# Patient Record
Sex: MALE | Race: ASIAN | NOT HISPANIC OR LATINO | Employment: OTHER | URBAN - METROPOLITAN AREA
[De-identification: names, ages, dates, MRNs, and addresses within clinical notes are randomized per-mention and may not be internally consistent; named-entity substitution may affect disease eponyms.]

---

## 2017-01-03 ENCOUNTER — OFFICE VISIT - HEALTHEAST (OUTPATIENT)
Dept: SPEECH THERAPY | Facility: REHABILITATION | Age: 52
End: 2017-01-03

## 2017-01-03 ENCOUNTER — OFFICE VISIT - HEALTHEAST (OUTPATIENT)
Dept: PHYSICAL THERAPY | Facility: REHABILITATION | Age: 52
End: 2017-01-03

## 2017-01-03 DIAGNOSIS — R29.898 LOWER EXTREMITY WEAKNESS: ICD-10-CM

## 2017-01-03 DIAGNOSIS — R29.3 ABNORMAL POSTURE: ICD-10-CM

## 2017-01-03 DIAGNOSIS — I63.9 CVA (CEREBRAL VASCULAR ACCIDENT) (H): ICD-10-CM

## 2017-01-03 DIAGNOSIS — R13.12 OROPHARYNGEAL DYSPHAGIA: ICD-10-CM

## 2017-01-03 DIAGNOSIS — I63.9 STROKE (H): ICD-10-CM

## 2017-01-03 DIAGNOSIS — Z74.09 IMPAIRED FUNCTIONAL MOBILITY, BALANCE, GAIT, AND ENDURANCE: ICD-10-CM

## 2017-01-03 DIAGNOSIS — R29.898 WEAKNESS OF BOTH LOWER EXTREMITIES: ICD-10-CM

## 2017-01-03 DIAGNOSIS — I69.320 APHASIA, POST-STROKE: ICD-10-CM

## 2017-01-03 DIAGNOSIS — I69.322 DYSARTHRIA, POST-STROKE: ICD-10-CM

## 2017-01-05 ENCOUNTER — OFFICE VISIT - HEALTHEAST (OUTPATIENT)
Dept: PHYSICAL THERAPY | Facility: REHABILITATION | Age: 52
End: 2017-01-05

## 2017-01-05 ENCOUNTER — OFFICE VISIT - HEALTHEAST (OUTPATIENT)
Dept: SPEECH THERAPY | Facility: REHABILITATION | Age: 52
End: 2017-01-05

## 2017-01-05 DIAGNOSIS — I63.9 STROKE (H): ICD-10-CM

## 2017-01-05 DIAGNOSIS — R29.3 ABNORMAL POSTURE: ICD-10-CM

## 2017-01-05 DIAGNOSIS — R13.12 OROPHARYNGEAL DYSPHAGIA: ICD-10-CM

## 2017-01-05 DIAGNOSIS — I69.320 APHASIA, POST-STROKE: ICD-10-CM

## 2017-01-05 DIAGNOSIS — I63.9 CVA (CEREBRAL VASCULAR ACCIDENT) (H): ICD-10-CM

## 2017-01-05 DIAGNOSIS — I69.322 DYSARTHRIA, POST-STROKE: ICD-10-CM

## 2017-01-05 DIAGNOSIS — Z74.09 IMPAIRED FUNCTIONAL MOBILITY, BALANCE, GAIT, AND ENDURANCE: ICD-10-CM

## 2017-01-05 DIAGNOSIS — R29.898 LOWER EXTREMITY WEAKNESS: ICD-10-CM

## 2017-01-05 DIAGNOSIS — R29.898 WEAKNESS OF BOTH LOWER EXTREMITIES: ICD-10-CM

## 2017-01-06 ENCOUNTER — OFFICE VISIT - HEALTHEAST (OUTPATIENT)
Dept: OCCUPATIONAL THERAPY | Facility: REHABILITATION | Age: 52
End: 2017-01-06

## 2017-01-06 DIAGNOSIS — G81.91 RIGHT HEMIPARESIS (H): ICD-10-CM

## 2017-01-06 DIAGNOSIS — Z78.9 DECREASED ACTIVITIES OF DAILY LIVING (ADL): ICD-10-CM

## 2017-01-12 ENCOUNTER — OFFICE VISIT - HEALTHEAST (OUTPATIENT)
Dept: PHYSICAL THERAPY | Facility: REHABILITATION | Age: 52
End: 2017-01-12

## 2017-01-12 DIAGNOSIS — I63.9 STROKE (H): ICD-10-CM

## 2017-01-12 DIAGNOSIS — Z74.09 IMPAIRED FUNCTIONAL MOBILITY, BALANCE, GAIT, AND ENDURANCE: ICD-10-CM

## 2017-01-12 DIAGNOSIS — R29.3 ABNORMAL POSTURE: ICD-10-CM

## 2017-01-12 DIAGNOSIS — R29.898 LOWER EXTREMITY WEAKNESS: ICD-10-CM

## 2017-01-12 DIAGNOSIS — I63.9 CVA (CEREBRAL VASCULAR ACCIDENT) (H): ICD-10-CM

## 2017-01-12 DIAGNOSIS — R29.898 WEAKNESS OF BOTH LOWER EXTREMITIES: ICD-10-CM

## 2017-01-17 ENCOUNTER — OFFICE VISIT - HEALTHEAST (OUTPATIENT)
Dept: PHYSICAL THERAPY | Facility: REHABILITATION | Age: 52
End: 2017-01-17

## 2017-01-17 DIAGNOSIS — I63.9 STROKE (H): ICD-10-CM

## 2017-01-17 DIAGNOSIS — R29.3 ABNORMAL POSTURE: ICD-10-CM

## 2017-01-17 DIAGNOSIS — Z74.09 IMPAIRED FUNCTIONAL MOBILITY, BALANCE, GAIT, AND ENDURANCE: ICD-10-CM

## 2017-01-17 DIAGNOSIS — R29.898 WEAKNESS OF BOTH LOWER EXTREMITIES: ICD-10-CM

## 2017-01-19 ENCOUNTER — OFFICE VISIT - HEALTHEAST (OUTPATIENT)
Dept: PHYSICAL THERAPY | Facility: REHABILITATION | Age: 52
End: 2017-01-19

## 2017-01-19 DIAGNOSIS — Z74.09 IMPAIRED FUNCTIONAL MOBILITY, BALANCE, GAIT, AND ENDURANCE: ICD-10-CM

## 2017-01-19 DIAGNOSIS — R29.898 WEAKNESS OF BOTH LOWER EXTREMITIES: ICD-10-CM

## 2017-01-19 DIAGNOSIS — I63.9 STROKE (H): ICD-10-CM

## 2017-01-19 DIAGNOSIS — R29.3 ABNORMAL POSTURE: ICD-10-CM

## 2017-01-24 ENCOUNTER — OFFICE VISIT - HEALTHEAST (OUTPATIENT)
Dept: PHYSICAL THERAPY | Facility: REHABILITATION | Age: 52
End: 2017-01-24

## 2017-01-24 DIAGNOSIS — R29.3 ABNORMAL POSTURE: ICD-10-CM

## 2017-01-24 DIAGNOSIS — R29.898 WEAKNESS OF BOTH LOWER EXTREMITIES: ICD-10-CM

## 2017-01-24 DIAGNOSIS — Z74.09 IMPAIRED FUNCTIONAL MOBILITY, BALANCE, GAIT, AND ENDURANCE: ICD-10-CM

## 2017-01-24 DIAGNOSIS — I63.9 STROKE (H): ICD-10-CM

## 2017-01-25 ENCOUNTER — OFFICE VISIT - HEALTHEAST (OUTPATIENT)
Dept: FAMILY MEDICINE | Facility: CLINIC | Age: 52
End: 2017-01-25

## 2017-01-25 DIAGNOSIS — I63.9 STROKE (H): ICD-10-CM

## 2017-01-25 DIAGNOSIS — K13.0 LIP ULCER: ICD-10-CM

## 2017-01-25 DIAGNOSIS — G47.00 INSOMNIA: ICD-10-CM

## 2017-01-25 DIAGNOSIS — R32 INCONTINENCE: ICD-10-CM

## 2017-01-26 ENCOUNTER — COMMUNICATION - HEALTHEAST (OUTPATIENT)
Dept: FAMILY MEDICINE | Facility: CLINIC | Age: 52
End: 2017-01-26

## 2017-01-26 ENCOUNTER — OFFICE VISIT - HEALTHEAST (OUTPATIENT)
Dept: PHYSICAL THERAPY | Facility: REHABILITATION | Age: 52
End: 2017-01-26

## 2017-01-26 DIAGNOSIS — R29.3 ABNORMAL POSTURE: ICD-10-CM

## 2017-01-26 DIAGNOSIS — R29.898 WEAKNESS OF BOTH LOWER EXTREMITIES: ICD-10-CM

## 2017-01-26 DIAGNOSIS — Z74.09 IMPAIRED FUNCTIONAL MOBILITY, BALANCE, GAIT, AND ENDURANCE: ICD-10-CM

## 2017-01-26 DIAGNOSIS — I63.9 STROKE (H): ICD-10-CM

## 2017-01-27 ENCOUNTER — RECORDS - HEALTHEAST (OUTPATIENT)
Dept: ADMINISTRATIVE | Facility: OTHER | Age: 52
End: 2017-01-27

## 2017-01-27 ENCOUNTER — HOSPITAL ENCOUNTER (OUTPATIENT)
Dept: INTERVENTIONAL RADIOLOGY/VASCULAR | Facility: CLINIC | Age: 52
Discharge: HOME | End: 2017-01-27
Attending: RADIOLOGY

## 2017-01-27 ENCOUNTER — HOSPITAL ENCOUNTER (OUTPATIENT)
Dept: INTERVENTIONAL RADIOLOGY/VASCULAR | Facility: HOSPITAL | Age: 52
Discharge: HOME | End: 2017-01-27
Attending: RADIOLOGY

## 2017-01-27 DIAGNOSIS — R63.30 FEEDING DIFFICULTIES: ICD-10-CM

## 2017-01-30 ENCOUNTER — COMMUNICATION - HEALTHEAST (OUTPATIENT)
Dept: INTERVENTIONAL RADIOLOGY/VASCULAR | Facility: HOSPITAL | Age: 52
End: 2017-01-30

## 2017-01-31 ENCOUNTER — OFFICE VISIT - HEALTHEAST (OUTPATIENT)
Dept: PHYSICAL THERAPY | Facility: REHABILITATION | Age: 52
End: 2017-01-31

## 2017-01-31 DIAGNOSIS — I63.9 STROKE (H): ICD-10-CM

## 2017-01-31 DIAGNOSIS — R29.898 WEAKNESS OF BOTH LOWER EXTREMITIES: ICD-10-CM

## 2017-01-31 DIAGNOSIS — R29.3 ABNORMAL POSTURE: ICD-10-CM

## 2017-01-31 DIAGNOSIS — Z74.09 IMPAIRED FUNCTIONAL MOBILITY, BALANCE, GAIT, AND ENDURANCE: ICD-10-CM

## 2017-02-01 ENCOUNTER — HOSPITAL ENCOUNTER (OUTPATIENT)
Dept: NEUROLOGY | Facility: CLINIC | Age: 52
Setting detail: THERAPIES SERIES
Discharge: STILL ADMITTED OR ADMINISTRATIVE | End: 2017-02-01
Attending: PSYCHIATRY & NEUROLOGY

## 2017-02-01 ENCOUNTER — AMBULATORY - HEALTHEAST (OUTPATIENT)
Dept: NEUROLOGY | Facility: CLINIC | Age: 52
End: 2017-02-01

## 2017-02-01 DIAGNOSIS — G81.10 SPASTIC HEMIPLEGIA (H): ICD-10-CM

## 2017-02-01 DIAGNOSIS — I67.9 CEREBROVASCULAR DISEASE: ICD-10-CM

## 2017-02-01 DIAGNOSIS — I69.30 SEQUELAE, POST-STROKE: ICD-10-CM

## 2017-02-01 DIAGNOSIS — F06.4 ANXIETY DISORDER DUE TO MEDICAL CONDITION: ICD-10-CM

## 2017-02-01 DIAGNOSIS — M62.838 MUSCLE SPASTICITY: ICD-10-CM

## 2017-02-01 DIAGNOSIS — G47.00 INSOMNIA, UNSPECIFIED: ICD-10-CM

## 2017-02-01 DIAGNOSIS — G40.209 PARTIAL SYMPTOMATIC EPILEPSY WITH COMPLEX PARTIAL SEIZURES, NOT INTRACTABLE, WITHOUT STATUS EPILEPTICUS (H): ICD-10-CM

## 2017-02-01 DIAGNOSIS — F32.A ANXIETY AND DEPRESSION: ICD-10-CM

## 2017-02-01 DIAGNOSIS — F41.9 ANXIETY AND DEPRESSION: ICD-10-CM

## 2017-02-01 DIAGNOSIS — K11.7 SIALORRHEA: ICD-10-CM

## 2017-02-01 DIAGNOSIS — K11.7 DROOLING: ICD-10-CM

## 2017-02-07 ENCOUNTER — OFFICE VISIT - HEALTHEAST (OUTPATIENT)
Dept: PHYSICAL THERAPY | Facility: REHABILITATION | Age: 52
End: 2017-02-07

## 2017-02-07 DIAGNOSIS — Z74.09 IMPAIRED FUNCTIONAL MOBILITY, BALANCE, GAIT, AND ENDURANCE: ICD-10-CM

## 2017-02-07 DIAGNOSIS — R29.3 ABNORMAL POSTURE: ICD-10-CM

## 2017-02-07 DIAGNOSIS — I63.9 STROKE (H): ICD-10-CM

## 2017-02-07 DIAGNOSIS — R29.898 WEAKNESS OF BOTH LOWER EXTREMITIES: ICD-10-CM

## 2017-02-09 ENCOUNTER — OFFICE VISIT - HEALTHEAST (OUTPATIENT)
Dept: PHYSICAL THERAPY | Facility: REHABILITATION | Age: 52
End: 2017-02-09

## 2017-02-09 DIAGNOSIS — R29.3 ABNORMAL POSTURE: ICD-10-CM

## 2017-02-09 DIAGNOSIS — I63.9 STROKE (H): ICD-10-CM

## 2017-02-09 DIAGNOSIS — I63.9 CVA (CEREBRAL VASCULAR ACCIDENT) (H): ICD-10-CM

## 2017-02-09 DIAGNOSIS — R29.898 LOWER EXTREMITY WEAKNESS: ICD-10-CM

## 2017-02-09 DIAGNOSIS — Z74.09 IMPAIRED FUNCTIONAL MOBILITY, BALANCE, GAIT, AND ENDURANCE: ICD-10-CM

## 2017-02-09 DIAGNOSIS — R29.898 WEAKNESS OF BOTH LOWER EXTREMITIES: ICD-10-CM

## 2017-02-14 ENCOUNTER — OFFICE VISIT - HEALTHEAST (OUTPATIENT)
Dept: PHYSICAL THERAPY | Facility: REHABILITATION | Age: 52
End: 2017-02-14

## 2017-02-14 DIAGNOSIS — I63.9 CVA (CEREBRAL VASCULAR ACCIDENT) (H): ICD-10-CM

## 2017-02-14 DIAGNOSIS — Z74.09 IMPAIRED FUNCTIONAL MOBILITY, BALANCE, GAIT, AND ENDURANCE: ICD-10-CM

## 2017-02-14 DIAGNOSIS — I63.9 STROKE (H): ICD-10-CM

## 2017-02-14 DIAGNOSIS — R29.3 ABNORMAL POSTURE: ICD-10-CM

## 2017-02-14 DIAGNOSIS — R29.898 WEAKNESS OF BOTH LOWER EXTREMITIES: ICD-10-CM

## 2017-02-14 DIAGNOSIS — R29.898 LOWER EXTREMITY WEAKNESS: ICD-10-CM

## 2017-02-16 ENCOUNTER — OFFICE VISIT - HEALTHEAST (OUTPATIENT)
Dept: PHYSICAL THERAPY | Facility: REHABILITATION | Age: 52
End: 2017-02-16

## 2017-02-16 DIAGNOSIS — I63.9 STROKE (H): ICD-10-CM

## 2017-02-16 DIAGNOSIS — R29.3 ABNORMAL POSTURE: ICD-10-CM

## 2017-02-16 DIAGNOSIS — R29.898 WEAKNESS OF BOTH LOWER EXTREMITIES: ICD-10-CM

## 2017-02-16 DIAGNOSIS — Z74.09 IMPAIRED FUNCTIONAL MOBILITY, BALANCE, GAIT, AND ENDURANCE: ICD-10-CM

## 2017-02-22 ENCOUNTER — OFFICE VISIT - HEALTHEAST (OUTPATIENT)
Dept: FAMILY MEDICINE | Facility: CLINIC | Age: 52
End: 2017-02-22

## 2017-02-22 DIAGNOSIS — G40.109 FOCAL MOTOR SEIZURE (H): ICD-10-CM

## 2017-02-22 DIAGNOSIS — R10.9 ABDOMINAL PAIN: ICD-10-CM

## 2017-02-22 DIAGNOSIS — I69.959 HEMIPLEGIA OF NONDOMINANT SIDE, LATE EFFECT OF CEREBROVASCULAR DISEASE (H): ICD-10-CM

## 2017-03-09 ENCOUNTER — COMMUNICATION - HEALTHEAST (OUTPATIENT)
Dept: SCHEDULING | Facility: CLINIC | Age: 52
End: 2017-03-09

## 2017-03-20 ENCOUNTER — COMMUNICATION - HEALTHEAST (OUTPATIENT)
Dept: FAMILY MEDICINE | Facility: CLINIC | Age: 52
End: 2017-03-20

## 2017-03-20 DIAGNOSIS — R32 INCONTINENCE: ICD-10-CM

## 2017-03-22 ENCOUNTER — COMMUNICATION - HEALTHEAST (OUTPATIENT)
Dept: FAMILY MEDICINE | Facility: CLINIC | Age: 52
End: 2017-03-22

## 2017-03-28 ENCOUNTER — COMMUNICATION - HEALTHEAST (OUTPATIENT)
Dept: FAMILY MEDICINE | Facility: CLINIC | Age: 52
End: 2017-03-28

## 2017-03-28 DIAGNOSIS — K21.9 GERD WITHOUT ESOPHAGITIS: ICD-10-CM

## 2017-04-17 ENCOUNTER — RECORDS - HEALTHEAST (OUTPATIENT)
Dept: ADMINISTRATIVE | Facility: OTHER | Age: 52
End: 2017-04-17

## 2017-04-19 ENCOUNTER — HOSPITAL ENCOUNTER (OUTPATIENT)
Dept: INTERVENTIONAL RADIOLOGY/VASCULAR | Facility: HOSPITAL | Age: 52
Discharge: HOME | End: 2017-04-19
Attending: RADIOLOGY

## 2017-04-19 DIAGNOSIS — R63.30 FEEDING DIFFICULTIES: ICD-10-CM

## 2017-04-26 ENCOUNTER — OFFICE VISIT - HEALTHEAST (OUTPATIENT)
Dept: FAMILY MEDICINE | Facility: CLINIC | Age: 52
End: 2017-04-26

## 2017-04-26 DIAGNOSIS — I61.9 HEMORRHAGIC STROKE (H): ICD-10-CM

## 2017-04-26 DIAGNOSIS — R47.01 APHASIA: ICD-10-CM

## 2017-05-06 ENCOUNTER — COMMUNICATION - HEALTHEAST (OUTPATIENT)
Dept: FAMILY MEDICINE | Facility: CLINIC | Age: 52
End: 2017-05-06

## 2017-05-06 DIAGNOSIS — I61.9 HEMORRHAGIC STROKE (H): ICD-10-CM

## 2017-05-10 ENCOUNTER — COMMUNICATION - HEALTHEAST (OUTPATIENT)
Dept: FAMILY MEDICINE | Facility: CLINIC | Age: 52
End: 2017-05-10

## 2017-05-15 ENCOUNTER — OFFICE VISIT - HEALTHEAST (OUTPATIENT)
Dept: FAMILY MEDICINE | Facility: CLINIC | Age: 52
End: 2017-05-15

## 2017-05-15 DIAGNOSIS — R25.1 TREMOR: ICD-10-CM

## 2017-05-15 DIAGNOSIS — K21.9 GERD (GASTROESOPHAGEAL REFLUX DISEASE): ICD-10-CM

## 2017-05-15 DIAGNOSIS — K12.1 STOMATITIS: ICD-10-CM

## 2017-05-17 ENCOUNTER — COMMUNICATION - HEALTHEAST (OUTPATIENT)
Dept: FAMILY MEDICINE | Facility: CLINIC | Age: 52
End: 2017-05-17

## 2017-05-31 ENCOUNTER — COMMUNICATION - HEALTHEAST (OUTPATIENT)
Dept: NURSING | Facility: CLINIC | Age: 52
End: 2017-05-31

## 2017-06-08 ENCOUNTER — AMBULATORY - HEALTHEAST (OUTPATIENT)
Dept: CARE COORDINATION | Facility: CLINIC | Age: 52
End: 2017-06-08

## 2017-06-22 ENCOUNTER — COMMUNICATION - HEALTHEAST (OUTPATIENT)
Dept: FAMILY MEDICINE | Facility: CLINIC | Age: 52
End: 2017-06-22

## 2017-06-28 ENCOUNTER — COMMUNICATION - HEALTHEAST (OUTPATIENT)
Dept: NURSING | Facility: CLINIC | Age: 52
End: 2017-06-28

## 2017-06-30 ENCOUNTER — COMMUNICATION - HEALTHEAST (OUTPATIENT)
Dept: FAMILY MEDICINE | Facility: CLINIC | Age: 52
End: 2017-06-30

## 2017-06-30 DIAGNOSIS — K21.9 GERD WITHOUT ESOPHAGITIS: ICD-10-CM

## 2017-07-06 ENCOUNTER — AMBULATORY - HEALTHEAST (OUTPATIENT)
Dept: CARE COORDINATION | Facility: CLINIC | Age: 52
End: 2017-07-06

## 2017-07-25 ENCOUNTER — COMMUNICATION - HEALTHEAST (OUTPATIENT)
Dept: FAMILY MEDICINE | Facility: CLINIC | Age: 52
End: 2017-07-25

## 2017-07-25 DIAGNOSIS — R25.1 TREMOR: ICD-10-CM

## 2017-08-03 ENCOUNTER — COMMUNICATION - HEALTHEAST (OUTPATIENT)
Dept: NURSING | Facility: CLINIC | Age: 52
End: 2017-08-03

## 2017-08-06 ENCOUNTER — ANESTHESIA - HEALTHEAST (OUTPATIENT)
Dept: INTENSIVE CARE | Facility: CLINIC | Age: 52
End: 2017-08-06

## 2017-08-11 ENCOUNTER — HOME CARE/HOSPICE - HEALTHEAST (OUTPATIENT)
Dept: HOME HEALTH SERVICES | Facility: HOME HEALTH | Age: 52
End: 2017-08-11

## 2017-08-18 ENCOUNTER — COMMUNICATION - HEALTHEAST (OUTPATIENT)
Dept: HOME HEALTH SERVICES | Facility: HOME HEALTH | Age: 52
End: 2017-08-18

## 2017-08-21 ENCOUNTER — COMMUNICATION - HEALTHEAST (OUTPATIENT)
Dept: FAMILY MEDICINE | Facility: CLINIC | Age: 52
End: 2017-08-21

## 2017-08-22 ENCOUNTER — COMMUNICATION - HEALTHEAST (OUTPATIENT)
Dept: NURSING | Facility: CLINIC | Age: 52
End: 2017-08-22

## 2017-08-22 ENCOUNTER — AMBULATORY - HEALTHEAST (OUTPATIENT)
Dept: CARE COORDINATION | Facility: CLINIC | Age: 52
End: 2017-08-22

## 2017-08-23 ENCOUNTER — COMMUNICATION - HEALTHEAST (OUTPATIENT)
Dept: NURSING | Facility: CLINIC | Age: 52
End: 2017-08-23

## 2017-08-23 ENCOUNTER — COMMUNICATION - HEALTHEAST (OUTPATIENT)
Dept: CARE COORDINATION | Facility: CLINIC | Age: 52
End: 2017-08-23

## 2017-08-28 ENCOUNTER — COMMUNICATION - HEALTHEAST (OUTPATIENT)
Dept: NURSING | Facility: CLINIC | Age: 52
End: 2017-08-28

## 2017-08-28 DIAGNOSIS — G93.40 ACUTE ENCEPHALOPATHY: ICD-10-CM

## 2017-08-30 ENCOUNTER — COMMUNICATION - HEALTHEAST (OUTPATIENT)
Dept: NURSING | Facility: CLINIC | Age: 52
End: 2017-08-30

## 2017-08-30 DIAGNOSIS — G93.40 ACUTE ENCEPHALOPATHY: ICD-10-CM

## 2017-09-01 ENCOUNTER — AMBULATORY - HEALTHEAST (OUTPATIENT)
Dept: CARE COORDINATION | Facility: CLINIC | Age: 52
End: 2017-09-01

## 2017-09-07 ENCOUNTER — COMMUNICATION - HEALTHEAST (OUTPATIENT)
Dept: FAMILY MEDICINE | Facility: CLINIC | Age: 52
End: 2017-09-07

## 2017-09-07 DIAGNOSIS — R25.1 TREMOR: ICD-10-CM

## 2017-09-08 ENCOUNTER — COMMUNICATION - HEALTHEAST (OUTPATIENT)
Dept: SCHEDULING | Facility: CLINIC | Age: 52
End: 2017-09-08

## 2017-09-13 ENCOUNTER — AMBULATORY - HEALTHEAST (OUTPATIENT)
Dept: SCHEDULING | Facility: CLINIC | Age: 52
End: 2017-09-13

## 2017-09-13 ENCOUNTER — COMMUNICATION - HEALTHEAST (OUTPATIENT)
Dept: FAMILY MEDICINE | Facility: CLINIC | Age: 52
End: 2017-09-13

## 2017-09-13 DIAGNOSIS — I61.9 HEMORRHAGIC STROKE (H): ICD-10-CM

## 2017-09-15 ENCOUNTER — COMMUNICATION - HEALTHEAST (OUTPATIENT)
Dept: FAMILY MEDICINE | Facility: CLINIC | Age: 52
End: 2017-09-15

## 2017-09-17 ENCOUNTER — COMMUNICATION - HEALTHEAST (OUTPATIENT)
Dept: FAMILY MEDICINE | Facility: CLINIC | Age: 52
End: 2017-09-17

## 2017-09-17 DIAGNOSIS — R79.89 ELEVATED TROPONIN: ICD-10-CM

## 2017-09-20 ENCOUNTER — COMMUNICATION - HEALTHEAST (OUTPATIENT)
Dept: FAMILY MEDICINE | Facility: CLINIC | Age: 52
End: 2017-09-20

## 2017-09-22 ENCOUNTER — COMMUNICATION - HEALTHEAST (OUTPATIENT)
Dept: FAMILY MEDICINE | Facility: CLINIC | Age: 52
End: 2017-09-22

## 2017-09-22 ENCOUNTER — OFFICE VISIT - HEALTHEAST (OUTPATIENT)
Dept: FAMILY MEDICINE | Facility: CLINIC | Age: 52
End: 2017-09-22

## 2017-09-22 DIAGNOSIS — Z12.11 SCREEN FOR COLON CANCER: ICD-10-CM

## 2017-09-22 DIAGNOSIS — A41.9 SEVERE SEPSIS (H): ICD-10-CM

## 2017-09-22 DIAGNOSIS — R65.20 SEVERE SEPSIS (H): ICD-10-CM

## 2017-09-22 DIAGNOSIS — J18.9 PNEUMONIA: ICD-10-CM

## 2017-09-25 ENCOUNTER — COMMUNICATION - HEALTHEAST (OUTPATIENT)
Dept: NURSING | Facility: CLINIC | Age: 52
End: 2017-09-25

## 2017-09-25 ENCOUNTER — COMMUNICATION - HEALTHEAST (OUTPATIENT)
Dept: FAMILY MEDICINE | Facility: CLINIC | Age: 52
End: 2017-09-25

## 2017-09-29 ENCOUNTER — OFFICE VISIT - HEALTHEAST (OUTPATIENT)
Dept: FAMILY MEDICINE | Facility: CLINIC | Age: 52
End: 2017-09-29

## 2017-09-29 ENCOUNTER — COMMUNICATION - HEALTHEAST (OUTPATIENT)
Dept: FAMILY MEDICINE | Facility: CLINIC | Age: 52
End: 2017-09-29

## 2017-09-29 DIAGNOSIS — R05.9 COUGH: ICD-10-CM

## 2017-09-29 DIAGNOSIS — E87.5 HYPERKALEMIA: ICD-10-CM

## 2017-10-09 ENCOUNTER — COMMUNICATION - HEALTHEAST (OUTPATIENT)
Dept: FAMILY MEDICINE | Facility: CLINIC | Age: 52
End: 2017-10-09

## 2017-10-09 DIAGNOSIS — K21.9 GERD (GASTROESOPHAGEAL REFLUX DISEASE): ICD-10-CM

## 2017-10-09 DIAGNOSIS — I61.9 HEMORRHAGIC STROKE (H): ICD-10-CM

## 2017-10-09 DIAGNOSIS — R79.89 ELEVATED TROPONIN: ICD-10-CM

## 2017-10-09 DIAGNOSIS — R25.1 TREMOR: ICD-10-CM

## 2017-10-13 ENCOUNTER — COMMUNICATION - HEALTHEAST (OUTPATIENT)
Dept: SCHEDULING | Facility: CLINIC | Age: 52
End: 2017-10-13

## 2017-10-13 ENCOUNTER — OFFICE VISIT - HEALTHEAST (OUTPATIENT)
Dept: FAMILY MEDICINE | Facility: CLINIC | Age: 52
End: 2017-10-13

## 2017-10-13 DIAGNOSIS — R05.9 COUGH: ICD-10-CM

## 2017-10-13 DIAGNOSIS — J18.9 PNEUMONIA: ICD-10-CM

## 2017-10-13 DIAGNOSIS — K13.79 MOUTH PAIN: ICD-10-CM

## 2017-10-16 ENCOUNTER — COMMUNICATION - HEALTHEAST (OUTPATIENT)
Dept: NURSING | Facility: CLINIC | Age: 52
End: 2017-10-16

## 2017-10-20 ENCOUNTER — HOSPITAL ENCOUNTER (OUTPATIENT)
Dept: INTERVENTIONAL RADIOLOGY/VASCULAR | Facility: HOSPITAL | Age: 52
Discharge: HOME | End: 2017-10-20
Attending: RADIOLOGY

## 2017-10-20 DIAGNOSIS — R63.30 FEEDING DIFFICULTIES: ICD-10-CM

## 2017-10-25 ENCOUNTER — OFFICE VISIT - HEALTHEAST (OUTPATIENT)
Dept: FAMILY MEDICINE | Facility: CLINIC | Age: 52
End: 2017-10-25

## 2017-10-25 DIAGNOSIS — I61.9 HEMORRHAGIC STROKE (H): ICD-10-CM

## 2017-10-25 DIAGNOSIS — M26.629 TMJ PAIN DYSFUNCTION SYNDROME: ICD-10-CM

## 2017-10-25 DIAGNOSIS — Z23 NEED FOR VACCINATION: ICD-10-CM

## 2017-10-25 DIAGNOSIS — R47.01 APHASIA: ICD-10-CM

## 2017-10-30 ENCOUNTER — RECORDS - HEALTHEAST (OUTPATIENT)
Dept: ADMINISTRATIVE | Facility: OTHER | Age: 52
End: 2017-10-30

## 2017-11-17 ENCOUNTER — COMMUNICATION - HEALTHEAST (OUTPATIENT)
Dept: FAMILY MEDICINE | Facility: CLINIC | Age: 52
End: 2017-11-17

## 2017-11-17 DIAGNOSIS — I61.9 HEMORRHAGIC STROKE (H): ICD-10-CM

## 2017-11-21 ENCOUNTER — COMMUNICATION - HEALTHEAST (OUTPATIENT)
Dept: NURSING | Facility: CLINIC | Age: 52
End: 2017-11-21

## 2017-11-24 ENCOUNTER — COMMUNICATION - HEALTHEAST (OUTPATIENT)
Dept: FAMILY MEDICINE | Facility: CLINIC | Age: 52
End: 2017-11-24

## 2017-11-24 DIAGNOSIS — R05.9 COUGH: ICD-10-CM

## 2017-11-26 ENCOUNTER — OFFICE VISIT - HEALTHEAST (OUTPATIENT)
Dept: FAMILY MEDICINE | Facility: CLINIC | Age: 52
End: 2017-11-26

## 2017-11-26 DIAGNOSIS — J96.11 CHRONIC RESPIRATORY FAILURE WITH HYPOXIA (H): ICD-10-CM

## 2017-11-26 DIAGNOSIS — I69.30 HISTORY OF STROKE WITH RESIDUAL DEFICIT: ICD-10-CM

## 2017-11-26 DIAGNOSIS — J69.0 ASPIRATION PNEUMONIA (H): ICD-10-CM

## 2017-11-30 ENCOUNTER — COMMUNICATION - HEALTHEAST (OUTPATIENT)
Dept: NURSING | Facility: CLINIC | Age: 52
End: 2017-11-30

## 2017-12-07 ENCOUNTER — COMMUNICATION - HEALTHEAST (OUTPATIENT)
Dept: FAMILY MEDICINE | Facility: CLINIC | Age: 52
End: 2017-12-07

## 2017-12-08 ENCOUNTER — RECORDS - HEALTHEAST (OUTPATIENT)
Dept: ADMINISTRATIVE | Facility: OTHER | Age: 52
End: 2017-12-08

## 2017-12-11 ENCOUNTER — AMBULATORY - HEALTHEAST (OUTPATIENT)
Dept: CARE COORDINATION | Facility: CLINIC | Age: 52
End: 2017-12-11

## 2017-12-17 ENCOUNTER — COMMUNICATION - HEALTHEAST (OUTPATIENT)
Dept: FAMILY MEDICINE | Facility: CLINIC | Age: 52
End: 2017-12-17

## 2017-12-17 DIAGNOSIS — I61.9 HEMORRHAGIC STROKE (H): ICD-10-CM

## 2017-12-21 ENCOUNTER — COMMUNICATION - HEALTHEAST (OUTPATIENT)
Dept: NURSING | Facility: CLINIC | Age: 52
End: 2017-12-21

## 2017-12-22 ENCOUNTER — AMBULATORY - HEALTHEAST (OUTPATIENT)
Dept: CARE COORDINATION | Facility: CLINIC | Age: 52
End: 2017-12-22

## 2017-12-28 ENCOUNTER — RECORDS - HEALTHEAST (OUTPATIENT)
Dept: ADMINISTRATIVE | Facility: OTHER | Age: 52
End: 2017-12-28

## 2018-01-02 ENCOUNTER — HOSPITAL ENCOUNTER (OUTPATIENT)
Dept: INTERVENTIONAL RADIOLOGY/VASCULAR | Facility: HOSPITAL | Age: 53
Discharge: HOME | End: 2018-01-02
Attending: RADIOLOGY

## 2018-01-02 DIAGNOSIS — R63.30 FEEDING DIFFICULTIES: ICD-10-CM

## 2018-01-03 ENCOUNTER — COMMUNICATION - HEALTHEAST (OUTPATIENT)
Dept: FAMILY MEDICINE | Facility: CLINIC | Age: 53
End: 2018-01-03

## 2018-01-03 DIAGNOSIS — R25.1 TREMOR: ICD-10-CM

## 2018-01-03 DIAGNOSIS — I61.9 HEMORRHAGIC STROKE (H): ICD-10-CM

## 2018-01-03 DIAGNOSIS — K21.9 GERD (GASTROESOPHAGEAL REFLUX DISEASE): ICD-10-CM

## 2018-01-26 ENCOUNTER — COMMUNICATION - HEALTHEAST (OUTPATIENT)
Dept: FAMILY MEDICINE | Facility: CLINIC | Age: 53
End: 2018-01-26

## 2018-01-29 ENCOUNTER — COMMUNICATION - HEALTHEAST (OUTPATIENT)
Dept: FAMILY MEDICINE | Facility: CLINIC | Age: 53
End: 2018-01-29

## 2018-02-13 ENCOUNTER — COMMUNICATION - HEALTHEAST (OUTPATIENT)
Dept: FAMILY MEDICINE | Facility: CLINIC | Age: 53
End: 2018-02-13

## 2018-02-13 DIAGNOSIS — R79.89 ELEVATED TROPONIN: ICD-10-CM

## 2018-02-16 ENCOUNTER — RECORDS - HEALTHEAST (OUTPATIENT)
Dept: ADMINISTRATIVE | Facility: OTHER | Age: 53
End: 2018-02-16

## 2018-02-16 ENCOUNTER — HOSPITAL ENCOUNTER (OUTPATIENT)
Dept: RADIOLOGY | Facility: HOSPITAL | Age: 53
Discharge: HOME | End: 2018-02-16
Attending: FAMILY MEDICINE

## 2018-02-16 ENCOUNTER — HOSPITAL ENCOUNTER (OUTPATIENT)
Dept: INTERVENTIONAL RADIOLOGY/VASCULAR | Facility: HOSPITAL | Age: 53
Discharge: HOME | End: 2018-02-16
Attending: RADIOLOGY | Admitting: RADIOLOGY

## 2018-02-16 ENCOUNTER — OFFICE VISIT - HEALTHEAST (OUTPATIENT)
Dept: FAMILY MEDICINE | Facility: CLINIC | Age: 53
End: 2018-02-16

## 2018-02-16 DIAGNOSIS — J69.0 ASPIRATION PNEUMONIA (H): ICD-10-CM

## 2018-02-16 DIAGNOSIS — I63.9 CVA (CEREBRAL VASCULAR ACCIDENT) (H): ICD-10-CM

## 2018-02-16 DIAGNOSIS — R63.30 FEEDING DIFFICULTIES: ICD-10-CM

## 2018-02-21 ENCOUNTER — COMMUNICATION - HEALTHEAST (OUTPATIENT)
Dept: FAMILY MEDICINE | Facility: CLINIC | Age: 53
End: 2018-02-21

## 2018-02-28 ENCOUNTER — OFFICE VISIT (OUTPATIENT)
Dept: SURGERY | Facility: CLINIC | Age: 53
End: 2018-02-28

## 2018-02-28 ENCOUNTER — ANESTHESIA EVENT (OUTPATIENT)
Dept: SURGERY | Facility: CLINIC | Age: 53
End: 2018-02-28
Payer: COMMERCIAL

## 2018-02-28 ENCOUNTER — APPOINTMENT (OUTPATIENT)
Dept: SURGERY | Facility: CLINIC | Age: 53
End: 2018-02-28

## 2018-02-28 ENCOUNTER — ALLIED HEALTH/NURSE VISIT (OUTPATIENT)
Dept: SURGERY | Facility: CLINIC | Age: 53
End: 2018-02-28

## 2018-02-28 ENCOUNTER — RECORDS - HEALTHEAST (OUTPATIENT)
Dept: ADMINISTRATIVE | Facility: OTHER | Age: 53
End: 2018-02-28

## 2018-02-28 VITALS
WEIGHT: 80 LBS | OXYGEN SATURATION: 95 % | RESPIRATION RATE: 16 BRPM | BODY MASS INDEX: 14.72 KG/M2 | TEMPERATURE: 98.4 F | HEART RATE: 85 BPM | HEIGHT: 62 IN | DIASTOLIC BLOOD PRESSURE: 83 MMHG | SYSTOLIC BLOOD PRESSURE: 122 MMHG

## 2018-02-28 DIAGNOSIS — Z01.818 PRE-OP EVALUATION: Primary | ICD-10-CM

## 2018-02-28 RX ORDER — METOPROLOL TARTRATE 25 MG/1
25 TABLET, FILM COATED ORAL 2 TIMES DAILY
COMMUNITY
Start: 2018-02-13 | End: 2021-11-03

## 2018-02-28 RX ORDER — ESOMEPRAZOLE MAGNESIUM 40 MG/1
40 CAPSULE, DELAYED RELEASE ORAL EVERY MORNING
COMMUNITY
Start: 2018-01-04 | End: 2021-10-28

## 2018-02-28 RX ORDER — CODEINE PHOSPHATE/GUAIFENESIN 10-100MG/5
5 LIQUID (ML) ORAL 3 TIMES DAILY PRN
COMMUNITY
Start: 2017-11-24 | End: 2021-10-28

## 2018-02-28 RX ORDER — QUETIAPINE FUMARATE 25 MG/1
25 TABLET, FILM COATED ORAL 2 TIMES DAILY PRN
COMMUNITY
End: 2021-09-09

## 2018-02-28 RX ORDER — BISACODYL 10 MG
10 SUPPOSITORY, RECTAL RECTAL PRN
COMMUNITY
Start: 2017-12-18 | End: 2018-03-18

## 2018-02-28 RX ORDER — LEVETIRACETAM 100 MG/ML
7.5 SOLUTION ORAL 2 TIMES DAILY
COMMUNITY
Start: 2017-10-10 | End: 2021-08-20

## 2018-02-28 RX ORDER — BENZONATATE 100 MG/1
100 CAPSULE ORAL EVERY 6 HOURS PRN
COMMUNITY
Start: 2017-09-29 | End: 2021-10-28

## 2018-02-28 RX ORDER — CHLORHEXIDINE GLUCONATE ORAL RINSE 1.2 MG/ML
15 SOLUTION DENTAL DAILY
COMMUNITY
End: 2023-10-25

## 2018-02-28 RX ORDER — BACLOFEN 10 MG/1
10 TABLET ORAL AT BEDTIME
COMMUNITY
Start: 2018-01-04 | End: 2021-10-28

## 2018-02-28 RX ORDER — ASPIRIN 81 MG/1
81 TABLET, CHEWABLE ORAL EVERY MORNING
COMMUNITY
Start: 2017-08-17 | End: 2022-10-28

## 2018-02-28 ASSESSMENT — LIFESTYLE VARIABLES: TOBACCO_USE: 1

## 2018-02-28 ASSESSMENT — ENCOUNTER SYMPTOMS: SEIZURES: 1

## 2018-02-28 NOTE — ANESTHESIA PREPROCEDURE EVALUATION
Anesthesia Evaluation     . Pt has had prior anesthetic. Type: General    No history of anesthetic complications          ROS/MED HX    ENT/Pulmonary: Comment: 2/16/18:  Placed on antibiotics for suspected aspiration  pneumonia.  CXR:  Stable slight nodular opacity left upper lung is not significantly changed. Lungs otherwise clear. No acute new findings          (+)other ENT- Trismus, bruxism and perdontal disease, tobacco use (smoked 1 PPD for 20 years.  ), Past use 1 packs/day  , . .    Neurologic: Comment: Seizure disorder 2/2 CVA.  No recent seizures.     H/O CVA with left sided weakneaa & dysphagia >>> feeding tube placement 6/2016    S/P aneurysm clipping, 2007            (+)seizures CVA date: 6/2016 with deficits- bilateral weakness with L weakness> right.  Dysphagia.  ,     Cardiovascular: Comment: Severe sepsis 8/17/2017 with NSTEMI,records indicate likely LV strain from underlying sepsis    (+) hypertension--CAD (NSTEMI 6/2017), --. Taking blood thinners Pt has received instructions: . . . :. . Previous cardiac testing date:results:date: results:ECG reviewed date: results: date: results:          METS/Exercise Tolerance: Comment:   Total cares    Hematologic:  - neg hematologic  ROS       Musculoskeletal: Comment: Upper and lower extremity contractures.         GI/Hepatic: Comment: G-J tube for all nutrition.  Tube feedings 24 hours per day.     (+) GERD Asymptomatic on medication, hepatitis (carrier) type C,       Renal/Genitourinary: Comment: Incontinent of bladder, but not bowel         Endo:  - neg endo ROS       Psychiatric:     (+) psychiatric history depression      Infectious Disease: Comment: + ESBL  (+) MRSA,       Malignancy:      - no malignancy   Other: Comment: Total cares   (+) No chance of pregnancy H/O Chronic Pain,H/O chronic opiod use , other significant disability Wheelchair bound and Other (comment)                   Physical Exam      Airway   TM distance: >3 FB  Neck ROM:  full  Comment: Difficult airway evaluation given Trismus.     Dental   (+) missing    Cardiovascular   Rhythm and rate: regular and normal      Pulmonary   PE comment: Shallow breath sounds with diminished breath sounds in bases bilateral. Coarse cough.    Other findings: XR CHEST 2 VIEWS    2/16/2018 2:49 PM        INDICATION: Pneumonitis due to inhalation of food and vomit    COMPARISON: 11/26/2017 an 10/13/2017        FINDINGS: Mild thoracic curve convex to the right. Stable slight nodular opacity left upper lung is not significantly changed. Lungs otherwise clear. No acute new findings        ECG 9/22/2017 ([facility])  Normal sinus rhythm 86  Normal ECG  When compared with ECG of 05-AUG-2017 02:57,  T wave amplitude has increased in Anterolateral leads    Echocardiogram 8/7/2017    Left Ventricle: Normal size.The calculated left ventricular ejection   fraction is 47%. Mildly depressed LV systolic function primarily related   to abnormal septal wall motion. The left ventricular wall thickness is   normal. Abnormal septal motion. Excessive respiratory changes likely   secondary to mechanical ventilation. E/e' < 8, suggesting normal LV   filling pressure.    Right Ventricle: Right ventricle not well visualized. TAPSE is normal,   which is consistent with normal right ventricular systolic function.    IVC: Severely elevated central venous pressure with IVC diameter greater   than 2.1 cm and less than 50% decrease during inspiration. Estimated   central venous pressure equal to 13 mmHg    No hemodynamically significant valvular heart abnormalities           PAC Discussion and Assessment    ASA Classification: 3  Case is suitable for: [facility]  Anesthetic techniques and relevant risks discussed: GA  Invasive monitoring and risk discussed:   Types:   Possibility and Risk of blood transfusion discussed:   NPO instructions given:   Additional anesthetic preparation and risks discussed:   Needs early admission to pre-op area:    Other:     PAC Resident/NP Anesthesia Assessment:  Liu Malave is a 52 year old male scheduled for Kenalog Injection In Mouth, Dental Exam, Restorations, Radiographs, Extractions, Periodontal Therapy, Biopsies on 3/7/2018 with Monica Conroy and Akilah at [facility] under general anesthesia.  Mr. Ponce has dental pain and cannot be managed in clinic setting given limited mouth opening with trismus and bruxism.  Additionally, he has a h/o CVA with subsequent dysphagia requiring a G-J tube for all nutrition and medications.  His last dental EUA procedure was in 2016 requiring overnight hospitalization under observation status d/t concerns regarding dysphagia and airway management following the procedure.  PAC referral for risk assessment and optimization of anesthesia with comorbid conditions of:  as above; HTN; HLD; FRANKY; h/o aspiration pneumonia;  GERD; seizure disorder; depression; positive MRSA; positive ESBL and hepatitis C carrier.    Mr. Malave presents to PAC with his daughter, Idalmis, and [facility].  Daughter reports that Mr. Malave has frequent bouts of aspiration pneumonia given his dysphagia 2/2 CVA.  He was started prophylactically on antibiotics for suspected aspiration pneumonia 2/16/18. Mr. Malave resides at home, wheel chair bound and receives total cares by family.  He makes garbled sounds.  He has a G-J tube and receives 24 hour feedings. Daughter answers questions.  Complex hospitalization last summer (2016) for septicemia at [facility] . Please see EPIC for further details    He has the following specific operative considerations:     1.  Cardiology - NSTEMI 6/2017 thought to be LV strain from underlying sepsis according to records.  Medical management.  Denies cardiac symptoms.  METS<1. RCRI : Coronary Artery Disease (MI, positive stress test, angina, Qs on EKG).  0.9 % risk of major adverse cardiac event. Low risk surgery.  No further  cardiac evaluation needed per 2014 ACC/AHA guidelines for non-cardiac surgery.        - HTN, take BB and CCB DOS.  Hold ACE DOS       - HLD, take statin DOS  2.  Pulmonary - 20 pack years smoking hx.  Quit smoking       - High risk for aspiration>>>ASPIRATION PRECAUTIONS.  CXR showed pneumonitis due to aspiration 2/16/18>>>Currently being treated phylactically with amoxicillin>>>No evidence of consolidation.       - FRANKY # of risks 3/8 = intermediate  3.  Hematology - VTE rsk:  0.5%       - H/o blood transfusion   4.  GI - Risk of PONV score = 2.  If > 2, anti-emetic intervention recommended.       - GERD, take PPI       - G-J tube- all nutrition and medication through tube.  NPO per protocol for above procedure.   5.  Musculoskeletal - recommend careful positioning given left sided weakness greater than right with evidence of contractures of extremities. WC bound.  Total cares. Fall precautions.   6.  Neuro - CVA 6/2016 now with left sided weakness, dysphagia and total cares.  + aphasia.  Able to follow some commands.        - Seizure disorder 2/2 CVA.  No seizures since 6/2016.  Take levetiractam DOS            - aneurysm clipping, 2006       - depression, take venlafaxine DOS   7.  HEENT - Periodontal disease>>>above procedure planned       - Bruxism       - Trismus  8.  ID - + MRSA, +ESBL and Hepatis C Carrier      - Anesthesia considerations:  12/19/16; 1111; Mask Ventilation: Easy; Ease of Intubation: Easy; Airway Size: 7;  Cuffed;  JESSI, Nasal;  Blade Type: C-Mac;  Blade Size: 3;  Place by: NADINE López CRNA;  Insertion Attempts: 1;  Secured at (cm)to lip: 26 cm;  Breath Sounds: Equal, clear and bilateral;  End Tidal CO2: Present;  Dentition: Intact;  Grade View of Cords: 1;  Airway Adjuncts:  C-Mac- Airway:  Limited mouth opening given trismus.  Unable to evaluate mallmapati      Arrival time, NPO, shower and medication instructions provided by nursing staff today.  Preparing For Your Surgery handout given.   Patient was discussed with Dr Morales. I spent 20 minutes face to face with patient assessing, educating, counseling and/or coordinating care and examining the patient.  Of that 20 minutes, I spent greater than 50% of my time counseling and/or coordinating care.  All of the above labs and procedures I personally reviewed.    Reviewed and Signed by PAC Mid-Level Provider/Resident  Mid-Level Provider/Resident: Yessi MILLER CNP  Date: 2/28/2018  Time: 9:07    Attending Anesthesiologist Anesthesia Assessment:  52 year old for dental exam, restorations, extractions; HTN, HLD, history of chronic aspiration despite only being fed via g tube. NSTEMI 6/2017, managed medically. CXR showed pneumonitis due to aspiration 2/16/2018; started on prophylactic antibodies. He has had many CXR due to his chronic aspiration but never focal pneumonia. CVA 2016, non verbal, WCB.    Patient/case discussed with [place]. No need to see patient. Patient is appropriate for the planned procedure without further work-up or medical management.      Reviewed and Signed by PAC Anesthesiologist  Anesthesiologist: kasandra  Date: 2/28/2018  Time:   Pass/Fail: Pass  Disposition:     PAC Pharmacist Assessment:        Pharmacist:   Date:   Time:      Anesthesia Plan      History & Physical Review  History and physical reviewed and following examination; no interval change.    ASA Status:  3 .    NPO Status:  > 8 hours    Plan for General and ETT (Nasal Sharlene) with Intravenous and Propofol induction. Maintenance will be Balanced.    PONV prophylaxis:  Ondansetron (or other 5HT-3)  Additional equipment: Videolaryngoscope Reviewed GA risks including possible postop admission. All daughter's questions answered. Daughter agrees with plan and wishes to proceed.      Postoperative Care  Postoperative pain management:  IV analgesics and Oral pain medications.      Consents  Anesthetic plan, risks, benefits and alternatives discussed with:  Patient and Daughter/Son..                           .

## 2018-02-28 NOTE — ADDENDUM NOTE
Addendum  created 02/28/18 1236 by Yessi Landers, APRN CNP    Follow-up modified, LOS modified, Sign clinical note, Visit diagnoses modified

## 2018-02-28 NOTE — MR AVS SNAPSHOT
After Visit Summary   2018    Liu Malave    MRN: [id number]           Patient Information     Date Of Birth          1965        Visit Information        Provider Department      2018 9:15 AM Criselda, Son; Rn, [facility] Preoperative Assessment Center        Care Instructions    Preparing for Your Surgery      Name:  Liu Malave   MRN:  [id number]   :  1965   Today's Date:  2018     Arriving for surgery:  Surgery date:  3/7/2018  Arrival time:  6:00 am  Please come to:     [facility] [address]  [address] [place]  [address]    - parking is available in front of [facility] from 5:15AM to 8:00PM. If you prefer, park your car in the Green Lot.    -Proceed to the 3rd floor, check in at the Adult Surgery Waiting Lounge. [phone]    If an escort is needed stop at the Information Desk in the lobby. Inform the information person that you are here for surgery. An escort to the Adult Surgery Waiting Lounge will be provided.        What can I eat or drink?  -  You may have solid food or milk products until 8 hours prior to your surgery.  -  You may have water, apple juice or 7up/Sprite until 2 hours prior to your surgery.    Which medicines can I take?  -  Do NOT take these medications in the morning, the day of surgery:  Lisinopril      Stop aspirin 5 days before procedure    -  Please take these medications the day of surgery:   Zanaflex, effexor, doxazosin, tylenol, tramadol as needed, seroquel, benzonatate, Keppra, tylenol #3 as needed, Nexium, metoprolol      How do I prepare myself?  -  Take two showers: one the night before surgery; and one the morning of surgery.         Use Scrubcare or Hibiclens to wash from neck down.  You may use your own shampoo and conditioner. No other hair products.   -  Do NOT use lotion, powder, deodorant, or antiperspirant the day of your surgery.  -  Do NOT wear any makeup, fingernail  polish or jewelry.  -Do not bring your own medications to the hospital, except for inhalers and eye drops.  -  Bring your ID and insurance card.    Questions or Concerns:  If you have questions or concerns, please call the  Preoperative Assessment Center, Monday-Friday 7AM-7PM:  [phone]    AFTER YOUR SURGERY  Breathing exercises   Breathing exercises help you recover faster. Take deep breaths and let the air out slowly. This will:     Help you wake up after surgery.    Help prevent complications like pneumonia.  Preventing complications will help you go home sooner.   We may give you a breathing device (incentive spirometer) to encourage you to breathe deeply.   Nausea and vomiting   You may feel sick to your stomach after surgery; if so, let your nurse know.    Pain control:  After surgery, you may have pain. Our goal is to help you manage your pain. Pain medicine will help you feel comfortable enough to do activities that will help you heal.  These activities may include breathing exercises, walking and physical therapy.   To help your health care team treat your pain we will ask: 1) If you have pain  2) where it is located 3) describe your pain in your words  Methods of pain control include medications given by mouth, vein or by nerve block for some surgeries.  We may give you a pain control pump that will:  1) Deliver the medicine through a tube placed in your vein  2) Control the amount of medicine you receive  3) Allow you to push a button to deliver a dose of pain medicine  Sequential Compression Device (SCD) or Pneumo Boots:  You may need to wear SCD S on your legs or feet. These are wraps connected to a machine that pumps in air and releases it. The repeated pumping helps prevent blood clots from forming.                 Follow-ups after your visit        Your next 10 appointments already scheduled     Feb 28, 2018 11:15 AM CST   LAB with  LAB    Health Lab ([facility])     [address] [id number] [phone]           Please do not eat 10-12 hours before your appointment if you are coming in fasting for labs on lipids, cholesterol, or glucose (sugar). This does not apply to pregnant women. Water, hot tea and black coffee (with nothing added) are okay. Do not drink other fluids, diet soda or chew gum.            Mar 07, 2018   Procedure with Liu Conroy DDS   [facility], Check, Same Day Surgery (--)    [address]  [facility] [id number] [phone]              Who to contact     Please call your clinic at [phone] to:    Ask questions about your health    Make or cancel appointments    Discuss your medicines    Learn about your test results    Speak to your doctor            Additional Information About Your Visit        [employer] Information     [employer] is an electronic gateway that provides easy, online access to your medical records. With [employer], you can request a clinic appointment, read your test results, renew a prescription or communicate with your care team.     To sign up for [employer] visit the website at [email or web address]   You will be asked to enter the access code listed below, as well as some personal information. Please follow the directions to create your username and password.     Your access code is: [email or web address]  Expires: 2018  6:30 AM     Your access code will  in 90 days. If you need help or a new code, please contact your [facility] or call [phone] for assistance.        Care EveryWhere ID     This is your Care EveryWhere ID. This could be used by other organizations to access your Check medical records  [id number]         Blood Pressure from Last 3 Encounters:   18 122/83   16 114/71   16 (!) 146/91    Weight from Last 3 Encounters:   18 36.3 kg (80 lb)   16 48.7 kg (107 lb 5.8 oz)   16 54.4 kg (120 lb)               Today, you had the following     No orders found for display       Primary Care Provider Office Phone # Fax #    Silver Longoria [phone]       [facility] [address] [address]        Equal Access to Services     TERESATEE MARILYN : Hadii aad ku hadlainao Soomaali, waaxda luqadaha, qaybta kaalmada adeegyada, waxay idiin hayaan adetay le cheri . So [facility] [phone].    ATENCIÓN: Si habla español, tiene a serna disposición servicios gratuitos de asistencia lingüística. [facility] [phone].    We comply with applicable federal civil rights laws and [place] laws. We do not discriminate on the basis of race, color, national origin, age, disability, sex, sexual orientation, or gender identity.            Thank you!     Thank you for choosing [facility] PREOPERATIVE ASSESSMENT CENTER  for your care. Our goal is always to provide you with excellent care. Hearing back from our patients is one way we can continue to improve our services. Please take a few minutes to complete the written survey that you may receive in the mail after your visit with us. Thank you!             Your Updated Medication List - Protect others around you: Learn how to safely use, store and throw away your medicines at www.disposemymeds.org.          This list is accurate as of 2/28/18  9:53 AM.  Always use your most recent med list.                   Brand Name Dispense Instructions for use Diagnosis    acetaminophen 500 MG tablet    TYLENOL     1,000 mg by Gastric Tube route every 6 hours as needed for mild pain        acetaminophen-codeine 300-30 MG per tablet    TYLENOL #3     Take 1 tablet by mouth every 4 hours as needed        amoxicillin 400 MG/5ML suspension    AMOXIL     6.3 mLs (500 mg) by Oral or G tube route 3 times daily        aspirin 81 MG chewable tablet      Take 81 mg by mouth every morning        baclofen 10 MG tablet    LIORESAL     Take 10 mg by mouth At Bedtime        benzonatate  100 MG capsule    TESSALON     Take 100 mg by mouth every 6 hours as needed        * bisacodyl 5 MG EC tablet    DULCOLAX     Take 10 mg by mouth daily as needed for constipation        * bisacodyl 10 MG Suppository    DULCOLAX     Place 10 mg rectally as needed        chlorhexidine 0.12 % solution    PERIDEX     Swish and spit 15 mLs in mouth 2 times daily        doxazosin 2 MG tablet    CARDURA     2 mg by Gastric Tube route 2 times daily        esomeprazole 40 MG CR capsule    nexIUM     Take 40 mg by mouth every morning        guaiFENesin-codeine 100-10 MG/5ML Syrp syrup    guaiFENesin AC     Take 5 mLs by mouth 3 times daily as needed        levETIRAcetam 100 MG/ML solution    KEPPRA     Take 7.5 mLs by mouth 2 times daily        lisinopril 10 MG tablet    PRINIVIL/ZESTRIL     10 mg by Gastric Tube route every morning        metoprolol tartrate 25 MG tablet    LOPRESSOR     Take 25 mg by mouth 2 times daily        QUEtiapine 25 MG tablet    SEROquel     Take 25 mg by mouth 2 times daily as needed        simvastatin 20 MG tablet    ZOCOR     20 mg by Gastric Tube route At Bedtime        tiZANidine 4 MG tablet    ZANAFLEX     4 mg by Gastric Tube route 2 times daily        TRAMADOL HCL PO      50 mg by Gastric Tube route as needed        traZODone 100 MG tablet    DESYREL     100 mg by Gastric Tube route At Bedtime        venlafaxine 75 MG Tb24 24 hr tablet    EFFEXOR-ER     Take 75 mg by mouth 2 times daily        * Notice:  This list has 2 medication(s) that are the same as other medications prescribed for you. Read the directions carefully, and ask your doctor or other care provider to review them with you.

## 2018-02-28 NOTE — PATIENT INSTRUCTIONS
Preparing for Your Surgery      Name:  Liu Malave   MRN:  [id number]   :  1965   Today's Date:  2018     Arriving for surgery:  Surgery date:  3/7/2018  Arrival time:  6:00 am  Please come to:     [facility] [address]  [address] [place]  [address]    - parking is available in front of [facility] from 5:15AM to 8:00PM. If you prefer, park your car in the Green Lot.    -Proceed to the 3rd floor, check in at the Adult Surgery Waiting Lounge. [phone]    If an escort is needed stop at the Information Desk in the lobby. Inform the information person that you are here for surgery. An escort to the Adult Surgery Waiting Lounge will be provided.        What can I eat or drink?  -  You may have solid food or milk products until 8 hours prior to your surgery.  -  You may have water, apple juice or 7up/Sprite until 2 hours prior to your surgery.    Which medicines can I take?  -  Do NOT take these medications in the morning, the day of surgery:  Lisinopril      Stop aspirin 5 days before procedure    -  Please take these medications the day of surgery:   Zanaflex, effexor, doxazosin, tylenol, tramadol as needed, seroquel, benzonatate, Keppra, tylenol #3 as needed, Nexium, metoprolol      How do I prepare myself?  -  Take two showers: one the night before surgery; and one the morning of surgery.         Use Scrubcare or Hibiclens to wash from neck down.  You may use your own shampoo and conditioner. No other hair products.   -  Do NOT use lotion, powder, deodorant, or antiperspirant the day of your surgery.  -  Do NOT wear any makeup, fingernail polish or jewelry.  -Do not bring your own medications to the hospital, except for inhalers and eye drops.  -  Bring your ID and insurance card.    Questions or Concerns:  If you have questions or concerns, please call the  Preoperative Assessment Center, Monday-Friday 7AM-7PM:  [phone]    AFTER YOUR  SURGERY  Breathing exercises   Breathing exercises help you recover faster. Take deep breaths and let the air out slowly. This will:     Help you wake up after surgery.    Help prevent complications like pneumonia.  Preventing complications will help you go home sooner.   We may give you a breathing device (incentive spirometer) to encourage you to breathe deeply.   Nausea and vomiting   You may feel sick to your stomach after surgery; if so, let your nurse know.    Pain control:  After surgery, you may have pain. Our goal is to help you manage your pain. Pain medicine will help you feel comfortable enough to do activities that will help you heal.  These activities may include breathing exercises, walking and physical therapy.   To help your health care team treat your pain we will ask: 1) If you have pain  2) where it is located 3) describe your pain in your words  Methods of pain control include medications given by mouth, vein or by nerve block for some surgeries.  We may give you a pain control pump that will:  1) Deliver the medicine through a tube placed in your vein  2) Control the amount of medicine you receive  3) Allow you to push a button to deliver a dose of pain medicine  Sequential Compression Device (SCD) or Pneumo Boots:  You may need to wear SCD S on your legs or feet. These are wraps connected to a machine that pumps in air and releases it. The repeated pumping helps prevent blood clots from forming.

## 2018-02-28 NOTE — H&P
Pre-Operative H & P     CC:  Preoperative exam to assess for increased cardiopulmonary risk while undergoing surgery and anesthesia.    Date of Encounter: 2/28/2018  Primary Care Physician:  Silver Longoria  Liu Malave is a 52 year old male who presents for pre-operative H & P in preparation for Kenalog Injection In Mouth, Dental Exam, Restorations, Radiographs, Extractions, Periodontal Therapy, Biopsies on 3/7/2018 with Monica Conroy and Akilah at [facility] under general anesthesia.  Mr. Ponce has dental pain and cannot be managed in clinic setting given limited mouth opening with trismus and bruxism.  Additionally, he has a h/o CVA with subsequent dysphagia requiring a G-J tube for all nutrition and medications.  His last dental EUA procedure was in 2016 requiring overnight hospitalization under observation status d/t concerns regarding dysphagia and airway management following the procedure.  PAC referral for risk assessment and optimization of anesthesia with comorbid conditions of:  as above; HTN; HLD; FRANKY; h/o aspiration pneumonia;  GERD; seizure disorder; depression; positive MRSA; positive ESBL and hepatitis C carrier.    Mr. Malave presents to PAC with his daughter, Idalmis, and Khmer interpreteaur.  Daughter reports that Mr. Malave has frequent bouts of aspiration pneumonia given his dysphagia 2/2 CVA.  He was started prophylactically on antibiotics for suspected aspiration pneumonia 2/16/18. Mr. Malave resides at home, wheel chair bound and receives total cares by family.  He makes garbled sounds.  He has a G-J tube and receives 24 hour feedings. Daughter answers questions.  Complex hospitalization last summer (2016) for septicemia at [facility] . Please see EPIC for further details     History is obtained from the patient and electronic health record.     Past Medical History  Past Medical History:   Diagnosis Date     Anxiety      Bruxism       Chronic static encephalopathy      Chronic static encephalopathy      CVA (cerebral vascular accident) (H) 7/2016    Hemorrhagic,      Depression      Dyslipidemia      Elevated troponin      GERD (gastroesophageal reflux disease)      Hepatitis C carrier (H)      History of ESBL E. coli infection      History of stroke with residual deficit      HTN (hypertension)      Lung nodule 12/5/16    9 x 9 mm left upper lobe on CXR     MRSA (methicillin resistant staph aureus) culture positive      Periodontal disease      Persistent fever      Pneumonia of both lower lobes due to infectious organism      Respiratory failure with hypoxia (H)      Seizure (H)      Severe sepsis (H)      Trismus        Past Surgical History  Past Surgical History:   Procedure Laterality Date     aneurysm clipping  2006     EXAM UNDER ANESTHESIA, RESTORATIONS, EXTRACTION(S) DENTAL COMPLEX, COMBINED N/A 12/19/2016    Procedure: COMBINED EXAM UNDER ANESTHESIA, RESTORATIONS, EXTRACTION(S) DENTAL COMPLEX;  Surgeon: Verónica Martinez DDS;  Location: [facility] OR     J-G tube       TRACHEOSTOMY      Removed       Hx of Blood transfusions/reactions: denies     Hx of abnormal bleeding or anti-platelet use: ASA - messaged Dr. Conroy re: need to hold for procedure.  Waiting to hear back     Menstrual history: No LMP for male patient.: na     Steroid use in the last year: denies    Personal or FH with difficulty with Anesthesia:  denies    Prior to Admission Medications  Current Outpatient Prescriptions   Medication Sig Dispense Refill     metoprolol tartrate (LOPRESSOR) 25 MG tablet Take 25 mg by mouth 2 times daily       esomeprazole (NEXIUM) 40 MG CR capsule Take 40 mg by mouth every morning       baclofen (LIORESAL) 10 MG tablet Take 10 mg by mouth At Bedtime       bisacodyl (DULCOLAX) 10 MG Suppository Place 10 mg rectally as needed       guaiFENesin-codeine (GUAIFENESIN AC) 100-10 MG/5ML SYRP syrup Take 5 mLs by mouth 3 times daily as needed        acetaminophen-codeine (TYLENOL #3) 300-30 MG per tablet Take 1 tablet by mouth every 4 hours as needed       levETIRAcetam (KEPPRA) 100 MG/ML solution Take 7.5 mLs by mouth 2 times daily       benzonatate (TESSALON) 100 MG capsule Take 100 mg by mouth every 6 hours as needed       aspirin 81 MG chewable tablet Take 81 mg by mouth every morning       amoxicillin (AMOXIL) 400 MG/5ML suspension 6.3 mLs (500 mg) by Oral or G tube route 3 times daily  0     TRAMADOL HCL PO 50 mg by Gastric Tube route as needed        acetaminophen (TYLENOL) 500 MG tablet 1,000 mg by Gastric Tube route every 6 hours as needed for mild pain        doxazosin (CARDURA) 2 MG tablet 2 mg by Gastric Tube route 2 times daily        lisinopril (PRINIVIL/ZESTRIL) 10 MG tablet 10 mg by Gastric Tube route every morning        traZODone (DESYREL) 100 MG tablet 100 mg by Gastric Tube route At Bedtime        venlafaxine (EFFEXOR-ER) 75 MG TB24 24 hr tablet Take 75 mg by mouth 2 times daily       simvastatin (ZOCOR) 20 MG tablet 20 mg by Gastric Tube route At Bedtime        tiZANidine (ZANAFLEX) 4 MG tablet 4 mg by Gastric Tube route 2 times daily        QUEtiapine (SEROQUEL) 25 MG tablet Take 25 mg by mouth 2 times daily as needed       chlorhexidine (PERIDEX) 0.12 % solution Swish and spit 15 mLs in mouth 2 times daily       bisacodyl (DULCOLAX) 5 MG EC tablet Take 10 mg by mouth daily as needed for constipation       [DISCONTINUED] LEVETIRACETAM PO 7.5 mLs by Gastric Tube route 2 times daily Dose currently unknown         Allergies  No Known Allergies    Social History  Social History     Social History     Marital status:      Spouse name: N/A     Number of children: 2     Years of education: N/A     Occupational History     disability      Social History Main Topics     Smoking status: Former Smoker     Packs/day: 1.00     Years: 20.00     Types: Cigarettes     Smokeless tobacco: Never Used      Comment: quit 2006     Alcohol use No     Drug  use: No     Sexual activity: Not on file     Other Topics Concern     Not on file     Social History Narrative    Lives with wife, 2 daughters and son in law.    W/c bound.           Family History  History reviewed. No pertinent family history.    ROS/MED HX    ENT/Pulmonary: Comment: 2/16/18:  Placed on antibiotics for suspected aspiration  pneumonia.  CXR:  Stable slight nodular opacity left upper lung is not significantly changed. Lungs otherwise clear. No acute new findings          (+)other ENT- Trismus, bruxism and perdontal disease, tobacco use (smoked 1 PPD for 20 years.  ), Past use 1 packs/day  , . .    Neurologic: Comment: Seizure disorder 2/2 CVA.  No recent seizures.     H/O CVA with left sided weakneaa & dysphagia >>> feeding tube placement 6/2016    S/P aneurysm clipping, 2007            (+)seizures CVA date: 6/2016 with deficits- bilateral weakness with L weakness> right.  Dysphagia.  ,     Cardiovascular: Comment: Severe sepsis 8/17/2017 with NSTEMI,records indicate likely LV strain from underlying sepsis    (+) hypertension--CAD (NSTEMI 6/2017), --. Taking blood thinners Pt has received instructions: . . . :. . Previous cardiac testing date:results:date: results:ECG reviewed date: results: date: results:          METS/Exercise Tolerance: Comment:   Total cares    Hematologic:  - neg hematologic  ROS       Musculoskeletal: Comment: Upper and lower extremity contractures.         GI/Hepatic: Comment: G-J tube for all nutrition.  Tube feedings 24 hours per day.     (+) GERD Asymptomatic on medication, hepatitis (carrier) type C,       Renal/Genitourinary: Comment: Incontinent of bladder, but not bowel         Endo:  - neg endo ROS       Psychiatric:     (+) psychiatric history depression      Infectious Disease: Comment: + ESBL  (+) MRSA,       Malignancy:      - no malignancy   Other: Comment: Total cares   (+) No chance of pregnancy H/O Chronic Pain,H/O chronic opiod use , other significant  "disability Wheelchair bound and Other (comment)                  PAC Discussion and Assessment    ASA Classification: 3  Case is suitable for: [facility]  Anesthetic techniques and relevant risks discussed: GA  Invasive monitoring and risk discussed:   Types:   Possibility and Risk of blood transfusion discussed:   NPO instructions given:   Additional anesthetic preparation and risks discussed:   Needs early admission to pre-op area:   Other:     PAC Resident/NP Anesthesia Assessment:  [facility] Frieda is a 52 year old male scheduled for   Temp: 98.4  F (36.9  C) Temp src: Oral BP: 122/83 Pulse: 85   Resp: 16 SpO2: 95 %         80 lbs 0 oz  5' 2\"   Body mass index is 14.63 kg/(m^2).       Physical Exam  Constitutional: Awake, alert, cooperative, garbled speech, WC bound.  Eyes: Pupils equal, round and reactive to light, extra ocular muscles intact, sclera clear, conjunctiva normal.  HENT: Normocephalic, oral pharynx with moist mucus membranes, difficult oral exam given trismus.  Dentition that can can be assessed appears to be in poor repair.  No goiter appreciated.   Respiratory: Shallow breath sounds with diminished breath sounds in bases bilateral. Coarse cough.  Cardiovascular: Regular rate and rhythm, normal S1 and S2, and no murmur noted.  Carotids +2, no bruits. No edema. Palpable pulses to radial  DP and PT arteries.   GI: Normal bowel sounds, soft, non-distended, non-tender, no masses palpated, no hepatosplenomegaly.    Lymph/Hematologic: No cervical lymphadenopathy and no supraclavicular lymphadenopathy.  Genitourinary:  na  Skin: Warm and dry.    Musculoskeletal: limited ROM of neck.  Gross motor strength less than normal.  Contractures of extremities.  Neurologic: Awake, alert, oriented to name, place and time. Non-ambulatory  Neuropsychiatric: Calm, cooperative. Normal affect.     Procedures   XR CHEST 2 VIEWS    2/16/2018 2:49 PM        INDICATION: Pneumonitis due to inhalation of food and vomit  "   COMPARISON: 11/26/2017 an 10/13/2017        FINDINGS: Mild thoracic curve convex to the right. Stable slight nodular opacity left upper lung is not significantly changed. Lungs otherwise clear. No acute new findings        ECG 9/22/2017 ([facility])  Normal sinus rhythm 86  Normal ECG  When compared with ECG of 05-AUG-2017 02:57,  T wave amplitude has increased in Anterolateral leads    Echocardiogram 8/7/2017    Left Ventricle: Normal size.The calculated left ventricular ejection   fraction is 47%. Mildly depressed LV systolic function primarily related   to abnormal septal wall motion. The left ventricular wall thickness is   normal. Abnormal septal motion. Excessive respiratory changes likely   secondary to mechanical ventilation. E/e' < 8, suggesting normal LV   filling pressure.    Right Ventricle: Right ventricle not well visualized. TAPSE is normal,   which is consistent with normal right ventricular systolic function.    IVC: Severely elevated central venous pressure with IVC diameter greater   than 2.1 cm and less than 50% decrease during inspiration. Estimated   central venous pressure equal to 13 mmHg    No hemodynamically significant valvular heart abnormalities    Outside records reviewed from: Care Everywhere    ASSESSMENT and PLAN  Liu Malave is a 52 year old male scheduled to undergo Kenalog Injection In Mouth, Dental Exam, Restorations, Radiographs, Extractions, Periodontal Therapy, Biopsies on 3/7/2018 with Monica Conroy and Akilah at [facility] under general anesthesia.     He has the following specific operative considerations:     1.  Cardiology - NSTEMI 6/2017 thought to be LV strain from underlying sepsis according to records.  Medical management.  Denies cardiac symptoms.  METS<1. RCRI : Coronary Artery Disease (MI, positive stress test, angina, Qs on EKG).  0.9 % risk of major adverse cardiac event. Low risk surgery.  No further cardiac evaluation needed  per 2014 ACC/AHA guidelines for non-cardiac surgery.        - HTN, take BB and CCB DOS.  Hold ACE DOS       - HLD, take statin DOS  2.  Pulmonary - 20 pack years smoking hx.  Quit smoking       - High risk for aspiration>>>ASPIRATION PRECAUTIONS.  CXR showed pneumonitis due to aspiration 2/16/18>>>Currently being treated phylactically with amoxicillin>>>No evidence of consolidation.       - FRANKY # of risks 3/8 = intermediate  3.  Hematology - VTE rsk:  0.5%       - H/o blood transfusion   4.  GI - Risk of PONV score = 2.  If > 2, anti-emetic intervention recommended.       - GERD, take PPI       - G-J tube- all nutrition and medication through tube.  NPO per protocol for above procedure.   5.  Musculoskeletal - recommend careful positioning given left sided weakness greater than right with evidence of contractures of extremities. WC bound.  Total cares. Fall precautions.   6.  Neuro - CVA 6/2016 now with left sided weakness, dysphagia and total cares.  + aphasia.  Able to follow some commands.        - Seizure disorder 2/2 CVA.  No seizures since 6/2016.  Take levetiractam DOS            - aneurysm clipping, 2006       - depression, take venlafaxine DOS   7.  HEENT - Periodontal disease>>>above procedure planned       - Bruxism       - Trismus  8.  ID - + MRSA, +ESBL and Hepatis C Carrier      - Anesthesia considerations:  12/19/16; 1111; Mask Ventilation: Easy; Ease of Intubation: Easy; Airway Size: 7;  Cuffed;  JESSI, Nasal;  Blade Type: C-Mac;  Blade Size: 3;  Place by: NADINE López CRNA;  Insertion Attempts: 1;  Secured at (cm)to lip: 26 cm;  Breath Sounds: Equal, clear and bilateral;  End Tidal CO2: Present;  Dentition: Intact;  Grade View of Cords: 1;  Airway Adjuncts:  C-Mac  - Airway:  Limited mouth opening given trismus.  Unable to evaluate mallmapati      Arrival time, NPO, shower and medication instructions provided by nursing staff today.  Preparing For Your Surgery handout given.  Patient was discussed with  Dr Morales. I spent 20 minutes face to face with patient assessing, educating, counseling and/or coordinating care and examining the patient.  Of that 20 minutes, I spent greater than 50% of my time counseling and/or coordinating care.  All of the above labs and procedures I personally reviewed.    PAUL Quinn CNP  Preoperative Assessment Center  [facility]  Clinic and Surgery Center  Phone: [phone]  Fax: [phone]

## 2018-02-28 NOTE — MR AVS SNAPSHOT
"              After Visit Summary   2018    Liu Malave    MRN: [id number]           Patient Information     Date Of Birth          1965        Visit Information        Provider Department      2018 8:15 AM Aburto, Son; 5, [facility] Preoperative Assessment Center         Follow-ups after your visit        Your next 10 appointments already scheduled     Mar 07, 2018   Procedure with Liu Conroy DDS   [facility], [place], Same Day Surgery (--)    [address]  [facility] [address] [phone]              Who to contact     Please call your clinic at [phone] to:    Ask questions about your health    Make or cancel appointments    Discuss your medicines    Learn about your test results    Speak to your doctor            Additional Information About Your Visit        MyChart Information     [employer]s is an electronic gateway that provides easy, online access to your medical records. With EZ-Apps, you can request a clinic appointment, read your test results, renew a prescription or communicate with your care team.     To sign up for [employer] visit the website at [email or web address]   You will be asked to enter the access code listed below, as well as some personal information. Please follow the directions to create your username and password.     Your access code is: [email or web address]  Expires: 2018  6:30 AM     Your access code will  in 90 days. If you need help or a new code, please contact your [facility] or call [phone] for assistance.        Care EveryWhere ID     This is your Care EveryWhere ID. This could be used by other organizations to access your [place] medical records  [id number]        Your Vitals Were     Pulse Temperature Respirations Height Pulse Oximetry BMI (Body Mass Index)    85 98.4  F (36.9  C) (Oral) 16 5' 2\" (1.575 m) 95% 14.63 kg/m2       Blood Pressure from Last 3 Encounters:   18 122/83   16 114/71 "   12/05/16 (!) 146/91    Weight from Last 3 Encounters:   02/28/18 80 lb (36.3 kg)   12/19/16 107 lb 5.8 oz (48.7 kg)   12/05/16 120 lb (54.4 kg)              Today, you had the following     No orders found for display       Primary Care Provider Office Phone # Fax #    Silver Longoria [phone]       [facility] [address]  [facility] [address]        Equal Access to Services     [facility] CenterABHINAV : Hadii aad ku hadasho Soomaali, waaxda luqadaha, qaybta kaalmada adeegyada, waxay idiin hayaan adeeg mimi alonzo . So [facility] [phone].    ATENCIÓN: Si habla español, tiene a serna disposición servicios gratuitos de asistencia lingüística. [facility] [phone].    We comply with applicable federal civil rights laws and [place] laws. We do not discriminate on the basis of race, color, national origin, age, disability, sex, sexual orientation, or gender identity.            Thank you!     Thank you for choosing [facility] PREOPERATIVE ASSESSMENT CENTER  for your care. Our goal is always to provide you with excellent care. Hearing back from our patients is one way we can continue to improve our services. Please take a few minutes to complete the written survey that you may receive in the mail after your visit with us. Thank you!             Your Updated Medication List - Protect others around you: Learn how to safely use, store and throw away your medicines at www.disposemymeds.org.          This list is accurate as of 2/28/18 11:41 AM.  Always use your most recent med list.                   Brand Name Dispense Instructions for use Diagnosis    acetaminophen 500 MG tablet    TYLENOL     1,000 mg by Gastric Tube route every 6 hours as needed for mild pain        acetaminophen-codeine 300-30 MG per tablet    TYLENOL #3     Take 1 tablet by mouth every 4 hours as needed        amoxicillin 400 MG/5ML suspension    AMOXIL     6.3 mLs (500 mg) by Oral or G tube route 3 times daily        aspirin  81 MG chewable tablet      Take 81 mg by mouth every morning        baclofen 10 MG tablet    LIORESAL     Take 10 mg by mouth At Bedtime        benzonatate 100 MG capsule    TESSALON     Take 100 mg by mouth every 6 hours as needed        * bisacodyl 5 MG EC tablet    DULCOLAX     Take 10 mg by mouth daily as needed for constipation        * bisacodyl 10 MG Suppository    DULCOLAX     Place 10 mg rectally as needed        chlorhexidine 0.12 % solution    PERIDEX     Swish and spit 15 mLs in mouth 2 times daily        doxazosin 2 MG tablet    CARDURA     2 mg by Gastric Tube route 2 times daily        esomeprazole 40 MG CR capsule    nexIUM     Take 40 mg by mouth every morning        guaiFENesin-codeine 100-10 MG/5ML Syrp syrup    guaiFENesin AC     Take 5 mLs by mouth 3 times daily as needed        levETIRAcetam 100 MG/ML solution    KEPPRA     Take 7.5 mLs by mouth 2 times daily        lisinopril 10 MG tablet    PRINIVIL/ZESTRIL     10 mg by Gastric Tube route every morning        metoprolol tartrate 25 MG tablet    LOPRESSOR     Take 25 mg by mouth 2 times daily        QUEtiapine 25 MG tablet    SEROquel     Take 25 mg by mouth 2 times daily as needed        simvastatin 20 MG tablet    ZOCOR     20 mg by Gastric Tube route At Bedtime        tiZANidine 4 MG tablet    ZANAFLEX     4 mg by Gastric Tube route 2 times daily        TRAMADOL HCL PO      50 mg by Gastric Tube route as needed        traZODone 100 MG tablet    DESYREL     100 mg by Gastric Tube route At Bedtime        venlafaxine 75 MG Tb24 24 hr tablet    EFFEXOR-ER     Take 75 mg by mouth 2 times daily        * Notice:  This list has 2 medication(s) that are the same as other medications prescribed for you. Read the directions carefully, and ask your doctor or other care provider to review them with you.

## 2018-03-06 ENCOUNTER — TELEPHONE (OUTPATIENT)
Dept: SURGERY | Facility: CLINIC | Age: 53
End: 2018-03-06

## 2018-03-06 NOTE — TELEPHONE ENCOUNTER
Received message back from Dr. Conroy that patient can continue his ASA for upcoming dental procedure.  Contacted patient and spoke with his daughter who had accompanied him last week to his pre-op as patient does not speak English.  Instructed to have him continue his Aspirin, but hold lisinopril DOS.  Daughter verbalized an understanding and thanked me for the call back.  No further questions.

## 2018-03-07 ENCOUNTER — ANESTHESIA (OUTPATIENT)
Dept: SURGERY | Facility: CLINIC | Age: 53
End: 2018-03-07
Payer: COMMERCIAL

## 2018-03-07 ENCOUNTER — HOSPITAL ENCOUNTER (OUTPATIENT)
Facility: CLINIC | Age: 53
Discharge: HOME | End: 2018-03-07
Attending: DENTIST | Admitting: DENTIST
Payer: COMMERCIAL

## 2018-03-07 VITALS
WEIGHT: 80 LBS | RESPIRATION RATE: 12 BRPM | OXYGEN SATURATION: 93 % | TEMPERATURE: 99.2 F | DIASTOLIC BLOOD PRESSURE: 51 MMHG | HEIGHT: 62 IN | BODY MASS INDEX: 14.72 KG/M2 | SYSTOLIC BLOOD PRESSURE: 117 MMHG | HEART RATE: 78 BPM

## 2018-03-07 LAB — GLUCOSE BLDC GLUCOMTR-MCNC: 114 MG/DL (ref 70–99)

## 2018-03-07 PROCEDURE — 25000125 ZZHC RX 250: Performed by: DENTIST

## 2018-03-07 PROCEDURE — 82962 GLUCOSE BLOOD TEST: CPT

## 2018-03-07 PROCEDURE — 36000051 ZZH SURGERY LEVEL 2 1ST 30 MIN - UMMC: Performed by: DENTIST

## 2018-03-07 PROCEDURE — 25000566 ZZH SEVOFLURANE, EA 15 MIN: Performed by: DENTIST

## 2018-03-07 PROCEDURE — 25000128 H RX IP 250 OP 636: Performed by: NURSE ANESTHETIST, CERTIFIED REGISTERED

## 2018-03-07 PROCEDURE — 25000132 ZZH RX MED GY IP 250 OP 250 PS 637: Performed by: ANESTHESIOLOGY

## 2018-03-07 PROCEDURE — 37000008 ZZH ANESTHESIA TECHNICAL FEE, 1ST 30 MIN: Performed by: DENTIST

## 2018-03-07 PROCEDURE — 71000027 ZZH RECOVERY PHASE 2 EACH 15 MINS: Performed by: DENTIST

## 2018-03-07 PROCEDURE — 36000053 ZZH SURGERY LEVEL 2 EA 15 ADDTL MIN - UMMC: Performed by: DENTIST

## 2018-03-07 PROCEDURE — 25000128 H RX IP 250 OP 636: Performed by: DENTIST

## 2018-03-07 PROCEDURE — 27210794 ZZH OR GENERAL SUPPLY STERILE: Performed by: DENTIST

## 2018-03-07 PROCEDURE — 71000015 ZZH RECOVERY PHASE 1 LEVEL 2 EA ADDTL HR: Performed by: DENTIST

## 2018-03-07 PROCEDURE — 71000014 ZZH RECOVERY PHASE 1 LEVEL 2 FIRST HR: Performed by: DENTIST

## 2018-03-07 PROCEDURE — 25000125 ZZHC RX 250: Performed by: NURSE ANESTHETIST, CERTIFIED REGISTERED

## 2018-03-07 PROCEDURE — 37000009 ZZH ANESTHESIA TECHNICAL FEE, EACH ADDTL 15 MIN: Performed by: DENTIST

## 2018-03-07 PROCEDURE — 40000170 ZZH STATISTIC PRE-PROCEDURE ASSESSMENT II: Performed by: DENTIST

## 2018-03-07 PROCEDURE — C9399 UNCLASSIFIED DRUGS OR BIOLOG: HCPCS | Performed by: NURSE ANESTHETIST, CERTIFIED REGISTERED

## 2018-03-07 RX ORDER — LIDOCAINE 40 MG/G
CREAM TOPICAL
Status: DISCONTINUED | OUTPATIENT
Start: 2018-03-07 | End: 2018-03-07 | Stop reason: HOSPADM

## 2018-03-07 RX ORDER — ACETAMINOPHEN 10 MG/ML
15 INJECTION, SOLUTION INTRAVENOUS ONCE
Status: COMPLETED | OUTPATIENT
Start: 2018-03-07 | End: 2018-03-07

## 2018-03-07 RX ORDER — FENTANYL CITRATE 50 UG/ML
25-50 INJECTION, SOLUTION INTRAMUSCULAR; INTRAVENOUS
Status: DISCONTINUED | OUTPATIENT
Start: 2018-03-07 | End: 2018-03-07 | Stop reason: HOSPADM

## 2018-03-07 RX ORDER — MIDAZOLAM HYDROCHLORIDE 2 MG/ML
15 SYRUP ORAL ONCE
Status: DISCONTINUED | OUTPATIENT
Start: 2018-03-07 | End: 2018-03-07 | Stop reason: HOSPADM

## 2018-03-07 RX ORDER — OXYMETAZOLINE HYDROCHLORIDE 0.05 G/100ML
SPRAY NASAL PRN
Status: DISCONTINUED | OUTPATIENT
Start: 2018-03-07 | End: 2018-03-07

## 2018-03-07 RX ORDER — SODIUM CHLORIDE, SODIUM LACTATE, POTASSIUM CHLORIDE, CALCIUM CHLORIDE 600; 310; 30; 20 MG/100ML; MG/100ML; MG/100ML; MG/100ML
INJECTION, SOLUTION INTRAVENOUS CONTINUOUS
Status: DISCONTINUED | OUTPATIENT
Start: 2018-03-07 | End: 2018-03-07 | Stop reason: HOSPADM

## 2018-03-07 RX ORDER — CLINDAMYCIN PHOSPHATE 600 MG/50ML
600 INJECTION, SOLUTION INTRAVENOUS
Status: COMPLETED | OUTPATIENT
Start: 2018-03-07 | End: 2018-03-07

## 2018-03-07 RX ORDER — MEPERIDINE HYDROCHLORIDE 25 MG/ML
12.5 INJECTION INTRAMUSCULAR; INTRAVENOUS; SUBCUTANEOUS
Status: DISCONTINUED | OUTPATIENT
Start: 2018-03-07 | End: 2018-03-07 | Stop reason: HOSPADM

## 2018-03-07 RX ORDER — ONDANSETRON 2 MG/ML
INJECTION INTRAMUSCULAR; INTRAVENOUS PRN
Status: DISCONTINUED | OUTPATIENT
Start: 2018-03-07 | End: 2018-03-07

## 2018-03-07 RX ORDER — TRIAMCINOLONE ACETONIDE 40 MG/ML
INJECTION, SUSPENSION INTRA-ARTICULAR; INTRAMUSCULAR PRN
Status: DISCONTINUED | OUTPATIENT
Start: 2018-03-07 | End: 2018-03-07 | Stop reason: HOSPADM

## 2018-03-07 RX ORDER — NALOXONE HYDROCHLORIDE 0.4 MG/ML
.1-.4 INJECTION, SOLUTION INTRAMUSCULAR; INTRAVENOUS; SUBCUTANEOUS
Status: DISCONTINUED | OUTPATIENT
Start: 2018-03-07 | End: 2018-03-07 | Stop reason: HOSPADM

## 2018-03-07 RX ORDER — EPHEDRINE SULFATE 50 MG/ML
INJECTION, SOLUTION INTRAMUSCULAR; INTRAVENOUS; SUBCUTANEOUS PRN
Status: DISCONTINUED | OUTPATIENT
Start: 2018-03-07 | End: 2018-03-07

## 2018-03-07 RX ORDER — KETOROLAC TROMETHAMINE 30 MG/ML
INJECTION, SOLUTION INTRAMUSCULAR; INTRAVENOUS PRN
Status: DISCONTINUED | OUTPATIENT
Start: 2018-03-07 | End: 2018-03-07

## 2018-03-07 RX ORDER — LIDOCAINE HYDROCHLORIDE 20 MG/ML
INJECTION, SOLUTION INFILTRATION; PERINEURAL PRN
Status: DISCONTINUED | OUTPATIENT
Start: 2018-03-07 | End: 2018-03-07

## 2018-03-07 RX ORDER — SODIUM CHLORIDE, SODIUM LACTATE, POTASSIUM CHLORIDE, CALCIUM CHLORIDE 600; 310; 30; 20 MG/100ML; MG/100ML; MG/100ML; MG/100ML
INJECTION, SOLUTION INTRAVENOUS CONTINUOUS PRN
Status: DISCONTINUED | OUTPATIENT
Start: 2018-03-07 | End: 2018-03-07

## 2018-03-07 RX ORDER — ONDANSETRON 2 MG/ML
4 INJECTION INTRAMUSCULAR; INTRAVENOUS EVERY 30 MIN PRN
Status: DISCONTINUED | OUTPATIENT
Start: 2018-03-07 | End: 2018-03-07 | Stop reason: HOSPADM

## 2018-03-07 RX ORDER — PROPOFOL 10 MG/ML
INJECTION, EMULSION INTRAVENOUS PRN
Status: DISCONTINUED | OUTPATIENT
Start: 2018-03-07 | End: 2018-03-07

## 2018-03-07 RX ORDER — FENTANYL CITRATE 50 UG/ML
INJECTION, SOLUTION INTRAMUSCULAR; INTRAVENOUS PRN
Status: DISCONTINUED | OUTPATIENT
Start: 2018-03-07 | End: 2018-03-07

## 2018-03-07 RX ORDER — DEXAMETHASONE SODIUM PHOSPHATE 4 MG/ML
INJECTION, SOLUTION INTRA-ARTICULAR; INTRALESIONAL; INTRAMUSCULAR; INTRAVENOUS; SOFT TISSUE PRN
Status: DISCONTINUED | OUTPATIENT
Start: 2018-03-07 | End: 2018-03-07

## 2018-03-07 RX ORDER — ONDANSETRON 4 MG/1
4 TABLET, ORALLY DISINTEGRATING ORAL EVERY 30 MIN PRN
Status: DISCONTINUED | OUTPATIENT
Start: 2018-03-07 | End: 2018-03-07 | Stop reason: HOSPADM

## 2018-03-07 RX ADMIN — CLINDAMYCIN PHOSPHATE 600 MG: 12 INJECTION, SOLUTION INTRAVENOUS at 07:57

## 2018-03-07 RX ADMIN — PHENYLEPHRINE HYDROCHLORIDE 100 MCG: 10 INJECTION, SOLUTION INTRAMUSCULAR; INTRAVENOUS; SUBCUTANEOUS at 08:47

## 2018-03-07 RX ADMIN — Medication 10 MG: at 08:04

## 2018-03-07 RX ADMIN — DEXAMETHASONE SODIUM PHOSPHATE 4 MG: 4 INJECTION, SOLUTION INTRAMUSCULAR; INTRAVENOUS at 08:10

## 2018-03-07 RX ADMIN — OXYMETAZOLINE HYDROCHLORIDE 1 SPRAY: 5 SPRAY NASAL at 07:42

## 2018-03-07 RX ADMIN — PHENYLEPHRINE HYDROCHLORIDE 200 MCG: 10 INJECTION, SOLUTION INTRAMUSCULAR; INTRAVENOUS; SUBCUTANEOUS at 08:28

## 2018-03-07 RX ADMIN — PHENYLEPHRINE HYDROCHLORIDE 100 MCG: 10 INJECTION, SOLUTION INTRAMUSCULAR; INTRAVENOUS; SUBCUTANEOUS at 08:02

## 2018-03-07 RX ADMIN — PROPOFOL 200 MG: 10 INJECTION, EMULSION INTRAVENOUS at 07:39

## 2018-03-07 RX ADMIN — ACETAMINOPHEN 500 MG: 10 INJECTION, SOLUTION INTRAVENOUS at 08:07

## 2018-03-07 RX ADMIN — LIDOCAINE HYDROCHLORIDE 70 MG: 20 INJECTION, SOLUTION INFILTRATION; PERINEURAL at 07:39

## 2018-03-07 RX ADMIN — Medication 10 MG: at 08:14

## 2018-03-07 RX ADMIN — KETOROLAC TROMETHAMINE 15 MG: 30 INJECTION, SOLUTION INTRAMUSCULAR at 09:05

## 2018-03-07 RX ADMIN — SUGAMMADEX 100 MG: 100 INJECTION, SOLUTION INTRAVENOUS at 09:16

## 2018-03-07 RX ADMIN — Medication 10 MG: at 08:25

## 2018-03-07 RX ADMIN — Medication 10 MG: at 08:09

## 2018-03-07 RX ADMIN — ONDANSETRON 4 MG: 2 INJECTION INTRAMUSCULAR; INTRAVENOUS at 09:05

## 2018-03-07 RX ADMIN — PHENYLEPHRINE HYDROCHLORIDE 100 MCG: 10 INJECTION, SOLUTION INTRAMUSCULAR; INTRAVENOUS; SUBCUTANEOUS at 08:09

## 2018-03-07 RX ADMIN — FENTANYL CITRATE 100 MCG: 50 INJECTION, SOLUTION INTRAMUSCULAR; INTRAVENOUS at 07:39

## 2018-03-07 RX ADMIN — SODIUM CHLORIDE, POTASSIUM CHLORIDE, SODIUM LACTATE AND CALCIUM CHLORIDE: 600; 310; 30; 20 INJECTION, SOLUTION INTRAVENOUS at 07:25

## 2018-03-07 RX ADMIN — ROCURONIUM BROMIDE 50 MG: 10 INJECTION INTRAVENOUS at 07:39

## 2018-03-07 NOTE — OR NURSING
W/c bound w/ more left sided weakness; non-verbal but makes some garbled noises occasionally; Daughter says clear cognitively and signs own consents; History of seizures, but daughter denies need for seizure pads as they didn't display with seizing motion.

## 2018-03-07 NOTE — ANESTHESIA CARE TRANSFER NOTE
Patient: Liukurtis Malave    Procedure(s):  Kenalog Injection In Bilateral [place], Combined Dental Exam,Radiographs,Three Dental Restorations, Periodontal Therapy and Fluoride Treatment - Wound Class: I-Clean  Kenalog Injection in Bilateral [place], Combined Dental Exam,Radiographs, Three Dental Restorations, Periodontal Therapy and Fluoride Treatment - Wound Class: II-Clean Contaminated    Diagnosis: Dental Caries, Periodonal Disease   Diagnosis Additional Information: No value filed.    Anesthesia Type:   General, ETT     Note:  Airway :Face Mask  Patient transferred to:PACU  Comments: Arrived in PACU, report to RN, vitals stable, temp 37.5, PIV patent, patient comfortable.Handoff Report: Identifed the Patient, Identified the Reponsible Provider, Reviewed the pertinent medical history, Discussed the surgical course, Reviewed Intra-OP anesthesia mangement and issues during anesthesia, Set expectations for post-procedure period and Allowed opportunity for questions and acknowledgement of understanding      Vitals: (Last set prior to Anesthesia Care Transfer)    CRNA VITALS  3/7/2018 0858 - 3/7/2018 0938      3/7/2018             Pulse: 105    Ht Rate: 109    SpO2: 98 %    Resp Rate (observed): (!)  3                Electronically Signed By: PAUL Gill CRNA  March 7, 2018  9:38 AM

## 2018-03-07 NOTE — IP AVS SNAPSHOT
[address] [address]    Phone:  [phone]                                       After Visit Summary   3/7/2018    Liu Malave    MRN: [id number]           After Visit Summary Signature Page     I have received my discharge instructions, and my questions have been answered. I have discussed any challenges I see with this plan with the nurse or doctor.    ..........................................................................................................................................  Patient/Patient Representative Signature      ..........................................................................................................................................  Patient Representative Print Name and Relationship to Patient    ..................................................               ................................................  Date                                            Time    ..........................................................................................................................................  Reviewed by Signature/Title    ...................................................              ..............................................  Date                                                            Time

## 2018-03-07 NOTE — ANESTHESIA POSTPROCEDURE EVALUATION
Patient: Liu Malave    Procedure(s):  Kenalog Injection In Bilateral Masseter, Combined Dental Exam,Radiographs,Three Dental Restorations, Periodontal Therapy and Fluoride Treatment - Wound Class: I-Clean  Kenalog Injection in Bilateral [place]eter, Combined Dental Exam,Radiographs, Three Dental Restorations, Periodontal Therapy and Fluoride Treatment - Wound Class: II-Clean Contaminated    Diagnosis:Dental Caries, Periodonal Disease   Diagnosis Additional Information: No value filed.    Anesthesia Type:  General, ETT    Note:  Anesthesia Post Evaluation    Patient location during evaluation: Bedside and Phase 2  Patient participation: Unable to participate in evaluation secondary to underlying medical condition  Level of consciousness: awake and alert  Pain management: adequate  Airway patency: patent  Cardiovascular status: acceptable  Respiratory status: acceptable  Hydration status: balanced  PONV: none     Anesthetic complications: None          Last vitals:  Vitals:    03/07/18 0945 03/07/18 1000 03/07/18 1015   BP: 109/46 104/51 111/55   Pulse:      Resp: 18 17 15   Temp:  37.4  C (99.3  F)    SpO2: 99% 94% 92%         Electronically Signed By: Cherelle Ellison MD  March 7, 2018  10:47 AM

## 2018-03-07 NOTE — IP AVS SNAPSHOT
MRN:[id number]                      After Visit Summary   3/7/2018    Liu Malave    MRN: [id number]           Thank you!     Thank you for choosing [facility] for your care. Our goal is always to provide you with excellent care. Hearing back from our patients is one way we can continue to improve our services. Please take a few minutes to complete the written survey that you may receive in the mail after you visit with us. Thank you!        Patient Information     Date Of Birth          1965        About your hospital stay     You were admitted on:  March 7, 2018 You last received care in the:  [facility] PACU    You were discharged on:  March 7, 2018       Who to Call     For medical emergencies, please call 911.  For non-urgent questions about your medical care, please call your primary care provider or clinic, [phone]  For questions related to your surgery, please call your surgery clinic        Attending Provider     Provider iLu Sanchez DDS Oral Surgery       Primary Care Provider Office Phone # Fax #    Silver Longoria [phone]      Further instructions from your care team       Same-Day Surgery   Adult Discharge Orders & Instructions     For 24 hours after surgery:  1. Get plenty of rest.  A responsible adult must stay with you for at least 24 hours after you leave the hospital.   2. Pain medication can slow your reflexes. Do not drive or use heavy equipment.  If you have weakness or tingling, don't drive or use heavy equipment until this feeling goes away.  3. Mixing alcohol and pain medication can cause dizziness and slow your breathing. It can even be fatal. Do not drink alcohol while taking pain medication.  4. Avoid strenuous or risky activities.  Ask for help when climbing stairs.   5. You may feel lightheaded.  If so, sit for a few minutes before standing.  Have someone help you get up.   6. If you have nausea (feel sick to your stomach), drink  only clear liquids such as apple juice, ginger ale, broth or 7-Up.  Rest may also help.  Be sure to drink enough fluids.  Move to a regular diet as you feel able. Take pain medications with a small amount of solid food, such as toast or crackers, to avoid nausea.   7. A slight fever is normal. Call the doctor if your fever is over 100 F (37.7 C) (taken under the tongue) or lasts longer than 24 hours.  8. You may have a dry mouth, muscle aches, trouble sleeping or a sore throat.  These symptoms should go away after 24 hours.  9. Do not make important or legal decisions.   Pain Management:      1. Take pain medication (if prescribed) for pain as directed by your physician.        2. WARNING: If the pain medication you have been prescribed contains Tylenol  (acetaminophen), DO NOT take additional doses of Tylenol (acetaminophen).     Call your doctor for any of the followin.  Signs of infection (fever, growing tenderness at the surgery site, severe pain, a large amount of drainage or bleeding, foul-smelling drainage, redness, swelling).    2.  It has been over 8 to 10 hours since surgery and you are still not able to urinate (pee).    3.  Headache for over 24 hours.    4.  Numbness, tingling or weakness the day after surgery (if you had spinal anesthesia).  To contact a doctor, call _____________________________________ or:      [phone] and ask for the Resident On Call for:          __________________________________________ (answered 24 hours a day)      Emergency Department:  [facility]: [phone]  [facility] [facility]: [phone]               Rev. 10/2014   FOLLOW UP DENTAL CARE AFTER DENTAL SURGERY UNDER GENERAL ANESTHESIA       After dental surgery care or emergencies that develop during hours when our clinic is closed (5 PM - 8AM Monday through Friday, all hours on weekends) should be directed to the  Dental Resident on Call.  Please call [phone] and  specifically ask the  to page the  Dental Resident On-Call.      INSTRUCTIONS AFTER DENTAL SURGERY UNDER GENERAL ANESTHESIA  l    Daily Activities:  Your child should be limited to quiet, restful activities today.  Your child may return to school or  tomorrow as per his or her normal routine.  Physical activity can begin tomorrow.  Your child can bathe normally after surgery.      Bleeding:  Bleeding after dental procedures are a common finding.  Areas of bleeding within your child s mouth were controlled before the child was awakened from general anesthesia.  It is not unusual for periodic oozing (pink or blood tinged saliva) to occur after dental surgery.  Hold gauze or a clean towel with firm pressure against the surgical site until the oozing has stopped.      Swelling:  Slight swelling in the lips and cheeks are common after surgery and for the following 2 days.  If swelling occurs, ice packs may be used for the first 24 hours (10 minutes on, 10 minutes off) to decrease the swelling.  If swelling persists after 24 hours, warm, moist compresses (10 minutes on, 10 minutes off) may help.  If swelling develops or remains after 48 hours, please contact our office.      Diet:  After all bleeding has stopped, the patient may drink cool, non-carbonated beverages but should not use a straw.  Encourage your child to drink fluids to prevent dehydration.  Cool soft foods (eg. Jell-O, pudding, yogurt) are ideal the first day.  By the second day, consistency of foods can progress as tolerated.  Avoid hard, crunchy foods such as nuts, sunflower, seeds, pretzels, and popcorn that may get stuck in the surgical areas.      Oral Hygiene:  Keeping the mouth clean is essential for healing.  Today, teeth may be brushed and flossed gently, but avoid brushing over surgical sites.  Soreness and swelling may not permit your child to brush effectively.  Please make every effort to clean the teeth within the limits of your  child s comfort.      Pain:  Discomfort after surgery is common for the first 48 hours.  Acetaminophen (Tylenol) or ibuprofen (Motrin) can be used for pain control unless a doctor has advised against their use for your child.  If this is the case, please speak directly with the dentist to explore other medications for pain control.  Do not give your child Aspirin.  Tylenol or Motrin should be given according to the instructions on the bottle taking into account your child s age and weight.  If pain is not relieved by these medications, please contact the dentist to determine the best course of action.      INSTRUCTIONS AFTER DENTAL SURGERY UNDER GENERAL ANESTHESIA    Fever:  A slight fever (up to 100.5 F) is not uncommon for the first 48 hours after surgery.  If a higher fever develops or if the fever persists for more than 48 hours, please contact our office at [phone].     Surgical Site Care:  Today, do not disturb the surgical site if teeth have been removed.  Do not allow the child to use a straw or sippy for the first 2 days after surgery.  Do not stretch the lips or cheeks to look at the area.  Do not allow the child to rinse the mouth, use mouthwash, or probe the area with fingers or other objects.  Beginning tomorrow, the child may rinse with warm water every 2 to 4 hours and especially after meals.  If you prefer, your child may rinse with warm salt water [1 teaspoon of salt with one cup (8 ounces) of water].       Numbness:  Dental procedures in the operating room do not routinely require the use of medications that numb the teeth, gums, or other parts of the mouth.  If numbing medications have been used during your child s surgery, he or she can cause damage to his or her lips, cheeks, and tongue by biting or scatching the area.  Please monitor your child closely to prevent them from biting or scatching areas of the mouth that are numb after surgery.  The numbing medications can last for 2 to 4 hours  "after the dental procedure is complete.      Silver Caps:  If the dentist has placed stainless steel crowns ( silver caps ) on your child s teeth, your child should avoid eating hard, sticky foods to prevent dislodging the silver caps.  Silver caps should be kept clean to avoid irritation to the surrounding gum tissues.  Should a silver cap become loose or dislodged in the future, please have your child seen at our clinic.            Pending Results     No orders found from 3/5/2018 to 3/8/2018.            Admission Information     Date & Time Provider Department Dept. Phone    3/7/2018 [facility] [phone]      Your Vitals Were     Blood Pressure Pulse Temperature Respirations Height Weight    101/41 78 99.5  F (37.5  C) 20 1.575 m (5' 2.01\") 36.3 kg (80 lb)    Pulse Oximetry BMI (Body Mass Index)                99% 14.63 kg/m2          MyChart Information     [employer] lets you send messages to your doctor, view your test results, renew your prescriptions, schedule appointments and more. To sign up, go to [email or web address] . Click on \"Log in\" on the left side of the screen, which will take you to the Welcome page. Then click on \"Sign up Now\" on the right side of the page.     You will be asked to enter the access code listed below, as well as some personal information. Please follow the directions to create your username and password.     Your access code is: [email or web address]  Expires: 2018  6:30 AM     Your access code will  in 90 days. If you need help or a new code, please call your [facility] clinic or [phone].        Care EveryWhere ID     This is your Care EveryWhere ID. This could be used by other organizations to access your [facility] medical records  [id number]        Equal Access to Services     [facility] MARILYN NEWSOME: Jennifer Luo, waquiana ivey, qaybta kachris adams, brady newsome. So [facility] [phone].    ATENCIÓN: Si " janneth domínguez, tiene a serna disposición servicios gratuitos de asistencia lingüística. Iza gregory [phone].    We comply with applicable federal civil rights laws and [place] laws. We do not discriminate on the basis of race, color, national origin, age, disability, sex, sexual orientation, or gender identity.               Review of your medicines      UNREVIEWED medicines. Ask your doctor about these medicines        Dose / Directions    acetaminophen 500 MG tablet   Commonly known as:  TYLENOL        Dose:  1000 mg   1,000 mg by Gastric Tube route every 6 hours as needed for mild pain   Refills:  0       acetaminophen-codeine 300-30 MG per tablet   Commonly known as:  TYLENOL #3        Dose:  1 tablet   Take 1 tablet by mouth every 4 hours as needed   Refills:  0       amoxicillin 400 MG/5ML suspension   Commonly known as:  AMOXIL   Indication:  Possible post-op infection        Dose:  500 mg   6.3 mLs (500 mg) by Oral or G tube route 3 times daily   Refills:  0       aspirin 81 MG chewable tablet        Dose:  81 mg   Take 81 mg by mouth every morning   Refills:  0       baclofen 10 MG tablet   Commonly known as:  LIORESAL        Dose:  10 mg   Take 10 mg by mouth At Bedtime   Refills:  0       benzonatate 100 MG capsule   Commonly known as:  TESSALON        Dose:  100 mg   Take 100 mg by mouth every 6 hours as needed   Refills:  0       * bisacodyl 5 MG EC tablet   Commonly known as:  DULCOLAX        Dose:  10 mg   Take 10 mg by mouth daily as needed for constipation   Refills:  0       * bisacodyl 10 MG Suppository   Commonly known as:  DULCOLAX        Dose:  10 mg   Place 10 mg rectally as needed   Refills:  0       chlorhexidine 0.12 % solution   Commonly known as:  PERIDEX        Dose:  15 mL   Swish and spit 15 mLs in mouth 2 times daily   Refills:  0       doxazosin 2 MG tablet   Commonly known as:  CARDURA        Dose:  2 mg   2 mg by Gastric Tube route 2 times daily   Refills:  0       esomeprazole 40  MG CR capsule   Commonly known as:  nexIUM        Dose:  40 mg   Take 40 mg by mouth every morning   Refills:  0       guaiFENesin-codeine 100-10 MG/5ML Syrp syrup   Commonly known as:  guaiFENesin AC        Dose:  5 mL   Take 5 mLs by mouth 3 times daily as needed   Refills:  0       levETIRAcetam 100 MG/ML solution   Commonly known as:  KEPPRA        Dose:  7.5 mL   Take 7.5 mLs by mouth 2 times daily   Refills:  0       lisinopril 10 MG tablet   Commonly known as:  PRINIVIL/ZESTRIL        Dose:  10 mg   10 mg by Gastric Tube route every morning   Refills:  0       metoprolol tartrate 25 MG tablet   Commonly known as:  LOPRESSOR        Dose:  25 mg   Take 25 mg by mouth 2 times daily   Refills:  0       QUEtiapine 25 MG tablet   Commonly known as:  SEROquel        Dose:  25 mg   Take 25 mg by mouth 2 times daily as needed   Refills:  0       simvastatin 20 MG tablet   Commonly known as:  ZOCOR        Dose:  20 mg   20 mg by Gastric Tube route At Bedtime   Refills:  0       tiZANidine 4 MG tablet   Commonly known as:  ZANAFLEX        Dose:  4 mg   4 mg by Gastric Tube route 2 times daily   Refills:  0       TRAMADOL HCL PO        Dose:  50 mg   50 mg by Gastric Tube route as needed   Refills:  0       traZODone 100 MG tablet   Commonly known as:  DESYREL        Dose:  100 mg   100 mg by Gastric Tube route At Bedtime   Refills:  0       venlafaxine 75 MG Tb24 24 hr tablet   Commonly known as:  EFFEXOR-ER        Dose:  75 mg   Take 75 mg by mouth 2 times daily   Refills:  0       * Notice:  This list has 2 medication(s) that are the same as other medications prescribed for you. Read the directions carefully, and ask your doctor or other care provider to review them with you.             Protect others around you: Learn how to safely use, store and throw away your medicines at www.disposemymeds.org.             Medication List: This is a list of all your medications and when to take them. Check marks below indicate  your daily home schedule. Keep this list as a reference.      Medications           Morning Afternoon Evening Bedtime As Needed    acetaminophen 500 MG tablet   Commonly known as:  TYLENOL   1,000 mg by Gastric Tube route every 6 hours as needed for mild pain                                acetaminophen-codeine 300-30 MG per tablet   Commonly known as:  TYLENOL #3   Take 1 tablet by mouth every 4 hours as needed                                amoxicillin 400 MG/5ML suspension   Commonly known as:  AMOXIL   6.3 mLs (500 mg) by Oral or G tube route 3 times daily                                aspirin 81 MG chewable tablet   Take 81 mg by mouth every morning                                baclofen 10 MG tablet   Commonly known as:  LIORESAL   Take 10 mg by mouth At Bedtime                                benzonatate 100 MG capsule   Commonly known as:  TESSALON   Take 100 mg by mouth every 6 hours as needed                                * bisacodyl 5 MG EC tablet   Commonly known as:  DULCOLAX   Take 10 mg by mouth daily as needed for constipation                                * bisacodyl 10 MG Suppository   Commonly known as:  DULCOLAX   Place 10 mg rectally as needed                                chlorhexidine 0.12 % solution   Commonly known as:  PERIDEX   Swish and spit 15 mLs in mouth 2 times daily                                doxazosin 2 MG tablet   Commonly known as:  CARDURA   2 mg by Gastric Tube route 2 times daily                                esomeprazole 40 MG CR capsule   Commonly known as:  nexIUM   Take 40 mg by mouth every morning                                guaiFENesin-codeine 100-10 MG/5ML Syrp syrup   Commonly known as:  guaiFENesin AC   Take 5 mLs by mouth 3 times daily as needed                                levETIRAcetam 100 MG/ML solution   Commonly known as:  KEPPRA   Take 7.5 mLs by mouth 2 times daily                                lisinopril 10 MG tablet   Commonly known as:   PRINIVIL/ZESTRIL   10 mg by Gastric Tube route every morning                                metoprolol tartrate 25 MG tablet   Commonly known as:  LOPRESSOR   Take 25 mg by mouth 2 times daily                                QUEtiapine 25 MG tablet   Commonly known as:  SEROquel   Take 25 mg by mouth 2 times daily as needed                                simvastatin 20 MG tablet   Commonly known as:  ZOCOR   20 mg by Gastric Tube route At Bedtime                                tiZANidine 4 MG tablet   Commonly known as:  ZANAFLEX   4 mg by Gastric Tube route 2 times daily                                TRAMADOL HCL PO   50 mg by Gastric Tube route as needed                                traZODone 100 MG tablet   Commonly known as:  DESYREL   100 mg by Gastric Tube route At Bedtime                                venlafaxine 75 MG Tb24 24 hr tablet   Commonly known as:  EFFEXOR-ER   Take 75 mg by mouth 2 times daily                                * Notice:  This list has 2 medication(s) that are the same as other medications prescribed for you. Read the directions carefully, and ask your doctor or other care provider to review them with you.

## 2018-03-07 NOTE — BRIEF OP NOTE
[facility] [place]    Brief Operative Note    Pre-operative diagnosis: Dental Caries, Periodonal Disease   Post-operative diagnosis * No post-op diagnosis entered *  Procedure: Procedure(s):  Kenalog Injection In Bilateral [place], Combined Dental Exam,Radiographs,Three Dental Restorations, Periodontal Therapy and Fluoride Treatment - Wound Class: I-Clean  Kenalog Injection in Bilateral [place], Combined Dental Exam,Radiographs, Three Dental Restorations, Periodontal Therapy and Fluoride Treatment - Wound Class: II-Clean Contaminated  Surgeon: Surgeon(s) and Role:  Panel 1:     * Liu Conroy DDS - Primary    Panel 2:     * Darryl Isbell DDS - Primary     * Dipti Burrows MD - Resident - Assisting  Anesthesia: General   Estimated blood loss: Minimal  Drains: None  Specimens: * No specimens in log *  Findings:   None.  Complications: None.  Implants: None      Dipti Burrows DDS  [phone]

## 2018-03-07 NOTE — OR NURSING
Liu's jaw less swollen, softer after ice applied to face. His daughter was happy with how he looked.

## 2018-03-07 NOTE — OR NURSING
Patient's daughter, Idalmis, is concerned that Lui's jaw appears swollen and is firm to touch. Patient received kenalog injections in his jaw for dental surgery. Dr. Isbell assured Idalmis the swelling was from injection and will decrease in time. Ice applied to jaw with jaw bra.

## 2018-03-07 NOTE — DISCHARGE INSTRUCTIONS
Same-Day Surgery   Adult Discharge Orders & Instructions     For 24 hours after surgery:  1. Get plenty of rest.  A responsible adult must stay with you for at least 24 hours after you leave the hospital.   2. Pain medication can slow your reflexes. Do not drive or use heavy equipment.  If you have weakness or tingling, don't drive or use heavy equipment until this feeling goes away.  3. Mixing alcohol and pain medication can cause dizziness and slow your breathing. It can even be fatal. Do not drink alcohol while taking pain medication.  4. Avoid strenuous or risky activities.  Ask for help when climbing stairs.   5. You may feel lightheaded.  If so, sit for a few minutes before standing.  Have someone help you get up.   6. If you have nausea (feel sick to your stomach), drink only clear liquids such as apple juice, ginger ale, broth or 7-Up.  Rest may also help.  Be sure to drink enough fluids.  Move to a regular diet as you feel able. Take pain medications with a small amount of solid food, such as toast or crackers, to avoid nausea.   7. A slight fever is normal. Call the doctor if your fever is over 100 F (37.7 C) (taken under the tongue) or lasts longer than 24 hours.  8. You may have a dry mouth, muscle aches, trouble sleeping or a sore throat.  These symptoms should go away after 24 hours.  9. Do not make important or legal decisions.   Pain Management:      1. Take pain medication (if prescribed) for pain as directed by your physician.        2. WARNING: If the pain medication you have been prescribed contains Tylenol  (acetaminophen), DO NOT take additional doses of Tylenol (acetaminophen).     Call your doctor for any of the followin.  Signs of infection (fever, growing tenderness at the surgery site, severe pain, a large amount of drainage or bleeding, foul-smelling drainage, redness, swelling).    2.  It has been over 8 to 10 hours since surgery and you are still not able to urinate (pee).    3.   Headache for over 24 hours.    4.  Numbness, tingling or weakness the day after surgery (if you had spinal anesthesia).  To contact a doctor, call _____________________________________ or:      [phone] and ask for the Resident On Call for:          __________________________________________ (answered 24 hours a day)      Emergency Department:  [facility]: [phone]  [facility]: [phone]               Rev. 10/2014   FOLLOW UP DENTAL CARE AFTER DENTAL SURGERY UNDER GENERAL ANESTHESIA       After dental surgery care or emergencies that develop during hours when our clinic is closed (5 PM - 8AM Monday through Friday, all hours on weekends) should be directed to the  Dental Resident on Call.  Please call [phone] and specifically ask the  to page the  Dental Resident On-Call.      INSTRUCTIONS AFTER DENTAL SURGERY UNDER GENERAL ANESTHESIA  l    Daily Activities:  Your child should be limited to quiet, restful activities today.  Your child may return to school or  tomorrow as per his or her normal routine.  Physical activity can begin tomorrow.  Your child can bathe normally after surgery.      Bleeding:  Bleeding after dental procedures are a common finding.  Areas of bleeding within your child s mouth were controlled before the child was awakened from general anesthesia.  It is not unusual for periodic oozing (pink or blood tinged saliva) to occur after dental surgery.  Hold gauze or a clean towel with firm pressure against the surgical site until the oozing has stopped.      Swelling:  Slight swelling in the lips and cheeks are common after surgery and for the following 2 days.  If swelling occurs, ice packs may be used for the first 24 hours (10 minutes on, 10 minutes off) to decrease the swelling.  If swelling persists after 24 hours, warm, moist compresses (10 minutes on, 10 minutes off) may help.  If swelling develops or remains after 48  hours, please contact our office.      Diet:  After all bleeding has stopped, the patient may drink cool, non-carbonated beverages but should not use a straw.  Encourage your child to drink fluids to prevent dehydration.  Cool soft foods (eg. Jell-O, pudding, yogurt) are ideal the first day.  By the second day, consistency of foods can progress as tolerated.  Avoid hard, crunchy foods such as nuts, sunflower, seeds, pretzels, and popcorn that may get stuck in the surgical areas.      Oral Hygiene:  Keeping the mouth clean is essential for healing.  Today, teeth may be brushed and flossed gently, but avoid brushing over surgical sites.  Soreness and swelling may not permit your child to brush effectively.  Please make every effort to clean the teeth within the limits of your child s comfort.      Pain:  Discomfort after surgery is common for the first 48 hours.  Acetaminophen (Tylenol) or ibuprofen (Motrin) can be used for pain control unless a doctor has advised against their use for your child.  If this is the case, please speak directly with the dentist to explore other medications for pain control.  Do not give your child Aspirin.  Tylenol or Motrin should be given according to the instructions on the bottle taking into account your child s age and weight.  If pain is not relieved by these medications, please contact the dentist to determine the best course of action.      INSTRUCTIONS AFTER DENTAL SURGERY UNDER GENERAL ANESTHESIA    Fever:  A slight fever (up to 100.5 F) is not uncommon for the first 48 hours after surgery.  If a higher fever develops or if the fever persists for more than 48 hours, please contact our office at [phone].     Surgical Site Care:  Today, do not disturb the surgical site if teeth have been removed.  Do not allow the child to use a straw or sippy for the first 2 days after surgery.  Do not stretch the lips or cheeks to look at the area.  Do not allow the child to rinse the  mouth, use mouthwash, or probe the area with fingers or other objects.  Beginning tomorrow, the child may rinse with warm water every 2 to 4 hours and especially after meals.  If you prefer, your child may rinse with warm salt water [1 teaspoon of salt with one cup (8 ounces) of water].       Numbness:  Dental procedures in the operating room do not routinely require the use of medications that numb the teeth, gums, or other parts of the mouth.  If numbing medications have been used during your child s surgery, he or she can cause damage to his or her lips, cheeks, and tongue by biting or scatching the area.  Please monitor your child closely to prevent them from biting or scatching areas of the mouth that are numb after surgery.  The numbing medications can last for 2 to 4 hours after the dental procedure is complete.      Silver Caps:  If the dentist has placed stainless steel crowns ( silver caps ) on your child s teeth, your child should avoid eating hard, sticky foods to prevent dislodging the silver caps.  Silver caps should be kept clean to avoid irritation to the surrounding gum tissues.  Should a silver cap become loose or dislodged in the future, please have your child seen at our clinic.

## 2018-03-08 NOTE — OP NOTE
Procedure Date: 03/07/2018      DATE OF SERVICE:  3/7/2018.      It was deemed necessary for this patient to be seen in the hospital operating room due to trismus and aspiration pneumonia risk and the inability to be treated in a traditional dental clinic setting.      Under general anesthesia, the following operations were performed in the mouth:   1. Bilateral dental examination.   2. Dental radiographs.   3. Prophylaxis.     4. Periodontal therapy.   5. Dental restorations x 3   6. Fluoride varnish application.      ATTENDING PHYSICIAN: Darryl Isbell DDS.      FIRST ASSISTANT DENTAL RESIDENT:  Dipti Burrows DDS.      ANESTHESIOLOGIST:  MD Terra.      SCRUB NURSE Sussy.      CIRCULATING NURSE:  Mariluz.      CRNA:  Renuka.      PREOPERATIVE DIAGNOSIS:  Suspected periodontal disease and dental caries.      POSTOPERATIVE DIAGNOSES:  Chronic generalized gingivitis, enamel hypoplasia and dental caries.      DESCRIPTION OF PROCEDURE:  The patient was brought into the Operating Room and draped in the usual customary [facility] fashion.  Following the timeout procedures, general anesthesia was administered through the patient's right naris.  A bilateral dental exam was performed and a full mouth series of 6 periapical radiographs were obtained and interpreted.  A moist throat pack was placed at 8:36 a.m.  Clinical examination revealed multiple decayed teeth, generalized heavy plaque and light calculus, generalized bleeding on probing and periodontal pockets ranging from 2-4 mm.  Mobility of class II was noted on the upper left first premolar and the lower left second premolar.  Radiographic examination revealed normal bone trabeculation, generalized horizontal bone loss and several coronal radiolucencies consistent with dental caries on the upper right lateral incisor, upper left canine and lower left first premolar.  No local anesthesia was used.  The following procedures were performed:   Bilateral steroid injections into the masseters was performed with Dr. Conroy of oral surgery, periodontal therapy was performed on all teeth using ultrasonic debridement, supragingival and subgingival scaling and root planing with rubber cup polishing and flossing.  Dental restorations of Fuji 2 glass ionomer material were placed on the upper right lateral incisor mesial facial, upper left canine mesial lingual distal and lower left first premolar mesial occlusal.  Fluoride varnish was applied to all teeth.  The throat pack was removed with suction at 9:11 a.m.  The oropharynx was inspected and found to be clear.  Estimated blood loss was 2 mL.  The attending doctor, Dr. Wolff, was present for the entire procedure.  The patient was extubated in the Operating Room and taken to the post-anesthesia care unit in good condition.         STEFFEN WOLFF DDS       As dictated by CECILY GARCIA MD            D: 2018   T: 2018   MT: GIA      Name:     NAVYA ESPINAL   MRN:      [id number]        Account:        [id number]   :      1965           Procedure Date: 2018      Document: [id number]

## 2018-03-13 NOTE — OP NOTE
Procedure Date: 03/07/2018       DATE OF SERVICE:  3/7/2018.       It was deemed necessary for this patient to be seen in the hospital operating room due to trismus and aspiration pneumonia risk and the inability to be treated in a traditional dental clinic setting.       Under general anesthesia, the following operations were performed in the mouth:   1. Bilateral dental examination.   2. Dental radiographs.   3. Prophylaxis.     4. Periodontal therapy.   5. Dental restorations x 3   6. Fluoride varnish application.       ATTENDING PHYSICIAN:  Dr. Liu Conroy      FIRST ASSISTANT DENTAL RESIDENT:  Dipti Tellez DDS.       ANESTHESIOLOGIST:  MD Terra.       SCRUB NURSE Sussy.       CIRCULATING NURSE:  Francisco Javier.       CRNA:  Renuka.       PREOPERATIVE DIAGNOSIS: Mandibular hypomobility.       POSTOPERATIVE DIAGNOSES:  same as pre-op       DESCRIPTION OF PROCEDURE:  The patient was brought into the Operating Room and draped in the usual customary [facility] fashion.  Following the timeout procedures, general anesthesia was administered through the patient's right naris. 1 ml ( 40 mg ) of Kenalog injected into the left masseterand then 40 mg into the right masseter for a total of 80 mg of Kenalog. The mandible was opened to 49 mm and stretched to ensure no interferences. The dental service then completed there procedures. See their op note for details of dental procedures performed. The throat pack was removed with suction at 9:11 a.m.  The oropharynx was inspected and found to be clear.  Estimated blood loss was 2 mL.  The attending doctor, Dr. Isbell, was present for the entire procedure.  The patient was extubated in the Operating Room and taken to the post-anesthesia care unit in good condition.     I was present, scrubbed and directed the critical portions of the procedure. Please see the operative report for full details.     Liu Conroy DDS, MD  Staff, Oral and Maxillofacial  Surgery

## 2018-03-14 ENCOUNTER — OFFICE VISIT - HEALTHEAST (OUTPATIENT)
Dept: FAMILY MEDICINE | Facility: CLINIC | Age: 53
End: 2018-03-14

## 2018-03-14 DIAGNOSIS — R05.9 COUGH: ICD-10-CM

## 2018-03-14 DIAGNOSIS — Z87.01 HISTORY OF ASPIRATION PNEUMONIA: ICD-10-CM

## 2018-03-14 DIAGNOSIS — M79.89 SWELLING OF THIGH: ICD-10-CM

## 2018-03-14 DIAGNOSIS — Z86.73 HISTORY OF STROKE: ICD-10-CM

## 2018-03-14 DIAGNOSIS — J96.01 ACUTE RESPIRATORY FAILURE WITH HYPOXIA (H): ICD-10-CM

## 2018-03-16 ENCOUNTER — HOME CARE/HOSPICE - HEALTHEAST (OUTPATIENT)
Dept: HOSPICE | Facility: HOSPICE | Age: 53
End: 2018-03-16

## 2018-03-21 ENCOUNTER — COMMUNICATION - HEALTHEAST (OUTPATIENT)
Dept: NURSING | Facility: CLINIC | Age: 53
End: 2018-03-21

## 2018-03-21 ENCOUNTER — COMMUNICATION - HEALTHEAST (OUTPATIENT)
Dept: FAMILY MEDICINE | Facility: CLINIC | Age: 53
End: 2018-03-21

## 2018-03-21 DIAGNOSIS — R25.1 TREMOR: ICD-10-CM

## 2018-03-22 ENCOUNTER — HOME CARE/HOSPICE - HEALTHEAST (OUTPATIENT)
Dept: HOSPICE | Facility: HOSPICE | Age: 53
End: 2018-03-22

## 2018-03-22 ENCOUNTER — COMMUNICATION - HEALTHEAST (OUTPATIENT)
Dept: FAMILY MEDICINE | Facility: CLINIC | Age: 53
End: 2018-03-22

## 2018-03-24 ENCOUNTER — RECORDS - HEALTHEAST (OUTPATIENT)
Dept: ADMINISTRATIVE | Facility: OTHER | Age: 53
End: 2018-03-24

## 2018-03-28 ENCOUNTER — COMMUNICATION - HEALTHEAST (OUTPATIENT)
Dept: FAMILY MEDICINE | Facility: CLINIC | Age: 53
End: 2018-03-28

## 2018-05-16 ENCOUNTER — HOSPITAL ENCOUNTER (OUTPATIENT)
Dept: INTERVENTIONAL RADIOLOGY/VASCULAR | Facility: HOSPITAL | Age: 53
Discharge: HOME | End: 2018-05-16
Attending: NURSE PRACTITIONER | Admitting: RADIOLOGY

## 2018-05-16 DIAGNOSIS — E46 MALNUTRITION, UNSPECIFIED TYPE (H): ICD-10-CM

## 2018-06-26 ENCOUNTER — COMMUNICATION - HEALTHEAST (OUTPATIENT)
Dept: FAMILY MEDICINE | Facility: CLINIC | Age: 53
End: 2018-06-26

## 2018-06-26 ENCOUNTER — AMBULATORY - HEALTHEAST (OUTPATIENT)
Dept: FAMILY MEDICINE | Facility: CLINIC | Age: 53
End: 2018-06-26

## 2018-07-25 ENCOUNTER — COMMUNICATION - HEALTHEAST (OUTPATIENT)
Dept: FAMILY MEDICINE | Facility: CLINIC | Age: 53
End: 2018-07-25

## 2018-08-15 ENCOUNTER — HOME CARE/HOSPICE - HEALTHEAST (OUTPATIENT)
Dept: HOME HEALTH SERVICES | Facility: HOME HEALTH | Age: 53
End: 2018-08-15

## 2018-08-15 ENCOUNTER — OFFICE VISIT - HEALTHEAST (OUTPATIENT)
Dept: FAMILY MEDICINE | Facility: CLINIC | Age: 53
End: 2018-08-15

## 2018-08-15 DIAGNOSIS — I10 ESSENTIAL HYPERTENSION: ICD-10-CM

## 2018-08-15 DIAGNOSIS — R33.9 URINARY RETENTION: ICD-10-CM

## 2018-08-15 DIAGNOSIS — E46 HYPOALBUMINEMIA DUE TO PROTEIN-CALORIE MALNUTRITION (H): ICD-10-CM

## 2018-08-15 DIAGNOSIS — I69.30 LATE EFFECT OF STROKE: ICD-10-CM

## 2018-08-15 DIAGNOSIS — D64.9 ANEMIA: ICD-10-CM

## 2018-08-15 DIAGNOSIS — R25.2 SPASTICITY: ICD-10-CM

## 2018-08-15 DIAGNOSIS — I69.959 HEMIPLEGIA OF NONDOMINANT SIDE, LATE EFFECT OF CEREBROVASCULAR DISEASE (H): ICD-10-CM

## 2018-08-15 DIAGNOSIS — E78.5 DYSLIPIDEMIA: ICD-10-CM

## 2018-08-15 DIAGNOSIS — B18.2 HEPATITIS C VIRUS CARRIER STATE (H): ICD-10-CM

## 2018-08-15 DIAGNOSIS — E88.09 HYPOALBUMINEMIA DUE TO PROTEIN-CALORIE MALNUTRITION (H): ICD-10-CM

## 2018-08-15 DIAGNOSIS — R13.12 OROPHARYNGEAL DYSPHAGIA: ICD-10-CM

## 2018-08-15 DIAGNOSIS — K59.01 SLOW TRANSIT CONSTIPATION: ICD-10-CM

## 2018-08-15 DIAGNOSIS — K13.79 MOUTH SORES: ICD-10-CM

## 2018-08-15 DIAGNOSIS — G40.909 SEIZURE DISORDER (H): ICD-10-CM

## 2018-08-15 DIAGNOSIS — R45.1 RESTLESSNESS AND AGITATION: ICD-10-CM

## 2018-08-15 DIAGNOSIS — Z93.1 GASTROSTOMY TUBE DEPENDENT (H): ICD-10-CM

## 2018-08-15 LAB
ALBUMIN SERPL-MCNC: 3.2 G/DL (ref 3.5–5)
ALP SERPL-CCNC: 84 U/L (ref 45–120)
ALT SERPL W P-5'-P-CCNC: 44 U/L (ref 0–45)
ANION GAP SERPL CALCULATED.3IONS-SCNC: 11 MMOL/L (ref 5–18)
AST SERPL W P-5'-P-CCNC: 38 U/L (ref 0–40)
BILIRUB SERPL-MCNC: 0.2 MG/DL (ref 0–1)
BUN SERPL-MCNC: 23 MG/DL (ref 8–22)
CALCIUM SERPL-MCNC: 8.9 MG/DL (ref 8.5–10.5)
CHLORIDE BLD-SCNC: 105 MMOL/L (ref 98–107)
CHOLEST SERPL-MCNC: 183 MG/DL
CO2 SERPL-SCNC: 22 MMOL/L (ref 22–31)
CREAT SERPL-MCNC: 0.97 MG/DL (ref 0.7–1.3)
ERYTHROCYTE [DISTWIDTH] IN BLOOD BY AUTOMATED COUNT: 13.6 % (ref 11–14.5)
FASTING STATUS PATIENT QL REPORTED: YES
FERRITIN SERPL-MCNC: 64 NG/ML (ref 27–300)
GFR SERPL CREATININE-BSD FRML MDRD: >60 ML/MIN/1.73M2
GLUCOSE BLD-MCNC: 87 MG/DL (ref 70–125)
HCT VFR BLD AUTO: 33.3 % (ref 40–54)
HDLC SERPL-MCNC: 41 MG/DL
HGB BLD-MCNC: 11.5 G/DL (ref 14–18)
IRON SATN MFR SERPL: 22 % (ref 20–50)
IRON SERPL-MCNC: 84 UG/DL (ref 42–175)
LDLC SERPL CALC-MCNC: 120 MG/DL
MCH RBC QN AUTO: 30.7 PG (ref 27–34)
MCHC RBC AUTO-ENTMCNC: 34.6 G/DL (ref 32–36)
MCV RBC AUTO: 89 FL (ref 80–100)
PLATELET # BLD AUTO: 231 THOU/UL (ref 140–440)
PMV BLD AUTO: 6.8 FL (ref 7–10)
POTASSIUM BLD-SCNC: 5.1 MMOL/L (ref 3.5–5)
PROT SERPL-MCNC: 8.2 G/DL (ref 6–8)
RBC # BLD AUTO: 3.75 MILL/UL (ref 4.4–6.2)
SODIUM SERPL-SCNC: 138 MMOL/L (ref 136–145)
TIBC SERPL-MCNC: 384 UG/DL (ref 313–563)
TRANSFERRIN SERPL-MCNC: 307 MG/DL (ref 212–360)
TRIGL SERPL-MCNC: 110 MG/DL
VIT B12 SERPL-MCNC: 1399 PG/ML (ref 213–816)
WBC: 7.3 THOU/UL (ref 4–11)

## 2018-08-16 ENCOUNTER — HOME CARE/HOSPICE - HEALTHEAST (OUTPATIENT)
Dept: HOSPICE | Facility: HOSPICE | Age: 53
End: 2018-08-16

## 2018-08-16 ENCOUNTER — COMMUNICATION - HEALTHEAST (OUTPATIENT)
Dept: FAMILY MEDICINE | Facility: CLINIC | Age: 53
End: 2018-08-16

## 2018-08-17 ENCOUNTER — COMMUNICATION - HEALTHEAST (OUTPATIENT)
Dept: FAMILY MEDICINE | Facility: CLINIC | Age: 53
End: 2018-08-17

## 2018-08-17 ENCOUNTER — HOME CARE/HOSPICE - HEALTHEAST (OUTPATIENT)
Dept: HOME HEALTH SERVICES | Facility: HOME HEALTH | Age: 53
End: 2018-08-17

## 2018-08-19 ENCOUNTER — HOME CARE/HOSPICE - HEALTHEAST (OUTPATIENT)
Dept: HOME HEALTH SERVICES | Facility: HOME HEALTH | Age: 53
End: 2018-08-19

## 2018-08-20 ENCOUNTER — HOME CARE/HOSPICE - HEALTHEAST (OUTPATIENT)
Dept: HOME HEALTH SERVICES | Facility: HOME HEALTH | Age: 53
End: 2018-08-20

## 2018-08-20 ENCOUNTER — HOME CARE/HOSPICE - HEALTHEAST (OUTPATIENT)
Dept: HOSPICE | Facility: HOSPICE | Age: 53
End: 2018-08-20

## 2018-08-20 ENCOUNTER — COMMUNICATION - HEALTHEAST (OUTPATIENT)
Dept: FAMILY MEDICINE | Facility: CLINIC | Age: 53
End: 2018-08-20

## 2018-08-22 ENCOUNTER — COMMUNICATION - HEALTHEAST (OUTPATIENT)
Dept: FAMILY MEDICINE | Facility: CLINIC | Age: 53
End: 2018-08-22

## 2018-08-23 ENCOUNTER — HOME CARE/HOSPICE - HEALTHEAST (OUTPATIENT)
Dept: HOME HEALTH SERVICES | Facility: HOME HEALTH | Age: 53
End: 2018-08-23

## 2018-08-25 ENCOUNTER — HOME CARE/HOSPICE - HEALTHEAST (OUTPATIENT)
Dept: HOME HEALTH SERVICES | Facility: HOME HEALTH | Age: 53
End: 2018-08-25

## 2018-08-28 ENCOUNTER — HOME CARE/HOSPICE - HEALTHEAST (OUTPATIENT)
Dept: HOME HEALTH SERVICES | Facility: HOME HEALTH | Age: 53
End: 2018-08-28

## 2018-08-28 ENCOUNTER — COMMUNICATION - HEALTHEAST (OUTPATIENT)
Dept: FAMILY MEDICINE | Facility: CLINIC | Age: 53
End: 2018-08-28

## 2018-08-31 ENCOUNTER — HOME CARE/HOSPICE - HEALTHEAST (OUTPATIENT)
Dept: HOME HEALTH SERVICES | Facility: HOME HEALTH | Age: 53
End: 2018-08-31

## 2018-09-04 ENCOUNTER — COMMUNICATION - HEALTHEAST (OUTPATIENT)
Dept: FAMILY MEDICINE | Facility: CLINIC | Age: 53
End: 2018-09-04

## 2018-09-04 DIAGNOSIS — R25.1 TREMOR: ICD-10-CM

## 2018-09-05 ENCOUNTER — HOME CARE/HOSPICE - HEALTHEAST (OUTPATIENT)
Dept: HOME HEALTH SERVICES | Facility: HOME HEALTH | Age: 53
End: 2018-09-05

## 2018-09-07 ENCOUNTER — HOME CARE/HOSPICE - HEALTHEAST (OUTPATIENT)
Dept: HOME HEALTH SERVICES | Facility: HOME HEALTH | Age: 53
End: 2018-09-07

## 2018-09-07 ENCOUNTER — COMMUNICATION - HEALTHEAST (OUTPATIENT)
Dept: FAMILY MEDICINE | Facility: CLINIC | Age: 53
End: 2018-09-07

## 2018-09-12 ENCOUNTER — HOSPITAL ENCOUNTER (OUTPATIENT)
Dept: INTERVENTIONAL RADIOLOGY/VASCULAR | Facility: HOSPITAL | Age: 53
Discharge: HOME | End: 2018-09-12
Attending: RADIOLOGY | Admitting: RADIOLOGY

## 2018-09-12 DIAGNOSIS — E46 MALNUTRITION (H): ICD-10-CM

## 2018-09-15 ENCOUNTER — HOME CARE/HOSPICE - HEALTHEAST (OUTPATIENT)
Dept: HOME HEALTH SERVICES | Facility: HOME HEALTH | Age: 53
End: 2018-09-15

## 2018-09-20 ENCOUNTER — HOME CARE/HOSPICE - HEALTHEAST (OUTPATIENT)
Dept: HOME HEALTH SERVICES | Facility: HOME HEALTH | Age: 53
End: 2018-09-20

## 2018-09-21 ENCOUNTER — COMMUNICATION - HEALTHEAST (OUTPATIENT)
Dept: OCCUPATIONAL THERAPY | Facility: CLINIC | Age: 53
End: 2018-09-21

## 2018-09-21 ENCOUNTER — COMMUNICATION - HEALTHEAST (OUTPATIENT)
Dept: FAMILY MEDICINE | Facility: CLINIC | Age: 53
End: 2018-09-21

## 2018-09-21 ENCOUNTER — OFFICE VISIT - HEALTHEAST (OUTPATIENT)
Dept: FAMILY MEDICINE | Facility: CLINIC | Age: 53
End: 2018-09-21

## 2018-09-21 DIAGNOSIS — K21.9 GERD WITHOUT ESOPHAGITIS: ICD-10-CM

## 2018-09-21 DIAGNOSIS — I69.30 HISTORY OF STROKE WITH RESIDUAL DEFICIT: ICD-10-CM

## 2018-09-21 DIAGNOSIS — F41.9 ANXIETY: ICD-10-CM

## 2018-09-21 DIAGNOSIS — M62.471 CONTRACTURE OF MUSCLE ANKLE AND FOOT, RIGHT: ICD-10-CM

## 2018-09-21 DIAGNOSIS — K13.79 MOUTH SORES: ICD-10-CM

## 2018-09-21 DIAGNOSIS — R05.3 CHRONIC COUGH: ICD-10-CM

## 2018-09-21 DIAGNOSIS — R33.9 URINARY RETENTION: ICD-10-CM

## 2018-09-21 DIAGNOSIS — R13.10 DYSPHAGIA: ICD-10-CM

## 2018-09-21 DIAGNOSIS — R06.02 SHORTNESS OF BREATH: ICD-10-CM

## 2018-09-21 DIAGNOSIS — Z93.4 GASTROJEJUNOSTOMY TUBE STATUS (H): ICD-10-CM

## 2018-09-21 DIAGNOSIS — R06.00 DYSPNEA: ICD-10-CM

## 2018-09-21 DIAGNOSIS — R05.9 COUGH: ICD-10-CM

## 2018-09-21 DIAGNOSIS — R09.02 HYPOXIA: ICD-10-CM

## 2018-10-04 ENCOUNTER — COMMUNICATION - HEALTHEAST (OUTPATIENT)
Dept: FAMILY MEDICINE | Facility: CLINIC | Age: 53
End: 2018-10-04

## 2018-10-04 DIAGNOSIS — R06.00 DYSPNEA: ICD-10-CM

## 2018-10-04 DIAGNOSIS — R09.02 HYPOXIA: ICD-10-CM

## 2018-10-04 DIAGNOSIS — R05.9 COUGH: ICD-10-CM

## 2018-10-08 ENCOUNTER — COMMUNICATION - HEALTHEAST (OUTPATIENT)
Dept: FAMILY MEDICINE | Facility: CLINIC | Age: 53
End: 2018-10-08

## 2018-10-08 ENCOUNTER — RECORDS - HEALTHEAST (OUTPATIENT)
Dept: ADMINISTRATIVE | Facility: OTHER | Age: 53
End: 2018-10-08

## 2018-10-08 DIAGNOSIS — R05.3 CHRONIC COUGH: ICD-10-CM

## 2018-10-08 DIAGNOSIS — R06.02 SOB (SHORTNESS OF BREATH): ICD-10-CM

## 2018-10-17 ENCOUNTER — HOME CARE/HOSPICE - HEALTHEAST (OUTPATIENT)
Dept: HOME HEALTH SERVICES | Facility: HOME HEALTH | Age: 53
End: 2018-10-17

## 2018-10-17 ENCOUNTER — COMMUNICATION - HEALTHEAST (OUTPATIENT)
Dept: FAMILY MEDICINE | Facility: CLINIC | Age: 53
End: 2018-10-17

## 2018-10-17 DIAGNOSIS — K59.01 SLOW TRANSIT CONSTIPATION: ICD-10-CM

## 2018-11-14 ENCOUNTER — HOME CARE/HOSPICE - HEALTHEAST (OUTPATIENT)
Dept: HOSPICE | Facility: HOSPICE | Age: 53
End: 2018-11-14

## 2018-11-14 ENCOUNTER — COMMUNICATION - HEALTHEAST (OUTPATIENT)
Dept: FAMILY MEDICINE | Facility: CLINIC | Age: 53
End: 2018-11-14

## 2018-11-14 ENCOUNTER — HOME CARE/HOSPICE - HEALTHEAST (OUTPATIENT)
Dept: HOME HEALTH SERVICES | Facility: HOME HEALTH | Age: 53
End: 2018-11-14

## 2018-11-14 ENCOUNTER — COMMUNICATION - HEALTHEAST (OUTPATIENT)
Dept: HOME HEALTH SERVICES | Facility: HOME HEALTH | Age: 53
End: 2018-11-14

## 2018-11-14 DIAGNOSIS — K59.01 SLOW TRANSIT CONSTIPATION: ICD-10-CM

## 2018-11-15 ENCOUNTER — COMMUNICATION - HEALTHEAST (OUTPATIENT)
Dept: FAMILY MEDICINE | Facility: CLINIC | Age: 53
End: 2018-11-15

## 2018-11-15 DIAGNOSIS — I61.9 HEMORRHAGIC STROKE (H): ICD-10-CM

## 2018-12-13 ENCOUNTER — HOME CARE/HOSPICE - HEALTHEAST (OUTPATIENT)
Dept: HOME HEALTH SERVICES | Facility: HOME HEALTH | Age: 53
End: 2018-12-13

## 2018-12-13 ENCOUNTER — HOME CARE/HOSPICE - HEALTHEAST (OUTPATIENT)
Dept: HOSPICE | Facility: HOSPICE | Age: 53
End: 2018-12-13

## 2018-12-13 ENCOUNTER — COMMUNICATION - HEALTHEAST (OUTPATIENT)
Dept: HOME HEALTH SERVICES | Facility: HOME HEALTH | Age: 53
End: 2018-12-13

## 2018-12-14 ENCOUNTER — AMBULATORY - HEALTHEAST (OUTPATIENT)
Dept: FAMILY MEDICINE | Facility: CLINIC | Age: 53
End: 2018-12-14

## 2018-12-16 ENCOUNTER — HOME CARE/HOSPICE - HEALTHEAST (OUTPATIENT)
Dept: HOME HEALTH SERVICES | Facility: HOME HEALTH | Age: 53
End: 2018-12-16

## 2018-12-24 ENCOUNTER — HOSPITAL ENCOUNTER (OUTPATIENT)
Dept: INTERVENTIONAL RADIOLOGY/VASCULAR | Facility: HOSPITAL | Age: 53
Discharge: HOME | End: 2018-12-24
Admitting: RADIOLOGY

## 2018-12-24 DIAGNOSIS — R13.10 DYSPHAGIA, UNSPECIFIED TYPE: ICD-10-CM

## 2019-01-10 ENCOUNTER — HOME CARE/HOSPICE - HEALTHEAST (OUTPATIENT)
Dept: HOME HEALTH SERVICES | Facility: HOME HEALTH | Age: 54
End: 2019-01-10

## 2019-02-04 ENCOUNTER — COMMUNICATION - HEALTHEAST (OUTPATIENT)
Dept: FAMILY MEDICINE | Facility: CLINIC | Age: 54
End: 2019-02-04

## 2019-02-05 ENCOUNTER — HOME CARE/HOSPICE - HEALTHEAST (OUTPATIENT)
Dept: HOME HEALTH SERVICES | Facility: HOME HEALTH | Age: 54
End: 2019-02-05

## 2019-02-14 ENCOUNTER — HOME CARE/HOSPICE - HEALTHEAST (OUTPATIENT)
Dept: HOME HEALTH SERVICES | Facility: HOME HEALTH | Age: 54
End: 2019-02-14

## 2019-02-14 ENCOUNTER — HOME CARE/HOSPICE - HEALTHEAST (OUTPATIENT)
Dept: HOSPICE | Facility: HOSPICE | Age: 54
End: 2019-02-14

## 2019-02-14 ENCOUNTER — COMMUNICATION - HEALTHEAST (OUTPATIENT)
Dept: HOME HEALTH SERVICES | Facility: HOME HEALTH | Age: 54
End: 2019-02-14

## 2019-03-05 ENCOUNTER — HOME CARE/HOSPICE - HEALTHEAST (OUTPATIENT)
Dept: HOME HEALTH SERVICES | Facility: HOME HEALTH | Age: 54
End: 2019-03-05

## 2019-03-07 ENCOUNTER — RECORDS - HEALTHEAST (OUTPATIENT)
Dept: ADMINISTRATIVE | Facility: OTHER | Age: 54
End: 2019-03-07

## 2019-03-18 ENCOUNTER — HOSPITAL ENCOUNTER (OUTPATIENT)
Dept: INTERVENTIONAL RADIOLOGY/VASCULAR | Facility: HOSPITAL | Age: 54
Discharge: HOME | End: 2019-03-18
Admitting: RADIOLOGY

## 2019-03-18 DIAGNOSIS — E46 MALNUTRITION, UNSPECIFIED TYPE (H): ICD-10-CM

## 2019-03-22 ENCOUNTER — COMMUNICATION - HEALTHEAST (OUTPATIENT)
Dept: SCHEDULING | Facility: CLINIC | Age: 54
End: 2019-03-22

## 2019-03-26 ENCOUNTER — OFFICE VISIT - HEALTHEAST (OUTPATIENT)
Dept: FAMILY MEDICINE | Facility: CLINIC | Age: 54
End: 2019-03-26

## 2019-03-26 DIAGNOSIS — R05.9 COUGH: ICD-10-CM

## 2019-03-26 DIAGNOSIS — J18.9 PNEUMONIA: ICD-10-CM

## 2019-03-29 ENCOUNTER — COMMUNICATION - HEALTHEAST (OUTPATIENT)
Dept: HOME HEALTH SERVICES | Facility: HOME HEALTH | Age: 54
End: 2019-03-29

## 2019-03-29 ENCOUNTER — COMMUNICATION - HEALTHEAST (OUTPATIENT)
Dept: FAMILY MEDICINE | Facility: CLINIC | Age: 54
End: 2019-03-29

## 2019-03-29 DIAGNOSIS — R79.89 ELEVATED TROPONIN: ICD-10-CM

## 2019-03-29 DIAGNOSIS — J98.01 BRONCHOSPASM: ICD-10-CM

## 2019-04-02 ENCOUNTER — HOME CARE/HOSPICE - HEALTHEAST (OUTPATIENT)
Dept: HOME HEALTH SERVICES | Facility: HOME HEALTH | Age: 54
End: 2019-04-02

## 2019-04-05 ENCOUNTER — COMMUNICATION - HEALTHEAST (OUTPATIENT)
Dept: FAMILY MEDICINE | Facility: CLINIC | Age: 54
End: 2019-04-05

## 2019-04-05 DIAGNOSIS — K12.1 STOMATITIS: ICD-10-CM

## 2019-04-12 ENCOUNTER — COMMUNICATION - HEALTHEAST (OUTPATIENT)
Dept: HOME HEALTH SERVICES | Facility: HOME HEALTH | Age: 54
End: 2019-04-12

## 2019-04-12 ENCOUNTER — HOME CARE/HOSPICE - HEALTHEAST (OUTPATIENT)
Dept: HOME HEALTH SERVICES | Facility: HOME HEALTH | Age: 54
End: 2019-04-12

## 2019-04-12 ENCOUNTER — HOME CARE/HOSPICE - HEALTHEAST (OUTPATIENT)
Dept: HOSPICE | Facility: HOSPICE | Age: 54
End: 2019-04-12

## 2019-04-12 ENCOUNTER — AMBULATORY - HEALTHEAST (OUTPATIENT)
Dept: FAMILY MEDICINE | Facility: CLINIC | Age: 54
End: 2019-04-12

## 2019-04-23 ENCOUNTER — HOSPITAL ENCOUNTER (OUTPATIENT)
Dept: INTERVENTIONAL RADIOLOGY/VASCULAR | Facility: HOSPITAL | Age: 54
Discharge: HOME | End: 2019-04-23
Admitting: RADIOLOGY

## 2019-04-23 DIAGNOSIS — E46 MALNUTRITION, UNSPECIFIED TYPE (H): ICD-10-CM

## 2019-04-30 ENCOUNTER — HOME CARE/HOSPICE - HEALTHEAST (OUTPATIENT)
Dept: HOME HEALTH SERVICES | Facility: HOME HEALTH | Age: 54
End: 2019-04-30

## 2019-05-21 ENCOUNTER — HOSPITAL ENCOUNTER (OUTPATIENT)
Dept: INTERVENTIONAL RADIOLOGY/VASCULAR | Facility: HOSPITAL | Age: 54
Discharge: HOME | End: 2019-05-21
Attending: RADIOLOGY | Admitting: RADIOLOGY

## 2019-05-21 ENCOUNTER — RECORDS - HEALTHEAST (OUTPATIENT)
Dept: ADMINISTRATIVE | Facility: OTHER | Age: 54
End: 2019-05-21

## 2019-05-21 DIAGNOSIS — E46 MALNUTRITION (H): ICD-10-CM

## 2019-05-23 ENCOUNTER — COMMUNICATION - HEALTHEAST (OUTPATIENT)
Dept: FAMILY MEDICINE | Facility: CLINIC | Age: 54
End: 2019-05-23

## 2019-05-23 DIAGNOSIS — R52 PAIN: ICD-10-CM

## 2019-05-28 ENCOUNTER — HOME CARE/HOSPICE - HEALTHEAST (OUTPATIENT)
Dept: HOME HEALTH SERVICES | Facility: HOME HEALTH | Age: 54
End: 2019-05-28

## 2019-06-11 ENCOUNTER — COMMUNICATION - HEALTHEAST (OUTPATIENT)
Dept: HOME HEALTH SERVICES | Facility: HOME HEALTH | Age: 54
End: 2019-06-11

## 2019-06-11 ENCOUNTER — HOME CARE/HOSPICE - HEALTHEAST (OUTPATIENT)
Dept: HOSPICE | Facility: HOSPICE | Age: 54
End: 2019-06-11

## 2019-06-11 ENCOUNTER — HOME CARE/HOSPICE - HEALTHEAST (OUTPATIENT)
Dept: HOME HEALTH SERVICES | Facility: HOME HEALTH | Age: 54
End: 2019-06-11

## 2019-06-13 ENCOUNTER — HOME CARE/HOSPICE - HEALTHEAST (OUTPATIENT)
Dept: HOME HEALTH SERVICES | Facility: HOME HEALTH | Age: 54
End: 2019-06-13

## 2019-06-14 ENCOUNTER — HOME CARE/HOSPICE - HEALTHEAST (OUTPATIENT)
Dept: HOME HEALTH SERVICES | Facility: HOME HEALTH | Age: 54
End: 2019-06-14

## 2019-06-18 ENCOUNTER — COMMUNICATION - HEALTHEAST (OUTPATIENT)
Dept: HOME HEALTH SERVICES | Facility: HOME HEALTH | Age: 54
End: 2019-06-18

## 2019-06-18 ENCOUNTER — HOME CARE/HOSPICE - HEALTHEAST (OUTPATIENT)
Dept: HOME HEALTH SERVICES | Facility: HOME HEALTH | Age: 54
End: 2019-06-18

## 2019-06-18 ENCOUNTER — HOME CARE/HOSPICE - HEALTHEAST (OUTPATIENT)
Dept: HOSPICE | Facility: HOSPICE | Age: 54
End: 2019-06-18

## 2019-06-19 ENCOUNTER — HOME CARE/HOSPICE - HEALTHEAST (OUTPATIENT)
Dept: HOME HEALTH SERVICES | Facility: HOME HEALTH | Age: 54
End: 2019-06-19

## 2019-06-21 ENCOUNTER — HOME CARE/HOSPICE - HEALTHEAST (OUTPATIENT)
Dept: HOME HEALTH SERVICES | Facility: HOME HEALTH | Age: 54
End: 2019-06-21

## 2019-06-21 ENCOUNTER — HOME CARE/HOSPICE - HEALTHEAST (OUTPATIENT)
Dept: HOSPICE | Facility: HOSPICE | Age: 54
End: 2019-06-21

## 2019-06-24 ENCOUNTER — HOME CARE/HOSPICE - HEALTHEAST (OUTPATIENT)
Dept: HOME HEALTH SERVICES | Facility: HOME HEALTH | Age: 54
End: 2019-06-24

## 2019-06-25 ENCOUNTER — HOME CARE/HOSPICE - HEALTHEAST (OUTPATIENT)
Dept: HOME HEALTH SERVICES | Facility: HOME HEALTH | Age: 54
End: 2019-06-25

## 2019-06-26 ENCOUNTER — COMMUNICATION - HEALTHEAST (OUTPATIENT)
Dept: FAMILY MEDICINE | Facility: CLINIC | Age: 54
End: 2019-06-26

## 2019-06-26 ENCOUNTER — HOME CARE/HOSPICE - HEALTHEAST (OUTPATIENT)
Dept: HOME HEALTH SERVICES | Facility: HOME HEALTH | Age: 54
End: 2019-06-26

## 2019-06-27 ENCOUNTER — COMMUNICATION - HEALTHEAST (OUTPATIENT)
Dept: FAMILY MEDICINE | Facility: CLINIC | Age: 54
End: 2019-06-27

## 2019-06-27 ENCOUNTER — HOME CARE/HOSPICE - HEALTHEAST (OUTPATIENT)
Dept: HOME HEALTH SERVICES | Facility: HOME HEALTH | Age: 54
End: 2019-06-27

## 2019-06-30 ENCOUNTER — COMMUNICATION - HEALTHEAST (OUTPATIENT)
Dept: FAMILY MEDICINE | Facility: CLINIC | Age: 54
End: 2019-06-30

## 2019-06-30 DIAGNOSIS — R56.9 CONVULSIONS, UNSPECIFIED CONVULSION TYPE (H): ICD-10-CM

## 2019-07-01 ENCOUNTER — HOME CARE/HOSPICE - HEALTHEAST (OUTPATIENT)
Dept: HOME HEALTH SERVICES | Facility: HOME HEALTH | Age: 54
End: 2019-07-01

## 2019-07-02 ENCOUNTER — HOME CARE/HOSPICE - HEALTHEAST (OUTPATIENT)
Dept: HOME HEALTH SERVICES | Facility: HOME HEALTH | Age: 54
End: 2019-07-02

## 2019-07-03 ENCOUNTER — HOME CARE/HOSPICE - HEALTHEAST (OUTPATIENT)
Dept: HOME HEALTH SERVICES | Facility: HOME HEALTH | Age: 54
End: 2019-07-03

## 2019-07-06 ENCOUNTER — HOME CARE/HOSPICE - HEALTHEAST (OUTPATIENT)
Dept: HOME HEALTH SERVICES | Facility: HOME HEALTH | Age: 54
End: 2019-07-06

## 2019-07-08 ENCOUNTER — HOME CARE/HOSPICE - HEALTHEAST (OUTPATIENT)
Dept: HOME HEALTH SERVICES | Facility: HOME HEALTH | Age: 54
End: 2019-07-08

## 2019-07-10 ENCOUNTER — COMMUNICATION - HEALTHEAST (OUTPATIENT)
Dept: SCHEDULING | Facility: CLINIC | Age: 54
End: 2019-07-10

## 2019-07-11 ENCOUNTER — COMMUNICATION - HEALTHEAST (OUTPATIENT)
Dept: FAMILY MEDICINE | Facility: CLINIC | Age: 54
End: 2019-07-11

## 2019-07-11 ENCOUNTER — HOME CARE/HOSPICE - HEALTHEAST (OUTPATIENT)
Dept: HOME HEALTH SERVICES | Facility: HOME HEALTH | Age: 54
End: 2019-07-11

## 2019-07-11 DIAGNOSIS — I61.9 HEMORRHAGIC STROKE (H): ICD-10-CM

## 2019-07-15 ENCOUNTER — OFFICE VISIT - HEALTHEAST (OUTPATIENT)
Dept: FAMILY MEDICINE | Facility: CLINIC | Age: 54
End: 2019-07-15

## 2019-07-15 ENCOUNTER — AMBULATORY - HEALTHEAST (OUTPATIENT)
Dept: FAMILY MEDICINE | Facility: CLINIC | Age: 54
End: 2019-07-15

## 2019-07-15 DIAGNOSIS — I69.30 HISTORY OF STROKE WITH RESIDUAL DEFICIT: ICD-10-CM

## 2019-07-15 DIAGNOSIS — R79.89 ELEVATED TROPONIN: ICD-10-CM

## 2019-07-15 DIAGNOSIS — F41.9 ANXIETY: ICD-10-CM

## 2019-07-15 DIAGNOSIS — Z93.4 GASTROJEJUNOSTOMY TUBE STATUS (H): ICD-10-CM

## 2019-07-15 DIAGNOSIS — I95.9 HYPOTENSION, UNSPECIFIED HYPOTENSION TYPE: ICD-10-CM

## 2019-07-15 DIAGNOSIS — I69.959 HEMIPLEGIA OF NONDOMINANT SIDE, LATE EFFECT OF CEREBROVASCULAR DISEASE (H): ICD-10-CM

## 2019-07-15 DIAGNOSIS — M62.471 CONTRACTURE OF MUSCLE ANKLE AND FOOT, RIGHT: ICD-10-CM

## 2019-07-15 DIAGNOSIS — D64.9 ANEMIA, UNSPECIFIED TYPE: ICD-10-CM

## 2019-07-15 LAB
ALBUMIN SERPL-MCNC: 3.3 G/DL (ref 3.5–5)
ALP SERPL-CCNC: 73 U/L (ref 45–120)
ALT SERPL W P-5'-P-CCNC: 25 U/L (ref 0–45)
ANION GAP SERPL CALCULATED.3IONS-SCNC: 11 MMOL/L (ref 5–18)
AST SERPL W P-5'-P-CCNC: 26 U/L (ref 0–40)
BILIRUB SERPL-MCNC: 0.2 MG/DL (ref 0–1)
BUN SERPL-MCNC: 41 MG/DL (ref 8–22)
CALCIUM SERPL-MCNC: 8.9 MG/DL (ref 8.5–10.5)
CHLORIDE BLD-SCNC: 108 MMOL/L (ref 98–107)
CO2 SERPL-SCNC: 21 MMOL/L (ref 22–31)
CREAT SERPL-MCNC: 1.15 MG/DL (ref 0.7–1.3)
ERYTHROCYTE [DISTWIDTH] IN BLOOD BY AUTOMATED COUNT: 11.7 % (ref 11–14.5)
GFR SERPL CREATININE-BSD FRML MDRD: >60 ML/MIN/1.73M2
GLUCOSE BLD-MCNC: 96 MG/DL (ref 70–125)
HCT VFR BLD AUTO: 34.3 % (ref 40–54)
HGB BLD-MCNC: 11.7 G/DL (ref 14–18)
MCH RBC QN AUTO: 32 PG (ref 27–34)
MCHC RBC AUTO-ENTMCNC: 34 G/DL (ref 32–36)
MCV RBC AUTO: 94 FL (ref 80–100)
PLATELET # BLD AUTO: 265 THOU/UL (ref 140–440)
PMV BLD AUTO: 6.3 FL (ref 7–10)
POTASSIUM BLD-SCNC: 5.3 MMOL/L (ref 3.5–5)
PREALB SERPL-MCNC: 36.5 MG/DL (ref 19–38)
PROT SERPL-MCNC: 8.3 G/DL (ref 6–8)
RBC # BLD AUTO: 3.64 MILL/UL (ref 4.4–6.2)
SODIUM SERPL-SCNC: 140 MMOL/L (ref 136–145)
WBC: 7.5 THOU/UL (ref 4–11)

## 2019-07-22 ENCOUNTER — OFFICE VISIT - HEALTHEAST (OUTPATIENT)
Dept: PHYSICAL THERAPY | Facility: REHABILITATION | Age: 54
End: 2019-07-22

## 2019-07-22 DIAGNOSIS — I69.30 LATE EFFECT OF CEREBROVASCULAR ACCIDENT: ICD-10-CM

## 2019-07-22 DIAGNOSIS — M62.471 CONTRACTURE OF MUSCLE OF RIGHT ANKLE OR FOOT: ICD-10-CM

## 2019-07-22 DIAGNOSIS — M62.81 GENERALIZED MUSCLE WEAKNESS: ICD-10-CM

## 2019-07-22 DIAGNOSIS — I69.30 HISTORY OF STROKE WITH RESIDUAL DEFICIT: ICD-10-CM

## 2019-07-25 ENCOUNTER — OFFICE VISIT - HEALTHEAST (OUTPATIENT)
Dept: PHYSICAL THERAPY | Facility: REHABILITATION | Age: 54
End: 2019-07-25

## 2019-07-25 DIAGNOSIS — M62.471 CONTRACTURE OF MUSCLE OF RIGHT ANKLE OR FOOT: ICD-10-CM

## 2019-07-25 DIAGNOSIS — I69.30 LATE EFFECT OF CEREBROVASCULAR ACCIDENT: ICD-10-CM

## 2019-07-25 DIAGNOSIS — M62.81 GENERALIZED MUSCLE WEAKNESS: ICD-10-CM

## 2019-07-25 DIAGNOSIS — I69.30 HISTORY OF STROKE WITH RESIDUAL DEFICIT: ICD-10-CM

## 2019-07-30 ENCOUNTER — OFFICE VISIT - HEALTHEAST (OUTPATIENT)
Dept: PHYSICAL THERAPY | Facility: REHABILITATION | Age: 54
End: 2019-07-30

## 2019-07-30 DIAGNOSIS — I69.30 HISTORY OF STROKE WITH RESIDUAL DEFICIT: ICD-10-CM

## 2019-07-30 DIAGNOSIS — M62.81 GENERALIZED MUSCLE WEAKNESS: ICD-10-CM

## 2019-07-30 DIAGNOSIS — M62.471 CONTRACTURE OF MUSCLE OF RIGHT ANKLE OR FOOT: ICD-10-CM

## 2019-07-30 DIAGNOSIS — I69.30 LATE EFFECT OF CEREBROVASCULAR ACCIDENT: ICD-10-CM

## 2019-08-01 ENCOUNTER — OFFICE VISIT - HEALTHEAST (OUTPATIENT)
Dept: PHYSICAL THERAPY | Facility: REHABILITATION | Age: 54
End: 2019-08-01

## 2019-08-01 DIAGNOSIS — M62.81 GENERALIZED MUSCLE WEAKNESS: ICD-10-CM

## 2019-08-01 DIAGNOSIS — I69.30 LATE EFFECT OF CEREBROVASCULAR ACCIDENT: ICD-10-CM

## 2019-08-01 DIAGNOSIS — I69.30 HISTORY OF STROKE WITH RESIDUAL DEFICIT: ICD-10-CM

## 2019-08-01 DIAGNOSIS — M62.471 CONTRACTURE OF MUSCLE OF RIGHT ANKLE OR FOOT: ICD-10-CM

## 2019-08-06 ENCOUNTER — OFFICE VISIT - HEALTHEAST (OUTPATIENT)
Dept: PHYSICAL THERAPY | Facility: REHABILITATION | Age: 54
End: 2019-08-06

## 2019-08-06 DIAGNOSIS — I69.30 HISTORY OF STROKE WITH RESIDUAL DEFICIT: ICD-10-CM

## 2019-08-06 DIAGNOSIS — I69.30 LATE EFFECT OF CEREBROVASCULAR ACCIDENT: ICD-10-CM

## 2019-08-06 DIAGNOSIS — M62.471 CONTRACTURE OF MUSCLE OF RIGHT ANKLE OR FOOT: ICD-10-CM

## 2019-08-06 DIAGNOSIS — M62.81 GENERALIZED MUSCLE WEAKNESS: ICD-10-CM

## 2019-09-09 ENCOUNTER — HOSPITAL ENCOUNTER (OUTPATIENT)
Dept: INTERVENTIONAL RADIOLOGY/VASCULAR | Facility: HOSPITAL | Age: 54
Discharge: HOME | End: 2019-09-09
Admitting: RADIOLOGY

## 2019-09-09 DIAGNOSIS — R63.30 FEEDING DIFFICULTIES: ICD-10-CM

## 2019-09-10 ENCOUNTER — COMMUNICATION - HEALTHEAST (OUTPATIENT)
Dept: FAMILY MEDICINE | Facility: CLINIC | Age: 54
End: 2019-09-10

## 2019-09-10 DIAGNOSIS — Z86.73 HISTORY OF STROKE: ICD-10-CM

## 2019-09-26 ENCOUNTER — HOSPITAL ENCOUNTER (OUTPATIENT)
Dept: INTERVENTIONAL RADIOLOGY/VASCULAR | Facility: HOSPITAL | Age: 54
Discharge: HOME | End: 2019-09-26
Attending: RADIOLOGY | Admitting: RADIOLOGY

## 2019-09-26 ENCOUNTER — RECORDS - HEALTHEAST (OUTPATIENT)
Dept: ADMINISTRATIVE | Facility: OTHER | Age: 54
End: 2019-09-26

## 2019-09-26 DIAGNOSIS — E46 MALNUTRITION (H): ICD-10-CM

## 2019-09-30 ENCOUNTER — COMMUNICATION - HEALTHEAST (OUTPATIENT)
Dept: FAMILY MEDICINE | Facility: CLINIC | Age: 54
End: 2019-09-30

## 2019-09-30 DIAGNOSIS — M62.838 MUSCLE SPASM: ICD-10-CM

## 2019-10-11 ENCOUNTER — HOSPITAL ENCOUNTER (OUTPATIENT)
Dept: INTERVENTIONAL RADIOLOGY/VASCULAR | Facility: HOSPITAL | Age: 54
Discharge: HOME | End: 2019-10-11
Attending: RADIOLOGY | Admitting: RADIOLOGY

## 2019-10-11 ENCOUNTER — RECORDS - HEALTHEAST (OUTPATIENT)
Dept: ADMINISTRATIVE | Facility: OTHER | Age: 54
End: 2019-10-11

## 2019-10-11 DIAGNOSIS — E46 MALNUTRITION (H): ICD-10-CM

## 2019-10-14 ENCOUNTER — COMMUNICATION - HEALTHEAST (OUTPATIENT)
Dept: FAMILY MEDICINE | Facility: CLINIC | Age: 54
End: 2019-10-14

## 2019-10-31 ENCOUNTER — COMMUNICATION - HEALTHEAST (OUTPATIENT)
Dept: FAMILY MEDICINE | Facility: CLINIC | Age: 54
End: 2019-10-31

## 2019-10-31 DIAGNOSIS — K21.9 GERD WITHOUT ESOPHAGITIS: ICD-10-CM

## 2019-10-31 DIAGNOSIS — F41.9 ANXIETY: ICD-10-CM

## 2019-10-31 DIAGNOSIS — E78.5 DYSLIPIDEMIA: ICD-10-CM

## 2019-11-21 ENCOUNTER — HOSPITAL ENCOUNTER (OUTPATIENT)
Dept: INTERVENTIONAL RADIOLOGY/VASCULAR | Facility: CLINIC | Age: 54
Discharge: HOME | End: 2019-11-21
Admitting: RADIOLOGY

## 2019-11-21 ENCOUNTER — RECORDS - HEALTHEAST (OUTPATIENT)
Dept: ADMINISTRATIVE | Facility: OTHER | Age: 54
End: 2019-11-21

## 2019-11-21 DIAGNOSIS — E46 MALNUTRITION (H): ICD-10-CM

## 2019-11-29 ENCOUNTER — HOSPITAL ENCOUNTER (OUTPATIENT)
Dept: INTERVENTIONAL RADIOLOGY/VASCULAR | Facility: CLINIC | Age: 54
Discharge: HOME | End: 2019-11-29
Attending: RADIOLOGY | Admitting: RADIOLOGY

## 2019-11-29 ENCOUNTER — RECORDS - HEALTHEAST (OUTPATIENT)
Dept: ADMINISTRATIVE | Facility: OTHER | Age: 54
End: 2019-11-29

## 2019-11-29 DIAGNOSIS — E46 MALNUTRITION (H): ICD-10-CM

## 2019-12-05 ENCOUNTER — COMMUNICATION - HEALTHEAST (OUTPATIENT)
Dept: HOME HEALTH SERVICES | Facility: HOME HEALTH | Age: 54
End: 2019-12-05

## 2019-12-05 DIAGNOSIS — J98.01 BRONCHOSPASM: ICD-10-CM

## 2020-01-19 ENCOUNTER — COMMUNICATION - HEALTHEAST (OUTPATIENT)
Dept: FAMILY MEDICINE | Facility: CLINIC | Age: 55
End: 2020-01-19

## 2020-01-19 DIAGNOSIS — R56.9 CONVULSIONS, UNSPECIFIED CONVULSION TYPE (H): ICD-10-CM

## 2020-01-19 DIAGNOSIS — M62.838 MUSCLE SPASM: ICD-10-CM

## 2020-01-19 DIAGNOSIS — K59.01 SLOW TRANSIT CONSTIPATION: ICD-10-CM

## 2020-01-25 ENCOUNTER — COMMUNICATION - HEALTHEAST (OUTPATIENT)
Dept: FAMILY MEDICINE | Facility: CLINIC | Age: 55
End: 2020-01-25

## 2020-02-10 ENCOUNTER — COMMUNICATION - HEALTHEAST (OUTPATIENT)
Dept: SCHEDULING | Facility: CLINIC | Age: 55
End: 2020-02-10

## 2020-02-27 ENCOUNTER — RECORDS - HEALTHEAST (OUTPATIENT)
Dept: ADMINISTRATIVE | Facility: OTHER | Age: 55
End: 2020-02-27

## 2020-02-27 ENCOUNTER — HOSPITAL ENCOUNTER (OUTPATIENT)
Dept: INTERVENTIONAL RADIOLOGY/VASCULAR | Facility: HOSPITAL | Age: 55
Discharge: HOME | End: 2020-02-27
Attending: RADIOLOGY | Admitting: RADIOLOGY

## 2020-02-27 DIAGNOSIS — E46 MALNUTRITION (H): ICD-10-CM

## 2020-03-02 ENCOUNTER — OFFICE VISIT - HEALTHEAST (OUTPATIENT)
Dept: FAMILY MEDICINE | Facility: CLINIC | Age: 55
End: 2020-03-02

## 2020-03-02 DIAGNOSIS — Z93.4 GASTROJEJUNOSTOMY TUBE STATUS (H): ICD-10-CM

## 2020-03-02 DIAGNOSIS — M62.838 MUSCLE SPASM: ICD-10-CM

## 2020-03-02 DIAGNOSIS — G40.909 SEIZURE DISORDER (H): ICD-10-CM

## 2020-03-02 DIAGNOSIS — B18.2 HEPATITIS C VIRUS CARRIER STATE (H): ICD-10-CM

## 2020-03-02 DIAGNOSIS — I69.959 HEMIPLEGIA OF NONDOMINANT SIDE, LATE EFFECT OF CEREBROVASCULAR DISEASE (H): ICD-10-CM

## 2020-03-02 DIAGNOSIS — N39.0 ACUTE UTI: ICD-10-CM

## 2020-03-02 DIAGNOSIS — J18.9 PNEUMONIA OF BOTH LOWER LOBES DUE TO INFECTIOUS ORGANISM: ICD-10-CM

## 2020-03-02 DIAGNOSIS — K13.79 MOUTH SORES: ICD-10-CM

## 2020-03-02 DIAGNOSIS — E78.2 MIXED HYPERLIPIDEMIA: ICD-10-CM

## 2020-03-02 DIAGNOSIS — F41.9 ANXIETY: ICD-10-CM

## 2020-03-02 DIAGNOSIS — Z23 NEED FOR INFLUENZA VACCINATION: ICD-10-CM

## 2020-03-02 LAB
ALBUMIN SERPL-MCNC: 3.1 G/DL (ref 3.5–5)
ALBUMIN UR-MCNC: ABNORMAL MG/DL
ALP SERPL-CCNC: 106 U/L (ref 45–120)
ALT SERPL W P-5'-P-CCNC: 30 U/L (ref 0–45)
ANION GAP SERPL CALCULATED.3IONS-SCNC: 13 MMOL/L (ref 5–18)
APPEARANCE UR: CLEAR
AST SERPL W P-5'-P-CCNC: 27 U/L (ref 0–40)
BACTERIA #/AREA URNS HPF: ABNORMAL HPF
BILIRUB DIRECT SERPL-MCNC: <0.1 MG/DL
BILIRUB SERPL-MCNC: 0.2 MG/DL (ref 0–1)
BILIRUB UR QL STRIP: NEGATIVE
BUN SERPL-MCNC: 37 MG/DL (ref 8–22)
CALCIUM SERPL-MCNC: 8.8 MG/DL (ref 8.5–10.5)
CHLORIDE BLD-SCNC: 106 MMOL/L (ref 98–107)
CHOLEST SERPL-MCNC: 206 MG/DL
CO2 SERPL-SCNC: 17 MMOL/L (ref 22–31)
COLOR UR AUTO: YELLOW
CREAT SERPL-MCNC: 1.1 MG/DL (ref 0.7–1.3)
ERYTHROCYTE [DISTWIDTH] IN BLOOD BY AUTOMATED COUNT: 12.2 % (ref 11–14.5)
FASTING STATUS PATIENT QL REPORTED: YES
GFR SERPL CREATININE-BSD FRML MDRD: >60 ML/MIN/1.73M2
GLUCOSE BLD-MCNC: 99 MG/DL (ref 70–125)
GLUCOSE UR STRIP-MCNC: NEGATIVE MG/DL
GRAN CASTS #/AREA URNS LPF: ABNORMAL LPF
HCT VFR BLD AUTO: 32.6 % (ref 40–54)
HDLC SERPL-MCNC: 29 MG/DL
HGB BLD-MCNC: 10.8 G/DL (ref 14–18)
HGB UR QL STRIP: ABNORMAL
HYALINE CASTS #/AREA URNS LPF: ABNORMAL LPF
KETONES UR STRIP-MCNC: NEGATIVE MG/DL
LDLC SERPL CALC-MCNC: 138 MG/DL
LEUKOCYTE ESTERASE UR QL STRIP: NEGATIVE
MCH RBC QN AUTO: 30.6 PG (ref 27–34)
MCHC RBC AUTO-ENTMCNC: 33.3 G/DL (ref 32–36)
MCV RBC AUTO: 92 FL (ref 80–100)
NITRATE UR QL: NEGATIVE
PH UR STRIP: 5 [PH] (ref 5–8)
PLATELET # BLD AUTO: 462 THOU/UL (ref 140–440)
PMV BLD AUTO: 7 FL (ref 7–10)
POTASSIUM BLD-SCNC: 5.6 MMOL/L (ref 3.5–5)
PROT SERPL-MCNC: 8.5 G/DL (ref 6–8)
RBC # BLD AUTO: 3.55 MILL/UL (ref 4.4–6.2)
RBC #/AREA URNS AUTO: ABNORMAL HPF
SODIUM SERPL-SCNC: 136 MMOL/L (ref 136–145)
SP GR UR STRIP: 1.02 (ref 1–1.03)
SQUAMOUS #/AREA URNS AUTO: ABNORMAL LPF
TRIGL SERPL-MCNC: 197 MG/DL
UROBILINOGEN UR STRIP-ACNC: ABNORMAL
WBC #/AREA URNS AUTO: ABNORMAL HPF
WBC: 10.3 THOU/UL (ref 4–11)

## 2020-03-04 ENCOUNTER — AMBULATORY - HEALTHEAST (OUTPATIENT)
Dept: FAMILY MEDICINE | Facility: CLINIC | Age: 55
End: 2020-03-04

## 2020-03-04 DIAGNOSIS — E78.2 MIXED HYPERLIPIDEMIA: ICD-10-CM

## 2020-03-04 DIAGNOSIS — E87.5 HYPERKALEMIA: ICD-10-CM

## 2020-03-05 ENCOUNTER — COMMUNICATION - HEALTHEAST (OUTPATIENT)
Dept: FAMILY MEDICINE | Facility: CLINIC | Age: 55
End: 2020-03-05

## 2020-03-11 ENCOUNTER — COMMUNICATION - HEALTHEAST (OUTPATIENT)
Dept: FAMILY MEDICINE | Facility: CLINIC | Age: 55
End: 2020-03-11

## 2020-03-12 ENCOUNTER — AMBULATORY - HEALTHEAST (OUTPATIENT)
Dept: LAB | Facility: CLINIC | Age: 55
End: 2020-03-12

## 2020-03-12 DIAGNOSIS — E87.5 HYPERKALEMIA: ICD-10-CM

## 2020-03-12 LAB — POTASSIUM BLD-SCNC: 5.4 MMOL/L (ref 3.5–5)

## 2020-03-17 ENCOUNTER — COMMUNICATION - HEALTHEAST (OUTPATIENT)
Dept: FAMILY MEDICINE | Facility: CLINIC | Age: 55
End: 2020-03-17

## 2020-03-17 ENCOUNTER — COMMUNICATION - HEALTHEAST (OUTPATIENT)
Dept: SCHEDULING | Facility: CLINIC | Age: 55
End: 2020-03-17

## 2020-03-17 DIAGNOSIS — R82.90 CLOUDY URINE: ICD-10-CM

## 2020-03-18 ENCOUNTER — AMBULATORY - HEALTHEAST (OUTPATIENT)
Dept: FAMILY MEDICINE | Facility: CLINIC | Age: 55
End: 2020-03-18

## 2020-03-18 ENCOUNTER — AMBULATORY - HEALTHEAST (OUTPATIENT)
Dept: LAB | Facility: CLINIC | Age: 55
End: 2020-03-18

## 2020-03-18 ENCOUNTER — COMMUNICATION - HEALTHEAST (OUTPATIENT)
Dept: FAMILY MEDICINE | Facility: CLINIC | Age: 55
End: 2020-03-18

## 2020-03-18 DIAGNOSIS — T83.511A URINARY TRACT INFECTION ASSOCIATED WITH INDWELLING URETHRAL CATHETER, INITIAL ENCOUNTER (H): ICD-10-CM

## 2020-03-18 DIAGNOSIS — N39.0 URINARY TRACT INFECTION WITHOUT HEMATURIA, SITE UNSPECIFIED: ICD-10-CM

## 2020-03-18 DIAGNOSIS — N39.0 URINARY TRACT INFECTION ASSOCIATED WITH INDWELLING URETHRAL CATHETER, INITIAL ENCOUNTER (H): ICD-10-CM

## 2020-03-18 DIAGNOSIS — R82.90 CLOUDY URINE: ICD-10-CM

## 2020-03-18 LAB
ALBUMIN UR-MCNC: ABNORMAL MG/DL
APPEARANCE UR: ABNORMAL
BACTERIA #/AREA URNS HPF: ABNORMAL HPF
BILIRUB UR QL STRIP: NEGATIVE
COLOR UR AUTO: YELLOW
GLUCOSE UR STRIP-MCNC: NEGATIVE MG/DL
HGB UR QL STRIP: ABNORMAL
KETONES UR STRIP-MCNC: NEGATIVE MG/DL
LEUKOCYTE ESTERASE UR QL STRIP: ABNORMAL
NITRATE UR QL: POSITIVE
PH UR STRIP: 5.5 [PH] (ref 5–8)
RBC #/AREA URNS AUTO: ABNORMAL HPF
SP GR UR STRIP: 1.01 (ref 1–1.03)
SQUAMOUS #/AREA URNS AUTO: ABNORMAL LPF
UROBILINOGEN UR STRIP-ACNC: ABNORMAL
WBC #/AREA URNS AUTO: ABNORMAL HPF
WBC CLUMPS #/AREA URNS HPF: PRESENT /[HPF]

## 2020-03-20 LAB — BACTERIA SPEC CULT: ABNORMAL

## 2020-04-09 ENCOUNTER — RECORDS - HEALTHEAST (OUTPATIENT)
Dept: ADMINISTRATIVE | Facility: OTHER | Age: 55
End: 2020-04-09

## 2020-04-10 ENCOUNTER — HOSPITAL ENCOUNTER (OUTPATIENT)
Dept: INTERVENTIONAL RADIOLOGY/VASCULAR | Facility: HOSPITAL | Age: 55
Discharge: HOME | End: 2020-04-10
Attending: RADIOLOGY | Admitting: RADIOLOGY

## 2020-04-10 DIAGNOSIS — E46 MALNUTRITION (H): ICD-10-CM

## 2020-04-21 ENCOUNTER — COMMUNICATION - HEALTHEAST (OUTPATIENT)
Dept: FAMILY MEDICINE | Facility: CLINIC | Age: 55
End: 2020-04-21

## 2020-04-22 ENCOUNTER — OFFICE VISIT - HEALTHEAST (OUTPATIENT)
Dept: FAMILY MEDICINE | Facility: CLINIC | Age: 55
End: 2020-04-22

## 2020-04-22 DIAGNOSIS — N39.0 URINARY TRACT INFECTION WITHOUT HEMATURIA, SITE UNSPECIFIED: ICD-10-CM

## 2020-04-22 DIAGNOSIS — K13.79 MOUTH SORES: ICD-10-CM

## 2020-06-03 ENCOUNTER — COMMUNICATION - HEALTHEAST (OUTPATIENT)
Dept: FAMILY MEDICINE | Facility: CLINIC | Age: 55
End: 2020-06-03

## 2020-06-03 DIAGNOSIS — R79.89 ELEVATED TROPONIN: ICD-10-CM

## 2020-06-06 ENCOUNTER — HOME CARE/HOSPICE - HEALTHEAST (OUTPATIENT)
Dept: HOME HEALTH SERVICES | Facility: HOME HEALTH | Age: 55
End: 2020-06-06

## 2020-06-07 ENCOUNTER — COMMUNICATION - HEALTHEAST (OUTPATIENT)
Dept: HOME HEALTH SERVICES | Facility: HOME HEALTH | Age: 55
End: 2020-06-07

## 2020-06-08 ENCOUNTER — COMMUNICATION - HEALTHEAST (OUTPATIENT)
Dept: FAMILY MEDICINE | Facility: CLINIC | Age: 55
End: 2020-06-08

## 2020-06-12 ENCOUNTER — COMMUNICATION - HEALTHEAST (OUTPATIENT)
Dept: FAMILY MEDICINE | Facility: CLINIC | Age: 55
End: 2020-06-12

## 2020-06-12 ENCOUNTER — COMMUNICATION - HEALTHEAST (OUTPATIENT)
Dept: HOME HEALTH SERVICES | Facility: HOME HEALTH | Age: 55
End: 2020-06-12

## 2020-06-12 ENCOUNTER — HOME CARE/HOSPICE - HEALTHEAST (OUTPATIENT)
Dept: HOSPICE | Facility: HOSPICE | Age: 55
End: 2020-06-12

## 2020-06-12 ENCOUNTER — RECORDS - HEALTHEAST (OUTPATIENT)
Dept: LAB | Facility: HOSPITAL | Age: 55
End: 2020-06-12

## 2020-06-12 ENCOUNTER — HOME CARE/HOSPICE - HEALTHEAST (OUTPATIENT)
Dept: HOME HEALTH SERVICES | Facility: HOME HEALTH | Age: 55
End: 2020-06-12

## 2020-06-12 DIAGNOSIS — N39.0 ACUTE UTI: ICD-10-CM

## 2020-06-12 DIAGNOSIS — E83.51 HYPOCALCEMIA: ICD-10-CM

## 2020-06-12 LAB
ALBUMIN UR-MCNC: ABNORMAL MG/DL
APPEARANCE UR: CLEAR
BACTERIA #/AREA URNS HPF: ABNORMAL HPF
BILIRUB UR QL STRIP: NEGATIVE
CALCIUM SERPL-MCNC: 8.4 MG/DL (ref 8.5–10.5)
COLOR UR AUTO: YELLOW
GLUCOSE UR STRIP-MCNC: NEGATIVE MG/DL
HGB UR QL STRIP: ABNORMAL
HYALINE CASTS #/AREA URNS LPF: ABNORMAL LPF
KETONES UR STRIP-MCNC: NEGATIVE MG/DL
LEUKOCYTE ESTERASE UR QL STRIP: NEGATIVE
MUCOUS THREADS #/AREA URNS LPF: ABNORMAL LPF
NITRATE UR QL: NEGATIVE
PH UR STRIP: 5.5 [PH] (ref 4.5–8)
RBC #/AREA URNS AUTO: ABNORMAL HPF
SP GR UR STRIP: 1.01 (ref 1–1.03)
SQUAMOUS #/AREA URNS AUTO: ABNORMAL LPF
UROBILINOGEN UR STRIP-ACNC: ABNORMAL
WBC #/AREA URNS AUTO: ABNORMAL HPF

## 2020-06-16 ENCOUNTER — HOME CARE/HOSPICE - HEALTHEAST (OUTPATIENT)
Dept: HOME HEALTH SERVICES | Facility: HOME HEALTH | Age: 55
End: 2020-06-16

## 2020-06-16 ENCOUNTER — COMMUNICATION - HEALTHEAST (OUTPATIENT)
Dept: HOME HEALTH SERVICES | Facility: HOME HEALTH | Age: 55
End: 2020-06-16

## 2020-06-17 ENCOUNTER — HOME CARE/HOSPICE - HEALTHEAST (OUTPATIENT)
Dept: HOME HEALTH SERVICES | Facility: HOME HEALTH | Age: 55
End: 2020-06-17

## 2020-06-19 ENCOUNTER — COMMUNICATION - HEALTHEAST (OUTPATIENT)
Dept: HOME HEALTH SERVICES | Facility: HOME HEALTH | Age: 55
End: 2020-06-19

## 2020-06-19 ENCOUNTER — HOME CARE/HOSPICE - HEALTHEAST (OUTPATIENT)
Dept: HOME HEALTH SERVICES | Facility: HOME HEALTH | Age: 55
End: 2020-06-19

## 2020-06-19 DIAGNOSIS — R32 URINARY INCONTINENCE: ICD-10-CM

## 2020-06-23 ENCOUNTER — AMBULATORY - HEALTHEAST (OUTPATIENT)
Dept: INTERVENTIONAL RADIOLOGY/VASCULAR | Facility: HOSPITAL | Age: 55
End: 2020-06-23

## 2020-06-23 DIAGNOSIS — Z11.59 ENCOUNTER FOR SCREENING FOR OTHER VIRAL DISEASES: ICD-10-CM

## 2020-06-27 ENCOUNTER — COMMUNICATION - HEALTHEAST (OUTPATIENT)
Dept: SCHEDULING | Facility: CLINIC | Age: 55
End: 2020-06-27

## 2020-07-01 ENCOUNTER — COMMUNICATION - HEALTHEAST (OUTPATIENT)
Dept: PHARMACY | Facility: CLINIC | Age: 55
End: 2020-07-01

## 2020-07-02 ENCOUNTER — HOSPITAL ENCOUNTER (OUTPATIENT)
Dept: INTERVENTIONAL RADIOLOGY/VASCULAR | Facility: HOSPITAL | Age: 55
Discharge: HOME | End: 2020-07-02
Attending: NURSE PRACTITIONER | Admitting: RADIOLOGY

## 2020-07-02 DIAGNOSIS — E46 MALNUTRITION, UNSPECIFIED TYPE (H): ICD-10-CM

## 2020-07-03 ENCOUNTER — AMBULATORY - HEALTHEAST (OUTPATIENT)
Dept: PHARMACY | Facility: CLINIC | Age: 55
End: 2020-07-03

## 2020-07-03 DIAGNOSIS — A41.9 SEPSIS, DUE TO UNSPECIFIED ORGANISM, UNSPECIFIED WHETHER ACUTE ORGAN DYSFUNCTION PRESENT (H): ICD-10-CM

## 2020-07-03 DIAGNOSIS — Z86.73 HISTORY OF CVA (CEREBROVASCULAR ACCIDENT): ICD-10-CM

## 2020-07-03 DIAGNOSIS — I10 ESSENTIAL HYPERTENSION: ICD-10-CM

## 2020-07-03 PROCEDURE — 99607 MTMS BY PHARM ADDL 15 MIN: CPT | Performed by: PHARMACIST

## 2020-07-03 PROCEDURE — 99605 MTMS BY PHARM NP 15 MIN: CPT | Performed by: PHARMACIST

## 2020-07-15 ENCOUNTER — COMMUNICATION - HEALTHEAST (OUTPATIENT)
Dept: FAMILY MEDICINE | Facility: CLINIC | Age: 55
End: 2020-07-15

## 2020-07-15 DIAGNOSIS — K59.01 SLOW TRANSIT CONSTIPATION: ICD-10-CM

## 2020-07-24 ENCOUNTER — COMMUNICATION - HEALTHEAST (OUTPATIENT)
Dept: SCHEDULING | Facility: CLINIC | Age: 55
End: 2020-07-24

## 2020-08-03 ENCOUNTER — OFFICE VISIT - HEALTHEAST (OUTPATIENT)
Dept: FAMILY MEDICINE | Facility: CLINIC | Age: 55
End: 2020-08-03

## 2020-08-03 ENCOUNTER — OFFICE VISIT - HEALTHEAST (OUTPATIENT)
Dept: PHARMACY | Facility: CLINIC | Age: 55
End: 2020-08-03

## 2020-08-03 DIAGNOSIS — E83.51 HYPOCALCEMIA: ICD-10-CM

## 2020-08-03 DIAGNOSIS — R56.9 CONVULSIONS, UNSPECIFIED CONVULSION TYPE (H): ICD-10-CM

## 2020-08-03 DIAGNOSIS — R65.10 SIRS (SYSTEMIC INFLAMMATORY RESPONSE SYNDROME) (H): ICD-10-CM

## 2020-08-03 DIAGNOSIS — G47.00 INSOMNIA, UNSPECIFIED TYPE: ICD-10-CM

## 2020-08-03 DIAGNOSIS — Z86.73 HISTORY OF CVA (CEREBROVASCULAR ACCIDENT): ICD-10-CM

## 2020-08-03 DIAGNOSIS — E78.2 MIXED HYPERLIPIDEMIA: ICD-10-CM

## 2020-08-03 DIAGNOSIS — R06.02 SHORTNESS OF BREATH: ICD-10-CM

## 2020-08-03 DIAGNOSIS — D64.9 ANEMIA, UNSPECIFIED TYPE: ICD-10-CM

## 2020-08-03 DIAGNOSIS — K59.01 SLOW TRANSIT CONSTIPATION: ICD-10-CM

## 2020-08-03 DIAGNOSIS — R45.1 AGITATION: ICD-10-CM

## 2020-08-03 DIAGNOSIS — I10 ESSENTIAL HYPERTENSION: ICD-10-CM

## 2020-08-03 DIAGNOSIS — R25.2 SPASTICITY: ICD-10-CM

## 2020-08-03 DIAGNOSIS — Z29.89 NEED FOR PROPHYLAXIS AGAINST URINARY TRACT INFECTION: ICD-10-CM

## 2020-08-03 DIAGNOSIS — R13.12 OROPHARYNGEAL DYSPHAGIA: ICD-10-CM

## 2020-08-03 PROCEDURE — 99606 MTMS BY PHARM EST 15 MIN: CPT | Performed by: PHARMACIST

## 2020-08-06 ENCOUNTER — COMMUNICATION - HEALTHEAST (OUTPATIENT)
Dept: FAMILY MEDICINE | Facility: CLINIC | Age: 55
End: 2020-08-06

## 2020-08-13 ENCOUNTER — COMMUNICATION - HEALTHEAST (OUTPATIENT)
Dept: FAMILY MEDICINE | Facility: CLINIC | Age: 55
End: 2020-08-13

## 2020-08-13 ENCOUNTER — COMMUNICATION - HEALTHEAST (OUTPATIENT)
Dept: SCHEDULING | Facility: CLINIC | Age: 55
End: 2020-08-13

## 2020-08-13 ENCOUNTER — HOSPITAL ENCOUNTER (OUTPATIENT)
Dept: ADMINISTRATIVE | Facility: OTHER | Age: 55
Discharge: HOME | End: 2020-08-13

## 2020-08-13 ENCOUNTER — OFFICE VISIT - HEALTHEAST (OUTPATIENT)
Dept: FAMILY MEDICINE | Facility: CLINIC | Age: 55
End: 2020-08-13

## 2020-08-13 ENCOUNTER — AMBULATORY - HEALTHEAST (OUTPATIENT)
Dept: LAB | Facility: CLINIC | Age: 55
End: 2020-08-13

## 2020-08-13 DIAGNOSIS — R39.9 URINARY SYMPTOM OR SIGN: ICD-10-CM

## 2020-08-13 DIAGNOSIS — Z87.440 PERSONAL HISTORY OF URINARY TRACT INFECTION: ICD-10-CM

## 2020-08-13 DIAGNOSIS — I10 ESSENTIAL HYPERTENSION: ICD-10-CM

## 2020-08-13 DIAGNOSIS — D64.9 ANEMIA, UNSPECIFIED TYPE: ICD-10-CM

## 2020-08-13 DIAGNOSIS — R50.9 FEVER, UNSPECIFIED FEVER CAUSE: ICD-10-CM

## 2020-08-13 DIAGNOSIS — E78.2 MIXED HYPERLIPIDEMIA: ICD-10-CM

## 2020-08-13 DIAGNOSIS — N39.0 RECURRENT UTI: ICD-10-CM

## 2020-08-13 LAB
ALBUMIN SERPL-MCNC: 3.4 G/DL (ref 3.5–5)
ALBUMIN UR-MCNC: ABNORMAL MG/DL
ALP SERPL-CCNC: 78 U/L (ref 45–120)
ALT SERPL W P-5'-P-CCNC: 30 U/L (ref 0–45)
ANION GAP SERPL CALCULATED.3IONS-SCNC: 9 MMOL/L (ref 5–18)
APPEARANCE UR: CLEAR
AST SERPL W P-5'-P-CCNC: 30 U/L (ref 0–40)
BACTERIA #/AREA URNS HPF: ABNORMAL HPF
BILIRUB SERPL-MCNC: 0.3 MG/DL (ref 0–1)
BILIRUB UR QL STRIP: NEGATIVE
BUN SERPL-MCNC: 35 MG/DL (ref 8–22)
C REACTIVE PROTEIN LHE: 1.5 MG/DL (ref 0–0.8)
CALCIUM SERPL-MCNC: 8.7 MG/DL (ref 8.5–10.5)
CHLORIDE BLD-SCNC: 107 MMOL/L (ref 98–107)
CHOLEST SERPL-MCNC: 187 MG/DL
CO2 SERPL-SCNC: 20 MMOL/L (ref 22–31)
COLOR UR AUTO: YELLOW
CREAT SERPL-MCNC: 1.1 MG/DL (ref 0.7–1.3)
ERYTHROCYTE [DISTWIDTH] IN BLOOD BY AUTOMATED COUNT: 12.9 % (ref 11–14.5)
FASTING STATUS PATIENT QL REPORTED: NO
FERRITIN SERPL-MCNC: 43 NG/ML (ref 27–300)
FOLATE SERPL-MCNC: 15.8 NG/ML
GFR SERPL CREATININE-BSD FRML MDRD: >60 ML/MIN/1.73M2
GLUCOSE BLD-MCNC: 95 MG/DL (ref 70–125)
GLUCOSE UR STRIP-MCNC: NEGATIVE MG/DL
HCT VFR BLD AUTO: 33.2 % (ref 40–54)
HDLC SERPL-MCNC: 36 MG/DL
HGB BLD-MCNC: 11.3 G/DL (ref 14–18)
HGB UR QL STRIP: ABNORMAL
IRON SATN MFR SERPL: 10 % (ref 20–50)
IRON SERPL-MCNC: 37 UG/DL (ref 42–175)
KETONES UR STRIP-MCNC: NEGATIVE MG/DL
LDH SERPL L TO P-CCNC: 179 U/L (ref 125–220)
LDLC SERPL CALC-MCNC: 93 MG/DL
LEUKOCYTE ESTERASE UR QL STRIP: NEGATIVE
MCH RBC QN AUTO: 31.3 PG (ref 27–34)
MCHC RBC AUTO-ENTMCNC: 34 G/DL (ref 32–36)
MCV RBC AUTO: 92 FL (ref 80–100)
NITRATE UR QL: NEGATIVE
PH UR STRIP: 5 [PH] (ref 5–8)
PLATELET # BLD AUTO: 256 THOU/UL (ref 140–440)
PMV BLD AUTO: 6.9 FL (ref 7–10)
POTASSIUM BLD-SCNC: 5 MMOL/L (ref 3.5–5)
PROT SERPL-MCNC: 8 G/DL (ref 6–8)
RBC # BLD AUTO: 3.61 MILL/UL (ref 4.4–6.2)
RBC #/AREA URNS AUTO: ABNORMAL HPF
SODIUM SERPL-SCNC: 136 MMOL/L (ref 136–145)
SP GR UR STRIP: 1.01 (ref 1–1.03)
SQUAMOUS #/AREA URNS AUTO: ABNORMAL LPF
TIBC SERPL-MCNC: 389 UG/DL (ref 313–563)
TRANSFERRIN SERPL-MCNC: 311 MG/DL (ref 212–360)
TRIGL SERPL-MCNC: 290 MG/DL
UROBILINOGEN UR STRIP-ACNC: ABNORMAL
VIT B12 SERPL-MCNC: 1520 PG/ML (ref 213–816)
WBC #/AREA URNS AUTO: ABNORMAL HPF
WBC: 15.4 THOU/UL (ref 4–11)

## 2020-08-13 ASSESSMENT — PATIENT HEALTH QUESTIONNAIRE - PHQ9: SUM OF ALL RESPONSES TO PHQ QUESTIONS 1-9: 0

## 2020-08-14 ENCOUNTER — AMBULATORY - HEALTHEAST (OUTPATIENT)
Dept: FAMILY MEDICINE | Facility: CLINIC | Age: 55
End: 2020-08-14

## 2020-08-14 DIAGNOSIS — R50.9 FEVER, UNSPECIFIED FEVER CAUSE: ICD-10-CM

## 2020-08-14 DIAGNOSIS — R05.9 COUGH: ICD-10-CM

## 2020-08-17 ENCOUNTER — COMMUNICATION - HEALTHEAST (OUTPATIENT)
Dept: FAMILY MEDICINE | Facility: CLINIC | Age: 55
End: 2020-08-17

## 2020-08-18 LAB
AEROBIC BLOOD CULTURE BOTTLE: NO GROWTH
AEROBIC BLOOD CULTURE BOTTLE: NO GROWTH
ANAEROBIC BLOOD CULTURE BOTTLE: NO GROWTH
ANAEROBIC BLOOD CULTURE BOTTLE: NO GROWTH

## 2020-08-24 ENCOUNTER — OFFICE VISIT - HEALTHEAST (OUTPATIENT)
Dept: FAMILY MEDICINE | Facility: CLINIC | Age: 55
End: 2020-08-24

## 2020-08-24 ENCOUNTER — COMMUNICATION - HEALTHEAST (OUTPATIENT)
Dept: FAMILY MEDICINE | Facility: CLINIC | Age: 55
End: 2020-08-24

## 2020-08-24 DIAGNOSIS — R45.1 AGITATION: ICD-10-CM

## 2020-08-24 DIAGNOSIS — D50.9 IRON DEFICIENCY ANEMIA, UNSPECIFIED IRON DEFICIENCY ANEMIA TYPE: ICD-10-CM

## 2020-08-24 DIAGNOSIS — R31.29 MICROSCOPIC HEMATURIA: ICD-10-CM

## 2020-08-26 ENCOUNTER — COMMUNICATION - HEALTHEAST (OUTPATIENT)
Dept: FAMILY MEDICINE | Facility: CLINIC | Age: 55
End: 2020-08-26

## 2020-08-26 DIAGNOSIS — N39.0 RECURRENT UTI: ICD-10-CM

## 2020-08-28 ENCOUNTER — HOSPITAL ENCOUNTER (OUTPATIENT)
Dept: INTERVENTIONAL RADIOLOGY/VASCULAR | Facility: HOSPITAL | Age: 55
Discharge: HOME | End: 2020-08-28
Attending: NURSE PRACTITIONER | Admitting: RADIOLOGY

## 2020-08-28 DIAGNOSIS — E46 MALNUTRITION, UNSPECIFIED TYPE (H): ICD-10-CM

## 2020-09-17 ENCOUNTER — RECORDS - HEALTHEAST (OUTPATIENT)
Dept: ADMINISTRATIVE | Facility: OTHER | Age: 55
End: 2020-09-17

## 2020-09-17 ENCOUNTER — HOSPITAL ENCOUNTER (OUTPATIENT)
Dept: INTERVENTIONAL RADIOLOGY/VASCULAR | Facility: HOSPITAL | Age: 55
Discharge: HOME | End: 2020-09-17
Attending: RADIOLOGY | Admitting: RADIOLOGY

## 2020-09-17 DIAGNOSIS — E46 MALNUTRITION (H): ICD-10-CM

## 2020-09-18 ENCOUNTER — COMMUNICATION - HEALTHEAST (OUTPATIENT)
Dept: FAMILY MEDICINE | Facility: CLINIC | Age: 55
End: 2020-09-18

## 2020-09-18 DIAGNOSIS — E61.1 IRON DEFICIENCY: ICD-10-CM

## 2020-10-08 ENCOUNTER — RECORDS - HEALTHEAST (OUTPATIENT)
Dept: ADMINISTRATIVE | Facility: OTHER | Age: 55
End: 2020-10-08

## 2020-10-29 ENCOUNTER — HOSPITAL ENCOUNTER (OUTPATIENT)
Dept: INTERVENTIONAL RADIOLOGY/VASCULAR | Facility: HOSPITAL | Age: 55
Discharge: HOME | End: 2020-10-29
Attending: NURSE PRACTITIONER | Admitting: RADIOLOGY

## 2020-10-29 DIAGNOSIS — E46 MALNUTRITION, UNSPECIFIED TYPE (H): ICD-10-CM

## 2020-11-06 ENCOUNTER — HOSPITAL ENCOUNTER (OUTPATIENT)
Dept: INTERVENTIONAL RADIOLOGY/VASCULAR | Facility: CLINIC | Age: 55
Discharge: HOME | End: 2020-11-06
Admitting: RADIOLOGY

## 2020-11-06 DIAGNOSIS — E46 MALNUTRITION, UNSPECIFIED TYPE (H): ICD-10-CM

## 2020-12-24 ENCOUNTER — HOSPITAL ENCOUNTER (OUTPATIENT)
Dept: INTERVENTIONAL RADIOLOGY/VASCULAR | Facility: HOSPITAL | Age: 55
Discharge: HOME | End: 2020-12-24
Admitting: RADIOLOGY

## 2020-12-24 DIAGNOSIS — R63.30 FEEDING DIFFICULTY: ICD-10-CM

## 2020-12-26 ENCOUNTER — COMMUNICATION - HEALTHEAST (OUTPATIENT)
Dept: FAMILY MEDICINE | Facility: CLINIC | Age: 55
End: 2020-12-26

## 2020-12-26 DIAGNOSIS — K21.9 GERD WITHOUT ESOPHAGITIS: ICD-10-CM

## 2021-03-01 ENCOUNTER — COMMUNICATION - HEALTHEAST (OUTPATIENT)
Dept: FAMILY MEDICINE | Facility: CLINIC | Age: 56
End: 2021-03-01

## 2021-03-01 DIAGNOSIS — R56.9 CONVULSIONS, UNSPECIFIED CONVULSION TYPE (H): ICD-10-CM

## 2021-03-11 ENCOUNTER — AMBULATORY - HEALTHEAST (OUTPATIENT)
Dept: NURSING | Facility: CLINIC | Age: 56
End: 2021-03-11

## 2021-04-01 ENCOUNTER — AMBULATORY - HEALTHEAST (OUTPATIENT)
Dept: NURSING | Facility: CLINIC | Age: 56
End: 2021-04-01

## 2021-04-08 ENCOUNTER — COMMUNICATION - HEALTHEAST (OUTPATIENT)
Dept: FAMILY MEDICINE | Facility: CLINIC | Age: 56
End: 2021-04-08

## 2021-04-08 DIAGNOSIS — M62.838 MUSCLE SPASM: ICD-10-CM

## 2021-04-16 ENCOUNTER — COMMUNICATION - HEALTHEAST (OUTPATIENT)
Dept: SCHEDULING | Facility: CLINIC | Age: 56
End: 2021-04-16

## 2021-04-19 ENCOUNTER — COMMUNICATION - HEALTHEAST (OUTPATIENT)
Dept: FAMILY MEDICINE | Facility: CLINIC | Age: 56
End: 2021-04-19

## 2021-04-21 ENCOUNTER — OFFICE VISIT - HEALTHEAST (OUTPATIENT)
Dept: FAMILY MEDICINE | Facility: CLINIC | Age: 56
End: 2021-04-21

## 2021-04-21 DIAGNOSIS — E46 MALNUTRITION, UNSPECIFIED TYPE (H): ICD-10-CM

## 2021-04-21 DIAGNOSIS — R45.1 AGITATION: ICD-10-CM

## 2021-04-21 DIAGNOSIS — H81.10 BENIGN PAROXYSMAL POSITIONAL VERTIGO, UNSPECIFIED LATERALITY: ICD-10-CM

## 2021-04-21 DIAGNOSIS — B18.2 HEPATITIS C VIRUS CARRIER STATE (H): ICD-10-CM

## 2021-04-21 DIAGNOSIS — R56.9 CONVULSIONS, UNSPECIFIED CONVULSION TYPE (H): ICD-10-CM

## 2021-04-21 DIAGNOSIS — I69.351 HEMIPLEGIA AND HEMIPARESIS FOLLOWING CEREBRAL INFARCTION AFFECTING RIGHT DOMINANT SIDE (H): ICD-10-CM

## 2021-05-03 ENCOUNTER — COMMUNICATION - HEALTHEAST (OUTPATIENT)
Dept: FAMILY MEDICINE | Facility: CLINIC | Age: 56
End: 2021-05-03

## 2021-05-07 ENCOUNTER — OFFICE VISIT - HEALTHEAST (OUTPATIENT)
Dept: FAMILY MEDICINE | Facility: CLINIC | Age: 56
End: 2021-05-07

## 2021-05-07 DIAGNOSIS — F51.01 PRIMARY INSOMNIA: ICD-10-CM

## 2021-05-07 DIAGNOSIS — E61.1 IRON DEFICIENCY: ICD-10-CM

## 2021-05-07 DIAGNOSIS — L89.90 PRESSURE INJURY OF SKIN, UNSPECIFIED INJURY STAGE, UNSPECIFIED LOCATION: ICD-10-CM

## 2021-05-07 DIAGNOSIS — R33.9 URINARY RETENTION: ICD-10-CM

## 2021-05-07 DIAGNOSIS — I69.959 HEMIPLEGIA OF NONDOMINANT SIDE, LATE EFFECT OF CEREBROVASCULAR DISEASE (H): ICD-10-CM

## 2021-05-07 DIAGNOSIS — R45.1 RESTLESSNESS AND AGITATION: ICD-10-CM

## 2021-05-11 ENCOUNTER — COMMUNICATION - HEALTHEAST (OUTPATIENT)
Dept: FAMILY MEDICINE | Facility: CLINIC | Age: 56
End: 2021-05-11

## 2021-05-12 ENCOUNTER — RECORDS - HEALTHEAST (OUTPATIENT)
Dept: ADMINISTRATIVE | Facility: OTHER | Age: 56
End: 2021-05-12

## 2021-05-13 ENCOUNTER — RECORDS - HEALTHEAST (OUTPATIENT)
Dept: ADMINISTRATIVE | Facility: OTHER | Age: 56
End: 2021-05-13

## 2021-05-26 VITALS
RESPIRATION RATE: 20 BRPM | OXYGEN SATURATION: 92 % | TEMPERATURE: 96.6 F | DIASTOLIC BLOOD PRESSURE: 72 MMHG | SYSTOLIC BLOOD PRESSURE: 120 MMHG | HEART RATE: 72 BPM

## 2021-05-26 VITALS
SYSTOLIC BLOOD PRESSURE: 126 MMHG | HEART RATE: 78 BPM | OXYGEN SATURATION: 94 % | TEMPERATURE: 98.2 F | DIASTOLIC BLOOD PRESSURE: 80 MMHG

## 2021-05-26 NOTE — TELEPHONE ENCOUNTER
"Caller is pt's daughter (Cheryl Leiva).  No current consent to communicate in pt's chart, therefore primarily receiving information only.    Main symptom = \"coughing\" x ten days.  \"Had a fever at first\" -> resolved.  \"Has a lot of mucous.\"  \"Has been using Mucinex and Delsym.\"  \"Coughing is making him tired.\"    No nasal congestion.  No breathing difficulty.    Pt is NPO -> Tracheostomy present.  Daughter would like clinical eval Monday, March 25th.  Warm transferred to a  for this purpose now.  (In the interim however, advised eval over the weekend if any new sxs. manny recurrence of fever.  Or, If any breathing difficulty over the weekend, to call 911 if unable to transport by car to ED).    Daughter verbalizes understanding.  Agrees to plan.      "

## 2021-05-27 VITALS
HEART RATE: 73 BPM | OXYGEN SATURATION: 93 % | DIASTOLIC BLOOD PRESSURE: 82 MMHG | SYSTOLIC BLOOD PRESSURE: 134 MMHG | TEMPERATURE: 98.4 F | RESPIRATION RATE: 18 BRPM

## 2021-05-27 ASSESSMENT — PATIENT HEALTH QUESTIONNAIRE - PHQ9: SUM OF ALL RESPONSES TO PHQ QUESTIONS 1-9: 0

## 2021-05-27 NOTE — PROGRESS NOTES
Patient ID: Liu Malave is a 53 y.o. male.  /64   Pulse 69   Temp 97.9  F (36.6  C)   SpO2 95%     Assessment/Plan:                   Diagnoses and all orders for this visit:    Pneumonia  -     doxycycline (VIBRA-TABS) 100 MG tablet  Dispense: 20 tablet; Refill: 0    Cough  -     Cancel: XR Chest 2 Views  -     XR Chest 1 View        DISCUSSION  On the x-ray obtained today there is not distinct evidence of pneumonia or aspiration.  I am concerned given his underlying status and the worsening nature of the cough that this may be progressing into pneumonia.  Recommend that we start antibiotic therapy.  Discussed continued suctioning at home as they are doing.  Continue to monitor closely if he has worsening he will need to be evaluated in the emergency department.  Subjective:     HPI    Liu Malave is a 53 y.o. male with a complex medical history is here today with his daughter concern regarding an acute illness, possibly pneumonia.  A Niuean  is used for this visit.    He has a history of a hemorrhagic stroke with residual hemiparesis.  He is fed through a GJ tube because of dysphasia.  He is high risk for aspiration and has a chronic cough.  He also has a urinary catheter due to urinary retention.  He previously been on hospice care but because his health condition stabilized he has been transitioned away from hospice care.    Patient is nonverbal and does not participate during the course of our exam.    Daughter reports that he has had cough worsening over the past week.  Cough seems to be consistent with mucus production.  He has not had a fever.  He has not had any significant distress beyond normal.  Family is concerned about the worsening nature of the cough and the amount of mucus production.  He is continuing to be fed per usual through his G-tube.  There is no evidence of any episodes of vomiting there is no diarrhea.  He has not developed any skin rashes.  His level of  alertness has not changed.  He does not seem to be in any amount of distress.    Review of Systems  Complete review of systems is obtained.  Other than the specific considerations noted above complete review of systems is negative.          Objective:   Medications:  Current Outpatient Medications   Medication Sig Note     acetaminophen (TYLENOL) 325 MG tablet 650 mg by G-tube route every 6 (six) hours as needed for pain.       baclofen (LIORESAL) 10 MG tablet 1 tablet (10 mg total) by G-tube route 3 (three) times a day. Crush tablet      bisacodyl (DULCOLAX) 10 mg suppository Insert 10 mg into the rectum daily as needed. 8/15/2018: As needed      chlorhexidine (PERIDEX) 0.12 % solution Apply 15 mL to the mouth or throat 2 (two) times a day. Swish and spit 9/21/2018: As needed      emollient combination no.24 (L-LYSINE) Oint Apply topically to sores in mouth as needed      esomeprazole (NEXIUM) 20 MG capsule 1 capsule by G-tube twice daily      glycerin 7.5 % Swab 1 application by Mucous Membrane route every 4 (four) hours as needed.      haloperidol (HALDOL) 2 mg/mL solution 0.25 mL (0.5 mg total) by G-tube route every 4 (four) hours as needed (Agitation). 9/21/2018: As needed      hyoscyamine (LEVSIN/SL) 0.125 mg SL tablet Take 1 tablet (125 mcg total) by mouth every 4 (four) hours as needed (oral  secretion).      levETIRAcetam (KEPPRA) 100 mg/mL solution TAKE 7.5MLS BY G-TUBE 2 TIMES DAILY.      LORazepam (ATIVAN) 0.5 MG tablet Take 0.5-1 tablets (0.25-0.5 mg total) by mouth 2 (two) times a day as needed for anxiety (agitation and for sleep).      metoprolol tartrate (LOPRESSOR) 25 MG tablet 25 mg by G-tube route 2 (two) times a day.      nut.tx.impaired digest fxn (PEPTAMEN 1.5) 0.068 gram- 1.5 kcal/mL Liqd 60 mL/hr by G-tube route continuous.      oxyCODONE (ROXICODONE) 5 mg/5 mL solution 2.5 mL (2.5 mg total) by G-tube route every 8 (eight) hours as needed for pain (or shortness of breath). 8/15/2018: As  needed      polyethylene glycol (MIRALAX) 17 gram packet Take 1 packet (17 g total) by mouth daily as needed.      QUEtiapine (SEROQUEL) 25 MG tablet 1 tablet (25 mg total) by G-tube route 2 (two) times a day.      simvastatin (ZOCOR) 20 MG tablet 1 tablet (20 mg total) by G-tube route at bedtime.      triamcinolone (KENALOG) 0.1 % paste Take 1 application by mouth 4 (four) times a day as needed (for mouth sores).       codeine-guaiFENesin (GUAIFENESIN AC)  mg/5 mL liquid 5 mL by G-tube route 3 (three) times a day as needed for cough.      doxycycline (VIBRA-TABS) 100 MG tablet 100 mg by G- tube twice daily for 10 days      ipratropium-albuterol (DUO-NEB) 0.5-2.5 mg/3 mL nebulizer Take 3 mL by nebulization every 6 (six) hours as needed (wheezing/cough).        Allergies:  No Known Allergies    Tobacco:   reports that he quit smoking about 13 years ago. He has a 30.00 pack-year smoking history. he has never used smokeless tobacco.     Physical Exam          /64   Pulse 69   Temp 97.9  F (36.6  C)   SpO2 95%     General: Patient is alert he does not appear to be in physical distress but he does seem to have difficulty managing mucus secretions in his throat, according to the family member present this does not seem to be significantly different than his usual baseline.  He coughs frequently during the course of the exam and the cough sounds somewhat wet.  Family states that this is slightly worse than his usual baseline.    HEENT: There is no conjunctival irritation, the mouth is moist, the neck is supple without palpable lymphadenopathy    Lungs: No wheezes heard, no distinct crackles or focal sounds generally rough lung sounds throughout which are likely to be upper airway sounds.    Heart: Regular rate and rhythm no murmur    Abdomen: G-tube in place otherwise benign    Extremities: No evidence of any significant edema.    Neurologic: Remain seated in wheelchair.  No evidence of any change in overall  neurologic status.

## 2021-05-27 NOTE — TELEPHONE ENCOUNTER
RN cannot approve Refill Request    RN can NOT refill this medication med is not covered by policy/route to provider     . Last office visit: 9/21/2018 Lynda Gutierrez MD Last Physical: Visit date not found Last MTM visit: Visit date not found Last visit same specialty: Visit date not found.  Next visit within 3 mo: Visit date not found  Next physical within 3 mo: Visit date not found      María Elena Byrd, [facility] Triage/Med Refill 3/29/2019    Requested Prescriptions   Pending Prescriptions Disp Refills     ipratropium-albuterol (DUO-NEB) 0.5-2.5 mg/3 mL nebulizer [Pharmacy Med Name: Ipratropium-Albuterol Inhalation Solution 0.5-2.5 (3) MG/3ML] 90 mL 0     Sig: Take 3 mL by nebulization every 6 (six) hours as needed (wheezing/cough).    There is no refill protocol information for this order

## 2021-05-27 NOTE — TELEPHONE ENCOUNTER
RN cannot approve Refill Request    RN can NOT refill this medication med is not covered by policy/route to provider. Last office visit: 2/16/2018 Silver Longoria MD Last Physical: 4/27/2016 Last MTM visit: Visit date not found Last visit same specialty: 3/26/2019 Dyllan Banks MD.  Next visit within 3 mo: Visit date not found  Next physical within 3 mo: Visit date not found      Aruna Jackson, [facility] Triage/Med Refill 4/6/2019    Requested Prescriptions   Pending Prescriptions Disp Refills     triamcinolone (KENALOG) 0.1 % paste [Pharmacy Med Name: Triamcinolone Acetonide Mouth/Throat Paste 0.1 %] 5 g 11     Sig: APPLY 4 TIMES DAILY AS DIRECTED    There is no refill protocol information for this order

## 2021-05-27 NOTE — TELEPHONE ENCOUNTER
RN cannot approve Refill Request    RN can NOT refill this medication historical medication requested. Last office visit: 2/16/2018 Silver Longoria MD Last Physical: 4/27/2016 Last MTM visit: Visit date not found Last visit same specialty: 3/26/2019 Dyllan Banks MD.  Next visit within 3 mo: Visit date not found  Next physical within 3 mo: Visit date not found      Amara Archer, [facility] Triage/Med Refill 3/30/2019    Requested Prescriptions   Pending Prescriptions Disp Refills     metoprolol tartrate (LOPRESSOR) 25 MG tablet [Pharmacy Med Name: Metoprolol Tartrate Oral Tablet 25 MG] 60 tablet 4     Sig: TAKE ONE TABLET BY MOUTH TWICE DAILY    Beta-Blockers Refill Protocol Passed - 3/29/2019  9:12 AM       Passed - PCP or prescribing provider visit in past 12 months or next 3 months    Last office visit with prescriber/PCP: 2/16/2018 Silver Longoria MD OR same dept: 3/26/2019 Dyllan Banks MD OR same specialty: 3/26/2019 Dyllan Banks MD  Last physical: 4/27/2016 Last MTM visit: Visit date not found   Next visit within 3 mo: Visit date not found  Next physical within 3 mo: Visit date not found  Prescriber OR PCP: Silver Longoria MD  Last diagnosis associated with med order: 1. Elevated troponin  - metoprolol tartrate (LOPRESSOR) 25 MG tablet [Pharmacy Med Name: Metoprolol Tartrate Oral Tablet 25 MG]; TAKE ONE TABLET BY MOUTH TWICE DAILY   Dispense: 60 tablet; Refill: 4    If protocol passes may refill for 12 months if within 3 months of last provider visit (or a total of 15 months).            Passed - Blood pressure filed in past 12 months    BP Readings from Last 1 Encounters:   03/26/19 118/64

## 2021-05-29 NOTE — TELEPHONE ENCOUNTER
Ok for orders. I am not able to order the mask as I have not seen him for a face to face encounter within past 60 days.   Looks like Liu is currently in the hospital.  Maybe he can get an order for this from the hospitalist?

## 2021-05-29 NOTE — TELEPHONE ENCOUNTER
Medication Request  Medication name: Tylenol with Codeine  Pharmacy Name and Location: [facility]  Reason for request: Muscle Cramps.  Patient's daughter states regular Tylenol is not helping and Oxycodone is too strong.  When did you use medication last?:  2 or 3 days ago  Patient offered appointment:  patient declined  Okay to leave a detailed message: No    [phone] - Idalmis (Daughter)  No consent on file.

## 2021-05-29 NOTE — TELEPHONE ENCOUNTER
Patient was in hospital from pneumonia and urinary track infection and then referred to homecare. At hospital stay urology brought up removing catheter and going to straight catheter d/t abx resistant infections. How do you feel about this? We would be able to teach family and they are willing.    Requesting orders for SN    3wk1, 2wk2, 1wk2 for catheter, pain, skin, meds

## 2021-05-29 NOTE — TELEPHONE ENCOUNTER
Patient opened to homecare today    Requesting Orders for P.T eval and treat for increased weakness

## 2021-05-29 NOTE — TELEPHONE ENCOUNTER
Physical Therapy [facility] is requesting verbal ok for 2W3 to work on strengthening, ROM and transfer training and balance. You can respond to this inbasket with the ok or call and leave a message at [phone]. Thank you

## 2021-05-29 NOTE — TELEPHONE ENCOUNTER
Requesting orders for recertification of skilled nursing every 4 weeks for 9 weeks for catheter management and general health assessment. 3 PRN visits for catheter troubleshooting, reassessments, and OASIS data collection.    Patient's nebulizer mask is broken and they do not know where they got the supplies originally. Could you please send a new prescription to [facility] for nebulizer face mask?  Thanks

## 2021-05-30 NOTE — PROGRESS NOTES
[facility]   Initial Evaluation        [facility] Certification Request    July 22, 2019      Patient: Liu Malave  MR Number: [id number]  YOB: 1965  Date of Visit: 7/22/2019      Dear Dr. Banks    Thank you for this referral.   We are seeing Liu Malave for Physical Therapy of late effects due to post stroke.    Medicare and/or Medicaid requires physician review and approval of the treatment plan. Please review the plan of care and verify that you agree with the therapy plan of care by co-signing this note.      If you have any questions or concerns, please don't hesitate to call.    Sincerely,      Kacey Reilly, PT        Physician recommendation:     ___ Follow therapist's recommendation        ___ Modify therapy      *Physician co-signature indicates they certify the need for these services furnished within this plan and while under their care.        Patient Name: Liu Malave  Date of evaluation: 7/22/2019  Referral Diagnosis: History of stroke with residual deficit  Referring provider: Dyllan Banks MD  Visit Diagnosis:     ICD-10-CM    1. History of stroke with residual deficit I69.30    2. Generalized muscle weakness M62.81    3. 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 Contracture of muscle of right ankle or foot M62.471        Assessment:      Pt. is appropriate for skilled PT intervention as outlined in the Plan of Care (POC).  Pt. is a good candidate for skilled PT services to improve pain levels and function.    Patient presents 2-3 years S/P CVA with total dependency on his family and is wheelchair bound.  The family is looking for some help to see if he can gain some strength so he may be able to help with some transfers and improve his quality of life.  He is max A for sit<>stand, bed mobility, standing and has a contracture in his right foot.  He is able to move his left LE and some traces of movement with his right.  He does respond to  "commands and is able to give a \"thumbs up\" if he likes what you are saying or is happy with that is going on.  Feel he will benefit from physical therapy to work on his mobility, strength and teach his family exercises they can continue with at home.    Encouraged his daughter to have shoes on his left foot, and if they can comfortably place one on his right while he is in therapy.  Also encouraged something different on his feet at home so he may be able to stand on his feet and not slip.      Goals:  Pt. will be independent with home exercise program in : 6 weeks    Pt will: Able to stand which will allow patient to do a standing transfer with min to mod A within 12 weeks  Pt will: Able to assist with rolling in bed within 12 weeks  Pt will: Able to perform exercises with min A to mod A which will help gain strength to help with transfers or sit up straight in wheelchair within 12 weeks  Pt will: Able to sit<>stand with mod A as strength has improved within 12 weeks      Patient's expectations/goals are realistic.    Barriers to Learning or Achieving Goals:  Patient Active Problem List   Diagnosis     Late CVD Effects: Hemiplegia Affecting Nondominant Side Left     Hepatitis     Hepatic Disorder     Generalized anxiety disorder     Depression     Oropharyngeal dysphagia     Oral mucosal lesion     Hemorrhagic stroke (H)     Brain compression (H)     Vasogenic brain edema (H)     Convulsions, unspecified convulsion type (H)     History of stroke with residual deficit     Essential hypertension     Dyslipidemia     GERD without esophagitis     Hepatitis C virus carrier state (H)     Metabolic acidosis     Basal ganglia hemorrhage (H)     Tracheostomy present (H)     Hemoptysis     Insomnia     Urinary retention     Abnormal urine odor     Fever     Hypoalbuminemia due to protein-calorie malnutrition (H)     Chronic static encephalopathy     Elevated troponin     Essential hypertension with goal blood pressure less " than 140/90     Hyperkalemia     Shortness of breath     Spasticity     Restlessness and agitation     Dental decay     Slow transit constipation     Generalized pain     Diarrhea     History of hemorrhagic stroke with residual hemiparesis (H)     Dehydration     Seizure disorder (H)     Incontinence     Pneumonia          Plan / Patient Instructions:        Plan of Care:   Authorization / Certification Start Date: 07/22/19  Authorization / Certification End Date: 10/20/19  Authorization / Certification Number of Visits: 12  Communication with: Referral Source  Patient Related Instruction: Nature of Condition;Treatment plan and rationale;Self Care instruction;Basis of treatment  Times per Week: 1-2  Number of Weeks: 12  Number of Visits: 12  Therapeutic Exercise: ROM;Stretching;Strengthening  Neuromuscular Reeducation: stroke rehabilitation  Functional Training (ADL's): instructions in transfers  Gait Training: if able      Plan for next visit:   Stretch right foot  Exercises in supine or sitting  Sit<>stand        Daughter names: Idalmis  Subjective:         Social information:   Living Situation:single family home and lives with others    Equipment Available: wheelchair bound    History of Present Illness:    Liu is a 54 y.o. male who presents to therapy today with complaints of weakness. Patient had a stroke in 2007 and 2016.  After the first CVA he was able to do most tasks on his own with but with some weakness.  After the second stroke he has been max dependent for care.  He has had a few visits of home health but family members want him to get out more so they were hoping outpatient physical therapy can offer him more intense strengthening.  He is wheelchair bound.  The family is looking for exercises for him for strength as he is 100% dependent on his family for transfers etc.    He had physical therapy in his home up until 2 weeks ago.        Pain Rating:NA}  Pain rating at best: NA  Pain rating at worst:  "NA  Pain description: NA    Functional limitations are described as occurring with:   patient is dependent with all tasks.  Looking to gain strength to be able to assist with some tasks         Objective:      Patient Outcome Measures :    None appropriate    Examination  1. History of stroke with residual deficit     2. Generalized muscle weakness     3. Late effect of cerebrovascular accident     4. Contracture of muscle of right ankle or foot       Involved side: Bilateral but right is weaker  Posture Observation:     Slouches in wheelchair, no pillow supporting head (they forgot it today),     Contracture of right foot into plantar flexion and inversion.  Not able to stretch or move foot into neutral    Sit<>stand: max A in parallel bars.  Pt not able to grasp bars due to contractures in hands.                    No shoes on feet, no \"grippy\" socks to help with slippage on left  Supine<>sit:  Max A  Not able to sit unsupported at side of table  Pivot transfer chair<>table: max A    Supine:      left leg:             able to perform heel slide, SAQ, hip abd, SLR     Right leg:               able to get a slight muscle contraction for heel slide    Rolling: able to move left knee into flex and \"move\" left arm in an attempt to help, but no purposeful help with either    Exercises:  Exercise #1: sitting:  hip flex X 5  (L=I, R=mod A to max A=trace ability to help)  Comment #1: sitting: knee ext X 5 (L=I, R=mod A= slightly able to help)  Exercise #2: sitting:  hip add and abd  X 5 each (L=I, R=mod A add, max A abd)    Treatment Today     TREATMENT MINUTES COMMENTS   Evaluation 30 General weakness HX CVA   Self-care/ Home management     Manual therapy 8 Manually stretched right foot: dorsi and heel cord,  eversion   Neuromuscular Re-education 10 Sit<> parallel bars, bed mobility   Therapeutic Activity     Therapeutic Exercises 12 See above or flow sheet   Gait training     Modality__________________            "     Total 60    Blank areas are intentional and mean the treatment did not include these items.     PT Evaluation Code: (Please list factors)  Patient History/Comorbidities:   Patient Active Problem List   Diagnosis     Late CVD Effects: Hemiplegia Affecting Nondominant Side Left     Hepatitis     Hepatic Disorder     Generalized anxiety disorder     Depression     Oropharyngeal dysphagia     Oral mucosal lesion     Hemorrhagic stroke (H)     Brain compression (H)     Vasogenic brain edema (H)     Convulsions, unspecified convulsion type (H)     History of stroke with residual deficit     Essential hypertension     Dyslipidemia     GERD without esophagitis     Hepatitis C virus carrier state (H)     Metabolic acidosis     Basal ganglia hemorrhage (H)     Tracheostomy present (H)     Hemoptysis     Insomnia     Urinary retention     Abnormal urine odor     Fever     Hypoalbuminemia due to protein-calorie malnutrition (H)     Chronic static encephalopathy     Elevated troponin     Essential hypertension with goal blood pressure less than 140/90     Hyperkalemia     Shortness of breath     Spasticity     Restlessness and agitation     Dental decay     Slow transit constipation     Generalized pain     Diarrhea     History of hemorrhagic stroke with residual hemiparesis (H)     Dehydration     Seizure disorder (H)     Incontinence     Pneumonia     Examination: max A sit<>stand, max A bed mobility, contracture right foot, weakness bilateral LE and UE  Clinical Presentation: evolving  Clinical Decision Making: moderate    Patient History/  Comorbidities Examination  (body structures and functions, activity limitations, and/or participation restrictions) Clinical Presentation Clinical Decision Making (Complexity)   No documented Comorbidities or personal factors 1-2 Elements Stable and/or uncomplicated Low   1-2 documented comorbidities or personal factor 3 Elements Evolving clinical presentation with changing  characteristics Moderate   3-4 documented comorbidities or personal factors 4 or more Unstable and unpredictable High            Kacey S Melba  7/22/2019  7:51 AM

## 2021-05-30 NOTE — PROGRESS NOTES
Patient ID: Liu Malave is a 54 y.o. male.  /58   Pulse 74   Temp 98  F (36.7  C)   SpO2 95%     Assessment/Plan:                   Diagnoses and all orders for this visit:    History of stroke with residual deficit  -     Basic Metabolic Panel  -     Ambulatory referral to Nutrition Services  -     Ambulatory referral to PT/OT    Contracture of muscle ankle and foot, right  -     Ambulatory referral to Nutrition Services  -     Ambulatory referral to PT/OT    Gastrojejunostomy tube status (H)  -     Basic Metabolic Panel  -     Ambulatory referral to Nutrition Services  -     Prealbumin  -     Comprehensive Metabolic Panel    Hemiplegia of nondominant side, late effect of cerebrovascular disease (H)  -     Ambulatory referral to PT/OT    Anxiety    Hypotension, unspecified hypotension type  -     Basic Metabolic Panel  -     Prealbumin  -     Comprehensive Metabolic Panel    Anemia, unspecified type  -     HM2(CBC w/o Differential)    Elevated troponin  -     metoprolol tartrate (LOPRESSOR) 25 MG tablet; Take 0.5 tablets (12.5 mg total) by mouth 2 (two) times a day. Take 1 tablet by G-tube twice daily  Dispense: 180 tablet; Refill: 3          DISCUSSION  He is certainly at high risk for aspiration but he does not seem to be exhibiting signs and symptoms currently of an infection process.  He is not hypotensive currently.  He is certainly at high risk for urinary tract infection given his known colonization but at this point there is no sign or symptom of such an infection either.  We will obtain basic laboratory work the patient is given the following instructions as to how to proceed which sums up the consideration otherwise.    Reduce metoprolol to 1/2 tablet twice daily.    This should help improve blood pressure.    Instead of 1.5 L of fluid flush daily strive to get 2.0 L of fluid flush daily.    Continue to monitor blood pressure and oxygen saturation as you have been doing.  If the concerning  reading arises try to wake him up if he is asleep and get him sitting up.  See if the numbers simply improves with this action.  If it does and if he is behaving normally there is no reason for any further action.  If the number remains low or you are concerned about how he is doing then call for help.  I hope that making the change with the blood pressure medication and getting a little more fluid will be the solution to the concerns you have noted in the past couple of weeks.    I will check lab test today and let you know the results.    You will receive a call to be scheduled to see an nutrition specialist to help reevaluate his overall feedings and fluid intake.    You will receive a call to be set up for outpatient physical therapy.    The duration of today's visit exceeded 40 minutes with more than 50% of the time spent in counseling and coordination of care.  Subjective:     JOHN Malave is a 54 y.o. male with a complex medical history is here today with his daughter who is his primary caretaker concern regarding low blood pressure and hypoxia.  Bel[place]ian  was used for this visit.  His daughter speaks English and provides much of the history.  The visit is translated via the .    Is a history of hemorrhagic stroke with residual hemiparesis.  He is fed through a GJ tube because of dysphasia and history of aspiration.  He was hospitalized earlier this summer in June for presumed pneumonia secondary to aspiration.  He has had a chronic indwelling Kelly catheter because of urinary retention.  He was found to have ESBL producing E. coli present in his bladder.  There was consideration that this may have been simple colonization but he was treated with antibiotic therapy at the same time as treatment for his pneumonia.  At this point in time they remove the catheter and there is not any concern with urinary retention.  It is noted that he was previously on hospice care but because his  health condition and stabilized he had been transitioned away from hospice care.    This is my second meeting with him I have seen him occasion for an acute issue.    His daughter reports that on July 4 that he took a blood pressure reading and noted it was less than 80 systolic.  His oxygen saturation also was 87%.  They summoned paramedics who came to the house and evaluated.  They did not feel he had any significant clinical finding to warrant transport for hospitalization.  According to daughter they recommended increasing fluid.  Daughter reports that his temperature was elevated at 99 to 100 degrees at this time.  With Tylenol the temperature resolved and had not recurred up until Tuesday of last week about 1 week later when a similar phenomenon occurred, this time they called for advice but did not summoned paramedics.  Again the situation corrected itself.    Daughter is very concerned about the readings.  We had an extensive discussion today regarding the readings and their significance.  We discussed that the readings do not always correlate with the clinical state.  We discussed blood pressure medications, he is on metoprolol and if he is getting lower readings in general probably does not need to be on medication and we discussed making an adjustment.  She has several questions about nutrition and fluid intake which I was not able to answer.  We discussed striving to get additional fluid through fluid flushing in the short-term but reevaluating with the help of a nutritionist for long-term considerations related to nutrition and fluid intake overall.  She specifically inquires about the use of sodium chloride versus water alone.  We discussed not making any change at this time.    In regard to breathing they do not feel he has had any change from his baseline overall.  He does tend to have more difficulty managing secretions and this is audible with his breathing in the examination room.  Daughter  reports that his breathing seemed to be a little bit more quiet than usual which prompted them to take oxygen readings with a home oximeter during these above described episodes.  Rosalinda and approach to evaluating him more specifically from a clinical standpoint in terms of if he is struggling to breathe or if he is behaving as his normal self.  If this is the case then it probably does not warrant further evaluation and the readings may not be accurate if there are concerns about his clinical state then help should be summoned and he should be evaluated.  We discussed making some simple changes in an effort to try to overcome this.    Review of Systems  Complete review of systems is obtained.  Other than the specific considerations noted above complete review of systems is negative.          Objective:   Medications:  Current Outpatient Medications   Medication Sig     baclofen (LIORESAL) 10 MG tablet 1 tablet (10 mg total) by G-tube route 3 (three) times a day. Crush tablet     bisacodyl (DULCOLAX) 10 mg suppository Insert 10 mg into the rectum daily as needed (constipation). Indications: constipation     esomeprazole (NEXIUM) 20 MG capsule 1 capsule by G-tube twice daily     ipratropium-albuterol (DUO-NEB) 0.5-2.5 mg/3 mL nebulizer Take 3 mL by nebulization every 6 (six) hours as needed (wheezing/cough).     levETIRAcetam (KEPPRA) 100 mg/mL solution TAKE 7.5MLS BY G-TUBE 2 TIMES DAILY.     levETIRAcetam (KEPPRA) 100 mg/mL solution TAKE 7.5 MILLILITERS BY G-TUBE 2 TIMES DAILY.     LORazepam (ATIVAN) 0.5 MG tablet Take 0.5-1 tablets (0.25-0.5 mg total) by mouth 2 (two) times a day as needed for anxiety (agitation and for sleep). (Patient taking differently: 0.25-0.5 mg by G-tube route 2 (two) times a day as needed for anxiety (agitation and for sleep).       )     metoprolol tartrate (LOPRESSOR) 25 MG tablet Take 0.5 tablets (12.5 mg total) by mouth 2 (two) times a day. Take 1 tablet by G-tube twice daily      QUEtiapine (SEROQUEL) 25 MG tablet 1 tablet (25 mg total) by G-tube route 2 (two) times a day.     simvastatin (ZOCOR) 20 MG tablet 1 tablet (20 mg total) by G-tube route at bedtime.       Allergies:  Allergies   Allergen Reactions     Sleeping [Diphenhydramine Hcl]        Tobacco:   reports that he quit smoking about 14 years ago. He has a 30.00 pack-year smoking history. He has never used smokeless tobacco.     Physical Exam          /58   Pulse 74   Temp 98  F (36.7  C)   SpO2 95%     General: Patient is seated in wheelchair, he has difficulty maintaining his posture in this particular wheelchair but daughter assists him.  He does not appear to be in physical distress.  He is spoken to and Turkish by the .  It is difficult for me to gauge his understanding.    Lungs: Good air movement, upper airway sounds are heard without stethoscope but in listening to the lungs themselves there is no focal sound to suggest that there is fluid or an acute infection process.    Heart: Regular rate and rhythm I hear no murmur    Abdomen: JG tube in place, abdomen is soft nondistended, he does not react to suggest pain or discomfort on palpation.    Extremities: No edema is seen.    Neurologic: He does have somewhat of a flexed posture especially prominent in the distal extremities.

## 2021-05-30 NOTE — TELEPHONE ENCOUNTER
RN cannot approve Refill Request    RN can NOT refill this medication historical medication requested.     María Elena Byrd, [facility] Triage/Med Refill 7/11/2019    Requested Prescriptions   Pending Prescriptions Disp Refills     BISCOLAX 10 mg suppository [Pharmacy Med Name: Biscolax Rectal Suppository 10 MG] 30 suppository 0     Sig: INSERT 1 SUPPOSTIORY INTO THE RECTUM DAILY AS NEEDED FOR CONSTIPATION.       GI Medications Refill Protocol Passed - 7/11/2019  9:53 AM        Passed - PCP or prescribing provider visit in last 12 or next 3 months.     Last office visit with prescriber/PCP: 2/16/2018 Silver Longoria MD OR same dept: 3/26/2019 Dyllan Banks MD OR same specialty: 3/26/2019 Dyllan Banks MD  Last physical: 4/27/2016 Last MTM visit: Visit date not found   Next visit within 3 mo: Visit date not found  Next physical within 3 mo: Visit date not found  Prescriber OR PCP: Silver Longoria MD  Last diagnosis associated with med order: 1. Hemorrhagic stroke (H)  - BISCOLAX 10 mg suppository [Pharmacy Med Name: Biscolax Rectal Suppository 10 MG]; INSERT 1 SUPPOSTIORY INTO THE RECTUM DAILY AS NEEDED FOR CONSTIPATION.  Dispense: 30 suppository; Refill: 0    If protocol passes may refill for 12 months if within 3 months of last provider visit (or a total of 15 months).

## 2021-05-30 NOTE — TELEPHONE ENCOUNTER
Rn triage  Call from Daughter Idalmis ( No consent to communicate in file)  Daughter is reporting that her father has had low BP. She states that recently it has been 80/40. She is not with her father and is unable to answer triage questions. She thinks his BP is 100/?  She states that she has had EMS come out before to his house for BP checks and that they told her he needed more fluids.   She wonders if she should give him more fluids now.   She states that he is stable but currently does not know his status.  Advised daughter that with a BP that low of 80/40 he should be evaluated in ED. Also advised that she could go to him and call back so that a current status could be assessed. Daughter did bring up that her dad does have home care RN visits. She stated she will call them to see if the home care nurse could come out today.  Advised daughter to call back with any other questions or concerns.  Lotus Murillo, RN  [facility] Triage Nurse  12:52 PM  7/10/2019      Reason for Disposition    Systolic BP < 80 and NOT dizzy, lightheaded or weak (feels normal)    Protocols used: LOW BLOOD PRESSURE-A-OH

## 2021-05-30 NOTE — TELEPHONE ENCOUNTER
Left message to call back for: orders request  Information to relay to patient:  Below message via VM    
Okay to D/C Kelly catheter.  Recommend straight cath every 4-6 hours or as needed  
Orders being requested: Orders to discontinue patient's guerra catheter. Patient's family would rather patient be straight catheterized. Questioning how many times daily patient will need to be straight catheterized.  Reason service is needed/diagnosis: Per the request of patient's family.   When are orders needed by: As Able  Where to send Orders: Please give verbal orders to Mayte at: [phone].  Okay to leave detailed message?  Yes      
neurologic

## 2021-05-30 NOTE — TELEPHONE ENCOUNTER
Who is calling:  Mayte [facility] Care nurse  Reason for Call:  See order request on 6/26/19. Caller stated she needs to know the size and length of the straight catheter because there are many sizes. Caller was under the impression that there was just one standard size for the straight catheter. Caller would like to switch the catheter on Monday but she would like a call back today because it will take about a day to get the straight catheter ordered and received.  Date of last appointment with primary care: 3/26/19  Okay to leave a detailed message: Yes  [phone]

## 2021-05-30 NOTE — PATIENT INSTRUCTIONS - HE
Reduce metoprolol to 1/2 tablet twice daily.    This should help improve blood pressure.    Instead of 1.5 L of fluid flush daily strive to get 2.0 L of fluid flush daily.    Continue to monitor blood pressure and oxygen saturation as you have been doing.  If the concerning reading arises try to wake him up if he is asleep and get him sitting up.  See if the numbers simply improves with this action.  If it does and if he is behaving normally there is no reason for any further action.  If the number remains low or you are concerned about how he is doing then call for help.  I hope that making the change with the blood pressure medication and getting a little more fluid will be the solution to the concerns you have noted in the past couple of weeks.    I will check lab test today and let you know the results.    You will receive a call to be scheduled to see an nutrition specialist to help reevaluate his overall feedings and fluid intake.    You will receive a call to be set up for outpatient physical therapy.

## 2021-05-30 NOTE — TELEPHONE ENCOUNTER
Refill Approved    Rx renewed per Medication Renewal Policy. Medication was last renewed on 11/17/2018.  Last OV 3/26/2019.    Betsy Cherry, [facility] Triage/Med Refill 7/1/2019     Requested Prescriptions   Pending Prescriptions Disp Refills     levETIRAcetam (KEPPRA) 100 mg/mL solution [Pharmacy Med Name: levETIRAcetam Oral Solution 100 MG/ML] 450 mL 2     Sig: TAKE 7.5 MILLILITERS BY G-TUBE 2 TIMES DAILY.       Gabapentin/Levetiracetam/Tiagabine Refill Protocol  Passed - 6/30/2019  4:54 PM        Passed - PCP or prescribing provider visit in past 12 months or next 3 months     Last office visit with prescriber/PCP: 9/21/2018 Lynda Gutierrez MD OR same dept: 3/26/2019 Dyllan Banks MD OR same specialty: 3/26/2019 Dyllan Banks MD  Last physical: Visit date not found Last MTM visit: Visit date not found   Next visit within 3 mo: Visit date not found  Next physical within 3 mo: Visit date not found  Prescriber OR PCP: Lynda Gutierrez MD  Last diagnosis associated with med order: There are no diagnoses linked to this encounter.  If protocol passes may refill for 12 months if within 3 months of last provider visit (or a total of 15 months).

## 2021-05-30 NOTE — PROGRESS NOTES
"[facility] Daily Progress     Patient Name: Liu Malave  Date: 7/25/2019  Visit #: 2  PTA visit #:  1  Referral Diagnosis: [unfilled]  Referring provider: Dyllan Banks MD  Visit Diagnosis:     ICD-10-CM    1. History of stroke with residual deficit I69.30    2. Generalized muscle weakness M62.81    3. 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   Pt needing max assist of all transfers.  Pt arrives to the clinic via a WC, semi reclined.   Pt requires max assist with exercises on R LE and min-mod assist on the L.    Patient is benefitting from skilled physical therapy and is making steady progress toward functional goals.  Patient is appropriate to continue with skilled physical therapy intervention, as indicated by initial plan of care.    Goal Status:  Pt. will be independent with home exercise program in : 6 weeks    Pt will: Able to stand which will allow patient to do a standing transfer with min to mod A within 12 weeks  Pt will: Able to assist with rolling in bed within 12 weeks  Pt will: Able to perform exercises with min A to mod A which will help gain strength to help with transfers or sit up straight in wheelchair within 12 weeks  Pt will: Able to sit<>stand with mod A as strength has improved within 12 weeks      Plan / Patient Education:     Continue with initial plan of care.  Progress with home program as tolerated.    Subjective:   Pt's daughter states they have been working on the HEP.  \"Every thing is good.\"    Daughter Idalmis attended entire session  Objective:     Pt requires max assist with all transfers.    Treatment Today     TREATMENT MINUTES COMMENTS   Evaluation     Self-care/ Home management 5 Instructed pt's daughter on proper body mechanics for transfers recommended she use a gait belt and have help   Manual therapy     Neuromuscular " "Re-education     Therapeutic Activity     Therapeutic Exercises 25 Exercises:  - hip flexion B 2 x 10   -hip AB 2 x10  -hip ADD 2x 10  -knee extension 2 x 10  - manual heel stretch 30\" x 2 B  -manual hip ER and IR stretches    Gait training     Modality__________________                Total 30    Blank areas are intentional and mean the treatment did not include these items.       Lisa Aguilar,CLT  7/25/2019    "

## 2021-05-31 NOTE — PROGRESS NOTES
[facility] Discharge Summary  Patient Name: Liu Malave  Date: 9/3/2019  Referral Diagnosis: Weakness  Referring provider: Dyllan Banks MD  Visit Diagnosis:   1. History of stroke with residual deficit     2. Generalized muscle weakness     3. Late effect of cerebrovascular accident     4. Contracture of muscle of right ankle or foot         Goals:  Pt. will be independent with home exercise program in : 6 weeks    Pt will: Able to stand which will allow patient to do a standing transfer with min to mod A within 12 weeks  Pt will: Able to assist with rolling in bed within 12 weeks  Pt will: Able to perform exercises with min A to mod A which will help gain strength to help with transfers or sit up straight in wheelchair within 12 weeks  Pt will: Able to sit<>stand with mod A as strength has improved within 12 weeks      Patient was seen for 5 visits from 7/22/19 to 8/6/19 with 5 missed appointments.  Patient's daughter called to cancel his appointment again today.  he has been very ill the past month and has not been able to make it to therapy.  Discussed with daughter about discharging him now and when he is better, if they still want physical therapy they will see his MD and get another referral.      Therapy will be discontinued at this time.  The patient will need a new referral to resume.    Thank you for your referral.  Kacey Reilly  9/3/2019  10:46 AM

## 2021-05-31 NOTE — PROGRESS NOTES
"[facility] Daily Progress     Patient Name: Liu Malave  Date: 8/1/2019  Visit #: 4  PTA visit #:  3  Referral Diagnosis: History of stroke with residual deficit  Referring provider: Dyllan Banks MD  Visit Diagnosis:     ICD-10-CM    1. History of stroke with residual deficit I69.30    2. Generalized muscle weakness M62.81    3. 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   Pt needing max assist of all transfers.  Pt arrives to the clinic via a WC, semi reclined. Daughter helps with positioning and transfers.   Pt continues to need  max assist with exercises on R LE and min-mod assist on the L.    Patient is benefitting from skilled physical therapy and is making steady progress toward functional goals.  Patient is appropriate to continue with skilled physical therapy intervention, as indicated by initial plan of care.    Goal Status: On going  Pt. will be independent with home exercise program in : 6 weeks    Pt will: Able to stand which will allow patient to do a standing transfer with min to mod A within 12 weeks  Pt will: Able to assist with rolling in bed within 12 weeks  Pt will: Able to perform exercises with min A to mod A which will help gain strength to help with transfers or sit up straight in wheelchair within 12 weeks  Pt will: Able to sit<>stand with mod A as strength has improved within 12 weeks      Plan / Patient Education:     Continue with initial plan of care.  Progress with home program as tolerated.       Subjective:  Pt's daughter reports Liu was less tired after last session.  \"He's sleepy today.\"  Liu is doing his exercises 1/xdday.      Daughter Idalmis attended entire session  Objective:     Pt requires max assist with all transfers.    Treatment Today     TREATMENT MINUTES COMMENTS   Evaluation     Self-care/ Home management     Manual " "therapy     Neuromuscular Re-education     Therapeutic Activity     Therapeutic Exercises 27 Exercises:  - hip flexion B 2 x 10   -hip AB 2 x10  -hip ADD 2x 10  -knee extension 2 x 10  -bridge with assist x10  -supine hip flexion with assist x10 B  - manual heel cord stretch 30\" x 2 B  -manual HS stretch B  -manual hip ER and IR stretches    Gait training     Modality__________________                Total 27    Blank areas are intentional and mean the treatment did not include these items.       Lisa Aguilar,CLT  8/1/2019  "

## 2021-05-31 NOTE — PROGRESS NOTES
[facility] Daily Progress     Patient Name: Liu Malave  Date: 8/6/2019  Visit #: 5  PTA visit #:  4  Referral Diagnosis: History of stroke with residual deficit  Referring provider: Dyllan Banks MD  Visit Diagnosis:     ICD-10-CM    1. History of stroke with residual deficit I69.30    2. Generalized muscle weakness M62.81    3. 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  Pt arrives to the clinic via a WC, semi reclined. Daughter helps with positioning and transfers.   Pt continues to need  max assist with exercises on R LE and min-mod assist on the L.  Pt unable to independently go from sit to stand requires max assist. Min assist. He requires max assist with the R LE exercises  Pt able to perform L LE strength and ROM with     Patient is benefitting from skilled physical therapy and is making steady progress toward functional goals.  Patient is appropriate to continue with skilled physical therapy intervention, as indicated by initial plan of care.    Goal Status: On going  Pt. will be independent with home exercise program in : 6 weeks    Pt will: Able to stand which will allow patient to do a standing transfer with min to mod A within 12 weeks  Pt will: Able to assist with rolling in bed within 12 weeks  Pt will: Able to perform exercises with min A to mod A which will help gain strength to help with transfers or sit up straight in wheelchair within 12 weeks  Pt will: Able to sit<>stand with mod A as strength has improved within 12 weeks      Plan / Patient Education:     Continue with initial plan of care.  Progress with home program as tolerated.       Subjective:  Pt doing well with exercises and not as tired.   Liu is doing his exercises 1-2x/day.      Daughter Idalmis attended entire session  Objective:     Pt requires max assist with all transfers.    Treatment  "Today     TREATMENT MINUTES COMMENTS   Evaluation     Self-care/ Home management     Manual therapy     Neuromuscular Re-education     Therapeutic Activity     Therapeutic Exercises 27 Exercises:  - hip flexion B 2 x 10   -hip AB 2 x10  -hip ADD 2x 10  -knee extension 2 x 10  -bridge with assist x10  -supine hip flexion with assist x10 B  - manual heel cord stretch 30\" x 2 B  -manual HS stretch B  -manual hip ER and IR stretches   -sit to stand max assist of 2, 2 x 5 reps   Gait training     Modality__________________                Total 27    Blank areas are intentional and mean the treatment did not include these items.       Lisa Aguilar,CLT  8/6/2019  "

## 2021-06-01 NOTE — TELEPHONE ENCOUNTER
RN cannot approve Refill Request    RN can NOT refill this medication med is not covered by policy/route to provider. Last office visit: 9/21/2018 Lynda Gutierrez MD Last Physical: Visit date not found Last MTM visit: Visit date not found Last visit same specialty: 7/15/2019 Dyllan Banks MD.  Next visit within 3 mo: Visit date not found  Next physical within 3 mo: Visit date not found      Sussy Rivera, [facility] Connection Triage/Med Refill 9/30/2019    Requested Prescriptions   Pending Prescriptions Disp Refills     baclofen (LIORESAL) 10 MG tablet [Pharmacy Med Name: Baclofen Oral Tablet 10 MG] 90 tablet 2     Sig: take 1 tablet (10 mg total) by G-tube route 3 (three) times a day. Crush tablet.       There is no refill protocol information for this order

## 2021-06-01 NOTE — TELEPHONE ENCOUNTER
I have an order pended under the communications tab   What diagnosis do you want me to use?       Spoke with patients daughter who reports an order needs to be sent to [place] for new GJ tubing   Patient needs ENFit tubing size 18 [place], 45 cm

## 2021-06-01 NOTE — TELEPHONE ENCOUNTER
JENN      Called interventional radiology and spoke with Viri.  On 9/9/2019 patients G-tube was changed to a GJ tube.   conferenced in patients daughter to try and determine what supplies are needed for patient. Since G-tube was changed to a GJ tube all supplies are needed (bags, syringes, etc).     Per Viri with [facility] interventional radiology they should be able to order supplies for patients new GJ tube.           An order from [place] was faxed for Dr. Gutierrez to complete but we should be able to disregard this since IR will be sending an order for supplies to [place].

## 2021-06-01 NOTE — TELEPHONE ENCOUNTER
Interventional Radiology called and said that they are not able to order supplies for patient's GJ-Tube. This is usually done via a nutritionist or PCP. Unfortunately, patient did not make it to nutrition services appt.     [facility] is faxing an order for Dr. Gutierrez to review and sign.     Ashley Covarrubias RN

## 2021-06-01 NOTE — TELEPHONE ENCOUNTER
Orders being requested: N Fitt feeding tube .  Reason service is needed/diagnosis: Patient went to ED on 8/18/19 discharged home with GJ tube . Patient daughter is requesting ordr for N Fitt feeding tube .  When are orders needed by: AS soon As possible.  Where to send Orders: Fax: [phone]  Okay to leave detailed message?  No

## 2021-06-01 NOTE — TELEPHONE ENCOUNTER
Reason contacted:  Orders request  Information relayed:  Patients daughter seemed uncertain what supplies are needed for patients GJ tube.     She said she currently has the pump, formula, and bags but she needs some sort of machine?     She is going to call interventional radiology to try and determine what is needed.       Additional questions:  No  Further follow-up needed:  Yes  Okay to leave a detailed message:  Yes

## 2021-06-01 NOTE — TELEPHONE ENCOUNTER
Referral Request  Type of referral: Physical Therapy   Who s requesting: Patient Daughter   Why the request: patient is discharged form hospital post CVA  Have you been seen for this request: N/A  Does patient have a preference on a group/provider? [facility]   Okay to leave a detailed message?  No

## 2021-06-01 NOTE — PROGRESS NOTES
Pt arrived via own wheelchair with daughter for gj change. Daughter denies change in status or meds. Refuses  due to pt status.

## 2021-06-02 NOTE — TELEPHONE ENCOUNTER
Refill Approved    Rx renewed per Medication Renewal Policy. Medication was last renewed on 18.18    María Elena Byrd, [facility] Triage/Med Refill 2019     Requested Prescriptions   Pending Prescriptions Disp Refills     simvastatin (ZOCOR) 20 MG tablet [Pharmacy Med Name: Simvastatin Oral Tablet 20 MG] 30 tablet 2     Si tablet (20 mg total) by G-tube route at bedtime.       Statins Refill Protocol (Hmg CoA Reductase Inhibitors) Passed - 10/31/2019 11:44 AM        Passed - PCP or prescribing provider visit in past 12 months      Last office visit with prescriber/PCP: 2018 Lynda Gutierrez MD OR same dept: 7/15/2019 Dyllan Banks MD OR same specialty: 7/15/2019 Dyllan Banks MD  Last physical: Visit date not found Last MTM visit: Visit date not found   Next visit within 3 mo: Visit date not found  Next physical within 3 mo: Visit date not found  Prescriber OR PCP: Lynda Gutierrez MD  Last diagnosis associated with med order: There are no diagnoses linked to this encounter.  If protocol passes may refill for 12 months if within 3 months of last provider visit (or a total of 15 months).             QUEtiapine (SEROQUEL) 25 MG tablet [Pharmacy Med Name: QUEtiapine Fumarate Oral Tablet 25 MG] 60 tablet 2     Sig: take 1 tablet (25 mg total) by G-tube route 2 (two) times a day.       There is no refill protocol information for this order        esomeprazole (NEXIUM) 20 MG capsule [Pharmacy Med Name: Esomeprazole Magnesium Oral Capsule Delayed Release 20 MG] 60 capsule 2     Sig: take 1 capsule by G-tube twice daily.       GI Medications Refill Protocol Passed - 10/31/2019 11:44 AM        Passed - PCP or prescribing provider visit in last 12 or next 3 months.     Last office visit with prescriber/PCP: 2018 Lynda Gutierrez MD OR same dept: 7/15/2019 Dyllan Banks MD OR same specialty: 7/15/2019 Dyllan Banks MD  Last physical: Visit date not found Last MTM visit: Visit date  not found   Next visit within 3 mo: Visit date not found  Next physical within 3 mo: Visit date not found  Prescriber OR PCP: Lynda Gutierrez MD  Last diagnosis associated with med order: There are no diagnoses linked to this encounter.  If protocol passes may refill for 12 months if within 3 months of last provider visit (or a total of 15 months).

## 2021-06-02 NOTE — TELEPHONE ENCOUNTER
Dr. Liang Thompson,   see below and call . Thanks. Dania Ruiz RN cannot approve Refill Request    RN can NOT refill this medication med is not covered by policy/route to provider.       María Elena Byrd, [facility] Triage/Med Refill 2019    Requested Prescriptions   Pending Prescriptions Disp Refills     QUEtiapine (SEROQUEL) 25 MG tablet [Pharmacy Med Name: QUEtiapine Fumarate Oral Tablet 25 MG] 60 tablet 2     Sig: take 1 tablet (25 mg total) by G-tube route 2 (two) times a day.       There is no refill protocol information for this order      Signed Prescriptions Disp Refills    simvastatin (ZOCOR) 20 MG tablet 90 tablet 2     Si tablet (20 mg total) by G-tube route at bedtime.       Statins Refill Protocol (Hmg CoA Reductase Inhibitors) Passed - 10/31/2019 11:44 AM        Passed - PCP or prescribing provider visit in past 12 months      Last office visit with prescriber/PCP: 2018 Lynda Gutierrez MD OR same dept: 7/15/2019 Dyllan Banks MD OR same specialty: 7/15/2019 Dyllan Banks MD  Last physical: Visit date not found Last MTM visit: Visit date not found   Next visit within 3 mo: Visit date not found  Next physical within 3 mo: Visit date not found  Prescriber OR PCP: Lynda Gutierrez MD  Last diagnosis associated with med order: 1. GERD without esophagitis  - esomeprazole (NEXIUM) 20 MG capsule; take 1 capsule by G-tube twice daily.  Dispense: 180 capsule; Refill: 2    2. Dyslipidemia  - simvastatin (ZOCOR) 20 MG tablet; 1 tablet (20 mg total) by G-tube route at bedtime.  Dispense: 90 tablet; Refill: 2    If protocol passes may refill for 12 months if within 3 months of last provider visit (or a total of 15 months).            esomeprazole (NEXIUM) 20 MG capsule 180 capsule 2     Sig: take 1 capsule by G-tube twice daily.       GI Medications Refill Protocol Passed - 10/31/2019 11:44 AM        Passed - PCP or prescribing provider visit in last 12 or next 3 months.     Last office visit with prescriber/PCP: 2018 Lynda Gutierrez MD OR same dept:  7/15/2019 Dyllan Banks MD OR same specialty: 7/15/2019 Dyllan Banks MD  Last physical: Visit date not found Last MTM visit: Visit date not found   Next visit within 3 mo: Visit date not found  Next physical within 3 mo: Visit date not found  Prescriber OR PCP: Lynda Gutierrez MD  Last diagnosis associated with med order: 1. GERD without esophagitis  - esomeprazole (NEXIUM) 20 MG capsule; take 1 capsule by G-tube twice daily.  Dispense: 180 capsule; Refill: 2    2. Dyslipidemia  - simvastatin (ZOCOR) 20 MG tablet; 1 tablet (20 mg total) by G-tube route at bedtime.  Dispense: 90 tablet; Refill: 2    If protocol passes may refill for 12 months if within 3 months of last provider visit (or a total of 15 months).

## 2021-06-04 VITALS
OXYGEN SATURATION: 92 % | TEMPERATURE: 97.6 F | DIASTOLIC BLOOD PRESSURE: 70 MMHG | HEART RATE: 87 BPM | SYSTOLIC BLOOD PRESSURE: 112 MMHG

## 2021-06-04 NOTE — TELEPHONE ENCOUNTER
RN cannot approve Refill Request    RN can NOT refill this medication med is not covered by policy/route to provider. Last office visit: 9/21/2018 Lynda Gutierrez MD Last Physical: Visit date not found Last MTM visit: Visit date not found Last visit same specialty: Visit date not found.  Next visit within 3 mo: Visit date not found  Next physical within 3 mo: Visit date not found      Aruna Jackson, [facility] Connection Triage/Med Refill 12/6/2019    Requested Prescriptions   Pending Prescriptions Disp Refills     ipratropium-albuterol (DUO-NEB) 0.5-2.5 mg/3 mL nebulizer [Pharmacy Med Name: Ipratropium-Albuterol Inhalation Solution 0.5-2.5 (3) MG/3ML] 90 mL 2     Sig: Take 3 mL by nebulization every 6 (six) hours as needed (wheezing/cough).       There is no refill protocol information for this order

## 2021-06-05 NOTE — TELEPHONE ENCOUNTER
Refill Approved    Rx renewed per Medication Renewal Policy. Medication was last renewed on 7/1/19.    María Elena Byrd, [facility] Triage/Med Refill 1/20/2020     Requested Prescriptions   Pending Prescriptions Disp Refills     baclofen (LIORESAL) 10 MG tablet [Pharmacy Med Name: Baclofen Oral Tablet 10 MG] 90 tablet 1     Sig: take 1 tablet (10 mg total) by G-tube route 3 (three) times a day. Crush tablet.       There is no refill protocol information for this order        bisacodyL (DULCOLAX) 10 mg suppository [Pharmacy Med Name: Bisacodyl Rectal Suppository 10 MG] 30 suppository 2     Sig: INSERT 1 SUPPOSTIORY INTO THE RECTUM DAILY AS NEEDED FOR CONSTIPATION.       GI Medications Refill Protocol Passed - 1/19/2020 10:32 AM        Passed - PCP or prescribing provider visit in last 12 or next 3 months.     Last office visit with prescriber/PCP: 9/21/2018 Lynda Gutierrez MD OR same dept: 7/15/2019 Dyllan Banks MD OR same specialty: 7/15/2019 Dyllan Banks MD  Last physical: Visit date not found Last MTM visit: Visit date not found   Next visit within 3 mo: Visit date not found  Next physical within 3 mo: Visit date not found  Prescriber OR PCP: Lynda Gutierrez MD  Last diagnosis associated with med order: 1. Muscle spasm  - baclofen (LIORESAL) 10 MG tablet [Pharmacy Med Name: Baclofen Oral Tablet 10 MG]; take 1 tablet (10 mg total) by G-tube route 3 (three) times a day. Crush tablet.  Dispense: 90 tablet; Refill: 1    2. Convulsions, unspecified convulsion type (H)  - levETIRAcetam (KEPPRA) 100 mg/mL solution [Pharmacy Med Name: levETIRAcetam Oral Solution 100 MG/ML]; TAKE 7.5 MILLILITERS BY G-TUBE 2 TIMES DAILY.  Dispense: 450 mL; Refill: 1    If protocol passes may refill for 12 months if within 3 months of last provider visit (or a total of 15 months).             levETIRAcetam (KEPPRA) 100 mg/mL solution [Pharmacy Med Name: levETIRAcetam Oral Solution 100 MG/ML] 450 mL 1     Sig: TAKE 7.5 MILLILITERS  BY G-TUBE 2 TIMES DAILY.       Gabapentin/Levetiracetam/Tiagabine Refill Protocol  Passed - 1/19/2020 10:32 AM        Passed - PCP or prescribing provider visit in past 12 months or next 3 months     Last office visit with prescriber/PCP: 9/21/2018 Lynda Gutierrez MD OR same dept: 7/15/2019 Dyllan Banks MD OR same specialty: 7/15/2019 Dyllan Banks MD  Last physical: Visit date not found Last MTM visit: Visit date not found   Next visit within 3 mo: Visit date not found  Next physical within 3 mo: Visit date not found  Prescriber OR PCP: Lnyda Gutierrez MD  Last diagnosis associated with med order: 1. Muscle spasm  - baclofen (LIORESAL) 10 MG tablet [Pharmacy Med Name: Baclofen Oral Tablet 10 MG]; take 1 tablet (10 mg total) by G-tube route 3 (three) times a day. Crush tablet.  Dispense: 90 tablet; Refill: 1    2. Convulsions, unspecified convulsion type (H)  - levETIRAcetam (KEPPRA) 100 mg/mL solution [Pharmacy Med Name: levETIRAcetam Oral Solution 100 MG/ML]; TAKE 7.5 MILLILITERS BY G-TUBE 2 TIMES DAILY.  Dispense: 450 mL; Refill: 1    If protocol passes may refill for 12 months if within 3 months of last provider visit (or a total of 15 months).

## 2021-06-05 NOTE — TELEPHONE ENCOUNTER
RN cannot approve Refill Request    RN can NOT refill this medication med is not covered by policy/route to provider and historical medication requested.       María Elena Byrd, [facility] Triage/Med Refill 1/20/2020    Requested Prescriptions   Pending Prescriptions Disp Refills     baclofen (LIORESAL) 10 MG tablet [Pharmacy Med Name: Baclofen Oral Tablet 10 MG] 90 tablet 1     Sig: take 1 tablet (10 mg total) by G-tube route 3 (three) times a day. Crush tablet.       There is no refill protocol information for this order        bisacodyL (DULCOLAX) 10 mg suppository [Pharmacy Med Name: Bisacodyl Rectal Suppository 10 MG] 30 suppository 2     Sig: INSERT 1 SUPPOSTIORY INTO THE RECTUM DAILY AS NEEDED FOR CONSTIPATION.       GI Medications Refill Protocol Passed - 1/19/2020 10:32 AM        Passed - PCP or prescribing provider visit in last 12 or next 3 months.     Last office visit with prescriber/PCP: 9/21/2018 Lynda Gutierrez MD OR same dept: 7/15/2019 Dyllan Banks MD OR same specialty: 7/15/2019 Dyllan Banks MD  Last physical: Visit date not found Last MTM visit: Visit date not found   Next visit within 3 mo: Visit date not found  Next physical within 3 mo: Visit date not found  Prescriber OR PCP: Lynda Gutierrez MD  Last diagnosis associated with med order: 1. Muscle spasm  - baclofen (LIORESAL) 10 MG tablet [Pharmacy Med Name: Baclofen Oral Tablet 10 MG]; take 1 tablet (10 mg total) by G-tube route 3 (three) times a day. Crush tablet.  Dispense: 90 tablet; Refill: 1    2. Convulsions, unspecified convulsion type (H)  - levETIRAcetam (KEPPRA) 100 mg/mL solution; TAKE 7.5 MILLILITERS BY G-TUBE 2 TIMES DAILY.  Dispense: 450 mL; Refill: 5    If protocol passes may refill for 12 months if within 3 months of last provider visit (or a total of 15 months).           Signed Prescriptions Disp Refills    levETIRAcetam (KEPPRA) 100 mg/mL solution 450 mL 5     Sig: TAKE 7.5 MILLILITERS BY G-TUBE 2 TIMES DAILY.        Gabapentin/Levetiracetam/Tiagabine Refill Protocol  Passed - 1/19/2020 10:32 AM        Passed - PCP or prescribing provider visit in past 12 months or next 3 months     Last office visit with prescriber/PCP: 9/21/2018 Lynda Gutierrez MD OR same dept: 7/15/2019 Dyllan Banks MD OR same specialty: 7/15/2019 Dlylan Banks MD  Last physical: Visit date not found Last MTM visit: Visit date not found   Next visit within 3 mo: Visit date not found  Next physical within 3 mo: Visit date not found  Prescriber OR PCP: Lynda Gutierrez MD  Last diagnosis associated with med order: 1. Muscle spasm  - baclofen (LIORESAL) 10 MG tablet [Pharmacy Med Name: Baclofen Oral Tablet 10 MG]; take 1 tablet (10 mg total) by G-tube route 3 (three) times a day. Crush tablet.  Dispense: 90 tablet; Refill: 1    2. Convulsions, unspecified convulsion type (H)  - levETIRAcetam (KEPPRA) 100 mg/mL solution; TAKE 7.5 MILLILITERS BY G-TUBE 2 TIMES DAILY.  Dispense: 450 mL; Refill: 5    If protocol passes may refill for 12 months if within 3 months of last provider visit (or a total of 15 months).

## 2021-06-05 NOTE — TELEPHONE ENCOUNTER
Prior Authorization Request  Who s requesting:  Pharmacy  Pharmacy Name and Location: [facility] #[id number]  Medication Name: esomeprazole (NEXIUM) 20 MG capsule  Insurance Plan: [place] [employer]  Insurance Member ID Number:  [id number]  CoverMyMeds Key: [id number]  Informed patient that prior authorizations can take up to 10 business days for response:   NA  Okay to leave a detailed message: No

## 2021-06-05 NOTE — TELEPHONE ENCOUNTER
Central PA team  [phone]  Pool: HE PA MED ([id number])          PA has been initiated.       PA form completed and faxed insurance via Cover My Meds     Key:  [id number]     Medication:  ESOMEPRAZOLE    Insurance:  [employer]        Response will be received via fax and may take up to 5-10 business days depending on plan

## 2021-06-06 NOTE — PROGRESS NOTES
Hospital Follow-up Visit:    Assessment/Plan:     Liu was seen today for hospital visit follow up.    Acute UTI  -     Urinalysis-UC if Indicated  -     HM2(CBC w/o Differential)  -     Basic Metabolic Panel  -Urine culture was negative but symptoms improved with antibiotic treatment.  He will complete Augmentin today.  Will recheck hemogram to ensure white blood cell count has returned to normal as well as check UA and basic metabolic panel    Need for influenza vaccination  -     Influenza, Recombinant, Inj, Quadrivalent, PF, 18+YRS  -Counseled on vaccine and side effects    Mouth sores  -     triamcinolone (KENALOG) 0.1 % paste; Take 1 application by mouth 4 (four) times a day as needed (for mouth sores).  -Refill provided on triamcinolone paste    Anxiety  -     QUEtiapine (SEROQUEL) 25 MG tablet; Take 1 tablet in the morning and 2 tablets at bedtime. Take by G-tube  -We will increase Seroquel to 50 mg at bedtime and continue 25 mg in the morning to help promote better sleep    Muscle spasm  -     baclofen (LIORESAL) 10 MG tablet; take 1 tablet (10 mg total) by G-tube route 3 (three) times a day. Crush tablet.    Mixed hyperlipidemia  -     Hepatic Profile  -     Lipid Cascade RANDOM  -Continue simvastatin    Gastrojejunostomy tube status (H)  Stable.  Recently had GJ tube switched out      Hemiplegia of nondominant side, late effect of cerebrovascular disease (H)    Seizure disorder (H)  Continue Keppra    Hepatitis C virus carrier state (H)  Stable.  Not addressed at this visit    Pneumonia of both lower lobes due to infectious organism (H)  He will complete antibiotics today.  Clinically doing much better.  No significant cough.  No dyspnea.  Lungs clear on exam.         Subjective:     Liu Malave is a 54 y.o. male who presents for a hospital discharge follow up.      Hospital/Nursing Home/IP Rehab Facility: [facility]  Date of Admission: 2/21/2020  Date of Discharge:2/24/2020  Reason(s) for  Admission:acute UTI and pneumo            Do you have any problems taking your medication regularly?  None       Have you had any changes in your medication since discharge? None       Have you had any difficulty following your discharge or treatment plan?  No    Summary of hospitalization:  Hospital discharge summary reviewed  Diagnostic Tests/Treatments reviewed.  Follow up needed: None  Other Healthcare Providers Involved in Patient's Care: Patient Care Team:  Lynda Gutierrez MD as PCP - General (Family Medicine)  Madhuri Whitehead PharmD as Pharmacist (Pharmacist)  Dyllan Banks MD as Assigned PCP      Update since discharge: {improved   Information from family, SNF, care coordination: He is seen with the assistance of a [place]  and is accompanied by his daughter.  He was recently hospitalized for fever and sepsis.  Found to have acute UTI as well as bilateral pneumonia of the lower lobes.  Patient has history of hemorrhagic stroke and hemiplegia.  He is confined to a wheelchair and feeding tube dependent.  He has history of pneumonia as well as recurrent UTIs.  Presented with fever of 101  F, hypotension and sepsis.  CT scan of chest suggested right lower lobe pneumonia.  He was started on gentamicin, vancomycin and Zosyn.  Also had elevation of white count.  With antibiotic treatment, he had improvement in white blood cell count and returned to his baseline.  He was transitioned to oral Augmentin.  He was discharged to home.  Daughter reports that he will complete his antibiotics today.  He has been having some loose stools but otherwise no significant side effects.  He has not had recurrence of fever.  No increased coughing or dyspnea.  He continues to remain at his baseline.  He did have his GJ tube switched out last week.  They would like to recheck labs today as well as repeat a UA.  We discussed his medications.  Daughter reports that he takes Seroquel at night and sleeps for about 4  hours but then wakes up.  She wonders about alternative options to help improve his sleep.  They have no additional concerns or questions.  Review of systems is otherwise negative.    Post Discharge Medication Reconciliation: discharge medications reconciled and changed, per note/orders (see AVS)  Plan of care communicated with: patient and family    Objective:     Vitals:    03/02/20 0909   BP: 112/70   Pulse: 87   Temp: 97.6  F (36.4  C)   SpO2: 92%         Physical Exam:  Physical Examination: General appearance - chronically ill appearing  Mental status - alert, in wheelchair, no distress, aphasic  Eyes - pupils equal and reactive, extraocular eye movements intact  Chest - clear to auscultation, no wheezes, rales or rhonchi, symmetric air entry  Heart - normal rate, regular rhythm, normal S1, S2, no murmurs, rubs, clicks or gallops  Abdomen - soft, nontender, nondistended, no masses or organomegaly, GJ tube in place  Extremities - no pedal edema noted, contractures present in arms and legs        Coding guidelines for this visit:  Type of Medical   Decision Making Face-to-Face Visit       within 7 Days of discharge Face-to-Face Visit        within 14 days of discharge   Moderate Complexity [id number]   High Complexity [id number]       Electronically signed by Lynda Gutierrez MD 03/03/20 9:14 AM

## 2021-06-06 NOTE — TELEPHONE ENCOUNTER
Please call patient, I think she needs a home care referral, probably needs a face to face and has to be homebound, thank you for your help

## 2021-06-06 NOTE — TELEPHONE ENCOUNTER
Prior Authorization Request  Who s requesting:  Pharmacy  Pharmacy Name and Location: [facility] #[id number]  Medication Name: esomeprazole (NEXIUM) 20 MG capsule   Insurance Plan: [employer]   Insurance Member ID Number:  [id number]  CoverMyMeds Key: [id number]  Informed patient that prior authorizations can take up to 10 business days for response:   NA  Okay to leave a detailed message: No

## 2021-06-06 NOTE — TELEPHONE ENCOUNTER
Symptom  Describe your symptoms: Patient is non verbal.  Family has noticed the patient's urine to be cloudy in appreance.  Also the patient seems more tired  .  No Fever T: 98.0 today.  Any pain: no  New/Ongoing: New  How long have you been having symptoms: 3    day(s)  Have you been seen for this:  Yes 2/21/20  Appointment offered?: C  Triage offered?: No  Home remedies tried: Fluids  Requested Pharmacy: [facility] #[id number]  Okay to leave a detailed message? Yes   Pharmacy [facility]

## 2021-06-06 NOTE — TELEPHONE ENCOUNTER
Reason contacted:  Results   Information relayed:  Below message.     Can you resend the Macrobid to the pharmacy to be used via GJ tube?   patient cannot take anything by mouth.   Additional questions:  No  Further follow-up needed:  Yes  Okay to leave a detailed message:  Yes

## 2021-06-06 NOTE — TELEPHONE ENCOUNTER
Patient Returning Call  Reason for call:  Idalmis daughter called back.  Information relayed to patient:  Informed of Dr Gutierrez's message listed below.  Was told to daughter as Adolfo did not understand the message.  Daughter would like to know if a nurse can come out to draw the potassium lab as patient is very hard to transfer to appointments.  Please advise.  Patient has additional questions:  Yes  If YES, what are your questions/concerns:  As above.  Okay to leave a detailed message?: Yes

## 2021-06-06 NOTE — TELEPHONE ENCOUNTER
----- Message from Laura Ojeda CNP sent at 3/18/2020  3:17 PM CDT -----  Your Urinalysis is positive for urinary tract infection. I will send over an antibiotic to your pharmacy called Macrobid. Please take this twice daily for 3 days. I will reach out to you if we need to change the medication based on urine culture when it returns in a few days.

## 2021-06-06 NOTE — TELEPHONE ENCOUNTER
Central PA team  [phone]  Pool: HE PA MED ([id number])          PA has been initiated.       PA form completed and faxed insurance via Cover My Meds     Key:  NAVYA ESPINAL (Key: [id number])      Medication:  esomeprazole (NEXIUM) 20 MG capsule     Insurance:  [facility]        Response will be received via fax and may take up to 5-10 business days depending on plan

## 2021-06-06 NOTE — TELEPHONE ENCOUNTER
----- Message from Lynda Gutierrez MD sent at 3/4/2020  7:49 AM CST -----  Please let patient's family know that his cholesterol levels are high.  I recommend that he increase his dose of simvastatin to 40 mg daily.  I will send a new prescription to the pharmacy.  His potassium level was also a little bit high at 5.6.  I would like to recheck this as a lab visit in 1 week.  Order has been placed.

## 2021-06-06 NOTE — TELEPHONE ENCOUNTER
Prior Authorization Request  Who s requesting:  Pharmacy  Pharmacy Name and Location: [facility] #[id number]  Medication Name: triamcinolone (KENALOG) 0.1 % paste   Insurance Plan: [employer]   Insurance Member ID Number:  [id number]  CoverMyMeds Key: N/A  Informed patient that prior authorizations can take up to 10 business days for response:   NA  Okay to leave a detailed message: No

## 2021-06-06 NOTE — TELEPHONE ENCOUNTER
Discussed results with brother Adolfo, he will relay  message to Liu. Will make sure he picks  up new RX and schedules lab only visit in 1 week to re check potassium .

## 2021-06-06 NOTE — TELEPHONE ENCOUNTER
Patients daughter will come into the clinic to  a specimen cup.    Please place order for UA/UC  She passed the screening questions.

## 2021-06-06 NOTE — TELEPHONE ENCOUNTER
Caller is daughter (Idalmis) for pt who is nonverbal and bedridden  Concerned pt is having flu like symptoms: fever and cough.  Unsure if muscle aches, fatigue, or headache.   Pt had a fever Temp: 101 a couple hours ago. Daughter gave him tylenol and some ice packs to forehead.   Current Temp: 99 now axillary   Daughter stated she gave pt theraflu this morning.   Cough, productive  Daughter states they are suctioning pt every time he coughs    Reason for Disposition    [1] Fever > 100.0 F (37.8 C) AND [2] bedridden (e.g., nursing home patient, CVA, chronic illness, recovering from surgery)    Protocols used: INFLUENZA - SEASONAL-A-AH    Care Disposition: See Physician within 4 hours. Daughter verbalizes understanding. Daughter states they will continue to monitor his temp and if worsen, they will take him to Urgent Care tonight.     Mayte Trujillo RN Triage Nurse Advisor 8:33 PM

## 2021-06-06 NOTE — TELEPHONE ENCOUNTER
Central PA team  [phone]  Pool: HE PA MED ([id number])          PA has been initiated.       PA form completed and faxed insurance via Cover My Meds     Key:  [id number] - PA Case ID: [id number]     Medication:  Triamcinolone Acetonide 0.1% paste    Insurance:  [employer]         Response will be received via fax and may take up to 5-10 business days depending on plan

## 2021-06-07 ENCOUNTER — RECORDS - HEALTHEAST (OUTPATIENT)
Dept: ADMINISTRATIVE | Facility: OTHER | Age: 56
End: 2021-06-07

## 2021-06-07 NOTE — PROGRESS NOTES
"Liu Malave is a 54 y.o. male who is being evaluated via a billable video visit.      The patient has been notified of following:     \"This video visit will be conducted via a call between you and your physician/provider. We have found that certain health care needs can be provided without the need for an in-person physical exam.  This service lets us provide the care you need with a video conversation.  If a prescription is necessary we can send it directly to your pharmacy.  If lab work is needed we can place an order for that and you can then stop by our lab to have the test done at a later time.    Video visits are billed at different rates depending on your insurance coverage. Please reach out to your insurance provider with any questions.    If during the course of the call the physician/provider feels a video visit is not appropriate, you will not be charged for this service.\"    Patient has given verbal consent to a Video visit? Yes    Patient would like to receive their AVS by AVS Preference: Dominique.    Patient would like the video invitation sent by: Text to cell phone: [phone]    Will anyone else be joining your video visit? No        Video Start Time: 8:10 AM    Additional provider notes:   Subjective: Liu is being seen today via video and phone visit. His daughter Idalmis is acting as . The patient had fever up to 101 about two days ago. He was complaining of abdominal at that time. He is now also having pain with urination. He did have a urinary symptoms about a month ago as well that responded well to antibiotics. He is still having some abdominal pain but minimal. No N/V. Appetite has been normal. Urine is dark, no blood that they can see.    They are also requesting refill of triamcinolone cream. The patient's daughter reports he has recurrent sores around his mouth due to difficulty with opening his mouth related to his previous stroke. Triamcinolone past has been helpful in the past " and     Objective: Patient is resting comfortably in bed in no apparent distress. Unable to appreciate mouth sores well via video. Due to technical difficulties, changed to phone visit partway through the call.    Assessment/Plan:  Problem List Items Addressed This Visit     None      Visit Diagnoses     Urinary tract infection without hematuria, site unspecified    -  Given ongoing pandemic, I am not requesting a urine sample at this time but will treat empirically with Augmentin given previous resistance pattern. If symptoms not improving, then they should bring in a urine sample for testing.    Relevant Medications    amoxicillin-clavulanate (AUGMENTIN) 875-125 mg per tablet    Other Relevant Orders    Urinalysis-UC if Indicated    Mouth sores   - clear etiology not apparent but likely mechanical due to previous stroke. Triamcinolone has been effective previously so that was prescribed but I did talk with his daughter about potential concern about causing thrush. I am recommending they try Orajel first and prescription was sent.     Relevant Medications    triamcinolone (KENALOG) 0.1 % paste    benzocaine (ORAJEL) 10 % mucosal gel         Patient Instructions   1. Augmentin 1 tab twice daily for 5 days.  2. Orajel and/or triamcinolone cream to mouth sores as needed.  3. If urine symptoms not improving in 5 days, please make lab only appointment. Idalmis (patient's daughter) can then come in and get a urine cup to collect a sample and return sample to the clinic.       Video-Visit Details    Type of service:  Video Visit    Video End Time (time video stopped): 8:25 AM  Originating Location (pt. Location): Home    Distant Location (provider location):  [facility] MEDICINE/OB     Mode of Communication:  Trang Villeda MD

## 2021-06-07 NOTE — PATIENT INSTRUCTIONS - HE
1. Augmentin 1 tab twice daily for 5 days.  2. Orajel and/or triamcinolone cream to mouth sores as needed.  3. If urine symptoms not improving in 5 days, please make lab only appointment. Idalmis (patient's daughter) can then come in and get a urine cup to collect a sample and return sample to the clinic.

## 2021-06-07 NOTE — TELEPHONE ENCOUNTER
Orders being requested:    History of recurrent UTI's    UA/UC    Reason service is needed/diagnosis: UTI  Odor  Cloudy UA  Dark    When are orders needed by: ASAP    Where to send Orders: [facility]     Okay to leave detailed message?  Yes    Please place order and notify to schedule.

## 2021-06-07 NOTE — PROCEDURES
INTERVENTIONAL RADIOLOGY    Procedure:  GJ change    Meds:  None    MD:  Candi    Complications:  None    Contrast:  10 cc    FT:  0.4 min    Findings:    Broken balloon. New 18 Fr 45 cm GJ tube placed, tip in proximal jejunum.    Plan:  OK to use GJ tube

## 2021-06-07 NOTE — TELEPHONE ENCOUNTER
Who is calling:  Pharmacy    Reason for Call:  Pharmacy states patient thought he was to be treated for 7 days, not 3 days.    Date of last appointment with primary care: 07/15/2019    Okay to leave a detailed message: Yes    Please call pharmacy to clarify medication instructions.

## 2021-06-07 NOTE — TELEPHONE ENCOUNTER
Reason contacted:  Appointment   Information relayed:  Helped arrange a video visit for tomorrow   Additional questions:  No  Further follow-up needed:  No  Okay to leave a detailed message:  No

## 2021-06-07 NOTE — TELEPHONE ENCOUNTER
Prescription is twice daily for three days.     Reason contacted:  Medication question  Information relayed:  Called and notified the pharmacist nitrofurantoin suspension is twice daily for three days.   Additional questions:  No  Further follow-up needed:  No  Okay to leave a detailed message:  No

## 2021-06-08 NOTE — TELEPHONE ENCOUNTER
Spoke with patient daughter who reports patient has been taking 25 mg twice daily    She states there seems to be confusing though in regards to his dose of metoprolol and whether he should be on 12.5 mg or 25 mg twice daily. The patient has been taking 25 mg twice daily though.

## 2021-06-08 NOTE — TELEPHONE ENCOUNTER
Requesting ongoing SN orders, 2wk/3 for GI/ assess and education, med education, and education on infection prevention.

## 2021-06-08 NOTE — TELEPHONE ENCOUNTER
After speaking to the patient today, they are requesting a home care nursing visit for . The orders  . Please respond to this message to confirm an OK for a skilled nursing visit on  for patient.     Darshan Clifford at [facility].

## 2021-06-08 NOTE — PROGRESS NOTES
Assessment /Plan:    Came with daughter and interpreters for seizure, history of stroke, and spasticity.     No falls.     Psych: no depression. Though some times says he wants to die and gets emotional.     Sleep:  Poor. Unclear cause.     Advanced directives: discussed about care plans and code status.     ADL: 24/7 total care. But able to the the right side UE to certain deress.     Right BG hemorrhagic stroke 6/2016.    H/o cerebral aneurysm rupture and clipping about 2009 and residual left sided weakness ( baseline requiring 24/7 care),    Severe spasticity and difficulty for care. Severe drooling, Tried Botox injection in 10/2016, benefits not meeting the expection for neurological function improvement. Not further injections given. The patient is still interested in getting injections. The family will discuss about if he needs it then make arrangement.     Seizure: will continue current medication. No side effects.     Tremor in legs: periodic. No precipitating factors. No loss of consciousness.             IMPRESSION:  HCT 6/28  1. Evolving intraparenchymal hematoma centered in the left basal ganglia with decrease of hyperattenuating blood products and decreased adjacent edema. Less pronounced mass effect on the left lateral ventricle with 10 mm of left right midline shift,  previously 13 mm.  2. No new intracranial hemorrhage.  EEG 6/29 and 7/20 no seizures.     History review:  Mr Liu Malave, with a history cerebral aneurysm rupture and clipping about in about 2009 and residual left sided weakness ( baseline requiring 24/7 care), anxiety disorder, essential hypertension, hyperlipidemia, GERD, hepatitis C, presented to [facility] on June 5, 2016 for altered mental status. CT scan head showed large leftt basal ganglia hemorrhage and he was transferred to [facility] for further management.  He had mass effect due to large right basal ganglia hemorrhage with vasogenic edema. He was treated with  hypertonic saline for vasogenic edema. He had wtnessed questionable seizure started on seizure medication. Repeat EEG was not suggestive of seizures. He underwent tracheostomy tube and PEG placement on June 14, 2016. He was transferred to [place] on 6/20. Since then he has been weaned off ventilator, but continue to have significant spasticity in jaw and 4 limbs, not able to swallow or speech. Has gained better understanding and able to follow directions and expressing self with body language.       Review of system otherwise unremarkable.        Today's instruction and plans:   Start trazodone 150 mg qhs  Use seroqual (quetiapine) 25 mg/pill, 0.5 tab q8 hour prn for agitation and ok to use a full pill hs for sleep  Come back in 6 months.   After family and patient discussed if still want to Botox injection, ok to add back in at earlier time for injections.        Patient Active Problem List   Diagnosis     Late CVD Effects: Hemiplegia Affecting Nondominant Side Left     Hepatitis     Hepatic Disorder     Generalized Anxiety Disorder     Depression     Coughing Up Blood (Hemoptysis)     Dysphagia     Oral mucosal lesion     Hemorrhagic stroke     Acute encephalopathy     Brain compression     Vasogenic brain edema     Convulsions, unspecified convulsion type     History of CVA (cerebrovascular accident)     Acute respiratory failure with hypoxia     Essential hypertension     Dyslipidemia     GERD without esophagitis     Hepatitis C virus carrier state     Metabolic acidosis     Aspiration pneumonia     Meconium aspiration pneumonia     Other specified fever     Fever of unknown origin     Chronic respiratory failure     Respiratory failure with hypoxia     Basal ganglia hemorrhage     Tracheostomy present     Infection due to ESBL-producing Escherichia coli     Persistent fever     Hemoptysis     Insomnia     Attention to tracheostomy     UTI (urinary tract infection)     Urinary retention     Abnormal urine odor      Feeding tube blocked     Fever             Current Outpatient Prescriptions   Medication Sig Dispense Refill     amino acids-protein hydrolys (PROSOURCE NO CARB) 15-60 gram-kcal/30 mL LiPk 1 packet by Enteral Tube route daily.  0     amLODIPine (NORVASC) 5 MG tablet TAKE 1 TABLET (5 MG TOTAL) BY MOUTH DAILY 30 tablet 10     amoxicillin (AMOXIL) 500 MG capsule   0     chlorhexidine (PERIDEX) 0.12 % solution Apply 15 mL to the mouth or throat 2 (two) times a day. Or Swish and spit 900 mL 1     doxazosin (CARDURA) 2 MG tablet 1 tablet (2 mg total) by G-tube route 2 (two) times a day. 60 tablet 11     HYDROcodone-acetaminophen (HYCET) 7.5-325 mg/15 mL Take 15 mL by mouth every 6 (six) hours as needed for pain. 150 mL 0     levETIRAcetam (KEPPRA) 100 mg/mL solution 7.5 mL (750 mg total) by G-tube route 2 (two) times a day. 473 mL 12     lisinopril (PRINIVIL,ZESTRIL) 10 MG tablet 1 tablet (10 mg total) by G-tube route daily. 30 tablet 11     omeprazole (PRILOSEC) 20 MG capsule Take 1 capsule by mouth daily.  3     simvastatin (ZOCOR) 20 MG tablet 1 tablet (20 mg total) by G-tube route bedtime. 30 tablet 11     sodium chloride 1 gram tablet 2 tablets (2 g total) by G-tube route 2 (two) times a day. 120 tablet 11     traMADol (ULTRAM) 50 mg tablet Take 1 tablet (50 mg total) by mouth every 6 (six) hours as needed for pain. 30 tablet 1     triamcinolone (KENALOG) 0.1 % paste Apply twice daily 5 g 12     Current Facility-Administered Medications   Medication Dose Route Frequency Provider Last Rate Last Dose     botulinum toxin Type A (Cosm) injection 200 Units (BOTOX)  200 Units Intramuscular Once Tami Price MD           Review of patient's allergies indicates no known allergies.    Past Medical History   Diagnosis Date     Hypertension      Stroke        Social History     Social History     Marital status:      Spouse name: N/A     Number of children: N/A     Years of education: N/A     Occupational History      Not on file.     Social History Main Topics     Smoking status: Former Smoker     Packs/day: 1.00     Years: 30.00     Quit date: 6/5/2005     Smokeless tobacco: Never Used     Alcohol use No     Drug use: No     Sexual activity: Not on file     Other Topics Concern     Not on file     Social History Narrative       Family History   Problem Relation Age of Onset     Hypertension Mother      Cancer Father      Kidney disease Brother        Objective:  Vitals:    02/01/17 0842   BP: 137/80   Pulse: 97       There were no vitals filed for this visit.  The patient is sitting in chair without acute distress. Well dressed, groomed, and nourished. Atraumatic,and normocephalic. No skin rashes or edema. Normal mood.  Normal mental status, understanding ok, but mute, gives thumb up and other body language to communicate. CN II to XII significant for left facial weakness, drooling constant, dysarthria, dysphagia, spastic in jaw closure, constant grunting. Increase tone, severe in left UE with potentially some contracture, in flexion. Moving both legs and right UE ok. Poor cognitive function. No focal sensory difficulty. Coordination, gait, and station deferred. But has poor balance.

## 2021-06-08 NOTE — PROGRESS NOTES
"[facility] Daily Progress     Patient Name: Liu Malave  Date: 2017  Visit #: 22  PTA visit #:  8  Referral Diagnosis: CVA (cerecral vascular accident)  Referring provider: Silver Longoria MD  Visit Diagnosis:     ICD-10-CM    1. Stroke I63.9    2. Weakness of both lower extremities M62.81    3. Impaired functional mobility, balance, gait, and endurance Z74.09    4. Abnormal posture R29.3    5. CVA (cerebral vascular accident) I63.9    6. Lower extremity weakness M62.81          Assessment:   Pt's son in law and interpretor were present for treatment today.     Patient is appropriate to continue with skilled physical therapy intervention, as indicated by initial plan of care.       Goal Status:  Pt. will demonstrate/verbalize independence in self-management of condition in : 12 weeksOngoing  Pt. will improve posture : and demonstrate posture with minimal to no cuing;and maintain posture for;5 minutes;in sitting;in 12 weeks  Pt. will be able to walk : around the house;in 12 weeks (with appropriate AD and assistance): Ongoing  Patient will stand : for home chores;with less difficultty;in 12 weeks (with appropriate AD and assistance )Ongoing       Plan / Patient Education:     Continue with initial plan of care.  Progress with home program as tolerated.   Continue with sitting balance drills, STM and manual stretching.        :Subjective     Pain Ratin    Pt's son-in-law reports that Liu does not sleep well. He is tired today. Pt wakes frequently throughout the night  Pt does not like the AFOs . Pt's son in law states that \" he is not used to them.\"  Pt has been up walking with the walker with the help of his family. Pt's son in law states that there has not been much progress with the walking.    Objective:     Pt continues need moderate A of 2 when ambulating with walker. He continues to weight bear more on the R. Needs verbal and tactile cues to weight bear equally.    However since getting his " "AFO's, pt has a much better stride with more reciprocal steps.  Pt requires cueing to decreased bilateral toe drag.  Pt has decreased bilateral toe walking.     Pt continues to  have some pulsing/clonus type movements in his right LE.  This was more pronounced today possibly due to pt being very tired.       Pt does demonstrate increased bilateral LE strength and movement.  Pt is able to sit with less support.  Pt is able to reposition himself in a chair when cued.      Exercises:  Exercise #1: Sitting balance drills   Comment #1: sitting unsupported during AFO brace fitting   Exercise #2: SLR  Comment #2: R, L x20   Exercise #3: bridge   Comment #3: Bx20   Exercise #4: seated march  Comment #4: Bx20  Exercise #5: Seated Hip ADD Bx20 with ball   Comment #5: Seated Hip ABD with band (orange) Bx20   Exercise #6: seated LAQ  Comment #6: Bx20  Exercise #7: Manual HS and Gastroc stretch  Comment #7: B H/S x60\"  Exercise #8: Pbars  Comment #8: standing x2 min with min A; x3 min with I to min A;   Exercise #9: Ankle pumps  Comment #9: Standing heel raises Bx12   Exercise #10: Sit to stand  Comment #10: x5 with min A  Exercise #11: Nu Step  Comment #11: x8 min R: 4  Exercise #12: Standing           Treatment Today    TREATMENT MINUTES COMMENTS   Evaluation     Self-care/ Home management     Manual therapy 15 STM to bilateral gastroc muscles and bilateral hamstrings along with Manual stretch to B achilles tendon and HS 30\" x2 each   STM and stretching of the left UE to increased elbow extension    Neuromuscular Re-education     Therapeutic Activity     Therapeutic Exercises 20 See ex flow sheet for ex's performed this date   Gait training 15 Gait with FWW with platform for L UE with mod A of 2, verbal and tactile cues for advancing L LE and with AFO's on   100 ft, 40 feet   Modality__________________                Total 50    Blank areas are intentional and mean the treatment did not include these items.       Lisa DAVIS " BRYANT Triana,CLT  2/9/2017

## 2021-06-08 NOTE — PROGRESS NOTES
[facility]   Speech Therapy Daily Progress     Patient Name: Liu Malave  Date: 2017  Visit #: 11  Referral Diagnosis: Aphasia  Referring provider: Silver Longoria MD  Visit Diagnosis:     ICD-10-CM    1. 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 Oropharyngeal dysphagia R13.12          Session 11 of 12    Assessment:   Patient is appropriate to continue with skilled speech therapy intervention, as indicated by initial plan of care.  Goal Status:  Long Term Goals  Pt. will : be able to communicate basic needs nonverbally with communication board &/or gestures with 80% accuracy in 6 weeks  Short Term Goals  Follow verbal directions: 2 step verbal directions;with 70% accuracy;in 6 weeks;with minimal cuing(met)  Pt. will answer Y/N questions regarding information in a : simple verbal paragraph;with minimal cuing;with 70% accuracy;in 6 weeks(progressing)  Pt. will perform oral motor exercises: 5 times;with moderate cuing;in 6 weeks( not progressing due to oral pain)  Pt. will point to pictures on the communication board (f:8) with 90% accuracy.(progressing)  Added goal: Patient will swallow 10 times per session when stimulated orally with lemon swab or water from toothette. (on hold due to oral pain)  Added goal( 1/3/17): Patient will be able to perform basic signs to communicate basic needs to family with 80% accuracy.(progressing)                Plan / Patient Education:     Continue with initial plan of care.    Subjective:     Patient presents as alert and cooperative during the session.  Pain Ratin    Patient reports jaw and teeth pain. He recently had his teeth extracted.    Objective:     Alternative communication: Patient was able to imitate the following signs: me, you, boy, girl, go, come, cold, hot, shower, eat, drink, pain, headache, stomach ache, door, time, doctor, write, and swallow with 100% accuracy,.  He could produce a sign given the verbal word with  70% accuracy. Some basic signs had to be modified due to the patient only able to use 1 hand. The patient's daughter was present to learn the signs to be used. The daughter was encouraged to think of words that the family may want to have signs for and let the speech therapist know next session.      Interventions Today   Interventions MINUTES   Speech - Language 28   Cognition    Dysphagia    Assistive technology    Voice            Total 28     Laura Cain MS, CCC-SLP  1/5/2017

## 2021-06-08 NOTE — PROGRESS NOTES
[facility] Daily Progress     Patient Name: Liu Malave  Date: 1/3/2017  Visit #: 13  PTA visit #:  5  Referral Diagnosis: CVA (cerecral vascular accident)  Referring provider: Silver Longoria MD  Visit Diagnosis:     ICD-10-CM    1. Stroke I63.9    2. Weakness of both lower extremities M62.81    3. Impaired functional mobility, balance, gait, and endurance Z74.09    4. Abnormal posture R29.3    5. CVA (cerebral vascular accident) I63.9    6. Lower extremity weakness M62.81          Assessment:   Pt needs Mod+ support of 2 when ambulating with FWW with platform L UE. He weight bears mostly on the R LE which causes the walker to be unsteady.    Patient is appropriate to continue with skilled physical therapy intervention, as indicated by initial plan of care.    te for Pt was 15 ' late for his appointment today.    Goal Status:  Pt. will demonstrate/verbalize independence in self-management of condition in : 12 weeksOngoing  Pt. will improve posture : and demonstrate posture with minimal to no cuing;and maintain posture for;5 minutes;in sitting;in 12 weeks  Pt. will be able to walk : around the house;in 12 weeks (with appropriate AD and assistance): Ongoing  Patient will stand : for home chores;with less difficultty;in 12 weeks (with appropriate AD and assistance )Ongoing       Plan / Patient Education:     Continue with initial plan of care.  Progress with home program as tolerated.     :Subjective     Pain Ratin    Pt's son in law and interpretor present during treatment.    Pt's son-in-law reports pt has been walking with the assist of two. He is not using the walker much.   Pt remains compliant with his HEP with help from his family.  Pt's mouth is still painful after the teeth were removed.      Objective:     Pt continues need mod to max assist when ambulating with walker. Continues to weight bear more on the R. Needs verbal and tactile cues to weight bear equally.  Stanig more upright today.  "    Hamstring flexibility  L= 45 degrees  R=50 degrees      Treatment Today    TREATMENT MINUTES COMMENTS   Evaluation     Self-care/ Home management     Manual therapy 10 STM to bilateral gastroc muscles and bilateral hamstrings along with Manual stretch to B achilles tendon and HS 30\" x2 each    Neuromuscular Re-education     Therapeutic Activity     Therapeutic Exercises 18 See ex flow sheet, progressed to yellow band supine hip AB/Rotation x10.   Gait training 15 Gait with FWW with platform for L UE with mod A of 2, verbal and tactile cues for advancing L LE. Amb. 50 feet and 100 feet   Modality__________________                Total 43    Blank areas are intentional and mean the treatment did not include these items.       Lisa Triana, PTA,CLT  1/3/2017  "

## 2021-06-08 NOTE — PROGRESS NOTES
[facility] Daily Progress     Patient Name: Liu Malave  Date: 2017  Visit #: 20  PTA visit #:  7  Referral Diagnosis: CVA (cerecral vascular accident)  Referring provider: Silver Longoria MD  Visit Diagnosis:     ICD-10-CM    1. Stroke I63.9    2. Weakness of both lower extremities M62.81    3. Impaired functional mobility, balance, gait, and endurance Z74.09    4. Abnormal posture R29.3          Assessment:   Pt's son in law and interpretor were present for treatment today.   Continues to demonstrate improved sitting balance.  Patient is appropriate to continue with skilled physical therapy intervention, as indicated by initial plan of care.       Goal Status:  Pt. will demonstrate/verbalize independence in self-management of condition in : 12 weeksOngoing  Pt. will improve posture : and demonstrate posture with minimal to no cuing;and maintain posture for;5 minutes;in sitting;in 12 weeks  Pt. will be able to walk : around the house;in 12 weeks (with appropriate AD and assistance): Ongoing  Patient will stand : for home chores;with less difficultty;in 12 weeks (with appropriate AD and assistance )Ongoing       Plan / Patient Education:     Continue with initial plan of care.  Progress with home program as tolerated.   Continue with sitting balance drills.    :Subjective     Pain Ratin    No changes in status.  Pt's son-in-law reports that he continues to do some walking with his walker and his exercises.    Pt indicates that he likes to come to PT.      Pt's family reports that he has been having acupuncture treatments 3x per week for the past few months for his jaw pain, but it does not seemed to really help.      Pt's son-in law reports that he believes that the insurance covered the pt's AFO's that he will be fitted for today.       Objective:     Pt continues need mod to max assist when ambulating with walker. Continues to weight bear more on the R. Needs verbal and tactile cues to weight  "bear equally.    However, pt has a much better stride with more reciprocal steps.  Pt is walking more on toes lately.      Arsen from [facility] came to today's session with the pt's custom articulated AFP braces.  The Pt and his family member were instructed on wearing the braces, proper use and times as well as how to use the DF strap.  They indicated understanding of these instructions.  Pt also indicated that the AFO braces were comfortable and no currently causing pain in sitting as well as walking.      Today with walking pt was using his new AFO's and demonstrates decreased B toe walking with still minimal toe walking on the right side.  Left LE was able to achieve a heel strike and improved his overall step quality.  Pt still leans significantly to the right side and this increases with fatigue.  However, pt's quality of gait is much improved and walking is slightly easier with the patient today.  Pt greatly benefits from his new AFO braces.      Continued decreased bilateral ankle flexibility into DF.  Pt also has some pulsing/clonus type movements in his right LE.  Indicated to pt's son-in-law that this should be mentioned at the pt's next MD appointment.        Exercises:  Exercise #1: Sitting balance drills   Comment #1: seated reach Rx15   Exercise #2: SLR  Comment #2: R, L x20   Exercise #3: bridge   Comment #3: Bx20   Exercise #4: seated march  Comment #4: supine heel slides Bx20   Exercise #5: supine hip AB  Comment #5: seated ADD with Ball Bx20, seated ABD with orange Bx20   Exercise #6: seated LAQ  Comment #6: Bx20   Exercise #7: Manual HS and Gastroc stretch  Comment #7: B 2x60\" each   Exercise #8: Pbars  Comment #8: standing x2 min - 0-min A; 2 min with min A   Exercise #9: Ankle pumps  Comment #9: Standing heel raises Bx12   Exercise #10: Sit to stand  Comment #10: x5 with min A  Exercise #11: Nu Step  Comment #11: x5 min R: 4           Treatment Today    TREATMENT MINUTES COMMENTS   Evaluation   " "  Self-care/ Home management     Manual therapy  STM to bilateral gastroc muscles and bilateral hamstrings along with Manual stretch to B achilles tendon and HS 30\" x2 each    Neuromuscular Re-education     Therapeutic Activity     Therapeutic Exercises 8  See ex flow sheet, (Nu-Step)    Gait training 20 Gait with FWW with platform for L UE with mod A of 2, verbal and tactile cues for advancing L LE. Amb. 50 feet no AFO   60 feet and 40 feet feet using the new AFO's   Modality__________________     AFO Fitting By Arsen Conteh  NC: 20 Pt fitted and educated on benefits and wear of the AFO braces etc          Total 28 Pt's son-in-law requested to end 15 minutes early today due to another appointment after this   Blank areas are intentional and mean the treatment did not include these items.       Estelle Monroe, PT  1/31/2017  "

## 2021-06-08 NOTE — PROGRESS NOTES
[facility]   Speech Therapy Daily Progress     Patient Name: Liu Malave  Date: 1/3/2017  Visit #: 10  Referral Diagnosis: Aphasia  Referring provider: Silver Longoria MD  Visit Diagnosis:     ICD-10-CM    1. 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 Oropharyngeal dysphagia R13.12          Session 10 of 12    Assessment:   Patient is benefitting from skilled speech therapy and is making steady progress toward functional goals.  Patient is appropriate to continue with skilled speech therapy intervention, as indicated by initial plan of care.  Goal Status:  Long Term Goals  Pt. will : be able to communicate basic needs nonverbally with communication board &/or gestures with 80% accuracy in 6 weeks  Short Term Goals  Follow verbal directions: 2 step verbal directions;with 70% accuracy;in 6 weeks;with minimal cuing(met)  Pt. will answer Y/N questions regarding information in a : simple verbal paragraph;with minimal cuing;with 70% accuracy;in 6 weeks(progressing)  Pt. will perform oral motor exercises: 5 times;with moderate cuing;in 6 weeks( not progressing due to oral pain)  Pt. will point to pictures on the communication board (f:8) with 90% accuracy.(progressing)  Added goal: Patient will swallow 10 times per session when stimulated orally with lemon swab or water from toothette.   Added goal( 1/3/17): Patient will be able to perform basic signs to communicate basic needs to family with 80% accuracy.              Plan / Patient Education:     Continue with initial plan of care.    Subjective:     Patient presents as alert and cooperative during the session.  Pain Ratin    Patient reports pain in his jaw.    Objective:     Patient was able to use sign language in imitation with 100% accuracy.The following signs were introduced: hot, cold, baby, mother, father, pain, headache, stomach ache, come, go, house, drink, eat, me, you, door. The speech therapist recommended that  the family think of words that they feel would be helpful for Liu to communicate to them. These words would then be taught as a gesture or sign that family could recognize. The patient was able to communicate that he wanted to go home using two signs: go and home. The patient frequently gets fatigued after PT.  Patient's mouth was too sore to work on oral motor exercises or to use wet toothette to elicit a swallow.      Interventions Today   Interventions MINUTES   Speech - Language 25   Cognition    Dysphagia    Assistive technology    Voice            Total 25     Laura Cain MS, CCC-SLP  1/3/2017

## 2021-06-08 NOTE — TELEPHONE ENCOUNTER
"Clarifying orders for Metoprolol 25mg tabs. The order says \"Take 0.5 tablets (12.5 mg total) by mouth 2 (two) times a day. Take 1 tablet by G-tube twice daily\". Unclear what actual dose should be right now.    Also, Pt family was talking about intermittent catheterization d/t urine retention. There is no current order for this, and they haven't been retaining often. They do have a consult referral for urology d/t frequent UTIs.     Could order straight cath as needed for urinary retention? If other order please advise.  "

## 2021-06-08 NOTE — PROGRESS NOTES
[facility] Daily Progress     Patient Name: Liu Malave  Date: 2017  Visit #: 15  PTA visit #:  7  Referral Diagnosis: CVA (cerecral vascular accident)  Referring provider: Silver Longoria MD  Visit Diagnosis:     ICD-10-CM    1. Stroke I63.9    2. Weakness of both lower extremities M62.81    3. Impaired functional mobility, balance, gait, and endurance Z74.09    4. Abnormal posture R29.3    5. CVA (cerebral vascular accident) I63.9    6. Lower extremity weakness M62.81          Assessment:   Pt's son in law and interpretor were present for treatment today.  Pt is feeling better today. The sore on his lip is healing.  Pt observed to walk on his toes more today.   Continues to demonstrate improved sitting balance.  Patient is appropriate to continue with skilled physical therapy intervention, as indicated by initial plan of care.       Goal Status:  Pt. will demonstrate/verbalize independence in self-management of condition in : 12 weeksOngoing  Pt. will improve posture : and demonstrate posture with minimal to no cuing;and maintain posture for;5 minutes;in sitting;in 12 weeks  Pt. will be able to walk : around the house;in 12 weeks (with appropriate AD and assistance): Ongoing  Patient will stand : for home chores;with less difficultty;in 12 weeks (with appropriate AD and assistance )Ongoing       Plan / Patient Education:     Continue with initial plan of care.  Progress with home program as tolerated.   Continue with sitting balance drills.    :Subjective   Pt has been compliant with his HEP. He has been walking more with his walker.  Pain Ratin          Objective:     Pt continues need mod to max assist when ambulating with walker. Continues to weight bear more on the R. Needs verbal and tactile cues to weight bear equally.          Treatment Today    TREATMENT MINUTES COMMENTS   Evaluation     Self-care/ Home management     Manual therapy 10 STM to bilateral gastroc muscles and bilateral  "hamstrings along with Manual stretch to B achilles tendon and HS 30\" x2 each    Neuromuscular Re-education     Therapeutic Activity     Therapeutic Exercises 30 See ex flow sheet,    Gait training 10 Gait with FWW with platform for L UE with mod A of 2, verbal and tactile cues for advancing L LE. Amb. 50 feet and 100 feet   Modality__________________                Total 50    Blank areas are intentional and mean the treatment did not include these items.       Lisa Triana, PTA,CLT  1/12/2017  "

## 2021-06-08 NOTE — PROGRESS NOTES
[facility] Daily Progress     Patient Name: Liu Malave  Date: 2017  Visit #: 17  PTA visit #:  7  Referral Diagnosis: CVA (cerecral vascular accident)  Referring provider: Silver Longoria MD  Visit Diagnosis:     ICD-10-CM    1. Stroke I63.9    2. Weakness of both lower extremities M62.81    3. Impaired functional mobility, balance, gait, and endurance Z74.09    4. Abnormal posture R29.3          Assessment:   Pt's son in law and interpretor were present for treatment today.   Continues to demonstrate improved sitting balance.  Patient is appropriate to continue with skilled physical therapy intervention, as indicated by initial plan of care.       Goal Status:  Pt. will demonstrate/verbalize independence in self-management of condition in : 12 weeksOngoing  Pt. will improve posture : and demonstrate posture with minimal to no cuing;and maintain posture for;5 minutes;in sitting;in 12 weeks  Pt. will be able to walk : around the house;in 12 weeks (with appropriate AD and assistance): Ongoing  Patient will stand : for home chores;with less difficultty;in 12 weeks (with appropriate AD and assistance )Ongoing       Plan / Patient Education:     Continue with initial plan of care.  Progress with home program as tolerated.   Continue with sitting balance drills.    :Subjective     Pain Ratin    No changes in status.  Pt's son-in-law reports that he did do some walking with his walker   Pt indicates that he likes to come to PT.      Pt's son-in-law indicated that the family would like the pt to have something to help with stretching his ankles into DF.      Pt's son-in-law indicates that his HEP exercises and stretching is being done daily.      Objective:     Pt continues need mod to max assist when ambulating with walker. Continues to weight bear more on the R. Needs verbal and tactile cues to weight bear equally.    However, pt has a much better stride with more reciprocal steps.  Pt is walking  "more on toes lately.      Continued decreased bilateral ankle flexibility into DF.  Pt also has some pulsing/clonus type movements in his right LE.  Indicated to pt's son-in-law that this should be mentioned at the pt's next MD appointment.      Also discussed with pt's son-in-law that they should discuss if they would be willing to have him wear devices to help increased ankle DF at home as this will greatly benefit his walking and standing abilities.  They will discuss and report back.       Today's Exercises:  Exercises:  Exercise #1: Sitting balance drills   Comment #1: sit back/sit up x10, side to side shift Bx10, reach in various directions with R hand x10  Exercise #2: SLR  Comment #2: x15 B  Exercise #3: bridge   Comment #3: Bx20   Exercise #4: seated march  Comment #4: Bx20  Exercise #5: supine hip AB  Comment #5: orange Bx20   Exercise #6: seated LAQ  Comment #6: x15 Bx20   Exercise #7: Manual HS and Gastroc stretch  Comment #7: Seated H/S with leg on bolster Bx30\"  Exercise #8: Pbars  Comment #8: standing x3 min, x3 min with min-mod A to correct position   Exercise #9: Ankle pumps  Comment #9: seated R, L x20   Exercise #10: Sit to stand  Comment #10: x5 with min A  Exercise #11: Nu Step  Comment #11: x5 min R: 4           Treatment Today    TREATMENT MINUTES COMMENTS   Evaluation     Self-care/ Home management     Manual therapy 10 STM to bilateral gastroc muscles and bilateral hamstrings along with Manual stretch to B achilles tendon and HS 30\" x2 each    Neuromuscular Re-education     Therapeutic Activity     Therapeutic Exercises 35 See ex flow sheet,    Gait training 15 Gait with FWW with platform for L UE with mod A of 2, verbal and tactile cues for advancing L LE. Amb. 115 feet and 50 feet    Modality__________________                Total 60    Blank areas are intentional and mean the treatment did not include these items.       Estelle Monroe, PT  1/19/2017  "

## 2021-06-08 NOTE — PROGRESS NOTES
[facility] Discharge Summary    Patient Name: Liu Malave  Date: 2/16/2017  Visit #: 23  PTA visit #:  8  Referral Diagnosis: CVA (cerecral vascular accident)  Referring provider: Silver Longoria MD  Visit Diagnosis:     ICD-10-CM    1. Stroke I63.9    2. Weakness of both lower extremities M62.81    3. Impaired functional mobility, balance, gait, and endurance Z74.09    4. Abnormal posture R29.3          Assessment:   Pt's son in law and interpretor were present for treatment today.   Pt has become increasingly more frustrated with continued PT and appears ready for a break in care at this time.      Goal Status:  Pt. will demonstrate/verbalize independence in self-management of condition in : 12 weeks; Met  Pt. will improve posture : and demonstrate posture with minimal to no cuing;and maintain posture for;5 minutes;in sitting;in 12 weeks; Mostly Met  Pt. will be able to walk : around the house;in 12 weeks (with appropriate AD and assistance): Met  Patient will stand : for home chores;with less difficultty;in 12 weeks (with appropriate AD and assistance; Progressing        Plan / Patient Education:     Pt is ready for a break from therapies and has reached a plateau in his progress.  Pt has made great improvements in PT and is encouraged to continue his exercises and progress at home with his family and return to PT in a few months for continued progressions in his function.      Patient discharged to [facility] with family.      :Subjective     Pt's son in law reports that Liu is ready for a break from therapies as it becomes tedious to come to PT weekly.    Pt has had increased discomfort in B LE and L UE.   Pt's son in law reports he walks at home with help, but does not use the walker much.  Pt will do the exercises some, but is often non-compliant.    Objective:     Pt demonstrates good progress overall since starting PT.      Pt demonstrates increased bilateral LE strength as he is able to perform  "sitting and supine exercises with increased quality as well as repetitions.    Pt is able to  the parallel bars without assistance and his right hand on the bar for up to 2 minutes depending on the day.  Pt will lean and fall to the right at times, but responds to manual cueing.      Pt's ambulation has greatly improved.  Pt now has a 2 wheeled walker with a left side UE platform as well as bilateral ankle AFO's to assist with proper and effective gait technique.  Pt can walk up to 115 feet or several smaller bouts with min-mod A of 2 or mod-max A of one.      Pt still has increased bilateral LE tightness in the hamstrings, quads, and calf muscles.  Pt also with LE and L UE tone.      Exercises:  Exercise #1: Nustep   Comment #1: WL 5  x5 minutes  Exercise #2: Sit to stand   Comment #2: x10  Exercise #3: standing in // bars   Comment #3: standing 2 x 2 minutes with min A for upright  Exercise #4:  // bars  Comment #4: heel/oe raises Bx  Exercise #5: seated LAQ   Comment #5: 10  Exercise #6: Supine SLR: Hip flex, ABD   Comment #6: R, L x20   Exercise #7: Supine Bridge   Comment #7: Bx20  Exercise #8: Supine Heel slides   Comment #8: R, L x20   Exercise #9: Ankle pumps  Comment #9: Standing heel raises Bx12   Exercise #10: Sit to stand  Comment #10: x5 with min A  Exercise #11: Nu Step  Comment #11: x8 min R: 4  Exercise #12: Standing           Treatment Today    TREATMENT MINUTES COMMENTS   Evaluation     Self-care/ Home management     Manual therapy 25 STM to bilateral gastroc muscles and bilateral hamstrings along with Manual stretch to B achilles tendon and HS 30\" x2 each   STM and stretching of the left UE to increased elbow extension    Neuromuscular Re-education     Therapeutic Activity     Therapeutic Exercises 20 See flow sheet    Gait training 10 115 feet with 2WW with UE platform on L and mod A of 2    Modality__________________                Total 55    Blank areas are intentional and mean " the treatment did not include these items.       Estelle Monroe, PT  2/16/2017

## 2021-06-08 NOTE — PROGRESS NOTES
[facility] Discharge Summary    Patient Name: Liu Malave  Date: 2/14/2017  Referral Diagnosis: Aphasia  Referring provider: Silver Longoria MD  Visit Diagnosis:     ICD-10-CM    1. 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 Oropharyngeal dysphagia R13.12          Assessment:   Patient has had limited response to speech therapy due to oral pain and limited mouth opening. His motivation for speech therapy has also decreased and so it was recommended to the family that he take a break from speech therapy. Thus, he has not met all his speech therapy goals. He has been provided with a basic communication board to express basic needs, but family reports that he doesn't use it consistently. He and his family have been taught various hand gestures/signs to aid with communication of basic needs/wants. He can write single words but with reduced ledgibility. His auditory comprehension is intact for simple to moderately complex information. Limited progress has been made in swallowing due to oral pain. He does appear to handle his saliva better, but will on occasion still cough when swallowing his saliva. He remains basically non-verbal.   Long Term Goals  Pt. will : be able to communicate basic needs nonverbally with communication board &/or gestures with 80% accuracy in 6 weeks(Met)  Short Term Goals  Follow verbal directions: 2 step verbal directions;with 70% accuracy;in 6 weeks;with minimal cuing(met)  Pt. will answer Y/N questions regarding information in a : simple verbal paragraph;with minimal cuing;with 70% accuracy;in 6 weeks(Met)  Pt. will perform oral motor exercises: 5 times;with moderate cuing;in 6 weeks( not progressing due to oral pain)  Pt. will point to pictures on the communication board (f:8) with 90% accuracy.(Not met consistently)  Added goal: Patient will swallow 10 times per session when stimulated orally with lemon swab or water from [place]. (Not met due to  oral pain)  Added goal( 1/3/17): Patient will be able to perform basic signs to communicate basic needs to family with 80% accuracy.(Not met)     Plan:Speech therapy will be discontinued due to limited progress and plateau in progress. If the patient's oral pain resides and he is better able to open his mouth, he may benefit from further speech therapy in the future.    2/14/2017

## 2021-06-08 NOTE — PROGRESS NOTES
Assessment:     1. Lip ulcer  triamcinolone (KENALOG) 0.1 % paste   2. Incontinence  traMADol (ULTRAM) 50 mg tablet    triamcinolone (KENALOG) 0.1 % paste    Comprehensive Metabolic Panel    HM1(CBC and Differential)    HM1 (CBC with Diff)   3. Stroke     4. Insomnia  Ambulatory referral to Medication Management       Plan:     1. Incontinence  Patient is troubled by chronic incontinence.  We do need to check the following laboratory.  I will renew the following medications  - traMADol (ULTRAM) 50 mg tablet; Take 1 tablet (50 mg total) by mouth every 6 (six) hours as needed for pain.  Dispense: 30 tablet; Refill: 1  - triamcinolone (KENALOG) 0.1 % paste; Apply twice daily  Dispense: 5 g; Refill: 12  - Comprehensive Metabolic Panel  - HM1(CBC and Differential)  - HM1 (CBC with Diff)    2. Lip ulcer  We'll treat the following chronic ulcer which he bites because of bruxism with topical applications of the following  - triamcinolone (KENALOG) 0.1 % paste; Apply twice daily  Dispense: 5 g; Refill: 12    3. Stroke  Medications were reviewed.  We will continue him on his amlodipine.  His Cardura, Keppra, lisinopril, simvastatin, and when necessary tramadol for pain    4. Insomnia  Patient is on 100 mg of trazodone now for sleep.  Family wants him to sleep all the way through the night.  I feel we will overmedicate him.  We'll consult with medication management  - Ambulatory referral to Medication Management      Subjective:   51-year-old with complex past medical history all stemming from hemorrhagic stroke.  Has a feeding tube and has had respiratory difficulties in the past has benign essential hypertension, bladder issues, incontinence, presents today complaining of biting lower lip and chronic ulcers on lower lip.  Patient was troubled with bruxism in the past.  He does have intermittent pain.  We give him small amounts of tramadol.  Family wants him to sleep through the night.  He is already on trazodone 100 mg.  I  do not want to overmedicate him.  I want to consult with medication management.  We will treat his lip ulcer with triamcinolone paste.  I will check his blood parameters including his sodium, potassium, blood sugar, kidney function.  An  was present.  I answered all questions at least 3 times.  We will also have home health care stop in for a visit today, his weight and vital signs are all stable.  Review of systems through  essentially unremarkable    Review of Systems: A complete 14 point review of systems was obtained and is negative or as stated in the history of present illness.    Past Medical History   Diagnosis Date     Hypertension      Stroke      Family History   Problem Relation Age of Onset     Hypertension Mother      Cancer Father      Kidney disease Brother      Past Surgical History   Procedure Laterality Date     Brain surgery       Picc  6/10/2016           Social History   Substance Use Topics     Smoking status: Former Smoker     Packs/day: 1.00     Years: 30.00     Quit date: 6/5/2005     Smokeless tobacco: Never Used     Alcohol use No         Objective:     Visit Vitals     /88     Pulse 99     SpO2 98%       General Appearance:  Alert, cooperative, no distress  Head:  Normocephalic, no obvious abnormality  Ears: TM anatomy normal  Eyes:  PERRL, EOM's intact, conjunctiva and corneas clear  Nose:  Nares symmetrical, septum midline, mucosa pink, no sinus tenderness  Throat:  3 lip ulcers on the [place] border.  Of lower lip  Neck:  Supple, symmetrical, trachea midline, no adenopathy; thyroid: no enlargement, symmetric,no tenderness/mass/nodules; no carotid bruit, no JVD  Back:  Symmetrical, no curvature, ROM normal, no CVA tenderness  Chest/Breast:  No mass or tenderness  Lungs:  Clear to auscultation bilaterally, respirations unlabored   Heart:  Normal PMI, regular rate & rhythm, S1 and S2 normal, no murmurs, rubs, or gallops  Abdomen:  Soft, non-tender, bowel  sounds active all four quadrants, no mass, or organomegaly.  G-tube in place, draining well  Musculoskeletal:  Tone and strength strong and symmetrical, all extremities  Lymphatic:  No adenopathy  Skin/Hair/Nails:  Skin warm, dry, and intact, no rashes  Neurologic:  Alert and oriented x3, no cranial nerve deficits, normal strength and tone, gait steady  Extremities:  No edema.  David's sign negative.    Genitourinary: Patient is wearing depends  Pulses:  Equal bilaterally     The following high BMI interventions were performed this visit: prescribed dietary intake      This note has been dictated using voice recognition software. Any grammatical or context distortions are unintentional and inherent to the the software.

## 2021-06-08 NOTE — PROGRESS NOTES
"[facility] Daily Progress     Patient Name: Liu Malave  Date: 2017  Visit #: 22  PTA visit #:  8  Referral Diagnosis: CVA (cerecral vascular accident)  Referring provider: Silver Longoria MD  Visit Diagnosis:     ICD-10-CM    1. Stroke I63.9    2. Weakness of both lower extremities M62.81    3. Impaired functional mobility, balance, gait, and endurance Z74.09    4. Abnormal posture R29.3    5. CVA (cerebral vascular accident) I63.9    6. Lower extremity weakness M62.81          Assessment:   Pt's son in law and interpretor were present for treatment today.   Pt was very emotional today. He cried early on in the session. He did not want to do the Nustep or the exercises.  Increased tone noted B LEs and L UE.    Patient is appropriate to continue with skilled physical therapy intervention, as indicated by initial plan of care.       Goal Status:  Pt. will demonstrate/verbalize independence in self-management of condition in : 12 weeksOngoing  Pt. will improve posture : and demonstrate posture with minimal to no cuing;and maintain posture for;5 minutes;in sitting;in 12 weeks  Pt. will be able to walk : around the house;in 12 weeks (with appropriate AD and assistance): Ongoing  Patient will stand : for home chores;with less difficultty;in 12 weeks (with appropriate AD and assistance )Ongoing       Plan / Patient Education:     Continue with initial plan of care.  Progress with home program as tolerated.   Continue x 1, re assess for further visits.         :Subjective   Pt's son in law reports that Liu did not want to come today. He states that Liu was crying today because he did not want to attend PT today.   Pt has had increased discomfort in B LE and L UE.   Pt's son in law reports he continues to be very tired.  They have been compliant with his HEP but not walking too much. \" Just standing.\"  Pain Ratin        Objective:          Exercises:  Exercise #1: Nustep   Comment #1: WL x8 " "minutes  Exercise #2: Sit to stand   Comment #2: x10  Exercise #3: standing in // bars   Comment #3: heel raises x8  Exercise #4:  // bars  Comment #4: 60\"  Exercise #5: seated LAQ   Comment #5: 10  Exercise #6: seated LAQ  Comment #6: Bx20  Exercise #7: Manual HS and Gastroc stretch  Comment #7: B H/S x60\"  Exercise #8: Pbars  Comment #8: standing x2 min with min A; x3 min with I to min A;   Exercise #9: Ankle pumps  Comment #9: Standing heel raises Bx12   Exercise #10: Sit to stand  Comment #10: x5 with min A  Exercise #11: Nu Step  Comment #11: x8 min R: 4  Exercise #12: Standing           Treatment Today    TREATMENT MINUTES COMMENTS   Evaluation     Self-care/ Home management     Manual therapy 35 STM to bilateral gastroc muscles and bilateral hamstrings along with Manual stretch to B achilles tendon and HS 30\" x2 each   STM and stretching of the left UE to increased elbow extension   PNF diagonals B hip ER/IR   Neuromuscular Re-education     Therapeutic Activity     Therapeutic Exercises     Gait training     Modality__________________                Total 35    Blank areas are intentional and mean the treatment did not include these items.       Lisa Triana, PTA,CLT  2/14/2017  "

## 2021-06-08 NOTE — PROGRESS NOTES
[facility] Daily Progress     Patient Name: Liu Malave  Date: 1/6/2017  Visit #: 5  Referral Diagnosis: CVA  Referring provider: Silver Longoria MD  Visit Diagnosis:     ICD-10-CM    1. Right hemiparesis G81.91    2. Decreased activities of daily living (ADL) Z78.9    3. Lack of coordination due to stroke I63.9     R27.9        Assessment:     Progress plateau. Patient and family are performing HEP. No need for additional OT at this time.    Goal Status:  Pt. will demonstrate/verbalize independence in self-management of condition in : 12 weeks  Patient will perform hand functions of: gripping;holding;buttoning/dressing;manipulating fingers;for lifting;for carrying;for dressing;in 12 weeks;with less difficulty  Pt will: have increased AROM in right UE for increased independence with ADL's  Pt will: be cooperative with family performing PROM to left UE in 6 weeks.    Plan / Patient Education:     Continue with initial plan of care.    Subjective:     Pain rating at rest: patient points to jaw but is unable to rate pain      Objective:     Treatment Today: Instructed on and practiced self propelling his w/c. Family was able to lower his w/c as low as it goes. Moved the seat cushion to behind his back and patient is able to reach the ground with his feet(not flat but enough to be functional). He was able to propel the w/c on low pile carpet (that has no pad) with minimal assist. Family will encourage independence with this at home on the hard wood floors. ROM to patients left UE with review to family while performing. Brief review of AROM and activities for right UE. Patient and family encouraged to use resting splint. They have been using cotton in the hand instead of the splint and i explained the use of something soft versus firm. Also recommended they purchase an arm trough to deter patient from posturing left arm next to his body all day.   TREATMENT MINUTES COMMENTS   Evaluation     Self-care/ Home  management 25    Manual therapy     Neuromuscular Re-education     Therapeutic Exercises 10    Iontophoresis          Total 35    Blank areas are intentional and mean the treatment did not include these items.       Shahla Wilson  1/6/2017

## 2021-06-08 NOTE — TELEPHONE ENCOUNTER
----- Message from Christine Harris MD sent at 1/7/2021  8:59 PM CST -----  Please inform patient that the labs are stable. Follow-up as recommended at the last visit   Metoprolol should be 25 mg by G-tube twice daily.     Ok for straight cath as needed for urinary retention

## 2021-06-08 NOTE — TELEPHONE ENCOUNTER
Orders being requested: Labs:calcium and urinalysis and urine culture.  Reason service is needed/diagnosis: Hypocalcemia and sepsis due to urinary tract infection  When are orders needed by: Stat -the nurse is at the home now.  Where to send Orders: Phone:  [phone]  Okay to leave detailed message?  Yes

## 2021-06-08 NOTE — TELEPHONE ENCOUNTER
Refill request for metoprolol. Rx was changed with these directions:   12.5 mg, Oral, 2 times daily, Take 1 tablet by G-tube twice daily    Patient to take 12.5 mg or 25 mg twice daily ?     Did not send refill until dose is clarified. Thanks!     BP Readings from Last 3 Encounters:   06/06/20 150/72   03/02/20 112/70   02/24/20 170/85     Pulse Readings from Last 3 Encounters:   06/06/20 (!) 58   03/02/20 87   02/24/20 84         Madhuri Whitehead, Pharm.D., BCACP  Medication Therapy Management Pharmacist  [facility]

## 2021-06-08 NOTE — PROGRESS NOTES
[facility] Daily Progress     Patient Name: Liu Malave  Date: 2017  Visit #: 21  PTA visit #:  7  Referral Diagnosis: CVA (cerecral vascular accident)  Referring provider: Silver Longoria MD  Visit Diagnosis:     ICD-10-CM    1. Stroke I63.9    2. Weakness of both lower extremities M62.81    3. Impaired functional mobility, balance, gait, and endurance Z74.09    4. Abnormal posture R29.3          Assessment:   Pt's son in law and interpretor were present for treatment today.   Continues to demonstrate improved sitting balance.  Patient is appropriate to continue with skilled physical therapy intervention, as indicated by initial plan of care.       Goal Status:  Pt. will demonstrate/verbalize independence in self-management of condition in : 12 weeksOngoing  Pt. will improve posture : and demonstrate posture with minimal to no cuing;and maintain posture for;5 minutes;in sitting;in 12 weeks  Pt. will be able to walk : around the house;in 12 weeks (with appropriate AD and assistance): Ongoing  Patient will stand : for home chores;with less difficultty;in 12 weeks (with appropriate AD and assistance )Ongoing       Plan / Patient Education:     Continue with initial plan of care.  Progress with home program as tolerated.   Continue with sitting balance drills.    Cued pt's son-in-law that the AFO's can be adjusted if needed for pt's comfort and compliance with wearing them.  We also discussed possibly discontinuing PT in the next few weeks to allow the pt to have a break.      :Subjective     Pain Ratin    Pt's son-in-law reports that Liu has some discomfort with his new braces but feels this is because it is something new and stretching the ankles.  Pt reports that they use the strap some to stretch the ankles, but not a ton.  Pt's son-in-law reports that he wears tall socks and there are no redness or sores from the braces.    Pt has had a headache for a week.     Pt's family reports that he has  "been having acupuncture treatments 3x per week for the past few months for his jaw pain, but it does not seemed to really help.      Pt's son-in law reports that he believes that the insurance covered the pt's AFO's that he will be fitted for today.       Objective:     Pt continues need moderate A of 2 when ambulating with walker. He continues to weight bear more on the R. Needs verbal and tactile cues to weight bear equally.    However since getting his AFO's, pt has a much better stride with more reciprocal steps.  Pt requires cueing to decreased bilateral toe drag.  Pt has decreased bilateral toe walking.    Increasing bilateral ankle flexibility into DF.  Pt also has some pulsing/clonus type movements in his right LE.  This was noted at the last appt with a neurologist.       Pt does demonstrate increased bilateral LE strength and movement.  Pt is able to sit with less support.  Pt is able to reposition himself in a chair when cued.      Exercises:  Exercise #1: Sitting balance drills   Comment #1: sitting unsupported during AFO brace fitting   Exercise #2: SLR  Comment #2: R, L x20   Exercise #3: bridge   Comment #3: Bx20   Exercise #4: seated march  Comment #4: Bx20  Exercise #5: Seated Hip ADD Bx20 with ball   Comment #5: Seated Hip ABD with band (orange) Bx20   Exercise #6: seated LAQ  Comment #6: Bx20  Exercise #7: Manual HS and Gastroc stretch  Comment #7: B H/S x60\"  Exercise #8: Pbars  Comment #8: standing x2 min with min A; x3 min with I to min A;   Exercise #9: Ankle pumps  Comment #9: Standing heel raises Bx12   Exercise #10: Sit to stand  Comment #10: x5 with min A  Exercise #11: Nu Step  Comment #11: x8 min R: 4  Exercise #12: Standing           Treatment Today    TREATMENT MINUTES COMMENTS   Evaluation     Self-care/ Home management     Manual therapy 12 STM to bilateral gastroc muscles and bilateral hamstrings along with Manual stretch to B achilles tendon and HS 30\" x2 each   STM and stretching " of the left UE to increased elbow extension    Neuromuscular Re-education     Therapeutic Activity     Therapeutic Exercises 25 See ex flow sheet   Gait training 20 Gait with FWW with platform for L UE with mod A of 2, verbal and tactile cues for advancing L LE and with AFO's on   100 feet; 40 feet; 50 feet with mod A of 2    Modality__________________                Total 57    Blank areas are intentional and mean the treatment did not include these items.       Estelle Monroe, PT  2/7/2017

## 2021-06-08 NOTE — PROGRESS NOTES
How have you been doing since we last saw you? any concerns? Pt is having a hard time sleeping as well as more shakiness on his legs.       Current Symptoms : Yes

## 2021-06-08 NOTE — PROGRESS NOTES
[facility] Daily Progress     Patient Name: Liu Malave  Date: 2017  Visit #: 19  PTA visit #:  7  Referral Diagnosis: CVA (cerecral vascular accident)  Referring provider: Silver Longoria MD  Visit Diagnosis:     ICD-10-CM    1. Stroke I63.9    2. Weakness of both lower extremities M62.81    3. Impaired functional mobility, balance, gait, and endurance Z74.09    4. Abnormal posture R29.3          Assessment:   Pt's son in law and interpretor were present for treatment today.   Continues to demonstrate improved sitting balance.  Patient is appropriate to continue with skilled physical therapy intervention, as indicated by initial plan of care.       Goal Status:  Pt. will demonstrate/verbalize independence in self-management of condition in : 12 weeksOngoing  Pt. will improve posture : and demonstrate posture with minimal to no cuing;and maintain posture for;5 minutes;in sitting;in 12 weeks  Pt. will be able to walk : around the house;in 12 weeks (with appropriate AD and assistance): Ongoing  Patient will stand : for home chores;with less difficultty;in 12 weeks (with appropriate AD and assistance )Ongoing       Plan / Patient Education:     Continue with initial plan of care.  Progress with home program as tolerated.   Continue with sitting balance drills.    :Subjective     Pain Ratin    No changes in status.  Pt's son-in-law reports that he continues to do some walking with his walker and his exercises.    Pt indicates that he likes to come to PT.      Pt saw his family MD yesterday and he feels like the leg shakiness is related to fatigue.       Pt's son-in-law indicated that the family would like the pt to have something to help with stretching his ankles into DF.      Objective:     Pt continues need mod to max assist when ambulating with walker. Continues to weight bear more on the R. Needs verbal and tactile cues to weight bear equally.    However, pt has a much better stride with more  "reciprocal steps.  Pt is walking more on toes lately.      Arsen Conteh came to today's session and determine the pt would greatly benefit from an articulated AFO for bilateral ankles that cane be used to decreased toe walking, but can also be used to stretch the pt into DF at rest.  The pt was casted for these today and Arsen will return to the next appt (Tuesday 1/31) for fitting.      Continued decreased bilateral ankle flexibility into DF.  Pt also has some pulsing/clonus type movements in his right LE.  Indicated to pt's son-in-law that this should be mentioned at the pt's next MD appointment.        Exercises:  Exercise #1: Sitting balance drills   Comment #1: seated reach Rx15   Exercise #2: SLR  Comment #2: R, L x20   Exercise #3: bridge   Comment #3: Bx20   Exercise #4: seated march  Comment #4: supine heel slides Bx20   Exercise #5: supine hip AB  Comment #5: seated ADD with Ball Bx20, seated ABD with orange Bx20   Exercise #6: seated LAQ  Comment #6: Bx20   Exercise #7: Manual HS and Gastroc stretch  Comment #7: manual B 2x60\" each   Exercise #8: Pbars  Comment #8: standing x2 min - 0-min A; 2 min with min A   Exercise #9: Ankle pumps  Comment #9: Standing heel raises Bx12   Exercise #10: Sit to stand  Comment #10: x5 with min A  Exercise #11: Nu Step  Comment #11: x5 min R: 4           Treatment Today    TREATMENT MINUTES COMMENTS   Evaluation     Self-care/ Home management     Manual therapy 10 STM to bilateral gastroc muscles and bilateral hamstrings along with Manual stretch to B achilles tendon and HS 30\" x2 each    Neuromuscular Re-education     Therapeutic Activity     Therapeutic Exercises 5 See ex flow sheet, (Nu-Step)    Gait training 15 Gait with FWW with platform for L UE with mod A of 2, verbal and tactile cues for advancing L LE. Amb. 110 feet and 65 feet    Modality__________________     AFO Fitting By Arsen Conteh  NC: 30  Pt educated on benefits of the AFO braces etc          Total 30 "    Blank areas are intentional and mean the treatment did not include these items.       Estelle Monroe, PT  1/26/2017

## 2021-06-08 NOTE — TELEPHONE ENCOUNTER
Pt's family is wondering if he should still be taking calcium supplement?     FYI: pt's wife states pt is refusing to be straight cathed. He has not been straight cathed in several days. He urinates ~50mL every 30 minutes per pt's wife. Urine is clear yellow, no odor or sediment. Pt does not have temp.    OK to call [phone] with questions/recommendations.   Thank you,  Mishel

## 2021-06-08 NOTE — PROGRESS NOTES
[facility] Daily Progress     Patient Name: Liu Malave  Date: 2017  Visit #: 18  PTA visit #:  7  Referral Diagnosis: CVA (cerecral vascular accident)  Referring provider: Silver Longoria MD  Visit Diagnosis:     ICD-10-CM    1. Stroke I63.9    2. Weakness of both lower extremities M62.81    3. Impaired functional mobility, balance, gait, and endurance Z74.09    4. Abnormal posture R29.3          Assessment:   Pt's son in law and interpretor were present for treatment today.   Continues to demonstrate improved sitting balance.  Patient is appropriate to continue with skilled physical therapy intervention, as indicated by initial plan of care.       Goal Status:  Pt. will demonstrate/verbalize independence in self-management of condition in : 12 weeksOngoing  Pt. will improve posture : and demonstrate posture with minimal to no cuing;and maintain posture for;5 minutes;in sitting;in 12 weeks  Pt. will be able to walk : around the house;in 12 weeks (with appropriate AD and assistance): Ongoing  Patient will stand : for home chores;with less difficultty;in 12 weeks (with appropriate AD and assistance )Ongoing       Plan / Patient Education:     Continue with initial plan of care.  Progress with home program as tolerated.   Continue with sitting balance drills.    :Subjective     Pain Ratin    No changes in status.  Pt's son-in-law reports that he continues to do some walking with his walker and his exercises.    Pt indicates that he likes to come to PT.      Pt see's his family MD tomorrow regarding stability shoes.      Pt's son-in-law indicated that the family would like the pt to have something to help with stretching his ankles into DF.      Objective:     Pt continues need mod to max assist when ambulating with walker. Continues to weight bear more on the R. Needs verbal and tactile cues to weight bear equally.    However, pt has a much better stride with more reciprocal steps.  Pt is walking  "more on toes lately.      Continued decreased bilateral ankle flexibility into DF.  Pt also has some pulsing/clonus type movements in his right LE.  Indicated to pt's son-in-law that this should be mentioned at the pt's next MD appointment.      Also discussed with pt's son-in-law that they should discuss if they would be willing to have him wear devices to help increased ankle DF at home as this will greatly benefit his walking and standing abilities.  Arsen lam [facility] will be here on Thursday to observe the patient and determine the best device options.        Today's Exercises:  Exercises:  Exercise #1: Sitting balance drills   Comment #1: seated reach Rx15   Exercise #2: SLR  Comment #2: R, L x20   Exercise #3: bridge   Comment #3: Bx20   Exercise #4: seated march  Comment #4: supine heel slides Bx20   Exercise #5: supine hip AB  Comment #5: seated ADD with Ball Bx20, seated ABD with orange Bx20   Exercise #6: seated LAQ  Comment #6: Bx20   Exercise #7: Manual HS and Gastroc stretch  Comment #7: manual B 2x60\" each   Exercise #8: Pbars  Comment #8: standing x2 min - 0-min A; 2 min with min A   Exercise #9: Ankle pumps  Comment #9: Standing heel raises Bx12   Exercise #10: Sit to stand  Comment #10: x5 with min A  Exercise #11: Nu Step  Comment #11: x5 min R: 4           Treatment Today    TREATMENT MINUTES COMMENTS   Evaluation     Self-care/ Home management     Manual therapy 10 STM to bilateral gastroc muscles and bilateral hamstrings along with Manual stretch to B achilles tendon and HS 30\" x2 each    Neuromuscular Re-education     Therapeutic Activity     Therapeutic Exercises 30 See ex flow sheet,    Gait training 15 Gait with FWW with platform for L UE with mod A of 2, verbal and tactile cues for advancing L LE. Amb. 85 feet, 40 feet and 50 feet    Modality__________________                Total 55    Blank areas are intentional and mean the treatment did not include these items.       Estelle Monroe, " PT  1/24/2017

## 2021-06-08 NOTE — PROGRESS NOTES
[facility] Daily Progress     Patient Name: Liu Malave  Date: 2017  Visit #: 16   PTA visit #:  7  Referral Diagnosis: CVA (cerecral vascular accident)  Referring provider: Silver Longoria MD  Visit Diagnosis:     ICD-10-CM    1. Stroke I63.9    2. Weakness of both lower extremities M62.81    3. Impaired functional mobility, balance, gait, and endurance Z74.09    4. Abnormal posture R29.3          Assessment:   Pt's son in law and interpretor were present for treatment today.   Continues to demonstrate improved sitting balance.  Patient is appropriate to continue with skilled physical therapy intervention, as indicated by initial plan of care.       Goal Status:  Pt. will demonstrate/verbalize independence in self-management of condition in : 12 weeksOngoing  Pt. will improve posture : and demonstrate posture with minimal to no cuing;and maintain posture for;5 minutes;in sitting;in 12 weeks  Pt. will be able to walk : around the house;in 12 weeks (with appropriate AD and assistance): Ongoing  Patient will stand : for home chores;with less difficultty;in 12 weeks (with appropriate AD and assistance )Ongoing       Plan / Patient Education:     Continue with initial plan of care.  Progress with home program as tolerated.   Continue with sitting balance drills.    :Subjective     Pain Ratin    No changes in status.  Pt's son-in-law reports that he does not walk with his walker much at home because he feels like he has so many family members at home to help him and he does not need to walk.      Pt indicates that he like to come to PT.      Pt's son-in-law indicates that his HEP exercises and stretching is being done daily.      Objective:     Pt continues need mod to max assist when ambulating with walker. Continues to weight bear more on the R. Needs verbal and tactile cues to weight bear equally.    However, pt has a much better stride with more reciprocal steps.  Pt is walking more on toes  "lately.      Decreased right ankle flexibility into DF today.  Pt also has some pulsing/clonus type movements with putting his right ankle into DF.  Indicated to pt's son-in-law that this should be mentioned at the pt's next MD appointment.      Also discussed with pt's son-in-law that they should discuss if they would be willing to have him wear devices to help increased ankle DF at home as this will greatly benefit his walking and standing abilities.  They will discuss and report back.       Today's Exercises:  Exercises:  Exercise #1: Sitting balance drills   Comment #1: sit back/sit up x10, side to side shift Bx10, reach in various directions with R hand x10  Exercise #2: SLR  Comment #2: x15 B  Exercise #3: bridge   Comment #3: Bx20   Exercise #4: seated march  Comment #4: Bx20  Exercise #5: supine hip AB  Comment #5: orange Bx20   Exercise #6: seated LAQ  Comment #6: x15 Bx20   Exercise #7: Manual HS and Gastroc stretch  Comment #7: Seated H/S with leg on bolster Bx30\"  Exercise #8: Pbars  Comment #8: standing x3 min, x3 min with min-mod A to correct position   Exercise #9: Ankle pumps  Comment #9: seated R, L x20   Exercise #10: Sit to stand  Comment #10: x5 with min A  Exercise #11: Nu Step  Comment #11: x5 min R: 4           Treatment Today    TREATMENT MINUTES COMMENTS   Evaluation     Self-care/ Home management     Manual therapy 10 STM to bilateral gastroc muscles and bilateral hamstrings along with Manual stretch to B achilles tendon and HS 30\" x2 each    Neuromuscular Re-education     Therapeutic Activity     Therapeutic Exercises 35 See ex flow sheet,    Gait training 15 Gait with FWW with platform for L UE with mod A of 2, verbal and tactile cues for advancing L LE. Amb. 10 feet, 40 feet and 50 feet   Modality__________________                Total 60    Blank areas are intentional and mean the treatment did not include these items.       Estelle Monroe, PT  1/17/2017  "

## 2021-06-09 ENCOUNTER — RECORDS - HEALTHEAST (OUTPATIENT)
Dept: INTERVENTIONAL RADIOLOGY/VASCULAR | Facility: HOSPITAL | Age: 56
End: 2021-06-09

## 2021-06-09 ENCOUNTER — RECORDS - HEALTHEAST (OUTPATIENT)
Dept: ADMINISTRATIVE | Facility: OTHER | Age: 56
End: 2021-06-09

## 2021-06-09 ENCOUNTER — HOSPITAL ENCOUNTER (OUTPATIENT)
Dept: INTERVENTIONAL RADIOLOGY/VASCULAR | Facility: HOSPITAL | Age: 56
Discharge: HOME | End: 2021-06-09
Attending: RADIOLOGY | Admitting: RADIOLOGY

## 2021-06-09 DIAGNOSIS — E46 MALNUTRITION (H): ICD-10-CM

## 2021-06-09 DIAGNOSIS — E46 MALNUTRITION, UNSPECIFIED TYPE (H): ICD-10-CM

## 2021-06-09 NOTE — TELEPHONE ENCOUNTER
Pt and family are not wanting to straight cath due to pt discomfort and refusing by shaking his head no when cg tries. Pt has been voiding in urinal and briefs. Pt is going thru 12 briefs daily. Palpated bladder after urinating and is soft and not tender/painful. I am going to discharge home care as this leaves us without a skill. The family is requesting an updated order sent to [facility] stating more briefs are needed. Please let me know if there are any concerns. Thank you

## 2021-06-09 NOTE — PROGRESS NOTES
"MTM Transition of Care Encounter  Assessment & Plan                                                     Post Discharge Medication Reconciliation Status: discharge medications reconciled and changed, per note/orders (see AVS)      Sepsis: Needs improvement -  Pt no longer having fevers. Family has only been giving antibiotic two times a day vs three times a day. Will have them increase to three times a day for 10 days as prescribed, then decrease to 5mL daily. Encouraged [facility] to schedule hospital visit follow-up with PCP for continued refills as needed.   -Idalmis to administer Cephalexin three times a day x 10 days as originally prescribed  -Recommended PCP follow-up     CVA: Needs improvement - not giving aspirin daily. Reviewed importance of daily administration to reduce stroke risk. Last LDL above goal <70. May benefit from recheck LDL. Likely benefit from switch to high intensity statin.   -Idalmis to give aspirin 81 mg daily, as previously prescribed.   -Recommend LDL recheck. Consider high-intensity statin (PharmD to discuss with PCP)       Hypertension: Stable - BP at goal <130/80. Pulse at goal .  -Continue current therapy        Follow Up  No follow-ups on file.  Schedule with PharmD as needed   [facility] to schedule hospital visit follow-up with PCP       Subjective & Objective                                                       Liu GIL Malave is a 55 y.o. male called for a transitions of care visit. he was discharged from [facility] on 6/29/2020 for Sepsis. Patient is non-verbal. Spoke with daughter, Idalmis.  was initially present for call, but [facility] did not require an .     Patient consented to a telehealth visit: Yes    Chief Complaint: Medication Management - \"they want us to decrease the antibiotic and do that for a month. What happens after that?\"     Medication Adherence/Access: [facility] is managing patient's medications. Meds given via G Tube. Med list reconciled today.       Sepsis: Presented " with fever T-max 101. Treated with IV ceftriaxone transitioned to oral and discharged on Cephalexin 500 mg three times a day to complete 2 week course then decrease to 250 mg daily for prophylaxis.     Has only been given two times a day.     Family is checking temperature regular     CVA: Right-sided hemiplegia, chronic dysphagia with nutrition through G tube, non-verbal, getting 24 hour cares by family members. Prescribed aspirin 81 mg daily and simvastatin 40 mg daily.     Not giving aspirin daily.     Lab Results   Component Value Date    LDLCALC 138 (H) 03/02/2020        Hypertension: Prescribed Metoprolol tartrate 25 mg 0.5 tab two times a day.     110-120/70-80  Pulse: 60-70s. When he gets irritated it goes.     BP Readings from Last 3 Encounters:   06/29/20 151/70   06/19/20 120/72   06/16/20 134/82     Pulse Readings from Last 3 Encounters:   06/29/20 69   06/19/20 72   06/16/20 73          PMH: reviewed in EPIC   Allergies/ADRs: reviewed in EPIC   Alcohol:   Social History     Substance and Sexual Activity   Alcohol Use No        Tobacco:   Social History     Tobacco Use   Smoking Status Former Smoker     Packs/day: 1.00     Years: 30.00     Pack years: 30.00     Last attempt to quit: 6/5/2005     Years since quitting: 15.0   Smokeless Tobacco Never Used     Recent Vitals:   BP Readings from Last 3 Encounters:   06/29/20 151/70   06/19/20 120/72   06/16/20 134/82      Wt Readings from Last 3 Encounters:   06/29/20 126 lb 6.4 oz (57.3 kg)   06/04/20 120 lb (54.4 kg)   02/22/20 122 lb 4.8 oz (55.5 kg)     ----------------    The patient declined an after visit summary    I spent 21 minutes with this patient today;  . All changes were made via collaborative practice agreement with Lynda Gutierrez MD. A copy of the visit note was provided to the patient's provider.     Faizan Romero PharmD  Medication Therapy Management (MTM) Pharmacist  [facility]      Telemedicine Visit Details    Type  of service:  Telephone     Start Time: 10:38 AM  End Time (time video/phone call stopped): 10:59 AM     Originating Location (pt. Location): Home    Distant Location (provider location):  [facility] MEDICATION THERAPY MANAGEMENT [facility]     Mode of Communication:   Telephone     Current Outpatient Medications   Medication Sig Dispense Refill     acetaminophen (TYLENOL) 500 MG tablet 500 mg by G-tube route every 6 (six) hours as needed for pain.       aspirin 81 mg chewable tablet Chew 1 tablet (81 mg total) daily.  0     baclofen (LIORESAL) 10 MG tablet take 1 tablet (10 mg total) by G-tube route 3 (three) times a day. Crush tablet. 270 tablet 3     benzocaine (ORAJEL) 10 % mucosal gel Apply to mouth/lip sores as needed for pain. 9 g 3     bisacodyL (DULCOLAX) 10 mg suppository INSERT 1 SUPPOSTIORY INTO THE RECTUM DAILY AS NEEDED FOR CONSTIPATION. 30 suppository 2     calcium, as carbonate, (TUMS) 200 mg calcium (500 mg) chewable tablet Chew 1 tablet 3 (three) times a day.       cephALEXin (KEFLEX) 250 mg/5 mL suspension 10 ml three times a day for 10 days days then 5 ml daily 400 mL 0     esomeprazole (NEXIUM) 20 MG capsule take 1 capsule by G-tube twice daily. 180 capsule 2     ipratropium-albuterol (DUO-NEB) 0.5-2.5 mg/3 mL nebulizer Take 3 mL by nebulization every 6 (six) hours as needed (wheezing/cough). 90 mL 2     levETIRAcetam (KEPPRA) 100 mg/mL solution TAKE 7.5 MILLILITERS BY G-TUBE 2 TIMES DAILY. 450 mL 5     metoprolol tartrate (LOPRESSOR) 25 MG tablet 12.5 mg by G-tube route 2 (two) times a day.       QUEtiapine (SEROQUEL) 25 MG tablet 25 mg by G-tube route at bedtime.       QUEtiapine (SEROQUEL) 25 MG tablet 25 mg by G-tube route daily as needed (for agitation).       simvastatin (ZOCOR) 40 MG tablet Take 1 tablet (40 mg total) by mouth every evening. 90 tablet 3     triamcinolone (KENALOG) 0.1 % paste Take 1 application by mouth 4 (four) times a day as needed (for mouth sores). 5 g 3     No  current facility-administered medications for this visit.

## 2021-06-09 NOTE — TELEPHONE ENCOUNTER
RN Triage:   Daughter is calling father had a fever last night. 101 last night.   Cool cloths to the body.   Patient has a strong odor to the urine.   NO pain with urination.   Cloudy urine.   NO known blood in the urine.   Non verbal patient.   Does not walk. Patient is in a wheel chair. Advised ED for evaluation as he has a history of sepsis from UTI. Daughter agreed.     Sandra Pollack RN, BSN [facility] Triage Nurse          Reason for Disposition    Patient sounds very sick or weak to the triager    Bad or foul-smelling urine    Additional Information    Negative: Shock suspected (e.g., cold/pale/clammy skin, too weak to stand, low BP, rapid pulse)    Negative: Sounds like a life-threatening emergency to the triager    Negative: Followed a genital area injury    Negative: Followed a genital area injury (penis, scrotum)    Negative: Vaginal discharge    Negative: Pus (white, yellow) or bloody discharge from end of penis    Negative: [1] Taking antibiotic for urinary tract infection (UTI) AND [2] female    Negative: [1] Taking antibiotic for urinary tract infection (UTI) AND [2] male    Negative: [1] Discomfort (pain, burning or stinging) when passing urine AND [2] pregnant    Negative: [1] Discomfort (pain, burning or stinging) when passing urine AND [2] postpartum (from 0 to 6 weeks after delivery)    Negative: [1] Discomfort (pain, burning or stinging) when passing urine AND [2] female    Negative: [1] Discomfort (pain, burning or stinging) when passing urine AND [2] male    Negative: Pain or itching in the vulvar area    Negative: Pain in scrotum is main symptom    Negative: Blood in the urine is main symptom    Negative: Symptoms arising from use of a urinary catheter (Kelly or Coude)    Fever > 100.5 F (38.1 C)    Negative: [1] Unable to urinate (or only a few drops) > 4 hours AND     [2] bladder feels very full (e.g., palpable bladder or strong urge to urinate)    Negative: [1] Decreased urination and  [2] drinking very little AND [2] dehydration suspected (e.g., dark urine, no urine > 12 hours, very dry mouth, very lightheaded)    Protocols used: URINARY SYMPTOMS-A-AH

## 2021-06-09 NOTE — TELEPHONE ENCOUNTER
RN Triage  Idalmis, daughter, calling today on Liu's behalf. Liu was hospitalized for 2 days on 6/27, discharged 6/29. He was put on an antibiotic for UTI. Was recommended to follow up with PCP following hospitalization for med check. Not currently having further UTI/sepsis symptoms Idalmis reports.     Idalmis would like follow up telephone visit with PCP to review hospitalization and go over medications again. Idalmis says it was mentioned to her maybe that Liu should be on long term antibiotics to prevent sepsis from occurring again. Scheduling to assist with telephone visit to address these questions.     Valerie Flores RN  [facility] Nurse Advisor      Reason for Disposition    Caller requesting a NON-URGENT new prescription or refill and triager unable to refill per department policy    Protocols used: MEDICATION QUESTION CALL-A-OH    COVID 19 Nurse Triage Plan/Patient Instructions    Please be aware that novel coronavirus (COVID-19) may be circulating in the community. If you develop symptoms such as fever, cough, or SOB or if you have concerns about the presence of another infection including coronavirus (COVID-19), please contact your health care provider or visit [email or web address].     Disposition/Instructions    Virtual Visit with provider recommended. Reference Visit Selection Guide.    Thank you for taking steps to prevent the spread of this virus.  o Limit your contact with others.  o Wear a simple mask to cover your cough.  o Wash your hands well and often.    Resources    [facility]: About COVID-19: [email or web address]    CDC: What to Do If You're Sick: www.cdc.gov/coronavirus/2019-ncov/about/steps-when-sick.html    CDC: Ending Home Isolation: www.cdc.gov/coronavirus/2019-ncov/hcp/disposition-in-home-patients.html     CDC: Caring for Someone: www.cdc.gov/coronavirus/2019-ncov/if-you-are-sick/care-for-someone.html     DM: Interim Guidance for Hospital Discharge to Home:  www.health.[facility].mn.us/diseases/coronavirus/hcp/hospdischarge.pdf    [facility] clinical trials (COVID-19 research studies): clinicalaffairs.[facility]/umn-clinical-trials     Below are the COVID-19 hotlines at the [facility] of [facility] ([facility]). Interpreters are available.   o For health questions: Call 755-520-6727 or 1-366.834.6102 (7 a.m. to 7 p.m.)  o For questions about schools and childcare: Call [phone] or 1-964.204.9160 (7 a.m. to 7 p.m.)

## 2021-06-09 NOTE — PROGRESS NOTES
Assessment/Plan:     1. Focal motor seizure  It is unclear to me whether this is seizure disorder or whether this could be spasm due to electrolyte abnormalities, or other condition.  It certainly concerning.  Appears posturing in nature.  I do not suspect that this is due to an underlying musculoskeletal condition.  I am concerned about the amount of distress it is causing him, breaking out in a sweat from the discomfort.  For this reason, I feel that he needs more urgent management of his pain and more urgent evaluation.  I have referred him to [facility] emergency room for further evaluation and management.  Patient and his daughter are comfortable with this plan, she will bring him there by car.    2. 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    3. Abdominal pain  While I did not seem that he has an acute abdomen, he is rather tense in his abdomen, and he likely will require further imaging to assess whether discomfort is from complications of the GJ tube or from other underlying condition.  Will defer to emergency team and further assessment of this as well.  It is possible that constipation is contributing to his discomfort.      Subjective:      Liu Malave is a 51 y.o. male brought to clinic today by his daughter and seen in the presence of a certified [facility] .  Patient has had increased shaking in his legs over the past 2 weeks.  He was seen by Dr. Price of neurology on February 1 for this, she called it seizure at this time, and adjusted his medications mildly.  Continues to have episodes of shaking in his legs daily and over the past 2 days he has developed inversion of his right foot that is quite distressing and uncomfortable for him.  When it occurs his foot seems to shake more so.  He has not been able to relax it.  He is aware of this going on.  He does not have any sleepiness during the time.   Nothing new in his right upper extremity and no other recognized focal neurologic symptoms.  Patient is nonverbal at baseline from what I am able to understand at the time of his visit.  He does have a history of prior CVA.  Currently has a GJ tube for nutrition.  This was last replaced in September 2016.  He is complaining of abdominal pain off and on.  There has been no drainage.  There has been no blockage of the tube but family is able to recognize.  He has been constipated over the last few days with reduction in bowel movements.  No fevers or chills to their knowledge.    Current Outpatient Prescriptions   Medication Sig Dispense Refill     amino acids-protein hydrolys (PROSOURCE NO CARB) 15-60 gram-kcal/30 mL LiPk 1 packet by Enteral Tube route daily.  0     amoxicillin (AMOXIL) 500 MG capsule   0     chlorhexidine (PERIDEX) 0.12 % solution Apply 15 mL to the mouth or throat 2 (two) times a day. Or Swish and spit 900 mL 1     doxazosin (CARDURA) 2 MG tablet 1 tablet (2 mg total) by G-tube route 2 (two) times a day. 60 tablet 11     HYDROcodone-acetaminophen (HYCET) 7.5-325 mg/15 mL Take 15 mL by mouth every 6 (six) hours as needed for pain. 150 mL 0     levETIRAcetam (KEPPRA) 100 mg/mL solution 7.5 mL (750 mg total) by G-tube route 2 (two) times a day. 473 mL 12     lisinopril (PRINIVIL,ZESTRIL) 10 MG tablet 1 tablet (10 mg total) by G-tube route daily. 30 tablet 11     omeprazole (PRILOSEC) 20 MG capsule Take 1 capsule by mouth daily.  3     QUEtiapine (SEROQUEL) 25 MG tablet Take 0.5 tablets (12.5 mg total) by mouth as needed (q 8 hours for agitation and 1 full tab HS prn for sleep difficulty). 90 tablet 2     simvastatin (ZOCOR) 20 MG tablet 1 tablet (20 mg total) by G-tube route bedtime. 30 tablet 11     sodium chloride 1 gram tablet 2 tablets (2 g total) by G-tube route 2 (two) times a day. 120 tablet 11     traMADol (ULTRAM) 50 mg tablet Take 1 tablet (50 mg total) by mouth every 6 (six) hours as  needed for pain. 30 tablet 1     traZODone (DESYREL) 50 MG tablet 3 tablets (150 mg total) by G-tube route bedtime. 90 tablet 3     triamcinolone (KENALOG) 0.1 % paste Apply twice daily 5 g 12     amLODIPine (NORVASC) 5 MG tablet TAKE 1 TABLET (5 MG TOTAL) BY MOUTH DAILY 30 tablet 10     Current Facility-Administered Medications   Medication Dose Route Frequency Provider Last Rate Last Dose     botulinum toxin Type A (Cosm) injection 200 Units (BOTOX)  200 Units Intramuscular Once Tami Price MD           Past Medical History, Family History, and Social History reviewed.  Past Medical History:   Diagnosis Date     Hypertension      Stroke      Past Surgical History:   Procedure Laterality Date     BRAIN SURGERY       PICC  6/10/2016          Review of patient's allergies indicates no known allergies.  Family History   Problem Relation Age of Onset     Hypertension Mother      Cancer Father      Kidney disease Brother      Social History     Social History     Marital status:      Spouse name: N/A     Number of children: N/A     Years of education: N/A     Occupational History     Not on file.     Social History Main Topics     Smoking status: Former Smoker     Packs/day: 1.00     Years: 30.00     Quit date: 6/5/2005     Smokeless tobacco: Never Used     Alcohol use No     Drug use: No     Sexual activity: Not on file     Other Topics Concern     Not on file     Social History Narrative         Review of systems is as stated in HPI, and the remainder of the 10 system review is otherwise negative.    Objective:     Vitals:    02/22/17 1653   BP: 124/72   Patient Site: Left Arm   Patient Position: Sitting   Cuff Size: Adult Regular   Pulse: 62   Temp: 98.2  F (36.8  C)   TempSrc: Oral    There is no height or weight on file to calculate BMI.    Alert male, meets my gaze easily and appropriately.  He is sweating profusely when I see him in the exam room and appears uncomfortable.  He is under no respiratory  distress.  Site of GJ tube is intact without any erythema or drainage.  I am able to palpate his abdomen and his muscles are a bit rigid but he does not seem to have any objective tenderness that I am able to appreciate.  Frequently throughout my time in the exam room he raises his right leg in the air as his right ankle inverts and shakes with a regular frequency.  It will then seem to relax a bit more though it seems that when I  his foot to move it or when he attempts to move it the spasm and shaking recurs.  She has good peripheral pulses.  I do not appreciate any focal neurologic deficits otherwise as he is seated in his chair today.  He has no edema.  Is a small sore on his right outer lower lip.  On occasion he grinds his teeth loudly during her visit.      This note has been dictated using voice recognition software. Any grammatical or context distortions are unintentional and inherent to the the software.

## 2021-06-09 NOTE — TELEPHONE ENCOUNTER
Placed call to schedule MTM appt after post discharge. If patient calls back please schedule MTM appt.    Thanks,  Evelyn Salgado, MTM Coordinator

## 2021-06-10 NOTE — TELEPHONE ENCOUNTER
Pt's daughter returned call. States she will schedule lab appointment when COVID slows down. She is not comfortable bringing Liu out at this time.

## 2021-06-10 NOTE — TELEPHONE ENCOUNTER
Central PA team  [phone]  Pool: HE PA MED ([id number])          PA has been initiated.       PA form completed and faxed insurance via Cover My Meds     Key:  [id number]     Medication:  Nitrofurantoin 25MG/5ML suspension    Insurance:  [employer]         Response will be received via fax and may take up to 5-10 business days depending on plan

## 2021-06-10 NOTE — PROGRESS NOTES
"Liu Malave is a 55 y.o. male who is being evaluated via a billable telephone visit.      The patient has been notified of following:     \"This telephone visit will be conducted via a call between you and your physician/provider. We have found that certain health care needs can be provided without the need for a physical exam.  This service lets us provide the care you need with a short phone conversation.  If a prescription is necessary we can send it directly to your pharmacy.  If lab work is needed we can place an order for that and you can then stop by our lab to have the test done at a later time.    Telephone visits are billed at different rates depending on your insurance coverage. During this emergency period, for some insurers they may be billed the same as an in-person visit.  Please reach out to your insurance provider with any questions.    If during the course of the call the physician/provider feels a telephone visit is not appropriate, you will not be charged for this service.\"    Patient has given verbal consent to a Telephone visit? Yes    What phone number would you like to be contacted at? [phone]    Patient would like to receive their AVS by AVS Preference: Dominique.    Additional provider notes: see prob based     Problem List Items Addressed This Visit     Recurrent UTI     Prior Stroke   Non verbal  No C/O pain     Just this AM  \"irritated\"  He is restless  Kicking things  With Fever  Tylenol >>> Fever reduced, more calm  Baclofen  three times a day  and Seroquel scheduled     Nitrofuntoin Cap BReak          Relevant Medications    nitrofurantoin, macrocrystal-monohydrate, (MACROBID) 100 MG capsule    Personal history of urinary tract infection    Relevant Medications    nitrofurantoin, macrocrystal-monohydrate, (MACROBID) 100 MG capsule    Fever, unspecified fever cause - Primary    Relevant Orders    Culture, Blood    Culture, Blood    Iron and Transferrin Iron Binding Capacity    " Ferritin    Lactate Dehydrogenase (LDH)    C-Reactive Protein      Other Visit Diagnoses     Anemia, unspecified type        Relevant Orders    Vitamin B12    Folate, Serum            Assessment/Plan:  1. Fever, unspecified fever cause  1 day of feeling irritated may be a little bit of a fever  Prior history of bladder infections  Nonverbal  According to the daughter Michelle more agitated  Currently on Macrobid  - Culture, Blood; Future  - Culture, Blood; Future  - Iron and Transferrin Iron Binding Capacity; Future  - Ferritin; Future  - Lactate Dehydrogenase (LDH); Future  - C-Reactive Protein; Future    2. Personal history of urinary tract infection  Given recurrent symptoms consider urology appointment  Has had blood in his urine    3. Anemia, unspecified type  In hospital blood count 9.1  Unclear etiology  Could be hemolysis  Has had blood in his urine  Plan check iron levels folic acid B12  Repeat blood count  Spoke with the daughter about possible: Work-up stomach work-up or also checking for bladder issues  She will have a candid discussion with patient's primary Dr. Gutierrez  - Vitamin B12; Future  - Folate, Serum; Future        Phone call duration:  10:41 -11:01  20 min     Bruno López MD         Culture  >100,000 col/ml Escherichia coliAbnormal            Resulting Agency: [facility]    Susceptibility      Escherichia coli      LAURA      Amoxicillin + Clavulanate  <=4/2   Sensitive      Ampicillin  <=4   Sensitive      Cefazolin  2   Sensitive      Cefepime  <=1   Sensitive      Ceftriaxone  <=1   Sensitive      Ciprofloxacin  >2   Resistant      Gentamicin  <=2   Sensitive      Levofloxacin  >4   Resistant      Meropenem  <=0.25   Sensitive      Nitrofurantoin  <=16   Sensitive      Tetracycline  >8   Resistant      Tobramycin  <=2   Sensitive      Trimethoprim + Sulfamethoxazole  >2/38   Resistant                  Specimen Collected: 06/27/20 12:39    Last Resulted: 06/29/20 07:42

## 2021-06-10 NOTE — TELEPHONE ENCOUNTER
----- Message from Lynda Gutierrez MD sent at 8/13/2020  3:38 PM CDT -----  Please let patient's family know that urine looks okay.  No signs of a UTI.

## 2021-06-10 NOTE — PROGRESS NOTES
"Liu Malave is a 55 y.o. male who is being evaluated via a billable video visit.      The patient has been notified of following:     \"This video visit will be conducted via a call between you and your physician/provider. We have found that certain health care needs can be provided without the need for an in-person physical exam.  This service lets us provide the care you need with a video conversation.  If a prescription is necessary we can send it directly to your pharmacy.  If lab work is needed we can place an order for that and you can then stop by our lab to have the test done at a later time.    Video visits are billed at different rates depending on your insurance coverage. Please reach out to your insurance provider with any questions.    If during the course of the call the physician/provider feels a video visit is not appropriate, you will not be charged for this service.\"    Patient has given verbal consent to a Video visit? Yes  How would you like to obtain your AVS? AVS Preference: MyChart.  If dropped by the video visit, the video invitation should be sent to: Text to cell phone: [phone]   Will anyone else be joining your video visit? No        Video Start Time: 9:10        Assessment/Plan:     1. SIRS (systemic inflammatory response syndrome) (H)     2. Need for prophylaxis against urinary tract infection  nitrofurantoin (FURADANTIN) 25 mg/5 mL suspension   3. Agitation  QUEtiapine (SEROQUEL) 25 MG tablet   4. Essential hypertension  Comprehensive Metabolic Panel   5. Mixed hyperlipidemia  Lipid [place] RANDOM    Comprehensive Metabolic Panel   6. Anemia, unspecified type  HM2(CBC w/o Differential)       Diagnoses and all orders for this visit:    SIRS (systemic inflammatory response syndrome) (H)  Resolved    Need for prophylaxis against urinary tract infection  -     nitrofurantoin (FURADANTIN) 25 mg/5 mL suspension; Take 100 mg (20 ml) by G-tube at bedtime  Dispense: 600 mL; Refill: " 11    Agitation  -     QUEtiapine (SEROQUEL) 25 MG tablet; 1 tablet (25 mg total) by G-tube route every 6 (six) hours as needed (for agitation).; Refill: 0  -Discussed it is okay to take Seroquel every 6 hours as needed for agitation.  This can be in addition to his bedtime dose    Essential hypertension  -     Comprehensive Metabolic Panel; Future; Expected date: 08/03/2020  -Controlled with current medication    Mixed hyperlipidemia  -     Lipid [place] RANDOM; Future; Expected date: 08/03/2020  -     Comprehensive Metabolic Panel; Future; Expected date: 08/03/2020  -He did have dosage increase in March.  Has not had lipids checked since this dosage increase.  Recommend that he come in for labs to recheck this.  If lipids above goal, will need to change to high intensity statin    Anemia, unspecified type  -     HM2(CBC w/o Differential); Future; Expected date: 08/03/2020  -Continue to monitor.  Plan to recheck hemoglobin when he comes in for labs.                 Subjective:      Liu Malave is a 55 y.o. male who is evaluated today via video encounter for hospital follow-up visit.  He is in today and assist with interpretation.  Patient was recently admitted July 25 through July 27, 2020 due to concern of time.  He does have history of recurrent urinary tract infections.  Initially met criteria for Sirs on admission.  Was started on Zosyn for presumed urosepsis.  Ultimately urine cultures returned back negative.  He had been taking cephalexin to prevent UTI.  After ID consultation he was switched to nitrofurantoin at discharge for UTI prophylaxis.  His daughter reports that since they have been home he has been doing well.  They do monitor his vital signs.  She reports that blood pressures are around 110/75.  Pulse is in the 80s.  Oxygen sats are 92 to 93% on room air.  He does have a bit of a chronic cough.  This is unchanged.  He has not had recent fevers.  No shortness of breath.  Bowel movements are normal.   Urine appears normal.  He does have a G-tube.  They have noticed a little bit of serosanguineous discharge from the G-tube site.  Skin around the G-tube site is normal and not erythematous or warm.  There is been no purulent discharge from the G-tube site.  They would like a prescription for nitrofurantoin changed from a capsule to a liquid.  They also have questions about the dose of Seroquel.  He currently takes this at bedtime to help with agitation.  They report that he typically takes it about 6 PM.  He will wake up at midnight and they will give him another dose and then he will sleep to about 4 AM.  They wonder if he can take additional doses.  Otherwise they have no other concerns or questions.  Review of systems is otherwise negative.    Current Outpatient Medications   Medication Sig Dispense Refill     acetaminophen (TYLENOL) 500 MG tablet 500 mg by G-tube route every 6 (six) hours as needed for pain.       aspirin 81 MG EC tablet 81 mg by G-tube route daily.       baclofen (LIORESAL) 10 MG tablet take 1 tablet (10 mg total) by G-tube route 3 (three) times a day. Crush tablet. 270 tablet 3     benzocaine (ORAJEL) 10 % mucosal gel Apply to mouth/lip sores as needed for pain. 9 g 3     bisacodyL (DULCOLAX) 10 mg suppository INSERT 1 SUPPOSTIORY INTO THE RECTUM DAILY AS NEEDED FOR CONSTIPATION. 30 suppository 8     calcium, as carbonate, (TUMS) 200 mg calcium (500 mg) chewable tablet 1 tablet by G-tube route 3 (three) times a day.        esomeprazole (NEXIUM) 20 MG capsule take 1 capsule by G-tube twice daily. 180 capsule 2     ipratropium-albuterol (DUO-NEB) 0.5-2.5 mg/3 mL nebulizer Take 3 mL by nebulization every 6 (six) hours as needed (wheezing/cough). 90 mL 2     levETIRAcetam (KEPPRA) 100 mg/mL solution TAKE 7.5 MILLILITERS BY G-TUBE 2 TIMES DAILY. 450 mL 5     metoprolol tartrate (LOPRESSOR) 25 MG tablet 12.5 mg by G-tube route 2 (two) times a day.       nitrofurantoin (FURADANTIN) 25 mg/5 mL  suspension Take 100 mg (20 ml) by G-tube at bedtime 600 mL 11     QUEtiapine (SEROQUEL) 25 MG tablet 25 mg by G-tube route at bedtime.       QUEtiapine (SEROQUEL) 25 MG tablet 1 tablet (25 mg total) by G-tube route every 6 (six) hours as needed (for agitation).  0     simvastatin (ZOCOR) 40 MG tablet Take 1 tablet (40 mg total) by mouth every evening. 90 tablet 3     triamcinolone (KENALOG) 0.1 % paste Take 1 application by mouth 4 (four) times a day as needed (for mouth sores). 5 g 3     No current facility-administered medications for this visit.        Past Medical History, Family History, and Social History reviewed.  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     Created by Conversion  Replacement Utility updated for latest IMO load     Sepsis due to urinary tract infection (H) 6/4/2020     Stroke (H)      Past Surgical History:   Procedure Laterality Date     BRAIN SURGERY       HH MIDLINE INSERTION  8/11/2017          IR GJ TUBE REPLACEMENT  12/24/2018     IR GJ TUBE REPLACEMENT  3/18/2019     IR GJ TUBE REPLACEMENT  4/23/2019     IR GJ TUBE REPLACEMENT  5/21/2019     IR GJ TUBE REPLACEMENT  9/9/2019     IR GJ TUBE REPLACEMENT  9/26/2019     IR GJ TUBE REPLACEMENT  10/11/2019     IR GJ TUBE REPLACEMENT  11/21/2019     IR GJ TUBE REPLACEMENT  11/29/2019     IR GJ TUBE REPLACEMENT  2/27/2020     IR GJ TUBE REPLACEMENT  4/10/2020     IR GJ TUBE REPLACEMENT  7/2/2020     PICC  6/10/2016          Diphenhydramine and Trazodone  Family History   Problem  Relation Age of Onset     Hypertension Mother      Cancer Father      Kidney disease Brother      Social History     Socioeconomic History     Marital status:      Spouse name: Not on file     Number of children: Not on file     Years of education: Not on file     Highest education level: Not on file   Occupational History     Not on file   Social Needs     Financial resource strain: Not on file     Food insecurity     Worry: Not on file     Inability: Not on file     Transportation needs     Medical: Not on file     Non-medical: Not on file   Tobacco Use     Smoking status: Former Smoker     Packs/day: 1.00     Years: 30.00     Pack years: 30.00     Last attempt to quit: 6/5/2005     Years since quitting: 15.1     Smokeless tobacco: Never Used   Substance and Sexual Activity     Alcohol use: No     Drug use: No     Sexual activity: Never     Partners: Male   Lifestyle     Physical activity     Days per week: Not on file     Minutes per session: Not on file     Stress: Not on file   Relationships     Social connections     Talks on phone: Not on file     Gets together: Not on file     Attends Sabianist service: Not on file     Active member of club or organization: Not on file     Attends meetings of clubs or organizations: Not on file     Relationship status: Not on file     Intimate partner violence     Fear of current or ex partner: Not on file     Emotionally abused: Not on file     Physically abused: Not on file     Forced sexual activity: Not on file   Other Topics Concern     Not on file   Social History Narrative     Not on file         Review of systems is as stated in HPI, and the remainder of the 10 system review is otherwise negative.    Objective:     There were no vitals filed for this visit. There is no height or weight on file to calculate BMI.      Additional provider notes: GENERAL: Healthy, alert and no distress  EYES: Eyes grossly normal to inspection. No discharge or erythema, or obvious  scleral/conjunctival abnormalities.  RESP: No audible wheeze, cough, or visible cyanosis.  No visible retractions or increased work of breathing.    NEURO: Cranial nerves grossly intact. Mentation and speech appropriate for age.  PSYCH: Mentation appears normal, affect normal/bright, judgement and insight intact, normal speech and appearance well-groomed      Video-Visit Details    Type of service:  Video Visit    Video End Time (time video stopped): 9:23  Originating Location (pt. Location): Home    Distant Location (provider location):  [facility] MEDICINE/OB     Platform used for Video Visit: Trang Gutierrez MD

## 2021-06-10 NOTE — TELEPHONE ENCOUNTER
Prior Authorization Request  Who s requesting:  Pharmacy  Pharmacy Name and Location: [facility]  Medication Name: nitrofurantoin (FURADANTIN) 25 mg/5 mL suspension  Insurance Plan: [employer] and Medicaid  Insurance Member ID Number:  [id number] and [id number]  CoverMyMeds Key: N/A  Informed patient that prior authorizations can take up to 10 business days for response:   No  Okay to leave a detailed message: No

## 2021-06-10 NOTE — PROGRESS NOTES
Assessment:     1. Stomatitis  triamcinolone (KENALOG) 0.1 % paste   2. Tremor  Comprehensive Metabolic Panel    baclofen (LIORESAL) 10 MG tablet   3. GERD (gastroesophageal reflux disease)  esomeprazole (NEXIUM) 40 MG capsule       Plan:     1. Stomatitis  Patient has evidence of stomatitis and cracking of the corners of the mouth will treat with the following  medication  - triamcinolone (KENALOG) 0.1 % paste; Apply four times daily  Dispense: 5 g; Refill: 12    2. Tremor  Regular clonic tremor of right lower extremity patient is to continue on his baclofen; patient should take his Seroquel 25 mg twice daily  - Comprehensive Metabolic Panel  - baclofen (LIORESAL) 10 MG tablet; Please crush for G-tube and give one tablet at bedtime  Dispense: 60 tablet; Refill: 0    3. GERD (gastroesophageal reflux disease)  I have renewed the following medication to help the patient with his digestion  - esomeprazole (NEXIUM) 40 MG capsule; Take 1 capsule (40 mg total) by mouth every morning before breakfast.  Dispense: 60 capsule; Refill: 1      Subjective:   Patient is being seen here with 2 new complaints he recently was seen at the emergency room for a clonic tremor of his right lower extremity patient is X large massive CVA and is aphasic wheelchair-bound and is fed via G-tube.  Patient was started on baclofen for tremor daughter comes in who is translating the visit as no  is available today's complaint is soreness of lips cracking at the corners of mouth and soreness of tongue.  Patient also suffers from general anxiety disorder and depression.  All medical decision recommendations today were to increase his Seroquel to twice daily and take it regularly a whole tablet instead of a half a tablet 25 mg twice daily that is.  Patient's general appearance looks fine he is being fed via G-tube.  We are going to continue him on baclofen for this clonic like muscle twitching in his right lower extremity which is  bothering everybody.  I also filled out some disability papers for the [place] during this visit daughter denies any hemoptysis bowels are moving regularly review of systems otherwise unremarkable we will see him for follow-up 3 months I will check his sodium potassium and magnesium today.    Review of Systems: A complete 14 point review of systems was obtained and is negative or as stated in the history of present illness.    Past Medical History:   Diagnosis Date     Hypertension      Stroke      Family History   Problem Relation Age of Onset     Hypertension Mother      Cancer Father      Kidney disease Brother      Past Surgical History:   Procedure Laterality Date     BRAIN SURGERY       PIC  6/10/2016          Social History   Substance Use Topics     Smoking status: Former Smoker     Packs/day: 1.00     Years: 30.00     Quit date: 6/5/2005     Smokeless tobacco: Never Used     Alcohol use No         Objective:   /80  Pulse 96  SpO2 97%    General Appearance:  Alert patient is wheelchair-bound accompanied by his daughter he is aphasic and speaks no English in any event daughter is translating  Head:  Normocephalic, no obvious abnormality  Ears: TM anatomy normal  Eyes:  PERRL, EOM's intact, conjunctiva and corneas clear  Nose:  Nares symmetrical, septum midline, mucosa pink, no sinus tenderness  Throat:  Lips, tongue, and mucosa are moist, pink, and intact  Neck:  Supple, symmetrical, trachea midline, no adenopathy; thyroid: no enlargement, symmetric,no tenderness/mass/nodules; no carotid bruit, no JVD  Back:  Symmetrical, no curvature, ROM normal, no CVA tenderness  Chest/Breast:  No mass or tenderness  Lungs:  Clear to auscultation bilaterally, respirations unlabored   Heart:  Normal PMI, regular rate & rhythm, S1 and S2 normal, no murmurs, rubs, or gallops  Abdomen:  Soft, non-tender, bowel sounds active all four quadrants, no mass, or organomegaly  Musculoskeletal:  Tone and strength strong and  symmetrical, all extremities  Lymphatic:  No adenopathy  Skin/Hair/Nails:  Skin warm, dry, and intact, no rashes  Neurologic: Patient has a tremor is a phasic and wheelchair-bound lab; patient is drooling.  Extremities:  No edema.  David's sign negative.    Genitourinary: deferred  Pulses:  Equal bilaterally           This note has been dictated using voice recognition software. Any grammatical or context distortions are unintentional and inherent to the the software.

## 2021-06-10 NOTE — TELEPHONE ENCOUNTER
Drug Change Request  Who is calling?:  Pharmacy   What is the current medication?:     Disp  Refills  Start  End     ferrous sulfate 300 mg (60 mg iron)/5 mL syrup  150 mL  3  8/24/2020      Sig: Take 5 ml via G-tube daily     Sent to pharmacy as: ferrous sulfate 300 mg (60 mg iron)/5 mL oral liquid     E-Prescribing Status: Receipt confirmed by pharmacy (8/24/2020  9:22 AM CDT)       What alternative is being requested?: Ferrous sulfate oral solution #75 15Fe/mg/ml.  Why the request to change?:  Patient's insurance not covered.  Requested Pharmacy?: [id number]  Okay to leave a detailed message?:  No, speak with pharmacist.  [phone]

## 2021-06-10 NOTE — PROGRESS NOTES
MTM Transition of Care Encounter  Assessment & Plan                                                     Post Discharge Medication Reconciliation Status: discharge medications reconciled, continue medications without change    SIRS: Patient is feeling better.  Is taking new antibiotic and tolerating.  Reviewed that it does come in a suspension, therefore discussed this with PCP today.     CVA hx:  Patient to continue aspirin and statin.  Would recommend switching to a high intensity statin, especially since his last lipids were elevated.  Plan:  1.  Consider switching to high intensity statin, such as atorvastatin 40 mg or rosuvastatin 40 mg.     Hypertension: Last blood pressure was at goal.  Recommended to continue to monitor at home.    Spasms: Stable. Recommended to continue current regimen.     Seizure ppx: Stable. Recommended to continue current regimen.     Insomnia/Agitation: Stable. Recommended to continue current regimen.     GERD: Stable. Recommended to continue current regimen.     Hypocalcemia: Last calcium level was low, chronic issue.  We will continue to monitor, but until then continue to use Tums.    shortness of breath: Stable.  Continue to use as needed.    Constipation: Stable.  Continue current regimen.      Follow Up  As needed with MTM.  PCP appointment today    Subjective & Objective                                                       Liukurtis Malave is a 55 y.o. male called for a transitions of care visit. he was discharged from [facility] on 7/27/20 for SIRS. Patient is non-verbal. Spoke with daughter, Idalmis (does not require an ). Last spoke with Faizan on 7/3 and I last met patient in 2016. Meet with Dr. Gutierrez today.     Patient consented to a telehealth visit: Yes    Chief Complaint: doing ok since home.     Medication Adherence/Access: [facility] is managing patient's medications. Meds given via G Tube.    SIRS: Was in [facility] ED on 7/24/20 then transferred to [facility]. Patient was  previously admitted for sepsis on 6/27-6/29. Admitted with chief complaints of agitation and foul-smelling urine. Patient met criteria for SIRS on admission.  CTA of the chest showed no PE or infiltrates and CT abdomen did not show any acute abnormality.  Patient had significant leukocytosis on admission, was started on empiric IV Zosyn and vancomycin. Patient had one set of blood culture from admission positive for coag negative staph and the second was negative.  Antibiotics were discontinued per ID recommendations.  Urine cultures were also negative.  Patient is on chronic Keflex for recurrent UTI, this was switched to nitrofurantoin 100 mg HS per ID recommendation. Urine is clear and not smelly. Back to normal self. No diarrhea. Wondering about liquid or tablets.      CVA hx: Right-sided hemiplegia due to hx history of hemorrhagic basal ganglia stroke with hemiplegia of left-side. Chronic dysphagia with nutrition through G tube, non-verbal, getting 24 hour cares by family members. Prescribed aspirin 81 mg daily and simvastatin 40 mg daily. Confirms taking daily.   Last lipids checked 3/2/20      Hypertension: Prescribed Metoprolol tartrate 25 mg 0.5 tab (12.5 mg) two times a day. Checks BP at home and reports 120/75-80 mmHg and pulse: 75-80, if irritated it is higher.      BP Readings from Last 3 Encounters:   07/27/20 137/84   07/25/20 123/63   06/29/20 151/70     Pulse Readings from Last 3 Encounters:   07/27/20 76   07/25/20 97   06/29/20 69     Spasms: Related to CVA. Taking baclofen 10 mg three times a day. Reports helpful.     Seizure ppx: Taking levetiracetam 7.5 mL two times a day. No recent seizures.      Insomnia/Agitation: Taking quetiapine 25 mg HS + PRN. Sleeping ok. Daytime he does not use much, mostly using at night.     GERD: Taking esomeprazole 20 mg two times a day. stomach feels ok, but they dont know since he is nonverbal. No vomiting.     Hypocalcemia: Taking TUMS three times a  day    shortness of breath: Taking DuoNeb PRN. Using once a day as needed. Breathing is ok.     Constipation: Using bisacodyl suppository daily. Reports normal BMs currently.         PMH: reviewed in EPIC   Allergies/ADRs: reviewed in EPIC   Alcohol: reviewed in EPIC  Tobacco:   Social History     Tobacco Use   Smoking Status Former Smoker     Packs/day: 1.00     Years: 30.00     Pack years: 30.00     Last attempt to quit: 6/5/2005     Years since quitting: 15.1   Smokeless Tobacco Never Used     Recent Vitals:   BP Readings from Last 3 Encounters:   07/27/20 137/84   07/25/20 123/63   06/29/20 151/70      Wt Readings from Last 3 Encounters:   07/25/20 129 lb 11.2 oz (58.8 kg)   07/24/20 124 lb 3.2 oz (56.3 kg)   06/29/20 126 lb 6.4 oz (57.3 kg)     ----------------    The patient declined an after visit summary    I spent 14 minutes with this patient today;  . All changes were made via collaborative practice agreement with Lynda Gutierrez MD. A copy of the visit note was provided to the patient's provider.     Madhuri Whitehead, Pharm.D., [facility]  Medication Therapy Management Pharmacist  [facility]    Telemedicine Visit Details    Type of service:  Telephone     Start Time: 8:07 AM  End Time (time video/phone call stopped): 8:21 AM    Originating Location (pt. Location): Home    Distant Location (provider location):  [facility] MEDICATION THERAPY MANAGEMENT [facility]    Mode of Communication:   Telephone     Current Outpatient Medications   Medication Sig Dispense Refill     acetaminophen (TYLENOL) 500 MG tablet 500 mg by G-tube route every 6 (six) hours as needed for pain.       aspirin 81 MG EC tablet 81 mg by G-tube route daily.       baclofen (LIORESAL) 10 MG tablet take 1 tablet (10 mg total) by G-tube route 3 (three) times a day. Crush tablet. 270 tablet 3     benzocaine (ORAJEL) 10 % mucosal gel Apply to mouth/lip sores as needed for pain. 9 g 3     bisacodyL (DULCOLAX) 10 mg suppository  INSERT 1 SUPPOSTIORY INTO THE RECTUM DAILY AS NEEDED FOR CONSTIPATION. 30 suppository 8     calcium, as carbonate, (TUMS) 200 mg calcium (500 mg) chewable tablet 1 tablet by G-tube route 3 (three) times a day.        esomeprazole (NEXIUM) 20 MG capsule take 1 capsule by G-tube twice daily. 180 capsule 2     ipratropium-albuterol (DUO-NEB) 0.5-2.5 mg/3 mL nebulizer Take 3 mL by nebulization every 6 (six) hours as needed (wheezing/cough). 90 mL 2     levETIRAcetam (KEPPRA) 100 mg/mL solution TAKE 7.5 MILLILITERS BY G-TUBE 2 TIMES DAILY. 450 mL 5     metoprolol tartrate (LOPRESSOR) 25 MG tablet 12.5 mg by G-tube route 2 (two) times a day.       nitrofurantoin, macrocrystal-monohydrate, (MACROBID) 100 MG capsule 1 capsule (100 mg total) by G-tube route at bedtime. 30 capsule 0     QUEtiapine (SEROQUEL) 25 MG tablet 25 mg by G-tube route at bedtime.       QUEtiapine (SEROQUEL) 25 MG tablet 25 mg by G-tube route daily as needed (for agitation).       simvastatin (ZOCOR) 40 MG tablet Take 1 tablet (40 mg total) by mouth every evening. 90 tablet 3     triamcinolone (KENALOG) 0.1 % paste Take 1 application by mouth 4 (four) times a day as needed (for mouth sores). 5 g 3     No current facility-administered medications for this visit.

## 2021-06-10 NOTE — TELEPHONE ENCOUNTER
Central PA team  [phone]  Pool: HE PA MED ([id number])          PA has been initiated.       PA form completed and faxed insurance via Cover My Meds     Key:  [id number] - PA Case ID: [id number]     Medication:  Ferrous Sulfate 300 (60 Fe)MG/5ML syrup    Insurance:  [employer]         Response will be received via fax and may take up to 5-10 business days depending on plan

## 2021-06-10 NOTE — PROGRESS NOTES
Email:  Marital Status:   Children: Yes- 2 Girls 2 boys. My contact is his daughter Idalmis @ [phone]  Born and Raised: [place]- [place] to [place]. Has Green card-Permanent Resident.  Occupation: Unemployed   Living Situation: Lives with his wife, both daughters and his son in law  Smoking, Alcohol, other: None  Services receiving: [facility] and EMA  [facility] Follow up s: Monthly  CCC/[facility] Enrollment: September 7th 2016

## 2021-06-10 NOTE — TELEPHONE ENCOUNTER
Daughter Regulo was notified no sign of UTI , still waiting for other blood work results to process.

## 2021-06-10 NOTE — TELEPHONE ENCOUNTER
----- Message from Lynda Gutierrez MD sent at 8/24/2020  9:32 AM CDT -----  Please let pt's daughter Idalmis know that I double checked the nutritionist notes in his chart.  He does not need 2 L of free water daily in addition to his tube feeds.  He is already getting about 1 L of free water in his tube feeding formula.  He should get about 1 L daily in free water to supplement this.  Recommended free water is intake is 250 ml every 6 hours from flushes

## 2021-06-10 NOTE — TELEPHONE ENCOUNTER
----- Message from Lynda Gutierrez MD sent at 8/3/2020  2:25 PM CDT -----  Please let patient's family know that I forgot to mention he is due for recheck of cholesterol.  I have placed lab orders in his chart.     Stable.

## 2021-06-10 NOTE — TELEPHONE ENCOUNTER
Daughter Regulo called, Liu has a lab appointment today , she would like a urine to be added. Please order UA if appropriate.   She will  a urine container and at lab visit and take it home to collect and then bring it back. Pt had a virtual visit with DR López this morning.

## 2021-06-10 NOTE — PROGRESS NOTES
"Liu Malave is a 55 y.o. male who is being evaluated via a billable telephone visit.      The patient has been notified of following:     \"This telephone visit will be conducted via a call between you and your physician/provider. We have found that certain health care needs can be provided without the need for a physical exam.  This service lets us provide the care you need with a short phone conversation.  If a prescription is necessary we can send it directly to your pharmacy.  If lab work is needed we can place an order for that and you can then stop by our lab to have the test done at a later time.    Telephone visits are billed at different rates depending on your insurance coverage. During this emergency period, for some insurers they may be billed the same as an in-person visit.  Please reach out to your insurance provider with any questions.    If during the course of the call the physician/provider feels a telephone visit is not appropriate, you will not be charged for this service.\"    Patient has given verbal consent to a Telephone visit? Yes    What phone number would you like to be contacted at? [phone]  Daughter [facility] will be speaking with you    Patient would like to receive their AVS by AVS Preference: Dominique.        Assessment/Plan:     1. Iron deficiency anemia, unspecified iron deficiency anemia type  ferrous sulfate 300 mg (60 mg iron)/5 mL syrup   2. Agitation  QUEtiapine (SEROQUEL) 25 MG tablet   3. Microscopic hematuria         Diagnoses and all orders for this visit:    Iron deficiency anemia, unspecified iron deficiency anemia type  -     ferrous sulfate 300 mg (60 mg iron)/5 mL syrup; Take 5 ml via G-tube daily  Dispense: 150 mL; Refill: 3  -Discussed that low iron can result in low hemoglobin and low red blood cell count.  He also has hematuria and this could be contributing to his low hemoglobin and iron deficiency.  We will started iron supplements through the G-tube and to recheck " labs in the month.  Future lab orders will be placed.    Agitation  -     QUEtiapine (SEROQUEL) 25 MG tablet; Give 2 tablets via G-tube at bedtime for sleep. Give 1 tablet in the morning and 1 tablet in the afternoon as needed for agitation (via G-tube).  Dispense: 120 tablet; Refill: 11  -Updated prescription sent to pharmacy    Microscopic hematuria  We discussed possible causes of the hematuria.  Can be secondary to infection but last urine culture was negative.  CT scan of abdomen and pelvis did not show any signs of kidney stones or abnormalities within the kidney or bladder.  We discussed that further evaluation with urology could help determine the etiology of the hematuria but given his current medical status that this may not be warranted.  At this time, daughter favors not to pursue further evaluation.  She is aware that she can see urology at any time if they change their mind.         Subjective:      Liu Malave is a 55 y.o. male who is evaluated via telephone encounter to discuss recent lab results.  Patient is aphasic due to history of stroke and so his daughter Idalmis is on the telephone today to discuss concerns and recommendations.  She has questions about his labs.  He has history of low red blood cell count and low hemoglobin.  He also has had recent elevation of white blood cell count.  He has had blood in his urine.  He does have a history of recurrent urinary tract infections.  Most recently hospitalized for concern of sepsis but urine culture was negative.  He has been referred to urology for further evaluation of his recurrent UTIs and hematuria but they have not scheduled this appointment.  He did recently have some blood work drawn and he did have low iron levels.  He is not currently taking an iron supplement.  He does receive tube feeds for nutrition.  Medications are given through his G-tube.  Daughter would like to clarify dose of quetiapine.  Currently taking 2 tablets at bedtime for  sleep.  Takes 1 in the morning as well to help with agitation.  This dose change was made at his last virtual visit.  She will need a new prescription sent to his pharmacy.  Meds and allergies are reviewed and updated.  No other concerns or questions today.    Current Outpatient Medications   Medication Sig Dispense Refill     acetaminophen (TYLENOL) 500 MG tablet 500 mg by G-tube route every 6 (six) hours as needed for pain.       aspirin 81 MG EC tablet 81 mg by G-tube route daily.       baclofen (LIORESAL) 10 MG tablet take 1 tablet (10 mg total) by G-tube route 3 (three) times a day. Crush tablet. 270 tablet 3     bisacodyL (DULCOLAX) 10 mg suppository INSERT 1 SUPPOSTIORY INTO THE RECTUM DAILY AS NEEDED FOR CONSTIPATION. 30 suppository 8     calcium, as carbonate, (TUMS) 200 mg calcium (500 mg) chewable tablet 1 tablet by G-tube route 3 (three) times a day.        esomeprazole (NEXIUM) 20 MG capsule take 1 capsule by G-tube twice daily. 180 capsule 2     ipratropium-albuterol (DUO-NEB) 0.5-2.5 mg/3 mL nebulizer Take 3 mL by nebulization every 6 (six) hours as needed (wheezing/cough). 90 mL 2     levETIRAcetam (KEPPRA) 100 mg/mL solution TAKE 7.5 MILLILITERS BY G-TUBE 2 TIMES DAILY. 450 mL 5     metoprolol tartrate (LOPRESSOR) 25 MG tablet 12.5 mg by G-tube route 2 (two) times a day.       nitrofurantoin, macrocrystal-monohydrate, (MACROBID) 100 MG capsule Take 1 capsule (100 mg) by G-tube route at bedtime.       QUEtiapine (SEROQUEL) 25 MG tablet Give 2 tablets via G-tube at bedtime for sleep. Give 1 tablet in the morning and 1 tablet in the afternoon as needed for agitation (via G-tube). 120 tablet 11     simvastatin (ZOCOR) 40 MG tablet Take 1 tablet (40 mg total) by mouth every evening. 90 tablet 3     triamcinolone (KENALOG) 0.1 % paste Take 1 application by mouth 4 (four) times a day as needed (for mouth sores). 5 g 3     benzocaine (ORAJEL) 10 % mucosal gel Apply to mouth/lip sores as needed for pain. 9 g  3     ferrous sulfate 300 mg (60 mg iron)/5 mL syrup Take 5 ml via G-tube daily 150 mL 3     No current facility-administered medications for this visit.        Past Medical History, Family History, and Social History reviewed.  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     Created by Conversion  Replacement Utility updated for latest IMO load     Sepsis due to urinary tract infection (H) 6/4/2020     Stroke (H)      Past Surgical History:   Procedure Laterality Date     BRAIN SURGERY       HH MIDLINE INSERTION  8/11/2017          IR GJ TUBE REPLACEMENT  12/24/2018     IR GJ TUBE REPLACEMENT  3/18/2019     IR GJ TUBE REPLACEMENT  4/23/2019     IR GJ TUBE REPLACEMENT  5/21/2019     IR GJ TUBE REPLACEMENT  9/9/2019     IR GJ TUBE REPLACEMENT  9/26/2019     IR GJ TUBE REPLACEMENT  10/11/2019     IR GJ TUBE REPLACEMENT  11/21/2019     IR GJ TUBE REPLACEMENT  11/29/2019     IR GJ TUBE REPLACEMENT  2/27/2020     IR GJ TUBE REPLACEMENT  4/10/2020     IR GJ TUBE REPLACEMENT  7/2/2020     PICC  6/10/2016          Diphenhydramine and Trazodone  Family History   Problem Relation Age of Onset     Hypertension Mother      Cancer Father      Kidney disease Brother      Social History     Socioeconomic History     Marital status:      Spouse name: Not on file     Number of children: Not on file     Years of education: Not on file     Highest education level: Not on file   Occupational History     Not on file   Social Needs     Financial resource strain: Not on  file     Food insecurity     Worry: Not on file     Inability: Not on file     Transportation needs     Medical: Not on file     Non-medical: Not on file   Tobacco Use     Smoking status: Former Smoker     Packs/day: 1.00     Years: 30.00     Pack years: 30.00     Last attempt to quit: 6/5/2005     Years since quitting: 15.2     Smokeless tobacco: Never Used   Substance and Sexual Activity     Alcohol use: No     Drug use: No     Sexual activity: Never     Partners: Male   Lifestyle     Physical activity     Days per week: Not on file     Minutes per session: Not on file     Stress: Not on file   Relationships     Social connections     Talks on phone: Not on file     Gets together: Not on file     Attends [employer] service: Not on file     Active member of club or organization: Not on file     Attends meetings of clubs or organizations: Not on file     Relationship status: Not on file     Intimate partner violence     Fear of current or ex partner: Not on file     Emotionally abused: Not on file     Physically abused: Not on file     Forced sexual activity: Not on file   Other Topics Concern     Not on file   Social History Narrative     Not on file         Review of systems is as stated in HPI, and the remainder of the 10 system review is otherwise negative.    Objective:     There were no vitals filed for this visit. There is no height or weight on file to calculate BMI.    Exam not performed      This note has been dictated using voice recognition software. Any grammatical or context distortions are unintentional and inherent to the the software.       Phone call duration:  18 minutes    Lynda Gutierrez MD

## 2021-06-10 NOTE — TELEPHONE ENCOUNTER
Spoke with daughter Idalmis, discussed DR Gutierrez's message below. Agrees with plan, no other questions or concerns at this time.

## 2021-06-10 NOTE — PROGRESS NOTES
Pre-Procedure Unconfirmed COVID Test     Liu GIL Malave     Short/late add on due to tube malfunction.     COVID Screening  Due to the inability to confirm the patient's COVID status (positive or negative), the patient was screened for COVID symptoms     Patient reports the following:  Fever? No   Cough? No   Shortness of breath? No   Skin rash? No    If the patient is positive for new symptoms or worsening symptoms, and the procedure is deemed necessary by the ordering provider, notify your manager/supervisor     Patient Information  Patient informed of the no visitor policy  Patient instructed to continue to self-quarantine prior to procedure  Patient informed to contact the ordering provider if the following symptoms develop prior to procedure:   Fever  Cough  Shortness of Breath  Sore throat   Runny or stuffy nose  Muscle or body aches  Headaches  Fatigue  Vomiting or diarrhea   Rash    Pam Pereyra

## 2021-06-10 NOTE — PROGRESS NOTES
Pt arrives to have GJ tube replaced due to possible balloon rupture. Family says the tube fell out but she was able to put it back in. No changes to allergies or hx, is currently on an antibiotic for UTI.

## 2021-06-10 NOTE — TELEPHONE ENCOUNTER
I connected with scheduling for a telephone visit today with his daughter.  She is calling because his urine is cloudy, slimy and has a strong smell to it. He is on long term antibiotics. He is more irritable and tired.   COVID 19 Nurse Triage Plan/Patient Instructions    Please be aware that novel coronavirus (COVID-19) may be circulating in the community. If you develop symptoms such as fever, cough, or SOB or if you have concerns about the presence of another infection including coronavirus (COVID-19), please contact your health care provider or visit [email or web address].     Disposition/Instructions    Virtual Visit with provider recommended. Reference Visit Selection Guide.    Thank you for taking steps to prevent the spread of this virus.  o Limit your contact with others.  o Wear a simple mask to cover your cough.  o Wash your hands well and often.    Resources    [facility]: About COVID-19: [email or web address]    CDC: What to Do If You're Sick: www.cdc.gov/coronavirus/2019-ncov/about/steps-when-sick.html    CDC: Ending Home Isolation: www.cdc.gov/coronavirus/2019-ncov/hcp/disposition-in-home-patients.html     CDC: Caring for Someone: www.cdc.gov/coronavirus/2019-ncov/if-you-are-sick/care-for-someone.html     [facility]: Interim Guidance for Hospital Discharge to Home: www.health.[facility].mn.us/diseases/coronavirus/hcp/hospdischarge.pdf    [facility] clinical trials (COVID-19 research studies): [email or web address]-clinical-trials     Below are the COVID-19 hotlines at the [facility] ([facility]). Interpreters are available.   o For health questions: Call [phone] or 1-632.176.8850 (7 a.m. to 7 p.m.)  o For questions about schools and childcare: Call 030-322-3927 or 1-519.596.5197 (7 a.m. to 7 p.m.)   Lynda Silva RN  [facility]      Reason for Disposition    Bad or foul-smelling urine    Additional Information    Negative: Shock suspected (e.g.,  cold/pale/clammy skin, too weak to stand, low BP, rapid pulse)    Negative: Sounds like a life-threatening emergency to the triager    Negative: Followed a genital area injury    Negative: Followed a genital area injury (penis, scrotum)    Negative: Vaginal discharge    Negative: Pus (white, yellow) or bloody discharge from end of penis    Negative: [1] Taking antibiotic for urinary tract infection (UTI) AND [2] female    Negative: [1] Taking antibiotic for urinary tract infection (UTI) AND [2] male    Negative: [1] Discomfort (pain, burning or stinging) when passing urine AND [2] pregnant    Negative: [1] Discomfort (pain, burning or stinging) when passing urine AND [2] postpartum (from 0 to 6 weeks after delivery)    Negative: [1] Discomfort (pain, burning or stinging) when passing urine AND [2] female    Negative: [1] Discomfort (pain, burning or stinging) when passing urine AND [2] male    Negative: Pain or itching in the vulvar area    Negative: Pain in scrotum is main symptom    Negative: Blood in the urine is main symptom    Negative: Symptoms arising from use of a urinary catheter (Kelly or Coude)    Negative: [1] Unable to urinate (or only a few drops) > 4 hours AND     [2] bladder feels very full (e.g., palpable bladder or strong urge to urinate)    Negative: [1] Decreased urination and [2] drinking very little AND [2] dehydration suspected (e.g., dark urine, no urine > 12 hours, very dry mouth, very lightheaded)     He has  GJ tube    Negative: Patient sounds very sick or weak to the triager    Negative: Fever > 100.5 F (38.1 C)    Negative: Side (flank) or lower back pain present    Negative: [1] Can't control passage of urine (i.e., urinary incontinence) AND [2] new onset (< 2 weeks) or worsening    Negative: Urinating more frequently than usual (i.e., frequency)    Protocols used: URINARY SYMPTOMS-A-AH

## 2021-06-10 NOTE — PROGRESS NOTES
Assessment: /    Plan:    1. Hemorrhagic stroke     2. Aphasia         The [facility] schedulers made today's appointment with me because we have many immigrant patients who need a citizenship waiver, and we are familiar with the form.  [place] citizenship and immigration services requires that the patient's regular physician would be the one to certify a disability that would prevent the patient from communicating in English.  In order to facilitate the process for the patient and for Dr. Longoria, I completed portions of the form based upon information from the patient's medical record.  I gave the original to the patient and his daughter, and the remaining details and signatures can be completed.    This was a 45 minute visit with >50% of the time spent in counseling and coordination of care regarding: Hemorrhagic stroke and aphasia.      Subjective:    HPI:  Liu Malave is a 51-year-old male presenting for citizenship waiver.  He had a hemorrhagic stroke in June 2016.  One of the effects which resulted from that stroke is aphasia.    Social Hx: He is accompanied today by his daughter, who interpreted for the patient.    Review of Systems: He is currently at his baseline health status, with no recent fever or cough.      Current Outpatient Prescriptions   Medication Sig Dispense Refill     baclofen (LIORESAL) 10 MG tablet Please crush for G-tube and give one tablet at bedtime 20 tablet 0     doxazosin (CARDURA) 2 MG tablet 1 tablet (2 mg total) by G-tube route 2 (two) times a day. 60 tablet 11     levETIRAcetam (KEPPRA) 100 mg/mL solution 7.5 mL (750 mg total) by G-tube route 2 (two) times a day. 473 mL 12     lisinopril (PRINIVIL,ZESTRIL) 10 MG tablet 1 tablet (10 mg total) by G-tube route daily. 30 tablet 11     omeprazole (PRILOSEC) 40 MG capsule Take 1 capsule (40 mg total) by mouth Daily before breakfast. 90 capsule 0     QUEtiapine (SEROQUEL) 25 MG tablet Take 0.5 tablets (12.5 mg total) by mouth as needed (q 8  hours for agitation and 1 full tab HS prn for sleep difficulty). 90 tablet 2     simvastatin (ZOCOR) 20 MG tablet 1 tablet (20 mg total) by G-tube route bedtime. 30 tablet 11     traMADol (ULTRAM) 50 mg tablet TAKE 1 TABLET BY MOUTH EVERY 6 HOURS AS NEEDED FOR PAIN 30 tablet 0     traZODone (DESYREL) 150 MG tablet 150 mg by Enteral Tube route bedtime.       triamcinolone (KENALOG) 0.1 % paste   12     venlafaxine (EFFEXOR) 75 MG tablet 75 mg by Enteral Tube route 2 (two) times a day.       omeprazole (PRILOSEC) 20 MG capsule 40 mg by Enteral Tube route daily.   3     Current Facility-Administered Medications   Medication Dose Route Frequency Provider Last Rate Last Dose     botulinum toxin Type A (Cosm) injection 200 Units (BOTOX)  200 Units Intramuscular Once Tami Price MD             Objective:    Vitals:    04/26/17 0911   BP: 128/82   Pulse: 94   Resp: 18   Temp: 97.8  F (36.6  C)   SpO2: 94%       Gen:  NAD, VSS.  He is seated in a wheelchair.  He is able to make sounds, but is not able to produce clear speech.  It appears that he understands what his daughter says to him.  Lungs:  normal  Heart:  normal          ADDITIONAL HISTORY SUMMARIZED (2): Reviewed 1/25/2017 note by Dr. Longoria.  DECISION TO OBTAIN EXTRA INFORMATION (1): None.   RADIOLOGY TESTS (1): Reviewed 2/22/2017 head CT.  LABS (1): None.  MEDICINE TESTS (1): None.  INDEPENDENT REVIEW (2 each): None.     Total Data Points: 3

## 2021-06-10 NOTE — PATIENT INSTRUCTIONS - HE
Blood and URine     Continue Macrobid    Urine Culture  Blood CUlture  CRP    Anemia   Check IRon/B12/Folic /LDH     Follow up with Dr Gutierrez 2 weeks  Look at Bladder     Urology Appt?  Screen for Ulcer / Colon Cancer?

## 2021-06-10 NOTE — TELEPHONE ENCOUNTER
I don't see a PA was done regarding the Ferrous sulfate oral solution unless I missed this in the chart. Can you try a PA for this medication?

## 2021-06-10 NOTE — TELEPHONE ENCOUNTER
----- Message from Lynda Gutierrez MD sent at 8/3/2020  2:25 PM CDT -----  Please let patient's family know that I forgot to mention he is due for recheck of cholesterol.  I have placed lab orders in his chart.

## 2021-06-11 NOTE — PROGRESS NOTES
MRN:  [id number]    Patient name: Liu Malave    Care Guide: Elisabeth    Discuss at Care Conferences: Done on 6/30/17    Barriers: Only recently received green card. Now has MA    Plan Summary: Pt is working on his goals. Care Guide working with him in applying for SMRT. ongoing    Action  Due Date   CCC RN will:  NA    Delegations: Action  Due Date   CCC SW will:  NA    [facility] Care Guide will:  Care Guide working with him in applying for SMRT. Will also continue to assist pt in working with his other goals. ongoing

## 2021-06-11 NOTE — PROGRESS NOTES
[facility]. SMRT paperwork has been completed and sent into the SMRT team with [facility] @ fax# [phone].    Email:  Marital Status:   Children: Yes- 2 Girls 2 boys. My contact is his daughter Idalmis @ [phone]  Born and Raised: [place]- [place] to [place] Has Green card-Permanent Resident.  Occupation: Unemployed   Living Situation: Lives with his wife, both daughters and his son in law  Smoking, Alcohol, other: None  Services receiving: [facility] and EMA  [facility] Follow up s: Monthly  [facility] Enrollment: September 7th 2016

## 2021-06-11 NOTE — PROGRESS NOTES
Assessment  SW met with pts daughter Idalmis Malave and  at the clinic to discuss pts insurance status.  Pt currently has [employer] through [employer] and [facility] (PMI [id number]).  [place].  Daughter called [facility] and spoke to someone who said that referral would be sent to the SMRT team to see if pt qualifies for MA.  Once pt has MA then family can request assessment for PCA services.      SW called [facility]  ([phone]) who says that pt does not currently have an assigned financial worker-- and it must be a financial worker that submits the SMRT request.  CLAUDIA called [facility] ([phone]) and spoke to Eduardo. CLAUDIA explained situation.  Eduardo says no SMRT request has been submitted for the pt, but he agreed to submit request.  He says process takes about 3 months.  SMRT team will contact family to schedule home assessment.  CLAUDIA provided Eduardo with pts daughters name and contact info.  CLAUDIA explained to daughter who understands.     CLAUDIA will continue to follow.      Action Plan:    will:  1)  Next week SW will contact SMRT team to ensure they received referral from [facility]      Care Guide will:  Care Guide Delegation:   1)  Due Date:         Delegation:          Subjective     Pts daughter present today for assistance with insurance/in-home services        Objective    Pt not present for visit today

## 2021-06-11 NOTE — TELEPHONE ENCOUNTER
Medication Question or Clarification  Who is calling: [facility] fax  What medication are you calling about (include dose and sig)?:   ferrous sulfate 300 mg (60 mg iron)/5 mL syrup  150 mL  3  8/24/2020      Sig: Take 5 ml via G-tube daily     Sent to pharmacy as: ferrous sulfate 300 mg (60 mg iron)/5 mL oral liquid     E-Prescribing Status: Receipt confirmed by pharmacy (8/24/2020  9:22 AM CDT)       Who prescribed the medication?: Lynda Gutierrez MD  What is your question/concern?: Fax stated this Rx is not covered. Please submit an alternative or start a prior authorization. Please advise on how to proceed.    [employer]  ID [id number]  Plan phone:  [phone]  Requested Pharmacy: [facility]  Okay to leave a detailed message?: No

## 2021-06-12 NOTE — PROGRESS NOTES
Care Guide received a phone call from Liu's daughter Idalmis. Liu is on MA and has been for a while; Liu was also approved for SMRT about a week ago. Liu's UCare will end 8/31/17 and then will be on straight MA until 11/30/17. I spoke with [facility] CLAUDIA Peña @ [phone] and she let me know he is needing to have the Certain Populations Application filled out to have the disability status. Liu also has a large bill with [employer].     Sent message to [facility] FRG to assist with filling out Certain Populations application and [employer] Financial Assistance.    Email:  Marital Status:   Children: Yes- 2 Girls 2 boys. My contact is his daughter Idalmis @ [phone]  Born and Raised: [place]- [place] to [place]. Has Green card-Permanent Resident.  Occupation: Unemployed   Living Situation: Lives with his wife, both daughters and his son in law  Smoking, Alcohol, other: None  Services receiving: MNCare and EMA  CCC/HCH Follow up s: Monthly  CCC/HCH Enrollment: September 7th 2016    [id number]     Liu Tranuyen     Elisabeth     9/1/2017     Only recently received green card. Now has MA     Pt approved for SMRT. [facility] Financial Resource Guide working with pt to apply for disability. Pt is working on his goals.    Action  Due Date   NA     Action  Due Date   NA     Pt approved for SMRT. [facility] Financial Resource Guide working with pt to apply for disability. Pt is working on his goals.           MRN:  [id number]     Patient name: Liu Malave     Care Guide: Elisabeth     Discuss at Care Conferences: Done on 6/30/17     Barriers: Only recently received green card. Now has MA     Plan Summary: Pt is working on his goals. Care Guide working with him in applying for SMRT. ongoing     Action  Due Date   CCC RN will:  NA     Delegations: Action  Due Date   CCC SW will:  NA     [facility] Care Guide will:  Care Guide working with him in applying for SMRT. Will also continue to assist pt in working with his other goals. ongoing     **SMRT  has been approved as of 8/1/17.

## 2021-06-12 NOTE — PROGRESS NOTES
8/24/2017: Received a voicemail from [facility]. I called and left her a message stating that we could set up an appointment for tomorrow at 1 or 2pm if that worked for them along with the documentation needed. Pending call back.  (certain population and HE FAA)  8/25/2017: Received call back today from [facility]. An appointment is scheduled for 8/28 @ the [facility]. Liu doesn't have any income, wife does work. [facility] will bring their green cards, social security cards and mom's paystubs.

## 2021-06-12 NOTE — PROGRESS NOTES
Email:  Marital Status:   Children: Yes- 2 Girls 2 boys. My contact is his daughter Idalmis @ [phone]  Born and Raised: [place]- [place]. Has Green card-Permanent Resident.  Occupation: Unemployed   Living Situation: Lives with his wife, both daughters and his son in law  Smoking, Alcohol, other: None  Services receiving: MNCare and EMA  CCC/HCH Follow up s: Monthly  CCC/HCH Enrollment: September 7th 2016    MRN:  [id number]     Patient name: Liu Malave     Care Guide: Elisabeth     Discuss at Care Conferences: Done on 6/30/17     Barriers: Only recently received green card. Now has MA     Plan Summary: Pt is working on his goals. Care Guide working with him in applying for SMRT. ongoing     Action  Due Date   CCC RN will:  LOREN     Delegations: Action  Due Date   CCC CLAUDIA will:  LOREN     [facility] Care Guide will:  Care Guide working with him in applying for SMRT. Will also continue to assist pt in working with his other goals. ongoing     **SMRT has been approved as of 8/1/17.

## 2021-06-12 NOTE — PROGRESS NOTES
PC to [facility], she is on the way to take Liu to the hospital because he is not doing well.     I called [facility] with SMRT @ [phone] TCBCade    Rcvd return phone call from [facility] with the SMRT team and he let me know that he has not received medical records from [facility] yet and when he can get the medical records back from [facility] he should be able to approve the case right away.     PC to [facility] Medical records and spoke with Aruna that told me that medical records from [facility] and the [facility] have been subbmitted to processing to be sent to [facility]. I called [facility] with the SMRT team to let him know; also called [facility], Jeromy daughter to let her know too.    Email:  Marital Status:   Children: Yes- 2 Girls 2 boys. My contact is his daughter Idalmis @ [phone]  Born and Raised: [place]- [place]. Has Green card-Permanent Resident.  Occupation: Unemployed   Living Situation: Lives with his wife, both daughters and his son in law  Smoking, Alcohol, other: None  Services receiving: MNCare and EMA  CCC/HCH Follow up s: Monthly  CCC/HCH Enrollment: September 7th 2016    MRN:  [id number]     Patient name: Liu Malave     Care Guide: Elisabeth     Discuss at Care Conferences: Done on 6/30/17     Barriers: Only recently received green card. Now has MA     Plan Summary: Pt is working on his goals. Care Guide working with him in applying for SMRT. ongoing     Action  Due Date   CCC RN will:  NA     Delegations: Action  Due Date   CCC SW will:  NA     [facility] Care Guide will:  Care Guide working with him in applying for SMRT. Will also continue to assist pt in working with his other goals. ongoing

## 2021-06-12 NOTE — ANESTHESIA PROCEDURE NOTES
Emergent Intubation  Date/Time: 8/6/2017 2:12 AM    Performing CRNA: IGGY DURAN  Indications: respiratory distress  Route: oral  Technique: direct  Laryngoscope size: #3 Glide scope.  Tube size: 7.0 mm  Tube type: cuffed  Cuff inflated: yes  Level of Difficulty: 1ETT to lip: 23 cm  Tube secured with: ETT stroud  ETCO2 = Yes  Breath sounds: equal  SaO2 %: 100    Sign out given. CXR and sedation per primary care team.

## 2021-06-12 NOTE — PROGRESS NOTES
08/23/2017: Called [facility] and left a voicemail for her about setting up an appointment to complete certain pop application with her mother Liu on Friday. If I do not hear back, will call back Friday am.   Programs applying for: Certain Populations application for medical   Case #:   Financial Worker:        Referral-          Hello,          Liu is on MA and has been for a while; Liu was also approved for SMRT about a week ago. Liu's UCare will end 8/31/17 and then will be on straight MA until 11/30/17. I spoke with [facility] CLAUDIA Mariana @ [phone] and she let me know he is needing to have the Certain Populations Application filled out to have the disability status. Can you call Liu's daughter Idalmis @ [phone]?           Mariana asked we fax the application to [phone].           Please let me know if you have any questions.           Thank you,      Elisabeth     08/28/17 Met with [facility], patients daughter, who completed certain pop application and [employer] Financial Assistance Application and took these home to have Liu sign as well as an YOUNG. While in office [facility] asked about SNAP/cash assistance for parents, she will come back in Wednesday and complete combined application for patient and have him sign. [facility] asked about her mothers medical insurance appears its ongoing through 11/17 (she is not a [facility] patient). Requested [facility] have patient provide 2016 tax return, social security cards and bank statement.     08/30/17 Met with [facility], she answered questions for combined application and took home to her father to nazario, provided tax forms for parents as well as ssn cards. Forgot bank statement but will bring back with signed combined application. Asked about social security for Liu and LiHEAP program, gave Idalmis information on LiHEAP and let her know I would let care guide know that Liu would like to apply for SSA/SSI. Faxed combined application and certain pop and young to [facility] (will try to fax  to  [facility] again as not going through) and faxed BETHJULIETTE all on 09/01. Will check with [facility] in two weeks to see if processed.     09/08/17 checked [place] nothing showing, faxed [facility] application (previously faxed to usual [facility] #), spoke with [facility] Financial services who reports that clients application was approved to cover costs for previous charges.     09/15/17 Checked [place]its still shows patient has active [place]/Early Expansion but previous information stated he would not switch to MA/Disability until 12/17, so will flag then as well, will call [facility] next week to check on patients snap/cash assistance application.     [id number] LIU ESPINAL   This subscriber has eligibility for MA: Medical Assistance.  Alvina Type AX: MA Early Expansion  Eligibility Begin Date: 12/01/2016  Eligibility End Date: 11/30/2017 09/22/17 Called [facility] to ask about patient's SNAP and cash assistance application, representative reports case is pending verification of utilities and shelter costs as well as ID for Liu and 30 day of income for his wife, permanent resident card and State ID previously submitted for ID and wife's pay stubs were provided will re-fax to [facility] DHS. Called patient's daughter Idalmis who reports she sent in some of the pending info [facility] needed yesterday, but will drop of verification of shelter costs to me at the [facility]. Will re-fax current info on file and new info that [facility] provides, will contact [facility] again next week to check on case. **Idalmis dropped off shelter expense information (Xcel bill, mortgage billing statement) and current bank statement) faxed to [facility] along with previously faxed information (30 days of wages for Liu's wife, bank statement, permanent resident card, ssn cards, state ids).     10/04/17 Called [facility] to ask about patients combined application for SNAP/cash assistance and his Certain  Population application. Not available, left a voicemail. Also called daughter, Idalmis to see if she had any updated information from the county, left a voicemail. Called [facility] SW listed above Mariana, Mariana reports she is a financial worker not a , reports SNAP, cash assistance and Certain Population application are all pending sponsor's income. County worker reports sponsor is Manjeet Malave in [facility] and if not received patient and household are only eligible for emergency medical assistance. Will give this information to [facility].      10/13/17 Called [facility] Worker Mariana (possibly Melony?) and asked about Certain Population Med Application, she reports this is same as SNAP/cash and patient's case is incomplete without income verification from sponsor, Manjeet Malave (brother in [facility] CG reports). Checked [place] still showing patient with active [place]:  [id number] NAVYA GIL TEDDY   This subscriber has eligibility for MA: Medical Assistance.  Elig Type AX: MA Early Expansion  Eligibility Begin Date: 12/01/2016  Eligibility End Date: --/--/----  This subscriber is eligible for the following service types: Medical Care , Chiropractic , Dental Care , Hospital , Hospital - Inpatient , Hospital - Outpatient , Emergency Services , Pharmacy , Professional (Physician) Visit - Office , Vision (Optometry) , Mental Health , Urgent Care   Income from a sponsor is deemed for Medical Assistance, if patient's brother would submit his income (unsure what income is) and asset information possibility patient could be eligible if income is not too high. Let CCC CG know this information.   Medical Assistance for People Who Are Age 65 or Older or People Who Are Blind or Have a Disability  2.3.3.2.2.1 Sponsor Asset Deeming  Sponsor deeming rules apply to noncitizens who are sponsored by an individual or individuals and have a signed Affidavit of Support (USCIS [id number]), unless the applicant meets an exception  to sponsor deeming.  Assets of a sponsor and the sponsor s spouse are deemed to each noncitizen covered by the affidavit regardless of whether the sponsor actually contributes to the noncitizen s household needs. Assets are deemed even if the sponsor or the sponsor s spouse are receiving public assistance in [place] or another state.  If the sponsor is a member of the household:  The sponsor and the sponsor s family members are counted in the applicant s household composition. See Medical Assistance for People Who Are Age 65 or Older and People Who Are Blind or Have a Disability (MA-ABD) Household Composition policy for more information.  Applicable asset exclusions are applied to the sponsor s assets.  If the sponsor is not a member of the household, the following rules apply:  The sponsor or the sponsor s family members are not counted in the noncitizen s household composition.  The sponsor or the sponsor s spouse s assets are not considered unavailable or excluded.    Will check back with [facility] CG on this case.   10/16/17 Called and spoke to patient's daughter Idalmis who reports she just completed medical renewal and it is not requesting any information on sponsorship. [facility] reports she is fairly sure it is not needed. FRG offered to check on case in a few weeks and let her know if there are any changes she needs to be aware of, [facility] reports this is a good idea. Mentioned that [facility] CG needs requested paperwork to continue citizenship process for her father as once he is a citizen they will not need to include his sponsor's income. [facility] did not want to get sponsor's income at this time only wants to if needed for medical.     11/02/17 Checked patient's info in [employer] shows patient has active MA still at this time, will check back at beginning of 12/2017 as this is when patient's med case appeared to be closing previously (11/30/17).   12/01/17 Checked [place]its shows patient's [place] is active going forward from  12/01/17:  [id number] [facility] Programs   This subscriber has eligibility for MA: Medical Assistance.  Elig Type AX: MA Early Expansion  Eligibility Begin Date: 12/01/2017  Eligibility End Date: --/--/----  This subscriber is eligible for the following service types: Medical Care , Chiropractic , Dental Care , Hospital , Hospital - Inpatient , Hospital - Outpatient , Emergency Services , Pharmacy , Professional (Physician) Visit - Office , Vision (Optometry) , Mental Health , Urgent Care   Prepaid Health Plan  No further FRG outreach needed at this time. I am available for assistance if patient has a new need for FRG outreach-  patient or Care Guide may notify me.                  Aruna Huynh, Phone: [phone], Fax: [phone]

## 2021-06-12 NOTE — PROGRESS NOTES
[id number]    [facility] T Frieda Barber    7/28/2017    Only recently received green card. Now has MA    Pt approved for SMRT. [facility] Financial Resource Guide working with pt to apply for disability. Pt is working on his goals.    Action  Due Date   NA    Action  Due Date   NA    Pt approved for SMRT. [facility] Financial Resource Guide working with pt to apply for disability. Pt is working on his goals.  Ongoing

## 2021-06-12 NOTE — PROGRESS NOTES
[id number]    [facility] T Frieda Barber    7/28/2017    Only recently received green card. Now has MA    Pt is working on his goals. Care Guide working with him in applying for SMRT.   Action  Due Date   NA    Action  Due Date   NA    Care Guide working with him in applying for SMRT. Will also continue to assist pt in working with his other goals. Ongoing

## 2021-06-13 NOTE — PROCEDURES
Interventional Radiology Post-Procedure Note   [facility]  ?   Brief Procedure Note:   Patient name: Liu Malave Pt MRN:[id number]   Date of procedure: 10/20/2017     Procedure(s): Exchange of Gastrojejunostomy feeding tube  Sedation method: None. The patient was monitored by an interventional radiology nurse at all times throughout the procedure under my direct guidance.  Pre Procedure Diagnosis: Feeding difficulties  Post Procedure Diagnosis: Same  Indications: Need for exchange of existing gastrojejunostomy tube for continued feeding/oral medication   ?   Attending: Steven Pemberton M.D.  Specimen(s) removed: None   Additional studies ordered: None  Drains/Tubes: 18 Fr 45 cm LAURA Gastrojejunostomy feeding tube.  Estimated Blood Loss: Minimal  Complications: None  Vascular closure method: N/A    Findings/Notes/Comments: Uncomplicated exchange of Gastrojejunostomy feeding tube. New feeding tube in appropriate position. Both the G port and the J port may be used immediately.   ?   Please see dictation in PACS or under the Imaging tab in [facility] for detailed procedure note.     Steven Pemberton M.D.   Vascular and Interventional Radiology   Pager: [phone]   After Hours / Scheduling: [phone]     10/20/2017  1:10 PM

## 2021-06-13 NOTE — PROGRESS NOTES
Assessment:     1. Hyperkalemia  Potassium   2. Cough  benzonatate (TESSALON PERLES) 100 MG capsule       Plan:     1. Hyperkalemia  Patient was administered Kayexalate after emergency room visit for a potassium in the six-point to six-point 3 range he is being seen for follow-up here and repeat potassium patient's medications were reviewed he is to continue on the decreased dose of lisinopril  - Potassium    2. Cough  Chest was clear today however I will prescribe the following antitussive which he may take every 6 hours  - benzonatate (TESSALON PERLES) 100 MG capsule; Take 1 capsule (100 mg total) by mouth every 6 (six) hours as needed for cough.  Dispense: 30 capsule; Refill: 0      Subjective:   Patient returns accompanied by his daughter.  The patient was seen last week for follow-up after a large stroke routine laboratory revealed a potassium of 6.3.  Alertly the patient was sent to the emergency room by my partner where he was seen and evaluated and administered Kayexalate resin which brought his potassium down into the 5.  Patient was discharged and was due to follow-up here today.  He has been doing well taking nourishment through his G-tube daughter states he does have a loose cough however he is afebrile I did review his emergency room visit I did review the findings from his last visit here 10 days ago.  Our plan here today is to repeat his potassium.  I will prescribe some Tessalon Perles to help with his cough daughter was instructed to keep an eye on his vital signs and to alert us if there are any changes we did cut his lisinopril back to 5 mg and his blood pressure is acceptable today.  He is to continue with his other medications unchanged we did do a medication review today all medical questions that were asked were answered patient will be followed up routinely here 2 months.    Review of Systems: A complete 14 point review of systems was obtained and is negative or as stated in the history of  present illness.    Past Medical History:   Diagnosis Date     Hypertension      Stroke      Family History   Problem Relation Age of Onset     Hypertension Mother      Cancer Father      Kidney disease Brother      Past Surgical History:   Procedure Laterality Date     BRAIN SURGERY       HH MIDLINE INSERTION  8/11/2017          PICC  6/10/2016          Social History   Substance Use Topics     Smoking status: Former Smoker     Packs/day: 1.00     Years: 30.00     Quit date: 6/5/2005     Smokeless tobacco: Never Used     Alcohol use No         Objective:   /78  Pulse 76  SpO2 96%    General Appearance:  Alert, cooperative, no distress  Head:  Normocephalic, no obvious abnormality  Ears: TM anatomy normal  Eyes:  PERRL, EOM's intact, conjunctiva and corneas clear  Nose:  Nares symmetrical, septum midline, mucosa pink, no sinus tenderness  Throat:  Lips, tongue, and mucosa are moist, pink, and intact  Neck:  Supple, symmetrical, trachea midline, no adenopathy; thyroid: no enlargement, symmetric,no tenderness/mass/nodules; no carotid bruit, no JVD  Back:  Symmetrical, no curvature, ROM normal, no CVA tenderness  Chest/Breast:  No mass or tenderness  Lungs:  Clear to auscultation bilaterally, respirations unlabored   Heart:  Normal PMI, regular rate & rhythm, S1 and S2 normal, no murmurs, rubs, or gallops  Abdomen:  Soft, non-tender, bowel sounds active all four quadrants, no mass, or organomegaly  Musculoskeletal:  Tone and strength strong and symmetrical, all extremities  Lymphatic:  No adenopathy  Skin/Hair/Nails:  Skin warm, dry, and intact, no rashes  Neurologic: Patient is aphasic accompanied by his daughter who is interpreting for us here today no change in neurological status except patient recognizes me and smiles and has appropriate affect fact considering he has had residual stroke problems  Extremities:  No edema.  David's sign negative.    Genitourinary: deferred  Pulses:  Equal bilaterally            This note has been dictated using voice recognition software. Any grammatical or context distortions are unintentional and inherent to the the software.

## 2021-06-13 NOTE — PROGRESS NOTES
Assessment:    1. Hemorrhagic stroke     2. Aphasia     3. TMJ pain dysfunction syndrome  Ambulatory referral to [facility]   4. Need for vaccination  Influenza, Seasonal,Quad Inj, 36+ MOS    Pneumococcal polysaccharide vaccine 23-valent 3 yo or older, subq/IM         Plan:    25 minutes total time with patient, > 50% with counseling and coordination of cares.  History of hemorrhagic stroke June 2016 with residual aphasia.  Issues with TMJ pain bilateral described with clicking sound noted by patient's daughter and difficulty opening mouth completely.  Referral to TMJ specialist for further evaluation.  Flu shot and Pneumovax immunizations given today with tetanus status up-to-date.  Worked with  today for completion of form N-648 Medical Certification for Disability Exceptions from [employer] security [place] citizenship and immigration services.        Subjective:    Liu GIL Malave is seen today for paperwork completion.  Medical certification for disability exceptions paperwork was to be completed due to patient's inability to read English, right English, speak English or answer questions regarding [place] history and civics even in a language that applicant understands.  Patient is [place].  Suffered a hemorrhagic stroke June 2016 and has been left with residual aphasia.  Patient's daughter is present today as well as .  Complains of clicking sound with jaw movement at TMJ region bilateral.  Hard to open mouth completely.  Patient described as emotional.  Lability of the motion stable.  No recent illness.  No prior concerns associated with influenza vaccination etc.  Multiple medications continue including lisinopril, metoprolol tartrate, simvastatin, Nexium and venlafaxine.  Comprehensive review of systems as above otherwise all negative.    Past Surgical History:   Procedure Laterality Date     BRAIN SURGERY       HH MIDLINE INSERTION  8/11/2017          PICC  6/10/2016              Family History   Problem Relation Age of Onset     Hypertension Mother      Cancer Father      Kidney disease Brother         Past Medical History:   Diagnosis Date     Hypertension      Stroke         Social History   Substance Use Topics     Smoking status: Former Smoker     Packs/day: 1.00     Years: 30.00     Quit date: 6/5/2005     Smokeless tobacco: Never Used     Alcohol use No        Current Outpatient Prescriptions   Medication Sig Dispense Refill     acetaminophen (TYLENOL) 325 MG tablet 650 mg by Gastrostomy Tube route every 6 (six) hours as needed for pain.       acetaminophen-codeine (TYLENOL #3) 300-30 mg per tablet Take 1 tablet by mouth every 4 (four) hours as needed for pain. 15 tablet 0     aspirin 81 mg chewable tablet Chew 1 tablet (81 mg total) daily.  0     baclofen (LIORESAL) 10 MG tablet give 1 tablet crushed for g-tube at bedtime as instructed 30 tablet 2     benzonatate (TESSALON PERLES) 100 MG capsule Take 1 capsule (100 mg total) by mouth every 6 (six) hours as needed for cough. 30 capsule 0     bisacodyl (DULCOLAX) 10 mg suppository Insert 1 suppository (10 mg total) into the rectum daily as needed. 12 suppository 0     chlorhexidine (PERIDEX) 0.12 % solution Apply 15 mL to the mouth or throat 2 (two) times a day. Swish and spit       codeine-guaiFENesin (GUAIFENESIN AC)  mg/5 mL liquid Take 5 mL by mouth 3 (three) times a day as needed for cough. 118 mL 0     esomeprazole (NEXIUM) 40 MG capsule TAKE ONE CAPSULE BY MOUTH EVERY MORNING BEFORE BREAKFAST 30 capsule 2     levETIRAcetam (KEPPRA) 100 mg/mL solution TAKE 7.5ML BY G-TUBE TWO TIMES PER  mL 11     lisinopril (PRINIVIL,ZESTRIL) 5 MG tablet Take 1 tablet (5 mg total) by mouth daily. Resume on 9/24 30 tablet 0     metoprolol tartrate (LOPRESSOR) 25 MG tablet Take 1 tablet (25 mg total) by mouth 2 (two) times a day. 60 tablet 3     QUEtiapine (SEROQUEL) 25 MG tablet 25 mg by G-tube route 2 (two) times a day as  needed (for sleep).        simvastatin (ZOCOR) 20 MG tablet 20 mg by G-tube route at bedtime.       traZODone (DESYREL) 50 MG tablet Take 3 tablets (150 mg total) by mouth at bedtime. 270 tablet 1     triamcinolone (KENALOG) 0.1 % paste Take 1 application by mouth 4 (four) times a day as needed (for mouth sores).   12     venlafaxine (EFFEXOR) 75 MG tablet 75 mg by Enteral Tube route 2 (two) times a day.       No current facility-administered medications for this visit.           Objective:    Vitals:    10/25/17 1326   BP: 120/80   Pulse: 72   SpO2: 97%      There is no height or weight on file to calculate BMI.    Alert.  A phasic.  Is in wheelchair.  Is emotional at times while I talk with him about how wonderful his daughter is.  Does have difficulty moving job on both passive and active exam however later is able to close shot completely while swallowing.  Neck supple.  Chest clear.  Cardiac exam regular.  Extremities warm and dry.

## 2021-06-13 NOTE — PROGRESS NOTES
"Email:  Marital Status:   Children: Yes- 2 Girls 2 boys. My contact is his daughter Idalmis @ [phone]  Born and Raised: [place]- [place]ame to [place]. Has Green card-Permanent Resident.  Occupation: Unemployed   Living Situation: Lives with his wife, both daughters and his son in law  Smoking, Alcohol, other: None  Services receiving: [facility] and [facility]  [facility] Follow up s: Monthly  [facility] Enrollment: September 7th 2016    FRG Note:  \" 10/16/17 Called and spoke to patient's daughter Idalmis who reports she just completed medical renewal and it is not requesting any information on sponsorship. Idalmis reports she is fairly sure it is not needed. FRG offered to check on case in a few weeks and let her know if there are any changes she needs to be aware of, Idalmis reports this is a good idea. Mentioned that [facility] needs requested paperwork to continue citizenship process for her father as once he is a citizen they will not need to include his sponsor's income. Idalmis did not want to get sponsor's income at this time only wants to if needed for medical.\"  "

## 2021-06-13 NOTE — PROGRESS NOTES
Scheduled F/U Call:  Attempt 1    Care Guide called patient.  If this patient is returning our call please transfer to Elisabeth Morataya at ext [phone].

## 2021-06-13 NOTE — PROGRESS NOTES
Chief Complaint   Patient presents with     Cough     Had pneumonia 2 months ago and atill has a cough     Jaw Pain     x 2 months. waiting for consult for pain managment   '    HPI:    Note: patient is non-verbal and history was obtained from his daughter who speaks English.     Patient is here for the following issues:    #1. Cough - x 2 wks, without fever, but he reported chest wall tenderness with the cough.     #2. Mouth pain - around upper and lower lips without redness nor sores. This has been going on for months, worsened recently per his daughter, not improved with OTC Tylenol.    ROS: Pertinent ROS noted in HPI.     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     Hepatitis     Hepatic Disorder     Generalized anxiety disorder     Depression     Coughing Up Blood (Hemoptysis)     Oropharyngeal dysphagia     Oral mucosal lesion     Hemorrhagic stroke     Acute encephalopathy     Brain compression     Vasogenic brain edema     Convulsions, unspecified convulsion type     History of stroke with residual deficit     Acute respiratory failure with hypoxia     Essential hypertension     Dyslipidemia     GERD without esophagitis     Hepatitis C virus carrier state     Metabolic acidosis     Aspiration pneumonia     Meconium aspiration pneumonia     Other specified fever     Fever of unknown origin     Chronic respiratory failure     Respiratory failure with hypoxia     Basal ganglia hemorrhage     Tracheostomy present     Infection due to ESBL-producing Escherichia coli     Persistent fever     Hemoptysis     Insomnia     Attention to tracheostomy     UTI (urinary tract infection)     Urinary retention     Abnormal urine odor     Feeding tube blocked     Fever     Pneumonia of both lower lobes due to infectious organism     Sepsis     Severe sepsis     Hypoalbuminemia due to protein-calorie malnutrition     Chronic static  encephalopathy     Elevated troponin     Essential hypertension with goal blood pressure less than 140/90     Pneumonia     Hyperkalemia     Hypotension due to drugs       Family History   Problem Relation Age of Onset     Hypertension Mother      Cancer Father      Kidney disease Brother        Social History     Social History     Marital status:      Spouse name: N/A     Number of children: N/A     Years of education: N/A     Occupational History     Not on file.     Social History Main Topics     Smoking status: Former Smoker     Packs/day: 1.00     Years: 30.00     Quit date: 6/5/2005     Smokeless tobacco: Never Used     Alcohol use No     Drug use: No     Sexual activity: No     Other Topics Concern     Not on file     Social History Narrative     Objective:    Vitals:    10/13/17 1755   BP: 116/76   Pulse: 81   Resp: 16   Temp: 98.1  F (36.7  C)   SpO2: 96%       Gen:NAD  Oropharynx: normal throat, oral mucosa, gums, lips.  Neck: NAD  CV: RRR, no M, R, G  Pulm: CTAB, normal effort  Chest wall: tenderness to palpation at anterior chest wall.     CXR - ordered and reviewed by me.  ously seen upper lung zone infiltrates have resolved. No pneumothorax or pleural effusion.    Xr Chest Pa And Lateral    Result Date: 10/13/2017  XR CHEST PA AND LATERAL 10/13/2017 6:28 PM INDICATION: Cough. COMPARISON: 09/22/2017. FINDINGS: Heart and mediastinal size are normal. Minimal amount of interstitial opacity is seen within the mid left lung. This may reflect a small area of infectious process. There is right apical pleural parenchymal scarring. No pleural effusion. No pneumothorax. This report was electronically interpreted by: Dr. Yo Lemus MD ON 10/13/2017 at 19:29        Pneumonia  -     doxycycline (VIBRA-TABS) 100 MG tablet; Take 1 tablet (100 mg total) by mouth 2 (two) times a day for 10 days.    Cough  -     XR Chest PA and Lateral  -     codeine-guaiFENesin (GUAIFENESIN AC)  mg/5 mL liquid; Take  5 mL by mouth 3 (three) times a day as needed for cough.    Mouth pain  -     acetaminophen-codeine (TYLENOL #3) 300-30 mg per tablet; Take 1 tablet by mouth every 4 (four) hours as needed for pain.

## 2021-06-13 NOTE — PROGRESS NOTES
Assessment:     1. Screen for colon cancer  Comprehensive Metabolic Panel    HM1(CBC and Differential)    HM1 (CBC with Diff)   2. Pneumonia     3. Severe sepsis  Comprehensive Metabolic Panel    HM1(CBC and Differential)    HM1 (CBC with Diff)       Plan:     1. Screen for colon cancer  We will do a cold guard test on this patient  - Comprehensive Metabolic Panel  - HM1(CBC and Differential)  - HM1 (CBC with Diff)    2. Pneumonia  Patient has finished his course of antibiotics post hospital; we will check a hemogram today hemogram ordered we will also check his electrolyte status    3. Severe sepsis  .  52-year-old male  - Comprehensive Metabolic Panel  - HM1(CBC and Differential)  - HM1 (CBC with Diff)      Subjective:   Hospitalized 1 month ago because of pneumonia with sepsis patient was taken the emergency room with fever shortness of breath CT showed bilateral lower lobe infiltrates more prominent on the right than on the left second and hospital patient became hypoxic tachycardic hypotensive fever up to 103 a PICC line was inserted patient became myoclonic patient was vented seen by infectious disease and respiratory consultants was placed on troponin had a good response subsequently was weaned from the tube and was then placed on oral Augmentin and Flagyl which she was discharged on after his hospital course.  Patient however did develop mild elevation in his pancreatic enzymes however he did have a negative ultrasound patient's IVs were continued he was eventually rehydrated clinically improved discharge to home back on care of daughter.  He was given a 3 week course of Flagyl and Augmentin.  Patient was discharged afebrile his lisinopril was held because of hypotension presents today here in the office for follow-up medication management and answer a lot of questions without the aid of an .  Talking with the daughter patient's doing well he is it appears refluxing a little bit after fluid  flushing and tube feeding we suggested she keep him a little bit more upright patient's chart was reviewed with daughter which included interpretation of CT which showed no pancreatic necrosis negative findings for any acute abdomen or negative findings for gallstones patient is alert but a phasic and recognizes me hydration appears good chest was clear today cardiac exam unremarkable his EKG in the hospital showed LVH strain probably secondary to the sepsis patient was hypernatremic while in hospital.  Today's medical decision was to reinstitute therapy with lisinopril today, check his hemogram and his white count.  I did a medication review work and reinstitute therapy with lisinopril and restart his doxazosin.  Patient asked me to inspect his testicles again because he always has had a worry about testicular cancer.  Suspicion of this disease is 0 however I did perform an exam and asked the daughter to leave the room when we did it I reassured the patient through translation by her.  All medical questions that were asked were answered to the best of her abilities I will see the patient for follow-up 2 months time he appears to have survived with this severe stormy hospital course of pneumonia and sepsis.    Review of Systems: A complete 14 point review of systems was obtained and is negative or as stated in the history of present illness.    Past Medical History:   Diagnosis Date     Hypertension      Stroke      Family History   Problem Relation Age of Onset     Hypertension Mother      Cancer Father      Kidney disease Brother      Past Surgical History:   Procedure Laterality Date     BRAIN SURGERY       HH MIDLINE INSERTION  8/11/2017          PICC  6/10/2016          Social History   Substance Use Topics     Smoking status: Former Smoker     Packs/day: 1.00     Years: 30.00     Quit date: 6/5/2005     Smokeless tobacco: Never Used     Alcohol use No         Objective:   /72  Pulse 90  SpO2  92%    General Appearance:  Alert in wheelchair recognizes me daughter translating understands and communicates with daughter  Head:  Normocephalic, no obvious abnormality  Ears: TM anatomy normal  Eyes:  PERRL, EOM's intact, conjunctiva and corneas clear  Nose:  Nares symmetrical, septum midline, mucosa pink, no sinus tenderness  Throat:  Lips, tongue, and mucosa are moist, pink, and intact  Neck:  Supple, symmetrical, trachea midline, no adenopathy; thyroid: no enlargement, symmetric,no tenderness/mass/nodules; no carotid bruit, no JVD  Back:  Symmetrical, no curvature, ROM normal, no CVA tenderness  Chest/Breast:  No mass or tenderness  Lungs:  Clear to auscultation bilaterally, respirations unlabored   Heart:  Normal PMI, regular rate & rhythm, S1 and S2 normal, no murmurs, rubs, or gallops  Abdomen:  Soft, non-tender, bowel sounds active all four quadrants, no mass, or organomegaly feeding tube in place near her umbilicus  Musculoskeletal:  Tone and strength strong and symmetrical, all extremities  Lymphatic:  No adenopathy  Skin/Hair/Nails:  Skin warm, dry, and intact, no rashes  Neurologic: Patient is in a wheelchair and has had a large residual right stroke in 2016 he is a phasic  Extremities:  No edema.  David's sign negative.    Genitourinary: deferred  Pulses:  Equal bilaterally     The following low BMI interventions were performed this visit: feeding regime      This note has been dictated using voice recognition software. Any grammatical or context distortions are unintentional and inherent to the the software.

## 2021-06-14 ENCOUNTER — COMMUNICATION - HEALTHEAST (OUTPATIENT)
Dept: FAMILY MEDICINE | Facility: CLINIC | Age: 56
End: 2021-06-14

## 2021-06-14 DIAGNOSIS — R79.89 ELEVATED TROPONIN: ICD-10-CM

## 2021-06-14 NOTE — PROGRESS NOTES
My Clinic Care Coordination Wellness Plan  This Maintenance Wellness Plan provides private information in regards to the work I have done with my Care Team from my Primary Care Clinic.  This document provides insight on the goals I have worked hard to achieve.  My Care Team congratulates me on my journey to become well.  With the assistance of my Clinic Care Guide and Primary Care Physician,  I have succeeded in improving areas of my health that I identified as barriers to becoming well.  I will continue to seek wellness and use the skills I have obtained to further my journey.  My Care Guide will follow up with me in 3 months.  In the meantime, if I should have any questions or concerns I will contact my Care Guide.     [facility]  [address], [address] [place] [address]  [phone]      My Preferred Method of Contact:  Phone:  Alfredo Raygoza @ [phone]    My Primary/Preferred Language:  Maltese    Preferred Learning Style:  Face to face discussion    Emergency Contact: Extended Emergency Contact Information  Primary Emergency Contact: Idalmis Malave  Address: [address] [place] [address] [facility]  Home Phone: [phone]  Relation: Child  Secondary Emergency Contact: Regulo Dumont  Address: [address] [place], [place] [id number] [place] [address]  Home Phone: [phone]  Relation: Spouse     PCP:  Silver Longoria MD  Specialists:    Care Team            Silver Longoria MD PCP - General, Family Medicine    [phone]     Deacon DonahueD Pharmacist, Pharmacist    [facility] Care Coordination Care Guide, Clinic Care Coordination    [facility]. Phone: [phone]; Fax: [phone]    CLAUDIA Nolan         Accomplishments:  Goals        Patient Stated      COMPLETED: I have accomplished being approved for Medical Assistance. (pt-stated)            Personal Plan  If I am in need of further  financial resources I will call my Financial worker with [facility] or the Disability Linkage Line @ [phone].        COMPLETED: I have accomplished getting established with a dentist. (pt-stated)            Personal Plan  If I am in need to find a different dentist I will either refer to the Dentist list my Care Guide gave me or I will call the Disability Linkage line at [phone].        COMPLETED: I have accomplished getting established with a Neurologist.  (pt-stated)            Personal Plan  If I am needing to find a different Neurologist I will talk with my PCP to get a recommendation.         COMPLETED: I have accomplished obtaining a PCA. (pt-stated)            Personal Plan  I have been approved for PCA services and have been receiving them since 10/4/17. If I am  Having trouble with my PCA services I will call my Financial worker with [facility].            Advanced Directive/Living Will: The patient was given information regarding Adanced Directives/Living Will    Clinical Emergency Plan    When to Use the Emergency Department (ED)  An emergency means you could die if you don t get care quickly. Or you could be hurt permanently (disabled). Read below to know when to use -- and when not to use -- an emergency department (also called ED).     Dangers to your life  Here are examples of emergencies. These need immediate care:  A hard time breathing  Severe chest pain  Choking  Severe bleeding  Suddenly not able to move or speak  Blacking out (fainting)`  Poisoning     Dangers of permanent injuries  Here are other emergencies. These also need immediate care:  Deep cuts or severe burns  Broken bones, or sudden severe pain and swelling in a joint     When it s an emergency  If you have an emergency, follow these steps:     1. Go to the nearest emergency department  If you can, go to the hospital ED closest to you right away.  If you cannot get there right away, or if it is not safe to  take yourself, call 911 or your police emergency number.  2. Call your primary care doctor  Tell your doctor about the emergency. Call within 24 hours of going to the ED.  If you cannot call, have someone call for you.  Go to your doctor (not the ED) for any follow-up care.     When it s not an emergency  If a problem is not an emergency, follow these steps:     1. Call your primary care doctor  If you don t know the name of your doctor, call your health plan.  If you cannot call, have someone call for you.  2. Follow instructions  Your doctor will tell you what you should do.  You may be told to see your doctor right away. You may be told to go to the ED. Or you may be told to go to an urgent care center.  Follow your doctor s advice.    All [facility] clinic patients have access to a Nurse 24 hours a day, 7 days a week.  If you have questions or want advice from a Nurse, please know [facility] is here for you.  You can call your clinic and they will connect you or you can call Care Connection at [phone].  [facility] also has Walk In Care clinics in multiple locations.  Call the number listed above for more information about our Walk In Care clinics or visit the [facility] website at [email or web address].

## 2021-06-14 NOTE — PROGRESS NOTES
Care Conference Note       [id number]   [facility] Frieda   Elisabeth    YES- 12/21/17   Complex medical, tube feeding, total care   Provider completed forms for PCA agency    Action    NA   Action    NA   Continue outreach per standard work

## 2021-06-14 NOTE — PROGRESS NOTES
[phone]    [facility] Frieda Barber     Discussed at Care conference on 12/22/17    Complex medical, tube feeding, total care    Provider completed forms for PCA agency     Action  Due Date   NA    Action  Due Date   NA    Continue outreach per standard work

## 2021-06-14 NOTE — PROGRESS NOTES
Preventing Falls    Having a health problem can make you more likely to fall. Taking certain kinds of medicines may also increase your risk of falls. So, improving your health can help you avoid a fall. Work with your healthcare provider to manage health problems and to review your medicines. If you have your health under control, your risk of falling is lessened.    When to call your healthcare provider  Be sure to call your healthcare provider if you fall. Also call if you have any of these signs or symptoms (someone else may need to point them out to you):    Feeling lightheaded or dizzy more than once a day    Losing your balance often or feeling unsteady on your feet    Feeling numbness in your legs or feet, or noticing a change in the way you walk    Having a steady decline in your memory or mental sharpness    Emergency Plan Recommendation:    When to Use the Emergency Department (ED)  An emergency means you could die if you don t get care quickly. Or you could be hurt permanently (disabled). Read below to know when to use -- and when not to use -- an emergency department (also called ED).    Dangers to your life  Here are examples of emergencies. These need immediate care:  A hard time breathing  Severe chest pain  Choking  Severe bleeding  Suddenly not able to move or speak  Blacking out (fainting)`  Poisoning    Dangers of permanent injuries  Here are other emergencies. These also need immediate care:  Deep cuts or severe burns  Broken bones, or sudden severe pain and swelling in a joint    When it s an emergency  If you have an emergency, follow these steps:    1. Go to the nearest emergency department  If you can, go to the hospital ED closest to you right away.  If you cannot get there right away, or if it is not safe to take yourself, call 911 or your police emergency number.  2. Call your primary care doctor  Tell your doctor about the emergency. Call within 24 hours of going to the ED.  If you cannot  call, have someone call for you.  Go to your doctor (not the ED) for any follow-up care.    When it s not an emergency  If a problem is not an emergency, follow these steps:    1. Call your primary care doctor  If you don t know the name of your doctor, call your health plan.  If you cannot call, have someone call for you.  2. Follow instructions  Your doctor will tell you what you should do.  You may be told to see your doctor right away. You may be told to go to the ED. Or you may be told to go to an urgent care center.  Follow your doctor s advice.

## 2021-06-14 NOTE — PROGRESS NOTES
Scheduled F/U Call:  Attempt 1    Care Guide called patient.  If this patient is returning our call please transfer to Elisabeth Morataya at ext [phone].    Email:  Marital Status:   Children: Yes- 2 Girls 2 boys. My contact is his daughter Idalmis @ [phone]  Born and Raised: [place]- Came to [place]. Has Green card-Permanent Resident.  Occupation: Unemployed   Living Situation: Lives with his wife, both daughters and his son in law  Smoking, Alcohol, other: None  Services receiving: [facility] and [facility]  [facility] Follow up s: Monthly  [facility] Enrollment: September 7th 2016

## 2021-06-14 NOTE — PROGRESS NOTES
Patient has accomplished goals and has no other goals that this patient would like to work with Clinic Care Coordination/Health Care Home. Care Guide sent request to [facility] RN pool to review for Maintenance and update emergency plan.

## 2021-06-14 NOTE — TELEPHONE ENCOUNTER
Refill Approved    Rx renewed per Medication Renewal Policy. Medication was last renewed on 11/1/19.    María Elena Byrd, Care Connection Triage/Med Refill 12/28/2020     Requested Prescriptions   Pending Prescriptions Disp Refills     esomeprazole (NEXIUM) 20 MG capsule [Pharmacy Med Name: Esomeprazole Magnesium Oral Capsule Delayed Release 20 MG] 180 capsule 0     Sig: TAKE 1 CAPSULE BY G-TUBE TWICE DAILY.       GI Medications Refill Protocol Passed - 12/26/2020  9:51 AM        Passed - PCP or prescribing provider visit in last 12 or next 3 months.     Last office visit with prescriber/PCP: 3/2/2020 Lynda Gutierrez MD OR same dept: 3/2/2020 Lynda Gutierrez MD OR same specialty: 3/2/2020 Lynda Gutierrez MD  Last physical: Visit date not found Last MTM visit: Visit date not found   Next visit within 3 mo: Visit date not found  Next physical within 3 mo: Visit date not found  Prescriber OR PCP: Lynda Gutierrez MD  Last diagnosis associated with med order: 1. GERD without esophagitis  - esomeprazole (NEXIUM) 20 MG capsule [Pharmacy Med Name: Esomeprazole Magnesium Oral Capsule Delayed Release 20 MG]; take 1 capsule by G-tube twice daily.  Dispense: 180 capsule; Refill: 0    If protocol passes may refill for 12 months if within 3 months of last provider visit (or a total of 15 months).

## 2021-06-14 NOTE — PROGRESS NOTES
The pt arrives for GJ tube exchange due to rupture balloon, no there changes to allergies or medical hx.

## 2021-06-14 NOTE — PROGRESS NOTES
Assessment/Plan:     This is a patient with a history of hemoptysis, dysphagia, respiratory failure, recent pneumonia, and 2 strokes which have left him with residual deficits, including an inability to speak.      Patient was hospitalized for pneumonia in September for approximately 2 weeks.  Since that time he has never been completely without cough.  It has worsened in the past 3 weeks.  He has been treated with doxycycline, Tessalon Perles, and guaifenesin with codeine.  I suspect he was aspirating during the examination, and although patient's family has been excellent in suctioning his secretions at home, believe this warrants further workup.     I am sending the patient to the emergency department secondary to the following reasons:  -Suspected aspiration pneumonia with a history of stroke with residual deficits  -Cough present for at least 3 months without resolution, abnormal lung exam with rhonchi and wheezing, concern for increasing shortness of breath  -Worsening cough and accompanying chest pain  -Lab work abnormalities with most recent testing done 2 months ago, labs were not normal at that juncture and patient was hospitalized since his pneumonia hospitalization for hyperkalemia    Problem List Items Addressed This Visit        ENT/CARD/PULM/ENDO Problems    Aspiration pneumonia - Primary    Chronic respiratory failure       Other    History of stroke with residual deficit          Present to emergency department.    Total time spent with patient was 25 minutes with greater than 50% spent in face-to-face counseling regarding the above plan.    Subjective:      Liu Malave is a 52 y.o. male who presents to clinic for cough. He was diagnosed with pneumonia August 3rd and was discharged August 17th. Since that admission he has been coughing consistently. He presented to clinic for cough and was given tessalon pearls and again to [facility] for the cough and was given a 10 day supply of doxycycline which  would have been completed on 10/23. His cough never resolved and had worsened 3 weeks ago. Patient's daughter called in to refill the cough medication but never heard back and wanted to be seen, thus she brought him in today. She desires a refill of codeine-guaifenesin. He has been taking this essentially every day since the 13th of the previous month. Cough is productive of white mucous, patient is unable to expectorate; family suctions it out.    Patient was also hospitalized overnight for hyperkalemia since the last hospitalization for pneumonia. He also has a history of high potassium.     Patient is complaining of shortness of breath and chest pain s/p coughing. The pain does not radiate.  Endorses some constipation but no nausea, vomiting or diarrhea. Denies urinary problems. No change in mental status but more irritable than normal. Patient is nonverbal.         Past Medical History, Family History, and Social History reviewed.     Current Outpatient Prescriptions on File Prior to Visit   Medication Sig Dispense Refill     acetaminophen (TYLENOL) 325 MG tablet 650 mg by Gastrostomy Tube route every 6 (six) hours as needed for pain.       acetaminophen-codeine (TYLENOL #3) 300-30 mg per tablet Take 1 tablet by mouth every 4 (four) hours as needed for pain. 15 tablet 0     aspirin 81 mg chewable tablet Chew 1 tablet (81 mg total) daily.  0     baclofen (LIORESAL) 10 MG tablet give 1 tablet crushed for g-tube at bedtime as instructed 30 tablet 2     benzonatate (TESSALON PERLES) 100 MG capsule Take 1 capsule (100 mg total) by mouth every 6 (six) hours as needed for cough. 30 capsule 0     BISCOLAX 10 mg suppository Insert 1 suppository (10 mg total) into the rectum daily as needed 12 suppository 0     chlorhexidine (PERIDEX) 0.12 % solution Apply 15 mL to the mouth or throat 2 (two) times a day. Swish and spit       codeine-guaiFENesin (GUAIFENESIN AC)  mg/5 mL liquid Take 5 mL by mouth 3 (three) times a  day as needed for cough. 118 mL 0     esomeprazole (NEXIUM) 40 MG capsule TAKE ONE CAPSULE BY MOUTH EVERY MORNING BEFORE BREAKFAST 30 capsule 2     levETIRAcetam (KEPPRA) 100 mg/mL solution TAKE 7.5ML BY G-TUBE TWO TIMES PER  mL 11     lisinopril (PRINIVIL,ZESTRIL) 5 MG tablet Take 1 tablet (5 mg total) by mouth daily. Resume on 9/24 30 tablet 0     metoprolol tartrate (LOPRESSOR) 25 MG tablet Take 1 tablet (25 mg total) by mouth 2 (two) times a day. 60 tablet 3     QUEtiapine (SEROQUEL) 25 MG tablet 25 mg by G-tube route 2 (two) times a day as needed (for sleep).        simvastatin (ZOCOR) 20 MG tablet 20 mg by G-tube route at bedtime.       traZODone (DESYREL) 50 MG tablet Take 3 tablets (150 mg total) by mouth at bedtime. 270 tablet 1     triamcinolone (KENALOG) 0.1 % paste Take 1 application by mouth 4 (four) times a day as needed (for mouth sores).   12     venlafaxine (EFFEXOR) 75 MG tablet 75 mg by Enteral Tube route 2 (two) times a day.       No current facility-administered medications on file prior to visit.        Review of systems is as stated in HPI.  The remainder of the 10 system review is otherwise negative as available - patient is nonverbal.    Objective:     /72  Pulse 69  Temp 98.4  F (36.9  C) (Oral)   Resp 20  SpO2 94% There is no height or weight on file to calculate BMI.    Gen: NAD, appears older than stated, normal hygiene, cachectic  Eyes: conjunctivae without injection, sclera clear, EOMI  ENT/mouth: nares clear, septum midline, absent rhinorrhea, throat without injection, neck is supple, no thyroid enlargement; temporal wasting; patient would cough during the examination and forcibly expectorate white mucous  CV: RRR, no murmur appreciated  Resp: exp wheezing present on anterior examination, ronchi diffusely present, slight increase in work of breathing  ABD: normoactive, non-tender to palpation, nondistended  MSK: very little movement, comfortable in wheelchair, does  not move all limbs equally (no movemnt of left arm/hand, left and right feet both move under patient's volition)  Neuro: s/p stroke, poor facial movement  Psych: no apparent hallucinations or delusions, no pressured speech; alert, responds to questions daughter poses with a hand sign; apparently able to understand  SKIN: dry and without lesions  Heme/lymph: no pallor, no active bleeding/bruising    This note has been dictated using voice recognition software. Any grammatical or context distortions are unintentional and inherent to the the software.     Estelle Ya MD  Family Medicine [facility]

## 2021-06-15 ENCOUNTER — RECORDS - HEALTHEAST (OUTPATIENT)
Dept: FAMILY MEDICINE | Facility: CLINIC | Age: 56
End: 2021-06-15

## 2021-06-15 NOTE — TELEPHONE ENCOUNTER
Refill Approved    Rx renewed per Medication Renewal Policy. Medication was last renewed on 1/20/20.    Darryl Barrett, Care Connection Triage/Med Refill 3/2/2021     Requested Prescriptions   Pending Prescriptions Disp Refills     levETIRAcetam (KEPPRA) 100 mg/mL solution [Pharmacy Med Name: levETIRAcetam Oral Solution 100 MG/ML] 450 mL 0     Sig: TAKE 7.5 MILLILITERS BY G-TUBE 2 TIMES DAILY.       Gabapentin/Levetiracetam/Tiagabine Refill Protocol  Passed - 3/1/2021  2:33 PM        Passed - PCP or prescribing provider visit in past 12 months or next 3 months     Last office visit with prescriber/PCP: 3/2/2020 Lynda Gutierrez MD OR same dept: 3/2/2020 Lynda Gutierrez MD OR same specialty: 3/2/2020 Lynda Gutierrez MD  Last physical: Visit date not found Last MTM visit: Visit date not found   Next visit within 3 mo: Visit date not found  Next physical within 3 mo: Visit date not found  Prescriber OR PCP: Lynda Gutierrez MD  Last diagnosis associated with med order: 1. Convulsions, unspecified convulsion type (H)  - levETIRAcetam (KEPPRA) 100 mg/mL solution [Pharmacy Med Name: levETIRAcetam Oral Solution 100 MG/ML]; TAKE 7.5 MILLILITERS BY G-TUBE 2 TIMES DAILY.  Dispense: 450 mL; Refill: 0    If protocol passes may refill for 12 months if within 3 months of last provider visit (or a total of 15 months).

## 2021-06-15 NOTE — PROCEDURES
Interventional Radiology Post-Procedure Note   [facility]  ?   Brief Procedure Note:   Patient name: Liu Malave  Pt MRN:[id number]   Date of procedure: 1/2/2018     Procedure(s): Exchange of Gastrojejunostomy feeding tube  Sedation method: None. The patient was monitored by an interventional radiology nurse at all times throughout the procedure under my direct guidance.  Pre Procedure Diagnosis: Feeding difficulties  Post Procedure Diagnosis: Same  Indications: Need for exchange of existing gastrojejunostomy tube for continued feeding/oral medication   ?   Attending: Steven Pemberton M.D.  Specimen(s) removed: None   Additional studies ordered: None  Drains/Tubes: 18 Fr 45 cm LAURA Gastrojejunostomy feeding tube.  Estimated Blood Loss: Minimal  Complications: None  Vascular closure method: N/A    Findings/Notes/Comments: Uncomplicated exchange of Gastrojejunostomy feeding tube. New feeding tube in appropriate position. Both the G port and the J port may be used immediately.   ?   Please see dictation in PACS or under the Imaging tab in [facility] for detailed procedure note.     Steven Pemberton M.D.   Vascular and Interventional Radiology   Pager: [phone]   After Hours / Scheduling: [phone]     1/2/2018  11:35 AM

## 2021-06-16 NOTE — PROGRESS NOTES
Assessment/Plan:     1. Acute respiratory failure with hypoxia     2. Cough     3. History of aspiration pneumonia     4. History of stroke     5. Swelling of thigh         Diagnoses and all orders for this visit:    Acute respiratory failure with hypoxia  Initial oxygen saturation 86% in clinic.  2 L of oxygen placed and oxygen levels improved to 93%.  Patient with history of recurrent aspiration pneumonia. Recent increase in cough, sputum and dyspnea with rhonchi present on lung exam concerning for recurrence of aspiration pneumonia.  Recommend patient be seen in emergency department for further evaluation and treatment.  Recommend patient be transported by EMS.  Contacted [facility] emergency department and discussed with [facility] provider.  EMS called to transport patient to the emergency department.    Cough    History of aspiration pneumonia    History of stroke    Swelling of thigh  Concern for possible DVT.  Recommend further evaluation in the emergency department                 Subjective:      Liu Malave is a 52 y.o. male who comes in today accompanied by his daughter and is seen with the assistance of a Ethiopian  due to concern about pain and tightness in the right thigh.  This is my first time seeing this patient.  He has complicated past medical history including history of stroke.  He does not walk and is in a wheelchair.  He has a G-tube.  Takes nothing by mouth.  Has history of recurrent aspiration pneumonia and has been hospitalized for treatment of this several times over the past year.  Daughter noticed that over the past couple of days there is an area in his right inner thigh that is bruised in appearance.  It is warm, hard and tender to the touch.  There has been no recent trauma or injury.  Daughter also reports that patient has been less awake and alert and has been more drowsy than usual.  He has had some increased coughing and cough has been productive of more mucus.  He  has also had some increased shortness of breath.  Daughter has noticed that he has felt a little bit warmer to the touch but she has not checked his temperature.  Otherwise they report that bowel movements have been normal.  Urine output has been normal.  No other concerns or questions.  Medications and allergies are reviewed and updated.    No current facility-administered medications for this visit.      Current Outpatient Prescriptions   Medication Sig Dispense Refill     acetaminophen (TYLENOL) 325 MG tablet 650 mg by Gastrostomy Tube route every 6 (six) hours as needed for pain.       acetaminophen-codeine (TYLENOL #3) 300-30 mg per tablet Take 1 tablet by mouth every 4 (four) hours as needed for pain. 15 tablet 0     aspirin 81 mg chewable tablet Chew 1 tablet (81 mg total) daily.  0     baclofen (LIORESAL) 10 MG tablet GIVE 1 TABLET CRUSHED FOR G-TUBE AT BEDTIME AS INSTRUCTED. 30 tablet 1     benzonatate (TESSALON PERLES) 100 MG capsule Take 1 capsule (100 mg total) by mouth every 6 (six) hours as needed for cough. 30 capsule 0     bisacodyl (BISCOLAX) 10 mg suppository Insert 1 suppository (10 mg total) into the rectum daily. Prn constipation 90 suppository 0     chlorhexidine (PERIDEX) 0.12 % solution Apply 15 mL to the mouth or throat 2 (two) times a day. Swish and spit 30 mL 1     codeine-guaiFENesin (GUAIFENESIN AC)  mg/5 mL liquid Take 5 mL by mouth 3 (three) times a day as needed for cough. 118 mL 0     esomeprazole (NEXIUM) 40 MG capsule Take 1 capsule (40 mg total) by mouth daily before breakfast. 90 capsule 2     levETIRAcetam (KEPPRA) 100 mg/mL solution TAKE 7.5ML BY G-TUBE TWO TIMES PER  mL 11     lisinopril (PRINIVIL,ZESTRIL) 5 MG tablet Take 1 tablet (5 mg total) by mouth daily. Resume on 9/24 30 tablet 0     metoprolol tartrate (LOPRESSOR) 25 MG tablet TAKE 1 TABLET BY MOUTH 2 TIMES DAILY. 60 tablet 5     QUEtiapine (SEROQUEL) 25 MG tablet 25 mg by G-tube route 2 (two) times a day  as needed (for sleep).        simvastatin (ZOCOR) 20 MG tablet 20 mg by G-tube route at bedtime.       traZODone (DESYREL) 50 MG tablet Take 3 tablets (150 mg total) by mouth at bedtime. 270 tablet 1     triamcinolone (KENALOG) 0.1 % paste Take 1 application by mouth 4 (four) times a day as needed (for mouth sores).   12     venlafaxine (EFFEXOR) 75 MG tablet 75 mg by Enteral Tube route 2 (two) times a day.       Facility-Administered Medications Ordered in Other Visits   Medication Dose Route Frequency Provider Last Rate Last Dose     sodium chloride flush 3 mL (NS)  3 mL Intravenous Line Care Silver Scott DO           Past Medical History, Family History, and Social History reviewed.  Past Medical History:   Diagnosis Date     Hypertension      Stroke      Past Surgical History:   Procedure Laterality Date     BRAIN SURGERY       HH MIDLINE INSERTION  8/11/2017          PICC  6/10/2016          Review of patient's allergies indicates no known allergies.  Family History   Problem Relation Age of Onset     Hypertension Mother      Cancer Father      Kidney disease Brother      Social History     Social History     Marital status:      Spouse name: N/A     Number of children: N/A     Years of education: N/A     Occupational History     Not on file.     Social History Main Topics     Smoking status: Former Smoker     Packs/day: 1.00     Years: 30.00     Quit date: 6/5/2005     Smokeless tobacco: Never Used     Alcohol use No     Drug use: No     Sexual activity: No     Other Topics Concern     Not on file     Social History Narrative         Review of systems is as stated in HPI, and the remainder of the 10 system review is otherwise negative.    Objective:     Vitals:    03/14/18 0919   BP: 122/71   Patient Site: Right Arm   Patient Position: Sitting   Cuff Size: Adult Regular   Pulse: (!) 101   Temp: (!) 100.6  F (38.1  C)   TempSrc: Tympanic   SpO2: (!) 86%    There is no height or weight on file to  calculate BMI.      General appearance: alert, appears older than stated age, cachectic and no distress  Head: Normocephalic, without obvious abnormality, atraumatic  Lungs: Rhonchi present bilateral lungs, no wheezing  Heart: regular rate and rhythm, S1, S2 normal, no murmur, click, rub or gallop  Extremities: In the area or medial right thigh is area of bruising, induration, mild erythema and warmth.  Right lower extremity is contracted  Neurologic: Awake and alert, residual effects of stroke      This note has been dictated using voice recognition software. Any grammatical or context distortions are unintentional and inherent to the the software.

## 2021-06-16 NOTE — TELEPHONE ENCOUNTER
RN cannot approve Refill Request    RN can NOT refill this medication med is not covered by policy/route to provider. Last office visit: 3/2/2020 Lynda Gutierrez MD Last Physical: Visit date not found Last MTM visit: Visit date not found Last visit same specialty: 3/2/2020 Lynda Gutierrez MD.  Next visit within 3 mo: Visit date not found  Next physical within 3 mo: Visit date not found      Cheyenne Mcgovern, Care Connection Triage/Med Refill 4/8/2021    Requested Prescriptions   Pending Prescriptions Disp Refills     baclofen (LIORESAL) 10 MG tablet [Pharmacy Med Name: Baclofen Oral Tablet 10 MG] 270 tablet 0     Sig: Take 1 tablet (10 mg total) by G-tube 3 (three) times a day. Crush tablet.       There is no refill protocol information for this order

## 2021-06-16 NOTE — TELEPHONE ENCOUNTER
Telephone Encounter by Amber Dover at 1/30/2020  9:18 AM     Author: Amber Dover Service: -- Author Type: --    Filed: 1/30/2020  9:18 AM Encounter Date: 1/25/2020 Status: Signed    : Amber Dover APPROVED:    Approval start date: 1/1/2020  Approval end date:  1/31/2020    Pharmacy has been notified of approval and will contact patient when medication is ready for pickup.

## 2021-06-16 NOTE — PROGRESS NOTES
Liu Malave is a 55 y.o. male who is being evaluated via a billable telephone visit.      What phone number would you like to be contacted at? [phone]  Daughter Idalmis will be on the phone   How would you like to obtain your AVS? AVS Preference: Dominique.    Assessment & Plan     Liu was seen today for ww er f/u 4/15/2021.    Diagnoses and all orders for this visit:    Benign paroxysmal positional vertigo, unspecified laterality  -     meclizine (ANTIVERT) 25 mg tablet; Take 1 tablet via G-tube every 6 hours as needed for vertigo  -     Ambulatory referral to PT/OT  -His symptoms of room spinning dizziness when changing positions and turning his head to the side are suggestive of benign paroxysmal positional vertigo.  Discussed etiology of this condition with his daughter.  Recommended referral to Occupational Therapy for treatment.  We will also provide prescription for meclizine.  Counseled on use of medication and side effects.    Agitation  Continue Seroquel.  We discussed some potential changes in dosing of the Seroquel.  Discussed that daughter can give him 2 tablets at bedtime which would be a total of 50 mg.  His bedtime is around 6 PM.  She can give him an additional tablet around midnight if needed.  Can also continue to use 1 tablet in the morning and 1 tablet in the afternoon as needed for agitation    Malnutrition, unspecified type (H)  Known issue that I take into account for his medical decisions.  He continues to receive G-tube feeds.    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  No reported seizure activity.  Continue current cares    Hepatitis C virus carrier state (H)  Known issue that I take into account for his medical decisions.  Overall stable.  Recent liver enzymes normal.  Mild fatty changes noted of liver noted  on recent CT of abdomen.  Liver otherwise normal in appearance on recent imaging.               No follow-ups on file.    Lynda Gutierrez MD  [facility]    Subjective   Liu Malave is 55 y.o. and presents today for the following health issues   HPI   Evaluated today via telephone encounter for emergency room follow-up visit.  His daughter Idalmis has been doing well, provides most of the history as patient is unable to provide history himself due to previous stroke.  He was seen at the emergency department on April 15/2021 due to concern of worsening shortness of breath, weakness and fatigue.  He was evaluated with UA, CT of the head, chest, abdomen and pelvis as well as labs and Covid swab.  Labs and CT were reassuring overall and he was not admitted.  He did have some slight dehydration and it was suspected that he had a viral illness.  And he was advised to increase his fluid intake and have him follow-up with PCP.  His daughter reports that he continues to have the dizziness.  He is still a little bit more tired.  He has not had shortness of breath.  Daughter reports that the dizziness occurs with movement such as moving from a chair to the bed or turning his head.  He is okay if he is sitting still.  Dizziness is described as a room spinning sensation.  He has not had nausea or vomiting.  Dizziness has been going on since he had his Covid vaccine last week.  He has not had any other symptoms of concern.  Daughter would also like to discuss changing his dose of Seroquel.  He is currently getting a dose of Seroquel about 6 PM when he goes to bed.  He is getting a second dose around midnight.  She is wondering if it would be okay for him to do the third dose as he is waking up around 3:00 and is a little agitated.  They are not really needing to use the Seroquel during the daytime.  We reviewed and updated his medications and allergies.  No other questions today.          Review of Systems   All  other systems reviewed and are negative.          Objective         Physical Exam  No exam performed as this was a telephone encounter            Phone call duration: 19 minutes

## 2021-06-16 NOTE — TELEPHONE ENCOUNTER
Telephone Encounter by Lucía Ann at 3/16/2020  8:27 AM     Author: Lucía Ann Service: -- Author Type: --    Filed: 3/16/2020  8:28 AM Encounter Date: 3/11/2020 Status: Signed    : Lucía Ann APPROVED:    Approval start date: 02/11/2020  Approval end date:  03/12/2021    Pharmacy has been notified of approval and will contact patient when medication is ready for pickup.

## 2021-06-16 NOTE — PROGRESS NOTES
My Clinic Care Coordination Wellness Plan  This Graduation Wellness Plan provides private information in regards to the work I have done with my Care Team from my Primary Care Clinic.  This document provides insight on the goals I have accomplished.  My Care Team congratulates me on my journey to maintain wellness.  This document will help guide me on my journey to maintain a healthy lifestyle.  I will use this to help me overcome any barriers I may encounter.  If I should have any questions or concerns, I will continue to contact the members of my Care Team or contact my Primary Care Clinic for assistance.      [facility]  [address], [address] [place] [address]  [phone]    My Preferred Method of Contact:  Phone: Daughter Idalmis @ [phone]    My Primary/Preferred Language:  Estonian    Preferred Learning Style:  Face to face discussion    Emergency Contact: Extended Emergency Contact Information  Primary Emergency Contact: Idalmis Malave  Address: [address] [place] [address]  Home Phone: [phone]  Relation: Child  Secondary Emergency Contact: Regulo Dumont  Address: [address] [place] [address]  Home Phone: [phone]  Relation: Spouse     PCP:  Silver Longoria MD  Specialists:    Care Team            Silver Longoria MD PCP - General, Family Medicine    [phone]     Madhuri Whitehead, PharmD Pharmacist, Pharmacist        Accomplishments:  Goals        Patient Stated      COMPLETED: I have accomplished being approved for Medical Assistance. (pt-stated)            Personal Plan  If I am in need of further financial resources I will call my Financial worker with [facility] or the Disability Linkage Line @ [phone].        COMPLETED: I have accomplished getting established with a dentist. (pt-stated)            Personal Plan  If I am in need to find a different dentist I will  either refer to the Dentist list my Care Guide gave me or I will call the Disability Linkage line at [phone].        COMPLETED: I have accomplished getting established with a Neurologist.  (pt-stated)            Personal Plan  If I am needing to find a different Neurologist I will talk with my PCP to get a recommendation.         COMPLETED: I have accomplished obtaining a PCA. (pt-stated)            Personal Plan  I have been approved for PCA services and have been receiving them since 10/4/17. If I am  Having trouble with my PCA services I will call my Financial worker with [facility].            Advanced Directive/Living Will: The patient was given information regarding Adanced Directives/Living Will    Clinical Emergency Plan    When to Use the Emergency Department (ED)  An emergency means you could die if you don t get care quickly. Or you could be hurt permanently (disabled). Read below to know when to use -- and when not to use -- an emergency department (also called ED).      Dangers to your life  Here are examples of emergencies. These need immediate care:  A hard time breathing  Severe chest pain  Choking  Severe bleeding  Suddenly not able to move or speak  Blacking out (fainting)`  Poisoning      Dangers of permanent injuries  Here are other emergencies. These also need immediate care:  Deep cuts or severe burns  Broken bones, or sudden severe pain and swelling in a joint      When it s an emergency  If you have an emergency, follow these steps:      1. Go to the nearest emergency department  If you can, go to the hospital ED closest to you right away.  If you cannot get there right away, or if it is not safe to take yourself, call 911 or your police emergency number.  2. Call your primary care doctor  Tell your doctor about the emergency. Call within 24 hours of going to the ED.  If you cannot call, have someone call for you.  Go to your doctor (not the ED) for any follow-up care.      When  it s not an emergency  If a problem is not an emergency, follow these steps:      1. Call your primary care doctor  If you don t know the name of your doctor, call your health plan.  If you cannot call, have someone call for you.  2. Follow instructions  Your doctor will tell you what you should do.  You may be told to see your doctor right away. You may be told to go to the ED. Or you may be told to go to an urgent care center.  Follow your doctor s advice.    All [facility] clinic patients have access to a Nurse 24 hours a day, 7 days a week.  If you have questions or want advice from a Nurse, please know [facility] is here for you.  You can call your clinic and they will connect you or you can call [facility] at [phone].  [facility] also has Walk In Care clinics in multiple locations.  Call the number listed above for more information about our Walk In Care clinics or visit the [facility] website at [email or web address].

## 2021-06-16 NOTE — TELEPHONE ENCOUNTER
Daughter Idalmis called , states her father Liu went to  ER 4/15/2021 for dizziness and weakness. Pt c/o on going dizziness. Would like medication to help with this. States ER did not give RX for the dizziness. Pt has a G-Tube  Please advise .   Daughter is aware  Gutierrez is gone for the rest of day and off tomorrow.

## 2021-06-16 NOTE — PROGRESS NOTES
Assessment:     1. Aspiration pneumonia  XR Chest 2 Views    doxycycline (MONODOX) 100 MG capsule   2. CVA (cerebral vascular accident)       Clinical exam is consistent with  Plan:     1. Aspiration pneumonia  Possible aspiration pneumonia we will x-ray patient while he is at [facility] getting his G-tube replaced; clinically will treat as mild aspiration pneumonia patient is tolerated the following medication previously  - XR Chest 2 Views; Future  - doxycycline (MONODOX) 100 MG capsule; Take 1 capsule (100 mg total) by mouth 2 (two) times a day for 10 days.  Dispense: 20 capsule; Refill: 0    2. CVA (cerebral vascular accident)  Clinically stable with regards to stroke management; G-tube has been bleeding or at least erythematous will replace this today per radiology      Subjective:   This patient is well-known to us having suffered a right-sided CVA and has had recurrent hospitalizations for aspiration pneumonia.  Patient is accompanied by his daughter who is bilingual and translates for us and using sign signals and language.  Patient apparently has had some respiratory distress for the last 2 or 3 days although he is tolerating his G-tube feedings well daughter did notice some erythema around the stomal site and was concerned that he was bleeding patient is scheduled for G-tube replacement imminently; clinical exam here in the clinic is suggestive and indicative of possible aspiration pneumonitis recurrent patient suffered a similar episode 2-1/2 months ago was hospitalized for a few days hydrated and placed on doxycycline through the G-tube..  Here he appears well-hydrated based on my clinical exam and his past medical history I am going to treat him with doxycycline through the G-tube beginning today.  We will send him down to [facility] for replacement of his G-tube.  At and while he is there I will obtain a chest x-ray if it shows indications for admission we will do so however I think we may be  able to manage him by starting antibiotics today.  Daughter is quite skilled when it comes to caring for her father and I trust her judgment that we can perhaps hold off on hospitalization for a day or so.  We will see what we will see.  I have done a chart review medication review and clinical exam today all medical questions that were asked were answered I personally reviewed family social history surgeries allergies problems list.    Review of Systems: A complete 14 point review of systems was obtained and is negative or as stated in the history of present illness.    Past Medical History:   Diagnosis Date     Hypertension      Stroke      Family History   Problem Relation Age of Onset     Hypertension Mother      Cancer Father      Kidney disease Brother      Past Surgical History:   Procedure Laterality Date     BRAIN SURGERY       HH MIDLINE INSERTION  8/11/2017          PICC  6/10/2016          Social History   Substance Use Topics     Smoking status: Former Smoker     Packs/day: 1.00     Years: 30.00     Quit date: 6/5/2005     Smokeless tobacco: Never Used     Alcohol use No         Objective:   /74  Pulse 76  Temp 99.1  F (37.3  C)  SpO2 (!) 88% Comment: RA at rest    General Appearance:  Alert, some sign language between daughter and examiner and patient  Head:  Normocephalic, no obvious abnormality  Ears: TM anatomy normal  Eyes:  PERRL, EOM's intact, conjunctiva and corneas clear  Nose:  Nares symmetrical, septum midline, mucosa pink, no sinus tenderness  Throat:  Lips, tongue, and mucosa are moist, pink, and intact  Neck:  Supple, symmetrical, trachea midline, no adenopathy; thyroid: no enlargement, symmetric,no tenderness/mass/nodules; no carotid bruit, no JVD  Back:  Symmetrical, no curvature, ROM normal, no CVA tenderness  Chest/Breast:  No mass or tenderness  Lungs: Patient has loose wet sounding rhonchi throughout his chest temp was to 101 yesterday afebrile today; will get chest film  while patient is getting G-tube replaced   Heart:  Normal PMI, regular rate & rhythm, S1 and S2 normal, no murmurs, rubs, or gallops  Abdomen: G-tube in place bowel sounds are heard some peristomal erythema is present but no abdominal bleeding  Musculoskeletal:  Tone and strength strong and symmetrical, all extremities  Lymphatic:  No adenopathy  Skin/Hair/Nails:  Skin warm, dry, and intact, no rashes  Neurologic: Right-sided hemiparesis patient is in a wheelchair with G-tube in place  Extremities:  No edema.  David's sign negative.    Genitourinary: deferred  Pulses:  Equal bilaterally     I have had an Advance Directives discussion with the patient.      This note has been dictated using voice recognition software. Any grammatical or context distortions are unintentional and inherent to the the software.

## 2021-06-16 NOTE — TELEPHONE ENCOUNTER
Telephone Encounter by Courtney Malik at 3/17/2020 12:39 PM     Author: Courtney Malik Service: -- Author Type: --    Filed: 3/17/2020 12:41 PM Encounter Date: 3/11/2020 Status: Signed    : Courtney Malik APPROVED:    Approval start date: 02/11/2020  Approval end date:  03/12/2023    Pharmacy has been notified of approval and will contact patient when medication is ready for pickup. It is on order and will come in tomorrow.

## 2021-06-17 NOTE — TELEPHONE ENCOUNTER
Reason for Call: Request for an order or referral:    Order or referral being requested: Order for supplies for strait catheter and ointment    Date needed: as soon as possible    Has the patient been seen by the PCP for this problem? YES    Additional comments: Pls place order and send to "[facility] [employer] [facility]    Phone number Patient can be reached at:  Other phone number:  [phone]    Best Time:  anytime    Can we leave a detailed message on this number?  Yes    Call taken on 5/3/2021 at 10:57 AM by Viridiana Gurrola

## 2021-06-17 NOTE — TELEPHONE ENCOUNTER
Reason contacted:  Orders request  Information relayed:    Spoke with Eric with [employer] calling to check the status of an order for catheter supplies.    He states a new order will be faxed today and this should be completed and faxed in addition to the 5/7/2021 face to face note.     Please watch for this form.   Additional questions:  No  Further follow-up needed:  No  Okay to leave a detailed message:  No

## 2021-06-17 NOTE — TELEPHONE ENCOUNTER
Telephone Encounter by Amber Dover at 9/21/2020  2:30 PM     Author: Amber Dover Service: -- Author Type: --    Filed: 9/21/2020  2:47 PM Encounter Date: 9/18/2020 Status: Addendum    : Amber Dover    Related Notes: Original Note by Amber Dover filed at 9/21/2020  2:46 PM       PA has already been denied, clinic or patient may have initiated request earlier as PA team did not initiate the original request.  May have been denied due to question set being sent to clinic and they went unanswered.   Called [employer] to see if this can be resubmitted     Per call to rep this was initiated by the clinic and questions were answered and was denied.  Denial was due to question set being answered incorrectly. Unsure of the answers as PA team did not initiate pior auth.  There are no formulary alternatives for this medication.    Unfortunately this will need to be appealed. If provider would like to write a letter of medical necessity PA team can initiate appeal for clinic.

## 2021-06-17 NOTE — PROGRESS NOTES
Liu Malave is a 55 y.o. male who is being evaluated via a billable telephone visit.      What phone number would you like to be contacted at?  [phone]  How would you like to obtain your AVS? AVS Preference: Dominique.    Assessment & Plan     Liu was seen today for face to face for catherter supplies.    Diagnoses and all orders for this visit:    Urinary retention  Today's visit will serve as a face-to-face encounter for prescription of urinary straight catheter and lubricating jelly.  This is necessary as patient has history of urinary retention due to previous stroke.  Occasionally needs to be straight cathed to completely empty bladder.    Primary insomnia  -     melatonin 5 mg Tab tablet; Give 1 tablet via g-tube at bedtime as needed    Iron deficiency  -     ferrous sulfate 220 mg (44 mg iron)/5 mL solution; Take 5 ml via G-tube daily    Restlessness and agitation  Continue Seroquel 50 mg at bedtime.  Okay to increase dose of midnight Seroquel to 50 mg as well.    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  Today's visit will serve as a face-to-face encounter for Mepilex border pads to put over sites of pressure injury for protection and to prevent occurrence of pressure ulcers               No follow-ups on file.    Lynda Gutierrez MD  [facility]   Liu Malave is 55 y.o. and presents today for the following health issues   HPI   Pt is evaluated today via telephone encounter for face-to-face visit for catheter supplies.  His daughter Idalmis is present during the encounter and provides most of the history as patient is unable to provide history himself due to previous stroke.  He has history of urinary retention.  His caregivers occasionally need to perform a straight cath procedure to completely empty his  bladder.  Daughter reports that on average they do this about once a month.  Their current supply of straight catheters is nearly out and they would like an order for more supplies.  They also would like an order for lubricating jelly.  He has left-sided hemiplegia due to his previous stroke.  He sometimes gets some redness and irritation of his skin due to limited mobility.  Family does try to reposition him frequently but they do request a new prescription for Mepilex pads to use on areas where redness and skin irritation is noted to prevent development of a pressure sore.  Daughter also would like to discuss dosing of Seroquel which is prescribed for sleep and for agitation.  Patient is currently receiving 50 mg at bedtime which is around 7 PM.  He typically wakes up around midnight and is agitated and has trouble going back to sleep so they are giving him 25 mg at that time.  Daughter is wondering if it is okay to give him an additional 25 mg at midnight.  He is not needing the Seroquel during the day.  Daughter is requesting a prescription for melatonin which they have also been giving him at bedtime to help with sleep.  They also requests refill of ferrous sulfate.          Review of Systems   All other systems reviewed and are negative.          Objective    Vitals - Patient Reported  Weight (Patient Reported): 125 lb (56.7 kg)    Physical Exam  No exam performed as this is a telephone encounter            Phone call duration: 18 minutes

## 2021-06-17 NOTE — TELEPHONE ENCOUNTER
Telephone Encounter by Lucía Ann at 8/18/2020  8:04 AM     Author: Lucía Ann Service: -- Author Type: --    Filed: 8/18/2020  8:05 AM Encounter Date: 8/13/2020 Status: Signed    : Lucía Ann APPROVED:    Approval start date: 07/15/2020  Approval end date:  08/14/2021    Pharmacy has been notified of approval and will contact patient when medication is ready for pickup.

## 2021-06-18 NOTE — PROGRESS NOTES
Pt arrives to IR via WC from home with daughter.  Pt is at baseline per daughter.  Presents for routine GJ tube change.

## 2021-06-19 NOTE — PROGRESS NOTES
Assessment/Plan:     1. Late effect of stroke     2. Hypoalbuminemia due to protein-calorie malnutrition (H)  Ambulatory referral to Home Health   3. Hepatitis C virus carrier state (H)     4. Hemiplegia of nondominant side, late effect of cerebrovascular disease (H)     5. Urinary retention  Ambulatory referral to Home Health   6. Seizure disorder (H)     7. Restlessness and agitation     8. Slow transit constipation     9. Spasticity     10. Oropharyngeal dysphagia     11. Late CVD Effects: Hemiplegia Affecting Nondominant Side Left  Ambulatory referral to Home Health   12. Dyslipidemia  Lipid [place] RANDOM   13. Mouth sores  Vitamin B12   14. Essential hypertension  Comprehensive Metabolic Panel    Lipid [place] RANDOM   15. Anemia  Vitamin B12    HM2(CBC w/o Differential)    Ferritin    Iron and Transferrin Iron Binding Capacity   16. Gastrostomy tube dependent (H)  Ambulatory referral to Home Health       Diagnoses and all orders for this visit:    Late effect of stroke  Today's visit will serve as a face-to-face encounter for patient to receive home health care services.  Transitioning off of hospice as he no longer meets hospice criteria.  Need for home health care primarily due to late effect of stroke.  Order placed for home care services.    Hypoalbuminemia due to protein-calorie malnutrition (H)  -     Ambulatory referral to Home Health  -Continue G-tube feeds    Hepatitis C virus carrier state (H)  Stable.  Not addressed at this visit    Hemiplegia of nondominant side, late effect of cerebrovascular disease (H)  -     Ambulatory referral to Home Health    Urinary retention  -     Ambulatory referral to Home Health  -Continue Kelly catheter    Seizure disorder (H)  Stable.  No recent seizure activity.  Continue current dose of Keppra    Restlessness and agitation  Continue Seroquel as needed.  Continue Lorazepam as needed.  Continue haloperidol as needed    Slow transit constipation  Bowel movements  doing okay currently.  Continue current bowel regimen.    Spasticity  Continue baclofen    Oropharyngeal dysphagia  Continue G-tube feeds    Late CVD Effects: Hemiplegia Affecting Nondominant Side Left  -     Ambulatory referral to Home Health  -Not currently on statin or aspirin for secondary stroke prevention    Dyslipidemia  -     Lipid [place] RANDOM  -Previously was on simvastatin but no longer taking this medication.  Check lipid cascade today.  Daughter would be potentially interested in restarting this medication.    Mouth sores  -     Vitamin B12    Essential hypertension  -     Comprehensive Metabolic Panel  -     Lipid [place] RANDOM  -Controlled with current medications.  These will be continued    Anemia  -     Vitamin B12  -     HM2(CBC w/o Differential)  -     Ferritin  -     Iron and Transferrin Iron Binding Capacity    Gastrostomy tube dependent (H)  -     Ambulatory referral to Home Health               Subjective:      Liu Malave is a 53 y.o. male who comes in today accompanied by his daughter and seen with the assistance of a Romanian .  He is currently on hospice.  Working with [facility].  Daughter reports that he has been on hospice now for several months and with hospice care he has actually improved.  Daughter reports that hospice has informed him that he no longer meets criteria for ongoing hospice and they recommended that he speak to his primary care provider about transitioning over to home cares.  Reviewed his health history.  He has complicated medical history notable for a significant stroke and multiple hospitalizations for aspiration pneumonia.  Most recently hospitalized in March of this year in which he presented with symptoms consistent with aspiration pneumonia.  He was treated with IV antibiotics.  He has a gastrostomy tube.  Was discharged to home on hospice.  Daughter reports that he has subsequently done well.  He has been gaining weight.  He has been  gaining some strength.  He does cough frequently.  He takes medications to help with cough.  She reports that bowel movements are normal.  He does have a Kelly catheter.  They have no concerns with his tube feedings.  He is awake and alert but is mostly nonverbal.  He has history of seizure disorder but has not had any recent seizures.  He has history of hepatitis C.  They have no other specific concerns or questions today although they report that he frequently complains of sores in his mouth.  Medications and allergies are reviewed.  Medication reconciliation is performed.  He uses Seroquel for agitation.  States that he rarely uses oxycodone for pain management as Tylenol works quite well.  On average uses oxycodone about once a week.  Uses lorazepam about once a week and haloperidol about once a week.  No other concerns or questions.    Current Outpatient Prescriptions   Medication Sig Dispense Refill     acetaminophen (TYLENOL) 325 MG tablet 650 mg by G-tube route every 6 (six) hours as needed for pain.        baclofen (LIORESAL) 10 MG tablet 10 mg by G-tube route every morning. Crush tablet       bisacodyl (DULCOLAX) 10 mg suppository Insert 10 mg into the rectum daily as needed.       chlorhexidine (PERIDEX) 0.12 % solution Apply 15 mL to the mouth or throat 2 (two) times a day. Swish and spit 30 mL 1     codeine-guaiFENesin (GUAIFENESIN AC)  mg/5 mL liquid 5 mL by G-tube route 3 (three) times a day as needed for cough.       esomeprazole (NEXIUM) 40 MG capsule 40 mg by G-tube route daily before breakfast.       haloperidol (HALDOL) 2 mg/mL solution 0.25 mL (0.5 mg total) by G-tube route every 4 (four) hours as needed (Agitation). 15 mL 0     hyoscyamine (LEVSIN/SL) 0.125 mg SL tablet Take 1 tablet (125 mcg total) by mouth every 4 (four) hours as needed (oral  secretion). 4 tablet 0     levETIRAcetam (KEPPRA) 100 mg/mL solution 750 mg by G-tube route 2 (two) times a day.        LORazepam (ATIVAN) 0.5 MG  tablet Take 0.5-1 tablets (0.25-0.5 mg total) by mouth 2 (two) times a day as needed for anxiety (agitation and for sleep). 30 tablet 0     metoprolol tartrate (LOPRESSOR) 25 MG tablet 25 mg by G-tube route 2 (two) times a day.       oxyCODONE (ROXICODONE) 5 mg/5 mL solution 2.5 mL (2.5 mg total) by G-tube route every 8 (eight) hours as needed for pain (or shortness of breath). 473 mL 0     polyethylene glycol (MIRALAX) 17 gram packet Take 1 packet (17 g total) by mouth daily as needed.  0     QUEtiapine (SEROQUEL) 25 MG tablet 25 mg by G-tube route 2 (two) times a day.       triamcinolone (KENALOG) 0.1 % paste Take 1 application by mouth 4 (four) times a day as needed (for mouth sores).   12     aspirin 81 mg chewable tablet 81 mg by G-tube route daily.       simvastatin (ZOCOR) 20 MG tablet 20 mg by G-tube route at bedtime.       No current facility-administered medications for this visit.        Past Medical History, Family History, and Social History reviewed.  Past Medical History:   Diagnosis Date     Hypertension      Stroke (H)      Past Surgical History:   Procedure Laterality Date     BRAIN SURGERY       HH MIDLINE INSERTION  8/11/2017          PICC  6/10/2016          Review of patient's allergies indicates no known allergies.  Family History   Problem Relation Age of Onset     Hypertension Mother      Cancer Father      Kidney disease Brother      Social History     Social History     Marital status:      Spouse name: N/A     Number of children: N/A     Years of education: N/A     Occupational History     Not on file.     Social History Main Topics     Smoking status: Former Smoker     Packs/day: 1.00     Years: 30.00     Quit date: 6/5/2005     Smokeless tobacco: Never Used     Alcohol use No     Drug use: No     Sexual activity: No     Other Topics Concern     Not on file     Social History Narrative         Review of systems is as stated in HPI, and the remainder of the 10 system review is  otherwise negative.    Objective:     Vitals:    08/15/18 0805   BP: 110/58   Patient Site: Left Arm   Patient Position: Sitting   Cuff Size: Adult Regular   Pulse: 61   Temp: 99  F (37.2  C)   TempSrc: Tympanic   SpO2: 95%    There is no height or weight on file to calculate BMI.      General appearance: alert and appears stated age  Head: Normocephalic, without obvious abnormality, atraumatic  Eyes: negative   Mouth: poor dentition, no obvious sores/lesions  Neck: no adenopathy  Lungs: clear to auscultation bilaterally  Heart: regular rate and rhythm, S1, S2 normal, no murmur, click, rub or gallop  Abdomen: soft, non-tender; bowel sounds normal; no masses,  no organomegaly and gastrostomy tube present  Extremities: extremities normal, atraumatic, no cyanosis or edema and contractures presentin bilateral upper and lower extremites      This note has been dictated using voice recognition software. Any grammatical or context distortions are unintentional and inherent to the the software.

## 2021-06-19 NOTE — LETTER
Letter by Lynda Gutierrez MD at      Author: Lynda Gutierrez MD Service: -- Author Type: --    Filed:  Encounter Date: 10/14/2019 Status: Signed         Liu GIL Malave  [address] [facility] [address]      October 14, 2019      Dear Liu    In reviewing your records, we have determined a gap in your preventive services. Based on your age and health history, we recommend the follow service.             Please call to schedule an appointment with DR Gutierrez  [phone]    ? General Physical  ? Colon cancer screening  ? Immunization  ? Lab work  ? Med check      If you have had the service elsewhere, please contact us so we can update our records. Please let us know if you have transferred your care to another clinic.        We believe that a strong preventive care program, including regular physicals and follow-up care is an important part of a healthy lifestyle and we are committed to helping you maintain your health.    Thank you for choosing us as your health care provider.    Sincerely,     DR Gutierrez and [facility]

## 2021-06-19 NOTE — LETTER
Letter by Lynda Gutierrez MD at      Author: Lynda Gutierrez MD Service: -- Author Type: --    Filed:  Encounter Date: 9/10/2019 Status: (Other)         September 10, 2019     Patient: Liu Malave   YOB: 1965           Order: ENFit GJ tubing  Size: 18 Tunisian, 45 cm    Replacement frequency: every 2-3 months      Diagnosis:           Lynda Gutierrez MD: ______________________________________________ Date: _________________  NPI: [id number]

## 2021-06-20 NOTE — LETTER
Letter by Lynda Gutierrez MD at      Author: Lynda Gutierrez MD Service: -- Author Type: --    Filed:  Encounter Date: 6/12/2020 Status: (Other)         Liu Malave  [address] [facility] [place] [address]             June 12, 2020         Dear Mr. Malave,    Below are the results from your recent visit:    Resulted Orders   Calcium   Result Value Ref Range    Calcium 8.4 (L) 8.5 - 10.5 mg/dL   Urinalysis-UC if Indicated   Result Value Ref Range    Color, UA Yellow Colorless, Yellow, Straw, Light Yellow    Clarity, UA Clear Clear    Glucose, UA Negative Negative    Bilirubin, UA Negative Negative    Ketones, UA Negative Negative, 60 mg/dL    Specific Gravity, UA 1.011 1.001 - 1.030    Blood, UA Moderate (!) Negative    pH, UA 5.5 4.5 - 8.0    Protein,  mg/dL (!) Negative mg/dL    Urobilinogen, UA <2.0 E.U./dL <2.0 E.U./dL, 2.0 E.U./dL    Nitrite, UA Negative Negative    Leukocytes, UA Negative Negative    Bacteria, UA Few (!) None Seen hpf    RBC, UA 25-50 (!) None Seen, 0-2 hpf    WBC, UA 0-5 None Seen, 0-5 hpf    Squam Epithel, UA 0-5 None Seen, 0-5 lpf    Mucus, UA Few (!) None Seen lpf    Hyaline Casts, UA 0-5 0-5, None Seen lpf    Narrative    UC not indicated       Calcium level has improved.  Urine sample does not show signs of infection    Please call with questions or contact us using [employer].    Sincerely,        Electronically signed by Lynda Gutierrez MD

## 2021-06-20 NOTE — LETTER
Letter by Bruno López MD at      Author: Bruno López MD Service: -- Author Type: --    Filed:  Encounter Date: 8/14/2020 Status: (Other)                    My Depression Action Plan  Name: Liu Malave   Date of Birth 1965  Date: 8/14/2020    My Doctor: Lynda Gutierrez MD   My Clinic: [facility]/[address] [address]  [phone]          GREEN    ZONE   Good Control    What it looks like:     Things are going generally well. You have normal ups and downs. You may even feel depressed from time to time, but bad moods usually last less than a day.   What you need to do:  1. Continue to care for yourself (see self care plan)  2. Check your depression survival kit and update it as needed  3. Follow your physicians recommendations including any medication.  4. Do not stop taking medication unless you consult with your physician first.           YELLOW         ZONE Getting Worse    What it looks like:     Depression is starting to interfere with your life.     It may be hard to get out of bed; you may be starting to isolate yourself from others.    Symptoms of depression are starting to last most all day and this has happened for several days.     You may have suicidal thoughts but they are not constant.   What you need to do:     1. Call your care team. Your response to treatment will improve if you keep your care team informed of your progress. Yellow periods are signs an adjustment may need to be made.     2. Continue your self-care.  Just get dressed and ready for the day.  Don't give yourself time to talk yourself out of it.    3. Talk to someone in your support network.    4. Open up your depression Depression Self-Care Plan / Wellness kit.           RED    ZONE Medical Alert - Get Help    What it looks like:     Depression is seriously interfering with your life.     You may experience these or other symptoms: You cant get out of bed most days, cant work or  engage in other necessary activities, you have trouble taking care of basic hygiene, or basic responsibilities, thoughts of suicide or death that will not go away, self-injurious behavior.     What you need to do:  1. Call your care team and request a same-day appointment. If they are not available (weekends or after hours) call your local crisis line, emergency room or 911.            Self-Care Plan / Wellness Kit    Self-Care for Depression  Heres the deal. Your body and mind are really not as separate as most people think.  What you do and think affects how you feel and how you feel influences what you do and think. This means if you do things that people who feel good do, it will help you feel better.  Sometimes this is all it takes.  There is also a place for medication and therapy depending on how severe your depression is, so be sure to consult with your medical provider and/ or Behavioral Health Consultant if your symptoms are worsening or not improving.     In order to better manage my stress, I will:    Exercise  Get some form of exercise, every day. This will help reduce pain and release endorphins, the feel good chemicals in your brain. This is almost as good as taking antidepressants!  This is not the same as joining a gym and then never going! (they count on that by the way?) It can be as simple as just going for a walk or doing some gardening, anything that will get you moving.      Hygiene   Maintain good hygiene (get out of bed in the morning, make your bed, brush your teeth, take a shower, and get dressed like you were going to work, even if you are unemployed).  If your clothes don't fit try to get ones that do.    Diet  Strive to eat foods that are good for me, drink plenty of water, and avoid excessive sugar, caffeine, alcohol, and other mood-altering substances.  Some foods that are helpful in depression are: complex carbohydrates, B vitamins, flaxseed, fish or fish oil, fresh fruits and  vegetables.    Psychotherapy  Agree to participate in Individual Therapy (if recommended).    Medication  If prescribed medications, I agree to take them.  Missing doses can result in serious side effects.  I understand that drinking alcohol, or other illicit drug use, may cause potential side effects.  I will not stop my medication abruptly without first discussing it with my provider.    Staying Connected With Others  Stay in touch with my friends, family members, and my primary care provider/team.    Use your imagination  Be creative.  We all have a creative side; it doesnt matter if its oil painting, sand castles, or mud pies! This will also kick up the endorphins.    Witness Beauty  (AKA stop and smell the roses) Take a look outside, even in mid-winter. Notice colors, textures. Watch the squirrels and birds.     Service to others  Be of service to others.  There is always someone else in need.  By helping others we can get out of ourselves and remember the really important things.  This also provides opportunities for practicing all the other parts of the program.    Humor  Laugh and be silly!  Adjust your TV habits for less news and crime-drama and more comedy.    Control your stress  Try breathing deep, massage therapy, biofeedback, and meditation. Find time to relax each day.     Crisis Text Line  http://www.crisistextline.org    The Crisis Text Line serves anyone, in any type of crisis, providing access to free, 24/7 support and information via the medium people already use and trust:    Here's how it works:  1.  Text 828-336 from anywhere in the [place], anytime, about any type of crisis.  2.  A live, trained Crisis Counselor receives the text and responds quickly.  3.  The volunteer Crisis Counselor will help you move from a 'hot moment to a cool moment'.  My support system    Clinic Contact:  Phone number:    Contact 1:  Phone number:    Contact 2:  Phone number:    Taoist/:  Phone  number:    Therapist:  Phone number:    [facility] crisis center:    Phone number:    Other community support:  Phone number:

## 2021-06-20 NOTE — LETTER
Letter by Lynda Gutierrez MD at      Author: Lynda Gutierrez MD Service: -- Author Type: --    Filed:  Encounter Date: 3/17/2020 Status: (Other)         Liu Malave                                  April 2, 2020    Patient: Liu Malave   MR Number: [id number]   YOB: 1965   Date of Visit: 3/17/2020     Dear Dr. Malave:    Thank you for referring Liu Malave to me for evaluation. Below are the relevant portions of my assessment and plan of care.    If you have questions, please do not hesitate to call me. I look forward to following [facility] along with you.    Sincerely,        Lynda Gutierrez MD          CC  No Recipients

## 2021-06-20 NOTE — LETTER
Letter by Lynda Gutierrez MD at      Author: Lynda Gutierrez MD Service: -- Author Type: --    Filed:  Encounter Date: 3/17/2020 Status: (Other)         Liu Malave  [address]  [place] [address]             March 17, 2020         Dear Mr. Malave,    Below are the results from your recent visit:    Resulted Orders   Potassium   Result Value Ref Range    Potassium 5.4 (H) 3.5 - 5.0 mmol/L       Potassium level has improved.  At this time, I recommend periodic monitoring.    Please call with questions or contact us using [employer].    Sincerely,        Electronically signed by Lynda Gutierrez MD

## 2021-06-20 NOTE — PROGRESS NOTES
Patients daughter speaks english and states  not needed for routine gj change as he is used to this and is nonverbal and not oriented.

## 2021-06-20 NOTE — PROGRESS NOTES
Assessment/Plan:     1. Chronic cough     2. Urinary retention     3. GERD without esophagitis     4. Shortness of breath     5. History of stroke with residual deficit     6. Mouth sores     7. Contracture of muscle ankle and foot, right     8. Anxiety     9. Dysphagia     10. Gastrojejunostomy tube status (H)         Diagnoses and all orders for this visit:    Chronic cough  Today's visit will serve as a face-to-face encounter for him to receive a nebulizer machine for use at home.  Prescription for albuterol nebulizer solution was sent to pharmacy.  He does have chronic cough and chest congestion related to dysphagia, aspiration and difficulty with respiratory secretions.  It is unclear what his need for oxygen at home is at this time.  Oxygen sats are normal today in clinic.  We will see if we can obtain a overnight oximetry test through a respiratory care company to see if his oxygen levels dropped at night and whether home oxygen is needed.    Urinary retention  Continue urinary catheter    GERD without esophagitis  Daughter would like to try reducing dose of Nexium to 20 mg daily.  Prescription and sent to pharmacy    Shortness of breath  Will place order for use of nebulizer machine at home and albuterol solution for treatment of cough and shortness of breath.  Will check with respiratory care company about obtaining an overnight pulse oximetry test.    History of stroke with residual deficit  Stable.  Continue aspirin and statin for secondary prevention    Mouth sores  Continue triamcinolone paste as needed.  Prescription for lysine ointment sent to pharmacy    Contracture of muscle ankle and foot, right  Increase baclofen to 10 mg 3 times daily    Anxiety  Continue Seroquel.  Continue lorazepam as needed.  Continue Haldol prn    Dysphagia  Has GJ tube    Gastrojejunostomy tube status (H)               Subjective:      Liu Malave is a 53 y.o. male who comes in today for a face-to-face encounter for a  nebulizer machine for use at home as well as possible oxygen for use at home.  He is accompanied by his daughter.  He is seen with the assistance of a [place] .  He has history of hemorrhagic stroke with residual hemiparesis.  He has gastrojejunostomy tube and does tube feedings.  This is due to dysphagia and aspiration as result of stroke.  He also has urinary catheter due to urinary retention.  He was on hospice but recently graduated from hospice as his health condition stabilized and has been transitioned over to home health care services.  He was receiving home oxygen and nebulizer machine for use at home through hospice.  This has been taken away since he is no longer on hospice.  Family is requesting to have an order for a nebulizer machine.  He does have frequent coughing and chest congestion.  Previously was using the nebulizer and albuterol medication about 2-3 times daily.  This would help reduce his coughing and chest congestion.  He would occasionally have shortness of breath.  This would often occur when he was more anxious.  Daughter has a pulse oximeter that she uses to monitor his oxygen levels and she reports that about once a month or so his oxygen levels would drop down to around 89-90% and she would use oxygen for a few hours.  We reviewed and updated his medications.  He is currently on baclofen twice daily for muscle contractures.  Daughter wonders if there are any other medications that can help with the muscle contractures in his left foot.  She does do range of motion exercises with him.  He continues to have trouble with sores in his mouth.  They are using triamcinolone paste.  He has not recently had any fevers or chills, increased cough or increased sputum production.  Overall health has recently been stable.  They have no other concerns or questions.  They would like influenza vaccine today    Current Outpatient Prescriptions   Medication Sig Dispense Refill      acetaminophen (TYLENOL) 325 MG tablet 650 mg by G-tube route every 6 (six) hours as needed for pain.        baclofen (LIORESAL) 10 MG tablet 1 tablet (10 mg total) by G-tube route 3 (three) times a day. Crush tablet 270 tablet 3     bisacodyl (DULCOLAX) 10 mg suppository Insert 10 mg into the rectum daily as needed.       chlorhexidine (PERIDEX) 0.12 % solution Apply 15 mL to the mouth or throat 2 (two) times a day. Swish and spit 30 mL 1     codeine-guaiFENesin (GUAIFENESIN AC)  mg/5 mL liquid 5 mL by G-tube route 3 (three) times a day as needed for cough.       haloperidol (HALDOL) 2 mg/mL solution 0.25 mL (0.5 mg total) by G-tube route every 4 (four) hours as needed (Agitation). 15 mL 0     hyoscyamine (LEVSIN/SL) 0.125 mg SL tablet Take 1 tablet (125 mcg total) by mouth every 4 (four) hours as needed (oral  secretion). 4 tablet 0     levETIRAcetam (KEPPRA) 100 mg/mL solution 750 mg by G-tube route 2 (two) times a day.        LORazepam (ATIVAN) 0.5 MG tablet Take 0.5-1 tablets (0.25-0.5 mg total) by mouth 2 (two) times a day as needed for anxiety (agitation and for sleep). 30 tablet 0     metoprolol tartrate (LOPRESSOR) 25 MG tablet 25 mg by G-tube route 2 (two) times a day.       oxyCODONE (ROXICODONE) 5 mg/5 mL solution 2.5 mL (2.5 mg total) by G-tube route every 8 (eight) hours as needed for pain (or shortness of breath). 473 mL 0     polyethylene glycol (MIRALAX) 17 gram packet Take 1 packet (17 g total) by mouth daily as needed.  0     QUEtiapine (SEROQUEL) 25 MG tablet 1 tablet (25 mg total) by G-tube route 2 (two) times a day. 180 tablet 3     simvastatin (ZOCOR) 20 MG tablet 1 tablet (20 mg total) by G-tube route at bedtime. 90 tablet 3     triamcinolone (KENALOG) 0.1 % paste Take 1 application by mouth 4 (four) times a day as needed (for mouth sores).   12     emollient combination no.24 (L-LYSINE) Oint Apply topically to sores in mouth as needed 28.41 g 1     esomeprazole (NEXIUM) 20 MG capsule 1  capsule by G-tube twice daily 180 capsule 3     No current facility-administered medications for this visit.        Past Medical History, Family History, and Social History reviewed.  Past Medical History:   Diagnosis Date     Hypertension      Stroke (H)      Past Surgical History:   Procedure Laterality Date     BRAIN SURGERY       HH MIDLINE INSERTION  8/11/2017          PICC  6/10/2016          Review of patient's allergies indicates no known allergies.  Family History   Problem Relation Age of Onset     Hypertension Mother      Cancer Father      Kidney disease Brother      Social History     Social History     Marital status:      Spouse name: N/A     Number of children: N/A     Years of education: N/A     Occupational History     Not on file.     Social History Main Topics     Smoking status: Former Smoker     Packs/day: 1.00     Years: 30.00     Quit date: 6/5/2005     Smokeless tobacco: Never Used     Alcohol use No     Drug use: No     Sexual activity: No     Other Topics Concern     Not on file     Social History Narrative         Review of systems is as stated in HPI, and the remainder of the 10 system review is otherwise negative.    Objective:     Vitals:    09/21/18 0837   BP: 110/66   Patient Site: Right Arm   Patient Position: Sitting   Cuff Size: Adult Regular   Pulse: 77   Temp: 98.5  F (36.9  C)   TempSrc: Oral   SpO2: 93%    There is no height or weight on file to calculate BMI.      General appearance: alert, appears stated age and cooperative  Head: Normocephalic, without obvious abnormality, atraumatic  Lungs: clear to auscultation bilaterally, occasional cough  Heart: regular rate and rhythm, S1, S2 normal, no murmur, click, rub or gallop  MSK: muscle contractures right foot      This note has been dictated using voice recognition software. Any grammatical or context distortions are unintentional and inherent to the the software.

## 2021-06-24 NOTE — TELEPHONE ENCOUNTER
Requesting orders for recertification of skilled nursing every 4 weeks for catheter changes and 4 PRN visits for catheter troubleshooting, reassessments, and OASIS data collection.  Thanks

## 2021-06-25 NOTE — PROGRESS NOTES
Progress Notes by Lisa Triana PTA at 1/5/2017  7:30 AM     Author: Lisa Triana PTA Service: -- Author Type: Physical Therapist Assistant    Filed: 1/5/2017  8:42 AM Encounter Date: 1/5/2017 Status: Attested    : Lisa Triana PTA (Physical Therapist Assistant) Cosigner: Estelle Monroe PT at 1/5/2017  9:23 AM    Attestation signed by Estelle Monroe PT at 1/5/2017  9:23 AM    Physical Therapist observed treatment and reviewed patient's subjective and objective progress, progress towards goals and plan of care with the PTA.  It is appropriate to continue per Plan of Care.                  [facility] Daily Progress     Patient Name: Liu Malave  Date: 1/5/2017  Visit #: 14  PTA visit #:  6  Referral Diagnosis: CVA (cerecral vascular accident)  Referring provider: Silver Longoria MD  Visit Diagnosis:     ICD-10-CM    1. Stroke I63.9    2. Weakness of both lower extremities M62.81    3. Impaired functional mobility, balance, gait, and endurance Z74.09    4. Abnormal posture R29.3    5. CVA (cerebral vascular accident) I63.9    6. Lower extremity weakness M62.81          Assessment:   Pt demonstrates improved sitting balance.    Patient is appropriate to continue with skilled physical therapy intervention, as indicated by initial plan of care.    te for Pt was 15 ' late for his appointment today. Daughter was with pt today, the interpretor did not show up.  Pt bit his lip last weekend and was having pain today. His daughter does have a call into the MD.    Goal Status:  Pt. will demonstrate/verbalize independence in self-management of condition in : 12 weeksOngoing  Pt. will improve posture : and demonstrate posture with minimal to no cuing;and maintain posture for;5 minutes;in sitting;in 12 weeks  Pt. will be able to walk : around the house;in 12 weeks (with appropriate AD and assistance): Ongoing  Patient will stand : for home chores;with less difficultty;in 12 weeks (with  "appropriate AD and assistance )Ongoing       Plan / Patient Education:     Continue with initial plan of care.  Progress with home program as tolerated.     :Subjective     Pain Ratin    Pt's daughter present today during treatment session.    Pt's daughter reports compliancy with HEP. He has been ambulating with the assist of two, still not using the walker.      Objective:     Pt continues need mod to max assist when ambulating with walker. Continues to weight bear more on the R. Needs verbal and tactile cues to weight bear equally.          Treatment Today    TREATMENT MINUTES COMMENTS   Evaluation     Self-care/ Home management     Manual therapy 10 STM to bilateral gastroc muscles and bilateral hamstrings along with Manual stretch to B achilles tendon and HS 30\" x2 each    Neuromuscular Re-education     Therapeutic Activity     Therapeutic Exercises 25 See ex flow sheet,    Gait training 10 Gait with FWW with platform for L UE with mod A of 2, verbal and tactile cues for advancing L LE. Amb. 50 feet and 100 feet   Modality__________________                Total 45    Blank areas are intentional and mean the treatment did not include these items.       Lisa Triana, PTA,CLT  2017       "

## 2021-06-25 NOTE — TELEPHONE ENCOUNTER
RN cannot approve Refill Request    RN can NOT refill this medication medication not on med list. Last office visit: 2/16/2018 Silver Longoria MD Last Physical: Visit date not found Last MTM visit: Visit date not found Last visit same specialty: 3/2/2020 Lynda Gutierrez MD.  Next visit within 3 mo: Visit date not found  Next physical within 3 mo: Visit date not found      Kristina Guerrero, [facility] Triage/Med Refill 6/15/2021    Requested Prescriptions   Pending Prescriptions Disp Refills     amLODIPine (NORVASC) 5 MG tablet [Pharmacy Med Name: amLODIPine Besylate Oral Tablet 5 MG] 30 tablet 0     Sig: Take 1 tablet (5 mg total) by mouth daily.       Calcium-Channel Blockers Protocol Passed - 6/14/2021 10:04 AM        Passed - PCP or prescribing provider visit in past 12 months or next 3 months     Last office visit with prescriber/PCP: 2/16/2018 Silver Longroia MD OR same dept: Visit date not found OR same specialty: 3/2/2020 Lynda Gutierrez MD  Last physical: Visit date not found Last MTM visit: Visit date not found   Next visit within 3 mo: Visit date not found  Next physical within 3 mo: Visit date not found  Prescriber OR PCP: Silver Longoria MD  Last diagnosis associated with med order: 1. Elevated troponin  - amLODIPine (NORVASC) 5 MG tablet [Pharmacy Med Name: amLODIPine Besylate Oral Tablet 5 MG]; Take 1 tablet (5 mg total) by mouth daily.  Dispense: 30 tablet; Refill: 0    If protocol passes may refill for 12 months if within 3 months of last provider visit (or a total of 15 months).             Passed - Blood pressure filed in past 12 months     BP Readings from Last 1 Encounters:   04/15/21 143/76

## 2021-06-25 NOTE — TELEPHONE ENCOUNTER
RN cannot approve Refill Request    RN can NOT refill this medication med is not covered by policy/route to provider. Last office visit: 3/2/2020 Lynda Gutierrez MD Last Physical: Visit date not found Last MTM visit: Visit date not found Last visit same specialty: 3/2/2020 Lynda Gutierrez MD.  Next visit within 3 mo: Visit date not found  Next physical within 3 mo: Visit date not found      Kristina Guerrero, [facility] Triage/Med Refill 6/15/2021    Requested Prescriptions   Pending Prescriptions Disp Refills     meclizine (ANTIVERT) 25 mg tablet [Pharmacy Med Name: Meclizine HCl Oral Tablet 25 MG] 30 tablet 0     Sig: Take 1 tablet via G-tube every 6 hours as needed for vertigo       There is no refill protocol information for this order        simvastatin (ZOCOR) 20 MG tablet [Pharmacy Med Name: Simvastatin Oral Tablet 20 MG] 90 tablet 0     Si tablet (20 mg total) by G-tube route at bedtime.       Statins Refill Protocol (Hmg CoA Reductase Inhibitors) Passed - 2021 10:04 AM        Passed - PCP or prescribing provider visit in past 12 months      Last office visit with prescriber/PCP: 3/2/2020 Lynda Gutierrez MD OR same dept: Visit date not found OR same specialty: 3/2/2020 Lynda Gutierrez MD  Last physical: Visit date not found Last MTM visit: Visit date not found   Next visit within 3 mo: Visit date not found  Next physical within 3 mo: Visit date not found  Prescriber OR PCP: Lynda Gutierrez MD  Last diagnosis associated with med order: 1. Benign paroxysmal positional vertigo, unspecified laterality  - meclizine (ANTIVERT) 25 mg tablet [Pharmacy Med Name: Meclizine HCl Oral Tablet 25 MG]; Take 1 tablet via G-tube every 6 hours as needed for vertigo  Dispense: 30 tablet; Refill: 0    2. Dyslipidemia  - simvastatin (ZOCOR) 20 MG tablet [Pharmacy Med Name: Simvastatin Oral Tablet 20 MG]; 1 tablet (20 mg total) by G-tube route at bedtime.  Dispense: 90 tablet; Refill: 0    If protocol passes may refill for  12 months if within 3 months of last provider visit (or a total of 15 months).

## 2021-07-03 NOTE — ADDENDUM NOTE
Addendum Note by Viri Encarnacion CMA at 6/26/2018  3:23 PM     Author: Viri Encarnacion CMA Service: -- Author Type: Certified Medical Assistant    Filed: 6/26/2018  3:23 PM Encounter Date: 6/26/2018 Status: Signed    : Viri Encarnacion CMA (Certified Medical Assistant)    Addended by: VIRI ENCARNACION on: 6/26/2018 03:23 PM        Modules accepted: Orders

## 2021-07-03 NOTE — ADDENDUM NOTE
Addendum Note by Tami Anthony, RN at 6/23/2020 11:19 AM     Author: Tami Anthony RN Service: -- Author Type: Registered Nurse    Filed: 6/24/2020  9:06 AM Encounter Date: 6/23/2020 Status: Signed    : Tami Anthony RN (Registered Nurse)    Addended by: TAMI ANTHONY on: 6/24/2020 09:06 AM        Modules accepted: Orders

## 2021-07-03 NOTE — ADDENDUM NOTE
Addendum Note by Viri Encarnacion CMA at 6/29/2020  8:15 AM     Author: Viri Encarnacion CMA Service: -- Author Type: Certified Medical Assistant    Filed: 6/29/2020  8:15 AM Encounter Date: 6/19/2020 Status: Signed    : Viri Encarnacion CMA (Certified Medical Assistant)    Addended by: VIRI ENCARNACION on: 6/29/2020 08:15 AM        Modules accepted: Orders

## 2021-07-03 NOTE — ADDENDUM NOTE
Addendum Note by Viri Encarnacion CMA at 6/26/2018  3:26 PM     Author: Viri Encarnacion CMA Service: -- Author Type: Certified Medical Assistant    Filed: 6/26/2018  3:26 PM Encounter Date: 6/26/2018 Status: Signed    : Viri Encarnacion CMA (Certified Medical Assistant)    Addended by: VIRI ENCARNACION on: 6/26/2018 03:26 PM        Modules accepted: Orders

## 2021-07-03 NOTE — ADDENDUM NOTE
Addendum Note by Viri Encarnacion CMA at 6/19/2020  2:02 PM     Author: Viri Encarnacion CMA Service: -- Author Type: Certified Medical Assistant    Filed: 6/19/2020  2:02 PM Encounter Date: 6/19/2020 Status: Signed    : Viri Encarnacion CMA (Certified Medical Assistant)    Addended by: VIRI ENCARNACION on: 6/19/2020 02:02 PM        Modules accepted: Orders

## 2021-07-16 DIAGNOSIS — R05.9 COUGH: Primary | ICD-10-CM

## 2021-07-16 RX ORDER — GUAIFENESIN 200 MG/10ML
LIQUID ORAL
Qty: 500 ML | Refills: 3 | Status: SHIPPED | OUTPATIENT
Start: 2021-07-16 | End: 2021-10-28

## 2021-08-03 PROBLEM — R65.10 SIRS (SYSTEMIC INFLAMMATORY RESPONSE SYNDROME) (H): Status: RESOLVED | Noted: 2020-07-25 | Resolved: 2021-05-07

## 2021-08-03 PROBLEM — Z93.1 FEEDING BY G-TUBE (H): Status: RESOLVED | Noted: 2020-06-04 | Resolved: 2021-04-24

## 2021-08-03 PROBLEM — A41.9 SEPSIS DUE TO URINARY TRACT INFECTION (H): Status: RESOLVED | Noted: 2020-06-04 | Resolved: 2021-05-07

## 2021-08-03 PROBLEM — N39.0 SEPSIS DUE TO URINARY TRACT INFECTION (H): Status: RESOLVED | Noted: 2020-06-04 | Resolved: 2021-05-07

## 2021-08-03 PROBLEM — Z93.4 GASTROJEJUNOSTOMY TUBE STATUS (H): Status: RESOLVED | Noted: 2020-03-03 | Resolved: 2021-04-24

## 2021-08-03 PROBLEM — R65.20 SEVERE SEPSIS (H): Status: RESOLVED | Noted: 2017-08-04 | Resolved: 2018-08-15

## 2021-08-03 PROBLEM — A41.9 SEPSIS (H): Status: RESOLVED | Noted: 2017-08-03 | Resolved: 2018-08-15

## 2021-08-03 PROBLEM — A41.9 SEPSIS (H): Status: RESOLVED | Noted: 2020-06-27 | Resolved: 2021-05-07

## 2021-08-03 PROBLEM — A41.9 SEVERE SEPSIS (H): Status: RESOLVED | Noted: 2017-08-04 | Resolved: 2018-08-15

## 2021-08-14 ENCOUNTER — HEALTH MAINTENANCE LETTER (OUTPATIENT)
Age: 56
End: 2021-08-14

## 2021-08-19 DIAGNOSIS — R05.9 COUGH: Primary | ICD-10-CM

## 2021-08-19 RX ORDER — IPRATROPIUM BROMIDE AND ALBUTEROL SULFATE 2.5; .5 MG/3ML; MG/3ML
1 SOLUTION RESPIRATORY (INHALATION) EVERY 6 HOURS PRN
Qty: 60 ML | Refills: 11 | Status: SHIPPED | OUTPATIENT
Start: 2021-08-19 | End: 2022-09-07

## 2021-08-20 DIAGNOSIS — R56.9 CONVULSIONS, UNSPECIFIED CONVULSION TYPE (H): ICD-10-CM

## 2021-08-20 DIAGNOSIS — K21.9 GERD WITHOUT ESOPHAGITIS: ICD-10-CM

## 2021-08-20 DIAGNOSIS — K59.01 SLOW TRANSIT CONSTIPATION: Primary | ICD-10-CM

## 2021-08-20 DIAGNOSIS — R42 VERTIGO: ICD-10-CM

## 2021-08-20 RX ORDER — MECLIZINE HYDROCHLORIDE 25 MG/1
TABLET ORAL
COMMUNITY
Start: 2021-06-15 | End: 2021-08-20

## 2021-08-20 RX ORDER — BISACODYL 10 MG
SUPPOSITORY, RECTAL RECTAL
COMMUNITY
Start: 2020-07-15 | End: 2021-08-20

## 2021-08-21 RX ORDER — BISACODYL 10 MG
SUPPOSITORY, RECTAL RECTAL
Qty: 30 SUPPOSITORY | Refills: 8 | Status: SHIPPED | OUTPATIENT
Start: 2021-08-21 | End: 2022-10-13

## 2021-08-21 RX ORDER — LEVETIRACETAM 100 MG/ML
750 SOLUTION ORAL 2 TIMES DAILY
Qty: 1350 ML | Refills: 1 | Status: SHIPPED | OUTPATIENT
Start: 2021-08-21 | End: 2022-10-28

## 2021-08-21 RX ORDER — MECLIZINE HYDROCHLORIDE 25 MG/1
TABLET ORAL
Qty: 30 TABLET | Refills: 3 | Status: SHIPPED | OUTPATIENT
Start: 2021-08-21 | End: 2022-05-30

## 2021-09-08 DIAGNOSIS — N39.0 RECURRENT UTI: ICD-10-CM

## 2021-09-08 DIAGNOSIS — R45.1 AGITATION: Primary | ICD-10-CM

## 2021-09-09 RX ORDER — QUETIAPINE FUMARATE 25 MG/1
TABLET, FILM COATED ORAL
Qty: 120 TABLET | Refills: 0 | Status: SHIPPED | OUTPATIENT
Start: 2021-09-09 | End: 2021-10-28

## 2021-09-09 RX ORDER — NITROFURANTOIN 25; 75 MG/1; MG/1
CAPSULE ORAL
Qty: 90 CAPSULE | Refills: 0 | Status: SHIPPED | OUTPATIENT
Start: 2021-09-09 | End: 2021-10-28

## 2021-09-09 NOTE — TELEPHONE ENCOUNTER
"Routing refill request to provider for review/approval because:  Patient needs to be seen because it has been more than 1 year since last office visit.  Lipid needed      Last office visit provider:  5/7/21     Requested Prescriptions   Pending Prescriptions Disp Refills     nitroFURantoin macrocrystal-monohydrate (MACROBID) 100 MG capsule [Pharmacy Med Name: Nitrofurantoin Monohyd Macro Oral Capsule 100 MG] 90 capsule 0     Sig: Take 1 capsule by G-tube route at bedtime.       There is no refill protocol information for this order        QUEtiapine (SEROQUEL) 25 MG tablet [Pharmacy Med Name: QUEtiapine Fumarate Oral Tablet 25 MG] 120 tablet 0     Sig: Give 2 tablets via G-tube at bedtime for sleep. Give 1 tablet in the morning and 1 tablet in the afternoon as needed for agitation (via G-tube).       Antipsychotic Medications Failed - 9/8/2021  5:26 PM        Failed - Blood pressure under 140/90 in past 12 months     BP Readings from Last 3 Encounters:   06/19/20 120/72   06/16/20 134/82   06/12/20 126/80                 Failed - Lipid panel on file within the past 12 months     Recent Labs   Lab Test 08/13/20  1140   CHOL 187   TRIG 290*   HDL 36*   LDL 93               Failed - Heart Rate on file within past 12 months     Pulse Readings from Last 3 Encounters:   06/19/20 72   06/16/20 73   06/12/20 78               Failed - Recent (6 mo) or future (30 days) visit within the authorizing provider's specialty     Patient had office visit in the last 6 months or has a visit in the next 30 days with authorizing provider or within the authorizing provider's specialty.  See \"Patient Info\" tab in inbasket, or \"Choose Columns\" in Meds & Orders section of the refill encounter.            Passed - Patient is 12 years of age or older        Passed - CBC on file in past 12 months     Recent Labs   Lab Test 04/15/21  0936   WBC 11.6*   RBC 3.71*   HGB 11.7*   HCT 36.2*                    Passed - A1c or Glucose on file " in past 12 months     Recent Labs   Lab Test 04/15/21  0936          Please review patients last 3 weights. If a weight gain of >10 lbs exists, you may refill the prescription once after instructing the patient to schedule an appointment within the next 30 days.    Wt Readings from Last 3 Encounters:   03/07/18 36.3 kg (80 lb)   02/28/18 36.3 kg (80 lb)   12/19/16 48.7 kg (107 lb 5.8 oz)             Passed - Medication is active on med list             laurie chino RN 09/08/21 7:52 PM

## 2021-09-21 ENCOUNTER — NURSE TRIAGE (OUTPATIENT)
Dept: NURSING | Facility: CLINIC | Age: 56
End: 2021-09-21

## 2021-09-21 ENCOUNTER — TELEPHONE (OUTPATIENT)
Dept: FAMILY MEDICINE | Facility: CLINIC | Age: 56
End: 2021-09-21

## 2021-09-21 NOTE — TELEPHONE ENCOUNTER
"Triage Call: Idalmis, [facility] on file, is calling. She states patient is coughing a lot when he gets water flushes in his feeding tube and more coughing at night despite elevating the head of the bed. Patients O2 gets to 89% when coughing and normally sits at % O2. Patient is more drowsy and weak today. Axillary temp 98.6.     PCP or covering provider please advise if the patient needs to be seen in clinic, urgent care or ED.     Hoda Patel RN Nursing Advisor 9/21/2021 3:30 PM     Reason for Disposition    [1] Coughing spells AND [2] occur during feedings or within 2 hours    Additional Information    Negative: Shock suspected (e.g., cold/pale/clammy skin, too weak to stand, low BP, rapid pulse)    Negative: [1] Shortness of breath AND [2] new onset    Negative: Bluish (or gray) lips or face now    Negative: Sounds like a life-threatening emergency to the triager    Negative: SEVERE abdominal pain    Negative: [1] Vomiting AND [2] contains red blood or black (\"coffee ground\") material  (Exception: few red streaks in vomit that only happened once)    Negative: [1] Vomiting AND [2] contains bile (green color)    Negative: Tube contains red blood or black (\"coffee ground\") material  (Exception: pink tinge of tube fluid occurs once and clears immediately with irrigation.)    Negative: G-tube, J-tube, or GJ-tube came completely out of site in abdominal wall    Negative: Difficulty breathing    Negative: Dehydration suspected (e.g., no urine > 12 hours, very dry mouth, lightheaded)    Negative: Vomiting > 4 times in the past 4 hours    Negative: Diarrhea > 4 times in the past 4 hours    Negative: [1] G-tube is clogged AND [2] irrigation has been attempted without success    Negative: [1] G-tube is broken or cracked AND [2] is not usable    Negative: [1] G-tube, J-tube, or GJ-tube tube SITE looks infected  (spreading redness) AND [2] fever    Negative: [1] J-tube or GJ-tube is clogged AND [2] irrigation has been attempted " without success    Negative: [1] J-tube or GJ-tube is broken or cracked AND [2] is not usable    Negative: [1] NG-tube or NJ-tube is clogged AND [2] irrigation has been attempted without success    Negative: NG-tube or NJ-tube came out    Protocols used: FEEDING TUBE SYMPTOMS AND [id number]

## 2021-09-21 NOTE — TELEPHONE ENCOUNTER
Would recommend a clinic appointment somtime in the next week to evaluate more.  Preferably with primary who I assume knows this patient.    Shahid Brady MD

## 2021-09-22 ENCOUNTER — NURSE TRIAGE (OUTPATIENT)
Dept: NURSING | Facility: CLINIC | Age: 56
End: 2021-09-22

## 2021-09-22 NOTE — TELEPHONE ENCOUNTER
Daughter is calling and received a phone call to call back.  FNA relayed message from Lynda Toscano and daughter agreed.  Today daughter would like to also schedule a regular physical at this time with clinic.  FNA transferred caller through to scheduling.      COVID 19 Nurse Triage Plan/Patient Instructions    Please be aware that novel coronavirus (COVID-19) may be circulating in the community. If you develop symptoms such as fever, cough, or SOB or if you have concerns about the presence of another infection including coronavirus (COVID-19), please contact your health care provider or visit [email or web address].     Disposition/Instructions    Home care recommended. Follow home care protocol based instructions.    Thank you for taking steps to prevent the spread of this virus.  o Limit your contact with others.  o Wear a simple mask to cover your cough.  o Wash your hands well and often.    Resources    [facility]: About COVID-19: [email or web address]    CDC: What to Do If You're Sick: www.cdc.gov/coronavirus/2019-ncov/about/steps-when-sick.html    CDC: Ending Home Isolation: www.cdc.gov/coronavirus/2019-ncov/hcp/disposition-in-home-patients.html     CDC: Caring for Someone: www.cdc.gov/coronavirus/2019-ncov/if-you-are-sick/care-for-someone.html     [facility]: Interim Guidance for Hospital Discharge to Home: www.[email or web address].[email or web address]/diseases/coronavirus/hcp/hospdischarge.pdf    [facility] clinical trials (COVID-19 research studies): clinicalaffairs.[facility]-clinical-trials     Below are the COVID-19 hotlines at the [place] Department of Health ([facility]). Interpreters are available.   o For health questions: Call 486-764-5781 or 1-935.997.4727 (7 a.m. to 7 p.m.)  o For questions about schools and childcare: Call 004-937-2133 or 1-267.707.1850 (7 a.m. to 7 p.m.)

## 2021-09-22 NOTE — TELEPHONE ENCOUNTER
Triage Call:    Daughter is returning call from earlier today . CTC on file to speak with daughter Idalmis. Informed daughter of Dr. Gutierrez's response below.     He has history of sepsis and pneumonia. I recommend he be seen in UC or ED today.     Daughter is agreeable to go into UCC today, does not want to bring patient into ER, would prefer UCC. RN gave many locations that patient can be see for UCC. Advised daughter if any new or worsening sx to call back. Daughter verbalized understanding and agrees with plan.       Routing to PCP and care team as FYI. Thank you.    Cheyenne Mcgovern RN, BSN Nurse Triage Advisor 2:27 PM 9/22/2021

## 2021-09-22 NOTE — TELEPHONE ENCOUNTER
Croatian :     RN attempted to call patient, no answer. Left a non-detailed message for patient to call back and speak with any triage nurse.    If patient calls back, please inform him of MD response below. Thank you.     Routing to Care team/PCP to advise at this time.     Cheyenne Mcgovern RN, BSN Nurse Triage Advisor 8:28 AM 9/22/2021

## 2021-09-29 ENCOUNTER — TRANSFERRED RECORDS (OUTPATIENT)
Dept: HEALTH INFORMATION MANAGEMENT | Facility: CLINIC | Age: 56
End: 2021-09-29

## 2021-10-04 ENCOUNTER — HOSPITAL ENCOUNTER (OUTPATIENT)
Dept: INTERVENTIONAL RADIOLOGY/VASCULAR | Facility: HOSPITAL | Age: 56
Discharge: HOME | End: 2021-10-04
Attending: NURSE PRACTITIONER | Admitting: RADIOLOGY
Payer: COMMERCIAL

## 2021-10-04 DIAGNOSIS — E46 MALNUTRITION, UNSPECIFIED TYPE (H): ICD-10-CM

## 2021-10-04 PROCEDURE — C1769 GUIDE WIRE: HCPCS

## 2021-10-04 PROCEDURE — 272N000583 ZZ HC TUBE GASTRO CR12

## 2021-10-04 PROCEDURE — 49452 REPLACE G-J TUBE PERC: CPT

## 2021-10-04 PROCEDURE — 255N000002 HC RX 255 OP 636: Performed by: NURSE PRACTITIONER

## 2021-10-04 RX ADMIN — IOHEXOL 10 ML: 350 INJECTION, SOLUTION INTRAVENOUS at 10:01

## 2021-10-04 NOTE — PROCEDURES
[facility]    Procedure: Imaging Procedure Note    Date/Time: 10/4/2021 10:04 AM  Performed by: Cesar Foy MD  Authorized by: Cesar Foy MD     UNIVERSAL PROTOCOL   Site Marked: Yes  Prior Images Obtained and Reviewed:  Yes  Required items: Required blood products, implants, devices and special equipment available    Patient identity confirmed:  Verbally with patient, arm band and provided demographic data  NA - No sedation, light sedation, or local anesthesia  Confirmation Checklist:  Patient's identity using two indicators, relevant allergies, procedure was appropriate and matched the consent or emergent situation and correct equipment/implants were available  Time out: Immediately prior to the procedure a time out was called    Universal Protocol: the Joint Commission Universal Protocol was followed    Preparation: Patient was prepped and draped in usual sterile fashion    ESBL (mL):  0         ANESTHESIA    Anesthesia: Local infiltration  Local Anesthetic:  Lidocaine 1% without epinephrine  Anesthetic Total (mL):  10      SEDATION    Patient Sedated: No    See dictated procedure note for full details.  PROCEDURE   Patient Tolerance:  Patient tolerated the procedure well with no immediate complications  Describe Procedure: Successful GJ exchange  Length of time physician/provider present for 1:1 monitoring during sedation: 0

## 2021-10-05 DIAGNOSIS — R63.30 FEEDING DIFFICULTIES: Primary | ICD-10-CM

## 2021-10-09 ENCOUNTER — HEALTH MAINTENANCE LETTER (OUTPATIENT)
Age: 56
End: 2021-10-09

## 2021-10-18 ENCOUNTER — HOSPITAL ENCOUNTER (OUTPATIENT)
Dept: INTERVENTIONAL RADIOLOGY/VASCULAR | Facility: HOSPITAL | Age: 56
Discharge: HOME | End: 2021-10-18
Admitting: RADIOLOGY
Payer: COMMERCIAL

## 2021-10-18 DIAGNOSIS — R63.30 FEEDING DIFFICULTIES: ICD-10-CM

## 2021-10-18 PROCEDURE — 49452 REPLACE G-J TUBE PERC: CPT

## 2021-10-18 PROCEDURE — 255N000002 HC RX 255 OP 636

## 2021-10-18 PROCEDURE — 272N000583 ZZ HC TUBE GASTRO CR12

## 2021-10-18 PROCEDURE — C1769 GUIDE WIRE: HCPCS

## 2021-10-18 RX ADMIN — IOHEXOL 15 ML: 350 INJECTION, SOLUTION INTRAVENOUS at 15:08

## 2021-10-28 ENCOUNTER — OFFICE VISIT (OUTPATIENT)
Dept: FAMILY MEDICINE | Facility: CLINIC | Age: 56
End: 2021-10-28
Payer: COMMERCIAL

## 2021-10-28 VITALS — HEART RATE: 82 BPM | OXYGEN SATURATION: 94 % | DIASTOLIC BLOOD PRESSURE: 90 MMHG | SYSTOLIC BLOOD PRESSURE: 138 MMHG

## 2021-10-28 DIAGNOSIS — R05.9 COUGH: ICD-10-CM

## 2021-10-28 DIAGNOSIS — R45.1 AGITATION: ICD-10-CM

## 2021-10-28 DIAGNOSIS — M62.838 MUSCLE SPASM: ICD-10-CM

## 2021-10-28 DIAGNOSIS — K21.9 GERD WITHOUT ESOPHAGITIS: ICD-10-CM

## 2021-10-28 DIAGNOSIS — K59.00 CONSTIPATION, UNSPECIFIED CONSTIPATION TYPE: ICD-10-CM

## 2021-10-28 DIAGNOSIS — D50.9 IRON DEFICIENCY ANEMIA, UNSPECIFIED IRON DEFICIENCY ANEMIA TYPE: ICD-10-CM

## 2021-10-28 DIAGNOSIS — M25.50 MULTIPLE JOINT PAIN: ICD-10-CM

## 2021-10-28 DIAGNOSIS — N39.0 RECURRENT UTI: ICD-10-CM

## 2021-10-28 DIAGNOSIS — G40.909 SEIZURE DISORDER (H): ICD-10-CM

## 2021-10-28 DIAGNOSIS — E78.2 MIXED HYPERLIPIDEMIA: ICD-10-CM

## 2021-10-28 DIAGNOSIS — Z11.4 SCREENING FOR HIV (HUMAN IMMUNODEFICIENCY VIRUS): ICD-10-CM

## 2021-10-28 DIAGNOSIS — I69.959 HEMIPLEGIA OF NONDOMINANT SIDE, LATE EFFECT OF CEREBROVASCULAR DISEASE (H): ICD-10-CM

## 2021-10-28 DIAGNOSIS — Z00.00 ROUTINE HISTORY AND PHYSICAL EXAMINATION OF ADULT: Primary | ICD-10-CM

## 2021-10-28 DIAGNOSIS — H04.123 DRY EYES: ICD-10-CM

## 2021-10-28 DIAGNOSIS — K59.01 SLOW TRANSIT CONSTIPATION: ICD-10-CM

## 2021-10-28 DIAGNOSIS — I10 ESSENTIAL HYPERTENSION: ICD-10-CM

## 2021-10-28 LAB
ALBUMIN SERPL-MCNC: 3.1 G/DL (ref 3.5–5)
ALP SERPL-CCNC: 69 U/L (ref 45–120)
ALT SERPL W P-5'-P-CCNC: 22 U/L (ref 0–45)
ANION GAP SERPL CALCULATED.3IONS-SCNC: 9 MMOL/L (ref 5–18)
AST SERPL W P-5'-P-CCNC: 22 U/L (ref 0–40)
BILIRUB SERPL-MCNC: 0.3 MG/DL (ref 0–1)
BUN SERPL-MCNC: 31 MG/DL (ref 8–22)
CALCIUM SERPL-MCNC: 8.8 MG/DL (ref 8.5–10.5)
CHLORIDE BLD-SCNC: 106 MMOL/L (ref 98–107)
CHOLEST SERPL-MCNC: 193 MG/DL
CO2 SERPL-SCNC: 23 MMOL/L (ref 22–31)
CREAT SERPL-MCNC: 1.06 MG/DL (ref 0.7–1.3)
ERYTHROCYTE [DISTWIDTH] IN BLOOD BY AUTOMATED COUNT: 12 % (ref 10–15)
FASTING STATUS PATIENT QL REPORTED: YES
GFR SERPL CREATININE-BSD FRML MDRD: 77 ML/MIN/1.73M2
GLUCOSE BLD-MCNC: 105 MG/DL (ref 70–125)
HBA1C MFR BLD: 5.2 % (ref 0–5.6)
HCT VFR BLD AUTO: 35.4 % (ref 40–53)
HDLC SERPL-MCNC: 37 MG/DL
HGB BLD-MCNC: 11.7 G/DL (ref 13.3–17.7)
HIV 1+2 AB+HIV1 P24 AG SERPL QL IA: NEGATIVE
LDLC SERPL CALC-MCNC: 111 MG/DL
MCH RBC QN AUTO: 31.5 PG (ref 26.5–33)
MCHC RBC AUTO-ENTMCNC: 33.1 G/DL (ref 31.5–36.5)
MCV RBC AUTO: 95 FL (ref 78–100)
PLATELET # BLD AUTO: 230 10E3/UL (ref 150–450)
POTASSIUM BLD-SCNC: 5 MMOL/L (ref 3.5–5)
PROT SERPL-MCNC: 8.4 G/DL (ref 6–8)
RBC # BLD AUTO: 3.71 10E6/UL (ref 4.4–5.9)
SODIUM SERPL-SCNC: 138 MMOL/L (ref 136–145)
TRIGL SERPL-MCNC: 224 MG/DL
WBC # BLD AUTO: 9.6 10E3/UL (ref 4–11)

## 2021-10-28 PROCEDURE — 87389 HIV-1 AG W/HIV-1&-2 AB AG IA: CPT | Performed by: FAMILY MEDICINE

## 2021-10-28 PROCEDURE — 90471 IMMUNIZATION ADMIN: CPT | Performed by: FAMILY MEDICINE

## 2021-10-28 PROCEDURE — 85027 COMPLETE CBC AUTOMATED: CPT | Performed by: FAMILY MEDICINE

## 2021-10-28 PROCEDURE — 90670 PCV13 VACCINE IM: CPT | Performed by: FAMILY MEDICINE

## 2021-10-28 PROCEDURE — 83036 HEMOGLOBIN GLYCOSYLATED A1C: CPT | Performed by: FAMILY MEDICINE

## 2021-10-28 PROCEDURE — 80061 LIPID PANEL: CPT | Performed by: FAMILY MEDICINE

## 2021-10-28 PROCEDURE — 80053 COMPREHEN METABOLIC PANEL: CPT | Performed by: FAMILY MEDICINE

## 2021-10-28 PROCEDURE — 90682 RIV4 VACC RECOMBINANT DNA IM: CPT | Performed by: FAMILY MEDICINE

## 2021-10-28 PROCEDURE — 36415 COLL VENOUS BLD VENIPUNCTURE: CPT | Performed by: FAMILY MEDICINE

## 2021-10-28 PROCEDURE — 99396 PREV VISIT EST AGE 40-64: CPT | Mod: 25 | Performed by: FAMILY MEDICINE

## 2021-10-28 PROCEDURE — 90472 IMMUNIZATION ADMIN EACH ADD: CPT | Performed by: FAMILY MEDICINE

## 2021-10-28 RX ORDER — LIDOCAINE 4 G/G
1 PATCH TOPICAL EVERY 24 HOURS
Qty: 30 PATCH | Refills: 3 | Status: SHIPPED | OUTPATIENT
Start: 2021-10-28 | End: 2022-01-19

## 2021-10-28 RX ORDER — NITROFURANTOIN 25; 75 MG/1; MG/1
CAPSULE ORAL
Qty: 90 CAPSULE | Refills: 3 | Status: ON HOLD | OUTPATIENT
Start: 2021-10-28 | End: 2022-10-31

## 2021-10-28 RX ORDER — BACLOFEN 10 MG/1
10 TABLET ORAL 3 TIMES DAILY PRN
Qty: 270 TABLET | Refills: 3 | Status: SHIPPED | OUTPATIENT
Start: 2021-10-28 | End: 2022-11-22

## 2021-10-28 RX ORDER — QUETIAPINE FUMARATE 50 MG/1
TABLET, FILM COATED ORAL
Qty: 360 TABLET | Refills: 3 | Status: SHIPPED | OUTPATIENT
Start: 2021-10-28 | End: 2022-09-28

## 2021-10-28 RX ORDER — GUAIFENESIN 200 MG/10ML
LIQUID ORAL
Qty: 500 ML | Refills: 3
Start: 2021-10-28 | End: 2022-07-15

## 2021-10-28 RX ORDER — POLYETHYLENE GLYCOL 3350 17 G/17G
POWDER, FOR SOLUTION ORAL
Qty: 850 G | Refills: 3 | Status: SHIPPED | OUTPATIENT
Start: 2021-10-28 | End: 2022-11-29

## 2021-10-28 NOTE — PROGRESS NOTES
SUBJECTIVE:   CC: Liu Malave is an 56 year old male who presents for preventative health visit.     He is accompanied today by his daughter.  He has a complex past medical history.  Has history of hemorrhagic stroke and hemiplegia.  He is confined to wheelchair and has a feeding tube.  He has history of pneumonia as well as recurrent UTIs.  Reviewed his health history including medications and allergies and updated the chart.  He is on nitrofurantoin for UTI prophylaxis.  We discussed his medications.  He does have agitation at night and sleep difficulties.  Seroquel is helpful.  Daughter has questions about how often he can take this medication.  He has been having some abdominal fullness and bloating.  He does have some occasional trouble with constipation.  He has also had some weight gain recently.  He has occasional cough.  Uses guaifenesin as needed.  He is on Keppra for history of seizure disorder.  He is on metoprolol for hypertension and simvastatin for hyperlipidemia.  They would like to discuss whether he should continue to take aspirin.  They would like an order for a new wheelchair.  Current wheelchair is about 5 years old and insurance will pay for a new one.  Review of systems is otherwise negative.  No other concerns or questions.  Patient has been advised of split billing requirements and indicates understanding: Yes  Healthy Habits:     Getting at least 3 servings of Calcium per day:  NO    Bi-annual eye exam:  NO    Dental care twice a year:  NO    Sleep apnea or symptoms of sleep apnea:  None    Diet:  Regular (no restrictions)    Frequency of exercise:  None    Taking medications regularly:  Yes    Medication side effects:  Muscle aches    PHQ-2 Total Score: 2    Additional concerns today:  Yes  Musculoskeletal Problem                Today's PHQ-2 Score:   PHQ-2 ( 1999 Pf[employer]) 10/28/2021   Q1: Little interest or pleasure in doing things 1   Q2: Feeling down, depressed or hopeless 1   PHQ-2  Score 2   Q1: Little interest or pleasure in doing things Several days   Q2: Feeling down, depressed or hopeless Several days   PHQ-2 Score 2       Abuse: Current or Past(Physical, Sexual or Emotional)- No  Do you feel safe in your environment? Yes        Social History     Tobacco Use     Smoking status: Former Smoker     Packs/day: 1.00     Years: 30.00     Pack years: 30.00     Types: Cigarettes     Quit date: 2005     Years since quittin.4     Smokeless tobacco: Never Used     Tobacco comment: quit 2006   Substance Use Topics     Alcohol use: No     Alcohol/week: 0.0 standard drinks       Alcohol Use 10/28/2021   Prescreen: >3 drinks/day or >7 drinks/week? No       Last PSA:   Prostate Specific Antigen Screen   Date Value Ref Range Status   2016 1.7 0.0 - 3.5 ng/mL Final       Reviewed orders with patient. Reviewed health maintenance and updated orders accordingly - Yes  Current Outpatient Medications   Medication Sig Dispense Refill     acetaminophen (TYLENOL) 500 MG tablet 1,000 mg by Gastric Tube route every 6 hours as needed for mild pain        aspirin 81 MG chewable tablet Take 81 mg by mouth every morning       baclofen (LIORESAL) 10 MG tablet Take 1 tablet (10 mg) by mouth 3 times daily as needed for muscle spasms 270 tablet 3     bisacodyl (DULCOLAX) 10 MG suppository INSERT 1 SUPPOSTIORY INTO THE RECTUM DAILY AS NEEDED FOR CONSTIPATION. 30 suppository 8     bisacodyl (DULCOLAX) 5 MG EC tablet Take 10 mg by mouth daily as needed for constipation       chlorhexidine (PERIDEX) 0.12 % solution Swish and spit 15 mLs in mouth 2 times daily       esomeprazole (NEXIUM) 20 MG DR capsule Take 1 capsule via G-tube twice daily 180 capsule 3     guaiFENesin (ROBITUSSIN) 100 MG/5ML liquid Give 5 ml via G tube every 6 hours as needed for cough 500 mL 3     ipratropium - albuterol 0.5 mg/2.5 mg/3 mL (DUONEB) 0.5-2.5 (3) MG/3ML neb solution Take 1 vial (3 mLs) by nebulization every 6 hours as needed for  shortness of breath / dyspnea or wheezing 60 mL 11     levETIRAcetam (KEPPRA) 100 MG/ML solution Take 7.5 mLs (750 mg) by mouth 2 times daily 1350 mL 1     Lidocaine (LIDOCARE) 4 % Patch Place 1 patch onto the skin every 24 hours To prevent lidocaine toxicity, patient should be patch free for 12 hrs daily. 30 patch 3     meclizine (ANTIVERT) 25 MG tablet Take 1 tablet via G-tube every 6 hours as needed for vertigo 30 tablet 3     metoprolol tartrate (LOPRESSOR) 25 MG tablet Take 25 mg by mouth 2 times daily       nitroFURantoin macrocrystal-monohydrate (MACROBID) 100 MG capsule Take 1 capsule by G-tube route at bedtime. 90 capsule 3     polyethylene glycol (MIRALAX) 17 GM/Dose powder Give 1 capful via G-tube daily as needed for constipation 850 g 3     Polyethylene Glycol 400 (VISINE DRY EYE RELIEF) 1 % SOLN Apply 1 drop to eye every 6 hours as needed (dry eyes) 15 mL 4     QUEtiapine (SEROQUEL) 50 MG tablet Take 1/2 to 1 tablet by g-tube every 4-6 hours as needed for agitation. Max of 200 mg per day 360 tablet 3     simvastatin (ZOCOR) 20 MG tablet 20 mg by Gastric Tube route At Bedtime          Reviewed and updated as needed this visit by clinical staff  Tobacco   Meds              Reviewed and updated as needed this visit by Provider                    Review of Systems  See HPI    OBJECTIVE:   BP (!) 138/90 (BP Location: Right arm, Cuff Size: Adult Regular)   Pulse 82   SpO2 94%     Physical Exam  GENERAL:  alert and no distress, non-verbal, in wheelchair  RESP: lungs clear to auscultation - no rales, rhonchi or wheezes  CV: regular rate and rhythm, normal S1 S2, no S3 or S4, no murmur, click or rub, no peripheral edema and peripheral pulses strong  ABDOMEN: soft, nontender, no hepatosplenomegaly, no masses and bowel sounds normal, GJ tube in place  MSK: contractures present in arms and legs        ASSESSMENT/PLAN:   Liu was seen today for physical, wheelchair, musculoskeletal problem and eye  problem.    Diagnoses and all orders for this visit:    Routine history and physical examination of adult  We discussed vaccines.  Recommend that they check with insurance on coverage for shingles vaccine.  Discussed it may be cheaper to get it at the pharmacy.  Influenza vaccine was administered.  Prevnar 13 was administered.  Follow-up in 1 year for annual physical    Screening for HIV (human immunodeficiency virus)  -     HIV Antigen Antibody Combo; Future  -     HIV Antigen Antibody Combo    Cough  -     guaiFENesin (ROBITUSSIN) 100 MG/5ML liquid; Give 5 ml via G tube every 6 hours as needed for cough    Recurrent UTI  -     nitroFURantoin macrocrystal-monohydrate (MACROBID) 100 MG capsule; Take 1 capsule by G-tube route at bedtime.    Dry eyes  -     Polyethylene Glycol 400 (VISINE DRY EYE RELIEF) 1 % SOLN; Apply 1 drop to eye every 6 hours as needed (dry eyes)    GERD without esophagitis  -     esomeprazole (NEXIUM) 20 MG DR capsule; Take 1 capsule via G-tube twice daily    Slow transit constipation  -     polyethylene glycol (MIRALAX) 17 GM/Dose powder; Give 1 capful via G-tube daily as needed for constipation    Iron deficiency anemia, unspecified iron deficiency anemia type  -     CBC with platelets; Future  -     CBC with platelets    Agitation  -     QUEtiapine (SEROQUEL) 50 MG tablet; Take 1/2 to 1 tablet by g-tube every 4-6 hours as needed for agitation. Max of 200 mg per day    Mixed hyperlipidemia  -     Comprehensive metabolic panel (BMP + Alb, Alk Phos, ALT, AST, Total. Bili, TP); Future  -     Hemoglobin A1c; Future  -     Comprehensive metabolic panel (BMP + Alb, Alk Phos, ALT, AST, Total. Bili, TP)  -     Hemoglobin A1c    Muscle spasm  -     baclofen (LIORESAL) 10 MG tablet; Take 1 tablet (10 mg) by mouth 3 times daily as needed for muscle spasms    Essential hypertension  -     Comprehensive metabolic panel (BMP + Alb, Alk Phos, ALT, AST, Total. Bili, TP); Future  -     Lipid panel reflex to  "direct LDL Fasting; Future  -     Hemoglobin A1c; Future  -     Comprehensive metabolic panel (BMP + Alb, Alk Phos, ALT, AST, Total. Bili, TP)  -     Lipid panel reflex to direct LDL Fasting  -     Hemoglobin A1c    Multiple joint pain  -     Lidocaine (LIDOCARE) 4 % Patch; Place 1 patch onto the skin every 24 hours To prevent lidocaine toxicity, patient should be patch free for 12 hrs daily.    Hemiplegia of nondominant side, late effect of cerebrovascular disease (H)  -     Physical Therapy Referral; Future  -He has history of hemorrhagic stroke and subsequent hemiplegia of nondominant side.  He is confined to a wheelchair.  Current wheelchair is 5 years old and he is in need of a new 1.  Today's visit will serve as a face-to-face encounter for this order to physical therapy for a new wheelchair.    Constipation, unspecified constipation type  Suggest MiraLAX as needed    Seizure disorder (H)  Stable on current dose of Keppra    Other orders  -     REVIEW OF HEALTH MAINTENANCE PROTOCOL ORDERS  -     Cancel: FL RIV4 (FLUBLOK) VACCINE RECOMBINANT DNA PRSRV ANTIBIO FREE, IM  -     PCV13, IM (6+ WK) - Sahbotu20  -     FL RIV4 (FLUBLOK) VACCINE RECOMBINANT DNA PRSRV ANTIBIO FREE, IM        Patient has been advised of split billing requirements and indicates understanding: Yes  COUNSELING:   Reviewed preventive health counseling, as reflected in patient instructions       Aspirin prophylaxis     Estimated body mass index is 24.69 kg/m  as calculated from the following:    Height as of 1/9/21: 1.575 m (5' 2\").    Weight as of 4/15/21: 61.2 kg (135 lb).     Weight management plan: monitor weight    He reports that he quit smoking about 16 years ago. His smoking use included cigarettes. He has a 30.00 pack-year smoking history. He has never used smokeless tobacco.      Counseling Resources:  ATP IV Guidelines  Pooled Cohorts Equation Calculator  FRAX Risk Assessment  ICSI Preventive Guidelines  Dietary Guidelines for " Americans, 2010  USDA's MyPlate  ASA Prophylaxis  Lung CA Screening    Lynda Gutierrez MD  [facility]

## 2021-10-28 NOTE — PATIENT INSTRUCTIONS
Check with insurance on coverage for shingles vaccine. It may cheaper at the pharmacy. This vaccine is recommended to prevent shingles    Try miralax every other day for constipation

## 2021-11-01 DIAGNOSIS — I10 ESSENTIAL HYPERTENSION: Primary | ICD-10-CM

## 2021-11-02 NOTE — TELEPHONE ENCOUNTER
"  Disp Refills Start End ARETHA    metoprolol tartrate (LOPRESSOR) 25 MG tablet     --   Sig - Route: 12.5 mg by G-tube route 2 (two) times a day. - G-tube   Class: Historical Med       metoprolol tartrate (LOPRESSOR) 25 MG tablet [[id number]]  Patient-reported historical medication  Ordering date: 06/27/20 1257 Authorized by: PROVIDER, HISTORICAL   Frequency: BID  - Until Discontinued     Routing refill request to provider for review/approval because:  Labs out of range:  BP  Medication is reported/historical    Last office visit provider:  10/28/21     Requested Prescriptions   Pending Prescriptions Disp Refills     amLODIPine (NORVASC) 5 MG tablet [Pharmacy Med Name: amLODIPine Besylate Oral Tablet 5 MG] 30 tablet 0     Sig: Take 1 tablet (5 mg total) by mouth daily.       Calcium Channel Blockers Protocol  Failed - 11/1/2021 10:25 AM        Failed - Blood pressure under 140/90 in past 12 months     BP Readings from Last 3 Encounters:   10/28/21 (!) 138/90   06/19/20 120/72   06/16/20 134/82                 Failed - Medication is active on med list        Passed - Recent (12 mo) or future (30 days) visit within the authorizing provider's specialty     Patient has had an office visit with the authorizing provider or a provider within the authorizing providers department within the previous 12 mos or has a future within next 30 days. See \"Patient Info\" tab in inbasket, or \"Choose Columns\" in Meds & Orders section of the refill encounter.              Passed - Patient is age 18 or older        Passed - Normal serum creatinine on file in past 12 months     Recent Labs   Lab Test 10/28/21  0852   CR 1.06       Ok to refill medication if creatinine is low             metoprolol tartrate (LOPRESSOR) 25 MG tablet [Pharmacy Med Name: Metoprolol Tartrate Oral Tablet 25 MG] 60 tablet 0     Sig: TAKE ONE TABLET BY MOUTH TWICE DAILY       Beta-Blockers Protocol Failed - 11/1/2021 10:25 AM        Failed - Blood pressure under " "140/90 in past 12 months     BP Readings from Last 3 Encounters:   10/28/21 (!) 138/90   06/19/20 120/72   06/16/20 134/82                 Passed - Patient is age 6 or older        Passed - Recent (12 mo) or future (30 days) visit within the authorizing provider's specialty     Patient has had an office visit with the authorizing provider or a provider within the authorizing providers department within the previous 12 mos or has a future within next 30 days. See \"Patient Info\" tab in inbasket, or \"Choose Columns\" in Meds & Orders section of the refill encounter.              Passed - Medication is active on med list             Darryl Barrett RN 11/02/21 11:50 AM  "

## 2021-11-03 RX ORDER — AMLODIPINE BESYLATE 5 MG/1
TABLET ORAL
Qty: 30 TABLET | Refills: 11 | Status: SHIPPED | OUTPATIENT
Start: 2021-11-03 | End: 2022-01-14

## 2021-11-03 RX ORDER — METOPROLOL TARTRATE 25 MG/1
TABLET, FILM COATED ORAL
Qty: 60 TABLET | Refills: 11 | Status: SHIPPED | OUTPATIENT
Start: 2021-11-03 | End: 2023-05-24

## 2021-11-08 ENCOUNTER — MEDICAL CORRESPONDENCE (OUTPATIENT)
Dept: HEALTH INFORMATION MANAGEMENT | Facility: CLINIC | Age: 56
End: 2021-11-08
Payer: COMMERCIAL

## 2021-12-02 ENCOUNTER — MYC MEDICAL ADVICE (OUTPATIENT)
Dept: FAMILY MEDICINE | Facility: CLINIC | Age: 56
End: 2021-12-02
Payer: COMMERCIAL

## 2021-12-02 DIAGNOSIS — M25.50 MULTIPLE JOINT PAIN: Primary | ICD-10-CM

## 2021-12-02 RX ORDER — LIDOCAINE 50 MG/G
1 PATCH TOPICAL EVERY 24 HOURS
Qty: 30 PATCH | Refills: 11 | Status: SHIPPED | OUTPATIENT
Start: 2021-12-02 | End: 2022-01-19

## 2021-12-28 DIAGNOSIS — R45.1 AGITATION: ICD-10-CM

## 2021-12-30 RX ORDER — QUETIAPINE FUMARATE 25 MG/1
TABLET, FILM COATED ORAL
Qty: 102 TABLET | Refills: 3 | Status: SHIPPED | OUTPATIENT
Start: 2021-12-30 | End: 2022-08-22

## 2021-12-30 NOTE — TELEPHONE ENCOUNTER
Routing refill request to provider for review/approval because:  Labs out of range:  BP    Last Written Prescription Date:  10/28/21  Last Fill Quantity: 360,  # refills: 3   Last office visit provider:  10/28/21     Requested Prescriptions   Pending Prescriptions Disp Refills     QUEtiapine (SEROQUEL) 25 MG tablet [Pharmacy Med Name: QUEtiapine Fumarate Oral Tablet 25 MG] 102 tablet 0     Sig: Give 2 tablets via G-tube at bedtime for sleep. Give 1 tablet in the morning and 1 tablet in the afternoon as needed for agitation (via G-tube).       Antipsychotic Medications Failed - 12/28/2021  3:54 PM        Failed - Blood pressure under 140/90 in past 12 months     BP Readings from Last 3 Encounters:   10/28/21 (!) 138/90   06/19/20 120/72   06/16/20 134/82                 Passed - Patient is 12 years of age or older        Passed - Lipid panel on file within the past 12 months     Recent Labs   Lab Test 10/28/21  0852   CHOL 193   TRIG 224*   HDL 37*                  Passed - CBC on file in past 12 months     Recent Labs   Lab Test 10/28/21  0852   WBC 9.6   RBC 3.71*   HGB 11.7*   HCT 35.4*                    Passed - Heart Rate on file within past 12 months     Pulse Readings from Last 3 Encounters:   10/28/21 82   06/19/20 72   06/16/20 73               Passed - A1c or Glucose on file in past 12 months     Recent Labs   Lab Test 10/28/21  0852      A1C 5.2       Please review patients last 3 weights. If a weight gain of >10 lbs exists, you may refill the prescription once after instructing the patient to schedule an appointment within the next 30 days.    Wt Readings from Last 3 Encounters:   03/07/18 36.3 kg (80 lb)   02/28/18 36.3 kg (80 lb)   12/19/16 48.7 kg (107 lb 5.8 oz)             Passed - Medication is active on med list        Passed - Recent (6 mo) or future (30 days) visit within the authorizing provider's specialty     Patient had office visit in the last 6 months or has a visit in  "the next 30 days with authorizing provider or within the authorizing provider's specialty.  See \"Patient Info\" tab in inbasket, or \"Choose Columns\" in Meds & Orders section of the refill encounter.                 laurie chino RN 12/30/21 2:52 PM  "

## 2022-01-05 DIAGNOSIS — Z11.59 ENCOUNTER FOR SCREENING FOR OTHER VIRAL DISEASES: ICD-10-CM

## 2022-01-07 NOTE — IR NOTE
IR Procedure Pre-call    Spoke with: Pts daughter  Arrival and procedure time: 0930/1000  Patient has covid test:Yes, pt caregiver given covid apt # to schedule test    Pre-call completed.   January 7, 2022 2:44 PM  Pam Pereyra RN

## 2022-01-13 ENCOUNTER — E-VISIT (OUTPATIENT)
Dept: FAMILY MEDICINE | Facility: CLINIC | Age: 57
End: 2022-01-13
Payer: COMMERCIAL

## 2022-01-13 DIAGNOSIS — R06.81 APNEA: Primary | ICD-10-CM

## 2022-01-13 PROCEDURE — 99207 PR NON-BILLABLE SERV PER CHARTING: CPT | Performed by: FAMILY MEDICINE

## 2022-01-14 ENCOUNTER — HOSPITAL ENCOUNTER (OUTPATIENT)
Dept: INTERVENTIONAL RADIOLOGY/VASCULAR | Facility: HOSPITAL | Age: 57
Discharge: HOME | End: 2022-01-14
Attending: PHYSICIAN ASSISTANT | Admitting: RADIOLOGY
Payer: COMMERCIAL

## 2022-01-14 DIAGNOSIS — R63.30 FEEDING DIFFICULTIES: Primary | ICD-10-CM

## 2022-01-14 DIAGNOSIS — R63.30 FEEDING DIFFICULTIES: ICD-10-CM

## 2022-01-14 PROCEDURE — 49452 REPLACE G-J TUBE PERC: CPT

## 2022-01-14 PROCEDURE — 272N000583 ZZ HC TUBE GASTRO CR12

## 2022-01-14 PROCEDURE — 255N000002 HC RX 255 OP 636: Performed by: RADIOLOGY

## 2022-01-14 PROCEDURE — C1769 GUIDE WIRE: HCPCS

## 2022-01-14 RX ADMIN — IOHEXOL 15 ML: 350 INJECTION, SOLUTION INTRAVENOUS at 11:08

## 2022-01-14 NOTE — PATIENT INSTRUCTIONS
Thank you for choosing us for your care. I think a virtual visit would be best next steps based on your symptoms. Please schedule a clinic appointment; you won t be charged for this eVisit.      You can schedule an appointment right here in [employer], or call [phone]

## 2022-01-19 ENCOUNTER — VIRTUAL VISIT (OUTPATIENT)
Dept: FAMILY MEDICINE | Facility: CLINIC | Age: 57
End: 2022-01-19
Payer: COMMERCIAL

## 2022-01-19 DIAGNOSIS — R06.83 SNORING: ICD-10-CM

## 2022-01-19 DIAGNOSIS — R06.81 APNEIC EPISODE: Primary | ICD-10-CM

## 2022-01-19 DIAGNOSIS — M25.50 MULTIPLE JOINT PAIN: ICD-10-CM

## 2022-01-19 DIAGNOSIS — E61.1 IRON DEFICIENCY: ICD-10-CM

## 2022-01-19 PROCEDURE — 99213 OFFICE O/P EST LOW 20 MIN: CPT | Mod: 95 | Performed by: FAMILY MEDICINE

## 2022-01-19 RX ORDER — LIDOCAINE 4 G/G
1 PATCH TOPICAL EVERY 24 HOURS
Qty: 30 PATCH | Refills: 3 | Status: SHIPPED | OUTPATIENT
Start: 2022-01-19 | End: 2022-11-11

## 2022-01-19 NOTE — PROGRESS NOTES
Liu is a 56 year old who is being evaluated via a billable video visit.      How would you like to obtain your AVS? e(ye)BRAINharSleepy's  If the video visit is dropped, the invitation should be resent by: Text to cell phone: .  Will anyone else be joining your video visit? No      Video Start Time: 4:50    Assessment & Plan     Apneic episode  Discussed that symptoms of snoring and apneic spells are concerning for possible sleep apnea.  Discussed whether they would want to pursue further evaluation of this given his multiple medical issues.  Discussed that if he is diagnosed with sleep apnea and treatment is recommended that this would involve a CPAP machine.  Daughter does wish to pursue further evaluation so referral is made to sleep specialist.  - SLEEP EVALUATION & MANAGEMENT REFERRAL - ADULT -    Multiple joint pain    - Lidocaine (LIDOCARE) 4 % Patch  Dispense: 30 patch; Refill: 3    Iron deficiency    - ferrous sulfate 220 (44 Fe) MG/5ML ELIX  Dispense: 150 mL; Refill: 11    Snoring    - SLEEP EVALUATION & MANAGEMENT REFERRAL - ADULT -                   No follow-ups on file.    Lynda Gutierrez MD  [facility]   Liu is a 56 year old who presents for the following health issues     HPI   He is evaluated today for symptoms of snoring as well as an apneic episode.  His daughter is present during the video call and provides most of the history.  She reports that last week she came home and found her father in his recliner.  His face was purple and he had stopped breathing.  She shook him a little bit and he started gasping for air and then started breathing very heavily.  They called 911 and he was assessed.  Vital signs were normal.  Patient did not want to go to the emergency department so they contacted this provider through [employer] who advised him to schedule this appointment.  Daughter reports that he has not had any episodes since then.  He does seem to be more tired than usual.  She  reports that he does snore.  States that he does not seem to get good sleep.  They do not notice any apneic episodes while he is sleeping.  He does sleep in his recliner out in the living room.  She does not believe that he was choking on anything to cause the apneic spell.  He did not cough to clear his throat or lungs after the apneic episode.  She expresses some concern about his weight.  He has appeared to have gained some weight over the last year.  They did weigh him this morning and he weighed 130 pounds.  States that they have not weighed him in several years because it is very difficult.  He is getting tube feeds.  Gets about 1500 ry/day.  She is not noticing any swelling in his legs.  Review of systems is otherwise negative.  No other concerns today.        Review of Systems         Objective           Vitals:  No vitals were obtained today due to virtual visit.    Physical Exam   GENERAL: alert and no distress  EYES: Eyes grossly normal to inspection.  No discharge or erythema, or obvious scleral/conjunctival abnormalities.  RESP: No audible wheeze, cough, or visible cyanosis.  No visible retractions or increased work of breathing.    SKIN: Visible skin clear. No significant rash, abnormal pigmentation or lesions.  NEURO: dysarthria and normal for patient                Video-Visit Details    Type of service:  Video Visit    Video End Time:5:09    Originating Location (pt. Location): Home    Distant Location (provider location):  [facility]     Platform used for Video Visit: [employer]

## 2022-01-20 ENCOUNTER — HOSPITAL ENCOUNTER (OUTPATIENT)
Dept: INTERVENTIONAL RADIOLOGY/VASCULAR | Facility: HOSPITAL | Age: 57
Discharge: HOME | End: 2022-01-20
Attending: NURSE PRACTITIONER | Admitting: RADIOLOGY
Payer: COMMERCIAL

## 2022-01-20 DIAGNOSIS — R63.30 FEEDING DIFFICULTIES: ICD-10-CM

## 2022-01-20 DIAGNOSIS — R63.30 FEEDING DIFFICULTIES: Primary | ICD-10-CM

## 2022-01-20 PROCEDURE — 49452 REPLACE G-J TUBE PERC: CPT

## 2022-01-20 PROCEDURE — 255N000002 HC RX 255 OP 636: Performed by: RADIOLOGY

## 2022-01-20 PROCEDURE — C1769 GUIDE WIRE: HCPCS

## 2022-01-20 PROCEDURE — 272N000116 HC CATH CR1

## 2022-01-20 PROCEDURE — 272N000583 ZZ HC TUBE GASTRO CR12

## 2022-01-20 RX ORDER — LIDOCAINE 40 MG/G
CREAM TOPICAL
Status: CANCELLED | OUTPATIENT
Start: 2022-01-20

## 2022-01-20 RX ADMIN — IOHEXOL 10 ML: 350 INJECTION, SOLUTION INTRAVENOUS at 12:18

## 2022-01-28 ENCOUNTER — APPOINTMENT (OUTPATIENT)
Dept: CT IMAGING | Facility: CLINIC | Age: 57
End: 2022-01-28
Attending: EMERGENCY MEDICINE
Payer: COMMERCIAL

## 2022-01-28 ENCOUNTER — APPOINTMENT (OUTPATIENT)
Dept: RADIOLOGY | Facility: CLINIC | Age: 57
End: 2022-01-28
Attending: EMERGENCY MEDICINE
Payer: COMMERCIAL

## 2022-01-28 ENCOUNTER — HOSPITAL ENCOUNTER (INPATIENT)
Facility: CLINIC | Age: 57
LOS: 1 days | Discharge: HOME | End: 2022-01-29
Attending: EMERGENCY MEDICINE | Admitting: INTERNAL MEDICINE
Payer: COMMERCIAL

## 2022-01-28 DIAGNOSIS — R50.9 FEVER, UNSPECIFIED FEVER CAUSE: ICD-10-CM

## 2022-01-28 DIAGNOSIS — A41.9 SEPSIS, DUE TO UNSPECIFIED ORGANISM, UNSPECIFIED WHETHER ACUTE ORGAN DYSFUNCTION PRESENT (H): ICD-10-CM

## 2022-01-28 LAB
ALBUMIN SERPL-MCNC: 3.1 G/DL (ref 3.5–5)
ALBUMIN UR-MCNC: 100 MG/DL
ALP SERPL-CCNC: 60 U/L (ref 45–120)
ALT SERPL W P-5'-P-CCNC: 26 U/L (ref 0–45)
ANION GAP SERPL CALCULATED.3IONS-SCNC: 7 MMOL/L (ref 5–18)
APPEARANCE UR: CLEAR
AST SERPL W P-5'-P-CCNC: 21 U/L (ref 0–40)
ATRIAL RATE - MUSE: 108 BPM
BASOPHILS # BLD AUTO: 0.1 10E3/UL (ref 0–0.2)
BASOPHILS NFR BLD AUTO: 0 %
BILIRUB SERPL-MCNC: 0.4 MG/DL (ref 0–1)
BILIRUB UR QL STRIP: NEGATIVE
BNP SERPL-MCNC: <10 PG/ML (ref 0–49)
BUN SERPL-MCNC: 32 MG/DL (ref 8–22)
CALCIUM SERPL-MCNC: 8.5 MG/DL (ref 8.5–10.5)
CHLORIDE BLD-SCNC: 110 MMOL/L (ref 98–107)
CO2 SERPL-SCNC: 20 MMOL/L (ref 22–31)
COLOR UR AUTO: YELLOW
CREAT SERPL-MCNC: 1.07 MG/DL (ref 0.7–1.3)
DIASTOLIC BLOOD PRESSURE - MUSE: 86 MMHG
EOSINOPHIL # BLD AUTO: 0.1 10E3/UL (ref 0–0.7)
EOSINOPHIL NFR BLD AUTO: 1 %
ERYTHROCYTE [DISTWIDTH] IN BLOOD BY AUTOMATED COUNT: 12.1 % (ref 10–15)
FLUAV RNA SPEC QL NAA+PROBE: NEGATIVE
FLUBV RNA RESP QL NAA+PROBE: NEGATIVE
GFR SERPL CREATININE-BSD FRML MDRD: 80 ML/MIN/1.73M2
GLUCOSE BLD-MCNC: 107 MG/DL (ref 70–125)
GLUCOSE UR STRIP-MCNC: NEGATIVE MG/DL
HCT VFR BLD AUTO: 35.4 % (ref 40–53)
HGB BLD-MCNC: 11.7 G/DL (ref 13.3–17.7)
HGB UR QL STRIP: ABNORMAL
HYALINE CASTS: 7 /LPF
IMM GRANULOCYTES # BLD: 0.1 10E3/UL
IMM GRANULOCYTES NFR BLD: 0 %
INTERPRETATION ECG - MUSE: NORMAL
KETONES UR STRIP-MCNC: NEGATIVE MG/DL
LACTATE SERPL-SCNC: 1.5 MMOL/L (ref 0.7–2)
LEUKOCYTE ESTERASE UR QL STRIP: NEGATIVE
LYMPHOCYTES # BLD AUTO: 3.1 10E3/UL (ref 0.8–5.3)
LYMPHOCYTES NFR BLD AUTO: 16 %
MCH RBC QN AUTO: 31.9 PG (ref 26.5–33)
MCHC RBC AUTO-ENTMCNC: 33.1 G/DL (ref 31.5–36.5)
MCV RBC AUTO: 97 FL (ref 78–100)
MONOCYTES # BLD AUTO: 1 10E3/UL (ref 0–1.3)
MONOCYTES NFR BLD AUTO: 5 %
MUCOUS THREADS #/AREA URNS LPF: PRESENT /LPF
NEUTROPHILS # BLD AUTO: 14.8 10E3/UL (ref 1.6–8.3)
NEUTROPHILS NFR BLD AUTO: 78 %
NITRATE UR QL: NEGATIVE
NRBC # BLD AUTO: 0 10E3/UL
NRBC BLD AUTO-RTO: 0 /100
P AXIS - MUSE: 55 DEGREES
PH UR STRIP: 5.5 [PH] (ref 5–7)
PLATELET # BLD AUTO: 231 10E3/UL (ref 150–450)
POTASSIUM BLD-SCNC: 5.1 MMOL/L (ref 3.5–5)
PR INTERVAL - MUSE: 140 MS
PROT SERPL-MCNC: 8 G/DL (ref 6–8)
QRS DURATION - MUSE: 70 MS
QT - MUSE: 312 MS
QTC - MUSE: 418 MS
R AXIS - MUSE: 1 DEGREES
RBC # BLD AUTO: 3.67 10E6/UL (ref 4.4–5.9)
RBC URINE: 27 /HPF
SARS-COV-2 RNA RESP QL NAA+PROBE: NEGATIVE
SODIUM SERPL-SCNC: 137 MMOL/L (ref 136–145)
SP GR UR STRIP: 1.02 (ref 1–1.03)
SQUAMOUS EPITHELIAL: <1 /HPF
SYSTOLIC BLOOD PRESSURE - MUSE: 117 MMHG
T AXIS - MUSE: 51 DEGREES
TROPONIN I SERPL-MCNC: <0.01 NG/ML (ref 0–0.29)
UROBILINOGEN UR STRIP-MCNC: <2 MG/DL
VENTRICULAR RATE- MUSE: 108 BPM
WBC # BLD AUTO: 19.2 10E3/UL (ref 4–11)
WBC URINE: 1 /HPF

## 2022-01-28 PROCEDURE — 71250 CT THORAX DX C-: CPT

## 2022-01-28 PROCEDURE — 96366 THER/PROPH/DIAG IV INF ADDON: CPT

## 2022-01-28 PROCEDURE — 99285 EMERGENCY DEPT VISIT HI MDM: CPT | Mod: 25

## 2022-01-28 PROCEDURE — 36415 COLL VENOUS BLD VENIPUNCTURE: CPT | Performed by: EMERGENCY MEDICINE

## 2022-01-28 PROCEDURE — 74177 CT ABD & PELVIS W/CONTRAST: CPT

## 2022-01-28 PROCEDURE — 85025 COMPLETE CBC W/AUTO DIFF WBC: CPT | Performed by: EMERGENCY MEDICINE

## 2022-01-28 PROCEDURE — 250N000011 HC RX IP 250 OP 636: Performed by: INTERNAL MEDICINE

## 2022-01-28 PROCEDURE — 96375 TX/PRO/DX INJ NEW DRUG ADDON: CPT

## 2022-01-28 PROCEDURE — 80053 COMPREHEN METABOLIC PANEL: CPT | Performed by: EMERGENCY MEDICINE

## 2022-01-28 PROCEDURE — 99223 1ST HOSP IP/OBS HIGH 75: CPT | Performed by: INTERNAL MEDICINE

## 2022-01-28 PROCEDURE — 250N000011 HC RX IP 250 OP 636: Performed by: EMERGENCY MEDICINE

## 2022-01-28 PROCEDURE — 81001 URINALYSIS AUTO W/SCOPE: CPT | Performed by: EMERGENCY MEDICINE

## 2022-01-28 PROCEDURE — 84484 ASSAY OF TROPONIN QUANT: CPT | Performed by: EMERGENCY MEDICINE

## 2022-01-28 PROCEDURE — 87040 BLOOD CULTURE FOR BACTERIA: CPT | Performed by: EMERGENCY MEDICINE

## 2022-01-28 PROCEDURE — C9113 INJ PANTOPRAZOLE SODIUM, VIA: HCPCS | Performed by: INTERNAL MEDICINE

## 2022-01-28 PROCEDURE — 93005 ELECTROCARDIOGRAM TRACING: CPT | Performed by: EMERGENCY MEDICINE

## 2022-01-28 PROCEDURE — 71045 X-RAY EXAM CHEST 1 VIEW: CPT

## 2022-01-28 PROCEDURE — 96365 THER/PROPH/DIAG IV INF INIT: CPT | Mod: 59

## 2022-01-28 PROCEDURE — 87086 URINE CULTURE/COLONY COUNT: CPT | Performed by: EMERGENCY MEDICINE

## 2022-01-28 PROCEDURE — C9803 HOPD COVID-19 SPEC COLLECT: HCPCS

## 2022-01-28 PROCEDURE — 120N000001 HC R&B MED SURG/OB

## 2022-01-28 PROCEDURE — 250N000009 HC RX 250: Performed by: INTERNAL MEDICINE

## 2022-01-28 PROCEDURE — 83605 ASSAY OF LACTIC ACID: CPT | Performed by: EMERGENCY MEDICINE

## 2022-01-28 PROCEDURE — 258N000003 HC RX IP 258 OP 636: Performed by: EMERGENCY MEDICINE

## 2022-01-28 PROCEDURE — 83880 ASSAY OF NATRIURETIC PEPTIDE: CPT | Performed by: EMERGENCY MEDICINE

## 2022-01-28 PROCEDURE — 250N000013 HC RX MED GY IP 250 OP 250 PS 637: Performed by: INTERNAL MEDICINE

## 2022-01-28 PROCEDURE — 87636 SARSCOV2 & INF A&B AMP PRB: CPT | Performed by: EMERGENCY MEDICINE

## 2022-01-28 RX ORDER — PIPERACILLIN SODIUM, TAZOBACTAM SODIUM 3; .375 G/15ML; G/15ML
3.38 INJECTION, POWDER, LYOPHILIZED, FOR SOLUTION INTRAVENOUS ONCE
Status: COMPLETED | OUTPATIENT
Start: 2022-01-28 | End: 2022-01-28

## 2022-01-28 RX ORDER — BISACODYL 10 MG
10 SUPPOSITORY, RECTAL RECTAL DAILY PRN
Status: DISCONTINUED | OUTPATIENT
Start: 2022-01-28 | End: 2022-01-29 | Stop reason: HOSPADM

## 2022-01-28 RX ORDER — SIMVASTATIN 20 MG
20 TABLET ORAL AT BEDTIME
Status: DISCONTINUED | OUTPATIENT
Start: 2022-01-28 | End: 2022-01-29 | Stop reason: HOSPADM

## 2022-01-28 RX ORDER — METOPROLOL TARTRATE 25 MG/1
25 TABLET, FILM COATED ORAL 2 TIMES DAILY
Status: DISCONTINUED | OUTPATIENT
Start: 2022-01-28 | End: 2022-01-29

## 2022-01-28 RX ORDER — BACLOFEN 10 MG/1
10 TABLET ORAL 3 TIMES DAILY PRN
Status: DISCONTINUED | OUTPATIENT
Start: 2022-01-28 | End: 2022-01-28

## 2022-01-28 RX ORDER — ONDANSETRON 4 MG/1
4 TABLET, ORALLY DISINTEGRATING ORAL EVERY 6 HOURS PRN
Status: DISCONTINUED | OUTPATIENT
Start: 2022-01-28 | End: 2022-01-29 | Stop reason: HOSPADM

## 2022-01-28 RX ORDER — QUETIAPINE FUMARATE 25 MG/1
25 TABLET, FILM COATED ORAL 2 TIMES DAILY
Status: DISCONTINUED | OUTPATIENT
Start: 2022-01-28 | End: 2022-01-29

## 2022-01-28 RX ORDER — PIPERACILLIN SODIUM, TAZOBACTAM SODIUM 3; .375 G/15ML; G/15ML
3.38 INJECTION, POWDER, LYOPHILIZED, FOR SOLUTION INTRAVENOUS EVERY 8 HOURS
Status: DISCONTINUED | OUTPATIENT
Start: 2022-01-28 | End: 2022-01-29 | Stop reason: HOSPADM

## 2022-01-28 RX ORDER — IOPAMIDOL 755 MG/ML
100 INJECTION, SOLUTION INTRAVASCULAR ONCE
Status: COMPLETED | OUTPATIENT
Start: 2022-01-28 | End: 2022-01-28

## 2022-01-28 RX ORDER — BACLOFEN 10 MG/1
10 TABLET ORAL 3 TIMES DAILY
Status: DISCONTINUED | OUTPATIENT
Start: 2022-01-28 | End: 2022-01-29 | Stop reason: HOSPADM

## 2022-01-28 RX ORDER — ONDANSETRON 2 MG/ML
4 INJECTION INTRAMUSCULAR; INTRAVENOUS EVERY 6 HOURS PRN
Status: DISCONTINUED | OUTPATIENT
Start: 2022-01-28 | End: 2022-01-29 | Stop reason: HOSPADM

## 2022-01-28 RX ORDER — CHLORHEXIDINE GLUCONATE ORAL RINSE 1.2 MG/ML
15 SOLUTION DENTAL DAILY
Status: DISCONTINUED | OUTPATIENT
Start: 2022-01-29 | End: 2022-01-29 | Stop reason: HOSPADM

## 2022-01-28 RX ORDER — ACETAMINOPHEN 500 MG
1000 TABLET ORAL EVERY 6 HOURS PRN
Status: DISCONTINUED | OUTPATIENT
Start: 2022-01-28 | End: 2022-01-29 | Stop reason: HOSPADM

## 2022-01-28 RX ORDER — LEVETIRACETAM 100 MG/ML
750 SOLUTION ORAL 2 TIMES DAILY
Status: DISCONTINUED | OUTPATIENT
Start: 2022-01-28 | End: 2022-01-29 | Stop reason: HOSPADM

## 2022-01-28 RX ORDER — MECLIZINE HYDROCHLORIDE 25 MG/1
25 TABLET ORAL 3 TIMES DAILY PRN
Status: DISCONTINUED | OUTPATIENT
Start: 2022-01-28 | End: 2022-01-29 | Stop reason: HOSPADM

## 2022-01-28 RX ORDER — CARBOXYMETHYLCELLULOSE SODIUM 10 MG/ML
1 GEL OPHTHALMIC EVERY 6 HOURS PRN
Status: DISCONTINUED | OUTPATIENT
Start: 2022-01-28 | End: 2022-01-29 | Stop reason: HOSPADM

## 2022-01-28 RX ORDER — POLYETHYLENE GLYCOL 3350 17 G/17G
17 POWDER, FOR SOLUTION ORAL DAILY PRN
Status: DISCONTINUED | OUTPATIENT
Start: 2022-01-28 | End: 2022-01-29 | Stop reason: HOSPADM

## 2022-01-28 RX ORDER — IPRATROPIUM BROMIDE AND ALBUTEROL SULFATE 2.5; .5 MG/3ML; MG/3ML
1 SOLUTION RESPIRATORY (INHALATION)
Status: DISCONTINUED | OUTPATIENT
Start: 2022-01-28 | End: 2022-01-29 | Stop reason: HOSPADM

## 2022-01-28 RX ORDER — ASPIRIN 81 MG/1
81 TABLET, CHEWABLE ORAL EVERY MORNING
Status: DISCONTINUED | OUTPATIENT
Start: 2022-01-29 | End: 2022-01-29 | Stop reason: HOSPADM

## 2022-01-28 RX ORDER — LIDOCAINE 40 MG/G
CREAM TOPICAL
Status: DISCONTINUED | OUTPATIENT
Start: 2022-01-28 | End: 2022-01-29 | Stop reason: HOSPADM

## 2022-01-28 RX ORDER — QUETIAPINE FUMARATE 25 MG/1
25 TABLET, FILM COATED ORAL EVERY 6 HOURS PRN
Status: DISCONTINUED | OUTPATIENT
Start: 2022-01-28 | End: 2022-01-29

## 2022-01-28 RX ADMIN — METOPROLOL TARTRATE 25 MG: 25 TABLET, FILM COATED ORAL at 17:28

## 2022-01-28 RX ADMIN — PIPERACILLIN AND TAZOBACTAM 3.38 G: 3; .375 INJECTION, POWDER, LYOPHILIZED, FOR SOLUTION INTRAVENOUS at 17:12

## 2022-01-28 RX ADMIN — BACLOFEN 10 MG: 10 TABLET ORAL at 18:19

## 2022-01-28 RX ADMIN — PIPERACILLIN AND TAZOBACTAM 3.38 G: 3; .375 INJECTION, POWDER, LYOPHILIZED, FOR SOLUTION INTRAVENOUS at 10:44

## 2022-01-28 RX ADMIN — IPRATROPIUM BROMIDE AND ALBUTEROL SULFATE 3 ML: .5; 2.5 SOLUTION RESPIRATORY (INHALATION) at 17:12

## 2022-01-28 RX ADMIN — SODIUM CHLORIDE 1000 ML: 9 INJECTION, SOLUTION INTRAVENOUS at 10:35

## 2022-01-28 RX ADMIN — PANTOPRAZOLE SODIUM 40 MG: 40 INJECTION, POWDER, FOR SOLUTION INTRAVENOUS at 17:12

## 2022-01-28 RX ADMIN — IOPAMIDOL 100 ML: 755 INJECTION, SOLUTION INTRAVENOUS at 04:44

## 2022-01-28 RX ADMIN — QUETIAPINE FUMARATE 25 MG: 25 TABLET ORAL at 18:55

## 2022-01-28 RX ADMIN — LEVETIRACETAM 750 MG: 100 SOLUTION ORAL at 17:29

## 2022-01-28 RX ADMIN — IPRATROPIUM BROMIDE AND ALBUTEROL SULFATE 3 ML: .5; 2.5 SOLUTION RESPIRATORY (INHALATION) at 22:25

## 2022-01-28 RX ADMIN — Medication 220 MG: at 17:12

## 2022-01-28 ASSESSMENT — ACTIVITIES OF DAILY LIVING (ADL)
ADLS_ACUITY_SCORE: 14
ADLS_ACUITY_SCORE: 14
ADLS_ACUITY_SCORE: 12
ADLS_ACUITY_SCORE: 14
DEPENDENT_IADLS:: CLEANING;COOKING;LAUNDRY;SHOPPING;MEDICATION MANAGEMENT;MONEY MANAGEMENT;TRANSPORTATION;INCONTINENCE
ADLS_ACUITY_SCORE: 14
ADLS_ACUITY_SCORE: 12
ADLS_ACUITY_SCORE: 14

## 2022-01-28 ASSESSMENT — MIFFLIN-ST. JEOR: SCORE: 1288.93

## 2022-01-28 ASSESSMENT — ENCOUNTER SYMPTOMS
VOMITING: 0
AGITATION: 1
COUGH: 1
FEVER: 1

## 2022-01-28 NOTE — ED TRIAGE NOTES
Pt arrived via EMS, here for possible infection. Pt comes from home. Lives with daughter. Hx of stroke 2016, leaving pt nonverbal and quadriplegic.    Daughter, Idalmis, here for history and communication. She reports increased agitation/ irritability ~ 2200 last night. Had a fever of 100.4, 1000mg of tylenol given. Dtr states pt is normally able to communicate with minimal gestures which are not present tonight. Pt is snoring during examination, does take seroquel HS.     At home covid test negative prior to arrival.

## 2022-01-28 NOTE — ED PROVIDER NOTES
EMERGENCY DEPARTMENT SIGN OUT NOTE        ED COURSE AND MEDICAL DECISION MAKING  Patient was signed out to me by Dr Tristian Vazquez at 6:00 AM  10:34 AM I discussed the case with hospitalist, Dr Fletcher, who accepts the patient for admission.      In brief, Liu Malave is a 56 year old male who initially presented for irritability. Patient is nonverbal. 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 However, he has been more irritated since 2200 last night and his heart rate increases to 100+ and had a temperature of 100.4. He gets frequent UTIs and is on chronic antibiotics.     At time of sign out, disposition was pending.  While in the emergency department patient became more tachycardic (ranging around 110bpm prior to fluids), and with his elevated white count of 19, when his baseline is 9, I was concerned for the potential for early sepsis.  I did repeat a CT scan of the chest to evaluate for possible undiagnosed pneumonia from the chest x-ray, this was unremarkable.  At this time I am uncertain underlying cause of his symptoms, however with the fact that he has symptoms that are concerning for potential early sepsis, and worsening heart rate, I did believe despite the fact that he had no elevation in his lactate that the patient required further evaluation, and antibiotics until at least his blood cultures returned.  I discussed patient with the hospitalist, and the patient will be admitted for further management.    FINAL IMPRESSION    1. Fever, unspecified fever cause        ED MEDS  Medications   iopamidol (ISOVUE-370) solution 100 mL (100 mLs Intravenous Given 1/28/22 0444)       LAB  Labs Ordered and Resulted from Time of ED Arrival to Time of ED Departure   COMPREHENSIVE METABOLIC PANEL - Abnormal       Result Value    Sodium 137      Potassium 5.1 (*)     Chloride 110 (*)     Carbon Dioxide (CO2) 20 (*)     Anion Gap 7      Urea Nitrogen 32 (*)     Creatinine 1.07       Calcium 8.5      Glucose 107      Alkaline Phosphatase 60      AST 21      ALT 26      Protein Total 8.0      Albumin 3.1 (*)     Bilirubin Total 0.4      GFR Estimate 80     ROUTINE UA WITH MICROSCOPIC REFLEX TO CULTURE - Abnormal    Color Urine Yellow      Appearance Urine Clear      Glucose Urine Negative      Bilirubin Urine Negative      Ketones Urine Negative      Specific Gravity Urine 1.023      Blood Urine 0.2 mg/dL (*)     pH Urine 5.5      Protein Albumin Urine 100  (*)     Urobilinogen Urine <2.0      Nitrite Urine Negative      Leukocyte Esterase Urine Negative      Mucus Urine Present (*)     RBC Urine 27 (*)     WBC Urine 1      Squamous Epithelials Urine <1      Hyaline Casts Urine 7 (*)    CBC WITH PLATELETS AND DIFFERENTIAL - Abnormal    WBC Count 19.2 (*)     RBC Count 3.67 (*)     Hemoglobin 11.7 (*)     Hematocrit 35.4 (*)     MCV 97      MCH 31.9      MCHC 33.1      RDW 12.1      Platelet Count 231      % Neutrophils 78      % Lymphocytes 16      % Monocytes 5      % Eosinophils 1      % Basophils 0      % Immature Granulocytes 0      NRBCs per 100 WBC 0      Absolute Neutrophils 14.8 (*)     Absolute Lymphocytes 3.1      Absolute Monocytes 1.0      Absolute Eosinophils 0.1      Absolute Basophils 0.1      Absolute Immature Granulocytes 0.1      Absolute NRBCs 0.0     TROPONIN I - Normal    Troponin I <0.01     B-TYPE NATRIURETIC PEPTIDE ([facility] ONLY) - Normal    BNP <10     INFLUENZA A/B & SARS-COV2 PCR MULTIPLEX - Normal    Influenza A PCR Negative      Influenza B PCR Negative      SARS CoV2 PCR Negative     LACTIC ACID WHOLE BLOOD - Normal    Lactic Acid 1.5     URINE CULTURE   BLOOD CULTURE   BLOOD CULTURE   BLOOD CULTURE   BLOOD CULTURE       RADIOLOGY    CT Abdomen Pelvis w Contrast   Final Result   IMPRESSION:    1.  Bladder is mostly decompressed which limits evaluation. However, there is some questionable diffuse bladder wall thickening. Clinical correlation for any signs of cystitis  or UTI. No hydronephrosis or evidence of pyelonephritis.      2.  No acute bowel findings. Gastrojejunostomy tube in appropriate location.      3.  Moderate subpleural opacities in both lung bases posteriorly. This is favored to be more likely due to atelectasis versus less likely pneumonitis. Clinical correlation.                   XR Chest Port 1 View   Final Result   IMPRESSION: No pneumothorax or pleural effusion. No focal consolidation. Cardiac silhouette within normal limits. Tortuous atherosclerotic aorta. No acute osseous abnormality.      Chest CT w/o contrast    (Results Pending)       DISCHARGE MEDS  New Prescriptions    No medications on file       Silver Scott D.O.  Emergency Medicine  [facility] EMERGENCY ROOM  [facility] [facility] [address] [phone]  Dept: [phone]     Silver Scott DO  01/28/22 121

## 2022-01-28 NOTE — ED PROVIDER NOTES
EMERGENCY DEPARTMENT ENCOUNTER      NAME: Liu Malave  AGE: 56 year old male  YOB: 1965  MRN: [id number]  EVALUATION DATE & TIME: 1/28/2022  2:36 AM    PCP: Lynda Gutierrez    ED PROVIDER: Tristian Vazquez M.D.      Chief Complaint   Patient presents with     Altered Mental Status     Fatigue         FINAL IMPRESSION:  1. Fever, unspecified fever cause          ED COURSE & MEDICAL DECISION MAKING:    Pertinent Labs & Imaging studies reviewed. (See chart for details)  56 year old male presents to the Emergency Department for evaluation of altered mental status.  Patient is nonverbal at baseline.  Daughter notes that he has been somewhat off and had a slightly elevated temperature.  Patient not able to give a good history so did start a broad work-up here.  White count is elevated.  Influenza and Covid are negative.  Troponin BNP are negative.  EKG is normal.  Urinalysis does not show any obvious infection.  Did do a CT scan of the abdomen which does not show an obvious cause.  X-ray of the chest is negative.  No neck stiffness or signs of meningitis.  Patient tachycardia improved with fluids and antipyretics.  Patient be signed out to Dr. Scott pending lactic acid.  If lactic acid is okay likely can be discharged home.  If elevated may need admission and IV antibiotics for sepsis of unknown origin.    2:45 AM I met with the patient to gather history and to perform my initial exam. I discussed the plan for care while in the Emergency Department. PPE: Provider wore goggles and surgical mask.     At the conclusion of the encounter I discussed the results of all of the tests and the disposition. The questions were answered. The patient or family acknowledged understanding and was agreeable with the care plan.           MEDICATIONS GIVEN IN THE EMERGENCY:  Medications   iopamidol (ISOVUE-370) solution 100 mL (100 mLs Intravenous Given 1/28/22 9874)       NEW PRESCRIPTIONS STARTED AT TODAY'S ER VISIT  New  Prescriptions    No medications on file          =================================================================    HPI    Patient information was obtained from: daughter    Use of : N/A         Liu GIL Malave is a 56 year old male with a pertinent history of previous hemorrhagic stroke with residual hemiparesis, chronic static encephalopathy, and recurrent UTI on chronic antibiotics who presents to this ED for evaluation of irritibility     HPI and ROS are limited - patient nonverbal. History provided by daughter    Per daughter, patient is paralyzed, nonverbal, and uses a GJ tube since his stroke in 2016. However, he has been more irritated since 2200 last night and his heart rate increases to 100+. He also woke up around 6105-0561, was irritated again, HR was high, and had a temperature of 100.4. He gets frequent UTIs and is on chronic antibiotics. Occasionally complains of abdominal pain but not recently. Last bowel movement yesterday, no stool changes. Daughter denies vomiting or urinary symptoms. Patient has a chronic cough, unchanged today. Had a negative Covid-19 test at home yesterday. No known sick contacts.       REVIEW OF SYSTEMS   Review of Systems   Unable to perform ROS: Patient nonverbal   Constitutional: Positive for fever (100.4).   Respiratory: Positive for cough (chronic, unchanged).    Gastrointestinal: Negative for vomiting.   Psychiatric/Behavioral: Positive for agitation.        PAST MEDICAL HISTORY:  Past Medical History:   Diagnosis Date     Acute UTI 2/21/2020     Anxiety      Basal ganglia hemorrhage (H) 6/20/2016     Brain compression (H)      Bruxism      Chronic static encephalopathy      Chronic static encephalopathy      Chronic static encephalopathy      Convulsions, unspecified convulsion type (H)      CVA (cerebral vascular accident) (H) 7/2016    Hemorrhagic,      Depression      Dyslipidemia      Dyslipidemia      Elevated troponin      Essential hypertension       Feeding by G-tube (H) 6/4/2020     GERD (gastroesophageal reflux disease)      GERD without esophagitis      Hemiplegia of nondominant side, late effect of cerebrovascular disease (H)     Created by Conversion  Replacement Utility updated for latest IMO load     Hemorrhagic stroke (H) 6/5/2016     Hepatitis C carrier (H)      History of ESBL E. coli infection      History of hemorrhagic stroke with residual hemiparesis (H)      History of stroke with residual deficit      HTN (hypertension)      Hypertension      Lung nodule 12/5/16    9 x 9 mm left upper lobe on CXR     MRSA (methicillin resistant staph aureus) culture positive      Periodontal disease      Persistent fever      Pneumonia of both lower lobes due to infectious organism      Respiratory failure with hypoxia (H)      Seizure (H)      Sepsis due to urinary tract infection (H) 6/4/2020     Severe sepsis (H)      Stroke (H)      Trismus        PAST SURGICAL HISTORY:  Past Surgical History:   Procedure Laterality Date     aneurysm clipping  2006     BRAIN SURGERY       EXAM UNDER ANESTHESIA, RESTORATIONS, EXTRACTION(S) DENTAL COMPLEX, COMBINED N/A 12/19/2016    Procedure: COMBINED EXAM UNDER ANESTHESIA, RESTORATIONS, EXTRACTION(S) DENTAL COMPLEX;  Surgeon: Verónica Martinez DDS;  Location: [facility] OR     EXAM UNDER ANESTHESIA, RESTORATIONS, EXTRACTION(S) DENTAL COMPLEX, COMBINED N/A 3/7/2018    Procedure: COMBINED EXAM UNDER ANESTHESIA, RESTORATIONS, EXTRACTION(S) DENTAL COMPLEX;  Kenalog Injection in Bilateral [place], Combined Dental Exam,Radiographs, Three Dental Restorations, Periodontal Therapy and Fluoride Treatment;  Surgeon: Darryl Isbell DDS;  Location: [facility] OR     HH MIDLINE INSERTION  8/11/2017          INJECT STEROID (LOCATION) N/A 3/7/2018    Procedure: INJECT STEROID (LOCATION);  Kenalog Injection In Bilateral [place], Combined Dental Exam,Radiographs,Three Dental Restorations, Periodontal Therapy and Fluoride Treatment;  Surgeon: Marycarmen  JENNY Hatfield;  Location: [facility] OR     IR GASTRO JEJUNOSTOMY TUBE CHANGE  7/19/2016     IR GASTRO JEJUNOSTOMY TUBE CHANGE  9/23/2016     IR GASTRO JEJUNOSTOMY TUBE CHANGE  11/28/2016     IR GASTRO JEJUNOSTOMY TUBE CHANGE  1/27/2017     IR GASTRO JEJUNOSTOMY TUBE CHANGE  4/19/2017     IR GASTRO JEJUNOSTOMY TUBE CHANGE  6/20/2017     IR GASTRO JEJUNOSTOMY TUBE CHANGE  8/7/2017     IR GASTRO JEJUNOSTOMY TUBE CHANGE  8/11/2017     IR GASTRO JEJUNOSTOMY TUBE CHANGE  10/20/2017     IR GASTRO JEJUNOSTOMY TUBE CHANGE  1/2/2018     IR GASTRO JEJUNOSTOMY TUBE CHANGE  2/16/2018     IR GASTRO JEJUNOSTOMY TUBE CHANGE  5/16/2018     IR GASTRO JEJUNOSTOMY TUBE CHANGE  6/11/2018     IR GASTRO JEJUNOSTOMY TUBE CHANGE  9/12/2018     IR GASTRO JEJUNOSTOMY TUBE CHANGE  12/24/2018     IR GASTRO JEJUNOSTOMY TUBE CHANGE  3/18/2019     IR GASTRO JEJUNOSTOMY TUBE CHANGE  4/23/2019     IR GASTRO JEJUNOSTOMY TUBE CHANGE  5/21/2019     IR GASTRO JEJUNOSTOMY TUBE CHANGE  9/9/2019     IR GASTRO JEJUNOSTOMY TUBE CHANGE  9/26/2019     IR GASTRO JEJUNOSTOMY TUBE CHANGE  10/11/2019     IR GASTRO JEJUNOSTOMY TUBE CHANGE  11/21/2019     IR GASTRO JEJUNOSTOMY TUBE CHANGE  11/29/2019     IR GASTRO JEJUNOSTOMY TUBE CHANGE  2/27/2020     IR GASTRO JEJUNOSTOMY TUBE CHANGE  4/10/2020     IR GASTRO JEJUNOSTOMY TUBE CHANGE  7/2/2020     IR GASTRO JEJUNOSTOMY TUBE CHANGE  8/28/2020     IR GASTRO JEJUNOSTOMY TUBE CHANGE  9/17/2020     IR GASTRO JEJUNOSTOMY TUBE CHANGE  10/29/2020     IR GASTRO JEJUNOSTOMY TUBE CHANGE  11/6/2020     IR GASTRO JEJUNOSTOMY TUBE CHANGE  12/24/2020     IR GASTRO JEJUNOSTOMY TUBE CHANGE  1/9/2021     IR GASTRO JEJUNOSTOMY TUBE CHANGE  6/9/2021     IR GASTRO JEJUNOSTOMY TUBE CHANGE  10/4/2021     IR GASTRO JEJUNOSTOMY TUBE CHANGE  10/18/2021     IR GASTRO JEJUNOSTOMY TUBE CHANGE  1/14/2022     IR GASTRO JEJUNOSTOMY TUBE CHANGE  1/20/2022     IR GASTRO JEJUNOSTOMY TUBE PLACEMENT  6/14/2016     IR GJ TUBE REPLACEMENT  12/24/2018     IR GJ  "TUBE REPLACEMENT  3/18/2019     IR GJ TUBE REPLACEMENT  4/23/2019     IR GJ TUBE REPLACEMENT  5/21/2019     IR GJ TUBE REPLACEMENT  9/9/2019     IR GJ TUBE REPLACEMENT  9/26/2019     IR GJ TUBE REPLACEMENT  10/11/2019     IR GJ TUBE REPLACEMENT  11/21/2019     IR GJ TUBE REPLACEMENT  11/29/2019     IR GJ TUBE REPLACEMENT  2/27/2020     IR GJ TUBE REPLACEMENT  4/10/2020     IR GJ TUBE REPLACEMENT  7/2/2020     IR GJ TUBE REPLACEMENT  8/28/2020     IR GJ TUBE REPLACEMENT  9/17/2020     IR GJ TUBE REPLACEMENT  10/29/2020     IR GJ TUBE REPLACEMENT  11/6/2020     IR GJ TUBE REPLACEMENT  12/24/2020     IR GJ TUBE REPLACEMENT  1/9/2021     IR GJ TUBE REPLACEMENT  6/9/2021     J-G tube       PICC  6/10/2016          TRACHEOSTOMY      Removed           CURRENT MEDICATIONS:    No current facility-administered medications for this encounter.     Current Outpatient Medications   Medication     acetaminophen (TYLENOL) 500 MG tablet     aspirin 81 MG chewable tablet     baclofen (LIORESAL) 10 MG tablet     bisacodyl (DULCOLAX) 10 MG suppository     bisacodyl (DULCOLAX) 5 MG EC tablet     chlorhexidine (PERIDEX) 0.12 % solution     esomeprazole (NEXIUM) 20 MG DR capsule     ferrous sulfate 220 (44 Fe) MG/5ML ELIX     guaiFENesin (ROBITUSSIN) 100 MG/5ML liquid     ipratropium - albuterol 0.5 mg/2.5 mg/3 mL (DUONEB) 0.5-2.5 (3) MG/3ML neb solution     levETIRAcetam (KEPPRA) 100 MG/ML solution     Lidocaine (LIDOCARE) 4 % Patch     meclizine (ANTIVERT) 25 MG tablet     metoprolol tartrate (LOPRESSOR) 25 MG tablet     nitroFURantoin macrocrystal-monohydrate (MACROBID) 100 MG capsule     polyethylene glycol (MIRALAX) 17 GM/Dose powder     Polyethylene Glycol 400 (VISINE DRY EYE RELIEF) 1 % SOLN     QUEtiapine (SEROQUEL) 25 MG tablet     QUEtiapine (SEROQUEL) 50 MG tablet     simvastatin (ZOCOR) 20 MG tablet         ALLERGIES:  Allergies   Allergen Reactions     Diphenhydramine Unknown     Trazodone Other (See Comments)     \"shaking\" " "per family       FAMILY HISTORY:  Family History   Problem Relation Age of Onset     Hypertension Mother      Cancer Father      Kidney Disease Brother        SOCIAL HISTORY:   Social History     Socioeconomic History     Marital status:      Spouse name: Not on file     Number of children: 2     Years of education: Not on file     Highest education level: Not on file   Occupational History     Occupation: disability   Tobacco Use     Smoking status: Former Smoker     Packs/day: 1.00     Years: 30.00     Pack years: 30.00     Types: Cigarettes     Quit date: 2005     Years since quittin.6     Smokeless tobacco: Never Used     Tobacco comment: quit 2006   Substance and Sexual Activity     Alcohol use: No     Alcohol/week: 0.0 standard drinks     Drug use: No     Sexual activity: Never     Partners: Male   Other Topics Concern     Not on file   Social History Narrative    Lives with wife, 2 daughters and son in law.    W/c bound.     Social Determinants of Health     Financial Resource Strain: Not on file   Food Insecurity: Not on file   Transportation Needs: Not on file   Physical Activity: Not on file   Stress: Not on file   Social Connections: Not on file   Intimate Partner Violence: Not on file   Housing Stability: Not on file       VITALS:  /83   Pulse 92   Temp 99  F (37.2  C) (Temporal)   Resp 16   Ht 1.575 m (5' 2\")   Wt 59 kg (130 lb)   SpO2 98%   BMI 23.78 kg/m      PHYSICAL EXAM    Physical Exam  Constitutional:       General: He is not in acute distress.     Appearance: He is not diaphoretic.   HENT:      Head: Atraumatic.   Eyes:      General: No scleral icterus.     Pupils: Pupils are equal, round, and reactive to light.   Cardiovascular:      Rate and Rhythm: Tachycardia present.      Heart sounds: Normal heart sounds.   Pulmonary:      Effort: No respiratory distress.      Breath sounds: Normal breath sounds.   Abdominal:      General: Bowel sounds are normal. There is " distension.      Palpations: Abdomen is soft.      Tenderness: There is no abdominal tenderness.      Comments: gtube     Musculoskeletal:         General: No tenderness.   Skin:     General: Skin is warm.      Findings: No rash.   Neurological:      Comments: Patient has dense right-sided hemiplegia.  A phasic.  Not able to cooperate with exam further.           LAB:  All pertinent labs reviewed and interpreted.  Labs Ordered and Resulted from Time of ED Arrival to Time of ED Departure   COMPREHENSIVE METABOLIC PANEL - Abnormal       Result Value    Sodium 137      Potassium 5.1 (*)     Chloride 110 (*)     Carbon Dioxide (CO2) 20 (*)     Anion Gap 7      Urea Nitrogen 32 (*)     Creatinine 1.07      Calcium 8.5      Glucose 107      Alkaline Phosphatase 60      AST 21      ALT 26      Protein Total 8.0      Albumin 3.1 (*)     Bilirubin Total 0.4      GFR Estimate 80     ROUTINE UA WITH MICROSCOPIC REFLEX TO CULTURE - Abnormal    Color Urine Yellow      Appearance Urine Clear      Glucose Urine Negative      Bilirubin Urine Negative      Ketones Urine Negative      Specific Gravity Urine 1.023      Blood Urine 0.2 mg/dL (*)     pH Urine 5.5      Protein Albumin Urine 100  (*)     Urobilinogen Urine <2.0      Nitrite Urine Negative      Leukocyte Esterase Urine Negative      Mucus Urine Present (*)     RBC Urine 27 (*)     WBC Urine 1      Squamous Epithelials Urine <1      Hyaline Casts Urine 7 (*)    CBC WITH PLATELETS AND DIFFERENTIAL - Abnormal    WBC Count 19.2 (*)     RBC Count 3.67 (*)     Hemoglobin 11.7 (*)     Hematocrit 35.4 (*)     MCV 97      MCH 31.9      MCHC 33.1      RDW 12.1      Platelet Count 231      % Neutrophils 78      % Lymphocytes 16      % Monocytes 5      % Eosinophils 1      % Basophils 0      % Immature Granulocytes 0      NRBCs per 100 WBC 0      Absolute Neutrophils 14.8 (*)     Absolute Lymphocytes 3.1      Absolute Monocytes 1.0      Absolute Eosinophils 0.1      Absolute Basophils  0.1      Absolute Immature Granulocytes 0.1      Absolute NRBCs 0.0     TROPONIN I - Normal    Troponin I <0.01     B-TYPE NATRIURETIC PEPTIDE ( [facility] ONLY) - Normal    BNP <10     INFLUENZA A/B & SARS-COV2 PCR MULTIPLEX - Normal    Influenza A PCR Negative      Influenza B PCR Negative      SARS CoV2 PCR Negative     LACTIC ACID WHOLE BLOOD   URINE CULTURE   BLOOD CULTURE   BLOOD CULTURE       RADIOLOGY:  Reviewed all pertinent imaging. Please see official radiology report.  CT Abdomen Pelvis w Contrast   Final Result   IMPRESSION:    1.  Bladder is mostly decompressed which limits evaluation. However, there is some questionable diffuse bladder wall thickening. Clinical correlation for any signs of cystitis or UTI. No hydronephrosis or evidence of pyelonephritis.      2.  No acute bowel findings. Gastrojejunostomy tube in appropriate location.      3.  Moderate subpleural opacities in both lung bases posteriorly. This is favored to be more likely due to atelectasis versus less likely pneumonitis. Clinical correlation.                   XR Chest Port 1 View   Final Result   IMPRESSION: No pneumothorax or pleural effusion. No focal consolidation. Cardiac silhouette within normal limits. Tortuous atherosclerotic aorta. No acute osseous abnormality.          EKG:    Performed at: 305  Impression: Sinus tachycardia.  Otherwise normal.  Unchanged from previous dated April 15, 2021  Sinus tachycardia ventricular 108.  .  QRS 70.      I have independently reviewed and interpreted the EKG(s) documented above.    PROCEDURES:         I, Sabrina Velasco, am serving as a scribe to document services personally performed by Dr. Tristian Vazquez, based on my observation and the provider's statements to me. I, Tristian Vazquez MD attest that Sabrina Velasco is acting in a scribe capacity, has observed my performance of the services and has documented them in accordance with my direction.    Tristian Vazquez M.D.  Emergency  Located within [facility] EMERGENCY ROOM  [address] [facility] [address]  [phone]  Dept: [phone]     Tristian Vazquez MD  01/28/22 0644

## 2022-01-28 NOTE — H&P
[facility] MEDICINE ADMISSION HISTORY AND PHYSICAL     Assessment & Plan       Liu GIL Malave is a 56 year old old male with history of hemorrhagic stroke with subsequent paralysis, feeding tube, frequent urinary tract infections.  Presented with recurrent fever looking more fatigued.    Fever and tachycardia  Leukocytosis  Likely there is an infection.,  Chest CT and abdominal CT are negative  Patient is nonverbal and aphasic.  In the past patient has had frequent urinary tract infection usually presenting as such.  Urinalysis is unremarkable.  Chest x-ray is negative.  This may just be an evolving infection at this time.  Covid is negative, patient is vaccinated  Probably best to empirically treat this as a combination of UTI or pneumonia that is just in the early stage and not showing radiographically.  Based on previous patterns he did respond to antibiotics for frequent UTI.  He does take prophylaxis antibiotics according to the family.  Patient is at risk for frequent infection and sepsis because of immobility secondary to the stroke  Family states there is no change in his mental status.  Distress the fever and tachycardia mainly there is their indicator for infection.  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  Patient has no pressure ulcers  Patient lives with wife and 2 daughters at the turns taking care of him  Family does not want him to go to TCU.  Preferably they would like him to be discharged as soon as possible.  I advised him to start IV antibiotics in the ER and do additional work-up to figure out the focus of infection.      DVTP: Heparin or Lovenox if not previously on anticoagulant.  He is a high risk because of his stroke  status.  Code Status: No Order  Goals for the hospitalization: Figure out infection.  Treat with antibiotics.  Try to see if there is clinical improvement in the patient's overall condition.  Expected LOS: 2 days At least  Disposition/ advanced care planning/ placement: Inpatient       Chief Complaint  fever and tachycardia     HISTORY     Liu GIL Malave is a 56 year old old male with h/o hemorrhagic stroke x2 with paralysis, currently aphasic and nonverbal, being fed with tube feeding p/w fever and tachycardia.  Really hard to see if there is a change in his mental status as he is largely nonverbal says the family.    Patient has had previous admissions for UTI and sepsis and family says he is on a prophylactic antibiotics for frequent UTI.  He has had COVID-19 vaccination including a booster.  They recently noted that he was tachycardic with heart rate over 100.  He was also having episodes of mild to moderate fever.  They cannot really say there is a change in his mental status.  As they always did in the past they would take him to the emergency room.  Patient is being managed and taken cared by his wife and 2 children who take turns to tackle that responsibility.    Course in the ER: There was no identifiable source of infection.  CT of the chest and abdomen as well as urinalysis were unremarkable.  Patient was empirically started on IV Zosyn as he is tachycardic and febrile with leukocytosis.  Lactic acid was normal however.  Heart rate improved after patient was given IV fluids.    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  No date: Convulsions, unspecified convulsion type (H)  7/2016: CVA (cerebral vascular accident) (H)      Comment:  Hemorrhagic,   No date:  Depression  No date: Dyslipidemia  No date: Dyslipidemia  No date: Elevated troponin  No date: Essential hypertension  6/4/2020: Feeding by G-tube (H)  No date: GERD (gastroesophageal reflux disease)  No date: GERD without esophagitis  No date: Hemiplegia of nondominant side, late effect of   cerebrovascular disease (H)      Comment:  Created by Conversion  Replacement Utility updated for                latest IMO load  6/5/2016: Hemorrhagic stroke (H)  No date: Hepatitis C carrier (H)  No date: History of ESBL E. coli infection  No date: History of hemorrhagic stroke with residual hemiparesis (H)  No date: History of stroke with residual deficit  No date: HTN (hypertension)  No date: Hypertension  12/5/16: Lung nodule      Comment:  9 x 9 mm left upper lobe on CXR  No date: MRSA (methicillin resistant staph aureus) culture positive  No date: Periodontal disease  No date: Persistent fever  No date: Pneumonia of both lower lobes due to infectious organism  No date: Respiratory failure with hypoxia (H)  No date: Seizure (H)  6/4/2020: Sepsis due to urinary tract infection (H)  No date: Severe sepsis (H)  No date: Stroke (H)  No date: Trismus     Surgical History     Past Surgical History:   Procedure Laterality Date     aneurysm clipping  2006     BRAIN SURGERY       EXAM UNDER ANESTHESIA, RESTORATIONS, EXTRACTION(S) DENTAL COMPLEX, COMBINED N/A 12/19/2016    Procedure: COMBINED EXAM UNDER ANESTHESIA, RESTORATIONS, EXTRACTION(S) DENTAL COMPLEX;  Surgeon: Verónica Martinez DDS;  Location: [facility] OR     EXAM UNDER ANESTHESIA, RESTORATIONS, EXTRACTION(S) DENTAL COMPLEX, COMBINED N/A 3/7/2018    Procedure: COMBINED EXAM UNDER ANESTHESIA, RESTORATIONS, EXTRACTION(S) DENTAL COMPLEX;  Kenalog Injection in Bilateral [place], Combined Dental Exam,Radiographs, Three Dental Restorations, Periodontal Therapy and Fluoride Treatment;  Surgeon: Darryl Isbell DDS;  Location: [facility] OR     HH MIDLINE INSERTION  8/11/2017           INJECT STEROID (LOCATION) N/A 3/7/2018    Procedure: INJECT STEROID (LOCATION);  Kenalog Injection In Bilateral [place], Combined Dental Exam,Radiographs,Three Dental Restorations, Periodontal Therapy and Fluoride Treatment;  Surgeon: Liu Conroy DDS;  Location: [facility] OR     IR GASTRO JEJUNOSTOMY TUBE CHANGE  7/19/2016     IR GASTRO JEJUNOSTOMY TUBE CHANGE  9/23/2016     IR GASTRO JEJUNOSTOMY TUBE CHANGE  11/28/2016     IR GASTRO JEJUNOSTOMY TUBE CHANGE  1/27/2017     IR GASTRO JEJUNOSTOMY TUBE CHANGE  4/19/2017     IR GASTRO JEJUNOSTOMY TUBE CHANGE  6/20/2017     IR GASTRO JEJUNOSTOMY TUBE CHANGE  8/7/2017     IR GASTRO JEJUNOSTOMY TUBE CHANGE  8/11/2017     IR GASTRO JEJUNOSTOMY TUBE CHANGE  10/20/2017     IR GASTRO JEJUNOSTOMY TUBE CHANGE  1/2/2018     IR GASTRO JEJUNOSTOMY TUBE CHANGE  2/16/2018     IR GASTRO JEJUNOSTOMY TUBE CHANGE  5/16/2018     IR GASTRO JEJUNOSTOMY TUBE CHANGE  6/11/2018     IR GASTRO JEJUNOSTOMY TUBE CHANGE  9/12/2018     IR GASTRO JEJUNOSTOMY TUBE CHANGE  12/24/2018     IR GASTRO JEJUNOSTOMY TUBE CHANGE  3/18/2019     IR GASTRO JEJUNOSTOMY TUBE CHANGE  4/23/2019     IR GASTRO JEJUNOSTOMY TUBE CHANGE  5/21/2019     IR GASTRO JEJUNOSTOMY TUBE CHANGE  9/9/2019     IR GASTRO JEJUNOSTOMY TUBE CHANGE  9/26/2019     IR GASTRO JEJUNOSTOMY TUBE CHANGE  10/11/2019     IR GASTRO JEJUNOSTOMY TUBE CHANGE  11/21/2019     IR GASTRO JEJUNOSTOMY TUBE CHANGE  11/29/2019     IR GASTRO JEJUNOSTOMY TUBE CHANGE  2/27/2020     IR GASTRO JEJUNOSTOMY TUBE CHANGE  4/10/2020     IR GASTRO JEJUNOSTOMY TUBE CHANGE  7/2/2020     IR GASTRO JEJUNOSTOMY TUBE CHANGE  8/28/2020     IR GASTRO JEJUNOSTOMY TUBE CHANGE  9/17/2020     IR GASTRO JEJUNOSTOMY TUBE CHANGE  10/29/2020     IR GASTRO JEJUNOSTOMY TUBE CHANGE  11/6/2020     IR GASTRO JEJUNOSTOMY TUBE CHANGE  12/24/2020     IR GASTRO JEJUNOSTOMY TUBE CHANGE  1/9/2021     IR GASTRO JEJUNOSTOMY TUBE CHANGE  6/9/2021     IR GASTRO JEJUNOSTOMY TUBE CHANGE  10/4/2021     IR  "GASTRO JEJUNOSTOMY TUBE CHANGE  10/18/2021     IR GASTRO JEJUNOSTOMY TUBE CHANGE  2022     IR GASTRO JEJUNOSTOMY TUBE CHANGE  2022     IR GASTRO JEJUNOSTOMY TUBE PLACEMENT  2016     IR GJ TUBE REPLACEMENT  2018     IR GJ TUBE REPLACEMENT  3/18/2019     IR GJ TUBE REPLACEMENT  2019     IR GJ TUBE REPLACEMENT  2019     IR GJ TUBE REPLACEMENT  2019     IR GJ TUBE REPLACEMENT  2019     IR GJ TUBE REPLACEMENT  10/11/2019     IR GJ TUBE REPLACEMENT  2019     IR GJ TUBE REPLACEMENT  2019     IR GJ TUBE REPLACEMENT  2020     IR GJ TUBE REPLACEMENT  4/10/2020     IR GJ TUBE REPLACEMENT  2020     IR GJ TUBE REPLACEMENT  2020     IR GJ TUBE REPLACEMENT  2020     IR GJ TUBE REPLACEMENT  10/29/2020     IR GJ TUBE REPLACEMENT  2020     IR GJ TUBE REPLACEMENT  2020     IR GJ TUBE REPLACEMENT  2021     IR GJ TUBE REPLACEMENT  2021     J-G tube       PICC  6/10/2016          TRACHEOSTOMY      Removed     Family History    Reviewed, and   Family History   Problem Relation Age of Onset     Hypertension Mother      Cancer Father      Kidney Disease Brother       Social History      Social History     Tobacco Use     Smoking status: Former Smoker     Packs/day: 1.00     Years: 30.00     Pack years: 30.00     Types: Cigarettes     Quit date: 2005     Years since quittin.6     Smokeless tobacco: Never Used     Tobacco comment: quit    Substance Use Topics     Alcohol use: No     Alcohol/week: 0.0 standard drinks     Drug use: No      Allergies     Allergies   Allergen Reactions     Diphenhydramine Unknown     Trazodone Other (See Comments)     \"shaking\" per family     Prior to Admission Medications      Prior to Admission Medications   Prescriptions Last Dose Informant Patient Reported? Taking?   Lidocaine (LIDOCARE) 4 % Patch   No No   Sig: Place 1 patch onto the skin every 24 hours To prevent lidocaine toxicity, patient should be patch " free for 12 hrs daily.   Polyethylene Glycol 400 (VISINE DRY EYE RELIEF) 1 % SOLN   No No   Sig: Apply 1 drop to eye every 6 hours as needed (dry eyes)   QUEtiapine (SEROQUEL) 25 MG tablet   No No   Sig: Give 2 tablets via G-tube at bedtime for sleep. Give 1 tablet in the morning and 1 tablet in the afternoon as needed for agitation (via G-tube).   QUEtiapine (SEROQUEL) 50 MG tablet   No No   Sig: Take 1/2 to 1 tablet by g-tube every 4-6 hours as needed for agitation. Max of 200 mg per day   acetaminophen (TYLENOL) 500 MG tablet   Yes No   Si,000 mg by Gastric Tube route every 6 hours as needed for mild pain    aspirin 81 MG chewable tablet   Yes No   Sig: Take 81 mg by mouth every morning   baclofen (LIORESAL) 10 MG tablet   No No   Sig: Take 1 tablet (10 mg) by mouth 3 times daily as needed for muscle spasms   bisacodyl (DULCOLAX) 10 MG suppository   No No   Sig: INSERT 1 SUPPOSTIORY INTO THE RECTUM DAILY AS NEEDED FOR CONSTIPATION.   bisacodyl (DULCOLAX) 5 MG EC tablet   Yes No   Sig: Take 10 mg by mouth daily as needed for constipation   chlorhexidine (PERIDEX) 0.12 % solution   Yes No   Sig: Swish and spit 15 mLs in mouth 2 times daily   esomeprazole (NEXIUM) 20 MG DR capsule   No No   Sig: Take 1 capsule via G-tube twice daily   ferrous sulfate 220 (44 Fe) MG/5ML ELIX   No No   Si mLs by Enteral route daily   guaiFENesin (ROBITUSSIN) 100 MG/5ML liquid   No No   Sig: Give 5 ml via G tube every 6 hours as needed for cough   ipratropium - albuterol 0.5 mg/2.5 mg/3 mL (DUONEB) 0.5-2.5 (3) MG/3ML neb solution   No No   Sig: Take 1 vial (3 mLs) by nebulization every 6 hours as needed for shortness of breath / dyspnea or wheezing   levETIRAcetam (KEPPRA) 100 MG/ML solution   No No   Sig: Take 7.5 mLs (750 mg) by mouth 2 times daily   meclizine (ANTIVERT) 25 MG tablet   No No   Sig: Take 1 tablet via G-tube every 6 hours as needed for vertigo   metoprolol tartrate (LOPRESSOR) 25 MG tablet   No No   Sig: TAKE  ONE TABLET BY MOUTH TWICE DAILY    nitroFURantoin macrocrystal-monohydrate (MACROBID) 100 MG capsule   No No   Sig: Take 1 capsule by G-tube route at bedtime.   polyethylene glycol (MIRALAX) 17 GM/Dose powder   No No   Sig: Give 1 capful via G-tube daily as needed for constipation   simvastatin (ZOCOR) 20 MG tablet   Yes No   Si mg by Gastric Tube route At Bedtime       Facility-Administered Medications: None      Review of Systems     A 12 point comprehensive review of systems was negative except as noted above in HPI.    PHYSICAL EXAMINATION       Vitals      Temp:  [99  F (37.2  C)-99.2  F (37.3  C)] 99.2  F (37.3  C)  Pulse:  [] 89  Resp:  [15-39] 31  BP: (128-138)/(75-89) 138/75  SpO2:  [82 %-100 %] 95 %    Examination     GENERAL:   Nonverbal but otherwise looks comfortable.  There is no verbal output and no verbal interaction with me at all., in no acute distress, appears stated age   HEAD:  Normocephalic, without obvious abnormality, atraumatic   EYES:  Grossly normal   NOSE: Nares normal, septum midline, mucosa normal, no drainage   THROAT: Lips, tongue normal; mouth is moist   NECK: Supple, no mass   BACK:   Grossly unremarkable   LUNGS:   Clear to auscultation bilaterally, no wheezing, symmetric chest rise on inhalation, respirations unlabored; no obvious crackles appreciated.   CHEST WALL:  No tenderness or deformity   HEART:  Regular rate and rhythm, tachycardic, improved, no murmur, rub, or gallop    ABDOMEN:   Soft, non-tender, bowel sounds active all four quadrants, no masses, no organomegaly, no rebound or guarding; PEG tube is in place.  No ostomy.   EXTREMITIES: Extremities normal, atraumatic, no cyanosis or edema    SKIN: Dry to touch, no exanthems in the visualized areas; really hard to examine but I asked the family and they said there were no skin ulcers.   NEURO: Alert, no status stable, no signs of acute stroke   PSYCH:  Nonverbal but otherwise patient looks comfortable.      Pertinent Lab   White count of 19,200.  Hemoglobin 11.7  Platelet 231  Metabolic panel is unremarkable.  BUN of 32 but creatinine is normal.  GFR 80.  LFTs normal  BNP less than 10  Lactic acid 1.5  Troponin negative x1  Glucose 107      Pertinent Radiology     Radiology Results:   Recent Results (from the past 24 hour(s))   XR Chest Port 1 View    Narrative    EXAM: XR CHEST PORT 1 VIEW  LOCATION: [facility]  DATE/TIME: 1/28/2022 4:25 AM    INDICATION: dyspena  COMPARISON: 09/29/2021      Impression    IMPRESSION: No pneumothorax or pleural effusion. No focal consolidation. Cardiac silhouette within normal limits. Tortuous atherosclerotic aorta. No acute osseous abnormality.   CT Abdomen Pelvis w Contrast    Narrative    EXAM: CT ABDOMEN PELVIS W CONTRAST  LOCATION: [facility]  DATE/TIME: 1/28/2022 4:02 AM    INDICATION: Abdominal pain, acute, nonlocalized.  COMPARISON: 9/29/2021.  TECHNIQUE: CT scan of the abdomen and pelvis was performed following injection of IV contrast. Multiplanar reformats were obtained. Dose reduction techniques were used.  CONTRAST: 100 mL Isovue-370.    FINDINGS:   LOWER CHEST: Moderate subpleural bibasilar opacities favored to be more likely due to atelectasis versus less likely infiltrate. Clinical correlation.    HEPATOBILIARY: Normal.    PANCREAS: Normal.    SPLEEN: Normal.    ADRENAL GLANDS: Normal.    KIDNEYS/BLADDER: Benign bilateral renal cysts with the largest on the right measuring up to 5.2 cm. No specific follow-up needed. Kidneys otherwise negative. Bladder is mostly decompressed. Questionable mild bladder wall thickening.    BOWEL: Gastrojejunostomy tube tip in the proximal jejunum in the left upper quadrant. Bowel is normal in caliber with no evidence for obstruction. Normal appendix. No acute bowel findings.    LYMPH NODES: Normal.    VASCULATURE: Aortic calcification without aneurysm.    PELVIC ORGANS:  Normal.    MUSCULOSKELETAL: Normal.      Impression    IMPRESSION:   1.  Bladder is mostly decompressed which limits evaluation. However, there is some questionable diffuse bladder wall thickening. Clinical correlation for any signs of cystitis or UTI. No hydronephrosis or evidence of pyelonephritis.    2.  No acute bowel findings. Gastrojejunostomy tube in appropriate location.    3.  Moderate subpleural opacities in both lung bases posteriorly. This is favored to be more likely due to atelectasis versus less likely pneumonitis. Clinical correlation.             Chest CT w/o contrast    Narrative    EXAM: CT CHEST W/O CONTRAST  LOCATION: [facility]  DATE/TIME: 1/28/2022 8:39 AM    INDICATION: Fever, concern for PNA not seen on CXR.  COMPARISON: 1/28/22 and 4/15/21 CT studies. Radiograph 01/28/2022.  TECHNIQUE: CT chest without IV contrast. Multiplanar reformats were obtained. Dose reduction techniques were used.  CONTRAST: None.    FINDINGS:   LUNGS AND PLEURA: Calcified upper lung nodules with architectural distortion from prior granulomatous disease. Emphysema is present. There is mild diffuse bronchial wall thickening. Scarring or atelectasis in the medial left lower lobe has increased.    Minimally elevated right hemidiaphragm. No airspace disease.    MEDIASTINUM/AXILLAE: No lymphadenopathy. No thoracic aortic aneurysm.    CORONARY ARTERY CALCIFICATION: Severe.    UPPER ABDOMEN: A GJ tube is partially included in the field-of-view.     MUSCULOSKELETAL: Unremarkable.      Impression    IMPRESSION:   1.  No airspace disease.        EKG Results: -On telemetry patient is in sinus tachycardia but now improving to less than 100    Jamie Fletcher MD  Hospitalist  [facility] Medicine  [facility]   Phone: #[phone]

## 2022-01-28 NOTE — CONSULTS
Care Management Initial Consult    General Information  Assessment completed with: Children, Met with patient's daughter Idalmis.  Type of CM/SW Visit: Initial Assessment    Primary Care Provider verified and updated as needed: Yes   Readmission within the last 30 days: no previous admission in last 30 days         Advance Care Planning: Advance Care Planning Reviewed: no concerns identified (No HCD completed. Not interested in receiving form.)          Communication Assessment  Patient's communication style: spoken language (non-English) (Understands Hungarian. Pt is non-verbal.)    Hearing Difficulty or Deaf: no   Wear Glasses or Blind: no    Cognitive  Cognitive/Neuro/Behavioral:  (See CM note.)                      Living Environment:   People in home: child(sam), adult,grandchild(sam),spouse  Idalmis's home. Many family members.  Current living Arrangements: house (He lives on the main level.)      Able to return to prior arrangements: yes       Family/Social Support:  Care provided by: spouse/significant other,child(sam),homecare agency (PCA services. Idalmis, another dtr and son-shana are his PCA's)  Provides care for: no one, unable/limited ability to care for self  Marital Status:   Wife,Children  [employer].       Description of Support System: Supportive,Involved    Support Assessment: Adequate family and caregiver support,Adequate social supports    Current Resources:   Patient receiving home care services: Yes (PCA home care)     Community Resources: County Programs,County Worker,Infusion Services,PCA  Equipment currently used at home: hospital bed,wheelchair, power,other (see comments) (Tube feeding, suction, nebulizer. Private pay oxygen.)  Supplies currently used at home: Incontinence Supplies,Enteral Nutrition & Supplies,Gloves,Wipes,Chux,Oxygen Tubing/Supplies,Nebulizer tubing    Employment/Financial:  Employment Status: disabled        Financial Concerns: No concerns identified   Referral to Financial  Counselor: No       Lifestyle & Psychosocial Needs:  Social Determinants of Health     Tobacco Use: Medium Risk     Smoking Tobacco Use: Former Smoker     Smokeless Tobacco Use: Never Used   Alcohol Use: Not on file   Financial Resource Strain: Not on file   Food Insecurity: Not on file   Transportation Needs: Not on file   Physical Activity: Not on file   Stress: Not on file   Social Connections: Not on file   Intimate Partner Violence: Not on file   Depression: Not at risk     PHQ-2 Score: 2   Housing Stability: Not on file       Functional Status:  Prior to admission patient needed assistance:   Dependent ADLs:: Bathing,Dressing,Eating,Grooming,Incontinence,Positioning,Transfers,Toileting  Dependent IADLs:: Cleaning,Cooking,Laundry,Shopping,Medication Management,Money Management,Transportation,Incontinence (Tube feeding.)       Mental Health Status:          Chemical Dependency Status:                Values/Beliefs:  Spiritual, Cultural Beliefs, Adventism Practices, Values that affect care:                 Additional Information:  Assessed in ED room. Patient is non-verbal and paralyzed per daughter Idalmis who is here with patient. Idalmis is his daughter and one of his PCA's. (another daughter and son-shana are also his PCA's) He has 13.5 hours daily of PCA services to meet all his ADL's for total care of patient which includes tube feedings. PCA company is:  [employer].  Patient lives at his daughters (Idalmis's) home and he lives on the main level. Her mom is also a primary caregiver of his and she will plan to stay with him the duration of his hospital stay. She does not speak english. However anytime there needs to be communication she will facetime with her daughter or family. She did receive authorization from ED charge nurse to allow her mom to come in her place as she has a  at home and cannot stay the duration.    It is their goal to have patient return home when medically stable and family will  provide transportation. They use assist of two people to do all transfers.     JOY THOMAS, RN

## 2022-01-29 VITALS
HEIGHT: 62 IN | DIASTOLIC BLOOD PRESSURE: 82 MMHG | RESPIRATION RATE: 23 BRPM | TEMPERATURE: 97.3 F | BODY MASS INDEX: 23.92 KG/M2 | OXYGEN SATURATION: 100 % | HEART RATE: 82 BPM | SYSTOLIC BLOOD PRESSURE: 125 MMHG | WEIGHT: 130 LBS

## 2022-01-29 LAB
ALBUMIN SERPL-MCNC: 2.6 G/DL (ref 3.5–5)
ALP SERPL-CCNC: 57 U/L (ref 45–120)
ALT SERPL W P-5'-P-CCNC: 23 U/L (ref 0–45)
ANION GAP SERPL CALCULATED.3IONS-SCNC: 5 MMOL/L (ref 5–18)
AST SERPL W P-5'-P-CCNC: 19 U/L (ref 0–40)
BILIRUB SERPL-MCNC: 0.3 MG/DL (ref 0–1)
BUN SERPL-MCNC: 24 MG/DL (ref 8–22)
C REACTIVE PROTEIN LHE: 2.7 MG/DL (ref 0–0.8)
CALCIUM SERPL-MCNC: 7.7 MG/DL (ref 8.5–10.5)
CHLORIDE BLD-SCNC: 116 MMOL/L (ref 98–107)
CO2 SERPL-SCNC: 20 MMOL/L (ref 22–31)
CREAT SERPL-MCNC: 1.12 MG/DL (ref 0.7–1.3)
ERYTHROCYTE [DISTWIDTH] IN BLOOD BY AUTOMATED COUNT: 12.3 % (ref 10–15)
GFR SERPL CREATININE-BSD FRML MDRD: 76 ML/MIN/1.73M2
GLUCOSE BLD-MCNC: 123 MG/DL (ref 70–125)
HCT VFR BLD AUTO: 30.6 % (ref 40–53)
HGB BLD-MCNC: 10.3 G/DL (ref 13.3–17.7)
MCH RBC QN AUTO: 31.6 PG (ref 26.5–33)
MCHC RBC AUTO-ENTMCNC: 33.7 G/DL (ref 31.5–36.5)
MCV RBC AUTO: 94 FL (ref 78–100)
PLATELET # BLD AUTO: 187 10E3/UL (ref 150–450)
POTASSIUM BLD-SCNC: 4.7 MMOL/L (ref 3.5–5)
PROT SERPL-MCNC: 7 G/DL (ref 6–8)
RBC # BLD AUTO: 3.26 10E6/UL (ref 4.4–5.9)
SODIUM SERPL-SCNC: 141 MMOL/L (ref 136–145)
WBC # BLD AUTO: 8.4 10E3/UL (ref 4–11)

## 2022-01-29 PROCEDURE — 96376 TX/PRO/DX INJ SAME DRUG ADON: CPT

## 2022-01-29 PROCEDURE — C9113 INJ PANTOPRAZOLE SODIUM, VIA: HCPCS | Performed by: INTERNAL MEDICINE

## 2022-01-29 PROCEDURE — 250N000009 HC RX 250: Performed by: INTERNAL MEDICINE

## 2022-01-29 PROCEDURE — 80053 COMPREHEN METABOLIC PANEL: CPT | Performed by: INTERNAL MEDICINE

## 2022-01-29 PROCEDURE — 85027 COMPLETE CBC AUTOMATED: CPT | Performed by: INTERNAL MEDICINE

## 2022-01-29 PROCEDURE — 250N000013 HC RX MED GY IP 250 OP 250 PS 637: Performed by: INTERNAL MEDICINE

## 2022-01-29 PROCEDURE — 86140 C-REACTIVE PROTEIN: CPT | Performed by: INTERNAL MEDICINE

## 2022-01-29 PROCEDURE — 250N000011 HC RX IP 250 OP 636: Performed by: INTERNAL MEDICINE

## 2022-01-29 PROCEDURE — 36415 COLL VENOUS BLD VENIPUNCTURE: CPT | Performed by: INTERNAL MEDICINE

## 2022-01-29 PROCEDURE — 82040 ASSAY OF SERUM ALBUMIN: CPT | Performed by: INTERNAL MEDICINE

## 2022-01-29 PROCEDURE — 99239 HOSP IP/OBS DSCHRG MGMT >30: CPT | Performed by: INTERNAL MEDICINE

## 2022-01-29 RX ORDER — METOPROLOL TARTRATE 25 MG/1
25 TABLET, FILM COATED ORAL 2 TIMES DAILY
Status: DISCONTINUED | OUTPATIENT
Start: 2022-01-29 | End: 2022-01-29 | Stop reason: HOSPADM

## 2022-01-29 RX ORDER — QUETIAPINE FUMARATE 25 MG/1
25 TABLET, FILM COATED ORAL EVERY 6 HOURS PRN
Status: DISCONTINUED | OUTPATIENT
Start: 2022-01-29 | End: 2022-01-29 | Stop reason: HOSPADM

## 2022-01-29 RX ORDER — QUETIAPINE FUMARATE 25 MG/1
25 TABLET, FILM COATED ORAL 2 TIMES DAILY
Status: DISCONTINUED | OUTPATIENT
Start: 2022-01-29 | End: 2022-01-29 | Stop reason: HOSPADM

## 2022-01-29 RX ORDER — DEXTROSE MONOHYDRATE 100 MG/ML
INJECTION, SOLUTION INTRAVENOUS CONTINUOUS PRN
Status: DISCONTINUED | OUTPATIENT
Start: 2022-01-29 | End: 2022-01-29 | Stop reason: HOSPADM

## 2022-01-29 RX ADMIN — SIMVASTATIN 20 MG: 20 TABLET, FILM COATED ORAL at 00:07

## 2022-01-29 RX ADMIN — METOPROLOL TARTRATE 25 MG: 25 TABLET, FILM COATED ORAL at 08:51

## 2022-01-29 RX ADMIN — PIPERACILLIN AND TAZOBACTAM 3.38 G: 3; .375 INJECTION, POWDER, LYOPHILIZED, FOR SOLUTION INTRAVENOUS at 00:06

## 2022-01-29 RX ADMIN — QUETIAPINE FUMARATE 25 MG: 25 TABLET ORAL at 00:07

## 2022-01-29 RX ADMIN — LEVETIRACETAM 750 MG: 100 SOLUTION ORAL at 08:51

## 2022-01-29 RX ADMIN — BACLOFEN 10 MG: 10 TABLET ORAL at 08:51

## 2022-01-29 RX ADMIN — PIPERACILLIN AND TAZOBACTAM 3.38 G: 3; .375 INJECTION, POWDER, LYOPHILIZED, FOR SOLUTION INTRAVENOUS at 08:52

## 2022-01-29 RX ADMIN — IPRATROPIUM BROMIDE AND ALBUTEROL SULFATE 3 ML: .5; 2.5 SOLUTION RESPIRATORY (INHALATION) at 04:32

## 2022-01-29 RX ADMIN — ASPIRIN 81 MG CHEWABLE TABLET 81 MG: 81 TABLET CHEWABLE at 08:51

## 2022-01-29 RX ADMIN — IPRATROPIUM BROMIDE AND ALBUTEROL SULFATE 3 ML: .5; 2.5 SOLUTION RESPIRATORY (INHALATION) at 09:05

## 2022-01-29 RX ADMIN — BISACODYL 10 MG: 10 SUPPOSITORY RECTAL at 08:52

## 2022-01-29 RX ADMIN — CHLORHEXIDINE GLUCONATE 15 ML: 1.2 SOLUTION ORAL at 08:51

## 2022-01-29 RX ADMIN — IPRATROPIUM BROMIDE AND ALBUTEROL SULFATE 3 ML: .5; 2.5 SOLUTION RESPIRATORY (INHALATION) at 11:55

## 2022-01-29 RX ADMIN — PANTOPRAZOLE SODIUM 40 MG: 40 INJECTION, POWDER, FOR SOLUTION INTRAVENOUS at 08:51

## 2022-01-29 ASSESSMENT — ACTIVITIES OF DAILY LIVING (ADL)
DRESSING/BATHING_DIFFICULTY: YES
TOILETING_ISSUES: YES
ADLS_ACUITY_SCORE: 22
ADLS_ACUITY_SCORE: 14
WALKING_OR_CLIMBING_STAIRS: TRANSFERRING DIFFICULTY, DEPENDENT;AMBULATION DIFFICULTY, DEPENDENT;STAIR CLIMBING DIFFICULTY, DEPENDENT
ADLS_ACUITY_SCORE: 14
EATING/SWALLOWING: EATING
ADLS_ACUITY_SCORE: 22
PATIENT_/_FAMILY_COMMUNICATION_STYLE: SPOKEN LANGUAGE (NON-ENGLISH)
ADLS_ACUITY_SCORE: 22
ADLS_ACUITY_SCORE: 22
DIFFICULTY_COMMUNICATING: YES
INTERPRETER_SERVICES_OFFERED_TO_THE_PATIENT: NO
ADLS_ACUITY_SCORE: 22
ADLS_ACUITY_SCORE: 14
ADLS_ACUITY_SCORE: 14
ADLS_ACUITY_SCORE: 22
COMMUNICATION: UNABLE TO SPEAK;UNABLE TO UNDERSTAND
ADLS_ACUITY_SCORE: 14
DIFFICULTY_EATING/SWALLOWING: YES
ADLS_ACUITY_SCORE: 22

## 2022-01-29 NOTE — CONSULTS
CLINICAL NUTRITION SERVICES  -  ASSESSMENT NOTE      RECOMMENDATIONS FOR MD/PROVIDER TO ORDER:   None     Recommendations Ordered by Registered Dietitian (RD):   Initiated enteral order set w/ home TF regimen:    Peptamen 1.5 w/ Prebio @ 50ml/hr from 6am-8pm and @ 30ml/hr from 8pm to 6am daily w/ 200ml water flushes every 4-6 hours.     At goal, Peptamen 1.5 w/ Prebio provides 1260kcals, 57g protein, 161g CHO, 47g fat, 5g fiber, 647ml free water, 1200ml water from flushes for a total of 1847ml fluid daily.     Future/Additional Recommendations:   Adjust TF if necessary pending wt     Malnutrition:   Unable to assess, needs NFPE         REASON FOR ASSESSMENT  Liu Malave is a 56 year old male seen by Registered Dietitian for Provider Order - Registered Dietitian to Assess and Order TF per Medical Nutrition Therapy Protocol    Patient presents as nonverbal w/ AMS, h/o stroke 2016.    NUTRITION HISTORY  - Information obtained from daughter, Idalmis  - Unable to obtain nutrition history from patient has he is nonverbal at baseline  - Patient is on a TF diet at home  - Typical food/fluid intake is Peptamen 1.5 w/ Prebio @ 50ml/hr from 6am-8pm and @ 30ml/hr from 8pm to 6am daily w/ 200ml water flushes every 4-6 hours.   - Tube feeding history Patient has a GJ tube last placed 1/20/22. Pt uses [place] and had his TF rate reduced from 60ml/hr continuously to current regimen dt weight gain.     NKFA      CURRENT NUTRITION ORDERS  Diet Order:     NPO     Current Intake/Tolerance:  Patient is receiving home supply of TF.     At goal, Peptamen 1.5 w/ Prebio provides 1260kcals, 57g protein, 161g CHO, 47g fat, 5g fiber, 647ml free water, 1200ml water from flushes for a total of 1847ml fluid daily.      NUTRITION FOCUSED PHYSICAL ASSESSMENT FOR DIAGNOSING MALNUTRITION)  No:  Patient not available                Observed:    No nutrition-related physical findings observed    Obtained from Chart/Interdisciplinary Team:  Edema  "2+    ANTHROPOMETRICS  Height: 5' 2\"  Weight: 130 lbs 0 oz  Body mass index is 23.78 kg/m .  Weight Status:  Normal BMI  IBW: 53.6kg   % IBW: 110%  Weight History:   Wt Readings from Last 30 Encounters:   01/28/22 59 kg (130 lb)   03/07/18 36.3 kg (80 lb)   02/28/18 36.3 kg (80 lb)   12/19/16 48.7 kg (107 lb 5.8 oz)   12/05/16 54.4 kg (120 lb)   05/01/16 54.8 kg (120 lb 12.8 oz)   04/27/16 53.5 kg (118 lb)   04/21/16 54.4 kg (120 lb)   02/05/16 56.7 kg (125 lb)   01/27/16 57.6 kg (127 lb)   01/05/16 59 kg (130 lb)   12/02/15 57.2 kg (126 lb)   10/23/15 59.1 kg (130 lb 3.2 oz)   08/03/15 57.2 kg (126 lb)   07/23/15 57.2 kg (126 lb)   05/15/15 58.5 kg (129 lb)   04/20/15 58.5 kg (129 lb)   04/13/15 59.9 kg (132 lb)   04/03/15 59 kg (130 lb)   03/23/15 59 kg (130 lb)   03/12/15 59.6 kg (131 lb 6.4 oz)   03/03/15 59.4 kg (131 lb)   02/03/15 58.6 kg (129 lb 2 oz)   01/27/15 59.6 kg (131 lb 6.4 oz)     LABS  Labs reviewed: UN 24, alb 2.6, CRP 2.7, hgb 10.3, hematocrit 30.6, rbc 3.26    MEDICATIONS  Medications reviewed: ferrous sulfate elixir, protonix, zosyn, dulcolax       ASSESSED NUTRITION NEEDS PER APPROVED PRACTICE GUIDELINES:    Dosing Weight 59 kg (130 lb)  Estimated Energy Needs: 1294 kcals ([place] [address] Jeor)  Justification: maintenance  Estimated Protein Needs: 47-59 grams protein (0.8-1 g pro/Kg)  Justification: maintenance  Estimated Fluid Needs: 1770  mL (30 mL/kg)  Justification: maintenance    MALNUTRITION:  % Weight Loss:  None noted  % Intake:  Decreased intake does not meet criteria for malnutrition as patient is meeting 97% estimated needs  Subcutaneous Fat Loss:  Unable to assess  Muscle Loss:  Unable to assess  Fluid Retention:  Moderate 2+    Malnutrition Diagnosis: Unable to determine due to needs NFPE  In Context of:  Acute illness or injury    NUTRITION DIAGNOSIS:  Inadequate oral intake related to h/o CVA as evidenced by dysphagia      NUTRITION INTERVENTIONS  Recommendations / Nutrition " Prescription  Continue home TF regimen  .      Implementation  Nutrition education: Not appropriate at this time due to patient condition  EN Composition, EN Schedule and Feeding Tube Flush  .      Nutrition Goals  Tolerate TF  Meet estimated nutrition needs  No significant dry wt loss    MONITORING AND EVALUATION:  Progress towards goals will be monitored and evaluated per protocol and Practice Guidelines, Enteral Nutrition intake, Weight, Biochemical data and Nutrition-focused physical findings

## 2022-01-29 NOTE — PROGRESS NOTES
Pt resting well between cares. Wife at bedside through night. Family has been okayed to run home tube feedings through GJ tube. Wife refilled bag and adjusting rate. MD aware that nutrition consult still needs to be fulfilled. Pt repositioned Q2H through night. IVABX received. All meds crushed and administered through GJ port. Male external catheter used effectively. Pt O2 maintained >90% on RA through night; continues to have strong, congested cough. All VSS, and afebrile. Pt is stable at this time.

## 2022-01-29 NOTE — DISCHARGE SUMMARY
[facility] MEDICINE  DISCHARGE SUMMARY     Primary Care Physician: Lynda Gutierrez  Admission Date: 1/28/2022   Discharge Provider: Jamie Fletcher MD Discharge Date: 1/29/2022   Diet:   Active Diet and Nourishment Order   Procedures     Adult Formula Drip Feeding: Continuous Other; Peptamen 1.5 w/ Prebio; Jejunostomy; Goal Rate: 50-30; mL/hr; Medication - Feeding Tube Flush Frequency: At least 15-30 mL water before and after medication administration and with tube clogging; Run T...     NPO for Medical/Clinical Reasons Except for: NPO but receiving Tube Feeding     Diet       Code Status: Full Code   Activity: Patient is a bedridden patient following his stroke.        Condition at Discharge: Stable     REASON FOR PRESENTATION(See Admission Note for Details)   Tachycardia, suspected infection, fever    PRINCIPAL & ACTIVE DISCHARGE DIAGNOSES     Active Problems:    Fever, unspecified fever cause    Sepsis, due to unspecified organism, unspecified whether acute organ dysfunction present (H)  infection of unknown source, clinically improved after treatment with antibiotic    PENDING LABS     Unresulted Labs Ordered in the Past 30 Days of this Admission     Date and Time Order Name Status Description    1/28/2022  7:42 AM Blood Culture Peripheral Blood Preliminary     1/28/2022  7:42 AM Blood Culture Peripheral Blood Preliminary     1/28/2022  2:51 AM Urine Culture Preliminary             PROCEDURES ( this hospitalization only)          RECOMMENDATIONS TO OUTPATIENT PROVIDER FOR F/U VISIT     Follow-up Appointments     Follow-up and recommended labs and tests       Be aware of the changes in your medications, especially the new ones and   those that have been changed or stopped.  -given Augmentin x 7 days  We do not ave cultures back so we will let you know if there is new   information about resistant bacteria.  -Follow up with primary care doctor in 12 weeks.  -IF you were  seen or being seen by a specialist  follow up with the   specialists as instructed..    -IF you have high blood pressure, monitor your blood pressure at home and   notify your doctor about results that are consistently very high (more   than 180) and very low (less than 100).    -IF you are diabetic, continue to monitor your blood sugar and notify your   doctor about readings that are consistently very high (more than 300) or   very low (less than 100).    -IF you are on warfarin/coumadin (blood thinner), please call your primary   care office for recheck or your INR (blood test) and adjustment of your   dose if needed.    -Some of your tests done in the hospital may still be pending.   -Tell primary care doctor to check on any pending labs or tests.    -Follow up blood tests with primary care doctor: CBC and BMP, etc..    -Return to the ER or notify primary care doctor if you have recurrence or   worsening of symptoms.  -Return to the ER or notify primary care doctor if you have any of the   following: fever, chest pressure, shortness of breath, abdominal pain,   intractable vomiting, diarrhea.               DISPOSITION     Home    SUMMARY OF HOSPITAL COURSE:      Liu GIL Malave is a 56 year old old male with h/o hemorrhagic stroke x2 with paralysis, currently aphasic and nonverbal, being fed with tube feeding p/w fever and tachycardia.  Really hard to see if there is a change in his mental status as he is largely nonverbal says the family.     Patient has had previous admissions for UTI and sepsis and family says he is on a prophylactic antibiotics for frequent UTI.  He has had COVID-19 vaccination including a booster.  They recently noted that he was tachycardic with heart rate over 100.  He was also having episodes of mild to moderate fever.  They cannot really say there is a change in his mental status.  As they always did in the past they would take him to the emergency room.  Patient is being managed and taken  cared by his wife and 2 children who take turns to tackle that responsibility.     Course in the ER: There was no identifiable source of infection.  CT of the chest and abdomen as well as urinalysis were unremarkable.  Patient was empirically started on IV Zosyn as he is tachycardic and febrile with leukocytosis.  Lactic acid was normal however.  Heart rate improved after patient was given IV fluids.    Patient was treated IV Zosyn.  There was significant improvement of his leukocytosis.  There was no recurrence of his fever.  His tachycardia improved.  There is no obvious source of infection as the urinalysis was negative and imaging studies were likewise negative for infection.  However there was overall significant improvement of the patient.  The family wanted to take him home even if we did not have any focus of infection.  We will put him on Augmentin.  He did have history of E. coli that was resistant in the past that was sensitive to Augmentin.        Discharge Medications with Med changes:     Current Discharge Medication List      START taking these medications    Details   amoxicillin-clavulanate (AUGMENTIN) 875-125 MG tablet Take 1 tablet by mouth 2 times daily for 6 days  Qty: 12 tablet, Refills: 0    Associated Diagnoses: Sepsis, due to unspecified organism, unspecified whether acute organ dysfunction present (H)         CONTINUE these medications which have NOT CHANGED    Details   acetaminophen (TYLENOL) 500 MG tablet 1,000 mg by Gastric Tube route every 6 hours as needed for mild pain       aspirin 81 MG chewable tablet 81 mg by Per Feeding Tube route every morning       baclofen (LIORESAL) 10 MG tablet Take 1 tablet (10 mg) by mouth 3 times daily as needed for muscle spasms  Qty: 270 tablet, Refills: 3    Associated Diagnoses: Muscle spasm      bisacodyl (DULCOLAX) 10 MG suppository INSERT 1 SUPPOSTIORY INTO THE RECTUM DAILY AS NEEDED FOR CONSTIPATION.  Qty: 30 suppository, Refills: 8    Associated  Diagnoses: Slow transit constipation      chlorhexidine (PERIDEX) 0.12 % solution Swish and spit 15 mLs in mouth daily       esomeprazole (NEXIUM) 20 MG DR capsule Take 1 capsule via G-tube twice daily  Qty: 180 capsule, Refills: 3    Associated Diagnoses: GERD without esophagitis      ferrous sulfate 220 (44 Fe) MG/5ML ELIX 5 mLs by Enteral route daily  Qty: 150 mL, Refills: 11    Associated Diagnoses: Iron deficiency      guaiFENesin (ROBITUSSIN) 100 MG/5ML liquid Give 5 ml via G tube every 6 hours as needed for cough  Qty: 500 mL, Refills: 3    Associated Diagnoses: Cough      ipratropium - albuterol 0.5 mg/2.5 mg/3 mL (DUONEB) 0.5-2.5 (3) MG/3ML neb solution Take 1 vial (3 mLs) by nebulization every 6 hours as needed for shortness of breath / dyspnea or wheezing  Qty: 60 mL, Refills: 11    Associated Diagnoses: Cough      levETIRAcetam (KEPPRA) 100 MG/ML solution Take 7.5 mLs (750 mg) by mouth 2 times daily  Qty: 1350 mL, Refills: 1    Associated Diagnoses: Convulsions, unspecified convulsion type (H)      Lidocaine (LIDOCARE) 4 % Patch Place 1 patch onto the skin every 24 hours To prevent lidocaine toxicity, patient should be patch free for 12 hrs daily.  Qty: 30 patch, Refills: 3    Associated Diagnoses: Multiple joint pain      meclizine (ANTIVERT) 25 MG tablet Take 1 tablet via G-tube every 6 hours as needed for vertigo  Qty: 30 tablet, Refills: 3    Associated Diagnoses: Vertigo      metoprolol tartrate (LOPRESSOR) 25 MG tablet TAKE ONE TABLET BY MOUTH TWICE DAILY   Qty: 60 tablet, Refills: 11    Associated Diagnoses: Essential hypertension      nitroFURantoin macrocrystal-monohydrate (MACROBID) 100 MG capsule Take 1 capsule by G-tube route at bedtime.  Qty: 90 capsule, Refills: 3    Associated Diagnoses: Recurrent UTI      polyethylene glycol (MIRALAX) 17 GM/Dose powder Give 1 capful via G-tube daily as needed for constipation  Qty: 850 g, Refills: 3    Associated Diagnoses: Slow transit constipation       Polyethylene Glycol 400 (VISINE DRY EYE RELIEF) 1 % SOLN Apply 1 drop to eye every 6 hours as needed (dry eyes)  Qty: 15 mL, Refills: 4    Associated Diagnoses: Dry eyes      !! QUEtiapine (SEROQUEL) 25 MG tablet Give 2 tablets via G-tube at bedtime for sleep. Give 1 tablet in the morning and 1 tablet in the afternoon as needed for agitation (via G-tube).  Qty: 102 tablet, Refills: 3    Associated Diagnoses: Agitation      !! QUEtiapine (SEROQUEL) 50 MG tablet Take 1/2 to 1 tablet by g-tube every 4-6 hours as needed for agitation. Max of 200 mg per day  Qty: 360 tablet, Refills: 3    Associated Diagnoses: Agitation      simvastatin (ZOCOR) 20 MG tablet 20 mg by Gastric Tube route At Bedtime        !! - Potential duplicate medications found. Please discuss with provider.                Rationale for medication changes:      Augmentin for infection.  Not sure about the focus of infection but presumably UTI or pneumonia evolving.        Consults       SOCIAL WORK IP CONSULT  NUTRITION SERVICES ADULT IP CONSULT  PHARMACY IP CONSULT    Immunizations given this encounter     Most Recent Immunizations   Administered Date(s) Administered     COVID-19,PF,Pfizer (12+ Yrs) 04/01/2021     Influenza Quad, Recombinant, pf(RIV4) (Flublok) 10/28/2021     Influenza Vaccine, 6+MO IM (QUADRIVALENT W/PRESERVATIVES) 09/21/2018     Pneumo Conj 13-V (2010&after) 10/28/2021     Pneumococcal 23 valent 10/25/2017     Tdap (Adacel,Boostrix) 04/20/2015           Anticoagulation Information      Recent INR results: No results for input(s): INR in the last 168 hours.  Warfarin doses (if applicable) or name of other anticoagulant: Not on warfarin.      SIGNIFICANT IMAGING FINDINGS     Results for orders placed or performed during the hospital encounter of 01/28/22   XR Chest Port 1 View    Impression    IMPRESSION: No pneumothorax or pleural effusion. No focal consolidation. Cardiac silhouette within normal limits. Tortuous atherosclerotic  aorta. No acute osseous abnormality.   CT Abdomen Pelvis w Contrast    Impression    IMPRESSION:   1.  Bladder is mostly decompressed which limits evaluation. However, there is some questionable diffuse bladder wall thickening. Clinical correlation for any signs of cystitis or UTI. No hydronephrosis or evidence of pyelonephritis.    2.  No acute bowel findings. Gastrojejunostomy tube in appropriate location.    3.  Moderate subpleural opacities in both lung bases posteriorly. This is favored to be more likely due to atelectasis versus less likely pneumonitis. Clinical correlation.             Chest CT w/o contrast    Impression    IMPRESSION:   1.  No airspace disease.          SIGNIFICANT LABORATORY FINDINGS     White count of 19,200.  Improved to 8400.   Hemoglobin 11.7 stable  Platelet 231  Metabolic panel is unremarkable.  BUN of 32 but creatinine is normal.  GFR 80.  LFTs normal  BNP less than 10  Lactic acid 1.5  Troponin negative x1  Glucose 107  CRP is only 2.7.    Discharge Orders        Reason for your hospital stay    Infection of unknown source, improving     Follow-up and recommended labs and tests     Be aware of the changes in your medications, especially the new ones and those that have been changed or stopped.  -given Augmentin x 7 days  We do not ave cultures back so we will let you know if there is new information about resistant bacteria.  -Follow up with primary care doctor in 12 weeks.  -IF you were seen or being seen by a specialist  follow up with the specialists as instructed..    -IF you have high blood pressure, monitor your blood pressure at home and notify your doctor about results that are consistently very high (more than 180) and very low (less than 100).    -IF you are diabetic, continue to monitor your blood sugar and notify your doctor about readings that are consistently very high (more than 300) or very low (less than 100).    -IF you are on warfarin/coumadin (blood thinner),  please call your primary care office for recheck or your INR (blood test) and adjustment of your dose if needed.    -Some of your tests done in the hospital may still be pending.   -Tell primary care doctor to check on any pending labs or tests.    -Follow up blood tests with primary care doctor: CBC and BMP, etc..    -Return to the ER or notify primary care doctor if you have recurrence or worsening of symptoms.  -Return to the ER or notify primary care doctor if you have any of the following: fever, chest pressure, shortness of breath, abdominal pain, intractable vomiting, diarrhea.     Activity    Your activity upon discharge: activity as tolerated; bed rest     Diet    Follow this diet upon discharge: Orders Placed This Encounter (tube feeding as before)      Adult Formula Drip Feeding: Continuous Other; Peptamen 1.5 w/ Prebio; Jejunostomy; Goal Rate: 50-30; mL/hr; Medication - Feeding Tube Flush Frequency: At least 15-30 mL water before and after medication administration and with tube clogging; Run T...      NPO for Medical/Clinical Reasons Except for: NPO but receiving Tube Feeding       Examination   Physical Exam   Temp:  [97.3  F (36.3  C)-98.3  F (36.8  C)] 97.3  F (36.3  C)  Pulse:  [72-94] 82  Resp:  [14-29] 23  BP: (115-148)/(69-93) 125/82  SpO2:  [93 %-100 %] 100 %  Patient is bedridden and nonverbal  No JVD.  Lungs are relatively clear to auscultation no crackles.  Regular heart rhythm, tachycardia has resolved.  No significant murmur.  Abdomen is notable for the presence of a PEG tube.  No focal tenderness or guarding  No significant peripheral edema tenderness of the calves.  No change in neurologic status.  He has prior history of stroke.      Please see EMR for more detailed significant labs, imaging, consultant notes etc.    IJamie MD, personally saw the patient today and spent greater than 30 minutes discharging this patient.    Jamie Fletcher MD  [facility]  [facility]    CC:Lynda Gutierrez

## 2022-01-29 NOTE — ED NOTES
Met patient. In no apparent distress. VSS. Labs drawn and sent. Waiting for meds from pharmacy. Wife at bedside.

## 2022-01-29 NOTE — PROGRESS NOTES
Family and patient himself does not want to stay in the hospital.  He is feeling and looking better and tachy has improved.  No more fever.    They would like him to be on oral abx and go home.  Cant see any recent positive blood cultures and UC fromrecent years.  [place] arboleda as Zosyn has worked    Jamie Fletcher MD

## 2022-01-30 LAB — BACTERIA UR CULT: NO GROWTH

## 2022-01-31 ENCOUNTER — PATIENT OUTREACH (OUTPATIENT)
Dept: CARE COORDINATION | Facility: CLINIC | Age: 57
End: 2022-01-31
Payer: COMMERCIAL

## 2022-01-31 DIAGNOSIS — Z71.89 OTHER SPECIFIED COUNSELING: ICD-10-CM

## 2022-01-31 NOTE — PROGRESS NOTES
"Clinic Care Coordination Contact  [facility]: Post-Discharge Note  SITUATION                                                      Admission:    Admission Date: 01/28/22   Reason for Admission: Altered Mental Status, Fatigue  Discharge:   Discharge Date: 01/29/22  Discharge Diagnosis: Altered Mental Status, Fatigue    BACKGROUND                                                      Per hospital discharge summary and inpatient provider notes:  Liu Malave is a 56 year old male with a pertinent history of previous hemorrhagic stroke with residual hemiparesis, chronic static encephalopathy, and recurrent UTI on chronic antibiotics who presents to this ED for evaluation of irritibility        ASSESSMENT      Enrollment  Primary Care Care Coordination Status: Declined    Discharge Assessment  How are you doing now that you are home?: \" He's doing fine \"  How are your symptoms? (Red Flag symptoms escalate to triage hotline per guidelines): Improved  Do you feel your condition is stable enough to be safe at home until your provider visit?: Yes  Does the patient have their discharge instructions? : Yes  Does the patient have questions regarding their discharge instructions? : No  Were you started on any new medications or were there changes to any of your previous medications? : Yes  Does the patient have all of their medications?: Yes  Do you have questions regarding any of your medications? : No  Do you have all of your needed medical supplies or equipment (DME)?  (i.e. oxygen tank, CPAP, cane, etc.): Yes  Discharge follow-up appointment scheduled within 14 calendar days? : No  Is patient agreeable to assistance with scheduling? : No (Kita will make appointment)    Post-op ([facility] Only)  If the patient had a surgery or procedure, do they have any questions for a nurse?: No             PLAN                                                      Outpatient Plan:  Follow up    Future Appointments   Date Time " Provider Department Center   4/7/2022  3:30 PM Goltz, Bennett Ezra, PA-C [facility] SLEEP         For any urgent concerns, please contact our 24 hour nurse triage line: [phone] ([phone])         Kristina Suero MA

## 2022-02-02 LAB
BACTERIA BLD CULT: NO GROWTH
BACTERIA BLD CULT: NO GROWTH

## 2022-02-06 ENCOUNTER — E-VISIT (OUTPATIENT)
Dept: FAMILY MEDICINE | Facility: CLINIC | Age: 57
End: 2022-02-06
Payer: COMMERCIAL

## 2022-02-06 DIAGNOSIS — Z11.59 SCREENING FOR VIRAL DISEASE: Primary | ICD-10-CM

## 2022-02-06 PROCEDURE — 99207 PR NO CHARGE LOS: CPT | Performed by: FAMILY MEDICINE

## 2022-02-07 RX ORDER — COVID-19 ANTIGEN TEST
1 KIT MISCELLANEOUS DAILY PRN
Qty: 8 KIT | Refills: 1 | Status: SHIPPED | OUTPATIENT
Start: 2022-02-07 | End: 2022-04-18

## 2022-04-06 PROBLEM — F32.A DEPRESSION: Status: ACTIVE | Noted: 2022-04-06

## 2022-04-06 PROBLEM — G40.909 SEIZURE DISORDER (H): Status: ACTIVE | Noted: 2022-04-06

## 2022-04-06 PROBLEM — B18.2 HEPATITIS C VIRUS CARRIER STATE (H): Status: ACTIVE | Noted: 2022-04-06

## 2022-04-06 PROBLEM — K21.9 GERD WITHOUT ESOPHAGITIS: Status: ACTIVE | Noted: 2022-04-06

## 2022-04-06 PROBLEM — F41.1 GENERALIZED ANXIETY DISORDER: Status: ACTIVE | Noted: 2022-04-06

## 2022-04-06 PROBLEM — E78.5 DYSLIPIDEMIA: Status: ACTIVE | Noted: 2022-04-06

## 2022-04-07 ENCOUNTER — VIRTUAL VISIT (OUTPATIENT)
Dept: SLEEP MEDICINE | Facility: CLINIC | Age: 57
End: 2022-04-07
Attending: FAMILY MEDICINE
Payer: COMMERCIAL

## 2022-04-07 VITALS — BODY MASS INDEX: 23.92 KG/M2 | HEIGHT: 62 IN | WEIGHT: 130 LBS

## 2022-04-07 DIAGNOSIS — Z86.73 HISTORY OF CVA (CEREBROVASCULAR ACCIDENT): ICD-10-CM

## 2022-04-07 DIAGNOSIS — R06.81 APNEIC EPISODE: ICD-10-CM

## 2022-04-07 DIAGNOSIS — R06.83 SNORING: ICD-10-CM

## 2022-04-07 DIAGNOSIS — F51.04 CHRONIC INSOMNIA: Primary | ICD-10-CM

## 2022-04-07 PROCEDURE — 99205 OFFICE O/P NEW HI 60 MIN: CPT | Mod: 95 | Performed by: PHYSICIAN ASSISTANT

## 2022-04-07 ASSESSMENT — SLEEP AND FATIGUE QUESTIONNAIRES
HOW LIKELY ARE YOU TO NOD OFF OR FALL ASLEEP WHILE LYING DOWN TO REST IN THE AFTERNOON WHEN CIRCUMSTANCES PERMIT: HIGH CHANCE OF DOZING
HOW LIKELY ARE YOU TO NOD OFF OR FALL ASLEEP WHEN YOU ARE A PASSENGER IN A CAR FOR AN HOUR WITHOUT A BREAK: WOULD NEVER DOZE
HOW LIKELY ARE YOU TO NOD OFF OR FALL ASLEEP IN A CAR, WHILE STOPPED FOR A FEW MINUTES IN TRAFFIC: WOULD NEVER DOZE
HOW LIKELY ARE YOU TO NOD OFF OR FALL ASLEEP WHILE SITTING AND READING: MODERATE CHANCE OF DOZING
HOW LIKELY ARE YOU TO NOD OFF OR FALL ASLEEP WHILE SITTING AND TALKING TO SOMEONE: WOULD NEVER DOZE
HOW LIKELY ARE YOU TO NOD OFF OR FALL ASLEEP WHILE SITTING INACTIVE IN A PUBLIC PLACE: WOULD NEVER DOZE
HOW LIKELY ARE YOU TO NOD OFF OR FALL ASLEEP WHILE SITTING QUIETLY AFTER LUNCH WITHOUT ALCOHOL: WOULD NEVER DOZE
HOW LIKELY ARE YOU TO NOD OFF OR FALL ASLEEP WHILE WATCHING TV: MODERATE CHANCE OF DOZING

## 2022-04-07 NOTE — PROGRESS NOTES
"Sleep Consultation:    Date on this visit: 4/7/2022    Liu Malave  is referred by Lynda Gutierrez for a sleep consultation.     Primary Physician: Lynda Gutierrez       Liu Malave reports difficulty sleeping for years and concerns for apnea. Has history of hemorrhagic stroke of basal ganglia (6/2016) and hemiplegia.  He is confined to wheelchair, non-verbal (but can communicate with pointing), and has a feeding tube.  He has history of pneumonia as well as recurrent UTIs.  He is on nitrofurantoin for UTI prophylaxis. History of seizure disorder, hypertension and hyperlipidemia. The history is conducted through his daughter, Idalmis. A [place]  was declined.    SLEEP HISTORY    Per the note from his primary on 1/22: His daughter \"reports that last week she came home and found her father in his recliner.  His face was purple and he had stopped breathing. (It took a couple of minutes to get him awake.)  She shook him a little bit and he started gasping for air and then started breathing very heavily.  They called 911 and he was assessed.  Vital signs were normal.  Patient did not want to go to the emergency department so they contacted this provider through [employer] who advised him to schedule this appointment.  Daughter reports that he has not had any episodes since then.  He does seem to be more tired than usual.  She reports that he does snore.  States that he does not seem to get good sleep.  They do not notice any apneic episodes while he is sleeping.  He does sleep in his recliner out in the living room.  She does not believe that he was choking on anything to cause the apneic spell.  He did not cough to clear his throat or lungs after the apneic episode.\"    Liu is transferred to bed around 6 PM. He sleeps from about 7 PM to 9-10 PM. He then wakes for 15 minutes. He is given the quetiapine and he will sleep another 1-2 hours. He may be in and out of sleep between 12 AM and when he is awake for the " day by 3-4 AM. He goes back to sleep around 9-10 AM for up to an hour. He is apparently awake most of the time between 10 AM and 7 PM. He is given 25 mg quetiapine at 7 PM, midnight and 3 AM. They tried putting him to bed around 8 PM but he would get frustrated and angry. They tried to increase the quetiapine but it still only works for a couple of hours. He had adverse effects from trazodone (was on it for 2 years then developed shaking and spasms/contractions) and diphenhydramine (cough).  On weekends, his sleep is the same.  He had been on melatonin for sleep in the past and haloperidol for agitation (2018).    Liu is cared for by family members.        Liu does snore frequently and snoring is loud. His snoring is louder recently. Patient sleeps in the living room. He does not have witnessed apneas. He does have snort awakenings.  Patient sleeps in a bed elevated because the feeding tube is running. He sleeps in the recliner in the day. He has dizziness recently in the day. He denies headaches. He does feel restless around 5-7 PM. That improves once he gets the baclofen and quetiapine. Liu has occasional bruxism (better since he had a number of teeth removed due to the grinding-in 2018). He seems restless and kicks at night and makes noises. He looks angry and frustrated.     Liu has reflux at night and heartburn (he is on treatment), denies difficulty breathing through his nose.      It is unclear how much weight fluctuation Liu has had. His weight is not tracked often due to him being wheelchair bound.  I tried to get a sense for whether he was more of a morning or night person by asking about his sleep schedule prior to his stroke.  Prior to 2016, he would sleep somewhat better at night, but it still was not perfect because he had a stroke prior to 2016 as well.  His daughter could not give me a specific bedtime and wake time.  Patient's [place] Sleepiness score does not really apply as he does not do a number  "of the activities listing the questionnaire. He reportedly does not doze between 10 AM and 7 PM.  He does not drive.  He uses a very small amount of caffeine, drops in his mouth. He is fed through a feeding tube.      Allergies:    Allergies   Allergen Reactions     Diphenhydramine Unknown     Trazodone Other (See Comments)     \"shaking\" per family       Medications:    Current Outpatient Medications   Medication Sig Dispense Refill     acetaminophen (TYLENOL) 500 MG tablet 1,000 mg by Gastric Tube route every 6 hours as needed for mild pain        aspirin 81 MG chewable tablet 81 mg by Per Feeding Tube route every morning        baclofen (LIORESAL) 10 MG tablet Take 1 tablet (10 mg) by mouth 3 times daily as needed for muscle spasms (Patient taking differently: 10 mg by Per Feeding Tube route 3 times daily ) 270 tablet 3     bisacodyl (DULCOLAX) 10 MG suppository INSERT 1 SUPPOSTIORY INTO THE RECTUM DAILY AS NEEDED FOR CONSTIPATION. 30 suppository 8     chlorhexidine (PERIDEX) 0.12 % solution Swish and spit 15 mLs in mouth daily        COVID-19 At Home Antigen Test KIT 1 kit by In Vitro route daily as needed (covid symptoms) 8 kit 1     esomeprazole (NEXIUM) 20 MG DR capsule Take 1 capsule via G-tube twice daily 180 capsule 3     ferrous sulfate 220 (44 Fe) MG/5ML ELIX 5 mLs by Enteral route daily 150 mL 11     guaiFENesin (ROBITUSSIN) 100 MG/5ML liquid Give 5 ml via G tube every 6 hours as needed for cough 500 mL 3     ipratropium - albuterol 0.5 mg/2.5 mg/3 mL (DUONEB) 0.5-2.5 (3) MG/3ML neb solution Take 1 vial (3 mLs) by nebulization every 6 hours as needed for shortness of breath / dyspnea or wheezing (Patient taking differently: Take 1 vial by nebulization every 4 hours ) 60 mL 11     levETIRAcetam (KEPPRA) 100 MG/ML solution Take 7.5 mLs (750 mg) by mouth 2 times daily 1350 mL 1     Lidocaine (LIDOCARE) 4 % Patch Place 1 patch onto the skin every 24 hours To prevent lidocaine toxicity, patient should be " patch free for 12 hrs daily. 30 patch 3     meclizine (ANTIVERT) 25 MG tablet Take 1 tablet via G-tube every 6 hours as needed for vertigo 30 tablet 3     metoprolol tartrate (LOPRESSOR) 25 MG tablet TAKE ONE TABLET BY MOUTH TWICE DAILY  60 tablet 11     nitroFURantoin macrocrystal-monohydrate (MACROBID) 100 MG capsule Take 1 capsule by G-tube route at bedtime. 90 capsule 3     polyethylene glycol (MIRALAX) 17 GM/Dose powder Give 1 capful via G-tube daily as needed for constipation 850 g 3     Polyethylene Glycol 400 (VISINE DRY EYE RELIEF) 1 % SOLN Apply 1 drop to eye every 6 hours as needed (dry eyes) 15 mL 4     QUEtiapine (SEROQUEL) 25 MG tablet Give 2 tablets via G-tube at bedtime for sleep. Give 1 tablet in the morning and 1 tablet in the afternoon as needed for agitation (via G-tube). (Patient taking differently: Take 25 mg by mouth 2 times daily Schedule: 12am and 7pm) 102 tablet 3     QUEtiapine (SEROQUEL) 50 MG tablet Take 1/2 to 1 tablet by g-tube every 4-6 hours as needed for agitation. Max of 200 mg per day 360 tablet 3     simvastatin (ZOCOR) 20 MG tablet 20 mg by Gastric Tube route At Bedtime          Problem List:  Patient Active Problem List    Diagnosis Date Noted     Seizure disorder (H) 04/06/2022     Priority: Medium     Depression 04/06/2022     Priority: Medium     Formatting of this note might be different from the original.  Created by Conversion       Dyslipidemia 04/06/2022     Priority: Medium     Generalized anxiety disorder 04/06/2022     Priority: Medium     Formatting of this note might be different from the original.  Created by Conversion       GERD without esophagitis 04/06/2022     Priority: Medium     Hepatitis C virus carrier state (H) 04/06/2022     Priority: Medium     Fever, unspecified fever cause 01/28/2022     Priority: Medium     Sepsis, due to unspecified organism, unspecified whether acute organ dysfunction present (H) 01/28/2022     Priority: Medium     Hemiplegia and  hemiparesis following cerebral infarction affecting unspecified side (H) 06/04/2020     Priority: Medium     Dental decay 12/19/2016     Priority: Medium     Basal ganglia hemorrhage (H) 06/20/2016     Priority: Medium        Past Medical/Surgical History:  Past Medical History:   Diagnosis Date     Acute UTI 2/21/2020     Anxiety      Basal ganglia hemorrhage (H) 6/20/2016     Brain compression (H)      Bruxism      Chronic static encephalopathy      Chronic static encephalopathy      Chronic static encephalopathy      Convulsions, unspecified convulsion type (H)      CVA (cerebral vascular accident) (H) 7/2016    Hemorrhagic,      Depression      Dyslipidemia      Dyslipidemia      Elevated troponin      Essential hypertension      Feeding by G-tube (H) 6/4/2020     GERD (gastroesophageal reflux disease)      GERD without esophagitis      Hemiplegia of nondominant side, late effect of cerebrovascular disease (H)     Created by Conversion  Replacement Utility updated for latest IMO load     Hemorrhagic stroke (H) 6/5/2016     Hepatitis C carrier (H)      History of ESBL E. coli infection      History of hemorrhagic stroke with residual hemiparesis (H)      History of stroke with residual deficit      HTN (hypertension)      Hypertension      Lung nodule 12/5/16    9 x 9 mm left upper lobe on CXR     MRSA (methicillin resistant staph aureus) culture positive      Periodontal disease      Persistent fever      Pneumonia of both lower lobes due to infectious organism      Respiratory failure with hypoxia (H)      Seizure (H)      Sepsis due to urinary tract infection (H) 6/4/2020     Severe sepsis (H)      Stroke (H)      Trismus      Past Surgical History:   Procedure Laterality Date     aneurysm clipping  2006     BRAIN SURGERY       EXAM UNDER ANESTHESIA, RESTORATIONS, EXTRACTION(S) DENTAL COMPLEX, COMBINED N/A 12/19/2016    Procedure: COMBINED EXAM UNDER ANESTHESIA, RESTORATIONS, EXTRACTION(S) DENTAL COMPLEX;   Surgeon: Verónica Martinez DDS;  Location: [facility]     EXAM UNDER ANESTHESIA, RESTORATIONS, EXTRACTION(S) DENTAL COMPLEX, COMBINED N/A 3/7/2018    Procedure: COMBINED EXAM UNDER ANESTHESIA, RESTORATIONS, EXTRACTION(S) DENTAL COMPLEX;  Kenalog Injection in Bilateral [place], Combined Dental Exam,Radiographs, Three Dental Restorations, Periodontal Therapy and Fluoride Treatment;  Surgeon: Darryl Isblel DDS;  Location: [facility] OR     HH MIDLINE INSERTION  8/11/2017          INJECT STEROID (LOCATION) N/A 3/7/2018    Procedure: INJECT STEROID (LOCATION);  Kenalog Injection In Bilateral [place], Combined Dental Exam,Radiographs,Three Dental Restorations, Periodontal Therapy and Fluoride Treatment;  Surgeon: Liu Conroy DDS;  Location: [facility] OR     IR GASTRO JEJUNOSTOMY TUBE CHANGE  7/19/2016     IR GASTRO JEJUNOSTOMY TUBE CHANGE  9/23/2016     IR GASTRO JEJUNOSTOMY TUBE CHANGE  11/28/2016     IR GASTRO JEJUNOSTOMY TUBE CHANGE  1/27/2017     IR GASTRO JEJUNOSTOMY TUBE CHANGE  4/19/2017     IR GASTRO JEJUNOSTOMY TUBE CHANGE  6/20/2017     IR GASTRO JEJUNOSTOMY TUBE CHANGE  8/7/2017     IR GASTRO JEJUNOSTOMY TUBE CHANGE  8/11/2017     IR GASTRO JEJUNOSTOMY TUBE CHANGE  10/20/2017     IR GASTRO JEJUNOSTOMY TUBE CHANGE  1/2/2018     IR GASTRO JEJUNOSTOMY TUBE CHANGE  2/16/2018     IR GASTRO JEJUNOSTOMY TUBE CHANGE  5/16/2018     IR GASTRO JEJUNOSTOMY TUBE CHANGE  6/11/2018     IR GASTRO JEJUNOSTOMY TUBE CHANGE  9/12/2018     IR GASTRO JEJUNOSTOMY TUBE CHANGE  12/24/2018     IR GASTRO JEJUNOSTOMY TUBE CHANGE  3/18/2019     IR GASTRO JEJUNOSTOMY TUBE CHANGE  4/23/2019     IR GASTRO JEJUNOSTOMY TUBE CHANGE  5/21/2019     IR GASTRO JEJUNOSTOMY TUBE CHANGE  9/9/2019     IR GASTRO JEJUNOSTOMY TUBE CHANGE  9/26/2019     IR GASTRO JEJUNOSTOMY TUBE CHANGE  10/11/2019     IR GASTRO JEJUNOSTOMY TUBE CHANGE  11/21/2019     IR GASTRO JEJUNOSTOMY TUBE CHANGE  11/29/2019     IR GASTRO JEJUNOSTOMY TUBE CHANGE  2/27/2020     IR GASTRO  JEJUNOSTOMY TUBE CHANGE  4/10/2020     IR GASTRO JEJUNOSTOMY TUBE CHANGE  2020     IR GASTRO JEJUNOSTOMY TUBE CHANGE  2020     IR GASTRO JEJUNOSTOMY TUBE CHANGE  2020     IR GASTRO JEJUNOSTOMY TUBE CHANGE  10/29/2020     IR GASTRO JEJUNOSTOMY TUBE CHANGE  2020     IR GASTRO JEJUNOSTOMY TUBE CHANGE  2020     IR GASTRO JEJUNOSTOMY TUBE CHANGE  2021     IR GASTRO JEJUNOSTOMY TUBE CHANGE  2021     IR GASTRO JEJUNOSTOMY TUBE CHANGE  10/4/2021     IR GASTRO JEJUNOSTOMY TUBE CHANGE  10/18/2021     IR GASTRO JEJUNOSTOMY TUBE CHANGE  2022     IR GASTRO JEJUNOSTOMY TUBE CHANGE  2022     IR GASTRO JEJUNOSTOMY TUBE PLACEMENT  2016     IR GJ TUBE REPLACEMENT  2018     IR GJ TUBE REPLACEMENT  3/18/2019     IR GJ TUBE REPLACEMENT  2019     IR GJ TUBE REPLACEMENT  2019     IR GJ TUBE REPLACEMENT  2019     IR GJ TUBE REPLACEMENT  2019     IR GJ TUBE REPLACEMENT  10/11/2019     IR GJ TUBE REPLACEMENT  2019     IR GJ TUBE REPLACEMENT  2019     IR GJ TUBE REPLACEMENT  2020     IR GJ TUBE REPLACEMENT  4/10/2020     IR GJ TUBE REPLACEMENT  2020     IR GJ TUBE REPLACEMENT  2020     IR GJ TUBE REPLACEMENT  2020     IR GJ TUBE REPLACEMENT  10/29/2020     IR GJ TUBE REPLACEMENT  2020     IR GJ TUBE REPLACEMENT  2020     IR GJ TUBE REPLACEMENT  2021     IR GJ TUBE REPLACEMENT  2021     J-G tube       PICC  6/10/2016          TRACHEOSTOMY      Removed       Social History:  Social History     Socioeconomic History     Marital status:      Spouse name: Not on file     Number of children: 2     Years of education: Not on file     Highest education level: Not on file   Occupational History     Occupation: disability   Tobacco Use     Smoking status: Former Smoker     Packs/day: 1.00     Years: 30.00     Pack years: 30.00     Types: Cigarettes     Quit date: 2005     Years since quittin.8     Smokeless tobacco:  "Never Used     Tobacco comment: quit 2006   Substance and Sexual Activity     Alcohol use: No     Alcohol/week: 0.0 standard drinks     Drug use: No     Sexual activity: Never     Partners: Male   Other Topics Concern     Not on file   Social History Narrative    Lives with wife, 2 daughters and son in law.    W/c bound.     Social Determinants of Health     Financial Resource Strain: Not on file   Food Insecurity: Not on file   Transportation Needs: Not on file   Physical Activity: Not on file   Stress: Not on file   Social Connections: Not on file   Intimate Partner Violence: Not on file   Housing Stability: Not on file       Family History:  Family History   Problem Relation Age of Onset     Hypertension Mother      Cancer Father      Kidney Disease Brother        Review of Systems:  Omitted for time      Physical Examination:  Pt Reported Vitals: Ht 1.575 m (5' 2\")   Wt 59 kg (130 lb)   BMI 23.78 kg/m          Impression/Plan:    (F51.04) Chronic insomnia  (primary encounter diagnosis)  Comment: Liu has not slept well for at least 6 years. He currently is transferred from the recliner to bed around 6 PM. He is given 25 mg quetiapine. He sleeps somewhat lightly until 9-10 PM.  He is given another 25 mg quetiapine and have restless sleep between 12 and 3 AM. He is given another 25 mg quetiapine and will sleep 9-10 AM. He reportedly does not sleep between 10 AM and 7 PM. His sleep is restless, grinding his teeth, kicking, making noises and appearing angry. He did not tolerate when his bedtime was 8 PM in the past. He also did not have a benefit to higher quetiapine doses. He was on trazodone for 2 years but it was causing shaking and spasms/contractions. I suspect his poor sleep is at least in part related to his excessive sleep opportunity.  Plan:  We talked about trying to increase his period of wakefulness during the day to consolidate his sleep at night, although he did not tolerate a later bedtime in the " past. We may try making very gradual changes, pushing the bedtime and quetiapine later by 5-10 minutes every week. We will work on sleep scheduling after his sleep study.    (R06.83) Snoring, (R06.81) Apneic episode  Comment: Liu had an episode in January where his daughter came in to find him not breathing. He was purple. It took a while to get him to wake and breathing again. He snores at night and has snort awakenings but not yi pauses in breathing. It is challenging to assess daytime sleepiness as his nocturnal sleep periods go as late as 10 AM and start as early as 7 PM. He reportedly does not often sleep between 10 AM and 7 PM. OTher positive risk factors for FRANKY include; HTN, age >50 (56) and make gender. Negative risk factors include; BMI 23. Baclofen 10 mg 3 times daily could increase apnea risk. He also has risk for central apnea given stroke history. He does tube feedings at night and sleeps with the head of bed slightly elevated to help avoid reflux. CO2 on metabolic panel has been low (20). SpO2 was 94% at his last clinic visit (last October).  Plan: Given the level of care that would be required with an in lab study, I suggest we try a home study first to see if we can get a ball-park idea of whether or not he has significant apnea. I have placed an order for Noxturnal HST.       Literature provided regarding sleep apnea.      He will follow up with me in approximately two weeks after his sleep study has been competed to review the results and discuss plan of care.       Polysomnography reviewed.  Obstructive sleep apnea reviewed.  Complications of untreated sleep apnea were reviewed.    80+ minutes were spent on the date of the encounter doing chart review, history and exam, documentation and further activities as noted above. There was extra complexity due to his complicated medical history and conducting the interview through his daughter.  Bennett Goltz, PA-C       CC: Lynda Gutierrez

## 2022-04-07 NOTE — PROGRESS NOTES
Liu is a 56 year old who is being evaluated via a billable video visit.      How would you like to obtain your AVS? MyChart  If the video visit is dropped, the invitation should be resent by: Text to cell phone: [phone]  Will anyone else be joining your video visit? Yes: Idalmis - daughter. How would they like to receive their invitation? Text to cell phone: she is on the video with him      Video Start Time: 3:43 PM  Video-Visit Details    Type of service:  Video Visit    Video End Time:4:26 PM    Originating Location (pt. Location): Home    Distant Location (provider location):  [facility]     Platform used for Video Visit: [employer]

## 2022-04-08 NOTE — PATIENT INSTRUCTIONS
"      MY TREATMENT INFORMATION FOR SLEEP APNEA-  Liu Malave    DOCTOR : Bennett Ezra Goltz, PA-C, [facility]    Am I having a sleep study at a sleep center?  --->Due to normal delays, you will be contacted within 2-4 weeks to schedule    Am I having a home sleep study?  --->Watch the video for the device you are using:    -/drop off device-   https://www.Zumeo.com.com/watch?v=yGGFBdELGhk    -Disposable device sent out require phone/computer application-   https://www.Zumeo.com.com/watch?v=BCce_vbiwxE      Frequently asked questions:  1. What is Obstructive Sleep Apnea (FRANKY)? FRANKY is the most common type of sleep apnea. Apnea means, \"without breath.\"  Apnea is most often caused by narrowing or collapse of the upper airway as muscles relax during sleep.   Almost everyone has occasional apneas. Most people with sleep apnea have had brief interruptions at night frequently for many years.  The severity of sleep apnea is related to how frequent and severe the events are.   2. What are the consequences of FRANKY? Symptoms include: feeling sleepy during the day, snoring loudly, gasping or stopping of breathing, trouble sleeping, and occasionally morning headaches or heartburn at night.  Sleepiness can be serious and even increase the risk of falling asleep while driving. Other health consequences may include development of high blood pressure and other cardiovascular disease in persons who are susceptible. Untreated FRANKY  can contribute to heart disease, stroke and diabetes.   3. What are the treatment options? In most situations, sleep apnea is a lifelong disease that must be managed with daily therapy. Medications are not effective for sleep apnea and surgery is generally not considered until other therapies have been tried. Your treatment is your choice . Continuous Positive Airway (CPAP) works right away and is the therapy that is effective in nearly everyone. An oral device to hold your jaw forward is usually the next most " reliable option. Other options include postioning devices (to keep you off your back), weight loss, and surgery including a tongue pacing device. There is more detail about some of these options below.  4. Are my sleep studies covered by insurance? Although we will request verification of coverage, we advise you also check in advance of the study to ensure there is coverage.    Important tips for those choosing CPAP and similar devices   Know your equipment:  CPAP is continuous positive airway pressure that prevents obstructive sleep apnea by keeping the throat from collapsing while you are sleeping. In most cases, the device is  smart  and can slowly self-adjusts if your throat collapses and keeps a record every day of how well you are treated-this information is available to you and your care team.  BPAP is bilevel positive airway pressure that keeps your throat open and also assists each breath with a pressure boost to maintain adequate breathing.  Special kinds of BPAP are used in patients who have inadequate breathing from lung or heart disease. In most cases, the device is  smart  and can slowly self-adjusts to assist breathing. Like CPAP, the device keeps a record of how well you are treated.  Your mask is your connection to the device. You get to choose what feels most comfortable and the staff will help to make sure if fits. Here: are some examples of the different masks that are available:       Key points to remember on your journey with sleep apnea:  1. Sleep study.  PAP devices often need to be adjusted during a sleep study to show that they are effective and adjusted right.  2. Good tips to remember: Try wearing just the mask during a quiet time during the day so your body adapts to wearing it. A humidifier is recommended for comfort in most cases to prevent drying of your nose and throat. Allergy medication from your provider may help you if you are having nasal congestion.  3. Getting settled-in. It  takes more than one night for most of us to get used to wearing a mask. Try wearing just the mask during a quiet time during the day so your body adapts to wearing it. A humidifier is recommended for comfort in most cases. Our team will work with you carefully on the first day and will be in contact within 4 days and again at 2 and 4 weeks for advice and remote device adjustments. Your therapy is evaluated by the device each day.   4. Use it every night. The more you are able to sleep naturally for 7-8 hours, the more likely you will have good sleep and to prevent health risks or symptoms from sleep apnea. Even if you use it 4 hours it helps. Occasionally all of us are unable to use a medical therapy, in sleep apnea, it is not dangerous to miss one night.   5. Communicate. Call our skilled team on the number provided on the first day if your visit for problems that make it difficult to wear the device. Over 2 out of 3 patients can learn to wear the device long-term with help from our team. Remember to call our team or your sleep providers if you are unable to wear the device as we may have other solutions for those who cannot adapt to mask CPAP therapy. It is recommended that you sleep your sleep provider within the first 3 months and yearly after that if you are not having problems.   6. Use it for your health. We encourage use of CPAP masks during daytime quiet periods to allow your face and brain to adapt to the sensation of CPAP so that it will be a more natural sensation to awaken to at night or during naps. This can be very useful during the first few weeks or months of adapting to CPAP though it does not help medically to wear CPAP during wakefulness and  should not be used as a strategy just to meet guidelines.  7. Take care of your equipment. Make sure you clean your mask and tubing using directions every day and that your filter and mask are replaced as recommended or if they are not working.     BESIDES  CPAP, WHAT OTHER THERAPIES ARE THERE?    Positioning Device  Positioning devices are generally used when sleep apnea is mild and only occurs on your back.This example shows a pillow that straps around the waist. It may be appropriate for those whose sleep study shows milder sleep apnea that occurs primarily when lying flat on one's back. Preliminary studies have shown benefit but effectiveness at home may need to be verified by a home sleep test. These devices are generally not covered by medical insurance.  Examples of devices that maintain sleeping on the back to prevent snoring and mild sleep apnea.    Belt type body positioner  http://Mixpo.Clearwire/    Electronic reminder  http://nightshifttherapy.com/  http://www."[employer].Clearwire.au/      Oral Appliance  What is oral appliance therapy?  An oral appliance device fits on your teeth at night like a retainer used after having braces. The device is made by a specialized dentist and requires several visits over 1-2 months before a manufactured device is made to fit your teeth and is adjusted to prevent your sleep apnea. Once an oral device is working properly, snoring should be improved. A home sleep test may be recommended at that time if to determine whether the sleep apnea is adequately treated.       Some things to remember:  -Oral devices are often, but not always, covered by your medical insurance. Be sure to check with your insurance provider.   -If you are referred for oral therapy, you will be given a list of specialized dentists to consider or you may choose to visit the Web site of the American Academy of Dental Sleep Medicine  -Oral devices are less likely to work if you have severe sleep apnea or are extremely overweight.     More detailed information  An oral appliance is a small acrylic device that fits over the upper and lower teeth  (similar to a retainer or a mouth guard). This device slightly moves jaw forward, which moves the base of the tongue forward,  opens the airway, improves breathing for effective treat snoring and obstructive sleep apnea in perhaps 7 out of 10 people .  The best working devices are custom-made by a dental device  after a mold is made of the teeth 1, 2, 3.  When is an oral appliance indicated?  Oral appliance therapy is recommended as a first-line treatment for patients with primary snoring, mild sleep apnea, and for patients with moderate sleep apnea who prefer appliance therapy to use of CPAP4, 5. Severity of sleep apnea is determined by sleep testing and is based on the number of respiratory events per hour of sleep.   How successful is oral appliance therapy?  The success rate of oral appliance therapy in patients with mild sleep apnea is 75-80% while in patients with moderate sleep apnea it is 50-70%. The chance of success in patients with severe sleep apnea is 40-50%. The research also shows that oral appliances have a beneficial effect on the cardiovascular health of FRANKY patients at the same magnitude as CPAP therapy7.  Oral appliances should be a second-line treatment in cases of severe sleep apnea, but if not completely successful then a combination therapy utilizing CPAP plus oral appliance therapy may be effective. Oral appliances tend to be effective in a broad range of patients although studies show that the patients who have the highest success are females, younger patients, those with milder disease, and less severe obesity. 3, 6.   Finding a dentist that practices dental sleep medicine  Specific training is available through the American Academy of Dental Sleep Medicine for dentists interested in working in the field of sleep. To find a dentist who is educated in the field of sleep and the use of oral appliances, near you, visit the Web site of the American Academy of Dental Sleep Medicine.    References  1. Susan et al. Objectively measured vs self-reported compliance during oral appliance therapy for  sleep-disordered breathing. Chest 2013; 144(5): 4849-5381.  2. Chidi, et al. Objective measurement of compliance during oral appliance therapy for sleep-disordered breathing. Thorax 2013; 68(1): 91-96.  3. Graham et al. Mandibular advancement devices in 620 men and women with FRANKY and snoring: tolerability and predictors of treatment success. Chest 2004; 125: 2537-4762.  4. Gracia et al. Oral appliances for snoring and FRANKY: a review. Sleep 2006; 29: 244-262.  5. Marty et al. Oral appliance treatment for FRANKY: an update. J Clin Sleep Med 2014; 10(2): 215-227.  6. Homa et al. Predictors of OSAH treatment outcome. J Dent Res 2007; 86: 0954-3816.      Weight Loss:    Weight loss is a long-term strategy that may improve sleep apnea in some patients.    Weight management is a personal decision and the decision should be based on your interest and the potential benefits.  If you are interested in exploring weight loss strategies, the following discussion covers the impact on weight loss on sleep apnea and the approaches that may be successful.    Being overweight does not necessarily mean you will have health consequences.  Those who have BMI over 35 or over 27 with existing medical conditions carries greater risk.   Weight loss decreases severity of sleep apnea in most people with obesity. For those with mild obesity who have developed snoring with weight gain, even 15-30 pound weight loss can improve and occasionally eliminate sleep apnea.  Structured and life-long dietary and health habits are necessary to lose weight and keep healthier weight levels.     Though there may be significant health benefits from weight loss, long-term weight loss is very difficult to achieve- studies show success with dietary management in less than 10% of people. In addition, substantial weight loss may require years of dietary control and may be difficult if patients have severe obesity. In these cases, surgical  management may be considered.  Finally, older individuals who have tolerated obesity without health complications may be less likely to benefit from weight loss strategies.        Your BMI is Body mass index is 23.78 kg/m .  Weight management is a personal decision.  If you are interested in exploring weight loss strategies, the following discussion covers the approaches that may be successful. Body mass index (BMI) is one way to tell whether you are at a healthy weight, overweight, or obese. It measures your weight in relation to your height.  A BMI of 18.5 to 24.9 is in the healthy range. A person with a BMI of 25 to 29.9 is considered overweight, and someone with a BMI of 30 or greater is considered obese. More than two-thirds of American adults are considered overweight or obese.  Being overweight or obese increases the risk for further weight gain. Excess weight may lead to heart disease and diabetes.  Creating and following plans for healthy eating and physical activity may help you improve your health.  Weight control is part of healthy lifestyle and includes exercise, emotional health, and healthy eating habits. Careful eating habits lifelong are the mainstay of weight control. Though there are significant health benefits from weight loss, long-term weight loss with diet alone may be very difficult to achieve- studies show long-term success with dietary management in less than 10% of people. Attaining a healthy weight may be especially difficult to achieve in those with severe obesity. In some cases, medications, devices and surgical management might be considered.  What can you do?  If you are overweight or obese and are interested in methods for weight loss, you should discuss this with your provider.     Consider reducing daily calorie intake by 500 calories.     Keep a food journal.     Avoiding skipping meals, consider cutting portions instead.    Diet combined with exercise helps maintain muscle while  optimizing fat loss. Strength training is particularly important for building and maintaining muscle mass. Exercise helps reduce stress, increase energy, and improves fitness. Increasing exercise without diet control, however, may not burn enough calories to loose weight.       Start walking three days a week 10-20 minutes at a time    Work towards walking thirty minutes five days a week     Eventually, increase the speed of your walking for 1-2 minutes at time    And look into health and wellness programs that may be available through your health insurance provider, employer, local community center, or remigio club.    Surgery:    Surgery for obstructive sleep apnea is considered generally only when other therapies fail to work. Surgery may be discussed with you if you are having a difficult time tolerating CPAP and or when there is an abnormal structure that requires surgical correction.  Nose and throat surgeries often enlarge the airway to prevent collapse.  Most of these surgeries create pain for 1-2 weeks and up to half of the most common surgeries are not effective throughout life.  You should carefully discuss the benefits and drawbacks to surgery with your sleep provider and surgeon to determine if it is the best solution for you.   More information  Surgery for FRANKY is directed at areas that are responsible for narrowing or complete obstruction of the airway during sleep.  There are a wide range of procedures available to enlarge and/or stabilize the airway to prevent blockage of breathing in the three major areas where it can occur: the palate, tongue, and nasal regions.  Successful surgical treatment depends on the accurate identification of the factors responsible for obstructive sleep apnea in each person.  A personalized approach is required because there is no single treatment that works well for everyone.  Because of anatomic variation, consultation with an examination by a sleep surgeon is a critical  first step in determining what surgical options are best for each patient.  In some cases, examination during sedation may be recommended in order to guide the selection of procedures.  Patients will be counseled about risks and benefits as well as the typical recovery course after surgery. Surgery is typically not a cure for a person s FRANKY.  However, surgery will often significantly improve one s FRANKY severity (termed  success rate ).  Even in the absence of a cure, surgery will decrease the cardiovascular risk associated with OSA7; improve overall quality of life8 (sleepiness, functionality, sleep quality, etc).      Palate Procedures:  Patients with FRANKY often have narrowing of their airway in the region of their tonsils and uvula.  The goals of palate procedures are to widen the airway in this region as well as to help the tissues resist collapse.  Modern palate procedure techniques focus on tissue conservation and soft tissue rearrangement, rather than tissue removal.  Often the uvula is preserved in this procedure. Residual sleep apnea is common in patient after pharyngoplasty with an average reduction in sleep apnea events of 33%2.      Tongue Procedures:  ExamWhile patients are awake, the muscles that surround the throat are active and keep this region open for breathing. These muscles relax during sleep, allowing the tongue and other structures to collapse and block breathing.  There are several different tongue procedures available.  Selection of a tongue base procedure depends on characteristics seen on physical exam.  Generally, procedures are aimed at removing bulky tissues in this area or preventing the back of the tongue from falling back during sleep.  Success rates for tongue surgery range from 50-62%3.    Hypoglossal Nerve Stimulation:  Hypoglossal nerve stimulation has recently received approval from the [place] Food and Drug Administration for the treatment of obstructive sleep apnea.  This  is based on research showing that the system was safe and effective in treating sleep apnea6.  Results showed that the median AHI score decreased 68%, from 29.3 to 9.0. This therapy uses an implant system that senses breathing patterns and delivers mild stimulation to airway muscles, which keeps the airway open during sleep.  The system consists of three fully implanted components: a small generator (similar in size to a pacemaker), a breathing sensor, and a stimulation lead.  Using a small handheld remote, a patient turns the therapy on before bed and off upon awakening.    Candidates for this device must be greater than 22 years of age, have moderate to severe FRANKY (AHI between 20-65), BMI less than 32, have tried CPAP/oral appliance without success, and have appropriate upper airway anatomy (determined by a sleep endoscopy performed by Dr. Pereira).    Hypoglossal Nerve Stimulation Pathway:    The sleep surgeon s office will work with the patient through the insurance prior-authorization process (including communications and appeals).    Nasal Procedures:  Nasal obstruction can interfere with nasal breathing during the day and night.  Studies have shown that relief of nasal obstruction can improve the ability of some patients to tolerate positive airway pressure therapy for obstructive sleep apnea1.  Treatment options include medications such as nasal saline, topical corticosteroid and antihistamine sprays, and oral medications such as antihistamines or decongestants. Non-surgical treatments can include external nasal dilators for selected patients. If these are not successful by themselves, surgery can improve the nasal airway either alone or in combination with these other options.      Combination Procedures:  Combination of surgical procedures and other treatments may be recommended, particularly if patients have more than one area of narrowing or persistent positional disease.  The success rate of combination  surgery ranges from 66-80%2,3.    References  1. Rajesh BUSTILLO. The Role of the Nose in Snoring and Obstructive Sleep Apnoea: An Update.  Eur Arch Otorhinolaryngol. 2011; 268: 1365-73.  2.  Fatou SM; Mary JA; Naina JR; Pallanch JF; Sophy MB; Ronald SG; Douglas NICOLE. Surgical modifications of the upper airway for obstructive sleep apnea in adults: a systematic review and meta-analysis. SLEEP 2010;33(10):7454-1965. Alfredo BOWSER. Hypopharyngeal surgery in obstructive sleep apnea: an evidence-based medicine review.  Arch Otolaryngol Head Neck Surg. 2006 Feb;132(2):206-13.  3. Shelton YH1, Pauly Y, Ronnie MAGO. The efficacy of anatomically based multilevel surgery for obstructive sleep apnea. Otolaryngol Head Neck Surg. 2003 Oct;129(4):327-35.  4. Alfredo BOWSER, Goldberg A. Hypopharyngeal Surgery in Obstructive Sleep Apnea: An Evidence-Based Medicine Review. Arch Otolaryngol Head Neck Surg. 2006 Feb;132(2):206-13.  5. Owen PJ et al. Upper-Airway Stimulation for Obstructive Sleep Apnea.  N Engl J Med. 2014 Jan 9;370(2):139-49.  6. Heidi Y et al. Increased Incidence of Cardiovascular Disease in Middle-aged Men with Obstructive Sleep Apnea. Am J Respir Crit Care Med; 2002 166: 159-165  7. Sanders EM et al. Studying Life Effects and Effectiveness of Palatopharyngoplasty (SLEEP) study: Subjective Outcomes of Isolated Uvulopalatopharyngoplasty. Otolaryngol Head Neck Surg. 2011; 144: 623-631.        WHAT IF I ONLY HAVE SNORING?      Mandibular advancement devices, lateral sleep positioning, long-term weight loss and treatment of nasal allergies have been shown to improve snoring.    Exercising tongue muscles with a game (https://www.ncbi.nlm.nih.gov/pubmed/58826299) or stimulating the tongue during the day with a device (https://doi.org/10.3390/gok14031709) have improved snoring in some individuals.    Remember to Drive Safe... Drive Alive     Sleep health profoundly affects your health, mood, and your safety.  Thirty three percent of  the population (one in three of us) is not getting enough sleep and many have a sleep disorder. Not getting enough sleep or having an untreated / undertreated sleep condition may make us sleepy without even knowing it. In fact, our driving could be dramatically impaired due to our sleep health. As your provider, here are some things I would like you to know about driving:     Here are some warning signs for impairment and dangerous drowsy driving:              -Having been awake more than 16 hours               -Looking tired               -Eyelid drooping              -Head nodding (it could be too late at this point)              -Driving for more than 30 minutes     Some things you could do to make the driving safer if you are experiencing some drowsiness:              -Stop driving and rest              -Call for transportation              -Make sure your sleep disorder is adequately treated     Some things that have been shown NOT to work when experiencing drowsiness while driving:              -Turning on the radio              -Opening windows              -Eating any  distracting  /  entertaining  foods (e.g., sunflower seeds, candy, or any other)              -Talking on the phone      Your decision may not only impact your life, but also the life of others. Please, remember to drive safe for yourself and all of us.

## 2022-04-14 DIAGNOSIS — Z11.59 SCREENING FOR VIRAL DISEASE: ICD-10-CM

## 2022-04-16 NOTE — TELEPHONE ENCOUNTER
Routing refill request to provider for review/approval because:  Drug not on the [facility] refill protocol     Last Written Prescription Date:  2/7/2022  Last Fill Quantity: 8 kit,  # refills: 1   Last office visit provider:  1/19/2022     Requested Prescriptions   Pending Prescriptions Disp Refills     FLOWFLEX COVID-19 AG HOME TEST KIT [Pharmacy Med Name: Flowflex COVID-19 Ag Home Test In Vitro Kit] 8 kit 0     Sig: USE AS DIRECTED       There is no refill protocol information for this order          Hoda Patel RN 04/16/22 3:15 PM

## 2022-04-18 DIAGNOSIS — R63.30 FEEDING DIFFICULTIES: Primary | ICD-10-CM

## 2022-04-18 RX ORDER — COVID-19 ANTIGEN TEST
KIT MISCELLANEOUS
Qty: 8 KIT | Refills: 0 | Status: SHIPPED | OUTPATIENT
Start: 2022-04-18 | End: 2022-05-31

## 2022-04-19 ENCOUNTER — HOSPITAL ENCOUNTER (OUTPATIENT)
Dept: INTERVENTIONAL RADIOLOGY/VASCULAR | Facility: HOSPITAL | Age: 57
Discharge: HOME | End: 2022-04-19
Attending: NURSE PRACTITIONER | Admitting: RADIOLOGY
Payer: COMMERCIAL

## 2022-04-19 DIAGNOSIS — R63.30 FEEDING DIFFICULTIES: Primary | ICD-10-CM

## 2022-04-19 DIAGNOSIS — R63.30 FEEDING DIFFICULTIES: ICD-10-CM

## 2022-04-19 PROCEDURE — 255N000002 HC RX 255 OP 636: Performed by: RADIOLOGY

## 2022-04-19 PROCEDURE — C1769 GUIDE WIRE: HCPCS

## 2022-04-19 PROCEDURE — 49452 REPLACE G-J TUBE PERC: CPT

## 2022-04-19 RX ORDER — LIDOCAINE 40 MG/G
CREAM TOPICAL
Status: DISCONTINUED | OUTPATIENT
Start: 2022-04-19 | End: 2022-04-20 | Stop reason: HOSPADM

## 2022-04-19 RX ADMIN — IOHEXOL 10 ML: 350 INJECTION, SOLUTION INTRAVENOUS at 10:18

## 2022-05-10 ENCOUNTER — VIRTUAL VISIT (OUTPATIENT)
Dept: FAMILY MEDICINE | Facility: CLINIC | Age: 57
End: 2022-05-10
Payer: COMMERCIAL

## 2022-05-10 DIAGNOSIS — Z78.9 ON TUBE FEEDING DIET: ICD-10-CM

## 2022-05-10 DIAGNOSIS — G40.909 SEIZURE DISORDER (H): ICD-10-CM

## 2022-05-10 DIAGNOSIS — I69.959 HEMIPLEGIA OF NONDOMINANT SIDE, LATE EFFECT OF CEREBROVASCULAR DISEASE (H): ICD-10-CM

## 2022-05-10 DIAGNOSIS — U07.1 SARS-COV-2 POSITIVE: Primary | ICD-10-CM

## 2022-05-10 PROCEDURE — 99214 OFFICE O/P EST MOD 30 MIN: CPT | Mod: 95 | Performed by: FAMILY MEDICINE

## 2022-05-10 NOTE — PATIENT INSTRUCTIONS
The initial information presented today was that Liu has increasing cough and phlegm production and a low  grade fever.      Due to that, his positive home Covid-19 test, and him being at risk for severe illness (pneumonia, blood clots, respiratory failure), it is important for him to be evaluated in person before any treatment is initiated.    You have been advised that he should be brought to the ER for a more comprehensive evaluation. You, on behalf of [facility], have declined to do so at the moment and have ended the video visit. Reconsider this decision and bring hm to the ER today so he can be fully assessed and be given the most appropriate treatment for his condition.

## 2022-05-10 NOTE — PROGRESS NOTES
"Liu is a 56 year old who is being evaluated via a billable video visit.      How would you like to obtain your AVS? MyChart  If the video visit is dropped, the invitation should be resent by: Text to cell phone: [phone]  Will anyone else be joining your video visit? No    Video Start Time: 10:48 AM    Assessment & Plan     SARS-CoV-2 positive   - Home Ag test  Hemiplegia of nondominant side, late effect of cerebrovascular disease (H)  Seizure disorder (H)  On tube feeding diet    Patient is non-verbal. He was seen on video laying on a recliner alert and awake but not in acute distress.  Discussed with patient's daughter that based on the reported initial history on rooming in and confirmation by provider, patient may be developing lower respiratory infection complication (pneumonia, pneumonitis).  Furthermore, patient has co-morbidities that increases his risk for severe illness (see above). Daughter was advised patient may deteriorate quickly if he has developing pneumonia, and could go into respiratory failure.  Discussed with daughter available monoclonal antibody and paxlovid for treatment. However, due to the above concerns, safety of home treatment cannot be guaranteed. Hence, it was highly advised for patient to be brought to the ER now for more comprehensive eval. His renal function and other labs will also need to be repeated to ensure safety of treatment. If paxlovid is started, it will also need to be started 12 hours after statin is held.     Daughter declined multiple times to bring patient to the ER citing the last time he went to the ER, he \"got sicker\".  Reiterated to daughter that in this case he does need a more thorough evaluation and will be best cared for if that is carried out. Provider encouraged her to reconsider and bring him to the ER today. Daughter thanked provider for the time, and she ended the video visit.     Patient Instructions   The initial information presented today was that " "Liu has increasing cough and phlegm production and a low  grade fever.      Due to that, his positive home Covid-19 test, and him being at risk for severe illness (pneumonia, blood clots, respiratory failure), it is important for him to be evaluated in person before any treatment is initiated.    You have been advised that he should be brought to the ER for a more comprehensive evaluation. You, on behalf of Liu, have declined to do so at the moment and have ended the video visit. Reconsider this decision and bring hm to the ER today so he can be fully assessed and be given the most appropriate treatment for his condition.      No follow-ups on file.    Tramaine Morocho MD  [facility]    Subjective   Liu is a 56 year old who presents for the following health issues  accompanied by his daughter.  Daughter had patient on video call on another phone, so she directed her phone's camera to the other to show him on video.  HPI   Chief Complaint   Patient presents with     Covid Concern     Pt tested positive for covid on Saturday.  Looking for treatment         COVID-19 Symptom Review  How many days ago did these symptoms start? Started Saturday    Are any of the following symptoms significant for you?  New or worsening difficulty breathing? Yes    Please describe what kind of difficulty you are having breathing:No dyspnea, or dyspnea at patients normal baseline    Worsening cough? Yes, coughing up mucus - daughter said more than usual    Fever or chills? Yes, the highest temperature was 100 F; temp at time of visit 99.5 F    Headache: no, just dizziness    Sore throat: yes    Chest pain: no    Diarrhea: YES    Body aches? YES    Daughter said patient appears \"more tired now\".  What treatments has patient tried? Tylenol cold and flu, theraflu, mucinex, tylenol   Does patient live in a nursing home, group home, or shelter? no  Does patient have a way to get food/medications during quarantined? Yes, " "I have a friend or family member who can help me.    Patient is on G-tube feedings purely. Has hemiplegia due to previous CVA.          No flowsheet data found.    Estimated body mass index is 23.78 kg/m  as calculated from the following:    Height as of 4/7/22: 1.575 m (5' 2\").    Weight as of 4/7/22: 59 kg (130 lb).     GFR Estimate   Date Value Ref Range Status   01/29/2022 76 >60 mL/min/1.73m2 Final     Comment:     Effective December 21, 2021 eGFRcr in adults is calculated using the 2021 CKD-EPI creatinine equation which includes age and gender (Josefa et al., [place], DOI: 10.1056/HORTxr9504058)   04/15/2021 >60 >60 mL/min/1.73m2 Final   12/20/2016 66 >60 mL/min/1.7m2 Final     Comment:     Non  GFR Calc        FDA Facts Sheet  [employer] Drug Interaction review    Medications were reviewed with the patient and held or adjusted where applicable.    Current Outpatient Medications   Medication     acetaminophen (TYLENOL) 500 MG tablet     aspirin 81 MG chewable tablet     baclofen (LIORESAL) 10 MG tablet     bisacodyl (DULCOLAX) 10 MG suppository     chlorhexidine (PERIDEX) 0.12 % solution     esomeprazole (NEXIUM) 20 MG DR capsule     ferrous sulfate 220 (44 Fe) MG/5ML ELIX     FLOWFLEX COVID-19 AG HOME TEST KIT     guaiFENesin (ROBITUSSIN) 100 MG/5ML liquid     ipratropium - albuterol 0.5 mg/2.5 mg/3 mL (DUONEB) 0.5-2.5 (3) MG/3ML neb solution     levETIRAcetam (KEPPRA) 100 MG/ML solution     Lidocaine (LIDOCARE) 4 % Patch     meclizine (ANTIVERT) 25 MG tablet     metoprolol tartrate (LOPRESSOR) 25 MG tablet     nitroFURantoin macrocrystal-monohydrate (MACROBID) 100 MG capsule     polyethylene glycol (MIRALAX) 17 GM/Dose powder     Polyethylene Glycol 400 (VISINE DRY EYE RELIEF) 1 % SOLN     QUEtiapine (SEROQUEL) 25 MG tablet     QUEtiapine (SEROQUEL) 50 MG tablet     simvastatin (ZOCOR) 20 MG tablet     No current facility-administered medications for this visit.         Review of Systems "   Constitutional, HEENT, cardiovascular, pulmonary, GI, , musculoskeletal, neuro, skin, endocrine and psych systems are negative, except as otherwise noted.      Objective           Vitals:  No vitals were obtained today due to virtual visit.    Physical Exam   GENERAL: alert, not in acute distress, laying on recliner and unable to get up on his own  EYES: Eyes grossly normal to inspection.  No discharge or erythema, or obvious scleral/conjunctival abnormalities.  RESP: No audible wheeze, cough, or visible cyanosis.  No visible retractions or increased work of breathing.    SKIN: Visible skin clear. No significant rash, abnormal pigmentation or lesions.  NEURO: speech non-verbal and hemiplegic  PSYCH: affect flat, patient does look at the phone camera when focused by family member    No results found for any visits on 05/10/22.        Video-Visit Details    Type of service:  Video Visit    Video End Time:11:07 AM    Originating Location (pt. Location): Home    Distant Location (provider location):  [facility]     Platform used for Video Visit: [employer]

## 2022-05-29 ENCOUNTER — MYC MEDICAL ADVICE (OUTPATIENT)
Dept: FAMILY MEDICINE | Facility: CLINIC | Age: 57
End: 2022-05-29
Payer: COMMERCIAL

## 2022-05-29 DIAGNOSIS — J43.9 PULMONARY EMPHYSEMA, UNSPECIFIED EMPHYSEMA TYPE (H): ICD-10-CM

## 2022-05-29 DIAGNOSIS — J47.9 BRONCHIECTASIS WITHOUT COMPLICATION (H): ICD-10-CM

## 2022-05-29 DIAGNOSIS — T17.908D ASPIRATION INTO RESPIRATORY TRACT, SUBSEQUENT ENCOUNTER: Primary | ICD-10-CM

## 2022-05-29 DIAGNOSIS — Z11.59 SCREENING FOR VIRAL DISEASE: ICD-10-CM

## 2022-05-29 DIAGNOSIS — R42 VERTIGO: ICD-10-CM

## 2022-05-30 ENCOUNTER — APPOINTMENT (OUTPATIENT)
Dept: CT IMAGING | Facility: CLINIC | Age: 57
End: 2022-05-30
Attending: EMERGENCY MEDICINE
Payer: COMMERCIAL

## 2022-05-30 ENCOUNTER — HOSPITAL ENCOUNTER (EMERGENCY)
Facility: CLINIC | Age: 57
Discharge: HOME | End: 2022-05-30
Attending: EMERGENCY MEDICINE | Admitting: EMERGENCY MEDICINE
Payer: COMMERCIAL

## 2022-05-30 VITALS
SYSTOLIC BLOOD PRESSURE: 151 MMHG | DIASTOLIC BLOOD PRESSURE: 96 MMHG | HEART RATE: 106 BPM | BODY MASS INDEX: 23.92 KG/M2 | WEIGHT: 130 LBS | HEIGHT: 62 IN | RESPIRATION RATE: 25 BRPM | OXYGEN SATURATION: 95 % | TEMPERATURE: 98.6 F

## 2022-05-30 DIAGNOSIS — J69.0 ASPIRATION PNEUMONITIS (H): ICD-10-CM

## 2022-05-30 LAB
ALBUMIN SERPL-MCNC: 3.2 G/DL (ref 3.5–5)
ALBUMIN UR-MCNC: 300 MG/DL
ALP SERPL-CCNC: 62 U/L (ref 45–120)
ALT SERPL W P-5'-P-CCNC: 26 U/L (ref 0–45)
ANION GAP SERPL CALCULATED.3IONS-SCNC: 13 MMOL/L (ref 5–18)
APPEARANCE UR: CLEAR
AST SERPL W P-5'-P-CCNC: 31 U/L (ref 0–40)
BASOPHILS # BLD AUTO: 0.1 10E3/UL (ref 0–0.2)
BASOPHILS NFR BLD AUTO: 1 %
BILIRUB SERPL-MCNC: 0.4 MG/DL (ref 0–1)
BILIRUB UR QL STRIP: NEGATIVE
BNP SERPL-MCNC: <10 PG/ML (ref 0–49)
BUN SERPL-MCNC: 36 MG/DL (ref 8–22)
C REACTIVE PROTEIN LHE: 0.4 MG/DL (ref 0–0.8)
CALCIUM SERPL-MCNC: 8.7 MG/DL (ref 8.5–10.5)
CHLORIDE BLD-SCNC: 113 MMOL/L (ref 98–107)
CO2 SERPL-SCNC: 21 MMOL/L (ref 22–31)
COLOR UR AUTO: ABNORMAL
CREAT SERPL-MCNC: 1.3 MG/DL (ref 0.7–1.3)
EOSINOPHIL # BLD AUTO: 0.3 10E3/UL (ref 0–0.7)
EOSINOPHIL NFR BLD AUTO: 3 %
ERYTHROCYTE [DISTWIDTH] IN BLOOD BY AUTOMATED COUNT: 12.3 % (ref 10–15)
ERYTHROCYTE [SEDIMENTATION RATE] IN BLOOD BY WESTERGREN METHOD: 74 MM/HR (ref 0–15)
GFR SERPL CREATININE-BSD FRML MDRD: 64 ML/MIN/1.73M2
GLUCOSE BLD-MCNC: 104 MG/DL (ref 70–125)
GLUCOSE UR STRIP-MCNC: NEGATIVE MG/DL
HCT VFR BLD AUTO: 37 % (ref 40–53)
HGB BLD-MCNC: 12 G/DL (ref 13.3–17.7)
HGB UR QL STRIP: ABNORMAL
HYALINE CASTS: 3 /LPF
IMM GRANULOCYTES # BLD: 0 10E3/UL
IMM GRANULOCYTES NFR BLD: 1 %
KETONES UR STRIP-MCNC: NEGATIVE MG/DL
LACTATE SERPL-SCNC: 0.9 MMOL/L (ref 0.7–2)
LEUKOCYTE ESTERASE UR QL STRIP: NEGATIVE
LIPASE SERPL-CCNC: 70 U/L (ref 0–52)
LYMPHOCYTES # BLD AUTO: 2.7 10E3/UL (ref 0.8–5.3)
LYMPHOCYTES NFR BLD AUTO: 32 %
MCH RBC QN AUTO: 31.3 PG (ref 26.5–33)
MCHC RBC AUTO-ENTMCNC: 32.4 G/DL (ref 31.5–36.5)
MCV RBC AUTO: 97 FL (ref 78–100)
MONOCYTES # BLD AUTO: 1.1 10E3/UL (ref 0–1.3)
MONOCYTES NFR BLD AUTO: 13 %
MUCOUS THREADS #/AREA URNS LPF: PRESENT /LPF
NEUTROPHILS # BLD AUTO: 4.5 10E3/UL (ref 1.6–8.3)
NEUTROPHILS NFR BLD AUTO: 50 %
NITRATE UR QL: NEGATIVE
NRBC # BLD AUTO: 0 10E3/UL
NRBC BLD AUTO-RTO: 0 /100
PH UR STRIP: 5.5 [PH] (ref 5–7)
PLATELET # BLD AUTO: 210 10E3/UL (ref 150–450)
POTASSIUM BLD-SCNC: 4.7 MMOL/L (ref 3.5–5)
PROT SERPL-MCNC: 8.5 G/DL (ref 6–8)
RBC # BLD AUTO: 3.83 10E6/UL (ref 4.4–5.9)
RBC URINE: 8 /HPF
SARS-COV-2 RNA RESP QL NAA+PROBE: NEGATIVE
SODIUM SERPL-SCNC: 147 MMOL/L (ref 136–145)
SP GR UR STRIP: 1.02 (ref 1–1.03)
SQUAMOUS EPITHELIAL: <1 /HPF
TROPONIN I SERPL-MCNC: <0.01 NG/ML (ref 0–0.29)
UROBILINOGEN UR STRIP-MCNC: <2 MG/DL
WBC # BLD AUTO: 8.7 10E3/UL (ref 4–11)
WBC URINE: 3 /HPF

## 2022-05-30 PROCEDURE — U0005 INFEC AGEN DETEC AMPLI PROBE: HCPCS | Performed by: EMERGENCY MEDICINE

## 2022-05-30 PROCEDURE — 83605 ASSAY OF LACTIC ACID: CPT | Performed by: EMERGENCY MEDICINE

## 2022-05-30 PROCEDURE — C9803 HOPD COVID-19 SPEC COLLECT: HCPCS

## 2022-05-30 PROCEDURE — 87040 BLOOD CULTURE FOR BACTERIA: CPT | Performed by: EMERGENCY MEDICINE

## 2022-05-30 PROCEDURE — 36415 COLL VENOUS BLD VENIPUNCTURE: CPT | Performed by: EMERGENCY MEDICINE

## 2022-05-30 PROCEDURE — 250N000011 HC RX IP 250 OP 636: Performed by: EMERGENCY MEDICINE

## 2022-05-30 PROCEDURE — 85025 COMPLETE CBC W/AUTO DIFF WBC: CPT | Performed by: EMERGENCY MEDICINE

## 2022-05-30 PROCEDURE — 99285 EMERGENCY DEPT VISIT HI MDM: CPT | Mod: 25

## 2022-05-30 PROCEDURE — 83880 ASSAY OF NATRIURETIC PEPTIDE: CPT | Performed by: EMERGENCY MEDICINE

## 2022-05-30 PROCEDURE — 96365 THER/PROPH/DIAG IV INF INIT: CPT | Mod: 59

## 2022-05-30 PROCEDURE — 81001 URINALYSIS AUTO W/SCOPE: CPT | Performed by: EMERGENCY MEDICINE

## 2022-05-30 PROCEDURE — 84484 ASSAY OF TROPONIN QUANT: CPT | Performed by: EMERGENCY MEDICINE

## 2022-05-30 PROCEDURE — 258N000003 HC RX IP 258 OP 636: Performed by: EMERGENCY MEDICINE

## 2022-05-30 PROCEDURE — 93005 ELECTROCARDIOGRAM TRACING: CPT | Performed by: EMERGENCY MEDICINE

## 2022-05-30 PROCEDURE — 74177 CT ABD & PELVIS W/CONTRAST: CPT

## 2022-05-30 PROCEDURE — 82040 ASSAY OF SERUM ALBUMIN: CPT | Performed by: EMERGENCY MEDICINE

## 2022-05-30 PROCEDURE — 83690 ASSAY OF LIPASE: CPT | Performed by: EMERGENCY MEDICINE

## 2022-05-30 PROCEDURE — 86140 C-REACTIVE PROTEIN: CPT | Performed by: EMERGENCY MEDICINE

## 2022-05-30 PROCEDURE — 80053 COMPREHEN METABOLIC PANEL: CPT | Performed by: EMERGENCY MEDICINE

## 2022-05-30 PROCEDURE — 250N000013 HC RX MED GY IP 250 OP 250 PS 637: Performed by: EMERGENCY MEDICINE

## 2022-05-30 PROCEDURE — 96366 THER/PROPH/DIAG IV INF ADDON: CPT

## 2022-05-30 PROCEDURE — 85652 RBC SED RATE AUTOMATED: CPT | Performed by: EMERGENCY MEDICINE

## 2022-05-30 PROCEDURE — 71275 CT ANGIOGRAPHY CHEST: CPT

## 2022-05-30 RX ORDER — AMLODIPINE BESYLATE 10 MG/1
10 TABLET ORAL ONCE
Status: COMPLETED | OUTPATIENT
Start: 2022-05-30 | End: 2022-05-30

## 2022-05-30 RX ORDER — MECLIZINE HYDROCHLORIDE 25 MG/1
TABLET ORAL
Qty: 30 TABLET | Refills: 11 | Status: SHIPPED | OUTPATIENT
Start: 2022-05-30 | End: 2023-06-21

## 2022-05-30 RX ORDER — PIPERACILLIN SODIUM, TAZOBACTAM SODIUM 3; .375 G/15ML; G/15ML
3.38 INJECTION, POWDER, LYOPHILIZED, FOR SOLUTION INTRAVENOUS ONCE
Status: COMPLETED | OUTPATIENT
Start: 2022-05-30 | End: 2022-05-30

## 2022-05-30 RX ORDER — IOPAMIDOL 755 MG/ML
80 INJECTION, SOLUTION INTRAVASCULAR ONCE
Status: COMPLETED | OUTPATIENT
Start: 2022-05-30 | End: 2022-05-30

## 2022-05-30 RX ADMIN — IOPAMIDOL 80 ML: 755 INJECTION, SOLUTION INTRAVENOUS at 15:53

## 2022-05-30 RX ADMIN — SODIUM CHLORIDE 500 ML: 9 INJECTION, SOLUTION INTRAVENOUS at 21:06

## 2022-05-30 RX ADMIN — AMLODIPINE BESYLATE 10 MG: 10 TABLET ORAL at 19:34

## 2022-05-30 RX ADMIN — PIPERACILLIN AND TAZOBACTAM 3.38 G: 3; .375 INJECTION, POWDER, LYOPHILIZED, FOR SOLUTION INTRAVENOUS at 19:09

## 2022-05-30 RX ADMIN — SODIUM CHLORIDE 1000 ML: 9 INJECTION, SOLUTION INTRAVENOUS at 21:01

## 2022-05-30 ASSESSMENT — ENCOUNTER SYMPTOMS
ABDOMINAL PAIN: 1
SLEEP DISTURBANCE: 1
FATIGUE: 1
FEVER: 1
SHORTNESS OF BREATH: 1

## 2022-05-30 NOTE — ED PROVIDER NOTES
EMERGENCY DEPARTMENT ENCOUNTER       ED Course & Medical Decision Making     2:15 PM I introduced myself to the patient, obtained patient history, performed a physical exam, and discussed plan for ED workup including potential diagnostic laboratory/imaging studies and interventions.  7:12 PM Rechecked and updated the patient. We discussed the plan for discharge and the patient is agreeable. Reviewed supportive cares, symptomatic treatment, outpatient follow up, and reasons to return to the Emergency Department. Patient to be discharged by ED RN.         I saw and examined the patient.  IV was established and is placed on the monitor.  He is given some IV fluids.  Diagnostics ordered.      His work-up actually returns fairly benign with the exception of some reported mucus/debris in his airways which likely represents aspiration    No yi infiltrates but given his symptoms I think it would be reasonable to treat for an aspiration pneumonia.    Otherwise, he did have some hypernatremia mild increase in his BUN and creatinine and I suspect he has been dehydrated.  He was given a liter of saline here and his tachycardia resolved and the remainder of his work-up is unremarkable and reassuring.    I discussed options for disposition with the family and ultimately we agreed upon a trial of outpatient antibiotics with Augmentin, discharge, follow-up closely with primary care and return if anything worsens despite these therapies        Prior to making a final disposition on this patient the results of patient's tests and other diagnostic studies were discussed with the patient. All questions were answered. Patient expressed understanding of the plan and was amenable to it.    Medications   0.9% sodium chloride BOLUS (has no administration in time range)   piperacillin-tazobactam (ZOSYN) 3.375 g vial to attach to  mL bag (3.375 g Intravenous Given 5/30/22 1909)   amLODIPine (NORVASC) tablet 10 mg (has no  "administration in time range)   iopamidol (ISOVUE-370) solution 80 mL (80 mLs Intravenous Given 5/30/22 4580)       Final Impression     1. Aspiration pneumonitis (H)            Chief Complaint     Chief Complaint   Patient presents with     Fever     Palpitations       Patident brought to the ED by his daughter with complaints of \"palpitations and fever for last 3 days. She reports he has not been sleeping well.        HPI       Liu Malave is a 56 year old male with a history of CVA, hepatitis C, and aspiration pneumonia who presents for evaluation of fever.    Per the patient's daughter, the patient has had abnormal breath sounds since Thursday (4 days ago), low grade fever since Friday (3 days ago), and has not slept well the last 3 nights. His family also noticed he had an elevated heart rate over the past few days. The patient has a history of pneumonia and is on daily nitrofurantoin for recurrent UTI. The patient was in the ED last month with similar symptoms, but no cause was identified and they were told the patient likely had a cold. 3 weeks ago the patient tested positive for COVID, but he has since had a negative antigen test. He has been taking Tylenol at home.    The patient has a feeding tube in his upper abdomen. The feeding tube has been in place since he had a stroke in 2016. He recently has been complaining of pain in his abdomen, but his feeding tube is still infusing properly. The patient uses a wheelchair at baseline. He lives at home with his family who serves as his PCA.    Per chart review, the patient admitted to [facility] earlier this month with fevers to 100.2 and possible drainage from his PEG-J tube and discomfort at the area. CXR unchanged, only scarring in left mid lung, CT abd/pelvis without any acute process. WBC 14,000. UA neg. Patient started on ceftriaxone/azithromycin, PEG-J was exchanged. His fevers are most likely explained by recurrent silent aspiration events with " pneumonitis. His CXR did not show acute infiltrate, but patient has coarse breath sounds and difficulty clearing his airway. He had a hypoxic event on the day of discharge that improved with deep suctioning by RT, and CXR immediately following showed no new infiltrates.  He was discharged with a 5 day course of Augmentin, calazime and polysporin to apply around PEG-J, and on order for vest therapy. Referral placed for outpatient palliative care.    IEric am serving as a scribe to document services personally performed by Liu Price M.D. based on my observation and the provider's statements to me. ILiu M.D attest that Eric Wilson is acting in a scribe capacity, has observed my performance of the services and has documented them in accordance with my direction.    Past Medical History     Past Medical History:   Diagnosis Date     Acute UTI 02/21/2020     Anxiety      Basal ganglia hemorrhage (H) 06/20/2016     Brain compression (H)      Bruxism      Chronic static encephalopathy      Chronic static encephalopathy      Chronic static encephalopathy      Convulsions, unspecified convulsion type (H)      CVA (cerebral vascular accident) (H) 07/2016     Depression      Dyslipidemia      Dyslipidemia      Elevated troponin      Essential hypertension      Feeding by G-tube (H) 06/04/2020     GERD (gastroesophageal reflux disease)      GERD without esophagitis      Hemiplegia of nondominant side, late effect of cerebrovascular disease (H)      Hemorrhagic stroke (H) 06/05/2016     Hepatitis C carrier (H)      History of ESBL E. coli infection      History of hemorrhagic stroke with residual hemiparesis (H)      History of stroke with residual deficit      HTN (hypertension)      Hypertension      Lung nodule 12/05/2016     MRSA (methicillin resistant staph aureus) culture positive      Periodontal disease      Persistent fever      Pneumonia of both lower lobes due to infectious organism       Respiratory failure with hypoxia (H)      Seizure (H)      Sepsis due to urinary tract infection (H) 06/04/2020     Severe sepsis (H)      Stroke (H)      Trismus      Past Surgical History:   Procedure Laterality Date     aneurysm clipping  2006     BRAIN SURGERY       EXAM UNDER ANESTHESIA, RESTORATIONS, EXTRACTION(S) DENTAL COMPLEX, COMBINED N/A 12/19/2016    Procedure: COMBINED EXAM UNDER ANESTHESIA, RESTORATIONS, EXTRACTION(S) DENTAL COMPLEX;  Surgeon: Verónica Martinez DDS;  Location: [facility] OR     EXAM UNDER ANESTHESIA, RESTORATIONS, EXTRACTION(S) DENTAL COMPLEX, COMBINED N/A 3/7/2018    Procedure: COMBINED EXAM UNDER ANESTHESIA, RESTORATIONS, EXTRACTION(S) DENTAL COMPLEX;  Kenalog Injection in Bilateral Masseter, Combined Dental Exam,Radiographs, Three Dental Restorations, Periodontal Therapy and Fluoride Treatment;  Surgeon: Darryl Isbell DDS;  Location: [facility] OR     HH MIDLINE INSERTION  8/11/2017          INJECT STEROID (LOCATION) N/A 3/7/2018    Procedure: INJECT STEROID (LOCATION);  Kenalog Injection In Bilateral Masseter, Combined Dental Exam,Radiographs,Three Dental Restorations, Periodontal Therapy and Fluoride Treatment;  Surgeon: Liu Conroy DDS;  Location: [facility] OR     IR GASTRO JEJUNOSTOMY TUBE CHANGE  7/19/2016     IR GASTRO JEJUNOSTOMY TUBE CHANGE  9/23/2016     IR GASTRO JEJUNOSTOMY TUBE CHANGE  11/28/2016     IR GASTRO JEJUNOSTOMY TUBE CHANGE  1/27/2017     IR GASTRO JEJUNOSTOMY TUBE CHANGE  4/19/2017     IR GASTRO JEJUNOSTOMY TUBE CHANGE  6/20/2017     IR GASTRO JEJUNOSTOMY TUBE CHANGE  8/7/2017     IR GASTRO JEJUNOSTOMY TUBE CHANGE  8/11/2017     IR GASTRO JEJUNOSTOMY TUBE CHANGE  10/20/2017     IR GASTRO JEJUNOSTOMY TUBE CHANGE  1/2/2018     IR GASTRO JEJUNOSTOMY TUBE CHANGE  2/16/2018     IR GASTRO JEJUNOSTOMY TUBE CHANGE  5/16/2018     IR GASTRO JEJUNOSTOMY TUBE CHANGE  6/11/2018     IR GASTRO JEJUNOSTOMY TUBE CHANGE  9/12/2018     IR GASTRO JEJUNOSTOMY TUBE CHANGE  12/24/2018      IR GASTRO JEJUNOSTOMY TUBE CHANGE  3/18/2019     IR GASTRO JEJUNOSTOMY TUBE CHANGE  4/23/2019     IR GASTRO JEJUNOSTOMY TUBE CHANGE  5/21/2019     IR GASTRO JEJUNOSTOMY TUBE CHANGE  9/9/2019     IR GASTRO JEJUNOSTOMY TUBE CHANGE  9/26/2019     IR GASTRO JEJUNOSTOMY TUBE CHANGE  10/11/2019     IR GASTRO JEJUNOSTOMY TUBE CHANGE  11/21/2019     IR GASTRO JEJUNOSTOMY TUBE CHANGE  11/29/2019     IR GASTRO JEJUNOSTOMY TUBE CHANGE  2/27/2020     IR GASTRO JEJUNOSTOMY TUBE CHANGE  4/10/2020     IR GASTRO JEJUNOSTOMY TUBE CHANGE  7/2/2020     IR GASTRO JEJUNOSTOMY TUBE CHANGE  8/28/2020     IR GASTRO JEJUNOSTOMY TUBE CHANGE  9/17/2020     IR GASTRO JEJUNOSTOMY TUBE CHANGE  10/29/2020     IR GASTRO JEJUNOSTOMY TUBE CHANGE  11/6/2020     IR GASTRO JEJUNOSTOMY TUBE CHANGE  12/24/2020     IR GASTRO JEJUNOSTOMY TUBE CHANGE  1/9/2021     IR GASTRO JEJUNOSTOMY TUBE CHANGE  6/9/2021     IR GASTRO JEJUNOSTOMY TUBE CHANGE  10/4/2021     IR GASTRO JEJUNOSTOMY TUBE CHANGE  10/18/2021     IR GASTRO JEJUNOSTOMY TUBE CHANGE  1/14/2022     IR GASTRO JEJUNOSTOMY TUBE CHANGE  1/20/2022     IR GASTRO JEJUNOSTOMY TUBE CHANGE  4/19/2022     IR GASTRO JEJUNOSTOMY TUBE PLACEMENT  6/14/2016     IR GJ TUBE REPLACEMENT  12/24/2018     IR GJ TUBE REPLACEMENT  3/18/2019     IR GJ TUBE REPLACEMENT  4/23/2019     IR GJ TUBE REPLACEMENT  5/21/2019     IR GJ TUBE REPLACEMENT  9/9/2019     IR GJ TUBE REPLACEMENT  9/26/2019     IR GJ TUBE REPLACEMENT  10/11/2019     IR GJ TUBE REPLACEMENT  11/21/2019     IR GJ TUBE REPLACEMENT  11/29/2019     IR GJ TUBE REPLACEMENT  2/27/2020     IR GJ TUBE REPLACEMENT  4/10/2020     IR GJ TUBE REPLACEMENT  7/2/2020     IR GJ TUBE REPLACEMENT  8/28/2020     IR GJ TUBE REPLACEMENT  9/17/2020     IR GJ TUBE REPLACEMENT  10/29/2020     IR GJ TUBE REPLACEMENT  11/6/2020     IR GJ TUBE REPLACEMENT  12/24/2020     IR GJ TUBE REPLACEMENT  1/9/2021     IR GJ TUBE REPLACEMENT  6/9/2021     J-G tube       PICC  6/10/2016           "TRACHEOSTOMY      Removed     Family History   Problem Relation Age of Onset     Hypertension Mother      Cancer Father      Other Cancer Father      Kidney Disease Brother       Social History     Tobacco Use     Smoking status: Former Smoker     Packs/day: 1.00     Years: 30.00     Pack years: 30.00     Types: Cigarettes     Quit date: 2005     Years since quittin.9     Smokeless tobacco: Former User     Quit date: 2007     Tobacco comment: quit 2006   Vaping Use     Vaping Use: Never used   Substance Use Topics     Alcohol use: No     Drug use: No       Relevant past medical, surgical, family and social history as documented above, has been reviewed and discussed with patient. No changes or additions, unless otherwise noted in the HPI.    Current Medications     amoxicillin-clavulanate (AUGMENTIN) 875-125 MG tablet  acetaminophen (TYLENOL) 500 MG tablet  aspirin 81 MG chewable tablet  baclofen (LIORESAL) 10 MG tablet  bisacodyl (DULCOLAX) 10 MG suppository  chlorhexidine (PERIDEX) 0.12 % solution  esomeprazole (NEXIUM) 20 MG DR capsule  ferrous sulfate 220 (44 Fe) MG/5ML ELIX  FLOWFLEX COVID-19 AG HOME TEST KIT  guaiFENesin (ROBITUSSIN) 100 MG/5ML liquid  ipratropium - albuterol 0.5 mg/2.5 mg/3 mL (DUONEB) 0.5-2.5 (3) MG/3ML neb solution  levETIRAcetam (KEPPRA) 100 MG/ML solution  Lidocaine (LIDOCARE) 4 % Patch  meclizine (ANTIVERT) 25 MG tablet  metoprolol tartrate (LOPRESSOR) 25 MG tablet  nitroFURantoin macrocrystal-monohydrate (MACROBID) 100 MG capsule  polyethylene glycol (MIRALAX) 17 GM/Dose powder  Polyethylene Glycol 400 (VISINE DRY EYE RELIEF) 1 % SOLN  QUEtiapine (SEROQUEL) 25 MG tablet  QUEtiapine (SEROQUEL) 50 MG tablet  simvastatin (ZOCOR) 20 MG tablet        Allergies     Allergies   Allergen Reactions     Diphenhydramine Unknown     Trazodone Other (See Comments)     \"shaking\" per family       Review of Systems     Review of Systems   Constitutional: Positive for fatigue and fever. " "  Respiratory: Positive for shortness of breath.    Gastrointestinal: Positive for abdominal pain.   Psychiatric/Behavioral: Positive for sleep disturbance.   All other systems reviewed and are negative.       Remainder of systems reviewed, unless noted in HPI all others negative.    Physical Exam     BP (!) 151/89   Pulse 109   Temp 98.6  F (37  C) (Axillary)   Resp 19   Ht 1.575 m (5' 2\")   Wt 59 kg (130 lb)   SpO2 93%   BMI 23.78 kg/m      Physical Exam  Vitals and nursing note reviewed.   Constitutional:       General: He is not in acute distress.  HENT:      Head: Normocephalic.      Nose: Nose normal.   Eyes:      General: No scleral icterus.  Cardiovascular:      Rate and Rhythm: Normal rate.   Pulmonary:      Effort: Pulmonary effort is normal.      Comments: he does have a wet cough.  Scattered centralized rhonchi  Abdominal:      Comments: Centralized abdominal tenderness without peritoneal signs.  Feeding tube site looks clean and dry   Musculoskeletal:      Cervical back: Neck supple.   Skin:     Findings: No rash.   Neurological:      Mental Status: He is alert. Mental status is at baseline.   Psychiatric:         Mood and Affect: Mood normal.             Labs & Imaging         Labs Ordered and Resulted from Time of ED Arrival to Time of ED Departure   ROUTINE UA WITH MICROSCOPIC REFLEX TO CULTURE - Abnormal       Result Value    Color Urine Light Yellow      Appearance Urine Clear      Glucose Urine Negative      Bilirubin Urine Negative      Ketones Urine Negative      Specific Gravity Urine 1.024      Blood Urine 0.5 mg/dL (*)     pH Urine 5.5      Protein Albumin Urine 300  (*)     Urobilinogen Urine <2.0      Nitrite Urine Negative      Leukocyte Esterase Urine Negative      Mucus Urine Present (*)     RBC Urine 8 (*)     WBC Urine 3      Squamous Epithelials Urine <1      Hyaline Casts Urine 3 (*)    COMPREHENSIVE METABOLIC PANEL - Abnormal    Sodium 147 (*)     Potassium 4.7      Chloride " 113 (*)     Carbon Dioxide (CO2) 21 (*)     Anion Gap 13      Urea Nitrogen 36 (*)     Creatinine 1.30      Calcium 8.7      Glucose 104      Alkaline Phosphatase 62      AST 31      ALT 26      Protein Total 8.5 (*)     Albumin 3.2 (*)     Bilirubin Total 0.4      GFR Estimate 64     LIPASE - Abnormal    Lipase 70 (*)    ERYTHROCYTE SEDIMENTATION RATE AUTO - Abnormal    Erythrocyte Sedimentation Rate 74 (*)    CBC WITH PLATELETS AND DIFFERENTIAL - Abnormal    WBC Count 8.7      RBC Count 3.83 (*)     Hemoglobin 12.0 (*)     Hematocrit 37.0 (*)     MCV 97      MCH 31.3      MCHC 32.4      RDW 12.3      Platelet Count 210      % Neutrophils 50      % Lymphocytes 32      % Monocytes 13      % Eosinophils 3      % Basophils 1      % Immature Granulocytes 1      NRBCs per 100 WBC 0      Absolute Neutrophils 4.5      Absolute Lymphocytes 2.7      Absolute Monocytes 1.1      Absolute Eosinophils 0.3      Absolute Basophils 0.1      Absolute Immature Granulocytes 0.0      Absolute NRBCs 0.0     COVID-19 VIRUS (CORONAVIRUS) BY PCR - Normal    SARS CoV2 PCR Negative     LACTIC ACID WHOLE BLOOD - Normal    Lactic Acid 0.9     TROPONIN I - Normal    Troponin I <0.01     B-TYPE NATRIURETIC PEPTIDE ([facility] ONLY) - Normal    BNP <10     CRP INFLAMMATION - Normal    CRP 0.4     BLOOD CULTURE         Results for orders placed or performed during the hospital encounter of 05/30/22   CT Chest Pulmonary Embolism w Contrast    Impression    IMPRESSION:  1.  No evidence of pulmonary embolus.  2.  Mucus material in the trachea and mainstem bronchi. No other change compared to previous. No acute pulmonary infiltrates.   CT Abdomen Pelvis w Contrast    Impression    IMPRESSION:   1.  Gastrojejunostomy tube in good position. No acute findings in the abdomen or pelvis. No change from previous.   Asymptomatic COVID-19 Virus (Coronavirus) by PCR Nasopharyngeal    Specimen: Nasopharyngeal; Swab   Result Value Ref Range    SARS CoV2 PCR  Negative Negative   UA with Microscopic reflex to Culture    Specimen: Urine, Midstream   Result Value Ref Range    Color Urine Light Yellow Colorless, Straw, Light Yellow, Yellow    Appearance Urine Clear Clear    Glucose Urine Negative Negative mg/dL    Bilirubin Urine Negative Negative    Ketones Urine Negative Negative mg/dL    Specific Gravity Urine 1.024 1.001 - 1.030    Blood Urine 0.5 mg/dL (A) Negative    pH Urine 5.5 5.0 - 7.0    Protein Albumin Urine 300  (A) Negative mg/dL    Urobilinogen Urine <2.0 <2.0 mg/dL    Nitrite Urine Negative Negative    Leukocyte Esterase Urine Negative Negative    Mucus Urine Present (A) None Seen /LPF    RBC Urine 8 (H) <=2 /HPF    WBC Urine 3 <=5 /HPF    Squamous Epithelials Urine <1 <=1 /HPF    Hyaline Casts Urine 3 (H) <=2 /LPF   Comprehensive metabolic panel   Result Value Ref Range    Sodium 147 (H) 136 - 145 mmol/L    Potassium 4.7 3.5 - 5.0 mmol/L    Chloride 113 (H) 98 - 107 mmol/L    Carbon Dioxide (CO2) 21 (L) 22 - 31 mmol/L    Anion Gap 13 5 - 18 mmol/L    Urea Nitrogen 36 (H) 8 - 22 mg/dL    Creatinine 1.30 0.70 - 1.30 mg/dL    Calcium 8.7 8.5 - 10.5 mg/dL    Glucose 104 70 - 125 mg/dL    Alkaline Phosphatase 62 45 - 120 U/L    AST 31 0 - 40 U/L    ALT 26 0 - 45 U/L    Protein Total 8.5 (H) 6.0 - 8.0 g/dL    Albumin 3.2 (L) 3.5 - 5.0 g/dL    Bilirubin Total 0.4 0.0 - 1.0 mg/dL    GFR Estimate 64 >60 mL/min/1.73m2   Result Value Ref Range    Lipase 70 (H) 0 - 52 U/L   Lactic acid whole blood   Result Value Ref Range    Lactic Acid 0.9 0.7 - 2.0 mmol/L   Result Value Ref Range    Troponin I <0.01 0.00 - 0.29 ng/mL   B-Type Natriuretic Peptide ([facility] Only)   Result Value Ref Range    BNP <10 0 - 49 pg/mL   CRP inflammation   Result Value Ref Range    CRP 0.4 0.0 - 0.8 mg/dL   Erythrocyte sedimentation rate auto   Result Value Ref Range    Erythrocyte Sedimentation Rate 74 (H) 0 - 15 mm/hr   CBC with platelets and differential   Result Value Ref Range    WBC Count  8.7 4.0 - 11.0 10e3/uL    RBC Count 3.83 (L) 4.40 - 5.90 10e6/uL    Hemoglobin 12.0 (L) 13.3 - 17.7 g/dL    Hematocrit 37.0 (L) 40.0 - 53.0 %    MCV 97 78 - 100 fL    MCH 31.3 26.5 - 33.0 pg    MCHC 32.4 31.5 - 36.5 g/dL    RDW 12.3 10.0 - 15.0 %    Platelet Count 210 150 - 450 10e3/uL    % Neutrophils 50 %    % Lymphocytes 32 %    % Monocytes 13 %    % Eosinophils 3 %    % Basophils 1 %    % Immature Granulocytes 1 %    NRBCs per 100 WBC 0 <1 /100    Absolute Neutrophils 4.5 1.6 - 8.3 10e3/uL    Absolute Lymphocytes 2.7 0.8 - 5.3 10e3/uL    Absolute Monocytes 1.1 0.0 - 1.3 10e3/uL    Absolute Eosinophils 0.3 0.0 - 0.7 10e3/uL    Absolute Basophils 0.1 0.0 - 0.2 10e3/uL    Absolute Immature Granulocytes 0.0 <=0.4 10e3/uL    Absolute NRBCs 0.0 10e3/uL       Liu Price MD  Emergency Medicine  [facility] EMERGENCY ROOM  [facility] [facility] [address] [phone]  5/30/2022       Liu Price MD  05/30/22 2031

## 2022-05-30 NOTE — ED TRIAGE NOTES
"Patident brought to the ED by his daughter with complaints of \"palpitations and fever for last 3 days. She reports he has not been sleeping well.    "

## 2022-05-30 NOTE — Clinical Note
Liu Malave was seen and treated in our emergency department on 5/30/2022.  He may return to work on 06/04/2022.       If you have any questions or concerns, please don't hesitate to call.      Liu Price MD

## 2022-05-31 LAB
ATRIAL RATE - MUSE: 112 BPM
DIASTOLIC BLOOD PRESSURE - MUSE: 94 MMHG
INTERPRETATION ECG - MUSE: NORMAL
P AXIS - MUSE: 63 DEGREES
PR INTERVAL - MUSE: 140 MS
QRS DURATION - MUSE: 72 MS
QT - MUSE: 322 MS
QTC - MUSE: 439 MS
R AXIS - MUSE: 26 DEGREES
SYSTOLIC BLOOD PRESSURE - MUSE: 138 MMHG
T AXIS - MUSE: 60 DEGREES
VENTRICULAR RATE- MUSE: 112 BPM

## 2022-05-31 RX ORDER — COVID-19 ANTIGEN TEST
KIT MISCELLANEOUS
Qty: 8 KIT | Refills: 0 | Status: SHIPPED | OUTPATIENT
Start: 2022-05-31 | End: 2022-07-14

## 2022-05-31 NOTE — TELEPHONE ENCOUNTER
Routing to PCP to advise on abx for covid.    Would the patient qualify for covid treatment? It has been two weeks already. On 5/10 patient was advised by Dr. Morocho to be seen in ER but daughter and patient refused. Please advise on abx for treatment.    Thanks,  Mike CRAFT RN

## 2022-05-31 NOTE — TELEPHONE ENCOUNTER
Order placed for vest for chest physiotherapy.  Please give patient's daughter the phone number for the DME store.

## 2022-05-31 NOTE — ED NOTES
Patient and family sent home with pulse oximeter, writer completed verbal instruction.  Family asked about a physiotherapy vest, was told to follow up with primary MD.

## 2022-06-04 LAB — BACTERIA BLD CULT: NO GROWTH

## 2022-06-06 ENCOUNTER — TELEPHONE (OUTPATIENT)
Dept: FAMILY MEDICINE | Facility: CLINIC | Age: 57
End: 2022-06-06
Payer: COMMERCIAL

## 2022-06-06 NOTE — TELEPHONE ENCOUNTER
Left message to return call (daughter Idalmis) to schedule a virtual visit to discuss medication changes

## 2022-06-07 ENCOUNTER — HOSPITAL ENCOUNTER (OUTPATIENT)
Dept: INTERVENTIONAL RADIOLOGY/VASCULAR | Facility: HOSPITAL | Age: 57
Discharge: HOME | End: 2022-06-07
Attending: NURSE PRACTITIONER | Admitting: RADIOLOGY
Payer: COMMERCIAL

## 2022-06-07 VITALS
SYSTOLIC BLOOD PRESSURE: 161 MMHG | RESPIRATION RATE: 20 BRPM | DIASTOLIC BLOOD PRESSURE: 94 MMHG | OXYGEN SATURATION: 96 %

## 2022-06-07 DIAGNOSIS — R63.30 FEEDING DIFFICULTIES: ICD-10-CM

## 2022-06-07 PROCEDURE — 272N000570 HC SHEATH CR7

## 2022-06-07 PROCEDURE — C1769 GUIDE WIRE: HCPCS

## 2022-06-07 PROCEDURE — 49452 REPLACE G-J TUBE PERC: CPT

## 2022-06-07 PROCEDURE — 255N000002 HC RX 255 OP 636: Performed by: NURSE PRACTITIONER

## 2022-06-07 RX ADMIN — IOHEXOL 15 ML: 300 INJECTION, SOLUTION INTRAVENOUS at 09:17

## 2022-06-09 ENCOUNTER — TELEPHONE (OUTPATIENT)
Dept: SLEEP MEDICINE | Facility: CLINIC | Age: 57
End: 2022-06-09
Payer: COMMERCIAL

## 2022-06-09 NOTE — TELEPHONE ENCOUNTER
Please extend patient's sleep study order. Expiration is 7/22, however HST appointments are booked out past that date.

## 2022-06-09 NOTE — TELEPHONE ENCOUNTER
Called and spoke with pt daughter to schedule pt HST and follow up appt. Daughter will call back to schedule.    Mitch Ledezma CNA

## 2022-06-20 NOTE — TELEPHONE ENCOUNTER
Spoke with daughter, she states that he is doing better with sleep. She will call back to make an appointment if needed.

## 2022-06-30 ENCOUNTER — MEDICAL CORRESPONDENCE (OUTPATIENT)
Dept: HEALTH INFORMATION MANAGEMENT | Facility: CLINIC | Age: 57
End: 2022-06-30

## 2022-07-07 DIAGNOSIS — F51.01 PRIMARY INSOMNIA: Primary | ICD-10-CM

## 2022-07-07 RX ORDER — MIRTAZAPINE 7.5 MG/1
TABLET, FILM COATED ORAL
Qty: 90 TABLET | Refills: 3 | Status: SHIPPED | OUTPATIENT
Start: 2022-07-07 | End: 2022-10-19

## 2022-07-11 ENCOUNTER — MEDICAL CORRESPONDENCE (OUTPATIENT)
Dept: HEALTH INFORMATION MANAGEMENT | Facility: CLINIC | Age: 57
End: 2022-07-11

## 2022-07-13 DIAGNOSIS — Z11.59 SCREENING FOR VIRAL DISEASE: ICD-10-CM

## 2022-07-14 DIAGNOSIS — R05.9 COUGH: ICD-10-CM

## 2022-07-14 RX ORDER — COVID-19 ANTIGEN TEST
KIT MISCELLANEOUS
Qty: 8 KIT | Refills: 0 | Status: SHIPPED | OUTPATIENT
Start: 2022-07-14 | End: 2022-08-22

## 2022-07-14 NOTE — TELEPHONE ENCOUNTER
Routing refill request to provider for review/approval because:  Drug not on the [facility] refill protocol     Last Written Prescription Date:  5/31/22  Last Fill Quantity: 8,  # refills: 0   Last office visit provider:  10/28/21     Requested Prescriptions   Pending Prescriptions Disp Refills     FLOWFLEX COVID-19 AG HOME TEST KIT [Pharmacy Med Name: Flowflex COVID-19 Ag Home Test In Vitro Kit] 8 kit 0     Sig: USE AS DIRECTED.       There is no refill protocol information for this order          Darryl Barrett RN 07/14/22 7:18 AM

## 2022-07-15 RX ORDER — GUAIFENESIN 200 MG/10ML
LIQUID ORAL
Qty: 500 ML | Refills: 3 | Status: SHIPPED | OUTPATIENT
Start: 2022-07-15 | End: 2022-11-29

## 2022-08-11 ENCOUNTER — TELEPHONE (OUTPATIENT)
Dept: FAMILY MEDICINE | Facility: CLINIC | Age: 57
End: 2022-08-11

## 2022-08-11 ENCOUNTER — NURSE TRIAGE (OUTPATIENT)
Dept: NURSING | Facility: CLINIC | Age: 57
End: 2022-08-11

## 2022-08-11 NOTE — TELEPHONE ENCOUNTER
[facility] calling. After identifying pt, had phone connection issues (unsure if from FNA or pharmacy's end).     FNA unable to hear caller on the other line. Caller disconnected.    FNA attempted to call number on caller ID, gets connected to [facility] main line. FNA ended call.    Carisa Mobley RN/[place] Nurse Advisor      Reason for Disposition    Unable to complete triage due to phone connection issues    Protocols used: NO CONTACT OR DUPLICATE CONTACT CALL-A-[place]

## 2022-08-11 NOTE — TELEPHONE ENCOUNTER
[facility] called to ask if a fax had been received for this patient.     They are requesting the medical necessity form be filled out and faxed back as soon as possible. Fax was sent on 08/05/2022. Return fax number: [phone].    If you have questions, please call Sulma at [phone] ext [phone].

## 2022-08-19 ENCOUNTER — HOSPITAL ENCOUNTER (OUTPATIENT)
Dept: INTERVENTIONAL RADIOLOGY/VASCULAR | Facility: CLINIC | Age: 57
Discharge: HOME | End: 2022-08-19
Attending: PHYSICIAN ASSISTANT | Admitting: RADIOLOGY
Payer: COMMERCIAL

## 2022-08-19 VITALS
HEART RATE: 82 BPM | RESPIRATION RATE: 18 BRPM | SYSTOLIC BLOOD PRESSURE: 159 MMHG | OXYGEN SATURATION: 96 % | DIASTOLIC BLOOD PRESSURE: 96 MMHG

## 2022-08-19 DIAGNOSIS — R63.30 FEEDING DIFFICULTIES: ICD-10-CM

## 2022-08-19 DIAGNOSIS — R63.30 FEEDING DIFFICULTIES: Primary | ICD-10-CM

## 2022-08-19 DIAGNOSIS — R45.1 AGITATION: ICD-10-CM

## 2022-08-19 PROCEDURE — 255N000002 HC RX 255 OP 636: Performed by: PHYSICIAN ASSISTANT

## 2022-08-19 PROCEDURE — C1769 GUIDE WIRE: HCPCS

## 2022-08-19 PROCEDURE — 49452 REPLACE G-J TUBE PERC: CPT

## 2022-08-19 RX ADMIN — IOHEXOL 10 ML: 350 INJECTION, SOLUTION INTRAVENOUS at 14:57

## 2022-08-19 NOTE — TELEPHONE ENCOUNTER
"Routing refill request to provider for review/approval because:  bp out of range    Last Written Prescription Date:  12/30/21  Last Fill Quantity: 102,  # refills: 3   Last office visit provider:  1/19/22     Requested Prescriptions   Pending Prescriptions Disp Refills     QUEtiapine (SEROQUEL) 25 MG tablet [Pharmacy Med Name: QUEtiapine Fumarate Oral Tablet 25 MG] 102 tablet 0     Sig: Give 1 tablet via G-tube in the morning and 1 tablet in the afternoon as needed for agitation.Give 2 tablets via G-tube at bedtime for sleep.       Antipsychotic Medications Failed - 8/19/2022  3:44 PM        Failed - Blood pressure under 140/90 in past 12 months     BP Readings from Last 3 Encounters:   08/19/22 (!) 159/96   06/07/22 (!) 161/94   05/30/22 (!) 151/96                 Failed - Recent (6 mo) or future (30 days) visit within the authorizing provider's specialty     Patient had office visit in the last 6 months or has a visit in the next 30 days with authorizing provider or within the authorizing provider's specialty.  See \"Patient Info\" tab in inbasket, or \"Choose Columns\" in Meds & Orders section of the refill encounter.            Passed - Patient is 12 years of age or older        Passed - Lipid panel on file within the past 12 months     Recent Labs   Lab Test 10/28/21  0852   CHOL 193   TRIG 224*   HDL 37*                  Passed - CBC on file in past 12 months     Recent Labs   Lab Test 05/30/22  1519   WBC 8.7   RBC 3.83*   HGB 12.0*   HCT 37.0*                    Passed - Heart Rate on file within past 12 months     Pulse Readings from Last 3 Encounters:   08/19/22 82   05/30/22 106   01/29/22 82               Passed - A1c or Glucose on file in past 12 months     Recent Labs   Lab Test 05/30/22  1519 01/28/22  0339 10/28/21  0852      < > 105   A1C  --   --  5.2    < > = values in this interval not displayed.       Please review patients last 3 weights. If a weight gain of >10 lbs exists, you " may refill the prescription once after instructing the patient to schedule an appointment within the next 30 days.    Wt Readings from Last 3 Encounters:   05/30/22 59 kg (130 lb)   04/07/22 59 kg (130 lb)   01/28/22 59 kg (130 lb)             Passed - Medication is active on med list             María Elena Byrd RN 08/19/22 6:51 PM

## 2022-08-19 NOTE — PROGRESS NOTES
Patient Name: Liu Malave  Medical Record Number: [id number]  Today's Date: 8/19/2022    Procedure: Image Guided Gastrojejunostomy tube exchange  Proceduralist: Dr. Luis Trevizo  Pathology present: n/a    Procedure Start: 1435  Procedure end: 1453  Sedation medications administered: none    Report given to: ASH Anderson  : Ty Raygoza, AMADA    Other Notes: Pt arrived to IR room #1 from IR Pre / Post 1. Consent reviewed. Pt denies any questions or concerns regarding procedure. Pt positioned supine and monitored per protocol. Dr. Trevizo exchanged for new 18 fr x 45 cm Gastrojejunostomy tube. Pt tolerated procedure without any noted complications. Pt transferred back to IR Pre / Post 1.

## 2022-08-19 NOTE — IP AVS SNAPSHOT
[facility]  [address] [address]  Phone: [phone]  Fax: [phone]                                      After Visit Summary   8/19/2022    Liu Malave   MRN: [id number]           After Visit Summary Signature Page    I have received my discharge instructions, and my questions have been answered. I have discussed any challenges I see with this plan with the nurse or doctor.    ..........................................................................................................................................  Patient/Patient Representative Signature      ..........................................................................................................................................  Patient Representative Print Name and Relationship to Patient    ..................................................               ................................................  Date                                   Time    ..........................................................................................................................................  Reviewed by Signature/Title    ...................................................              ..............................................  Date                                               Time          22EPIC Rev 08/18

## 2022-08-19 NOTE — DISCHARGE INSTRUCTIONS
Gastrojejunostomy (GJ) Tube Discharge Instructions:   You had a gastrojejunostomy (stomach-intestinal) also known as GJ tube placed on 8/19/2022. This tube is often used for nutritional support, medication administration and/or stomach venting.     Care instructions:  - If you received sedation for your procedure, do not drive or operate heavy machinery for the rest of the day.  - Avoid soaking in stagnant water (tub baths, Jacuzzis, pools, lake, or ocean).   - You may shower beginning the day after the tube was placed.   - Clean under the disc daily with soap and water. Pat dry under disc and apply new split gauze dressing under disc. Cleaning tube site daily will help prevent infection and skin irritation.  - A small amount of clear tan drainage from the tube exit site can be normal.  - Make sure the disc on the tube fits slightly snug against the skin so that the tube does not move in or out of the body easily.  - Flush your tube with 60cc of water twice a day (using a cath tip syringe) to prevent tube from clogging (or follow recommendations from your dietician if given).    Giving Feedings and Medications:  - Follow-up with your dietician, primary care provider, or oncologist for instructions on tube feedings and medication administration.    - ONLY use specific enteric feedings with your GJ tube (unless otherwise discussed with your dietitian).    - Flush tube at least twice daily with 60ml of water using cath tip syringe or follow dietician's instructions if given.  - Flush the tube before and after administrating medications and bolus feedings with 60cc of water.  - Note: Most jejunal (J port) feedings are given by a continuous method. A continuous feeding is given with a pump over a period of time, usually 12 to 24 hours based upon your specific situation.    Follow Up:  - Routine 3 month exchanges of feeding tube is recommended. Please contact [facility] at [phone] to arrange a GJ exchange  appointment.  - GJ tubes CANNOT be removed until 6 weeks after date of tube placement.  - T-fasteners/Buttons (suture) should be removed 7-10 days after tube placement. This may have been done while you were still and inpatient, or can be completed in your outpatient clinic or care facility. Please contact [facility] for T fastener questions or concerns at [phone].    Please seek medical evaluation for:  - Fever (greater than 101 F (38.3C)).  - Purulent (yellow/green/foul smelling) drainage from tube exit site.   - Significant or worsening abdominal pain.   - Skin that is hot to the touch or significantly reddened at the tube exit site.   - Bleeding at tube exit site.    Call [facility] at [phone]  with questions or if you have any of the following symptoms:  - Tube falls out or felt to be out of position.  - Unable to flush tube.  - Significant leakage around tube site (tube feeding, medications or drainage).  - Significant bleeding at the tube exit site.  - Severe pain at tube exit site.

## 2022-08-22 DIAGNOSIS — Z11.59 SCREENING FOR VIRAL DISEASE: ICD-10-CM

## 2022-08-22 DIAGNOSIS — H04.123 DRY EYES: ICD-10-CM

## 2022-08-22 RX ORDER — QUETIAPINE FUMARATE 25 MG/1
TABLET, FILM COATED ORAL
Qty: 102 TABLET | Refills: 3 | Status: SHIPPED | OUTPATIENT
Start: 2022-08-22 | End: 2022-11-29

## 2022-08-22 NOTE — TELEPHONE ENCOUNTER
Routing refill request to provider for review/approval because:  Drug not on the [facility] refill protocol     Last Written Prescription Date:  7/14/22  Last Fill Quantity: 8 kit,  # refills: 0   Last office visit provider:  10/28/21     Last Written Prescription Date:  10/28/21  Last Fill Quantity: 15 ml,  # refills: 4   Last office visit provider:  10/28/21    Requested Prescriptions   Pending Prescriptions Disp Refills     FLOWFLEX COVID-19 AG HOME TEST KIT [Pharmacy Med Name: Flowflex COVID-19 Ag Home Test In Vitro Kit] 8 kit 0     Sig: USE AS DIRECTED.       There is no refill protocol information for this order        VISINE DRY EYE RELIEF 1 % SOLN [Pharmacy Med Name: Visine Dry Eye Relief Ophthalmic Solution 1 %] 15 mL 0     Sig: Apply 1 drop to eye every 6 hours as needed (dry eyes)       There is no refill protocol information for this order          Darryl Barrett RN 08/22/22 3:21 PM

## 2022-08-23 RX ORDER — COVID-19 ANTIGEN TEST
KIT MISCELLANEOUS
Qty: 2 KIT | Refills: 0 | Status: SHIPPED | OUTPATIENT
Start: 2022-08-23 | End: 2022-09-28

## 2022-09-01 ENCOUNTER — TELEPHONE (OUTPATIENT)
Dept: FAMILY MEDICINE | Facility: CLINIC | Age: 57
End: 2022-09-01

## 2022-09-01 DIAGNOSIS — R05.9 COUGH: Primary | ICD-10-CM

## 2022-09-01 RX ORDER — DEXTROMETHORPHAN POLISTIREX 30 MG/5ML
60 SUSPENSION ORAL 2 TIMES DAILY PRN
Qty: 148 ML | Refills: 0 | Status: SHIPPED | OUTPATIENT
Start: 2022-09-01 | End: 2022-11-29

## 2022-09-11 ENCOUNTER — HEALTH MAINTENANCE LETTER (OUTPATIENT)
Age: 57
End: 2022-09-11

## 2022-09-13 ENCOUNTER — HOSPITAL ENCOUNTER (OUTPATIENT)
Dept: INTERVENTIONAL RADIOLOGY/VASCULAR | Facility: CLINIC | Age: 57
Discharge: HOME | End: 2022-09-13
Attending: NURSE PRACTITIONER | Admitting: RADIOLOGY
Payer: COMMERCIAL

## 2022-09-13 VITALS
SYSTOLIC BLOOD PRESSURE: 148 MMHG | RESPIRATION RATE: 18 BRPM | DIASTOLIC BLOOD PRESSURE: 90 MMHG | HEART RATE: 79 BPM | OXYGEN SATURATION: 96 %

## 2022-09-13 DIAGNOSIS — R63.30 FEEDING DIFFICULTIES: ICD-10-CM

## 2022-09-13 PROCEDURE — 49452 REPLACE G-J TUBE PERC: CPT

## 2022-09-13 PROCEDURE — C1769 GUIDE WIRE: HCPCS

## 2022-09-13 RX ORDER — IOPAMIDOL 755 MG/ML
100 INJECTION, SOLUTION INTRAVASCULAR ONCE
Status: DISCONTINUED | OUTPATIENT
Start: 2022-09-13 | End: 2022-09-14 | Stop reason: HOSPADM

## 2022-09-13 NOTE — PROGRESS NOTES
Patient Name: Liu Malave  Medical Record Number: [id number]  Today's Date: 9/13/2022    Procedure: Image Guided Gastrojejunostomy tube change  Proceduralist: Dr. John Malave  Pathology present: n/a    Procedure Start: 1410  Procedure end: 1415  Sedation medications administered: n/a, Lidocaine 1%    Report given to: ASH Anderson IR  : n/a    Other Notes: Pt arrived to IR room #1 from IR Pre / Post 1. Consent reviewed. Pt denies any questions or concerns regarding procedure. Pt positioned supine and monitored per protocol. 18 fr x 45 cm Gastrojejunostomy tube changed by Dr. Malave. Pt tolerated procedure without any noted complications. Pt transferred back to IR Pre / Post 1.

## 2022-09-18 ENCOUNTER — HOSPITAL ENCOUNTER (EMERGENCY)
Facility: CLINIC | Age: 57
Discharge: HOME | End: 2022-09-19
Attending: EMERGENCY MEDICINE | Admitting: EMERGENCY MEDICINE
Payer: COMMERCIAL

## 2022-09-18 ENCOUNTER — APPOINTMENT (OUTPATIENT)
Dept: RADIOLOGY | Facility: CLINIC | Age: 57
End: 2022-09-18
Attending: EMERGENCY MEDICINE
Payer: COMMERCIAL

## 2022-09-18 ENCOUNTER — APPOINTMENT (OUTPATIENT)
Dept: CT IMAGING | Facility: CLINIC | Age: 57
End: 2022-09-18
Attending: EMERGENCY MEDICINE
Payer: COMMERCIAL

## 2022-09-18 DIAGNOSIS — J69.0 ASPIRATION PNEUMONIA OF RIGHT LOWER LOBE DUE TO GASTRIC SECRETIONS (H): ICD-10-CM

## 2022-09-18 LAB
ALBUMIN SERPL-MCNC: 3.1 G/DL (ref 3.5–5)
ALP SERPL-CCNC: 74 U/L (ref 45–120)
ALT SERPL W P-5'-P-CCNC: 25 U/L (ref 0–45)
ANION GAP SERPL CALCULATED.3IONS-SCNC: 10 MMOL/L (ref 5–18)
AST SERPL W P-5'-P-CCNC: 24 U/L (ref 0–40)
BASOPHILS # BLD AUTO: 0.1 10E3/UL (ref 0–0.2)
BASOPHILS NFR BLD AUTO: 0 %
BILIRUB SERPL-MCNC: 0.4 MG/DL (ref 0–1)
BUN SERPL-MCNC: 35 MG/DL (ref 8–22)
CALCIUM SERPL-MCNC: 8.9 MG/DL (ref 8.5–10.5)
CHLORIDE BLD-SCNC: 108 MMOL/L (ref 98–107)
CO2 SERPL-SCNC: 21 MMOL/L (ref 22–31)
CREAT SERPL-MCNC: 1.28 MG/DL (ref 0.7–1.3)
EOSINOPHIL # BLD AUTO: 0 10E3/UL (ref 0–0.7)
EOSINOPHIL NFR BLD AUTO: 0 %
ERYTHROCYTE [DISTWIDTH] IN BLOOD BY AUTOMATED COUNT: 12 % (ref 10–15)
GFR SERPL CREATININE-BSD FRML MDRD: 64 ML/MIN/1.73M2
GLUCOSE BLD-MCNC: 133 MG/DL (ref 70–125)
HCT VFR BLD AUTO: 38.1 % (ref 40–53)
HGB BLD-MCNC: 12.6 G/DL (ref 13.3–17.7)
IMM GRANULOCYTES # BLD: 0.2 10E3/UL
IMM GRANULOCYTES NFR BLD: 1 %
LACTATE SERPL-SCNC: 1.5 MMOL/L (ref 0.7–2)
LYMPHOCYTES # BLD AUTO: 2.7 10E3/UL (ref 0.8–5.3)
LYMPHOCYTES NFR BLD AUTO: 13 %
MCH RBC QN AUTO: 31 PG (ref 26.5–33)
MCHC RBC AUTO-ENTMCNC: 33.1 G/DL (ref 31.5–36.5)
MCV RBC AUTO: 94 FL (ref 78–100)
MONOCYTES # BLD AUTO: 1.3 10E3/UL (ref 0–1.3)
MONOCYTES NFR BLD AUTO: 6 %
NEUTROPHILS # BLD AUTO: 16 10E3/UL (ref 1.6–8.3)
NEUTROPHILS NFR BLD AUTO: 80 %
NRBC # BLD AUTO: 0 10E3/UL
NRBC BLD AUTO-RTO: 0 /100
PLATELET # BLD AUTO: 242 10E3/UL (ref 150–450)
POTASSIUM BLD-SCNC: 4.7 MMOL/L (ref 3.5–5)
PROT SERPL-MCNC: 8.5 G/DL (ref 6–8)
RBC # BLD AUTO: 4.07 10E6/UL (ref 4.4–5.9)
SODIUM SERPL-SCNC: 139 MMOL/L (ref 136–145)
WBC # BLD AUTO: 20.1 10E3/UL (ref 4–11)

## 2022-09-18 PROCEDURE — 87040 BLOOD CULTURE FOR BACTERIA: CPT | Performed by: EMERGENCY MEDICINE

## 2022-09-18 PROCEDURE — 96361 HYDRATE IV INFUSION ADD-ON: CPT

## 2022-09-18 PROCEDURE — 70450 CT HEAD/BRAIN W/O DYE: CPT

## 2022-09-18 PROCEDURE — 85025 COMPLETE CBC W/AUTO DIFF WBC: CPT | Performed by: EMERGENCY MEDICINE

## 2022-09-18 PROCEDURE — 93005 ELECTROCARDIOGRAM TRACING: CPT | Performed by: EMERGENCY MEDICINE

## 2022-09-18 PROCEDURE — 83605 ASSAY OF LACTIC ACID: CPT | Performed by: EMERGENCY MEDICINE

## 2022-09-18 PROCEDURE — C9803 HOPD COVID-19 SPEC COLLECT: HCPCS

## 2022-09-18 PROCEDURE — 99285 EMERGENCY DEPT VISIT HI MDM: CPT | Mod: 25

## 2022-09-18 PROCEDURE — 80053 COMPREHEN METABOLIC PANEL: CPT | Performed by: EMERGENCY MEDICINE

## 2022-09-18 PROCEDURE — 87637 SARSCOV2&INF A&B&RSV AMP PRB: CPT | Performed by: EMERGENCY MEDICINE

## 2022-09-18 PROCEDURE — 71045 X-RAY EXAM CHEST 1 VIEW: CPT

## 2022-09-18 PROCEDURE — 36415 COLL VENOUS BLD VENIPUNCTURE: CPT | Performed by: EMERGENCY MEDICINE

## 2022-09-18 PROCEDURE — 258N000003 HC RX IP 258 OP 636: Performed by: EMERGENCY MEDICINE

## 2022-09-18 RX ORDER — SODIUM CHLORIDE 9 MG/ML
INJECTION, SOLUTION INTRAVENOUS CONTINUOUS
Status: DISCONTINUED | OUTPATIENT
Start: 2022-09-18 | End: 2022-09-19 | Stop reason: HOSPADM

## 2022-09-18 RX ORDER — PIPERACILLIN SODIUM, TAZOBACTAM SODIUM 3; .375 G/15ML; G/15ML
3.38 INJECTION, POWDER, LYOPHILIZED, FOR SOLUTION INTRAVENOUS ONCE
Status: COMPLETED | OUTPATIENT
Start: 2022-09-19 | End: 2022-09-19

## 2022-09-18 RX ADMIN — SODIUM CHLORIDE: 9 INJECTION, SOLUTION INTRAVENOUS at 23:25

## 2022-09-18 RX ADMIN — SODIUM CHLORIDE 1000 ML: 9 INJECTION, SOLUTION INTRAVENOUS at 23:24

## 2022-09-19 ENCOUNTER — APPOINTMENT (OUTPATIENT)
Dept: RADIOLOGY | Facility: CLINIC | Age: 57
End: 2022-09-19
Attending: EMERGENCY MEDICINE
Payer: COMMERCIAL

## 2022-09-19 VITALS
DIASTOLIC BLOOD PRESSURE: 92 MMHG | TEMPERATURE: 100 F | OXYGEN SATURATION: 95 % | SYSTOLIC BLOOD PRESSURE: 140 MMHG | HEART RATE: 107 BPM | BODY MASS INDEX: 24.19 KG/M2 | RESPIRATION RATE: 19 BRPM | WEIGHT: 132.28 LBS

## 2022-09-19 LAB
ATRIAL RATE - MUSE: 136 BPM
DIASTOLIC BLOOD PRESSURE - MUSE: NORMAL MMHG
FLUAV RNA SPEC QL NAA+PROBE: NEGATIVE
FLUBV RNA RESP QL NAA+PROBE: NEGATIVE
HOLD SPECIMEN: NORMAL
INTERPRETATION ECG - MUSE: NORMAL
P AXIS - MUSE: 72 DEGREES
PR INTERVAL - MUSE: 142 MS
QRS DURATION - MUSE: 68 MS
QT - MUSE: 280 MS
QTC - MUSE: 421 MS
R AXIS - MUSE: 44 DEGREES
RSV RNA SPEC NAA+PROBE: NEGATIVE
SARS-COV-2 RNA RESP QL NAA+PROBE: NEGATIVE
SYSTOLIC BLOOD PRESSURE - MUSE: NORMAL MMHG
T AXIS - MUSE: 66 DEGREES
VENTRICULAR RATE- MUSE: 136 BPM

## 2022-09-19 PROCEDURE — 96367 TX/PROPH/DG ADDL SEQ IV INF: CPT

## 2022-09-19 PROCEDURE — 74018 RADEX ABDOMEN 1 VIEW: CPT

## 2022-09-19 PROCEDURE — 96365 THER/PROPH/DIAG IV INF INIT: CPT

## 2022-09-19 PROCEDURE — 250N000011 HC RX IP 250 OP 636: Performed by: EMERGENCY MEDICINE

## 2022-09-19 PROCEDURE — 258N000003 HC RX IP 258 OP 636: Performed by: EMERGENCY MEDICINE

## 2022-09-19 PROCEDURE — 36415 COLL VENOUS BLD VENIPUNCTURE: CPT | Performed by: EMERGENCY MEDICINE

## 2022-09-19 PROCEDURE — 87149 DNA/RNA DIRECT PROBE: CPT | Performed by: EMERGENCY MEDICINE

## 2022-09-19 PROCEDURE — 87077 CULTURE AEROBIC IDENTIFY: CPT | Performed by: EMERGENCY MEDICINE

## 2022-09-19 PROCEDURE — 999N000065 XR ABDOMEN PORT 1 VIEW

## 2022-09-19 PROCEDURE — 96366 THER/PROPH/DIAG IV INF ADDON: CPT

## 2022-09-19 RX ORDER — CLINDAMYCIN HCL 300 MG
300 CAPSULE ORAL 3 TIMES DAILY
Qty: 30 CAPSULE | Refills: 0 | Status: SHIPPED | OUTPATIENT
Start: 2022-09-19 | End: 2022-09-29

## 2022-09-19 RX ADMIN — VANCOMYCIN HYDROCHLORIDE 1000 MG: 5 INJECTION, POWDER, LYOPHILIZED, FOR SOLUTION INTRAVENOUS at 00:57

## 2022-09-19 RX ADMIN — PIPERACILLIN AND TAZOBACTAM 3.38 G: 3; .375 INJECTION, POWDER, FOR SOLUTION INTRAVENOUS at 00:19

## 2022-09-19 ASSESSMENT — ACTIVITIES OF DAILY LIVING (ADL)
ADLS_ACUITY_SCORE: 35
ADLS_ACUITY_SCORE: 35

## 2022-09-19 NOTE — ED NOTES
Introduced self to patient. Updated whiteboard. Discussed plan of care. Bed in low position with side rails up. Call light within reach.     Will continue to frequently round, assess and monitor.     Wayne Brush RN  9/18/2022  11:45 PM

## 2022-09-19 NOTE — ED TRIAGE NOTES
Pt has had cough for the past two weeks. Fever was noted by family today after an episode of emesis. 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 Frequent cough noted in triage.     Triage Assessment     Row Name 09/18/22 9280       Triage Assessment (Adult)    Airway WDL WDL       Respiratory WDL    Respiratory WDL X;rhythm/pattern;cough    Rhythm/Pattern, Respiratory sighing;pattern regular;depth regular    Cough Frequency frequent       Skin Circulation/Temperature WDL    Skin Circulation/Temperature WDL WDL       Cardiac WDL    Cardiac WDL X;rhythm    Cardiac Rhythm tachycardic       Peripheral/Neurovascular WDL    Peripheral Neurovascular WDL WDL

## 2022-09-19 NOTE — ED PROVIDER NOTES
EMERGENCY DEPARTMENT ENCOUNTER      NAME: Liu Malave  AGE: 57 year old male  YOB: 1965  MRN: [id number]  EVALUATION DATE & TIME: No admission date for patient encounter.    PCP: Lynda Gutierrez    ED PROVIDER: Tristian Vazquez M.D.      Chief Complaint   Patient presents with     Fever     Cough         FINAL IMPRESSION:  1. Aspiration pneumonia of right lower lobe due to gastric secretions (H)          ED COURSE & MEDICAL DECISION MAKING:    Pertinent Labs & Imaging studies reviewed. (See chart for details)  57 year old male presents to the Emergency Department for evaluation of fever cough and difficulty breathing.  Patient is complex.  Has a history of in her cranial hemorrhage with dense hemiparesis.  He is nonverbal.  Does have a history of aspiration.  I suspect that is what caused this today.  Does have a new right lower lobe infiltrate.  White count is elevated.  Lactic acid is normal.  Did send blood cultures.  Oxygen saturation is normal.  Likely aspiration.  He did recently have his G-tube replaced.  X-ray shows this in good position.  Family concerned about possible some change in mental status did get a head CT this is unchanged.  Patient received IV Zosyn and IV Vanco here as he has a history of MRSA.  After discussion with the patient and family he does want to go home.  I think is reasonable start him on oral clindamycin.  He does have palliative care.  Did offer admission but they state they would prefer to go home.  Did discuss the need to return immediately for any worsening symptoms.  State understanding.    11:16 PM I met with the patient to gather history and to perform my initial exam. I discussed the plan for care while in the Emergency Department. PPE: gloves, N95, and eye protection.  12:44 AM I rechecked patient and updated patient's family on results.  4:05 AM I rechecked patient and updated patient's family. We discussed the plan for discharge and the patient is agreeable.  Reviewed supportive cares, symptomatic treatment, outpatient follow up, and reasons to return to the Emergency Department. Patient to be discharged by ED RN.     At the conclusion of the encounter I discussed the results of all of the tests and the disposition. The questions were answered. The patient or family acknowledged understanding and was agreeable with the care plan.       me     MEDICATIONS GIVEN IN THE EMERGENCY:  Medications   0.9% sodium chloride BOLUS (0 mLs Intravenous Stopped 9/19/22 0157)     Followed by   sodium chloride 0.9% infusion (0 mLs Intravenous Stopped 9/19/22 0359)   piperacillin-tazobactam (ZOSYN) 3.375 g vial to attach to  mL bag (0 g Intravenous Stopped 9/19/22 0049)   vancomycin 1000 mg in 0.9% NaCl 250 mL intermittent infusion 1,000 mg (0 mg Intravenous Stopped 9/19/22 0359)       NEW PRESCRIPTIONS STARTED AT TODAY'S ER VISIT  New Prescriptions    CLINDAMYCIN (CLEOCIN) 300 MG CAPSULE    Take 1 capsule (300 mg) by mouth 3 times daily for 10 days          =================================================================    HPI    Patient information was obtained from: Daughters and Wife.     Use of : Daughters interpreting.       Liu Malave is a 57 year old male with a pertinent history of HTN, dyslipidemia, chronic state encephalopathy, hemorrhagic stroke, basal ganglia hemorrhage, CVA, seizure disorder, hemiplegia, SIRS, GERD, GJ tube dependence, aspiration pneumonia, frequent UTI's, who presents to this ED via walk-in for evaluation of fever and cough.  Family states that patient had an episode of vomiting after he was given water earlier tonight.  After that they noticed worsening cough shortness of breath and tachycardia.  He developed a fever as well.  He does have a history of aspiration in the past.  He does have a G-tube.  This was switched out on Tuesday.  Looking back through his electronic medical record he has had frequent visits for cough and shortness  of breath likely thought to be due to aspiration pneumonia.  He does have a palliative care consult.  I did discuss goals of care with family.  They would be amenable for hospitalization if necessary for the patient to be home.  Are okay with testing.  He has been having normal bowel and bladder habits for him.  Has not been complaining of pain that they have noticed.  Patient is nonverbal due to a significant stroke.  He seems to be at his baseline for mental status per the daughter.    REVIEW OF SYSTEMS   Review of Systems   Unable to perform ROS: Patient nonverbal        PAST MEDICAL HISTORY:  Past Medical History:   Diagnosis Date     Acute UTI 02/21/2020     Anxiety      Basal ganglia hemorrhage (H) 06/20/2016     Brain compression (H)      Bruxism      Chronic static encephalopathy      Chronic static encephalopathy      Chronic static encephalopathy      Convulsions, unspecified convulsion type (H)      CVA (cerebral vascular accident) (H) 07/2016    Hemorrhagic,      Depression      Dyslipidemia      Dyslipidemia      Elevated troponin      Essential hypertension      Feeding by G-tube (H) 06/04/2020     GERD (gastroesophageal reflux disease)      GERD without esophagitis      Hemiplegia of nondominant side, late effect of cerebrovascular disease (H)     Created by Conversion  Replacement Utility updated for latest IMO load     Hemorrhagic stroke (H) 06/05/2016     Hepatitis C carrier (H)      History of ESBL E. coli infection      History of hemorrhagic stroke with residual hemiparesis (H)      History of stroke with residual deficit      HTN (hypertension)      Hypertension      Lung nodule 12/05/2016    9 x 9 mm left upper lobe on CXR     MRSA (methicillin resistant staph aureus) culture positive      Periodontal disease      Persistent fever      Pneumonia of both lower lobes due to infectious organism      Respiratory failure with hypoxia (H)      Seizure (H)      Sepsis due to urinary tract infection  (H) 06/04/2020     Severe sepsis (H)      Stroke (H)      Trismus        PAST SURGICAL HISTORY:  Past Surgical History:   Procedure Laterality Date     aneurysm clipping  2006     BRAIN SURGERY       EXAM UNDER ANESTHESIA, RESTORATIONS, EXTRACTION(S) DENTAL COMPLEX, COMBINED N/A 12/19/2016    Procedure: COMBINED EXAM UNDER ANESTHESIA, RESTORATIONS, EXTRACTION(S) DENTAL COMPLEX;  Surgeon: Verónica Martinez DDS;  Location: [facility]     EXAM UNDER ANESTHESIA, RESTORATIONS, EXTRACTION(S) DENTAL COMPLEX, COMBINED N/A 3/7/2018    Procedure: COMBINED EXAM UNDER ANESTHESIA, RESTORATIONS, EXTRACTION(S) DENTAL COMPLEX;  Kenalog Injection in Bilateral [place], Combined Dental Exam,Radiographs, Three Dental Restorations, Periodontal Therapy and Fluoride Treatment;  Surgeon: Darryl Isbell DDS;  Location: [facility] OR     HH MIDLINE INSERTION  8/11/2017          INJECT STEROID (LOCATION) N/A 3/7/2018    Procedure: INJECT STEROID (LOCATION);  Kenalog Injection In Bilateral [place], Combined Dental Exam,Radiographs,Three Dental Restorations, Periodontal Therapy and Fluoride Treatment;  Surgeon: Liu Conroy DDS;  Location: [facility] OR     IR GASTRO JEJUNOSTOMY TUBE CHANGE  7/19/2016     IR GASTRO JEJUNOSTOMY TUBE CHANGE  9/23/2016     IR GASTRO JEJUNOSTOMY TUBE CHANGE  11/28/2016     IR GASTRO JEJUNOSTOMY TUBE CHANGE  1/27/2017     IR GASTRO JEJUNOSTOMY TUBE CHANGE  4/19/2017     IR GASTRO JEJUNOSTOMY TUBE CHANGE  6/20/2017     IR GASTRO JEJUNOSTOMY TUBE CHANGE  8/7/2017     IR GASTRO JEJUNOSTOMY TUBE CHANGE  8/11/2017     IR GASTRO JEJUNOSTOMY TUBE CHANGE  10/20/2017     IR GASTRO JEJUNOSTOMY TUBE CHANGE  1/2/2018     IR GASTRO JEJUNOSTOMY TUBE CHANGE  2/16/2018     IR GASTRO JEJUNOSTOMY TUBE CHANGE  5/16/2018     IR GASTRO JEJUNOSTOMY TUBE CHANGE  6/11/2018     IR GASTRO JEJUNOSTOMY TUBE CHANGE  9/12/2018     IR GASTRO JEJUNOSTOMY TUBE CHANGE  12/24/2018     IR GASTRO JEJUNOSTOMY TUBE CHANGE  3/18/2019     IR GASTRO JEJUNOSTOMY TUBE  CHANGE  4/23/2019     IR GASTRO JEJUNOSTOMY TUBE CHANGE  5/21/2019     IR GASTRO JEJUNOSTOMY TUBE CHANGE  9/9/2019     IR GASTRO JEJUNOSTOMY TUBE CHANGE  9/26/2019     IR GASTRO JEJUNOSTOMY TUBE CHANGE  10/11/2019     IR GASTRO JEJUNOSTOMY TUBE CHANGE  11/21/2019     IR GASTRO JEJUNOSTOMY TUBE CHANGE  11/29/2019     IR GASTRO JEJUNOSTOMY TUBE CHANGE  2/27/2020     IR GASTRO JEJUNOSTOMY TUBE CHANGE  4/10/2020     IR GASTRO JEJUNOSTOMY TUBE CHANGE  7/2/2020     IR GASTRO JEJUNOSTOMY TUBE CHANGE  8/28/2020     IR GASTRO JEJUNOSTOMY TUBE CHANGE  9/17/2020     IR GASTRO JEJUNOSTOMY TUBE CHANGE  10/29/2020     IR GASTRO JEJUNOSTOMY TUBE CHANGE  11/6/2020     IR GASTRO JEJUNOSTOMY TUBE CHANGE  12/24/2020     IR GASTRO JEJUNOSTOMY TUBE CHANGE  1/9/2021     IR GASTRO JEJUNOSTOMY TUBE CHANGE  6/9/2021     IR GASTRO JEJUNOSTOMY TUBE CHANGE  10/4/2021     IR GASTRO JEJUNOSTOMY TUBE CHANGE  10/18/2021     IR GASTRO JEJUNOSTOMY TUBE CHANGE  1/14/2022     IR GASTRO JEJUNOSTOMY TUBE CHANGE  1/20/2022     IR GASTRO JEJUNOSTOMY TUBE CHANGE  4/19/2022     IR GASTRO JEJUNOSTOMY TUBE CHANGE  6/7/2022     IR GASTRO JEJUNOSTOMY TUBE CHANGE  8/19/2022     IR GASTRO JEJUNOSTOMY TUBE CHANGE  9/13/2022     IR GASTRO JEJUNOSTOMY TUBE PLACEMENT  6/14/2016     IR GJ TUBE REPLACEMENT  12/24/2018     IR GJ TUBE REPLACEMENT  3/18/2019     IR GJ TUBE REPLACEMENT  4/23/2019     IR GJ TUBE REPLACEMENT  5/21/2019     IR GJ TUBE REPLACEMENT  9/9/2019     IR GJ TUBE REPLACEMENT  9/26/2019     IR GJ TUBE REPLACEMENT  10/11/2019     IR GJ TUBE REPLACEMENT  11/21/2019     IR GJ TUBE REPLACEMENT  11/29/2019     IR GJ TUBE REPLACEMENT  2/27/2020     IR GJ TUBE REPLACEMENT  4/10/2020     IR GJ TUBE REPLACEMENT  7/2/2020     IR GJ TUBE REPLACEMENT  8/28/2020     IR GJ TUBE REPLACEMENT  9/17/2020     IR GJ TUBE REPLACEMENT  10/29/2020     IR GJ TUBE REPLACEMENT  11/6/2020     IR GJ TUBE REPLACEMENT  12/24/2020     IR GJ TUBE REPLACEMENT  1/9/2021     IR GJ TUBE  "REPLACEMENT  6/9/2021     J-G tube       PICC  6/10/2016          TRACHEOSTOMY      Removed           CURRENT MEDICATIONS:    Current Facility-Administered Medications   Medication     sodium chloride 0.9% infusion     Current Outpatient Medications   Medication     clindamycin (CLEOCIN) 300 MG capsule     acetaminophen (TYLENOL) 500 MG tablet     amoxicillin-clavulanate (AUGMENTIN) 875-125 MG tablet     aspirin 81 MG chewable tablet     baclofen (LIORESAL) 10 MG tablet     bisacodyl (DULCOLAX) 10 MG suppository     chlorhexidine (PERIDEX) 0.12 % solution     dextromethorphan (DELSYM) 30 MG/5ML liquid     esomeprazole (NEXIUM) 20 MG DR capsule     ferrous sulfate 220 (44 Fe) MG/5ML ELIX     FLOWFLEX COVID-19 AG HOME TEST KIT     guaiFENesin (ROBITUSSIN) 100 MG/5ML liquid     ipratropium - albuterol 0.5 mg/2.5 mg/3 mL (DUONEB) 0.5-2.5 (3) MG/3ML neb solution     levETIRAcetam (KEPPRA) 100 MG/ML solution     Lidocaine (LIDOCARE) 4 % Patch     meclizine (ANTIVERT) 25 MG tablet     metoprolol tartrate (LOPRESSOR) 25 MG tablet     mirtazapine (REMERON) 7.5 MG tablet     nitroFURantoin macrocrystal-monohydrate (MACROBID) 100 MG capsule     polyethylene glycol (MIRALAX) 17 GM/Dose powder     QUEtiapine (SEROQUEL) 25 MG tablet     QUEtiapine (SEROQUEL) 50 MG tablet     simvastatin (ZOCOR) 20 MG tablet     VISINE DRY EYE RELIEF 1 % SOLN         ALLERGIES:  Allergies   Allergen Reactions     Diphenhydramine Unknown     Trazodone Other (See Comments)     \"shaking\" per family       FAMILY HISTORY:  Family History   Problem Relation Age of Onset     Hypertension Mother      Cancer Father      Other Cancer Father      Kidney Disease Brother        SOCIAL HISTORY:   Social History     Socioeconomic History     Marital status:      Number of children: 2   Occupational History     Occupation: disability   Tobacco Use     Smoking status: Former Smoker     Packs/day: 1.00     Years: 30.00     Pack years: 30.00     Types: " Cigarettes     Quit date: 2005     Years since quittin.2     Smokeless tobacco: Former User     Quit date: 2007     Tobacco comment: quit 2006   Vaping Use     Vaping Use: Never used   Substance and Sexual Activity     Alcohol use: No     Drug use: No     Sexual activity: Not Currently     Partners: Female     Birth control/protection: None   Other Topics Concern     Parent/sibling w/ CABG, MI or angioplasty before 65F 55M? No   Social History Narrative    Lives with wife, 2 daughters and son in law.    W/c bound.       VITALS:  BP (!) 147/86   Pulse 104   Temp 100  F (37.8  C) (Temporal)   Resp 17   Wt 60 kg (132 lb 4.4 oz)   SpO2 97%   BMI 24.19 kg/m      PHYSICAL EXAM    Physical Exam  Constitutional:       General: He is not in acute distress.     Appearance: He is ill-appearing. He is not diaphoretic.   HENT:      Head: Atraumatic.   Eyes:      General: No scleral icterus.     Pupils: Pupils are equal, round, and reactive to light.   Cardiovascular:      Rate and Rhythm: Regular rhythm. Tachycardia present.      Heart sounds: Normal heart sounds.   Pulmonary:      Effort: No respiratory distress.      Breath sounds: Normal breath sounds.   Chest:      Chest wall: No tenderness.   Abdominal:      General: Bowel sounds are normal.      Palpations: Abdomen is soft.      Tenderness: There is no abdominal tenderness.      Comments: gtube.   Musculoskeletal:         General: No tenderness. Normal range of motion.      Cervical back: No tenderness.      Thoracic back: No tenderness.      Lumbar back: No tenderness.   Skin:     General: Skin is warm.      Findings: No abrasion, laceration or rash.   Neurological:      Mental Status: He is alert and oriented to person, place, and time.      Comments: He does not follow commands.  Has dense hemiparesis on the right.  Also has weakness on the left.  At baseline per daughter           LAB:  All pertinent labs reviewed and interpreted.  Labs Ordered and  Resulted from Time of ED Arrival to Time of ED Departure   COMPREHENSIVE METABOLIC PANEL - Abnormal       Result Value    Sodium 139      Potassium 4.7      Chloride 108 (*)     Carbon Dioxide (CO2) 21 (*)     Anion Gap 10      Urea Nitrogen 35 (*)     Creatinine 1.28      Calcium 8.9      Glucose 133 (*)     Alkaline Phosphatase 74      AST 24      ALT 25      Protein Total 8.5 (*)     Albumin 3.1 (*)     Bilirubin Total 0.4      GFR Estimate 64     CBC WITH PLATELETS AND DIFFERENTIAL - Abnormal    WBC Count 20.1 (*)     RBC Count 4.07 (*)     Hemoglobin 12.6 (*)     Hematocrit 38.1 (*)     MCV 94      MCH 31.0      MCHC 33.1      RDW 12.0      Platelet Count 242      % Neutrophils 80      % Lymphocytes 13      % Monocytes 6      % Eosinophils 0      % Basophils 0      % Immature Granulocytes 1      NRBCs per 100 WBC 0      Absolute Neutrophils 16.0 (*)     Absolute Lymphocytes 2.7      Absolute Monocytes 1.3      Absolute Eosinophils 0.0      Absolute Basophils 0.1      Absolute Immature Granulocytes 0.2      Absolute NRBCs 0.0     LACTIC ACID WHOLE BLOOD - Normal    Lactic Acid 1.5     INFLUENZA A/B & SARS-COV2 PCR MULTIPLEX - Normal    Influenza A PCR Negative      Influenza B PCR Negative      RSV PCR Negative      SARS CoV2 PCR Negative     BLOOD CULTURE   BLOOD CULTURE       RADIOLOGY:  Reviewed all pertinent imaging. Please see official radiology report.  XR Abdomen Port 1 View   Final Result   IMPRESSION: Gastrojejunostomy tube with tip in expected region of proximal small bowel. No visible bowel dilatation. Elevated right hemidiaphragm. Basilar atelectasis.      CT Head w/o Contrast   Final Result   IMPRESSION:   1.  No CT evidence for acute intracranial process.   2.  Stable chronic changes as above.      XR Chest Port 1 View   Final Result   IMPRESSION: New hazy ill-defined opacity involving the right lung base suspicious for pulmonary infiltrate related to pneumonia given clinical history. Minimal  linear scarring left lung base unchanged. Normal heart size and pulmonary vascularity.    Atherosclerotic vascular calcification thoracic aorta. No overt osseous abnormality.          EKG:    Performed at: 2239  Impression: Sinus tachycardia.  No acute changes otherwise.  This is compared to previous dated May 30, 2022  Sinus tachycardia ventricular to 136.  .  QRS 68.  QTc 421    I have independently reviewed and interpreted the EKG(s) documented above.    PROCEDURES:         I, Hoad Gaines, am serving as a scribe to document services personally performed by Dr. Tristian Vazquez, based on my observation and the provider's statements to me. I, Tristian Vazquez MD attest that Hoda Gaines is acting in a scribe capacity, has observed my performance of the services and has documented them in accordance with my direction.    Tristian Vazquez M.D.  Emergency Medicine  [facility] EMERGENCY ROOM  [address] [id number] [phone]  Dept: [phone]     Tristian Vazquez MD  09/19/22 0532

## 2022-09-20 LAB
ENTEROCOCCUS FAECALIS: NOT DETECTED
ENTEROCOCCUS FAECIUM: NOT DETECTED
LISTERIA SPECIES (DETECTED/NOT DETECTED): NOT DETECTED
STAPHYLOCOCCUS AUREUS: NOT DETECTED
STAPHYLOCOCCUS EPIDERMIDIS: NOT DETECTED
STAPHYLOCOCCUS LUGDUNENSIS: NOT DETECTED
STAPHYLOCOCCUS SPECIES: DETECTED
STREPTOCOCCUS AGALACTIAE: NOT DETECTED
STREPTOCOCCUS ANGINOSUS GROUP: NOT DETECTED
STREPTOCOCCUS PNEUMONIAE: NOT DETECTED
STREPTOCOCCUS PYOGENES: NOT DETECTED
STREPTOCOCCUS SPECIES: NOT DETECTED

## 2022-09-23 LAB
BACTERIA BLD CULT: ABNORMAL
BACTERIA BLD CULT: ABNORMAL

## 2022-09-24 LAB — BACTERIA BLD CULT: NO GROWTH

## 2022-09-27 DIAGNOSIS — H04.123 DRY EYES: ICD-10-CM

## 2022-09-27 DIAGNOSIS — K21.9 GERD WITHOUT ESOPHAGITIS: ICD-10-CM

## 2022-09-27 DIAGNOSIS — Z11.59 SCREENING FOR VIRAL DISEASE: ICD-10-CM

## 2022-09-28 ENCOUNTER — VIRTUAL VISIT (OUTPATIENT)
Dept: FAMILY MEDICINE | Facility: CLINIC | Age: 57
End: 2022-09-28
Payer: COMMERCIAL

## 2022-09-28 DIAGNOSIS — D72.829 LEUKOCYTOSIS, UNSPECIFIED TYPE: ICD-10-CM

## 2022-09-28 DIAGNOSIS — I10 ESSENTIAL HYPERTENSION: ICD-10-CM

## 2022-09-28 DIAGNOSIS — E46 MALNUTRITION, UNSPECIFIED TYPE (H): ICD-10-CM

## 2022-09-28 DIAGNOSIS — K94.29 IRRITATION AROUND PERCUTANEOUS ENDOSCOPIC GASTROSTOMY (PEG) TUBE SITE (H): ICD-10-CM

## 2022-09-28 DIAGNOSIS — H04.123 DRY EYES: ICD-10-CM

## 2022-09-28 DIAGNOSIS — I69.359 HEMIPLEGIA AND HEMIPARESIS FOLLOWING CEREBRAL INFARCTION AFFECTING UNSPECIFIED SIDE (H): ICD-10-CM

## 2022-09-28 DIAGNOSIS — T17.908D ASPIRATION INTO RESPIRATORY TRACT, SUBSEQUENT ENCOUNTER: ICD-10-CM

## 2022-09-28 DIAGNOSIS — Z87.01 HISTORY OF PNEUMONIA: Primary | ICD-10-CM

## 2022-09-28 DIAGNOSIS — Z78.9 ON TUBE FEEDING DIET: ICD-10-CM

## 2022-09-28 DIAGNOSIS — E78.5 DYSLIPIDEMIA: ICD-10-CM

## 2022-09-28 DIAGNOSIS — B18.2 HEPATITIS C VIRUS CARRIER STATE (H): ICD-10-CM

## 2022-09-28 PROCEDURE — 99214 OFFICE O/P EST MOD 30 MIN: CPT | Mod: 95 | Performed by: FAMILY MEDICINE

## 2022-09-28 RX ORDER — COVID-19 ANTIGEN TEST
KIT MISCELLANEOUS
Qty: 2 KIT | Refills: 4 | Status: SHIPPED | OUTPATIENT
Start: 2022-09-28 | End: 2022-10-19

## 2022-09-28 RX ORDER — NEOMYCIN/BACITRACIN/POLYMYXINB 3.5-400-5K
OINTMENT (GRAM) TOPICAL 4 TIMES DAILY
Qty: 30 G | Refills: 3 | Status: SHIPPED | OUTPATIENT
Start: 2022-09-28 | End: 2023-06-21

## 2022-09-28 NOTE — TELEPHONE ENCOUNTER
"Routing refill request to provider for review/approval because:  Drug not on the [facility] refill protocol   Early refill requested.    Last Written Prescription Date:  8/23/22  Last Fill Quantity: 15 ml,  # refills: 0   Last office visit provider:  10/28/21     Last Written Prescription Date:  8/23/22  Last Fill Quantity: 2,  # refills: 0   Last office visit provider:  10/28/21    Last Written Prescription Date:  10/28/21  Last Fill Quantity: 180,  # refills: 3   Last office visit provider:  10/28/21    Requested Prescriptions   Pending Prescriptions Disp Refills     VISINE DRY EYE RELIEF 1 % SOLN [Pharmacy Med Name: Visine Dry Eye Relief Ophthalmic Solution 1 %] 15 mL 0     Sig: Apply 1 drop to eye every 6 hours as needed for dry eyes.       There is no refill protocol information for this order        FLOWFLEX COVID-19 AG HOME TEST KIT [Pharmacy Med Name: Flowflex COVID-19 Ag Home Test In Vitro Kit] 2 kit 0     Sig: USE AS DIRECTED.       There is no refill protocol information for this order        esomeprazole (NEXIUM) 20 MG DR capsule [Pharmacy Med Name: Esomeprazole Magnesium Oral Capsule Delayed Release 20 MG] 180 capsule 0     Sig: Take 1 capsule via G-tube twice daily       PPI Protocol Passed - 9/28/2022  2:20 PM        Passed - Not on Clopidogrel (unless Pantoprazole ordered)        Passed - No diagnosis of osteoporosis on record        Passed - Recent (12 mo) or future (30 days) visit within the authorizing provider's specialty     Patient has had an office visit with the authorizing provider or a provider within the authorizing providers department within the previous 12 mos or has a future within next 30 days. See \"Patient Info\" tab in inbasket, or \"Choose Columns\" in Meds & Orders section of the refill encounter.              Passed - Medication is active on med list        Passed - Patient is age 18 or older             Darryl Barrett RN 09/28/22 2:20 PM  "

## 2022-09-28 NOTE — PROGRESS NOTES
Liu is a 57 year old who is being evaluated via a billable video visit.      How would you like to obtain your AVS? MyChart  If the video visit is dropped, the invitation should be resent by: Text to cell phone: [phone]  Will anyone else be joining your video visit? No          Assessment & Plan     History of pneumonia  Symptoms have resolved.  He has completed antibiotic therapy.  He is at risk of recurrence of pneumonia due to his history of aspiration.  Order placed for chest physiotherapy vest  - Miscellaneous Order for DME - ONLY FOR DME    Dry eyes    - Polyethylene Glycol 400 (VISINE DRY EYE RELIEF) 1 % SOLN  Dispense: 15 mL; Refill: 11    Irritation around percutaneous endoscopic gastrostomy (PEG) tube site (H)    - neomycin-bacitracin-polymyxin (NEOSPORIN) 5-400-5000 ointment  Dispense: 30 g; Refill: 3    Malnutrition, unspecified type (H)  Continue tube feeds    Hepatitis C virus carrier state (H)  Known condition that I take into account for his overall medical care.  Not addressed at this visit    Leukocytosis, unspecified type  Had elevated white count when in ED secondary to pneumonia.  We will plan to recheck this when he comes in for labs  - CBC with platelets    Hemiplegia and hemiparesis following cerebral infarction affecting unspecified side (H)    - Palliative Care Referral    On tube feeding diet    - Palliative Care Referral    Dyslipidemia  Continue statin  - Lipid panel reflex to direct LDL Fasting    Essential hypertension  Continue metoprolol  - Lipid panel reflex to direct LDL Fasting  - Basic metabolic panel  (Ca, Cl, CO2, Creat, Gluc, K, Na, BUN)    Aspiration into respiratory tract, subsequent encounter    - Miscellaneous Order for DME - ONLY FOR DME                   No follow-ups on file.    Lynda Gutierrez MD  [facility]   Liu is a 57 year old, presenting for the following health issues:  Hospital F/U and needs antibiotic ointment  (Bacitracin zinc polymixin b sulfate  feeding tube area gets infected)      HPI   His daughter is present for the encounter and provides information as patient is nonverbal.  Patient was recently seen in the emergency department for cough and fever.  He has history of stroke with left-sided hemiparesis.  He has history of aspiration.  Receives tube feeds via G-tube.  Is prone to pneumonia.  Found to have new right lower lobe infiltrate in the emergency department with elevated white blood cell count.  Family was concerned about possible change in mental status so there was a head CT performed.  This was unchanged.  He was treated with IV Zosyn and IV vancomycin and then discharged home on oral clindamycin.      We reviewed his current medications and updated the chart.  Daughter reports that he is doing better and is back to his baseline.    He has completed antibiotics.  Cough has improved.  He is afebrile.  Daughter does request some refills today.  She would like refill of eyedrops for dry eyes.  She also like topical antibiotic ointment that she uses to treat irritation around his G-tube.  She requests referral to palliative care.  He had previously been referred to palliative care when he was at [facility] a few months ago.  They were never contacted regarding this referral.  She also would like a chest physiotherapy vest.  When he was at [facility] they had been advised that use of this would help with his pulmonary secretions and reduce his risk of pneumonia.  She would like to schedule him for a time to come in and receive his influenza vaccine and COVID booster.  She also would like to recheck his labs.        Review of Systems         Objective           Vitals:  No vitals were obtained today due to virtual visit.    Physical Exam   Not performed                Video-Visit Details    Video Start Time: 3:46    Type of service:  Video Visit    Video End Time:3:58    Originating Location (pt. Location):  Home    Distant Location (provider location):  [facility]     Platform used for Video Visit: Marcio

## 2022-09-29 ENCOUNTER — TELEPHONE (OUTPATIENT)
Dept: FAMILY MEDICINE | Facility: CLINIC | Age: 57
End: 2022-09-29

## 2022-09-29 NOTE — TELEPHONE ENCOUNTER
----- Message from Lynda Gutierrez MD sent at 9/29/2022 11:15 AM CDT -----  Order placed for chest physiotherapy vest.  Please contact patient's daughter and give her information on DME locations

## 2022-09-30 ENCOUNTER — LAB (OUTPATIENT)
Dept: LAB | Facility: CLINIC | Age: 57
End: 2022-09-30
Payer: COMMERCIAL

## 2022-09-30 DIAGNOSIS — I10 ESSENTIAL HYPERTENSION: ICD-10-CM

## 2022-09-30 DIAGNOSIS — D72.829 LEUKOCYTOSIS, UNSPECIFIED TYPE: ICD-10-CM

## 2022-09-30 DIAGNOSIS — E78.5 DYSLIPIDEMIA: ICD-10-CM

## 2022-09-30 LAB
ANION GAP SERPL CALCULATED.3IONS-SCNC: 13 MMOL/L (ref 7–15)
BUN SERPL-MCNC: 35 MG/DL (ref 8–23)
CALCIUM SERPL-MCNC: 8.8 MG/DL (ref 8.6–10)
CHLORIDE SERPL-SCNC: 106 MMOL/L (ref 98–107)
CHOLEST SERPL-MCNC: 229 MG/DL
CREAT SERPL-MCNC: 1.33 MG/DL (ref 0.67–1.17)
DEPRECATED HCO3 PLAS-SCNC: 22 MMOL/L (ref 22–29)
ERYTHROCYTE [DISTWIDTH] IN BLOOD BY AUTOMATED COUNT: 12.3 % (ref 10–15)
GFR SERPL CREATININE-BSD FRML MDRD: 62 ML/MIN/1.73M2
GLUCOSE SERPL-MCNC: 109 MG/DL (ref 70–99)
HCT VFR BLD AUTO: 36.7 % (ref 40–53)
HDLC SERPL-MCNC: 32 MG/DL
HGB BLD-MCNC: 11.9 G/DL (ref 13.3–17.7)
LDLC SERPL CALC-MCNC: 130 MG/DL
MCH RBC QN AUTO: 31 PG (ref 26.5–33)
MCHC RBC AUTO-ENTMCNC: 32.4 G/DL (ref 31.5–36.5)
MCV RBC AUTO: 96 FL (ref 78–100)
NONHDLC SERPL-MCNC: 197 MG/DL
PLATELET # BLD AUTO: 275 10E3/UL (ref 150–450)
POTASSIUM SERPL-SCNC: 4.5 MMOL/L (ref 3.4–5.3)
RBC # BLD AUTO: 3.84 10E6/UL (ref 4.4–5.9)
SODIUM SERPL-SCNC: 141 MMOL/L (ref 136–145)
TRIGL SERPL-MCNC: 333 MG/DL
WBC # BLD AUTO: 10.1 10E3/UL (ref 4–11)

## 2022-09-30 PROCEDURE — 80048 BASIC METABOLIC PNL TOTAL CA: CPT

## 2022-09-30 PROCEDURE — 36415 COLL VENOUS BLD VENIPUNCTURE: CPT

## 2022-09-30 PROCEDURE — 85027 COMPLETE CBC AUTOMATED: CPT

## 2022-09-30 PROCEDURE — 80061 LIPID PANEL: CPT

## 2022-10-02 DIAGNOSIS — E78.5 DYSLIPIDEMIA: Primary | ICD-10-CM

## 2022-10-02 RX ORDER — SIMVASTATIN 40 MG
40 TABLET ORAL AT BEDTIME
Qty: 90 TABLET | Refills: 3 | Status: SHIPPED | OUTPATIENT
Start: 2022-10-02 | End: 2022-11-29

## 2022-10-13 DIAGNOSIS — K59.01 SLOW TRANSIT CONSTIPATION: ICD-10-CM

## 2022-10-13 RX ORDER — BISACODYL 10 MG
SUPPOSITORY, RECTAL RECTAL
Qty: 30 SUPPOSITORY | Refills: 8 | Status: SHIPPED | OUTPATIENT
Start: 2022-10-13 | End: 2023-05-01

## 2022-10-13 NOTE — TELEPHONE ENCOUNTER
"Last Written Prescription Date:  8/21/21  Last Fill Quantity: 30,  # refills: 8   Last office visit provider:  9/28/22     Requested Prescriptions   Pending Prescriptions Disp Refills     bisacodyl (DULCOLAX) 10 MG suppository [Pharmacy Med Name: Bisacodyl Rectal Suppository 10 MG] 30 suppository 0     Sig: INSERT 1 SUPPOSTIORY INTO THE RECTUM DAILY AS NEEDED FOR CONSTIPATION.       Laxatives Protocol Passed - 10/13/2022 11:30 AM        Passed - Patient is age 6 or older        Passed - Recent (12 mo) or future (30 days) visit within the authorizing provider's specialty     Patient has had an office visit with the authorizing provider or a provider within the authorizing providers department within the previous 12 mos or has a future within next 30 days. See \"Patient Info\" tab in inbasket, or \"Choose Columns\" in Meds & Orders section of the refill encounter.              Passed - Medication is active on med list             María Elena Byrd RN 10/13/22 6:17 PM  "

## 2022-10-14 ENCOUNTER — MYC MEDICAL ADVICE (OUTPATIENT)
Dept: FAMILY MEDICINE | Facility: CLINIC | Age: 57
End: 2022-10-14

## 2022-10-19 ENCOUNTER — VIRTUAL VISIT (OUTPATIENT)
Dept: FAMILY MEDICINE | Facility: CLINIC | Age: 57
End: 2022-10-19
Payer: COMMERCIAL

## 2022-10-19 DIAGNOSIS — R05.3 CHRONIC COUGH: ICD-10-CM

## 2022-10-19 DIAGNOSIS — R56.9 CONVULSIONS, UNSPECIFIED CONVULSION TYPE (H): ICD-10-CM

## 2022-10-19 DIAGNOSIS — T88.7XXA MEDICATION SIDE EFFECTS: ICD-10-CM

## 2022-10-19 DIAGNOSIS — I69.959 HEMIPLEGIA OF NONDOMINANT SIDE, LATE EFFECT OF CEREBROVASCULAR DISEASE (H): Primary | ICD-10-CM

## 2022-10-19 DIAGNOSIS — J43.9 PULMONARY EMPHYSEMA, UNSPECIFIED EMPHYSEMA TYPE (H): ICD-10-CM

## 2022-10-19 DIAGNOSIS — T17.908D ASPIRATION INTO RESPIRATORY TRACT, SUBSEQUENT ENCOUNTER: ICD-10-CM

## 2022-10-19 DIAGNOSIS — J47.9 BRONCHIECTASIS WITHOUT COMPLICATION (H): ICD-10-CM

## 2022-10-19 PROCEDURE — 99214 OFFICE O/P EST MOD 30 MIN: CPT | Mod: 95 | Performed by: FAMILY MEDICINE

## 2022-10-19 RX ORDER — AMLODIPINE BESYLATE 5 MG/1
5 TABLET ORAL DAILY
COMMUNITY
Start: 2022-09-27 | End: 2023-03-03

## 2022-10-19 NOTE — PROGRESS NOTES
Liu is a 57 year old who is being evaluated via a billable video visit.      How would you like to obtain your AVS? MyChart  If the video visit is dropped, the invitation should be resent by: Text to cell phone: [phone]  Will anyone else be joining your video visit? Daughter MILAGRO          Assessment & Plan     Hemiplegia of nondominant side, late effect of cerebrovascular disease (H)  Today's visit will serve as a face-to-face encounter for him to obtain a new wheelchair.  Prescription signed and will be faxed.  Patient has a mobility limitation that impairs his ability to accomplish mobility related activities of daily living.  This mobility limitation is hemiplegia of nondominant side as late effect of a stroke that occurred many years ago.  He is unable to walk.  Therefore this mobility limitation cannot be resolved by the use of a cane or walker.  His home provides adequate access, maneuvering space and surfaces for use of the wheelchair.  The patient is unable to self propel the manual wheelchair but there is a caregiver who can assist.  The use of the manual wheelchair will significantly improve the ability to participate in mobility related activities of daily living.  The patient was used on a regular basis in the home.  Additional accessories will be needed.  Patient will need a height adjustable backrest, footplate, heel loops, wheel locks, height adjustable armrests, antitempers, seat belt for positioning, cushion and headrest.  Due to prior stroke, patient has spasticity and muscle contractures.  Patient will need these items for positioning and comfort as he is confined to the wheelchair for more than 2 hours/day..    Pulmonary emphysema, unspecified emphysema type (H)  Today's visit will serve as a face-to-face encounter for a nebulizer machine.  Patient has history of emphysema and nebulizer machine is necessary for administration of [employer] solutions    - Nebulizer and Supplies Order for DME -  ONLY FOR DME  - Miscellaneous Order for DME - ONLY FOR DME      Aspiration into respiratory tract, subsequent encounter  Today's visit will serve as a face-to-face encounter for suction device and chest physiotherapy vest.  Patient has history of recurrent aspiration pneumonia as well as pulmonary emphysema.  These items are necessary for him to help clear out secretions and reduce risk of recurrent pneumonia  - Miscellaneous Order for DME - ONLY FOR DME    Chronic cough    - Miscellaneous Order for DME - ONLY FOR DME    Bronchiectasis without complication (H)    - Miscellaneous Order for DME - ONLY FOR DME    Medication side effects  We discussed side effect of sedation with use of levetiracetam.  Reviewed records dating back to 2014 at which time patient had some shaking of the legs and was started on this medication due to possible seizure disorder.  No seizure activity was ever found on EEG.  Patient has not been taking the medication for 6 months and has not had any seizures during this time.  We discussed several options options:  1. Remain off of levetiracetam and monitoring for seizure activity  2.  Referral back to neurology to determine whether patient should continue this medication or   3.  Trialing lower dose of levetiracetam    Patient's daughter would like to go with option 1.                 No follow-ups on file.    Lynda Gutierrez MD  [facility] is a 57 year old, presenting for the following health issues:  wheelchair (Face to face to get new wheelchair)      HPI   Patient is evaluated today via video visit for a face-to-face encounter to get a new wheelchair to his daughter is present during the video encounter as well and provides much of the history.  Patient has had prior stroke which has resulted in hemiplegia of the nondominant side.  He is confined to a wheelchair.  Current wheelchair is old.  It is over 5 years old.  The break is broken.  It is  also too small for him as he has gained weight.  They have had an assessment for a wheelchair and an order is ready.  He just needs a face-to-face encounter.  Wheelchair is necessary for him for mobility related activities of daily living.  There is enough space in their home for maneuvering of the wheelchair.  There is a caregiver available who can maneuver the wheelchair.  Patient also needs accessories such as seatbelt, headrest, adjustable arm rests, etc. for positioning.    Daughter has a couple of other requests today.  They would like a chest physiotherapy vest order sent to [facility].  They also would like a prescription for a suction device to stimulate and suction secretions.  They are in need of a new handicap parking sticker.  They would like an order for a new nebulizer machine.  Daughter has questions as well about his levetiracetam.  He was started on this many years ago for possible seizure disorder but this was never confirmed.  He has not been taking the medication for about 6 months due to frequent hospitalizations.  Family recently restarted giving him the medication and have noticed that it makes him very sleepy.  She is wondering if he really needs this medication.  We reviewed medications and allergies.  Review of systems is otherwise negative.  No other concerns today.        Review of Systems         Objective           Vitals:  No vitals were obtained today due to virtual visit.    Physical Exam   GENERAL: alert and no distress  EYES: Eyes grossly normal to inspection.  No discharge or erythema, or obvious scleral/conjunctival abnormalities.  RESP: No audible wheeze, cough, or visible cyanosis.  No visible retractions or increased work of breathing.    SKIN: Visible skin clear. No significant rash, abnormal pigmentation or lesions.  NEURO: non-verbal, confined to wheelchair                     Video-Visit Details    Video Start Time: 5:04    Type of service:  Video Visit    Video End  Time:5:25    Originating Location (pt. Location): Home        Distant Location (provider location):  On-site    Platform used for Video Visit: Marcio

## 2022-10-21 ENCOUNTER — TELEPHONE (OUTPATIENT)
Dept: FAMILY MEDICINE | Facility: CLINIC | Age: 57
End: 2022-10-21

## 2022-10-21 NOTE — TELEPHONE ENCOUNTER
[address] Now        NAME: Axel Dickson is a 26 y o  female  : 1987    MRN: [id number]  DATE: 2021  TIME: 12:52 PM    Assessment and Plan   Lower abdominal pain [R10 30]  1  Lower abdominal pain  Transfer to other facility   2  Nausea  Transfer to other facility   3  Diarrhea, unspecified type  Transfer to other facility   4  Dehydration  Transfer to other facility     Bilateral lower abdominal pain, right side worse than the left x3 days  Diarrhea that is watery, nonbloody  Intermittent nausea but denies any vomiting  Has not tried anything for it  Denies any urinary symptoms  Feeling dehydrated, has only urinated 1 time today  Overall decreased p o  intake    Patient Instructions      Patient would like to go via private vehicle to [facility]  Please go to the [facility] now for further evaluation and treatment- hospital address verified with the patient  Patient agreed to go immediately to the ED  Chief Complaint     Chief Complaint   Patient presents with    Abdominal Pain     b/l lower stabbing x 3 days, c/o reflux , denies v/n     Diarrhea         History of Present Illness        25-year-old female presents with bilateral lower abdominal pain x3 days  She states pain start on left side, sometimes to the right side  She has not tried anything for it  States she has also been having multiple episodes daily of watery diarrhea, denies any blood  Has some intermittent nausea without vomiting  States he has had overall decreased p o  intake, feels dehydrated and like she has decreased urination  States he has only urinated 1 time today  Feeling tired /fatigued  She denies any new foods, restaurants, travel, sick contacts, COVID exposures or other inciting factors    She denies any fevers, cough/cold symptoms, chest pain, shortness of breath or other complaints      Review of Systems Calling to check on order for cough assist device, [employer]. They would like to get it set up with the vest at the same time. Once they get orders they will train family in the home within 24-48 hours. Will fax over a prepopulated order form.   Review of Systems   Constitutional: Positive for appetite change and fatigue  Negative for chills and fever  HENT: Negative  Eyes: Negative for visual disturbance  Respiratory: Negative for cough and shortness of breath  Cardiovascular: Negative for chest pain  Gastrointestinal: Positive for abdominal pain, diarrhea and nausea  Negative for blood in stool and vomiting  Genitourinary: Positive for decreased urine volume  Musculoskeletal: Negative for back pain  Neurological: Negative for dizziness, syncope, weakness, numbness and headaches  All other systems reviewed and are negative  Current Medications     No current outpatient medications on file      Current Allergies     Allergies as of 04/16/2021 - Reviewed 04/16/2021   Allergen Reaction Noted    Cephalexin  01/03/2017    Sunscreens Hives 12/16/2016            The following portions of the patient's history were reviewed and updated as appropriate: allergies, current medications, past family history, past medical history, past social history, past surgical history and problem list      Past Medical History:   Diagnosis Date    Chiari I malformation ([facility] [address] )     Kidney stone     UTI (urinary tract infection)     treated with antibiotics recently will have ua repeated     Varicella     childhood       Past Surgical History:   Procedure Laterality Date    WA CONIZATION CERVIX,LOOP ELECTRD N/A 5/29/2020    Procedure: BIOPSY LEEP CERVIX;  Surgeon: Magno Mclean MD;  [facility];  Service: Gynecology    WA LAP,RMV  ADNEXAL STRUCTURE Bilateral 5/29/2020    Procedure: SALPINGECTOMY, LAPAROSCOPIC BILATERAL;  Surgeon: Magno Mclean MD;  [facility];  Service: Gynecology    WA SUBOCCIPT DECOMP MEDULLA/SP CRD N/A 12/14/2016    Procedure: REPEAT SUBOCCIPITAL CRANIECTOMY; C1 LAMINECTOMY AND DURAPLASTY; PLACEMENT OF 4TH VENTRICULAR-SUBARACHNOID SHUNT; IMPULSE MONITORING;  Surgeon: Zechariah Barrera MD; Location[facility];  Service: Neurosurgeryx3    CA TOTAL DISC ARTHROPLASTY, CERVICAL, SINGLE N/A 1/25/2019    Procedure: Anterior cervical discectomy and total disc arthroplasty C5-6;  Surgeon: Brandee Adorno MD;  [facility];  Service: Neurosurgery    TUBAL LIGATION  06/2020       Family History   Problem Relation Age of Onset    Other Mother         sleep apnea    Hypertension Father     Drug abuse Father     Alcohol abuse Father     No Known Problems Sister     Asthma Son     Heart disease Maternal Grandmother     Diabetes Paternal Grandmother         type 2    Arthritis Paternal Grandmother     No Known Problems Daughter     Stroke Maternal Grandfather     Heart disease Maternal Grandfather     Diabetes Paternal Grandfather         type 2    Heart disease Paternal Grandfather     No Known Problems Sister     Heart disease Sister         failure    Heart disease Maternal Aunt          Medications have been verified  Objective   Pulse 92   Temp 97 5 °F (36 4 °C)   Resp 18   LMP 04/05/2021   SpO2 99%        Physical Exam     Physical Exam  Vitals signs and nursing note reviewed  Constitutional:       Appearance: She is well-developed  She is not toxic-appearing  HENT:      Head: Normocephalic and atraumatic  Neck:      Musculoskeletal: Normal range of motion and neck supple  Cardiovascular:      Rate and Rhythm: Normal rate and regular rhythm  Heart sounds: Normal heart sounds  Pulmonary:      Effort: Pulmonary effort is normal       Breath sounds: Normal breath sounds  No wheezing  Abdominal:      General: Bowel sounds are normal       Palpations: Abdomen is soft  Tenderness: There is abdominal tenderness in the right lower quadrant and left lower quadrant  There is no guarding or rebound  Skin:     Capillary Refill: Capillary refill takes less than 2 seconds  Neurological:      Mental Status: She is alert and oriented to person, place, and time  Psychiatric:         Behavior: Behavior normal

## 2022-10-22 PROCEDURE — 96366 THER/PROPH/DIAG IV INF ADDON: CPT

## 2022-10-27 ENCOUNTER — HOSPITAL ENCOUNTER (OUTPATIENT)
Dept: INTERVENTIONAL RADIOLOGY/VASCULAR | Facility: HOSPITAL | Age: 57
Discharge: HOME | End: 2022-10-27
Attending: NURSE PRACTITIONER | Admitting: RADIOLOGY
Payer: COMMERCIAL

## 2022-10-27 DIAGNOSIS — R63.30 FEEDING DIFFICULTIES: ICD-10-CM

## 2022-10-27 DIAGNOSIS — R63.30 FEEDING DIFFICULTIES: Primary | ICD-10-CM

## 2022-10-27 PROCEDURE — 49452 REPLACE G-J TUBE PERC: CPT

## 2022-10-27 PROCEDURE — C1769 GUIDE WIRE: HCPCS

## 2022-10-27 PROCEDURE — 255N000002 HC RX 255 OP 636: Performed by: NURSE PRACTITIONER

## 2022-10-27 RX ADMIN — IOHEXOL 20 ML: 350 INJECTION, SOLUTION INTRAVENOUS at 12:19

## 2022-10-28 ENCOUNTER — HOSPITAL ENCOUNTER (INPATIENT)
Facility: CLINIC | Age: 57
LOS: 3 days | Discharge: HOME HEALTH | End: 2022-10-31
Attending: EMERGENCY MEDICINE | Admitting: HOSPITALIST
Payer: COMMERCIAL

## 2022-10-28 ENCOUNTER — APPOINTMENT (OUTPATIENT)
Dept: CT IMAGING | Facility: CLINIC | Age: 57
End: 2022-10-28
Attending: EMERGENCY MEDICINE
Payer: COMMERCIAL

## 2022-10-28 DIAGNOSIS — N39.0 RECURRENT UTI: ICD-10-CM

## 2022-10-28 DIAGNOSIS — A41.9 SEPSIS, DUE TO UNSPECIFIED ORGANISM, UNSPECIFIED WHETHER ACUTE ORGAN DYSFUNCTION PRESENT (H): ICD-10-CM

## 2022-10-28 DIAGNOSIS — K21.9 GERD WITHOUT ESOPHAGITIS: ICD-10-CM

## 2022-10-28 DIAGNOSIS — Z86.73 HISTORY OF STROKE: Primary | ICD-10-CM

## 2022-10-28 DIAGNOSIS — J18.9 PNEUMONIA OF BOTH LUNGS DUE TO INFECTIOUS ORGANISM, UNSPECIFIED PART OF LUNG: ICD-10-CM

## 2022-10-28 DIAGNOSIS — R50.9 FEVER AND CHILLS: ICD-10-CM

## 2022-10-28 LAB
ALBUMIN SERPL-MCNC: 3.3 G/DL (ref 3.5–5)
ALP SERPL-CCNC: 71 U/L (ref 45–120)
ALT SERPL W P-5'-P-CCNC: 22 U/L (ref 0–45)
ANION GAP SERPL CALCULATED.3IONS-SCNC: 11 MMOL/L (ref 5–18)
AST SERPL W P-5'-P-CCNC: 25 U/L (ref 0–40)
BASOPHILS # BLD AUTO: 0 10E3/UL (ref 0–0.2)
BASOPHILS NFR BLD AUTO: 0 %
BILIRUB DIRECT SERPL-MCNC: 0.2 MG/DL
BILIRUB SERPL-MCNC: 0.4 MG/DL (ref 0–1)
BNP SERPL-MCNC: 19 PG/ML (ref 0–51)
BUN SERPL-MCNC: 41 MG/DL (ref 8–22)
CALCIUM SERPL-MCNC: 9 MG/DL (ref 8.5–10.5)
CHLORIDE BLD-SCNC: 109 MMOL/L (ref 98–107)
CO2 SERPL-SCNC: 19 MMOL/L (ref 22–31)
CREAT SERPL-MCNC: 1.29 MG/DL (ref 0.7–1.3)
EOSINOPHIL # BLD AUTO: 0 10E3/UL (ref 0–0.7)
EOSINOPHIL NFR BLD AUTO: 0 %
ERYTHROCYTE [DISTWIDTH] IN BLOOD BY AUTOMATED COUNT: 12.6 % (ref 10–15)
FLUAV RNA SPEC QL NAA+PROBE: NEGATIVE
FLUBV RNA RESP QL NAA+PROBE: NEGATIVE
GFR SERPL CREATININE-BSD FRML MDRD: 64 ML/MIN/1.73M2
GLUCOSE BLD-MCNC: 98 MG/DL (ref 70–125)
HCT VFR BLD AUTO: 40.3 % (ref 40–53)
HGB BLD-MCNC: 13.1 G/DL (ref 13.3–17.7)
HOLD SPECIMEN: NORMAL
IMM GRANULOCYTES # BLD: 0.1 10E3/UL
IMM GRANULOCYTES NFR BLD: 1 %
LACTATE SERPL-SCNC: 1.1 MMOL/L (ref 0.7–2)
LACTATE SERPL-SCNC: 1.7 MMOL/L (ref 0.7–2)
LIPASE SERPL-CCNC: 79 U/L (ref 0–52)
LYMPHOCYTES # BLD AUTO: 0.6 10E3/UL (ref 0.8–5.3)
LYMPHOCYTES NFR BLD AUTO: 5 %
MCH RBC QN AUTO: 31.3 PG (ref 26.5–33)
MCHC RBC AUTO-ENTMCNC: 32.5 G/DL (ref 31.5–36.5)
MCV RBC AUTO: 96 FL (ref 78–100)
MONOCYTES # BLD AUTO: 0.6 10E3/UL (ref 0–1.3)
MONOCYTES NFR BLD AUTO: 5 %
NEUTROPHILS # BLD AUTO: 11.4 10E3/UL (ref 1.6–8.3)
NEUTROPHILS NFR BLD AUTO: 89 %
NRBC # BLD AUTO: 0 10E3/UL
NRBC BLD AUTO-RTO: 0 /100
PLATELET # BLD AUTO: 291 10E3/UL (ref 150–450)
POTASSIUM BLD-SCNC: 4.6 MMOL/L (ref 3.5–5)
PROCALCITONIN SERPL-MCNC: 2.01 NG/ML (ref 0–0.49)
PROT SERPL-MCNC: 9 G/DL (ref 6–8)
RBC # BLD AUTO: 4.19 10E6/UL (ref 4.4–5.9)
RSV RNA SPEC NAA+PROBE: NEGATIVE
SARS-COV-2 RNA RESP QL NAA+PROBE: NEGATIVE
SODIUM SERPL-SCNC: 139 MMOL/L (ref 136–145)
WBC # BLD AUTO: 12.7 10E3/UL (ref 4–11)

## 2022-10-28 PROCEDURE — 83605 ASSAY OF LACTIC ACID: CPT | Performed by: HOSPITALIST

## 2022-10-28 PROCEDURE — 250N000013 HC RX MED GY IP 250 OP 250 PS 637: Performed by: EMERGENCY MEDICINE

## 2022-10-28 PROCEDURE — 99285 EMERGENCY DEPT VISIT HI MDM: CPT | Mod: 25

## 2022-10-28 PROCEDURE — 85025 COMPLETE CBC W/AUTO DIFF WBC: CPT | Performed by: EMERGENCY MEDICINE

## 2022-10-28 PROCEDURE — 250N000011 HC RX IP 250 OP 636: Performed by: EMERGENCY MEDICINE

## 2022-10-28 PROCEDURE — C9803 HOPD COVID-19 SPEC COLLECT: HCPCS

## 2022-10-28 PROCEDURE — 83880 ASSAY OF NATRIURETIC PEPTIDE: CPT | Performed by: HOSPITALIST

## 2022-10-28 PROCEDURE — 80053 COMPREHEN METABOLIC PANEL: CPT | Performed by: EMERGENCY MEDICINE

## 2022-10-28 PROCEDURE — 99223 1ST HOSP IP/OBS HIGH 75: CPT | Performed by: HOSPITALIST

## 2022-10-28 PROCEDURE — 83690 ASSAY OF LIPASE: CPT | Performed by: EMERGENCY MEDICINE

## 2022-10-28 PROCEDURE — 120N000001 HC R&B MED SURG/OB

## 2022-10-28 PROCEDURE — 74177 CT ABD & PELVIS W/CONTRAST: CPT

## 2022-10-28 PROCEDURE — 87040 BLOOD CULTURE FOR BACTERIA: CPT | Performed by: EMERGENCY MEDICINE

## 2022-10-28 PROCEDURE — 96375 TX/PRO/DX INJ NEW DRUG ADDON: CPT

## 2022-10-28 PROCEDURE — 250N000013 HC RX MED GY IP 250 OP 250 PS 637: Performed by: HOSPITALIST

## 2022-10-28 PROCEDURE — 96361 HYDRATE IV INFUSION ADD-ON: CPT

## 2022-10-28 PROCEDURE — 83605 ASSAY OF LACTIC ACID: CPT | Performed by: EMERGENCY MEDICINE

## 2022-10-28 PROCEDURE — 96367 TX/PROPH/DG ADDL SEQ IV INF: CPT

## 2022-10-28 PROCEDURE — 36415 COLL VENOUS BLD VENIPUNCTURE: CPT | Performed by: HOSPITALIST

## 2022-10-28 PROCEDURE — 82248 BILIRUBIN DIRECT: CPT | Performed by: EMERGENCY MEDICINE

## 2022-10-28 PROCEDURE — 36415 COLL VENOUS BLD VENIPUNCTURE: CPT | Performed by: EMERGENCY MEDICINE

## 2022-10-28 PROCEDURE — 96366 THER/PROPH/DIAG IV INF ADDON: CPT

## 2022-10-28 PROCEDURE — 258N000003 HC RX IP 258 OP 636: Performed by: HOSPITALIST

## 2022-10-28 PROCEDURE — 96365 THER/PROPH/DIAG IV INF INIT: CPT

## 2022-10-28 PROCEDURE — 87637 SARSCOV2&INF A&B&RSV AMP PRB: CPT | Performed by: EMERGENCY MEDICINE

## 2022-10-28 PROCEDURE — 84145 PROCALCITONIN (PCT): CPT | Performed by: EMERGENCY MEDICINE

## 2022-10-28 PROCEDURE — 258N000003 HC RX IP 258 OP 636: Performed by: EMERGENCY MEDICINE

## 2022-10-28 RX ORDER — MEROPENEM 1 G/1
1 INJECTION, POWDER, FOR SOLUTION INTRAVENOUS ONCE
Status: COMPLETED | OUTPATIENT
Start: 2022-10-28 | End: 2022-10-29

## 2022-10-28 RX ORDER — SODIUM CHLORIDE 9 MG/ML
INJECTION, SOLUTION INTRAVENOUS CONTINUOUS
Status: DISCONTINUED | OUTPATIENT
Start: 2022-10-28 | End: 2022-10-30

## 2022-10-28 RX ORDER — IBUPROFEN 100 MG/5ML
400 SUSPENSION, ORAL (FINAL DOSE FORM) ORAL ONCE
Status: COMPLETED | OUTPATIENT
Start: 2022-10-28 | End: 2022-10-28

## 2022-10-28 RX ORDER — ENOXAPARIN SODIUM 100 MG/ML
40 INJECTION SUBCUTANEOUS EVERY 24 HOURS
Status: DISCONTINUED | OUTPATIENT
Start: 2022-10-28 | End: 2022-10-28

## 2022-10-28 RX ORDER — ONDANSETRON 4 MG/1
4 TABLET, ORALLY DISINTEGRATING ORAL EVERY 6 HOURS PRN
Status: DISCONTINUED | OUTPATIENT
Start: 2022-10-28 | End: 2022-10-29

## 2022-10-28 RX ORDER — ONDANSETRON 2 MG/ML
4 INJECTION INTRAMUSCULAR; INTRAVENOUS EVERY 6 HOURS PRN
Status: DISCONTINUED | OUTPATIENT
Start: 2022-10-28 | End: 2022-10-29

## 2022-10-28 RX ORDER — ACETAMINOPHEN 325 MG/1
975 TABLET ORAL EVERY 8 HOURS PRN
Status: DISCONTINUED | OUTPATIENT
Start: 2022-10-28 | End: 2022-10-31 | Stop reason: HOSPADM

## 2022-10-28 RX ORDER — IOPAMIDOL 755 MG/ML
100 INJECTION, SOLUTION INTRAVASCULAR ONCE
Status: COMPLETED | OUTPATIENT
Start: 2022-10-28 | End: 2022-10-28

## 2022-10-28 RX ORDER — MEROPENEM 1 G/1
1 INJECTION, POWDER, FOR SOLUTION INTRAVENOUS EVERY 8 HOURS
Status: DISCONTINUED | OUTPATIENT
Start: 2022-10-29 | End: 2022-10-31

## 2022-10-28 RX ORDER — PROCHLORPERAZINE MALEATE 10 MG
10 TABLET ORAL EVERY 6 HOURS PRN
Status: DISCONTINUED | OUTPATIENT
Start: 2022-10-28 | End: 2022-10-29

## 2022-10-28 RX ORDER — BACLOFEN 10 MG/1
10 TABLET ORAL 3 TIMES DAILY
Status: DISCONTINUED | OUTPATIENT
Start: 2022-10-28 | End: 2022-10-31 | Stop reason: HOSPADM

## 2022-10-28 RX ORDER — IPRATROPIUM BROMIDE AND ALBUTEROL SULFATE 2.5; .5 MG/3ML; MG/3ML
3 SOLUTION RESPIRATORY (INHALATION)
Status: DISCONTINUED | OUTPATIENT
Start: 2022-10-29 | End: 2022-10-29

## 2022-10-28 RX ORDER — LIDOCAINE 40 MG/G
CREAM TOPICAL
Status: DISCONTINUED | OUTPATIENT
Start: 2022-10-28 | End: 2022-10-31 | Stop reason: HOSPADM

## 2022-10-28 RX ORDER — PROCHLORPERAZINE 25 MG
25 SUPPOSITORY, RECTAL RECTAL EVERY 12 HOURS PRN
Status: DISCONTINUED | OUTPATIENT
Start: 2022-10-28 | End: 2022-10-29

## 2022-10-28 RX ORDER — PIPERACILLIN SODIUM, TAZOBACTAM SODIUM 3; .375 G/15ML; G/15ML
3.38 INJECTION, POWDER, LYOPHILIZED, FOR SOLUTION INTRAVENOUS ONCE
Status: COMPLETED | OUTPATIENT
Start: 2022-10-28 | End: 2022-10-28

## 2022-10-28 RX ORDER — QUETIAPINE FUMARATE 25 MG/1
50 TABLET, FILM COATED ORAL AT BEDTIME
Status: DISCONTINUED | OUTPATIENT
Start: 2022-10-28 | End: 2022-10-29

## 2022-10-28 RX ADMIN — VANCOMYCIN HYDROCHLORIDE 1250 MG: 5 INJECTION, POWDER, LYOPHILIZED, FOR SOLUTION INTRAVENOUS at 22:08

## 2022-10-28 RX ADMIN — IOPAMIDOL 90 ML: 755 INJECTION, SOLUTION INTRAVENOUS at 21:30

## 2022-10-28 RX ADMIN — PIPERACILLIN AND TAZOBACTAM 3.38 G: 3; .375 INJECTION, POWDER, FOR SOLUTION INTRAVENOUS at 21:35

## 2022-10-28 RX ADMIN — IBUPROFEN 400 MG: 100 SUSPENSION ORAL at 21:35

## 2022-10-28 RX ADMIN — BACLOFEN 10 MG: 10 TABLET ORAL at 23:59

## 2022-10-28 RX ADMIN — QUETIAPINE 50 MG: 50 TABLET, FILM COATED ORAL at 23:59

## 2022-10-28 RX ADMIN — SODIUM CHLORIDE, PRESERVATIVE FREE: 5 INJECTION INTRAVENOUS at 23:58

## 2022-10-28 RX ADMIN — MEROPENEM 1 G: 1 INJECTION, POWDER, FOR SOLUTION INTRAVENOUS at 23:59

## 2022-10-28 RX ADMIN — SODIUM CHLORIDE 1000 ML: 9 INJECTION, SOLUTION INTRAVENOUS at 21:30

## 2022-10-28 ASSESSMENT — ACTIVITIES OF DAILY LIVING (ADL)
ADLS_ACUITY_SCORE: 35
ADLS_ACUITY_SCORE: 35

## 2022-10-28 ASSESSMENT — ENCOUNTER SYMPTOMS
SHORTNESS OF BREATH: 1
DIARRHEA: 0
VOMITING: 1
FATIGUE: 1
ABDOMINAL PAIN: 0
FEVER: 1
COUGH: 1

## 2022-10-29 LAB
ALBUMIN SERPL-MCNC: 2.5 G/DL (ref 3.5–5)
ALP SERPL-CCNC: 54 U/L (ref 45–120)
ALT SERPL W P-5'-P-CCNC: 16 U/L (ref 0–45)
ANION GAP SERPL CALCULATED.3IONS-SCNC: 5 MMOL/L (ref 5–18)
AST SERPL W P-5'-P-CCNC: 19 U/L (ref 0–40)
BASOPHILS # BLD AUTO: 0.1 10E3/UL (ref 0–0.2)
BASOPHILS NFR BLD AUTO: 0 %
BILIRUB SERPL-MCNC: 0.5 MG/DL (ref 0–1)
BUN SERPL-MCNC: 34 MG/DL (ref 8–22)
CALCIUM SERPL-MCNC: 7.7 MG/DL (ref 8.5–10.5)
CHLORIDE BLD-SCNC: 117 MMOL/L (ref 98–107)
CO2 SERPL-SCNC: 19 MMOL/L (ref 22–31)
CREAT SERPL-MCNC: 1.32 MG/DL (ref 0.7–1.3)
EOSINOPHIL # BLD AUTO: 0.1 10E3/UL (ref 0–0.7)
EOSINOPHIL NFR BLD AUTO: 1 %
ERYTHROCYTE [DISTWIDTH] IN BLOOD BY AUTOMATED COUNT: 12.9 % (ref 10–15)
GFR SERPL CREATININE-BSD FRML MDRD: 62 ML/MIN/1.73M2
GLUCOSE BLD-MCNC: 93 MG/DL (ref 70–125)
HCT VFR BLD AUTO: 33.1 % (ref 40–53)
HGB BLD-MCNC: 10.5 G/DL (ref 13.3–17.7)
IMM GRANULOCYTES # BLD: 0.5 10E3/UL
IMM GRANULOCYTES NFR BLD: 3 %
LACTATE SERPL-SCNC: 1.8 MMOL/L (ref 0.7–2)
LYMPHOCYTES # BLD AUTO: 1.3 10E3/UL (ref 0.8–5.3)
LYMPHOCYTES NFR BLD AUTO: 6 %
MCH RBC QN AUTO: 31.3 PG (ref 26.5–33)
MCHC RBC AUTO-ENTMCNC: 31.7 G/DL (ref 31.5–36.5)
MCV RBC AUTO: 99 FL (ref 78–100)
MONOCYTES # BLD AUTO: 1.2 10E3/UL (ref 0–1.3)
MONOCYTES NFR BLD AUTO: 6 %
NEUTROPHILS # BLD AUTO: 16.7 10E3/UL (ref 1.6–8.3)
NEUTROPHILS NFR BLD AUTO: 84 %
NRBC # BLD AUTO: 0 10E3/UL
NRBC BLD AUTO-RTO: 0 /100
PLATELET # BLD AUTO: 224 10E3/UL (ref 150–450)
POTASSIUM BLD-SCNC: 4.7 MMOL/L (ref 3.5–5)
PROT SERPL-MCNC: 6.9 G/DL (ref 6–8)
RBC # BLD AUTO: 3.35 10E6/UL (ref 4.4–5.9)
SODIUM SERPL-SCNC: 141 MMOL/L (ref 136–145)
WBC # BLD AUTO: 19.8 10E3/UL (ref 4–11)

## 2022-10-29 PROCEDURE — 87633 RESP VIRUS 12-25 TARGETS: CPT | Performed by: INTERNAL MEDICINE

## 2022-10-29 PROCEDURE — 250N000011 HC RX IP 250 OP 636: Performed by: HOSPITALIST

## 2022-10-29 PROCEDURE — 80053 COMPREHEN METABOLIC PANEL: CPT | Performed by: HOSPITALIST

## 2022-10-29 PROCEDURE — 94667 MNPJ CHEST WALL 1ST: CPT

## 2022-10-29 PROCEDURE — 250N000013 HC RX MED GY IP 250 OP 250 PS 637: Performed by: HOSPITALIST

## 2022-10-29 PROCEDURE — 96376 TX/PRO/DX INJ SAME DRUG ADON: CPT

## 2022-10-29 PROCEDURE — C9113 INJ PANTOPRAZOLE SODIUM, VIA: HCPCS | Performed by: HOSPITALIST

## 2022-10-29 PROCEDURE — 84100 ASSAY OF PHOSPHORUS: CPT | Performed by: HOSPITALIST

## 2022-10-29 PROCEDURE — 120N000001 HC R&B MED SURG/OB

## 2022-10-29 PROCEDURE — 83735 ASSAY OF MAGNESIUM: CPT | Performed by: HOSPITALIST

## 2022-10-29 PROCEDURE — 83605 ASSAY OF LACTIC ACID: CPT | Performed by: FAMILY MEDICINE

## 2022-10-29 PROCEDURE — 84132 ASSAY OF SERUM POTASSIUM: CPT | Performed by: HOSPITALIST

## 2022-10-29 PROCEDURE — 999N000157 HC STATISTIC RCP TIME EA 10 MIN

## 2022-10-29 PROCEDURE — 99255 IP/OBS CONSLTJ NEW/EST HI 80: CPT | Performed by: INTERNAL MEDICINE

## 2022-10-29 PROCEDURE — 36415 COLL VENOUS BLD VENIPUNCTURE: CPT | Performed by: HOSPITALIST

## 2022-10-29 PROCEDURE — 99233 SBSQ HOSP IP/OBS HIGH 50: CPT | Performed by: HOSPITALIST

## 2022-10-29 PROCEDURE — 250N000009 HC RX 250: Performed by: HOSPITALIST

## 2022-10-29 PROCEDURE — 258N000003 HC RX IP 258 OP 636: Performed by: HOSPITALIST

## 2022-10-29 PROCEDURE — 87040 BLOOD CULTURE FOR BACTERIA: CPT | Performed by: INTERNAL MEDICINE

## 2022-10-29 PROCEDURE — 94640 AIRWAY INHALATION TREATMENT: CPT | Mod: 76

## 2022-10-29 PROCEDURE — 272N000098

## 2022-10-29 PROCEDURE — 94640 AIRWAY INHALATION TREATMENT: CPT

## 2022-10-29 PROCEDURE — 96375 TX/PRO/DX INJ NEW DRUG ADDON: CPT

## 2022-10-29 PROCEDURE — 36415 COLL VENOUS BLD VENIPUNCTURE: CPT | Performed by: INTERNAL MEDICINE

## 2022-10-29 PROCEDURE — 36415 COLL VENOUS BLD VENIPUNCTURE: CPT | Performed by: FAMILY MEDICINE

## 2022-10-29 PROCEDURE — 85025 COMPLETE CBC W/AUTO DIFF WBC: CPT | Performed by: HOSPITALIST

## 2022-10-29 RX ORDER — METOPROLOL TARTRATE 25 MG/1
25 TABLET, FILM COATED ORAL 2 TIMES DAILY
Status: DISCONTINUED | OUTPATIENT
Start: 2022-10-29 | End: 2022-10-29

## 2022-10-29 RX ORDER — ONDANSETRON 2 MG/ML
4 INJECTION INTRAMUSCULAR; INTRAVENOUS EVERY 6 HOURS PRN
Status: DISCONTINUED | OUTPATIENT
Start: 2022-10-29 | End: 2022-10-31 | Stop reason: HOSPADM

## 2022-10-29 RX ORDER — QUETIAPINE FUMARATE 25 MG/1
25 TABLET, FILM COATED ORAL 2 TIMES DAILY PRN
Status: DISCONTINUED | OUTPATIENT
Start: 2022-10-29 | End: 2022-10-29

## 2022-10-29 RX ORDER — QUETIAPINE FUMARATE 25 MG/1
25 TABLET, FILM COATED ORAL DAILY
Status: DISCONTINUED | OUTPATIENT
Start: 2022-10-29 | End: 2022-10-29

## 2022-10-29 RX ORDER — SIMVASTATIN 40 MG
40 TABLET ORAL AT BEDTIME
Status: DISCONTINUED | OUTPATIENT
Start: 2022-10-29 | End: 2022-10-31 | Stop reason: HOSPADM

## 2022-10-29 RX ORDER — PROCHLORPERAZINE MALEATE 10 MG
10 TABLET ORAL EVERY 6 HOURS PRN
Status: DISCONTINUED | OUTPATIENT
Start: 2022-10-29 | End: 2022-10-31 | Stop reason: HOSPADM

## 2022-10-29 RX ORDER — QUETIAPINE FUMARATE 25 MG/1
25 TABLET, FILM COATED ORAL DAILY PRN
Status: DISCONTINUED | OUTPATIENT
Start: 2022-10-29 | End: 2022-10-31 | Stop reason: HOSPADM

## 2022-10-29 RX ORDER — METOPROLOL TARTRATE 25 MG/1
25 TABLET, FILM COATED ORAL 2 TIMES DAILY
Status: DISCONTINUED | OUTPATIENT
Start: 2022-10-29 | End: 2022-10-31 | Stop reason: HOSPADM

## 2022-10-29 RX ORDER — DEXTROMETHORPHAN POLISTIREX 30 MG/5ML
60 SUSPENSION ORAL 2 TIMES DAILY PRN
Status: DISCONTINUED | OUTPATIENT
Start: 2022-10-29 | End: 2022-10-31 | Stop reason: HOSPADM

## 2022-10-29 RX ORDER — CHLORHEXIDINE GLUCONATE ORAL RINSE 1.2 MG/ML
15 SOLUTION DENTAL DAILY
Status: DISCONTINUED | OUTPATIENT
Start: 2022-10-29 | End: 2022-10-31 | Stop reason: HOSPADM

## 2022-10-29 RX ORDER — PROCHLORPERAZINE 25 MG
25 SUPPOSITORY, RECTAL RECTAL EVERY 12 HOURS PRN
Status: DISCONTINUED | OUTPATIENT
Start: 2022-10-29 | End: 2022-10-31 | Stop reason: HOSPADM

## 2022-10-29 RX ORDER — BACLOFEN 10 MG/1
10 TABLET ORAL 3 TIMES DAILY
Status: DISCONTINUED | OUTPATIENT
Start: 2022-10-29 | End: 2022-10-29

## 2022-10-29 RX ORDER — ONDANSETRON 4 MG/1
4 TABLET, ORALLY DISINTEGRATING ORAL EVERY 6 HOURS PRN
Status: DISCONTINUED | OUTPATIENT
Start: 2022-10-29 | End: 2022-10-31 | Stop reason: HOSPADM

## 2022-10-29 RX ORDER — SIMVASTATIN 40 MG
40 TABLET ORAL AT BEDTIME
Status: DISCONTINUED | OUTPATIENT
Start: 2022-10-29 | End: 2022-10-29

## 2022-10-29 RX ORDER — CARBOXYMETHYLCELLULOSE SODIUM 10 MG/ML
1 GEL OPHTHALMIC EVERY 6 HOURS PRN
Status: DISCONTINUED | OUTPATIENT
Start: 2022-10-29 | End: 2022-10-31 | Stop reason: HOSPADM

## 2022-10-29 RX ORDER — DEXTROMETHORPHAN POLISTIREX 30 MG/5ML
60 SUSPENSION ORAL 2 TIMES DAILY PRN
Status: DISCONTINUED | OUTPATIENT
Start: 2022-10-29 | End: 2022-10-29

## 2022-10-29 RX ORDER — POLYETHYLENE GLYCOL 3350 17 G/17G
17 POWDER, FOR SOLUTION ORAL DAILY PRN
Status: DISCONTINUED | OUTPATIENT
Start: 2022-10-29 | End: 2022-10-31 | Stop reason: HOSPADM

## 2022-10-29 RX ORDER — HYDROXYZINE HYDROCHLORIDE 10 MG/1
10 TABLET, FILM COATED ORAL 3 TIMES DAILY PRN
Status: DISCONTINUED | OUTPATIENT
Start: 2022-10-29 | End: 2022-10-31 | Stop reason: HOSPADM

## 2022-10-29 RX ORDER — IPRATROPIUM BROMIDE AND ALBUTEROL SULFATE 2.5; .5 MG/3ML; MG/3ML
1 SOLUTION RESPIRATORY (INHALATION) EVERY 4 HOURS
Status: DISCONTINUED | OUTPATIENT
Start: 2022-10-29 | End: 2022-10-31 | Stop reason: HOSPADM

## 2022-10-29 RX ORDER — QUETIAPINE FUMARATE 25 MG/1
25 TABLET, FILM COATED ORAL AT BEDTIME
Status: DISCONTINUED | OUTPATIENT
Start: 2022-10-30 | End: 2022-10-31 | Stop reason: HOSPADM

## 2022-10-29 RX ADMIN — PANTOPRAZOLE SODIUM 40 MG: 40 INJECTION, POWDER, FOR SOLUTION INTRAVENOUS at 11:38

## 2022-10-29 RX ADMIN — SODIUM CHLORIDE, PRESERVATIVE FREE: 5 INJECTION INTRAVENOUS at 12:14

## 2022-10-29 RX ADMIN — IPRATROPIUM BROMIDE AND ALBUTEROL SULFATE 3 ML: 2.5; .5 SOLUTION RESPIRATORY (INHALATION) at 08:19

## 2022-10-29 RX ADMIN — VANCOMYCIN HYDROCHLORIDE 1250 MG: 5 INJECTION, POWDER, LYOPHILIZED, FOR SOLUTION INTRAVENOUS at 22:24

## 2022-10-29 RX ADMIN — IPRATROPIUM BROMIDE AND ALBUTEROL SULFATE 3 ML: 2.5; .5 SOLUTION RESPIRATORY (INHALATION) at 13:05

## 2022-10-29 RX ADMIN — ACETAMINOPHEN 975 MG: 325 TABLET, FILM COATED ORAL at 20:51

## 2022-10-29 RX ADMIN — SIMVASTATIN 40 MG: 40 TABLET, FILM COATED ORAL at 22:24

## 2022-10-29 RX ADMIN — BACLOFEN 10 MG: 10 TABLET ORAL at 17:46

## 2022-10-29 RX ADMIN — QUETIAPINE FUMARATE 25 MG: 25 TABLET ORAL at 14:00

## 2022-10-29 RX ADMIN — MEROPENEM 1 G: 1 INJECTION, POWDER, FOR SOLUTION INTRAVENOUS at 17:42

## 2022-10-29 RX ADMIN — HYDROXYZINE HYDROCHLORIDE 10 MG: 10 TABLET ORAL at 14:25

## 2022-10-29 RX ADMIN — QUETIAPINE FUMARATE 25 MG: 25 TABLET ORAL at 21:12

## 2022-10-29 RX ADMIN — PANTOPRAZOLE SODIUM 40 MG: 40 INJECTION, POWDER, FOR SOLUTION INTRAVENOUS at 01:04

## 2022-10-29 RX ADMIN — BACLOFEN 10 MG: 10 TABLET ORAL at 20:51

## 2022-10-29 RX ADMIN — BACLOFEN 10 MG: 10 TABLET ORAL at 11:37

## 2022-10-29 RX ADMIN — MEROPENEM 1 G: 1 INJECTION, POWDER, FOR SOLUTION INTRAVENOUS at 06:33

## 2022-10-29 RX ADMIN — METOPROLOL TARTRATE 25 MG: 25 TABLET, FILM COATED ORAL at 20:51

## 2022-10-29 RX ADMIN — QUETIAPINE FUMARATE 25 MG: 25 TABLET ORAL at 04:20

## 2022-10-29 RX ADMIN — IPRATROPIUM BROMIDE AND ALBUTEROL SULFATE 3 ML: 2.5; .5 SOLUTION RESPIRATORY (INHALATION) at 19:12

## 2022-10-29 RX ADMIN — IPRATROPIUM BROMIDE AND ALBUTEROL SULFATE 3 ML: 2.5; .5 SOLUTION RESPIRATORY (INHALATION) at 15:13

## 2022-10-29 RX ADMIN — METOPROLOL TARTRATE 25 MG: 25 TABLET, FILM COATED ORAL at 14:22

## 2022-10-29 ASSESSMENT — ACTIVITIES OF DAILY LIVING (ADL)
ADLS_ACUITY_SCORE: 39
DEPENDENT_IADLS:: CLEANING;COOKING;LAUNDRY;SHOPPING;MEDICATION MANAGEMENT;MONEY MANAGEMENT;TRANSPORTATION;INCONTINENCE
ADLS_ACUITY_SCORE: 51
ADLS_ACUITY_SCORE: 51
ADLS_ACUITY_SCORE: 39
ADLS_ACUITY_SCORE: 35
ADLS_ACUITY_SCORE: 39
ADLS_ACUITY_SCORE: 51
ADLS_ACUITY_SCORE: 39

## 2022-10-29 NOTE — H&P
[facility] MEDICINE ADMISSION HISTORY AND PHYSICAL     Assessment & Plan       Liu GIL Malave is a 57 year old male who presented with concerns of fever, low oxygen saturation at home and drainage from a feeding tube site.    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  Has a feeding tube.  Feeding tube was changed on the day prior to this presentation.  He has a prior history of MRSA and ESBL.  Seen in the emergency department in September for aspiration pneumonia, sent home and treated with G-tube clindamycin at that time.  That presentation was also soon after G-tube was replaced.    Initial evaluation revealed temperature of 101.1, heart rate of 128, oxygen saturation of 86% on room air.  Labs notable for creatinine of 1.3, BUN 41, albumin 3.3, total protein of 9.0, lipase of 79, procalcitonin 2.0, white count 12.7, hemoglobin 13.1.  CT chest abdomen pelvis with bilateral pneumonia, abdomen negative.    Initial treatment included 1 L normal saline bolus, ibuprofen, vancomycin, Zosyn.      Assessment plan:  Sepsis due to bilateral pneumonia  Acute respiratory failure with hypoxia due to pneumonia  History of MRSA and ESBL in previous cultures, history of recurrent UTI  History of COPD on DuoNebs  Treat with meropenem and vancomycin  Consult infectious disease regarding history of MDR pathogens  Did find history of E. coli ESBL but I did not find any history of MRSA in cultures  Continue aggressive IV fluids  Supplemental oxygen as needed  Keep head of bed elevated  Wonder about aspiration related to the G-tube exchange as it has happened 2 months in a row  Culture sputum and blood  Check urinalysis/urine culture  Seems he is on chronic Macrobid for UTI prophylaxis    History of brainstem hemorrhagic stroke, hemiplegia, nonverbal  Seizure history  Feeding tube dependent  Per  recent visit he is now off of Keppra and they are monitoring for seizure activity  Feeding tube is replaced frequently as the balloon pops    Essential hypertension  Most recent med list includes metoprolol, amlodipine  Hold antihypertensive medications for now  Resume home blood pressure meds pending med rec and stable blood pressures      Clinically Significant Risk Factors Present on Admission              # Hypoalbuminemia: Lowest albumin = 3.3 g/dL (Ref range: 3.5-5.2) at 10/28/2022  8:23 PM, will monitor as appropriate                   DVTP: Lovenox prophylactic dose   Code Status: Prior  Disposition: Inpatient   Expected LOS: 3 days   Goals for the hospitalization: Treatment of sepsis, return to normal oxygen saturation at baseline  Diet: N.p.o., feeding per G-tube eventually  Fluids: Normal saline at 100/h    Disposition Plan      Expected Discharge Date: 10/30/2022               Chief Complaint  fever, low oxygen saturation     HISTORY   Liu Malave is a 57 year old male who presented with complaints of fever and low oxygen saturation.    Per ED provider:  Liu Malave is a 57 year old male with history of seizure disorder, persistent fevers, lung nodule, severe sepsis, cranial hemorrhage with dense hemiparesis, decreased communication abilities, G-tube in place, hypertension, GERD, hepatitis C carrier, bruxism, pneumonia, MRSA, prior ESBL E.coli infection, who presents to the ER with complaints of fever, cough, fatigue, shortness of breath, vomiting.     Patient's family reports that for the past 1 week he has had increasing fatigue and now developed fever, cough, difficulty breathing.  A grandchild has RSV.  He has had 1 episode of vomiting.  Otherwise he has not suggested to them that he is having any chest pain, abdominal pain, or diarrhea.  He does not have a Kelly catheter.     Reports that they gave him Tylenol about 7:30 PM (just prior to arrival) for temp of 100 at home.     Family reports that  he does have difficulty with communication after his stroke.  He is completely bedridden and dependent on all cares.  He does not take anything orally but instead uses his feeding tube.     Family also reports some bleeding in the tube earlier which is unusual for him.  Family says that sometimes he will have some bleeding around the tube but not in the lumen itself.  He vomited earlier in the day but did not have hematemesis.  Has not had yi melena.  No lower extremity edema.  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  No date: Convulsions, unspecified convulsion type (H)  07/2016: CVA (cerebral vascular accident) (H)      Comment:  Hemorrhagic,   No date: Depression  No date: Dyslipidemia  No date: Dyslipidemia  No date: Elevated troponin  No date: Essential hypertension  06/04/2020: Feeding by G-tube (H)  No date: GERD (gastroesophageal reflux disease)  No date: GERD without esophagitis  No date: Hemiplegia of nondominant side, late effect of   cerebrovascular disease (H)      Comment:  Created by Conversion  Replacement Utility updated for                latest IMO load  06/05/2016: Hemorrhagic stroke (H)  No date: Hepatitis C carrier (H)  No date: History of ESBL E. coli infection  No date: History of hemorrhagic stroke with residual hemiparesis (H)  No date: History of stroke with residual deficit  No date: HTN (hypertension)  No date: Hypertension  12/05/2016: Lung nodule      Comment:  9 x 9 mm left upper lobe on CXR  No date: MRSA (methicillin resistant staph aureus) culture positive  No date: Periodontal disease  No date: Persistent fever  No date: Pneumonia of both lower lobes due to infectious organism  No date: Respiratory failure with hypoxia  (H)  No date: Seizure (H)  06/04/2020: Sepsis due to urinary tract infection (H)  No date: Severe sepsis (H)  No date: Stroke (H)  No date: Trismus     Surgical History     Past Surgical History:   Procedure Laterality Date     aneurysm clipping  2006     BRAIN SURGERY       EXAM UNDER ANESTHESIA, RESTORATIONS, EXTRACTION(S) DENTAL COMPLEX, COMBINED N/A 12/19/2016    Procedure: COMBINED EXAM UNDER ANESTHESIA, RESTORATIONS, EXTRACTION(S) DENTAL COMPLEX;  Surgeon: Verónica Martinez DDS;  Location: [facility] OR     EXAM UNDER ANESTHESIA, RESTORATIONS, EXTRACTION(S) DENTAL COMPLEX, COMBINED N/A 3/7/2018    Procedure: COMBINED EXAM UNDER ANESTHESIA, RESTORATIONS, EXTRACTION(S) DENTAL COMPLEX;  Kenalog Injection in Bilateral Masseter, Combined Dental Exam,Radiographs, Three Dental Restorations, Periodontal Therapy and Fluoride Treatment;  Surgeon: Darryl Isbell DDS;  Location: [facility] OR     HH MIDLINE INSERTION  8/11/2017          INJECT STEROID (LOCATION) N/A 3/7/2018    Procedure: INJECT STEROID (LOCATION);  Kenalog Injection In Bilateral Masseter, Combined Dental Exam,Radiographs,Three Dental Restorations, Periodontal Therapy and Fluoride Treatment;  Surgeon: Liu Conroy DDS;  Location: [facility] OR     IR GASTRO JEJUNOSTOMY TUBE CHANGE  7/19/2016     IR GASTRO JEJUNOSTOMY TUBE CHANGE  9/23/2016     IR GASTRO JEJUNOSTOMY TUBE CHANGE  11/28/2016     IR GASTRO JEJUNOSTOMY TUBE CHANGE  1/27/2017     IR GASTRO JEJUNOSTOMY TUBE CHANGE  4/19/2017     IR GASTRO JEJUNOSTOMY TUBE CHANGE  6/20/2017     IR GASTRO JEJUNOSTOMY TUBE CHANGE  8/7/2017     IR GASTRO JEJUNOSTOMY TUBE CHANGE  8/11/2017     IR GASTRO JEJUNOSTOMY TUBE CHANGE  10/20/2017     IR GASTRO JEJUNOSTOMY TUBE CHANGE  1/2/2018     IR GASTRO JEJUNOSTOMY TUBE CHANGE  2/16/2018     IR GASTRO JEJUNOSTOMY TUBE CHANGE  5/16/2018     IR GASTRO JEJUNOSTOMY TUBE CHANGE  6/11/2018     IR GASTRO JEJUNOSTOMY TUBE CHANGE  9/12/2018     IR GASTRO JEJUNOSTOMY TUBE CHANGE  12/24/2018      IR GASTRO JEJUNOSTOMY TUBE CHANGE  3/18/2019     IR GASTRO JEJUNOSTOMY TUBE CHANGE  4/23/2019     IR GASTRO JEJUNOSTOMY TUBE CHANGE  5/21/2019     IR GASTRO JEJUNOSTOMY TUBE CHANGE  9/9/2019     IR GASTRO JEJUNOSTOMY TUBE CHANGE  9/26/2019     IR GASTRO JEJUNOSTOMY TUBE CHANGE  10/11/2019     IR GASTRO JEJUNOSTOMY TUBE CHANGE  11/21/2019     IR GASTRO JEJUNOSTOMY TUBE CHANGE  11/29/2019     IR GASTRO JEJUNOSTOMY TUBE CHANGE  2/27/2020     IR GASTRO JEJUNOSTOMY TUBE CHANGE  4/10/2020     IR GASTRO JEJUNOSTOMY TUBE CHANGE  7/2/2020     IR GASTRO JEJUNOSTOMY TUBE CHANGE  8/28/2020     IR GASTRO JEJUNOSTOMY TUBE CHANGE  9/17/2020     IR GASTRO JEJUNOSTOMY TUBE CHANGE  10/29/2020     IR GASTRO JEJUNOSTOMY TUBE CHANGE  11/6/2020     IR GASTRO JEJUNOSTOMY TUBE CHANGE  12/24/2020     IR GASTRO JEJUNOSTOMY TUBE CHANGE  1/9/2021     IR GASTRO JEJUNOSTOMY TUBE CHANGE  6/9/2021     IR GASTRO JEJUNOSTOMY TUBE CHANGE  10/4/2021     IR GASTRO JEJUNOSTOMY TUBE CHANGE  10/18/2021     IR GASTRO JEJUNOSTOMY TUBE CHANGE  1/14/2022     IR GASTRO JEJUNOSTOMY TUBE CHANGE  1/20/2022     IR GASTRO JEJUNOSTOMY TUBE CHANGE  4/19/2022     IR GASTRO JEJUNOSTOMY TUBE CHANGE  6/7/2022     IR GASTRO JEJUNOSTOMY TUBE CHANGE  8/19/2022     IR GASTRO JEJUNOSTOMY TUBE CHANGE  9/13/2022     IR GASTRO JEJUNOSTOMY TUBE CHANGE  10/27/2022     IR GASTRO JEJUNOSTOMY TUBE PLACEMENT  6/14/2016     IR GJ TUBE REPLACEMENT  12/24/2018     IR GJ TUBE REPLACEMENT  3/18/2019     IR GJ TUBE REPLACEMENT  4/23/2019     IR GJ TUBE REPLACEMENT  5/21/2019     IR GJ TUBE REPLACEMENT  9/9/2019     IR GJ TUBE REPLACEMENT  9/26/2019     IR GJ TUBE REPLACEMENT  10/11/2019     IR GJ TUBE REPLACEMENT  11/21/2019     IR GJ TUBE REPLACEMENT  11/29/2019     IR GJ TUBE REPLACEMENT  2/27/2020     IR GJ TUBE REPLACEMENT  4/10/2020     IR GJ TUBE REPLACEMENT  7/2/2020     IR GJ TUBE REPLACEMENT  8/28/2020     IR GJ TUBE REPLACEMENT  9/17/2020     IR GJ TUBE REPLACEMENT  10/29/2020      "IR GJ TUBE REPLACEMENT  2020     IR GJ TUBE REPLACEMENT  2020     IR GJ TUBE REPLACEMENT  2021     IR GJ TUBE REPLACEMENT  2021     J-G tube       PICC  6/10/2016          TRACHEOSTOMY      Removed     Family History      Family History   Problem Relation Age of Onset     Hypertension Mother      Cancer Father      Other Cancer Father      Kidney Disease Brother       Social History      Social History     Tobacco Use     Smoking status: Former     Packs/day: 1.00     Years: 30.00     Pack years: 30.00     Types: Cigarettes     Quit date: 2005     Years since quittin.4     Smokeless tobacco: Former     Quit date: 2007     Tobacco comments:     quit 2006   Vaping Use     Vaping Use: Never used   Substance Use Topics     Alcohol use: No     Drug use: No      Allergies     Allergies   Allergen Reactions     Diphenhydramine Unknown     Trazodone Other (See Comments)     \"shaking\" per family     Prior to Admission Medications      Prior to Admission Medications   Prescriptions Last Dose Informant Patient Reported? Taking?   Lidocaine (LIDOCARE) 4 % Patch   No No   Sig: Place 1 patch onto the skin every 24 hours To prevent lidocaine toxicity, patient should be patch free for 12 hrs daily.   Polyethylene Glycol 400 (VISINE DRY EYE RELIEF) 1 % SOLN   No No   Sig: Apply 1 drop to eye every 6 hours as needed (dry eyes)   QUEtiapine (SEROQUEL) 25 MG tablet   No No   Sig: Give 1 tablet via G-tube in the morning and 1 tablet in the afternoon as needed for agitation.Give 2 tablets via G-tube at bedtime for sleep.   acetaminophen (TYLENOL) 500 MG tablet   Yes No   Si,000 mg by Gastric Tube route every 6 hours as needed for mild pain    amLODIPine (NORVASC) 5 MG tablet   Yes No   Sig: Take 5 mg by mouth daily   aspirin 81 MG chewable tablet   Yes No   Si mg by Per Feeding Tube route every morning    baclofen (LIORESAL) 10 MG tablet   No No   Sig: Take 1 tablet (10 mg) by mouth 3 times daily " as needed for muscle spasms   Patient taking differently: 10 mg by Per Feeding Tube route 3 times daily   bisacodyl (DULCOLAX) 10 MG suppository   No No   Sig: INSERT 1 SUPPOSTIORY INTO THE RECTUM DAILY AS NEEDED FOR CONSTIPATION.   chlorhexidine (PERIDEX) 0.12 % solution   Yes No   Sig: Swish and spit 15 mLs in mouth daily    dextromethorphan (DELSYM) 30 MG/5ML liquid   No No   Sig: Take 10 mLs (60 mg) by mouth 2 times daily as needed for cough   esomeprazole (NEXIUM) 20 MG DR capsule   No No   Sig: Take 1 capsule via G-tube twice daily   ferrous sulfate 220 (44 Fe) MG/5ML ELIX   No No   Si mLs by Enteral route daily   guaiFENesin (ROBITUSSIN) 100 MG/5ML liquid   No No   Sig: Give 5 ml via G tube every 6 hours as needed for cough   ipratropium - albuterol 0.5 mg/2.5 mg/3 mL (DUONEB) 0.5-2.5 (3) MG/3ML neb solution   No No   Sig: Take 1 vial (3 mLs) by nebulization every 4 hours   levETIRAcetam (KEPPRA) 100 MG/ML solution   No No   Sig: Take 7.5 mLs (750 mg) by mouth 2 times daily   meclizine (ANTIVERT) 25 MG tablet   No No   Sig: Take 1 tablet via G-tube every 6 hours as needed for vertigo   metoprolol tartrate (LOPRESSOR) 25 MG tablet   No No   Sig: TAKE ONE TABLET BY MOUTH TWICE DAILY    neomycin-bacitracin-polymyxin (NEOSPORIN) 5-400-5000 ointment   No No   Sig: Apply topically 4 times daily   nitroFURantoin macrocrystal-monohydrate (MACROBID) 100 MG capsule   No No   Sig: Take 1 capsule by G-tube route at bedtime.   polyethylene glycol (MIRALAX) 17 GM/Dose powder   No No   Sig: Give 1 capful via G-tube daily as needed for constipation   simvastatin (ZOCOR) 40 MG tablet   No No   Sig: Take 1 tablet (40 mg) by mouth At Bedtime      Facility-Administered Medications: None      Review of Systems     A 12 point comprehensive review of systems was negative except as noted above in HPI.    PHYSICAL EXAMINATION     Vitals      Temp:  [101.1  F (38.4  C)] 101.1  F (38.4  C)  Pulse:  [112-128] 112  Resp:  [24]  24  BP: (112-123)/(76-82) 112/76  SpO2:  [86 %-94 %] 93 %    Examination   Physical Exam:    Gen: no acute distress, comfortable  ENT: no scleral icterus  Pulm: Breathing comfortably at rest with occasional strong deep loose cough  CV: regular rate and rhythm, no significant lower extremity pitting edema  GI: abdomen is soft, non-tender, non-distended with active bowel sounds.  No bleeding around G-tube site.  MSK: no obvious deformities of the extremities  Derm: Not pale, no jaundice  Psych: Unable to assess      Pertinent Radiology     Radiology Results:   Recent Results (from the past 24 hour(s))   CT Chest/Abdomen/Pelvis w Contrast    Narrative    EXAM: CT CHEST/ABDOMEN/PELVIS W CONTRAST  LOCATION: [facility]  DATE/TIME: 10/28/2022 9:28 PM    INDICATION: fever, hypoxia, bloody drainage from Gtube  COMPARISON: 5/30/2022  TECHNIQUE: CT scan of the chest, abdomen, and pelvis was performed following injection of IV contrast. Multiplanar reformats were obtained. Dose reduction techniques were used.   CONTRAST: Isovue 370 100ml    FINDINGS:   LUNGS AND PLEURA: Chronic scarring and calcified nodules at upper lobes unchanged. However there is increased opacities at both lung bases and posterior aspect of right middle lobe suggesting pneumonia. Secretions are seen filling posterior bronchus to   right lower lobe. Motion artifact.    MEDIASTINUM/AXILLAE: No lymphadenopathy. Normal-sized heart.    CORONARY ARTERY CALCIFICATION: Moderate.    HEPATOBILIARY: Normal.    PANCREAS: Normal.    SPLEEN: Normal.    ADRENAL GLANDS: Normal.    KIDNEYS/BLADDER: Motion artifact. Stable benign renal cysts need no further workup.    BOWEL: Gastrojejunostomy tube is in good position. There is no significant inflammatory reaction involving the tube track, no abscess. Stomach is collapsed and empty. No inflammatory process involving bowel. Appendix normal.    LYMPH NODES: Normal.    VASCULATURE: Moderate  atherosclerotic plaque.    PELVIC ORGANS: Normal.    MUSCULOSKELETAL: Normal.      Impression    IMPRESSION:  1.  New patchy bilateral airspace disease right middle lobe and both lower lobes consistent with pneumonia.  2.  Gastrojejunostomy tube appears in good position with no complications evident. Bowel unremarkable.       Advance Care Planning        Silver Pereyra, [facility]  [facility]   Phone: #[phone]

## 2022-10-29 NOTE — ED TRIAGE NOTES
Pt presents to the ED with c/o fever, low oxygen sats, and drainage at feeding tube side. Pt sats in the 80's at home. Pt has fevers of 101.

## 2022-10-29 NOTE — PHARMACY-VANCOMYCIN DOSING SERVICE
Pharmacy Vancomycin Initial Note  Date of Service 2022  Patient's  1965  57 year old, male    Indication: Sepsis    Current estimated CrCl = Estimated Creatinine Clearance: 51.5 mL/min (based on SCr of 1.29 mg/dL).    Creatinine for last 3 days  10/28/2022:  8:23 PM Creatinine 1.29 mg/dL    Recent Vancomycin Level(s) for last 3 days  No results found for requested labs within last 72 hours.      Vancomycin IV Administrations (past 72 hours)                   vancomycin (VANCOCIN) 1,250 mg in 0.9% NaCl 250 mL intermittent infusion (mg) 1,250 mg New Bag 10/28/22 2208                Nephrotoxins and other renal medications (From now, onward)    Start     Dose/Rate Route Frequency Ordered Stop    10/29/22 2200  vancomycin (VANCOCIN) 1,250 mg in 0.9% NaCl 250 mL intermittent infusion         1,250 mg  over 90 Minutes Intravenous EVERY 24 HOURS 10/29/22 0033            Contrast Orders - past 72 hours (72h ago, onward)    Start     Dose/Rate Route Frequency Stop    10/28/22 2130  iopamidol (ISOVUE-370) solution 100 mL         100 mL Intravenous ONCE 10/28/22 2130          [employer] Prediction of Planned Initial Vancomycin Regimen  Loading dose: 1250 mg at 22:08 on 10/28/22  Regimen: 1250 mg IV every 24 hours.  Start time: 01:31 on 10/29/2022  Exposure target: AUC24 (range)400-600 mg/L.hr   AUC24,ss: 535 mg/L.hr  Probability of AUC24 > 400: 81 %  Ctrough,ss: 15.5 mg/L  Probability of Ctrough,ss > 20: 27 %  Probability of nephrotoxicity (Lodise ALMA ): 11 %          Plan:  1. Start vancomycin  1250 mg IV q24h.   2. Vancomycin monitoring method: AUC  3. Vancomycin therapeutic monitoring goal: 400-600 mg*h/L  4. Pharmacy will check vancomycin levels as appropriate in 1-3 Days.    5. Serum creatinine levels will be ordered daily for the first week of therapy and at least twice weekly for subsequent weeks.      Roxi Lau [facility]

## 2022-10-29 NOTE — CONSULTS
Care Management Initial Consult    General Information  Assessment completed with: Children, Dtr Idalmis.  Type of CM/SW Visit: Initial Assessment  Primary Care Provider verified and updated as needed: Yes   Readmission within the last 30 days: no previous admission in last 30 days   Reason for Consult: discharge planning, health care directive, transportation  Advance Care Planning: Advance Care Planning Reviewed: no concerns identified        Communication Assessment  Patient's communication style: spoken language (non-English) (Thai)        Cognitive  Cognitive/Neuro/Behavioral:   WDL except, orientation, speech, level of consciousness    Level of Consciousness: alert    Arousal Level: arouses to voice    Orientation: other (see comments) (nonverbal, JUAN ANTONIO)    Mood/Behavior: restless       Speech: unable to speak    Living Environment:   People in home: child(sam), adult, grandchild(sam), other relative(s), spouse     Current living Arrangements: house      Able to return to prior arrangements: yes     Family/Social Support:  Care provided by: spouse/significant other, child(sam), homecare agency  Provides care for: no one, unable/limited ability to care for self  Marital Status:   Wife, Children, PCA  Rajput       Description of Support System: Supportive, Involved    Support Assessment: Adequate family and caregiver support, Adequate social supports    Current Resources:   Patient receiving home care services: Yes  Skilled Home Care Services:  (21+ hrs/day of PCA care provided by family.)  Community Resources: County Programs, County Worker, Infusion Services, PCA  Equipment currently used at home: walker, standard, wheelchair, manual, shower chair, colostomy/ostomy supplies  Supplies currently used at home: Incontinence Supplies, Enteral Nutrition & Supplies, Gloves, Wipes, Chux, Oxygen Tubing/Supplies, Nebulizer tubing    Employment/Financial:  Employment Status: disabled     Financial Concerns: No  "concerns identified   Referral to Financial Worker: No     Lifestyle & Psychosocial Needs:  Social Determinants of Health     Tobacco Use: Medium Risk     Smoking Tobacco Use: Former     Smokeless Tobacco Use: Former     Passive Exposure: Not on file   Alcohol Use: Not on file   Financial Resource Strain: Not on file   Food Insecurity: Not on file   Transportation Needs: Not on file   Physical Activity: Not on file   Stress: Not on file   Social Connections: Not on file   Intimate Partner Violence: Not on file   Depression: Not at risk     PHQ-2 Score: 2   Housing Stability: Not on file     Functional Status:  Prior to admission patient needed assistance:   Dependent ADLs:: Bathing, Dressing, Eating, Grooming, Incontinence, Positioning, Transfers, Toileting, Wheelchair-with assist, Ambulation-walker  Dependent IADLs:: Cleaning, Cooking, Laundry, Shopping, Medication Management, Money Management, Transportation, Incontinence  Assessment of Functional Status: Not at  functional baseline    Mental Health Status:  Mental Health Status: No Current Concerns       Chemical Dependency Status:  Chemical Dependency Status: No Current Concerns           Values/Beliefs:  Spiritual, Cultural Beliefs, Yazidi Practices, Values that affect care: no (None identified.)             Additional Information:  Writer briefly spoke to pt's daughter Idalmis DE LA PAZ(JANE) to introduce Care Management(CM) and obtain an initial assessment. NN states pt lives with his wife, her, another daughter, and grandchildren. Pt reportedly gets a little over 21 hrs/day of PCA care through \"[facility]\" and is provided by family. Pt is basically total care. No concerns reported by NN in regards to pt's eventual return home.     10/28 per CHRISSIE Aranda-\"57 year old male with history of seizure disorder, persistent fevers, lung nodule, severe sepsis, cranial hemorrhage with dense hemiparesis, decreased communication abilities, G-tube in place, hypertension, " "GERD, hepatitis C carrier, bruxism, pneumonia, MRSA, prior ESBL E.coli infection, who presents to the ER with complaints of fever, cough, fatigue, shortness of breath, vomiting.     Patient's family reports that for the past 1 week he has had increasing fatigue and now developed fever, cough, difficulty breathing.  A grandchild has RSV.  He has had 1 episode of vomiting.  Otherwise he has not suggested to them that he is having any chest pain, abdominal pain, or diarrhea.  He does not have a Kelly catheter. Reports that they gave him Tylenol about 7:30 PM (just prior to arrival) for temp of 100 at home. Family reports that he does have difficulty with communication after his stroke.  He is completely bedridden and dependent on all cares.  He does not take anything orally but instead uses his feeding tube.      Chart Review:  10/27/22: [facility]: Patient presented for IR Gastro Jejunostomy tube change. Patient tolerated the procedure well and discharged home in stable condition.   9/18/22-9/19/22: [facility]: Patient presented fever and cough. Was found to have a new right lower lobe infiltrate. White count was elevated. Lactic acid was normal. Oxygen saturation was normal. G-tube replaced recently at the time.  X-ray showed it in good position. Family was concerned about possible some change in mental status did get a head CT which was unchanged.  Patient received IV Zosyn and IV Vanco here as he has a history of MRSA. He was started on oral clindamycin. He does have palliative care. Provider offered admission but they state they would prefer to go home. Patient discharged home in stable condition.\"     No therapy consults. Anticipate return home and continued in-home services to meet pt needs. CM to follow for changes in current anticipated discharge disposition, acquire confirmation of services with [facility], and continue to communicate with " Idalmis for any needed coordination. Family will provide transport at time of discharge.    Moris Young RN

## 2022-10-29 NOTE — ED PROVIDER NOTES
EMERGENCY DEPARTMENT ENCOUNTER      NAME: Liu Malave  AGE: 57 year old male  YOB: 1965  MRN: [id number]  EVALUATION DATE & TIME: 10/28/2022  7:56 PM    PCP: Lynda Gutierrez    ED PROVIDER: Estelle Mcgee M.D.        Chief Complaint   Patient presents with     Fever         FINAL IMPRESSION:    1. Fever and chills    2. Sepsis, due to unspecified organism, unspecified whether acute organ dysfunction present (H)    3. Pneumonia of both lungs due to infectious organism, unspecified part of lung            MEDICAL DECISION MAKING:    Liu Malave is a 57 year old male with history of seizure disorder, persistent fevers, lung nodule, severe sepsis, cranial hemorrhage with dense hemiparesis, decreased communication abilities, G-tube in place, hypertension, GERD, hepatitis C carrier, bruxism, pneumonia, MRSA, prior ESBL E.coli infection, who presents to the ER with complaints of fever, cough, fatigue, shortness of breath, vomiting.  Patient found to have bilateral pneumonia on imaging.  Intermittently requiring oxygen supplementation by nasal cannula.  Plan at this time is admission to the hospital for IV antibiotics.  Blood pressure has been stable at this time and no indication for pressors.  He was accepted to the hospital by the hospitalist will go to medical telemetry.      ED COURSE:  8:04 PM  I met with the patient to gather history and perform my exam. ED course and treatment discussed.    10:00 PM  Updated patient's family at bedside about bilateral pneumonia.  Intermittently he is requiring supplemental oxygen.  Plan at this time is admission to the hospital.  At this time blood pressures have been stable.    10:15 PM  Care discussed with Dr. Pereyra, hospitalist who accepts the patient for admission.     10:18 PM  Patient will go to medical telemetry under inpatient status.  Hospitalist and I discussed antibiotics and he would like to add on meropenem for possible ESBL coverage and may be  stop ongoing Zosyn.  Certainly this is reasonable and meropenem has been ordered.  At this time he has been hemodynamically stable.  Patient and family aware of the results and agree with the plan for admission.  At this time I do not feel would be safe for him to be discharged home with a suspect he will become significantly septic if he is not on aggressive IV antibiotics given his chronic morbidities.    No indication for ICU level care or CPAP/BiPAP at this time.    I do not think that this represents rib fractures, allergic reaction, COPD exacerbation, asthma exacerbation, CHF, myocarditis, pericarditis, endocarditis, ACS, PE, ruptured AAA, pneumothorax, aortic dissection, bowel obstruction, or other such etiologies at this time.    COVID-19 PPE worn during patient evaluation:  Mask: n95 and homemade masks   Eye Protection: goggles   Gown: none   Hair cover: yes  Face shield: none   Patient wearing a mask: yes     At the conclusion of the encounter I discussed the results of all of the tests and the disposition. Their questions were answered. The patient (and any family present) acknowledged understanding and were agreeable with the care plan.      CONSULTANTS:  none        MEDICATIONS GIVEN IN THE EMERGENCY:  Medications   lidocaine 1 % 0.1-1 mL (has no administration in time range)   lidocaine (LMX4) cream (has no administration in time range)   sodium chloride (PF) 0.9% PF flush 3 mL (3 mLs Intracatheter Given 10/28/22 9639)   sodium chloride (PF) 0.9% PF flush 3 mL (has no administration in time range)   sodium chloride 0.9% infusion ( Intravenous New Bag 10/28/22 6522)   acetaminophen (TYLENOL) tablet 975 mg (has no administration in time range)   ondansetron (ZOFRAN ODT) ODT tab 4 mg (has no administration in time range)     Or   ondansetron (ZOFRAN) injection 4 mg (has no administration in time range)   prochlorperazine (COMPAZINE) injection 10 mg (has no administration in time range)     Or    prochlorperazine (COMPAZINE) tablet 10 mg (has no administration in time range)     Or   prochlorperazine (COMPAZINE) suppository 25 mg (has no administration in time range)   meropenem (MERREM) 1 g vial to attach to  mL bag (has no administration in time range)   pantoprazole (PROTONIX) IV push injection 40 mg (has no administration in time range)   QUEtiapine (SEROquel) tablet 50 mg (50 mg Per G Tube Given 10/28/22 2359)   baclofen (LIORESAL) tablet 10 mg (10 mg Per G Tube Given 10/28/22 2359)   ipratropium - albuterol 0.5 mg/2.5 mg/3 mL (DUONEB) neb solution 3 mL (has no administration in time range)   0.9% sodium chloride BOLUS (0 mLs Intravenous Stopped 10/28/22 2230)   piperacillin-tazobactam (ZOSYN) 3.375 g vial to attach to  mL bag (3.375 g Intravenous Given 10/28/22 2135)   ibuprofen (ADVIL/MOTRIN) suspension 400 mg (400 mg Oral Given 10/28/22 2135)   vancomycin (VANCOCIN) 1,250 mg in 0.9% NaCl 250 mL intermittent infusion (1,250 mg Intravenous New Bag 10/28/22 2208)   iopamidol (ISOVUE-370) solution 100 mL (90 mLs Intravenous Given 10/28/22 2130)   meropenem (MERREM) 1 g vial to attach to  mL bag (1 g Intravenous New Bag 10/28/22 2359)           NEW PRESCRIPTIONS STARTED AT TODAY'S ER VISIT     Medication List      There are no discharge medications for this visit.             CONDITION:  stable        DISPOSITION:  Med tele ip as accepted by Dr. Pereyra, hospitalist         =================================================================  =================================================================    HPI    Patient information was obtained from: two daughters (one in person and one by phone)    Use of Intrepreter: N/A     Liu GIL Malave is a 57 year old male with history of seizure disorder, persistent fevers, lung nodule, severe sepsis, cranial hemorrhage with dense hemiparesis, decreased communication abilities, G-tube in place, hypertension, GERD, hepatitis C carrier, bruxism,  pneumonia, MRSA, prior ESBL E.coli infection, who presents to the ER with complaints of fever, cough, fatigue, shortness of breath, vomiting.    Patient's family reports that for the past 1 week he has had increasing fatigue and now developed fever, cough, difficulty breathing.  A grandchild has RSV.  He has had 1 episode of vomiting.  Otherwise he has not suggested to them that he is having any chest pain, abdominal pain, or diarrhea.  He does not have a Kelly catheter.    Reports that they gave him Tylenol about 7:30 PM (just prior to arrival) for temp of 100 at home.    Family reports that he does have difficulty with communication after his stroke.  He is completely bedridden and dependent on all cares.  He does not take anything orally but instead uses his feeding tube.     Chart Review:  10/27/22: [facility]: Patient presented for IR Gastro Jejunostomy tube change. Patient tolerated the procedure well and discharged home in stable condition.   9/18/22-9/19/22: [facility] Emergency Room: Patient presented fever and cough. Was found to have a new right lower lobe infiltrate. White count was elevated. Lactic acid was normal. Oxygen saturation was normal. G-tube replaced recently at the time.  X-ray showed it in good position. Family was concerned about possible some change in mental status did get a head CT which was unchanged.  Patient received IV Zosyn and IV Vanco here as he has a history of MRSA. He was started on oral clindamycin. He does have palliative care. Provider offered admission but they state they would prefer to go home. Patient discharged home in stable condition.       REVIEW OF SYSTEMS - ROS per abril  Review of Systems   Constitutional: Positive for fatigue and fever.   Respiratory: Positive for cough and shortness of breath.    Cardiovascular: Negative for chest pain.   Gastrointestinal: Positive for vomiting. Negative for abdominal  pain and diarrhea.   Skin: Negative for rash.   All other systems reviewed and are negative.        PAST MEDICAL HISTORY:  Past Medical History:   Diagnosis Date     Acute UTI 02/21/2020     Anxiety      Basal ganglia hemorrhage (H) 06/20/2016     Brain compression (H)      Bruxism      Chronic static encephalopathy      Chronic static encephalopathy      Chronic static encephalopathy      Convulsions, unspecified convulsion type (H)      CVA (cerebral vascular accident) (H) 07/2016    Hemorrhagic,      Depression      Dyslipidemia      Dyslipidemia      Elevated troponin      Essential hypertension      Feeding by G-tube (H) 06/04/2020     GERD (gastroesophageal reflux disease)      GERD without esophagitis      Hemiplegia of nondominant side, late effect of cerebrovascular disease (H)     Created by Conversion  Replacement Utility updated for latest IMO load     Hemorrhagic stroke (H) 06/05/2016     Hepatitis C carrier (H)      History of ESBL E. coli infection      History of hemorrhagic stroke with residual hemiparesis (H)      History of stroke with residual deficit      HTN (hypertension)      Hypertension      Lung nodule 12/05/2016    9 x 9 mm left upper lobe on CXR     MRSA (methicillin resistant staph aureus) culture positive      Periodontal disease      Persistent fever      Pneumonia of both lower lobes due to infectious organism      Respiratory failure with hypoxia (H)      Seizure (H)      Sepsis due to urinary tract infection (H) 06/04/2020     Severe sepsis (H)      Stroke (H)      Trismus          PAST SURGICAL HISTORY:  Past Surgical History:   Procedure Laterality Date     aneurysm clipping  2006     BRAIN SURGERY       EXAM UNDER ANESTHESIA, RESTORATIONS, EXTRACTION(S) DENTAL COMPLEX, COMBINED N/A 12/19/2016    Procedure: COMBINED EXAM UNDER ANESTHESIA, RESTORATIONS, EXTRACTION(S) DENTAL COMPLEX;  Surgeon: Verónica Martinez DDS;  Location:  OR     EXAM UNDER ANESTHESIA, RESTORATIONS,  EXTRACTION(S) DENTAL COMPLEX, COMBINED N/A 3/7/2018    Procedure: COMBINED EXAM UNDER ANESTHESIA, RESTORATIONS, EXTRACTION(S) DENTAL COMPLEX;  Kenalog Injection in Bilateral [place], Combined Dental Exam,Radiographs, Three Dental Restorations, Periodontal Therapy and Fluoride Treatment;  Surgeon: Darryl Isbell DDS;  Location: [facility] OR     HH MIDLINE INSERTION  8/11/2017          INJECT STEROID (LOCATION) N/A 3/7/2018    Procedure: INJECT STEROID (LOCATION);  Kenalog Injection In Bilateral [place], Combined Dental Exam,Radiographs,Three Dental Restorations, Periodontal Therapy and Fluoride Treatment;  Surgeon: Liu Conroy DDS;  Location: [facility] OR     IR GASTRO JEJUNOSTOMY TUBE CHANGE  7/19/2016     IR GASTRO JEJUNOSTOMY TUBE CHANGE  9/23/2016     IR GASTRO JEJUNOSTOMY TUBE CHANGE  11/28/2016     IR GASTRO JEJUNOSTOMY TUBE CHANGE  1/27/2017     IR GASTRO JEJUNOSTOMY TUBE CHANGE  4/19/2017     IR GASTRO JEJUNOSTOMY TUBE CHANGE  6/20/2017     IR GASTRO JEJUNOSTOMY TUBE CHANGE  8/7/2017     IR GASTRO JEJUNOSTOMY TUBE CHANGE  8/11/2017     IR GASTRO JEJUNOSTOMY TUBE CHANGE  10/20/2017     IR GASTRO JEJUNOSTOMY TUBE CHANGE  1/2/2018     IR GASTRO JEJUNOSTOMY TUBE CHANGE  2/16/2018     IR GASTRO JEJUNOSTOMY TUBE CHANGE  5/16/2018     IR GASTRO JEJUNOSTOMY TUBE CHANGE  6/11/2018     IR GASTRO JEJUNOSTOMY TUBE CHANGE  9/12/2018     IR GASTRO JEJUNOSTOMY TUBE CHANGE  12/24/2018     IR GASTRO JEJUNOSTOMY TUBE CHANGE  3/18/2019     IR GASTRO JEJUNOSTOMY TUBE CHANGE  4/23/2019     IR GASTRO JEJUNOSTOMY TUBE CHANGE  5/21/2019     IR GASTRO JEJUNOSTOMY TUBE CHANGE  9/9/2019     IR GASTRO JEJUNOSTOMY TUBE CHANGE  9/26/2019     IR GASTRO JEJUNOSTOMY TUBE CHANGE  10/11/2019     IR GASTRO JEJUNOSTOMY TUBE CHANGE  11/21/2019     IR GASTRO JEJUNOSTOMY TUBE CHANGE  11/29/2019     IR GASTRO JEJUNOSTOMY TUBE CHANGE  2/27/2020     IR GASTRO JEJUNOSTOMY TUBE CHANGE  4/10/2020     IR GASTRO JEJUNOSTOMY TUBE CHANGE  7/2/2020     IR  GASTRO JEJUNOSTOMY TUBE CHANGE  8/28/2020     IR GASTRO JEJUNOSTOMY TUBE CHANGE  9/17/2020     IR GASTRO JEJUNOSTOMY TUBE CHANGE  10/29/2020     IR GASTRO JEJUNOSTOMY TUBE CHANGE  11/6/2020     IR GASTRO JEJUNOSTOMY TUBE CHANGE  12/24/2020     IR GASTRO JEJUNOSTOMY TUBE CHANGE  1/9/2021     IR GASTRO JEJUNOSTOMY TUBE CHANGE  6/9/2021     IR GASTRO JEJUNOSTOMY TUBE CHANGE  10/4/2021     IR GASTRO JEJUNOSTOMY TUBE CHANGE  10/18/2021     IR GASTRO JEJUNOSTOMY TUBE CHANGE  1/14/2022     IR GASTRO JEJUNOSTOMY TUBE CHANGE  1/20/2022     IR GASTRO JEJUNOSTOMY TUBE CHANGE  4/19/2022     IR GASTRO JEJUNOSTOMY TUBE CHANGE  6/7/2022     IR GASTRO JEJUNOSTOMY TUBE CHANGE  8/19/2022     IR GASTRO JEJUNOSTOMY TUBE CHANGE  9/13/2022     IR GASTRO JEJUNOSTOMY TUBE CHANGE  10/27/2022     IR GASTRO JEJUNOSTOMY TUBE PLACEMENT  6/14/2016     IR GJ TUBE REPLACEMENT  12/24/2018     IR GJ TUBE REPLACEMENT  3/18/2019     IR GJ TUBE REPLACEMENT  4/23/2019     IR GJ TUBE REPLACEMENT  5/21/2019     IR GJ TUBE REPLACEMENT  9/9/2019     IR GJ TUBE REPLACEMENT  9/26/2019     IR GJ TUBE REPLACEMENT  10/11/2019     IR GJ TUBE REPLACEMENT  11/21/2019     IR GJ TUBE REPLACEMENT  11/29/2019     IR GJ TUBE REPLACEMENT  2/27/2020     IR GJ TUBE REPLACEMENT  4/10/2020     IR GJ TUBE REPLACEMENT  7/2/2020     IR GJ TUBE REPLACEMENT  8/28/2020     IR GJ TUBE REPLACEMENT  9/17/2020     IR GJ TUBE REPLACEMENT  10/29/2020     IR GJ TUBE REPLACEMENT  11/6/2020     IR GJ TUBE REPLACEMENT  12/24/2020     IR GJ TUBE REPLACEMENT  1/9/2021     IR GJ TUBE REPLACEMENT  6/9/2021     J-G tube       PICC  6/10/2016          TRACHEOSTOMY      Removed         CURRENT MEDICATIONS:    Prior to Admission medications    Medication Sig Start Date End Date Taking? Authorizing Provider   acetaminophen (TYLENOL) 500 MG tablet 1,000 mg by Gastric Tube route every 6 hours as needed for mild pain     Reported, Patient   amLODIPine (NORVASC) 5 MG tablet Take 5 mg by mouth daily  9/27/22   Reported, Patient   aspirin 81 MG chewable tablet 81 mg by Per Feeding Tube route every morning  8/17/17   Reported, Patient   baclofen (LIORESAL) 10 MG tablet Take 1 tablet (10 mg) by mouth 3 times daily as needed for muscle spasms  Patient taking differently: 10 mg by Per Feeding Tube route 3 times daily 10/28/21   Lynda Gutierrez MD   bisacodyl (DULCOLAX) 10 MG suppository INSERT 1 SUPPOSTIORY INTO THE RECTUM DAILY AS NEEDED FOR CONSTIPATION. 10/13/22   Lynda Gutierrez MD   chlorhexidine (PERIDEX) 0.12 % solution Swish and spit 15 mLs in mouth daily     Reported, Patient   dextromethorphan (DELSYM) 30 MG/5ML liquid Take 10 mLs (60 mg) by mouth 2 times daily as needed for cough 9/1/22   Shahid Enriquez MD   esomeprazole (NEXIUM) 20 MG DR capsule Take 1 capsule via G-tube twice daily 9/28/22   Lynda Gutierrez MD   ferrous sulfate 220 (44 Fe) MG/5ML ELIX 5 mLs by Enteral route daily 1/19/22   Lynda Gutierrez MD   guaiFENesin (ROBITUSSIN) 100 MG/5ML liquid Give 5 ml via G tube every 6 hours as needed for cough 7/15/22   Lynda Gutierrez MD   ipratropium - albuterol 0.5 mg/2.5 mg/3 mL (DUONEB) 0.5-2.5 (3) MG/3ML neb solution Take 1 vial (3 mLs) by nebulization every 4 hours 9/7/22   Lynda Gutierrez MD   levETIRAcetam (KEPPRA) 100 MG/ML solution Take 7.5 mLs (750 mg) by mouth 2 times daily 8/21/21   Lynda Gutierrez MD   Lidocaine (LIDOCARE) 4 % Patch Place 1 patch onto the skin every 24 hours To prevent lidocaine toxicity, patient should be patch free for 12 hrs daily. 1/19/22   Lynda Gutierrez MD   meclizine (ANTIVERT) 25 MG tablet Take 1 tablet via G-tube every 6 hours as needed for vertigo 5/30/22   Lynda Gutierrez MD   metoprolol tartrate (LOPRESSOR) 25 MG tablet TAKE ONE TABLET BY MOUTH TWICE DAILY  11/3/21   Lynda Gutierrez MD   neomycin-bacitracin-polymyxin (NEOSPORIN) 5-400-5000 ointment Apply topically 4 times daily 9/28/22   Lynda Gutierrez MD   nitroFURantoin macrocrystal-monohydrate  "(MACROBID) 100 MG capsule Take 1 capsule by G-tube route at bedtime. 10/28/21   Lynda Gutierrez MD   polyethylene glycol (MIRALAX) 17 GM/Dose powder Give 1 capful via G-tube daily as needed for constipation 10/28/21   Lynda Gutierrez MD   Polyethylene Glycol 400 (VISINE DRY EYE RELIEF) 1 % SOLN Apply 1 drop to eye every 6 hours as needed (dry eyes) 22   Lynda Gutierrez MD   QUEtiapine (SEROQUEL) 25 MG tablet Give 1 tablet via G-tube in the morning and 1 tablet in the afternoon as needed for agitation.Give 2 tablets via G-tube at bedtime for sleep. 22   Lynda Gutierrez MD   simvastatin (ZOCOR) 40 MG tablet Take 1 tablet (40 mg) by mouth At Bedtime 10/2/22   Lynda Gutierrez MD         ALLERGIES:  Allergies   Allergen Reactions     Diphenhydramine Unknown     Trazodone Other (See Comments)     \"shaking\" per family         FAMILY HISTORY:  Family History   Problem Relation Age of Onset     Hypertension Mother      Cancer Father      Other Cancer Father      Kidney Disease Brother          SOCIAL HISTORY:  Social History     Socioeconomic History     Marital status:      Number of children: 2   Occupational History     Occupation: disability   Tobacco Use     Smoking status: Former     Packs/day: 1.00     Years: 30.00     Pack years: 30.00     Types: Cigarettes     Quit date: 2005     Years since quittin.4     Smokeless tobacco: Former     Quit date: 2007     Tobacco comments:     quit 2006   Vaping Use     Vaping Use: Never used   Substance and Sexual Activity     Alcohol use: No     Drug use: No     Sexual activity: Not Currently     Partners: Female     Birth control/protection: None   Other Topics Concern     Parent/sibling w/ CABG, MI or angioplasty before 65F 55M? No   Social History Narrative    Lives with wife, 2 daughters and son in law.    W/c bound.         VITALS:  Patient Vitals for the past 24 hrs:   BP Temp Temp src Pulse Resp SpO2 Height Weight   10/28/22 2354 129/71 " "99.4  F (37.4  C) Axillary 109 20 92 % -- --   10/28/22 2300 119/73 -- -- 110 -- 91 % -- --   10/28/22 2245 119/72 -- -- 109 -- 92 % -- --   10/28/22 2230 128/74 -- -- 108 -- 93 % -- --   10/28/22 2221 -- -- -- 106 -- -- -- --   10/28/22 2215 139/78 -- -- 115 -- 92 % -- --   10/28/22 2200 130/59 -- -- 116 -- 93 % -- --   10/28/22 2155 (!) 169/77 -- -- 116 -- 93 % -- --   10/28/22 2100 112/76 -- -- 112 -- 93 % -- --   10/28/22 2045 118/78 -- -- 116 -- 93 % -- --   10/28/22 2030 123/79 -- -- 120 -- 94 % -- --   10/28/22 2024 -- -- -- 118 -- -- -- --   10/28/22 2015 122/78 -- -- 119 -- 94 % -- --   10/28/22 2008 119/78 -- -- (!) 123 -- 94 % -- --   10/28/22 1950 113/82 (!) 101.1  F (38.4  C) Oral (!) 128 24 (!) 86 % 1.575 m (5' 2\") 59 kg (130 lb)       Wt Readings from Last 3 Encounters:   10/28/22 59 kg (130 lb)   09/19/22 60 kg (132 lb 4.4 oz)   05/30/22 59 kg (130 lb)       Estimated Creatinine Clearance: 51.5 mL/min (based on SCr of 1.29 mg/dL).    PHYSICAL EXAM    Constitutional:  Well developed, Well nourished, NAD  HENT:  Normocephalic, Atraumatic, Bilateral external ears normal, Nose normal. Neck- Supple, No stridor.   Eyes:  PERRL, EOMI, Conjunctiva normal, No discharge.  Respiratory:  Normal breath sounds, No respiratory distress, No wheezing, No cough.   Cardiovascular:  Normal heart rate, Regular rhythm, no rubs, No gallops.  GI:  No excessive obesity.  Bowel sounds normal, Soft, No tenderness, No masses, No flank tenderness. No rebound or guarding.   : deferred  Musculoskeletal: 2+ DP pulses. No pitting edema. No major deformities noted. +chronic contractures  Integument:  Warm, Dry, No erythema, No rash.  No petechiae.  Neurologic:  Alert, does not move arms or legs (baseline per family)  Psychiatric:  Affect normal         LAB:  All pertinent labs reviewed and interpreted.  Recent Results (from the past 24 hour(s))   Lactic acid whole blood    Collection Time: 10/28/22  8:23 PM   Result Value Ref Range "    Lactic Acid 1.1 0.7 - 2.0 mmol/L   Procalcitonin    Collection Time: 10/28/22  8:23 PM   Result Value Ref Range    Procalcitonin 2.01 (H) 0.00 - 0.49 ng/mL   Basic metabolic panel    Collection Time: 10/28/22  8:23 PM   Result Value Ref Range    Sodium 139 136 - 145 mmol/L    Potassium 4.6 3.5 - 5.0 mmol/L    Chloride 109 (H) 98 - 107 mmol/L    Carbon Dioxide (CO2) 19 (L) 22 - 31 mmol/L    Anion Gap 11 5 - 18 mmol/L    Urea Nitrogen 41 (H) 8 - 22 mg/dL    Creatinine 1.29 0.70 - 1.30 mg/dL    Calcium 9.0 8.5 - 10.5 mg/dL    Glucose 98 70 - 125 mg/dL    GFR Estimate 64 >60 mL/min/1.73m2   Hepatic function panel    Collection Time: 10/28/22  8:23 PM   Result Value Ref Range    Bilirubin Total 0.4 0.0 - 1.0 mg/dL    Bilirubin Direct 0.2 <=0.5 mg/dL    Protein Total 9.0 (H) 6.0 - 8.0 g/dL    Albumin 3.3 (L) 3.5 - 5.0 g/dL    Alkaline Phosphatase 71 45 - 120 U/L    AST 25 0 - 40 U/L    ALT 22 0 - 45 U/L   Lipase    Collection Time: 10/28/22  8:23 PM   Result Value Ref Range    Lipase 79 (H) 0 - 52 U/L   Extra Red Top Tube    Collection Time: 10/28/22  8:23 PM   Result Value Ref Range    Hold Specimen JIC    Extra Green Top (Lithium Heparin) Tube    Collection Time: 10/28/22  8:23 PM   Result Value Ref Range    Hold Specimen .    Extra Purple Top Tube    Collection Time: 10/28/22  8:23 PM   Result Value Ref Range    Hold Specimen JIC    CBC with platelets and differential    Collection Time: 10/28/22  8:23 PM   Result Value Ref Range    WBC Count 12.7 (H) 4.0 - 11.0 10e3/uL    RBC Count 4.19 (L) 4.40 - 5.90 10e6/uL    Hemoglobin 13.1 (L) 13.3 - 17.7 g/dL    Hematocrit 40.3 40.0 - 53.0 %    MCV 96 78 - 100 fL    MCH 31.3 26.5 - 33.0 pg    MCHC 32.5 31.5 - 36.5 g/dL    RDW 12.6 10.0 - 15.0 %    Platelet Count 291 150 - 450 10e3/uL    % Neutrophils 89 %    % Lymphocytes 5 %    % Monocytes 5 %    % Eosinophils 0 %    % Basophils 0 %    % Immature Granulocytes 1 %    NRBCs per 100 WBC 0 <1 /100    Absolute Neutrophils 11.4  (H) 1.6 - 8.3 10e3/uL    Absolute Lymphocytes 0.6 (L) 0.8 - 5.3 10e3/uL    Absolute Monocytes 0.6 0.0 - 1.3 10e3/uL    Absolute Eosinophils 0.0 0.0 - 0.7 10e3/uL    Absolute Basophils 0.0 0.0 - 0.2 10e3/uL    Absolute Immature Granulocytes 0.1 <=0.4 10e3/uL    Absolute NRBCs 0.0 10e3/uL   B-Type Natriuretic Peptide ([facility] Only)    Collection Time: 10/28/22  8:23 PM   Result Value Ref Range    BNP 19 0 - 51 pg/mL   Symptomatic; Unknown Influenza A/B & SARS-CoV2 (COVID-19) Virus PCR Multiplex Nasopharyngeal    Collection Time: 10/28/22 10:00 PM    Specimen: Nasopharyngeal; Swab   Result Value Ref Range    Influenza A PCR Negative Negative    Influenza B PCR Negative Negative    RSV PCR Negative Negative    SARS CoV2 PCR Negative Negative   Lactic Acid STAT    Collection Time: 10/28/22 11:51 PM   Result Value Ref Range    Lactic Acid 1.7 0.7 - 2.0 mmol/L       Lab Results   Component Value Date    ABORH O POS 03/14/2018           RADIOLOGY:  Reviewed all pertinent imaging. Please see official radiology report.    CT Chest/Abdomen/Pelvis w Contrast   Final Result   IMPRESSION:   1.  New patchy bilateral airspace disease right middle lobe and both lower lobes consistent with pneumonia.   2.  Gastrojejunostomy tube appears in good position with no complications evident. Bowel unremarkable.            EKG:    none    PROCEDURES:  none      Medical Decision Making    Supplemental history from: Family Member    External Record(s) Reviewed: Other: ER records    Differential Diagnosis: See MDM charting for differential considered.     I performed an independent interpretation of the: N/A    Discussed with radiology regarding test interpretation: N/A    Discussion of management with another provider: Hospitalist    The following testing was considered but ultimately not selected: None    I considered prescription management with: N/A    The patient's care impacted: Hyperlipidemia and Hypertension    Consideration of  Admission/Observation: N/A - Patient admitted or discharged without consideration for admission    Care significantly affected by Social Determinants of Health including: N/A          I, Frances Watkins, am serving as a scribe to document services personally performed by Dr. Estelle Mcgee based on my observation and the provider's statements to me. I, Dr. Estelle Mcgee MD attest that Frances Watkins is acting in a scribe capacity, has observed my performance of the services and has documented them in accordance with my direction.        Estelle Mcgee M.D. FACE  Emergency Medicine and Medical Toxicology  [facility] EMERGENCY ROOM  [address] [facility] [id number] [phone]  Dept: [phone]           Estelle Mcgee MD  10/29/22 0031

## 2022-10-29 NOTE — PROGRESS NOTES
RESPIRATORY CARE NOTE     Patient Name: Liu Malave  Today's Date: 10/29/2022       Pt continues to receive duoneb and vest therapy. BS are coarse rhonchi. Pt is on room air of oxygen via NC, SpO2 is 95%. Post treatment there is increased aeration. Pt tolerated vest therapy well. Daughter wants to try QID but states he only did it at home once a day. RT will continue to follow.

## 2022-10-29 NOTE — CONSULTS
"CLINICAL NUTRITION SERVICES - ASSESSMENT NOTE     Nutrition Prescription    RECOMMENDATIONS FOR MDs/PROVIDERS TO ORDER:  None    Malnutrition Status:    Does not meet criteria    Recommendations already ordered by Registered Dietitian (RD):  Osmolite 1.5 Navid @ goal of  45ml/hr  (1080ml/day)  will provide: 1620 kcals, 67 g PRO, 822 ml free H20, 219 g CHO, and 0 g fiber daily.  FWF: 130mL q 4 hrs to meet hydration needs  Initiate TF at 15mL/hr and advance by 10mL/hr q 6 hrs as tolerated to goal rate.     Future/Additional Recommendations:  Monitor TF tolerance and labs     REASON FOR ASSESSMENT  Liu Malave is a/an 57 year old male assessed by the dietitian for Provider Order - Registered Dietitian to Assess and Order TF per Medical Nutrition Therapy Protocol    NUTRITION HISTORY  Patient has G tube.   Spoke with patient's daughter on the phone and she reported that the patient has 4 cans of Peptamen 1.5 daily. They run it continuous at a rate of 50mL/hr during the day and 30mL/hr during the night time. She says that he gets 1000mL daily which would provide 1500 kcals and 68g protein.     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  Has a feeding tube.  Feeding tube was changed on the day prior to this presentation.    CURRENT NUTRITION ORDERS  Diet: NPO  Intake/Tolerance: NPO     LABS  Labs reviewed    MEDICATIONS  Medications reviewed    ANTHROPOMETRICS  Height: 157.5 cm (5' 2\")  Most Recent Weight: 59 kg (130 lb)    IBW: 54 kg  BMI: Normal BMI  Weight History:   Wt Readings from Last 10 Encounters:   10/28/22 59 kg (130 lb)   09/19/22 60 kg (132 lb 4.4 oz)   05/30/22 59 kg (130 lb)   04/07/22 59 kg (130 lb)   01/28/22 59 kg (130 lb)   03/07/18 36.3 kg (80 lb)   02/28/18 36.3 kg (80 lb)   12/19/16 48.7 kg (107 lb 5.8 oz)   12/05/16 54.4 kg (120 lb)   05/01/16 54.8 kg (120 lb 12.8 oz)   Dosing Weight: " 59 kg    ASSESSED NUTRITION NEEDS  Estimated Energy Needs: 4073-6017 kcals/day (25 - 30 kcals/kg)  Justification: Maintenance  Estimated Protein Needs: 47-59 grams protein/day (0.8 - 1 grams of pro/kg)  Justification: Maintenance  Estimated Fluid Needs: (1 mL/kcal)   Justification: Maintenance    MALNUTRITION  % Intake: Decreased intake does not meet criteria  % Weight Loss: None noted  Subcutaneous Fat Loss: None   Muscle Loss: Temporal: moderate  Fluid Accumulation/Edema: Does not meet criteria-trace  Malnutrition Diagnosis: does not meet criteria    NUTRITION DIAGNOSIS  Inadequate oral intake related to swallowing difficulty as evidenced by NPO and reliance on enteral nutrition to meet nutrition needs    INTERVENTIONS  Implementation  Osmolite 1.5 Navid @ goal of  45ml/hr  (1080ml/day)  will provide: 1620 kcals, 67 g PRO, 822 ml free H20, 219 g CHO, and 0 g fiber daily.  FWF: 130mL q 4 hrs to meet hydration needs  Initiate TF at 15mL/hr and advance by 10mL/hr q 6 hrs as tolerated to goal rate.     Goals  Tolerate TF  Meet estimated needs     Monitoring/Evaluation  Progress toward goals will be monitored and evaluated per protocol.  Maida Morocho RDN, LD

## 2022-10-29 NOTE — PLAN OF CARE
Problem: Plan of Care - These are the overarching goals to be used throughout the patient stay.    Goal: Plan of Care Review  Description: The Plan of Care Review/Shift note should be completed every shift.  The Outcome Evaluation is a brief statement about your assessment that the patient is improving, declining, or no change.  This information will be displayed automatically on your shift note.  Outcome: Progressing    Pt pain rated via the rFLACC scale this shift, scoring 2 and 5. Per pt family member, pt needs Seroquel during the night to help with restlessness. MD paged for prn Seroquel order. Pt calm and sleeping on reassessment. Pt remains on IVMF and IV anbx. Lactic acid 1.7. Pt tele sinus tach x 2. Pt temp 99.5. Remains on RA, sats 92-94%.

## 2022-10-29 NOTE — CONSULTS
[facility]    Infectious Disease Consultation     Date of Admission:  10/28/2022  Date of Consult (When I saw the patient): 10/29/22    Assessment & Plan   Liu Malave is a 57 year old male who was admitted on 10/28/2022.     Impression:  1. Pneumonia  2. Fever and chills  3. History of basal ganglia hemorrhage, dependent for cares  4. G-tube, S/P Gastrojejunostomy tube change on 10/27/2022  5. Previous blood culture with Staphylococcus pettenkoferi in September 2022  6. Draining at tube site  7. History of aspiration pneumonia  8. History of MRSA and ESBL producing organisms in previous cultures    Recommendations    1. Empiric meropenem and vancomycin  2. Follow culture results   3. De-escalate/focus based on culture results and clinical response  4. Thank you for consulting infectious disease    Yaima Landrum MD  [facility]  [phone]        Reason for Consult   Reason for consult: I was asked to evaluate this patient for Sepsis, pneumonia, history of MRSA and ESBL    Primary Care Physician   Lynda Gutierrez    Chief Complaint   Fever, low oxygen saturation at home, drainage from feeding tube site    History is obtained from the patient and medical records    History of Present Illness   Liu Malave is a 57 year old male who presents with history of basal ganglia hemorrhage when he was 51 years old, with significant neurologic deficits, resulting in being dependent for all cares, feeding tube in place.  Patient has history of MRSA and ESBL producing infections in the past per H&P.  At the time of my evaluation patient is lying in bed, family member at bedside  Patient is nonverbal at baseline  Breathing appears congested    Past Medical History   I have reviewed this patient's medical history and updated it with pertinent information if needed.   Past Medical History:   Diagnosis Date     Acute UTI 02/21/2020     Anxiety      Basal ganglia hemorrhage (H)  06/20/2016     Brain compression (H)      Bruxism      Chronic static encephalopathy      Chronic static encephalopathy      Chronic static encephalopathy      Convulsions, unspecified convulsion type (H)      CVA (cerebral vascular accident) (H) 07/2016    Hemorrhagic,      Depression      Dyslipidemia      Dyslipidemia      Elevated troponin      Essential hypertension      Feeding by G-tube (H) 06/04/2020     GERD (gastroesophageal reflux disease)      GERD without esophagitis      Hemiplegia of nondominant side, late effect of cerebrovascular disease (H)     Created by Conversion  Replacement Utility updated for latest IMO load     Hemorrhagic stroke (H) 06/05/2016     Hepatitis C carrier (H)      History of ESBL E. coli infection      History of hemorrhagic stroke with residual hemiparesis (H)      History of stroke with residual deficit      HTN (hypertension)      Hypertension      Lung nodule 12/05/2016    9 x 9 mm left upper lobe on CXR     MRSA (methicillin resistant staph aureus) culture positive      Periodontal disease      Persistent fever      Pneumonia of both lower lobes due to infectious organism      Respiratory failure with hypoxia (H)      Seizure (H)      Sepsis due to urinary tract infection (H) 06/04/2020     Severe sepsis (H)      Stroke (H)      Trismus        Past Surgical History   I have reviewed this patient's surgical history and updated it with pertinent information if needed.  Past Surgical History:   Procedure Laterality Date     aneurysm clipping  2006     BRAIN SURGERY       EXAM UNDER ANESTHESIA, RESTORATIONS, EXTRACTION(S) DENTAL COMPLEX, COMBINED N/A 12/19/2016    Procedure: COMBINED EXAM UNDER ANESTHESIA, RESTORATIONS, EXTRACTION(S) DENTAL COMPLEX;  Surgeon: Verónica Martinez DDS;  Location: [facility] OR     EXAM UNDER ANESTHESIA, RESTORATIONS, EXTRACTION(S) DENTAL COMPLEX, COMBINED N/A 3/7/2018    Procedure: COMBINED EXAM UNDER ANESTHESIA, RESTORATIONS, EXTRACTION(S) DENTAL COMPLEX;   Kenalog Injection in Bilateral [place], Combined Dental Exam,Radiographs, Three Dental Restorations, Periodontal Therapy and Fluoride Treatment;  Surgeon: Darryl Isbell DDS;  Location: [facility] OR     HH MIDLINE INSERTION  8/11/2017          INJECT STEROID (LOCATION) N/A 3/7/2018    Procedure: INJECT STEROID (LOCATION);  Kenalog Injection In Bilateral [place], Combined Dental Exam,Radiographs,Three Dental Restorations, Periodontal Therapy and Fluoride Treatment;  Surgeon: Liu Conroy DDS;  Location: [facility] OR     IR GASTRO JEJUNOSTOMY TUBE CHANGE  7/19/2016     IR GASTRO JEJUNOSTOMY TUBE CHANGE  9/23/2016     IR GASTRO JEJUNOSTOMY TUBE CHANGE  11/28/2016     IR GASTRO JEJUNOSTOMY TUBE CHANGE  1/27/2017     IR GASTRO JEJUNOSTOMY TUBE CHANGE  4/19/2017     IR GASTRO JEJUNOSTOMY TUBE CHANGE  6/20/2017     IR GASTRO JEJUNOSTOMY TUBE CHANGE  8/7/2017     IR GASTRO JEJUNOSTOMY TUBE CHANGE  8/11/2017     IR GASTRO JEJUNOSTOMY TUBE CHANGE  10/20/2017     IR GASTRO JEJUNOSTOMY TUBE CHANGE  1/2/2018     IR GASTRO JEJUNOSTOMY TUBE CHANGE  2/16/2018     IR GASTRO JEJUNOSTOMY TUBE CHANGE  5/16/2018     IR GASTRO JEJUNOSTOMY TUBE CHANGE  6/11/2018     IR GASTRO JEJUNOSTOMY TUBE CHANGE  9/12/2018     IR GASTRO JEJUNOSTOMY TUBE CHANGE  12/24/2018     IR GASTRO JEJUNOSTOMY TUBE CHANGE  3/18/2019     IR GASTRO JEJUNOSTOMY TUBE CHANGE  4/23/2019     IR GASTRO JEJUNOSTOMY TUBE CHANGE  5/21/2019     IR GASTRO JEJUNOSTOMY TUBE CHANGE  9/9/2019     IR GASTRO JEJUNOSTOMY TUBE CHANGE  9/26/2019     IR GASTRO JEJUNOSTOMY TUBE CHANGE  10/11/2019     IR GASTRO JEJUNOSTOMY TUBE CHANGE  11/21/2019     IR GASTRO JEJUNOSTOMY TUBE CHANGE  11/29/2019     IR GASTRO JEJUNOSTOMY TUBE CHANGE  2/27/2020     IR GASTRO JEJUNOSTOMY TUBE CHANGE  4/10/2020     IR GASTRO JEJUNOSTOMY TUBE CHANGE  7/2/2020     IR GASTRO JEJUNOSTOMY TUBE CHANGE  8/28/2020     IR GASTRO JEJUNOSTOMY TUBE CHANGE  9/17/2020     IR GASTRO JEJUNOSTOMY TUBE CHANGE  10/29/2020      IR GASTRO JEJUNOSTOMY TUBE CHANGE  11/6/2020     IR GASTRO JEJUNOSTOMY TUBE CHANGE  12/24/2020     IR GASTRO JEJUNOSTOMY TUBE CHANGE  1/9/2021     IR GASTRO JEJUNOSTOMY TUBE CHANGE  6/9/2021     IR GASTRO JEJUNOSTOMY TUBE CHANGE  10/4/2021     IR GASTRO JEJUNOSTOMY TUBE CHANGE  10/18/2021     IR GASTRO JEJUNOSTOMY TUBE CHANGE  1/14/2022     IR GASTRO JEJUNOSTOMY TUBE CHANGE  1/20/2022     IR GASTRO JEJUNOSTOMY TUBE CHANGE  4/19/2022     IR GASTRO JEJUNOSTOMY TUBE CHANGE  6/7/2022     IR GASTRO JEJUNOSTOMY TUBE CHANGE  8/19/2022     IR GASTRO JEJUNOSTOMY TUBE CHANGE  9/13/2022     IR GASTRO JEJUNOSTOMY TUBE CHANGE  10/27/2022     IR GASTRO JEJUNOSTOMY TUBE PLACEMENT  6/14/2016     IR GJ TUBE REPLACEMENT  12/24/2018     IR GJ TUBE REPLACEMENT  3/18/2019     IR GJ TUBE REPLACEMENT  4/23/2019     IR GJ TUBE REPLACEMENT  5/21/2019     IR GJ TUBE REPLACEMENT  9/9/2019     IR GJ TUBE REPLACEMENT  9/26/2019     IR GJ TUBE REPLACEMENT  10/11/2019     IR GJ TUBE REPLACEMENT  11/21/2019     IR GJ TUBE REPLACEMENT  11/29/2019     IR GJ TUBE REPLACEMENT  2/27/2020     IR GJ TUBE REPLACEMENT  4/10/2020     IR GJ TUBE REPLACEMENT  7/2/2020     IR GJ TUBE REPLACEMENT  8/28/2020     IR GJ TUBE REPLACEMENT  9/17/2020     IR GJ TUBE REPLACEMENT  10/29/2020     IR GJ TUBE REPLACEMENT  11/6/2020     IR GJ TUBE REPLACEMENT  12/24/2020     IR GJ TUBE REPLACEMENT  1/9/2021     IR GJ TUBE REPLACEMENT  6/9/2021     J-G tube       PICC  6/10/2016          TRACHEOSTOMY      Removed       Prior to Admission Medications   Prior to Admission Medications   Prescriptions Last Dose Informant Patient Reported? Taking?   Lidocaine (LIDOCARE) 4 % Patch prn  No Yes   Sig: Place 1 patch onto the skin every 24 hours To prevent lidocaine toxicity, patient should be patch free for 12 hrs daily.   Patient taking differently: Place 1 patch onto the skin daily as needed To prevent lidocaine toxicity, patient should be patch free for 12 hrs daily.    Polyethylene Glycol 400 (VISINE DRY EYE RELIEF) 1 % SOLN prn  No Yes   Sig: Apply 1 drop to eye every 6 hours as needed (dry eyes)   QUEtiapine (SEROQUEL) 25 MG tablet 10/28/2022 at x2  No Yes   Sig: Give 1 tablet via G-tube in the morning and 1 tablet in the afternoon as needed for agitation.Give 2 tablets via G-tube at bedtime for sleep.   acetaminophen (TYLENOL) 500 MG tablet 10/28/2022 at 1930  Yes Yes   Si,000 mg by Gastric Tube route every 6 hours as needed for mild pain    amLODIPine (NORVASC) 5 MG tablet 10/28/2022  Yes Yes   Sig: Take 5 mg by mouth daily   baclofen (LIORESAL) 10 MG tablet 10/28/2022 at x2  No Yes   Sig: Take 1 tablet (10 mg) by mouth 3 times daily as needed for muscle spasms   Patient taking differently: 10 mg by Per Feeding Tube route 3 times daily   bisacodyl (DULCOLAX) 10 MG suppository Past Week  No Yes   Sig: INSERT 1 SUPPOSTIORY INTO THE RECTUM DAILY AS NEEDED FOR CONSTIPATION.   chlorhexidine (PERIDEX) 0.12 % solution 10/28/2022  Yes Yes   Sig: Swish and spit 15 mLs in mouth daily    dextromethorphan (DELSYM) 30 MG/5ML liquid prn  No Yes   Sig: Take 10 mLs (60 mg) by mouth 2 times daily as needed for cough   esomeprazole (NEXIUM) 20 MG DR capsule 10/28/2022 at x1  No Yes   Sig: Take 1 capsule via G-tube twice daily   Patient taking differently: 20 mg daily Take 1 capsule via G-tube twice daily   ferrous sulfate 220 (44 Fe) MG/5ML ELIX 10/28/2022  No Yes   Si mLs by Enteral route daily   guaiFENesin (ROBITUSSIN) 100 MG/5ML liquid 10/28/2022  No Yes   Sig: Give 5 ml via G tube every 6 hours as needed for cough   ipratropium - albuterol 0.5 mg/2.5 mg/3 mL (DUONEB) 0.5-2.5 (3) MG/3ML neb solution 10/28/2022 at 1800  No Yes   Sig: Take 1 vial (3 mLs) by nebulization every 4 hours   meclizine (ANTIVERT) 25 MG tablet 10/26/2022  No Yes   Sig: Take 1 tablet via G-tube every 6 hours as needed for vertigo   metoprolol tartrate (LOPRESSOR) 25 MG tablet 10/28/2022 at x1  No Yes   Sig:  "TAKE ONE TABLET BY MOUTH TWICE DAILY    neomycin-bacitracin-polymyxin (NEOSPORIN) 5-400-5000 ointment 10/28/2022  No Yes   Sig: Apply topically 4 times daily   nitroFURantoin macrocrystal-monohydrate (MACROBID) 100 MG capsule 10/28/2022 at AM  No Yes   Sig: Take 1 capsule by G-tube route at bedtime.   polyethylene glycol (MIRALAX) 17 GM/Dose powder prn  No Yes   Sig: Give 1 capful via G-tube daily as needed for constipation   simvastatin (ZOCOR) 40 MG tablet 10/27/2022  No Yes   Sig: Take 1 tablet (40 mg) by mouth At Bedtime      Facility-Administered Medications: None     Allergies   Allergies   Allergen Reactions     Diphenhydramine Unknown     Trazodone Other (See Comments)     \"shaking\" per family       Immunization History   Immunization History   Administered Date(s) Administered     COVID-19,PF,Pfizer (12+ Yrs) 03/11/2021, 04/01/2021     COVID-19,PF,Pfizer 12+ Yrs (2022 and After) 03/20/2022     Influenza Quad, Recombinant, pf(RIV4) (Flublok) 03/02/2020, 10/28/2021     Influenza Vaccine, 6+MO IM (QUADRIVALENT W/PRESERVATIVES) 10/25/2017, 09/21/2018, 11/06/2020     Pneumo Conj 13-V (2010&after) 10/28/2021     Pneumococcal 23 valent 10/25/2017     Tdap (Adacel,Boostrix) 04/20/2015       Social History   I have reviewed this patient's social history and updated it with pertinent information if needed. Liu GIL Frieda  reports that he quit smoking about 17 years ago. His smoking use included cigarettes. He has a 30.00 pack-year smoking history. He quit smokeless tobacco use about 15 years ago. He reports that he does not drink alcohol and does not use drugs.    Family History   I have reviewed this patient's family history and updated it with pertinent information if needed.   Family History   Problem Relation Age of Onset     Hypertension Mother      Cancer Father      Other Cancer Father      Kidney Disease Brother        Review of Systems   The 10 point Review of Systems is negative other than noted in the HPI or " here.     Physical Exam   Temp: 99.4  F (37.4  C) Temp src: Axillary BP: 128/70 Pulse: 118   Resp: 24 SpO2: 93 % O2 Device: None (Room air)    Vital Signs with Ranges  Temp:  [99.4  F (37.4  C)-101.1  F (38.4  C)] 99.4  F (37.4  C)  Pulse:  [106-128] 118  Resp:  [20-24] 24  BP: (112-169)/(59-82) 128/70  SpO2:  [86 %-94 %] 93 %  130 lbs 0 oz  Body mass index is 23.78 kg/m .    GENERAL APPEARANCE: Eyes closed, nonverbal  EYES: Eyes closed  HENT: Upper airway sounds  NECK: No swelling  RESP: Appears congested upper airway sounds  CV: No significant edema  LYMPHATICS: normal ant/post cervical and supraclavicular nodes  ABDOMEN: G-tube  MS: Muscle wasting  SKIN: Warm  Neuro: Nonverbal      Data  reviewed    Lab Results   Component Value Date    WBC 19.8 (H) 10/29/2022    WBC 12.7 (H) 10/28/2022    WBC 10.1 09/30/2022    WBC 20.1 (H) 09/18/2022    WBC 8.7 05/30/2022    HGB 10.5 (L) 10/29/2022    HGB 13.1 (L) 10/28/2022    HGB 11.9 (L) 09/30/2022    HGB 12.6 (L) 09/18/2022    HGB 12.0 (L) 05/30/2022    HCT 33.1 (L) 10/29/2022    HCT 40.3 10/28/2022    HCT 36.7 (L) 09/30/2022    HCT 38.1 (L) 09/18/2022    HCT 37.0 (L) 05/30/2022     10/29/2022     10/28/2022     09/30/2022     09/18/2022     05/30/2022     10/29/2022     10/28/2022     09/30/2022     09/18/2022     (H) 05/30/2022    POTASSIUM 4.7 10/29/2022    POTASSIUM 4.6 10/28/2022    POTASSIUM 4.5 09/30/2022    POTASSIUM 4.7 09/18/2022    POTASSIUM 4.7 05/30/2022    CHLORIDE 117 (H) 10/29/2022    CHLORIDE 109 (H) 10/28/2022    CHLORIDE 106 09/30/2022    CHLORIDE 108 (H) 09/18/2022    CHLORIDE 113 (H) 05/30/2022    CO2 19 (L) 10/29/2022    CO2 19 (L) 10/28/2022    CO2 22 09/30/2022    CO2 21 (L) 09/18/2022    CO2 21 (L) 05/30/2022    BUN 34 (H) 10/29/2022    BUN 41 (H) 10/28/2022    BUN 35.0 (H) 09/30/2022    BUN 35 (H) 09/18/2022    BUN 36 (H) 05/30/2022    CR 1.32 (H) 10/29/2022    CR 1.29 10/28/2022     CR 1.33 (H) 09/30/2022    CR 1.28 09/18/2022    CR 1.30 05/30/2022    GLC 93 10/29/2022    GLC 98 10/28/2022     (H) 09/30/2022     (H) 09/18/2022     05/30/2022    SED 74 (H) 05/30/2022    DD 0.35 07/25/2020    AST 19 10/29/2022    AST 25 10/28/2022    AST 24 09/18/2022    AST 31 05/30/2022    AST 19 01/29/2022    ALT 16 10/29/2022    ALT 22 10/28/2022    ALT 25 09/18/2022    ALT 26 05/30/2022    ALT 23 01/29/2022    ALKPHOS 54 10/29/2022    ALKPHOS 71 10/28/2022    ALKPHOS 74 09/18/2022    ALKPHOS 62 05/30/2022    ALKPHOS 57 01/29/2022    BILITOTAL 0.5 10/29/2022    BILITOTAL 0.4 10/28/2022    BILITOTAL 0.4 09/18/2022    BILITOTAL 0.4 05/30/2022    BILITOTAL 0.3 01/29/2022    INR 1.04 04/15/2021    INR 1.03 06/27/2020    INR 0.94 06/04/2020    INR 1.13 (H) 02/21/2020    INR 0.98 08/18/2019     No results for input(s): CULT in the last 168 hours.  No lab results found.    Invalid input(s):     MICRO:  10/29/2022 1754 10/29/2022 1759 Respiratory Panel PCR [[id number]]   Swab from Nasopharyngeal    In process Component Value   No component results          10/29/2022 1521 10/29/2022 1521 Blood Culture Peripheral Blood [[id number]]   Peripheral Blood    In process Component Value   No component results          10/29/2022 1521 10/29/2022 1521 Blood Culture Peripheral Blood [[id number]]   Peripheral Blood    In process Component Value   No component results          10/28/2022 2200 10/28/2022 2256 Symptomatic; Unknown Influenza A/B & SARS-CoV2 (COVID-19) Virus PCR Multiplex Nasopharyngeal [[id number]]    Swab from Nasopharyngeal    Final result Component Value   Influenza A PCR Negative   Influenza B PCR Negative   RSV PCR Negative   SARS CoV2 PCR Negative   NEGATIVE: SARS-CoV-2 (COVID-19) RNA not detected, presumed negative.          10/28/2022 2035 10/29/2022 1231 Blood Culture Peripheral Blood [[id number]]   Peripheral Blood    Preliminary result Component Value   Culture No growth  after 12 hours P          10/28/2022 2023 10/29/2022 1231 Blood Culture Peripheral Blood [[id number]]   Peripheral Blood    Preliminary result Component Value   Culture No growth after 12 hours P              09/19/2022 0010 09/23/2022 0837 Blood Culture Peripheral Blood [[id number]]    (Abnormal)   Peripheral Blood    Final result Component Value   Culture Positive on the 2nd day of incubation Abnormal     Staphylococcus pettenkoferi Panic     1 of 2 bottles   Identification obtained by MALDI-TOF mass spectrometry research use only database. Test characteristics determined and verified by the [facility] Diagnostic Laboratory.       Susceptibility     Staphylococcus pettenkoferi     LAURA     Ciprofloxacin 2 ug/mL Intermediate     Clindamycin <=0.25 ug/mL Susceptible     Erythromycin <=0.25 ug/mL Susceptible     Gentamicin <=0.5 ug/mL Susceptible     Levofloxacin 4 ug/mL Resistant     Oxacillin <=0.25 ug/mL Susceptible 1     Tetracycline <=1 ug/mL Susceptible     Vancomycin <=0.5 ug/mL Susceptible              1 Oxacillin susceptible isolates are susceptible to cephalosporins (example: cefazolin and cephalexin) and beta lactam combination agents. Oxacillin resistant isolates are resistant to these agents.            09/19/2022 0010 09/20/2022 1820 Verigene GP Panel [[id number]]    (Abnormal)   Peripheral Blood    Final result Component Value   Staphylococcus species Detected Abnormal    Positive for coagulase-negative Staphylococci, other than Staphylococcus epidermidis and Staphylococcus lugdunensis, by MyPublisherigene multiplex nucleic acid test. Coagulase-negative Staphylococci are the most common venipuncture or collection associated skin contaminants grown in blood cultures. Final identification and antimicrobial susceptibility testing will be verified by standard methods.   Staphylococcus aureus Not Detected   Staphylococcus epidermidis Not Detected   Staphylococcus lugdunensis Not Detected    Enterococcus faecalis Not Detected   Enterococcus faecium Not Detected   Streptococcus species Not Detected   Streptococcus agalactiae Not Detected   Streptococcus anginosus group Not Detected   Streptococcus pneumoniae Not Detected   Streptococcus pyogenes Not Detected   Listeria species Not Detected          09/18/2022 2323 09/19/2022 0011 Symptomatic; Unknown Influenza A/B & SARS-CoV2 (COVID-19) Virus PCR Multiplex Nasopharyngeal [[id number]]    Swab from Nasopharyngeal    Final result Component Value   Influenza A PCR Negative   Influenza B PCR Negative   RSV PCR Negative   SARS CoV2 PCR Negative   NEGATIVE: SARS-CoV-2 (COVID-19) RNA not detected, presumed negative.          09/18/2022 2256 09/24/2022 1005 Blood Culture Arm, Right [[id number]]   Blood from Arm, Right    Final result Component Value   Culture No Growth        RADIOLOGY:  Reviewed results    CT Chest/Abdomen/Pelvis w Contrast    Result Date: 10/28/2022  EXAM: CT CHEST/ABDOMEN/PELVIS W CONTRAST LOCATION: [facility] DATE/TIME: 10/28/2022 9:28 PM INDICATION: fever, hypoxia, bloody drainage from Gtube COMPARISON: 5/30/2022 TECHNIQUE: CT scan of the chest, abdomen, and pelvis was performed following injection of IV contrast. Multiplanar reformats were obtained. Dose reduction techniques were used. CONTRAST: Isovue 370 100ml FINDINGS: LUNGS AND PLEURA: Chronic scarring and calcified nodules at upper lobes unchanged. However there is increased opacities at both lung bases and posterior aspect of right middle lobe suggesting pneumonia. Secretions are seen filling posterior bronchus to right lower lobe. Motion artifact. MEDIASTINUM/AXILLAE: No lymphadenopathy. Normal-sized heart. CORONARY ARTERY CALCIFICATION: Moderate. HEPATOBILIARY: Normal. PANCREAS: Normal. SPLEEN: Normal. ADRENAL GLANDS: Normal. KIDNEYS/BLADDER: Motion artifact. Stable benign renal cysts need no further workup. BOWEL: Gastrojejunostomy tube is in good  position. There is no significant inflammatory reaction involving the tube track, no abscess. Stomach is collapsed and empty. No inflammatory process involving bowel. Appendix normal. LYMPH NODES: Normal. VASCULATURE: Moderate atherosclerotic plaque. PELVIC ORGANS: Normal. MUSCULOSKELETAL: Normal.     IMPRESSION: 1.  New patchy bilateral airspace disease right middle lobe and both lower lobes consistent with pneumonia. 2.  Gastrojejunostomy tube appears in good position with no complications evident. Bowel unremarkable.    IR Gastro Jejunostomy Tube Change    Result Date: 10/27/2022  [place] RADIOLOGY LOCATION: [facility] DATE: 10/27/2022 PROCEDURE: PERCUTANEOUS GASTROJEJUNOSTOMY EXCHANGE INTERVENTIONAL RADIOLOGIST: Dale Bro MD INDICATION: Routine gastrojejunostomy tube exchange. CONTRAST: 20 mL Omnipaque intraenteric. ANTIBIOTICS: None. ADDITIONAL MEDICATIONS: None. FLUOROSCOPIC TIME: 0.4 minutes. RADIATION DOSE: Air Kerma: 2 mGy. COMPLICATIONS: No immediate complications. UNIVERSAL PROTOCOL: The operative site was marked and any prior imaging was reviewed. Required items including blood products, implants, devices and special equipment was made available. Patient identity was confirmed either verbally, with demographic information, hospital assigned identification or other identification markers. A timeout was performed immediately prior to the procedure. STERILE BARRIER TECHNIQUE: Maximum sterile barrier technique was used. Cutaneous antisepsis was performed at the operative site with application of 2% chlorhexidine and large sterile drape. Prior to the procedure, the  and assistant performed hand hygiene and wore hat, mask, sterile gown, and sterile gloves during the entire procedure. PROCEDURE/TECHNIQUE: The indwelling 18 Iraqi gastrojejunostomy tube's gastric and jejunal ports were injected and images obtained. The tube was then exchanged over a wire for a new 18 Iraqi,  45cm length gastrojejunostomy tube which was positioned with distal tip in the proximal jejunum. The retention balloon was inflated.  A post placement injection of both the gastric and jejunal ports was performed. FINDINGS: The initial injection shows the gastric and jejunal lumens to be patent and in appropriate position. After exchange, the new gastrojejunostomy is in appropriate position with the gastric port within the stomach and distal jejunal port near jejunal origin.     IMPRESSION:  Gastrojejunostomy tube change as discussed above. CPT codes for physician reference only: 41642

## 2022-10-29 NOTE — PROGRESS NOTES
Hospitalist Progress Note    Assessment/Plan    Principal Problem:    Pneumonia of both lungs due to infectious organism, unspecified part of lung  Active Problems:    Sepsis, due to unspecified organism, unspecified whether acute organ dysfunction present (H)    Fever and chills  57-year-old patient with history of basal ganglia hemorrhage dependent on cares G-tube in place, presented with fever, draining a tube site, per history it was changed 1 day prior to presentation, he had a prior history of aspiration pneumonia last September, he was found to have fever tachycardia hypoxia acute kidney injury, procalcitonin 2, chest CT showing bilateral pneumonia, was treated with a bolus of fluid, vancomycin and Zosyn, currently being treated for severe sepsis with bilateral pneumonia    Severe sepsis due to bilateral pneumonia  Acute respiratory failure with hypoxia secondary to pneumonia possible aspiration pneumonia  History of MRSA and ESBL in a previous cultures, history of recurrent UTI,  History of COPD on DuoNeb  Currently on meropenem and vancomycin, infectious disease consulted with history of MDR pathogens,      Oxygen needs since last night, culture results are pending, patient is on Macrobid for UTI prophylaxis on hold for now,  History of brainstem hemorrhagic stroke, hemiplegia,  Nonverbal, feeding tube dependent,  Has been off Keppra, feeding tube was replaced recently, appears to be in good position.  The CT scan report,  Dietitian consulted regarding tube feeding    Hypoalbuminemia  Possible malnutrition, dietitian consulted    Itching  Per the daughter patient has allergy to Benadryl probably cough, will start him on hydroxyzine for now    Hypertension  On metoprolol and amlodipine, blood pressure is on the lower end resume the metoprolol and hold amlodipine for now    Barriers to Discharge: Not clinically ready for discharge    Anticipated discharge date/Disposition: TBD    Subjective  Patient was seen  today, nonverbal, family at bedside, appears to be moaning and outside his baseline per his daughter, per his daughter he usually sits in the recliner appears more comfortable,    Objective    Vital signs in last 24 hours  Temp:  [99.4  F (37.4  C)-101.1  F (38.4  C)] 99.4  F (37.4  C)  Pulse:  [106-128] 118  Resp:  [20-24] 24  BP: (112-169)/(59-82) 128/70  SpO2:  [86 %-94 %] 93 % [unfilled] O2 Device: None (Room air)    Weight:   [unfilled] Weight change:     Intake/Output last 3 shifts  I/O last 3 completed shifts:  In: 180 [NG/GT:180]  Out: -   Body mass index is 23.78 kg/m .    Physical Exam:  General Appearance unable to assess orientation due to nonverbal does not appear in distress.  HEENT: Normocephalic. No scleral icterus, . Mucous membranes moist.  Heart: :Regular rate and rhythm, normal S1 ,S2, No murmurs, no JVD, no pedal edema   Lungs: Clear to auscultation bilaterally. No wheezing or crackles  Abdomen: Soft, non tender, no rebound or rigidity, non distended, bowel sounds present.  G-tube in place,    Pertinent Labs   Lab Results: personally reviewed.   Recent Labs   Lab 10/29/22  0733 10/28/22  2023    139   CO2 19* 19*   BUN 34* 41*   ALBUMIN 2.5* 3.3*   ALKPHOS 54 71   ALT 16 22   AST 19 25     Recent Labs   Lab 10/29/22  0733 10/28/22  2023   WBC 19.8* 12.7*   HGB 10.5* 13.1*   HCT 33.1* 40.3    291     No results for input(s): CKTOTAL, TROPONINI in the last 168 hours.    Invalid input(s): TROPONINT, CKMBINDEX  Invalid input(s): POCGLUFGR      Advanced Care Planning:  Discharge Planning discussed with patient's family  Total time with this patient is 35 min with 50% of time spent in examining the patient, reviewing records, discussing plan of care and counseling, 50% of time spent in coordination of care.  Care discussed and coordinated with RN, pharmacist,.      Will Grace MD  Internal Medicine Hospitalist  10/29/2022

## 2022-10-29 NOTE — PHARMACY-VANCOMYCIN DOSING SERVICE
"Pharmacy Vancomycin Initial Note  Date of Service 2022  Patient's  1965  57 year old, male    Indication: Sepsis    Current estimated CrCl = Estimated Creatinine Clearance: 49.9 mL/min (A) (based on SCr of 1.33 mg/dL (H)).    Creatinine for last 3 days  No results found for requested labs within last 72 hours.    Recent Vancomycin Level(s) for last 3 days  No results found for requested labs within last 72 hours.      Vancomycin IV Administrations (past 72 hours)      No vancomycin orders with administrations in past 72 hours.                Nephrotoxins and other renal medications (From now, onward)    Start     Dose/Rate Route Frequency Ordered Stop    10/28/22 2030  piperacillin-tazobactam (ZOSYN) 3.375 g vial to attach to  mL bag        Note to Pharmacy: For [facility], [facility] and [facility]: For Zosyn-naive patients, use the \"Zosyn initial dose + extended infusion\" order panel.    3.375 g  over 240 Minutes Intravenous ONCE 10/28/22 2016      10/28/22 2030  ibuprofen (ADVIL/MOTRIN) suspension 400 mg         400 mg Oral ONCE 10/28/22 2017      10/28/22 2030  vancomycin (VANCOCIN) 1,250 mg in 0.9% NaCl 250 mL intermittent infusion         1,250 mg  over 90 Minutes Intravenous ONCE 10/28/22 2029            Contrast Orders - past 72 hours (72h ago, onward)    None              Plan:  1. Give vancomycin  1250 mg IV once in emergency department.  2. Please re-consult pharmacy if vancomycin is to continue.     Yamilex Naylor [facility]  "

## 2022-10-29 NOTE — PLAN OF CARE
Patient put in recliner by family. PRN hydroxyzine given for rash/itching, MD aware. Tele sinus tach. All medications given via tube. NS running at 100ml/hr. Still need UA. Patient is NPO. Patient is supposed to have respiratory vest therapy at some point today when vest arrives from [facility]. Oral suction used as needed throughout shift. Patient remains afebrile this shift, not requiring oxygen at this time.

## 2022-10-29 NOTE — PHARMACY-ADMISSION MEDICATION HISTORY
Pharmacy Note - Admission Medication History    Pertinent Provider Information: n/a     ______________________________________________________________________    Prior To Admission (PTA) med list completed and updated in EMR.       PTA Med List   Medication Sig Last Dose     acetaminophen (TYLENOL) 500 MG tablet 1,000 mg by Gastric Tube route every 6 hours as needed for mild pain  10/28/2022 at 1930     amLODIPine (NORVASC) 5 MG tablet Take 5 mg by mouth daily 10/28/2022     baclofen (LIORESAL) 10 MG tablet Take 1 tablet (10 mg) by mouth 3 times daily as needed for muscle spasms (Patient taking differently: 10 mg by Per Feeding Tube route 3 times daily) 10/28/2022 at x2     bisacodyl (DULCOLAX) 10 MG suppository INSERT 1 SUPPOSTIORY INTO THE RECTUM DAILY AS NEEDED FOR CONSTIPATION. Past Week     chlorhexidine (PERIDEX) 0.12 % solution Swish and spit 15 mLs in mouth daily  10/28/2022     dextromethorphan (DELSYM) 30 MG/5ML liquid Take 10 mLs (60 mg) by mouth 2 times daily as needed for cough prn     esomeprazole (NEXIUM) 20 MG DR capsule Take 1 capsule via G-tube twice daily (Patient taking differently: 20 mg daily Take 1 capsule via G-tube twice daily) 10/28/2022 at x1     ferrous sulfate 220 (44 Fe) MG/5ML ELIX 5 mLs by Enteral route daily 10/28/2022     guaiFENesin (ROBITUSSIN) 100 MG/5ML liquid Give 5 ml via G tube every 6 hours as needed for cough 10/28/2022     ipratropium - albuterol 0.5 mg/2.5 mg/3 mL (DUONEB) 0.5-2.5 (3) MG/3ML neb solution Take 1 vial (3 mLs) by nebulization every 4 hours 10/28/2022 at 1800     Lidocaine (LIDOCARE) 4 % Patch Place 1 patch onto the skin every 24 hours To prevent lidocaine toxicity, patient should be patch free for 12 hrs daily. (Patient taking differently: Place 1 patch onto the skin daily as needed To prevent lidocaine toxicity, patient should be patch free for 12 hrs daily.) prn     meclizine (ANTIVERT) 25 MG tablet Take 1 tablet via G-tube every 6 hours as needed for  vertigo 10/26/2022     metoprolol tartrate (LOPRESSOR) 25 MG tablet TAKE ONE TABLET BY MOUTH TWICE DAILY  10/28/2022 at x1     neomycin-bacitracin-polymyxin (NEOSPORIN) 5-400-5000 ointment Apply topically 4 times daily 10/28/2022     nitroFURantoin macrocrystal-monohydrate (MACROBID) 100 MG capsule Take 1 capsule by G-tube route at bedtime. 10/28/2022 at AM     polyethylene glycol (MIRALAX) 17 GM/Dose powder Give 1 capful via G-tube daily as needed for constipation prn     Polyethylene Glycol 400 (VISINE DRY EYE RELIEF) 1 % SOLN Apply 1 drop to eye every 6 hours as needed (dry eyes) prn     QUEtiapine (SEROQUEL) 25 MG tablet Give 1 tablet via G-tube in the morning and 1 tablet in the afternoon as needed for agitation.Give 2 tablets via G-tube at bedtime for sleep. 10/28/2022 at x2     simvastatin (ZOCOR) 40 MG tablet Take 1 tablet (40 mg) by mouth At Bedtime 10/27/2022       Information source(s): Family member and CareEverywhere/SureScripts  Method of interview communication: in-person    Summary of Changes to PTA Med List  New: none  Discontinued: levetiracetam, asa  Changed: lidocaine patch, esomeprazole    Patient was asked about OTC/herbal products specifically.  PTA med list reflects this.    In the past week, patient estimated taking medication this percent of the time:  greater than 90%.      Patient did not bring any medications to the hospital and can't retrieve from home. No multi-dose medications are available for use during hospital stay.     The information provided in this note is only as accurate as the sources available at the time of the update(s).    Thank you for the opportunity to participate in the care of this patient.    Yamilex Naylor RPH  10/28/2022 10:56 PM

## 2022-10-30 LAB
BASOPHILS # BLD AUTO: 0 10E3/UL (ref 0–0.2)
BASOPHILS NFR BLD AUTO: 0 %
C PNEUM DNA SPEC QL NAA+PROBE: NOT DETECTED
EOSINOPHIL # BLD AUTO: 0.4 10E3/UL (ref 0–0.7)
EOSINOPHIL NFR BLD AUTO: 3 %
ERYTHROCYTE [DISTWIDTH] IN BLOOD BY AUTOMATED COUNT: 13.2 % (ref 10–15)
FLUAV H1 2009 PAND RNA SPEC QL NAA+PROBE: NOT DETECTED
FLUAV H1 RNA SPEC QL NAA+PROBE: NOT DETECTED
FLUAV H3 RNA SPEC QL NAA+PROBE: NOT DETECTED
FLUAV RNA SPEC QL NAA+PROBE: NOT DETECTED
FLUBV RNA SPEC QL NAA+PROBE: NOT DETECTED
GLUCOSE BLD-MCNC: 101 MG/DL (ref 70–125)
HADV DNA SPEC QL NAA+PROBE: NOT DETECTED
HCOV PNL SPEC NAA+PROBE: NOT DETECTED
HCT VFR BLD AUTO: 32.6 % (ref 40–53)
HGB BLD-MCNC: 10.1 G/DL (ref 13.3–17.7)
HMPV RNA SPEC QL NAA+PROBE: NOT DETECTED
HOLD SPECIMEN: NORMAL
HOLD SPECIMEN: NORMAL
HPIV1 RNA SPEC QL NAA+PROBE: NOT DETECTED
HPIV2 RNA SPEC QL NAA+PROBE: NOT DETECTED
HPIV3 RNA SPEC QL NAA+PROBE: NOT DETECTED
HPIV4 RNA SPEC QL NAA+PROBE: NOT DETECTED
IMM GRANULOCYTES # BLD: 0.1 10E3/UL
IMM GRANULOCYTES NFR BLD: 1 %
LYMPHOCYTES # BLD AUTO: 2 10E3/UL (ref 0.8–5.3)
LYMPHOCYTES NFR BLD AUTO: 14 %
M PNEUMO DNA SPEC QL NAA+PROBE: NOT DETECTED
MAGNESIUM SERPL-MCNC: 2.3 MG/DL (ref 1.8–2.6)
MCH RBC QN AUTO: 30.9 PG (ref 26.5–33)
MCHC RBC AUTO-ENTMCNC: 31 G/DL (ref 31.5–36.5)
MCV RBC AUTO: 100 FL (ref 78–100)
MONOCYTES # BLD AUTO: 0.9 10E3/UL (ref 0–1.3)
MONOCYTES NFR BLD AUTO: 6 %
NEUTROPHILS # BLD AUTO: 10.6 10E3/UL (ref 1.6–8.3)
NEUTROPHILS NFR BLD AUTO: 76 %
NRBC # BLD AUTO: 0 10E3/UL
NRBC BLD AUTO-RTO: 0 /100
PHOSPHATE SERPL-MCNC: 2.7 MG/DL (ref 2.5–4.5)
PLATELET # BLD AUTO: 231 10E3/UL (ref 150–450)
POTASSIUM BLD-SCNC: 4.3 MMOL/L (ref 3.5–5)
RBC # BLD AUTO: 3.27 10E6/UL (ref 4.4–5.9)
RSV RNA SPEC QL NAA+PROBE: NOT DETECTED
RSV RNA SPEC QL NAA+PROBE: NOT DETECTED
RV+EV RNA SPEC QL NAA+PROBE: NOT DETECTED
WBC # BLD AUTO: 14.1 10E3/UL (ref 4–11)

## 2022-10-30 PROCEDURE — 250N000011 HC RX IP 250 OP 636: Performed by: HOSPITALIST

## 2022-10-30 PROCEDURE — 36415 COLL VENOUS BLD VENIPUNCTURE: CPT | Performed by: HOSPITALIST

## 2022-10-30 PROCEDURE — 999N000157 HC STATISTIC RCP TIME EA 10 MIN

## 2022-10-30 PROCEDURE — 94640 AIRWAY INHALATION TREATMENT: CPT

## 2022-10-30 PROCEDURE — 258N000003 HC RX IP 258 OP 636: Performed by: HOSPITALIST

## 2022-10-30 PROCEDURE — 85025 COMPLETE CBC W/AUTO DIFF WBC: CPT | Performed by: INTERNAL MEDICINE

## 2022-10-30 PROCEDURE — 272N000098

## 2022-10-30 PROCEDURE — 99233 SBSQ HOSP IP/OBS HIGH 50: CPT | Performed by: INTERNAL MEDICINE

## 2022-10-30 PROCEDURE — 84100 ASSAY OF PHOSPHORUS: CPT | Performed by: HOSPITALIST

## 2022-10-30 PROCEDURE — 120N000001 HC R&B MED SURG/OB

## 2022-10-30 PROCEDURE — 36415 COLL VENOUS BLD VENIPUNCTURE: CPT | Performed by: INTERNAL MEDICINE

## 2022-10-30 PROCEDURE — 250N000011 HC RX IP 250 OP 636: Performed by: FAMILY MEDICINE

## 2022-10-30 PROCEDURE — 99233 SBSQ HOSP IP/OBS HIGH 50: CPT | Performed by: FAMILY MEDICINE

## 2022-10-30 PROCEDURE — 250N000013 HC RX MED GY IP 250 OP 250 PS 637: Performed by: FAMILY MEDICINE

## 2022-10-30 PROCEDURE — 94640 AIRWAY INHALATION TREATMENT: CPT | Mod: 76

## 2022-10-30 PROCEDURE — 250N000013 HC RX MED GY IP 250 OP 250 PS 637: Performed by: HOSPITALIST

## 2022-10-30 PROCEDURE — 83735 ASSAY OF MAGNESIUM: CPT | Performed by: HOSPITALIST

## 2022-10-30 PROCEDURE — 82947 ASSAY GLUCOSE BLOOD QUANT: CPT | Performed by: FAMILY MEDICINE

## 2022-10-30 PROCEDURE — 250N000009 HC RX 250: Performed by: HOSPITALIST

## 2022-10-30 PROCEDURE — 84132 ASSAY OF SERUM POTASSIUM: CPT | Performed by: HOSPITALIST

## 2022-10-30 RX ORDER — BISACODYL 10 MG
10 SUPPOSITORY, RECTAL RECTAL DAILY PRN
Status: DISCONTINUED | OUTPATIENT
Start: 2022-10-30 | End: 2022-10-31 | Stop reason: HOSPADM

## 2022-10-30 RX ORDER — HEPARIN SODIUM 5000 [USP'U]/.5ML
5000 INJECTION, SOLUTION INTRAVENOUS; SUBCUTANEOUS EVERY 12 HOURS
Status: DISCONTINUED | OUTPATIENT
Start: 2022-10-30 | End: 2022-10-31 | Stop reason: HOSPADM

## 2022-10-30 RX ORDER — AMLODIPINE BESYLATE 5 MG/1
5 TABLET ORAL DAILY
Status: DISCONTINUED | OUTPATIENT
Start: 2022-10-30 | End: 2022-10-31 | Stop reason: HOSPADM

## 2022-10-30 RX ORDER — ASPIRIN 81 MG/1
81 TABLET, CHEWABLE ORAL DAILY
Status: DISCONTINUED | OUTPATIENT
Start: 2022-10-30 | End: 2022-10-31 | Stop reason: HOSPADM

## 2022-10-30 RX ADMIN — BACLOFEN 10 MG: 10 TABLET ORAL at 19:18

## 2022-10-30 RX ADMIN — BISACODYL 10 MG: 10 SUPPOSITORY RECTAL at 12:13

## 2022-10-30 RX ADMIN — IPRATROPIUM BROMIDE AND ALBUTEROL SULFATE 3 ML: 2.5; .5 SOLUTION RESPIRATORY (INHALATION) at 07:57

## 2022-10-30 RX ADMIN — SODIUM CHLORIDE, PRESERVATIVE FREE: 5 INJECTION INTRAVENOUS at 00:28

## 2022-10-30 RX ADMIN — IPRATROPIUM BROMIDE AND ALBUTEROL SULFATE 3 ML: 2.5; .5 SOLUTION RESPIRATORY (INHALATION) at 04:26

## 2022-10-30 RX ADMIN — QUETIAPINE FUMARATE 25 MG: 25 TABLET ORAL at 01:03

## 2022-10-30 RX ADMIN — IPRATROPIUM BROMIDE AND ALBUTEROL SULFATE 3 ML: 2.5; .5 SOLUTION RESPIRATORY (INHALATION) at 11:35

## 2022-10-30 RX ADMIN — MEROPENEM 1 G: 1 INJECTION, POWDER, FOR SOLUTION INTRAVENOUS at 17:56

## 2022-10-30 RX ADMIN — MEROPENEM 1 G: 1 INJECTION, POWDER, FOR SOLUTION INTRAVENOUS at 10:53

## 2022-10-30 RX ADMIN — MEROPENEM 1 G: 1 INJECTION, POWDER, FOR SOLUTION INTRAVENOUS at 01:14

## 2022-10-30 RX ADMIN — IPRATROPIUM BROMIDE AND ALBUTEROL SULFATE 3 ML: 2.5; .5 SOLUTION RESPIRATORY (INHALATION) at 00:15

## 2022-10-30 RX ADMIN — IPRATROPIUM BROMIDE AND ALBUTEROL SULFATE 3 ML: 2.5; .5 SOLUTION RESPIRATORY (INHALATION) at 19:31

## 2022-10-30 RX ADMIN — HEPARIN SODIUM 5000 UNITS: 5000 INJECTION, SOLUTION INTRAVENOUS; SUBCUTANEOUS at 12:14

## 2022-10-30 RX ADMIN — METOPROLOL TARTRATE 25 MG: 25 TABLET, FILM COATED ORAL at 08:24

## 2022-10-30 RX ADMIN — QUETIAPINE FUMARATE 25 MG: 25 TABLET ORAL at 19:17

## 2022-10-30 RX ADMIN — METOPROLOL TARTRATE 25 MG: 25 TABLET, FILM COATED ORAL at 19:17

## 2022-10-30 RX ADMIN — CHLORHEXIDINE GLUCONATE 15 ML: 1.2 SOLUTION ORAL at 08:24

## 2022-10-30 RX ADMIN — AMLODIPINE BESYLATE 5 MG: 5 TABLET ORAL at 12:13

## 2022-10-30 RX ADMIN — HEPARIN SODIUM 5000 UNITS: 5000 INJECTION, SOLUTION INTRAVENOUS; SUBCUTANEOUS at 23:15

## 2022-10-30 RX ADMIN — BACLOFEN 10 MG: 10 TABLET ORAL at 13:36

## 2022-10-30 RX ADMIN — QUETIAPINE FUMARATE 25 MG: 25 TABLET ORAL at 23:22

## 2022-10-30 RX ADMIN — ASPIRIN 81 MG 81 MG: 81 TABLET ORAL at 10:53

## 2022-10-30 RX ADMIN — SODIUM BICARBONATE 40 MG: 84 INJECTION, SOLUTION INTRAVENOUS at 08:24

## 2022-10-30 RX ADMIN — SIMVASTATIN 40 MG: 40 TABLET, FILM COATED ORAL at 23:22

## 2022-10-30 RX ADMIN — IPRATROPIUM BROMIDE AND ALBUTEROL SULFATE 3 ML: 2.5; .5 SOLUTION RESPIRATORY (INHALATION) at 17:17

## 2022-10-30 RX ADMIN — BACLOFEN 10 MG: 10 TABLET ORAL at 08:24

## 2022-10-30 RX ADMIN — VANCOMYCIN HYDROCHLORIDE 1250 MG: 5 INJECTION, POWDER, LYOPHILIZED, FOR SOLUTION INTRAVENOUS at 23:08

## 2022-10-30 ASSESSMENT — ACTIVITIES OF DAILY LIVING (ADL)
ADLS_ACUITY_SCORE: 55

## 2022-10-30 NOTE — PROGRESS NOTES
Pt on room air. BS coarse. O2 sats in the mid 90s. Pt's getting Duoneb  Q4h. Vest therapy was done for about 10 min and pt was desaturated to 88%. RT will continue to follow.    Gregorio Chiang, RT

## 2022-10-30 NOTE — PROGRESS NOTES
[facility]    Infectious Disease Progress Note    Date of Service : 10/30/2022     Assessment & Plan   Liu GIL Malave is a 57 year old male who was admitted on 10/28/2022.     ASSESSMENT:  1. Aspiration pneumonia, sepsis  2. CT chest bilateral airspace disease right middle lobe both lower lobes  3. History of CVA, nonverbal, hemiplegia  4. Chronic encephalopathy  5. Dysphagia  6. Previous blood cultures with Staphylococcus pettenkoferi in September 2022  7. Draining a G-tube site which has been replaced  8. History of MRSA and ESBL producing organisms per previous reports      RECOMMENDATIONS:    1. Antibiotics-meropenem and vancomycin  2. Follow culture results   3. Focus/de-escalate antibiotics based on final culture results  4. Monitor CBC, CMP  5. Discussed with patient's nurse at bedside  6. ID will follow      Yaima Landrum MD  [facility]  [phone]      Interval History   Nonverbal, nursing at bedside and assisting with turning    Physical Exam   Temp: 99.6  F (37.6  C) Temp src: Axillary BP: (!) 150/81 Pulse: 93   Resp: 22 SpO2: 94 % O2 Device: None (Room air)    Vitals:    10/28/22 1950 10/29/22 1624 10/30/22 0410   Weight: 59 kg (130 lb) 59.6 kg (131 lb 6.3 oz) 61.5 kg (135 lb 9.3 oz)     Vital Signs with Ranges  Temp:  [98.4  F (36.9  C)-99.8  F (37.7  C)] 99.6  F (37.6  C)  Pulse:  [] 93  Resp:  [16-22] 22  BP: (122-150)/(69-85) 150/81  SpO2:  [93 %-96 %] 94 %    Constitutional: Awake, no apparent distress  Lungs: On room air  Cardiovascular: Heart rate within normal limits  Abdomen: non distended, G-tube  Skin: No new rashes  Neuro: Hemiplegia  Psych: Nonverbal    Medications       amLODIPine  5 mg Oral Daily     aspirin  81 mg Per Feeding Tube Daily     Baclofen  10 mg Per G Tube TID     chlorhexidine  15 mL Swish & Spit Daily     heparin ANTICOAGULANT  5,000 Units Subcutaneous Q12H     ipratropium - albuterol 0.5 mg/2.5 mg/3 mL  1 vial  Nebulization Q4H     meropenem  1 g Intravenous Q8H     metoprolol tartrate  25 mg Oral or Feeding Tube BID     pantoprazole  40 mg Oral or Feeding Tube QAM AC     QUEtiapine  25 mg Per G Tube At Bedtime     simvastatin  40 mg Oral or Feeding Tube At Bedtime     sodium chloride (PF)  3 mL Intracatheter Q8H     vancomycin  1,250 mg Intravenous Q24H       Data   All microbiology laboratory data reviewed.  Recent Labs   Lab Test 10/30/22  0657 10/29/22  0733 10/28/22  2023   WBC 14.1* 19.8* 12.7*   HGB 10.1* 10.5* 13.1*   HCT 32.6* 33.1* 40.3    99 96    224 291     Recent Labs   Lab Test 10/29/22  0733 10/28/22  2023 09/30/22  0923   CR 1.32* 1.29 1.33*     Recent Labs   Lab Test 05/30/22  1519   SED 74*     Micro:   10/29/2022 1754 10/30/2022 0007 Respiratory Panel PCR [[id number]]    Swab from Nasopharyngeal    Final result Component Value   Adenovirus Not Detected   Coronavirus Not Detected   This test detects Coronavirus 229E, HKU1, NL63 and OC43 but does not distinguish between them. It does not detect MERS ( Respiratory Syndrome), SARS (Severe Acute Respiratory Syndrome) or 2019-nCoV (Novel 2019) Coronavirus.   Human Metapneumovirus Not Detected   Human Rhin/Enterovirus Not Detected   Influenza A Not Detected   Influenza A, H1 Not Detected   Influenza A 2009 H1N1 Not Detected   Influenza A, H3 Not Detected   Influenza B Not Detected   Parainfluenza Virus 1 Not Detected   Parainfluenza Virus 2 Not Detected   Parainfluenza Virus 3 Not Detected   Parainfluenza Virus 4 Not Detected   Respiratory Syncytial Virus A Not Detected   Respiratory Syncytial Virus B Not Detected   Chlamydia Pneumoniae Not Detected   Mycoplasma Pneumoniae Not Detected          10/29/2022 1521 10/30/2022 0803 Blood Culture Peripheral Blood [[id number]]   Peripheral Blood    Preliminary result Component Value   Culture No growth after 12 hours P          10/29/2022 1521 10/30/2022 0803 Blood Culture Peripheral  Blood [[id number]]   Peripheral Blood    Preliminary result Component Value   Culture No growth after 12 hours P          10/28/2022 2200 10/28/2022 2256 Symptomatic; Unknown Influenza A/B & SARS-CoV2 (COVID-19) Virus PCR Multiplex Nasopharyngeal [[id number]]    Swab from Nasopharyngeal    Final result Component Value   Influenza A PCR Negative   Influenza B PCR Negative   RSV PCR Negative   SARS CoV2 PCR Negative   NEGATIVE: SARS-CoV-2 (COVID-19) RNA not detected, presumed negative.          10/28/2022 2035 10/30/2022 0031 Blood Culture Peripheral Blood [[id number]]   Peripheral Blood    Preliminary result Component Value   Culture No growth after 1 day P          10/28/2022 2023 10/30/2022 0031 Blood Culture Peripheral Blood [[id number]]   Peripheral Blood    Preliminary result Component Value   Culture No growth after 1 day P          09/19/2022 0010 09/23/2022 0837 Blood Culture Peripheral Blood [[id number]]    (Abnormal)   Peripheral Blood    Final result Component Value   Culture Positive on the 2nd day of incubation Abnormal     Staphylococcus pettenkoferi Panic     1 of 2 bottles   Identification obtained by MALDI-TOF mass spectrometry research use only database. Test characteristics determined and verified by the [facility].          09/19/2022 0010 09/20/2022 1820 Verigene GP Panel [[id number]]    (Abnormal)   Peripheral Blood    Final result Component Value   Staphylococcus species Detected Abnormal    Positive for coagulase-negative Staphylococci, other than Staphylococcus epidermidis and Staphylococcus lugdunensis, by Verigene multiplex nucleic acid test. Coagulase-negative Staphylococci are the most common venipuncture or collection associated skin contaminants grown in blood cultures. Final identification and antimicrobial susceptibility testing will be verified by standard methods.   Staphylococcus aureus Not Detected   Staphylococcus epidermidis Not  Detected   Staphylococcus lugdunensis Not Detected   Enterococcus faecalis Not Detected   Enterococcus faecium Not Detected   Streptococcus species Not Detected   Streptococcus agalactiae Not Detected   Streptococcus anginosus group Not Detected   Streptococcus pneumoniae Not Detected   Streptococcus pyogenes Not Detected   Listeria species Not Detected          09/18/2022 2323 09/19/2022 0011 Symptomatic; Unknown Influenza A/B & SARS-CoV2 (COVID-19) Virus PCR Multiplex Nasopharyngeal [[id number]]    Swab from Nasopharyngeal    Final result Component Value   Influenza A PCR Negative   Influenza B PCR Negative   RSV PCR Negative   SARS CoV2 PCR Negative   NEGATIVE: SARS-CoV-2 (COVID-19) RNA not detected, presumed negative.          09/18/2022 2256 09/24/2022 1005 Blood Culture Arm, Right [[id number]]   Blood from Arm, Right    Final result Component Value   Culture No Growth          05/30/2022 1519 06/04/2022 1933 Blood Culture Peripheral Blood [[id number]]   Peripheral Blood    Final result Component Value   Culture No Growth          05/30/2022 1458 05/30/2022 1546 Asymptomatic COVID-19 Virus (Coronavirus) by PCR Nasopharyngeal [[id number]]    Swab from Nasopharyngeal    Final result Component Value   SARS CoV2 PCR Negative   NEGATIVE: SARS-CoV-2 (COVID-19) RNA not detected, presumed negative.          05/01/2022 0942 05/10/2022 1017 COVID-19 VIRUS (CORONAVIRUS) BY PCR (EXTERNAL RESULT) Final result Component Value   No component results          01/28/2022 0743 02/02/2022 1032 Blood Culture Peripheral Blood [[id number]]   Peripheral Blood    Final result Component Value   Culture No Growth          01/28/2022 0743 02/02/2022 1032 Blood Culture Peripheral Blood [[id number]]   Peripheral Blood    Final result Component Value   Culture No Growth          01/28/2022 0350 01/30/2022 0814 Urine Culture [[id number]]   Urine, Straight Catheter    Final result Component Value   Culture No Growth           01/28/2022 0339 01/28/2022 0415 Symptomatic; Unknown Influenza A/B & SARS-CoV2 (COVID-19) Virus PCR Multiplex Nasopharyngeal [[id number]]    Swab from Nasopharyngeal    Final result Component Value   Influenza A PCR Negative   Influenza B PCR Negative   SARS CoV2 PCR Negative   NEGATIVE: SARS-CoV-2 (COVID-19) RNA not detected, presumed negative.          01/11/2022 1518 01/13/2022 1330 COVID-19 Virus (Coronavirus) by PCR (External Result)  Edited Result - FINAL Component Value   COVID-19 Virus by PCR (External Result) not detected          01/11/2022 1518 08/19/2022 1351 COVID-19 VIRUS (CORONAVIRUS) BY PCR (EXTERNAL RESULT) Final result Component Value   No component results          Radiology:  Reviewed  Result Date: 10/28/2022    CT Chest/Abdomen/Pelvis w Contrast    Result Date: 10/28/2022  EXAM: CT CHEST/ABDOMEN/PELVIS W CONTRAST LOCATION: [facility] DATE/TIME: 10/28/2022 9:28 PM INDICATION: fever, hypoxia, bloody drainage from Gtube COMPARISON: 5/30/2022 TECHNIQUE: CT scan of the chest, abdomen, and pelvis was performed following injection of IV contrast. Multiplanar reformats were obtained. Dose reduction techniques were used. CONTRAST: Isovue 370 100ml FINDINGS: LUNGS AND PLEURA: Chronic scarring and calcified nodules at upper lobes unchanged. However there is increased opacities at both lung bases and posterior aspect of right middle lobe suggesting pneumonia. Secretions are seen filling posterior bronchus to right lower lobe. Motion artifact. MEDIASTINUM/AXILLAE: No lymphadenopathy. Normal-sized heart. CORONARY ARTERY CALCIFICATION: Moderate. HEPATOBILIARY: Normal. PANCREAS: Normal. SPLEEN: Normal. ADRENAL GLANDS: Normal. KIDNEYS/BLADDER: Motion artifact. Stable benign renal cysts need no further workup. BOWEL: Gastrojejunostomy tube is in good position. There is no significant inflammatory reaction involving the tube track, no abscess. Stomach is collapsed and empty. No  inflammatory process involving bowel. Appendix normal. LYMPH NODES: Normal. VASCULATURE: Moderate atherosclerotic plaque. PELVIC ORGANS: Normal. MUSCULOSKELETAL: Normal.     IMPRESSION: 1.  New patchy bilateral airspace disease right middle lobe and both lower lobes consistent with pneumonia. 2.  Gastrojejunostomy tube appears in good position with no complications evident. Bowel unremarkable.    IR Gastro Jejunostomy Tube Change    Result Date: 10/27/2022  [facility] LOCATION: [facility] DATE: 10/27/2022 PROCEDURE: PERCUTANEOUS GASTROJEJUNOSTOMY EXCHANGE INTERVENTIONAL RADIOLOGIST: Dale Bro MD INDICATION: Routine gastrojejunostomy tube exchange. CONTRAST: 20 mL Omnipaque intraenteric. ANTIBIOTICS: None. ADDITIONAL MEDICATIONS: None. FLUOROSCOPIC TIME: 0.4 minutes. RADIATION DOSE: Air Kerma: 2 mGy. COMPLICATIONS: No immediate complications. UNIVERSAL PROTOCOL: The operative site was marked and any prior imaging was reviewed. Required items including blood products, implants, devices and special equipment was made available. Patient identity was confirmed either verbally, with demographic information, hospital assigned identification or other identification markers. A timeout was performed immediately prior to the procedure. STERILE BARRIER TECHNIQUE: Maximum sterile barrier technique was used. Cutaneous antisepsis was performed at the operative site with application of 2% chlorhexidine and large sterile drape. Prior to the procedure, the  and assistant performed hand hygiene and wore hat, mask, sterile gown, and sterile gloves during the entire procedure. PROCEDURE/TECHNIQUE: The indwelling 18 Qatari gastrojejunostomy tube's gastric and jejunal ports were injected and images obtained. The tube was then exchanged over a wire for a new 18 Qatari, 45cm length gastrojejunostomy tube which was positioned with distal tip in the proximal jejunum. The retention balloon was  inflated.  A post placement injection of both the gastric and jejunal ports was performed. FINDINGS: The initial injection shows the gastric and jejunal lumens to be patent and in appropriate position. After exchange, the new gastrojejunostomy is in appropriate position with the gastric port within the stomach and distal jejunal port near jejunal origin.     IMPRESSION:  Gastrojejunostomy tube change as discussed above. CPT codes for physician reference only: 76644

## 2022-10-30 NOTE — PLAN OF CARE
Problem: Infection  Goal: Absence of Infection Signs and Symptoms  Outcome: Progressing  Intervention: Prevent or Manage Infection  Recent Flowsheet Documentation  Taken 10/29/2022 2000 by Steven Ojeda RN  Isolation Precautions: contact precautions maintained  Taken 10/29/2022 1617 by Steven Ojeda RN  Isolation Precautions: contact precautions maintained     Problem: Enteral Nutrition  Goal: Feeding Tolerance  Outcome: Progressing     Problem: Plan of Care - These are the overarching goals to be used throughout the patient stay.    Goal: Absence of Hospital-Acquired Illness or Injury  Intervention: Identify and Manage Fall Risk  Recent Flowsheet Documentation  Taken 10/29/2022 2000 by Steven Ojeda RN  Safety Promotion/Fall Prevention:   activity supervised   bed alarm on   clutter free environment maintained   fall prevention program maintained   increased rounding and observation   safety round/check completed  Taken 10/29/2022 1617 by Steven Ojeda RN  Safety Promotion/Fall Prevention:   activity supervised   bed alarm on   clutter free environment maintained   fall prevention program maintained   increased rounding and observation   safety round/check completed   Goal Outcome Evaluation:       The patient is nonverbal and only understand Hungarian. He has a visitor exception, and family is staying in the room with him. The patient has a diagnosis of pneumonia and has a very congest, but non-productive cough. He is receiving IV antibiotics. He has been vitally stable, but did have a low-grade temp - 99.8 axillary.   The patient has a JG tube and gets tube feeding at home. He was started on continuous tube feeding during the evening shift. Of note, the hospital did not have Osmolite 1.5, so he was started on Jevity 1.5 at 2115 tonight at 15 mL/hr, per the orders. He is on telemetry, and was normal sinus, and sinus tachy.

## 2022-10-30 NOTE — PLAN OF CARE
Goal Outcome Evaluation:         Problem: Enteral Nutrition  Goal: Absence of Aspiration Signs and Symptoms  Outcome: Progressing  Intervention: Minimize Aspiration Risk  Recent Flowsheet Documentation  Taken 10/30/2022 1800 by Gail Almendarez  Oral Care:   swabbed with antiseptic solution   suction provided  Head of Bed (HOB) Positioning: HOB at 30-45 degrees  Taken 10/30/2022 1610 by Gail Almendarez  Oral Care:   swabbed with antiseptic solution   suction provided  Head of Bed (HOB) Positioning: HOB at 45 degrees  Taken 10/30/2022 1400 by Gail Almendarez  Oral Care:   swabbed with antiseptic solution   suction provided  Head of Bed (HOB) Positioning: HOB at 45 degrees  Taken 10/30/2022 1230 by Gail Almendarez  Oral Care:   swabbed with antiseptic solution   lip/mouth moisturizer applied   suction provided  Head of Bed (HOB) Positioning: HOB at 60 degrees  Taken 10/30/2022 1100 by Gail Almendarez  Oral Care: swabbed with antiseptic solution  Head of Bed (HOB) Positioning: HOB at 60 degrees  Taken 10/30/2022 0900 by Gail Almendarez  Oral Care:   teeth brushed   swabbed with antiseptic solution   suction provided   lip/mouth moisturizer applied  Head of Bed (HOB) Positioning: HOB at 60 degrees         Pt had two vest treatments today. Lung sounds are improving with moderate secretions. Pt had two bowel movements after suppository was given. Tube feed was increased to 25 ml/hr. Pt remains calm with minimal signs of pain when not disturbed. Skin remains dry and intact. Turning every two hours and changing as needed for incontinent bowel/bladder.

## 2022-10-30 NOTE — PLAN OF CARE
Problem: Plan of Care - These are the overarching goals to be used throughout the patient stay.    Goal: Readiness for Transition of Care  Outcome: Not Progressing     Problem: Enteral Nutrition  Goal: Feeding Tolerance  Outcome: Not Progressing     VSS. Tele NSR. Non-verbal. Tube feeding running at 15mL/hr. BS present, abdomen firm and distended so TF slowed from 25mL/hr. GJ tube dressing CDI. NS running at 100mL/hr. Continues on IV vanco and meropenem. NPO. Wife at bedside overnight. Repositioned q2h.

## 2022-10-30 NOTE — PROGRESS NOTES
Pt's abdomen firm. BS audible and normoactive. Pt nonverbal so unable to ask if pain in abdomen or bloating present. Tube feeding slowed to 15mL/hr. No free flush given at 0400.

## 2022-10-30 NOTE — PROGRESS NOTES
[facility] MEDICINE PROGRESS NOTE      Identification/Summary: Liu Malave is a 57 year old male with a past medical history of hemorrhagic stroke x2, basal ganglia hemorrhage at age 51 years resulted significant brain damage, chronic static encephalopathy, hemiplegia, dysphagia and nonverbal getting nutrition through feeding tube, seizure disorder, MRSA and ESBL UTI, recurrent aspiration pneumonia, came with fever and shortness of breath secondary to aspiration pneumonia.  Started on IV meropenem and vancomycin.  Symptoms are improving.  Evaluated by infectious disease.    Assessment and Plan:  Aspiration pneumonia  Sepsis  History of aspiration pneumonia in 9/2022  Chest CT-New patchy bilateral airspace disease right middle lobe and both lower lobes consistent with pneumonia.  Significant aspiration risk getting nutrition through tube feeding  IV meropenem and vancomycin  History of MRSA  Previous blood culture with Staphylococcus pettenkoferi in September 2022  Appreciate ID consult    History of hemorrhagic stroke x2  Chronic static encephalopathy  Hemiplegia  Nonverbal  Dysphagia  Resume statin.  Started on aspirin 81 mg daily   Baclofen 10 mg 3 times a day    History of COPD without exacerbation  Resume bronchodilator therapy    History of MRSA and ESBL UTI      Essential hypertension  Metoprolol 25 mg twice a day    Code full  DVT prophylaxis  Subcu heparin 5000 unit twice a day  Barrier to discharge  Awaiting for more clinical improvement.  Anticipated discharge in 1 day    Palmira Montemayor MD  [facility]  [facility]  Phone: #[phone]    Interval History/Subjective:  Resting comfortably.  Patient is nonverbal.  Wife is present.  Spoke with nursing staff.  Discussed with daughter over the phone.  No new concern.  Afebrile.    Physical Exam/Objective:  Temp:  [98.4  F (36.9  C)-99.8  F (37.7  C)] 99  F (37.2  C)  Pulse:  []  97  Resp:  [16-22] 22  BP: (119-147)/(69-85) 142/77  SpO2:  [93 %-96 %] 94 %  Body mass index is 24.8 kg/m .     GENERAL:  Alert, appears comfortable, in no acute distress, appears stated age, nonverbal   HEAD:  Normocephalic, without obvious abnormality, atraumatic   EYES:  PERRL, conjunctiva  clear,  EOM's intact   NOSE: Nares normal,  mucosa normal, no drainage   THROAT: Lips, mucosa,   gums normal, mouth moist   NECK: Supple, symmetrical, trachea midline   BACK:   Symmetric, no curvature, ROM normal   LUNGS:   Coarse breath sounds bilaterally, no rales, rhonchi, or wheezing, symmetric chest rise on inhalation, respirations unlabored   CHEST WALL:  No tenderness or deformity   HEART:  Regular rate and rhythm, S1 and S2 normal, no murmur    ABDOMEN:   Soft, non-tender, bowel sounds active , no masses, no organomegaly, no rebound or guarding, g tube in place   EXTREMITIES: 2+ BLE edema , stasis deformity    SKIN: No rashes   NEURO: Alert,nonverbal, hemiplegic   PSYCH: Cooperative, behavior is appropriate      Data reviewed today: I personally reviewed all new medications, labs, imaging/diagnostics reports over the past 24 hours. Pertinent findings include:    Imaging:   No results found for this or any previous visit (from the past 24 hour(s)).     Chest and abdomen CT  1.  New patchy bilateral airspace disease right middle lobe and both lower lobes consistent with pneumonia.  2.  Gastrojejunostomy tube appears in good position with no complications evident. Bowel unremarkable.    Labs:  Most Recent 3 CBC's:Recent Labs   Lab Test 10/30/22  0657 10/29/22  0733 10/28/22  2023   WBC 14.1* 19.8* 12.7*   HGB 10.1* 10.5* 13.1*    99 96    224 291     Most Recent 3 BMP's:Recent Labs   Lab Test 10/30/22  0657 10/29/22  2307 10/29/22  0733 10/28/22  2023 09/30/22  0923   NA  --   --  141 139 141   POTASSIUM  --  4.3 4.7 4.6 4.5   CHLORIDE  --   --  117* 109* 106   CO2  --   --  19* 19* 22   BUN  --   --  34*  41* 35.0*   CR  --   --  1.32* 1.29 1.33*   ANIONGAP  --   --  5 11 13   SAMIR  --   --  7.7* 9.0 8.8     --  93 98 109*       Medications:   Personally Reviewed.  Medications       aspirin  81 mg Per Feeding Tube Daily     Baclofen  10 mg Per G Tube TID     chlorhexidine  15 mL Swish & Spit Daily     ipratropium - albuterol 0.5 mg/2.5 mg/3 mL  1 vial Nebulization Q4H     meropenem  1 g Intravenous Q8H     metoprolol tartrate  25 mg Oral or Feeding Tube BID     pantoprazole  40 mg Oral or Feeding Tube QAM AC     QUEtiapine  25 mg Per G Tube At Bedtime     simvastatin  40 mg Oral or Feeding Tube At Bedtime     sodium chloride (PF)  3 mL Intracatheter Q8H     vancomycin  1,250 mg Intravenous Q24H

## 2022-10-31 VITALS
BODY MASS INDEX: 24.95 KG/M2 | DIASTOLIC BLOOD PRESSURE: 82 MMHG | TEMPERATURE: 97.9 F | SYSTOLIC BLOOD PRESSURE: 115 MMHG | HEART RATE: 96 BPM | RESPIRATION RATE: 16 BRPM | WEIGHT: 135.58 LBS | HEIGHT: 62 IN | OXYGEN SATURATION: 98 %

## 2022-10-31 LAB
ANION GAP SERPL CALCULATED.3IONS-SCNC: 7 MMOL/L (ref 5–18)
BASOPHILS # BLD AUTO: 0.1 10E3/UL (ref 0–0.2)
BASOPHILS NFR BLD AUTO: 1 %
BUN SERPL-MCNC: 14 MG/DL (ref 8–22)
CALCIUM SERPL-MCNC: 8.5 MG/DL (ref 8.5–10.5)
CHLORIDE BLD-SCNC: 109 MMOL/L (ref 98–107)
CO2 SERPL-SCNC: 18 MMOL/L (ref 22–31)
CREAT SERPL-MCNC: 1.07 MG/DL (ref 0.7–1.3)
EOSINOPHIL # BLD AUTO: 0.4 10E3/UL (ref 0–0.7)
EOSINOPHIL NFR BLD AUTO: 4 %
ERYTHROCYTE [DISTWIDTH] IN BLOOD BY AUTOMATED COUNT: 12.5 % (ref 10–15)
GFR SERPL CREATININE-BSD FRML MDRD: 80 ML/MIN/1.73M2
GLUCOSE BLD-MCNC: 88 MG/DL (ref 70–125)
GLUCOSE BLDC GLUCOMTR-MCNC: 102 MG/DL (ref 70–99)
HCT VFR BLD AUTO: 34.5 % (ref 40–53)
HGB BLD-MCNC: 10.8 G/DL (ref 13.3–17.7)
HOLD SPECIMEN: 0
IMM GRANULOCYTES # BLD: 0.1 10E3/UL
IMM GRANULOCYTES NFR BLD: 1 %
LYMPHOCYTES # BLD AUTO: 2.5 10E3/UL (ref 0.8–5.3)
LYMPHOCYTES NFR BLD AUTO: 23 %
MAGNESIUM SERPL-MCNC: 2.1 MG/DL (ref 1.8–2.6)
MCH RBC QN AUTO: 30.9 PG (ref 26.5–33)
MCHC RBC AUTO-ENTMCNC: 31.3 G/DL (ref 31.5–36.5)
MCV RBC AUTO: 99 FL (ref 78–100)
MONOCYTES # BLD AUTO: 0.8 10E3/UL (ref 0–1.3)
MONOCYTES NFR BLD AUTO: 7 %
NEUTROPHILS # BLD AUTO: 7.2 10E3/UL (ref 1.6–8.3)
NEUTROPHILS NFR BLD AUTO: 64 %
NRBC # BLD AUTO: 0 10E3/UL
NRBC BLD AUTO-RTO: 0 /100
PHOSPHATE SERPL-MCNC: 2.4 MG/DL (ref 2.5–4.5)
PLATELET # BLD AUTO: 241 10E3/UL (ref 150–450)
POTASSIUM BLD-SCNC: 4 MMOL/L (ref 3.5–5)
POTASSIUM BLD-SCNC: 4.1 MMOL/L (ref 3.5–5)
RBC # BLD AUTO: 3.49 10E6/UL (ref 4.4–5.9)
SODIUM SERPL-SCNC: 134 MMOL/L (ref 136–145)
WBC # BLD AUTO: 11 10E3/UL (ref 4–11)

## 2022-10-31 PROCEDURE — 36415 COLL VENOUS BLD VENIPUNCTURE: CPT | Performed by: INTERNAL MEDICINE

## 2022-10-31 PROCEDURE — 250N000013 HC RX MED GY IP 250 OP 250 PS 637: Performed by: HOSPITALIST

## 2022-10-31 PROCEDURE — 250N000013 HC RX MED GY IP 250 OP 250 PS 637: Performed by: FAMILY MEDICINE

## 2022-10-31 PROCEDURE — 94640 AIRWAY INHALATION TREATMENT: CPT | Mod: 76

## 2022-10-31 PROCEDURE — 250N000011 HC RX IP 250 OP 636: Performed by: FAMILY MEDICINE

## 2022-10-31 PROCEDURE — 999N000157 HC STATISTIC RCP TIME EA 10 MIN

## 2022-10-31 PROCEDURE — 250N000009 HC RX 250: Performed by: HOSPITALIST

## 2022-10-31 PROCEDURE — 85014 HEMATOCRIT: CPT | Performed by: INTERNAL MEDICINE

## 2022-10-31 PROCEDURE — 250N000011 HC RX IP 250 OP 636: Performed by: HOSPITALIST

## 2022-10-31 PROCEDURE — 99233 SBSQ HOSP IP/OBS HIGH 50: CPT | Performed by: INTERNAL MEDICINE

## 2022-10-31 PROCEDURE — 272N000098

## 2022-10-31 PROCEDURE — 80048 BASIC METABOLIC PNL TOTAL CA: CPT | Performed by: FAMILY MEDICINE

## 2022-10-31 PROCEDURE — 94640 AIRWAY INHALATION TREATMENT: CPT

## 2022-10-31 PROCEDURE — 99239 HOSP IP/OBS DSCHRG MGMT >30: CPT | Performed by: FAMILY MEDICINE

## 2022-10-31 RX ORDER — FLUCONAZOLE 40 MG/ML
200 POWDER, FOR SUSPENSION ORAL ONCE
Status: COMPLETED | OUTPATIENT
Start: 2022-10-31 | End: 2022-10-31

## 2022-10-31 RX ORDER — ASPIRIN 81 MG/1
81 TABLET, CHEWABLE ORAL DAILY
Qty: 90 TABLET | Refills: 0 | Status: SHIPPED | OUTPATIENT
Start: 2022-11-01 | End: 2022-11-29

## 2022-10-31 RX ORDER — CEFDINIR 125 MG/5ML
300 POWDER, FOR SUSPENSION ORAL 2 TIMES DAILY
Status: DISCONTINUED | OUTPATIENT
Start: 2022-10-31 | End: 2022-10-31 | Stop reason: HOSPADM

## 2022-10-31 RX ORDER — CEFDINIR 125 MG/5ML
300 POWDER, FOR SUSPENSION ORAL 2 TIMES DAILY
Qty: 120 ML | Refills: 0 | Status: SHIPPED | OUTPATIENT
Start: 2022-10-31 | End: 2022-11-05

## 2022-10-31 RX ORDER — NITROFURANTOIN 25; 75 MG/1; MG/1
CAPSULE ORAL
Qty: 90 CAPSULE | Refills: 3 | COMMUNITY
Start: 2021-11-06 | End: 2022-11-11

## 2022-10-31 RX ORDER — METRONIDAZOLE 500 MG/1
500 TABLET ORAL 3 TIMES DAILY
Qty: 15 TABLET | Refills: 0 | Status: SHIPPED | OUTPATIENT
Start: 2022-10-31 | End: 2022-10-31

## 2022-10-31 RX ADMIN — BACLOFEN 10 MG: 10 TABLET ORAL at 13:34

## 2022-10-31 RX ADMIN — IPRATROPIUM BROMIDE AND ALBUTEROL SULFATE 3 ML: 2.5; .5 SOLUTION RESPIRATORY (INHALATION) at 00:06

## 2022-10-31 RX ADMIN — HEPARIN SODIUM 5000 UNITS: 5000 INJECTION, SOLUTION INTRAVENOUS; SUBCUTANEOUS at 10:38

## 2022-10-31 RX ADMIN — IPRATROPIUM BROMIDE AND ALBUTEROL SULFATE 3 ML: 2.5; .5 SOLUTION RESPIRATORY (INHALATION) at 08:30

## 2022-10-31 RX ADMIN — SODIUM BICARBONATE 40 MG: 84 INJECTION, SOLUTION INTRAVENOUS at 08:31

## 2022-10-31 RX ADMIN — IPRATROPIUM BROMIDE AND ALBUTEROL SULFATE 3 ML: 2.5; .5 SOLUTION RESPIRATORY (INHALATION) at 04:43

## 2022-10-31 RX ADMIN — FLUCONAZOLE 200 MG: 40 POWDER, FOR SUSPENSION ORAL at 10:39

## 2022-10-31 RX ADMIN — MEROPENEM 1 G: 1 INJECTION, POWDER, FOR SOLUTION INTRAVENOUS at 10:39

## 2022-10-31 RX ADMIN — IPRATROPIUM BROMIDE AND ALBUTEROL SULFATE 3 ML: 2.5; .5 SOLUTION RESPIRATORY (INHALATION) at 12:16

## 2022-10-31 RX ADMIN — METOPROLOL TARTRATE 25 MG: 25 TABLET, FILM COATED ORAL at 08:31

## 2022-10-31 RX ADMIN — ASPIRIN 81 MG 81 MG: 81 TABLET ORAL at 08:30

## 2022-10-31 RX ADMIN — MEROPENEM 1 G: 1 INJECTION, POWDER, FOR SOLUTION INTRAVENOUS at 02:05

## 2022-10-31 RX ADMIN — AMLODIPINE BESYLATE 5 MG: 5 TABLET ORAL at 08:31

## 2022-10-31 RX ADMIN — BACLOFEN 10 MG: 10 TABLET ORAL at 08:31

## 2022-10-31 ASSESSMENT — ACTIVITIES OF DAILY LIVING (ADL)
ADLS_ACUITY_SCORE: 57
ADLS_ACUITY_SCORE: 55
ADLS_ACUITY_SCORE: 57

## 2022-10-31 NOTE — PROGRESS NOTES
Pt nonverbal. Wife at bedside overnight. Both non-English speaking. Tele NSR & sinus tach. LS coarse. Tube feeding running at 35mL/hr, free water flush q4h. Repositioned q2h. Continues on meropenem and vanco abx.

## 2022-10-31 NOTE — PROGRESS NOTES
Care Management Discharge Note    Discharge Date: 10/31/2022       Discharge Disposition: Home Care, Home Infusion (Confirmation of services being provided still pending. Confirmation request faxed by writer.)    Discharge Services: Other (see comment) (Continued home services.)    Discharge DME: None    Discharge Transportation: family or friend will provide    Private pay costs discussed: Not applicable    PAS Confirmation Code:    Patient/family educated on Medicare website which has current facility and service quality ratings: no    Education Provided on the Discharge Plan:    Persons Notified of Discharge Plans: per team  Patient/Family in Agreement with the Plan: yes    Handoff Referral Completed: Yes    Additional Information:  CM been following along for discharge dispo. Patient will return home with family, and support from family. Patients family provides 21 hours a day of PCA. His family will provide a ride at discharge.         Estelle Zarate RN

## 2022-10-31 NOTE — PLAN OF CARE
Problem: Plan of Care - These are the overarching goals to be used throughout the patient stay.    Goal: Absence of Hospital-Acquired Illness or Injury  Intervention: Identify and Manage Fall Risk  Recent Flowsheet Documentation  Taken 10/30/2022 2300 by Nicolle Rey RN  Safety Promotion/Fall Prevention:   supervised activity   bed alarm on   fall prevention program maintained   clutter free environment maintained   lighting adjusted   safety round/check completed   Goal Outcome Evaluation:       Pt nonverbal. Family at bedside; daughter translates, wife does not speak english and uses ipad with daughters to translate. Pt cooperative. Pt on tele, Q4VS. Pt had a large bowel movement, loose.

## 2022-10-31 NOTE — PROGRESS NOTES
[facility]    Infectious Disease Progress Note    Date of Service : 10/31/2022     Assessment & Plan   Liu GIL Malave is a 57 year old male who was admitted on 10/28/2022.     ASSESSMENT:  1. Suspected Aspiration pneumonia ( high procaclitnin, leukocytosis) ,vs viral pneumonia vs both;  based on hypoxia/tachy/fever noted by daughter;; with multiple family members with RSV infection  2. Pt RSV/COVID/Flu negative;   3. CT chest bilateral airspace disease right middle lobe both lower lobes  4. History of CVA, nonverbal, hemiplegia  5. Chronic encephalopathy  6. Dysphagia  7. Previous blood cultures with Staphylococcus pettenkoferi in September 2022  8. Draining a G-tube site which has been replaced  9. History of MRSA and ESBL producing organisms per previous reports; I cant see the corresponding micro      RECOMMENDATIONS:    1. Antibiotics-meropenem and vancomycin  2. Order staph screen  3. Discontinue iv abx  4. Oral omnicef and metro x 5 days (PEG)  Discussed  Nursing and staph    Azul Aldana M.D.        Interval History   Nonverbal, nursing at bedside and assisting with turning    Physical Exam   Temp: 99.5  F (37.5  C) Temp src: Axillary BP: 133/78 Pulse: 84   Resp: 16 SpO2: 95 % O2 Device: None (Room air)    Vitals:    10/28/22 1950 10/29/22 1624 10/30/22 0410   Weight: 59 kg (130 lb) 59.6 kg (131 lb 6.3 oz) 61.5 kg (135 lb 9.3 oz)     Vital Signs with Ranges  Temp:  [97.3  F (36.3  C)-99.6  F (37.6  C)] 99.5  F (37.5  C)  Pulse:  [77-93] 84  Resp:  [12-22] 16  BP: (132-166)/(73-93) 133/78  SpO2:  [94 %-96 %] 95 %    Gen. appearance nontoxic not on oxygen  Eyes no conjunctivitis or icterus  Neck no stiffness or neck vein distention  Heart  no S3   Lungs clear no wheeze  Abdomen soft not tender  peg  Extremities no synovitis, trace edema  Skin  no rash or emboli  Neurologic alert  no focal deficits      Medications       amLODIPine  5 mg Oral Daily     aspirin  81 mg Per Feeding Tube  Daily     Baclofen  10 mg Per G Tube TID     chlorhexidine  15 mL Swish & Spit Daily     heparin ANTICOAGULANT  5,000 Units Subcutaneous Q12H     ipratropium - albuterol 0.5 mg/2.5 mg/3 mL  1 vial Nebulization Q4H     meropenem  1 g Intravenous Q8H     metoprolol tartrate  25 mg Oral or Feeding Tube BID     pantoprazole  40 mg Oral or Feeding Tube QAM AC     QUEtiapine  25 mg Per G Tube At Bedtime     simvastatin  40 mg Oral or Feeding Tube At Bedtime     sodium chloride (PF)  3 mL Intracatheter Q8H     vancomycin  1,250 mg Intravenous Q24H       Data   All microbiology laboratory data reviewed.  Recent Labs   Lab Test 10/30/22  0657 10/29/22  0733 10/28/22  2023   WBC 14.1* 19.8* 12.7*   HGB 10.1* 10.5* 13.1*   HCT 32.6* 33.1* 40.3    99 96    224 291     Recent Labs   Lab Test 10/29/22  0733 10/28/22  2023 09/30/22  0923   CR 1.32* 1.29 1.33*     Recent Labs   Lab Test 05/30/22  1519   SED 74*     Micro:   10/29/2022 1754 10/30/2022 0007 Respiratory Panel PCR [[id number]]    Swab from Nasopharyngeal    Final result Component Value   Adenovirus Not Detected   Coronavirus Not Detected   This test detects Coronavirus 229E, HKU1, NL63 and OC43 but does not distinguish between them. It does not detect MERS ( Respiratory Syndrome), SARS (Severe Acute Respiratory Syndrome) or 2019-nCoV (Novel 2019) Coronavirus.   Human Metapneumovirus Not Detected   Human Rhin/Enterovirus Not Detected   Influenza A Not Detected   Influenza A, H1 Not Detected   Influenza A 2009 H1N1 Not Detected   Influenza A, H3 Not Detected   Influenza B Not Detected   Parainfluenza Virus 1 Not Detected   Parainfluenza Virus 2 Not Detected   Parainfluenza Virus 3 Not Detected   Parainfluenza Virus 4 Not Detected   Respiratory Syncytial Virus A Not Detected   Respiratory Syncytial Virus B Not Detected   Chlamydia Pneumoniae Not Detected   Mycoplasma Pneumoniae Not Detected          10/29/2022 1521 10/30/2022 0803 Blood  Culture Peripheral Blood [[id number]]   Peripheral Blood    Preliminary result Component Value   Culture No growth after 12 hours P          10/29/2022 1521 10/30/2022 0803 Blood Culture Peripheral Blood [[id number]]   Peripheral Blood    Preliminary result Component Value   Culture No growth after 12 hours P          10/28/2022 2200 10/28/2022 2256 Symptomatic; Unknown Influenza A/B & SARS-CoV2 (COVID-19) Virus PCR Multiplex Nasopharyngeal [[id number]]    Swab from Nasopharyngeal    Final result Component Value   Influenza A PCR Negative   Influenza B PCR Negative   RSV PCR Negative   SARS CoV2 PCR Negative   NEGATIVE: SARS-CoV-2 (COVID-19) RNA not detected, presumed negative.          10/28/2022 2035 10/30/2022 0031 Blood Culture Peripheral Blood [[id number]]   Peripheral Blood    Preliminary result Component Value   Culture No growth after 1 day P          10/28/2022 2023 10/30/2022 0031 Blood Culture Peripheral Blood [[id number]]   Peripheral Blood    Preliminary result Component Value   Culture No growth after 1 day P          09/19/2022 0010 09/23/2022 0837 Blood Culture Peripheral Blood [[id number]]    (Abnormal)   Peripheral Blood    Final result Component Value   Culture Positive on the 2nd day of incubation Abnormal     Staphylococcus pettenkoferi Panic     1 of 2 bottles   Identification obtained by MALDI-TOF mass spectrometry research use only database. Test characteristics determined and verified by the [facility].          09/19/2022 0010 09/20/2022 1820 Verigene GP Panel [[id number]]    (Abnormal)   Peripheral Blood    Final result Component Value   Staphylococcus species Detected Abnormal    Positive for coagulase-negative Staphylococci, other than Staphylococcus epidermidis and Staphylococcus lugdunensis, by Verigene multiplex nucleic acid test. Coagulase-negative Staphylococci are the most common venipuncture or collection associated skin contaminants  grown in blood cultures. Final identification and antimicrobial susceptibility testing will be verified by standard methods.   Staphylococcus aureus Not Detected   Staphylococcus epidermidis Not Detected   Staphylococcus lugdunensis Not Detected   Enterococcus faecalis Not Detected   Enterococcus faecium Not Detected   Streptococcus species Not Detected   Streptococcus agalactiae Not Detected   Streptococcus anginosus group Not Detected   Streptococcus pneumoniae Not Detected   Streptococcus pyogenes Not Detected   Listeria species Not Detected          09/18/2022 2323 09/19/2022 0011 Symptomatic; Unknown Influenza A/B & SARS-CoV2 (COVID-19) Virus PCR Multiplex Nasopharyngeal [[id number]]    Swab from Nasopharyngeal    Final result Component Value   Influenza A PCR Negative   Influenza B PCR Negative   RSV PCR Negative   SARS CoV2 PCR Negative   NEGATIVE: SARS-CoV-2 (COVID-19) RNA not detected, presumed negative.          09/18/2022 2256 09/24/2022 1005 Blood Culture Arm, Right [[id number]]   Blood from Arm, Right    Final result Component Value   Culture No Growth          05/30/2022 1519 06/04/2022 1933 Blood Culture Peripheral Blood [[id number]]   Peripheral Blood    Final result Component Value   Culture No Growth          05/30/2022 1458 05/30/2022 1546 Asymptomatic COVID-19 Virus (Coronavirus) by PCR Nasopharyngeal [[id number]]    Swab from Nasopharyngeal    Final result Component Value   SARS CoV2 PCR Negative   NEGATIVE: SARS-CoV-2 (COVID-19) RNA not detected, presumed negative.          05/01/2022 0942 05/10/2022 1017 COVID-19 VIRUS (CORONAVIRUS) BY PCR (EXTERNAL RESULT) Final result Component Value   No component results          01/28/2022 0743 02/02/2022 1032 Blood Culture Peripheral Blood [[id number]]   Peripheral Blood    Final result Component Value   Culture No Growth          01/28/2022 0743 02/02/2022 1032 Blood Culture Peripheral Blood [[id number]]   Peripheral Blood    Final result  Component Value   Culture No Growth          01/28/2022 0350 01/30/2022 0814 Urine Culture [[id number]]   Urine, Straight Catheter    Final result Component Value   Culture No Growth          01/28/2022 0339 01/28/2022 0415 Symptomatic; Unknown Influenza A/B & SARS-CoV2 (COVID-19) Virus PCR Multiplex Nasopharyngeal [[id number]]    Swab from Nasopharyngeal    Final result Component Value   Influenza A PCR Negative   Influenza B PCR Negative   SARS CoV2 PCR Negative   NEGATIVE: SARS-CoV-2 (COVID-19) RNA not detected, presumed negative.          01/11/2022 1518 01/13/2022 1330 COVID-19 Virus (Coronavirus) by PCR (External Result)  Edited Result - FINAL Component Value   COVID-19 Virus by PCR (External Result) not detected          01/11/2022 1518 08/19/2022 1351 COVID-19 VIRUS (CORONAVIRUS) BY PCR (EXTERNAL RESULT) Final result Component Value   No component results          Radiology:  Reviewed  Result Date: 10/28/2022    CT Chest/Abdomen/Pelvis w Contrast    Result Date: 10/28/2022  EXAM: CT CHEST/ABDOMEN/PELVIS W CONTRAST LOCATION: [facility] DATE/TIME: 10/28/2022 9:28 PM INDICATION: fever, hypoxia, bloody drainage from Gtube COMPARISON: 5/30/2022 TECHNIQUE: CT scan of the chest, abdomen, and pelvis was performed following injection of IV contrast. Multiplanar reformats were obtained. Dose reduction techniques were used. CONTRAST: Isovue 370 100ml FINDINGS: LUNGS AND PLEURA: Chronic scarring and calcified nodules at upper lobes unchanged. However there is increased opacities at both lung bases and posterior aspect of right middle lobe suggesting pneumonia. Secretions are seen filling posterior bronchus to right lower lobe. Motion artifact. MEDIASTINUM/AXILLAE: No lymphadenopathy. Normal-sized heart. CORONARY ARTERY CALCIFICATION: Moderate. HEPATOBILIARY: Normal. PANCREAS: Normal. SPLEEN: Normal. ADRENAL GLANDS: Normal. KIDNEYS/BLADDER: Motion artifact. Stable benign renal cysts need no  further workup. BOWEL: Gastrojejunostomy tube is in good position. There is no significant inflammatory reaction involving the tube track, no abscess. Stomach is collapsed and empty. No inflammatory process involving bowel. Appendix normal. LYMPH NODES: Normal. VASCULATURE: Moderate atherosclerotic plaque. PELVIC ORGANS: Normal. MUSCULOSKELETAL: Normal.     IMPRESSION: 1.  New patchy bilateral airspace disease right middle lobe and both lower lobes consistent with pneumonia. 2.  Gastrojejunostomy tube appears in good position with no complications evident. Bowel unremarkable.    IR Gastro Jejunostomy Tube Change    Result Date: 10/27/2022  [facility] LOCATION: [facility] DATE: 10/27/2022 PROCEDURE: PERCUTANEOUS GASTROJEJUNOSTOMY EXCHANGE INTERVENTIONAL RADIOLOGIST: Dale Bro MD INDICATION: Routine gastrojejunostomy tube exchange. CONTRAST: 20 mL Omnipaque intraenteric. ANTIBIOTICS: None. ADDITIONAL MEDICATIONS: None. FLUOROSCOPIC TIME: 0.4 minutes. RADIATION DOSE: Air Kerma: 2 mGy. COMPLICATIONS: No immediate complications. UNIVERSAL PROTOCOL: The operative site was marked and any prior imaging was reviewed. Required items including blood products, implants, devices and special equipment was made available. Patient identity was confirmed either verbally, with demographic information, hospital assigned identification or other identification markers. A timeout was performed immediately prior to the procedure. STERILE BARRIER TECHNIQUE: Maximum sterile barrier technique was used. Cutaneous antisepsis was performed at the operative site with application of 2% chlorhexidine and large sterile drape. Prior to the procedure, the  and assistant performed hand hygiene and wore hat, mask, sterile gown, and sterile gloves during the entire procedure. PROCEDURE/TECHNIQUE: The indwelling 18 Lithuanian gastrojejunostomy tube's gastric and jejunal ports were injected and images obtained. The  tube was then exchanged over a wire for a new 18 [place]an, 45cm length gastrojejunostomy tube which was positioned with distal tip in the proximal jejunum. The retention balloon was inflated.  A post placement injection of both the gastric and jejunal ports was performed. FINDINGS: The initial injection shows the gastric and jejunal lumens to be patent and in appropriate position. After exchange, the new gastrojejunostomy is in appropriate position with the gastric port within the stomach and distal jejunal port near jejunal origin.     IMPRESSION:  Gastrojejunostomy tube change as discussed above. CPT codes for physician reference only: 64176

## 2022-10-31 NOTE — PROGRESS NOTES
Pt on room air, O2 sats in the high 90's. BS coarse and expiratory wheezing. Pt tolerated chest physiotherapy well. Pt received Duoneb q4h. RT will continue to follow.    Gregorio Chiang, RT

## 2022-10-31 NOTE — PROGRESS NOTES
[facility] MEDICINE PROGRESS NOTE      Identification/Summary: Liu Malave is a 57 year old male with a past medical history of hemorrhagic stroke x2, basal ganglia hemorrhage at age 51 years resulted significant brain damage, chronic static encephalopathy, hemiplegia, dysphagia and nonverbal getting nutrition through feeding tube, seizure disorder, MRSA and ESBL UTI, recurrent aspiration pneumonia, came with fever and shortness of breath secondary to aspiration pneumonia. Started on IV meropenem and vancomycin.  Symptoms are improving. Evaluated by infectious disease.  Vest therapy.    Assessment and Plan:  Acute on recurrent aspiration pneumonia  Sepsis, resolved  History of aspiration pneumonia 9/2022  Chest CT-New patchy bilateral airspace disease right middle lobe and both lower lobes consistent with pneumonia.  Significant aspiration risk getting nutrition through tube feeding  IV meropenem and vancomycin  Blood and sputum culture have no growth  History of MRSA and ESBL producing organisms per previous reports  Previous blood culture with Staphylococcus pettenkoferi in September 2022  Appreciate ID consult  Resume vest therapy at home    History of hemorrhagic stroke x2  Chronic static encephalopathy  Hemiplegia  Nonverbal  Dysphagia  Resume statin.  Started on aspirin 81 mg daily   Baclofen 10 mg 3 times a day  Nutrition per tube feeding    Oral candidiasis, Diflucan 200 mg once    History of COPD without exacerbation  Resume bronchodilator therapy    Essential hypertension  Metoprolol 25 mg twice a day    Code full  DVT prophylaxis  Subcu heparin 5000 unit twice a day  Barrier to discharge  Awaiting for more clinical improvement.  Anticipated discharge in 1 day    Palmira Montemayor MD  Hospitalist  [facility] Medicine  [facility]  Phone: #[phone]    Interval History/Subjective:  Resting comfortably.  Patient is nonverbal.  Patient is unable to cough  spontaneously.  No new concern.  Afebrile.    Physical Exam/Objective:  Temp:  [97.3  F (36.3  C)-99.6  F (37.6  C)] 99.5  F (37.5  C)  Pulse:  [77-93] 84  Resp:  [12-22] 16  BP: (132-166)/(73-93) 133/78  SpO2:  [94 %-96 %] 95 %  Body mass index is 24.8 kg/m .     GENERAL:  Alert, appears comfortable, in no acute distress, appears stated age, nonverbal   HEAD:  Normocephalic, without obvious abnormality, atraumatic   EYES:  PERRL, conjunctiva  clear,  EOM's intact   NOSE: Nares normal,  mucosa normal, no drainage   THROAT: Lips, mucosa,   gums normal, mouth moist   NECK: Supple, symmetrical, trachea midline   BACK:   Symmetric, no curvature, ROM normal   LUNGS:   Coarse breath sounds bilaterally, no   wheezing, symmetric chest rise on inhalation, respirations unlabored   CHEST WALL:  No tenderness or deformity   HEART:  Regular rate and rhythm, S1 and S2 normal, no murmur    ABDOMEN:   Soft, non-tender, bowel sounds active , no masses, no organomegaly, no rebound or guarding, g tube in place   EXTREMITIES: 2+ BLE edema , stasis deformity    SKIN: No rashes   NEURO: Alert,nonverbal, hemiplegic   PSYCH: Cooperative, behavior is appropriate      Data reviewed today: I personally reviewed all new medications, labs, imaging/diagnostics reports over the past 24 hours. Pertinent findings include:    Imaging:   No results found for this or any previous visit (from the past 24 hour(s)).     Chest and abdomen CT  1.  New patchy bilateral airspace disease right middle lobe and both lower lobes consistent with pneumonia.  2.  Gastrojejunostomy tube appears in good position with no complications evident. Bowel unremarkable.    Labs:  Most Recent 3 CBC's:  Recent Labs   Lab Test 10/30/22  0657 10/29/22  0733 10/28/22  2023   WBC 14.1* 19.8* 12.7*   HGB 10.1* 10.5* 13.1*    99 96    224 291     Most Recent 3 BMP's:  Recent Labs   Lab Test 10/31/22  0840 10/30/22  2354 10/30/22  0657 10/29/22  2307 10/29/22  0733  10/28/22  2023 09/30/22  0923   NA  --   --   --   --  141 139 141   POTASSIUM  --  4.0  --  4.3 4.7 4.6 4.5   CHLORIDE  --   --   --   --  117* 109* 106   CO2  --   --   --   --  19* 19* 22   BUN  --   --   --   --  34* 41* 35.0*   CR  --   --   --   --  1.32* 1.29 1.33*   ANIONGAP  --   --   --   --  5 11 13   SAMIR  --   --   --   --  7.7* 9.0 8.8   *  --  101  --  93 98 109*       Medications:   Personally Reviewed.  Medications       amLODIPine  5 mg Oral Daily     aspirin  81 mg Per Feeding Tube Daily     Baclofen  10 mg Per G Tube TID     chlorhexidine  15 mL Swish & Spit Daily     heparin ANTICOAGULANT  5,000 Units Subcutaneous Q12H     ipratropium - albuterol 0.5 mg/2.5 mg/3 mL  1 vial Nebulization Q4H     meropenem  1 g Intravenous Q8H     metoprolol tartrate  25 mg Oral or Feeding Tube BID     pantoprazole  40 mg Oral or Feeding Tube QAM AC     QUEtiapine  25 mg Per G Tube At Bedtime     simvastatin  40 mg Oral or Feeding Tube At Bedtime     sodium chloride (PF)  3 mL Intracatheter Q8H     vancomycin  1,250 mg Intravenous Q24H

## 2022-10-31 NOTE — DISCHARGE SUMMARY
[facility] MEDICINE  DISCHARGE SUMMARY     Primary Care Physician: Lynda Gutierrez  Admission Date: 10/28/2022   Discharge Provider: Palmira Monteamyor MD Discharge Date: 10/31/2022   Diet:   Active Diet and Nourishment Order   Procedures     Adult Formula Drip Feeding: Continuous Jevity 1.5; Gastrostomy; Goal Rate: 45; mL/hr; Initiate TF at 15mL/hr and advance by 10mL q 6 hrs as tolerated to goal rate; Do not advance tube feeding rate unless K+ is = or > 3.0, Mg++ is = or > 1.5, and P...     NPO for Medical/Clinical Reasons Except for: NPO but receiving Tube Feeding       Code Status: Full Code   Activity: DCACTIVITY: Activity as tolerated        Condition at Discharge: Stable     REASON FOR PRESENTATION(See Admission Note for Details)   Short of breath    PRINCIPAL & ACTIVE DISCHARGE DIAGNOSES     Acute on recurrent aspiration pneumonia  Sepsis, resolved  History of hemorrhagic stroke x2  Chronic encephalopathy  Hemiplegia, spasticity  Nonverbal  Dysphagia, getting nutrition through tube feeding  History of COPD without exacerbation  Essential hypertension    PENDING LABS     Unresulted Labs Ordered in the Past 30 Days of this Admission     Date and Time Order Name Status Description    10/29/2022  2:05 PM Blood Culture Peripheral Blood Preliminary     10/29/2022  2:05 PM Blood Culture Peripheral Blood Preliminary     10/28/2022  7:55 PM Blood Culture Peripheral Blood Preliminary     10/28/2022  7:55 PM Blood Culture Peripheral Blood Preliminary            PROCEDURES ( this hospitalization only)    None      RECOMMENDATIONS TO OUTPATIENT PROVIDER FOR F/U VISIT     Follow-up Appointments     Follow-up and recommended labs and tests       Follow-up with primary MD in 1 week  Follow-up with pulmonology in 2-week  Resume vest therapy  Resume feeding per G-tube              DISPOSITION     Home    SUMMARY OF HOSPITAL COURSE:      Mr.Phu GIL Malave is a 57 year old male with a past  medical history of hemorrhagic stroke x2 ,basal ganglia hemorrhage at age 51 years resulted significant brain damage, chronic encephalopathy,  hemiplegia, dysphagia and nonverbal getting nutrition through feeding tube, seizure disorder, MRSA, ESBL UTI, recurrent aspiration pneumonia, came with fever and shortness of breath secondary to aspiration pneumonia.   Aspiration pneumonia on 9/2022, blood culture grew Staphylococcus pettenkoferi in 9/ 2022. Chest CT-New patchy bilateral airspace disease right middle lobe and both lower lobes consistent with pneumonia.  Treated with IV meropenem and vancomycin in the hospital.  Blood and respiratory culture have no growth.  Evaluated by infectious disease.  Discharging home with Omnicef and metronidazole to complete 10-day course. Significant aspiration risk getting nutrition through tube feeding.  He has difficulty coughing out sputum.  Will start vest therapy at home. Discharging home with family support.      Discharge Medications with Med changes:     Current Discharge Medication List      START taking these medications    Details   aspirin (ASA) 81 MG chewable tablet 1 tablet (81 mg) by Per Feeding Tube route daily  Qty: 90 tablet, Refills: 0    Associated Diagnoses: History of stroke      cefdinir (OMNICEF) 125 MG/5ML suspension Take 12 mLs (300 mg) by mouth 2 times daily for 5 days  Qty: 120 mL, Refills: 0    Associated Diagnoses: Pneumonia of both lungs due to infectious organism, unspecified part of lung      metroNIDAZOLE (FLAGYL) 50 mg/mL SUSP Take 10 mLs (500 mg) by mouth 3 times daily for 5 days  Qty: 150 mL, Refills: 0    Associated Diagnoses: Pneumonia of both lungs due to infectious organism, unspecified part of lung         CONTINUE these medications which have CHANGED    Details   nitroFURantoin macrocrystal-monohydrate (MACROBID) 100 MG capsule Take 1 capsule by G-tube route at bedtime. To be started in 6 day  Qty: 90 capsule, Refills: 3    Associated  Diagnoses: Recurrent UTI         CONTINUE these medications which have NOT CHANGED    Details   acetaminophen (TYLENOL) 500 MG tablet 1,000 mg by Gastric Tube route every 6 hours as needed for mild pain       amLODIPine (NORVASC) 5 MG tablet Take 5 mg by mouth daily      baclofen (LIORESAL) 10 MG tablet Take 1 tablet (10 mg) by mouth 3 times daily as needed for muscle spasms  Qty: 270 tablet, Refills: 3    Associated Diagnoses: Muscle spasm      bisacodyl (DULCOLAX) 10 MG suppository INSERT 1 SUPPOSTIORY INTO THE RECTUM DAILY AS NEEDED FOR CONSTIPATION.  Qty: 30 suppository, Refills: 8    Associated Diagnoses: Slow transit constipation      chlorhexidine (PERIDEX) 0.12 % solution Swish and spit 15 mLs in mouth daily       dextromethorphan (DELSYM) 30 MG/5ML liquid Take 10 mLs (60 mg) by mouth 2 times daily as needed for cough  Qty: 148 mL, Refills: 0    Associated Diagnoses: Cough      esomeprazole (NEXIUM) 20 MG DR capsule Take 1 capsule via G-tube twice daily  Qty: 180 capsule, Refills: 3    Associated Diagnoses: GERD without esophagitis      ferrous sulfate 220 (44 Fe) MG/5ML ELIX 5 mLs by Enteral route daily  Qty: 150 mL, Refills: 11    Associated Diagnoses: Iron deficiency      guaiFENesin (ROBITUSSIN) 100 MG/5ML liquid Give 5 ml via G tube every 6 hours as needed for cough  Qty: 500 mL, Refills: 3    Associated Diagnoses: Cough      ipratropium - albuterol 0.5 mg/2.5 mg/3 mL (DUONEB) 0.5-2.5 (3) MG/3ML neb solution Take 1 vial (3 mLs) by nebulization every 4 hours  Qty: 90 mL, Refills: 4    Associated Diagnoses: Cough      Lidocaine (LIDOCARE) 4 % Patch Place 1 patch onto the skin every 24 hours To prevent lidocaine toxicity, patient should be patch free for 12 hrs daily.  Qty: 30 patch, Refills: 3    Associated Diagnoses: Multiple joint pain      meclizine (ANTIVERT) 25 MG tablet Take 1 tablet via G-tube every 6 hours as needed for vertigo  Qty: 30 tablet, Refills: 11    Associated Diagnoses: Vertigo       metoprolol tartrate (LOPRESSOR) 25 MG tablet TAKE ONE TABLET BY MOUTH TWICE DAILY   Qty: 60 tablet, Refills: 11    Associated Diagnoses: Essential hypertension      neomycin-bacitracin-polymyxin (NEOSPORIN) 5-400-5000 ointment Apply topically 4 times daily  Qty: 30 g, Refills: 3    Associated Diagnoses: Irritation around percutaneous endoscopic gastrostomy (PEG) tube site (H)      polyethylene glycol (MIRALAX) 17 GM/Dose powder Give 1 capful via G-tube daily as needed for constipation  Qty: 850 g, Refills: 3    Associated Diagnoses: Slow transit constipation      Polyethylene Glycol 400 (VISINE DRY EYE RELIEF) 1 % SOLN Apply 1 drop to eye every 6 hours as needed (dry eyes)  Qty: 15 mL, Refills: 11    Associated Diagnoses: Dry eyes      QUEtiapine (SEROQUEL) 25 MG tablet Give 1 tablet via G-tube in the morning and 1 tablet in the afternoon as needed for agitation.Give 2 tablets via G-tube at bedtime for sleep.  Qty: 102 tablet, Refills: 3    Associated Diagnoses: Agitation      simvastatin (ZOCOR) 40 MG tablet Take 1 tablet (40 mg) by mouth At Bedtime  Qty: 90 tablet, Refills: 3    Associated Diagnoses: Dyslipidemia                   Rationale for medication changes:      Omnicef and Metronidazole for 5-day        Consults   Infectious disease    Immunizations given this encounter     Most Recent Immunizations   Administered Date(s) Administered     COVID-19,PF,Pfizer (12+ Yrs) 04/01/2021     COVID-19,PF,Pfizer 12+ Yrs (2022 and After) 03/20/2022     Influenza Quad, Recombinant, pf(RIV4) (Flublok) 10/28/2021     Influenza Vaccine, 6+MO IM (QUADRIVALENT W/PRESERVATIVES) 11/06/2020     Pneumo Conj 13-V (2010&after) 10/28/2021     Pneumococcal 23 valent 10/25/2017     Tdap (Adacel,Boostrix) 04/20/2015           Anticoagulation Information      None      SIGNIFICANT IMAGING FINDINGS     Results for orders placed or performed during the hospital encounter of 10/28/22   CT Chest/Abdomen/Pelvis w Contrast    Impression     IMPRESSION:  1.  New patchy bilateral airspace disease right middle lobe and both lower lobes consistent with pneumonia.  2.  Gastrojejunostomy tube appears in good position with no complications evident. Bowel unremarkable.       SIGNIFICANT LABORATORY FINDINGS   WBC 19.8-->14  Blood culture have no growth  Respiratory culture have no growth    Discharge Orders        Home Care Referral      Medication Therapy Management Referral      Reason for your hospital stay    sob     Activity    Your activity upon discharge: activity as tolerated     Follow-up and recommended labs and tests     Follow-up with primary MD in 1 week  Follow-up with pulmonology in 2-week  Resume vest therapy  Resume feeding per G-tube       Examination   Physical Exam   Temp:  [97.3  F (36.3  C)-99.5  F (37.5  C)] 97.9  F (36.6  C)  Pulse:  [77-96] 96  Resp:  [12-22] 16  BP: (115-166)/(73-93) 115/82  SpO2:  [94 %-98 %] 98 %  Wt Readings from Last 1 Encounters:   10/30/22 61.5 kg (135 lb 9.3 oz)     GENERAL:  Alert, appears comfortable, in no acute distress, appears stated age, nonverbal   HEAD:  Normocephalic, without obvious abnormality, atraumatic   EYES:  PERRL, conjunctiva  clear,  EOM's intact   NOSE: Nares normal,  mucosa normal, no drainage   THROAT: Lips, mucosa,   gums normal, mouth moist   NECK: Supple, symmetrical, trachea midline   BACK:   Symmetric, no curvature, ROM normal   LUNGS:   Coarse breath sounds bilaterally, no   wheezing, symmetric chest rise on inhalation, respirations unlabored   CHEST WALL:  No tenderness or deformity   HEART:  Regular rate and rhythm, S1 and S2 normal, no murmur    ABDOMEN:   Soft, non-tender, bowel sounds active , no masses, no organomegaly, no rebound or guarding, g tube in place   EXTREMITIES: 2+ BLE edema , stasis deformity    SKIN: No rashes   NEURO: Alert,nonverbal, hemiplegic   PSYCH: Cooperative, behavior is appropriate            Please see EMR for more detailed significant labs, imaging,  consultant notes etc.    I, Palmira Montemayor MD, personally saw the patient today and spent greater than 30 minutes discharging this patient.    Palmira Montemayor MD  [facility]    CC:Lynda Gutierrez

## 2022-10-31 NOTE — PLAN OF CARE
Tube feedings going at a rate of 45ml/hr with 130ml free water flushes Q4. Pt turned and repositioned Q2 hours. Pt's mouth suctioned PRN.  Discharge orders in per MD. IV removed. Discharge instructions reviewed with family. Family verbalized understanding. Pt discharged off the unit via wheelchair to the front entrance where the family took the pt home in their personal vehicle. Safety precautions maintained.

## 2022-11-02 ENCOUNTER — PATIENT OUTREACH (OUTPATIENT)
Dept: CARE COORDINATION | Facility: CLINIC | Age: 57
End: 2022-11-02

## 2022-11-02 NOTE — PROGRESS NOTES
Clinic Care Coordination Contact  [facility]/Voicemail       Clinical Data: Care Coordinator Outreach  Outreach attempted x 2.  Left message on patient's voicemail with call back information and requested return call.    Plan: Care Coordinator will do no further outreaches at this time.    MARK Damon  Connected Care Resource Center  [facility]     *Connected Care Resource Team does NOT follow patient ongoing. Referrals are identified based on internal discharge reports and the outreach is to ensure patient has an understanding of their discharge instructions.

## 2022-11-03 ENCOUNTER — HOSPITAL ENCOUNTER (OUTPATIENT)
Dept: INTERVENTIONAL RADIOLOGY/VASCULAR | Facility: CLINIC | Age: 57
Discharge: HOME | End: 2022-11-03
Attending: NURSE PRACTITIONER | Admitting: RADIOLOGY
Payer: COMMERCIAL

## 2022-11-03 VITALS
OXYGEN SATURATION: 94 % | DIASTOLIC BLOOD PRESSURE: 90 MMHG | HEART RATE: 80 BPM | RESPIRATION RATE: 21 BRPM | SYSTOLIC BLOOD PRESSURE: 141 MMHG

## 2022-11-03 DIAGNOSIS — R63.30 FEEDING DIFFICULTIES: ICD-10-CM

## 2022-11-03 DIAGNOSIS — R63.30 FEEDING DIFFICULTIES: Primary | ICD-10-CM

## 2022-11-03 LAB
BACTERIA BLD CULT: NO GROWTH

## 2022-11-03 PROCEDURE — 49452 REPLACE G-J TUBE PERC: CPT

## 2022-11-03 PROCEDURE — C1769 GUIDE WIRE: HCPCS

## 2022-11-03 RX ORDER — LIDOCAINE HYDROCHLORIDE 10 MG/ML
1-30 INJECTION, SOLUTION EPIDURAL; INFILTRATION; INTRACAUDAL; PERINEURAL ONCE
Status: DISCONTINUED | OUTPATIENT
Start: 2022-11-03 | End: 2022-11-03 | Stop reason: CLARIF

## 2022-11-03 NOTE — IP AVS SNAPSHOT
[facility] Interventional Radiology  [address] [address]  Phone: [phone]  Fax: [phone]                                    After Visit Summary   11/3/2022    Liu Malave   MRN: [id number]           After Visit Summary Signature Page    I have received my discharge instructions, and my questions have been answered. I have discussed any challenges I see with this plan with the nurse or doctor.    ..........................................................................................................................................  Patient/Patient Representative Signature      ..........................................................................................................................................  Patient Representative Print Name and Relationship to Patient    ..................................................               ................................................  Date                                   Time    ..........................................................................................................................................  Reviewed by Signature/Title    ...................................................              ..............................................  Date                                               Time          22EPIC Rev 08/18

## 2022-11-03 NOTE — PROGRESS NOTES
Patient's GJ exchanged.  Discharge instructions reviewed with patient's daughter, AVS provided.  All questions answered.  Patient discharged to home with daughter.

## 2022-11-03 NOTE — DISCHARGE INSTRUCTIONS
Gastrojejunostomy (GJ) Tube Discharge Instructions:   You had a gastrojejunostomy (stomach-intestinal) also known as GJ tube exchanged on 11/3/2022. This tube is often used for nutritional support, medication administration and/or stomach venting.     Care instructions:  - If you received sedation for your procedure, do not drive or operate heavy machinery for the rest of the day.  - Avoid soaking in stagnant water (tub baths, Jacuzzis, pools, lake, or ocean).   - You may shower beginning the day after the tube was placed.   - Clean under the disc daily with soap and water. Pat dry under disc and apply new split gauze dressing under disc. Cleaning tube site daily will help prevent infection and skin irritation.  - A small amount of clear tan drainage from the tube exit site can be normal.  - Make sure the disc on the tube fits slightly snug against the skin so that the tube does not move in or out of the body easily.  - Flush your tube with 60cc of water twice a day (using a cath tip syringe) to prevent tube from clogging (or follow recommendations from your dietician if given).    Giving Feedings and Medications:  - Follow-up with your dietician, primary care provider, or oncologist for instructions on tube feedings and medication administration.    - ONLY use specific enteric feedings with your GJ tube (unless otherwise discussed with your dietitian).    - Flush tube at least twice daily with 60ml of water using cath tip syringe or follow dietician's instructions if given.  - Flush the tube before and after administrating medications and bolus feedings with 60cc of water.  - Note: Most jejunal (J port) feedings are given by a continuous method. A continuous feeding is given with a pump over a period of time, usually 12 to 24 hours based upon your specific situation.    Follow Up:  - Routine 3 month exchanges of feeding tube is recommended. Please contact [facility] at [phone]*** to arrange a GJ  exchange appointment.  - GJ tubes CANNOT be removed until 6 weeks after date of tube placement (Tube placed ***).  - T-fasteners/Buttons (suture) should be removed 7-10 days after tube placement. This may have been done while you were still and inpatient, or can be completed in your outpatient clinic or care facility. Please contact [facility] for T fastener questions or concerns at [phone]***.    Please seek medical evaluation for:  - Fever (greater than 101 F (38.3C)).  - Purulent (yellow/green/foul smelling) drainage from tube exit site.   - Significant or worsening abdominal pain.   - Skin that is hot to the touch or significantly reddened at the tube exit site.   - Bleeding at tube exit site.    Call [facility] at [phone]*** with questions or if you have any of the following symptoms:  - Tube falls out or felt to be out of position.  - Unable to flush tube.  - Significant leakage around tube site (tube feeding, medications or drainage).  - Significant bleeding at the tube exit site.  - Severe pain at tube exit site.

## 2022-11-28 NOTE — PROGRESS NOTES
Medication Therapy Management (MTM) Encounter    ASSESSMENT:                            Recurrent aspiration pneumonia: Patient has completed both antibiotics.  I will send a prescription for Robitussin-DM to see if it is covered by insurance.  Will reach out to Dr. Gutierrez regarding the suction.    CVA hx: Patient to continue aspirin, reviewed that he is on a PPI which should help reduce the risk of GI issues.  We will send over a new prescription for the chewable version.  Patient should be on max 20 mg of simvastatin due to concomitant amlodipine.  Would recommend rechecking his lipids in 2 to 3 months and if remains high, consider switching to an alternative statin.     Hypertension: Last blood pressure was elevated.  Reviewed importance of blood pressure controlled due to stroke history.  Would recommend taking amlodipine every day and not as needed.  See above regarding simvastatin.     Spasms/Pain:  Patient to continue current regimen.     Seizure ppx:  Okay to remain off levetiracetam.     Insomnia/Agitation: Patient continues to have issues with insomnia.  Reviewed trying 75 mg of quetiapine at bedtime to see if that helps.  Otherwise, can consider a trial of mirtazapine in the future, which they have at home already but have not tried.     GERD:  Continue at this time.     UTI prophylaxis: Continue at this time.    Anemia: Hemoglobin level is stable.  Patient to continue and we will closely monitor.  Reviewed difference between dark stools from iron versus GI bleed.    Vertigo: Okay to continue to use as needed    Constipation:  Continue current regimen.      PLAN:                            1.  I will reach out to Dr. Matt alford regarding the suction device  2.  Rx sent for Robitussin-DM  3.  Decrease simvastatin to 20 mg.  Repeat lipids in 2 to 3 months  4.  Increase quetiapine to 75 mg nightly   5.  Take amlodipine daily, not as needed    Follow-up: 2-week follow-up MyChart    SUBJECTIVE/OBJECTIVE:                           Liu Malave is a 57 year old male called for a transitions of care visit. He was discharged from [facility] on 10/31/2022 for pneumonia. I last spoke with patient in on 8/3/2020.   Patient is non-verbal. Spoke with daughter, Idalmis (does not require an ).     Reason for visit: Transitions of care  Medication Adherence/Access: no issues reported.  Wife and daughter manage her medications.  She had a lot of questions today.    Recurrent aspiration pneumonia: Patient presented with fever and shortness of breath secondary to aspiration pneumonia.  Chest CT showed both lower lobes consistent with pneumonia.  Treated with IV antibiotics and discharged home with Omnicef and metronidazole for 5 days --completed this.  Patient is a significant aspiration risk since he is getting nutrition through tube feeding.  He has difficulty coughing up sputum.  Reports that they were able to start the vest therapy, but they would like a new suction and wonder if they can get one from [facility].  Also has Robitussin as needed for cough.  She buys Robitussin-DM over-the-counter, which is more helpful and wonders about getting a prescription.  They use duo nebs as needed and are planning on contacting [facility] for a new nebulizer.    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 Getting 24 hour cares by family members.   Prescribed aspirin 81 mg daily and simvastatin 40 mg daily.  Wonders about the risk of aspirin on the stomach.   Admits that they were not taking simvastatin for a period of time and forgot to restart.  Lab Test 09/30/22  0923 10/28/21  0852   CHOL 229* 193   HDL 32* 37*   * 111   TRIG 333* 224*        Hypertension: Prescribed amlodipine 5 mg daily and metoprolol tartrate 25 mg two times a day. Checks BP at home and reports  120-130/80s mmHg.  Admits that they are only giving the amlodipine as needed if BP is high.  BP Readings from Last 3 Encounters:   11/03/22 (!) 141/90   10/31/22 115/82   09/19/22 (!) 140/92     Pulse Readings from Last 3 Encounters:   11/03/22 80   10/31/22 96   09/19/22 107        Spasms/Pain: Related to CVA. Taking baclofen 10 mg three times a day.  Also uses acetaminophen as needed and lidocaine 4% patch.  Reports helpful but he continues to have muscle spasms and contractions which they will then do massage.      Seizure ppx:  No longer taking levetiracetam. No recent seizures.       Insomnia/Agitation: Taking quetiapine 25-50 mg HS + PRN.  Reports that they are giving him 25 mg at bedtime, but then he needs another dose about 4 hours later, then he will wake up again.  They have tried giving him 50 mg nightly and that was no more helpful than 25 mg, he still needed an extra dose in the middle of the night.     GERD: Taking esomeprazole 20 mg two times a day.  Is unsure about any recent GI issues.     UTI prophylaxis: Taking nitrofurantoin 100 mg daily.    Anemia: Using ferrous sulfate to 20 mg per 5 ml - 5 ml daily.  Is having dark stools.  Hemoglobin   Date Value Ref Range Status   10/31/2022 10.8 (L) 13.3 - 17.7 g/dL Final     Vertigo: Has meclizine as needed for vertigo.  Reports that they will give it to him when he is feeling dizzy and spinning,  Only as needed.     Constipation: Using bisacodyl suppository every other day and MiraLAX as needed. Reports normal BMs currently.       Today's Vitals: There were no vitals taken for this visit.     Allergies/ADRs: Reviewed in chart  Past Medical History: Reviewed in chart  Tobacco: He reports that he quit smoking about 17 years ago. His smoking use included cigarettes. He has a 30.00 pack-year smoking history. He quit smokeless tobacco use about 15 years ago.  Alcohol: reviewed in chart      ----------------  Post Discharge Medication Reconciliation Status:  discharge medications reconciled and changed, per note/orders.    I spent 38 minutes with this patient today. All changes were made via collaborative practice agreement with Lynda Gutierrez MD. A copy of the visit note was provided to the patient's provider(s).    The patient was sent via [employer] a summary of these recommendations.     Madhuri Whitehead, Pharm.D., [facility]   Medication Therapy Management Pharmacist   [facility]     Telemedicine Visit Details  Type of service:  Telephone visit  Start Time: 3:07 PM  End Time: 3:45 PM  Originating Location (pt. Location): Home  Distant Location (provider location):  Off-site  Provider has received verbal consent for a visit from the patient? Yes     Medication Therapy Recommendations  Agitation    Current Medication: QUEtiapine (SEROQUEL) 25 MG tablet (Discontinued)   Rationale: Dose too low - Dosage too low - Effectiveness   Recommendation: Increase Dose   Status: Accepted per CPA         History of stroke    Current Medication: simvastatin (ZOCOR) 40 MG tablet (Discontinued)   Rationale: Medication interaction - Dosage too high - Safety   Recommendation: Decrease Dose   Status: Accepted per CPA         Hypertension, unspecified type    Current Medication: amLODIPine (NORVASC) 5 MG tablet   Rationale: Incorrect administration - Dosage too low - Effectiveness   Recommendation: Provide Education   Status: Patient Agreed - Adherence/Education

## 2022-11-29 ENCOUNTER — TELEPHONE (OUTPATIENT)
Dept: PHARMACY | Facility: CLINIC | Age: 57
End: 2022-11-29

## 2022-11-29 ENCOUNTER — VIRTUAL VISIT (OUTPATIENT)
Dept: PHARMACY | Facility: CLINIC | Age: 57
End: 2022-11-29
Attending: FAMILY MEDICINE
Payer: COMMERCIAL

## 2022-11-29 DIAGNOSIS — Z86.73 HISTORY OF STROKE: Primary | ICD-10-CM

## 2022-11-29 DIAGNOSIS — K21.9 GERD WITHOUT ESOPHAGITIS: ICD-10-CM

## 2022-11-29 DIAGNOSIS — R42 VERTIGO: ICD-10-CM

## 2022-11-29 DIAGNOSIS — Z87.440 HISTORY OF UTI: ICD-10-CM

## 2022-11-29 DIAGNOSIS — J18.9 PNEUMONIA OF BOTH LUNGS DUE TO INFECTIOUS ORGANISM, UNSPECIFIED PART OF LUNG: ICD-10-CM

## 2022-11-29 DIAGNOSIS — I10 HYPERTENSION, UNSPECIFIED TYPE: ICD-10-CM

## 2022-11-29 DIAGNOSIS — K59.01 SLOW TRANSIT CONSTIPATION: ICD-10-CM

## 2022-11-29 DIAGNOSIS — E78.5 DYSLIPIDEMIA: ICD-10-CM

## 2022-11-29 DIAGNOSIS — G40.909 SEIZURE DISORDER (H): ICD-10-CM

## 2022-11-29 DIAGNOSIS — R45.1 AGITATION: ICD-10-CM

## 2022-11-29 DIAGNOSIS — J69.0 RECURRENT ASPIRATION PNEUMONIA (H): Primary | ICD-10-CM

## 2022-11-29 DIAGNOSIS — E46 MALNUTRITION, UNSPECIFIED TYPE (H): ICD-10-CM

## 2022-11-29 PROCEDURE — 99607 MTMS BY PHARM ADDL 15 MIN: CPT | Performed by: PHARMACIST

## 2022-11-29 PROCEDURE — 99605 MTMS BY PHARM NP 15 MIN: CPT | Performed by: PHARMACIST

## 2022-11-29 RX ORDER — ASPIRIN 81 MG/1
81 TABLET, CHEWABLE ORAL DAILY
Qty: 90 TABLET | Refills: 0 | Status: SHIPPED | OUTPATIENT
Start: 2022-11-29 | End: 2023-06-21

## 2022-11-29 RX ORDER — POLYETHYLENE GLYCOL 3350 17 G/17G
POWDER, FOR SOLUTION ORAL
Qty: 850 G | Refills: 3 | Status: SHIPPED | OUTPATIENT
Start: 2022-11-29 | End: 2023-12-18

## 2022-11-29 RX ORDER — GUAIFENESIN/DEXTROMETHORPHAN 100-10MG/5
10 SYRUP ORAL EVERY 4 HOURS PRN
Qty: 236 ML | Refills: 2 | Status: SHIPPED | OUTPATIENT
Start: 2022-11-29 | End: 2023-04-07

## 2022-11-29 RX ORDER — SIMVASTATIN 20 MG
20 TABLET ORAL AT BEDTIME
Qty: 90 TABLET | Refills: 0 | Status: SHIPPED | OUTPATIENT
Start: 2022-11-29 | End: 2023-06-21

## 2022-11-29 RX ORDER — QUETIAPINE FUMARATE 25 MG/1
TABLET, FILM COATED ORAL
Qty: 102 TABLET | Refills: 3
Start: 2022-11-29 | End: 2023-03-03

## 2022-11-29 NOTE — TELEPHONE ENCOUNTER
Spoke with Idalmis today - they request a new rx for his suction device for secretions through [employer]. Thank you!

## 2022-12-11 ENCOUNTER — APPOINTMENT (OUTPATIENT)
Dept: CT IMAGING | Facility: CLINIC | Age: 57
End: 2022-12-11
Attending: STUDENT IN AN ORGANIZED HEALTH CARE EDUCATION/TRAINING PROGRAM
Payer: COMMERCIAL

## 2022-12-11 ENCOUNTER — HOSPITAL ENCOUNTER (INPATIENT)
Facility: CLINIC | Age: 57
LOS: 2 days | Discharge: HOME | End: 2022-12-13
Attending: STUDENT IN AN ORGANIZED HEALTH CARE EDUCATION/TRAINING PROGRAM | Admitting: FAMILY MEDICINE
Payer: COMMERCIAL

## 2022-12-11 DIAGNOSIS — J18.9 PNEUMONIA DUE TO INFECTIOUS ORGANISM, UNSPECIFIED LATERALITY, UNSPECIFIED PART OF LUNG: Primary | ICD-10-CM

## 2022-12-11 DIAGNOSIS — J18.9 PNEUMONIA OF BOTH LUNGS DUE TO INFECTIOUS ORGANISM, UNSPECIFIED PART OF LUNG: ICD-10-CM

## 2022-12-11 LAB
ALBUMIN SERPL-MCNC: 3.3 G/DL (ref 3.5–5)
ALP SERPL-CCNC: 63 U/L (ref 45–120)
ALT SERPL W P-5'-P-CCNC: 28 U/L (ref 0–45)
ANION GAP SERPL CALCULATED.3IONS-SCNC: 8 MMOL/L (ref 5–18)
AST SERPL W P-5'-P-CCNC: 26 U/L (ref 0–40)
BASOPHILS # BLD AUTO: 0 10E3/UL (ref 0–0.2)
BASOPHILS NFR BLD AUTO: 0 %
BILIRUB SERPL-MCNC: 0.3 MG/DL (ref 0–1)
BNP SERPL-MCNC: <10 PG/ML (ref 0–51)
BUN SERPL-MCNC: 39 MG/DL (ref 8–22)
CALCIUM SERPL-MCNC: 8.9 MG/DL (ref 8.5–10.5)
CHLORIDE BLD-SCNC: 107 MMOL/L (ref 98–107)
CO2 SERPL-SCNC: 24 MMOL/L (ref 22–31)
CREAT SERPL-MCNC: 1.28 MG/DL (ref 0.7–1.3)
EOSINOPHIL # BLD AUTO: 0.2 10E3/UL (ref 0–0.7)
EOSINOPHIL NFR BLD AUTO: 2 %
ERYTHROCYTE [DISTWIDTH] IN BLOOD BY AUTOMATED COUNT: 12.8 % (ref 10–15)
GFR SERPL CREATININE-BSD FRML MDRD: 64 ML/MIN/1.73M2
GLUCOSE BLD-MCNC: 108 MG/DL (ref 70–125)
HCT VFR BLD AUTO: 36.2 % (ref 40–53)
HGB BLD-MCNC: 11.6 G/DL (ref 13.3–17.7)
HOLD SPECIMEN: NORMAL
IMM GRANULOCYTES # BLD: 0.1 10E3/UL
IMM GRANULOCYTES NFR BLD: 1 %
LACTATE SERPL-SCNC: 2.1 MMOL/L (ref 0.7–2)
LYMPHOCYTES # BLD AUTO: 1.5 10E3/UL (ref 0.8–5.3)
LYMPHOCYTES NFR BLD AUTO: 13 %
MAGNESIUM SERPL-MCNC: 2.6 MG/DL (ref 1.8–2.6)
MCH RBC QN AUTO: 31.2 PG (ref 26.5–33)
MCHC RBC AUTO-ENTMCNC: 32 G/DL (ref 31.5–36.5)
MCV RBC AUTO: 97 FL (ref 78–100)
MONOCYTES # BLD AUTO: 0.5 10E3/UL (ref 0–1.3)
MONOCYTES NFR BLD AUTO: 4 %
NEUTROPHILS # BLD AUTO: 9.5 10E3/UL (ref 1.6–8.3)
NEUTROPHILS NFR BLD AUTO: 80 %
NRBC # BLD AUTO: 0 10E3/UL
NRBC BLD AUTO-RTO: 0 /100
PLATELET # BLD AUTO: 246 10E3/UL (ref 150–450)
POTASSIUM BLD-SCNC: 5.1 MMOL/L (ref 3.5–5)
PROT SERPL-MCNC: 8.7 G/DL (ref 6–8)
RBC # BLD AUTO: 3.72 10E6/UL (ref 4.4–5.9)
SODIUM SERPL-SCNC: 139 MMOL/L (ref 136–145)
TROPONIN I SERPL-MCNC: 0.02 NG/ML (ref 0–0.29)
WBC # BLD AUTO: 11.8 10E3/UL (ref 4–11)

## 2022-12-11 PROCEDURE — 99285 EMERGENCY DEPT VISIT HI MDM: CPT | Mod: 25

## 2022-12-11 PROCEDURE — 96365 THER/PROPH/DIAG IV INF INIT: CPT | Mod: 59

## 2022-12-11 PROCEDURE — 80053 COMPREHEN METABOLIC PANEL: CPT | Performed by: STUDENT IN AN ORGANIZED HEALTH CARE EDUCATION/TRAINING PROGRAM

## 2022-12-11 PROCEDURE — 258N000003 HC RX IP 258 OP 636: Performed by: STUDENT IN AN ORGANIZED HEALTH CARE EDUCATION/TRAINING PROGRAM

## 2022-12-11 PROCEDURE — 250N000011 HC RX IP 250 OP 636: Performed by: STUDENT IN AN ORGANIZED HEALTH CARE EDUCATION/TRAINING PROGRAM

## 2022-12-11 PROCEDURE — 85025 COMPLETE CBC W/AUTO DIFF WBC: CPT | Performed by: STUDENT IN AN ORGANIZED HEALTH CARE EDUCATION/TRAINING PROGRAM

## 2022-12-11 PROCEDURE — C9803 HOPD COVID-19 SPEC COLLECT: HCPCS

## 2022-12-11 PROCEDURE — 70450 CT HEAD/BRAIN W/O DYE: CPT

## 2022-12-11 PROCEDURE — 84484 ASSAY OF TROPONIN QUANT: CPT | Performed by: STUDENT IN AN ORGANIZED HEALTH CARE EDUCATION/TRAINING PROGRAM

## 2022-12-11 PROCEDURE — 36415 COLL VENOUS BLD VENIPUNCTURE: CPT | Performed by: EMERGENCY MEDICINE

## 2022-12-11 PROCEDURE — 83880 ASSAY OF NATRIURETIC PEPTIDE: CPT | Performed by: STUDENT IN AN ORGANIZED HEALTH CARE EDUCATION/TRAINING PROGRAM

## 2022-12-11 PROCEDURE — 83605 ASSAY OF LACTIC ACID: CPT | Performed by: EMERGENCY MEDICINE

## 2022-12-11 PROCEDURE — 96367 TX/PROPH/DG ADDL SEQ IV INF: CPT

## 2022-12-11 PROCEDURE — 250N000013 HC RX MED GY IP 250 OP 250 PS 637: Performed by: STUDENT IN AN ORGANIZED HEALTH CARE EDUCATION/TRAINING PROGRAM

## 2022-12-11 PROCEDURE — 71275 CT ANGIOGRAPHY CHEST: CPT

## 2022-12-11 PROCEDURE — 200N000001 HC R&B ICU

## 2022-12-11 PROCEDURE — 83735 ASSAY OF MAGNESIUM: CPT | Performed by: STUDENT IN AN ORGANIZED HEALTH CARE EDUCATION/TRAINING PROGRAM

## 2022-12-11 PROCEDURE — 87040 BLOOD CULTURE FOR BACTERIA: CPT | Performed by: EMERGENCY MEDICINE

## 2022-12-11 RX ORDER — IOPAMIDOL 755 MG/ML
90 INJECTION, SOLUTION INTRAVASCULAR ONCE
Status: COMPLETED | OUTPATIENT
Start: 2022-12-11 | End: 2022-12-11

## 2022-12-11 RX ORDER — MEROPENEM 1 G/1
1 INJECTION, POWDER, FOR SOLUTION INTRAVENOUS ONCE
Status: COMPLETED | OUTPATIENT
Start: 2022-12-11 | End: 2022-12-11

## 2022-12-11 RX ADMIN — IOPAMIDOL 90 ML: 755 INJECTION, SOLUTION INTRAVENOUS at 22:31

## 2022-12-11 RX ADMIN — ACETAMINOPHEN 650 MG: 325 SOLUTION ORAL at 23:03

## 2022-12-11 RX ADMIN — MEROPENEM 1 G: 1 INJECTION, POWDER, FOR SOLUTION INTRAVENOUS at 21:46

## 2022-12-11 RX ADMIN — SODIUM CHLORIDE, POTASSIUM CHLORIDE, SODIUM LACTATE AND CALCIUM CHLORIDE 2000 ML: 600; 310; 30; 20 INJECTION, SOLUTION INTRAVENOUS at 21:45

## 2022-12-11 RX ADMIN — VANCOMYCIN HYDROCHLORIDE 1000 MG: 5 INJECTION, POWDER, LYOPHILIZED, FOR SOLUTION INTRAVENOUS at 22:45

## 2022-12-11 ASSESSMENT — ACTIVITIES OF DAILY LIVING (ADL)
ADLS_ACUITY_SCORE: 35
ADLS_ACUITY_SCORE: 35

## 2022-12-12 LAB
ANION GAP SERPL CALCULATED.3IONS-SCNC: 5 MMOL/L (ref 5–18)
BASOPHILS # BLD AUTO: 0.1 10E3/UL (ref 0–0.2)
BASOPHILS NFR BLD AUTO: 0 %
BUN SERPL-MCNC: 29 MG/DL (ref 8–22)
CALCIUM SERPL-MCNC: 8.2 MG/DL (ref 8.5–10.5)
CHLORIDE BLD-SCNC: 113 MMOL/L (ref 98–107)
CO2 SERPL-SCNC: 19 MMOL/L (ref 22–31)
CREAT SERPL-MCNC: 1.03 MG/DL (ref 0.7–1.3)
EOSINOPHIL # BLD AUTO: 0.1 10E3/UL (ref 0–0.7)
EOSINOPHIL NFR BLD AUTO: 0 %
ERYTHROCYTE [DISTWIDTH] IN BLOOD BY AUTOMATED COUNT: 12.9 % (ref 10–15)
FLUAV RNA SPEC QL NAA+PROBE: NEGATIVE
FLUBV RNA RESP QL NAA+PROBE: NEGATIVE
GFR SERPL CREATININE-BSD FRML MDRD: 84 ML/MIN/1.73M2
GLUCOSE BLD-MCNC: 101 MG/DL (ref 70–125)
GLUCOSE BLDC GLUCOMTR-MCNC: 100 MG/DL (ref 70–99)
GLUCOSE BLDC GLUCOMTR-MCNC: 95 MG/DL (ref 70–99)
HCT VFR BLD AUTO: 33.1 % (ref 40–53)
HGB BLD-MCNC: 10.4 G/DL (ref 13.3–17.7)
IMM GRANULOCYTES # BLD: 0.1 10E3/UL
IMM GRANULOCYTES NFR BLD: 1 %
LACTATE SERPL-SCNC: 0.7 MMOL/L (ref 0.7–2)
LACTATE SERPL-SCNC: 2.2 MMOL/L (ref 0.7–2)
LYMPHOCYTES # BLD AUTO: 1.6 10E3/UL (ref 0.8–5.3)
LYMPHOCYTES NFR BLD AUTO: 8 %
MCH RBC QN AUTO: 31.6 PG (ref 26.5–33)
MCHC RBC AUTO-ENTMCNC: 31.4 G/DL (ref 31.5–36.5)
MCV RBC AUTO: 101 FL (ref 78–100)
MONOCYTES # BLD AUTO: 1 10E3/UL (ref 0–1.3)
MONOCYTES NFR BLD AUTO: 5 %
MRSA DNA SPEC QL NAA+PROBE: NEGATIVE
NEUTROPHILS # BLD AUTO: 16.5 10E3/UL (ref 1.6–8.3)
NEUTROPHILS NFR BLD AUTO: 86 %
NRBC # BLD AUTO: 0 10E3/UL
NRBC BLD AUTO-RTO: 0 /100
PLATELET # BLD AUTO: 195 10E3/UL (ref 150–450)
POTASSIUM BLD-SCNC: 5.1 MMOL/L (ref 3.5–5)
RBC # BLD AUTO: 3.29 10E6/UL (ref 4.4–5.9)
RSV RNA SPEC NAA+PROBE: NEGATIVE
SA TARGET DNA: NEGATIVE
SARS-COV-2 RNA RESP QL NAA+PROBE: NEGATIVE
SODIUM SERPL-SCNC: 137 MMOL/L (ref 136–145)
WBC # BLD AUTO: 19.3 10E3/UL (ref 4–11)

## 2022-12-12 PROCEDURE — 87637 SARSCOV2&INF A&B&RSV AMP PRB: CPT | Performed by: STUDENT IN AN ORGANIZED HEALTH CARE EDUCATION/TRAINING PROGRAM

## 2022-12-12 PROCEDURE — 83605 ASSAY OF LACTIC ACID: CPT | Performed by: STUDENT IN AN ORGANIZED HEALTH CARE EDUCATION/TRAINING PROGRAM

## 2022-12-12 PROCEDURE — 99223 1ST HOSP IP/OBS HIGH 75: CPT | Mod: AI | Performed by: STUDENT IN AN ORGANIZED HEALTH CARE EDUCATION/TRAINING PROGRAM

## 2022-12-12 PROCEDURE — 87205 SMEAR GRAM STAIN: CPT | Performed by: STUDENT IN AN ORGANIZED HEALTH CARE EDUCATION/TRAINING PROGRAM

## 2022-12-12 PROCEDURE — 80048 BASIC METABOLIC PNL TOTAL CA: CPT | Performed by: STUDENT IN AN ORGANIZED HEALTH CARE EDUCATION/TRAINING PROGRAM

## 2022-12-12 PROCEDURE — 99255 IP/OBS CONSLTJ NEW/EST HI 80: CPT | Performed by: INTERNAL MEDICINE

## 2022-12-12 PROCEDURE — 250N000013 HC RX MED GY IP 250 OP 250 PS 637

## 2022-12-12 PROCEDURE — 36415 COLL VENOUS BLD VENIPUNCTURE: CPT | Performed by: STUDENT IN AN ORGANIZED HEALTH CARE EDUCATION/TRAINING PROGRAM

## 2022-12-12 PROCEDURE — 258N000003 HC RX IP 258 OP 636: Performed by: FAMILY MEDICINE

## 2022-12-12 PROCEDURE — 36415 COLL VENOUS BLD VENIPUNCTURE: CPT | Performed by: EMERGENCY MEDICINE

## 2022-12-12 PROCEDURE — 999N000178 HC STATISTIC SUCTION SPUTUM

## 2022-12-12 PROCEDURE — 94640 AIRWAY INHALATION TREATMENT: CPT

## 2022-12-12 PROCEDURE — 87641 MR-STAPH DNA AMP PROBE: CPT | Performed by: INTERNAL MEDICINE

## 2022-12-12 PROCEDURE — 250N000011 HC RX IP 250 OP 636: Performed by: FAMILY MEDICINE

## 2022-12-12 PROCEDURE — 250N000013 HC RX MED GY IP 250 OP 250 PS 637: Performed by: STUDENT IN AN ORGANIZED HEALTH CARE EDUCATION/TRAINING PROGRAM

## 2022-12-12 PROCEDURE — 250N000013 HC RX MED GY IP 250 OP 250 PS 637: Performed by: FAMILY MEDICINE

## 2022-12-12 PROCEDURE — 250N000009 HC RX 250: Performed by: STUDENT IN AN ORGANIZED HEALTH CARE EDUCATION/TRAINING PROGRAM

## 2022-12-12 PROCEDURE — 83605 ASSAY OF LACTIC ACID: CPT | Performed by: EMERGENCY MEDICINE

## 2022-12-12 PROCEDURE — 250N000011 HC RX IP 250 OP 636: Performed by: STUDENT IN AN ORGANIZED HEALTH CARE EDUCATION/TRAINING PROGRAM

## 2022-12-12 PROCEDURE — 200N000001 HC R&B ICU

## 2022-12-12 PROCEDURE — 94640 AIRWAY INHALATION TREATMENT: CPT | Mod: 76

## 2022-12-12 PROCEDURE — 258N000003 HC RX IP 258 OP 636: Performed by: STUDENT IN AN ORGANIZED HEALTH CARE EDUCATION/TRAINING PROGRAM

## 2022-12-12 PROCEDURE — 250N000009 HC RX 250: Performed by: FAMILY MEDICINE

## 2022-12-12 PROCEDURE — 999N000157 HC STATISTIC RCP TIME EA 10 MIN

## 2022-12-12 PROCEDURE — 85025 COMPLETE CBC W/AUTO DIFF WBC: CPT | Performed by: STUDENT IN AN ORGANIZED HEALTH CARE EDUCATION/TRAINING PROGRAM

## 2022-12-12 RX ORDER — DEXTROSE MONOHYDRATE 100 MG/ML
INJECTION, SOLUTION INTRAVENOUS CONTINUOUS PRN
Status: DISCONTINUED | OUTPATIENT
Start: 2022-12-12 | End: 2022-12-13 | Stop reason: HOSPADM

## 2022-12-12 RX ORDER — NITROFURANTOIN 25; 75 MG/1; MG/1
100 CAPSULE ORAL AT BEDTIME
Status: DISCONTINUED | OUTPATIENT
Start: 2022-12-12 | End: 2022-12-13 | Stop reason: HOSPADM

## 2022-12-12 RX ORDER — POLYETHYLENE GLYCOL 3350 17 G/17G
17 POWDER, FOR SOLUTION ORAL DAILY PRN
Status: DISCONTINUED | OUTPATIENT
Start: 2022-12-12 | End: 2022-12-13 | Stop reason: HOSPADM

## 2022-12-12 RX ORDER — METOPROLOL TARTRATE 25 MG/1
25 TABLET, FILM COATED ORAL 2 TIMES DAILY
Status: DISCONTINUED | OUTPATIENT
Start: 2022-12-12 | End: 2022-12-13 | Stop reason: HOSPADM

## 2022-12-12 RX ORDER — QUETIAPINE FUMARATE 25 MG/1
25 TABLET, FILM COATED ORAL
Status: DISCONTINUED | OUTPATIENT
Start: 2022-12-12 | End: 2022-12-13

## 2022-12-12 RX ORDER — ACETAMINOPHEN 500 MG
1000 TABLET ORAL EVERY 6 HOURS PRN
Status: DISCONTINUED | OUTPATIENT
Start: 2022-12-12 | End: 2022-12-13 | Stop reason: HOSPADM

## 2022-12-12 RX ORDER — QUETIAPINE FUMARATE 25 MG/1
75 TABLET, FILM COATED ORAL AT BEDTIME
Status: DISCONTINUED | OUTPATIENT
Start: 2022-12-12 | End: 2022-12-12

## 2022-12-12 RX ORDER — AMLODIPINE BESYLATE 5 MG/1
5 TABLET ORAL DAILY
Status: DISCONTINUED | OUTPATIENT
Start: 2022-12-12 | End: 2022-12-13 | Stop reason: HOSPADM

## 2022-12-12 RX ORDER — LIDOCAINE 40 MG/G
CREAM TOPICAL
Status: DISCONTINUED | OUTPATIENT
Start: 2022-12-12 | End: 2022-12-13 | Stop reason: HOSPADM

## 2022-12-12 RX ORDER — MEROPENEM 1 G/1
1 INJECTION, POWDER, FOR SOLUTION INTRAVENOUS EVERY 8 HOURS
Status: DISCONTINUED | OUTPATIENT
Start: 2022-12-12 | End: 2022-12-13 | Stop reason: HOSPADM

## 2022-12-12 RX ORDER — SIMVASTATIN 20 MG
20 TABLET ORAL AT BEDTIME
Status: DISCONTINUED | OUTPATIENT
Start: 2022-12-12 | End: 2022-12-13 | Stop reason: HOSPADM

## 2022-12-12 RX ORDER — IPRATROPIUM BROMIDE AND ALBUTEROL SULFATE 2.5; .5 MG/3ML; MG/3ML
1 SOLUTION RESPIRATORY (INHALATION) EVERY 4 HOURS
Status: DISCONTINUED | OUTPATIENT
Start: 2022-12-12 | End: 2022-12-13

## 2022-12-12 RX ORDER — BISACODYL 10 MG
10 SUPPOSITORY, RECTAL RECTAL DAILY PRN
Status: DISCONTINUED | OUTPATIENT
Start: 2022-12-12 | End: 2022-12-13

## 2022-12-12 RX ORDER — QUETIAPINE FUMARATE 25 MG/1
25 TABLET, FILM COATED ORAL
Status: COMPLETED | OUTPATIENT
Start: 2022-12-12 | End: 2022-12-12

## 2022-12-12 RX ORDER — QUETIAPINE FUMARATE 25 MG/1
50 TABLET, FILM COATED ORAL AT BEDTIME
Status: DISCONTINUED | OUTPATIENT
Start: 2022-12-12 | End: 2022-12-13 | Stop reason: HOSPADM

## 2022-12-12 RX ORDER — BACLOFEN 10 MG/1
10 TABLET ORAL 3 TIMES DAILY PRN
Status: DISCONTINUED | OUTPATIENT
Start: 2022-12-12 | End: 2022-12-13 | Stop reason: HOSPADM

## 2022-12-12 RX ORDER — ASPIRIN 81 MG/1
81 TABLET, CHEWABLE ORAL DAILY
Status: DISCONTINUED | OUTPATIENT
Start: 2022-12-12 | End: 2022-12-13 | Stop reason: HOSPADM

## 2022-12-12 RX ORDER — PANTOPRAZOLE SODIUM 20 MG/1
40 TABLET, DELAYED RELEASE ORAL
Status: DISCONTINUED | OUTPATIENT
Start: 2022-12-12 | End: 2022-12-12 | Stop reason: ALTCHOICE

## 2022-12-12 RX ORDER — CHLORHEXIDINE GLUCONATE ORAL RINSE 1.2 MG/ML
15 SOLUTION DENTAL DAILY
Status: DISCONTINUED | OUTPATIENT
Start: 2022-12-12 | End: 2022-12-13 | Stop reason: HOSPADM

## 2022-12-12 RX ORDER — SODIUM CHLORIDE 9 MG/ML
INJECTION, SOLUTION INTRAVENOUS CONTINUOUS
Status: DISCONTINUED | OUTPATIENT
Start: 2022-12-12 | End: 2022-12-13

## 2022-12-12 RX ADMIN — BISACODYL 10 MG: 10 SUPPOSITORY RECTAL at 09:20

## 2022-12-12 RX ADMIN — QUETIAPINE FUMARATE 25 MG: 25 TABLET ORAL at 11:48

## 2022-12-12 RX ADMIN — METOPROLOL TARTRATE 25 MG: 25 TABLET, FILM COATED ORAL at 02:20

## 2022-12-12 RX ADMIN — MEROPENEM 1 G: 1 INJECTION, POWDER, FOR SOLUTION INTRAVENOUS at 21:04

## 2022-12-12 RX ADMIN — SODIUM CHLORIDE: 9 INJECTION, SOLUTION INTRAVENOUS at 11:57

## 2022-12-12 RX ADMIN — SIMVASTATIN 20 MG: 20 TABLET, FILM COATED ORAL at 02:20

## 2022-12-12 RX ADMIN — IPRATROPIUM BROMIDE AND ALBUTEROL SULFATE 3 ML: 2.5; .5 SOLUTION RESPIRATORY (INHALATION) at 15:53

## 2022-12-12 RX ADMIN — NITROFURANTOIN (MONOHYDRATE/MACROCRYSTALS) 100 MG: 75; 25 CAPSULE ORAL at 02:20

## 2022-12-12 RX ADMIN — IPRATROPIUM BROMIDE AND ALBUTEROL SULFATE 3 ML: 2.5; .5 SOLUTION RESPIRATORY (INHALATION) at 20:00

## 2022-12-12 RX ADMIN — QUETIAPINE FUMARATE 75 MG: 25 TABLET ORAL at 02:20

## 2022-12-12 RX ADMIN — ASPIRIN 81 MG CHEWABLE TABLET 81 MG: 81 TABLET CHEWABLE at 10:44

## 2022-12-12 RX ADMIN — ACETAMINOPHEN 1000 MG: 500 TABLET ORAL at 10:51

## 2022-12-12 RX ADMIN — QUETIAPINE FUMARATE 50 MG: 25 TABLET ORAL at 20:58

## 2022-12-12 RX ADMIN — MEROPENEM 1 G: 1 INJECTION, POWDER, FOR SOLUTION INTRAVENOUS at 05:42

## 2022-12-12 RX ADMIN — BACLOFEN 10 MG: 10 TABLET ORAL at 22:30

## 2022-12-12 RX ADMIN — SODIUM CHLORIDE: 9 INJECTION, SOLUTION INTRAVENOUS at 22:40

## 2022-12-12 RX ADMIN — SODIUM BICARBONATE 40 MG: 84 INJECTION, SOLUTION INTRAVENOUS at 10:44

## 2022-12-12 RX ADMIN — BACLOFEN 10 MG: 10 TABLET ORAL at 10:51

## 2022-12-12 RX ADMIN — SIMVASTATIN 20 MG: 20 TABLET, FILM COATED ORAL at 20:58

## 2022-12-12 RX ADMIN — CHLORHEXIDINE GLUCONATE 15 ML: 1.2 SOLUTION ORAL at 09:20

## 2022-12-12 RX ADMIN — MEROPENEM 1 G: 1 INJECTION, POWDER, FOR SOLUTION INTRAVENOUS at 14:37

## 2022-12-12 RX ADMIN — VANCOMYCIN HYDROCHLORIDE 1250 MG: 5 INJECTION, POWDER, LYOPHILIZED, FOR SOLUTION INTRAVENOUS at 22:30

## 2022-12-12 RX ADMIN — IPRATROPIUM BROMIDE AND ALBUTEROL SULFATE 3 ML: 2.5; .5 SOLUTION RESPIRATORY (INHALATION) at 02:39

## 2022-12-12 RX ADMIN — IPRATROPIUM BROMIDE AND ALBUTEROL SULFATE 3 ML: 2.5; .5 SOLUTION RESPIRATORY (INHALATION) at 07:45

## 2022-12-12 RX ADMIN — SODIUM CHLORIDE: 9 INJECTION, SOLUTION INTRAVENOUS at 01:25

## 2022-12-12 RX ADMIN — Medication 220 MG: at 10:44

## 2022-12-12 RX ADMIN — IPRATROPIUM BROMIDE AND ALBUTEROL SULFATE 3 ML: 2.5; .5 SOLUTION RESPIRATORY (INHALATION) at 11:26

## 2022-12-12 ASSESSMENT — ACTIVITIES OF DAILY LIVING (ADL)
ADLS_ACUITY_SCORE: 53
ADLS_ACUITY_SCORE: 35

## 2022-12-12 NOTE — PROGRESS NOTES
"Rooma ir SpO2 93 %. BS clear bilat. Duoneb tx, given, Tolerated well, BS after coarse upper lobes.     /70 (BP Location: Left arm)   Pulse 84   Temp 99.7  F (37.6  C)   Resp 22   Ht 1.575 m (5' 2\")   Wt 57.6 kg (127 lb)   SpO2 93%   BMI 23.23 kg/m      RT to  monitor  "

## 2022-12-12 NOTE — PLAN OF CARE
Problem: Plan of Care - These are the overarching goals to be used throughout the patient stay.    Goal: Optimal Comfort and Wellbeing  Intervention: Monitor Pain and Promote Comfort  Recent Flowsheet Documentation  Taken 12/12/2022 0100 by Melissa Faustin RN  Pain Management Interventions:   emotional support   pillow support provided   repositioned   quiet environment facilitated   Goal Outcome Evaluation:  Pt. Will rest tonight with minimal rest and sleep interruptions.

## 2022-12-12 NOTE — PROGRESS NOTES
Pt was given scheduled nebulizer, BS coarse pre and post txs, pt remains on 2L NC, no complications. RT following.

## 2022-12-12 NOTE — PHARMACY-VANCOMYCIN DOSING SERVICE
Pharmacy Vancomycin Initial Note  Date of Service 2022  Patient's  1965  57 year old, male    Indication: Sepsis    Current estimated CrCl = CrCl cannot be calculated (Patient's most recent lab result is older than the maximum 30 days allowed.).    Creatinine for last 3 days  No results found for requested labs within last 72 hours.    Recent Vancomycin Level(s) for last 3 days  No results found for requested labs within last 72 hours.      Vancomycin IV Administrations (past 72 hours)      No vancomycin orders with administrations in past 72 hours.                Nephrotoxins and other renal medications (From now, onward)    Start     Dose/Rate Route Frequency Ordered Stop    22  vancomycin (VANCOCIN) 1,000 mg in 0.9% NaCl 250 mL intermittent infusion         1,000 mg  over 60 Minutes Intravenous ONCE 22            Contrast Orders - past 72 hours (72h ago, onward)    None            Plan:  1. Start vancomycin  1000 mg IV once     Mena Rodriguez [facility]

## 2022-12-12 NOTE — CONSULTS
[facility]    Infectious Disease Consultation     Date of Admission:  12/11/2022  Date of Consult (When I saw the patient): 12/12/22    Assessment & Plan   Liu GIL Malave is a 57 year old male who was admitted on 12/11/2022.     Recurrent hospitalizations. Patient was admitted on 10/28/2022 to [facility] with aspiration pneumonia and sepsis    Impression:    1. Suspected Aspiration pneumonia ( high procaclitnin, leukocytosis) ,vs viral pneumonia   2. Previous history of  multiple family members with RSV infection  3. Pt RSV/COVID/Flu negative;   4. CT chest bilateral airspace disease right middle lobe both lower lobes  5. History of CVA, nonverbal, hemiplegia  6. Chronic encephalopathy  7. Dysphagia  8. Previous blood cultures with Staphylococcus pettenkoferi in September 2022  9. Draining a G-tube site which has been replaced  10. History of MRSA and ESBL producing organisms per previous reports; I cant see the corresponding micro    RECOMMENDATIONS:      1. Meropenem, Vancomycin  2. Follow culture results  3. MRSA screen  Patient seen and examined in ICU.  Thank you for consulting infectious disease.      Yaima Landrum MD  [facility]  Answering Service: [phone]  On-Call ID provider: [phone], option: [phone]    Reason for Consult   Reason for consult: I was asked to evaluate this patient for Please assist with antibiotic coverage for recurrent aspiration pneumonia in nonverbal hemiplegic patient with history of ESBL and MRSA    Primary Care Physician   Lynda Gutierrez    Chief Complaint   Unable to clear secretions coughing choking episodes for several days    History is obtained from the patient and medical records    History of Present Illness   Liu GIL Malave is a 57 year old male who presents with history of CVA, hemorrhagic stroke resulting in chronic encephalopathy, hemiplegia, dysphagia, nonverbal, seizure disorder, hypertension, GERD, MRSA, ESBL UTI,  recurrent aspiration pneumonia, hep C carrier, presents with increasing fatigue fever cough unable to clear secretions, coughing and choking episodes  The time of evaluation patient opens eyes  Does not appear to be any distress  ID consulted to assist with antibiotic management      Past Medical History   I have reviewed this patient's medical history and updated it with pertinent information if needed.   Past Medical History:   Diagnosis Date     Acute UTI 02/21/2020     Anxiety      Basal ganglia hemorrhage (H) 06/20/2016     Brain compression (H)      Bruxism      Chronic static encephalopathy      Chronic static encephalopathy      Chronic static encephalopathy      Convulsions, unspecified convulsion type (H)      CVA (cerebral vascular accident) (H) 07/2016    Hemorrhagic,      Depression      Dyslipidemia      Dyslipidemia      Elevated troponin      Essential hypertension      Feeding by G-tube (H) 06/04/2020     GERD (gastroesophageal reflux disease)      GERD without esophagitis      Hemiplegia of nondominant side, late effect of cerebrovascular disease (H)     Created by Conversion  Replacement Utility updated for latest IMO load     Hemorrhagic stroke (H) 06/05/2016     Hepatitis C carrier (H)      History of ESBL E. coli infection      History of hemorrhagic stroke with residual hemiparesis (H)      History of stroke with residual deficit      HTN (hypertension)      Hypertension      Lung nodule 12/05/2016    9 x 9 mm left upper lobe on CXR     MRSA (methicillin resistant staph aureus) culture positive      Periodontal disease      Persistent fever      Pneumonia of both lower lobes due to infectious organism      Respiratory failure with hypoxia (H)      Seizure (H)      Sepsis due to urinary tract infection (H) 06/04/2020     Severe sepsis (H)      Stroke (H)      Trismus        Past Surgical History   I have reviewed this patient's surgical history and updated it with pertinent information if  needed.  Past Surgical History:   Procedure Laterality Date     aneurysm clipping  2006     BRAIN SURGERY       EXAM UNDER ANESTHESIA, RESTORATIONS, EXTRACTION(S) DENTAL COMPLEX, COMBINED N/A 12/19/2016    Procedure: COMBINED EXAM UNDER ANESTHESIA, RESTORATIONS, EXTRACTION(S) DENTAL COMPLEX;  Surgeon: Verónica Martinez DDS;  Location: [facility] OR     EXAM UNDER ANESTHESIA, RESTORATIONS, EXTRACTION(S) DENTAL COMPLEX, COMBINED N/A 3/7/2018    Procedure: COMBINED EXAM UNDER ANESTHESIA, RESTORATIONS, EXTRACTION(S) DENTAL COMPLEX;  Kenalog Injection in Bilateral [place], Combined Dental Exam,Radiographs, Three Dental Restorations, Periodontal Therapy and Fluoride Treatment;  Surgeon: Darryl Isbell DDS;  Location: [facility] OR     HH MIDLINE INSERTION  8/11/2017          INJECT STEROID (LOCATION) N/A 3/7/2018    Procedure: INJECT STEROID (LOCATION);  Kenalog Injection In Bilateral [place], Combined Dental Exam,Radiographs,Three Dental Restorations, Periodontal Therapy and Fluoride Treatment;  Surgeon: Liu Conroy DDS;  Location: [facility] OR     IR GASTRO JEJUNOSTOMY TUBE CHANGE  7/19/2016     IR GASTRO JEJUNOSTOMY TUBE CHANGE  9/23/2016     IR GASTRO JEJUNOSTOMY TUBE CHANGE  11/28/2016     IR GASTRO JEJUNOSTOMY TUBE CHANGE  1/27/2017     IR GASTRO JEJUNOSTOMY TUBE CHANGE  4/19/2017     IR GASTRO JEJUNOSTOMY TUBE CHANGE  6/20/2017     IR GASTRO JEJUNOSTOMY TUBE CHANGE  8/7/2017     IR GASTRO JEJUNOSTOMY TUBE CHANGE  8/11/2017     IR GASTRO JEJUNOSTOMY TUBE CHANGE  10/20/2017     IR GASTRO JEJUNOSTOMY TUBE CHANGE  1/2/2018     IR GASTRO JEJUNOSTOMY TUBE CHANGE  2/16/2018     IR GASTRO JEJUNOSTOMY TUBE CHANGE  5/16/2018     IR GASTRO JEJUNOSTOMY TUBE CHANGE  6/11/2018     IR GASTRO JEJUNOSTOMY TUBE CHANGE  9/12/2018     IR GASTRO JEJUNOSTOMY TUBE CHANGE  12/24/2018     IR GASTRO JEJUNOSTOMY TUBE CHANGE  3/18/2019     IR GASTRO JEJUNOSTOMY TUBE CHANGE  4/23/2019     IR GASTRO JEJUNOSTOMY TUBE CHANGE  5/21/2019     IR GASTRO  JEJUNOSTOMY TUBE CHANGE  9/9/2019     IR GASTRO JEJUNOSTOMY TUBE CHANGE  9/26/2019     IR GASTRO JEJUNOSTOMY TUBE CHANGE  10/11/2019     IR GASTRO JEJUNOSTOMY TUBE CHANGE  11/21/2019     IR GASTRO JEJUNOSTOMY TUBE CHANGE  11/29/2019     IR GASTRO JEJUNOSTOMY TUBE CHANGE  2/27/2020     IR GASTRO JEJUNOSTOMY TUBE CHANGE  4/10/2020     IR GASTRO JEJUNOSTOMY TUBE CHANGE  7/2/2020     IR GASTRO JEJUNOSTOMY TUBE CHANGE  8/28/2020     IR GASTRO JEJUNOSTOMY TUBE CHANGE  9/17/2020     IR GASTRO JEJUNOSTOMY TUBE CHANGE  10/29/2020     IR GASTRO JEJUNOSTOMY TUBE CHANGE  11/6/2020     IR GASTRO JEJUNOSTOMY TUBE CHANGE  12/24/2020     IR GASTRO JEJUNOSTOMY TUBE CHANGE  1/9/2021     IR GASTRO JEJUNOSTOMY TUBE CHANGE  6/9/2021     IR GASTRO JEJUNOSTOMY TUBE CHANGE  10/4/2021     IR GASTRO JEJUNOSTOMY TUBE CHANGE  10/18/2021     IR GASTRO JEJUNOSTOMY TUBE CHANGE  1/14/2022     IR GASTRO JEJUNOSTOMY TUBE CHANGE  1/20/2022     IR GASTRO JEJUNOSTOMY TUBE CHANGE  4/19/2022     IR GASTRO JEJUNOSTOMY TUBE CHANGE  6/7/2022     IR GASTRO JEJUNOSTOMY TUBE CHANGE  8/19/2022     IR GASTRO JEJUNOSTOMY TUBE CHANGE  9/13/2022     IR GASTRO JEJUNOSTOMY TUBE CHANGE  10/27/2022     IR GASTRO JEJUNOSTOMY TUBE CHANGE  11/3/2022     IR GASTRO JEJUNOSTOMY TUBE PLACEMENT  6/14/2016     IR GJ TUBE REPLACEMENT  12/24/2018     IR GJ TUBE REPLACEMENT  3/18/2019     IR GJ TUBE REPLACEMENT  4/23/2019     IR GJ TUBE REPLACEMENT  5/21/2019     IR GJ TUBE REPLACEMENT  9/9/2019     IR GJ TUBE REPLACEMENT  9/26/2019     IR GJ TUBE REPLACEMENT  10/11/2019     IR GJ TUBE REPLACEMENT  11/21/2019     IR GJ TUBE REPLACEMENT  11/29/2019     IR GJ TUBE REPLACEMENT  2/27/2020     IR GJ TUBE REPLACEMENT  4/10/2020     IR GJ TUBE REPLACEMENT  7/2/2020     IR GJ TUBE REPLACEMENT  8/28/2020     IR GJ TUBE REPLACEMENT  9/17/2020     IR GJ TUBE REPLACEMENT  10/29/2020     IR GJ TUBE REPLACEMENT  11/6/2020     IR GJ TUBE REPLACEMENT  12/24/2020     IR GJ TUBE REPLACEMENT   2021     IR GJ TUBE REPLACEMENT  2021     J-G tube       PICC  6/10/2016          TRACHEOSTOMY      Removed       Prior to Admission Medications   Prior to Admission Medications   Prescriptions Last Dose Informant Patient Reported? Taking?   LIDOCAINE PAIN RELIEF 4 % Patch none on  No Yes   Sig: PLACE 1 PATCH ONTO THE SKIN EVERY 24 HOURS TO PREVENT LIDOCAINE TOXICITY, PATIENT SHOULD BE PATCH FREE FOR 12 HOURS DAILY.   Polyethylene Glycol 400 (VISINE DRY EYE RELIEF) 1 % SOLN prn  No Yes   Sig: Apply 1 drop to eye every 6 hours as needed (dry eyes)   QUEtiapine (SEROQUEL) 25 MG tablet 2022 at 1900  No Yes   Sig: Give 3 tablets (75 mg) at bedtime for sleep. Ok to repeat after 4 hours and during the day PRN   acetaminophen (TYLENOL) 500 MG tablet prn  Yes Yes   Si,000 mg by Gastric Tube route every 6 hours as needed for mild pain    amLODIPine (NORVASC) 5 MG tablet 2022  Yes Yes   Sig: Take 5 mg by mouth daily   aspirin (ASA) 81 MG chewable tablet   No Yes   Si tablet (81 mg) by Per Feeding Tube route daily   baclofen (LIORESAL) 10 MG tablet 2022 at 1900  No Yes   Sig: Take 1 tablet (10 mg) by mouth 3 times daily as needed for muscle spasms   bisacodyl (DULCOLAX) 10 MG suppository 2022  No Yes   Sig: INSERT 1 SUPPOSTIORY INTO THE RECTUM DAILY AS NEEDED FOR CONSTIPATION.   chlorhexidine (PERIDEX) 0.12 % solution 2022  Yes Yes   Sig: Swish and spit 15 mLs in mouth daily    esomeprazole (NEXIUM) 20 MG DR capsule 2022  No Yes   Sig: Take 1 capsule via G-tube twice daily   Patient taking differently: 20 mg daily Take 1 capsule via G-tube twice daily   ferrous sulfate 220 (44 Fe) MG/5ML ELIX 2022  No Yes   Si mLs by Enteral route daily   guaiFENesin-dextromethorphan (ROBITUSSIN DM) 100-10 MG/5ML syrup prn  No Yes   Sig: 10 mLs by Per G Tube route every 4 hours as needed for cough   ipratropium - albuterol 0.5 mg/2.5 mg/3 mL (DUONEB) 0.5-2.5 (3) MG/3ML neb solution  "2022 at 2100  No Yes   Sig: Take 1 vial (3 mLs) by nebulization every 4 hours   meclizine (ANTIVERT) 25 MG tablet prn  No No   Sig: Take 1 tablet via G-tube every 6 hours as needed for vertigo   metoprolol tartrate (LOPRESSOR) 25 MG tablet 2022 at x1  No Yes   Sig: TAKE ONE TABLET BY MOUTH TWICE DAILY    neomycin-bacitracin-polymyxin (NEOSPORIN) 5-400-5000 ointment prn  No Yes   Sig: Apply topically 4 times daily   nitroFURantoin macrocrystal-monohydrate (MACROBID) 100 MG capsule 2022 at AM  No Yes   Sig: Take 1 capsule by G-tube route at bedtime.   polyethylene glycol (MIRALAX) 17 GM/Dose powder prn  No Yes   Sig: Give 1 capful via G-tube daily as needed for constipation   simvastatin (ZOCOR) 20 MG tablet 2022 at 1900  No Yes   Si tablet (20 mg) by Per G Tube route At Bedtime      Facility-Administered Medications: None     Allergies   Allergies   Allergen Reactions     Diphenhydramine Unknown     Trazodone Other (See Comments)     \"shaking\" per family       Immunization History   Immunization History   Administered Date(s) Administered     COVID-19 Vaccine 12+ (Pfizer ) 2022     COVID-19 Vaccine 12+ (Pfizer) 2021, 2021     Influenza Vaccine 50-64 or 18-64 w/egg allergy (Flublok) 2020, 10/28/2021     Influenza Vaccine, 6+MO IM (QUADRIVALENT W/PRESERVATIVES) 10/25/2017, 2018, 2020     Pneumo Conj 13-V (2010&after) 10/28/2021     Pneumococcal 23 valent 10/25/2017     Tdap (Adacel,Boostrix) 2015       Social History   I have reviewed this patient's social history and updated it with pertinent information if needed. Liu Malave  reports that he quit smoking about 17 years ago. His smoking use included cigarettes. He has a 30.00 pack-year smoking history. He quit smokeless tobacco use about 15 years ago. He reports that he does not drink alcohol and does not use drugs.    Family History   I have reviewed this patient's family history and updated " it with pertinent information if needed.   Family History   Problem Relation Age of Onset     Hypertension Mother      Cancer Father      Other Cancer Father      Kidney Disease Brother        Review of Systems   The 10 point Review of Systems is negative other than noted in the HPI or here.     Physical Exam   Temp: 99.1  F (37.3  C) Temp src: Axillary BP: 118/75 Pulse: 89   Resp: 22 SpO2: 100 % O2 Device: Nasal cannula Oxygen Delivery: 2 LPM (decreased to 1 lpm)  Vital Signs with Ranges  Temp:  [99.1  F (37.3  C)-103.3  F (39.6  C)] 99.1  F (37.3  C)  Pulse:  [] 89  Resp:  [22] 22  BP: ()/(63-86) 118/75  SpO2:  [93 %-100 %] 100 %  127 lbs 0 oz  Body mass index is 23.23 kg/m .    GENERAL APPEARANCE:  awake, NAD  EYES: Eyes grossly normal to inspection, conjunctivae and sclerae normal  HENT: Dry  NECK: supple  RESP: Coarse  CV: regular rates and rhythm  LYMPHATICS:  swelling  ABDOMEN: G-tube  MS: extremities atrophy, hemiplegia  SKIN: Warm  NEURO: coherent      LABORATORY DATA:  Reviewed    CBC RESULTS:   Recent Labs   Lab Test 12/12/22  0610   WBC 19.3*   RBC 3.29*   HGB 10.4*   HCT 33.1*   *   MCH 31.6   MCHC 31.4*   RDW 12.9           Last Comprehensive Metabolic Panel:  Sodium   Date Value Ref Range Status   12/12/2022 137 136 - 145 mmol/L Final   12/20/2016 142 133 - 144 mmol/L Final     Potassium   Date Value Ref Range Status   12/12/2022 5.1 (H) 3.5 - 5.0 mmol/L Final   12/20/2016 5.1 3.4 - 5.3 mmol/L Final     Chloride   Date Value Ref Range Status   12/12/2022 113 (H) 98 - 107 mmol/L Final   12/20/2016 108 94 - 109 mmol/L Final     Carbon Dioxide   Date Value Ref Range Status   12/20/2016 29 20 - 32 mmol/L Final     Carbon Dioxide (CO2)   Date Value Ref Range Status   12/12/2022 19 (L) 22 - 31 mmol/L Final     Anion Gap   Date Value Ref Range Status   12/12/2022 5 5 - 18 mmol/L Final   12/20/2016 5 3 - 14 mmol/L Final     Glucose   Date Value Ref Range Status   12/12/2022 101 70 -  125 mg/dL Final   12/20/2016 133 (H) 70 - 99 mg/dL Final     Urea Nitrogen   Date Value Ref Range Status   12/12/2022 29 (H) 8 - 22 mg/dL Final   12/20/2016 32 (H) 7 - 30 mg/dL Final     Creatinine   Date Value Ref Range Status   12/12/2022 1.03 0.70 - 1.30 mg/dL Final   12/20/2016 1.16 0.66 - 1.25 mg/dL Final     GFR Estimate   Date Value Ref Range Status   12/12/2022 84 >60 mL/min/1.73m2 Final     Comment:     Effective December 21, 2021 eGFRcr in adults is calculated using the 2021 CKD-EPI creatinine equation which includes age and gender (Josefa et al., [place], DOI: 10.1056/GMFYuy8753801)   04/15/2021 >60 >60 mL/min/1.73m2 Final   12/20/2016 66 >60 mL/min/1.7m2 Final     Comment:     Non  GFR Calc     Calcium   Date Value Ref Range Status   12/12/2022 8.2 (L) 8.5 - 10.5 mg/dL Final   12/20/2016 8.3 (L) 8.5 - 10.1 mg/dL Final     Bilirubin Total   Date Value Ref Range Status   12/11/2022 0.3 0.0 - 1.0 mg/dL Final     Alkaline Phosphatase   Date Value Ref Range Status   12/11/2022 63 45 - 120 U/L Final     ALT   Date Value Ref Range Status   12/11/2022 28 0 - 45 U/L Final     AST   Date Value Ref Range Status   12/11/2022 26 0 - 40 U/L Final             CRP   Date Value Ref Range Status   05/30/2022 0.4 0.0 - 0.8 mg/dL Final            MICROBIOLOGY:    Reviewed    Blood cultures  Other Micro:  7-Day Micro Results     Collected Updated Procedure Result Status      12/12/2022 0932 12/12/2022 1312 MRSA MSSA PCR, Nasal Swab [[id number]]    Swab from Nares, Bilateral    Final result Component Value   MRSA Target DNA Negative   SA Target DNA Negative            12/12/2022 0540 12/12/2022 1111 Respiratory Aerobic Bacterial Culture [[id number]]   Sputum from Endotracheal    Preliminary result Component Value   Gram Stain Result <25 PMNs/low power field  [P]     4+ Mixed brandon  [P]                12/12/2022 0117 12/12/2022 0204 Symptomatic Influenza A/B & SARS-CoV2 (COVID-19) Virus PCR Multiplex  Nasopharyngeal [[id number]]    Swab from Nasopharyngeal    Final result Component Value   Influenza A PCR Negative   Influenza B PCR Negative   RSV PCR Negative   SARS CoV2 PCR Negative   NEGATIVE: SARS-CoV-2 (COVID-19) RNA not detected, presumed negative.            12/11/2022 2141 12/12/2022 1232 Blood Culture Peripheral Blood [[id number]]   Peripheral Blood    Preliminary result Component Value   Culture No growth after 12 hours  [P]                12/11/2022 2126 12/12/2022 1232 Blood Culture Peripheral Blood [[id number]]   Peripheral Blood    Preliminary result Component Value   Culture No growth after 12 hours  [P]                      RADIOLOGY:    CT Chest Pulmonary Embolism w Contrast    Result Date: 12/11/2022  EXAM: CT CHEST PULMONARY EMBOLISM W CONTRAST LOCATION: [facility] DATE/TIME: 12/11/2022 10:46 PM INDICATION: sob, fever COMPARISON: 10/28/2022 TECHNIQUE: CT chest pulmonary angiogram during arterial phase injection of IV contrast. Multiplanar reformats and MIP reconstructions were performed. Dose reduction techniques were used. CONTRAST: Isovue 370 90ml FINDINGS: ANGIOGRAM CHEST: Pulmonary arteries are normal caliber and negative for pulmonary emboli. Thoracic aorta is negative for dissection. No CT evidence of right heart strain. LUNGS AND PLEURA: Emphysema, linear scarring and benign calcifications involving upper lobes are unchanged. Lower lobes demonstrate a similar amount of infiltrate and atelectasis is previous exam though there is been some shifting of opacities now with more airspace disease within posterior left lower lobe and anterior inferior right lower lobe with other areas resolving; lower lobe findings suggest new or recurrent regions of pneumonia. MEDIASTINUM/AXILLAE: Normal. CORONARY ARTERY CALCIFICATION: Moderate. UPPER ABDOMEN: Gastrojejunostomy tube appears in good position. MUSCULOSKELETAL: Normal.     IMPRESSION: 1.  No pulmonary emboli. 2.   Mild patchy zones of new airspace infiltrate at lung bases suggests developing pneumonia. 3.  Extensive chronic pulmonary scarring.    Head CT w/o contrast    Result Date: 12/11/2022  EXAM: CT HEAD W/O CONTRAST LOCATION: [facility] DATE/TIME: 12/11/2022 10:45 PM INDICATION: Altered mental status COMPARISON:  CT head 09/18/2022. TECHNIQUE: Routine CT Head without IV contrast. Multiplanar reformats. Dose reduction techniques were used. FINDINGS: INTRACRANIAL CONTENTS: No intracranial hemorrhage, extraaxial collection, or mass effect.  No CT evidence of acute infarct.  Similar appearance of multifocal encephalomalacia since 09/18/2022. Mild to moderate generalized volume loss. No hydrocephalus. VISUALIZED ORBITS/SINUSES/MASTOIDS: No intraorbital abnormality. No paranasal sinus mucosal disease. No middle ear or mastoid effusion. BONES/SOFT TISSUES: No acute abnormality.     IMPRESSION: 1.  No acute intracranial process or significant change since 09/18/2022.

## 2022-12-12 NOTE — PROGRESS NOTES
Resident/Fellow Attestation   I, Schuyler Mcrae MD, was present with the medical/[employer] student who participated in the service and in the documentation of the note.  I have verified the history and personally performed the physical exam and medical decision making.  I agree with the assessment and plan of care as documented in the note.      Schuyler Mcrae MD  PGY3  Date of Service (when I saw the patient): 12/12/22    [facility]    Progress Note - Hospitalist Service       Date of Admission:  12/11/2022    Assessment & Plan      Liu GIL Malave is a 57 year old male admitted on 12/11/2022 with suspected aspiration pneumonia.      Sepsis due to bilateral recurrent aspiration pneumonia   Acute respiratory failure with hypoxia due to pneumonia  History of MRSA and ESBL in previous cultures  Initial evaluation revealed fever 103.3, hypotension 89/63, tachycardia, and congested lung sounds. He received 2 L IV fluids and his hypotension improved. Routine respiratory viral panel was negative. CT chest abdomen pelvis was suggestive of developing pneumonia, abdomen negative. Repeat lactic acid 2.2, WBC increased to 19.3 from 11.8 on admit; however lactic acidosis resolved and now on room air.  - ID consulted, recommendations appreciated  - Continue IV meropenem and vancomycin as per last admission  - Continue IV fluids  - Supplemental oxygen as needed  - Keep HOB elevated  - Blood cultures pending  - Prelim sputum culture revealed mixed brandon  - CBCs    History of COPD  - Continue PTA duonebs    History of brainstem hemorrhagic stroke, hemiplegia, nonverbal  - Continue PTA seroquel PRN for agitation  - PTA aspirin 81 mg      Seizure history  Per recent visit he is now off of Keppra and they are monitoring for seizure activity      Feeding tube dependent  Feeding tube is replaced frequently as the balloon pops.  Feeding tube was replaced recently, appears to be in good position. Does not meet  criteria for malnutrition.  - Nutrition consulted, recommendations appreciated   - resume home TF      Essential hypertension  Hypotensive on admission.   - Hold PTA antihypertensives for now, resume with holding parameters when BP stabilizes.     Hx recurrent UTI  - Hold PTA Macrobid given current abx regimen and long term risk of pulmonary fibrosis  - Consider outpatient alternative ppx abx     History of brainstem hemorrhagic stroke, hemiplegia  -Continue PTA Baclofen PRN     GERD: Protonix per formulary  HLD:  Statin  Chronic anemia: hemoglobin 9.6 on admission, Ferrous sulfate per G tube  Constipation:  PRN Dulcolax and Miralax       Diet: NPO for Medical/Clinical Reasons Except for: NPO but receiving PN  Adult Formula Drip Feeding: Continuous Other; peptamen 1.5 with prebio (Family to bring in); Jejunostomy; Goal Rate: 55 ml x 12hrs (6am-6pm), 30ml x 12hrs (6pm-6am); mL/hr    DVT Prophylaxis: Pneumatic Compression Devices  Kelly Catheter: Not present  Fluids: 100 ml/ hr NS  Central Lines: None  Cardiac Monitoring: ACTIVE order. Indication: Tachyarrhythmias, acute (48 hours)  Code Status: Full Code      Disposition Plan    ID consult, appreciate recommendations     Expected Discharge Date: 12/14/2022      Destination: home with family  Discharge Comments: IV ABX, O2        The patient's care was discussed with the Attending Physician, Dr. Saleem Payne.    Ange Waters  Medical Student  Teaching Service  [facility]  Securely message with the Vocera Web Console (learn more here)  Text page via Mobile Shareholder Paging/Directory         Clinically Significant Risk Factors Present on Admission          # Hypocalcemia: Lowest Ca = 8.2 mg/dL in last 2 days, will monitor and replace as appropriate     # Hypoalbuminemia: Lowest albumin = 3.3 g/dL at 12/11/2022  9:26 PM, will monitor as appropriate                  ______________________________________________________________________    Interval History    Admitted overnight, stable on exam. Patient nonverbal, daughter at bedside reports patient seems more agitated. SOB resolving. Updated daughter on the phone.    Data reviewed today: I reviewed all medications, new labs and imaging results over the last 24 hours.     Physical Exam   Vital Signs: Temp: 99.3  F (37.4  C) Temp src: Axillary BP: 109/63 Pulse: 93   Resp: 22 SpO2: 92 % O2 Device: None (Room air) Oxygen Delivery: 2 LPM  Weight: 127 lbs 0 oz  Constitutional: alert but comfortable, no apparent distress; does not follow instructions  HEENT: normocepalic, without obvious abnormality  Respiratory: No increased work of breathing, good air exchange, clear to auscultation bilaterally, no crackles or wheezing  Cardiovascular: Normal apical impulse, regular rate and rhythm, normal S1 and S2, no S3 or S4, and no murmur noted  GI: soft, nondistended, nontender. G tube in place, site clean and dry.  Skin: warm, dry, no rash on exposed skin  Musculoskeletal: RUE contracture, RLE extension  Neuropsychiatric: unable to assess, pt nonverbal    Data   Recent Labs   Lab 12/12/22  0818 12/12/22  0610 12/11/22  2126   WBC  --  19.3* 11.8*   HGB  --  10.4* 11.6*   MCV  --  101* 97   PLT  --  195 246   NA  --  137 139   POTASSIUM  --  5.1* 5.1*   CHLORIDE  --  113* 107   CO2  --  19* 24   BUN  --  29* 39*   CR  --  1.03 1.28   ANIONGAP  --  5 8   SAMIR  --  8.2* 8.9   * 101 108   ALBUMIN  --   --  3.3*   PROTTOTAL  --   --  8.7*   BILITOTAL  --   --  0.3   ALKPHOS  --   --  63   ALT  --   --  28   AST  --   --  26     Recent Results (from the past 24 hour(s))   Head CT w/o contrast    Narrative    EXAM: CT HEAD W/O CONTRAST  LOCATION: [facility]  DATE/TIME: 12/11/2022 10:45 PM    INDICATION: Altered mental status  COMPARISON:  CT head 09/18/2022.  TECHNIQUE: Routine CT Head without IV contrast. Multiplanar reformats. Dose reduction techniques were used.    FINDINGS:  INTRACRANIAL CONTENTS: No  intracranial hemorrhage, extraaxial collection, or mass effect.  No CT evidence of acute infarct.  Similar appearance of multifocal encephalomalacia since 09/18/2022. Mild to moderate generalized volume loss. No hydrocephalus.     VISUALIZED ORBITS/SINUSES/MASTOIDS: No intraorbital abnormality. No paranasal sinus mucosal disease. No middle ear or mastoid effusion.    BONES/SOFT TISSUES: No acute abnormality.       Impression    IMPRESSION:  1.  No acute intracranial process or significant change since 09/18/2022.   CT Chest Pulmonary Embolism w Contrast    Narrative    EXAM: CT CHEST PULMONARY EMBOLISM W CONTRAST  LOCATION: [facility]  DATE/TIME: 12/11/2022 10:46 PM    INDICATION: sob, fever  COMPARISON: 10/28/2022  TECHNIQUE: CT chest pulmonary angiogram during arterial phase injection of IV contrast. Multiplanar reformats and MIP reconstructions were performed. Dose reduction techniques were used.   CONTRAST: Isovue 370 90ml    FINDINGS:  ANGIOGRAM CHEST: Pulmonary arteries are normal caliber and negative for pulmonary emboli. Thoracic aorta is negative for dissection. No CT evidence of right heart strain.    LUNGS AND PLEURA: Emphysema, linear scarring and benign calcifications involving upper lobes are unchanged. Lower lobes demonstrate a similar amount of infiltrate and atelectasis is previous exam though there is been some shifting of opacities now with   more airspace disease within posterior left lower lobe and anterior inferior right lower lobe with other areas resolving; lower lobe findings suggest new or recurrent regions of pneumonia.    MEDIASTINUM/AXILLAE: Normal.    CORONARY ARTERY CALCIFICATION: Moderate.    UPPER ABDOMEN: Gastrojejunostomy tube appears in good position.    MUSCULOSKELETAL: Normal.      Impression    IMPRESSION:  1.  No pulmonary emboli.  2.  Mild patchy zones of new airspace infiltrate at lung bases suggests developing pneumonia.  3.  Extensive chronic  pulmonary scarring.     Medications     dextrose       sodium chloride 100 mL/hr at 12/12/22 1157       [Held by provider] amLODIPine  5 mg Oral Daily     aspirin  81 mg Per Feeding Tube Daily     chlorhexidine  15 mL Swish & Spit Daily     ferrous sulfate  220 mg Per G Tube Daily     ipratropium - albuterol 0.5 mg/2.5 mg/3 mL  1 vial Nebulization Q4H     meropenem  1 g Intravenous Q8H     [Held by provider] metoprolol tartrate  25 mg Oral BID     [Held by provider] nitroFURantoin macrocrystal-monohydrate  100 mg Per G Tube At Bedtime     pantoprazole  40 mg Oral QAM AC     QUEtiapine  50 mg Per G Tube At Bedtime     simvastatin  20 mg Per G Tube At Bedtime     sodium chloride (PF)  3 mL Intracatheter Q8H     vancomycin  1,250 mg Intravenous Q24H

## 2022-12-12 NOTE — ED TRIAGE NOTES
Pt here via EMS for a coughing fit at home and pt was unable to clear secretions. EMS reporting pt appeared in distress upon arrival and grunting aspirations when trying to clear throat, shortly before arrival pt appeared to clear secretion. Pt not in distress in triage, BP low and O2 92% on 2L. Pt has hx of CVA with deficits of nonverbal, restricted movement, and joint tightness. Pt also has a recent diagnosis of pneumonia which family reports was getting better until tonight. Pt was requiring more oxygen at home and family reports pt feeling warmer than usual

## 2022-12-12 NOTE — H&P
[facility]    History and Physical - Teaching Service       Date of Admission:  12/11/2022    Assessment & Plan      Liu GIL Malave is a 57 year old male admitted on 12/11/2022 with suspected aspiration pneumonia.     Sepsis due to bilateral recurrent aspiration pneumonia  Acute respiratory failure with hypoxia due to pneumonia  History of MRSA and ESBL in previous cultures, history of recurrent UTI  History of COPD on DuoNebs  -ID consultation regarding history of MDR pathogens  -Continue IV meropenem and vancomycin as per last admission  -Continue IV fluids  -Supplemental oxygen as needed  -Keep HOB elevated  -Could consider pulmonology referral for recommendations regarding recurrent aspiration events.  Patient takes nothing p.o.  -Blood cultures pending  -Sputum cultures ordered  -Check UA/UC  -Continue Duonebs as at home     History of brainstem hemorrhagic stroke, hemiplegia, nonverbal  -Seroquel at bedtime as at home  -Aspirin 81 mg    Seizure history  Per recent visit he is now off of Keppra and they are monitoring for seizure activity    Feeding tube dependent  Feeding tube is replaced frequently as the balloon pops.  Feeding tube was replaced recently, appears to be in good position.     Essential hypertension  - Hold antihypertensives for now, resume when BP stabilizes     UTI prophylaxis  -Continue Macrobid    History of brainstem hemorrhagic stroke, hemiplegia  -Baclofen as at home    Nonverbal, feeding tube dependent  - Nutrition consulted for assistance with TF    GERD: Protonix per formulary  HLD:  Statin  Chronic anemia:  Ferrous sulfate per G tube  Constipation:  PRN Dulcolax and Miralax           Diet: NPO for Medical/Clinical Reasons Except for: NPO but receiving PN  Adult Formula Drip Feeding: Continuous Jevity 1.5; Gastrostomy; Goal Rate: 45; mL/hr; Initiate TF at 15mL/hr and advance by 10mL q 6 hrs as tolerated to goal rate; Do not advance tube feeding rate unless  K+ is = or > 3.0, Mg++ is = or > 1.5, and P...            DVT Prophylaxis: Pneumatic Compression Devices  Kelly Catheter: Not present  Fluids: LR  Central Lines: None  Cardiac Monitoring: None  Code Status: Full Code     Clinically Significant Risk Factors Present on Admission              # Hypoalbuminemia: Lowest albumin = 3.3 g/dL at 12/11/2022  9:26 PM, will monitor as appropriate                     The patient's care was discussed with the Attending Physician, Dr. Gomez.  Patient will be formally assessed in the morning by daytime faculty, Dr. Saleem Payne.    Lona Walker MD   PGY-3  [facility]  Securely message with the Vocera Web Console (learn more here)  Text page via [employer] Paging/Directory       ______________________________________________________________________    Chief Complaint   Difficulty breathing    History is obtained from the patient and patient's daughter and chart review.  Pt is nonverbal due to history of stroke.     History of Present Illness   Liu Malave is a 57 year old male with a pertinent history of  hemorrhagic stroke resulting in chronic encephalopathy, hemiplegia, dysphagia, nonverbal, seizures, hypertension, GERD, MRSA, ESBL urinary tract infection, recurrent aspiration pneumonia, hepatitis C carrier, former smoker, bruxism presented from home with increasing fatigue, cough, fever.  He has been unable to clear secretions and having coughing and choking episodes for the past several days.  He does have as needed oxygen to use at home.    Patient has a history of basal ganglia hemorrhage at 51 years old resulting in significant brain damage requiring him to be dependent on others for all cares.  He has a feeding tube and takes nothing p.o.  He does not have a Kelly catheter.    He was seen in the ED in September for aspiration pneumonia and was treated with clindamycin by G-tube.  Recently admitted to hospitalist service for aspiration pneumonia  that was treated with meropenem and vancomycin.  Blood culture grew Staphylococcus pettenkoferi in 09/2022.  He was treated with IV meropenem and vancomycin and discharged home with Omnicef and Flagyl to complete 10-day course.    Patient has significant risk of aspiration despite getting nutrition by tube feed.  He has difficulty coughing up sputum.  Vest therapy was started at discharge.    Initial evaluation revealed fever 103.3, hypotension 89/63, tachycardia, and congested lung sounds.  Septic work-up showed a lactate >2.  He received 2 L IV fluids and his hypotension improved.  Labs were notable for K5.1, creatinine 1.28, BNP <10, glucose 108, WBC 11.8 with neutrophilic predominance, hemoglobin 9.6.  Blood cultures are pending.  Routine respiratory viral panel was negative.  CT chest abdomen pelvis was suggestive of developing pneumonia.  Abdomen negative.      Initial treatment included vancomycin and meropenem.  Tube feeds are being held.        Review of Systems    Review of systems not obtained due to patient factors - mental status    Past Medical History    I have reviewed this patient's medical history and updated it with pertinent information if needed.   Past Medical History:   Diagnosis Date     Acute UTI 02/21/2020     Anxiety      Basal ganglia hemorrhage (H) 06/20/2016     Brain compression (H)      Bruxism      Chronic static encephalopathy      Chronic static encephalopathy      Chronic static encephalopathy      Convulsions, unspecified convulsion type (H)      CVA (cerebral vascular accident) (H) 07/2016    Hemorrhagic,      Depression      Dyslipidemia      Dyslipidemia      Elevated troponin      Essential hypertension      Feeding by G-tube (H) 06/04/2020     GERD (gastroesophageal reflux disease)      GERD without esophagitis      Hemiplegia of nondominant side, late effect of cerebrovascular disease (H)     Created by Conversion  Replacement Utility updated for latest IMO load      Hemorrhagic stroke (H) 06/05/2016     Hepatitis C carrier (H)      History of ESBL E. coli infection      History of hemorrhagic stroke with residual hemiparesis (H)      History of stroke with residual deficit      HTN (hypertension)      Hypertension      Lung nodule 12/05/2016    9 x 9 mm left upper lobe on CXR     MRSA (methicillin resistant staph aureus) culture positive      Periodontal disease      Persistent fever      Pneumonia of both lower lobes due to infectious organism      Respiratory failure with hypoxia (H)      Seizure (H)      Sepsis due to urinary tract infection (H) 06/04/2020     Severe sepsis (H)      Stroke (H)      Trismus         Past Surgical History   I have reviewed this patient's surgical history and updated it with pertinent information if needed.  Past Surgical History:   Procedure Laterality Date     aneurysm clipping  2006     BRAIN SURGERY       EXAM UNDER ANESTHESIA, RESTORATIONS, EXTRACTION(S) DENTAL COMPLEX, COMBINED N/A 12/19/2016    Procedure: COMBINED EXAM UNDER ANESTHESIA, RESTORATIONS, EXTRACTION(S) DENTAL COMPLEX;  Surgeon: Verónica Martinez DDS;  Location: [facility] OR     EXAM UNDER ANESTHESIA, RESTORATIONS, EXTRACTION(S) DENTAL COMPLEX, COMBINED N/A 3/7/2018    Procedure: COMBINED EXAM UNDER ANESTHESIA, RESTORATIONS, EXTRACTION(S) DENTAL COMPLEX;  Kenalog Injection in Bilateral Masseter, Combined Dental Exam,Radiographs, Three Dental Restorations, Periodontal Therapy and Fluoride Treatment;  Surgeon: Darryl Isbell DDS;  Location: [facility] OR     HH MIDLINE INSERTION  8/11/2017          INJECT STEROID (LOCATION) N/A 3/7/2018    Procedure: INJECT STEROID (LOCATION);  Kenalog Injection In Bilateral Masseter, Combined Dental Exam,Radiographs,Three Dental Restorations, Periodontal Therapy and Fluoride Treatment;  Surgeon: Liu Conroy DDS;  Location: [facility] OR     IR GASTRO JEJUNOSTOMY TUBE CHANGE  7/19/2016     IR GASTRO JEJUNOSTOMY TUBE CHANGE  9/23/2016     IR GASTRO  JEJUNOSTOMY TUBE CHANGE  11/28/2016     IR GASTRO JEJUNOSTOMY TUBE CHANGE  1/27/2017     IR GASTRO JEJUNOSTOMY TUBE CHANGE  4/19/2017     IR GASTRO JEJUNOSTOMY TUBE CHANGE  6/20/2017     IR GASTRO JEJUNOSTOMY TUBE CHANGE  8/7/2017     IR GASTRO JEJUNOSTOMY TUBE CHANGE  8/11/2017     IR GASTRO JEJUNOSTOMY TUBE CHANGE  10/20/2017     IR GASTRO JEJUNOSTOMY TUBE CHANGE  1/2/2018     IR GASTRO JEJUNOSTOMY TUBE CHANGE  2/16/2018     IR GASTRO JEJUNOSTOMY TUBE CHANGE  5/16/2018     IR GASTRO JEJUNOSTOMY TUBE CHANGE  6/11/2018     IR GASTRO JEJUNOSTOMY TUBE CHANGE  9/12/2018     IR GASTRO JEJUNOSTOMY TUBE CHANGE  12/24/2018     IR GASTRO JEJUNOSTOMY TUBE CHANGE  3/18/2019     IR GASTRO JEJUNOSTOMY TUBE CHANGE  4/23/2019     IR GASTRO JEJUNOSTOMY TUBE CHANGE  5/21/2019     IR GASTRO JEJUNOSTOMY TUBE CHANGE  9/9/2019     IR GASTRO JEJUNOSTOMY TUBE CHANGE  9/26/2019     IR GASTRO JEJUNOSTOMY TUBE CHANGE  10/11/2019     IR GASTRO JEJUNOSTOMY TUBE CHANGE  11/21/2019     IR GASTRO JEJUNOSTOMY TUBE CHANGE  11/29/2019     IR GASTRO JEJUNOSTOMY TUBE CHANGE  2/27/2020     IR GASTRO JEJUNOSTOMY TUBE CHANGE  4/10/2020     IR GASTRO JEJUNOSTOMY TUBE CHANGE  7/2/2020     IR GASTRO JEJUNOSTOMY TUBE CHANGE  8/28/2020     IR GASTRO JEJUNOSTOMY TUBE CHANGE  9/17/2020     IR GASTRO JEJUNOSTOMY TUBE CHANGE  10/29/2020     IR GASTRO JEJUNOSTOMY TUBE CHANGE  11/6/2020     IR GASTRO JEJUNOSTOMY TUBE CHANGE  12/24/2020     IR GASTRO JEJUNOSTOMY TUBE CHANGE  1/9/2021     IR GASTRO JEJUNOSTOMY TUBE CHANGE  6/9/2021     IR GASTRO JEJUNOSTOMY TUBE CHANGE  10/4/2021     IR GASTRO JEJUNOSTOMY TUBE CHANGE  10/18/2021     IR GASTRO JEJUNOSTOMY TUBE CHANGE  1/14/2022     IR GASTRO JEJUNOSTOMY TUBE CHANGE  1/20/2022     IR GASTRO JEJUNOSTOMY TUBE CHANGE  4/19/2022     IR GASTRO JEJUNOSTOMY TUBE CHANGE  6/7/2022     IR GASTRO JEJUNOSTOMY TUBE CHANGE  8/19/2022     IR GASTRO JEJUNOSTOMY TUBE CHANGE  9/13/2022     IR GASTRO JEJUNOSTOMY TUBE CHANGE  10/27/2022      IR GASTRO JEJUNOSTOMY TUBE CHANGE  11/3/2022     IR GASTRO JEJUNOSTOMY TUBE PLACEMENT  6/14/2016     IR GJ TUBE REPLACEMENT  12/24/2018     IR GJ TUBE REPLACEMENT  3/18/2019     IR GJ TUBE REPLACEMENT  4/23/2019     IR GJ TUBE REPLACEMENT  5/21/2019     IR GJ TUBE REPLACEMENT  9/9/2019     IR GJ TUBE REPLACEMENT  9/26/2019     IR GJ TUBE REPLACEMENT  10/11/2019     IR GJ TUBE REPLACEMENT  11/21/2019     IR GJ TUBE REPLACEMENT  11/29/2019     IR GJ TUBE REPLACEMENT  2/27/2020     IR GJ TUBE REPLACEMENT  4/10/2020     IR GJ TUBE REPLACEMENT  7/2/2020     IR GJ TUBE REPLACEMENT  8/28/2020     IR GJ TUBE REPLACEMENT  9/17/2020     IR GJ TUBE REPLACEMENT  10/29/2020     IR GJ TUBE REPLACEMENT  11/6/2020     IR GJ TUBE REPLACEMENT  12/24/2020     IR GJ TUBE REPLACEMENT  1/9/2021     IR GJ TUBE REPLACEMENT  6/9/2021     J-G tube       PICC  6/10/2016          TRACHEOSTOMY      Removed        Social History   I have reviewed this patient's social history and updated it with pertinent information if needed. Liu Malave  reports that he quit smoking about 17 years ago. His smoking use included cigarettes. He has a 30.00 pack-year smoking history. He quit smokeless tobacco use about 15 years ago. He reports that he does not drink alcohol and does not use drugs.    Family History   I have reviewed this patient's family history and updated it with pertinent information if needed.  Family History   Problem Relation Age of Onset     Hypertension Mother      Cancer Father      Other Cancer Father      Kidney Disease Brother        Prior to Admission Medications   Prior to Admission Medications   Prescriptions Last Dose Informant Patient Reported? Taking?   LIDOCAINE PAIN RELIEF 4 % Patch none on  No Yes   Sig: PLACE 1 PATCH ONTO THE SKIN EVERY 24 HOURS TO PREVENT LIDOCAINE TOXICITY, PATIENT SHOULD BE PATCH FREE FOR 12 HOURS DAILY.   Polyethylene Glycol 400 (VISINE DRY EYE RELIEF) 1 % SOLN prn  No Yes   Sig: Apply 1 drop to eye  every 6 hours as needed (dry eyes)   QUEtiapine (SEROQUEL) 25 MG tablet 2022 at 1900  No Yes   Sig: Give 3 tablets (75 mg) at bedtime for sleep. Ok to repeat after 4 hours and during the day PRN   acetaminophen (TYLENOL) 500 MG tablet prn  Yes Yes   Si,000 mg by Gastric Tube route every 6 hours as needed for mild pain    amLODIPine (NORVASC) 5 MG tablet 2022  Yes Yes   Sig: Take 5 mg by mouth daily   aspirin (ASA) 81 MG chewable tablet   No Yes   Si tablet (81 mg) by Per Feeding Tube route daily   baclofen (LIORESAL) 10 MG tablet 2022 at 1900  No Yes   Sig: Take 1 tablet (10 mg) by mouth 3 times daily as needed for muscle spasms   bisacodyl (DULCOLAX) 10 MG suppository 2022  No Yes   Sig: INSERT 1 SUPPOSTIORY INTO THE RECTUM DAILY AS NEEDED FOR CONSTIPATION.   chlorhexidine (PERIDEX) 0.12 % solution 2022  Yes Yes   Sig: Swish and spit 15 mLs in mouth daily    esomeprazole (NEXIUM) 20 MG DR capsule 2022  No Yes   Sig: Take 1 capsule via G-tube twice daily   Patient taking differently: 20 mg daily Take 1 capsule via G-tube twice daily   ferrous sulfate 220 (44 Fe) MG/5ML ELIX 2022  No Yes   Si mLs by Enteral route daily   guaiFENesin-dextromethorphan (ROBITUSSIN DM) 100-10 MG/5ML syrup prn  No Yes   Sig: 10 mLs by Per G Tube route every 4 hours as needed for cough   ipratropium - albuterol 0.5 mg/2.5 mg/3 mL (DUONEB) 0.5-2.5 (3) MG/3ML neb solution 2022 at 2100  No Yes   Sig: Take 1 vial (3 mLs) by nebulization every 4 hours   meclizine (ANTIVERT) 25 MG tablet prn  No No   Sig: Take 1 tablet via G-tube every 6 hours as needed for vertigo   metoprolol tartrate (LOPRESSOR) 25 MG tablet 2022 at x1  No Yes   Sig: TAKE ONE TABLET BY MOUTH TWICE DAILY    neomycin-bacitracin-polymyxin (NEOSPORIN) 5-400-5000 ointment prn  No Yes   Sig: Apply topically 4 times daily   nitroFURantoin macrocrystal-monohydrate (MACROBID) 100 MG capsule 2022 at AM  No Yes  "  Sig: Take 1 capsule by G-tube route at bedtime.   polyethylene glycol (MIRALAX) 17 GM/Dose powder prn  No Yes   Sig: Give 1 capful via G-tube daily as needed for constipation   simvastatin (ZOCOR) 20 MG tablet 2022 at 1900  No Yes   Si tablet (20 mg) by Per G Tube route At Bedtime      Facility-Administered Medications: None     Allergies   Allergies   Allergen Reactions     Diphenhydramine Unknown     Trazodone Other (See Comments)     \"shaking\" per family       Physical Exam   Vital Signs: Temp: (!) 103.3  F (39.6  C) Temp src: Rectal BP: 139/82 Pulse: (!) 133   Resp: 22 SpO2: 96 % O2 Device: Nasal cannula Oxygen Delivery: 2 LPM  Weight: 0 lbs 0 oz     Constitutional:  No acute distress.  Comfortable.  Occasional deep loose cough.  ENT: no scleral icterus    NECK: soft and supple. No lymphadenopathy or masses. Airway is midline.  THROAT:  Oropharynx moist. Dentition is in poor repair.    RESP:  No respiratory distress, no wheezing.  CV:  Normal rate.  Normal rhythm.  No murmur. DP 2+ bilaterally  GI:  Soft, nondistended, nontender.  G tube site is clean and dry.   MSK:  R upper extremity contracture and RLE extension.  Moves left leg and foot.  Integument:  Warm, dry, no rash.  Psych:  Unable to assess.        Data   Data reviewed today: I reviewed all medications, new labs and imaging results over the last 24 hours.    Recent Labs   Lab 226   WBC 11.8*   HGB 11.6*   MCV 97         POTASSIUM 5.1*   CHLORIDE 107   CO2 24   BUN 39*   CR 1.28   ANIONGAP 8   SAMIR 8.9      ALBUMIN 3.3*   PROTTOTAL 8.7*   BILITOTAL 0.3   ALKPHOS 63   ALT 28   AST 26     Recent Results (from the past 24 hour(s))   Head CT w/o contrast    Narrative    EXAM: CT HEAD W/O CONTRAST  LOCATION: [facility]  DATE/TIME: 2022 10:45 PM    INDICATION: Altered mental status  COMPARISON:  CT head 2022.  TECHNIQUE: Routine CT Head without IV contrast. Multiplanar reformats. " Dose reduction techniques were used.    FINDINGS:  INTRACRANIAL CONTENTS: No intracranial hemorrhage, extraaxial collection, or mass effect.  No CT evidence of acute infarct.  Similar appearance of multifocal encephalomalacia since 09/18/2022. Mild to moderate generalized volume loss. No hydrocephalus.     VISUALIZED ORBITS/SINUSES/MASTOIDS: No intraorbital abnormality. No paranasal sinus mucosal disease. No middle ear or mastoid effusion.    BONES/SOFT TISSUES: No acute abnormality.       Impression    IMPRESSION:  1.  No acute intracranial process or significant change since 09/18/2022.   CT Chest Pulmonary Embolism w Contrast    Narrative    EXAM: CT CHEST PULMONARY EMBOLISM W CONTRAST  LOCATION: [facility]  DATE/TIME: 12/11/2022 10:46 PM    INDICATION: sob, fever  COMPARISON: 10/28/2022  TECHNIQUE: CT chest pulmonary angiogram during arterial phase injection of IV contrast. Multiplanar reformats and MIP reconstructions were performed. Dose reduction techniques were used.   CONTRAST: Isovue 370 90ml    FINDINGS:  ANGIOGRAM CHEST: Pulmonary arteries are normal caliber and negative for pulmonary emboli. Thoracic aorta is negative for dissection. No CT evidence of right heart strain.    LUNGS AND PLEURA: Emphysema, linear scarring and benign calcifications involving upper lobes are unchanged. Lower lobes demonstrate a similar amount of infiltrate and atelectasis is previous exam though there is been some shifting of opacities now with   more airspace disease within posterior left lower lobe and anterior inferior right lower lobe with other areas resolving; lower lobe findings suggest new or recurrent regions of pneumonia.    MEDIASTINUM/AXILLAE: Normal.    CORONARY ARTERY CALCIFICATION: Moderate.    UPPER ABDOMEN: Gastrojejunostomy tube appears in good position.    MUSCULOSKELETAL: Normal.      Impression    IMPRESSION:  1.  No pulmonary emboli.  2.  Mild patchy zones of new airspace  infiltrate at lung bases suggests developing pneumonia.  3.  Extensive chronic pulmonary scarring.

## 2022-12-12 NOTE — PROGRESS NOTES
"Care Management Initial Consult    General Information  Assessment completed with: Children, Idalmis (daughter)  Type of CM/SW Visit: Initial Assessment     Readmission within the last 30 days: no previous admission in last 30 days      Reason for Consult: discharge planning      Communication Assessment  Patient's communication style: spoken language (non-English)    Hearing Difficulty or Deaf: no   Wear Glasses or Blind: no    Living Environment:   People in home: child(sam), adult, spouse     Current living Arrangements: house      Able to return to prior arrangements: yes       Family/Social Support:  Care provided by: child(sam), spouse/significant other  Provides care for: no one, unable/limited ability to care for self  Marital Status:   Wife, Children          Description of Support System: Supportive, Involved         Current Resources:   Patient receiving home care services: No   Community Resources: [employer] Worker ()  Equipment currently used at home:  \"have everything\"   Supplies currently used at home: \"have everything\"     Employment/Financial:  Employment Status: disabled         Financial Concerns: No concerns identified        Functional Status:  Prior to admission patient needed assistance: family provides all cares        Mental Health Status: none identified          Chemical Dependency Status: none identified              Values/Beliefs:  Spiritual, Cultural Beliefs, Jewish Practices, Values that affect care: no               Additional Information:  Chart reviewed. CM following for discharge needs. Patient assessed 12/12/22 with assistance of Idalmis Malave (daughter). Patient lives with his spouse and his adult children including Idalmis Malave (daughter). Patient receiving home care services: No   Community Resources: Toy Worker ()  Equipment currently used at home:  \"have everything\"   Supplies currently used at home: \"have everything\" . Family provides all care. " Family will transport home at time of hospital discharge.       Faith Dumont RN

## 2022-12-12 NOTE — PROGRESS NOTES
"O 2 2 lpm. SpO2 98 %, O2 decreased to 1 lpm. BS clear bilat, Duoneb tx given,m Christos;erated well, BS unchanged after    /75 (BP Location: Left arm)   Pulse 89   Temp 99.1  F (37.3  C) (Axillary)   Resp 22   Ht 1.575 m (5' 2\")   Wt 57.6 kg (127 lb)   SpO2 100%   BMI 23.23 kg/m      RT to monitor      "

## 2022-12-12 NOTE — ED PROVIDER NOTES
Emergency Department Encounter         FINAL IMPRESSION:  PNA        ED COURSE AND MEDICAL DECISION MAKING   8:58 PM Medical student Tristian Camarena met with the patient.   9:05 PM I introduced myself to the patient, obtained patient history, performed a physical exam, and discussed plan for ED workup including potential diagnostic laboratory/imaging studies and interventions.    ED Course as of 12/11/22 2205   Sun Dec 11, 2022   2118 Patient is a 57-year-old male with a history of hemorrhagic stroke now nonverbal and mobile with a G-tube, history of multiple recurrent aspiration pneumonia most recently in October of this year treated with IV vancomycin and meropenem with positive blood cultures, here with 2 days of worsening breathing.  Patient rolled over 2 days ago and has since been having difficulty breathing with cough and congestion.  Patient does have as needed oxygen at home which has been used more often.  Congested lung sounds.  Arrives tonight via EMS febrile and hypotensive as well as tachycardic.  His lungs are congested bilaterally.  He is full code.  Abdomen is benign.  No leg swelling.  Plan for septic work-up.  We will start with vancomycin as well as meropenem which were used during his last admission as well as CT chest.     -CT scan showing new pneumonia.  Patient given vancomycin and meropenem.  Lactate downtrending.  No CYNTHIA.  Hemodynamically stable otherwise with stable blood pressures.  Mild tachycardia.  No significant hypoxia.  Patient admitted to the resident service.                         EKG      At the conclusion of the encounter I discussed the results of all the tests and the disposition. The questions were answered. The patient or family acknowledged understanding and was agreeable with the care plan.                  MEDICATIONS GIVEN IN THE EMERGENCY DEPARTMENT:  Medications   meropenem (MERREM) 1 g vial to attach to  mL bag (1 g Intravenous New Bag 12/11/22 1813)  "  vancomycin (VANCOCIN) 1,000 mg in 0.9% NaCl 250 mL intermittent infusion (has no administration in time range)   lactated ringers BOLUS 2,000 mL (2,000 mLs Intravenous New Bag 12/11/22 2552)       NEW PRESCRIPTIONS STARTED AT TODAY'S ED VISIT:  New Prescriptions    No medications on file       HPI     Patient information obtained from: patient's daughter    Use of Interpretor: N/A     Liu GIL Malave is a 57 year old male with a pertinent history of stroke, seizures, hypertension, and GERD who presents to this ED via EMS for evaluation of URI and cough.    Per patient's daughter, the patient has been unable to clear secretions for the last couple of days. Last night the patient had multiple \"coughing and choking\" episodes as they were moving him from his chair to the bath. Patient also seems more tired and weak than usual. Today at 6:30 PM, the patient had another choking and coughing episode for an hour along with worsening breathing which prompted them to call EMS. Denies any fever or sickness over the last couple of days. Bowel movements and urinary output has been normal. No new falls or confusion. Patient uses albuterol at home and uses O2 only when his stats are low. Patient is full code.     Patient had pneumonia 1 month ago and has gotten better. He also had pneumonia earlier this year in May and last year. Patient used to smoke. Patient has no advanced care directives.     Per nurse, patient's rectal temperature is 103.3. BP  Is 89/63.         REVIEW OF SYSTEMS:  Review of Systems Limited due to patient being nonverbal.   Constitutional: Negative for malaise. Positive for fever and fatigue.   HEENT: Negative runny nose, sore throat, ear pain, neck pain  Respiratory: Negative for congestion. Positive for shortness of breath, cough and choking.   Cardiovascular: Negative for chest pain, leg edema  Gastrointestinal: Negative for abdominal distention, abdominal pain, constipation, vomiting, nausea, " diarrhea  Genitourinary: Negative for dysuria and hematuria.   Integument: Negative for rash, skin breakdown  Neurological: Negative for paresthesias, headache.  Positive for weakness.   Musculoskeletal: Negative for joint pain, joint swelling      All other systems reviewed and are negative.          MEDICAL HISTORY     Past Medical History:   Diagnosis Date     Acute UTI 02/21/2020     Anxiety      Basal ganglia hemorrhage (H) 06/20/2016     Brain compression (H)      Bruxism      Chronic static encephalopathy      Chronic static encephalopathy      Chronic static encephalopathy      Convulsions, unspecified convulsion type (H)      CVA (cerebral vascular accident) (H) 07/2016     Depression      Dyslipidemia      Dyslipidemia      Elevated troponin      Essential hypertension      Feeding by G-tube (H) 06/04/2020     GERD (gastroesophageal reflux disease)      GERD without esophagitis      Hemiplegia of nondominant side, late effect of cerebrovascular disease (H)      Hemorrhagic stroke (H) 06/05/2016     Hepatitis C carrier (H)      History of ESBL E. coli infection      History of hemorrhagic stroke with residual hemiparesis (H)      History of stroke with residual deficit      HTN (hypertension)      Hypertension      Lung nodule 12/05/2016     MRSA (methicillin resistant staph aureus) culture positive      Periodontal disease      Persistent fever      Pneumonia of both lower lobes due to infectious organism      Respiratory failure with hypoxia (H)      Seizure (H)      Sepsis due to urinary tract infection (H) 06/04/2020     Severe sepsis (H)      Stroke (H)      Trismus        Past Surgical History:   Procedure Laterality Date     aneurysm clipping  2006     BRAIN SURGERY       EXAM UNDER ANESTHESIA, RESTORATIONS, EXTRACTION(S) DENTAL COMPLEX, COMBINED N/A 12/19/2016    Procedure: COMBINED EXAM UNDER ANESTHESIA, RESTORATIONS, EXTRACTION(S) DENTAL COMPLEX;  Surgeon: Verónica Martinez DDS;  Location:  OR      EXAM UNDER ANESTHESIA, RESTORATIONS, EXTRACTION(S) DENTAL COMPLEX, COMBINED N/A 3/7/2018    Procedure: COMBINED EXAM UNDER ANESTHESIA, RESTORATIONS, EXTRACTION(S) DENTAL COMPLEX;  Kenalog Injection in Bilateral [place], Combined Dental Exam,Radiographs, Three Dental Restorations, Periodontal Therapy and Fluoride Treatment;  Surgeon: Darryl Isbell DDS;  Location: [facility] OR     HH MIDLINE INSERTION  8/11/2017          INJECT STEROID (LOCATION) N/A 3/7/2018    Procedure: INJECT STEROID (LOCATION);  Kenalog Injection In Bilateral [place], Combined Dental Exam,Radiographs,Three Dental Restorations, Periodontal Therapy and Fluoride Treatment;  Surgeon: Liu Conroy DDS;  Location: [facility] OR     IR GASTRO JEJUNOSTOMY TUBE CHANGE  7/19/2016     IR GASTRO JEJUNOSTOMY TUBE CHANGE  9/23/2016     IR GASTRO JEJUNOSTOMY TUBE CHANGE  11/28/2016     IR GASTRO JEJUNOSTOMY TUBE CHANGE  1/27/2017     IR GASTRO JEJUNOSTOMY TUBE CHANGE  4/19/2017     IR GASTRO JEJUNOSTOMY TUBE CHANGE  6/20/2017     IR GASTRO JEJUNOSTOMY TUBE CHANGE  8/7/2017     IR GASTRO JEJUNOSTOMY TUBE CHANGE  8/11/2017     IR GASTRO JEJUNOSTOMY TUBE CHANGE  10/20/2017     IR GASTRO JEJUNOSTOMY TUBE CHANGE  1/2/2018     IR GASTRO JEJUNOSTOMY TUBE CHANGE  2/16/2018     IR GASTRO JEJUNOSTOMY TUBE CHANGE  5/16/2018     IR GASTRO JEJUNOSTOMY TUBE CHANGE  6/11/2018     IR GASTRO JEJUNOSTOMY TUBE CHANGE  9/12/2018     IR GASTRO JEJUNOSTOMY TUBE CHANGE  12/24/2018     IR GASTRO JEJUNOSTOMY TUBE CHANGE  3/18/2019     IR GASTRO JEJUNOSTOMY TUBE CHANGE  4/23/2019     IR GASTRO JEJUNOSTOMY TUBE CHANGE  5/21/2019     IR GASTRO JEJUNOSTOMY TUBE CHANGE  9/9/2019     IR GASTRO JEJUNOSTOMY TUBE CHANGE  9/26/2019     IR GASTRO JEJUNOSTOMY TUBE CHANGE  10/11/2019     IR GASTRO JEJUNOSTOMY TUBE CHANGE  11/21/2019     IR GASTRO JEJUNOSTOMY TUBE CHANGE  11/29/2019     IR GASTRO JEJUNOSTOMY TUBE CHANGE  2/27/2020     IR GASTRO JEJUNOSTOMY TUBE CHANGE  4/10/2020     IR GASTRO  JEJUNOSTOMY TUBE CHANGE  2020     IR GASTRO JEJUNOSTOMY TUBE CHANGE  2020     IR GASTRO JEJUNOSTOMY TUBE CHANGE  2020     IR GASTRO JEJUNOSTOMY TUBE CHANGE  10/29/2020     IR GASTRO JEJUNOSTOMY TUBE CHANGE  2020     IR GASTRO JEJUNOSTOMY TUBE CHANGE  2020     IR GASTRO JEJUNOSTOMY TUBE CHANGE  2021     IR GASTRO JEJUNOSTOMY TUBE CHANGE  2021     IR GASTRO JEJUNOSTOMY TUBE CHANGE  10/4/2021     IR GASTRO JEJUNOSTOMY TUBE CHANGE  10/18/2021     IR GASTRO JEJUNOSTOMY TUBE CHANGE  2022     IR GASTRO JEJUNOSTOMY TUBE CHANGE  2022     IR GASTRO JEJUNOSTOMY TUBE CHANGE  2022     IR GASTRO JEJUNOSTOMY TUBE CHANGE  2022     IR GASTRO JEJUNOSTOMY TUBE CHANGE  2022     IR GASTRO JEJUNOSTOMY TUBE CHANGE  2022     IR GASTRO JEJUNOSTOMY TUBE CHANGE  10/27/2022     IR GASTRO JEJUNOSTOMY TUBE CHANGE  11/3/2022     IR GASTRO JEJUNOSTOMY TUBE PLACEMENT  2016     IR GJ TUBE REPLACEMENT  2018     IR GJ TUBE REPLACEMENT  3/18/2019     IR GJ TUBE REPLACEMENT  2019     IR GJ TUBE REPLACEMENT  2019     IR GJ TUBE REPLACEMENT  2019     IR GJ TUBE REPLACEMENT  2019     IR GJ TUBE REPLACEMENT  10/11/2019     IR GJ TUBE REPLACEMENT  2019     IR GJ TUBE REPLACEMENT  2019     IR GJ TUBE REPLACEMENT  2020     IR GJ TUBE REPLACEMENT  4/10/2020     IR GJ TUBE REPLACEMENT  2020     IR GJ TUBE REPLACEMENT  2020     IR GJ TUBE REPLACEMENT  2020     IR GJ TUBE REPLACEMENT  10/29/2020     IR GJ TUBE REPLACEMENT  2020     IR GJ TUBE REPLACEMENT  2020     IR GJ TUBE REPLACEMENT  2021     IR GJ TUBE REPLACEMENT  2021     J-G tube       PICC  6/10/2016          TRACHEOSTOMY      Removed       Social History     Tobacco Use     Smoking status: Former     Packs/day: 1.00     Years: 30.00     Pack years: 30.00     Types: Cigarettes     Quit date: 2005     Years since quittin.5     Smokeless tobacco: Former      Quit date: 6/1/2007     Tobacco comments:     quit 2006   Vaping Use     Vaping Use: Never used   Substance Use Topics     Alcohol use: No     Drug use: No       acetaminophen (TYLENOL) 500 MG tablet  amLODIPine (NORVASC) 5 MG tablet  aspirin (ASA) 81 MG chewable tablet  baclofen (LIORESAL) 10 MG tablet  bisacodyl (DULCOLAX) 10 MG suppository  chlorhexidine (PERIDEX) 0.12 % solution  esomeprazole (NEXIUM) 20 MG DR capsule  ferrous sulfate 220 (44 Fe) MG/5ML ELIX  guaiFENesin-dextromethorphan (ROBITUSSIN DM) 100-10 MG/5ML syrup  ipratropium - albuterol 0.5 mg/2.5 mg/3 mL (DUONEB) 0.5-2.5 (3) MG/3ML neb solution  LIDOCAINE PAIN RELIEF 4 % Patch  metoprolol tartrate (LOPRESSOR) 25 MG tablet  neomycin-bacitracin-polymyxin (NEOSPORIN) 5-400-5000 ointment  nitroFURantoin macrocrystal-monohydrate (MACROBID) 100 MG capsule  polyethylene glycol (MIRALAX) 17 GM/Dose powder  Polyethylene Glycol 400 (VISINE DRY EYE RELIEF) 1 % SOLN  QUEtiapine (SEROQUEL) 25 MG tablet  simvastatin (ZOCOR) 20 MG tablet  meclizine (ANTIVERT) 25 MG tablet            PHYSICAL EXAM     BP (!) 143/86   Pulse 120   Temp (!) 103.3  F (39.6  C) (Rectal)   Resp 22   SpO2 93%       PHYSICAL EXAM:     General: Patient appears well, nontoxic, comfortable. Febrile.   HEENT: Moist mucous membranes,  No head trauma.  No midline neck pain.  Cardiovascular: Hypotensive as well as tachycardic, normal rhythm, no extremity edema.  No appreciable murmur.  Respiratory: No wheezes rhonchi or rales.  Congested lung sounds bilaterally.   Abdominal: Soft, nontender, nondistended, no palpable masses, no guarding, no rebound  Musculoskeletal: Full range of motion of joints, no deformities appreciated.  Neurological: Alert, moving upper extremities spontaneously   Psychological: Normal affect and mood.  Integument: No rashes appreciated          RESULTS       Labs Ordered and Resulted from Time of ED Arrival to Time of ED Departure   COMPREHENSIVE METABOLIC PANEL -  Abnormal       Result Value    Sodium 139      Potassium 5.1 (*)     Chloride 107      Carbon Dioxide (CO2) 24      Anion Gap 8      Urea Nitrogen 39 (*)     Creatinine 1.28      Calcium 8.9      Glucose 108      Alkaline Phosphatase 63      AST 26      ALT 28      Protein Total 8.7 (*)     Albumin 3.3 (*)     Bilirubin Total 0.3      GFR Estimate 64     LACTIC ACID WHOLE BLOOD - Abnormal    Lactic Acid 2.1 (*)    CBC WITH PLATELETS AND DIFFERENTIAL - Abnormal    WBC Count 11.8 (*)     RBC Count 3.72 (*)     Hemoglobin 11.6 (*)     Hematocrit 36.2 (*)     MCV 97      MCH 31.2      MCHC 32.0      RDW 12.8      Platelet Count 246      % Neutrophils 80      % Lymphocytes 13      % Monocytes 4      % Eosinophils 2      % Basophils 0      % Immature Granulocytes 1      NRBCs per 100 WBC 0      Absolute Neutrophils 9.5 (*)     Absolute Lymphocytes 1.5      Absolute Monocytes 0.5      Absolute Eosinophils 0.2      Absolute Basophils 0.0      Absolute Immature Granulocytes 0.1      Absolute NRBCs 0.0     TROPONIN I - Normal    Troponin I 0.02     MAGNESIUM - Normal    Magnesium 2.6     B-TYPE NATRIURETIC PEPTIDE ([facility] ONLY) - Normal    BNP <10     LACTIC ACID WHOLE BLOOD   BLOOD CULTURE   BLOOD CULTURE       CT Chest Pulmonary Embolism w Contrast    (Results Pending)   Head CT w/o contrast    (Results Pending)                     PROCEDURES:  Procedures:  Procedures       I, Donis Trujillo am serving as a scribe to document services personally performed by Tadeo Berg DO, based on my observations and the provider's statements to me.  I, Tadeo Berg DO, attest that Donis Trujillo is acting in a scribe capacity, has observed my performance of the services and has documented them in accordance with my direction.    Tadeo Berg DO  Emergency Medicine  [facility] EMERGENCY ROOM     Tadeo Berg DO  12/12/22 0002

## 2022-12-12 NOTE — PHARMACY-VANCOMYCIN DOSING SERVICE
Pharmacy Vancomycin Initial Note  Date of Service 2022  Patient's  1965  57 year old, male    Indication: Aspiration Pneumonia    Current estimated CrCl = Estimated Creatinine Clearance: 50.6 mL/min (based on SCr of 1.28 mg/dL).    Creatinine for last 3 days  2022:  9:26 PM Creatinine 1.28 mg/dL    Recent Vancomycin Level(s) for last 3 days  No results found for requested labs within last 72 hours.      Vancomycin IV Administrations (past 72 hours)                   vancomycin (VANCOCIN) 1,000 mg in 0.9% NaCl 250 mL intermittent infusion (mg) 1,000 mg New Bag 22 224                Nephrotoxins and other renal medications (From now, onward)    None          Contrast Orders - past 72 hours (72h ago, onward)    Start     Dose/Rate Route Frequency Stop    22 223  iopamidol (ISOVUE-370) solution 90 mL         90 mL Intravenous ONCE 22          [employer] Prediction of Planned Initial Vancomycin Regimen  Loading dose: N/A  Regimen: 1250 mg IV every 24 hours.  Start time: 05:44 on 2022  Exposure target: AUC24 (range)400-600 mg/L.hr   AUC24,ss: 520 mg/L.hr  Probability of AUC24 > 400: 79 %  Ctrough,ss: 15 mg/L  Probability of Ctrough,ss > 20: 24 %  Probability of nephrotoxicity (Lodise ALMA ): 10 %          Plan:  1. Start vancomycin  1,250 mg IV q24h.   2. Vancomycin monitoring method: AUC  3. Vancomycin therapeutic monitoring goal: 400-600 mg*h/L  4. Pharmacy will check vancomycin levels as appropriate in 1-3 Days.    5. Serum creatinine levels will be ordered daily for the first week of therapy and at least twice weekly for subsequent weeks.      Thank you for the consult.   Tri Epstein, PharmD, BCPS

## 2022-12-12 NOTE — PROGRESS NOTES
"CLINICAL NUTRITION SERVICES - ASSESSMENT NOTE     Nutrition Prescription    RECOMMENDATIONS FOR MDs/PROVIDERS TO ORDER:  None    Malnutrition Status:    Does not meet 2 criteria     Recommendations already ordered by Registered Dietitian (RD):  Ok to resume home TF   4 cartons of Peptamen 1.5 with prebio (family to bring in)   I would change rate to get all 4 cartons in  Run for 12hrs at 55ml/hr  Run for 12hrs at 30ml/hr   This will better get him to 4 cartons daily   4 cartons   =1500 calories, 68g protein, 192g CHO  772ml water  Ok to give fluid flushes 180ml (split between port) every 4 hrs. Anticipate more with meds       Future/Additional Recommendations:  Will monitor TF      REASON FOR ASSESSMENT  Liu GIL Malave is a/an 57 year old male assessed by the dietitian for Provider Order - Registered Dietitian to Assess and Order TF per Medical Nutrition Therapy Protocol      Pt admitted with suspected aspiration pneumonia, sepsis, stroke, nonverbal, feeding tube dependent       NUTRITION HISTORY  Per daughter pt gets Peptamen 1.5 with prebio at home.   They run it at 50ml/hr from 6am-7pm  And then from 7pm-6am they run it at 30ml/hr or whatever gets him 4 cartons in. Sometimes he will get 5 cartons daily. However the rate they gave won't give 4 cartons daily   He is fed through the j-port  Family ok bringing his formula in  4 cartons   =1500 calories, 68g protein, 192g CHO  772ml water      He also gets 180ml FWF every 2 hrs, split between port   CURRENT NUTRITION ORDERS  Diet: NPO    LABS  Labs reviewed    MEDICATIONS  Medications reviewed    ANTHROPOMETRICS  Height: 157.5 cm (5' 2\")  Most Recent Weight: 57.6 kg (127 lb)    IBW: 54 kg  BMI: Normal BMI  Weight History:   Wt Readings from Last 10 Encounters:   12/12/22 57.6 kg (127 lb)   10/30/22 61.5 kg (135 lb 9.3 oz)   09/19/22 60 kg (132 lb 4.4 oz)   05/30/22 59 kg (130 lb)   04/07/22 59 kg (130 lb)   01/28/22 59 kg (130 lb)   03/07/18 36.3 kg (80 lb)   02/28/18 " 36.3 kg (80 lb)   12/19/16 48.7 kg (107 lb 5.8 oz)   12/05/16 54.4 kg (120 lb)   Wt typically around 130#    Dosing Weight: 54 kg    ASSESSED NUTRITION NEEDS  Estimated Energy Needs: 9393-3199 kcals/day (25 - 30 kcals/kg)  Justification: Maintenance  Estimated Protein Needs: 54-65 grams protein/day (1 - 1.2 grams of pro/kg)  Justification: Maintenance  Estimated Fluid Needs: 9161-4317 mL/day (25 - 30 mL/kg)   Justification: Maintenance    PHYSICAL FINDINGS  See malnutrition section below.  GI: fecal incontinence   Skin: no breakdown      MALNUTRITION:  % Weight Loss:  Weight loss does not meet criteria for malnutrition   % Intake:  No decreased intake noted  Subcutaneous Fat Loss:  None observed  Muscle Loss:  None observed  Fluid Retention:  None noted    Malnutrition Diagnosis: Patient does not meet two of the above criteria necessary for diagnosing malnutrition      NUTRITION DIAGNOSIS  Swallowing difficulty related to dysphagia as evidenced by need for TF       INTERVENTIONS  Implementation  Ok to resume home TF   4 cartons of Peptamen 1.5 with prebio (family to bring in)   I would change rate to get all 4 cartons in  Run for 12hrs at 55ml/hr  Run for 12hrs at 30ml/hr   This will better get him to 4 cartons daily   4 cartons   =1500 calories, 68g protein, 192g CHO  772ml water  Ok to give fluid flushes 180ml (split between port) every 4 hrs. Anticipate more with meds       Goals  Tolerate Enteral nutrition      Monitoring/Evaluation  Progress toward goals will be monitored and evaluated per protocol.

## 2022-12-13 VITALS
RESPIRATION RATE: 22 BRPM | BODY MASS INDEX: 23.83 KG/M2 | OXYGEN SATURATION: 97 % | TEMPERATURE: 98.6 F | WEIGHT: 129.5 LBS | HEIGHT: 62 IN | HEART RATE: 101 BPM | DIASTOLIC BLOOD PRESSURE: 82 MMHG | SYSTOLIC BLOOD PRESSURE: 131 MMHG

## 2022-12-13 LAB
ANION GAP SERPL CALCULATED.3IONS-SCNC: 8 MMOL/L (ref 5–18)
BASOPHILS # BLD AUTO: 0 10E3/UL (ref 0–0.2)
BASOPHILS NFR BLD AUTO: 0 %
BUN SERPL-MCNC: 17 MG/DL (ref 8–22)
CALCIUM SERPL-MCNC: 8 MG/DL (ref 8.5–10.5)
CHLORIDE BLD-SCNC: 115 MMOL/L (ref 98–107)
CO2 SERPL-SCNC: 15 MMOL/L (ref 22–31)
CREAT SERPL-MCNC: 0.95 MG/DL (ref 0.7–1.3)
EOSINOPHIL # BLD AUTO: 0.4 10E3/UL (ref 0–0.7)
EOSINOPHIL NFR BLD AUTO: 3 %
ERYTHROCYTE [DISTWIDTH] IN BLOOD BY AUTOMATED COUNT: 12.8 % (ref 10–15)
GFR SERPL CREATININE-BSD FRML MDRD: >90 ML/MIN/1.73M2
GLUCOSE BLD-MCNC: 80 MG/DL (ref 70–125)
GLUCOSE BLDC GLUCOMTR-MCNC: 84 MG/DL (ref 70–99)
HCT VFR BLD AUTO: 31.2 % (ref 40–53)
HGB BLD-MCNC: 10 G/DL (ref 13.3–17.7)
IMM GRANULOCYTES # BLD: 0.1 10E3/UL
IMM GRANULOCYTES NFR BLD: 0 %
LYMPHOCYTES # BLD AUTO: 1.9 10E3/UL (ref 0.8–5.3)
LYMPHOCYTES NFR BLD AUTO: 17 %
MCH RBC QN AUTO: 31.3 PG (ref 26.5–33)
MCHC RBC AUTO-ENTMCNC: 32.1 G/DL (ref 31.5–36.5)
MCV RBC AUTO: 98 FL (ref 78–100)
MONOCYTES # BLD AUTO: 0.7 10E3/UL (ref 0–1.3)
MONOCYTES NFR BLD AUTO: 6 %
NEUTROPHILS # BLD AUTO: 8.7 10E3/UL (ref 1.6–8.3)
NEUTROPHILS NFR BLD AUTO: 74 %
NRBC # BLD AUTO: 0 10E3/UL
NRBC BLD AUTO-RTO: 0 /100
PLATELET # BLD AUTO: 196 10E3/UL (ref 150–450)
POTASSIUM BLD-SCNC: 4.2 MMOL/L (ref 3.5–5)
RBC # BLD AUTO: 3.19 10E6/UL (ref 4.4–5.9)
SODIUM SERPL-SCNC: 138 MMOL/L (ref 136–145)
WBC # BLD AUTO: 11.8 10E3/UL (ref 4–11)

## 2022-12-13 PROCEDURE — 250N000011 HC RX IP 250 OP 636: Performed by: STUDENT IN AN ORGANIZED HEALTH CARE EDUCATION/TRAINING PROGRAM

## 2022-12-13 PROCEDURE — 36415 COLL VENOUS BLD VENIPUNCTURE: CPT | Performed by: STUDENT IN AN ORGANIZED HEALTH CARE EDUCATION/TRAINING PROGRAM

## 2022-12-13 PROCEDURE — 80048 BASIC METABOLIC PNL TOTAL CA: CPT | Performed by: STUDENT IN AN ORGANIZED HEALTH CARE EDUCATION/TRAINING PROGRAM

## 2022-12-13 PROCEDURE — 94640 AIRWAY INHALATION TREATMENT: CPT

## 2022-12-13 PROCEDURE — 999N000157 HC STATISTIC RCP TIME EA 10 MIN

## 2022-12-13 PROCEDURE — 250N000013 HC RX MED GY IP 250 OP 250 PS 637

## 2022-12-13 PROCEDURE — 250N000009 HC RX 250: Performed by: STUDENT IN AN ORGANIZED HEALTH CARE EDUCATION/TRAINING PROGRAM

## 2022-12-13 PROCEDURE — 94640 AIRWAY INHALATION TREATMENT: CPT | Mod: 76

## 2022-12-13 PROCEDURE — 99238 HOSP IP/OBS DSCHRG MGMT 30/<: CPT | Mod: GC

## 2022-12-13 PROCEDURE — 250N000013 HC RX MED GY IP 250 OP 250 PS 637: Performed by: STUDENT IN AN ORGANIZED HEALTH CARE EDUCATION/TRAINING PROGRAM

## 2022-12-13 PROCEDURE — 85025 COMPLETE CBC W/AUTO DIFF WBC: CPT | Performed by: STUDENT IN AN ORGANIZED HEALTH CARE EDUCATION/TRAINING PROGRAM

## 2022-12-13 PROCEDURE — 99233 SBSQ HOSP IP/OBS HIGH 50: CPT | Performed by: INTERNAL MEDICINE

## 2022-12-13 RX ORDER — BISACODYL 10 MG
10 SUPPOSITORY, RECTAL RECTAL EVERY OTHER DAY
Status: DISCONTINUED | OUTPATIENT
Start: 2022-12-14 | End: 2022-12-13 | Stop reason: HOSPADM

## 2022-12-13 RX ORDER — QUETIAPINE FUMARATE 25 MG/1
25 TABLET, FILM COATED ORAL DAILY PRN
Status: DISCONTINUED | OUTPATIENT
Start: 2022-12-13 | End: 2022-12-13 | Stop reason: HOSPADM

## 2022-12-13 RX ORDER — IPRATROPIUM BROMIDE AND ALBUTEROL SULFATE 2.5; .5 MG/3ML; MG/3ML
1 SOLUTION RESPIRATORY (INHALATION) EVERY 4 HOURS PRN
Status: DISCONTINUED | OUTPATIENT
Start: 2022-12-13 | End: 2022-12-13 | Stop reason: HOSPADM

## 2022-12-13 RX ORDER — CEFDINIR 125 MG/5ML
300 POWDER, FOR SUSPENSION ORAL 2 TIMES DAILY
Qty: 120 ML | Refills: 0 | Status: SHIPPED | OUTPATIENT
Start: 2022-12-13 | End: 2022-12-18

## 2022-12-13 RX ADMIN — IPRATROPIUM BROMIDE AND ALBUTEROL SULFATE 3 ML: 2.5; .5 SOLUTION RESPIRATORY (INHALATION) at 07:53

## 2022-12-13 RX ADMIN — IPRATROPIUM BROMIDE AND ALBUTEROL SULFATE 3 ML: 2.5; .5 SOLUTION RESPIRATORY (INHALATION) at 11:41

## 2022-12-13 RX ADMIN — Medication 220 MG: at 09:34

## 2022-12-13 RX ADMIN — BACLOFEN 10 MG: 10 TABLET ORAL at 12:34

## 2022-12-13 RX ADMIN — ASPIRIN 81 MG CHEWABLE TABLET 81 MG: 81 TABLET CHEWABLE at 09:35

## 2022-12-13 RX ADMIN — QUETIAPINE FUMARATE 25 MG: 25 TABLET ORAL at 10:10

## 2022-12-13 RX ADMIN — MEROPENEM 1 G: 1 INJECTION, POWDER, FOR SOLUTION INTRAVENOUS at 13:51

## 2022-12-13 RX ADMIN — MEROPENEM 1 G: 1 INJECTION, POWDER, FOR SOLUTION INTRAVENOUS at 06:11

## 2022-12-13 RX ADMIN — IPRATROPIUM BROMIDE AND ALBUTEROL SULFATE 3 ML: 2.5; .5 SOLUTION RESPIRATORY (INHALATION) at 00:52

## 2022-12-13 ASSESSMENT — ACTIVITIES OF DAILY LIVING (ADL)
ADLS_ACUITY_SCORE: 61
ADLS_ACUITY_SCORE: 57
ADLS_ACUITY_SCORE: 61
ADLS_ACUITY_SCORE: 61
ADLS_ACUITY_SCORE: 57
ADLS_ACUITY_SCORE: 61

## 2022-12-13 NOTE — PLAN OF CARE
Problem: Enteral Nutrition  Goal: Feeding Tolerance  12/12/2022 2141 by Riaz Meyers RN  Outcome: Progressing    Problem: Pneumonia  Goal: Effective Oxygenation and Ventilation  Outcome: Progressing    Pt resting comfortably this shift, no events. He is nonverbal, Danish is his primary language, family available for translation. Total cares, Q2 turns. GJ tube infusing TF at 30 mL/hr, increase to 55 mL/hr at 0600 x 12 hours. NPO. Incontinent of bowel & bladder, primofit in place, pt had BM today. NS infusing at 100 mL/hr into L PIV. Has a somewhat productive cough, suction utilized. PRN baclofen administered per family request. Does not appear to be in any distress at this time.

## 2022-12-13 NOTE — DISCHARGE SUMMARY
[facility]  Discharge Summary - Medicine       Date of Admission:  12/11/2022  Date of Discharge:  12/13/2022  Discharging Provider: Dr Saleem Payne  Discharge Service: Hospitalist Service    Discharge Diagnoses   Acute on recurrent aspiration pneumonia  Sepsis, resolved  History of hemorrhagic stroke x2  Chronic encephalopathy  Hemiplegia, spasticity  Nonverbal  Dysphagia, feeding tube dependent  History of COPD without exacerbation  Essential hypertension    Follow-ups Needed After Discharge      Follow-up Appointments      Follow-up with primary MD in 1 week to discuss risks vs benefits of current medications and possible alternatives, as well as set goals for care.    Follow-up with pulmonology in 2-week regarding aspiration prophylaxis  Resume vest therapy  Resume feeding per G-tube     Resume prior to admission medications as outlined below      Unresulted Labs Ordered in the Past 30 Days of this Admission     Date and Time Order Name Status Description    12/12/2022  4:41 AM Respiratory Aerobic Bacterial Culture Preliminary     12/11/2022  8:53 PM Blood Culture Peripheral Blood Preliminary     12/11/2022  8:53 PM Blood Culture Peripheral Blood Preliminary       These results will be followed up by PCP    Discharge Disposition   Discharged to home with family support.  Condition at discharge: Stable    Hospital Course   [facility] GIL Malave was admitted on 12/11/2022 for sepsis due to suspected aspiration pneumonia. 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 Patient was hospitalized most recently on 10/28/2022 for suspected recurrent aspiration pneumonia.    Patient presented to the ED on 12/11 and initial evaluation revealed fever 103.3,  hypotension 89/63, tachycardia, and congested lung sounds. He received 2 L IV fluids in the ED and his hypotension improved. CT chest abdomen pelvis was suggestive of developing pneumonia, abdomen negative. Patient started on IV meropenem and vancomycin. Lactic acidosis resolved. WBC downtrending. Patient stable on room air since early yesterday. No growth to date on blood cultures, preliminary sputum culture revealed mixed brandon. Routine respiratory viral panel, MRSA screen negative. ID consulted, recommended discharging home on Omnicef and metronidazole for 5 days via G-tube.    Wheelchair-bound, nonverbal at baseline. Nutrition consulted, patient did not meet criteria for malnutrition and patient resumed home TF. PTA seroquel was continued as needed for agitation. PTA Baclofen 10 mg PRN was held given lengthy side effect profile. PTA Macrobid was held given current abx regimen and long term risk of pulmonary fibrosis. PTA antihypertensives were held due to presenting hypotension, but okay to resume at discharge as patient BPs now stable.     High risk for aspiration pneumonia. Recommended pulmonology outpatient referral to discuss further prevention strategies. Given significant improvement of course discharging home with family support. Encouraged patient's family to follow up with PCP after discharge to discuss current medication regimen and goals of care moving forward. Recommend discussing possible outpatient pulmonology or ID referral given recurrent episodes. Recommend to discuss other options for UTI prophylaxis.       Consultations This Hospital Stay   PHARMACY TO DOSE VANCO  NUTRITION SERVICES ADULT IP CONSULT  PHARMACY TO DOSE VANCO  INFECTIOUS DISEASES IP CONSULT  PHARMACY IP CONSULT    Code Status   Full Code        Resident/Fellow Attestation   I, Terrie Fields MD, was present with the medical/[employer] student who participated in the service and in the documentation of the note.  I have verified the  history and personally performed the physical exam and medical decision making.  I agree with the assessment and plan of care as documented in the note.      Terrie Fields MD  PGY2  Date of Service (when I saw the patient): 12/13/22    The patient was discussed with attending physician, Dr. Saleem Payne.    Ange Waters, [facility] 3 ICU  [address] [address]  Phone: [phone]  Fax: [phone]  ______________________________________________________________________    Physical Exam   Vital Signs: Temp: 98.6  F (37  C) Temp src: Axillary BP: 128/77 Pulse: 102   Resp: 20 SpO2: 95 % O2 Device: None (Room air)    Weight: 129 lbs 8 oz   Constitutional: alert but comfortable, no apparent distress; does not follow instructions  HEENT: normocepalic, without obvious abnormality  Respiratory: Course breath sounds R>L, slight expiratory wheeze, rattling cough present  Cardiovascular: Regular rate and rhythm, no murmur noted  GI: soft, nondistended, nontender. G tube in place, site clean and dry.  Skin: warm, dry, no rash on exposed skin  Musculoskeletal: RUE contracture, RLE extension  Neuropsychiatric: unable to assess, pt nonverbal      Primary Care Physician   Lynda Gutierrez    Discharge Orders   No discharge procedures on file.    Significant Results and Procedures   Most Recent 3 CBC's:  Recent Labs   Lab Test 12/13/22  1109 12/12/22  0610 12/11/22 2126   WBC 11.8* 19.3* 11.8*   HGB 10.0* 10.4* 11.6*   MCV 98 101* 97    195 246     Most Recent 3 BMP's:  Recent Labs   Lab Test 12/13/22  1109 12/13/22  0934 12/12/22  1639 12/12/22  0818 12/12/22  0610 12/11/22 2126     --   --   --  137 139   POTASSIUM 4.2  --   --   --  5.1* 5.1*   CHLORIDE 115*  --   --   --  113* 107   CO2 15*  --   --   --  19* 24   BUN 17  --   --   --  29* 39*   CR 0.95  --   --   --  1.03 1.28   ANIONGAP 8  --   --   --  5 8   SAMIR 8.0*  --   --   --   8.2* 8.9   GLC 80 84 95   < > 101 108    < > = values in this interval not displayed.     Most Recent 2 LFT's:  Recent Labs   Lab Test 12/11/22  2126 10/29/22  0733   AST 26 19   ALT 28 16   ALKPHOS 63 54   BILITOTAL 0.3 0.5     7-Day Micro Results     Collected Updated Procedure Result Status      12/12/2022 0932 12/12/2022 1312 MRSA MSSA PCR, Nasal Swab [[id number]]    Swab from Nares, Bilateral    Final result Component Value   MRSA Target DNA Negative   SA Target DNA Negative            12/12/2022 0540 12/13/2022 0944 Respiratory Aerobic Bacterial Culture [[id number]]   Sputum from Endotracheal    Preliminary result Component Value   Culture Culture in progress  [P]    Gram Stain Result <25 PMNs/low power field  [P]     4+ Mixed brandon  [P]                12/12/2022 0117 12/12/2022 0204 Symptomatic Influenza A/B & SARS-CoV2 (COVID-19) Virus PCR Multiplex Nasopharyngeal [[id number]]    Swab from Nasopharyngeal    Final result Component Value   Influenza A PCR Negative   Influenza B PCR Negative   RSV PCR Negative   SARS CoV2 PCR Negative   NEGATIVE: SARS-CoV-2 (COVID-19) RNA not detected, presumed negative.            12/11/2022 2141 12/13/2022 0031 Blood Culture Peripheral Blood [[id number]]   Peripheral Blood    Preliminary result Component Value   Culture No growth after 1 day  [P]                12/11/2022 2126 12/13/2022 0031 Blood Culture Peripheral Blood [[id number]]   Peripheral Blood    Preliminary result Component Value   Culture No growth after 1 day  [P]                  ,   Results for orders placed or performed during the hospital encounter of 12/11/22   CT Chest Pulmonary Embolism w Contrast    Narrative    EXAM: CT CHEST PULMONARY EMBOLISM W CONTRAST  LOCATION: [facility]  DATE/TIME: 12/11/2022 10:46 PM    INDICATION: sob, fever  COMPARISON: 10/28/2022  TECHNIQUE: CT chest pulmonary angiogram during arterial phase injection of IV contrast. Multiplanar reformats  and MIP reconstructions were performed. Dose reduction techniques were used.   CONTRAST: Isovue 370 90ml    FINDINGS:  ANGIOGRAM CHEST: Pulmonary arteries are normal caliber and negative for pulmonary emboli. Thoracic aorta is negative for dissection. No CT evidence of right heart strain.    LUNGS AND PLEURA: Emphysema, linear scarring and benign calcifications involving upper lobes are unchanged. Lower lobes demonstrate a similar amount of infiltrate and atelectasis is previous exam though there is been some shifting of opacities now with   more airspace disease within posterior left lower lobe and anterior inferior right lower lobe with other areas resolving; lower lobe findings suggest new or recurrent regions of pneumonia.    MEDIASTINUM/AXILLAE: Normal.    CORONARY ARTERY CALCIFICATION: Moderate.    UPPER ABDOMEN: Gastrojejunostomy tube appears in good position.    MUSCULOSKELETAL: Normal.      Impression    IMPRESSION:  1.  No pulmonary emboli.  2.  Mild patchy zones of new airspace infiltrate at lung bases suggests developing pneumonia.  3.  Extensive chronic pulmonary scarring.   Head CT w/o contrast    Narrative    EXAM: CT HEAD W/O CONTRAST  LOCATION: [facility]  DATE/TIME: 12/11/2022 10:45 PM    INDICATION: Altered mental status  COMPARISON:  CT head 09/18/2022.  TECHNIQUE: Routine CT Head without IV contrast. Multiplanar reformats. Dose reduction techniques were used.    FINDINGS:  INTRACRANIAL CONTENTS: No intracranial hemorrhage, extraaxial collection, or mass effect.  No CT evidence of acute infarct.  Similar appearance of multifocal encephalomalacia since 09/18/2022. Mild to moderate generalized volume loss. No hydrocephalus.     VISUALIZED ORBITS/SINUSES/MASTOIDS: No intraorbital abnormality. No paranasal sinus mucosal disease. No middle ear or mastoid effusion.    BONES/SOFT TISSUES: No acute abnormality.       Impression    IMPRESSION:  1.  No acute intracranial process  or significant change since 09/18/2022.       Discharge Medications   Current Discharge Medication List      CONTINUE these medications which have NOT CHANGED    Details   acetaminophen (TYLENOL) 500 MG tablet 1,000 mg by Gastric Tube route every 6 hours as needed for mild pain       amLODIPine (NORVASC) 5 MG tablet Take 5 mg by mouth daily      aspirin (ASA) 81 MG chewable tablet 1 tablet (81 mg) by Per Feeding Tube route daily  Qty: 90 tablet, Refills: 0    Associated Diagnoses: History of stroke      baclofen (LIORESAL) 10 MG tablet Take 1 tablet (10 mg) by mouth 3 times daily as needed for muscle spasms  Qty: 270 tablet, Refills: 3    Associated Diagnoses: Muscle spasm      bisacodyl (DULCOLAX) 10 MG suppository INSERT 1 SUPPOSTIORY INTO THE RECTUM DAILY AS NEEDED FOR CONSTIPATION.  Qty: 30 suppository, Refills: 8    Associated Diagnoses: Slow transit constipation      chlorhexidine (PERIDEX) 0.12 % solution Swish and spit 15 mLs in mouth daily       esomeprazole (NEXIUM) 20 MG DR capsule Take 1 capsule via G-tube twice daily  Qty: 180 capsule, Refills: 3    Associated Diagnoses: GERD without esophagitis      ferrous sulfate 220 (44 Fe) MG/5ML ELIX 5 mLs by Enteral route daily  Qty: 150 mL, Refills: 11    Associated Diagnoses: Iron deficiency      guaiFENesin-dextromethorphan (ROBITUSSIN DM) 100-10 MG/5ML syrup 10 mLs by Per G Tube route every 4 hours as needed for cough  Qty: 236 mL, Refills: 2    Associated Diagnoses: Pneumonia of both lungs due to infectious organism, unspecified part of lung      ipratropium - albuterol 0.5 mg/2.5 mg/3 mL (DUONEB) 0.5-2.5 (3) MG/3ML neb solution Take 1 vial (3 mLs) by nebulization every 4 hours  Qty: 90 mL, Refills: 4    Associated Diagnoses: Cough      LIDOCAINE PAIN RELIEF 4 % Patch PLACE 1 PATCH ONTO THE SKIN EVERY 24 HOURS TO PREVENT LIDOCAINE TOXICITY, PATIENT SHOULD BE PATCH FREE FOR 12 HOURS DAILY.  Qty: 30 patch, Refills: 3    Associated Diagnoses: Multiple joint  "pain      metoprolol tartrate (LOPRESSOR) 25 MG tablet TAKE ONE TABLET BY MOUTH TWICE DAILY   Qty: 60 tablet, Refills: 11    Associated Diagnoses: Essential hypertension      neomycin-bacitracin-polymyxin (NEOSPORIN) 5-400-5000 ointment Apply topically 4 times daily  Qty: 30 g, Refills: 3    Associated Diagnoses: Irritation around percutaneous endoscopic gastrostomy (PEG) tube site (H)      nitroFURantoin macrocrystal-monohydrate (MACROBID) 100 MG capsule Take 1 capsule by G-tube route at bedtime.  Qty: 90 capsule, Refills: 3    Associated Diagnoses: Recurrent UTI      polyethylene glycol (MIRALAX) 17 GM/Dose powder Give 1 capful via G-tube daily as needed for constipation  Qty: 850 g, Refills: 3    Associated Diagnoses: Slow transit constipation      Polyethylene Glycol 400 (VISINE DRY EYE RELIEF) 1 % SOLN Apply 1 drop to eye every 6 hours as needed (dry eyes)  Qty: 15 mL, Refills: 11    Associated Diagnoses: Dry eyes      QUEtiapine (SEROQUEL) 25 MG tablet Give 3 tablets (75 mg) at bedtime for sleep. Ok to repeat after 4 hours and during the day PRN  Qty: 102 tablet, Refills: 3    Associated Diagnoses: Agitation      simvastatin (ZOCOR) 20 MG tablet 1 tablet (20 mg) by Per G Tube route At Bedtime  Qty: 90 tablet, Refills: 0    Comments: Dose decrease  Associated Diagnoses: Dyslipidemia      meclizine (ANTIVERT) 25 MG tablet Take 1 tablet via G-tube every 6 hours as needed for vertigo  Qty: 30 tablet, Refills: 11    Associated Diagnoses: Vertigo           Allergies   Allergies   Allergen Reactions     Diphenhydramine Unknown     Trazodone Other (See Comments)     \"shaking\" per family     "

## 2022-12-13 NOTE — PLAN OF CARE
Goal Outcome Evaluation:      Plan of Care Reviewed With: patient  [facility]'s daughter,Idalmis. Daughter, Idalmis will  prescriptions at [facility]. All questions answered and patient was discharged with daughter

## 2022-12-13 NOTE — PROGRESS NOTES
Pt resting this shift. Does become irritable with cares and  Doesn't like to be positioned on his sides. Does have a congested cough and rattling noise in back of throat. Not able to suction any secretions. TF per JT. Urine output per external catheter. IV @ 100cc/hr. VSS. Wife at bedside.     06:50 Pt increase frequency of rattling cough with increase of TF rate & water bolus. HOB to 60 degrees & TF held until MD sees pt this am (?aspiration).

## 2022-12-13 NOTE — DISCHARGE INSTRUCTIONS
Thank you for the opportunity to take care of you during your hospitalization! Best of luck on your recovery journey.    - Please  the antibiotics from [facility] to start taking tomorrow for a total of 5 days.  - Please follow up with your primary care provider within 1 week to evaluate how he is doing. Consider discussing with your primary care provider about seeing a lung doctor (pulmonologist) to prevent further episodes of aspiration pneumonia.    Sincerely,  Dr. Bazzi

## 2022-12-13 NOTE — PROGRESS NOTES
[facility]    Infectious Disease Progress Note    Date of Service : 12/13/2022     Assessment & Plan   Liu GIL Malave is a 57 year old male who was admitted on 12/11/2022.     ASSESSMENT:  1. Coughing, aspiration, mucous  2. Clinically better and at baseline and patient's daughter requesting patient to be discharged today  3. CT chest with airspace disease, previous history of recurrent admissions, aspiration  4. History of CVA nonverbal hemiplegia  5. Chronic encephalopathy  6. Dysphagia  7. Previous blood cultures with Staphylococcus but then coper in September 2022  8. G-tube site has been replaced in the past  9. History of MRSA and ESBL producing organisms per previous reports      RECOMMENDATIONS:    1. Antibiotics: Meropenem vancomycin while hospitalized.  2. Discharge on Omnicef and metronidazole for 5 days via G-tube.  Patient's daughter stated that the regimen given on previous discharge was effective and well-tolerated.  3. Follow culture results   4. Discussed with patient's daughter at bedside, and charge nurse  5. ID will sign off.  Please call with questions      Yaima Landrum MD  [facility]  [phone]      Interval History   Daughter at bedside, states that patient wants to go home  Patient at baseline according to patient's daughter    Physical Exam   Temp: 99.5  F (37.5  C) Temp src: Axillary BP: (!) 161/77 Pulse: 92   Resp: 22 SpO2: 96 % O2 Device: None (Room air)    Vitals:    12/12/22 0100 12/13/22 0429   Weight: 57.6 kg (127 lb) 58.7 kg (129 lb 8 oz)     Vital Signs with Ranges  Temp:  [98.1  F (36.7  C)-99.6  F (37.6  C)] 99.5  F (37.5  C)  Pulse:  [73-98] 92  Resp:  [20-26] 22  BP: (109-161)/(63-84) 161/77  SpO2:  [92 %-98 %] 96 %    Constitutional: Awake, noncommunicative to physician, notes to patient's daughter  Lungs: Coarse, on room air  Cardiovascular: Regular rate and rhythm  Abdomen: Distended, G-tube  Skin: warm  Neuro:  deconditioned, hemiplegia, contractures  Psych: Nonverbal    Medications     dextrose       sodium chloride 100 mL/hr at 12/13/22 0000       [Held by provider] amLODIPine  5 mg Oral Daily     aspirin  81 mg Per Feeding Tube Daily     [START ON 12/14/2022] bisacodyl  10 mg Rectal Every Other Day     chlorhexidine  15 mL Swish & Spit Daily     ferrous sulfate  220 mg Per G Tube Daily     ipratropium - albuterol 0.5 mg/2.5 mg/3 mL  1 vial Nebulization Q4H     meropenem  1 g Intravenous Q8H     [Held by provider] metoprolol tartrate  25 mg Oral BID     [Held by provider] nitroFURantoin macrocrystal-monohydrate  100 mg Per G Tube At Bedtime     pantoprazole  40 mg Oral QAM AC     QUEtiapine  50 mg Per G Tube At Bedtime     simvastatin  20 mg Per G Tube At Bedtime     sodium chloride (PF)  3 mL Intracatheter Q8H     vancomycin  1,250 mg Intravenous Q24H       Data   All microbiology laboratory data reviewed.  Recent Labs   Lab Test 12/12/22  0610 12/11/22  2126 10/31/22  1143   WBC 19.3* 11.8* 11.0   HGB 10.4* 11.6* 10.8*   HCT 33.1* 36.2* 34.5*   * 97 99    246 241     Recent Labs   Lab Test 12/12/22  0610 12/11/22  2126 10/31/22  1143   CR 1.03 1.28 1.07     Recent Labs   Lab Test 05/30/22  1519   SED 74*     No lab results found.    Invalid input(s):     MICROBIOLOGY:    Reviewed    7-Day Micro Results     Collected Updated Procedure Result Status      12/12/2022 0932 12/12/2022 1312 MRSA MSSA PCR, Nasal Swab [[id number]]    Swab from Nares, Bilateral    Final result Component Value   MRSA Target DNA Negative   SA Target DNA Negative            12/12/2022 0540 12/13/2022 0944 Respiratory Aerobic Bacterial Culture [[id number]]   Sputum from Endotracheal    Preliminary result Component Value   Culture Culture in progress  [P]    Gram Stain Result <25 PMNs/low power field  [P]     4+ Mixed brandon  [P]                12/12/2022 0117 12/12/2022 0204 Symptomatic Influenza A/B & SARS-CoV2 (COVID-19) Virus  PCR Multiplex Nasopharyngeal [[id number]]    Swab from Nasopharyngeal    Final result Component Value   Influenza A PCR Negative   Influenza B PCR Negative   RSV PCR Negative   SARS CoV2 PCR Negative   NEGATIVE: SARS-CoV-2 (COVID-19) RNA not detected, presumed negative.            12/11/2022 2141 12/13/2022 0031 Blood Culture Peripheral Blood [[id number]]   Peripheral Blood    Preliminary result Component Value   Culture No growth after 1 day  [P]                12/11/2022 2126 12/13/2022 0031 Blood Culture Peripheral Blood [[id number]]   Peripheral Blood    Preliminary result Component Value   Culture No growth after 1 day  [P]                       RADIOLOGY:    Reviewed  CT Chest Pulmonary Embolism w Contrast    Result Date: 12/11/2022  EXAM: CT CHEST PULMONARY EMBOLISM W CONTRAST LOCATION: [facility] DATE/TIME: 12/11/2022 10:46 PM INDICATION: sob, fever COMPARISON: 10/28/2022 TECHNIQUE: CT chest pulmonary angiogram during arterial phase injection of IV contrast. Multiplanar reformats and MIP reconstructions were performed. Dose reduction techniques were used. CONTRAST: Isovue 370 90ml FINDINGS: ANGIOGRAM CHEST: Pulmonary arteries are normal caliber and negative for pulmonary emboli. Thoracic aorta is negative for dissection. No CT evidence of right heart strain. LUNGS AND PLEURA: Emphysema, linear scarring and benign calcifications involving upper lobes are unchanged. Lower lobes demonstrate a similar amount of infiltrate and atelectasis is previous exam though there is been some shifting of opacities now with more airspace disease within posterior left lower lobe and anterior inferior right lower lobe with other areas resolving; lower lobe findings suggest new or recurrent regions of pneumonia. MEDIASTINUM/AXILLAE: Normal. CORONARY ARTERY CALCIFICATION: Moderate. UPPER ABDOMEN: Gastrojejunostomy tube appears in good position. MUSCULOSKELETAL: Normal.     IMPRESSION: 1.  No  pulmonary emboli. 2.  Mild patchy zones of new airspace infiltrate at lung bases suggests developing pneumonia. 3.  Extensive chronic pulmonary scarring.    Head CT w/o contrast    Result Date: 12/11/2022  EXAM: CT HEAD W/O CONTRAST LOCATION: [facility] DATE/TIME: 12/11/2022 10:45 PM INDICATION: Altered mental status COMPARISON:  CT head 09/18/2022. TECHNIQUE: Routine CT Head without IV contrast. Multiplanar reformats. Dose reduction techniques were used. FINDINGS: INTRACRANIAL CONTENTS: No intracranial hemorrhage, extraaxial collection, or mass effect.  No CT evidence of acute infarct.  Similar appearance of multifocal encephalomalacia since 09/18/2022. Mild to moderate generalized volume loss. No hydrocephalus. VISUALIZED ORBITS/SINUSES/MASTOIDS: No intraorbital abnormality. No paranasal sinus mucosal disease. No middle ear or mastoid effusion. BONES/SOFT TISSUES: No acute abnormality.     IMPRESSION: 1.  No acute intracranial process or significant change since 09/18/2022.     Attestation:  Total time on the floor involved in the patient's care: 35 minutes. Total time spent in counseling/care coordination: >50%

## 2022-12-13 NOTE — PROGRESS NOTES
"Care Management Discharge Note    Discharge Date: 12/14/2022    Discharge Disposition: Home    Discharge Services: None    Discharge Transportation: family or friend will provide     Education Provided on the Discharge Plan:  AVS per bedside RN    Persons Notified of Discharge Plans: daughter    Patient/Family in Agreement with the Plan: yes        Additional Information:  Chart reviewed. CM following for discharge needs. Daughter states they \"have everything\"  that they need for supplies and equipment. Family will transport home at time of hospital discharge.     [facility] referral sent per protocol.     Faith Dumont RN        "

## 2022-12-14 ENCOUNTER — PATIENT OUTREACH (OUTPATIENT)
Dept: CARE COORDINATION | Facility: CLINIC | Age: 57
End: 2022-12-14

## 2022-12-14 NOTE — PROGRESS NOTES
CHW and [place]  talked to patient's daughter, Idalmis. Patient was currently unavailable. Consent to communicate on file. CHW talked to [facility] for hospital discharge/follow-up and was unable to offer Clinic Care Coordination to patient who is established within the [facility] system and is eligible for CCC. CHW created a new program for Primary Care-Care Coordination.    Clinic Care Coordination Contact  [facility]: Post-Discharge Note  SITUATION                                                      Admission:    Admission Date: 12/11/22   Reason for Admission: URI, Cough  Discharge:   Discharge Date: 12/13/22  Discharge Diagnosis: Acute on recurrent aspiration pneumonia, Sepsis, resolved, History of hemorrhagic stroke x2, Chronic encephalopathy, Hemiplegia, spasticity, Nonverbal  Dysphagia, feeding tube dependent, History of COPD without exacerbation, Essential hypertension    BACKGROUND                                                      Per hospital discharge summary and inpatient provider notes:    History of Present Illness     Liu GIL Malave is a 57 year old male with a pertinent history of  hemorrhagic stroke resulting in chronic encephalopathy, hemiplegia, dysphagia, nonverbal, seizures, hypertension, GERD, MRSA, ESBL urinary tract infection, recurrent aspiration pneumonia, hepatitis C carrier, former smoker, bruxism presented from home with increasing fatigue, cough, fever.  He has been unable to clear secretions and having coughing and choking episodes for the past several days.  He does have as needed oxygen to use at home.     Patient has a history of basal ganglia hemorrhage at 51 years old resulting in significant brain damage requiring him to be dependent on others for all cares.  He has a feeding tube and takes nothing p.o.  He does not have a Kelly catheter.     He was seen in the ED in September for aspiration pneumonia and was treated with clindamycin by G-tube.  Recently admitted to  "hospitalist service for aspiration pneumonia that was treated with meropenem and vancomycin.  Blood culture grew Staphylococcus pettenkoferi in 09/2022.  He was treated with IV meropenem and vancomycin and discharged home with Omnicef and Flagyl to complete 10-day course.     Patient has significant risk of aspiration despite getting nutrition by tube feed.  He has difficulty coughing up sputum.  Vest therapy was started at discharge.     Initial evaluation revealed fever 103.3, hypotension 89/63, tachycardia, and congested lung sounds.  Septic work-up showed a lactate >2.  He received 2 L IV fluids and his hypotension improved.  Labs were notable for K5.1, creatinine 1.28, BNP <10, glucose 108, WBC 11.8 with neutrophilic predominance, hemoglobin 9.6.  Blood cultures are pending.  Routine respiratory viral panel was negative.  CT chest abdomen pelvis was suggestive of developing pneumonia.  Abdomen negative.       Initial treatment included vancomycin and meropenem.  Tube feeds are being held.       ASSESSMENT      Discharge Assessment  How are you doing now that you are home?: \"He is doing okay yeah he feels more comortable and has slept a little more. Still tired.\"  How are your symptoms? (Red Flag symptoms escalate to triage hotline per guidelines): Improved  Do you feel your condition is stable enough to be safe at home until your provider visit?: Yes  Does the patient have their discharge instructions? : Yes  Does the patient have questions regarding their discharge instructions? : No  Were you started on any new medications or were there changes to any of your previous medications? : Yes  Does the patient have all of their medications?: Yes  Do you have questions regarding any of your medications? : No  Do you have all of your needed medical supplies or equipment (DME)?  (i.e. oxygen tank, CPAP, cane, etc.): Yes  Discharge follow-up appointment scheduled within 14 calendar days? : Yes (CHW scheduled pt a PCP " f/u appt)  Discharge Follow Up Appointment Date: 12/21/22  Discharge Follow Up Appointment Scheduled with?: Primary Care Provider    Post-op (CHW CTA Only)  If the patient had a surgery or procedure, do they have any questions for a nurse?: No      PLAN                                                      Outpatient Plan:      Follow-up and recommended labs and tests  Follow up with primary care provider, Lynda Gutierrez, within 7 days for hospital follow- up. Consider talking with primary care provider to see a lung doctor to prevent further episodes of pneumonia in the future.      Follow-ups Needed After Discharge         Follow-up Appointments                 Follow-up with primary MD in 1 week to discuss risks vs benefits of current medications and possible alternatives, as well as set goals for care.               Follow-up with pulmonology in 2-week regarding aspiration prophylaxis  Resume vest therapy  Resume feeding per G-tube     Resume prior to admission medications as outlined below       Future Appointments   Date Time Provider Department Center   12/21/2022  5:30 PM Lynda Gutierrez MD [facility]         For any urgent concerns, please contact our 24 hour nurse triage line: [phone] ([phone])         BARTOLO Wadsworth  [phone]  [facility]

## 2022-12-15 ENCOUNTER — PATIENT OUTREACH (OUTPATIENT)
Dept: CARE COORDINATION | Facility: CLINIC | Age: 57
End: 2022-12-15

## 2022-12-15 NOTE — PROGRESS NOTES
Clinic Care Coordination Contact  Community Health Worker Initial Outreach    CHW Note:    CHW contacted patients daughter Idalmis regarding a referral to CCC. CHW introduced self and role of CCC.    Idalmis shared that she has been assisting the patient and they are doing good at this time. CHW informed Idalmis of introduction letter that would be sent via [employer] and encouraged her to reach out in the future if she has questions or concerns. Idalmis shared that she has access to patients MyChart and agreed to reach out to CCC if needed.     Patient accepts CC: No, patients daughter Idalmis declined CCC at this time. Patient will be sent Care Coordination introduction letter for future reference.       Corine Adames  Community Health Worker   [facility] Care Coordination  [facility]   [email or web address]  Office: [phone]

## 2022-12-15 NOTE — LETTER
[facility] CARE COORDINATION  [facility]   December 15, 2022    Liu Malave  [address] [facility] [address]  [place] [address]      Dear Idalmis & Liu,        I am a clinic care coordinator who works with Lynda Gutierrez MD with the [facility]. I wanted to thank you for spending the time to talk with me.  Below is a description of clinic care coordination and how I can further assist you.       The clinic care coordination team is made up of a registered nurse, , financial resource worker and community health worker who understand the health care system. The goal of clinic care coordination is to help you manage your health and improve access to the health care system. Our team works alongside your provider to assist you in determining your health and social needs. We can help you obtain health care and community resources, providing you with necessary information and education. We can work with you through any barriers and develop a care plan that helps coordinate and strengthen the communication between you and your care team.    Please feel free to contact me with any questions or concerns regarding care coordination and what we can offer.      We are focused on providing you with the highest-quality healthcare experience possible.    Sincerely,     Corine Adames  Community Health Worker   [facility] Care Coordination  [facility]   [email or web address]  Office: [phone]

## 2022-12-16 LAB
BACTERIA SPT CULT: NORMAL
GRAM STAIN RESULT: NORMAL
GRAM STAIN RESULT: NORMAL

## 2022-12-17 LAB
BACTERIA BLD CULT: NO GROWTH
BACTERIA BLD CULT: NO GROWTH

## 2022-12-19 ENCOUNTER — TELEPHONE (OUTPATIENT)
Dept: FAMILY MEDICINE | Facility: CLINIC | Age: 57
End: 2022-12-19

## 2022-12-19 NOTE — TELEPHONE ENCOUNTER
Reason for Call:  Other letter of medical necessity form     Detailed comments: Mami from [facility] calling because she had faxed over on 12- a letter of medical necessity to us to state the need for the vest/respiratory medical device. She said the CT scan did show the need for the vest that correlates with the diagnosis code that was written on the medical necessity form that was sent over originally. She will refax again the information that was faxed on 12-. Please reach out for further clarification if needed.     Phone Number Patient can be reached at: Other phone number:  [phone]    Best Time: anytime    Can we leave a detailed message on this number? YES    Call taken on 12/19/2022 at 2:56 PM by Melida Mirza

## 2022-12-21 ENCOUNTER — VIRTUAL VISIT (OUTPATIENT)
Dept: FAMILY MEDICINE | Facility: CLINIC | Age: 57
End: 2022-12-21
Payer: COMMERCIAL

## 2022-12-21 DIAGNOSIS — D64.9 ANEMIA, UNSPECIFIED TYPE: ICD-10-CM

## 2022-12-21 DIAGNOSIS — E83.51 HYPOCALCEMIA: ICD-10-CM

## 2022-12-21 DIAGNOSIS — M62.838 MUSCLE SPASM: ICD-10-CM

## 2022-12-21 DIAGNOSIS — J69.0 RECURRENT ASPIRATION PNEUMONIA (H): Primary | ICD-10-CM

## 2022-12-21 PROCEDURE — 99214 OFFICE O/P EST MOD 30 MIN: CPT | Mod: 95 | Performed by: FAMILY MEDICINE

## 2022-12-21 RX ORDER — BACLOFEN 10 MG/1
10 TABLET ORAL 3 TIMES DAILY PRN
Qty: 270 TABLET | Refills: 3 | Status: ON HOLD | OUTPATIENT
Start: 2022-12-21 | End: 2023-02-12

## 2022-12-21 NOTE — PROGRESS NOTES
Liu is a 57 year old who is being evaluated via a billable telephone visit.      What phone number would you like to be contacted at? [phone]  How would you like to obtain your AVS? Dominique    Distant Location (provider location):  On-site    Assessment & Plan     Recurrent aspiration pneumonia (H)  Continue vest therapy.  Continue Robitussin-DM for cough.  Continue suction as needed.  Referral placed to pulmonology to discuss prophylaxis for recurrent aspiration pneumonia  - Adult Pulmonary Medicine Referral    Muscle spasm  Refill provided on baclofen  - baclofen (LIORESAL) 10 MG tablet  Dispense: 270 tablet; Refill: 3    Hypocalcemia  Patient was noted to have hypocalcemia.  We will plan to recheck this.  Lab order placed  - Basic metabolic panel  (Ca, Cl, CO2, Creat, Gluc, K, Na, BUN)    Anemia, unspecified type  We will check CBC to follow-up on anemia.  - CBC with platelets             MED REC REQUIRED  Post Medication Reconciliation Status: discharge medications reconciled, continue medications without change      No follow-ups on file.    Lynda Gutierrez MD  [facility] 57 [facility], presenting for the following health issues:  Hospital F/U      HPI   Spoke with patient's daughter Idalmis as patient is nonverbal.  Was recently hospitalized for acute on recurrent aspiration pneumonia and sepsis.  Discharged on 12/13/2022.  Completed 5 days of outpatient antibiotics.  Was advised to follow-up PCP and pulmonology.  Daughter reports that he continues to cough and have congestion.  They are using Robitussin-DM for the cough and congestion.  He has not had fevers.  Daughter wonders if he is to come in for lab visit to recheck blood work.  They would like the referral for pulmonology.  He is doing vest therapy and they are using a suction machine to help manage his secretions.  No other new concerns.  Medications were reviewed and medication reconciliation was  performed.        Review of Systems         Objective           Vitals:  No vitals were obtained today due to virtual visit.    Physical Exam   exam unable to be completed due to telephone visits                Phone call duration: 8 minutes

## 2023-01-03 ENCOUNTER — OFFICE VISIT (OUTPATIENT)
Dept: PULMONOLOGY | Facility: CLINIC | Age: 58
End: 2023-01-03
Attending: FAMILY MEDICINE
Payer: COMMERCIAL

## 2023-01-03 ENCOUNTER — HOSPITAL ENCOUNTER (OUTPATIENT)
Dept: RADIOLOGY | Facility: HOSPITAL | Age: 58
Discharge: HOME | End: 2023-01-03
Attending: INTERNAL MEDICINE | Admitting: INTERNAL MEDICINE
Payer: COMMERCIAL

## 2023-01-03 VITALS
SYSTOLIC BLOOD PRESSURE: 116 MMHG | DIASTOLIC BLOOD PRESSURE: 72 MMHG | OXYGEN SATURATION: 95 % | TEMPERATURE: 98.1 F | HEART RATE: 86 BPM

## 2023-01-03 DIAGNOSIS — J69.0 RECURRENT ASPIRATION PNEUMONIA (H): ICD-10-CM

## 2023-01-03 DIAGNOSIS — R09.82 POST-NASAL DRIP: Primary | ICD-10-CM

## 2023-01-03 PROCEDURE — 71046 X-RAY EXAM CHEST 2 VIEWS: CPT

## 2023-01-03 PROCEDURE — 99244 OFF/OP CNSLTJ NEW/EST MOD 40: CPT | Performed by: INTERNAL MEDICINE

## 2023-01-03 RX ORDER — ACETYLCYSTEINE 200 MG/ML
2 SOLUTION ORAL; RESPIRATORY (INHALATION) EVERY 4 HOURS
Qty: 360 ML | Refills: 3 | Status: SHIPPED | OUTPATIENT
Start: 2023-01-03 | End: 2023-04-25

## 2023-01-03 RX ORDER — IPRATROPIUM BROMIDE 42 UG/1
2 SPRAY, METERED NASAL 2 TIMES DAILY
Qty: 11 ML | Refills: 11 | Status: SHIPPED | OUTPATIENT
Start: 2023-01-03 | End: 2023-02-02

## 2023-01-03 NOTE — LETTER
1/3/2023       RE: Liu Malave  [address] [place]  [facility] [address]     Dear Colleague,    Thank you for referring your patient, Liu Malave, to the [facility] at [facility]. Please see a copy of my visit note below.    Assessment and Plan:Liu Malave is a 57 year old male with a past medical history significant for hemorrhagic stroke with hemiplegia and nonverbal communication who presents to clinic today for recurrent aspiration pneumonia.  He was recently hospitalized with recurrent aspiration at [facility] and we have been asked to determine if there are any other maneuvers to prevent aspiration.  He is fed with a GJ tube and it seems unlikely his aspiration is tube feed.  At this point we are managing upper airway secretions only.  We can attempt to add nasal atrovent to dry up his nasal secretions and mucomyst to help him cough up the aspirated secretions more succesfully.  It is also possible the SLP team has some suggestions for positioning or ways to stimulate a swallow as he does seem to have a swallow reflex.  Finally we discussed the most aggressive maneuver would be to provide a trach.  He does seem to have FRANKY based on his daughters description of his breathing, and what I witnessed during our talk when he fell asleep.  She is not sure she would want to be this aggressive as he has had a prior trach.  She knows he would not tolerate a CPAP mask.   1. Chronic aspiration - likely all oral secretions - work to dry his upper airway without overdrying.  He has no way to communicate a dry mouth and this could be discomforting if we go too far.  To start we can try nasal atrovent.  He does not have sinusitis on his last head CT.  Will send to SLP to get any other advice they have.  He has not seen them since his stroke in 2016.    1. Airway clearance - uses his nebulized duoneb QID, add mucomyst for use 1-2 times a day as well.   Tolerated this during his 2016 hospitalization, test dose not needed.  Has a vest, hard to use with PEG tube.  Can use a donut pad to protect (need to mention next visit).  Otherwise manual percussion may be the best approach if family willing  2. Aspiration pneumonia - his chest images show chronic migratory patchy infiltrates, will try to get a baseline CXR.    3. Chronic UTI - on daily macrobid.  This can cause lung reactions.  Will ask Dr. Gutierrez if there is an alternative choice.    4. RTC in 3 months      CCx: chronic aspiration    HPI: Mr. Malave is a 57 year old male with a history of stroke who presents for evaluation of recurrent aspiration pneumonia.  He was hospitalized at [facility] on 12/12 with aspiration pneumonia and hypoxia that rapidly resolved.  He has been in the ER at least 4 times this year with this problem.  His stroke was in 2016 and this left him non verbal with yi aspiration on swallow studies.  He has had a GJ tube since and is fed with continuous tube feeds.  His daughter provides excellent cares and is extremely well informed on his medical regimen and a good thought partner for him.  She uses nebulizers four times a day to help him clear secretions as well as NT suctioning.  They also have a vest they try to use, but it rides over his G tube and they find it hard to manage.  He also does not like his aerobika device and will spit it out. He can hear her and can communicate by thumbs up or down and sometimes pointing at a chart.  He caught covid recently at home, and while he got through this well, his aspiration events seem more and more frequent.  He will have brief fevers and chills and coughing when these occur and sometimes his sats will dip.    He did have a trach initially after his stroke, but was decannulated before discharge.  His daughter notes he will choke when he sleeps and has a broken breathing pattern.  She agrees he would not tolerate any kind of mask over his  face.    PMH:  Past Medical History:   Diagnosis Date     Acute UTI 02/21/2020     Anxiety      Basal ganglia hemorrhage (H) 06/20/2016     Brain compression (H)      Bruxism      Chronic static encephalopathy      Chronic static encephalopathy      Chronic static encephalopathy      Convulsions, unspecified convulsion type (H)      CVA (cerebral vascular accident) (H) 07/2016    Hemorrhagic,      Depression      Dyslipidemia      Dyslipidemia      Elevated troponin      Essential hypertension      Feeding by G-tube (H) 06/04/2020     GERD (gastroesophageal reflux disease)      GERD without esophagitis      Hemiplegia of nondominant side, late effect of cerebrovascular disease (H)     Created by Conversion  Replacement Utility updated for latest IMO load     Hemorrhagic stroke (H) 06/05/2016     Hepatitis C carrier (H)      History of ESBL E. coli infection      History of hemorrhagic stroke with residual hemiparesis (H)      History of stroke with residual deficit      HTN (hypertension)      Hypertension      Lung nodule 12/05/2016    9 x 9 mm left upper lobe on CXR     MRSA (methicillin resistant staph aureus) culture positive      Periodontal disease      Persistent fever      Pneumonia of both lower lobes due to infectious organism      Respiratory failure with hypoxia (H)      Seizure (H)      Sepsis due to urinary tract infection (H) 06/04/2020     Severe sepsis (H)      Stroke (H)      Trismus        PSH:  Past Surgical History:   Procedure Laterality Date     aneurysm clipping  2006     BRAIN SURGERY       EXAM UNDER ANESTHESIA, RESTORATIONS, EXTRACTION(S) DENTAL COMPLEX, COMBINED N/A 12/19/2016    Procedure: COMBINED EXAM UNDER ANESTHESIA, RESTORATIONS, EXTRACTION(S) DENTAL COMPLEX;  Surgeon: Verónica Martinez DDS;  Location: [facility] OR     EXAM UNDER ANESTHESIA, RESTORATIONS, EXTRACTION(S) DENTAL COMPLEX, COMBINED N/A 3/7/2018    Procedure: COMBINED EXAM UNDER ANESTHESIA, RESTORATIONS, EXTRACTION(S) DENTAL  COMPLEX;  Kenalog Injection in Bilateral [place], Combined Dental Exam,Radiographs, Three Dental Restorations, Periodontal Therapy and Fluoride Treatment;  Surgeon: Darryl Isbell DDS;  Location: [facility] OR     HH MIDLINE INSERTION  8/11/2017          INJECT STEROID (LOCATION) N/A 3/7/2018    Procedure: INJECT STEROID (LOCATION);  Kenalog Injection In Bilateral [place], Combined Dental Exam,Radiographs,Three Dental Restorations, Periodontal Therapy and Fluoride Treatment;  Surgeon: Liu Conroy DDS;  Location: [facility] OR     IR GASTRO JEJUNOSTOMY TUBE CHANGE  7/19/2016     IR GASTRO JEJUNOSTOMY TUBE CHANGE  9/23/2016     IR GASTRO JEJUNOSTOMY TUBE CHANGE  11/28/2016     IR GASTRO JEJUNOSTOMY TUBE CHANGE  1/27/2017     IR GASTRO JEJUNOSTOMY TUBE CHANGE  4/19/2017     IR GASTRO JEJUNOSTOMY TUBE CHANGE  6/20/2017     IR GASTRO JEJUNOSTOMY TUBE CHANGE  8/7/2017     IR GASTRO JEJUNOSTOMY TUBE CHANGE  8/11/2017     IR GASTRO JEJUNOSTOMY TUBE CHANGE  10/20/2017     IR GASTRO JEJUNOSTOMY TUBE CHANGE  1/2/2018     IR GASTRO JEJUNOSTOMY TUBE CHANGE  2/16/2018     IR GASTRO JEJUNOSTOMY TUBE CHANGE  5/16/2018     IR GASTRO JEJUNOSTOMY TUBE CHANGE  6/11/2018     IR GASTRO JEJUNOSTOMY TUBE CHANGE  9/12/2018     IR GASTRO JEJUNOSTOMY TUBE CHANGE  12/24/2018     IR GASTRO JEJUNOSTOMY TUBE CHANGE  3/18/2019     IR GASTRO JEJUNOSTOMY TUBE CHANGE  4/23/2019     IR GASTRO JEJUNOSTOMY TUBE CHANGE  5/21/2019     IR GASTRO JEJUNOSTOMY TUBE CHANGE  9/9/2019     IR GASTRO JEJUNOSTOMY TUBE CHANGE  9/26/2019     IR GASTRO JEJUNOSTOMY TUBE CHANGE  10/11/2019     IR GASTRO JEJUNOSTOMY TUBE CHANGE  11/21/2019     IR GASTRO JEJUNOSTOMY TUBE CHANGE  11/29/2019     IR GASTRO JEJUNOSTOMY TUBE CHANGE  2/27/2020     IR GASTRO JEJUNOSTOMY TUBE CHANGE  4/10/2020     IR GASTRO JEJUNOSTOMY TUBE CHANGE  7/2/2020     IR GASTRO JEJUNOSTOMY TUBE CHANGE  8/28/2020     IR GASTRO JEJUNOSTOMY TUBE CHANGE  9/17/2020     IR GASTRO JEJUNOSTOMY TUBE CHANGE   10/29/2020     IR GASTRO JEJUNOSTOMY TUBE CHANGE  11/6/2020     IR GASTRO JEJUNOSTOMY TUBE CHANGE  12/24/2020     IR GASTRO JEJUNOSTOMY TUBE CHANGE  1/9/2021     IR GASTRO JEJUNOSTOMY TUBE CHANGE  6/9/2021     IR GASTRO JEJUNOSTOMY TUBE CHANGE  10/4/2021     IR GASTRO JEJUNOSTOMY TUBE CHANGE  10/18/2021     IR GASTRO JEJUNOSTOMY TUBE CHANGE  1/14/2022     IR GASTRO JEJUNOSTOMY TUBE CHANGE  1/20/2022     IR GASTRO JEJUNOSTOMY TUBE CHANGE  4/19/2022     IR GASTRO JEJUNOSTOMY TUBE CHANGE  6/7/2022     IR GASTRO JEJUNOSTOMY TUBE CHANGE  8/19/2022     IR GASTRO JEJUNOSTOMY TUBE CHANGE  9/13/2022     IR GASTRO JEJUNOSTOMY TUBE CHANGE  10/27/2022     IR GASTRO JEJUNOSTOMY TUBE CHANGE  11/3/2022     IR GASTRO JEJUNOSTOMY TUBE PLACEMENT  6/14/2016     IR GJ TUBE REPLACEMENT  12/24/2018     IR GJ TUBE REPLACEMENT  3/18/2019     IR GJ TUBE REPLACEMENT  4/23/2019     IR GJ TUBE REPLACEMENT  5/21/2019     IR GJ TUBE REPLACEMENT  9/9/2019     IR GJ TUBE REPLACEMENT  9/26/2019     IR GJ TUBE REPLACEMENT  10/11/2019     IR GJ TUBE REPLACEMENT  11/21/2019     IR GJ TUBE REPLACEMENT  11/29/2019     IR GJ TUBE REPLACEMENT  2/27/2020     IR GJ TUBE REPLACEMENT  4/10/2020     IR GJ TUBE REPLACEMENT  7/2/2020     IR GJ TUBE REPLACEMENT  8/28/2020     IR GJ TUBE REPLACEMENT  9/17/2020     IR GJ TUBE REPLACEMENT  10/29/2020     IR GJ TUBE REPLACEMENT  11/6/2020     IR GJ TUBE REPLACEMENT  12/24/2020     IR GJ TUBE REPLACEMENT  1/9/2021     IR GJ TUBE REPLACEMENT  6/9/2021     J-G tube       PICC  6/10/2016          TRACHEOSTOMY      Removed       SH:  Social History     Socioeconomic History     Marital status:      Spouse name: Not on file     Number of children: 2     Years of education: Not on file     Highest education level: Not on file   Occupational History     Occupation: disability   Tobacco Use     Smoking status: Former     Packs/day: 1.00     Years: 30.00     Pack years: 30.00     Types: Cigarettes     Quit date:  2005     Years since quittin.5     Smokeless tobacco: Former     Quit date: 2007     Tobacco comments:     quit 2006   Vaping Use     Vaping Use: Never used   Substance and Sexual Activity     Alcohol use: No     Drug use: No     Sexual activity: Not Currently     Partners: Female     Birth control/protection: None   Other Topics Concern     Parent/sibling w/ CABG, MI or angioplasty before 65F 55M? No   Social History Narrative    Lives with wife, 2 daughters and son in law.    W/c bound.     Social Determinants of Health     Financial Resource Strain: Not on file   Food Insecurity: Not on file   Transportation Needs: Not on file   Physical Activity: Not on file   Stress: Not on file   Social Connections: Not on file   Intimate Partner Violence: Not on file   Housing Stability: Not on file       Family history:  Family History   Problem Relation Age of Onset     Hypertension Mother      Cancer Father      Other Cancer Father      Kidney Disease Brother      The family history was reviewed and is not pertinent to the chief complaint or HPI.    ROS:  Review of Systems - History obtained from the patient  General ROS: negative  Psychological ROS: negative  ENT ROS: negative  Allergy and Immunology ROS: negative  Endocrine ROS: negative  Respiratory ROS: positive for - cough, shortness of breath and sputum changes  negative for - stridor or tachypnea  Cardiovascular ROS: no chest pain or palpitations  Gastrointestinal ROS: no abdominal pain, change in bowel habits, or black or bloody stools  Genito-Urinary ROS: no dysuria, trouble voiding, or hematuria  Musculoskeletal ROS: negative  Neurological ROS: no TIA or stroke symptoms  Dermatological ROS: negative      Current Meds:  Current Outpatient Medications   Medication Sig Dispense Refill     acetaminophen (TYLENOL) 500 MG tablet 1,000 mg by Gastric Tube route every 6 hours as needed for mild pain        acetylcysteine (MUCOMYST) 20 % neb solution Take 2  mLs by nebulization every 4 hours 360 mL 3     amLODIPine (NORVASC) 5 MG tablet Take 5 mg by mouth daily       aspirin (ASA) 81 MG chewable tablet 1 tablet (81 mg) by Per Feeding Tube route daily 90 tablet 0     baclofen (LIORESAL) 10 MG tablet Take 1 tablet (10 mg) by mouth 3 times daily as needed for muscle spasms 270 tablet 3     bisacodyl (DULCOLAX) 10 MG suppository INSERT 1 SUPPOSTIORY INTO THE RECTUM DAILY AS NEEDED FOR CONSTIPATION. 30 suppository 8     chlorhexidine (PERIDEX) 0.12 % solution Swish and spit 15 mLs in mouth daily        esomeprazole (NEXIUM) 20 MG DR capsule Take 1 capsule via G-tube twice daily (Patient taking differently: 20 mg daily Take 1 capsule via G-tube twice daily) 180 capsule 3     ferrous sulfate 220 (44 Fe) MG/5ML ELIX 5 mLs by Enteral route daily 150 mL 11     guaiFENesin-dextromethorphan (ROBITUSSIN DM) 100-10 MG/5ML syrup 10 mLs by Per G Tube route every 4 hours as needed for cough 236 mL 2     ipratropium (ATROVENT) 0.06 % nasal spray Spray 2 sprays into both nostrils 2 times daily for 30 days 11 mL 11     ipratropium - albuterol 0.5 mg/2.5 mg/3 mL (DUONEB) 0.5-2.5 (3) MG/3ML neb solution Take 1 vial (3 mLs) by nebulization every 4 hours 90 mL 4     LIDOCAINE PAIN RELIEF 4 % Patch PLACE 1 PATCH ONTO THE SKIN EVERY 24 HOURS TO PREVENT LIDOCAINE TOXICITY, PATIENT SHOULD BE PATCH FREE FOR 12 HOURS DAILY. 30 patch 3     meclizine (ANTIVERT) 25 MG tablet Take 1 tablet via G-tube every 6 hours as needed for vertigo 30 tablet 11     metoprolol tartrate (LOPRESSOR) 25 MG tablet TAKE ONE TABLET BY MOUTH TWICE DAILY  60 tablet 11     neomycin-bacitracin-polymyxin (NEOSPORIN) 5-400-5000 ointment Apply topically 4 times daily 30 g 3     nitroFURantoin macrocrystal-monohydrate (MACROBID) 100 MG capsule Take 1 capsule by G-tube route at bedtime. 90 capsule 3     polyethylene glycol (MIRALAX) 17 GM/Dose powder Give 1 capful via G-tube daily as needed for constipation 850 g 3     Polyethylene  Glycol 400 (VISINE DRY EYE RELIEF) 1 % SOLN Apply 1 drop to eye every 6 hours as needed (dry eyes) 15 mL 11     QUEtiapine (SEROQUEL) 25 MG tablet Give 3 tablets (75 mg) at bedtime for sleep. Ok to repeat after 4 hours and during the day  tablet 3     simvastatin (ZOCOR) 20 MG tablet 1 tablet (20 mg) by Per G Tube route At Bedtime 90 tablet 0       Labs:  @clab@    I have personally reviewed all imaging and PFT data available pertinent to this visit.    Imaging studies:  CT Chest Pulmonary Embolism w Contrast    Result Date: 12/11/2022  EXAM: CT CHEST PULMONARY EMBOLISM W CONTRAST LOCATION: [facility] DATE/TIME: 12/11/2022 10:46 PM INDICATION: sob, fever COMPARISON: 10/28/2022 TECHNIQUE: CT chest pulmonary angiogram during arterial phase injection of IV contrast. Multiplanar reformats and MIP reconstructions were performed. Dose reduction techniques were used. CONTRAST: Isovue 370 90ml FINDINGS: ANGIOGRAM CHEST: Pulmonary arteries are normal caliber and negative for pulmonary emboli. Thoracic aorta is negative for dissection. No CT evidence of right heart strain. LUNGS AND PLEURA: Emphysema, linear scarring and benign calcifications involving upper lobes are unchanged. Lower lobes demonstrate a similar amount of infiltrate and atelectasis is previous exam though there is been some shifting of opacities now with more airspace disease within posterior left lower lobe and anterior inferior right lower lobe with other areas resolving; lower lobe findings suggest new or recurrent regions of pneumonia. MEDIASTINUM/AXILLAE: Normal. CORONARY ARTERY CALCIFICATION: Moderate. UPPER ABDOMEN: Gastrojejunostomy tube appears in good position. MUSCULOSKELETAL: Normal.     IMPRESSION: 1.  No pulmonary emboli. 2.  Mild patchy zones of new airspace infiltrate at lung bases suggests developing pneumonia. 3.  Extensive chronic pulmonary scarring.    Head CT w/o contrast    Result Date: 12/11/2022  EXAM:  CT HEAD W/O CONTRAST LOCATION: [facility] DATE/TIME: 12/11/2022 10:45 PM INDICATION: Altered mental status COMPARISON:  CT head 09/18/2022. TECHNIQUE: Routine CT Head without IV contrast. Multiplanar reformats. Dose reduction techniques were used. FINDINGS: INTRACRANIAL CONTENTS: No intracranial hemorrhage, extraaxial collection, or mass effect.  No CT evidence of acute infarct.  Similar appearance of multifocal encephalomalacia since 09/18/2022. Mild to moderate generalized volume loss. No hydrocephalus. VISUALIZED ORBITS/SINUSES/MASTOIDS: No intraorbital abnormality. No paranasal sinus mucosal disease. No middle ear or mastoid effusion. BONES/SOFT TISSUES: No acute abnormality.     IMPRESSION: 1.  No acute intracranial process or significant change since 09/18/2022.      PFTs:  none    Physical Exam:  /72 (BP Location: Left arm, Patient Position: Chair, Cuff Size: Adult Regular)   Pulse 86   Temp 98.1  F (36.7  C) (Axillary)   SpO2 95%   General - Well nourished  Ears/Mouth - Deferred given mask use during pandemic  Neck - no cervical lymphadenopathy  Lungs - sonorous breath sounds emanating from tracheal secretions  CVS - regular rhythm with no murmurs, rubs or gallups  Abdomen - soft, NT, ND, NABS  Ext - no cyanosis, clubbing or edema  Skin - no rash  Psychology - nonverbal, intermittently sleeping        Electronically signed by:    Jose Maria Anguiano MD PhD  [facility]      Again, thank you for allowing me to participate in the care of your patient.      Sincerely,    Jose Maria Anguiano MD

## 2023-01-03 NOTE — Clinical Note
Pollo Tatum, Any thoughts on a different chronic antibiotic to use for Mr. Malave chronic UTI.  Macrobid can cause a fibrotic lung disorder with long enough use, and it makes me nervous given his fragility.   Thanks for considering, Jose Maria

## 2023-01-03 NOTE — PROGRESS NOTES
Assessment and Plan:[facility] GIL Malave is a 57 year old male with a past medical history significant for hemorrhagic stroke with hemiplegia and nonverbal communication who presents to clinic today for recurrent aspiration pneumonia.  He was recently hospitalized with recurrent aspiration at [facility] and we have been asked to determine if there are any other maneuvers to prevent aspiration.  He is fed with a GJ tube and it seems unlikely his aspiration is tube feed.  At this point we are managing upper airway secretions only.  We can attempt to add nasal atrovent to dry up his nasal secretions and mucomyst to help him cough up the aspirated secretions more succesfully.  It is also possible the SLP team has some suggestions for positioning or ways to stimulate a swallow as he does seem to have a swallow reflex.  Finally we discussed the most aggressive maneuver would be to provide a trach.  He does seem to have FRANKY based on his daughters description of his breathing, and what I witnessed during our talk when he fell asleep.  She is not sure she would want to be this aggressive as he has had a prior trach.  She knows he would not tolerate a CPAP mask.   1. Chronic aspiration - likely all oral secretions - work to dry his upper airway without overdrying.  He has no way to communicate a dry mouth and this could be discomforting if we go too far.  To start we can try nasal atrovent.  He does not have sinusitis on his last head CT.  Will send to SLP to get any other advice they have.  He has not seen them since his stroke in 2016.    1. Airway clearance - uses his nebulized duoneb QID, add mucomyst for use 1-2 times a day as well.  Tolerated this during his 2016 hospitalization, test dose not needed.  Has a vest, hard to use with PEG tube.  Can use a donut pad to protect (need to mention next visit).  Otherwise manual percussion may be the best approach if family willing  2. Aspiration pneumonia - his chest images show chronic  migratory patchy infiltrates, will try to get a baseline CXR.    3. Chronic UTI - on daily macrobid.  This can cause lung reactions.  Will ask Dr. Gutierrez if there is an alternative choice.    4. RTC in 3 months      CCx: chronic aspiration    HPI: Mr. Malave is a 57 year old male with a history of stroke who presents for evaluation of recurrent aspiration pneumonia.  He was hospitalized at [facility] on 12/12 with aspiration pneumonia and hypoxia that rapidly resolved.  He has been in the ER at least 4 times this year with this problem.  His stroke was in 2016 and this left him non verbal with yi aspiration on swallow studies.  He has had a GJ tube since and is fed with continuous tube feeds.  His daughter provides excellent cares and is extremely well informed on his medical regimen and a good thought partner for him.  She uses nebulizers four times a day to help him clear secretions as well as NT suctioning.  They also have a vest they try to use, but it rides over his G tube and they find it hard to manage.  He also does not like his aerobika device and will spit it out. He can hear her and can communicate by thumbs up or down and sometimes pointing at a chart.  He caught covid recently at home, and while he got through this well, his aspiration events seem more and more frequent.  He will have brief fevers and chills and coughing when these occur and sometimes his sats will dip.    He did have a trach initially after his stroke, but was decannulated before discharge.  His daughter notes he will choke when he sleeps and has a broken breathing pattern.  She agrees he would not tolerate any kind of mask over his face.    PMH:  Past Medical History:   Diagnosis Date     Acute UTI 02/21/2020     Anxiety      Basal ganglia hemorrhage (H) 06/20/2016     Brain compression (H)      Bruxism      Chronic static encephalopathy      Chronic static encephalopathy      Chronic static encephalopathy      Convulsions,  unspecified convulsion type (H)      CVA (cerebral vascular accident) (H) 07/2016    Hemorrhagic,      Depression      Dyslipidemia      Dyslipidemia      Elevated troponin      Essential hypertension      Feeding by G-tube (H) 06/04/2020     GERD (gastroesophageal reflux disease)      GERD without esophagitis      Hemiplegia of nondominant side, late effect of cerebrovascular disease (H)     Created by Conversion  Replacement Utility updated for latest IMO load     Hemorrhagic stroke (H) 06/05/2016     Hepatitis C carrier (H)      History of ESBL E. coli infection      History of hemorrhagic stroke with residual hemiparesis (H)      History of stroke with residual deficit      HTN (hypertension)      Hypertension      Lung nodule 12/05/2016    9 x 9 mm left upper lobe on CXR     MRSA (methicillin resistant staph aureus) culture positive      Periodontal disease      Persistent fever      Pneumonia of both lower lobes due to infectious organism      Respiratory failure with hypoxia (H)      Seizure (H)      Sepsis due to urinary tract infection (H) 06/04/2020     Severe sepsis (H)      Stroke (H)      Trismus        PSH:  Past Surgical History:   Procedure Laterality Date     aneurysm clipping  2006     BRAIN SURGERY       EXAM UNDER ANESTHESIA, RESTORATIONS, EXTRACTION(S) DENTAL COMPLEX, COMBINED N/A 12/19/2016    Procedure: COMBINED EXAM UNDER ANESTHESIA, RESTORATIONS, EXTRACTION(S) DENTAL COMPLEX;  Surgeon: Verónica Martinez DDS;  Location: [facility] OR     EXAM UNDER ANESTHESIA, RESTORATIONS, EXTRACTION(S) DENTAL COMPLEX, COMBINED N/A 3/7/2018    Procedure: COMBINED EXAM UNDER ANESTHESIA, RESTORATIONS, EXTRACTION(S) DENTAL COMPLEX;  Kenalog Injection in Bilateral [place], Combined Dental Exam,Radiographs, Three Dental Restorations, Periodontal Therapy and Fluoride Treatment;  Surgeon: Darryl Isbell DDS;  Location: [facility] OR     HH MIDLINE INSERTION  8/11/2017          INJECT STEROID (LOCATION) N/A 3/7/2018     Procedure: INJECT STEROID (LOCATION);  Kenalog Injection In Bilateral [place], Combined Dental Exam,Radiographs,Three Dental Restorations, Periodontal Therapy and Fluoride Treatment;  Surgeon: Liu Conroy DDS;  Location: [facility] OR     IR GASTRO JEJUNOSTOMY TUBE CHANGE  7/19/2016     IR GASTRO JEJUNOSTOMY TUBE CHANGE  9/23/2016     IR GASTRO JEJUNOSTOMY TUBE CHANGE  11/28/2016     IR GASTRO JEJUNOSTOMY TUBE CHANGE  1/27/2017     IR GASTRO JEJUNOSTOMY TUBE CHANGE  4/19/2017     IR GASTRO JEJUNOSTOMY TUBE CHANGE  6/20/2017     IR GASTRO JEJUNOSTOMY TUBE CHANGE  8/7/2017     IR GASTRO JEJUNOSTOMY TUBE CHANGE  8/11/2017     IR GASTRO JEJUNOSTOMY TUBE CHANGE  10/20/2017     IR GASTRO JEJUNOSTOMY TUBE CHANGE  1/2/2018     IR GASTRO JEJUNOSTOMY TUBE CHANGE  2/16/2018     IR GASTRO JEJUNOSTOMY TUBE CHANGE  5/16/2018     IR GASTRO JEJUNOSTOMY TUBE CHANGE  6/11/2018     IR GASTRO JEJUNOSTOMY TUBE CHANGE  9/12/2018     IR GASTRO JEJUNOSTOMY TUBE CHANGE  12/24/2018     IR GASTRO JEJUNOSTOMY TUBE CHANGE  3/18/2019     IR GASTRO JEJUNOSTOMY TUBE CHANGE  4/23/2019     IR GASTRO JEJUNOSTOMY TUBE CHANGE  5/21/2019     IR GASTRO JEJUNOSTOMY TUBE CHANGE  9/9/2019     IR GASTRO JEJUNOSTOMY TUBE CHANGE  9/26/2019     IR GASTRO JEJUNOSTOMY TUBE CHANGE  10/11/2019     IR GASTRO JEJUNOSTOMY TUBE CHANGE  11/21/2019     IR GASTRO JEJUNOSTOMY TUBE CHANGE  11/29/2019     IR GASTRO JEJUNOSTOMY TUBE CHANGE  2/27/2020     IR GASTRO JEJUNOSTOMY TUBE CHANGE  4/10/2020     IR GASTRO JEJUNOSTOMY TUBE CHANGE  7/2/2020     IR GASTRO JEJUNOSTOMY TUBE CHANGE  8/28/2020     IR GASTRO JEJUNOSTOMY TUBE CHANGE  9/17/2020     IR GASTRO JEJUNOSTOMY TUBE CHANGE  10/29/2020     IR GASTRO JEJUNOSTOMY TUBE CHANGE  11/6/2020     IR GASTRO JEJUNOSTOMY TUBE CHANGE  12/24/2020     IR GASTRO JEJUNOSTOMY TUBE CHANGE  1/9/2021     IR GASTRO JEJUNOSTOMY TUBE CHANGE  6/9/2021     IR GASTRO JEJUNOSTOMY TUBE CHANGE  10/4/2021     IR GASTRO JEJUNOSTOMY TUBE CHANGE  10/18/2021      IR GASTRO JEJUNOSTOMY TUBE CHANGE  2022     IR GASTRO JEJUNOSTOMY TUBE CHANGE  2022     IR GASTRO JEJUNOSTOMY TUBE CHANGE  2022     IR GASTRO JEJUNOSTOMY TUBE CHANGE  2022     IR GASTRO JEJUNOSTOMY TUBE CHANGE  2022     IR GASTRO JEJUNOSTOMY TUBE CHANGE  2022     IR GASTRO JEJUNOSTOMY TUBE CHANGE  10/27/2022     IR GASTRO JEJUNOSTOMY TUBE CHANGE  11/3/2022     IR GASTRO JEJUNOSTOMY TUBE PLACEMENT  2016     IR GJ TUBE REPLACEMENT  2018     IR GJ TUBE REPLACEMENT  3/18/2019     IR GJ TUBE REPLACEMENT  2019     IR GJ TUBE REPLACEMENT  2019     IR GJ TUBE REPLACEMENT  2019     IR GJ TUBE REPLACEMENT  2019     IR GJ TUBE REPLACEMENT  10/11/2019     IR GJ TUBE REPLACEMENT  2019     IR GJ TUBE REPLACEMENT  2019     IR GJ TUBE REPLACEMENT  2020     IR GJ TUBE REPLACEMENT  4/10/2020     IR GJ TUBE REPLACEMENT  2020     IR GJ TUBE REPLACEMENT  2020     IR GJ TUBE REPLACEMENT  2020     IR GJ TUBE REPLACEMENT  10/29/2020     IR GJ TUBE REPLACEMENT  2020     IR GJ TUBE REPLACEMENT  2020     IR GJ TUBE REPLACEMENT  2021     IR GJ TUBE REPLACEMENT  2021     J-G tube       PICC  6/10/2016          TRACHEOSTOMY      Removed       SH:  Social History     Socioeconomic History     Marital status:      Spouse name: Not on file     Number of children: 2     Years of education: Not on file     Highest education level: Not on file   Occupational History     Occupation: disability   Tobacco Use     Smoking status: Former     Packs/day: 1.00     Years: 30.00     Pack years: 30.00     Types: Cigarettes     Quit date: 2005     Years since quittin.5     Smokeless tobacco: Former     Quit date: 2007     Tobacco comments:     quit 2006   Vaping Use     Vaping Use: Never used   Substance and Sexual Activity     Alcohol use: No     Drug use: No     Sexual activity: Not Currently     Partners: Female     Birth  control/protection: None   Other Topics Concern     Parent/sibling w/ CABG, MI or angioplasty before 65F 55M? No   Social History Narrative    Lives with wife, 2 daughters and son in law.    W/c bound.     Social Determinants of Health     Financial Resource Strain: Not on file   Food Insecurity: Not on file   Transportation Needs: Not on file   Physical Activity: Not on file   Stress: Not on file   Social Connections: Not on file   Intimate Partner Violence: Not on file   Housing Stability: Not on file       Family history:  Family History   Problem Relation Age of Onset     Hypertension Mother      Cancer Father      Other Cancer Father      Kidney Disease Brother      The family history was reviewed and is not pertinent to the chief complaint or HPI.    ROS:  Review of Systems - History obtained from the patient  General ROS: negative  Psychological ROS: negative  ENT ROS: negative  Allergy and Immunology ROS: negative  Endocrine ROS: negative  Respiratory ROS: positive for - cough, shortness of breath and sputum changes  negative for - stridor or tachypnea  Cardiovascular ROS: no chest pain or palpitations  Gastrointestinal ROS: no abdominal pain, change in bowel habits, or black or bloody stools  Genito-Urinary ROS: no dysuria, trouble voiding, or hematuria  Musculoskeletal ROS: negative  Neurological ROS: no TIA or stroke symptoms  Dermatological ROS: negative      Current Meds:  Current Outpatient Medications   Medication Sig Dispense Refill     acetaminophen (TYLENOL) 500 MG tablet 1,000 mg by Gastric Tube route every 6 hours as needed for mild pain        acetylcysteine (MUCOMYST) 20 % neb solution Take 2 mLs by nebulization every 4 hours 360 mL 3     amLODIPine (NORVASC) 5 MG tablet Take 5 mg by mouth daily       aspirin (ASA) 81 MG chewable tablet 1 tablet (81 mg) by Per Feeding Tube route daily 90 tablet 0     baclofen (LIORESAL) 10 MG tablet Take 1 tablet (10 mg) by mouth 3 times daily as needed for  muscle spasms 270 tablet 3     bisacodyl (DULCOLAX) 10 MG suppository INSERT 1 SUPPOSTIORY INTO THE RECTUM DAILY AS NEEDED FOR CONSTIPATION. 30 suppository 8     chlorhexidine (PERIDEX) 0.12 % solution Swish and spit 15 mLs in mouth daily        esomeprazole (NEXIUM) 20 MG DR capsule Take 1 capsule via G-tube twice daily (Patient taking differently: 20 mg daily Take 1 capsule via G-tube twice daily) 180 capsule 3     ferrous sulfate 220 (44 Fe) MG/5ML ELIX 5 mLs by Enteral route daily 150 mL 11     guaiFENesin-dextromethorphan (ROBITUSSIN DM) 100-10 MG/5ML syrup 10 mLs by Per G Tube route every 4 hours as needed for cough 236 mL 2     ipratropium (ATROVENT) 0.06 % nasal spray Spray 2 sprays into both nostrils 2 times daily for 30 days 11 mL 11     ipratropium - albuterol 0.5 mg/2.5 mg/3 mL (DUONEB) 0.5-2.5 (3) MG/3ML neb solution Take 1 vial (3 mLs) by nebulization every 4 hours 90 mL 4     LIDOCAINE PAIN RELIEF 4 % Patch PLACE 1 PATCH ONTO THE SKIN EVERY 24 HOURS TO PREVENT LIDOCAINE TOXICITY, PATIENT SHOULD BE PATCH FREE FOR 12 HOURS DAILY. 30 patch 3     meclizine (ANTIVERT) 25 MG tablet Take 1 tablet via G-tube every 6 hours as needed for vertigo 30 tablet 11     metoprolol tartrate (LOPRESSOR) 25 MG tablet TAKE ONE TABLET BY MOUTH TWICE DAILY  60 tablet 11     neomycin-bacitracin-polymyxin (NEOSPORIN) 5-400-5000 ointment Apply topically 4 times daily 30 g 3     nitroFURantoin macrocrystal-monohydrate (MACROBID) 100 MG capsule Take 1 capsule by G-tube route at bedtime. 90 capsule 3     polyethylene glycol (MIRALAX) 17 GM/Dose powder Give 1 capful via G-tube daily as needed for constipation 850 g 3     Polyethylene Glycol 400 (VISINE DRY EYE RELIEF) 1 % SOLN Apply 1 drop to eye every 6 hours as needed (dry eyes) 15 mL 11     QUEtiapine (SEROQUEL) 25 MG tablet Give 3 tablets (75 mg) at bedtime for sleep. Ok to repeat after 4 hours and during the day  tablet 3     simvastatin (ZOCOR) 20 MG tablet 1 tablet  (20 mg) by Per G Tube route At Bedtime 90 tablet 0       Labs:  @priti@    CHANNING have personally reviewed all imaging and PFT data available pertinent to this visit.    Imaging studies:  CT Chest Pulmonary Embolism w Contrast    Result Date: 12/11/2022  EXAM: CT CHEST PULMONARY EMBOLISM W CONTRAST LOCATION: [facility] DATE/TIME: 12/11/2022 10:46 PM INDICATION: sob, fever COMPARISON: 10/28/2022 TECHNIQUE: CT chest pulmonary angiogram during arterial phase injection of IV contrast. Multiplanar reformats and MIP reconstructions were performed. Dose reduction techniques were used. CONTRAST: Isovue 370 90ml FINDINGS: ANGIOGRAM CHEST: Pulmonary arteries are normal caliber and negative for pulmonary emboli. Thoracic aorta is negative for dissection. No CT evidence of right heart strain. LUNGS AND PLEURA: Emphysema, linear scarring and benign calcifications involving upper lobes are unchanged. Lower lobes demonstrate a similar amount of infiltrate and atelectasis is previous exam though there is been some shifting of opacities now with more airspace disease within posterior left lower lobe and anterior inferior right lower lobe with other areas resolving; lower lobe findings suggest new or recurrent regions of pneumonia. MEDIASTINUM/AXILLAE: Normal. CORONARY ARTERY CALCIFICATION: Moderate. UPPER ABDOMEN: Gastrojejunostomy tube appears in good position. MUSCULOSKELETAL: Normal.     IMPRESSION: 1.  No pulmonary emboli. 2.  Mild patchy zones of new airspace infiltrate at lung bases suggests developing pneumonia. 3.  Extensive chronic pulmonary scarring.    Head CT w/o contrast    Result Date: 12/11/2022  EXAM: CT HEAD W/O CONTRAST LOCATION: [facility] DATE/TIME: 12/11/2022 10:45 PM INDICATION: Altered mental status COMPARISON:  CT head 09/18/2022. TECHNIQUE: Routine CT Head without IV contrast. Multiplanar reformats. Dose reduction techniques were used. FINDINGS: INTRACRANIAL  CONTENTS: No intracranial hemorrhage, extraaxial collection, or mass effect.  No CT evidence of acute infarct.  Similar appearance of multifocal encephalomalacia since 09/18/2022. Mild to moderate generalized volume loss. No hydrocephalus. VISUALIZED ORBITS/SINUSES/MASTOIDS: No intraorbital abnormality. No paranasal sinus mucosal disease. No middle ear or mastoid effusion. BONES/SOFT TISSUES: No acute abnormality.     IMPRESSION: 1.  No acute intracranial process or significant change since 09/18/2022.      PFTs:  none    Physical Exam:  /72 (BP Location: Left arm, Patient Position: Chair, Cuff Size: Adult Regular)   Pulse 86   Temp 98.1  F (36.7  C) (Axillary)   SpO2 95%   General - Well nourished  Ears/Mouth - Deferred given mask use during pandemic  Neck - no cervical lymphadenopathy  Lungs - sonorous breath sounds emanating from tracheal secretions  CVS - regular rhythm with no murmurs, rubs or gallups  Abdomen - soft, NT, ND, NABS  Ext - no cyanosis, clubbing or edema  Skin - no rash  Psychology - nonverbal, intermittently sleeping        Electronically signed by:    Jose Maria Anguiano MD PhD  [facility] Pulmonary and Critical Care Medicine

## 2023-01-03 NOTE — PATIENT INSTRUCTIONS
Lets try a few things to help   1) We will repeat a chest xray to see if his lungs are back to baseline  2) We can try nasal atrovent to dry up his secretions  3) I will also give you mucomyst to nebulize to help clear up his secretions out of his lungs- you can use this 1-2 times a day to start  4)  I will also have you see the speech language team to see if they have any suggestions  5) a tracheostomy would potentially prevent a lot of his recurrent problems, but it could also introduce some new problems and more to take care of  6) I'll ask Dr. Gutierrez if there is a different antibiotic to use for his chronic UTI

## 2023-01-04 ENCOUNTER — TELEPHONE (OUTPATIENT)
Dept: FAMILY MEDICINE | Facility: CLINIC | Age: 58
End: 2023-01-04

## 2023-01-04 DIAGNOSIS — N39.0 RECURRENT UTI: Primary | ICD-10-CM

## 2023-01-04 RX ORDER — CEPHALEXIN 250 MG/5ML
POWDER, FOR SUSPENSION ORAL
Qty: 225 ML | Refills: 3 | Status: SHIPPED | OUTPATIENT
Start: 2023-01-04 | End: 2023-02-10

## 2023-01-04 NOTE — TELEPHONE ENCOUNTER
Please let patient's family know that I received a note from the pulmonologist regarding his nitrofurantoin which he is taking for prevention of urinary tract infection.  The pulmonologist recommended that we find an alternative antibiotic.  I would like him to stop taking nitrofurantoin and in place of this medication I would like him to start taking the cephalexin once daily.  I have sent this new prescription to the pharmacy.

## 2023-01-05 ENCOUNTER — E-VISIT (OUTPATIENT)
Dept: FAMILY MEDICINE | Facility: CLINIC | Age: 58
End: 2023-01-05
Payer: COMMERCIAL

## 2023-01-05 ENCOUNTER — MYC MEDICAL ADVICE (OUTPATIENT)
Dept: FAMILY MEDICINE | Facility: CLINIC | Age: 58
End: 2023-01-05
Payer: COMMERCIAL

## 2023-01-05 DIAGNOSIS — R39.9 URINARY TRACT INFECTION SYMPTOMS: Primary | ICD-10-CM

## 2023-01-05 DIAGNOSIS — R39.9 URINARY TRACT INFECTION SYMPTOMS: ICD-10-CM

## 2023-01-05 DIAGNOSIS — R31.21 ASYMPTOMATIC MICROSCOPIC HEMATURIA: Primary | ICD-10-CM

## 2023-01-05 PROCEDURE — 87088 URINE BACTERIA CULTURE: CPT | Performed by: FAMILY MEDICINE

## 2023-01-05 PROCEDURE — 99421 OL DIG E/M SVC 5-10 MIN: CPT | Performed by: FAMILY MEDICINE

## 2023-01-05 PROCEDURE — 87186 SC STD MICRODIL/AGAR DIL: CPT | Performed by: FAMILY MEDICINE

## 2023-01-05 PROCEDURE — 87086 URINE CULTURE/COLONY COUNT: CPT | Performed by: FAMILY MEDICINE

## 2023-01-05 NOTE — PATIENT INSTRUCTIONS
Dear Liu Malvae,     After reviewing your responses, I would like you to come in for a urine test to make sure we treat you correctly. This urine test is to evaluate you for a possible urinary tract infection, and should be scheduled for today or tomorrow. Schedule a Lab Only appointment here.     Lab appointments are not available at most locations on the weekends. If no Lab Only appointment is available, you should be seen in any of our convenient Walk-in or Urgent Care Centers, which can be found on our website here.     You will receive instructions with your results in [facility] once they are available.     If your symptoms worsen, you develop pain in your back or stomach, develop fevers, or are not improving in 5 days, please contact your primary care provider for an appointment or visit a Walk-in or Urgent Care Center to be seen.     Thanks again for choosing us as your health care partner,     Lynda Gutierrez MD

## 2023-01-08 LAB — BACTERIA UR CULT: ABNORMAL

## 2023-01-09 ENCOUNTER — TELEPHONE (OUTPATIENT)
Dept: FAMILY MEDICINE | Facility: CLINIC | Age: 58
End: 2023-01-09

## 2023-01-09 DIAGNOSIS — N39.0 ACUTE UTI: Primary | ICD-10-CM

## 2023-01-09 RX ORDER — CEFDINIR 250 MG/5ML
POWDER, FOR SUSPENSION ORAL
Qty: 120 ML | Refills: 0 | Status: SHIPPED | OUTPATIENT
Start: 2023-01-09 | End: 2023-02-10

## 2023-01-09 NOTE — TELEPHONE ENCOUNTER
Spoke with Idalmis, they had been seen at the urgency room 1/8/23. They were given cipro for the uti. Per Dr. Gutierrez ok to to take that Rx instead of the cedfinir  she had sent in.   Daughter understands to restart cephalexin after finiished with antibiotic

## 2023-01-09 NOTE — TELEPHONE ENCOUNTER
----- Message from Lynda Gutierrez MD sent at 1/9/2023  8:21 AM CST -----  Please let pt's family know that Liu's Urine culture is positive. I am going to send in a prescription for cefdinir.  They should stop the cephalexin while Liu is taking cefidinir. When Liu finishes cefdinir, he can restart cephalexin to help prevent recurrence of UTI

## 2023-01-18 ENCOUNTER — HOSPITAL ENCOUNTER (OUTPATIENT)
Dept: INTERVENTIONAL RADIOLOGY/VASCULAR | Facility: CLINIC | Age: 58
Discharge: HOME | End: 2023-01-18
Admitting: RADIOLOGY
Payer: COMMERCIAL

## 2023-01-18 DIAGNOSIS — R63.30 FEEDING DIFFICULTIES: ICD-10-CM

## 2023-01-18 PROCEDURE — 49452 REPLACE G-J TUBE PERC: CPT

## 2023-01-18 PROCEDURE — C1769 GUIDE WIRE: HCPCS

## 2023-01-18 NOTE — IP AVS SNAPSHOT
[facility] Interventional Radiology  [address] [address]  Phone: [phone]  Fax: [phone]                                    After Visit Summary   1/18/2023    Liu Malave   MRN: [id number]           After Visit Summary Signature Page    I have received my discharge instructions, and my questions have been answered. I have discussed any challenges I see with this plan with the nurse or doctor.    ..........................................................................................................................................  Patient/Patient Representative Signature      ..........................................................................................................................................  Patient Representative Print Name and Relationship to Patient    ..................................................               ................................................  Date                                   Time    ..........................................................................................................................................  Reviewed by Signature/Title    ...................................................              ..............................................  Date                                               Time          22EPIC Rev 08/18

## 2023-01-18 NOTE — PROGRESS NOTES
Patient Name: Liu Malave  Medical Record Number: [id number]  Today's Date: 1/18/2023    Procedure: Gastro Jejunostmoy Tube exchange  Proceduralist: Dr Trevizo  Pathology present: NO    Procedure Start: 1529  Procedure end: 1533  Sedation medications administered: NA     Report given to: LOREN  : No    Other Notes: Pt arrived to IR room  from pre/post rm 1. Consent reviewed. Pt denies any questions or concerns regarding procedure. Pt positioned supine and monitored per protocol. Pt tolerated procedure without any noted complications. Pt transferred back to pre/post rm 2.    Tami Mooney RN

## 2023-01-19 ENCOUNTER — TELEPHONE (OUTPATIENT)
Dept: FAMILY MEDICINE | Facility: CLINIC | Age: 58
End: 2023-01-19
Payer: COMMERCIAL

## 2023-01-19 NOTE — LETTER
January 23, 2023    To whom it may concern:     Liu Malave is under my care at the [facility].  He has a history of a stroke that has left him non verbal with yi aspiration on swallow studies.  He has had a GJ tube since and is fed with continuous tube feeds.  He has a history of recurrent aspiration pneumonia.  He has difficulty clearing his airways of secretions due to his prior stroke. He has history of multiple hospitalizations for recurrent pneumonia and hypoxia.  His chest images show chronic migratory patchy infiltrates, atelectasis and airspace disease although bronchiectasis has never been specifically included in the findings of his chest images. Because of his difficulty clearing his airway secretions on his own and his propensity to develop pneumonia and hypoxia, a vest airway clearance system was recommended.  It is my opinion that this is medically necessary to aid Liu in clearing his airway secretions and reducing episodes of pneumonia, which frequently require inpatient hospital treatment or emergency room care.    He is benefiting from use of the device.  Other treatments tried include inhalers and nebulizer treatments. He is also being seen by a pulmonology specialist.     Sincerely,          Lynda Gutierrez MD

## 2023-01-19 NOTE — TELEPHONE ENCOUNTER
Mami from [facility] calling because she had faxed over on 12- a letter of medical necessity to us to state the need for the vest/respiratory medical device. She said the CT scan did show the need for the vest that correlates with the diagnosis code that was written on the medical necessity form that was sent over originally. She will refax again the information that was faxed on 12-. Please reach out for further clarification if needed.   She is asking for PCP to write a letter stating Pt needing the vest due to Bronchiectasis otherwise insurance will not pay for it.    Mami is asking for a call back [phone]

## 2023-01-20 ENCOUNTER — MYC MEDICAL ADVICE (OUTPATIENT)
Dept: FAMILY MEDICINE | Facility: CLINIC | Age: 58
End: 2023-01-20
Payer: COMMERCIAL

## 2023-01-20 ENCOUNTER — TELEPHONE (OUTPATIENT)
Dept: FAMILY MEDICINE | Facility: CLINIC | Age: 58
End: 2023-01-20
Payer: COMMERCIAL

## 2023-01-20 DIAGNOSIS — Z78.9 ON TUBE FEEDING DIET: Primary | ICD-10-CM

## 2023-01-20 RX ORDER — NYSTATIN 100000 U/G
CREAM TOPICAL 2 TIMES DAILY
Qty: 30 G | Refills: 0 | Status: ON HOLD | OUTPATIENT
Start: 2023-01-20 | End: 2023-06-10

## 2023-01-20 NOTE — TELEPHONE ENCOUNTER
Daughter is calling back again regarding the message. If meds get sent to the pharmacy then if it can go to the [facility] pharmacy here in [place], did let daughter know that I will let the covering provider know.

## 2023-01-23 ENCOUNTER — HEALTH MAINTENANCE LETTER (OUTPATIENT)
Age: 58
End: 2023-01-23

## 2023-01-30 NOTE — TELEPHONE ENCOUNTER
Mami is calling back to check on the letter of medical necessity. She has not heard back yet. Please advise and call back with MD amber

## 2023-02-10 ENCOUNTER — HOSPITAL ENCOUNTER (INPATIENT)
Facility: CLINIC | Age: 58
LOS: 2 days | Discharge: HOME HEALTH | End: 2023-02-12
Attending: EMERGENCY MEDICINE | Admitting: INTERNAL MEDICINE
Payer: COMMERCIAL

## 2023-02-10 ENCOUNTER — APPOINTMENT (OUTPATIENT)
Dept: CT IMAGING | Facility: CLINIC | Age: 58
End: 2023-02-10
Attending: INTERNAL MEDICINE
Payer: COMMERCIAL

## 2023-02-10 ENCOUNTER — APPOINTMENT (OUTPATIENT)
Dept: RADIOLOGY | Facility: CLINIC | Age: 58
End: 2023-02-10
Attending: EMERGENCY MEDICINE
Payer: COMMERCIAL

## 2023-02-10 DIAGNOSIS — R33.9 URINARY RETENTION WITH INCOMPLETE BLADDER EMPTYING: Primary | ICD-10-CM

## 2023-02-10 DIAGNOSIS — I69.359 HEMIPLEGIA AND HEMIPARESIS FOLLOWING CEREBRAL INFARCTION AFFECTING UNSPECIFIED SIDE (H): ICD-10-CM

## 2023-02-10 DIAGNOSIS — M62.838 MUSCLE SPASM: ICD-10-CM

## 2023-02-10 DIAGNOSIS — J69.0 ASPIRATION PNEUMONIA OF BOTH LOWER LOBES, UNSPECIFIED ASPIRATION PNEUMONIA TYPE (H): ICD-10-CM

## 2023-02-10 LAB
ALBUMIN SERPL-MCNC: 3.3 G/DL (ref 3.5–5)
ALBUMIN UR-MCNC: 200 MG/DL
ALP SERPL-CCNC: 78 U/L (ref 45–120)
ALT SERPL W P-5'-P-CCNC: 24 U/L (ref 0–45)
ANION GAP SERPL CALCULATED.3IONS-SCNC: 8 MMOL/L (ref 5–18)
APPEARANCE UR: CLEAR
AST SERPL W P-5'-P-CCNC: 23 U/L (ref 0–40)
BILIRUB SERPL-MCNC: 0.3 MG/DL (ref 0–1)
BILIRUB UR QL STRIP: NEGATIVE
BUN SERPL-MCNC: 37 MG/DL (ref 8–22)
CALCIUM SERPL-MCNC: 9.1 MG/DL (ref 8.5–10.5)
CHLORIDE BLD-SCNC: 110 MMOL/L (ref 98–107)
CO2 SERPL-SCNC: 23 MMOL/L (ref 22–31)
COLOR UR AUTO: YELLOW
CREAT SERPL-MCNC: 1.19 MG/DL (ref 0.7–1.3)
ERYTHROCYTE [DISTWIDTH] IN BLOOD BY AUTOMATED COUNT: 12.2 % (ref 10–15)
FLUAV RNA SPEC QL NAA+PROBE: NEGATIVE
FLUBV RNA RESP QL NAA+PROBE: NEGATIVE
GFR SERPL CREATININE-BSD FRML MDRD: 70 ML/MIN/1.73M2
GLUCOSE BLD-MCNC: 116 MG/DL (ref 70–125)
GLUCOSE UR STRIP-MCNC: NEGATIVE MG/DL
HCT VFR BLD AUTO: 34.1 % (ref 40–53)
HGB BLD-MCNC: 11.1 G/DL (ref 13.3–17.7)
HGB UR QL STRIP: ABNORMAL
HYALINE CASTS: 4 /LPF
KETONES UR STRIP-MCNC: NEGATIVE MG/DL
LACTATE SERPL-SCNC: 1.5 MMOL/L (ref 0.7–2)
LEUKOCYTE ESTERASE UR QL STRIP: NEGATIVE
MAGNESIUM SERPL-MCNC: 2.3 MG/DL (ref 1.8–2.6)
MCH RBC QN AUTO: 30.6 PG (ref 26.5–33)
MCHC RBC AUTO-ENTMCNC: 32.6 G/DL (ref 31.5–36.5)
MCV RBC AUTO: 94 FL (ref 78–100)
MUCOUS THREADS #/AREA URNS LPF: PRESENT /LPF
NITRATE UR QL: NEGATIVE
PH UR STRIP: 5.5 [PH] (ref 5–7)
PLATELET # BLD AUTO: 278 10E3/UL (ref 150–450)
POTASSIUM BLD-SCNC: 4.7 MMOL/L (ref 3.5–5)
PROT SERPL-MCNC: 8.8 G/DL (ref 6–8)
RBC # BLD AUTO: 3.63 10E6/UL (ref 4.4–5.9)
RBC URINE: 41 /HPF
RSV RNA SPEC NAA+PROBE: NEGATIVE
SARS-COV-2 RNA RESP QL NAA+PROBE: NEGATIVE
SODIUM SERPL-SCNC: 141 MMOL/L (ref 136–145)
SP GR UR STRIP: 1.02 (ref 1–1.03)
SQUAMOUS EPITHELIAL: <1 /HPF
UROBILINOGEN UR STRIP-MCNC: <2 MG/DL
WBC # BLD AUTO: 18.6 10E3/UL (ref 4–11)
WBC URINE: 3 /HPF

## 2023-02-10 PROCEDURE — 87040 BLOOD CULTURE FOR BACTERIA: CPT | Performed by: EMERGENCY MEDICINE

## 2023-02-10 PROCEDURE — 85014 HEMATOCRIT: CPT | Performed by: EMERGENCY MEDICINE

## 2023-02-10 PROCEDURE — 250N000011 HC RX IP 250 OP 636: Performed by: EMERGENCY MEDICINE

## 2023-02-10 PROCEDURE — 80053 COMPREHEN METABOLIC PANEL: CPT | Performed by: EMERGENCY MEDICINE

## 2023-02-10 PROCEDURE — 210N000002 HC R&B HEART CARE

## 2023-02-10 PROCEDURE — 250N000011 HC RX IP 250 OP 636: Performed by: INTERNAL MEDICINE

## 2023-02-10 PROCEDURE — 250N000013 HC RX MED GY IP 250 OP 250 PS 637: Performed by: EMERGENCY MEDICINE

## 2023-02-10 PROCEDURE — 83735 ASSAY OF MAGNESIUM: CPT | Performed by: INTERNAL MEDICINE

## 2023-02-10 PROCEDURE — 81001 URINALYSIS AUTO W/SCOPE: CPT | Performed by: EMERGENCY MEDICINE

## 2023-02-10 PROCEDURE — 99223 1ST HOSP IP/OBS HIGH 75: CPT | Performed by: INTERNAL MEDICINE

## 2023-02-10 PROCEDURE — 99285 EMERGENCY DEPT VISIT HI MDM: CPT | Mod: CS,25

## 2023-02-10 PROCEDURE — 71250 CT THORAX DX C-: CPT

## 2023-02-10 PROCEDURE — 96361 HYDRATE IV INFUSION ADD-ON: CPT

## 2023-02-10 PROCEDURE — 36415 COLL VENOUS BLD VENIPUNCTURE: CPT | Performed by: EMERGENCY MEDICINE

## 2023-02-10 PROCEDURE — 71045 X-RAY EXAM CHEST 1 VIEW: CPT

## 2023-02-10 PROCEDURE — 83605 ASSAY OF LACTIC ACID: CPT | Performed by: EMERGENCY MEDICINE

## 2023-02-10 PROCEDURE — 258N000003 HC RX IP 258 OP 636: Performed by: EMERGENCY MEDICINE

## 2023-02-10 PROCEDURE — 96365 THER/PROPH/DIAG IV INF INIT: CPT

## 2023-02-10 PROCEDURE — 87637 SARSCOV2&INF A&B&RSV AMP PRB: CPT | Performed by: EMERGENCY MEDICINE

## 2023-02-10 PROCEDURE — C9803 HOPD COVID-19 SPEC COLLECT: HCPCS

## 2023-02-10 PROCEDURE — 93005 ELECTROCARDIOGRAM TRACING: CPT | Performed by: FAMILY MEDICINE

## 2023-02-10 RX ORDER — BISACODYL 10 MG
10 SUPPOSITORY, RECTAL RECTAL DAILY PRN
Status: DISCONTINUED | OUTPATIENT
Start: 2023-02-10 | End: 2023-02-12 | Stop reason: HOSPADM

## 2023-02-10 RX ORDER — IOPAMIDOL 755 MG/ML
100 INJECTION, SOLUTION INTRAVASCULAR ONCE
Status: COMPLETED | OUTPATIENT
Start: 2023-02-11 | End: 2023-02-10

## 2023-02-10 RX ORDER — IPRATROPIUM BROMIDE AND ALBUTEROL SULFATE 2.5; .5 MG/3ML; MG/3ML
1 SOLUTION RESPIRATORY (INHALATION) EVERY 4 HOURS
Status: DISCONTINUED | OUTPATIENT
Start: 2023-02-11 | End: 2023-02-12 | Stop reason: HOSPADM

## 2023-02-10 RX ORDER — ACETAMINOPHEN 650 MG/1
650 SUPPOSITORY RECTAL ONCE
Status: COMPLETED | OUTPATIENT
Start: 2023-02-10 | End: 2023-02-10

## 2023-02-10 RX ORDER — ASPIRIN 81 MG/1
81 TABLET, CHEWABLE ORAL DAILY
Status: DISCONTINUED | OUTPATIENT
Start: 2023-02-11 | End: 2023-02-12 | Stop reason: HOSPADM

## 2023-02-10 RX ORDER — ENOXAPARIN SODIUM 100 MG/ML
40 INJECTION SUBCUTANEOUS EVERY 24 HOURS
Status: DISCONTINUED | OUTPATIENT
Start: 2023-02-11 | End: 2023-02-12 | Stop reason: HOSPADM

## 2023-02-10 RX ORDER — BACLOFEN 10 MG/1
10 TABLET ORAL 3 TIMES DAILY PRN
Status: DISCONTINUED | OUTPATIENT
Start: 2023-02-10 | End: 2023-02-11

## 2023-02-10 RX ORDER — QUETIAPINE FUMARATE 25 MG/1
25 TABLET, FILM COATED ORAL 3 TIMES DAILY
Status: DISCONTINUED | OUTPATIENT
Start: 2023-02-11 | End: 2023-02-10

## 2023-02-10 RX ORDER — PIPERACILLIN SODIUM, TAZOBACTAM SODIUM 3; .375 G/15ML; G/15ML
3.38 INJECTION, POWDER, LYOPHILIZED, FOR SOLUTION INTRAVENOUS ONCE
Status: COMPLETED | OUTPATIENT
Start: 2023-02-10 | End: 2023-02-10

## 2023-02-10 RX ORDER — CHLORHEXIDINE GLUCONATE ORAL RINSE 1.2 MG/ML
15 SOLUTION DENTAL DAILY
Status: DISCONTINUED | OUTPATIENT
Start: 2023-02-11 | End: 2023-02-12 | Stop reason: HOSPADM

## 2023-02-10 RX ORDER — GUAIFENESIN/DEXTROMETHORPHAN 100-10MG/5
10 SYRUP ORAL EVERY 4 HOURS PRN
Status: DISCONTINUED | OUTPATIENT
Start: 2023-02-10 | End: 2023-02-12 | Stop reason: HOSPADM

## 2023-02-10 RX ORDER — LIDOCAINE 40 MG/G
CREAM TOPICAL
Status: DISCONTINUED | OUTPATIENT
Start: 2023-02-10 | End: 2023-02-12 | Stop reason: HOSPADM

## 2023-02-10 RX ORDER — AMLODIPINE BESYLATE 5 MG/1
5 TABLET ORAL DAILY
Status: DISCONTINUED | OUTPATIENT
Start: 2023-02-11 | End: 2023-02-12 | Stop reason: HOSPADM

## 2023-02-10 RX ORDER — ACETAMINOPHEN 500 MG
1000 TABLET ORAL EVERY 6 HOURS PRN
Status: DISCONTINUED | OUTPATIENT
Start: 2023-02-10 | End: 2023-02-12 | Stop reason: HOSPADM

## 2023-02-10 RX ORDER — MEROPENEM 1 G/1
1 INJECTION, POWDER, FOR SOLUTION INTRAVENOUS EVERY 8 HOURS
Status: DISCONTINUED | OUTPATIENT
Start: 2023-02-11 | End: 2023-02-11

## 2023-02-10 RX ORDER — NEOMYCIN/BACITRACIN/POLYMYXINB 3.5-400-5K
OINTMENT (GRAM) TOPICAL 4 TIMES DAILY
Status: DISCONTINUED | OUTPATIENT
Start: 2023-02-11 | End: 2023-02-12 | Stop reason: HOSPADM

## 2023-02-10 RX ORDER — SIMVASTATIN 20 MG
20 TABLET ORAL AT BEDTIME
Status: DISCONTINUED | OUTPATIENT
Start: 2023-02-10 | End: 2023-02-12 | Stop reason: HOSPADM

## 2023-02-10 RX ORDER — SODIUM CHLORIDE 9 MG/ML
INJECTION, SOLUTION INTRAVENOUS CONTINUOUS
Status: DISCONTINUED | OUTPATIENT
Start: 2023-02-10 | End: 2023-02-12

## 2023-02-10 RX ORDER — METOPROLOL TARTRATE 25 MG/1
25 TABLET, FILM COATED ORAL 2 TIMES DAILY
Status: DISCONTINUED | OUTPATIENT
Start: 2023-02-11 | End: 2023-02-11

## 2023-02-10 RX ORDER — QUETIAPINE FUMARATE 25 MG/1
25 TABLET, FILM COATED ORAL 3 TIMES DAILY
Status: DISCONTINUED | OUTPATIENT
Start: 2023-02-11 | End: 2023-02-11

## 2023-02-10 RX ORDER — PIPERACILLIN SODIUM, TAZOBACTAM SODIUM 3; .375 G/15ML; G/15ML
3.38 INJECTION, POWDER, LYOPHILIZED, FOR SOLUTION INTRAVENOUS EVERY 8 HOURS
Status: DISCONTINUED | OUTPATIENT
Start: 2023-02-11 | End: 2023-02-10

## 2023-02-10 RX ORDER — ACETYLCYSTEINE 200 MG/ML
2 SOLUTION ORAL; RESPIRATORY (INHALATION) EVERY 4 HOURS
Status: DISCONTINUED | OUTPATIENT
Start: 2023-02-11 | End: 2023-02-12 | Stop reason: HOSPADM

## 2023-02-10 RX ADMIN — IOPAMIDOL 100 ML: 755 INJECTION, SOLUTION INTRAVENOUS at 23:49

## 2023-02-10 RX ADMIN — PIPERACILLIN AND TAZOBACTAM 3.38 G: 3; .375 INJECTION, POWDER, FOR SOLUTION INTRAVENOUS at 22:30

## 2023-02-10 RX ADMIN — ACETAMINOPHEN 650 MG: 650 SUPPOSITORY RECTAL at 21:35

## 2023-02-10 RX ADMIN — SODIUM CHLORIDE 1000 ML: 9 INJECTION, SOLUTION INTRAVENOUS at 21:51

## 2023-02-10 ASSESSMENT — ACTIVITIES OF DAILY LIVING (ADL)
ADLS_ACUITY_SCORE: 35
ADLS_ACUITY_SCORE: 35

## 2023-02-11 PROBLEM — I69.359 HEMIPLEGIA AND HEMIPARESIS FOLLOWING CEREBRAL INFARCTION AFFECTING UNSPECIFIED SIDE (H): Status: ACTIVE | Noted: 2020-06-04

## 2023-02-11 PROBLEM — A41.9 SEPSIS WITHOUT ACUTE ORGAN DYSFUNCTION (H): Status: ACTIVE | Noted: 2023-02-11

## 2023-02-11 LAB
ALBUMIN SERPL-MCNC: 2.6 G/DL (ref 3.5–5)
ALP SERPL-CCNC: 59 U/L (ref 45–120)
ALT SERPL W P-5'-P-CCNC: 18 U/L (ref 0–45)
ANION GAP SERPL CALCULATED.3IONS-SCNC: 10 MMOL/L (ref 5–18)
AST SERPL W P-5'-P-CCNC: 20 U/L (ref 0–40)
ATRIAL RATE - MUSE: 112 BPM
BASOPHILS # BLD AUTO: 0.1 10E3/UL (ref 0–0.2)
BASOPHILS NFR BLD AUTO: 0 %
BILIRUB SERPL-MCNC: 0.4 MG/DL (ref 0–1)
BUN SERPL-MCNC: 28 MG/DL (ref 8–22)
CALCIUM SERPL-MCNC: 8 MG/DL (ref 8.5–10.5)
CHLORIDE BLD-SCNC: 114 MMOL/L (ref 98–107)
CO2 SERPL-SCNC: 16 MMOL/L (ref 22–31)
CREAT SERPL-MCNC: 1.19 MG/DL (ref 0.7–1.3)
DIASTOLIC BLOOD PRESSURE - MUSE: 85 MMHG
EOSINOPHIL # BLD AUTO: 0 10E3/UL (ref 0–0.7)
EOSINOPHIL NFR BLD AUTO: 0 %
ERYTHROCYTE [DISTWIDTH] IN BLOOD BY AUTOMATED COUNT: 12.4 % (ref 10–15)
GFR SERPL CREATININE-BSD FRML MDRD: 70 ML/MIN/1.73M2
GLUCOSE BLD-MCNC: 123 MG/DL (ref 70–125)
HCT VFR BLD AUTO: 31.9 % (ref 40–53)
HGB BLD-MCNC: 10.3 G/DL (ref 13.3–17.7)
IMM GRANULOCYTES # BLD: 0.1 10E3/UL
IMM GRANULOCYTES NFR BLD: 1 %
INTERPRETATION ECG - MUSE: NORMAL
LYMPHOCYTES # BLD AUTO: 2.5 10E3/UL (ref 0.8–5.3)
LYMPHOCYTES NFR BLD AUTO: 11 %
MAGNESIUM SERPL-MCNC: 2.2 MG/DL (ref 1.8–2.6)
MCH RBC QN AUTO: 31.1 PG (ref 26.5–33)
MCHC RBC AUTO-ENTMCNC: 32.3 G/DL (ref 31.5–36.5)
MCV RBC AUTO: 96 FL (ref 78–100)
MONOCYTES # BLD AUTO: 1.1 10E3/UL (ref 0–1.3)
MONOCYTES NFR BLD AUTO: 5 %
NEUTROPHILS # BLD AUTO: 18.4 10E3/UL (ref 1.6–8.3)
NEUTROPHILS NFR BLD AUTO: 83 %
NRBC # BLD AUTO: 0 10E3/UL
NRBC BLD AUTO-RTO: 0 /100
P AXIS - MUSE: 66 DEGREES
PLATELET # BLD AUTO: 240 10E3/UL (ref 150–450)
POTASSIUM BLD-SCNC: 4.7 MMOL/L (ref 3.5–5)
PR INTERVAL - MUSE: 164 MS
PROCALCITONIN SERPL-MCNC: 1.26 NG/ML (ref 0–0.49)
PROT SERPL-MCNC: 7.2 G/DL (ref 6–8)
QRS DURATION - MUSE: 68 MS
QT - MUSE: 330 MS
QTC - MUSE: 450 MS
R AXIS - MUSE: 28 DEGREES
RBC # BLD AUTO: 3.31 10E6/UL (ref 4.4–5.9)
SODIUM SERPL-SCNC: 140 MMOL/L (ref 136–145)
SYSTOLIC BLOOD PRESSURE - MUSE: 158 MMHG
T AXIS - MUSE: 59 DEGREES
VENTRICULAR RATE- MUSE: 112 BPM
WBC # BLD AUTO: 22.1 10E3/UL (ref 4–11)

## 2023-02-11 PROCEDURE — 31720 CLEARANCE OF AIRWAYS: CPT

## 2023-02-11 PROCEDURE — 85004 AUTOMATED DIFF WBC COUNT: CPT | Performed by: INTERNAL MEDICINE

## 2023-02-11 PROCEDURE — 999N000157 HC STATISTIC RCP TIME EA 10 MIN

## 2023-02-11 PROCEDURE — 99233 SBSQ HOSP IP/OBS HIGH 50: CPT | Performed by: FAMILY MEDICINE

## 2023-02-11 PROCEDURE — 258N000003 HC RX IP 258 OP 636: Performed by: INTERNAL MEDICINE

## 2023-02-11 PROCEDURE — 80053 COMPREHEN METABOLIC PANEL: CPT | Performed by: INTERNAL MEDICINE

## 2023-02-11 PROCEDURE — 94640 AIRWAY INHALATION TREATMENT: CPT

## 2023-02-11 PROCEDURE — 250N000009 HC RX 250: Performed by: INTERNAL MEDICINE

## 2023-02-11 PROCEDURE — 210N000002 HC R&B HEART CARE

## 2023-02-11 PROCEDURE — 83735 ASSAY OF MAGNESIUM: CPT | Performed by: INTERNAL MEDICINE

## 2023-02-11 PROCEDURE — 250N000011 HC RX IP 250 OP 636: Performed by: FAMILY MEDICINE

## 2023-02-11 PROCEDURE — 94640 AIRWAY INHALATION TREATMENT: CPT | Mod: 76

## 2023-02-11 PROCEDURE — 250N000013 HC RX MED GY IP 250 OP 250 PS 637: Performed by: INTERNAL MEDICINE

## 2023-02-11 PROCEDURE — 258N000003 HC RX IP 258 OP 636: Performed by: FAMILY MEDICINE

## 2023-02-11 PROCEDURE — 99222 1ST HOSP IP/OBS MODERATE 55: CPT | Performed by: INTERNAL MEDICINE

## 2023-02-11 PROCEDURE — 250N000011 HC RX IP 250 OP 636: Performed by: INTERNAL MEDICINE

## 2023-02-11 PROCEDURE — 36415 COLL VENOUS BLD VENIPUNCTURE: CPT | Performed by: INTERNAL MEDICINE

## 2023-02-11 PROCEDURE — 84145 PROCALCITONIN (PCT): CPT | Performed by: INTERNAL MEDICINE

## 2023-02-11 PROCEDURE — 250N000013 HC RX MED GY IP 250 OP 250 PS 637: Performed by: FAMILY MEDICINE

## 2023-02-11 RX ORDER — QUETIAPINE FUMARATE 25 MG/1
75 TABLET, FILM COATED ORAL AT BEDTIME
Status: DISCONTINUED | OUTPATIENT
Start: 2023-02-11 | End: 2023-02-12 | Stop reason: HOSPADM

## 2023-02-11 RX ORDER — PIPERACILLIN SODIUM, TAZOBACTAM SODIUM 3; .375 G/15ML; G/15ML
3.38 INJECTION, POWDER, LYOPHILIZED, FOR SOLUTION INTRAVENOUS EVERY 8 HOURS
Status: DISCONTINUED | OUTPATIENT
Start: 2023-02-11 | End: 2023-02-12 | Stop reason: HOSPADM

## 2023-02-11 RX ORDER — MECLIZINE HYDROCHLORIDE 25 MG/1
25 TABLET ORAL 3 TIMES DAILY PRN
Status: DISCONTINUED | OUTPATIENT
Start: 2023-02-11 | End: 2023-02-12 | Stop reason: HOSPADM

## 2023-02-11 RX ORDER — METOPROLOL TARTRATE 25 MG/1
25 TABLET, FILM COATED ORAL 2 TIMES DAILY
Status: DISCONTINUED | OUTPATIENT
Start: 2023-02-11 | End: 2023-02-12 | Stop reason: HOSPADM

## 2023-02-11 RX ORDER — QUETIAPINE FUMARATE 25 MG/1
25 TABLET, FILM COATED ORAL 3 TIMES DAILY PRN
Status: DISCONTINUED | OUTPATIENT
Start: 2023-02-11 | End: 2023-02-12 | Stop reason: HOSPADM

## 2023-02-11 RX ORDER — NYSTATIN 100000 U/G
CREAM TOPICAL 2 TIMES DAILY
Status: DISCONTINUED | OUTPATIENT
Start: 2023-02-11 | End: 2023-02-12 | Stop reason: HOSPADM

## 2023-02-11 RX ORDER — DOXAZOSIN 1 MG/1
1 TABLET ORAL DAILY
Status: DISCONTINUED | OUTPATIENT
Start: 2023-02-11 | End: 2023-02-12 | Stop reason: HOSPADM

## 2023-02-11 RX ORDER — PIPERACILLIN SODIUM, TAZOBACTAM SODIUM 3; .375 G/15ML; G/15ML
3.38 INJECTION, POWDER, LYOPHILIZED, FOR SOLUTION INTRAVENOUS ONCE
Status: COMPLETED | OUTPATIENT
Start: 2023-02-11 | End: 2023-02-11

## 2023-02-11 RX ORDER — BACLOFEN 10 MG/1
10 TABLET ORAL 4 TIMES DAILY PRN
Status: DISCONTINUED | OUTPATIENT
Start: 2023-02-11 | End: 2023-02-12 | Stop reason: HOSPADM

## 2023-02-11 RX ORDER — POLYETHYLENE GLYCOL 3350 17 G/17G
17 POWDER, FOR SOLUTION ORAL DAILY PRN
Status: DISCONTINUED | OUTPATIENT
Start: 2023-02-11 | End: 2023-02-12 | Stop reason: HOSPADM

## 2023-02-11 RX ORDER — LIDOCAINE 4 G/G
1 PATCH TOPICAL DAILY PRN
Status: DISCONTINUED | OUTPATIENT
Start: 2023-02-11 | End: 2023-02-12 | Stop reason: HOSPADM

## 2023-02-11 RX ADMIN — BACITRACIN ZINC, NEOMYCIN, POLYMYXIN B: 400; 3.5; 5 OINTMENT TOPICAL at 11:24

## 2023-02-11 RX ADMIN — NYSTATIN: 100000 CREAM TOPICAL at 10:09

## 2023-02-11 RX ADMIN — MEROPENEM 1 G: 1 INJECTION, POWDER, FOR SOLUTION INTRAVENOUS at 08:38

## 2023-02-11 RX ADMIN — ACETYLCYSTEINE 2 ML: 200 SOLUTION ORAL; RESPIRATORY (INHALATION) at 16:19

## 2023-02-11 RX ADMIN — SODIUM CHLORIDE: 9 INJECTION, SOLUTION INTRAVENOUS at 00:15

## 2023-02-11 RX ADMIN — BACITRACIN ZINC, NEOMYCIN, POLYMYXIN B: 400; 3.5; 5 OINTMENT TOPICAL at 20:35

## 2023-02-11 RX ADMIN — Medication 220 MG: at 08:39

## 2023-02-11 RX ADMIN — PIPERACILLIN AND TAZOBACTAM 3.38 G: 3; .375 INJECTION, POWDER, FOR SOLUTION INTRAVENOUS at 15:42

## 2023-02-11 RX ADMIN — BACLOFEN 10 MG: 10 TABLET ORAL at 10:52

## 2023-02-11 RX ADMIN — ACETYLCYSTEINE 2 ML: 200 SOLUTION ORAL; RESPIRATORY (INHALATION) at 07:48

## 2023-02-11 RX ADMIN — BACITRACIN ZINC, NEOMYCIN, POLYMYXIN B: 400; 3.5; 5 OINTMENT TOPICAL at 08:38

## 2023-02-11 RX ADMIN — METOPROLOL TARTRATE 25 MG: 25 TABLET, FILM COATED ORAL at 08:38

## 2023-02-11 RX ADMIN — PIPERACILLIN AND TAZOBACTAM 3.38 G: 3; .375 INJECTION, POWDER, FOR SOLUTION INTRAVENOUS at 20:33

## 2023-02-11 RX ADMIN — IPRATROPIUM BROMIDE AND ALBUTEROL SULFATE 3 ML: 2.5; .5 SOLUTION RESPIRATORY (INHALATION) at 12:04

## 2023-02-11 RX ADMIN — QUETIAPINE FUMARATE 25 MG: 25 TABLET ORAL at 00:22

## 2023-02-11 RX ADMIN — IPRATROPIUM BROMIDE AND ALBUTEROL SULFATE 3 ML: 2.5; .5 SOLUTION RESPIRATORY (INHALATION) at 19:09

## 2023-02-11 RX ADMIN — SIMVASTATIN 20 MG: 20 TABLET, FILM COATED ORAL at 20:32

## 2023-02-11 RX ADMIN — IPRATROPIUM BROMIDE AND ALBUTEROL SULFATE 3 ML: 2.5; .5 SOLUTION RESPIRATORY (INHALATION) at 04:33

## 2023-02-11 RX ADMIN — SIMVASTATIN 20 MG: 20 TABLET, FILM COATED ORAL at 00:22

## 2023-02-11 RX ADMIN — NYSTATIN: 100000 CREAM TOPICAL at 20:34

## 2023-02-11 RX ADMIN — QUETIAPINE FUMARATE 75 MG: 25 TABLET ORAL at 20:32

## 2023-02-11 RX ADMIN — ASPIRIN 81 MG 81 MG: 81 TABLET ORAL at 08:38

## 2023-02-11 RX ADMIN — ACETYLCYSTEINE 2 ML: 200 SOLUTION ORAL; RESPIRATORY (INHALATION) at 12:04

## 2023-02-11 RX ADMIN — IPRATROPIUM BROMIDE AND ALBUTEROL SULFATE 3 ML: 2.5; .5 SOLUTION RESPIRATORY (INHALATION) at 07:48

## 2023-02-11 RX ADMIN — METOPROLOL TARTRATE 25 MG: 25 TABLET, FILM COATED ORAL at 20:32

## 2023-02-11 RX ADMIN — SODIUM CHLORIDE: 9 INJECTION, SOLUTION INTRAVENOUS at 14:37

## 2023-02-11 RX ADMIN — IPRATROPIUM BROMIDE AND ALBUTEROL SULFATE 3 ML: 2.5; .5 SOLUTION RESPIRATORY (INHALATION) at 01:00

## 2023-02-11 RX ADMIN — SODIUM BICARBONATE 40 MG: 84 INJECTION, SOLUTION INTRAVENOUS at 10:09

## 2023-02-11 RX ADMIN — MEROPENEM 1 G: 1 INJECTION, POWDER, FOR SOLUTION INTRAVENOUS at 00:16

## 2023-02-11 RX ADMIN — BACITRACIN ZINC, NEOMYCIN, POLYMYXIN B: 400; 3.5; 5 OINTMENT TOPICAL at 15:43

## 2023-02-11 RX ADMIN — ENOXAPARIN SODIUM 40 MG: 100 INJECTION SUBCUTANEOUS at 00:19

## 2023-02-11 RX ADMIN — DOXAZOSIN 1 MG: 1 TABLET ORAL at 11:23

## 2023-02-11 RX ADMIN — ACETYLCYSTEINE 2 ML: 200 SOLUTION ORAL; RESPIRATORY (INHALATION) at 19:09

## 2023-02-11 RX ADMIN — AMLODIPINE BESYLATE 5 MG: 5 TABLET ORAL at 08:38

## 2023-02-11 RX ADMIN — TOBRAMYCIN SULFATE 390 MG: 40 INJECTION, SOLUTION INTRAVENOUS at 11:21

## 2023-02-11 RX ADMIN — ACETAMINOPHEN 1000 MG: 500 TABLET ORAL at 08:38

## 2023-02-11 RX ADMIN — IPRATROPIUM BROMIDE AND ALBUTEROL SULFATE 3 ML: 2.5; .5 SOLUTION RESPIRATORY (INHALATION) at 16:19

## 2023-02-11 RX ADMIN — QUETIAPINE FUMARATE 25 MG: 25 TABLET ORAL at 02:34

## 2023-02-11 RX ADMIN — ACETAMINOPHEN 1000 MG: 500 TABLET ORAL at 02:34

## 2023-02-11 RX ADMIN — CHLORHEXIDINE GLUCONATE 15 ML: 1.2 SOLUTION ORAL at 10:08

## 2023-02-11 RX ADMIN — PIPERACILLIN AND TAZOBACTAM 3.38 G: 3; .375 INJECTION, POWDER, FOR SOLUTION INTRAVENOUS at 10:08

## 2023-02-11 ASSESSMENT — ACTIVITIES OF DAILY LIVING (ADL)
ADLS_ACUITY_SCORE: 44
ADLS_ACUITY_SCORE: 47
ADLS_ACUITY_SCORE: 47
ADLS_ACUITY_SCORE: 44
ADLS_ACUITY_SCORE: 49
ADLS_ACUITY_SCORE: 47
ADLS_ACUITY_SCORE: 44
ADLS_ACUITY_SCORE: 51
ADLS_ACUITY_SCORE: 47
ADLS_ACUITY_SCORE: 44
ADLS_ACUITY_SCORE: 51
ADLS_ACUITY_SCORE: 35

## 2023-02-11 NOTE — CONSULTS
INITIAL PULMONARY   2/11/2023      Admit Date: 2/10/2023  Hospital Day: 1   CODE: Full Code    Reason for Consult: Recurrent aspiration pneumonia      Assessment/Plan:   Liu GIL Malave is a 57 year old male with a past medical history significant for hemorrhagic stroke with hemiplegia, nonverbal with a history of recurrent aspirations, anxiety, chronic encephalopathy, depression, dyslipidemia, HTN and a history of seizure disorder admitted for fever and a cough.  Chest imaging reveals bibasilar infiltrates consistent with his prior aspiration episodes.    1. Recurrent aspiration pneumonia: He follows in the pulmonary clinic with Dr. Anguiano and was recently seen by him due to recurrent aspiration pneumonia episodes leading to hospitalizations.  It is noted that the patient has a GJ tube and we are unable to prevent any further aspirations of oral secretions.  There have been some discussion about adding nasal Atrovent to dry up his nasal secretions and Mucomyst to help cough up aspirated secretions more successfully.    Can resume as outpatient nebulized DuoNebs 4 times daily.    Initiate Mucomyst 1-2 times a day as needed.    Agree with vancomycin and meropenem as you are presently given leukocytosis.    Presently on room air and does not require any supplemental oxygen.    SLP evaluation.  2. Metabolic acidosis: Noted on BMP this AM. Would gently hydrate as you are and recheck a BMP later today.    We will continue to follow with you.    Karina Mosquera MD  Pulmonary and Critical Care  (P) [phone]        HPI:   CCx:Fevers and a cough    HPI:Mr. Malave is a 57 year old male with a past medical history significant for hemorrhagic stroke with hemiplegia, nonverbal with a history of recurrent aspirations, anxiety, chronic encephalopathy, depression, dyslipidemia, HTN and a history of seizure disorder who presents to the ER with the aforementioned chief complaints. The history was provided by the patient's  daughter to the ER physician (She was not in the room when I went to see him). Per review, the patient was in his usual state of health and developed fevers and a cough. He is noted to be chronically debilitated, non-verbal and is not interactive.   His initial evaluation revealed bibasilar infiltrates, leukocytosis and was initiated on broad spectrum antibiotics.         ROS: Pertinent positives alluded to in the HPI. Remainder of 10 point ROS is negative.                                                                                                                                                       Medical/Surgical history:  Reviewed.     Allergies:  Reviewed in Epic    PTA medications:  (Not in a hospital admission)      Family Hx:  Reviewed.     Social Hx:  Reviewed.     Exam/Data:   ROS: 10 point ROS obtained, pertinant positives alluded to in HPI    Vitals  /71   Pulse (!) 124   Temp 99.6  F (37.6  C) (Axillary)   Resp 22   Wt 55.3 kg (122 lb)   SpO2 95%   BMI 22.31 kg/m    BP - Mean:  [104-113] 110  I/O last 3 completed shifts:  In: -   Out: 300 [Urine:300]  Weight change:   EXAM:  Physical Exam  Constitutional:       Comments: Non verbal.  NAD.   HENT:      Head: Normocephalic.      Mouth/Throat:      Mouth: Mucous membranes are dry.   Cardiovascular:      Heart sounds: Normal heart sounds.      Comments: Irregularly irregular.  S1 and S2 audible.  Tachycardia noted.  Pulmonary:      Breath sounds: Normal breath sounds.   Abdominal:      General: Abdomen is flat.      Comments: GJ tube noted.   Skin:     General: Skin is warm.           Medications:       sodium chloride 75 mL/hr at 02/11/23 1030       acetylcysteine  2 mL Nebulization Q4H     amLODIPine  5 mg Per G Tube Daily     aspirin  81 mg Per Feeding Tube Daily     chlorhexidine  15 mL Swish & Spit Daily     doxazosin  1 mg Per G Tube Daily     enoxaparin ANTICOAGULANT  40 mg Subcutaneous Q24H     ferrous sulfate  220 mg Per G Tube  Daily     ipratropium - albuterol 0.5 mg/2.5 mg/3 mL  1 vial Nebulization Q4H     lidocaine   Transdermal Q8H CHALO     metoprolol tartrate  25 mg Oral BID     neomycin-bacitracin-polymyxin   Topical 4x Daily     nystatin   Topical BID     pantoprazole  40 mg Per G Tube Daily     piperacillin-tazobactam  3.375 g Intravenous Once    Followed by     piperacillin-tazobactam  3.375 g Intravenous Q8H     QUEtiapine  75 mg Per G Tube At Bedtime     simvastatin  20 mg Per G Tube At Bedtime     sodium chloride (PF)  3 mL Intracatheter Q8H     tobramycin  390 mg Intravenous Q36H         DATA  All laboratory and radiology has been personally reviewed by myself today.  Recent Labs   Lab 02/11/23  0545   WBC 22.1*   HGB 10.3*   HCT 31.9*        Recent Labs   Lab 02/11/23  0545 02/10/23  2117    141   CO2 16* 23   BUN 28* 37*   ALKPHOS 59 78   ALT 18 24   AST 20 23       IMAGING:   CT Chest Abdomen Pelvis 2/11/23:  1.  Secretions in the bilateral mainstem bronchi with patchy groundglass and solid opacities at the lung bases which could be due to aspiration. Enlarged mediastinal adenopathy could be reactive.  2.  Hepatic steatosis.  3.  Urinary bladder thickening with prostatomegaly. Correlation with urinalysis.         Karina Mosquera  Pulm/CC  [id number]

## 2023-02-11 NOTE — ED NOTES
Pt was given scheduled nebulizers per home regimen, BS coarse, on room air. No complications. RT following.

## 2023-02-11 NOTE — PLAN OF CARE
Problem: Plan of Care - These are the overarching goals to be used throughout the patient stay.    Goal: Optimal Comfort and Wellbeing  Outcome: Progressing     Problem: Enteral Nutrition  Goal: Absence of Aspiration Signs and Symptoms  Outcome: Progressing     Problem: Enteral Nutrition  Goal: Feeding Tolerance  Outcome: Progressing   Goal Outcome Evaluation:

## 2023-02-11 NOTE — CONSULTS
"CLINICAL NUTRITION SERVICES - ASSESSMENT NOTE     Nutrition Prescription    RECOMMENDATIONS FOR MDs/PROVIDERS TO ORDER:  Stop IV fluids once TF reaches goal rate    Malnutrition Status:    Patient does not meet two of the established criteria necessary for diagnosing malnutrition    Recommendations already ordered by Registered Dietitian (RD):  Osmolite 1.5 Navid @ goal of  45ml/hr  (1080ml/day)  will provide: 1620 kcals, 67 g PRO, 822 ml free H20, 219 g CHO, and 0 g fiber daily.  FWF: 130mL q 4 hrs to meet hydration needs  Initiate TF at 15mL/hr and advance by 10mL/hr q 6 hrs as tolerated to goal rate.     Future/Additional Recommendations:  Monitor TF tolerance      REASON FOR ASSESSMENT  Liu GIL Malave is a/an 57 year old male assessed by the dietitian for Provider Order - resume home tube feedings    NUTRITION HISTORY  Patient in nonverbal. Has G tube  Home TF: 4 cans of peptamen 1.5 daily. This provides 1500 calories, 68g protein, 192g CHO daily.     Current issues: Sepsis, HTN, GERD, risk for aspiration    No BM since admit, No skin issues noted.    CURRENT NUTRITION ORDERS  Diet: NPO  Intake/Tolerance: NPO    LABS  Labs reviewed - BUN 28(H), Ca 8.0(L)    MEDICATIONS  Medications reviewed - ferrous sulfate, lopressor, protonix, NaCl IV fluids    ANTHROPOMETRICS  Height: 5' 2\"  Most Recent Weight: 55.3 kg (122 lb)    IBW: 54 kg  BMI: Normal BMI  Weight History:   Wt Readings from Last 10 Encounters:   02/10/23 55.3 kg (122 lb)   12/13/22 58.7 kg (129 lb 8 oz)   10/30/22 61.5 kg (135 lb 9.3 oz)   09/19/22 60 kg (132 lb 4.4 oz)   05/30/22 59 kg (130 lb)   04/07/22 59 kg (130 lb)   01/28/22 59 kg (130 lb)   03/07/18 36.3 kg (80 lb)   02/28/18 36.3 kg (80 lb)   12/19/16 48.7 kg (107 lb 5.8 oz)   Dosing Weight: 55 kg    ASSESSED NUTRITION NEEDS  Estimated Energy Needs: 3876-6259 kcals/day (25 - 30 kcals/kg)  Justification: Maintenance  Estimated Protein Needs: 55-66 grams protein/day (1 - 1.2 grams of " pro/kg)  Justification: Maintenance  Estimated Fluid Needs: (1 mL/kcal)   Justification: Maintenance    MALNUTRITION  % Intake: Decreased intake does not meet criteria  % Weight Loss: Weight loss does not meet criteria  Subcutaneous Fat Loss: None observed  Muscle Loss: None observed  Fluid Accumulation/Edema: None noted  Malnutrition Diagnosis: Patient does not meet two of the established criteria necessary for diagnosing malnutrition    NUTRITION DIAGNOSIS  Inadequate oral intake related to dysphagia/aspiration risk as evidenced by reliance on enteral nutrition to meet nutrition needs at baseline.    INTERVENTIONS  Implementation  Enteral Nutrition - Initiate   Osmolite 1.5 Navid @ goal of  45ml/hr  (1080ml/day)  will provide: 1620 kcals, 67 g PRO, 822 ml free H20, 219 g CHO, and 0 g fiber daily.  FWF: 130mL q 4 hrs to meet hydration needs  Initiate TF at 15mL/hr and advance by 10mL/hr q 6 hrs as tolerated to goal rate.     Goals  Tolerate TF  Meet estimated nutrition needs     Monitoring/Evaluation  Monitor TF tolerance   Maida Morocho RDN, LD

## 2023-02-11 NOTE — PLAN OF CARE
Alert, opens eyes spontaneously. Nonverbal. Hx of CVAs. Quad, bedridden. G/J tube. Per Dr Leslie, hold TF for tonight pending workups.   Bilateral contractures. Does not follow commands.   Room air, coarse lung sounds. Tachypneic. Chest CT completed overnight.   Sinus tachycardia. K/Mg protocol, no replacements needed.   NPO strict, oral cares Q2HR. Meds given via G tube. Suction as needed.   Turns/reposition Q2HR.  Male external catheter. Incontinent of bowel and bladder.  Skin intact.   Temp max 99F. D/t behavioral pain scale and high HR, tylenol given.   HR/restlessness improved     Problem: Infection  Goal: Absence of Infection Signs and Symptoms  Outcome: Not Progressing   Goal Outcome Evaluation:

## 2023-02-11 NOTE — PHARMACY-AMINOGLYCOSIDE DOSING SERVICE
Pharmacy Aminoglycoside Initial Note  Date of Service 2023  Patient's  1965  57 year old, male    Weight (Actual):  55.3 kg    Indication: Aspiration Pneumonia    Current estimated CrCl = Estimated Creatinine Clearance: 52.3 mL/min (based on SCr of 1.19 mg/dL).    Creatinine for last 3 days  2/10/2023:  9:17 PM Creatinine 1.19 mg/dL  2023:  5:45 AM Creatinine 1.19 mg/dL     Nephrotoxins and other renal medications (From now, onward)    Start     Dose/Rate Route Frequency Ordered Stop    23 1452  piperacillin-tazobactam (ZOSYN) 3.375 g vial to attach to  mL bag        Note to Pharmacy: Extended infusion dosing to start 6 hours after initial infusion.   See Hyperspace for full Linked Orders Report.    3.375 g  over 240 Minutes Intravenous EVERY 8 HOURS 23 0854 23 1459    23 0900  piperacillin-tazobactam (ZOSYN) 3.375 g vial to attach to  mL bag        See Hyperspace for full Linked Orders Report.    3.375 g  over 30 Minutes Intravenous ONCE 23 0854            Contrast Orders - past 72 hours (72h ago, onward)    Start     Dose/Rate Route Frequency Stop    23 0000  iopamidol (ISOVUE-370) solution 100 mL         100 mL Intravenous ONCE 02/10/23 2349          Aminoglycoside Levels - past 2 days  No results found for requested labs within last 48 hours.    Aminoglycosides IV Administrations (past 72 hours)      No aminoglycosides orders with administrations in past 72 hours.                    Plan:  1.  Start Tobramycin 390 mg (7 mg/kg) IV q36h.   2.  Target goals based on extended interval dosing  3.  Goal peak level: 15-24 mcg/ml  4.  Goal trough level: 0.5 mcg/ml  5.  Pharmacy will continue to follow and check levels as appropriate in 1-3 Days      Gabrielle Rolon, [facility]

## 2023-02-11 NOTE — ED TRIAGE NOTES
Pt presents with fever and generalized weakness for last three days. Pt is nonverbal at base line and multiple neurological deficits. ABCs intact.      Triage Assessment     Row Name 02/10/23 2033       Triage Assessment (Adult)    Airway WDL WDL       Respiratory WDL    Respiratory WDL WDL       Skin Circulation/Temperature WDL    Skin Circulation/Temperature WDL WDL       Cardiac WDL    Cardiac WDL WDL       Peripheral/Neurovascular WDL    Peripheral Neurovascular WDL WDL       Cognitive/Neuro/Behavioral WDL    Cognitive/Neuro/Behavioral WDL WDL

## 2023-02-11 NOTE — PHARMACY-ADMISSION MEDICATION HISTORY
Pharmacy Note - Admission Medication History    Pertinent Provider Information: Discussed med list with pt's daughter.      ______________________________________________________________________    Prior To Admission (PTA) med list completed and updated in EMR.       PTA Med List   Medication Sig Note Last Dose     acetaminophen (TYLENOL) 500 MG tablet 1,000 mg by Gastric Tube route every 6 hours as needed for mild pain   Unknown     acetylcysteine (MUCOMYST) 20 % neb solution Take 2 mLs by nebulization every 4 hours  2/10/2023 at 1700     amLODIPine (NORVASC) 5 MG tablet 5 mg by Per G Tube route daily  2/10/2023 at AM     aspirin (ASA) 81 MG chewable tablet 1 tablet (81 mg) by Per Feeding Tube route daily  2/10/2023 at AM     baclofen (LIORESAL) 10 MG tablet Take 1 tablet (10 mg) by mouth 3 times daily as needed for muscle spasms (Patient taking differently: 10 mg by Per G Tube route 3 times daily as needed for muscle spasms)  2/10/2023 at 1700     bisacodyl (DULCOLAX) 10 MG suppository INSERT 1 SUPPOSTIORY INTO THE RECTUM DAILY AS NEEDED FOR CONSTIPATION.  Unknown     cefaclor (CECLOR) 250 MG capsule Give 1 capsule via g tube daily for UTI prevention  not yet started     chlorhexidine (PERIDEX) 0.12 % solution Swish and spit 15 mLs in mouth daily   2/10/2023 at AM     esomeprazole (NEXIUM) 20 MG DR capsule Take 1 capsule via G-tube twice daily (Patient taking differently: 20 mg daily Take 1 capsule via G-tube once daily)  2/10/2023 at AM     ferrous sulfate 220 (44 Fe) MG/5ML ELIX 5 mLs by Enteral route daily  2/10/2023     guaiFENesin-dextromethorphan (ROBITUSSIN DM) 100-10 MG/5ML syrup 10 mLs by Per G Tube route every 4 hours as needed for cough  2/10/2023 at 1700     ipratropium - albuterol 0.5 mg/2.5 mg/3 mL (DUONEB) 0.5-2.5 (3) MG/3ML neb solution Take 1 vial (3 mLs) by nebulization every 4 hours  2/10/2023 at 1700     LIDOCAINE PAIN RELIEF 4 % Patch PLACE 1 PATCH ONTO THE SKIN EVERY 24 HOURS TO PREVENT  LIDOCAINE TOXICITY, PATIENT SHOULD BE PATCH FREE FOR 12 HOURS DAILY. (Patient taking differently: Place 1 patch onto the skin daily as needed)  Unknown     meclizine (ANTIVERT) 25 MG tablet Take 1 tablet via G-tube every 6 hours as needed for vertigo  Unknown     metoprolol tartrate (LOPRESSOR) 25 MG tablet TAKE ONE TABLET BY MOUTH TWICE DAILY   2/10/2023 at 1700 x2     neomycin-bacitracin-polymyxin (NEOSPORIN) 5-400-5000 ointment Apply topically 4 times daily  2/10/2023     nystatin (MYCOSTATIN) 183014 UNIT/GM external cream Apply topically 2 times daily  2/10/2023 at Pm x2     polyethylene glycol (MIRALAX) 17 GM/Dose powder Give 1 capful via G-tube daily as needed for constipation  Unknown     Polyethylene Glycol 400 (VISINE DRY EYE RELIEF) 1 % SOLN Apply 1 drop to eye every 6 hours as needed (dry eyes)  Unknown     QUEtiapine (SEROQUEL) 25 MG tablet Give 3 tablets (75 mg) at bedtime for sleep. Ok to repeat after 4 hours and during the day PRN (Patient taking differently: Take 25 mg by mouth See Admin Instructions Take 1 tablet at 7PM, Midnight and 3AM for total of 75 mg nightly. Ok to repeat after 4 hours and during the day PRN) 2/10/2023: Split up dose so pt doesn't wake up in middle of night.  2/10/2023 at 1700, early dose     simvastatin (ZOCOR) 20 MG tablet 1 tablet (20 mg) by Per G Tube route At Bedtime  2/9/2023 at PM       Information source(s): Family member and CareEverywhere/[facility]  Method of interview communication: in-person    Summary of Changes to PTA Med List  New: N/A  Discontinued: cefdinir, Keflex  Changed: lidocaine patch prn, Seroquel dosing    Patient was asked about OTC/herbal products specifically.  PTA med list reflects this.    In the past week, patient estimated taking medication this percent of the time:  greater than 90%.    Medication Affordability:  Not including over the counter (OTC) medications, was there a time in the past 12 months when you did not take your medications as  prescribed because of cost?: No    Allergies were reviewed, assessed, and updated with the patient.      Patient did not bring any medications to the hospital and can't retrieve from home. No multi-dose medications are available for use during hospital stay.     The information provided in this note is only as accurate as the sources available at the time of the update(s).    Thank you for the opportunity to participate in the care of this patient.    Francis Hurtado [facility]  2/10/2023 9:36 PM

## 2023-02-11 NOTE — PROGRESS NOTES
[facility] MEDICINE  PROGRESS NOTE     Code Status: Full Code       Identification/Summary:   Liu Malave is a 57 year old male with a PMH of hemorrhagic stroke with left hemiplegia and nonverbal 2016, G-tube dependent, GERD, HTN, MRSA, ESBL, recurrent UTIs and aspiration pneumonia, hep C carrier.  2/10/2023 admitted for fever and pneumonia.  CT shows bilateral bronchial secretions and patchy groundglass appearance consistent with aspiration.  Bladder wall thickening noted.  Given meropenem.  White count increased so antibiotics changed to Zosyn and tobramycin.  Monitor with PVRs.  Adjusted Seroquel and baclofen doses.  Okay to resume tube feedings.  RD consulted.  Family wishes to meet with pulmonary to evaluate if other aspiration preventive measures would be available.  Consulted.     Assessment and Plan:  -Recurrent bilateral aspiration pneumonia  -Sepsis  -Leukocytosis  Patient likely had a viral illness that multiple family members had.  This then caused 2 significant episodes of vomiting and then patient's respiratory status deteriorated.  CT scan showing bilateral bronchial secretions and patchy groundglass appearance consistent with aspiration.  Initially on meropenem but white count has worsened.  WBC 18--> 22.  Per sepsis protocols will transition to Zosyn and tobramycin.  Follow CBC.  Clinically looks better.  -High aspiration risk  -GERD  Patient has documented aspiration per the family.  They do most of his tube feedings during the day and keep him upright.  They frequently suction his mouth to help.  They do not give him any medications orally except for ODT formulation.  Continue Protonix 40 mg daily.  Family has met with pulmonary before given his recurrent aspirations.  They would like to meet with them again to see if there are any other aspiration prevention steps they should be taking.  Pulmonary consult ordered.  Discussed with family that options may be  limited.  -Urinary retention  CT scan did show bladder wall thickening.  Urinalysis unremarkable.  Family currently straight caths him once per day.  We will monitor PVRs.  Discussed with family that may need to straight cath him at least twice a day.  Order doxazosin to see if this could help with his urinary retention.  -History of hemorrhagic stroke 2016  -Right hemiplegia  -Nonverbal but communicates with board  Family notes that he is able to answer yes/no questions and does have a board that he can point to for communication purposes.  Asked to have family members bring in his communication board.  Adjusted his scheduled Seroquel and baclofen to mimic his home regimen.  Otherwise stable from a stroke standpoint.  -Essential hypertension  Continue home metoprolol and Norvasc with hold parameters.   -Hyperlipidemia  Continue home Zocor.    Anticoagulation   Continue home aspirin 81 daily and subcutaneous Lovenox    COVID-19 PCR influenza a/B/RSV negative from 2/10/2023  Noted.  Fluids: Normal saline 75 mL/h  Pain meds: Tylenol, baclofen   Therapy: Patient likely at near baseline  Kelly:Not present  Lines: None       Current Diet  Orders Placed This Encounter      NPO for Medical/Clinical Reasons Except for: Meds, Ice Chips    Supplements  None    Barriers to Discharge: Sepsis, IV antibiotics, ID and pulmonary consultations    Disposition: Possible discharge in 1 to 2 days    Clinically Significant Risk Factors Present on Admission           # Hypercalcemia: corrected calcium is >10.1, will monitor as appropriate    # Hypoalbuminemia: Lowest albumin = 2.6 g/dL at 2/11/2023  5:45 AM, will monitor as appropriate                  Interval History/Subjective:  Family states patient is clinically doing better.  He is able to answer yes/no questions and uses a pointing board to communicate.  Denies chest pain or shortness of breath at this time.  Does acknowledge some nausea but no notable emesis.  Daughter states that  his cough is at near baseline.  They do not give him any oral medications or food unless its ODT formulation.  They straight cath him generally just once per day.  They do tube feedings during the day and to try to avoid at night because they know that he does aspirate with his feedings.  Family has met with pulmonologist before regarding his aspiration.  They would be interested in meeting with them again.  Questions answered to verbalized satisfaction.    Physical Exam/Objective:  Temp:  [98.4  F (36.9  C)-101.2  F (38.4  C)] 99.6  F (37.6  C)  Pulse:  [] 124  Resp:  [16-24] 22  BP: (120-158)/(71-89) 121/71  SpO2:  [90 %-98 %] 95 %  Wt Readings from Last 4 Encounters:   02/10/23 55.3 kg (122 lb)   12/13/22 58.7 kg (129 lb 8 oz)   10/30/22 61.5 kg (135 lb 9.3 oz)   09/19/22 60 kg (132 lb 4.4 oz)     Body mass index is 22.31 kg/m .    Constitutional: awake, alert, slowed response time but does answer simple yes/no questions with head movements, intermittently cooperative, no apparent distress, appears stated age and thin  ENT: Normocephalic, without obvious abnormality, atraumatic, external ears without lesions, oral pharynx with moist mucous membranes, tonsils without erythema or exudates, gums normal and good dentition.  Respiratory: Some upper respiratory noise but no rhonchi rales nor wheezing.  Cardiovascular: Normal apical impulse, regular rate and rhythm, normal S1 and S2, no S3 or S4, and no murmur noted  GI: No scars, normal bowel sounds, soft, non-distended, non-tender, no masses palpated, no hepatosplenomegally and G-tube in place  Skin: normal skin color, texture, turgor, no redness, warmth, or swelling, and no rashes  Musculoskeletal: Notable flexors to the right upper and lower extremity.  Some trace lower extremity edema on the left leg noted.  no lower extremity pitting edema present  Neurologic: Cranial nerves II-XII are grossly intact. Sensory:  Sensory intact  Neuropsychiatric: General:  fidgeting and poor eye contact Level of consciousness: drowsy and slowed response time affect: flat Orientation: Difficult to assess memory and insight: Difficult to assess      Medications:   Personally Reviewed.  Medications     sodium chloride 75 mL/hr at 02/11/23 0225       acetylcysteine  2 mL Nebulization Q4H     amLODIPine  5 mg Per G Tube Daily     aspirin  81 mg Per Feeding Tube Daily     chlorhexidine  15 mL Swish & Spit Daily     enoxaparin ANTICOAGULANT  40 mg Subcutaneous Q24H     ferrous sulfate  220 mg Per G Tube Daily     ipratropium - albuterol 0.5 mg/2.5 mg/3 mL  1 vial Nebulization Q4H     lidocaine   Transdermal Q8H CHALO     metoprolol tartrate  25 mg Oral BID     neomycin-bacitracin-polymyxin   Topical 4x Daily     nystatin   Topical BID     pantoprazole  40 mg Per G Tube Daily     piperacillin-tazobactam  3.375 g Intravenous Once    Followed by     piperacillin-tazobactam  3.375 g Intravenous Q8H     QUEtiapine  25 mg Per G Tube TID     simvastatin  20 mg Per G Tube At Bedtime     sodium chloride (PF)  3 mL Intracatheter Q8H       Data reviewed today: I personally reviewed all new medications, labs, imaging/diagnostics reports over the past 24 hours. Pertinent findings include:    Imaging:   Recent Results (from the past 24 hour(s))   XR Chest Port 1 View    Narrative    EXAM: XR CHEST PORT 1 VIEW  LOCATION: [facility]  DATE/TIME: 2/10/2023 10:19 PM    INDICATION: fever cough  COMPARISON: 01/03/2023.      Impression    IMPRESSION: Low lung volumes. Mild bibasilar atelectasis/infiltrates. No significant pleural fluid. No pneumothorax. Normal heart size and pulmonary vascularity. Calcified plaque of the aortic arch   CT Chest Abdomen Pelvis w/o Contrast    Narrative    EXAM: CT CHEST ABDOMEN PELVIS W/O CONTRAST  LOCATION: [facility]  DATE/TIME: 2/11/2023 12:09 AM    INDICATION: vomiting, SOB, fever of 101, hx of recurrent aspiration, non verbal,  chest XR   not helpful  COMPARISON: 12/11/2022 and 10/20/2022  TECHNIQUE: CT scan of the chest, abdomen, and pelvis was performed without IV contrast. Multiplanar reformats were obtained. Dose reduction techniques were used.   CONTRAST: None.    FINDINGS:   Evaluation is limited due to streak artifact from patient's extremities.    LUNGS AND PLEURA: Secretions within the bilateral mainstem bronchi. Emphysema. Calcified granulomas. Patchy groundglass and solid opacities at the bilateral lung bases.    MEDIASTINUM/AXILLAE: Nonspecific 1.6 cm right hilar lymph node. Additional 1.1 cm subcarinal lymph node.    CORONARY ARTERY CALCIFICATION: Severe.    HEPATOBILIARY: Hepatic steatosis. Small hypodensity that is too small to characterize. Normal gallbladder.    PANCREAS: Normal.    SPLEEN: Normal.    ADRENAL GLANDS: Normal.    KIDNEYS/BLADDER: Bilateral cysts. No follow-up is needed. Urinary bladder thickening. Prostatomegaly.    BOWEL: J-tube terminates in the jejunum. No obstruction. Normal appendix.    LYMPH NODES: Normal.    VASCULATURE: Unremarkable.    PELVIC ORGANS: Normal.    MUSCULOSKELETAL: Normal.      Impression    IMPRESSION:  1.  Secretions in the bilateral mainstem bronchi with patchy groundglass and solid opacities at the lung bases which could be due to aspiration. Enlarged mediastinal adenopathy could be reactive.  2.  Hepatic steatosis.  3.  Urinary bladder thickening with prostatomegaly. Correlation with urinalysis.       Labs:  CT Chest Abdomen Pelvis w/o Contrast   Final Result   IMPRESSION:   1.  Secretions in the bilateral mainstem bronchi with patchy groundglass and solid opacities at the lung bases which could be due to aspiration. Enlarged mediastinal adenopathy could be reactive.   2.  Hepatic steatosis.   3.  Urinary bladder thickening with prostatomegaly. Correlation with urinalysis.      XR Chest Port 1 View   Final Result   IMPRESSION: Low lung volumes. Mild bibasilar  atelectasis/infiltrates. No significant pleural fluid. No pneumothorax. Normal heart size and pulmonary vascularity. Calcified plaque of the aortic arch        Recent Results (from the past 24 hour(s))   Symptomatic Influenza A/B & SARS-CoV2 (COVID-19) Virus PCR Multiplex Nose    Collection Time: 02/10/23  9:05 PM    Specimen: Nose; Swab   Result Value Ref Range    Influenza A PCR Negative Negative    Influenza B PCR Negative Negative    RSV PCR Negative Negative    SARS CoV2 PCR Negative Negative   CBC (+ platelets, no diff)    Collection Time: 02/10/23  9:17 PM   Result Value Ref Range    WBC Count 18.6 (H) 4.0 - 11.0 10e3/uL    RBC Count 3.63 (L) 4.40 - 5.90 10e6/uL    Hemoglobin 11.1 (L) 13.3 - 17.7 g/dL    Hematocrit 34.1 (L) 40.0 - 53.0 %    MCV 94 78 - 100 fL    MCH 30.6 26.5 - 33.0 pg    MCHC 32.6 31.5 - 36.5 g/dL    RDW 12.2 10.0 - 15.0 %    Platelet Count 278 150 - 450 10e3/uL   Comprehensive metabolic panel    Collection Time: 02/10/23  9:17 PM   Result Value Ref Range    Sodium 141 136 - 145 mmol/L    Potassium 4.7 3.5 - 5.0 mmol/L    Chloride 110 (H) 98 - 107 mmol/L    Carbon Dioxide (CO2) 23 22 - 31 mmol/L    Anion Gap 8 5 - 18 mmol/L    Urea Nitrogen 37 (H) 8 - 22 mg/dL    Creatinine 1.19 0.70 - 1.30 mg/dL    Calcium 9.1 8.5 - 10.5 mg/dL    Glucose 116 70 - 125 mg/dL    Alkaline Phosphatase 78 45 - 120 U/L    AST 23 0 - 40 U/L    ALT 24 0 - 45 U/L    Protein Total 8.8 (H) 6.0 - 8.0 g/dL    Albumin 3.3 (L) 3.5 - 5.0 g/dL    Bilirubin Total 0.3 0.0 - 1.0 mg/dL    GFR Estimate 70 >60 mL/min/1.73m2   Lactic acid whole blood    Collection Time: 02/10/23  9:17 PM   Result Value Ref Range    Lactic Acid 1.5 0.7 - 2.0 mmol/L   Magnesium Lab    Collection Time: 02/10/23  9:17 PM   Result Value Ref Range    Magnesium 2.3 1.8 - 2.6 mg/dL   UA with Microscopic reflex to Culture    Collection Time: 02/10/23 10:20 PM    Specimen: Urine, Catheter   Result Value Ref Range    Color Urine Yellow Colorless, Straw, Light  Yellow, Yellow    Appearance Urine Clear Clear    Glucose Urine Negative Negative mg/dL    Bilirubin Urine Negative Negative    Ketones Urine Negative Negative mg/dL    Specific Gravity Urine 1.019 1.001 - 1.030    Blood Urine 1.0 mg/dL (A) Negative    pH Urine 5.5 5.0 - 7.0    Protein Albumin Urine 200 (A) Negative mg/dL    Urobilinogen Urine <2.0 <2.0 mg/dL    Nitrite Urine Negative Negative    Leukocyte Esterase Urine Negative Negative    Mucus Urine Present (A) None Seen /LPF    RBC Urine 41 (H) <=2 /HPF    WBC Urine 3 <=5 /HPF    Squamous Epithelials Urine <1 <=1 /HPF    Hyaline Casts Urine 4 (H) <=2 /LPF   Procalcitonin    Collection Time: 02/11/23 12:28 AM   Result Value Ref Range    Procalcitonin 1.26 (H) 0.00 - 0.49 ng/mL   Comprehensive metabolic panel    Collection Time: 02/11/23  5:45 AM   Result Value Ref Range    Sodium 140 136 - 145 mmol/L    Potassium 4.7 3.5 - 5.0 mmol/L    Chloride 114 (H) 98 - 107 mmol/L    Carbon Dioxide (CO2) 16 (L) 22 - 31 mmol/L    Anion Gap 10 5 - 18 mmol/L    Urea Nitrogen 28 (H) 8 - 22 mg/dL    Creatinine 1.19 0.70 - 1.30 mg/dL    Calcium 8.0 (L) 8.5 - 10.5 mg/dL    Glucose 123 70 - 125 mg/dL    Alkaline Phosphatase 59 45 - 120 U/L    AST 20 0 - 40 U/L    ALT 18 0 - 45 U/L    Protein Total 7.2 6.0 - 8.0 g/dL    Albumin 2.6 (L) 3.5 - 5.0 g/dL    Bilirubin Total 0.4 0.0 - 1.0 mg/dL    GFR Estimate 70 >60 mL/min/1.73m2   CBC with platelets and differential    Collection Time: 02/11/23  5:45 AM   Result Value Ref Range    WBC Count 22.1 (H) 4.0 - 11.0 10e3/uL    RBC Count 3.31 (L) 4.40 - 5.90 10e6/uL    Hemoglobin 10.3 (L) 13.3 - 17.7 g/dL    Hematocrit 31.9 (L) 40.0 - 53.0 %    MCV 96 78 - 100 fL    MCH 31.1 26.5 - 33.0 pg    MCHC 32.3 31.5 - 36.5 g/dL    RDW 12.4 10.0 - 15.0 %    Platelet Count 240 150 - 450 10e3/uL    % Neutrophils 83 %    % Lymphocytes 11 %    % Monocytes 5 %    % Eosinophils 0 %    % Basophils 0 %    % Immature Granulocytes 1 %    NRBCs per 100 WBC 0 <1  /100    Absolute Neutrophils 18.4 (H) 1.6 - 8.3 10e3/uL    Absolute Lymphocytes 2.5 0.8 - 5.3 10e3/uL    Absolute Monocytes 1.1 0.0 - 1.3 10e3/uL    Absolute Eosinophils 0.0 0.0 - 0.7 10e3/uL    Absolute Basophils 0.1 0.0 - 0.2 10e3/uL    Absolute Immature Granulocytes 0.1 <=0.4 10e3/uL    Absolute NRBCs 0.0 10e3/uL   Magnesium    Collection Time: 02/11/23  5:45 AM   Result Value Ref Range    Magnesium 2.2 1.8 - 2.6 mg/dL       Pending Labs:  Unresulted Labs Ordered in the Past 30 Days of this Admission     Date and Time Order Name Status Description    2/10/2023  8:47 PM Blood Culture Peripheral Blood In process     2/10/2023  8:47 PM Blood Culture Line, venous In process             Dayday Gonsales MD  [facility]  Phone: #[phone]

## 2023-02-11 NOTE — H&P
[facility] MEDICINE ADMISSION HISTORY AND PHYSICAL       Assessment & Plan      1. Consider Sepsis - source could be respiratory from aspiration given history of recurrent aspiration and he vomited 2x today. Chest XR not helpful. With recurrent aspiration, may need better imaging to check for evidence of aspiration, abscess etc. UA is clean less likely UTI. O2 sat is 97% on RA, mildy tachypneic    - Continue antibiotic - will use Meropenem given prior ESBL (instead of Zosyn). Recent negative MRSA screen  - AM labs, add procalcitonin  - chest XR not enough to explain -- will proceed with CT chest/abdomen    - aspiration precs   - nebs    2. HTN  - PTA meds    3. He is awake, eyes open, not following commands. Non verbal. History of hemorrhagic stroke with hemiplegia. UE and LE no movement, contractures on UE. Has G tube.   - NPO for now    VTE prophylaxis: SCDs/lovenox  Diet:  NPO for now  Code Status: FULL   COVID vaccination: yes  Barriers to discharge: admitting clinical condition  Discharge Disposition and goals:  Unable to determine at this point, pending clinical progress and response to treatment. Patient may need transfer to SNF or ACR if unsafe to go home and needed treatment inappropriate at home setting OR may need home health care evaluation if care can be delivered at home settings. Consider referral to care manager/    PPE - I was wearing PPE when I met the patient including but not limited to - N95 mask, Gloves, and/or Safety glasses.      [facility] Medicine - Medical Decision Making Complexity     History -- supplemental history from: Documented in HPI, if applicable. I did review relevant/available external record(s) including records through care everywhere. If applicable/appropriate, I also did talk to family members/caregivers.     Work Up/Care plan - Based on presenting illnesses and acuity/complexity, it required additional treatment and evaluation using  diagnostic testings including but not limited to, blood work, EKG, ECHO/stress test, and/or radiographic imagings such as CT or MRI. These were independently reviewed/interpreted by the provider if results are available as documented, and if it is applicable.     Care plan was created based on available information provided to me, including patient's condition at the time of encounter. This plan was discussed with patient and/or family members using layman's terms, including counseling/education and they have agreed to proceed. At the end of night shift (9PM - 730A), this case will be presented to the AM Hospitalist.      Consultation - Discussion of management/coordination of care with another provider/external physician and/or nurse, respiratory therapist, pharmacy, and laboratory staff: See chart documentation, if applicable     Complicating factors - In addition to acute illness, patient has multiple chronic medical issues and care is impacted by exacerbation of these illnesses and it required care/treatment modification. These chronic conditions include but not limited to as documented above. Care is affected by social determinants of health, if applicable.       All or some of home medication/s were not resumed on admission due to safety reasons or contraindications. Dosing and frequency may also have been modified. Please resume/review them during your visit.     It is recommended to revise care plan and review history if there is change in condition and/or new clinical information is not available during my encounter.     Pool Leslie MD, MPH, FACP, [facility]  Internal Medicine - Hospitalist        Chief Complaint Cough      HISTORY     - I met him in ED-11. Also met a family member/caregiver.     - Reported sick for 3 days with fever and cough, also today vomited x2. Has G tube feeding.     - He has history of hemorrhagic stroke with hemiplegia and nonverbal communication. He is wheelchair bound. He has been  seen by Pulmonary - for recurrent aspiration PNA. He also has history of chronic UTI on daily macrobid    - In the ED, WBC is 18 thousand. Chest XT - no definite PNA. Fever of 101.2. Mild tachy. Normal lactic acid    - ROS --- Patient is non verbal       Past Medical History     Past Medical History:   Diagnosis Date     Acute UTI 02/21/2020     Anxiety      Basal ganglia hemorrhage (H) 06/20/2016     Brain compression (H)      Bruxism      Chronic static encephalopathy      Chronic static encephalopathy      Chronic static encephalopathy      Convulsions, unspecified convulsion type (H)      CVA (cerebral vascular accident) (H) 07/2016    Hemorrhagic,      Depression      Dyslipidemia      Dyslipidemia      Elevated troponin      Essential hypertension      Feeding by G-tube (H) 06/04/2020     GERD (gastroesophageal reflux disease)      GERD without esophagitis      Hemiplegia of nondominant side, late effect of cerebrovascular disease (H)     Created by Conversion  Replacement Utility updated for latest IMO load     Hemorrhagic stroke (H) 06/05/2016     Hepatitis C carrier (H)      History of ESBL E. coli infection      History of hemorrhagic stroke with residual hemiparesis (H)      History of stroke with residual deficit      HTN (hypertension)      Hypertension      Lung nodule 12/05/2016    9 x 9 mm left upper lobe on CXR     MRSA (methicillin resistant staph aureus) culture positive      Periodontal disease      Persistent fever      Pneumonia of both lower lobes due to infectious organism      Respiratory failure with hypoxia (H)      Seizure (H)      Sepsis due to urinary tract infection (H) 06/04/2020     Severe sepsis (H)      Stroke (H)      Trismus          Surgical History     Past Surgical History:   Procedure Laterality Date     aneurysm clipping  2006     BRAIN SURGERY       EXAM UNDER ANESTHESIA, RESTORATIONS, EXTRACTION(S) DENTAL COMPLEX, COMBINED N/A 12/19/2016    Procedure: COMBINED EXAM UNDER  ANESTHESIA, RESTORATIONS, EXTRACTION(S) DENTAL COMPLEX;  Surgeon: Verónica Martinez DDS;  Location: [facility] OR     EXAM UNDER ANESTHESIA, RESTORATIONS, EXTRACTION(S) DENTAL COMPLEX, COMBINED N/A 3/7/2018    Procedure: COMBINED EXAM UNDER ANESTHESIA, RESTORATIONS, EXTRACTION(S) DENTAL COMPLEX;  Kenalog Injection in Bilateral [place], Combined Dental Exam,Radiographs, Three Dental Restorations, Periodontal Therapy and Fluoride Treatment;  Surgeon: Darryl Isbell DDS;  Location: [facility] OR     HH MIDLINE INSERTION  8/11/2017          INJECT STEROID (LOCATION) N/A 3/7/2018    Procedure: INJECT STEROID (LOCATION);  Kenalog Injection In Bilateral [place], Combined Dental Exam,Radiographs,Three Dental Restorations, Periodontal Therapy and Fluoride Treatment;  Surgeon: Liu Conroy DDS;  Location: [facility] OR     IR GASTRO JEJUNOSTOMY TUBE CHANGE  7/19/2016     IR GASTRO JEJUNOSTOMY TUBE CHANGE  9/23/2016     IR GASTRO JEJUNOSTOMY TUBE CHANGE  11/28/2016     IR GASTRO JEJUNOSTOMY TUBE CHANGE  1/27/2017     IR GASTRO JEJUNOSTOMY TUBE CHANGE  4/19/2017     IR GASTRO JEJUNOSTOMY TUBE CHANGE  6/20/2017     IR GASTRO JEJUNOSTOMY TUBE CHANGE  8/7/2017     IR GASTRO JEJUNOSTOMY TUBE CHANGE  8/11/2017     IR GASTRO JEJUNOSTOMY TUBE CHANGE  10/20/2017     IR GASTRO JEJUNOSTOMY TUBE CHANGE  1/2/2018     IR GASTRO JEJUNOSTOMY TUBE CHANGE  2/16/2018     IR GASTRO JEJUNOSTOMY TUBE CHANGE  5/16/2018     IR GASTRO JEJUNOSTOMY TUBE CHANGE  6/11/2018     IR GASTRO JEJUNOSTOMY TUBE CHANGE  9/12/2018     IR GASTRO JEJUNOSTOMY TUBE CHANGE  12/24/2018     IR GASTRO JEJUNOSTOMY TUBE CHANGE  3/18/2019     IR GASTRO JEJUNOSTOMY TUBE CHANGE  4/23/2019     IR GASTRO JEJUNOSTOMY TUBE CHANGE  5/21/2019     IR GASTRO JEJUNOSTOMY TUBE CHANGE  9/9/2019     IR GASTRO JEJUNOSTOMY TUBE CHANGE  9/26/2019     IR GASTRO JEJUNOSTOMY TUBE CHANGE  10/11/2019     IR GASTRO JEJUNOSTOMY TUBE CHANGE  11/21/2019     IR GASTRO JEJUNOSTOMY TUBE CHANGE  11/29/2019     IR  GASTRO JEJUNOSTOMY TUBE CHANGE  2/27/2020     IR GASTRO JEJUNOSTOMY TUBE CHANGE  4/10/2020     IR GASTRO JEJUNOSTOMY TUBE CHANGE  7/2/2020     IR GASTRO JEJUNOSTOMY TUBE CHANGE  8/28/2020     IR GASTRO JEJUNOSTOMY TUBE CHANGE  9/17/2020     IR GASTRO JEJUNOSTOMY TUBE CHANGE  10/29/2020     IR GASTRO JEJUNOSTOMY TUBE CHANGE  11/6/2020     IR GASTRO JEJUNOSTOMY TUBE CHANGE  12/24/2020     IR GASTRO JEJUNOSTOMY TUBE CHANGE  1/9/2021     IR GASTRO JEJUNOSTOMY TUBE CHANGE  6/9/2021     IR GASTRO JEJUNOSTOMY TUBE CHANGE  10/4/2021     IR GASTRO JEJUNOSTOMY TUBE CHANGE  10/18/2021     IR GASTRO JEJUNOSTOMY TUBE CHANGE  1/14/2022     IR GASTRO JEJUNOSTOMY TUBE CHANGE  1/20/2022     IR GASTRO JEJUNOSTOMY TUBE CHANGE  4/19/2022     IR GASTRO JEJUNOSTOMY TUBE CHANGE  6/7/2022     IR GASTRO JEJUNOSTOMY TUBE CHANGE  8/19/2022     IR GASTRO JEJUNOSTOMY TUBE CHANGE  9/13/2022     IR GASTRO JEJUNOSTOMY TUBE CHANGE  10/27/2022     IR GASTRO JEJUNOSTOMY TUBE CHANGE  11/3/2022     IR GASTRO JEJUNOSTOMY TUBE CHANGE  1/18/2023     IR GASTRO JEJUNOSTOMY TUBE PLACEMENT  6/14/2016     IR GJ TUBE REPLACEMENT  12/24/2018     IR GJ TUBE REPLACEMENT  3/18/2019     IR GJ TUBE REPLACEMENT  4/23/2019     IR GJ TUBE REPLACEMENT  5/21/2019     IR GJ TUBE REPLACEMENT  9/9/2019     IR GJ TUBE REPLACEMENT  9/26/2019     IR GJ TUBE REPLACEMENT  10/11/2019     IR GJ TUBE REPLACEMENT  11/21/2019     IR GJ TUBE REPLACEMENT  11/29/2019     IR GJ TUBE REPLACEMENT  2/27/2020     IR GJ TUBE REPLACEMENT  4/10/2020     IR GJ TUBE REPLACEMENT  7/2/2020     IR GJ TUBE REPLACEMENT  8/28/2020     IR GJ TUBE REPLACEMENT  9/17/2020     IR GJ TUBE REPLACEMENT  10/29/2020     IR GJ TUBE REPLACEMENT  11/6/2020     IR GJ TUBE REPLACEMENT  12/24/2020     IR GJ TUBE REPLACEMENT  1/9/2021     IR GJ TUBE REPLACEMENT  6/9/2021     J-G tube       PICC  6/10/2016          TRACHEOSTOMY      Removed        Family History      Family History   Problem Relation Age of Onset      "Hypertension Mother      Cancer Father      Other Cancer Father      Kidney Disease Brother          Social History      .  Social History     Socioeconomic History     Marital status:      Spouse name: Not on file     Number of children: 2     Years of education: Not on file     Highest education level: Not on file   Occupational History     Occupation: disability   Tobacco Use     Smoking status: Former     Packs/day: 1.00     Years: 30.00     Pack years: 30.00     Types: Cigarettes     Quit date: 2005     Years since quittin.6     Smokeless tobacco: Former     Quit date: 2007     Tobacco comments:     quit 2006   Vaping Use     Vaping Use: Never used   Substance and Sexual Activity     Alcohol use: No     Drug use: No     Sexual activity: Not Currently     Partners: Female     Birth control/protection: None   Other Topics Concern     Parent/sibling w/ CABG, MI or angioplasty before 65F 55M? No   Social History Narrative    Lives with wife, 2 daughters and son in law.    W/c bound.     Social Determinants of Health     Financial Resource Strain: Not on file   Food Insecurity: Not on file   Transportation Needs: Not on file   Physical Activity: Not on file   Stress: Not on file   Social Connections: Not on file   Intimate Partner Violence: Not on file   Housing Stability: Not on file          Allergies        Allergies   Allergen Reactions     Diphenhydramine Unknown     Trazodone Other (See Comments)     \"shaking\" per family         Prior to Admission Medications      No current facility-administered medications on file prior to encounter.  acetaminophen (TYLENOL) 500 MG tablet, 1,000 mg by Gastric Tube route every 6 hours as needed for mild pain   acetylcysteine (MUCOMYST) 20 % neb solution, Take 2 mLs by nebulization every 4 hours  amLODIPine (NORVASC) 5 MG tablet, 5 mg by Per G Tube route daily  aspirin (ASA) 81 MG chewable tablet, 1 tablet (81 mg) by Per Feeding Tube route daily  baclofen " (LIORESAL) 10 MG tablet, Take 1 tablet (10 mg) by mouth 3 times daily as needed for muscle spasms (Patient taking differently: 10 mg by Per G Tube route 3 times daily as needed for muscle spasms)  bisacodyl (DULCOLAX) 10 MG suppository, INSERT 1 SUPPOSTIORY INTO THE RECTUM DAILY AS NEEDED FOR CONSTIPATION.  cefaclor (CECLOR) 250 MG capsule, Give 1 capsule via g tube daily for UTI prevention  chlorhexidine (PERIDEX) 0.12 % solution, Swish and spit 15 mLs in mouth daily   esomeprazole (NEXIUM) 20 MG DR capsule, Take 1 capsule via G-tube twice daily (Patient taking differently: 20 mg daily Take 1 capsule via G-tube once daily)  ferrous sulfate 220 (44 Fe) MG/5ML ELIX, 5 mLs by Enteral route daily  guaiFENesin-dextromethorphan (ROBITUSSIN DM) 100-10 MG/5ML syrup, 10 mLs by Per G Tube route every 4 hours as needed for cough  ipratropium - albuterol 0.5 mg/2.5 mg/3 mL (DUONEB) 0.5-2.5 (3) MG/3ML neb solution, Take 1 vial (3 mLs) by nebulization every 4 hours  LIDOCAINE PAIN RELIEF 4 % Patch, PLACE 1 PATCH ONTO THE SKIN EVERY 24 HOURS TO PREVENT LIDOCAINE TOXICITY, PATIENT SHOULD BE PATCH FREE FOR 12 HOURS DAILY. (Patient taking differently: Place 1 patch onto the skin daily as needed)  meclizine (ANTIVERT) 25 MG tablet, Take 1 tablet via G-tube every 6 hours as needed for vertigo  metoprolol tartrate (LOPRESSOR) 25 MG tablet, TAKE ONE TABLET BY MOUTH TWICE DAILY   neomycin-bacitracin-polymyxin (NEOSPORIN) 5-400-5000 ointment, Apply topically 4 times daily  nystatin (MYCOSTATIN) 883504 UNIT/GM external cream, Apply topically 2 times daily  polyethylene glycol (MIRALAX) 17 GM/Dose powder, Give 1 capful via G-tube daily as needed for constipation  Polyethylene Glycol 400 (VISINE DRY EYE RELIEF) 1 % SOLN, Apply 1 drop to eye every 6 hours as needed (dry eyes)  QUEtiapine (SEROQUEL) 25 MG tablet, Give 3 tablets (75 mg) at bedtime for sleep. Ok to repeat after 4 hours and during the day PRN (Patient taking differently: Take 25  mg by mouth See Admin Instructions Take 1 tablet at 7PM, Midnight and 3AM for total of 75 mg nightly. Ok to repeat after 4 hours and during the day PRN)  simvastatin (ZOCOR) 20 MG tablet, 1 tablet (20 mg) by Per G Tube route At Bedtime            Review of Systems     A 12 point comprehensive review of systems was negative except as noted above in HPI.    PHYSICAL EXAMINATION       Vitals      Vitals: BP (!) 141/89   Pulse 113   Temp (!) 101.2  F (38.4  C)   Resp 24   SpO2 97%   BMI= There is no height or weight on file to calculate BMI.      Examination     General Appearance:  Alert, cooperative, no distress  Head:    Normocephalic, without obvious abnormality, atraumatic  EENT:  PERRL, conjunctiva/corneas clear, EOM's intact.   Neck:   Supple, symmetrical, trachea midline, no adenopathy; no NVE  Back:  Symmetric, no curvature, no CVA tenderness  Chest/Lungs: decreased air entry, (+) rhonchi. No wheezes, respirations unlabored, No tenderness or deformity. No abdominal breathing or use of accessory muscles.   Heart:    Regular rate and rhythm, S1 and S2 normal, no murmur, rub   or gallop  Abdomen: Soft, non-tender, bowel sounds active all four quadrants, not peritoneal on palpation. ?distended, G tube in place  Extremities:  Extremities normal, atraumatic, no swelling   Skin:  Skin color, texture, turgor normal, no rashes or lesion  Neurologic:  Awake and non verbal, contracture on UE, no movement, and LE no movement. No posturing      Pertinent Lab     Results for orders placed or performed during the hospital encounter of 02/10/23   XR Chest Port 1 View    Impression    IMPRESSION: Low lung volumes. Mild bibasilar atelectasis/infiltrates. No significant pleural fluid. No pneumothorax. Normal heart size and pulmonary vascularity. Calcified plaque of the aortic arch   CBC (+ platelets, no diff)   Result Value Ref Range    WBC Count 18.6 (H) 4.0 - 11.0 10e3/uL    RBC Count 3.63 (L) 4.40 - 5.90 10e6/uL     Hemoglobin 11.1 (L) 13.3 - 17.7 g/dL    Hematocrit 34.1 (L) 40.0 - 53.0 %    MCV 94 78 - 100 fL    MCH 30.6 26.5 - 33.0 pg    MCHC 32.6 31.5 - 36.5 g/dL    RDW 12.2 10.0 - 15.0 %    Platelet Count 278 150 - 450 10e3/uL   Comprehensive metabolic panel   Result Value Ref Range    Sodium 141 136 - 145 mmol/L    Potassium 4.7 3.5 - 5.0 mmol/L    Chloride 110 (H) 98 - 107 mmol/L    Carbon Dioxide (CO2) 23 22 - 31 mmol/L    Anion Gap 8 5 - 18 mmol/L    Urea Nitrogen 37 (H) 8 - 22 mg/dL    Creatinine 1.19 0.70 - 1.30 mg/dL    Calcium 9.1 8.5 - 10.5 mg/dL    Glucose 116 70 - 125 mg/dL    Alkaline Phosphatase 78 45 - 120 U/L    AST 23 0 - 40 U/L    ALT 24 0 - 45 U/L    Protein Total 8.8 (H) 6.0 - 8.0 g/dL    Albumin 3.3 (L) 3.5 - 5.0 g/dL    Bilirubin Total 0.3 0.0 - 1.0 mg/dL    GFR Estimate 70 >60 mL/min/1.73m2   Lactic acid whole blood   Result Value Ref Range    Lactic Acid 1.5 0.7 - 2.0 mmol/L   UA with Microscopic reflex to Culture    Specimen: Urine, Catheter   Result Value Ref Range    Color Urine Yellow Colorless, Straw, Light Yellow, Yellow    Appearance Urine Clear Clear    Glucose Urine Negative Negative mg/dL    Bilirubin Urine Negative Negative    Ketones Urine Negative Negative mg/dL    Specific Gravity Urine 1.019 1.001 - 1.030    Blood Urine 1.0 mg/dL (A) Negative    pH Urine 5.5 5.0 - 7.0    Protein Albumin Urine 200 (A) Negative mg/dL    Urobilinogen Urine <2.0 <2.0 mg/dL    Nitrite Urine Negative Negative    Leukocyte Esterase Urine Negative Negative    Mucus Urine Present (A) None Seen /LPF    RBC Urine 41 (H) <=2 /HPF    WBC Urine 3 <=5 /HPF    Squamous Epithelials Urine <1 <=1 /HPF    Hyaline Casts Urine 4 (H) <=2 /LPF   Symptomatic Influenza A/B & SARS-CoV2 (COVID-19) Virus PCR Multiplex Nose    Specimen: Nose; Swab   Result Value Ref Range    Influenza A PCR Negative Negative    Influenza B PCR Negative Negative    RSV PCR Negative Negative    SARS CoV2 PCR Negative Negative           Pertinent  Radiology

## 2023-02-11 NOTE — ED PROVIDER NOTES
EMERGENCY DEPARTMENT ENCOUNTER      NAME: Liu Malave  AGE: 57 year old male  YOB: 1965  MRN: [id number]  EVALUATION DATE & TIME: No admission date for patient encounter.    PCP: Lynda Gutierrez    ED PROVIDER: Kevin Aviles M.D.      Chief Complaint   Patient presents with     Fever     Cough         FINAL IMPRESSION:  Aspiration pneumonia      ED COURSE & MEDICAL DECISION MAKING:    Pertinent Labs & Imaging studies reviewed. (See chart for details)  57 year old male presents to the Emergency Department for evaluation of fever and cough.  Patient brought to the emergency room by family members who are his caregivers.  Daughter provides all information as patient is nonverbal after multiple strokes.  Patient essentially bedridden with a feeding tube in place.  He has not taken anything by mouth since 2016.  Patient with recurrent episodes pneumonia which have presented similarly.  She does report patient is up-to-date on his COVID and flu vaccinations.  Review of records indicate patient hospitalized on December 11, 2022 for recurrent aspiration pneumonia and sepsis.  On exam patient is a chronically ill.  Significantly debilitated male.  He is nonverbal with roving eye movement.  Contractures bilaterally with minimal spontaneous movement.  Feeding tube in place.  Lungs clear but diminished.  Tachycardic.  Abdomen soft and nontender.  Patient with likely COVID versus influenza versus aspiration no.  Possibility of sepsis also.  Will initiate intravenous fluids.  Tylenol given rectally.  Blood cultures been obtained along with urine analysis.  Patient will require hospitalization.    8:40 PM I met with the patient for the initial interview and physical examination. Discussed plan for treatment and workup in the ED.    9:59 PM.  Lactic acid normal at 1.5.  Comprehensive metabolic panel essentially unremarkable.  White cell count moderately elevated 18.6.  Minimal anemia hemoglobin 11.1.  Swab negative  for influenza/RSV/COVID.  Chest x-ray pending as is urine analysis.  10:32 PM.  Chest x-ray with possible lower lobe infiltrates versus atelectasis.  Awaiting urine analysis  At 10:41 PM.  UA unremarkable.  We will proceed with plan for hospitalization.  10:56 PM.  Patient discussed with .  Patient will be full admit.  Medical Decision Making    History:    Supplemental history from: Documented in chart, if applicable and Family Member/Significant Other    External Record(s) reviewed: Documented in chart, if applicable.    Work Up:    Chart documentation includes differential considered and any EKGs or imaging independently interpreted by provider, where specified.    In additional to work up documented, I considered the following work up: Documented in chart, if applicable.    External consultation:    Discussion of management with another provider: Documented in chart, if applicable    Complicating factors:    Care impacted by chronic illness: Jack paresis/hemorrhagic stroke    Care affected by social determinants of health: N/A    Disposition considerations: Admit    At the conclusion of the encounter I discussed the results of all of the tests and the disposition. The questions were answered and return precautions provided. The patient or family acknowledged understanding and was agreeable with the care plan.     PPE: Provider wore gloves, N95 mask, eye protection, surgical gown.    MEDICATIONS GIVEN IN THE EMERGENCY:  Medications   acetaminophen (TYLENOL) Suppository 650 mg (has no administration in time range)       NEW PRESCRIPTIONS STARTED AT TODAY'S ER VISIT  New Prescriptions    No medications on file          =================================================================    HPI    Per chart review, patient visited [facility] from 12/11/22-12/13/22, with a concern of sepsis. Given 2 L IV fluids in the ED and his hypotension improved. CT chest abdomen pelvis was  suggestive of developing pneumonia, abdomen negative. On IV meropenem and vancomycin. Lactic acidosis resolved. WBC downtrending. Patient stable on room air since 12/10. No growth to date on blood cultures, preliminary sputum culture revealed mixed brandon. Routine respiratory viral panel, MRSA screen negative. ID consulted, recommended discharging home on Omnicef and metronidazole for 5 days via G-tube. Encouraged patient's family to follow up with PCP after discharge to discuss current medication regimen and goals of care moving forward. Recommend discussing possible outpatient pulmonology or ID referral given recurrent episodes. Recommend to discuss other options for UTI prophylaxis. No other medical concerns.    Patient information was obtained from: Patient's daughter    Use of Intrepreter: N/A      Liu GIL Malave is a 57 year old male with a pertient medical history of CVA, hypertension, dyslipidemia, GERD, lung nodule, hemorrhagic stroke, pneumonia, severe sepsis, hemiplegia, depression, and anxiety who presents to the ED for evaluation of fever and cough.    Patient reports an onset of fever and cough which occured on 2/7, after spending time with his grand children who had diarrhea but no fever.     Patient's family reports he has a past medical history of aspiration and pneumonia, including stroke which occurred in 2016. They state he is on a feeding tube and doesn't eat through his mouth. He also is up to date with his COVID-19 vaccinations. No other medical concerns are expressed at this time.    REVIEW OF SYSTEMS   Constitutional:  Denies chills. Positive for fever  Respiratory:  Denies increased work of breathing. Positive for cough  Cardiovascular:  Denies chest pain, palpitations  GI:  Denies abdominal pain, nausea, vomiting, or change in bowel or bladder habits   Musculoskeletal:  Denies any new muscle/joint swelling  Skin:  Denies rash   Neurologic:  Denies focal weakness  ROS Limited. All systems  negative except as marked.     PAST MEDICAL HISTORY:  Past Medical History:   Diagnosis Date     Acute UTI 02/21/2020     Anxiety      Basal ganglia hemorrhage (H) 06/20/2016     Brain compression (H)      Bruxism      Chronic static encephalopathy      Chronic static encephalopathy      Chronic static encephalopathy      Convulsions, unspecified convulsion type (H)      CVA (cerebral vascular accident) (H) 07/2016    Hemorrhagic,      Depression      Dyslipidemia      Dyslipidemia      Elevated troponin      Essential hypertension      Feeding by G-tube (H) 06/04/2020     GERD (gastroesophageal reflux disease)      GERD without esophagitis      Hemiplegia of nondominant side, late effect of cerebrovascular disease (H)     Created by Conversion  Replacement Utility updated for latest IMO load     Hemorrhagic stroke (H) 06/05/2016     Hepatitis C carrier (H)      History of ESBL E. coli infection      History of hemorrhagic stroke with residual hemiparesis (H)      History of stroke with residual deficit      HTN (hypertension)      Hypertension      Lung nodule 12/05/2016    9 x 9 mm left upper lobe on CXR     MRSA (methicillin resistant staph aureus) culture positive      Periodontal disease      Persistent fever      Pneumonia of both lower lobes due to infectious organism      Respiratory failure with hypoxia (H)      Seizure (H)      Sepsis due to urinary tract infection (H) 06/04/2020     Severe sepsis (H)      Stroke (H)      Trismus        PAST SURGICAL HISTORY:  Past Surgical History:   Procedure Laterality Date     aneurysm clipping  2006     BRAIN SURGERY       EXAM UNDER ANESTHESIA, RESTORATIONS, EXTRACTION(S) DENTAL COMPLEX, COMBINED N/A 12/19/2016    Procedure: COMBINED EXAM UNDER ANESTHESIA, RESTORATIONS, EXTRACTION(S) DENTAL COMPLEX;  Surgeon: Verónica Martinez DDS;  Location: [facility] OR     EXAM UNDER ANESTHESIA, RESTORATIONS, EXTRACTION(S) DENTAL COMPLEX, COMBINED N/A 3/7/2018    Procedure: COMBINED EXAM  UNDER ANESTHESIA, RESTORATIONS, EXTRACTION(S) DENTAL COMPLEX;  Kenalog Injection in Bilateral [place], Combined Dental Exam,Radiographs, Three Dental Restorations, Periodontal Therapy and Fluoride Treatment;  Surgeon: Darryl Isbell DDS;  Location: [facility] OR     HH MIDLINE INSERTION  8/11/2017          INJECT STEROID (LOCATION) N/A 3/7/2018    Procedure: INJECT STEROID (LOCATION);  Kenalog Injection In Bilateral [place], Combined Dental Exam,Radiographs,Three Dental Restorations, Periodontal Therapy and Fluoride Treatment;  Surgeon: Liu Conroy DDS;  Location: [facility] OR     IR GASTRO JEJUNOSTOMY TUBE CHANGE  7/19/2016     IR GASTRO JEJUNOSTOMY TUBE CHANGE  9/23/2016     IR GASTRO JEJUNOSTOMY TUBE CHANGE  11/28/2016     IR GASTRO JEJUNOSTOMY TUBE CHANGE  1/27/2017     IR GASTRO JEJUNOSTOMY TUBE CHANGE  4/19/2017     IR GASTRO JEJUNOSTOMY TUBE CHANGE  6/20/2017     IR GASTRO JEJUNOSTOMY TUBE CHANGE  8/7/2017     IR GASTRO JEJUNOSTOMY TUBE CHANGE  8/11/2017     IR GASTRO JEJUNOSTOMY TUBE CHANGE  10/20/2017     IR GASTRO JEJUNOSTOMY TUBE CHANGE  1/2/2018     IR GASTRO JEJUNOSTOMY TUBE CHANGE  2/16/2018     IR GASTRO JEJUNOSTOMY TUBE CHANGE  5/16/2018     IR GASTRO JEJUNOSTOMY TUBE CHANGE  6/11/2018     IR GASTRO JEJUNOSTOMY TUBE CHANGE  9/12/2018     IR GASTRO JEJUNOSTOMY TUBE CHANGE  12/24/2018     IR GASTRO JEJUNOSTOMY TUBE CHANGE  3/18/2019     IR GASTRO JEJUNOSTOMY TUBE CHANGE  4/23/2019     IR GASTRO JEJUNOSTOMY TUBE CHANGE  5/21/2019     IR GASTRO JEJUNOSTOMY TUBE CHANGE  9/9/2019     IR GASTRO JEJUNOSTOMY TUBE CHANGE  9/26/2019     IR GASTRO JEJUNOSTOMY TUBE CHANGE  10/11/2019     IR GASTRO JEJUNOSTOMY TUBE CHANGE  11/21/2019     IR GASTRO JEJUNOSTOMY TUBE CHANGE  11/29/2019     IR GASTRO JEJUNOSTOMY TUBE CHANGE  2/27/2020     IR GASTRO JEJUNOSTOMY TUBE CHANGE  4/10/2020     IR GASTRO JEJUNOSTOMY TUBE CHANGE  7/2/2020     IR GASTRO JEJUNOSTOMY TUBE CHANGE  8/28/2020     IR GASTRO JEJUNOSTOMY TUBE CHANGE   9/17/2020     IR GASTRO JEJUNOSTOMY TUBE CHANGE  10/29/2020     IR GASTRO JEJUNOSTOMY TUBE CHANGE  11/6/2020     IR GASTRO JEJUNOSTOMY TUBE CHANGE  12/24/2020     IR GASTRO JEJUNOSTOMY TUBE CHANGE  1/9/2021     IR GASTRO JEJUNOSTOMY TUBE CHANGE  6/9/2021     IR GASTRO JEJUNOSTOMY TUBE CHANGE  10/4/2021     IR GASTRO JEJUNOSTOMY TUBE CHANGE  10/18/2021     IR GASTRO JEJUNOSTOMY TUBE CHANGE  1/14/2022     IR GASTRO JEJUNOSTOMY TUBE CHANGE  1/20/2022     IR GASTRO JEJUNOSTOMY TUBE CHANGE  4/19/2022     IR GASTRO JEJUNOSTOMY TUBE CHANGE  6/7/2022     IR GASTRO JEJUNOSTOMY TUBE CHANGE  8/19/2022     IR GASTRO JEJUNOSTOMY TUBE CHANGE  9/13/2022     IR GASTRO JEJUNOSTOMY TUBE CHANGE  10/27/2022     IR GASTRO JEJUNOSTOMY TUBE CHANGE  11/3/2022     IR GASTRO JEJUNOSTOMY TUBE CHANGE  1/18/2023     IR GASTRO JEJUNOSTOMY TUBE PLACEMENT  6/14/2016     IR GJ TUBE REPLACEMENT  12/24/2018     IR GJ TUBE REPLACEMENT  3/18/2019     IR GJ TUBE REPLACEMENT  4/23/2019     IR GJ TUBE REPLACEMENT  5/21/2019     IR GJ TUBE REPLACEMENT  9/9/2019     IR GJ TUBE REPLACEMENT  9/26/2019     IR GJ TUBE REPLACEMENT  10/11/2019     IR GJ TUBE REPLACEMENT  11/21/2019     IR GJ TUBE REPLACEMENT  11/29/2019     IR GJ TUBE REPLACEMENT  2/27/2020     IR GJ TUBE REPLACEMENT  4/10/2020     IR GJ TUBE REPLACEMENT  7/2/2020     IR GJ TUBE REPLACEMENT  8/28/2020     IR GJ TUBE REPLACEMENT  9/17/2020     IR GJ TUBE REPLACEMENT  10/29/2020     IR GJ TUBE REPLACEMENT  11/6/2020     IR GJ TUBE REPLACEMENT  12/24/2020     IR GJ TUBE REPLACEMENT  1/9/2021     IR GJ TUBE REPLACEMENT  6/9/2021     J-G tube       PICC  6/10/2016          TRACHEOSTOMY      Removed         CURRENT MEDICATIONS:      Current Facility-Administered Medications:      acetaminophen (TYLENOL) Suppository 650 mg, 650 mg, Rectal, Once, SovellKevin MD    Current Outpatient Medications:      acetaminophen (TYLENOL) 500 MG tablet, 1,000 mg by Gastric Tube route every 6 hours as needed for  mild pain , Disp: , Rfl:      acetylcysteine (MUCOMYST) 20 % neb solution, Take 2 mLs by nebulization every 4 hours, Disp: 360 mL, Rfl: 3     amLODIPine (NORVASC) 5 MG tablet, Take 5 mg by mouth daily, Disp: , Rfl:      aspirin (ASA) 81 MG chewable tablet, 1 tablet (81 mg) by Per Feeding Tube route daily, Disp: 90 tablet, Rfl: 0     baclofen (LIORESAL) 10 MG tablet, Take 1 tablet (10 mg) by mouth 3 times daily as needed for muscle spasms, Disp: 270 tablet, Rfl: 3     bisacodyl (DULCOLAX) 10 MG suppository, INSERT 1 SUPPOSTIORY INTO THE RECTUM DAILY AS NEEDED FOR CONSTIPATION., Disp: 30 suppository, Rfl: 8     cefaclor (CECLOR) 250 MG capsule, Give 1 capsule via g tube daily for UTI prevention, Disp: 90 capsule, Rfl: 1     cefdinir (OMNICEF) 250 MG/5ML suspension, Take 6 ml (300 mg) per G tube twice daily for 10 days, Disp: 120 mL, Rfl: 0     cephALEXin (KEFLEX) 250 MG/5ML suspension, Administer 2.5 ml via g tube once daily for preventive of urinary tract infection, Disp: 225 mL, Rfl: 3     chlorhexidine (PERIDEX) 0.12 % solution, Swish and spit 15 mLs in mouth daily , Disp: , Rfl:      esomeprazole (NEXIUM) 20 MG DR capsule, Take 1 capsule via G-tube twice daily (Patient taking differently: 20 mg daily Take 1 capsule via G-tube twice daily), Disp: 180 capsule, Rfl: 3     ferrous sulfate 220 (44 Fe) MG/5ML ELIX, 5 mLs by Enteral route daily, Disp: 150 mL, Rfl: 11     guaiFENesin-dextromethorphan (ROBITUSSIN DM) 100-10 MG/5ML syrup, 10 mLs by Per G Tube route every 4 hours as needed for cough, Disp: 236 mL, Rfl: 2     ipratropium - albuterol 0.5 mg/2.5 mg/3 mL (DUONEB) 0.5-2.5 (3) MG/3ML neb solution, Take 1 vial (3 mLs) by nebulization every 4 hours, Disp: 90 mL, Rfl: 4     LIDOCAINE PAIN RELIEF 4 % Patch, PLACE 1 PATCH ONTO THE SKIN EVERY 24 HOURS TO PREVENT LIDOCAINE TOXICITY, PATIENT SHOULD BE PATCH FREE FOR 12 HOURS DAILY., Disp: 30 patch, Rfl: 3     meclizine (ANTIVERT) 25 MG tablet, Take 1 tablet via G-tube  "every 6 hours as needed for vertigo, Disp: 30 tablet, Rfl: 11     metoprolol tartrate (LOPRESSOR) 25 MG tablet, TAKE ONE TABLET BY MOUTH TWICE DAILY , Disp: 60 tablet, Rfl: 11     neomycin-bacitracin-polymyxin (NEOSPORIN) 5-400-5000 ointment, Apply topically 4 times daily, Disp: 30 g, Rfl: 3     nystatin (MYCOSTATIN) 603160 UNIT/GM external cream, Apply topically 2 times daily, Disp: 30 g, Rfl: 0     polyethylene glycol (MIRALAX) 17 GM/Dose powder, Give 1 capful via G-tube daily as needed for constipation, Disp: 850 g, Rfl: 3     Polyethylene Glycol 400 (VISINE DRY EYE RELIEF) 1 % SOLN, Apply 1 drop to eye every 6 hours as needed (dry eyes), Disp: 15 mL, Rfl: 11     QUEtiapine (SEROQUEL) 25 MG tablet, Give 3 tablets (75 mg) at bedtime for sleep. Ok to repeat after 4 hours and during the day PRN, Disp: 102 tablet, Rfl: 3     simvastatin (ZOCOR) 20 MG tablet, 1 tablet (20 mg) by Per G Tube route At Bedtime, Disp: 90 tablet, Rfl: 0    ALLERGIES:  Allergies   Allergen Reactions     Diphenhydramine Unknown     Trazodone Other (See Comments)     \"shaking\" per family       FAMILY HISTORY:  Family History   Problem Relation Age of Onset     Hypertension Mother      Cancer Father      Other Cancer Father      Kidney Disease Brother        SOCIAL HISTORY:   Social History     Socioeconomic History     Marital status:      Spouse name: None     Number of children: 2     Years of education: None     Highest education level: None   Occupational History     Occupation: disability   Tobacco Use     Smoking status: Former     Packs/day: 1.00     Years: 30.00     Pack years: 30.00     Types: Cigarettes     Quit date: 2005     Years since quittin.6     Smokeless tobacco: Former     Quit date: 2007     Tobacco comments:     quit 2006   Vaping Use     Vaping Use: Never used   Substance and Sexual Activity     Alcohol use: No     Drug use: No     Sexual activity: Not Currently     Partners: Female     Birth " control/protection: None   Other Topics Concern     Parent/sibling w/ CABG, MI or angioplasty before 65F 55M? No   Social History Narrative    Lives with wife, 2 daughters and son in law.    W/c bound.       VITALS:  Patient Vitals for the past 24 hrs:   BP Temp Pulse Resp SpO2   02/10/23 2031 120/80 (!) 101.2  F (38.4  C) 111 16 90 %        PHYSICAL EXAM    Constitutional:  Awake, alert, in moderate apparent distress  HENT:  Normocephalic, Atraumatic. Bilateral external ears normal. Oropharynx moist. Nose normal. Neck- Normal range of motion with no guarding, No midline cervical tenderness, Supple, No stridor.   Eyes:  PERRL, EOMI with no signs of entrapment, Conjunctiva normal, No discharge.   Respiratory:  Normal breath sounds, No respiratory distress, No wheezing. Lungs clear  Cardiovascular:  No appreciable rubs or gallops. Tachycardic  GI:  Soft, No tenderness, No distension, No palpable masses  Musculoskeletal:  Intact distal pulses, No edema. Good range of motion in all major joints. No tenderness to palpation or major deformities noted. Constitution in upper and lower extremities  Integument:  Warm, Dry, No erythema, No rash.   Neurologic:  Alert.  Nonverbal.  Bilateral contractures with minimal movement.     LAB:  All pertinent labs reviewed and interpreted.     Results for orders placed or performed during the hospital encounter of 02/10/23   XR Chest Port 1 View     Status: None    Narrative    EXAM: XR CHEST PORT 1 VIEW  LOCATION: [facility]  DATE/TIME: 2/10/2023 10:19 PM    INDICATION: fever cough  COMPARISON: 01/03/2023.      Impression    IMPRESSION: Low lung volumes. Mild bibasilar atelectasis/infiltrates. No significant pleural fluid. No pneumothorax. Normal heart size and pulmonary vascularity. Calcified plaque of the aortic arch   CBC (+ platelets, no diff)     Status: Abnormal   Result Value Ref Range    WBC Count 18.6 (H) 4.0 - 11.0 10e3/uL    RBC Count 3.63 (L) 4.40 -  5.90 10e6/uL    Hemoglobin 11.1 (L) 13.3 - 17.7 g/dL    Hematocrit 34.1 (L) 40.0 - 53.0 %    MCV 94 78 - 100 fL    MCH 30.6 26.5 - 33.0 pg    MCHC 32.6 31.5 - 36.5 g/dL    RDW 12.2 10.0 - 15.0 %    Platelet Count 278 150 - 450 10e3/uL   Comprehensive metabolic panel     Status: Abnormal   Result Value Ref Range    Sodium 141 136 - 145 mmol/L    Potassium 4.7 3.5 - 5.0 mmol/L    Chloride 110 (H) 98 - 107 mmol/L    Carbon Dioxide (CO2) 23 22 - 31 mmol/L    Anion Gap 8 5 - 18 mmol/L    Urea Nitrogen 37 (H) 8 - 22 mg/dL    Creatinine 1.19 0.70 - 1.30 mg/dL    Calcium 9.1 8.5 - 10.5 mg/dL    Glucose 116 70 - 125 mg/dL    Alkaline Phosphatase 78 45 - 120 U/L    AST 23 0 - 40 U/L    ALT 24 0 - 45 U/L    Protein Total 8.8 (H) 6.0 - 8.0 g/dL    Albumin 3.3 (L) 3.5 - 5.0 g/dL    Bilirubin Total 0.3 0.0 - 1.0 mg/dL    GFR Estimate 70 >60 mL/min/1.73m2   Lactic acid whole blood     Status: Normal   Result Value Ref Range    Lactic Acid 1.5 0.7 - 2.0 mmol/L   UA with Microscopic reflex to Culture     Status: Abnormal    Specimen: Urine, Catheter   Result Value Ref Range    Color Urine Yellow Colorless, Straw, Light Yellow, Yellow    Appearance Urine Clear Clear    Glucose Urine Negative Negative mg/dL    Bilirubin Urine Negative Negative    Ketones Urine Negative Negative mg/dL    Specific Gravity Urine 1.019 1.001 - 1.030    Blood Urine 1.0 mg/dL (A) Negative    pH Urine 5.5 5.0 - 7.0    Protein Albumin Urine 200 (A) Negative mg/dL    Urobilinogen Urine <2.0 <2.0 mg/dL    Nitrite Urine Negative Negative    Leukocyte Esterase Urine Negative Negative    Mucus Urine Present (A) None Seen /LPF    RBC Urine 41 (H) <=2 /HPF    WBC Urine 3 <=5 /HPF    Squamous Epithelials Urine <1 <=1 /HPF    Hyaline Casts Urine 4 (H) <=2 /LPF    Narrative    Urine Culture not indicated   Symptomatic Influenza A/B & SARS-CoV2 (COVID-19) Virus PCR Multiplex Nose     Status: Normal    Specimen: Nose; Swab   Result Value Ref Range    Influenza A PCR  Negative Negative    Influenza B PCR Negative Negative    RSV PCR Negative Negative    SARS CoV2 PCR Negative Negative    Narrative    Testing was performed using the Xpert Xpress CoV2/Flu/RSV Assay on the [employer] GeneXpert Instrument. This test should be ordered for the detection of SARS-CoV-2 and influenza viruses in individuals who meet clinical and/or epidemiological criteria. Test performance is unknown in asymptomatic patients. This test is for in vitro diagnostic use under the FDA EUA for laboratories certified under CLIA to perform high or moderate complexity testing. This test has not been FDA cleared or approved. A negative result does not rule out the presence of PCR inhibitors in the specimen or target RNA in concentration below the limit of detection for the assay. If only one viral target is positive but coinfection with multiple targets is suspected, the sample should be re-tested with another FDA cleared, approved, or authorized test, if coinfection would change clinical management. This test was validated by the [facility]. These laboratories are certified under the Clinical Laboratory Improvement Amendments of 1988 (CLIA-88) as qualified to perform high complexity laboratory testing.     RADIOLOGY:  Reviewed all pertinent imaging. Please see official radiology report.  XR Chest Port 1 View    (Results Pending)       EKG:    Sinus tachycardia.  Rate of 112.  Normal QRS.  Normal ST segments.  When compared to September 18, 2022 unchanged.  I have independently reviewed and interpreted the EKG(s) documented above.      I, Mateo Paredes, am serving as a scribe to document services personally performed by Kevin Aviles MD, based on my observation and the provider's statements to me. I, Kevin Aviles MD attest that Mateo Paredes is acting in a scribe capacity, has observed my performance of the services and has documented them in accordance with my direction.    Kevin Aviles  M.D.  Emergency Medicine  [facility] EMERGENCY ROOM     Kevin Aviles MD  02/10/23 [id number]

## 2023-02-12 VITALS
SYSTOLIC BLOOD PRESSURE: 132 MMHG | BODY MASS INDEX: 23.04 KG/M2 | TEMPERATURE: 99.7 F | DIASTOLIC BLOOD PRESSURE: 79 MMHG | WEIGHT: 125.22 LBS | HEIGHT: 62 IN | HEART RATE: 88 BPM | RESPIRATION RATE: 22 BRPM | OXYGEN SATURATION: 95 %

## 2023-02-12 PROBLEM — R33.9 URINARY RETENTION WITH INCOMPLETE BLADDER EMPTYING: Status: ACTIVE | Noted: 2023-02-12

## 2023-02-12 LAB
ANION GAP SERPL CALCULATED.3IONS-SCNC: 10 MMOL/L (ref 5–18)
BUN SERPL-MCNC: 22 MG/DL (ref 8–22)
CALCIUM SERPL-MCNC: 7.5 MG/DL (ref 8.5–10.5)
CHLORIDE BLD-SCNC: 115 MMOL/L (ref 98–107)
CO2 SERPL-SCNC: 18 MMOL/L (ref 22–31)
CREAT SERPL-MCNC: 1.29 MG/DL (ref 0.7–1.3)
ERYTHROCYTE [DISTWIDTH] IN BLOOD BY AUTOMATED COUNT: 12.7 % (ref 10–15)
GFR SERPL CREATININE-BSD FRML MDRD: 64 ML/MIN/1.73M2
GLUCOSE BLD-MCNC: 98 MG/DL (ref 70–125)
HCT VFR BLD AUTO: 28.3 % (ref 40–53)
HGB BLD-MCNC: 9.1 G/DL (ref 13.3–17.7)
MAGNESIUM SERPL-MCNC: 2.4 MG/DL (ref 1.8–2.6)
MCH RBC QN AUTO: 31.2 PG (ref 26.5–33)
MCHC RBC AUTO-ENTMCNC: 32.2 G/DL (ref 31.5–36.5)
MCV RBC AUTO: 97 FL (ref 78–100)
PLATELET # BLD AUTO: 210 10E3/UL (ref 150–450)
POTASSIUM BLD-SCNC: 4.2 MMOL/L (ref 3.5–5)
RBC # BLD AUTO: 2.92 10E6/UL (ref 4.4–5.9)
SODIUM SERPL-SCNC: 143 MMOL/L (ref 136–145)
WBC # BLD AUTO: 11.2 10E3/UL (ref 4–11)

## 2023-02-12 PROCEDURE — 94640 AIRWAY INHALATION TREATMENT: CPT

## 2023-02-12 PROCEDURE — 83735 ASSAY OF MAGNESIUM: CPT | Performed by: INTERNAL MEDICINE

## 2023-02-12 PROCEDURE — 250N000013 HC RX MED GY IP 250 OP 250 PS 637: Performed by: INTERNAL MEDICINE

## 2023-02-12 PROCEDURE — 85014 HEMATOCRIT: CPT | Performed by: FAMILY MEDICINE

## 2023-02-12 PROCEDURE — 250N000011 HC RX IP 250 OP 636: Performed by: FAMILY MEDICINE

## 2023-02-12 PROCEDURE — 250N000011 HC RX IP 250 OP 636: Performed by: INTERNAL MEDICINE

## 2023-02-12 PROCEDURE — 250N000009 HC RX 250: Performed by: INTERNAL MEDICINE

## 2023-02-12 PROCEDURE — 94640 AIRWAY INHALATION TREATMENT: CPT | Mod: 76

## 2023-02-12 PROCEDURE — 82310 ASSAY OF CALCIUM: CPT | Performed by: FAMILY MEDICINE

## 2023-02-12 PROCEDURE — 36415 COLL VENOUS BLD VENIPUNCTURE: CPT | Performed by: INTERNAL MEDICINE

## 2023-02-12 PROCEDURE — 99239 HOSP IP/OBS DSCHRG MGMT >30: CPT | Performed by: FAMILY MEDICINE

## 2023-02-12 PROCEDURE — 250N000013 HC RX MED GY IP 250 OP 250 PS 637: Performed by: FAMILY MEDICINE

## 2023-02-12 RX ORDER — BACLOFEN 10 MG/1
10 TABLET ORAL 3 TIMES DAILY PRN
Status: ON HOLD
Start: 2023-02-12 | End: 2023-04-27

## 2023-02-12 RX ORDER — DOXAZOSIN 1 MG/1
1 TABLET ORAL DAILY
Qty: 30 TABLET | Refills: 0 | Status: SHIPPED | OUTPATIENT
Start: 2023-02-13 | End: 2023-05-01

## 2023-02-12 RX ORDER — AMOXICILLIN AND CLAVULANATE POTASSIUM 400; 57 MG/5ML; MG/5ML
875 POWDER, FOR SUSPENSION ORAL 2 TIMES DAILY
Qty: 109.4 ML | Refills: 0 | Status: SHIPPED | OUTPATIENT
Start: 2023-02-12 | End: 2023-02-17

## 2023-02-12 RX ADMIN — BACLOFEN 10 MG: 10 TABLET ORAL at 08:44

## 2023-02-12 RX ADMIN — QUETIAPINE FUMARATE 25 MG: 25 TABLET ORAL at 00:51

## 2023-02-12 RX ADMIN — ACETYLCYSTEINE 2 ML: 200 SOLUTION ORAL; RESPIRATORY (INHALATION) at 07:54

## 2023-02-12 RX ADMIN — BISACODYL 10 MG: 10 SUPPOSITORY RECTAL at 08:34

## 2023-02-12 RX ADMIN — CHLORHEXIDINE GLUCONATE 15 ML: 1.2 SOLUTION ORAL at 08:42

## 2023-02-12 RX ADMIN — IPRATROPIUM BROMIDE AND ALBUTEROL SULFATE 3 ML: 2.5; .5 SOLUTION RESPIRATORY (INHALATION) at 12:15

## 2023-02-12 RX ADMIN — IPRATROPIUM BROMIDE AND ALBUTEROL SULFATE 3 ML: 2.5; .5 SOLUTION RESPIRATORY (INHALATION) at 07:54

## 2023-02-12 RX ADMIN — PIPERACILLIN AND TAZOBACTAM 3.38 G: 3; .375 INJECTION, POWDER, FOR SOLUTION INTRAVENOUS at 04:22

## 2023-02-12 RX ADMIN — IPRATROPIUM BROMIDE AND ALBUTEROL SULFATE 3 ML: 2.5; .5 SOLUTION RESPIRATORY (INHALATION) at 00:13

## 2023-02-12 RX ADMIN — BACITRACIN ZINC, NEOMYCIN, POLYMYXIN B: 400; 3.5; 5 OINTMENT TOPICAL at 08:45

## 2023-02-12 RX ADMIN — SODIUM BICARBONATE 40 MG: 84 INJECTION, SOLUTION INTRAVENOUS at 08:44

## 2023-02-12 RX ADMIN — ACETYLCYSTEINE 2 ML: 200 SOLUTION ORAL; RESPIRATORY (INHALATION) at 03:16

## 2023-02-12 RX ADMIN — GUAIFENESIN AND DEXTROMETHORPHAN 10 ML: 100; 10 SYRUP ORAL at 08:44

## 2023-02-12 RX ADMIN — ENOXAPARIN SODIUM 40 MG: 100 INJECTION SUBCUTANEOUS at 00:03

## 2023-02-12 RX ADMIN — Medication 220 MG: at 08:43

## 2023-02-12 RX ADMIN — ASPIRIN 81 MG 81 MG: 81 TABLET ORAL at 08:42

## 2023-02-12 RX ADMIN — AMLODIPINE BESYLATE 5 MG: 5 TABLET ORAL at 08:42

## 2023-02-12 RX ADMIN — BACLOFEN 10 MG: 10 TABLET ORAL at 00:03

## 2023-02-12 RX ADMIN — ACETYLCYSTEINE 2 ML: 200 SOLUTION ORAL; RESPIRATORY (INHALATION) at 00:13

## 2023-02-12 RX ADMIN — METOPROLOL TARTRATE 25 MG: 25 TABLET, FILM COATED ORAL at 08:42

## 2023-02-12 RX ADMIN — DOXAZOSIN 1 MG: 1 TABLET ORAL at 08:44

## 2023-02-12 RX ADMIN — IPRATROPIUM BROMIDE AND ALBUTEROL SULFATE 3 ML: 2.5; .5 SOLUTION RESPIRATORY (INHALATION) at 03:16

## 2023-02-12 RX ADMIN — ACETYLCYSTEINE 2 ML: 200 SOLUTION ORAL; RESPIRATORY (INHALATION) at 12:15

## 2023-02-12 RX ADMIN — GUAIFENESIN AND DEXTROMETHORPHAN 10 ML: 100; 10 SYRUP ORAL at 03:20

## 2023-02-12 ASSESSMENT — ACTIVITIES OF DAILY LIVING (ADL)
ADLS_ACUITY_SCORE: 44

## 2023-02-12 NOTE — PLAN OF CARE
Problem: Plan of Care - These are the overarching goals to be used throughout the patient stay.    Goal: Optimal Comfort and Wellbeing  Outcome: Progressing   Goal Outcome Evaluation:       Pt. Will rest tonight with minimal rest and sleep interruptions.

## 2023-02-12 NOTE — PROGRESS NOTES
"Scheduled nebs given, breath sounds coarse pre and post nebs.  On room air, sats in the mid 90's.  Strong, nonproductive cough.  RT will continue to follow.         BP (!) 142/69 (BP Location: Left leg)   Pulse 87   Temp 99.6  F (37.6  C) (Axillary)   Resp 19   Ht 1.575 m (5' 2\")   Wt 56.8 kg (125 lb 3.5 oz)   SpO2 95%   BMI 22.90 kg/m      "

## 2023-02-12 NOTE — DISCHARGE SUMMARY
[facility] MEDICINE  DISCHARGE SUMMARY     Primary Care Physician: Lynda Gutierrez  Admission Date: 2/10/2023   Discharge Provider: Dayday Gonsales MD Discharge Date: 2/12/2023   Diet:   Active Diet and Nourishment Order   Procedures     Adult Formula Drip Feeding: Continuous Osmolite 1.5; Gastrostomy; Goal Rate: 45; mL/hr; Initiate at 15mL/hr and advance by 10mL/hr q 6 hrs as tolerated to goal rate     NPO for Medical/Clinical Reasons Except for: Meds, Ice Chips     Diet     Code Status: Full Code   Activity: DCACTIVITY: Activity as tolerated  Bedrest per previous recommendations.      Condition at Discharge: Stable     REASON FOR PRESENTATION(See Admission Note for Details)   Cough fever    PRINCIPAL & ACTIVE DISCHARGE DIAGNOSES     Principal Problem:    Aspiration pneumonia of both lower lobes, unspecified aspiration pneumonia type (H)  Active Problems:    Hemiplegia and hemiparesis following cerebral infarction affecting unspecified side (H)    Sepsis without acute organ dysfunction (H)    Urinary retention with incomplete bladder emptying  Leukocytosis  GERD  Hemorrhagic stroke history  Essential hypertension  Hyperlipidemia    Clinically Significant Risk Factors           # Hypercalcemia: corrected calcium is >10.1, will monitor as appropriate    # Hypoalbuminemia: Lowest albumin = 2.6 g/dL at 2/11/2023  5:45 AM, will monitor as appropriate                   PENDING LABS     Unresulted Labs Ordered in the Past 30 Days of this Admission     Date and Time Order Name Status Description    2/10/2023  8:47 PM Blood Culture Peripheral Blood Preliminary     2/10/2023  8:47 PM Blood Culture Line, venous Preliminary         PROCEDURES ( this hospitalization only)      None    RECOMMENDATIONS TO OUTPATIENT PROVIDER FOR F/U VISIT   Follow-up Appointments     Follow-up and recommended labs and tests      1.  Follow-up with primary care provider in 1 to 2 weeks.  2.  Follow-up with  pulmonologist Dr. Julian Agnuiano in 1 month.               DISPOSITION     Home with previous home care protocols    SUMMARY OF HOSPITAL COURSE:      Liu GIL Malave is a 57 year old male with a PMH of hemorrhagic stroke with left hemiplegia and nonverbal 2016, G-tube dependent, GERD, HTN, MRSA, ESBL, recurrent UTIs and aspiration pneumonia, hep C carrier.  2/10/2023 presented with fever and worsening cough.  CT shows bilateral bronchial secretions and patchy groundglass appearance consistent with aspiration.  Bladder wall thickening noted.    Admitted.      1.  Pneumonia.  Qualified for sepsis protocols.  Given IV fluids and meropenem.  White count increased so antibiotics changed to Zosyn and tobramycin.    Continued his previous Mucomyst and DuoNebs on a scheduled basis.  Pulmonary service consulted to see if there were any other treatment options available for his aspiration pneumonitis and future prevention.  No changes recommended.  2/12 white count came down to 11 then patient's fevers had substantially improved.  Clinically looked quite good and family interested in taking him home.  Discussed with with them the critical importance of aspiration prevention.  Trying to keep him upright is much as possible and minimizing tube feedings at night.  Resumed his tube feedings without difficulty.  We will follow-up with pulmonary in a month.      2.  Urinary retention.  Family has been straight cathing him once per day and CT scan did show bladder wall thickening.  Urinalysis unremarkable.  Initiated doxazosin and since then has had very good PVRs.  Family is to continue to monitor volumes but hopefully this will minimize some of his urinary retention.      Medically otherwise was stable.  Cleared for discharge.    Discharge Medications with Med changes:     Current Discharge Medication List      START taking these medications    Details   amoxicillin-clavulanate (AUGMENTIN) 400-57 MG/5ML suspension 10.94 mLs (875 mg)  by Per GJ tube route 2 times daily for 5 days  Qty: 109.4 mL, Refills: 0    Associated Diagnoses: Aspiration pneumonia of both lower lobes, unspecified aspiration pneumonia type (H)      doxazosin (CARDURA) 1 MG tablet 1 tablet (1 mg) by Per G Tube route daily  Qty: 30 tablet, Refills: 0    Associated Diagnoses: Urinary retention with incomplete bladder emptying         CONTINUE these medications which have CHANGED    Details   baclofen (LIORESAL) 10 MG tablet 1 tablet (10 mg) by Per G Tube route 3 times daily as needed for muscle spasms    Associated Diagnoses: Muscle spasm         CONTINUE these medications which have NOT CHANGED    Details   acetaminophen (TYLENOL) 500 MG tablet 1,000 mg by Gastric Tube route every 6 hours as needed for mild pain       acetylcysteine (MUCOMYST) 20 % neb solution Take 2 mLs by nebulization every 4 hours  Qty: 360 mL, Refills: 3    Associated Diagnoses: Recurrent aspiration pneumonia (H)      amLODIPine (NORVASC) 5 MG tablet 5 mg by Per G Tube route daily      aspirin (ASA) 81 MG chewable tablet 1 tablet (81 mg) by Per Feeding Tube route daily  Qty: 90 tablet, Refills: 0    Associated Diagnoses: History of stroke      bisacodyl (DULCOLAX) 10 MG suppository INSERT 1 SUPPOSTIORY INTO THE RECTUM DAILY AS NEEDED FOR CONSTIPATION.  Qty: 30 suppository, Refills: 8    Associated Diagnoses: Slow transit constipation      cefaclor (CECLOR) 250 MG capsule Give 1 capsule via g tube daily for UTI prevention  Qty: 90 capsule, Refills: 1    Associated Diagnoses: Recurrent UTI      chlorhexidine (PERIDEX) 0.12 % solution Swish and spit 15 mLs in mouth daily       esomeprazole (NEXIUM) 20 MG DR capsule Take 1 capsule via G-tube twice daily  Qty: 180 capsule, Refills: 3    Associated Diagnoses: GERD without esophagitis      ferrous sulfate 220 (44 Fe) MG/5ML ELIX 5 mLs by Enteral route daily  Qty: 150 mL, Refills: 11    Associated Diagnoses: Iron deficiency      guaiFENesin-dextromethorphan  (ROBITUSSIN DM) 100-10 MG/5ML syrup 10 mLs by Per G Tube route every 4 hours as needed for cough  Qty: 236 mL, Refills: 2    Associated Diagnoses: Pneumonia of both lungs due to infectious organism, unspecified part of lung      ipratropium - albuterol 0.5 mg/2.5 mg/3 mL (DUONEB) 0.5-2.5 (3) MG/3ML neb solution Take 1 vial (3 mLs) by nebulization every 4 hours  Qty: 90 mL, Refills: 4    Associated Diagnoses: Cough      LIDOCAINE PAIN RELIEF 4 % Patch PLACE 1 PATCH ONTO THE SKIN EVERY 24 HOURS TO PREVENT LIDOCAINE TOXICITY, PATIENT SHOULD BE PATCH FREE FOR 12 HOURS DAILY.  Qty: 30 patch, Refills: 3    Associated Diagnoses: Multiple joint pain      meclizine (ANTIVERT) 25 MG tablet Take 1 tablet via G-tube every 6 hours as needed for vertigo  Qty: 30 tablet, Refills: 11    Associated Diagnoses: Vertigo      metoprolol tartrate (LOPRESSOR) 25 MG tablet TAKE ONE TABLET BY MOUTH TWICE DAILY   Qty: 60 tablet, Refills: 11    Associated Diagnoses: Essential hypertension      neomycin-bacitracin-polymyxin (NEOSPORIN) 5-400-5000 ointment Apply topically 4 times daily  Qty: 30 g, Refills: 3    Associated Diagnoses: Irritation around percutaneous endoscopic gastrostomy (PEG) tube site (H)      nystatin (MYCOSTATIN) 118794 UNIT/GM external cream Apply topically 2 times daily  Qty: 30 g, Refills: 0    Associated Diagnoses: On tube feeding diet      polyethylene glycol (MIRALAX) 17 GM/Dose powder Give 1 capful via G-tube daily as needed for constipation  Qty: 850 g, Refills: 3    Associated Diagnoses: Slow transit constipation      Polyethylene Glycol 400 (VISINE DRY EYE RELIEF) 1 % SOLN Apply 1 drop to eye every 6 hours as needed (dry eyes)  Qty: 15 mL, Refills: 11    Associated Diagnoses: Dry eyes      QUEtiapine (SEROQUEL) 25 MG tablet Give 3 tablets (75 mg) at bedtime for sleep. Ok to repeat after 4 hours and during the day PRN  Qty: 102 tablet, Refills: 3    Associated Diagnoses: Agitation      simvastatin (ZOCOR) 20 MG  tablet 1 tablet (20 mg) by Per G Tube route At Bedtime  Qty: 90 tablet, Refills: 0    Comments: Dose decrease  Associated Diagnoses: Dyslipidemia               Rationale for medication changes:      Augmentin for aspiration pneumonia.  Doxazosin for urinary retention.      Consults     PHARMACY IP CONSULT  PHARMACY TO DOSE TOBRAMYCIN  PULMONARY IP CONSULT  NUTRITION SERVICES ADULT IP CONSULT  PHARMACY IP CONSULT      Immunizations given this encounter     Most Recent Immunizations   Administered Date(s) Administered     COVID-19 Vaccine 12+ (Pfizer 2022) 03/20/2022     COVID-19 Vaccine 12+ (Pfizer) 04/01/2021     Influenza Vaccine 50-64 or 18-64 w/egg allergy (Flublok) 10/28/2021     Influenza Vaccine, 6+MO IM (QUADRIVALENT W/PRESERVATIVES) 11/06/2020     Pneumo Conj 13-V (2010&after) 10/28/2021     Pneumococcal 23 valent 10/25/2017     Tdap (Adacel,Boostrix) 04/20/2015           Anticoagulation Information      Recent INR results: No results for input(s): INR in the last 168 hours.  Warfarin doses (if applicable) or name of other anticoagulant: Aspirin 81 daily      SIGNIFICANT IMAGING FINDINGS     Results for orders placed or performed during the hospital encounter of 02/10/23   XR Chest Port 1 View    Impression    IMPRESSION: Low lung volumes. Mild bibasilar atelectasis/infiltrates. No significant pleural fluid. No pneumothorax. Normal heart size and pulmonary vascularity. Calcified plaque of the aortic arch   CT Chest Abdomen Pelvis w/o Contrast    Impression    IMPRESSION:  1.  Secretions in the bilateral mainstem bronchi with patchy groundglass and solid opacities at the lung bases which could be due to aspiration. Enlarged mediastinal adenopathy could be reactive.  2.  Hepatic steatosis.  3.  Urinary bladder thickening with prostatomegaly. Correlation with urinalysis.       SIGNIFICANT LABORATORY FINDINGS     Most Recent 3 CBC's:  Recent Labs   Lab Test 02/12/23  0456 02/11/23  0545 02/10/23  2117   WBC  11.2* 22.1* 18.6*   HGB 9.1* 10.3* 11.1*   MCV 97 96 94    240 278     Most Recent 3 BMP's:  Recent Labs   Lab Test 02/12/23  0456 02/11/23  0545 02/10/23  2117    140 141   POTASSIUM 4.2 4.7 4.7   CHLORIDE 115* 114* 110*   CO2 18* 16* 23   BUN 22 28* 37*   CR 1.29 1.19 1.19   ANIONGAP 10 10 8   SAMIR 7.5* 8.0* 9.1   GLC 98 123 116     Most Recent 2 LFT's:  Recent Labs   Lab Test 02/11/23  0545 02/10/23  2117   AST 20 23   ALT 18 24   ALKPHOS 59 78   BILITOTAL 0.4 0.3     Most Recent 3 INR's:  Recent Labs   Lab Test 04/15/21  0936 06/27/20  1210 06/04/20  1535   INR 1.04 1.03 0.94     Most Recent Urinalysis:  Recent Labs   Lab Test 02/10/23  2220 01/05/23  1655   COLOR Yellow Yellow   APPEARANCE Clear Clear   URINEGLC Negative Negative   URINEBILI Negative Negative   URINEKETONE Negative Negative   SG 1.019 1.015   UBLD 1.0 mg/dL* Large*   URINEPH 5.5 5.0   PROTEIN 200* >=300*   UROBILINOGEN  --  0.2   NITRITE Negative Negative   LEUKEST Negative Negative   RBCU 41* 25-50*   WBCU 3 5-10*        7-Day Micro Results     Collected Updated Procedure Result Status      02/10/2023 2117 02/12/2023 0116 Blood Culture Line, venous [[id number]]   Blood from Line, venous    Preliminary result Component Value   Culture No growth after 1 day  [P]                02/10/2023 2117 02/12/2023 0131 Blood Culture Peripheral Blood [[id number]]   Peripheral Blood    Preliminary result Component Value   Culture No growth after 1 day  [P]                02/10/2023 2105 02/10/2023 2157 Symptomatic Influenza A/B & SARS-CoV2 (COVID-19) Virus PCR Multiplex Nose [[id number]]    Swab from Nose    Final result Component Value   Influenza A PCR Negative   Influenza B PCR Negative   RSV PCR Negative   SARS CoV2 PCR Negative   NEGATIVE: SARS-CoV-2 (COVID-19) RNA not detected, presumed negative.                    Discharge Orders        Home care nursing referral      Brief Discharge Instructions    Hold Ceclor antibiotic until  after done with Augmentin (5 days)     Reason for your hospital stay    Aspiration pneumonia     Follow-up and recommended labs and tests    1.  Follow-up with primary care provider in 1 to 2 weeks.  2.  Follow-up with pulmonologist Dr. Julian Anguiano in 1 month.     Activity    Your activity upon discharge: activity as tolerated.  Bedrest.  Full supports per previous routines.     Tubes and drains    You are going home with the following tubes or drains: feeding tube GJ-Tube.   Tube cares per hospital or home care instructions     Discharge Instructions    Resume previous home cares.  Make certain to do the majority of the tube feedings during the day when upright.  Try to minimize lying flat.  Suction frequently.  Straight cath at least once per day.  Monitor urine output.     Resume Home Care Services     Diet    Follow this diet upon discharge: Orders Placed This Encounter      Adult Formula Drip Feeding: Continuous Osmolite 1.5; Gastrostomy; Goal Rate: 45; mL/hr; Initiate at 15mL/hr and advance by 10mL/hr q 6 hrs as tolerated to goal rate       Examination   Physical Exam   Temp:  [98.7  F (37.1  C)-99.6  F (37.6  C)] 99.4  F (37.4  C)  Pulse:  [] 90  Resp:  [12-22] 20  BP: (111-156)/(57-88) 156/88  SpO2:  [92 %-97 %] 96 %  Wt Readings from Last 4 Encounters:   02/12/23 56.8 kg (125 lb 3.5 oz)   12/13/22 58.7 kg (129 lb 8 oz)   10/30/22 61.5 kg (135 lb 9.3 oz)   09/19/22 60 kg (132 lb 4.4 oz)       Constitutional: awake, alert, cooperative, no apparent distress, appears stated age and normal weight, more alert and interactive than previous visit  Eyes: Lids and lashes normal, pupils equal, round and reactive to light, extra ocular muscles intact, sclera clear, conjunctiva normal  ENT: Normocephalic, without obvious abnormality, atraumatic, sinuses nontender on palpation, external ears without lesions, oral pharynx with moist mucous membranes, tonsils without erythema or exudates, gums normal and good  dentition.  Respiratory: Some upper respiratory noise but good air movement.  No wheezing.  Cardiovascular: Normal apical impulse, regular rate and rhythm, normal S1 and S2, no S3 or S4, and no murmur noted  GI: No scars, normal bowel sounds, soft, non-distended, non-tender, no masses palpated, no hepatosplenomegally and GJ tube in place  Skin: normal skin color, texture, turgor, no redness, warmth, or swelling, and no rashes  Musculoskeletal: Notable spasticity secondary to his previous stroke.  Unchanged from previous.  no lower extremity pitting edema present  Neurologic: Cranial nerves II-XII are grossly intact. Sensory:  Sensory intact  Neuropsychiatric: General: normal, calm and normal eye contact Level of consciousness: alert / normal Affect: flat Orientation: Answered yes no questions.  Difficult to fully evaluate due to nonverbal status.  Family feels he is back to baseline.        Please see EMR for more detailed significant labs, imaging, consultant notes etc.    IDayday MD, personally saw the patient today and spent greater than 30 minutes discharging this patient.    Dayday Gonsales MD  [facility]    CC:Lynda Gutierrez

## 2023-02-12 NOTE — PLAN OF CARE
"  Problem: Plan of Care - These are the overarching goals to be used throughout the patient stay.    Goal: Plan of Care Review  Description: The Plan of Care Review/Shift note should be completed every shift.  The Outcome Evaluation is a brief statement about your assessment that the patient is improving, declining, or no change.  This information will be displayed automatically on your shift note.  Outcome: Adequate for Care Transition  Goal: Patient-Specific Goal (Individualized)  Description: You can add care plan individualizations to a care plan. Examples of Individualization might be:  \"Parent requests to be called daily at 9am for status\", \"I have a hard time hearing out of my right ear\", or \"Do not touch me to wake me up as it startles me\".  Outcome: Adequate for Care Transition  Goal: Absence of Hospital-Acquired Illness or Injury  Outcome: Adequate for Care Transition  Intervention: Identify and Manage Fall Risk  Recent Flowsheet Documentation  Taken 2/12/2023 0800 by Brandi Gordon RN  Safety Promotion/Fall Prevention:   room near nurse's station   bed alarm on  Intervention: Prevent Skin Injury  Recent Flowsheet Documentation  Taken 2/12/2023 1100 by Brandi Gordon RN  Body Position:   left   turned  Taken 2/12/2023 0800 by Brandi Gordon RN  Body Position:   turned   right  Goal: Optimal Comfort and Wellbeing  Outcome: Adequate for Care Transition  Goal: Readiness for Transition of Care  Outcome: Adequate for Care Transition      Pt discharged home with family via private vehicle. His G-tube was clamped at the time of discharge. AVS printed and education completed with daughter. Teach-back education completed with medication instructions. Home care to resume. Pt belongings sent with pt.     Brandi Gordon RN on 2/12/2023 at 1:15 PM    "

## 2023-02-12 NOTE — PLAN OF CARE
Pt has productive, strong cough. Oral suction provided. Family at bedside. Turning every 2 hours. Pt appears tense with turns, but otherwise comfortable. External catheter in place, voiding, bladder scanned for 200ml.    Brandi Gordon RN on 2/11/2023 at 7:28 PM

## 2023-02-13 ENCOUNTER — TELEPHONE (OUTPATIENT)
Dept: CARE COORDINATION | Facility: CLINIC | Age: 58
End: 2023-02-13
Payer: COMMERCIAL

## 2023-02-13 ENCOUNTER — PATIENT OUTREACH (OUTPATIENT)
Dept: CARE COORDINATION | Facility: CLINIC | Age: 58
End: 2023-02-13
Payer: COMMERCIAL

## 2023-02-13 NOTE — TELEPHONE ENCOUNTER
Carlos Gutierrez,     I spoke with patient's daughter Idalmis this afternoon. She was wondering if you had any availability to see her father for a hospital f/u appointment sooner than 3/16/23. This was the only date that worked with her schedule. Idalmis said anything after 2:00 pm would work for her and her father. Please advise.     Thanks,    Dave Myhre, RN  CCC RN

## 2023-02-13 NOTE — PROGRESS NOTES
Clinic Care Coordination Contact  [facility]: Post-Discharge Note  SITUATION                                                      Admission:    Admission Date: 02/10/23   Reason for Admission: cough, fever  Discharge:   Discharge Date: 02/12/23  Discharge Diagnosis: aspiration pneumonia of both lower lobes    BACKGROUND                                                      Per hospital discharge summary and inpatient provider notes:    Liu GIL Malave is a 57 year old male with a PMH of hemorrhagic stroke with left hemiplegia and nonverbal 2016, G-tube dependent, GERD, HTN, MRSA, ESBL, recurrent UTIs and aspiration pneumonia, hep C carrier.  2/10/2023 presented with fever and worsening cough.  CT shows bilateral bronchial secretions and patchy groundglass appearance consistent with aspiration.  Bladder wall thickening noted.    Admitted.       1.  Pneumonia.  Qualified for sepsis protocols.  Given IV fluids and meropenem.  White count increased so antibiotics changed to Zosyn and tobramycin.    Continued his previous Mucomyst and DuoNebs on a scheduled basis.  Pulmonary service consulted to see if there were any other treatment options available for his aspiration pneumonitis and future prevention.  No changes recommended.  2/12 white count came down to 11 then patient's fevers had substantially improved.  Clinically looked quite good and family interested in taking him home.  Discussed with with them the critical importance of aspiration prevention.  Trying to keep him upright is much as possible and minimizing tube feedings at night.  Resumed his tube feedings without difficulty.  We will follow-up with pulmonary in a month.       2.  Urinary retention.  Family has been straight cathing him once per day and CT scan did show bladder wall thickening.  Urinalysis unremarkable.  Initiated doxazosin and since then has had very good PVRs.  Family is to continue to monitor volumes but hopefully this will minimize some  "of his urinary retention.       Medically otherwise was stable.  Cleared for discharge.  ASSESSMENT           Discharge Assessment  How are you doing now that you are home?: Patient's daughter stated: \"He is doing better\".  How are your symptoms? (Red Flag symptoms escalate to triage hotline per guidelines): Improved  Do you feel your condition is stable enough to be safe at home until your provider visit?: Yes  Does the patient have their discharge instructions? : Yes  Does the patient have questions regarding their discharge instructions? : No  Were you started on any new medications or were there changes to any of your previous medications? : Yes  Does the patient have all of their medications?: Yes  Do you have questions regarding any of your medications? : No  Do you have all of your needed medical supplies or equipment (DME)?  (i.e. oxygen tank, CPAP, cane, etc.): Yes  Discharge follow-up appointment scheduled within 14 calendar days? : Yes  Discharge Follow Up Appointment Date: 02/28/23  Discharge Follow Up Appointment Scheduled with?: Specialty Care Provider (Writer will send note PCP to see if she can see him sooner.)         Post-op (Clinicians Only)  Did the patient have surgery or a procedure: No  Fever: No  Chills: No  Eating & Drinking:  (Tube feeding)  Bowel Function: normal  Date of last BM: 02/13/23  Urinary Status: voiding without complaint/concerns    Care Management       Care Mgmt General Assessment      CCC RN spoke with patient's daughter Idalmis today. She stated her father was doing better. She said he is taking all medications as directed, including antibiotics. Idalmis is the primary caregiver for her father and feels they are managing his care well. Idalmis wondered if a referral had been sent to wound care regarding possible wound near his tube feeding site. Writer confirmed a [facility] had [facility] by an associate of her father's PCP's. Provided Idalmis with the phone " number. She stated she would give them a call today and would schedule a follow up appointment. Writer assisted with scheduling hospital follow up appointment with PCP on 3/16/23 as this was the only time that would work with [facility]'s work schedule. Writer will send a note to PCP to see if she can see patient sooner. No other needs or concerns at this time.                    PLAN                                                      Outpatient Plan:  Home with family assistance (daughter is PCA) and Home Care.     Future Appointments   Date Time Provider Department Center   2/28/2023  9:30 AM SJN SLP OP [id number] [facility]   2/28/2023  9:30 AM SJN FL 2 [id number] [facility]   3/16/2023  7:40 AM [facility]   4/7/2023  8:00 AM Jeannie Jin MD [facility]         For any urgent concerns, please contact our 24 hour nurse triage line: [phone] ([phone])         David J Myhre, RN

## 2023-02-14 NOTE — TELEPHONE ENCOUNTER
I have 2 same day appointments on Friday afternoon.  Please see if they would like to schedule a hospital follow up appt at that time

## 2023-02-16 LAB
BACTERIA BLD CULT: NO GROWTH
BACTERIA BLD CULT: NO GROWTH

## 2023-03-02 DIAGNOSIS — I10 ESSENTIAL HYPERTENSION: Primary | ICD-10-CM

## 2023-03-02 DIAGNOSIS — K21.9 GASTROESOPHAGEAL REFLUX DISEASE WITHOUT ESOPHAGITIS: ICD-10-CM

## 2023-03-02 DIAGNOSIS — R45.1 AGITATION: ICD-10-CM

## 2023-03-03 RX ORDER — OMEPRAZOLE 40 MG/1
CAPSULE, DELAYED RELEASE ORAL
Qty: 30 CAPSULE | Refills: 11 | Status: SHIPPED | OUTPATIENT
Start: 2023-03-03 | End: 2023-06-07

## 2023-03-03 RX ORDER — QUETIAPINE FUMARATE 25 MG/1
25 TABLET, FILM COATED ORAL SEE ADMIN INSTRUCTIONS
Qty: 102 TABLET | Refills: 4 | Status: SHIPPED | OUTPATIENT
Start: 2023-03-03 | End: 2023-09-27

## 2023-03-03 RX ORDER — AMLODIPINE BESYLATE 5 MG/1
TABLET ORAL
Qty: 30 TABLET | Refills: 11 | Status: SHIPPED | OUTPATIENT
Start: 2023-03-03 | End: 2024-03-06

## 2023-03-03 NOTE — TELEPHONE ENCOUNTER
"Routing refill request to provider for review/approval because:  Drug not active on patient's medication list  Medication is reported/historical      Last office visit provider:  12/21/22     Requested Prescriptions   Pending Prescriptions Disp Refills     amLODIPine (NORVASC) 5 MG tablet [Pharmacy Med Name: amLODIPine Besylate Oral Tablet 5 MG] 30 tablet 0     Sig: Take 1 tablet (5 mg total) by mouth daily.       Calcium Channel Blockers Protocol  Passed - 3/2/2023  3:21 PM        Passed - Blood pressure under 140/90 in past 12 months     BP Readings from Last 3 Encounters:   02/12/23 132/79   01/03/23 116/72   12/13/22 131/82                 Passed - Recent (12 mo) or future (30 days) visit within the authorizing provider's specialty     Patient has had an office visit with the authorizing provider or a provider within the authorizing providers department within the previous 12 mos or has a future within next 30 days. See \"Patient Info\" tab in inbasket, or \"Choose Columns\" in Meds & Orders section of the refill encounter.              Passed - Medication is active on med list        Passed - Patient is age 18 or older        Passed - Normal serum creatinine on file in past 12 months     Recent Labs   Lab Test 02/12/23  0456   CR 1.29       Ok to refill medication if creatinine is low             omeprazole (PRILOSEC) 40 MG DR capsule [Pharmacy Med Name: Omeprazole Oral Capsule Delayed Release 40 MG] 30 capsule 0     Sig: take 1 capsule by mouth daily before breakfast       PPI Protocol Failed - 3/2/2023  3:21 PM        Failed - Medication is active on med list        Passed - Not on Clopidogrel (unless Pantoprazole ordered)        Passed - No diagnosis of osteoporosis on record        Passed - Recent (12 mo) or future (30 days) visit within the authorizing provider's specialty     Patient has had an office visit with the authorizing provider or a provider within the authorizing providers department within the " "previous 12 mos or has a future within next 30 days. See \"Patient Info\" tab in inbasket, or \"Choose Columns\" in Meds & Orders section of the refill encounter.              Passed - Patient is age 18 or older             Estelle Eisenberg RN 03/03/23 2:53 PM  "

## 2023-03-03 NOTE — TELEPHONE ENCOUNTER
New Sig on incoming Rx indicates administration via G-Tube.  Provider review of refill is therefore requested.  Thank you-    Last Written Prescription Date:  11/29/2022  Last Fill Quantity: 102,  # refills: 3   Last office visit provider:  12/21/2022     Requested Prescriptions   Pending Prescriptions Disp Refills     QUEtiapine (SEROQUEL) 25 MG tablet [Pharmacy Med Name: QUEtiapine Fumarate Oral Tablet 25 MG] 102 tablet 0     Sig: Give 1 tablet via G-tube in the morning and 1 tablet in the afternoon as needed for agitation. Give 2 tablets via G-tube at bedtime for sleep.       Antipsychotic Medications Passed - 3/2/2023  3:20 PM        Passed - Blood pressure under 140/90 in past 12 months     BP Readings from Last 3 Encounters:   02/12/23 132/79   01/03/23 116/72   12/13/22 131/82                 Passed - Lipid panel on file within the past 12 months     Recent Labs   Lab Test 09/30/22  0923   CHOL 229*   TRIG 333*   HDL 32*   *   NHDL 197*               Passed - Heart Rate on file within past 12 months     Pulse Readings from Last 3 Encounters:   02/12/23 88   01/03/23 86   12/13/22 101               Passed - A1c or Glucose on file in past 12 months     Recent Labs   Lab Test 02/12/23  0456 01/28/22  0339 10/28/21  0852   GLC 98   < > 105   A1C  --   --  5.2    < > = values in this interval not displayed.       Please review patients last 3 weights. If a weight gain of >10 lbs exists, you may refill the prescription once after instructing the patient to schedule an appointment within the next 30 days.    Wt Readings from Last 3 Encounters:   02/12/23 56.8 kg (125 lb 3.5 oz)   12/13/22 58.7 kg (129 lb 8 oz)   10/30/22 61.5 kg (135 lb 9.3 oz)             Passed - Medication is active on med list        Passed - Patient is 18 years of age or older        Passed - Recent (6 mo) or future (30 days) visit within the authorizing provider's specialty     Patient had office visit in the last 6 months or has a  "visit in the next 30 days with authorizing provider or within the authorizing provider's specialty.  See \"Patient Info\" tab in inbasket, or \"Choose Columns\" in Meds & Orders section of the refill encounter.                 Betsy Cherry RN 03/03/23 2:49 PM  "

## 2023-04-06 ENCOUNTER — TELEPHONE (OUTPATIENT)
Dept: FAMILY MEDICINE | Facility: CLINIC | Age: 58
End: 2023-04-06
Payer: COMMERCIAL

## 2023-04-06 DIAGNOSIS — E61.1 IRON DEFICIENCY: ICD-10-CM

## 2023-04-06 DIAGNOSIS — J18.9 PNEUMONIA OF BOTH LUNGS DUE TO INFECTIOUS ORGANISM, UNSPECIFIED PART OF LUNG: ICD-10-CM

## 2023-04-06 DIAGNOSIS — E46 MALNUTRITION, UNSPECIFIED TYPE (H): Primary | ICD-10-CM

## 2023-04-06 NOTE — TELEPHONE ENCOUNTER
Received a call from pharmacy stating that they are still on back order of the following prescription    ipratropium - albuterol 0.5 mg/2.5 mg/3 mL (DUONEB) 0.5-2.5 (3) MG/3ML neb solution 90 mL 4 9/7/2022  No   Sig - Route: Take 1 vial (3 mLs) by nebulization every 4 hours - Nebulization     Please send over alternative to pharmacy on file. [id number]  [place] - [address] [place] [address]

## 2023-04-06 NOTE — TELEPHONE ENCOUNTER
"Routing refill request to provider for review/approval because:  Drug not on the [facility] refill protocol   Patient needs to be seen because it has been more than 1 year since last office visit.    Last Written Prescription Date:  1/19/22  Last Fill Quantity: 150 ml,  # refills: 11   Last office visit provider:  12/21/22     Requested Prescriptions   Pending Prescriptions Disp Refills     Dextromethorphan-guaiFENesin  MG/5ML syrup [Pharmacy Med Name: Dextromethorphan-guaiFENesin Oral Syrup  MG/5ML] 237 mL 0     Sig: take 10 mLs by Per G Tube route every 4 hours as needed for cough       There is no refill protocol information for this order        ferrous sulfate 220 (44 Fe) MG/5ML ELIX [Pharmacy Med Name: Ferrous Sulfate Oral Elixir 220 (44 Fe) MG/5ML] 150 mL 0     Sig: TAKE 5 MLS BY ENTERAL ROUTE DAILY       Iron Supplements Passed - 4/6/2023  4:22 PM        Passed - Patient is 12 years of age or older        Passed - Recent (12 mo) or future (30 days) visit within the authorizing provider's specialty     Patient has had an office visit with the authorizing provider or a provider within the authorizing providers department within the previous 12 mos or has a future within next 30 days. See \"Patient Info\" tab in inbasket, or \"Choose Columns\" in Meds & Orders section of the refill encounter.              Passed - Hgb OR Hct on record within the past 12 mos.     Patient need only have had a HGB or HCT on file in the past 12 mos. That result does not need to be normal.    Recent Labs   Lab Test 02/12/23  0456 02/11/23  0545 02/10/23  2117   HGB 9.1* 10.3* 11.1*     Recent Labs   Lab Test 02/12/23  0456 02/11/23  0545 02/10/23  2117   HCT 28.3* 31.9* 34.1*       Please verify a HGB or HCT has been checked SINCE THE LAST DOSE CHANGE.            Passed - Medication is active on med list             Darryl Barrett RN 04/06/23 4:22 PM  "

## 2023-04-07 ENCOUNTER — HOSPITAL ENCOUNTER (OUTPATIENT)
Dept: INTERVENTIONAL RADIOLOGY/VASCULAR | Facility: HOSPITAL | Age: 58
Discharge: HOME | End: 2023-04-07
Attending: RADIOLOGY | Admitting: RADIOLOGY
Payer: COMMERCIAL

## 2023-04-07 DIAGNOSIS — E46 MALNUTRITION, UNSPECIFIED TYPE (H): ICD-10-CM

## 2023-04-07 PROCEDURE — 255N000002 HC RX 255 OP 636: Performed by: RADIOLOGY

## 2023-04-07 PROCEDURE — C1769 GUIDE WIRE: HCPCS

## 2023-04-07 PROCEDURE — 49452 REPLACE G-J TUBE PERC: CPT

## 2023-04-07 RX ORDER — GUAIFENESIN AND DEXTROMETHORPHAN HYDROBROMIDE 100; 10 MG/5ML; MG/5ML
SOLUTION ORAL
Qty: 237 ML | Refills: 0 | Status: SHIPPED | OUTPATIENT
Start: 2023-04-07 | End: 2023-05-15

## 2023-04-07 RX ADMIN — IOHEXOL 15 ML: 350 INJECTION, SOLUTION INTRAVENOUS at 14:27

## 2023-04-07 NOTE — TELEPHONE ENCOUNTER
I called the [facility] pharmacy in [place]. They said this was a nationwide shortage and patients that are using non [facility] pharmacies are trying to transfer their prescriptions over as other pharmacies are out of stock as well. I had also asked if maybe [facility] or [facility] and [facility] pharmacy said they all use the same supplier but I could try calling to see if they have any in stock. I tried to call [facility] inside [facility] but was not able to get through to them. Will try again later.

## 2023-04-07 NOTE — DISCHARGE INSTRUCTIONS
Gastrostomy (G) or Gastrojejunostomy (G/J) Tube Exchange Discharge Instructions:   You had a gastrostomy (stomach) tube or a Gastrojejunostomy Tube (stomach and jejunum) exchanged on ***.   This tube is often used for nutritional support and medication administration or it can be used for stomach venting.     Care instructions:  - If you received sedation for your procedure, do not drive or operate heavy machinery for the rest of the day.  - Avoid soaking in stagnant water (tub baths, Jacuzzis, pools, lake, or ocean).   - You may shower beginning the day after the tube was exchanged.   - Clean under the disc daily with soap and water. Pat dry under disc and apply new split gauze dressing under disc. Cleaning tube site daily will help prevent infection and skin irritation.  - A small amount of clear tan drainage from the tube exit site can be normal.  - Make sure the disc on the tube fits slightly snug against the skin so that the tube does not move in or out of the body easily.  - Flush your tube with 60cc of water twice a day (using a cath tip syringe) to prevent tube from clogging (or follow recommendations from your doctor or dietician if given).    Giving Feedings and Medications:  - Follow-up with your dietician, primary care provider, or oncologist for instructions on tube feedings and medication administration.    - ONLY use specific enteric feedings with your tube (unless otherwise discussed with your dietitian).    - Flush tube at least twice daily with 60ml of water using cath tip syringe unless otherwise instructed by your doctor or dietician.  - Flush the tube before and after administrating medications and bolus feedings with 60cc of water.    Follow Up:  -Recommend routine 3 month exchanges of feeding tube. Please contact your primary care provider or speak with your dietitian to obtain an order to have your G tube exchanged. Then contact [facility] scheduling department at  [phone] to schedule your G tube exchange appointment.  - G tubes CANNOT be removed until 6 weeks after date of tube placement.    Please seek medical evaluation for:  - Fever (greater than 101 F (38.3C).  - Purulent (yellow/green/foul smelling) drainage from tube exit site.   - Significant or worsening abdominal pain.   - Skin that is hot to the touch or significantly reddened at the tube exit site.   - Bleeding at tube exit site.    Call [facility] at [phone] with questions or if you have any of the following symptoms:  - Tube falls out or felt to be out of position.  - Unable to flush tube.  - Significant leakage around tube site (tube feeding, medications or drainage).  - Significant bleeding at the tube exit site.  - Severe pain at tube exit site.

## 2023-04-09 DIAGNOSIS — J69.0 RECURRENT ASPIRATION PNEUMONIA (H): Primary | ICD-10-CM

## 2023-04-09 RX ORDER — ALBUTEROL SULFATE 0.83 MG/ML
2.5 SOLUTION RESPIRATORY (INHALATION) EVERY 6 HOURS PRN
Qty: 90 ML | Refills: 0 | Status: SHIPPED | OUTPATIENT
Start: 2023-04-09 | End: 2023-05-01

## 2023-04-09 NOTE — TELEPHONE ENCOUNTER
I sent a prescription to the pharmacy for Albuterol neb solution to use as needed in the meantime.  Dr. Gutierrez can substitute upon her return if she feels appropriate. Thanks.

## 2023-04-14 ENCOUNTER — TELEPHONE (OUTPATIENT)
Dept: FAMILY MEDICINE | Facility: CLINIC | Age: 58
End: 2023-04-14
Payer: COMMERCIAL

## 2023-04-14 DIAGNOSIS — R05.3 CHRONIC COUGH: Primary | ICD-10-CM

## 2023-04-14 NOTE — TELEPHONE ENCOUNTER
Reason for Call:  prescription    Detailed comments: [facility] calling to report that ipratropium - albuterol 0.5 mg/2.5 mg/3 mL (DUONEB) 0.5-2.5 (3) MG/3ML neb solution is on back order. Pharmacy requesting an alternative be sent.     Please advise.     Call taken on 4/14/2023 at 12:17 PM by Candis Alicea MA

## 2023-04-25 ENCOUNTER — HOSPITAL ENCOUNTER (INPATIENT)
Facility: CLINIC | Age: 58
LOS: 2 days | Discharge: HOME HEALTH | End: 2023-04-27
Attending: STUDENT IN AN ORGANIZED HEALTH CARE EDUCATION/TRAINING PROGRAM | Admitting: HOSPITALIST
Payer: COMMERCIAL

## 2023-04-25 ENCOUNTER — APPOINTMENT (OUTPATIENT)
Dept: CT IMAGING | Facility: CLINIC | Age: 58
End: 2023-04-25
Attending: STUDENT IN AN ORGANIZED HEALTH CARE EDUCATION/TRAINING PROGRAM
Payer: COMMERCIAL

## 2023-04-25 DIAGNOSIS — B37.2 CANDIDIASIS OF SKIN: ICD-10-CM

## 2023-04-25 DIAGNOSIS — E87.29 RESPIRATORY ACIDOSIS: ICD-10-CM

## 2023-04-25 DIAGNOSIS — J69.0 ASPIRATION PNEUMONITIS (H): Primary | ICD-10-CM

## 2023-04-25 DIAGNOSIS — M62.838 MUSCLE SPASM: ICD-10-CM

## 2023-04-25 DIAGNOSIS — J18.9 PNEUMONIA OF BOTH LOWER LOBES DUE TO INFECTIOUS ORGANISM: ICD-10-CM

## 2023-04-25 DIAGNOSIS — N39.0 RECURRENT UTI: ICD-10-CM

## 2023-04-25 PROBLEM — R50.9 FEVER, UNSPECIFIED FEVER CAUSE: Status: RESOLVED | Noted: 2022-01-28 | Resolved: 2023-04-25

## 2023-04-25 PROBLEM — Z78.9 ON TUBE FEEDING DIET: Status: ACTIVE | Noted: 2023-04-25

## 2023-04-25 PROBLEM — R50.9 FEVER AND CHILLS: Status: RESOLVED | Noted: 2022-10-28 | Resolved: 2023-04-25

## 2023-04-25 PROBLEM — I69.151: Status: ACTIVE | Noted: 2023-04-25

## 2023-04-25 LAB
ALBUMIN SERPL-MCNC: 3.5 G/DL (ref 3.5–5)
ALBUMIN UR-MCNC: 100 MG/DL
ALP SERPL-CCNC: 63 U/L (ref 45–120)
ALT SERPL W P-5'-P-CCNC: 28 U/L (ref 0–45)
ANION GAP SERPL CALCULATED.3IONS-SCNC: 6 MMOL/L (ref 5–18)
APPEARANCE UR: CLEAR
AST SERPL W P-5'-P-CCNC: 24 U/L (ref 0–40)
BASE EXCESS BLDV CALC-SCNC: 0.4 MMOL/L
BASOPHILS # BLD AUTO: 0.1 10E3/UL (ref 0–0.2)
BASOPHILS NFR BLD AUTO: 0 %
BILIRUB SERPL-MCNC: 0.4 MG/DL (ref 0–1)
BILIRUB UR QL STRIP: NEGATIVE
BNP SERPL-MCNC: <10 PG/ML (ref 0–51)
BUN SERPL-MCNC: 40 MG/DL (ref 8–22)
CALCIUM SERPL-MCNC: 9 MG/DL (ref 8.5–10.5)
CHLORIDE BLD-SCNC: 111 MMOL/L (ref 98–107)
CO2 SERPL-SCNC: 25 MMOL/L (ref 22–31)
COLOR UR AUTO: ABNORMAL
CREAT SERPL-MCNC: 1.21 MG/DL (ref 0.7–1.3)
EOSINOPHIL # BLD AUTO: 0.3 10E3/UL (ref 0–0.7)
EOSINOPHIL NFR BLD AUTO: 2 %
ERYTHROCYTE [DISTWIDTH] IN BLOOD BY AUTOMATED COUNT: 12.3 % (ref 10–15)
FLUAV RNA SPEC QL NAA+PROBE: NEGATIVE
FLUBV RNA RESP QL NAA+PROBE: NEGATIVE
GFR SERPL CREATININE-BSD FRML MDRD: 69 ML/MIN/1.73M2
GLUCOSE BLD-MCNC: 109 MG/DL (ref 70–125)
GLUCOSE BLDC GLUCOMTR-MCNC: 122 MG/DL (ref 70–99)
GLUCOSE UR STRIP-MCNC: NEGATIVE MG/DL
HCO3 BLDV-SCNC: 26 MMOL/L (ref 24–30)
HCT VFR BLD AUTO: 36.5 % (ref 40–53)
HGB BLD-MCNC: 11.9 G/DL (ref 13.3–17.7)
HGB UR QL STRIP: ABNORMAL
IMM GRANULOCYTES # BLD: 0.1 10E3/UL
IMM GRANULOCYTES NFR BLD: 0 %
KETONES UR STRIP-MCNC: NEGATIVE MG/DL
LACTATE SERPL-SCNC: 1.4 MMOL/L (ref 0.7–2)
LEUKOCYTE ESTERASE UR QL STRIP: NEGATIVE
LYMPHOCYTES # BLD AUTO: 2.8 10E3/UL (ref 0.8–5.3)
LYMPHOCYTES NFR BLD AUTO: 17 %
MAGNESIUM SERPL-MCNC: 2.4 MG/DL (ref 1.8–2.6)
MCH RBC QN AUTO: 31.4 PG (ref 26.5–33)
MCHC RBC AUTO-ENTMCNC: 32.6 G/DL (ref 31.5–36.5)
MCV RBC AUTO: 96 FL (ref 78–100)
MONOCYTES # BLD AUTO: 0.9 10E3/UL (ref 0–1.3)
MONOCYTES NFR BLD AUTO: 5 %
MRSA DNA SPEC QL NAA+PROBE: NEGATIVE
MUCOUS THREADS #/AREA URNS LPF: PRESENT /LPF
NEUTROPHILS # BLD AUTO: 12.4 10E3/UL (ref 1.6–8.3)
NEUTROPHILS NFR BLD AUTO: 76 %
NITRATE UR QL: NEGATIVE
NRBC # BLD AUTO: 0 10E3/UL
NRBC BLD AUTO-RTO: 0 /100
OXYHGB MFR BLDV: 23.4 % (ref 70–75)
PCO2 BLDV: 51 MM HG (ref 35–50)
PH BLDV: 7.33 [PH] (ref 7.35–7.45)
PH UR STRIP: 5.5 [PH] (ref 5–7)
PLATELET # BLD AUTO: 218 10E3/UL (ref 150–450)
PO2 BLDV: 17 MM HG (ref 25–47)
POTASSIUM BLD-SCNC: 5 MMOL/L (ref 3.5–5)
PROT SERPL-MCNC: 8.6 G/DL (ref 6–8)
RBC # BLD AUTO: 3.79 10E6/UL (ref 4.4–5.9)
RBC URINE: 8 /HPF
RSV RNA SPEC NAA+PROBE: NEGATIVE
SA TARGET DNA: NEGATIVE
SAO2 % BLDV: 23.8 % (ref 70–75)
SARS-COV-2 RNA RESP QL NAA+PROBE: NEGATIVE
SODIUM SERPL-SCNC: 142 MMOL/L (ref 136–145)
SP GR UR STRIP: 1.02 (ref 1–1.03)
TROPONIN I SERPL-MCNC: 0.01 NG/ML (ref 0–0.29)
UROBILINOGEN UR STRIP-MCNC: <2 MG/DL
WBC # BLD AUTO: 16.6 10E3/UL (ref 4–11)
WBC URINE: 1 /HPF

## 2023-04-25 PROCEDURE — 82805 BLOOD GASES W/O2 SATURATION: CPT | Performed by: STUDENT IN AN ORGANIZED HEALTH CARE EDUCATION/TRAINING PROGRAM

## 2023-04-25 PROCEDURE — 99223 1ST HOSP IP/OBS HIGH 75: CPT | Performed by: HOSPITALIST

## 2023-04-25 PROCEDURE — 70450 CT HEAD/BRAIN W/O DYE: CPT

## 2023-04-25 PROCEDURE — 87040 BLOOD CULTURE FOR BACTERIA: CPT | Performed by: STUDENT IN AN ORGANIZED HEALTH CARE EDUCATION/TRAINING PROGRAM

## 2023-04-25 PROCEDURE — 36415 COLL VENOUS BLD VENIPUNCTURE: CPT | Performed by: STUDENT IN AN ORGANIZED HEALTH CARE EDUCATION/TRAINING PROGRAM

## 2023-04-25 PROCEDURE — 250N000011 HC RX IP 250 OP 636: Performed by: HOSPITALIST

## 2023-04-25 PROCEDURE — 83735 ASSAY OF MAGNESIUM: CPT | Performed by: STUDENT IN AN ORGANIZED HEALTH CARE EDUCATION/TRAINING PROGRAM

## 2023-04-25 PROCEDURE — 84484 ASSAY OF TROPONIN QUANT: CPT | Performed by: STUDENT IN AN ORGANIZED HEALTH CARE EDUCATION/TRAINING PROGRAM

## 2023-04-25 PROCEDURE — 83880 ASSAY OF NATRIURETIC PEPTIDE: CPT | Performed by: STUDENT IN AN ORGANIZED HEALTH CARE EDUCATION/TRAINING PROGRAM

## 2023-04-25 PROCEDURE — 96365 THER/PROPH/DIAG IV INF INIT: CPT | Mod: 59

## 2023-04-25 PROCEDURE — 83605 ASSAY OF LACTIC ACID: CPT | Performed by: STUDENT IN AN ORGANIZED HEALTH CARE EDUCATION/TRAINING PROGRAM

## 2023-04-25 PROCEDURE — 258N000003 HC RX IP 258 OP 636: Performed by: STUDENT IN AN ORGANIZED HEALTH CARE EDUCATION/TRAINING PROGRAM

## 2023-04-25 PROCEDURE — 99285 EMERGENCY DEPT VISIT HI MDM: CPT | Mod: CS,25

## 2023-04-25 PROCEDURE — 81001 URINALYSIS AUTO W/SCOPE: CPT | Performed by: STUDENT IN AN ORGANIZED HEALTH CARE EDUCATION/TRAINING PROGRAM

## 2023-04-25 PROCEDURE — 96367 TX/PROPH/DG ADDL SEQ IV INF: CPT

## 2023-04-25 PROCEDURE — 250N000013 HC RX MED GY IP 250 OP 250 PS 637: Performed by: HOSPITALIST

## 2023-04-25 PROCEDURE — C9803 HOPD COVID-19 SPEC COLLECT: HCPCS

## 2023-04-25 PROCEDURE — 250N000011 HC RX IP 250 OP 636: Performed by: STUDENT IN AN ORGANIZED HEALTH CARE EDUCATION/TRAINING PROGRAM

## 2023-04-25 PROCEDURE — 74177 CT ABD & PELVIS W/CONTRAST: CPT

## 2023-04-25 PROCEDURE — 210N000002 HC R&B HEART CARE

## 2023-04-25 PROCEDURE — 80053 COMPREHEN METABOLIC PANEL: CPT | Performed by: STUDENT IN AN ORGANIZED HEALTH CARE EDUCATION/TRAINING PROGRAM

## 2023-04-25 PROCEDURE — 85025 COMPLETE CBC W/AUTO DIFF WBC: CPT | Performed by: STUDENT IN AN ORGANIZED HEALTH CARE EDUCATION/TRAINING PROGRAM

## 2023-04-25 PROCEDURE — 87641 MR-STAPH DNA AMP PROBE: CPT | Performed by: HOSPITALIST

## 2023-04-25 PROCEDURE — 87637 SARSCOV2&INF A&B&RSV AMP PRB: CPT | Performed by: STUDENT IN AN ORGANIZED HEALTH CARE EDUCATION/TRAINING PROGRAM

## 2023-04-25 PROCEDURE — 250N000013 HC RX MED GY IP 250 OP 250 PS 637: Performed by: INTERNAL MEDICINE

## 2023-04-25 PROCEDURE — 250N000013 HC RX MED GY IP 250 OP 250 PS 637: Performed by: STUDENT IN AN ORGANIZED HEALTH CARE EDUCATION/TRAINING PROGRAM

## 2023-04-25 RX ORDER — PIPERACILLIN SODIUM, TAZOBACTAM SODIUM 3; .375 G/15ML; G/15ML
3.38 INJECTION, POWDER, LYOPHILIZED, FOR SOLUTION INTRAVENOUS EVERY 8 HOURS
Status: DISCONTINUED | OUTPATIENT
Start: 2023-04-25 | End: 2023-04-27 | Stop reason: HOSPADM

## 2023-04-25 RX ORDER — PANTOPRAZOLE SODIUM 40 MG/1
40 TABLET, DELAYED RELEASE ORAL
Status: DISCONTINUED | OUTPATIENT
Start: 2023-04-26 | End: 2023-04-25

## 2023-04-25 RX ORDER — ASPIRIN 81 MG/1
81 TABLET, CHEWABLE ORAL DAILY
Status: DISCONTINUED | OUTPATIENT
Start: 2023-04-25 | End: 2023-04-27 | Stop reason: HOSPADM

## 2023-04-25 RX ORDER — LIDOCAINE 40 MG/G
CREAM TOPICAL
Status: DISCONTINUED | OUTPATIENT
Start: 2023-04-25 | End: 2023-04-27 | Stop reason: HOSPADM

## 2023-04-25 RX ORDER — BACLOFEN 10 MG/1
10 TABLET ORAL 4 TIMES DAILY PRN
Status: DISCONTINUED | OUTPATIENT
Start: 2023-04-25 | End: 2023-04-27 | Stop reason: HOSPADM

## 2023-04-25 RX ORDER — AMLODIPINE BESYLATE 5 MG/1
5 TABLET ORAL DAILY
Status: DISCONTINUED | OUTPATIENT
Start: 2023-04-25 | End: 2023-04-27 | Stop reason: HOSPADM

## 2023-04-25 RX ORDER — PIPERACILLIN SODIUM, TAZOBACTAM SODIUM 3; .375 G/15ML; G/15ML
3.38 INJECTION, POWDER, LYOPHILIZED, FOR SOLUTION INTRAVENOUS ONCE
Status: COMPLETED | OUTPATIENT
Start: 2023-04-25 | End: 2023-04-25

## 2023-04-25 RX ORDER — ALBUTEROL SULFATE 0.83 MG/ML
2.5 SOLUTION RESPIRATORY (INHALATION) EVERY 6 HOURS PRN
Status: DISCONTINUED | OUTPATIENT
Start: 2023-04-25 | End: 2023-04-27 | Stop reason: HOSPADM

## 2023-04-25 RX ORDER — SIMVASTATIN 20 MG
20 TABLET ORAL AT BEDTIME
Status: DISCONTINUED | OUTPATIENT
Start: 2023-04-25 | End: 2023-04-27 | Stop reason: HOSPADM

## 2023-04-25 RX ORDER — QUETIAPINE FUMARATE 25 MG/1
25 TABLET, FILM COATED ORAL 3 TIMES DAILY
Status: DISCONTINUED | OUTPATIENT
Start: 2023-04-25 | End: 2023-04-25

## 2023-04-25 RX ORDER — QUETIAPINE FUMARATE 25 MG/1
25 TABLET, FILM COATED ORAL DAILY PRN
Status: DISCONTINUED | OUTPATIENT
Start: 2023-04-25 | End: 2023-04-27 | Stop reason: HOSPADM

## 2023-04-25 RX ORDER — QUETIAPINE FUMARATE 25 MG/1
25 TABLET, FILM COATED ORAL 3 TIMES DAILY
Status: DISCONTINUED | OUTPATIENT
Start: 2023-04-25 | End: 2023-04-27 | Stop reason: HOSPADM

## 2023-04-25 RX ORDER — DOXAZOSIN 1 MG/1
1 TABLET ORAL EVERY EVENING
Status: DISCONTINUED | OUTPATIENT
Start: 2023-04-25 | End: 2023-04-27 | Stop reason: HOSPADM

## 2023-04-25 RX ORDER — NEOMYCIN/BACITRACIN/POLYMYXINB 3.5-400-5K
OINTMENT (GRAM) TOPICAL 4 TIMES DAILY
Status: DISCONTINUED | OUTPATIENT
Start: 2023-04-25 | End: 2023-04-27 | Stop reason: HOSPADM

## 2023-04-25 RX ORDER — DEXTROSE MONOHYDRATE 100 MG/ML
INJECTION, SOLUTION INTRAVENOUS CONTINUOUS PRN
Status: DISCONTINUED | OUTPATIENT
Start: 2023-04-25 | End: 2023-04-27 | Stop reason: HOSPADM

## 2023-04-25 RX ORDER — IOPAMIDOL 755 MG/ML
100 INJECTION, SOLUTION INTRAVASCULAR ONCE
Status: COMPLETED | OUTPATIENT
Start: 2023-04-25 | End: 2023-04-25

## 2023-04-25 RX ORDER — ACETAMINOPHEN 325 MG/10.15ML
15 LIQUID ORAL ONCE
Status: COMPLETED | OUTPATIENT
Start: 2023-04-25 | End: 2023-04-25

## 2023-04-25 RX ORDER — CARBOXYMETHYLCELLULOSE SODIUM 5 MG/ML
1 SOLUTION/ DROPS OPHTHALMIC EVERY 6 HOURS PRN
Status: DISCONTINUED | OUTPATIENT
Start: 2023-04-25 | End: 2023-04-27 | Stop reason: HOSPADM

## 2023-04-25 RX ORDER — QUETIAPINE FUMARATE 25 MG/1
25 TABLET, FILM COATED ORAL DAILY PRN
Status: DISCONTINUED | OUTPATIENT
Start: 2023-04-25 | End: 2023-04-25

## 2023-04-25 RX ORDER — GUAIFENESIN/DEXTROMETHORPHAN 100-10MG/5
10 SYRUP ORAL EVERY 4 HOURS PRN
Status: DISCONTINUED | OUTPATIENT
Start: 2023-04-25 | End: 2023-04-27 | Stop reason: HOSPADM

## 2023-04-25 RX ORDER — ACETAMINOPHEN 500 MG
1000 TABLET ORAL EVERY 6 HOURS PRN
Status: DISCONTINUED | OUTPATIENT
Start: 2023-04-25 | End: 2023-04-27 | Stop reason: HOSPADM

## 2023-04-25 RX ORDER — BISACODYL 10 MG
10 SUPPOSITORY, RECTAL RECTAL DAILY PRN
Status: DISCONTINUED | OUTPATIENT
Start: 2023-04-25 | End: 2023-04-27 | Stop reason: HOSPADM

## 2023-04-25 RX ORDER — CHLORHEXIDINE GLUCONATE ORAL RINSE 1.2 MG/ML
15 SOLUTION DENTAL EVERY EVENING
Status: DISCONTINUED | OUTPATIENT
Start: 2023-04-25 | End: 2023-04-27 | Stop reason: HOSPADM

## 2023-04-25 RX ORDER — QUETIAPINE FUMARATE 25 MG/1
25 TABLET, FILM COATED ORAL SEE ADMIN INSTRUCTIONS
Status: DISCONTINUED | OUTPATIENT
Start: 2023-04-25 | End: 2023-04-25

## 2023-04-25 RX ADMIN — SIMVASTATIN 20 MG: 20 TABLET, FILM COATED ORAL at 22:21

## 2023-04-25 RX ADMIN — BACITRACIN ZINC, NEOMYCIN, POLYMYXIN B: 400; 3.5; 5 OINTMENT TOPICAL at 17:07

## 2023-04-25 RX ADMIN — QUETIAPINE FUMARATE 25 MG: 25 TABLET ORAL at 19:27

## 2023-04-25 RX ADMIN — AMLODIPINE BESYLATE 5 MG: 5 TABLET ORAL at 13:09

## 2023-04-25 RX ADMIN — PIPERACILLIN AND TAZOBACTAM 3.38 G: 3; .375 INJECTION, POWDER, LYOPHILIZED, FOR SOLUTION INTRAVENOUS at 07:08

## 2023-04-25 RX ADMIN — ASPIRIN 81 MG CHEWABLE TABLET 81 MG: 81 TABLET CHEWABLE at 13:09

## 2023-04-25 RX ADMIN — BACITRACIN ZINC, NEOMYCIN, POLYMYXIN B: 400; 3.5; 5 OINTMENT TOPICAL at 13:09

## 2023-04-25 RX ADMIN — PIPERACILLIN AND TAZOBACTAM 3.38 G: 3; .375 INJECTION, POWDER, LYOPHILIZED, FOR SOLUTION INTRAVENOUS at 22:37

## 2023-04-25 RX ADMIN — IOPAMIDOL 100 ML: 755 INJECTION, SOLUTION INTRAVENOUS at 08:41

## 2023-04-25 RX ADMIN — DOXAZOSIN 1 MG: 1 TABLET ORAL at 22:21

## 2023-04-25 RX ADMIN — METOPROLOL TARTRATE 12.5 MG: 25 TABLET, FILM COATED ORAL at 22:21

## 2023-04-25 RX ADMIN — BACLOFEN 10 MG: 10 TABLET ORAL at 19:28

## 2023-04-25 RX ADMIN — VANCOMYCIN HYDROCHLORIDE 1250 MG: 5 INJECTION, POWDER, LYOPHILIZED, FOR SOLUTION INTRAVENOUS at 08:55

## 2023-04-25 RX ADMIN — PIPERACILLIN AND TAZOBACTAM 3.38 G: 3; .375 INJECTION, POWDER, LYOPHILIZED, FOR SOLUTION INTRAVENOUS at 15:52

## 2023-04-25 RX ADMIN — BACITRACIN ZINC, NEOMYCIN, POLYMYXIN B: 400; 3.5; 5 OINTMENT TOPICAL at 22:22

## 2023-04-25 RX ADMIN — ACETAMINOPHEN 896 MG: 325 SOLUTION ORAL at 08:21

## 2023-04-25 ASSESSMENT — ACTIVITIES OF DAILY LIVING (ADL)
CHANGE_IN_FUNCTIONAL_STATUS_SINCE_ONSET_OF_CURRENT_ILLNESS/INJURY: NO
FALL_HISTORY_WITHIN_LAST_SIX_MONTHS: NO
DRESS: 2-->COMPLETELY DEPENDENT
ADLS_ACUITY_SCORE: 65
EATING/SWALLOWING: SWALLOWING LIQUIDS
TRANSFERRING: 2-->COMPLETELY DEPENDENT
TOILETING_ASSISTANCE: TOILETING DIFFICULTY, DEPENDENT
CONCENTRATING,_REMEMBERING_OR_MAKING_DECISIONS_DIFFICULTY: YES
TOILETING: 2-->COMPLETELY DEPENDENT
DOING_ERRANDS_INDEPENDENTLY_DIFFICULTY: YES
ADLS_ACUITY_SCORE: 65
TOILETING: 2-->COMPLETELY DEPENDENT (NOT DEVELOPMENTALLY APPROPRIATE)
ADLS_ACUITY_SCORE: 35
WALKING_OR_CLIMBING_STAIRS: TRANSFERRING DIFFICULTY, DEPENDENT;AMBULATION DIFFICULTY, DEPENDENT
ADLS_ACUITY_SCORE: 65
TRANSFERRING: 2-->COMPLETELY DEPENDENT (NOT DEVELOPMENTALLY APPROPRIATE)
EATING: 2-->COMPLETELY DEPENDENT (NOT DEVELOPMENTALLY APPROPRIATE)
SWALLOWING: 2-->DIFFICULTY SWALLOWING LIQUIDS/FOODS
ADLS_ACUITY_SCORE: 65
TOILETING_ISSUES: YES
WALKING_OR_CLIMBING_STAIRS_DIFFICULTY: YES
SWALLOWING: 2-->DIFFICULTY SWALLOWING LIQUIDS/FOODS
EATING: 2-->COMPLETELY DEPENDENT
WEAR_GLASSES_OR_BLIND: NO
DRESS: 2-->COMPLETELY DEPENDENT (NOT DEVELOPMENTALLY APPROPRIATE)
ADLS_ACUITY_SCORE: 35
DIFFICULTY_EATING/SWALLOWING: YES
BATHING: 2-->COMPLETELY DEPENDENT (NOT DEVELOPMENTALLY APPROPRIATE)
DRESSING/BATHING: DRESSING DIFFICULTY, DEPENDENT;BATHING DIFFICULTY, DEPENDENT
ADLS_ACUITY_SCORE: 35
DRESSING/BATHING_DIFFICULTY: YES
EQUIPMENT_CURRENTLY_USED_AT_HOME: WHEELCHAIR, MANUAL
ADLS_ACUITY_SCORE: 35
ADLS_ACUITY_SCORE: 65

## 2023-04-25 NOTE — ED PROVIDER NOTES
Emergency Department Encounter         FINAL IMPRESSION:  Bilateral pneumonia        ED COURSE AND MEDICAL DECISION MAKING       ED Course as of 04/25/23 1144   Tue Apr 25, 2023   0647 57-year-old male who is at baseline immobile and nonverbal from strokes, G-tube dependent, here from home with family with concerns of fever and tachycardia as well as increased mucus production and concern for pneumonia.  Has been admitted multiple times in the past.  States the symptoms have been present for last couple days.  Fever this morning.  On arrival patient is unwell appearing.  Tachycardic.  Gurgling breath sounds that improved with suctioning.  Has a large amount of yellow secretions.  Lungs are congested bilaterally.  Patient is grunting.  Abdomen is benign.  No leg swelling.  Patient was rolled, no  breakdown or signs of septic ulcerations.  Bedside discussion with family.  They still are wanting patient to be full code.  Discussed that he may need intubation next few hours after fluids and antibiotics are initiated.   1003 Lactic Acid: 1.4       6:20 AM I met with the patient to obtain patient history and performed a physical exam. Discussed plan for ED work up including potential diagnostic studies and interventions.  8:02 AM Nursing staff updated me. Patient's temperature is 101.2F.  8:26 AM Nursing staff updated me. There are currently no ICU beds upstairs to admit patient.  8:47 AM Nursing staff updated me. There is a ICU bed available.  9:18 AM Reevaluated and updated patient on results. Patient's tachypnea and his gurgled respirations are now resolving.  9:59 AM Spoke with hospitalist Dr. Mansfield who accepts patient for admission.        Medical Decision Making    History:    Supplemental history from: Family Member/Significant Other    External Record(s) reviewed: Documented in chart, if applicable.    Work Up:    Chart documentation includes differential considered and any EKGs or imaging independently  interpreted by provider, where specified.    In additional to work up documented, I considered the following work up: Documented in chart, if applicable.    External consultation:    Discussion of management with another provider: Documented in chart, if applicable    Complicating factors:    Care impacted by chronic illness: Cerebrovascular Disease, Hypertension and Other: GTube status    Care affected by social determinants of health: N/A    Disposition considerations: Admit.                  Critical Care     Performed by: Tadeo Berg or    Authorized by: Tadeo Berg  Total critical care time:  minutes  Critical care was necessary to treat or prevent imminent or life-threatening deterioration of the following conditions:   Critical care was time spent personally by me on the following activities: development of treatment plan with patient or surrogate, discussions with consultants, examination of patient, evaluation of patient's response to treatment, obtaining history from patient or surrogate, ordering and performing treatments and interventions, ordering and review of laboratory studies, ordering and review of radiographic studies, re-evaluation of patient's condition and monitoring for potential decompensation.  Critical care time was exclusive of separately billable procedures and treating other patients.'    At the conclusion of the encounter I discussed the results of all the tests and the disposition. The questions were answered. The patient or family acknowledged understanding and was agreeable with the care plan.                  MEDICATIONS GIVEN IN THE EMERGENCY DEPARTMENT:  Medications   vancomycin (VANCOCIN) 1,250 mg in 0.9% NaCl 250 mL intermittent infusion (1,250 mg Intravenous $New Bag 4/25/23 0855)   piperacillin-tazobactam (ZOSYN) 3.375 g vial to attach to  mL bag (0 g Intravenous Stopped 4/25/23 0821)   acetaminophen (TYLENOL) solution 896 mg (896 mg Oral $Given 4/25/23 0821)   iopamidol  (ISOVUE-370) solution 100 mL (100 mLs Intravenous $Given 4/25/23 0841)       NEW PRESCRIPTIONS STARTED AT TODAY'S ED VISIT:  New Prescriptions    No medications on file       HPI     Patient information obtained from: patient's family    Use of Interpretor: N/A    Liu GIL Malave is a 57 year old male with a pertinent history of seizures, hypertension, CVA, G-tube, hemorrhagic stroke, GJ Tube, lung nodule, who presents to this ED via self walk-in for evaluation of shortness of breath, cough, and fever.    Patient who is at baseline immobile and nonverbal from strokes, G-tube dependent, here from home with family with concerns of fever and tachycardia as well as increased mucus production and concern for pneumonia.  Has been admitted multiple times in the past.  States the symptoms have been present for last couple days.  Fever this morning.    There were no other concerns/complaints at this time.      REVIEW OF SYSTEMS:  Review of Systems   Constitutional: Endorses fever. Negative for malaise  HEENT: Endorses congestion. Negative runny nose, sore throat, ear pain, neck pain  Respiratory: Negative for shortness of breath, cough  Cardiovascular: Negative for chest pain, leg edema  Gastrointestinal: Negative for abdominal distention, abdominal pain, constipation, vomiting, nausea, diarrhea  Genitourinary: Negative for dysuria and hematuria.   Integument: Negative for rash, skin breakdown  Neurological: Negative for paresthesias, weakness, headache.  Musculoskeletal: Negative for joint pain, joint swelling      All other systems reviewed and are negative.          MEDICAL HISTORY     Past Medical History:   Diagnosis Date     Acute UTI 02/21/2020     Anxiety      Basal ganglia hemorrhage (H) 06/20/2016     Brain compression (H)      Bruxism      Chronic static encephalopathy      Convulsions, unspecified convulsion type (H)      CVA (cerebral vascular accident) (H) 07/2016     Depression      Dyslipidemia      Elevated  troponin      Essential hypertension      Feeding by G-tube (H) 06/04/2020     GERD (gastroesophageal reflux disease)      Hemiplegia of nondominant side, late effect of cerebrovascular disease (H)      Hemorrhagic stroke (H) 06/05/2016     Hepatitis C carrier (H)      History of ESBL E. coli infection      History of hemorrhagic stroke with residual hemiparesis (H)      Hypertension      Lung nodule 12/05/2016     MRSA (methicillin resistant staph aureus) culture positive      Periodontal disease      Persistent fever      Pneumonia of both lower lobes due to infectious organism      Respiratory failure with hypoxia (H)      Seizure (H)      Sepsis due to urinary tract infection (H) 06/04/2020     Severe sepsis (H)      Trismus        Past Surgical History:   Procedure Laterality Date     aneurysm clipping  2006     BRAIN SURGERY       EXAM UNDER ANESTHESIA, RESTORATIONS, EXTRACTION(S) DENTAL COMPLEX, COMBINED N/A 12/19/2016    Procedure: COMBINED EXAM UNDER ANESTHESIA, RESTORATIONS, EXTRACTION(S) DENTAL COMPLEX;  Surgeon: Verónica Martinez DDS;  Location: [facility]     EXAM UNDER ANESTHESIA, RESTORATIONS, EXTRACTION(S) DENTAL COMPLEX, COMBINED N/A 3/7/2018    Procedure: COMBINED EXAM UNDER ANESTHESIA, RESTORATIONS, EXTRACTION(S) DENTAL COMPLEX;  Kenalog Injection in Bilateral [place], Combined Dental Exam,Radiographs, Three Dental Restorations, Periodontal Therapy and Fluoride Treatment;  Surgeon: Darryl Isbell DDS;  Location: [facility] OR     HH MIDLINE INSERTION  8/11/2017          INJECT STEROID (LOCATION) N/A 3/7/2018    Procedure: INJECT STEROID (LOCATION);  Kenalog Injection In Bilateral [place], Combined Dental Exam,Radiographs,Three Dental Restorations, Periodontal Therapy and Fluoride Treatment;  Surgeon: Liu Conroy DDS;  Location: [facility] OR     IR GASTRO JEJUNOSTOMY TUBE CHANGE  7/19/2016     IR GASTRO JEJUNOSTOMY TUBE CHANGE  9/23/2016     IR GASTRO JEJUNOSTOMY TUBE CHANGE  11/28/2016     IR GASTRO  JEJUNOSTOMY TUBE CHANGE  1/27/2017     IR GASTRO JEJUNOSTOMY TUBE CHANGE  4/19/2017     IR GASTRO JEJUNOSTOMY TUBE CHANGE  6/20/2017     IR GASTRO JEJUNOSTOMY TUBE CHANGE  8/7/2017     IR GASTRO JEJUNOSTOMY TUBE CHANGE  8/11/2017     IR GASTRO JEJUNOSTOMY TUBE CHANGE  10/20/2017     IR GASTRO JEJUNOSTOMY TUBE CHANGE  1/2/2018     IR GASTRO JEJUNOSTOMY TUBE CHANGE  2/16/2018     IR GASTRO JEJUNOSTOMY TUBE CHANGE  5/16/2018     IR GASTRO JEJUNOSTOMY TUBE CHANGE  6/11/2018     IR GASTRO JEJUNOSTOMY TUBE CHANGE  9/12/2018     IR GASTRO JEJUNOSTOMY TUBE CHANGE  12/24/2018     IR GASTRO JEJUNOSTOMY TUBE CHANGE  3/18/2019     IR GASTRO JEJUNOSTOMY TUBE CHANGE  4/23/2019     IR GASTRO JEJUNOSTOMY TUBE CHANGE  5/21/2019     IR GASTRO JEJUNOSTOMY TUBE CHANGE  9/9/2019     IR GASTRO JEJUNOSTOMY TUBE CHANGE  9/26/2019     IR GASTRO JEJUNOSTOMY TUBE CHANGE  10/11/2019     IR GASTRO JEJUNOSTOMY TUBE CHANGE  11/21/2019     IR GASTRO JEJUNOSTOMY TUBE CHANGE  11/29/2019     IR GASTRO JEJUNOSTOMY TUBE CHANGE  2/27/2020     IR GASTRO JEJUNOSTOMY TUBE CHANGE  4/10/2020     IR GASTRO JEJUNOSTOMY TUBE CHANGE  7/2/2020     IR GASTRO JEJUNOSTOMY TUBE CHANGE  8/28/2020     IR GASTRO JEJUNOSTOMY TUBE CHANGE  9/17/2020     IR GASTRO JEJUNOSTOMY TUBE CHANGE  10/29/2020     IR GASTRO JEJUNOSTOMY TUBE CHANGE  11/6/2020     IR GASTRO JEJUNOSTOMY TUBE CHANGE  12/24/2020     IR GASTRO JEJUNOSTOMY TUBE CHANGE  1/9/2021     IR GASTRO JEJUNOSTOMY TUBE CHANGE  6/9/2021     IR GASTRO JEJUNOSTOMY TUBE CHANGE  10/4/2021     IR GASTRO JEJUNOSTOMY TUBE CHANGE  10/18/2021     IR GASTRO JEJUNOSTOMY TUBE CHANGE  1/14/2022     IR GASTRO JEJUNOSTOMY TUBE CHANGE  1/20/2022     IR GASTRO JEJUNOSTOMY TUBE CHANGE  4/19/2022     IR GASTRO JEJUNOSTOMY TUBE CHANGE  6/7/2022     IR GASTRO JEJUNOSTOMY TUBE CHANGE  8/19/2022     IR GASTRO JEJUNOSTOMY TUBE CHANGE  9/13/2022     IR GASTRO JEJUNOSTOMY TUBE CHANGE  10/27/2022     IR GASTRO JEJUNOSTOMY TUBE CHANGE  11/3/2022      IR GASTRO JEJUNOSTOMY TUBE CHANGE  2023     IR GASTRO JEJUNOSTOMY TUBE CHANGE  2023     IR GASTRO JEJUNOSTOMY TUBE PLACEMENT  2016     IR GJ TUBE REPLACEMENT  2018     IR GJ TUBE REPLACEMENT  3/18/2019     IR GJ TUBE REPLACEMENT  2019     IR GJ TUBE REPLACEMENT  2019     IR GJ TUBE REPLACEMENT  2019     IR GJ TUBE REPLACEMENT  2019     IR GJ TUBE REPLACEMENT  10/11/2019     IR GJ TUBE REPLACEMENT  2019     IR GJ TUBE REPLACEMENT  2019     IR GJ TUBE REPLACEMENT  2020     IR GJ TUBE REPLACEMENT  4/10/2020     IR GJ TUBE REPLACEMENT  2020     IR GJ TUBE REPLACEMENT  2020     IR GJ TUBE REPLACEMENT  2020     IR GJ TUBE REPLACEMENT  10/29/2020     IR GJ TUBE REPLACEMENT  2020     IR GJ TUBE REPLACEMENT  2020     IR GJ TUBE REPLACEMENT  2021     IR GJ TUBE REPLACEMENT  2021     J-G tube       PICC  6/10/2016          TRACHEOSTOMY      Removed       Social History     Tobacco Use     Smoking status: Former     Packs/day: 1.00     Years: 30.00     Pack years: 30.00     Types: Cigarettes     Quit date: 2005     Years since quittin.8     Smokeless tobacco: Former     Quit date: 2007     Tobacco comments:     quit 2006   Vaping Use     Vaping status: Never Used   Substance Use Topics     Alcohol use: No     Drug use: No       acetaminophen (TYLENOL) 500 MG tablet  albuterol (PROVENTIL) (2.5 MG/3ML) 0.083% neb solution  amLODIPine (NORVASC) 5 MG tablet  aspirin (ASA) 81 MG chewable tablet  baclofen (LIORESAL) 10 MG tablet  bisacodyl (DULCOLAX) 10 MG suppository  cefaclor (CECLOR) 250 MG capsule  chlorhexidine (PERIDEX) 0.12 % solution  Dextromethorphan-guaiFENesin  MG/5ML syrup  doxazosin (CARDURA) 1 MG tablet  ferrous sulfate 220 (44 Fe) MG/5ML ELIX  ipratropium (ATROVENT) 0.02 % neb solution  LIDOCAINE PAIN RELIEF 4 % Patch  meclizine (ANTIVERT) 25 MG tablet  metoprolol tartrate (LOPRESSOR) 25 MG  tablet  neomycin-bacitracin-polymyxin (NEOSPORIN) 5-400-5000 ointment  nystatin (MYCOSTATIN) 124777 UNIT/GM external cream  omeprazole (PRILOSEC) 40 MG DR capsule  polyethylene glycol (MIRALAX) 17 GM/Dose powder  Polyethylene Glycol 400 (VISINE DRY EYE RELIEF) 1 % SOLN  QUEtiapine (SEROQUEL) 25 MG tablet  simvastatin (ZOCOR) 20 MG tablet            PHYSICAL EXAM     BP (!) 141/86   Pulse 101   Temp 99.4  F (37.4  C) (Temporal)   Resp 18   Ht 1.524 m (5')   Wt 59 kg (130 lb)   SpO2 100%   BMI 25.39 kg/m        PHYSICAL EXAM:     General: Patient appears well, nontoxic, comfortable  HEENT: Moist mucous membranes,  No head trauma.    Cardiovascular: Tachycardiac, normal rhythm, no extremity edema.  No appreciable murmur.  Respiratory: Gurgling breath sounds that improved with suctioning. Has a large amount of yellow secretions. Lungs are congested bilaterally.   Abdominal: Soft, nontender, nondistended, no palpable masses, no guarding, no rebound  : No  breakdown or signs of septic ulcerations  Musculoskeletal: Full range of motion of joints, no deformities appreciated.  Neurological: Alert and oriented, grossly neurologically intact.  Psychological: Normal affect and mood.  Integument: No rashes appreciated            RESULTS       Labs Ordered and Resulted from Time of ED Arrival to Time of ED Departure   COMPREHENSIVE METABOLIC PANEL - Abnormal       Result Value    Sodium 142      Potassium 5.0      Chloride 111 (*)     Carbon Dioxide (CO2) 25      Anion Gap 6      Urea Nitrogen 40 (*)     Creatinine 1.21      Calcium 9.0      Glucose 109      Alkaline Phosphatase 63      AST 24      ALT 28      Protein Total 8.6 (*)     Albumin 3.5      Bilirubin Total 0.4      GFR Estimate 69     ROUTINE UA WITH MICROSCOPIC REFLEX TO CULTURE - Abnormal    Color Urine Light Yellow      Appearance Urine Clear      Glucose Urine Negative      Bilirubin Urine Negative      Ketones Urine Negative      Specific Gravity Urine  1.018      Blood Urine 0.1 mg/dL (*)     pH Urine 5.5      Protein Albumin Urine 100 (*)     Urobilinogen Urine <2.0      Nitrite Urine Negative      Leukocyte Esterase Urine Negative      Mucus Urine Present (*)     RBC Urine 8 (*)     WBC Urine 1     BLOOD GAS VENOUS - Abnormal    pH Venous 7.33 (*)     pCO2 Venous 51 (*)     pO2 Venous 17 (*)     Bicarbonate Venous 26      Base Excess/Deficit (+/-) 0.4      Oxyhemoglobin Venous 23.4 (*)     O2 Sat, Venous 23.8 (*)    CBC WITH PLATELETS AND DIFFERENTIAL - Abnormal    WBC Count 16.6 (*)     RBC Count 3.79 (*)     Hemoglobin 11.9 (*)     Hematocrit 36.5 (*)     MCV 96      MCH 31.4      MCHC 32.6      RDW 12.3      Platelet Count 218      % Neutrophils 76      % Lymphocytes 17      % Monocytes 5      % Eosinophils 2      % Basophils 0      % Immature Granulocytes 0      NRBCs per 100 WBC 0      Absolute Neutrophils 12.4 (*)     Absolute Lymphocytes 2.8      Absolute Monocytes 0.9      Absolute Eosinophils 0.3      Absolute Basophils 0.1      Absolute Immature Granulocytes 0.1      Absolute NRBCs 0.0     LACTIC ACID WHOLE BLOOD - Normal    Lactic Acid 1.4     B-TYPE NATRIURETIC PEPTIDE ([facility] ONLY) - Normal    BNP <10     TROPONIN I - Normal    Troponin I 0.01     INFLUENZA A/B, RSV, & SARS-COV2 PCR - Normal    Influenza A PCR Negative      Influenza B PCR Negative      RSV PCR Negative      SARS CoV2 PCR Negative     MAGNESIUM - Normal    Magnesium 2.4     BLOOD CULTURE   BLOOD CULTURE       CT Chest (PE) Abdomen Pelvis w Contrast   Final Result   IMPRESSION:   1.  Pulmonary arteries are well-opacified and there is no evidence for pulmonary embolus in either lung.   2.  No thoracic aortic aneurysm or thoracic aortic dissection.   3.  Bibasilar airspace consolidation consistent with bilateral lower lobe pneumonia.   4.  Mild diffuse thickening of the bladder wall with mild prosthetic enlargement this may be secondary to bladder outlet obstruction less likely would  be cystitis and correlation with urinalysis is recommended.   5.  Gastrojejunostomy tube in place no bowel obstruction or focal inflammatory process involving the bowel.                        PROCEDURES:  Procedures:  Procedures       I, Anika Reinoso am serving as a scribe to document services personally performed by Tadeo Berg DO, based on my observations and the provider's statements to me.  I, Tadeo Berg DO, attest that Anika Reinoso is acting in a scribe capacity, has observed my performance of the services and has documented them in accordance with my direction.    Tadeo Berg DO  Emergency Medicine  [facility] EMERGENCY ROOM     Tadeo Berg DO  04/25/23 1143

## 2023-04-25 NOTE — PHARMACY-VANCOMYCIN DOSING SERVICE
Pharmacy Vancomycin Initial Note  Date of Service 2023  Patient's  1965  57 year old, male    Indication: Sepsis    Current estimated CrCl = Estimated Creatinine Clearance: 54.9 mL/min (based on SCr of 1.21 mg/dL).    Creatinine for last 3 days  2023:  6:40 AM Creatinine 1.21 mg/dL    Recent Vancomycin Level(s) for last 3 days  No results found for requested labs within last 3 days.      Vancomycin IV Administrations (past 72 hours)      No vancomycin orders with administrations in past 72 hours.                Nephrotoxins and other renal medications (From now, onward)    Start     Dose/Rate Route Frequency Ordered Stop    23 0730  vancomycin (VANCOCIN) 1,250 mg in 0.9% NaCl 250 mL intermittent infusion         1,250 mg  over 90 Minutes Intravenous ONCE 23 0723      23 0700  piperacillin-tazobactam (ZOSYN) 3.375 g vial to attach to  mL bag         3.375 g  over 30 Minutes Intravenous ONCE 23 0648            Contrast Orders - past 72 hours (72h ago, onward)    None                  Plan:  1. Start vancomycin  1250 mg IV once in ED.     Mena Rodriguez [facility]

## 2023-04-25 NOTE — PLAN OF CARE
Patient on room air.  Every 2 hour turns.  Tube feeding started, tolerating well.  Small amount of oral secretions suctioned.  Post void residuals 4-50ccs.  Family at bedside.  Receiving IV antibiotics.    Problem: Plan of Care - These are the overarching goals to be used throughout the patient stay.    Goal: Absence of Hospital-Acquired Illness or Injury  Outcome: Progressing  Intervention: Identify and Manage Fall Risk  Recent Flowsheet Documentation  Taken 4/25/2023 1624 by Tami Tomlin RN  Safety Promotion/Fall Prevention:   room near nurse's station   room door open  Taken 4/25/2023 1200 by Tami Tomlin RN  Safety Promotion/Fall Prevention:   room near nurse's station   room door open  Intervention: Prevent Skin Injury  Recent Flowsheet Documentation  Taken 4/25/2023 1600 by Tami Tomlin RN  Body Position:   turned   left  Taken 4/25/2023 1400 by Tami Tomlin RN  Body Position:   turned   right  Taken 4/25/2023 1200 by Tami Tomlin RN  Body Position: log-rolled  Goal: Optimal Comfort and Wellbeing  Outcome: Progressing     Problem: Enteral Nutrition  Goal: Absence of Aspiration Signs and Symptoms  Outcome: Met  Intervention: Minimize Aspiration Risk  Recent Flowsheet Documentation  Taken 4/25/2023 1624 by Tami Tomlin RN  Oral Care: suction provided  Taken 4/25/2023 1200 by Tami Tomlin RN  Oral Care: suction provided  Goal: Safe, Effective Therapy Delivery  Outcome: Met  Goal: Feeding Tolerance  Outcome: Met     Problem: Skin Injury Risk Increased  Goal: Skin Health and Integrity  Outcome: Met  Intervention: Optimize Skin Protection  Recent Flowsheet Documentation  Taken 4/25/2023 1624 by Tami Tomlin RN  Activity Management: bedrest  Taken 4/25/2023 1200 by Tami Tomlin RN  Activity Management: bedrest     Problem: Gas Exchange Impaired  Goal: Optimal Gas Exchange  Outcome: Met   Goal Outcome Evaluation:

## 2023-04-25 NOTE — PROGRESS NOTES
"CLINICAL NUTRITION SERVICES - ASSESSMENT NOTE     Nutrition Prescription    RECOMMENDATIONS FOR MDs/PROVIDERS TO ORDER:  none    Malnutrition Status:    Limited data     Recommendations already ordered by Registered Dietitian (RD):  Ok to substitute formula while pt is here and give Osmolite 1.5 Navid @ goal of  45ml/hr  (1080ml/day)  will provide: 1620kcals, 67 g PRO, 822 ml free H20, 219 g CHO, and 0 g fiber daily.  FWF: 120mL q 4 hrs to meet hydration needs, split between ports     This will be slightly more than what he gets at home    Future/Additional Recommendations:  Will monitor TF tolerance      REASON FOR ASSESSMENT  Liu GIL Malave is a/an 57 year old male assessed by the dietitian for Provider Order - Registered Dietitian to Assess and Order TF per Medical Nutrition Therapy Protocol    Pt admitted with aspiration pneumonia, hemiplegia, chronic TF,     NUTRITION HISTORY  Pt nonverbal, Has g/j-tube, placed on 4/7/23  No family present. Left voice message with daughter     Per chart review on 2/23 pt was getting the following TF regimen  Osmolite 1.5 Navid @ goal of  45ml/hr  (1080ml/day)  will provide: 1620 kcals, 67 g PRO, 822 ml free H20, 219 g CHO, and 0 g fiber daily.  FWF: 130mL q 4 hrs to meet hydration needs    Home TF indicated as: 4 cans of peptamen 1.5 daily with prebio run around the clock. The run it at a higher rate during the day 50ml/hr and lower rate at night 30mlhr. This provides 1500 calories, 68g protein, 192g CHO daily. FWF of 120ml split between ports every 2hrs        CURRENT NUTRITION ORDERS  Diet: NPO      LABS  Labs reviewed, [id number]    MEDICATIONS  Medications reviewed    ANTHROPOMETRICS  Height: 152.4 cm (5' 0\")  Most Recent Weight: 59 kg (130 lb)    IBW: 52 kg  BMI: Overweight BMI 25-29.9  Weight History:   Wt Readings from Last 10 Encounters:   04/25/23 59 kg (130 lb)   02/12/23 56.8 kg (125 lb 3.5 oz)   12/13/22 58.7 kg (129 lb 8 oz)   10/30/22 61.5 kg (135 lb 9.3 oz)   09/19/22 60 " kg (132 lb 4.4 oz)   05/30/22 59 kg (130 lb)   04/07/22 59 kg (130 lb)   01/28/22 59 kg (130 lb)   03/07/18 36.3 kg (80 lb)   02/28/18 36.3 kg (80 lb)       Dosing Weight: 59 kg    ASSESSED NUTRITION NEEDS  Estimated Energy Needs: 1386-2191 kcals/day (25 - 30 kcals/kg)  Justification: Maintenance  Estimated Protein Needs: 59-71 grams protein/day (1 - 1.2 grams of pro/kg)  Justification: Maintenance  Estimated Fluid Needs: 1522-2739 mL/day (25 - 30 mL/kg)   Justification: Maintenance    PHYSICAL FINDINGS  See malnutrition section below.  Per flowsheet:   GI-rounded, g/j tube in place  Skin-no skin breakdown noted       MALNUTRITION:  % Weight Loss:  None noted  % Intake:  No decreased intake noted  Subcutaneous Fat Loss:  Unable to observe  Muscle Loss:  Unable to observe   Fluid Retention:  None noted    Malnutrition Diagnosis: limited data    NUTRITION DIAGNOSIS  Swallowing difficulty related to dysphagia as evidenced by need for TF       INTERVENTIONS  Implementation  Ok to substitute formula while pt is here and give Osmolite 1.5 Navid @ goal of  45ml/hr  (1080ml/day)  will provide: 1620kcals, 67 g PRO, 822 ml free H20, 219 g CHO, and 0 g fiber daily.  FWF: 120mL q 4 hrs to meet hydration needs, split between ports     This will be slightly more than what he gets at home     Goals  Tolerate enteral nutrition      Monitoring/Evaluation  Progress toward goals will be monitored and evaluated per protocol.

## 2023-04-25 NOTE — ED TRIAGE NOTES
Pt presents to ED with family who reports shortness of breath for the last couple of days.  Daughter reports that around 0500 this morning, pt coughing up sputum and having increased breathing.     Triage Assessment     Row Name 04/25/23 0627       Triage Assessment (Adult)    Airway WDL X  secretions, required immediate oral suctioning upon arrival       Respiratory WDL    Respiratory WDL X;rhythm/pattern;expansion/retractions    Rhythm/Pattern, Respiratory shortness of breath;labored;grunting    Expansion/Accessory Muscles/Retractions abdominal muscle use       Skin Circulation/Temperature WDL    Skin Circulation/Temperature WDL X;temperature    Skin Temperature warm       Cardiac WDL    Cardiac WDL X;rhythm    Pulse Rate & Regularity tachycardic       Peripheral/Neurovascular WDL    Peripheral Neurovascular WDL WDL       Cognitive/Neuro/Behavioral WDL    Cognitive/Neuro/Behavioral WDL orientation    Orientation disoriented to

## 2023-04-25 NOTE — PHARMACY-VANCOMYCIN DOSING SERVICE
"Pharmacy Vancomycin Initial Note  Date of Service 2023  Patient's  1965  57 year old, male    Indication: Aspiration Pneumonia    Current estimated CrCl = Estimated Creatinine Clearance: 54.9 mL/min (based on SCr of 1.21 mg/dL).    Creatinine for last 3 days  2023:  6:40 AM Creatinine 1.21 mg/dL    Recent Vancomycin Level(s) for last 3 days  No results found for requested labs within last 3 days.      Vancomycin IV Administrations (past 72 hours)                   vancomycin (VANCOCIN) 1,250 mg in 0.9% NaCl 250 mL intermittent infusion (mg) 1,250 mg New Bag 23 0855                Nephrotoxins and other renal medications (From now, onward)    Start     Dose/Rate Route Frequency Ordered Stop    23 1500  piperacillin-tazobactam (ZOSYN) 3.375 g vial to attach to  mL bag        Note to Pharmacy: For [facility], [facility] and [facility]: For Zosyn-naive patients, use the \"Zosyn initial dose + extended infusion\" order panel.    3.375 g  over 240 Minutes Intravenous EVERY 8 HOURS 23 1239            Contrast Orders - past 72 hours (72h ago, onward)    Start     Dose/Rate Route Frequency Stop    23 0900  iopamidol (ISOVUE-370) solution 100 mL         100 mL Intravenous ONCE 23 0841          [employer] Prediction of Planned Initial Vancomycin Regimen  Regimen: 1250 mg IV every 24 hours.  Start time: 20:55 on 2023  Exposure target: AUC24 (range)400-600 mg/L.hr   AUC24,ss: 508 mg/L.hr  Probability of AUC24 > 400: 76 %  Ctrough,ss: 14.4 mg/L  Probability of Ctrough,ss > 20: 23 %  Probability of nephrotoxicity (Lodise ALMA ): 10 %          Plan:  1. Start vancomycin  1250 mg IV q24h.   2. Vancomycin monitoring method: AUC  3. Vancomycin therapeutic monitoring goal: 400-600 mg*h/L  4. Pharmacy will check vancomycin levels as appropriate in 1-3 Days.    5. Serum creatinine levels will be ordered daily for the first week of therapy and at least twice weekly for subsequent weeks.  "     Gabrielle Rolon, [facility]

## 2023-04-25 NOTE — PHARMACY-ADMISSION MEDICATION HISTORY
Pharmacy Intern Admission Medication History    Admission medication history is complete. The information provided in this note is only as accurate as the sources available at the time of the update.    Medication reconciliation/reorder completed by provider prior to medication history? Yes    Information Source(s): Family member via in-person [employer] (daughter)            Allergies reviewed with patient and updates made in EHR: yes    Medication History Completed By: Jacques Bacon 4/25/2023 8:35 AM    Prior to Admission medications    Medication Sig Last Dose Taking? Auth Provider Long Term End Date   acetaminophen (TYLENOL) 500 MG tablet 1,000 mg by Gastric Tube route every 6 hours as needed for mild pain  Unknown at prn Yes Reported, Patient     albuterol (PROVENTIL) (2.5 MG/3ML) 0.083% neb solution Take 1 vial (2.5 mg) by nebulization every 6 hours as needed for shortness of breath, wheezing or cough 4/24/2023 at am Yes Sandie Leiva APRN CNP Yes    amLODIPine (NORVASC) 5 MG tablet Take 1 tablet (5 mg total) by mouth daily. 4/24/2023 at am Yes Lynda Gutierrez MD Yes    aspirin (ASA) 81 MG chewable tablet 1 tablet (81 mg) by Per Feeding Tube route daily 4/24/2023 at am Yes Lynda Gutierrez MD     baclofen (LIORESAL) 10 MG tablet 1 tablet (10 mg) by Per G Tube route 3 times daily as needed for muscle spasms  Patient taking differently: 10 mg by Per G Tube route 4 times daily as needed for muscle spasms 4/24/2023 at pm Yes Dayday Gonsales MD     bisacodyl (DULCOLAX) 10 MG suppository INSERT 1 SUPPOSTIORY INTO THE RECTUM DAILY AS NEEDED FOR CONSTIPATION. 4/24/2023 at prn Yes Lynda Gutierrez MD     cefaclor (CECLOR) 250 MG capsule Give 1 capsule via g tube daily for UTI prevention 4/24/2023 at am Yes Lynda Gutierrez MD     chlorhexidine (PERIDEX) 0.12 % solution Swish and spit 15 mLs in mouth daily  4/24/2023 at pm Yes Reported, Patient No    Dextromethorphan-guaiFENesin  MG/5ML syrup take 10 mLs by Per G  Tube route every 4 hours as needed for cough Unknown at prn Yes Shahid Enriquez MD     doxazosin (CARDURA) 1 MG tablet 1 tablet (1 mg) by Per G Tube route daily 4/24/2023 at pm Yes Dayday Gonsales MD Yes    ferrous sulfate 220 (44 Fe) MG/5ML ELIX TAKE 5 MLS BY ENTERAL ROUTE DAILY 4/24/2023 at pm Yes Shahid Enriquez MD     ipratropium (ATROVENT) 0.02 % neb solution Take 2.5 mLs (0.5 mg) by nebulization every 6 hours as needed for wheezing (shortness of breath or cough.) Use along with albuterol solution 4/24/2023 at pm Yes Lynda Gutierrez MD Yes    LIDOCAINE PAIN RELIEF 4 % Patch PLACE 1 PATCH ONTO THE SKIN EVERY 24 HOURS TO PREVENT LIDOCAINE TOXICITY, PATIENT SHOULD BE PATCH FREE FOR 12 HOURS DAILY.  Patient taking differently: Place 1 patch onto the skin daily as needed 4/24/2023 at prn Yes Lynda Gutierrez MD     meclizine (ANTIVERT) 25 MG tablet Take 1 tablet via G-tube every 6 hours as needed for vertigo 4/24/2023 at prn Yes Lynda Gutierrez MD     metoprolol tartrate (LOPRESSOR) 25 MG tablet TAKE ONE TABLET BY MOUTH TWICE DAILY   Patient taking differently: Take 12.5 mg by mouth daily 4/24/2023 at pm Yes Lynda Gutierrez MD Yes    neomycin-bacitracin-polymyxin (NEOSPORIN) 5-400-5000 ointment Apply topically 4 times daily Unknown at prn Yes Lynda Gutierrez MD     nystatin (MYCOSTATIN) 753943 UNIT/GM external cream Apply topically 2 times daily Unknown at prn Yes Shahid Enriquez MD     omeprazole (PRILOSEC) 40 MG DR capsule take 1 capsule by mouth daily before breakfast 4/24/2023 at am Yes Lynda Gutierrez MD     polyethylene glycol (MIRALAX) 17 GM/Dose powder Give 1 capful via G-tube daily as needed for constipation Unknown at prn Yes Lynda Gutierrez MD     Polyethylene Glycol 400 (VISINE DRY EYE RELIEF) 1 % SOLN Apply 1 drop to eye every 6 hours as needed (dry eyes) Unknown at prn Yes Lynda Gutierrez MD     QUEtiapine (SEROQUEL) 25 MG tablet Take 1 tablet (25 mg) by mouth See Admin Instructions Take 1  tablet at 7PM, Midnight and 3AM for total of 75 mg nightly. Ok to repeat after 4 hours and during the day PRN 4/24/2023 at pm Yes Lynda Gutierrez MD Yes    simvastatin (ZOCOR) 20 MG tablet 1 tablet (20 mg) by Per G Tube route At Bedtime 4/24/2023 at pm Yes Lynda Gutierrez MD Yes

## 2023-04-25 NOTE — H&P
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 coli infection, History of hemorrhagic stroke with residual hemiparesis (H), Hypertension, Lung nodule (12/05/2016), MRSA (methicillin resistant staph aureus) culture positive, Periodontal disease, Persistent fever, Pneumonia of both lower lobes due to infectious organism, Respiratory failure with hypoxia (H), Seizure (H), Sepsis due to urinary tract infection (H) (06/04/2020), Severe sepsis (H), and Trismus.   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    Weight as of this encounter: 59  kg (130 lb).  DVTP: germán hose  Code Status: Full Code  Disposition: Inpatient     History of Present Illness  Liu GIL Malave had a fever this am. Unable to gather Hx per Pt, discussed with 2 daughters. They feel he's been acting fairly normal today in terms of behavior and responsiveness. Generally speaking, they notice he starts coughing after giving water for G-tube flushes. They tried positioning him straight up which helps reduce coughing. They also do suctioning at home. Regarding today, this morning he had respiratory distress as well as fever. Was baselines yesterday. They state that he gets a PNA after every G-J tube exchange, usually 2 weeks later.     2016 - hemorrhagic stroke leaving him with hemiparesis, non-verbal, yi aspiration on swallow studies. Transiently had trach. Cares provided by daughter at home, QID nebs and frequent suctioning.  Dec 2022 - hospitalized for recurrent aspiration  2/10 - admitted to  for aspiration PNA, sepsis, and urinary retention. Treated with merepenem, changed to Zosyn tobramycin  2/12 - discharged from [facility]  4/7 - most recent J-G tube exchange by IR    ED triage note:  Pt presents to ED with family who reports shortness of breath for the last couple of days.  Daughter reports that around 0500 this morning, pt coughing up sputum and having increased breathing.    ED course:  In the ED, patient is afebrile 101.2, somewhat hypertensive, satting well on RA  HR is 110's  BUN 40  VBG pH 7.33, CO2 51  WBC 16, Hb 11.9 (baseline)  UA with protein, small blood/RBCs  BCx colleted     Results for orders placed or performed during the hospital encounter of 04/25/23   CT Chest (PE) Abdomen Pelvis w Contrast    Impression    IMPRESSION:  1.  Pulmonary arteries are well-opacified and there is no evidence for pulmonary embolus in either lung.  2.  No thoracic aortic aneurysm or thoracic aortic dissection.  3.  Bibasilar airspace consolidation consistent with bilateral lower lobe  pneumonia.  4.  Mild diffuse thickening of the bladder wall with mild prosthetic enlargement this may be secondary to bladder outlet obstruction less likely would be cystitis and correlation with urinalysis is recommended.  5.  Gastrojejunostomy tube in place no bowel obstruction or focal inflammatory process involving the bowel.      Medications   acetaminophen (TYLENOL) tablet 1,000 mg (has no administration in time range)   albuterol (PROVENTIL) neb solution 2.5 mg (has no administration in time range)   amLODIPine (NORVASC) tablet 5 mg (has no administration in time range)   aspirin (ASA) chewable tablet 81 mg (has no administration in time range)   baclofen (LIORESAL) tablet 10 mg (has no administration in time range)   bisacodyl (DULCOLAX) suppository 10 mg (has no administration in time range)   chlorhexidine (PERIDEX) 0.12 % solution 15 mL (has no administration in time range)   Dextromethorphan-guaiFENesin  MG/5ML SYRP 10 mL (has no administration in time range)   doxazosin (CARDURA) tablet 1 mg (has no administration in time range)   ipratropium (ATROVENT) 0.02 % neb solution 0.5 mg (has no administration in time range)   metoprolol tartrate (LOPRESSOR) half-tab 12.5 mg (has no administration in time range)   neomycin-bacitracin-polymyxin (NEOSPORIN) ointment (has no administration in time range)   omeprazole (priLOSEC) CR capsule 40 mg (has no administration in time range)   Polyethylene Glycol 400 1 % SOLN 1 drop (has no administration in time range)   QUEtiapine (SEROquel) tablet 25 mg (has no administration in time range)   simvastatin (ZOCOR) tablet 20 mg (has no administration in time range)   lidocaine 1 % 0.1-1 mL (has no administration in time range)   lidocaine (LMX4) cream (has no administration in time range)   sodium chloride (PF) 0.9% PF flush 3 mL (has no administration in time range)   sodium chloride (PF) 0.9% PF flush 3 mL (has no administration in time range)   melatonin tablet 1  mg (has no administration in time range)   piperacillin-tazobactam (ZOSYN) 3.375 g vial to attach to  mL bag (has no administration in time range)   piperacillin-tazobactam (ZOSYN) 3.375 g vial to attach to  mL bag (0 g Intravenous Stopped 4/25/23 0821)   vancomycin (VANCOCIN) 1,250 mg in 0.9% NaCl 250 mL intermittent infusion (0 mg Intravenous Stopped 4/25/23 1011)   acetaminophen (TYLENOL) solution 896 mg (896 mg Oral $Given 4/25/23 0821)   iopamidol (ISOVUE-370) solution 100 mL (100 mLs Intravenous $Given 4/25/23 0841)       Physical exam:  Appears chronically ill, moans, eyes are open  Anicteric conjunctiva, doesn't participate in visual exam  Oral secretions around mouth  Prior trach scar  Rate 90's, regular, BP stable  Bibasal crackles  G-J tube in abd without leak or erythema visible  Contractures on right side  Unable to assess affect  Vital signs reviewed by me    Wt Readings from Last 4 Encounters:   04/25/23 59 kg (130 lb)   02/12/23 56.8 kg (125 lb 3.5 oz)   12/13/22 58.7 kg (129 lb 8 oz)   10/30/22 61.5 kg (135 lb 9.3 oz)        reports that he quit smoking about 17 years ago. His smoking use included cigarettes. He has a 30.00 pack-year smoking history. He quit smokeless tobacco use about 15 years ago. He reports that he does not drink alcohol and does not use drugs.  family history includes Cancer in his father; Hypertension in his mother; Kidney Disease in his brother; Other Cancer in his father.   has a past surgical history that includes aneurysm clipping (2006); J-G tube; Tracheostomy; Exam under anesthesia, restorations, extraction(s) dental complex, combined (N/A, 12/19/2016); Inject steroid (location) (N/A, 3/7/2018); Exam under anesthesia, restorations, extraction(s) dental complex, combined (N/A, 3/7/2018); IR Gastro Jejunostomy Tube Placement (6/14/2016); IR Gastro Jejunostomy Tube Change (7/19/2016); IR Gastro Jejunostomy Tube Change (9/23/2016); IR Gastro Jejunostomy Tube  Change (11/28/2016); IR Gastro Jejunostomy Tube Change (1/27/2017); IR Gastro Jejunostomy Tube Change (4/19/2017); IR Gastro Jejunostomy Tube Change (6/20/2017); IR Gastro Jejunostomy Tube Change (8/7/2017); IR Gastro Jejunostomy Tube Change (8/11/2017); IR Gastro Jejunostomy Tube Change (10/20/2017); IR Gastro Jejunostomy Tube Change (1/2/2018); IR Gastro Jejunostomy Tube Change (2/16/2018); IR Gastro Jejunostomy Tube Change (5/16/2018); IR Gastro Jejunostomy Tube Change (6/11/2018); IR Gastro Jejunostomy Tube Change (9/12/2018); IR Gastro Jejunostomy Tube Change (12/24/2018); IR Gastro Jejunostomy Tube Change (3/18/2019); IR Gastro Jejunostomy Tube Change (4/23/2019); IR Gastro Jejunostomy Tube Change (5/21/2019); IR Gastro Jejunostomy Tube Change (9/9/2019); IR Gastro Jejunostomy Tube Change (9/26/2019); IR Gastro Jejunostomy Tube Change (10/11/2019); IR Gastro Jejunostomy Tube Change (11/21/2019); IR Gastro Jejunostomy Tube Change (11/29/2019); IR Gastro Jejunostomy Tube Change (2/27/2020); IR Gastro Jejunostomy Tube Change (4/10/2020); IR Gastro Jejunostomy Tube Change (7/2/2020); IR Gastro Jejunostomy Tube Change (8/28/2020); IR Gastro Jejunostomy Tube Change (9/17/2020); IR Gastro Jejunostomy Tube Change (10/29/2020); IR Gastro Jejunostomy Tube Change (11/6/2020); IR Gastro Jejunostomy Tube Change (12/24/2020); IR Gastro Jejunostomy Tube Change (1/9/2021); IR Gastro Jejunostomy Tube Change (6/9/2021); brain surgery; Picc (6/10/2016); Hh Midline Insertion (8/11/2017); Ir Gj Tube Replacement (12/24/2018); Ir Gj Tube Replacement (3/18/2019); Ir Gj Tube Replacement (4/23/2019); Ir Gj Tube Replacement (5/21/2019); Ir Gj Tube Replacement (9/9/2019); Ir Gj Tube Replacement (9/26/2019); Ir Gj Tube Replacement (10/11/2019); Ir Gj Tube Replacement (11/21/2019); Ir Gj Tube Replacement (11/29/2019); Ir Gj Tube Replacement (2/27/2020); Ir Gj Tube Replacement (4/10/2020); Ir Gj Tube Replacement (7/2/2020); Ir Gj Tube  "Replacement (8/28/2020); Ir Gj Tube Replacement (9/17/2020); Ir Gj Tube Replacement (10/29/2020); Ir Gj Tube Replacement (11/6/2020); Ir Gj Tube Replacement (12/24/2020); Ir Gj Tube Replacement (1/9/2021); Ir Gj Tube Replacement (6/9/2021); IR Gastro Jejunostomy Tube Change (10/4/2021); IR Gastro Jejunostomy Tube Change (10/18/2021); IR Gastro Jejunostomy Tube Change (1/14/2022); IR Gastro Jejunostomy Tube Change (1/20/2022); IR Gastro Jejunostomy Tube Change (4/19/2022); IR Gastro Jejunostomy Tube Change (6/7/2022); IR Gastro Jejunostomy Tube Change (8/19/2022); IR Gastro Jejunostomy Tube Change (9/13/2022); IR Gastro Jejunostomy Tube Change (10/27/2022); IR Gastro Jejunostomy Tube Change (11/3/2022); IR Gastro Jejunostomy Tube Change (1/18/2023); and IR Gastro Jejunostomy Tube Change (4/7/2023).   Allergies   Allergen Reactions     Diphenhydramine Unknown     Trazodone Other (See Comments)     \"shaking\" per family       Tyrell Mansfield MD, MPH  [facility]   Phone: #[phone]    "

## 2023-04-26 LAB
ANION GAP SERPL CALCULATED.3IONS-SCNC: 6 MMOL/L (ref 5–18)
BUN SERPL-MCNC: 33 MG/DL (ref 8–22)
CALCIUM SERPL-MCNC: 8.4 MG/DL (ref 8.5–10.5)
CHLORIDE BLD-SCNC: 114 MMOL/L (ref 98–107)
CO2 SERPL-SCNC: 22 MMOL/L (ref 22–31)
CREAT SERPL-MCNC: 1.31 MG/DL (ref 0.7–1.3)
ERYTHROCYTE [DISTWIDTH] IN BLOOD BY AUTOMATED COUNT: 12.8 % (ref 10–15)
GFR SERPL CREATININE-BSD FRML MDRD: 63 ML/MIN/1.73M2
GLUCOSE BLD-MCNC: 134 MG/DL (ref 70–125)
GLUCOSE BLDC GLUCOMTR-MCNC: 122 MG/DL (ref 70–99)
HCT VFR BLD AUTO: 33.3 % (ref 40–53)
HGB BLD-MCNC: 10.7 G/DL (ref 13.3–17.7)
IRON BINDING CAPACITY (ROCHE): 253 UG/DL (ref 240–430)
IRON SATN MFR SERPL: 9 % (ref 15–46)
IRON SERPL-MCNC: 22 UG/DL (ref 61–157)
LACTATE SERPL-SCNC: 1 MMOL/L (ref 0.7–2)
MCH RBC QN AUTO: 30.9 PG (ref 26.5–33)
MCHC RBC AUTO-ENTMCNC: 32.1 G/DL (ref 31.5–36.5)
MCV RBC AUTO: 96 FL (ref 78–100)
PLATELET # BLD AUTO: 178 10E3/UL (ref 150–450)
POTASSIUM BLD-SCNC: 4.5 MMOL/L (ref 3.5–5)
RBC # BLD AUTO: 3.46 10E6/UL (ref 4.4–5.9)
SODIUM SERPL-SCNC: 142 MMOL/L (ref 136–145)
WBC # BLD AUTO: 13.1 10E3/UL (ref 4–11)

## 2023-04-26 PROCEDURE — 99233 SBSQ HOSP IP/OBS HIGH 50: CPT | Performed by: FAMILY MEDICINE

## 2023-04-26 PROCEDURE — 120N000004 HC R&B MS OVERFLOW

## 2023-04-26 PROCEDURE — 250N000011 HC RX IP 250 OP 636: Performed by: FAMILY MEDICINE

## 2023-04-26 PROCEDURE — 250N000013 HC RX MED GY IP 250 OP 250 PS 637: Performed by: HOSPITALIST

## 2023-04-26 PROCEDURE — 83550 IRON BINDING TEST: CPT | Performed by: FAMILY MEDICINE

## 2023-04-26 PROCEDURE — 250N000011 HC RX IP 250 OP 636: Performed by: HOSPITALIST

## 2023-04-26 PROCEDURE — 250N000009 HC RX 250: Performed by: HOSPITALIST

## 2023-04-26 PROCEDURE — 258N000003 HC RX IP 258 OP 636: Performed by: HOSPITALIST

## 2023-04-26 PROCEDURE — 83605 ASSAY OF LACTIC ACID: CPT | Performed by: FAMILY MEDICINE

## 2023-04-26 PROCEDURE — 36415 COLL VENOUS BLD VENIPUNCTURE: CPT | Performed by: FAMILY MEDICINE

## 2023-04-26 PROCEDURE — 80048 BASIC METABOLIC PNL TOTAL CA: CPT | Performed by: HOSPITALIST

## 2023-04-26 PROCEDURE — 85027 COMPLETE CBC AUTOMATED: CPT | Performed by: HOSPITALIST

## 2023-04-26 PROCEDURE — 250N000013 HC RX MED GY IP 250 OP 250 PS 637: Performed by: INTERNAL MEDICINE

## 2023-04-26 PROCEDURE — 36415 COLL VENOUS BLD VENIPUNCTURE: CPT | Performed by: HOSPITALIST

## 2023-04-26 RX ORDER — HEPARIN SODIUM 5000 [USP'U]/.5ML
5000 INJECTION, SOLUTION INTRAVENOUS; SUBCUTANEOUS EVERY 12 HOURS
Status: DISCONTINUED | OUTPATIENT
Start: 2023-04-26 | End: 2023-04-27 | Stop reason: HOSPADM

## 2023-04-26 RX ADMIN — QUETIAPINE FUMARATE 25 MG: 25 TABLET ORAL at 00:14

## 2023-04-26 RX ADMIN — METOPROLOL TARTRATE 12.5 MG: 25 TABLET, FILM COATED ORAL at 20:35

## 2023-04-26 RX ADMIN — BACLOFEN 10 MG: 10 TABLET ORAL at 00:14

## 2023-04-26 RX ADMIN — SIMVASTATIN 20 MG: 20 TABLET, FILM COATED ORAL at 20:35

## 2023-04-26 RX ADMIN — HEPARIN SODIUM 5000 UNITS: 5000 INJECTION, SOLUTION INTRAVENOUS; SUBCUTANEOUS at 16:48

## 2023-04-26 RX ADMIN — VANCOMYCIN HYDROCHLORIDE 1250 MG: 5 INJECTION, POWDER, LYOPHILIZED, FOR SOLUTION INTRAVENOUS at 09:15

## 2023-04-26 RX ADMIN — PIPERACILLIN AND TAZOBACTAM 3.38 G: 3; .375 INJECTION, POWDER, LYOPHILIZED, FOR SOLUTION INTRAVENOUS at 16:47

## 2023-04-26 RX ADMIN — QUETIAPINE FUMARATE 25 MG: 25 TABLET ORAL at 19:05

## 2023-04-26 RX ADMIN — AMLODIPINE BESYLATE 5 MG: 5 TABLET ORAL at 09:15

## 2023-04-26 RX ADMIN — PIPERACILLIN AND TAZOBACTAM 3.38 G: 3; .375 INJECTION, POWDER, LYOPHILIZED, FOR SOLUTION INTRAVENOUS at 06:52

## 2023-04-26 RX ADMIN — BACITRACIN ZINC, NEOMYCIN, POLYMYXIN B: 400; 3.5; 5 OINTMENT TOPICAL at 09:15

## 2023-04-26 RX ADMIN — BISACODYL 10 MG: 10 SUPPOSITORY RECTAL at 16:51

## 2023-04-26 RX ADMIN — PANTOPRAZOLE SODIUM 40 MG: 40 TABLET, DELAYED RELEASE ORAL at 09:15

## 2023-04-26 RX ADMIN — DOXAZOSIN 1 MG: 1 TABLET ORAL at 20:35

## 2023-04-26 RX ADMIN — BACLOFEN 10 MG: 10 TABLET ORAL at 03:23

## 2023-04-26 RX ADMIN — BACITRACIN ZINC, NEOMYCIN, POLYMYXIN B: 400; 3.5; 5 OINTMENT TOPICAL at 16:47

## 2023-04-26 RX ADMIN — ASPIRIN 81 MG CHEWABLE TABLET 81 MG: 81 TABLET CHEWABLE at 09:15

## 2023-04-26 RX ADMIN — BACITRACIN ZINC, NEOMYCIN, POLYMYXIN B: 400; 3.5; 5 OINTMENT TOPICAL at 20:37

## 2023-04-26 RX ADMIN — BACLOFEN 10 MG: 10 TABLET ORAL at 19:04

## 2023-04-26 RX ADMIN — QUETIAPINE FUMARATE 25 MG: 25 TABLET ORAL at 03:23

## 2023-04-26 ASSESSMENT — ACTIVITIES OF DAILY LIVING (ADL)
ADLS_ACUITY_SCORE: 75
ADLS_ACUITY_SCORE: 70
ADLS_ACUITY_SCORE: 69
ADLS_ACUITY_SCORE: 69
ADLS_ACUITY_SCORE: 70
ADLS_ACUITY_SCORE: 70
ADLS_ACUITY_SCORE: 69
ADLS_ACUITY_SCORE: 70
ADLS_ACUITY_SCORE: 70
ADLS_ACUITY_SCORE: 69
ADLS_ACUITY_SCORE: 75
ADLS_ACUITY_SCORE: 66

## 2023-04-26 NOTE — PROGRESS NOTES
[facility] MEDICINE PROGRESS NOTE      Identification/Summary: Liukurtis Malave is a 57 year old male with a past medical history of  hemorrhagic stroke x2, basal ganglia hemorrhage at age 51 years resulted significant brain damage, chronic static encephalopathy, hemiplegia, dysphagia and nonverbal getting nutrition through feeding tube, seizure disorder, MRSA and ESBL UTI, recurrent aspiration pneumonia, came with short of breath secondary to aspiration pneumonia.  Chest CT scan revealed bibasilar airspace consolidation consistent with bilateral lower lobe pneumonia.  Started on IV Zosyn and vancomycin. He does vest therapy at home.  Leukocytosis 16-->13.  Afebrile.  Symptoms are clinically improving    Assessment and Plan:  Aspiration pneumonia  History of recurrent aspiration pneumonia  Strict n.p.o.  Getting tube feeding  On vest therapy at home  Chest CT revealed bibasilar airspace consolidation consistent with bilateral lower lobe pneumonia  Started on IV vancomycin and Zosyn  Sputum culture is pending    History of hemorrhagic stroke x2  Chronic static encephalopathy  Hemiplegia  Nonverbal  Resume statin.  Started on aspirin 81 mg daily   Baclofen 10 mg 3 times a day    Dysphagia  Nutrition per tube feeding     Dyslipidemia, simvastatin 20 mg daily     History of COPD without exacerbation  Resume bronchodilator therapy     Essential hypertension  Metoprolol 12.5 mg daily  Norvasc 5 mg daily  Doxazosin 1 mg daily    Hypocalcemia: Lowest Ca = 8.4 mg/dL in last 2 days, will monitor and replace as appropriate       Code full  DVT prophylaxis  Subcu heparin 5000 unit twice a day    Barrier to discharge  Awaiting for more clinical improvement.  Anticipated discharge in 1 day    Palmira Montemayor MD  [facility] Medicine  [facility]  Phone: #[phone]  Securely message with the Vocera Web Console (learn more here)  Text page via [employer] Paging/Directory      Interval History/Subjective:  Resting comfortably.  Breathing is improved.  Not requiring oxygen.    Physical Exam/Objective:  Temp:  [98.1  F (36.7  C)-99  F (37.2  C)] 99  F (37.2  C)  Pulse:  [71-98] 71  Resp:  [12-18] 16  BP: ()/(57-78) 137/77  SpO2:  [91 %-97 %] 97 %  Body mass index is 23.29 kg/m .     GENERAL:  Alert, appears comfortable, in no acute distress, appears stated age, nonverbal   HEAD:  Normocephalic, without obvious abnormality, atraumatic   EYES:  PERRL, conjunctiva  clear,   EOM's intact   NOSE: Nares normal,  mucosa normal, no drainage   THROAT: Lips, mucosa,  gums normal, mouth moist   NECK: Supple, symmetrical, trachea midline   BACK:   Symmetric, no curvature, ROM normal   LUNGS:   Bibasilar coarse breath sounds, no rales, rhonchi, or wheezing, symmetric chest rise on inhalation, respirations unlabored   CHEST WALL:  No tenderness or deformity   HEART:  Regular rate and rhythm, S1 and S2 normal, no murmur    ABDOMEN:   Soft, non-tender, bowel sounds active , no masses, no organomegaly, no rebound or guarding, external catheter in place   EXTREMITIES: No  edema    SKIN: No rashes   NEURO: Alert, nonverbal, spasticity in upper and lower extremity   PSYCH: Cooperative, behavior is appropriate      Data reviewed today: I personally reviewed all new medications, labs, imaging/diagnostics reports over the past 24 hours. Pertinent findings include:    Imaging:   Chest ct  1.  Pulmonary arteries are well-opacified and there is no evidence for pulmonary embolus in either lung.  2.  No thoracic aortic aneurysm or thoracic aortic dissection.  3.  Bibasilar airspace consolidation consistent with bilateral lower lobe pneumonia.  4.  Mild diffuse thickening of the bladder wall with mild prosthetic enlargement this may be secondary to bladder outlet obstruction less likely would be cystitis and correlation with urinalysis is recommended.  5.  Gastrojejunostomy tube in place no bowel obstruction  or focal inflammatory process involving the bowel.    Labs:  Most Recent 3 CBC's:Recent Labs   Lab Test 04/26/23  0618 04/25/23  0640 02/12/23  0456   WBC 13.1* 16.6* 11.2*   HGB 10.7* 11.9* 9.1*   MCV 96 96 97    218 210     Most Recent 3 BMP's:Recent Labs   Lab Test 04/26/23  0618 04/26/23  0333 04/25/23  1644 04/25/23  0640 02/12/23  0456     --   --  142 143   POTASSIUM 4.5  --   --  5.0 4.2   CHLORIDE 114*  --   --  111* 115*   CO2 22  --   --  25 18*   BUN 33*  --   --  40* 22   CR 1.31*  --   --  1.21 1.29   ANIONGAP 6  --   --  6 10   SAMIR 8.4*  --   --  9.0 7.5*   * 122* 122* 109 98       Medications:   Personally Reviewed.  Medications     dextrose         amLODIPine  5 mg Oral or Feeding Tube Daily     aspirin  81 mg Per Feeding Tube Daily     chlorhexidine  15 mL Swish & Spit QPM     doxazosin  1 mg Per G Tube QPM     metoprolol tartrate  12.5 mg Oral QPM     neomycin-bacitracin-polymyxin   Topical 4x Daily     pantoprazole  40 mg Oral or Feeding Tube QAM AC     piperacillin-tazobactam  3.375 g Intravenous Q8H     QUEtiapine  25 mg Oral or Feeding Tube TID     simvastatin  20 mg Per G Tube At Bedtime     sodium chloride (PF)  3 mL Intracatheter Q8H     vancomycin  1,250 mg Intravenous Q24H     Total time spent 50 min by me on the date of service doing chart review, history, exam, documentation & further activities per the note.

## 2023-04-26 NOTE — PLAN OF CARE
Goal Outcome Evaluation:       No acute changes today. Pt remains vitally stable on room air. Appears comfortable, no signs of pain. Continuous tube feed infusing at ordered rate 45 mL / hr through J tube. Oral care and suctioning provided.     PRN dulcolax suppository given per family request with resulting large, loose BM. Purewick in place for urine collection. Routine turn / repositioning performed.     PRN baclofen given with scheduled zyprexa per family request.     Continuing to receive IV antibiotics.     Overall Patient Progress: improving     Problem: Breathing Pattern Ineffective  Goal: Effective Breathing Pattern  Outcome: Progressing     Problem: Pneumonia  Goal: Resolution of Infection Signs and Symptoms  Outcome: Progressing     Problem: Pneumonia  Goal: Effective Oxygenation and Ventilation  Outcome: Progressing

## 2023-04-26 NOTE — PROGRESS NOTES
Care Management Follow Up    Length of Stay (days): 1    Expected Discharge Date: 04/27/2023     Concerns to be Addressed:       Patient plan of care discussed at interdisciplinary rounds: Yes    Anticipated Discharge Disposition:  Back home with family     Anticipated Discharge Services:  Home with family. Daughter's are PCA.  Anticipated Discharge DME:  Per daughter, Idalmis, patient has all necessary DME including hospital bed, suction machine, vest therapy, TF equipment/formula.     Education Provided on the Discharge Plan:  yes  Patient/Family in Agreement with the Plan:  yes    Additional Information:  Per daughter, Idalmis, patient lives with large family in home, receives around the clock care.  Daughter's are PCA and employed by [employer]-no skilled services.    [place] supplies pump and formula is Probiotioc 1.5 Peptamen and patient receives TF via PEG continuous.    Family transfers patient by lifting with 2 family members.     Family to transport home.      Mayte Street, CM      2

## 2023-04-26 NOTE — PLAN OF CARE
Goal Outcome Evaluation: ongoing.       Plan of Care Reviewed With: patient    Overall Patient Progress: improvingOverall Patient Progress: improving     Pt repositioned every 2-4 hours- per family request (if pt is sleeping no need to wake up, can do every 4 hours at night). Per pt request they want pt to get Seroquel and baclofen together and that pt doesn't need to have FWF every 4 hours at night if sleeping as well. Pt remains on room air. Per family, wanted pts TF at 20mL/hr and not 45- changed around 2300 and geovanna change back at 6 AM to 45 per family. BS checked. Family remains at bedside. Will continue to monitor.       Problem: Breathing Pattern Ineffective  Goal: Effective Breathing Pattern  Outcome: Progressing     Problem: Enteral Nutrition  Goal: Absence of Aspiration Signs and Symptoms  Intervention: Minimize Aspiration Risk  Recent Flowsheet Documentation  Taken 4/26/2023 0007 by Beth Luis, RN  Oral Care: suction provided

## 2023-04-26 NOTE — PROVIDER NOTIFICATION
WW rm 310 pt PN. Pt has nancy scheduled, could we get some parameters please? Thank you, Beth [id number]    Parameters entered.

## 2023-04-27 VITALS
TEMPERATURE: 98.4 F | HEART RATE: 72 BPM | OXYGEN SATURATION: 97 % | RESPIRATION RATE: 18 BRPM | SYSTOLIC BLOOD PRESSURE: 137 MMHG | BODY MASS INDEX: 23.42 KG/M2 | DIASTOLIC BLOOD PRESSURE: 76 MMHG | WEIGHT: 119.27 LBS | HEIGHT: 60 IN

## 2023-04-27 LAB
ERYTHROCYTE [DISTWIDTH] IN BLOOD BY AUTOMATED COUNT: 12.7 % (ref 10–15)
HCT VFR BLD AUTO: 31.2 % (ref 40–53)
HGB BLD-MCNC: 10 G/DL (ref 13.3–17.7)
HOLD SPECIMEN: NORMAL
LACTATE SERPL-SCNC: 0.8 MMOL/L (ref 0.7–2)
MCH RBC QN AUTO: 31.3 PG (ref 26.5–33)
MCHC RBC AUTO-ENTMCNC: 32.1 G/DL (ref 31.5–36.5)
MCV RBC AUTO: 98 FL (ref 78–100)
PLATELET # BLD AUTO: 182 10E3/UL (ref 150–450)
RBC # BLD AUTO: 3.2 10E6/UL (ref 4.4–5.9)
WBC # BLD AUTO: 8.9 10E3/UL (ref 4–11)

## 2023-04-27 PROCEDURE — 85027 COMPLETE CBC AUTOMATED: CPT | Performed by: FAMILY MEDICINE

## 2023-04-27 PROCEDURE — 99239 HOSP IP/OBS DSCHRG MGMT >30: CPT | Performed by: FAMILY MEDICINE

## 2023-04-27 PROCEDURE — 83605 ASSAY OF LACTIC ACID: CPT | Performed by: FAMILY MEDICINE

## 2023-04-27 PROCEDURE — 250N000011 HC RX IP 250 OP 636: Performed by: HOSPITALIST

## 2023-04-27 PROCEDURE — 250N000013 HC RX MED GY IP 250 OP 250 PS 637: Performed by: HOSPITALIST

## 2023-04-27 PROCEDURE — 250N000011 HC RX IP 250 OP 636: Performed by: FAMILY MEDICINE

## 2023-04-27 PROCEDURE — 258N000003 HC RX IP 258 OP 636: Performed by: FAMILY MEDICINE

## 2023-04-27 PROCEDURE — 250N000009 HC RX 250: Performed by: HOSPITALIST

## 2023-04-27 PROCEDURE — 36415 COLL VENOUS BLD VENIPUNCTURE: CPT | Performed by: FAMILY MEDICINE

## 2023-04-27 PROCEDURE — 250N000013 HC RX MED GY IP 250 OP 250 PS 637: Performed by: FAMILY MEDICINE

## 2023-04-27 RX ORDER — FLUCONAZOLE 100 MG/1
200 TABLET ORAL ONCE
Status: COMPLETED | OUTPATIENT
Start: 2023-04-27 | End: 2023-04-27

## 2023-04-27 RX ORDER — FLUCONAZOLE 40 MG/ML
200 POWDER, FOR SUSPENSION ORAL ONCE
Qty: 5 ML | Refills: 0 | Status: SHIPPED | OUTPATIENT
Start: 2023-04-30 | End: 2023-04-30

## 2023-04-27 RX ORDER — AMOXICILLIN AND CLAVULANATE POTASSIUM 600; 42.9 MG/5ML; MG/5ML
POWDER, FOR SUSPENSION ORAL
Qty: 125 ML | Refills: 0 | Status: SHIPPED | OUTPATIENT
Start: 2023-04-27 | End: 2023-05-24

## 2023-04-27 RX ORDER — BACLOFEN 10 MG/1
10 TABLET ORAL 4 TIMES DAILY PRN
COMMUNITY
Start: 2023-04-27 | End: 2023-06-21

## 2023-04-27 RX ORDER — CEFACLOR 250 MG
CAPSULE ORAL
Qty: 90 CAPSULE | Refills: 1 | Status: ON HOLD | OUTPATIENT
Start: 2023-04-27 | End: 2023-06-10

## 2023-04-27 RX ADMIN — BACLOFEN 10 MG: 10 TABLET ORAL at 00:12

## 2023-04-27 RX ADMIN — IRON SUCROSE 500 MG: 20 INJECTION, SOLUTION INTRAVENOUS at 11:20

## 2023-04-27 RX ADMIN — BACITRACIN ZINC, NEOMYCIN, POLYMYXIN B: 400; 3.5; 5 OINTMENT TOPICAL at 12:17

## 2023-04-27 RX ADMIN — PIPERACILLIN AND TAZOBACTAM 3.38 G: 3; .375 INJECTION, POWDER, LYOPHILIZED, FOR SOLUTION INTRAVENOUS at 07:03

## 2023-04-27 RX ADMIN — BACLOFEN 10 MG: 10 TABLET ORAL at 03:17

## 2023-04-27 RX ADMIN — HEPARIN SODIUM 5000 UNITS: 5000 INJECTION, SOLUTION INTRAVENOUS; SUBCUTANEOUS at 03:17

## 2023-04-27 RX ADMIN — BACITRACIN ZINC, NEOMYCIN, POLYMYXIN B: 400; 3.5; 5 OINTMENT TOPICAL at 09:49

## 2023-04-27 RX ADMIN — PIPERACILLIN AND TAZOBACTAM 3.38 G: 3; .375 INJECTION, POWDER, LYOPHILIZED, FOR SOLUTION INTRAVENOUS at 00:12

## 2023-04-27 RX ADMIN — QUETIAPINE FUMARATE 25 MG: 25 TABLET ORAL at 03:16

## 2023-04-27 RX ADMIN — QUETIAPINE FUMARATE 25 MG: 25 TABLET ORAL at 00:12

## 2023-04-27 RX ADMIN — FLUCONAZOLE 200 MG: 100 TABLET ORAL at 12:50

## 2023-04-27 RX ADMIN — PANTOPRAZOLE SODIUM 40 MG: 40 TABLET, DELAYED RELEASE ORAL at 12:16

## 2023-04-27 RX ADMIN — AMLODIPINE BESYLATE 5 MG: 5 TABLET ORAL at 09:49

## 2023-04-27 RX ADMIN — ASPIRIN 81 MG CHEWABLE TABLET 81 MG: 81 TABLET CHEWABLE at 09:49

## 2023-04-27 ASSESSMENT — ACTIVITIES OF DAILY LIVING (ADL)
ADLS_ACUITY_SCORE: 75

## 2023-04-27 NOTE — PLAN OF CARE
Goal Outcome Evaluation:         Patient is nonverbal and bedridden. Paient on RA. Coughs and moves secretions. Tolerating TF at goal . Male periwick in place. Giving dose of iron and then patient may be discharged.

## 2023-04-27 NOTE — DISCHARGE SUMMARY
[facility] MEDICINE  DISCHARGE SUMMARY     Primary Care Physician: Lynda Gutierrez  Admission Date: 4/25/2023   Discharge Provider: Palmira Montemayor MD Discharge Date: 4/27/2023   Diet: Resume Home TF-->  4 cans of peptamen 1.5 daily with prebio run around the clock. The run it at a higher rate during the day 50ml/hr and lower rate at night 30mlhr. This provides 1500 calories, 68g protein, 192g CHO daily. FWF of 120ml split between ports every 2hrs      Code Status: Full Code   Activity: DCACTIVITY: Activity as tolerated        Condition at Discharge: Stable     REASON FOR PRESENTATION(See Admission Note for Details)   Short of breath    PRINCIPAL & ACTIVE DISCHARGE DIAGNOSES     Acute on recurrent aspiration pneumonia  History of hemorrhagic stroke x2  Hemiplegia  Chronic spasticity  Chronic static encephalopathy  Nonverbal  Dysphagia, on tube feeding  History of COPD without exacerbation  Essential hypertension  Iron deficiency anemia  Hypokalemia, lowest calcium 8.4    PENDING LABS     Unresulted Labs Ordered in the Past 30 Days of this Admission     Date and Time Order Name Status Description    4/25/2023  6:33 AM Blood Culture Peripheral Blood Preliminary     4/25/2023  6:33 AM Blood Culture Peripheral Blood Preliminary            PROCEDURES ( this hospitalization only)      None    RECOMMENDATIONS TO OUTPATIENT PROVIDER FOR F/U VISIT     Follow-up Appointments     Follow-up and recommended labs and tests       Follow-up with primary MD in 1 week  CBC and BMP in 1 week                 DISPOSITION     Home with home care    SUMMARY OF HOSPITAL COURSE:      Mr.Phu GIL Malave is a 57 year old male with a past medical history of  hemorrhagic stroke x2, basal ganglia hemorrhage at age 51 years resulted significant brain damage, chronic static encephalopathy, hemiplegia, dysphagia and nonverbal getting nutrition through feeding tube, seizure disorder, MRSA and ESBL UTI, recurrent  aspiration pneumonia, came with short of breath secondary to aspiration pneumonia.  Chest CT scan revealed bibasilar airspace consolidation consistent with bilateral lower lobe pneumonia. Started on IV Zosyn and vancomycin--> p.o. Augmentin to complete 1 week course.  MRSA screening is negative.  Remains hemodynamically stable. He does vest therapy at home.  Leukocytosis 16-->13-->8.9.   Is strict NPO.  Getting tube feeding.    Resume statin, aspirin for previous stroke.  On baclofen 10 mg 3 times a day.  Lives with family.  Blood pressure is stable..  Resume metoprolol 12.5 mg daily, Norvasc 5 mg daily, doxazosin 1 mg daily for hypertension.    Has iron deficiency anemia.  Received IV iron in the hospital.    Has redness around G-tube insertion. Use nystatin locally.  Diflucan 200 mg once in the hospital and repeat in 3-day.    Discharge Medications with Med changes:     Current Discharge Medication List      START taking these medications    Details   amoxicillin-clavulanate (AUGMENTIN-ES) 600-42.9 MG/5ML suspension 8 ml bid for 1 week  Qty: 125 mL, Refills: 0    Associated Diagnoses: Aspiration pneumonitis (H)      fluconazole (DIFLUCAN) 40 MG/ML suspension 5 mLs (200 mg) by Oral or G tube route once for 1 dose On 4/30  Qty: 5 mL, Refills: 0    Associated Diagnoses: Candidiasis of skin         CONTINUE these medications which have CHANGED    Details   baclofen (LIORESAL) 10 MG tablet 1 tablet (10 mg) by Per G Tube route 4 times daily as needed for muscle spasms    Associated Diagnoses: Muscle spasm      cefaclor (CECLOR) 250 MG capsule Give 1 capsule via g tube daily for UTI prevention to be started in 5 day  Qty: 90 capsule, Refills: 1    Associated Diagnoses: Recurrent UTI         CONTINUE these medications which have NOT CHANGED    Details   acetaminophen (TYLENOL) 500 MG tablet 1,000 mg by Gastric Tube route every 6 hours as needed for mild pain       albuterol (PROVENTIL) (2.5 MG/3ML) 0.083% neb solution  Take 1 vial (2.5 mg) by nebulization every 6 hours as needed for shortness of breath, wheezing or cough  Qty: 90 mL, Refills: 0    Associated Diagnoses: Recurrent aspiration pneumonia (H)      amLODIPine (NORVASC) 5 MG tablet Take 1 tablet (5 mg total) by mouth daily.  Qty: 30 tablet, Refills: 11    Associated Diagnoses: Essential hypertension      aspirin (ASA) 81 MG chewable tablet 1 tablet (81 mg) by Per Feeding Tube route daily  Qty: 90 tablet, Refills: 0    Associated Diagnoses: History of stroke      bisacodyl (DULCOLAX) 10 MG suppository INSERT 1 SUPPOSTIORY INTO THE RECTUM DAILY AS NEEDED FOR CONSTIPATION.  Qty: 30 suppository, Refills: 8    Associated Diagnoses: Slow transit constipation      chlorhexidine (PERIDEX) 0.12 % solution Swish and spit 15 mLs in mouth daily       Dextromethorphan-guaiFENesin  MG/5ML syrup take 10 mLs by Per G Tube route every 4 hours as needed for cough  Qty: 237 mL, Refills: 0    Associated Diagnoses: Pneumonia of both lungs due to infectious organism, unspecified part of lung      doxazosin (CARDURA) 1 MG tablet 1 tablet (1 mg) by Per G Tube route daily  Qty: 30 tablet, Refills: 0    Associated Diagnoses: Urinary retention with incomplete bladder emptying      ferrous sulfate 220 (44 Fe) MG/5ML ELIX TAKE 5 MLS BY ENTERAL ROUTE DAILY  Qty: 150 mL, Refills: 0    Associated Diagnoses: Iron deficiency      ipratropium (ATROVENT) 0.02 % neb solution Take 2.5 mLs (0.5 mg) by nebulization every 6 hours as needed for wheezing (shortness of breath or cough.) Use along with albuterol solution  Qty: 90 mL, Refills: 3    Associated Diagnoses: Chronic cough      LIDOCAINE PAIN RELIEF 4 % Patch PLACE 1 PATCH ONTO THE SKIN EVERY 24 HOURS TO PREVENT LIDOCAINE TOXICITY, PATIENT SHOULD BE PATCH FREE FOR 12 HOURS DAILY.  Qty: 30 patch, Refills: 3    Associated Diagnoses: Multiple joint pain      meclizine (ANTIVERT) 25 MG tablet Take 1 tablet via G-tube every 6 hours as needed for  vertigo  Qty: 30 tablet, Refills: 11    Associated Diagnoses: Vertigo      metoprolol tartrate (LOPRESSOR) 25 MG tablet TAKE ONE TABLET BY MOUTH TWICE DAILY   Qty: 60 tablet, Refills: 11    Associated Diagnoses: Essential hypertension      neomycin-bacitracin-polymyxin (NEOSPORIN) 5-400-5000 ointment Apply topically 4 times daily  Qty: 30 g, Refills: 3    Associated Diagnoses: Irritation around percutaneous endoscopic gastrostomy (PEG) tube site (H)      nystatin (MYCOSTATIN) 158428 UNIT/GM external cream Apply topically 2 times daily  Qty: 30 g, Refills: 0    Associated Diagnoses: On tube feeding diet      omeprazole (PRILOSEC) 40 MG DR capsule take 1 capsule by mouth daily before breakfast  Qty: 30 capsule, Refills: 11    Associated Diagnoses: Gastroesophageal reflux disease without esophagitis      polyethylene glycol (MIRALAX) 17 GM/Dose powder Give 1 capful via G-tube daily as needed for constipation  Qty: 850 g, Refills: 3    Associated Diagnoses: Slow transit constipation      Polyethylene Glycol 400 (VISINE DRY EYE RELIEF) 1 % SOLN Apply 1 drop to eye every 6 hours as needed (dry eyes)  Qty: 15 mL, Refills: 11    Associated Diagnoses: Dry eyes      QUEtiapine (SEROQUEL) 25 MG tablet Take 1 tablet (25 mg) by mouth See Admin Instructions Take 1 tablet at 7PM, Midnight and 3AM for total of 75 mg nightly. Ok to repeat after 4 hours and during the day PRN  Qty: 102 tablet, Refills: 4    Associated Diagnoses: Agitation      simvastatin (ZOCOR) 20 MG tablet 1 tablet (20 mg) by Per G Tube route At Bedtime  Qty: 90 tablet, Refills: 0    Comments: Dose decrease  Associated Diagnoses: Dyslipidemia                   Rationale for medication changes:      Augmentin to complete 1 week course        Consults   RD    Immunizations given this encounter     Most Recent Immunizations   Administered Date(s) Administered     COVID-19 Vaccine 12+ (Pfizer 2022) 03/20/2022     COVID-19 Vaccine 12+ (Pfizer) 04/01/2021     Influenza  Vaccine 50-64 or 18-64 w/egg allergy (Flublok) 10/28/2021     Influenza Vaccine, 6+MO IM (QUADRIVALENT W/PRESERVATIVES) 11/06/2020     Pneumo Conj 13-V (2010&after) 10/28/2021     Pneumococcal 23 valent 10/25/2017     TDAP (Adacel,Boostrix) 04/20/2015           Anticoagulation Information      None      SIGNIFICANT IMAGING FINDINGS     Results for orders placed or performed during the hospital encounter of 04/25/23   CT Chest (PE) Abdomen Pelvis w Contrast    Impression    IMPRESSION:  1.  Pulmonary arteries are well-opacified and there is no evidence for pulmonary embolus in either lung.  2.  No thoracic aortic aneurysm or thoracic aortic dissection.  3.  Bibasilar airspace consolidation consistent with bilateral lower lobe pneumonia.  4.  Mild diffuse thickening of the bladder wall with mild prosthetic enlargement this may be secondary to bladder outlet obstruction less likely would be cystitis and correlation with urinalysis is recommended.  5.  Gastrojejunostomy tube in place no bowel obstruction or focal inflammatory process involving the bowel.   Head CT w/o contrast    Impression    IMPRESSION:  1.  No acute intracranial abnormality.  2.  Stable chronic changes as described above.       SIGNIFICANT LABORATORY FINDINGS     WBC 8.9, hemoglobin 10, platelet 182  Total iron 22, iron saturation 9  MRSA screening negative  Creatinine 1.31  Discharge Orders        Home Care Referral      Reason for your hospital stay    sob     Follow-up and recommended labs and tests     Follow-up with primary MD in 1 week  CBC and BMP in 1 week     Activity    Your activity upon discharge: activity as tolerated     Diet    Follow this diet upon discharge: resume tube feeding, Home TF indicated as: 4 cans of peptamen 1.5 daily with prebio run around the clock. The run it at a higher rate during the day 50ml/hr and lower rate at night 30mlhr. This provides 1500 calories, 68g protein, 192g CHO daily. FWF of 120ml split between ports  every 2hrs       Examination   Physical Exam   Temp:  [96.6  F (35.9  C)-98.9  F (37.2  C)] 98.4  F (36.9  C)  Pulse:  [72-77] 75  Resp:  [18-20] 18  BP: (111-147)/(67-87) 137/76  SpO2:  [96 %-97 %] 97 %  Wt Readings from Last 1 Encounters:   04/26/23 54.1 kg (119 lb 4.3 oz)     GENERAL:  Alert, appears comfortable, in no acute distress, appears stated age, nonverbal   HEAD:  Normocephalic, without obvious abnormality, atraumatic   EYES:  PERRL, conjunctiva  clear,   EOM's intact   NOSE: Nares normal,  mucosa normal, no drainage   THROAT: Lips, mucosa,  gums normal, mouth moist   NECK: Supple, symmetrical, trachea midline   BACK:   Symmetric, no curvature, ROM normal   LUNGS:   Bibasilar coarse breath sounds, no rales, rhonchi, or wheezing, symmetric chest rise on inhalation, respirations unlabored   CHEST WALL:  No tenderness or deformity   HEART:  Regular rate and rhythm, S1 and S2 normal, no murmur    ABDOMEN:   Soft, non-tender, bowel sounds active , no masses, no organomegaly, no rebound or guarding, external catheter in place, G-tube in place, redness around insertion site.   EXTREMITIES: No  edema , upper and lower extremity spasticity, hemiplegic   SKIN: No rashes   NEURO: Alert, nonverbal, spasticity in upper and lower extremity   PSYCH: Cooperative, behavior is appropriate, nonverbal          Please see EMR for more detailed significant labs, imaging, consultant notes etc.    I, Palmira Montemayor MD, personally saw the patient today and spent greater than 30 minutes discharging this patient.    Palmira Montemayor MD  [facility]    CC:Lynda Gutierrez

## 2023-04-27 NOTE — PLAN OF CARE
Patient discharge to home, transported by daughter/caregiver Idalmis. Discharge education provided to pt and daughter. Questions answered, verbalized understanding. PIV removed. All belongings returned and sent home with pt.

## 2023-04-27 NOTE — PROVIDER NOTIFICATION
WW rm 310 pt PN- per family around 2300 to 0600 they wnt TF at a rate of 20 vs 45-could we get a patient care order? thank you, willian [phone]     cross cover paged back -Continue to keep at 45 and put in note for nutrition to follow up due to nutritional intake.

## 2023-04-27 NOTE — PROGRESS NOTES
Care Management Discharge Note    Discharge Date: 04/27/2023       Discharge Disposition: Home    Discharge Services:  Home with family    Discharge Transportation: family or friend will provide    Education Provided on the Discharge Plan: Yes    Persons Notified of Discharge Plans: Idalmis (daughter)    Patient/Family in Agreement with the Plan: yes        Additional Information:  CM reviewed chart. CM spoke to Idalmis (daughter). Idalmis states that they have everything needed to care for patient at home. Denied needs from CM. Family is zoila to transport patient home at time of hospital discharge.     [facility] referral sent per protocol.     [facility] care referral sent to [facility].       Faith Dumont RN

## 2023-04-28 ENCOUNTER — PATIENT OUTREACH (OUTPATIENT)
Dept: CARE COORDINATION | Facility: CLINIC | Age: 58
End: 2023-04-28
Payer: COMMERCIAL

## 2023-04-28 ENCOUNTER — TELEPHONE (OUTPATIENT)
Dept: FAMILY MEDICINE | Facility: CLINIC | Age: 58
End: 2023-04-28
Payer: COMMERCIAL

## 2023-04-28 NOTE — LETTER
[facility] CARE COORDINATION  [facility]  April 28, 2023    Liu Malave  [address]  [facility] [address]      Dear Idalmis Hatfield,         I am a  clinic care coordinator who works with Lynda Gutierrez MD with the [facility]. I wanted to thank you for spending the time to talk with me.  Below is a description of clinic care coordination and how I can further assist you.       The clinic care coordination team is made up of a registered nurse, , financial resource worker and community health worker who understand the health care system. The goal of clinic care coordination is to help you manage your health and improve access to the health care system. Our team works alongside your provider to assist you in determining your health and social needs. We can help you obtain health care and community resources, providing you with necessary information and education. We can work with you through any barriers and develop a care plan that helps coordinate and strengthen the communication between you and your care team.  Our services are voluntary and are offered without charge to you personally.    Please feel free to contact me with any questions or concerns regarding care coordination and what we can offer.      We are focused on providing you with the highest-quality healthcare experience possible.    Sincerely,     Kiki Domínguez,   [facility]  [phone]

## 2023-04-28 NOTE — TELEPHONE ENCOUNTER
Reason for call:  Other     Patient called regarding (reason for call): call back    Additional comments: Requesting a verbal order for a delay in start of care until 5/1/23 due to being at capacity currently. Please contact Alyce and advise.

## 2023-04-28 NOTE — PROGRESS NOTES
Clinic Care Coordination Contact  [facility]: Post-Discharge Note  SITUATION                                                      Admission:    Admission Date: 04/25/23   Reason for Admission: shortness of breath  Discharge:   Discharge Date: 04/27/23  Discharge Diagnosis: pneumonia    BACKGROUND                                                      Per hospital discharge summary and inpatient provider notes:  Diet: Resume Home TF-->  4 cans of peptamen 1.5 daily with prebio run around the clock. The run it at a higher rate during the day 50ml/hr and lower rate at night 30mlhr. This provides 1500 calories, 68g protein, 192g CHO daily. FWF of 120ml split between ports every 2hrs       Code Status: Full Code   Activity: DCACTIVITY: Activity as tolerated           Condition at Discharge: Stable     REASON FOR PRESENTATION(See Admission Note for Details)   Short of breath     PRINCIPAL & ACTIVE DISCHARGE DIAGNOSES      Acute on recurrent aspiration pneumonia  History of hemorrhagic stroke x2  Hemiplegia  Chronic spasticity  Chronic static encephalopathy  Nonverbal  Dysphagia, on tube feeding  History of COPD without exacerbation  Essential hypertension  Iron deficiency anemia  Hypokalemia, lowest calcium 8.4      ASSESSMENT           Discharge Assessment  How are you doing now that you are home?: find  How are your symptoms? (Red Flag symptoms escalate to triage hotline per guidelines): Improved  Do you feel your condition is stable enough to be safe at home until your provider visit?: Yes  Does the patient have their discharge instructions? : Yes  Does the patient have questions regarding their discharge instructions? : No  Do you have all of your needed medical supplies or equipment (DME)?  (i.e. oxygen tank, CPAP, cane, etc.): No - What equipment or supplies are needed? (cover for sunctioning tool.  Will discuss at PCP appt)  Discharge follow-up appointment scheduled within 14 calendar days? : No  Is patient  agreeable to assistance with scheduling? : No         Post-op (Clinicians Only)  Did the patient have surgery or a procedure: No  Fever: No  Chills: No  Eating & Drinking:  (tube feeding)  Bowel Function: normal  Urinary Status: voiding without complaint/concerns    Talked to daughter, no  needed.  She will set up appt with PCP. She liked the cover they used for the suctioning machine and would like to get those ordered. Encouraged her to discuss at PCP appt as it is likely, they need to have an order for insurance to cover. She and her family are able to care for him at home and don't need any additional supports.  She didn't have time to do the entire post hospital assessment.      PLAN                                                      Outpatient Plan:  Daughter will set up PCP appt soon.     No future appointments.      For any urgent concerns, please contact our 24 hour nurse triage line: [phone] ([phone])         MARK Hagen

## 2023-04-30 LAB
BACTERIA BLD CULT: NO GROWTH
BACTERIA BLD CULT: NO GROWTH

## 2023-05-01 ENCOUNTER — APPOINTMENT (OUTPATIENT)
Dept: INTERPRETER SERVICES | Facility: CLINIC | Age: 58
End: 2023-05-01
Payer: COMMERCIAL

## 2023-05-01 ENCOUNTER — TELEPHONE (OUTPATIENT)
Dept: FAMILY MEDICINE | Facility: CLINIC | Age: 58
End: 2023-05-01
Payer: COMMERCIAL

## 2023-05-01 ENCOUNTER — VIRTUAL VISIT (OUTPATIENT)
Dept: PHARMACY | Facility: CLINIC | Age: 58
End: 2023-05-01
Attending: FAMILY MEDICINE
Payer: COMMERCIAL

## 2023-05-01 DIAGNOSIS — Z87.440 HISTORY OF UTI: ICD-10-CM

## 2023-05-01 DIAGNOSIS — B49 FUNGAL INFECTION: ICD-10-CM

## 2023-05-01 DIAGNOSIS — R05.3 CHRONIC COUGH: ICD-10-CM

## 2023-05-01 DIAGNOSIS — R42 VERTIGO: ICD-10-CM

## 2023-05-01 DIAGNOSIS — K21.9 GERD WITHOUT ESOPHAGITIS: ICD-10-CM

## 2023-05-01 DIAGNOSIS — M62.838 MUSCLE SPASM: Primary | ICD-10-CM

## 2023-05-01 DIAGNOSIS — M62.830 BACK MUSCLE SPASM: ICD-10-CM

## 2023-05-01 DIAGNOSIS — R33.9 URINARY RETENTION WITH INCOMPLETE BLADDER EMPTYING: ICD-10-CM

## 2023-05-01 DIAGNOSIS — R45.1 AGITATION: ICD-10-CM

## 2023-05-01 DIAGNOSIS — Z78.9 TAKES DIETARY SUPPLEMENTS: ICD-10-CM

## 2023-05-01 DIAGNOSIS — Z86.73 HISTORY OF STROKE: ICD-10-CM

## 2023-05-01 DIAGNOSIS — E61.1 IRON DEFICIENCY: ICD-10-CM

## 2023-05-01 DIAGNOSIS — K59.01 SLOW TRANSIT CONSTIPATION: ICD-10-CM

## 2023-05-01 DIAGNOSIS — J69.0 RECURRENT ASPIRATION PNEUMONIA (H): Primary | ICD-10-CM

## 2023-05-01 DIAGNOSIS — I10 HYPERTENSION, UNSPECIFIED TYPE: ICD-10-CM

## 2023-05-01 DIAGNOSIS — K59.00 CONSTIPATION, UNSPECIFIED CONSTIPATION TYPE: ICD-10-CM

## 2023-05-01 DIAGNOSIS — G47.00 INSOMNIA, UNSPECIFIED TYPE: ICD-10-CM

## 2023-05-01 PROCEDURE — 99605 MTMS BY PHARM NP 15 MIN: CPT

## 2023-05-01 RX ORDER — ALBUTEROL SULFATE 0.83 MG/ML
2.5 SOLUTION RESPIRATORY (INHALATION) EVERY 6 HOURS PRN
Qty: 90 ML | Refills: 3 | Status: SHIPPED | OUTPATIENT
Start: 2023-05-01 | End: 2023-06-07

## 2023-05-01 RX ORDER — SACCHAROMYCES BOULARDII 250 MG
250 CAPSULE ORAL DAILY
Qty: 90 CAPSULE | Refills: 3 | Status: SHIPPED | OUTPATIENT
Start: 2023-05-01 | End: 2023-10-25

## 2023-05-01 RX ORDER — BISACODYL 10 MG
SUPPOSITORY, RECTAL RECTAL
Qty: 30 SUPPOSITORY | Refills: 8 | Status: SHIPPED | OUTPATIENT
Start: 2023-05-01 | End: 2024-01-08

## 2023-05-01 RX ORDER — LIDOCAINE 50 MG/G
1 PATCH TOPICAL EVERY 24 HOURS
Qty: 30 PATCH | Refills: 3 | Status: ON HOLD | OUTPATIENT
Start: 2023-05-01 | End: 2023-06-10

## 2023-05-01 RX ORDER — DOXAZOSIN 1 MG/1
1 TABLET ORAL DAILY
Qty: 30 TABLET | Refills: 3 | Status: ON HOLD | OUTPATIENT
Start: 2023-05-01 | End: 2023-06-10

## 2023-05-01 NOTE — PROGRESS NOTES
Medication Therapy Management (MTM) Encounter    ASSESSMENT:                            Medication Adherence/Access: No issues identified    Recurrent aspiration pneumonia: Patient has another three days to finish oral antibiotic for recurrent aspiration pneumonia. Patient is taking cefaclor for UTI prophylaxis, and okay to take it with the other medications. Patient would benefit from taking probiotic for normal bacterial stomach brandon growth.     CVA hx: Patient to continue aspirin, reviewed that he is on a PPI which should help reduce the risk of GI issues.    Patient should be on max 20 mg of simvastatin due to concomitant amlodipine.  Would recommend rechecking his lipids  and if remains high, consider switching to an alternative statin.     Hypertension: Blood pressure within goal, but considering his past history for stroke, it is reasonable to control blood pressure. Reviewed importance of blood pressure controlled due to stroke history.  Considering history of higher blood pressure, appropriate to increase metoprolol dose back to the previous dose.     Spasms/Pain:  Patient to continue current regimen, and will try to do PA for lidocaine 5% patch for better pain control.     Insomnia/Agitation: Patient continues to have issues with insomnia. Patient wakes up in about four hours. Recommended they try melatonin considering daughters fear on starting new medication.  Otherwise, can consider a trial of mirtazapine in the future.    GERD: Continue taking omeprazole at this time.     UTI prophylaxis: Continue taking cefaclor at this time.     Anemia: Consider increasing iron dose, and monitor levels to adjust dose accordingly.  Appropriate to separate iron from other medication to avoid any interaction. Advised to check stool color to determine any bleeding event though patient is taking Omeprazole.      Vertigo: Okay to continue to use as needed     Constipation:  Continue current regimen.    Fungal infection at  G-tube site: Continue current medication, and patient to discuss the tenderness and G-tube site redness with provider. Seems no G-tube site granulation at this time. Continue cleaning with a little bit of soap or shampoo 2-3 times a day.     BPH/Urinary Retention with complete bladder emptying: Continue taking current regimen      PLAN:                            1. PharmD will try to work PA for lidocaine patch 5%  2. Order liquid probiotic,and refill doxazosin, albuterol, Atrovent, bisacodyl suppository  3. Continue taking current medications as prescribed    Follow-up: Whenever needed    SUBJECTIVE/OBJECTIVE:                          Liu Malave is a 57 year old male called for a TCM visit.  He was discharged from [facility] on 04/27/2023 for Shortness of breath secondary to aspiration pneumonia. Patient is not verbal, and the whole visit discussion was with daughter, Idalmis       Reason for visit: DANIEL    Allergies/ADRs: Reviewed in chart  Past Medical History: Reviewed in chart  Tobacco: He reports that he quit smoking about 17 years ago. His smoking use included cigarettes. He has a 30.00 pack-year smoking history. He quit smokeless tobacco use about 15 years ago.      Medication Adherence/Access: no issues reported    Recurrent Aspiration Pneumonia:  He was discharged from [facility] on 04/27/2023 for Shortness of breath secondary to aspiration pneumonia. Chest CT scan revealed bibasilar airspace consolidation consistent with bilateral lower lobe pneumonia.  Patient was treated with IV antibiotics  IV Zosyn and vancomycin, and was discharged on oral Augmentin to complete one week course.  Patient is a significant aspiration risk since he is getting nutrition through tube feeding. Taking Atrovent 0.02% neb solution. He is breathing fine and is able to cough now.     CVA Hx: Past medical history of  hemorrhagic stroke x2, basal ganglia hemorrhage at age 51 years  resulted significant brain damage, chronic static encephalopathy, hemiplegia, dysphagia and nonverbal getting nutrition through feeding tube. Getting 24 hour cares by family members. Patient takes aspirin 81 mg and simvastatin 20 mg daily for stroke prevention.      Hypertension:   Currently taking amlodipine 5 mg daily, doxazosin 1 mg daily, and metoprolol tartrate  12.5 mg daily.     BP Readings from Last 3 Encounters:   04/27/23 137/76   02/12/23 132/79   01/03/23 116/72     BP: 120 or 130/80 or 90, but sometimes it goes up to 140.  Heart rate is 80 and 90 when he is tired it goes 120. When he sleeps it goes to 50, and 70.     Spasms/pain: Related to CVA. Taking baclofen 10 mg three times a day.  Also uses acetaminophen as needed, and takes 1000 mg once daily and lidocaine 4% patch.  Reports helpful but he continues to have muscle spasms and contractions which they will then do massage. Daughter said patient's pain is controlled.      Insomnia/Agitation: Taking quetiapine 25-50 mg HS + PRN. Patient is taking 75 mg total dose at night. Reports that they are giving him 25 mg at bedtime, but then he needs another dose about 4 hours later, then he will wake up again.  They have tried giving him 50 mg nightly and that was no more helpful than 25 mg, he still needed an extra dose in the middle of the night. .      GERD: Taking esomeprazole 20 mg two times a day. No GI issues at this time. Said it helps.      UTI prophylaxis: Taking cefaclor 250 mg daily. Discussed about the long term effect of cefaclor.      Anemia: Using ferrous sulfate to 20 mg per 5 ml - 5 ml daily.  Is having dark stools.        Hemoglobin   Date Value Ref Range Status   10/31/2022 10.8 (L) 13.3 - 17.7 g/dL Final      Vertigo: Has meclizine as needed for vertigo.  Reports that they will give it to him when he is feeling dizzy and spinning,  Only as needed.     Constipation: Using bisacodyl suppository every other day and MiraLAX as needed. Reports  normal BMs currently.     Fungal infection at G-tube site:  Currently taking nystatin (Mycostatin) cream, and neosporin ointment. The site is tender and red. There is no granulation. Cleans it twice a day. Patient requested if she could use other medications.     BPH/Urinary Retention with complete bladder emptying: Currently taking doxazosin 1 mg daily. Medication is helping.     Today's Vitals: There were no vitals taken for this visit.  ----------------    MED REC REQUIRED  Post Medication Reconciliation Status:  Discharge medications reconciled, continue medications without change      I spent 52 minutes with this patient today. All changes were made via collaborative practice agreement with Lynda Gutierrez MD. A copy of the visit note was provided to the patient's provider(s).    A summary of these recommendations was sent via [employer].          Regina Laughlin, PharmD     Medication Therapy Management (MTM) Pharmacist     [phone]     [email or web address]

## 2023-05-01 NOTE — TELEPHONE ENCOUNTER
Received a call Marion with [facility] stating that start of care services were delayed per patients request due to scheduling issues.     Marion states that she tried to contact patient/daughter today to set up but was unable to reach.     Will try again later this week

## 2023-05-01 NOTE — PATIENT INSTRUCTIONS
"Recommendations from today's MTM visit:                                                    MTM (medication therapy management) is a service provided by a clinical pharmacist designed to help you get the most of out of your medicines.   Today we reviewed what your medicines are for, how to know if they are working, that your medicines are safe and how to make your medicine regimen as easy as possible.      PharmD will try to work PA for lidocaine patch 5%  Order liquid probiotic,and refill doxazosin, albuterol, Atrovent, bisacodyl supps  Continue taking current medications as prescribed    Follow-up: Whenever needed    It was great speaking with you today.  I value your experience and would be very thankful for your time in providing feedback in our clinic survey. In the next few days, you may receive an email or text message from [facility] with a link to a survey related to your  clinical pharmacist.\"     To schedule another MTM appointment, please call the clinic directly or you may call the MTM scheduling line at [phone] or toll-free at [phone].     My Clinical Pharmacist's contact information:                                                      Please feel free to contact me with any questions or concerns you have.        Regina Laughlin, PharmD     Medication Therapy Management (MTM) Pharmacist     [phone]     [email or web address]     [facility]    "

## 2023-05-01 NOTE — TELEPHONE ENCOUNTER
Prior Authorization Specialty Medication Request    Medication/Dose: Lidocaine 5% Patch  ICD code (if different than what is on RX):   Previously Tried and Failed: Lidocaine 4%    Important Lab Values:  Rationale: Lidocaine 4% patch not able to control muscle spasm/pain    Insurance Name:[facility]  Insurance ID: [id number]   Insurance Phone Number:[phone]         Regina Laughlin, PharmD     Medication Therapy Management (MTM) Pharmacist     [phone]     [email or web address]

## 2023-05-03 ENCOUNTER — TELEPHONE (OUTPATIENT)
Dept: FAMILY MEDICINE | Facility: CLINIC | Age: 58
End: 2023-05-03
Payer: COMMERCIAL

## 2023-05-03 NOTE — TELEPHONE ENCOUNTER
Reason for call:  Other   Patient called regarding (reason for call): call back  Additional comments: Marion from [facility] home care is calling stating that the family doesn't want home care at this time. Please advise and call Marion back if needed please and thank you.    Phone number to reach patient:  Other phone number:  [phone]    Best Time:  any    Can we leave a detailed message on this number?  YES

## 2023-05-04 ENCOUNTER — HOSPITAL ENCOUNTER (OUTPATIENT)
Dept: INTERVENTIONAL RADIOLOGY/VASCULAR | Facility: HOSPITAL | Age: 58
Discharge: HOME | End: 2023-05-04
Attending: RADIOLOGY | Admitting: RADIOLOGY
Payer: COMMERCIAL

## 2023-05-04 DIAGNOSIS — R63.30 FEEDING DIFFICULTIES: Primary | ICD-10-CM

## 2023-05-04 DIAGNOSIS — E46 MALNUTRITION (H): ICD-10-CM

## 2023-05-04 DIAGNOSIS — E46 MALNUTRITION (H): Primary | ICD-10-CM

## 2023-05-04 PROCEDURE — C1769 GUIDE WIRE: HCPCS

## 2023-05-04 PROCEDURE — 49452 REPLACE G-J TUBE PERC: CPT

## 2023-05-04 PROCEDURE — 255N000002 HC RX 255 OP 636: Performed by: RADIOLOGY

## 2023-05-04 RX ADMIN — IOHEXOL 20 ML: 350 INJECTION, SOLUTION INTRAVENOUS at 14:19

## 2023-05-15 DIAGNOSIS — J18.9 PNEUMONIA OF BOTH LUNGS DUE TO INFECTIOUS ORGANISM, UNSPECIFIED PART OF LUNG: ICD-10-CM

## 2023-05-15 RX ORDER — GUAIFENESIN AND DEXTROMETHORPHAN HYDROBROMIDE 100; 10 MG/5ML; MG/5ML
SOLUTION ORAL
Qty: 237 ML | Refills: 0 | Status: SHIPPED | OUTPATIENT
Start: 2023-05-15 | End: 2023-05-24

## 2023-05-15 NOTE — TELEPHONE ENCOUNTER
Orders for admission, patient is aware of plan and ready to go upstairs. Any questions, please call ED JESSICA Moore at extension [phone]. Patient Covid vaccination status: Partially vaccinated     COVID Test Ordered in ED: None    COVID Suspicion at Admission: N/A    Running Infusions:  None    Mental Status/LOC at time of transport: a/o x 2    Other pertinent information: Pt is from home with family. Pt has dementia and speaks primarily Turkmen. Pt has a eddy catheter from home.   CIWA score: N/A   NIH score:  N/A Spoke with daughter Idalmis, \[facility]\"" home nurse has used straight cath on occasion for urinary  retention. They are now out of them and home care has been discharge for now. Scheduled a face to face telephone visit for straight cath and lube on 5/7/2021. Daughter will take photo of cath package and send to [employer]. Will fax order to [facility] @  [phone] once face to face visit is completed.

## 2023-05-15 NOTE — TELEPHONE ENCOUNTER
Routing refill request to provider for review/approval because:  Drug not on the [facility] refill protocol     Last Written Prescription Date:  4/7/2023  Last Fill Quantity: 237 mL,  # refills: 0   Last office visit provider:  5/1/2023     Requested Prescriptions   Pending Prescriptions Disp Refills     Dextromethorphan-guaiFENesin  MG/5ML syrup [Pharmacy Med Name: Dextromethorphan-guaiFENesin Oral Syrup  MG/5ML] 237 mL 0     Sig: TAKE 10 MLS BY G TUBE ROUTE EVERY 4 HOURS AS NEEDED FOR COUGH.       There is no refill protocol information for this order          Betsy Cherry RN 05/15/23 12:25 PM

## 2023-05-24 ENCOUNTER — ANCILLARY PROCEDURE (OUTPATIENT)
Dept: GENERAL RADIOLOGY | Facility: CLINIC | Age: 58
End: 2023-05-24
Attending: FAMILY MEDICINE
Payer: COMMERCIAL

## 2023-05-24 ENCOUNTER — OFFICE VISIT (OUTPATIENT)
Dept: FAMILY MEDICINE | Facility: CLINIC | Age: 58
End: 2023-05-24
Payer: COMMERCIAL

## 2023-05-24 VITALS
RESPIRATION RATE: 18 BRPM | SYSTOLIC BLOOD PRESSURE: 127 MMHG | WEIGHT: 119 LBS | HEART RATE: 71 BPM | OXYGEN SATURATION: 97 % | BODY MASS INDEX: 23.36 KG/M2 | DIASTOLIC BLOOD PRESSURE: 85 MMHG | TEMPERATURE: 98.1 F | HEIGHT: 60 IN

## 2023-05-24 DIAGNOSIS — J43.9 PULMONARY EMPHYSEMA, UNSPECIFIED EMPHYSEMA TYPE (H): ICD-10-CM

## 2023-05-24 DIAGNOSIS — I10 ESSENTIAL HYPERTENSION: ICD-10-CM

## 2023-05-24 DIAGNOSIS — R05.3 CHRONIC COUGH: ICD-10-CM

## 2023-05-24 DIAGNOSIS — Z87.01 HISTORY OF PNEUMONIA: Primary | ICD-10-CM

## 2023-05-24 DIAGNOSIS — K94.29 IRRITATION AROUND PERCUTANEOUS ENDOSCOPIC GASTROSTOMY (PEG) TUBE SITE (H): ICD-10-CM

## 2023-05-24 LAB
ALBUMIN SERPL BCG-MCNC: 4.1 G/DL (ref 3.5–5.2)
ALP SERPL-CCNC: 65 U/L (ref 40–129)
ALT SERPL W P-5'-P-CCNC: 45 U/L (ref 10–50)
ANION GAP SERPL CALCULATED.3IONS-SCNC: 11 MMOL/L (ref 7–15)
AST SERPL W P-5'-P-CCNC: 37 U/L (ref 10–50)
BASOPHILS # BLD AUTO: 0 10E3/UL (ref 0–0.2)
BASOPHILS NFR BLD AUTO: 0 %
BILIRUB SERPL-MCNC: 0.2 MG/DL
BUN SERPL-MCNC: 40.1 MG/DL (ref 8–23)
CALCIUM SERPL-MCNC: 9.1 MG/DL (ref 8.6–10)
CHLORIDE SERPL-SCNC: 111 MMOL/L (ref 98–107)
CHOLEST SERPL-MCNC: 170 MG/DL
CREAT SERPL-MCNC: 1.24 MG/DL (ref 0.67–1.17)
DEPRECATED HCO3 PLAS-SCNC: 23 MMOL/L (ref 22–29)
EOSINOPHIL # BLD AUTO: 0.4 10E3/UL (ref 0–0.7)
EOSINOPHIL NFR BLD AUTO: 5 %
ERYTHROCYTE [DISTWIDTH] IN BLOOD BY AUTOMATED COUNT: 12.4 % (ref 10–15)
GFR SERPL CREATININE-BSD FRML MDRD: 67 ML/MIN/1.73M2
GLUCOSE SERPL-MCNC: 103 MG/DL (ref 70–99)
HCT VFR BLD AUTO: 36.2 % (ref 40–53)
HDLC SERPL-MCNC: 35 MG/DL
HGB BLD-MCNC: 11.6 G/DL (ref 13.3–17.7)
IMM GRANULOCYTES # BLD: 0 10E3/UL
IMM GRANULOCYTES NFR BLD: 0 %
LDLC SERPL CALC-MCNC: 101 MG/DL
LYMPHOCYTES # BLD AUTO: 2.3 10E3/UL (ref 0.8–5.3)
LYMPHOCYTES NFR BLD AUTO: 28 %
MCH RBC QN AUTO: 31.6 PG (ref 26.5–33)
MCHC RBC AUTO-ENTMCNC: 32 G/DL (ref 31.5–36.5)
MCV RBC AUTO: 99 FL (ref 78–100)
MONOCYTES # BLD AUTO: 0.9 10E3/UL (ref 0–1.3)
MONOCYTES NFR BLD AUTO: 11 %
NEUTROPHILS # BLD AUTO: 4.6 10E3/UL (ref 1.6–8.3)
NEUTROPHILS NFR BLD AUTO: 56 %
NONHDLC SERPL-MCNC: 135 MG/DL
PLATELET # BLD AUTO: 185 10E3/UL (ref 150–450)
POTASSIUM SERPL-SCNC: 5.2 MMOL/L (ref 3.4–5.3)
PROT SERPL-MCNC: 8.4 G/DL (ref 6.4–8.3)
RBC # BLD AUTO: 3.67 10E6/UL (ref 4.4–5.9)
SODIUM SERPL-SCNC: 145 MMOL/L (ref 136–145)
TRIGL SERPL-MCNC: 172 MG/DL
WBC # BLD AUTO: 8.2 10E3/UL (ref 4–11)

## 2023-05-24 PROCEDURE — 85025 COMPLETE CBC W/AUTO DIFF WBC: CPT | Performed by: FAMILY MEDICINE

## 2023-05-24 PROCEDURE — 90471 IMMUNIZATION ADMIN: CPT | Performed by: FAMILY MEDICINE

## 2023-05-24 PROCEDURE — 71046 X-RAY EXAM CHEST 2 VIEWS: CPT | Mod: TC | Performed by: RADIOLOGY

## 2023-05-24 PROCEDURE — 36415 COLL VENOUS BLD VENIPUNCTURE: CPT | Performed by: FAMILY MEDICINE

## 2023-05-24 PROCEDURE — 80061 LIPID PANEL: CPT | Performed by: FAMILY MEDICINE

## 2023-05-24 PROCEDURE — 99214 OFFICE O/P EST MOD 30 MIN: CPT | Mod: 25 | Performed by: FAMILY MEDICINE

## 2023-05-24 PROCEDURE — 90677 PCV20 VACCINE IM: CPT | Performed by: FAMILY MEDICINE

## 2023-05-24 PROCEDURE — 80053 COMPREHEN METABOLIC PANEL: CPT | Performed by: FAMILY MEDICINE

## 2023-05-24 RX ORDER — GUAIFENESIN AND DEXTROMETHORPHAN HYDROBROMIDE 100; 10 MG/5ML; MG/5ML
SOLUTION ORAL
Qty: 237 ML | Refills: 4 | Status: SHIPPED | OUTPATIENT
Start: 2023-05-24 | End: 2023-11-09

## 2023-05-24 RX ORDER — OSTOMY SUPPLY 2 3/4"
EACH MISCELLANEOUS
Qty: 56.7 G | Refills: 3 | Status: SHIPPED | OUTPATIENT
Start: 2023-05-24

## 2023-05-24 ASSESSMENT — PAIN SCALES - GENERAL: PAINLEVEL: NO PAIN (0)

## 2023-05-24 NOTE — PROGRESS NOTES
Assessment & Plan     History of pneumonia  We will repeat chest x-ray today.  He is at risk of recurrent pneumonia due to aspiration.  We will continue home regimen of vest treatments, nebulizer treatments and suction.  Encouraged them to follow-up with pulmonology as well.  - Dextromethorphan-guaiFENesin  MG/5ML syrup  Dispense: 237 mL; Refill: 4    Pulmonary emphysema, unspecified emphysema type (H)  Continue current pulmonary medications.  Encouraged follow-up with pulmonologist.  They have questions about whether he should have oxygen at home.  A home oximetry test was previously ordered by his pulmonologist but they have not scheduled this.  Encouraged patient's daughter to reach out to pulmonology to get this scheduled    Essential hypertension  Blood pressure is normal today.  Sound like he is having some lower blood pressure readings at home.  We will discontinue metoprolol and continue amlodipine 5 mg daily for they will monitor blood pressure and contact us if blood pressure increases  - Comprehensive metabolic panel (BMP + Alb, Alk Phos, ALT, AST, Total. Bili, TP)  - Lipid panel reflex to direct LDL Non-fasting    Chronic cough  Continue current medications.  Get repeat chest x-ray today  - XR Chest 2 Views  - CBC with platelets and differential    Irritation around percutaneous endoscopic gastrostomy (PEG) tube site (H)  Prescription for Stomahesive paste sent to pharmacy.  Continue nystatin cream as needed.  - Ostomy Supplies (STOMAHESIVE) PSTE  Dispense: 56.7 g; Refill: 3             MED REC REQUIRED  Post Medication Reconciliation Status: discharge medications reconciled and changed, per note/orders      Lynda Gutierrez MD  [facility] is a 57 year old, presenting for the following health issues:  Hospital F/U (Pneumonia F/U )        5/24/2023     8:33 AM   Additional Questions   Roomed by Tri Lydia   Accompanied by Daughter and Wife     HPI   He  comes in today accompanied by his wife and daughter for hospital follow-up visit.  Patient is nonverbal due to past history of stroke and basal ganglia hemorrhage resulting in significant brain damage.  He has chronic static encephalopathy, hemiplegia, dysphagia and is nonverbal.    He was recently hospitalized April 25 through April 27 due to shortness of breath.  He has history of recurrent aspiration pneumonia.  Presented to emergency department complaining of increased shortness of breath and coughing.  Chest CT revealed bibasilar airspace consolidation consistent with bilateral lower lobe pneumonia.  He was started on IV antibiotics and transition to oral Augmentin upon discharge to complete a 1 week course.  No other changes were made in his regular medications.    We reviewed his current medications and medication reconciliation was performed.    They have a couple of concerns.  They would like to discuss his metoprolol.  He is currently on metoprolol 25 mg daily and amlodipine 5 mg daily.  They wonder if the dose is appropriate.  They do check his blood pressure occasionally, particularly when he is feeling more tired and at those times his blood pressure is low.  They wonder if he needs both of these medications.    Last week he did have some increased coughing and congestion and was complaining of some chest discomfort.  Daughter was going to bring him into the emergency room but then he started feeling better so they did not go in.  Daughter is wondering if they could get an x-ray today of the chest.  They also would like to do follow-up blood work which was recommended when he was discharged from the hospital.    He continues to have some redness around his G-tube insertion site.  They are currently using a barrier cream and apply nystatin every couple of days.  They would like this to be looked at today and recommendations made on topical creams.    Review of systems is otherwise negative.  No other  concerns or questions today.          Review of Systems         Objective    /85   Pulse 71   Temp 98.1  F (36.7  C) (Temporal)   Resp 18   Ht 1.524 m (5')   Wt 54 kg (119 lb)   SpO2 97%   BMI 23.24 kg/m    Body mass index is 23.24 kg/m .  Physical Exam   GENERAL: alert and no distress  RESP: no wheezes, scattered rhonchi present bilaterally  CV: regular rates and rhythm  MS: no gross musculoskeletal defects noted, no edema  NEURO: nonverbal, alert, spasticity in upper and lower extremity, at baseline for patietn  Skin: G-tube in place, erythema present around insertion site

## 2023-05-24 NOTE — PATIENT INSTRUCTIONS
Contact lung doctor about getting new order for test to monitor oxygen at home and see if he qualifies for oxygen treatment    I recommend a COVID.  Consider getting Moderna due to side effects you had with Pfizer

## 2023-05-25 ENCOUNTER — HOSPITAL ENCOUNTER (OUTPATIENT)
Dept: CT IMAGING | Facility: HOSPITAL | Age: 58
Discharge: HOME | End: 2023-05-25
Attending: FAMILY MEDICINE | Admitting: FAMILY MEDICINE
Payer: COMMERCIAL

## 2023-05-25 ENCOUNTER — TELEPHONE (OUTPATIENT)
Dept: FAMILY MEDICINE | Facility: CLINIC | Age: 58
End: 2023-05-25

## 2023-05-25 DIAGNOSIS — R05.1 ACUTE COUGH: ICD-10-CM

## 2023-05-25 DIAGNOSIS — Z87.01 HISTORY OF PNEUMONIA: ICD-10-CM

## 2023-05-25 DIAGNOSIS — R05.1 ACUTE COUGH: Primary | ICD-10-CM

## 2023-05-25 DIAGNOSIS — J69.0 ASPIRATION PNEUMONITIS (H): ICD-10-CM

## 2023-05-25 PROCEDURE — 71250 CT THORAX DX C-: CPT

## 2023-05-25 RX ORDER — AMOXICILLIN AND CLAVULANATE POTASSIUM 600; 42.9 MG/5ML; MG/5ML
POWDER, FOR SUSPENSION ORAL
Qty: 125 ML | Refills: 0 | Status: SHIPPED | OUTPATIENT
Start: 2023-05-25 | End: 2023-06-07

## 2023-05-25 NOTE — TELEPHONE ENCOUNTER
Reason for call:  Other     Patient called regarding (reason for call): call back    Additional comments: Patient's daughter is calling and asking if she could get a call back about her fathers results for the CT scan and a treatment for pneumonia. Please advise and call patient's daughter back please and thank you.    Phone number to reach patient:  Cell number on file:    Telephone Information:   Mobile [phone]       Best Time:  any    Can we leave a detailed message on this number?  YES

## 2023-05-25 NOTE — TELEPHONE ENCOUNTER
I called the number provided. The phone number is not correct, in fact the phone number is not even a family member.

## 2023-06-07 ENCOUNTER — APPOINTMENT (OUTPATIENT)
Dept: RADIOLOGY | Facility: CLINIC | Age: 58
End: 2023-06-07
Attending: EMERGENCY MEDICINE
Payer: COMMERCIAL

## 2023-06-07 ENCOUNTER — APPOINTMENT (OUTPATIENT)
Dept: CT IMAGING | Facility: CLINIC | Age: 58
End: 2023-06-07
Attending: EMERGENCY MEDICINE
Payer: COMMERCIAL

## 2023-06-07 ENCOUNTER — HOSPITAL ENCOUNTER (INPATIENT)
Facility: CLINIC | Age: 58
LOS: 3 days | Discharge: HOME | End: 2023-06-10
Attending: EMERGENCY MEDICINE | Admitting: HOSPITALIST
Payer: COMMERCIAL

## 2023-06-07 DIAGNOSIS — J39.8 CHRONIC MUCUS HYPERSECRETION, RESPIRATORY: ICD-10-CM

## 2023-06-07 DIAGNOSIS — A41.9 SEPSIS, DUE TO UNSPECIFIED ORGANISM, UNSPECIFIED WHETHER ACUTE ORGAN DYSFUNCTION PRESENT (H): ICD-10-CM

## 2023-06-07 DIAGNOSIS — N39.0 RECURRENT UTI: ICD-10-CM

## 2023-06-07 DIAGNOSIS — Z78.9 ON TUBE FEEDING DIET: ICD-10-CM

## 2023-06-07 DIAGNOSIS — R11.0 NAUSEA: ICD-10-CM

## 2023-06-07 DIAGNOSIS — J18.9 HOSPITAL-ACQUIRED PNEUMONIA: ICD-10-CM

## 2023-06-07 DIAGNOSIS — J96.01 ACUTE HYPOXEMIC RESPIRATORY FAILURE (H): Primary | ICD-10-CM

## 2023-06-07 DIAGNOSIS — M62.838 MUSCLE SPASM: ICD-10-CM

## 2023-06-07 DIAGNOSIS — R33.9 URINARY RETENTION WITH INCOMPLETE BLADDER EMPTYING: ICD-10-CM
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
## 2023-06-07 DIAGNOSIS — J69.0 RECURRENT ASPIRATION PNEUMONIA (H): ICD-10-CM

## 2023-06-07 LAB
ALBUMIN SERPL-MCNC: 3.9 G/DL (ref 3.5–5)
ALBUMIN UR-MCNC: 100 MG/DL
ALP SERPL-CCNC: 70 U/L (ref 45–120)
ALT SERPL W P-5'-P-CCNC: 63 U/L (ref 0–45)
ANION GAP SERPL CALCULATED.3IONS-SCNC: 11 MMOL/L (ref 5–18)
APPEARANCE UR: CLEAR
AST SERPL W P-5'-P-CCNC: 43 U/L (ref 0–40)
BASOPHILS # BLD AUTO: 0.1 10E3/UL (ref 0–0.2)
BASOPHILS NFR BLD AUTO: 0 %
BILIRUB SERPL-MCNC: 0.4 MG/DL (ref 0–1)
BILIRUB UR QL STRIP: NEGATIVE
BNP SERPL-MCNC: 26 PG/ML (ref 0–52)
BUN SERPL-MCNC: 33 MG/DL (ref 8–22)
CALCIUM SERPL-MCNC: 9.1 MG/DL (ref 8.5–10.5)
CHLORIDE BLD-SCNC: 105 MMOL/L (ref 98–107)
CO2 SERPL-SCNC: 22 MMOL/L (ref 22–31)
COLOR UR AUTO: ABNORMAL
CREAT SERPL-MCNC: 1.06 MG/DL (ref 0.7–1.3)
EOSINOPHIL # BLD AUTO: 0.2 10E3/UL (ref 0–0.7)
EOSINOPHIL NFR BLD AUTO: 1 %
ERYTHROCYTE [DISTWIDTH] IN BLOOD BY AUTOMATED COUNT: 12.1 % (ref 10–15)
FLUAV RNA SPEC QL NAA+PROBE: NEGATIVE
FLUBV RNA RESP QL NAA+PROBE: NEGATIVE
GFR SERPL CREATININE-BSD FRML MDRD: 80 ML/MIN/1.73M2
GLUCOSE BLD-MCNC: 105 MG/DL (ref 70–125)
GLUCOSE UR STRIP-MCNC: NEGATIVE MG/DL
HCT VFR BLD AUTO: 36.7 % (ref 40–53)
HGB BLD-MCNC: 12.4 G/DL (ref 13.3–17.7)
HGB UR QL STRIP: ABNORMAL
HOLD SPECIMEN: NORMAL
IMM GRANULOCYTES # BLD: 0.1 10E3/UL
IMM GRANULOCYTES NFR BLD: 0 %
KETONES UR STRIP-MCNC: NEGATIVE MG/DL
LACTATE SERPL-SCNC: 1.3 MMOL/L (ref 0.7–2)
LEUKOCYTE ESTERASE UR QL STRIP: NEGATIVE
LYMPHOCYTES # BLD AUTO: 1.4 10E3/UL (ref 0.8–5.3)
LYMPHOCYTES NFR BLD AUTO: 12 %
MAGNESIUM SERPL-MCNC: 2.3 MG/DL (ref 1.8–2.6)
MCH RBC QN AUTO: 32.2 PG (ref 26.5–33)
MCHC RBC AUTO-ENTMCNC: 33.8 G/DL (ref 31.5–36.5)
MCV RBC AUTO: 95 FL (ref 78–100)
MONOCYTES # BLD AUTO: 0.6 10E3/UL (ref 0–1.3)
MONOCYTES NFR BLD AUTO: 5 %
MUCOUS THREADS #/AREA URNS LPF: PRESENT /LPF
NEUTROPHILS # BLD AUTO: 9.7 10E3/UL (ref 1.6–8.3)
NEUTROPHILS NFR BLD AUTO: 82 %
NITRATE UR QL: NEGATIVE
NRBC # BLD AUTO: 0 10E3/UL
NRBC BLD AUTO-RTO: 0 /100
PH UR STRIP: 5.5 [PH] (ref 5–7)
PLATELET # BLD AUTO: 235 10E3/UL (ref 150–450)
POTASSIUM BLD-SCNC: 4.5 MMOL/L (ref 3.5–5)
PROCALCITONIN SERPL-MCNC: 0.08 NG/ML (ref 0–0.49)
PROT SERPL-MCNC: 8.9 G/DL (ref 6–8)
RBC # BLD AUTO: 3.85 10E6/UL (ref 4.4–5.9)
RBC URINE: 6 /HPF
RSV RNA SPEC NAA+PROBE: NEGATIVE
SARS-COV-2 RNA RESP QL NAA+PROBE: NEGATIVE
SODIUM SERPL-SCNC: 138 MMOL/L (ref 136–145)
SP GR UR STRIP: 1.02 (ref 1–1.03)
UROBILINOGEN UR STRIP-MCNC: <2 MG/DL
WBC # BLD AUTO: 12 10E3/UL (ref 4–11)
WBC URINE: <1 /HPF

## 2023-06-07 PROCEDURE — 71045 X-RAY EXAM CHEST 1 VIEW: CPT

## 2023-06-07 PROCEDURE — 999N000157 HC STATISTIC RCP TIME EA 10 MIN

## 2023-06-07 PROCEDURE — 81001 URINALYSIS AUTO W/SCOPE: CPT | Performed by: EMERGENCY MEDICINE

## 2023-06-07 PROCEDURE — 96375 TX/PRO/DX INJ NEW DRUG ADDON: CPT

## 2023-06-07 PROCEDURE — 80053 COMPREHEN METABOLIC PANEL: CPT | Performed by: EMERGENCY MEDICINE

## 2023-06-07 PROCEDURE — 84145 PROCALCITONIN (PCT): CPT | Performed by: EMERGENCY MEDICINE

## 2023-06-07 PROCEDURE — 87637 SARSCOV2&INF A&B&RSV AMP PRB: CPT | Performed by: EMERGENCY MEDICINE

## 2023-06-07 PROCEDURE — 36415 COLL VENOUS BLD VENIPUNCTURE: CPT | Performed by: EMERGENCY MEDICINE

## 2023-06-07 PROCEDURE — 99291 CRITICAL CARE FIRST HOUR: CPT | Mod: 25

## 2023-06-07 PROCEDURE — 96366 THER/PROPH/DIAG IV INF ADDON: CPT

## 2023-06-07 PROCEDURE — 99223 1ST HOSP IP/OBS HIGH 75: CPT | Performed by: HOSPITALIST

## 2023-06-07 PROCEDURE — 83735 ASSAY OF MAGNESIUM: CPT | Performed by: EMERGENCY MEDICINE

## 2023-06-07 PROCEDURE — 96365 THER/PROPH/DIAG IV INF INIT: CPT

## 2023-06-07 PROCEDURE — 51798 US URINE CAPACITY MEASURE: CPT

## 2023-06-07 PROCEDURE — 120N000001 HC R&B MED SURG/OB

## 2023-06-07 PROCEDURE — 70450 CT HEAD/BRAIN W/O DYE: CPT

## 2023-06-07 PROCEDURE — 83605 ASSAY OF LACTIC ACID: CPT | Performed by: EMERGENCY MEDICINE

## 2023-06-07 PROCEDURE — 94640 AIRWAY INHALATION TREATMENT: CPT

## 2023-06-07 PROCEDURE — 250N000009 HC RX 250: Performed by: HOSPITALIST

## 2023-06-07 PROCEDURE — 83880 ASSAY OF NATRIURETIC PEPTIDE: CPT | Performed by: EMERGENCY MEDICINE

## 2023-06-07 PROCEDURE — 87040 BLOOD CULTURE FOR BACTERIA: CPT | Performed by: EMERGENCY MEDICINE

## 2023-06-07 PROCEDURE — 74176 CT ABD & PELVIS W/O CONTRAST: CPT

## 2023-06-07 PROCEDURE — 250N000011 HC RX IP 250 OP 636: Performed by: EMERGENCY MEDICINE

## 2023-06-07 PROCEDURE — 85014 HEMATOCRIT: CPT | Performed by: EMERGENCY MEDICINE

## 2023-06-07 RX ORDER — LIDOCAINE 40 MG/G
CREAM TOPICAL
Status: DISCONTINUED | OUTPATIENT
Start: 2023-06-07 | End: 2023-06-10 | Stop reason: HOSPADM

## 2023-06-07 RX ORDER — TETRAHYDROZOLINE HCL 0.05 %
1 DROPS OPHTHALMIC (EYE) EVERY 6 HOURS PRN
Status: DISCONTINUED | OUTPATIENT
Start: 2023-06-07 | End: 2023-06-10 | Stop reason: HOSPADM

## 2023-06-07 RX ORDER — IPRATROPIUM BROMIDE 42 UG/1
2 SPRAY, METERED NASAL 2 TIMES DAILY
COMMUNITY
End: 2023-09-27

## 2023-06-07 RX ORDER — AMLODIPINE BESYLATE 5 MG/1
5 TABLET ORAL DAILY
Status: DISCONTINUED | OUTPATIENT
Start: 2023-06-08 | End: 2023-06-10 | Stop reason: HOSPADM

## 2023-06-07 RX ORDER — DOXAZOSIN 1 MG/1
1 TABLET ORAL DAILY
Status: DISCONTINUED | OUTPATIENT
Start: 2023-06-08 | End: 2023-06-10 | Stop reason: HOSPADM

## 2023-06-07 RX ORDER — MEROPENEM 500 MG/1
500 INJECTION, POWDER, FOR SOLUTION INTRAVENOUS EVERY 6 HOURS
Status: DISCONTINUED | OUTPATIENT
Start: 2023-06-07 | End: 2023-06-08

## 2023-06-07 RX ORDER — ONDANSETRON 4 MG/1
4 TABLET, ORALLY DISINTEGRATING ORAL EVERY 6 HOURS PRN
Status: DISCONTINUED | OUTPATIENT
Start: 2023-06-07 | End: 2023-06-10 | Stop reason: HOSPADM

## 2023-06-07 RX ORDER — ONDANSETRON 2 MG/ML
4 INJECTION INTRAMUSCULAR; INTRAVENOUS ONCE
Status: COMPLETED | OUTPATIENT
Start: 2023-06-07 | End: 2023-06-07

## 2023-06-07 RX ORDER — IPRATROPIUM BROMIDE 42 UG/1
2 SPRAY, METERED NASAL 2 TIMES DAILY
Status: DISCONTINUED | OUTPATIENT
Start: 2023-06-07 | End: 2023-06-10 | Stop reason: HOSPADM

## 2023-06-07 RX ORDER — DOXYCYCLINE 100 MG/10ML
100 INJECTION, POWDER, LYOPHILIZED, FOR SOLUTION INTRAVENOUS EVERY 12 HOURS
Status: DISCONTINUED | OUTPATIENT
Start: 2023-06-07 | End: 2023-06-09

## 2023-06-07 RX ORDER — BACLOFEN 10 MG/1
10 TABLET ORAL 4 TIMES DAILY PRN
Status: DISCONTINUED | OUTPATIENT
Start: 2023-06-07 | End: 2023-06-10 | Stop reason: HOSPADM

## 2023-06-07 RX ORDER — IPRATROPIUM BROMIDE AND ALBUTEROL SULFATE 2.5; .5 MG/3ML; MG/3ML
1 SOLUTION RESPIRATORY (INHALATION) EVERY 4 HOURS
Status: DISCONTINUED | OUTPATIENT
Start: 2023-06-07 | End: 2023-06-08

## 2023-06-07 RX ORDER — GUAIFENESIN/DEXTROMETHORPHAN 100-10MG/5
10 SYRUP ORAL 3 TIMES DAILY PRN
Status: DISCONTINUED | OUTPATIENT
Start: 2023-06-07 | End: 2023-06-10 | Stop reason: HOSPADM

## 2023-06-07 RX ORDER — ACETAMINOPHEN 500 MG
500-1000 TABLET ORAL EVERY 6 HOURS PRN
Status: DISCONTINUED | OUTPATIENT
Start: 2023-06-07 | End: 2023-06-10 | Stop reason: HOSPADM

## 2023-06-07 RX ORDER — PANTOPRAZOLE SODIUM 40 MG/1
40 TABLET, DELAYED RELEASE ORAL
Status: DISCONTINUED | OUTPATIENT
Start: 2023-06-08 | End: 2023-06-10

## 2023-06-07 RX ORDER — SACCHAROMYCES BOULARDII 250 MG
250 CAPSULE ORAL DAILY
Status: DISCONTINUED | OUTPATIENT
Start: 2023-06-08 | End: 2023-06-10 | Stop reason: HOSPADM

## 2023-06-07 RX ORDER — QUETIAPINE FUMARATE 25 MG/1
25 TABLET, FILM COATED ORAL DAILY PRN
Status: DISCONTINUED | OUTPATIENT
Start: 2023-06-07 | End: 2023-06-10 | Stop reason: HOSPADM

## 2023-06-07 RX ORDER — PROCHLORPERAZINE 25 MG
25 SUPPOSITORY, RECTAL RECTAL EVERY 12 HOURS PRN
Status: DISCONTINUED | OUTPATIENT
Start: 2023-06-07 | End: 2023-06-10 | Stop reason: HOSPADM

## 2023-06-07 RX ORDER — PROCHLORPERAZINE MALEATE 10 MG
10 TABLET ORAL EVERY 6 HOURS PRN
Status: DISCONTINUED | OUTPATIENT
Start: 2023-06-07 | End: 2023-06-10 | Stop reason: HOSPADM

## 2023-06-07 RX ORDER — PIPERACILLIN SODIUM, TAZOBACTAM SODIUM 3; .375 G/15ML; G/15ML
3.38 INJECTION, POWDER, LYOPHILIZED, FOR SOLUTION INTRAVENOUS ONCE
Status: COMPLETED | OUTPATIENT
Start: 2023-06-07 | End: 2023-06-08

## 2023-06-07 RX ORDER — QUETIAPINE FUMARATE 25 MG/1
25 TABLET, FILM COATED ORAL 3 TIMES DAILY
Status: DISCONTINUED | OUTPATIENT
Start: 2023-06-08 | End: 2023-06-08

## 2023-06-07 RX ORDER — IPRATROPIUM BROMIDE AND ALBUTEROL SULFATE 2.5; .5 MG/3ML; MG/3ML
1 SOLUTION RESPIRATORY (INHALATION) EVERY 4 HOURS
COMMUNITY
End: 2023-08-30

## 2023-06-07 RX ORDER — MECLIZINE HYDROCHLORIDE 25 MG/1
25 TABLET ORAL 3 TIMES DAILY PRN
Status: DISCONTINUED | OUTPATIENT
Start: 2023-06-07 | End: 2023-06-10 | Stop reason: HOSPADM

## 2023-06-07 RX ORDER — SIMVASTATIN 20 MG
20 TABLET ORAL AT BEDTIME
Status: DISCONTINUED | OUTPATIENT
Start: 2023-06-07 | End: 2023-06-10 | Stop reason: HOSPADM

## 2023-06-07 RX ORDER — ONDANSETRON 2 MG/ML
4 INJECTION INTRAMUSCULAR; INTRAVENOUS EVERY 6 HOURS PRN
Status: DISCONTINUED | OUTPATIENT
Start: 2023-06-07 | End: 2023-06-10 | Stop reason: HOSPADM

## 2023-06-07 RX ORDER — NYSTATIN 100000 U/G
CREAM TOPICAL 2 TIMES DAILY PRN
Status: DISCONTINUED | OUTPATIENT
Start: 2023-06-07 | End: 2023-06-10 | Stop reason: HOSPADM

## 2023-06-07 RX ORDER — POLYETHYLENE GLYCOL 3350 17 G/17G
17 POWDER, FOR SOLUTION ORAL DAILY PRN
Status: DISCONTINUED | OUTPATIENT
Start: 2023-06-07 | End: 2023-06-10 | Stop reason: HOSPADM

## 2023-06-07 RX ORDER — ASPIRIN 81 MG/1
81 TABLET, CHEWABLE ORAL DAILY
Status: DISCONTINUED | OUTPATIENT
Start: 2023-06-08 | End: 2023-06-10 | Stop reason: HOSPADM

## 2023-06-07 RX ORDER — NEOMYCIN/BACITRACIN/POLYMYXINB 3.5-400-5K
OINTMENT (GRAM) TOPICAL 4 TIMES DAILY
Status: DISCONTINUED | OUTPATIENT
Start: 2023-06-07 | End: 2023-06-10 | Stop reason: HOSPADM

## 2023-06-07 RX ADMIN — PIPERACILLIN AND TAZOBACTAM 3.38 G: 3; .375 INJECTION, POWDER, LYOPHILIZED, FOR SOLUTION INTRAVENOUS at 19:49

## 2023-06-07 RX ADMIN — IPRATROPIUM BROMIDE AND ALBUTEROL SULFATE 3 ML: .5; 3 SOLUTION RESPIRATORY (INHALATION) at 22:20

## 2023-06-07 RX ADMIN — ONDANSETRON 4 MG: 2 INJECTION INTRAMUSCULAR; INTRAVENOUS at 19:49

## 2023-06-07 ASSESSMENT — ACTIVITIES OF DAILY LIVING (ADL)
ADLS_ACUITY_SCORE: 35

## 2023-06-07 NOTE — ED PROVIDER NOTES
EMERGENCY DEPARTMENT ENCOUNTER      NAME: Liu Malave  AGE: 58 year old male  YOB: 1965  MRN: [id number]  EVALUATION DATE & TIME: 6/7/2023  6:43 PM    PCP: Lynda Gutierrez    ED PROVIDER: Pam Estrada M.D.      Chief Complaint   Patient presents with     Cough     Vomiting         FINAL IMPRESSION:  1. Hospital-acquired pneumonia    2. Sepsis, due to unspecified organism, unspecified whether acute organ dysfunction present (H)          ED COURSE & MEDICAL DECISION MAKING:    ED Course as of 06/07/23 2126 Wed Jun 07, 2023 1919 Patient with coarse BS and some mild agitation/restlessness with sat 86% RA and improved to 94% on 2L NS with tachycardia, cvomiting today, some disocmfort apparent when I palpate abdomen, with knwon G-tube and normal flatus/stool today without blood in stool or emesis therefore no signs of GI bleeding or or bowel obstruction. As he did vomit a few times and likely superimposed new aspiration event with low sat, abx begun stat wtih zosyn for likely HCAP with recent admission for aspiration pneumonia and recently treated pneumonia now s/p augmentin completed a few days ago, zofran given so he doesn't feel nauseated further, electrolytes and UA pending, bladder scan with h/o urinary retention and will place Kelly if retaining, family amenable to plan  to readmit fo rlikely recurrent pneumonia with likely aspiration component and sepsis.    2106 Lactic acid reassuringly 1.3 and influenza, RSV and COVID19 negative. cBC 12.0 and CMP normal range.   2107 CT head without acute change that would relate to nausea/vomiting today   2107 CT abdomen without acute obstruction or abnormalities to cause vomiting, GJ in good position   2108 I tried to view CXR but chart is present and no imaging, I called radiology tech re: image not present/visible and they note they have a call out to PACS system rep, ok with plan to reshoot CXR as old images not findable to them at this time so I placed  new order for portable CXR and radiology coming back to room to re-shoot CXR now   2114 I spoke with radiology again and they note imaging has been fixed, CXR now available and visible on PACS/crossed over. I reviewed CXR image and see fluid overload and patchiness right sided, awaiting radiology formal read and hospitalist paged for admission   2126 Pt endorsed to hospitalist Dr Dwyer to med surg       Pertinent Labs & Imaging studies reviewed. (See chart for details)    N95 worn  A face shield was worn also  COVID PPE    Medical Decision Making    History:    Supplemental history from: Family Member/Significant Other    External Record(s) reviewed: Inpatient Record: [facility]    Work Up:    Chart documentation includes differential considered and any EKGs or imaging independently interpreted by provider, where specified.    In additional to work up documented, I considered the following work up: Documented in chart, if applicable.    External consultation:    Discussion of management with another provider: Documented in chart, if applicable    Complicating factors:    Care impacted by chronic illness: Hyperlipidemia and Hypertension    Care affected by social determinants of health: Literacy    Disposition considerations: Admit.     Critical Care time was 45 minutes for this patient excluding procedures.      7:18 PM I met with the patient, obtained history, performed an initial exam, and discussed options and plan for diagnostics and treatment here in the ED.     At the conclusion of the encounter I discussed the results of all of the tests and the disposition. The questions were answered. The patient or family acknowledged understanding and was agreeable with the care plan.     MEDICATIONS GIVEN IN THE EMERGENCY:  Medications   piperacillin-tazobactam (ZOSYN) 3.375 g vial to attach to  mL bag (3.375 g Intravenous $New Bag 6/7/23 1949)   ondansetron (ZOFRAN) injection 4 mg (4 mg  Intravenous $Given 6/7/23 1949)       NEW PRESCRIPTIONS STARTED AT TODAY'S ER VISIT  New Prescriptions    No medications on file          =================================================================    HPI      Liu GIL Malave is a 58 year old male with PMHx of prior hemorrhagic stroke, g-tube dependent, recurrent aspiration pneumonia, hypertension, hyperlipidemia, bed-bound, non-verbal who presents to the ED today via wheelchair with cough and vomiting.    Per daughter,  The patient developed a cough and fatigue yesterday and has had a few episodes of vomiting today. The vomit is watery and looks like there is some mucus in it. She is concerned about this because he is only fed through the g-tube. He has had normal stooling and urinating recently. She also confirms that he finished the course of antibiotics but has a history of antibiotic resistance.     He has not had fever or blood in stool. The daughter denies any other complaints at this time.       Per chart review,  The patient was admitted for aspiration pneumonia from 4/25-4/27/2023. At this hospitalization he had some urinary retention. He went to his PCP for follow up on 5/24 and they noted that his oxygen saturation was 94% on room air but there were coarse breath sounds on his lung exam. The PCP referred them to pulmonology and to get a CT scan on 5/25. The CT scan revealed pneumonia so he prescribed a course of Augmentin. The PCP noted that all of the contacts in the patient's chart were non-working numbers so he was unsure if the patient got the prescription.      REVIEW OF SYSTEMS   All other systems reviewed and are negative except as noted above in HPI.    PAST MEDICAL HISTORY:  Past Medical History:   Diagnosis Date     Acute UTI 02/21/2020     Anxiety      Basal ganglia hemorrhage (H) 06/20/2016     Brain compression (H)      Bruxism      Chronic static encephalopathy      Convulsions, unspecified convulsion type (H)      CVA (cerebral vascular  accident) (H) 07/2016    Hemorrhagic,      Depression      Dyslipidemia      Elevated troponin      Essential hypertension      Feeding by G-tube (H) 06/04/2020     GERD (gastroesophageal reflux disease)      Hemiplegia of nondominant side, late effect of cerebrovascular disease (H)     Created by Conversion  Replacement Utility updated for latest IMO load     Hemorrhagic stroke (H) 06/05/2016     Hepatitis C carrier (H)      History of ESBL E. coli infection      History of hemorrhagic stroke with residual hemiparesis (H)      Hypertension      Lung nodule 12/05/2016    9 x 9 mm left upper lobe on CXR     MRSA (methicillin resistant staph aureus) culture positive      Periodontal disease      Persistent fever      Pneumonia of both lower lobes due to infectious organism      Respiratory failure with hypoxia (H)      Seizure (H)      Sepsis due to urinary tract infection (H) 06/04/2020     Severe sepsis (H)      Trismus        PAST SURGICAL HISTORY:  Past Surgical History:   Procedure Laterality Date     aneurysm clipping  2006     BRAIN SURGERY       EXAM UNDER ANESTHESIA, RESTORATIONS, EXTRACTION(S) DENTAL COMPLEX, COMBINED N/A 12/19/2016    Procedure: COMBINED EXAM UNDER ANESTHESIA, RESTORATIONS, EXTRACTION(S) DENTAL COMPLEX;  Surgeon: Verónica Martinez DDS;  Location: [facility] OR     EXAM UNDER ANESTHESIA, RESTORATIONS, EXTRACTION(S) DENTAL COMPLEX, COMBINED N/A 3/7/2018    Procedure: COMBINED EXAM UNDER ANESTHESIA, RESTORATIONS, EXTRACTION(S) DENTAL COMPLEX;  Kenalog Injection in Bilateral [place], Combined Dental Exam,Radiographs, Three Dental Restorations, Periodontal Therapy and Fluoride Treatment;  Surgeon: Darryl Isbell DDS;  Location: [facility] OR     HH MIDLINE INSERTION  8/11/2017          INJECT STEROID (LOCATION) N/A 3/7/2018    Procedure: INJECT STEROID (LOCATION);  Kenalog Injection In Bilateral [place], Combined Dental Exam,Radiographs,Three Dental Restorations, Periodontal Therapy and Fluoride  Treatment;  Surgeon: Liu Conroy DDS;  Location: [facility] OR     IR GASTRO JEJUNOSTOMY TUBE CHANGE  7/19/2016     IR GASTRO JEJUNOSTOMY TUBE CHANGE  9/23/2016     IR GASTRO JEJUNOSTOMY TUBE CHANGE  11/28/2016     IR GASTRO JEJUNOSTOMY TUBE CHANGE  1/27/2017     IR GASTRO JEJUNOSTOMY TUBE CHANGE  4/19/2017     IR GASTRO JEJUNOSTOMY TUBE CHANGE  6/20/2017     IR GASTRO JEJUNOSTOMY TUBE CHANGE  8/7/2017     IR GASTRO JEJUNOSTOMY TUBE CHANGE  8/11/2017     IR GASTRO JEJUNOSTOMY TUBE CHANGE  10/20/2017     IR GASTRO JEJUNOSTOMY TUBE CHANGE  1/2/2018     IR GASTRO JEJUNOSTOMY TUBE CHANGE  2/16/2018     IR GASTRO JEJUNOSTOMY TUBE CHANGE  5/16/2018     IR GASTRO JEJUNOSTOMY TUBE CHANGE  6/11/2018     IR GASTRO JEJUNOSTOMY TUBE CHANGE  9/12/2018     IR GASTRO JEJUNOSTOMY TUBE CHANGE  12/24/2018     IR GASTRO JEJUNOSTOMY TUBE CHANGE  3/18/2019     IR GASTRO JEJUNOSTOMY TUBE CHANGE  4/23/2019     IR GASTRO JEJUNOSTOMY TUBE CHANGE  5/21/2019     IR GASTRO JEJUNOSTOMY TUBE CHANGE  9/9/2019     IR GASTRO JEJUNOSTOMY TUBE CHANGE  9/26/2019     IR GASTRO JEJUNOSTOMY TUBE CHANGE  10/11/2019     IR GASTRO JEJUNOSTOMY TUBE CHANGE  11/21/2019     IR GASTRO JEJUNOSTOMY TUBE CHANGE  11/29/2019     IR GASTRO JEJUNOSTOMY TUBE CHANGE  2/27/2020     IR GASTRO JEJUNOSTOMY TUBE CHANGE  4/10/2020     IR GASTRO JEJUNOSTOMY TUBE CHANGE  7/2/2020     IR GASTRO JEJUNOSTOMY TUBE CHANGE  8/28/2020     IR GASTRO JEJUNOSTOMY TUBE CHANGE  9/17/2020     IR GASTRO JEJUNOSTOMY TUBE CHANGE  10/29/2020     IR GASTRO JEJUNOSTOMY TUBE CHANGE  11/6/2020     IR GASTRO JEJUNOSTOMY TUBE CHANGE  12/24/2020     IR GASTRO JEJUNOSTOMY TUBE CHANGE  1/9/2021     IR GASTRO JEJUNOSTOMY TUBE CHANGE  6/9/2021     IR GASTRO JEJUNOSTOMY TUBE CHANGE  10/4/2021     IR GASTRO JEJUNOSTOMY TUBE CHANGE  10/18/2021     IR GASTRO JEJUNOSTOMY TUBE CHANGE  1/14/2022     IR GASTRO JEJUNOSTOMY TUBE CHANGE  1/20/2022     IR GASTRO JEJUNOSTOMY TUBE CHANGE  4/19/2022     IR GASTRO JEJUNOSTOMY  TUBE CHANGE  6/7/2022     IR GASTRO JEJUNOSTOMY TUBE CHANGE  8/19/2022     IR GASTRO JEJUNOSTOMY TUBE CHANGE  9/13/2022     IR GASTRO JEJUNOSTOMY TUBE CHANGE  10/27/2022     IR GASTRO JEJUNOSTOMY TUBE CHANGE  11/3/2022     IR GASTRO JEJUNOSTOMY TUBE CHANGE  1/18/2023     IR GASTRO JEJUNOSTOMY TUBE CHANGE  4/7/2023     IR GASTRO JEJUNOSTOMY TUBE CHANGE  5/4/2023     IR GASTRO JEJUNOSTOMY TUBE PLACEMENT  6/14/2016     IR GJ TUBE REPLACEMENT  12/24/2018     IR GJ TUBE REPLACEMENT  3/18/2019     IR GJ TUBE REPLACEMENT  4/23/2019     IR GJ TUBE REPLACEMENT  5/21/2019     IR GJ TUBE REPLACEMENT  9/9/2019     IR GJ TUBE REPLACEMENT  9/26/2019     IR GJ TUBE REPLACEMENT  10/11/2019     IR GJ TUBE REPLACEMENT  11/21/2019     IR GJ TUBE REPLACEMENT  11/29/2019     IR GJ TUBE REPLACEMENT  2/27/2020     IR GJ TUBE REPLACEMENT  4/10/2020     IR GJ TUBE REPLACEMENT  7/2/2020     IR GJ TUBE REPLACEMENT  8/28/2020     IR GJ TUBE REPLACEMENT  9/17/2020     IR GJ TUBE REPLACEMENT  10/29/2020     IR GJ TUBE REPLACEMENT  11/6/2020     IR GJ TUBE REPLACEMENT  12/24/2020     IR GJ TUBE REPLACEMENT  1/9/2021     IR GJ TUBE REPLACEMENT  6/9/2021     J-G tube       PICC  6/10/2016          TRACHEOSTOMY      Removed       CURRENT MEDICATIONS:    acetaminophen (TYLENOL) 500 MG tablet  amLODIPine (NORVASC) 5 MG tablet  aspirin (ASA) 81 MG chewable tablet  baclofen (LIORESAL) 10 MG tablet  bisacodyl (DULCOLAX) 10 MG suppository  cefaclor (CECLOR) 250 MG capsule  chlorhexidine (PERIDEX) 0.12 % solution  Dextromethorphan-guaiFENesin  MG/5ML syrup  doxazosin (CARDURA) 1 MG tablet  esomeprazole (NEXIUM) 20 MG DR capsule  ferrous sulfate 220 (44 Fe) MG/5ML ELIX  ipratropium (ATROVENT) 0.06 % nasal spray  ipratropium - albuterol 0.5 mg/2.5 mg/3 mL (DUONEB) 0.5-2.5 (3) MG/3ML neb solution  lidocaine (LIDODERM) 5 % patch  meclizine (ANTIVERT) 25 MG tablet  neomycin-bacitracin-polymyxin (NEOSPORIN) 5-400-5000 ointment  nystatin (MYCOSTATIN) 719945  "UNIT/GM external cream  polyethylene glycol (MIRALAX) 17 GM/Dose powder  Polyethylene Glycol 400 (VISINE DRY EYE RELIEF) 1 % SOLN  QUEtiapine (SEROQUEL) 25 MG tablet  saccharomyces boulardii (FLORASTOR) 250 MG capsule  simvastatin (ZOCOR) 20 MG tablet  Ostomy Supplies (STOMAHESIVE) PSTE        ALLERGIES:  Allergies   Allergen Reactions     Diphenhydramine Unknown     Trazodone Other (See Comments)     \"shaking\" per family       FAMILY HISTORY:  Family History   Problem Relation Age of Onset     Hypertension Mother      Cancer Father      Other Cancer Father      Kidney Disease Brother        SOCIAL HISTORY:   Social History     Socioeconomic History     Marital status:      Number of children: 2   Occupational History     Occupation: disability   Tobacco Use     Smoking status: Former     Packs/day: 1.00     Years: 30.00     Pack years: 30.00     Types: Cigarettes     Quit date: 2005     Years since quittin.0     Smokeless tobacco: Former     Quit date: 2007     Tobacco comments:     quit 2006   Vaping Use     Vaping status: Never Used   Substance and Sexual Activity     Alcohol use: No     Drug use: No     Sexual activity: Not Currently     Partners: Female     Birth control/protection: None   Other Topics Concern     Parent/sibling w/ CABG, MI or angioplasty before 65F 55M? No   Social History Narrative    Lives with wife, 2 daughters and son in law.    W/c bound.       VITALS:  Patient Vitals for the past 24 hrs:   BP Temp Temp src Pulse Resp SpO2 Height Weight   23 137/73 -- -- 101 23 96 % -- --   23 194 129/69 -- -- 96 22 96 % -- --   23 193 -- -- -- 97 22 96 % -- --   23 1920 (!) 145/81 -- -- 100 21 95 % -- --   23 1815 138/86 98.9  F (37.2  C) Temporal 105 22 (!) 86 % 1.575 m (5' 2\") 59 kg (130 lb)       PHYSICAL EXAM    GENERAL: Awake.   HEENT: Normocephalic, atraumatic.  Pupils equal, round and reactive.  Conjunctiva normal.  EOMI.  NECK: No stridor " or apparent deformity.  PULMONARY: Symmetrical breath sounds without distress. Diffuse bilateral breath sounds. Oxygen saturation of 86% on room air improved to 94% on 2L.   CARDIO: Regular rate and rhythm.  No significant murmur, rub or gallop.  Radial pulses strong and symmetrical.  ABDOMINAL: Abdomen soft and non-distended. No CVAT, no palpable hepatosplenomegaly. Appears uncomfortable when abdominal is palpated.   EXTREMITIES: No lower extremity swelling or edema.    NEURO:  Cranial nerves grossly intact. Nonverbal. Left upper extremity contractions. Making groaning sounds.   PSYCH: Normal mood and affect  SKIN: No rashes      LAB:  All pertinent labs reviewed and interpreted.  Results for orders placed or performed during the hospital encounter of 06/07/23   XR Chest Port 1 View    Impression    IMPRESSION: Bibasilar opacities suspicious for pneumonia. Normal heart size. No pleural effusions.   CT Abdomen Pelvis w/o Contrast    Impression    IMPRESSION:   1.  No acute findings in the abdomen and pelvis.. Gastrojejunostomy tube in appropriate position.     CT Head w/o Contrast    Impression    IMPRESSION:  1.  No CT evidence for acute intracranial process.  2.  Chronic infarcts in the basal ganglia bilaterally.  3.  Status post right MCA aneurysm clipping.  4.  No change.   Extra Blood Culture Bottle   Result Value Ref Range    Hold Specimen JIC    Extra Blue Top Tube   Result Value Ref Range    Hold Specimen JIC    Extra Red Top Tube   Result Value Ref Range    Hold Specimen JIC    Extra Green Top (Lithium Heparin) Tube   Result Value Ref Range    Hold Specimen JIC    Extra Purple Top Tube   Result Value Ref Range    Hold Specimen JIC    Extra Heparinized Syringe   Result Value Ref Range    Hold Specimen     Comprehensive metabolic panel   Result Value Ref Range    Sodium 138 136 - 145 mmol/L    Potassium 4.5 3.5 - 5.0 mmol/L    Chloride 105 98 - 107 mmol/L    Carbon Dioxide (CO2) 22 22 - 31 mmol/L    Anion Gap  11 5 - 18 mmol/L    Urea Nitrogen 33 (H) 8 - 22 mg/dL    Creatinine 1.06 0.70 - 1.30 mg/dL    Calcium 9.1 8.5 - 10.5 mg/dL    Glucose 105 70 - 125 mg/dL    Alkaline Phosphatase 70 45 - 120 U/L    AST 43 (H) 0 - 40 U/L    ALT 63 (H) 0 - 45 U/L    Protein Total 8.9 (H) 6.0 - 8.0 g/dL    Albumin 3.9 3.5 - 5.0 g/dL    Bilirubin Total 0.4 0.0 - 1.0 mg/dL    GFR Estimate 80 >60 mL/min/1.73m2   Lactic acid whole blood   Result Value Ref Range    Lactic Acid 1.3 0.7 - 2.0 mmol/L   Result Value Ref Range    Magnesium 2.3 1.8 - 2.6 mg/dL   B-Type Natriuretic Peptide ([facility] Only)   Result Value Ref Range    BNP 26 0 - 52 pg/mL   Result Value Ref Range    Procalcitonin 0.08 0.00 - 0.49 ng/mL   Symptomatic Influenza A/B, RSV, & SARS-CoV2 PCR (COVID-19) Nasopharyngeal    Specimen: Nasopharyngeal; Swab   Result Value Ref Range    Influenza A PCR Negative Negative    Influenza B PCR Negative Negative    RSV PCR Negative Negative    SARS CoV2 PCR Negative Negative   CBC with platelets and differential   Result Value Ref Range    WBC Count 12.0 (H) 4.0 - 11.0 10e3/uL    RBC Count 3.85 (L) 4.40 - 5.90 10e6/uL    Hemoglobin 12.4 (L) 13.3 - 17.7 g/dL    Hematocrit 36.7 (L) 40.0 - 53.0 %    MCV 95 78 - 100 fL    MCH 32.2 26.5 - 33.0 pg    MCHC 33.8 31.5 - 36.5 g/dL    RDW 12.1 10.0 - 15.0 %    Platelet Count 235 150 - 450 10e3/uL    % Neutrophils 82 %    % Lymphocytes 12 %    % Monocytes 5 %    % Eosinophils 1 %    % Basophils 0 %    % Immature Granulocytes 0 %    NRBCs per 100 WBC 0 <1 /100    Absolute Neutrophils 9.7 (H) 1.6 - 8.3 10e3/uL    Absolute Lymphocytes 1.4 0.8 - 5.3 10e3/uL    Absolute Monocytes 0.6 0.0 - 1.3 10e3/uL    Absolute Eosinophils 0.2 0.0 - 0.7 10e3/uL    Absolute Basophils 0.1 0.0 - 0.2 10e3/uL    Absolute Immature Granulocytes 0.1 <=0.4 10e3/uL    Absolute NRBCs 0.0 10e3/uL       RADIOLOGY:  Reviewed all pertinent imaging. Please see official radiology report.  XR Chest Port 1 View   Final Result   IMPRESSION:  Bibasilar opacities suspicious for pneumonia. Normal heart size. No pleural effusions.      CT Head w/o Contrast   Final Result   IMPRESSION:   1.  No CT evidence for acute intracranial process.   2.  Chronic infarcts in the basal ganglia bilaterally.   3.  Status post right MCA aneurysm clipping.   4.  No change.      CT Abdomen Pelvis w/o Contrast   Final Result   IMPRESSION:    1.  No acute findings in the abdomen and pelvis.. Gastrojejunostomy tube in appropriate position.                   I, Allan Adams, am serving as a scribe to document services personally performed by Dr. Pam Estrada based on my observation and the provider's statements to me. I, Pam Estrada MD attest that Allan Adams is acting in a scribe capacity, has observed my performance of the services and has documented them in accordance with my direction.     Pam Estrada MD  06/07/23 2123

## 2023-06-07 NOTE — ED TRIAGE NOTES
Pt recovering from recent aspiration pneumonia. Started having worsening cough yesterday and has vomited several times today and appeared restless.      Triage Assessment     Row Name 06/07/23 2785       Triage Assessment (Adult)    Airway WDL WDL       Respiratory WDL    Respiratory WDL X;rhythm/pattern;cough    Rhythm/Pattern, Respiratory tachypneic    Cough Frequency frequent       Skin Circulation/Temperature WDL    Skin Circulation/Temperature WDL WDL       Cardiac WDL    Cardiac WDL X;rhythm    Pulse Rate & Regularity tachycardic       Peripheral/Neurovascular WDL    Peripheral Neurovascular WDL WDL

## 2023-06-08 PROBLEM — J96.01 ACUTE HYPOXEMIC RESPIRATORY FAILURE (H): Status: ACTIVE | Noted: 2023-06-08

## 2023-06-08 PROBLEM — J69.0 RECURRENT ASPIRATION PNEUMONIA (H): Status: ACTIVE | Noted: 2023-06-08

## 2023-06-08 LAB
ALBUMIN SERPL-MCNC: 3.3 G/DL (ref 3.5–5)
ALP SERPL-CCNC: 54 U/L (ref 45–120)
ALT SERPL W P-5'-P-CCNC: 49 U/L (ref 0–45)
ANION GAP SERPL CALCULATED.3IONS-SCNC: 9 MMOL/L (ref 5–18)
AST SERPL W P-5'-P-CCNC: 31 U/L (ref 0–40)
BASOPHILS # BLD AUTO: 0.1 10E3/UL (ref 0–0.2)
BASOPHILS NFR BLD AUTO: 0 %
BILIRUB SERPL-MCNC: 0.4 MG/DL (ref 0–1)
BUN SERPL-MCNC: 31 MG/DL (ref 8–22)
CALCIUM SERPL-MCNC: 8.6 MG/DL (ref 8.5–10.5)
CHLORIDE BLD-SCNC: 108 MMOL/L (ref 98–107)
CO2 SERPL-SCNC: 20 MMOL/L (ref 22–31)
CREAT SERPL-MCNC: 1.35 MG/DL (ref 0.7–1.3)
EOSINOPHIL # BLD AUTO: 0 10E3/UL (ref 0–0.7)
EOSINOPHIL NFR BLD AUTO: 0 %
ERYTHROCYTE [DISTWIDTH] IN BLOOD BY AUTOMATED COUNT: 12.3 % (ref 10–15)
GFR SERPL CREATININE-BSD FRML MDRD: 60 ML/MIN/1.73M2
GLUCOSE BLD-MCNC: 114 MG/DL (ref 70–125)
HCT VFR BLD AUTO: 32.9 % (ref 40–53)
HGB BLD-MCNC: 10.8 G/DL (ref 13.3–17.7)
IMM GRANULOCYTES # BLD: 0.1 10E3/UL
IMM GRANULOCYTES NFR BLD: 0 %
LYMPHOCYTES # BLD AUTO: 2.7 10E3/UL (ref 0.8–5.3)
LYMPHOCYTES NFR BLD AUTO: 18 %
MCH RBC QN AUTO: 31.7 PG (ref 26.5–33)
MCHC RBC AUTO-ENTMCNC: 32.8 G/DL (ref 31.5–36.5)
MCV RBC AUTO: 97 FL (ref 78–100)
MONOCYTES # BLD AUTO: 1.2 10E3/UL (ref 0–1.3)
MONOCYTES NFR BLD AUTO: 8 %
NEUTROPHILS # BLD AUTO: 11 10E3/UL (ref 1.6–8.3)
NEUTROPHILS NFR BLD AUTO: 74 %
NRBC # BLD AUTO: 0 10E3/UL
NRBC BLD AUTO-RTO: 0 /100
PLATELET # BLD AUTO: 185 10E3/UL (ref 150–450)
POTASSIUM BLD-SCNC: 4.6 MMOL/L (ref 3.5–5)
PROT SERPL-MCNC: 7.5 G/DL (ref 6–8)
RBC # BLD AUTO: 3.41 10E6/UL (ref 4.4–5.9)
SODIUM SERPL-SCNC: 137 MMOL/L (ref 136–145)
TROPONIN I SERPL-MCNC: 0.02 NG/ML (ref 0–0.29)
TROPONIN I SERPL-MCNC: 0.02 NG/ML (ref 0–0.29)
TROPONIN I SERPL-MCNC: <0.01 NG/ML (ref 0–0.29)
WBC # BLD AUTO: 15 10E3/UL (ref 4–11)

## 2023-06-08 PROCEDURE — 80053 COMPREHEN METABOLIC PANEL: CPT | Performed by: HOSPITALIST

## 2023-06-08 PROCEDURE — 99232 SBSQ HOSP IP/OBS MODERATE 35: CPT | Performed by: FAMILY MEDICINE

## 2023-06-08 PROCEDURE — 120N000001 HC R&B MED SURG/OB

## 2023-06-08 PROCEDURE — 84484 ASSAY OF TROPONIN QUANT: CPT | Performed by: HOSPITALIST

## 2023-06-08 PROCEDURE — 250N000013 HC RX MED GY IP 250 OP 250 PS 637: Performed by: HOSPITALIST

## 2023-06-08 PROCEDURE — 250N000009 HC RX 250: Performed by: HOSPITALIST

## 2023-06-08 PROCEDURE — 99253 IP/OBS CNSLTJ NEW/EST LOW 45: CPT | Performed by: INTERNAL MEDICINE

## 2023-06-08 PROCEDURE — 250N000011 HC RX IP 250 OP 636: Performed by: FAMILY MEDICINE

## 2023-06-08 PROCEDURE — 999N000157 HC STATISTIC RCP TIME EA 10 MIN

## 2023-06-08 PROCEDURE — 94640 AIRWAY INHALATION TREATMENT: CPT

## 2023-06-08 PROCEDURE — 94640 AIRWAY INHALATION TREATMENT: CPT | Mod: 76

## 2023-06-08 PROCEDURE — 85025 COMPLETE CBC W/AUTO DIFF WBC: CPT | Performed by: HOSPITALIST

## 2023-06-08 PROCEDURE — 99254 IP/OBS CNSLTJ NEW/EST MOD 60: CPT | Performed by: STUDENT IN AN ORGANIZED HEALTH CARE EDUCATION/TRAINING PROGRAM

## 2023-06-08 PROCEDURE — 250N000011 HC RX IP 250 OP 636: Performed by: HOSPITALIST

## 2023-06-08 PROCEDURE — 36415 COLL VENOUS BLD VENIPUNCTURE: CPT | Performed by: HOSPITALIST

## 2023-06-08 PROCEDURE — 250N000013 HC RX MED GY IP 250 OP 250 PS 637: Performed by: FAMILY MEDICINE

## 2023-06-08 RX ORDER — ATROPINE SULFATE 10 MG/ML
2 SOLUTION/ DROPS OPHTHALMIC
Status: DISCONTINUED | OUTPATIENT
Start: 2023-06-08 | End: 2023-06-10 | Stop reason: HOSPADM

## 2023-06-08 RX ORDER — IPRATROPIUM BROMIDE AND ALBUTEROL SULFATE 2.5; .5 MG/3ML; MG/3ML
1 SOLUTION RESPIRATORY (INHALATION)
Status: DISCONTINUED | OUTPATIENT
Start: 2023-06-09 | End: 2023-06-10 | Stop reason: HOSPADM

## 2023-06-08 RX ORDER — QUETIAPINE FUMARATE 25 MG/1
25 TABLET, FILM COATED ORAL 3 TIMES DAILY
Status: DISCONTINUED | OUTPATIENT
Start: 2023-06-08 | End: 2023-06-10 | Stop reason: HOSPADM

## 2023-06-08 RX ORDER — MEROPENEM 500 MG/1
500 INJECTION, POWDER, FOR SOLUTION INTRAVENOUS EVERY 8 HOURS
Status: DISCONTINUED | OUTPATIENT
Start: 2023-06-08 | End: 2023-06-09

## 2023-06-08 RX ADMIN — MEROPENEM 500 MG: 500 INJECTION, POWDER, FOR SOLUTION INTRAVENOUS at 00:09

## 2023-06-08 RX ADMIN — MEROPENEM 500 MG: 500 INJECTION, POWDER, FOR SOLUTION INTRAVENOUS at 20:07

## 2023-06-08 RX ADMIN — IPRATROPIUM BROMIDE AND ALBUTEROL SULFATE 3 ML: .5; 3 SOLUTION RESPIRATORY (INHALATION) at 08:01

## 2023-06-08 RX ADMIN — BACITRACIN ZINC, NEOMYCIN, POLYMYXIN B 1 EACH: 400; 3.5; 5 OINTMENT TOPICAL at 08:23

## 2023-06-08 RX ADMIN — BACITRACIN ZINC, NEOMYCIN, POLYMYXIN B: 400; 3.5; 5 OINTMENT TOPICAL at 12:19

## 2023-06-08 RX ADMIN — Medication 250 MG: at 08:22

## 2023-06-08 RX ADMIN — BACLOFEN 10 MG: 10 TABLET ORAL at 20:08

## 2023-06-08 RX ADMIN — PANTOPRAZOLE SODIUM 40 MG: 40 TABLET, DELAYED RELEASE ORAL at 06:30

## 2023-06-08 RX ADMIN — DOXYCYCLINE 100 MG: 100 INJECTION, POWDER, LYOPHILIZED, FOR SOLUTION INTRAVENOUS at 00:02

## 2023-06-08 RX ADMIN — IPRATROPIUM BROMIDE AND ALBUTEROL SULFATE 3 ML: .5; 3 SOLUTION RESPIRATORY (INHALATION) at 02:23

## 2023-06-08 RX ADMIN — DOXAZOSIN 1 MG: 1 TABLET ORAL at 08:22

## 2023-06-08 RX ADMIN — QUETIAPINE FUMARATE 25 MG: 25 TABLET ORAL at 20:08

## 2023-06-08 RX ADMIN — SIMVASTATIN 20 MG: 20 TABLET, FILM COATED ORAL at 21:23

## 2023-06-08 RX ADMIN — BACITRACIN ZINC, NEOMYCIN, POLYMYXIN B: 400; 3.5; 5 OINTMENT TOPICAL at 20:17

## 2023-06-08 RX ADMIN — MEROPENEM 500 MG: 500 INJECTION, POWDER, FOR SOLUTION INTRAVENOUS at 06:30

## 2023-06-08 RX ADMIN — BACLOFEN 10 MG: 10 TABLET ORAL at 09:26

## 2023-06-08 RX ADMIN — ACETAMINOPHEN 1000 MG: 500 TABLET ORAL at 06:30

## 2023-06-08 RX ADMIN — SIMVASTATIN 20 MG: 20 TABLET, FILM COATED ORAL at 00:02

## 2023-06-08 RX ADMIN — DOXYCYCLINE 100 MG: 100 INJECTION, POWDER, LYOPHILIZED, FOR SOLUTION INTRAVENOUS at 10:54

## 2023-06-08 RX ADMIN — AMLODIPINE BESYLATE 5 MG: 5 TABLET ORAL at 08:21

## 2023-06-08 RX ADMIN — IPRATROPIUM BROMIDE AND ALBUTEROL SULFATE 3 ML: .5; 3 SOLUTION RESPIRATORY (INHALATION) at 20:50

## 2023-06-08 RX ADMIN — ASPIRIN 81 MG CHEWABLE TABLET 81 MG: 81 TABLET CHEWABLE at 08:21

## 2023-06-08 RX ADMIN — DOXYCYCLINE 100 MG: 100 INJECTION, POWDER, LYOPHILIZED, FOR SOLUTION INTRAVENOUS at 21:30

## 2023-06-08 RX ADMIN — ACETAMINOPHEN 500 MG: 500 TABLET ORAL at 00:03

## 2023-06-08 RX ADMIN — IPRATROPIUM BROMIDE 2 SPRAY: 42 SPRAY, METERED NASAL at 00:12

## 2023-06-08 RX ADMIN — IPRATROPIUM BROMIDE 2 SPRAY: 42 SPRAY, METERED NASAL at 08:31

## 2023-06-08 RX ADMIN — QUETIAPINE FUMARATE 25 MG: 25 TABLET ORAL at 02:57

## 2023-06-08 RX ADMIN — MEROPENEM 500 MG: 500 INJECTION, POWDER, FOR SOLUTION INTRAVENOUS at 12:18

## 2023-06-08 RX ADMIN — IPRATROPIUM BROMIDE AND ALBUTEROL SULFATE 3 ML: .5; 3 SOLUTION RESPIRATORY (INHALATION) at 15:25

## 2023-06-08 RX ADMIN — BACITRACIN ZINC, NEOMYCIN, POLYMYXIN B: 400; 3.5; 5 OINTMENT TOPICAL at 00:13

## 2023-06-08 RX ADMIN — IPRATROPIUM BROMIDE AND ALBUTEROL SULFATE 3 ML: .5; 3 SOLUTION RESPIRATORY (INHALATION) at 11:36

## 2023-06-08 ASSESSMENT — ACTIVITIES OF DAILY LIVING (ADL)
TOILETING_ISSUES: YES
ADLS_ACUITY_SCORE: 51
TRANSFERRING: 2-->COMPLETELY DEPENDENT (NOT DEVELOPMENTALLY APPROPRIATE)
ADLS_ACUITY_SCORE: 51
DOING_ERRANDS_INDEPENDENTLY_DIFFICULTY: YES
EATING: 2-->COMPLETELY DEPENDENT (NOT DEVELOPMENTALLY APPROPRIATE)
DIFFICULTY_EATING/SWALLOWING: YES
ADLS_ACUITY_SCORE: 35
CONCENTRATING,_REMEMBERING_OR_MAKING_DECISIONS_DIFFICULTY: YES
SWALLOWING: 2-->DIFFICULTY SWALLOWING LIQUIDS/FOODS
WALKING_OR_CLIMBING_STAIRS: TRANSFERRING DIFFICULTY, DEPENDENT;AMBULATION DIFFICULTY, DEPENDENT
TOILETING_ASSISTANCE: TOILETING DIFFICULTY, DEPENDENT
EQUIPMENT_CURRENTLY_USED_AT_HOME: WHEELCHAIR, MANUAL
FALL_HISTORY_WITHIN_LAST_SIX_MONTHS: NO
TRANSFERRING: 2-->COMPLETELY DEPENDENT
ADLS_ACUITY_SCORE: 73
DRESSING/BATHING_DIFFICULTY: YES
EATING/SWALLOWING: EATING;SWALLOWING LIQUIDS;SWALLOWING SOLID FOOD
DRESS: 2-->COMPLETELY DEPENDENT (NOT DEVELOPMENTALLY APPROPRIATE)
EATING/SWALLOWING_MANAGEMENT: NPO
EATING: 2-->COMPLETELY DEPENDENT
TOILETING_MANAGEMENT: PUREWICK
ADLS_ACUITY_SCORE: 73
DRESSING/BATHING: DRESSING DIFFICULTY, DEPENDENT;BATHING DIFFICULTY, DEPENDENT
ADLS_ACUITY_SCORE: 73
ADLS_ACUITY_SCORE: 73
CHANGE_IN_FUNCTIONAL_STATUS_SINCE_ONSET_OF_CURRENT_ILLNESS/INJURY: NO
DRESS: 2-->COMPLETELY DEPENDENT
ADLS_ACUITY_SCORE: 51
WALKING_OR_CLIMBING_STAIRS_DIFFICULTY: YES
BATHING: 2-->COMPLETELY DEPENDENT (NOT DEVELOPMENTALLY APPROPRIATE)
SWALLOWING: 2-->DIFFICULTY SWALLOWING LIQUIDS/FOODS
WEAR_GLASSES_OR_BLIND: NO
DRESSING/BATHING_MANAGEMENT: FAMILY TO ASSIST
TOILETING: 2-->COMPLETELY DEPENDENT (NOT DEVELOPMENTALLY APPROPRIATE)
ADLS_ACUITY_SCORE: 51
TOILETING: 2-->COMPLETELY DEPENDENT

## 2023-06-08 NOTE — H&P
[facility] MEDICINE ADMISSION HISTORY AND PHYSICAL     Brief Synopsis:     Liu GIL Malave is a 58 year old male who presented with complaints of cough, vomiting, restlessness.    Medical history is notable for stroke with significant sequelae, siezures, hemiplegia. Also hx of htn, MRSA, depresssion, ESBL UTI, aspiration pneumonia, dependent on tube feeding via G tube.    Initial evaluation revealed slight hypoxia, tachycardia. Elevated WBC of 12.0. UA neg for infection.  CT abdomen with GJ tube in appropriate position, otherwise unremarkable.  CT head negative for acute disease.  Chest x-ray with bibasilar infiltrates.  Procalcitonin normal.    Initial treatment included Zofran, Zosyn, supplemental oxygen.    Assessment and Plan:  Acute respiratory failure with hypoxia  Possible recurrent aspiration pneumonia, just completed course of Augmentin  History of hemorrhagic CVA with hemiplegia, nonverbal, nonambulatory  GJ tube dependent for feeding, 24-hour feeds  Recurrent UTIs, on cefaclor prophylaxis  History of MRSA, ESBL E. coli in prior cultures  Keep head of bed elevated  Strict n.p.o. status   Supplemental oxygen as needed  Check sputum culture if able  Will treat with meropenem and doxycycline for now  Consult infectious disease regarding the hx of resistant organisms, recurrence  Antiemetics prn, no diarrhea noted    Essential hypertension, on amlodipine, doxazosin  Hyperlipidemia, on Zocor  Insomnia, on Seroquel at night  Reactive airway disease, on DuoNebs every 4 hours  GERD, on esomeprazole 20 mg twice daily  Spasticity, on baclofen as needed    Clinically Significant Risk Factors Present on Admission                # Drug Induced Platelet Defect: home medication list includes an antiplatelet medication   # Hypertension: Home medication list includes antihypertensive(s)                 DVTP: Mechanical Prophylaxis/ Sequential Compression Devices  Code Status: Prior  Disposition:  Inpatient   Diet: N.p.o., continue home tube feedings  Fluids: None    Disposition Plan      Expected Discharge Date: 06/09/2023               Chief Complaint  cough, vomiting     HISTORY   Liu Malave is a 58 year old male who presented with complaints of cough and vomiting.    Per ED provider:  Liu Malave is a 58 year old male with PMHx of prior hemorrhagic stroke, g-tube dependent, recurrent aspiration pneumonia, hypertension, hyperlipidemia, bed-bound, non-verbal who presents to the ED today via wheelchair with cough and vomiting.     Per daughter,  The patient developed a cough and fatigue yesterday and has had a few episodes of vomiting today. The vomit is watery and looks like there is some mucus in it. She is concerned about this because he is only fed through the g-tube. He has had normal stooling and urinating recently. She also confirms that he finished the course of antibiotics but has a history of antibiotic resistance.      He has not had fever or blood in stool. The daughter denies any other complaints at this time.      Per chart review,  The patient was admitted for aspiration pneumonia from 4/25-4/27/2023. At this hospitalization he had some urinary retention. He went to his PCP for follow up on 5/24 and they noted that his oxygen saturation was 94% on room air but there were coarse breath sounds on his lung exam. The PCP referred them to pulmonology and to get a CT scan on 5/25. The CT scan revealed pneumonia so he prescribed a course of Augmentin. The PCP noted that all of the contacts in the patient's chart were non-working numbers so he was unsure if the patient got the prescription.    Seen with daughter helping to communicate for her father.  She thinks that maybe he has had some chest pain.  She also notices that he is vomiting clear liquid but not taking in any clear liquid.  Does not get anything by mouth.  Only gets tube feedings continuously every 24 hours.  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  No date: Convulsions, unspecified convulsion type (H)  07/2016: CVA (cerebral vascular accident) (H)      Comment:  Hemorrhagic,   No date: Depression  No date: Dyslipidemia  No date: Elevated troponin  No date: Essential hypertension  06/04/2020: Feeding by G-tube (H)  No date: GERD (gastroesophageal reflux disease)  No date: Hemiplegia of nondominant side, late effect of   cerebrovascular disease (H)      Comment:  Created by Conversion  Replacement Utility updated for                latest IMO load  06/05/2016: Hemorrhagic stroke (H)  No date: Hepatitis C carrier (H)  No date: History of ESBL E. coli infection  No date: History of hemorrhagic stroke with residual hemiparesis (H)  No date: Hypertension  12/05/2016: Lung nodule      Comment:  9 x 9 mm left upper lobe on CXR  No date: MRSA (methicillin resistant staph aureus) culture positive  No date: Periodontal disease  No date: Persistent fever  No date: Pneumonia of both lower lobes due to infectious organism  No date: Respiratory failure with hypoxia (H)  No date: Seizure (H)  06/04/2020: Sepsis due to urinary tract infection (H)  No date: Severe sepsis (H)  No date: Trismus     Surgical History     Past Surgical History:   Procedure Laterality Date     aneurysm clipping  2006     BRAIN SURGERY       EXAM UNDER ANESTHESIA, RESTORATIONS, EXTRACTION(S) DENTAL COMPLEX, COMBINED N/A 12/19/2016    Procedure: COMBINED EXAM UNDER ANESTHESIA, RESTORATIONS, EXTRACTION(S) DENTAL COMPLEX;  Surgeon: Verónica Martinez DDS;  Location: [facility] OR     EXAM UNDER ANESTHESIA, RESTORATIONS, EXTRACTION(S) DENTAL COMPLEX, COMBINED N/A 3/7/2018    Procedure: COMBINED EXAM UNDER ANESTHESIA, RESTORATIONS, EXTRACTION(S) DENTAL COMPLEX;  Kenalog Injection in Bilateral [place], Combined Dental  Exam,Radiographs, Three Dental Restorations, Periodontal Therapy and Fluoride Treatment;  Surgeon: Darryl Isbell DDS;  Location: [facility] OR     HH MIDLINE INSERTION  8/11/2017          INJECT STEROID (LOCATION) N/A 3/7/2018    Procedure: INJECT STEROID (LOCATION);  Kenalog Injection In Bilateral [place], Combined Dental Exam,Radiographs,Three Dental Restorations, Periodontal Therapy and Fluoride Treatment;  Surgeon: Liu Conroy DDS;  Location: [facility] OR     IR GASTRO JEJUNOSTOMY TUBE CHANGE  7/19/2016     IR GASTRO JEJUNOSTOMY TUBE CHANGE  9/23/2016     IR GASTRO JEJUNOSTOMY TUBE CHANGE  11/28/2016     IR GASTRO JEJUNOSTOMY TUBE CHANGE  1/27/2017     IR GASTRO JEJUNOSTOMY TUBE CHANGE  4/19/2017     IR GASTRO JEJUNOSTOMY TUBE CHANGE  6/20/2017     IR GASTRO JEJUNOSTOMY TUBE CHANGE  8/7/2017     IR GASTRO JEJUNOSTOMY TUBE CHANGE  8/11/2017     IR GASTRO JEJUNOSTOMY TUBE CHANGE  10/20/2017     IR GASTRO JEJUNOSTOMY TUBE CHANGE  1/2/2018     IR GASTRO JEJUNOSTOMY TUBE CHANGE  2/16/2018     IR GASTRO JEJUNOSTOMY TUBE CHANGE  5/16/2018     IR GASTRO JEJUNOSTOMY TUBE CHANGE  6/11/2018     IR GASTRO JEJUNOSTOMY TUBE CHANGE  9/12/2018     IR GASTRO JEJUNOSTOMY TUBE CHANGE  12/24/2018     IR GASTRO JEJUNOSTOMY TUBE CHANGE  3/18/2019     IR GASTRO JEJUNOSTOMY TUBE CHANGE  4/23/2019     IR GASTRO JEJUNOSTOMY TUBE CHANGE  5/21/2019     IR GASTRO JEJUNOSTOMY TUBE CHANGE  9/9/2019     IR GASTRO JEJUNOSTOMY TUBE CHANGE  9/26/2019     IR GASTRO JEJUNOSTOMY TUBE CHANGE  10/11/2019     IR GASTRO JEJUNOSTOMY TUBE CHANGE  11/21/2019     IR GASTRO JEJUNOSTOMY TUBE CHANGE  11/29/2019     IR GASTRO JEJUNOSTOMY TUBE CHANGE  2/27/2020     IR GASTRO JEJUNOSTOMY TUBE CHANGE  4/10/2020     IR GASTRO JEJUNOSTOMY TUBE CHANGE  7/2/2020     IR GASTRO JEJUNOSTOMY TUBE CHANGE  8/28/2020     IR GASTRO JEJUNOSTOMY TUBE CHANGE  9/17/2020     IR GASTRO JEJUNOSTOMY TUBE CHANGE  10/29/2020     IR GASTRO JEJUNOSTOMY TUBE CHANGE  11/6/2020     IR GASTRO  JEJUNOSTOMY TUBE CHANGE  12/24/2020     IR GASTRO JEJUNOSTOMY TUBE CHANGE  1/9/2021     IR GASTRO JEJUNOSTOMY TUBE CHANGE  6/9/2021     IR GASTRO JEJUNOSTOMY TUBE CHANGE  10/4/2021     IR GASTRO JEJUNOSTOMY TUBE CHANGE  10/18/2021     IR GASTRO JEJUNOSTOMY TUBE CHANGE  1/14/2022     IR GASTRO JEJUNOSTOMY TUBE CHANGE  1/20/2022     IR GASTRO JEJUNOSTOMY TUBE CHANGE  4/19/2022     IR GASTRO JEJUNOSTOMY TUBE CHANGE  6/7/2022     IR GASTRO JEJUNOSTOMY TUBE CHANGE  8/19/2022     IR GASTRO JEJUNOSTOMY TUBE CHANGE  9/13/2022     IR GASTRO JEJUNOSTOMY TUBE CHANGE  10/27/2022     IR GASTRO JEJUNOSTOMY TUBE CHANGE  11/3/2022     IR GASTRO JEJUNOSTOMY TUBE CHANGE  1/18/2023     IR GASTRO JEJUNOSTOMY TUBE CHANGE  4/7/2023     IR GASTRO JEJUNOSTOMY TUBE CHANGE  5/4/2023     IR GASTRO JEJUNOSTOMY TUBE PLACEMENT  6/14/2016     IR GJ TUBE REPLACEMENT  12/24/2018     IR GJ TUBE REPLACEMENT  3/18/2019     IR GJ TUBE REPLACEMENT  4/23/2019     IR GJ TUBE REPLACEMENT  5/21/2019     IR GJ TUBE REPLACEMENT  9/9/2019     IR GJ TUBE REPLACEMENT  9/26/2019     IR GJ TUBE REPLACEMENT  10/11/2019     IR GJ TUBE REPLACEMENT  11/21/2019     IR GJ TUBE REPLACEMENT  11/29/2019     IR GJ TUBE REPLACEMENT  2/27/2020     IR GJ TUBE REPLACEMENT  4/10/2020     IR GJ TUBE REPLACEMENT  7/2/2020     IR GJ TUBE REPLACEMENT  8/28/2020     IR GJ TUBE REPLACEMENT  9/17/2020     IR GJ TUBE REPLACEMENT  10/29/2020     IR GJ TUBE REPLACEMENT  11/6/2020     IR GJ TUBE REPLACEMENT  12/24/2020     IR GJ TUBE REPLACEMENT  1/9/2021     IR GJ TUBE REPLACEMENT  6/9/2021     J-G tube       PICC  6/10/2016          TRACHEOSTOMY      Removed     Family History      Family History   Problem Relation Age of Onset     Hypertension Mother      Cancer Father      Other Cancer Father      Kidney Disease Brother       Social History      Social History     Tobacco Use     Smoking status: Former     Packs/day: 1.00     Years: 30.00     Pack years: 30.00     Types: Cigarettes      "Quit date: 2005     Years since quittin.0     Smokeless tobacco: Former     Quit date: 2007     Tobacco comments:     quit 2006   Vaping Use     Vaping status: Never Used   Substance Use Topics     Alcohol use: No     Drug use: No      Allergies     Allergies   Allergen Reactions     Diphenhydramine Unknown     Trazodone Other (See Comments)     \"shaking\" per family     Prior to Admission Medications      Prior to Admission Medications   Prescriptions Last Dose Informant Patient Reported? Taking?   Dextromethorphan-guaiFENesin  MG/5ML syrup 2023 at 1500  No Yes   Sig: TAKE 10 MLS BY G TUBE ROUTE EVERY 4 HOURS AS NEEDED FOR COUGH.   Ostomy Supplies (STOMAHESIVE) PSTE   No No   Sig: Apply topically to skin around ostomy site twice daily and as needed   Polyethylene Glycol 400 (VISINE DRY EYE RELIEF) 1 % SOLN 2023  No Yes   Sig: Apply 1 drop to eye every 6 hours as needed (dry eyes)   QUEtiapine (SEROQUEL) 25 MG tablet 2023  No Yes   Sig: Take 1 tablet (25 mg) by mouth See Admin Instructions Take 1 tablet at 7PM, Midnight and 3AM for total of 75 mg nightly. Ok to repeat after 4 hours and during the day PRN   acetaminophen (TYLENOL) 500 MG tablet 2023 at 1000  Yes Yes   Si-1,000 mg by Gastric Tube route every 6 hours as needed for mild pain   amLODIPine (NORVASC) 5 MG tablet 2023  No Yes   Sig: Take 1 tablet (5 mg total) by mouth daily.   aspirin (ASA) 81 MG chewable tablet Past Week  No Yes   Si tablet (81 mg) by Per Feeding Tube route daily   baclofen (LIORESAL) 10 MG tablet 2023 at 2100  Yes Yes   Sig: 10 mg by Per G Tube route 4 times daily as needed for muscle spasms Usually takes with Seroquel   bisacodyl (DULCOLAX) 10 MG suppository 2023  No Yes   Sig: INSERT 1 SUPPOSTIORY INTO THE RECTUM DAILY AS NEEDED FOR CONSTIPATION.   cefaclor (CECLOR) 250 MG capsule 2023  No Yes   Sig: Give 1 capsule via g tube daily for UTI prevention to be started in 5 day "   chlorhexidine (PERIDEX) 0.12 % solution 2023  Yes Yes   Sig: Swish and spit 15 mLs in mouth daily    doxazosin (CARDURA) 1 MG tablet 2023  No Yes   Si tablet (1 mg) by Per G Tube route daily   esomeprazole (NEXIUM) 20 MG DR capsule 2023 at am x1  Yes Yes   Sig: Take 20 mg by mouth 2 times daily Take 30-60 minutes before eating.   ferrous sulfate 220 (44 Fe) MG/5ML ELIX 2023  No Yes   Sig: TAKE 5 MLS BY ENTERAL ROUTE DAILY   ipratropium (ATROVENT) 0.06 % nasal spray 2023 at am x1  Yes Yes   Sig: Spray 2 sprays into both nostrils 2 times daily   ipratropium - albuterol 0.5 mg/2.5 mg/3 mL (DUONEB) 0.5-2.5 (3) MG/3ML neb solution 2023 at 0800  Yes Yes   Sig: Take 1 vial by nebulization every 4 hours   lidocaine (LIDODERM) 5 % patch Unknown at prn  No Yes   Sig: Place 1 patch onto the skin every 24 hours To prevent lidocaine toxicity, patient should be patch free for 12 hrs daily.   Patient taking differently: Place 1 patch onto the skin daily as needed To prevent lidocaine toxicity, patient should be patch free for 12 hrs daily.   meclizine (ANTIVERT) 25 MG tablet 2023  No Yes   Sig: Take 1 tablet via G-tube every 6 hours as needed for vertigo   neomycin-bacitracin-polymyxin (NEOSPORIN) 5-400-5000 ointment 2023 at x2  No Yes   Sig: Apply topically 4 times daily   nystatin (MYCOSTATIN) 648597 UNIT/GM external cream 2023  No Yes   Sig: Apply topically 2 times daily   Patient taking differently: Apply topically 2 times daily as needed (redness)   polyethylene glycol (MIRALAX) 17 GM/Dose powder Unknown at prn  No Yes   Sig: Give 1 capful via G-tube daily as needed for constipation   saccharomyces boulardii (FLORASTOR) 250 MG capsule Past Week  No Yes   Sig: Take 1 capsule (250 mg) by mouth daily   simvastatin (ZOCOR) 20 MG tablet 2023  No Yes   Si tablet (20 mg) by Per G Tube route At Bedtime      Facility-Administered Medications: None      Review of Systems     A 12 point  comprehensive review of systems was negative except as noted above in HPI.    PHYSICAL EXAMINATION     Vitals      Temp:  [98.9  F (37.2  C)] 98.9  F (37.2  C)  Pulse:  [] 101  Resp:  [21-23] 23  BP: (129-145)/(69-86) 137/73  SpO2:  [86 %-96 %] 96 %    Examination   Physical Exam:    Gen: no acute distress, comfortable, alert but not able to interact  ENT: no scleral icterus  Pulm: Right-sided expiratory rhonchi, breathing comfortably at rest with oxygen via nasal cannula, mild expiratory wheezing noted, no retractions, not tachypneic  CV: regular rate and rhythm, no significant lower extremity pitting edema  GI: abdomen is soft, non-tender, non-distended with active bowel sounds.  MSK: no obvious deformities of the extremities  Derm: Not pale, no jaundice  Psych: appropriate affect      Pertinent Radiology     Radiology Results:   Recent Results (from the past 24 hour(s))   CT Abdomen Pelvis w/o Contrast    Narrative    EXAM: CT ABDOMEN PELVIS W/O CONTRAST  LOCATION: [facility]  DATE/TIME: 6/7/2023 8:28 PM CDT    INDICATION: g tube, vomiting today  COMPARISON: 02/10/2023  TECHNIQUE: CT scan of the abdomen and pelvis was performed without IV contrast. Multiplanar reformats were obtained. Dose reduction techniques were used.  CONTRAST: None.    FINDINGS:   LOWER CHEST: Patchy bilateral airspace opacity at the lung bases.    HEPATOBILIARY: Liver and gallbladder within normal limits.    PANCREAS: Normal.    SPLEEN: Normal.    ADRENAL GLANDS: Normal.    KIDNEYS/BLADDER: Bilateral renal cysts which are benign and needs no further follow-up. No hydronephrosis.    BOWEL: No obstruction or inflammatory change. Gastrojejunostomy tube appears to be in appropriate position.    LYMPH NODES: Normal.    VASCULATURE: Moderate atherosclerotic disease of the abdominal aorta and its branches.    PELVIC ORGANS: Bladder within normal limits.    MUSCULOSKELETAL: Degenerative changes of the spine.       Impression    IMPRESSION:   1.  No acute findings in the abdomen and pelvis.. Gastrojejunostomy tube in appropriate position.     CT Head w/o Contrast    Narrative    EXAM: CT HEAD W/O CONTRAST  LOCATION: [facility]  DATE/TIME: 6/7/2023 8:29 PM CDT    INDICATION: Acute vomiting. History of hemorrhagic stroke.  COMPARISON: Head CT 04/25/2023  TECHNIQUE: Routine CT Head without IV contrast. Multiplanar reformats. Dose reduction techniques were used.    FINDINGS:  INTRACRANIAL CONTENTS: No intracranial hemorrhage, extraaxial collection, or mass effect.  No CT evidence of acute infarct. Chronic infarcts in the basal ganglia bilaterally. Postsurgical change from a right MCA aneurysm clipping. Associated   encephalomalacia in the anterior right temporal lobe. These findings are unchanged. No new abnormality. Mild generalized volume loss.    VISUALIZED ORBITS/SINUSES/MASTOIDS: No intraorbital abnormality. No paranasal sinus mucosal disease. No middle ear or mastoid effusion.    BONES/SOFT TISSUES: No acute abnormality.      Impression    IMPRESSION:  1.  No CT evidence for acute intracranial process.  2.  Chronic infarcts in the basal ganglia bilaterally.  3.  Status post right MCA aneurysm clipping.  4.  No change.   XR Chest Port 1 View    Narrative    EXAM: XR CHEST PORT 1 VIEW  LOCATION: [facility]  DATE/TIME: 6/7/2023 8:29 PM CDT    INDICATION: hypoxia cough sepsis likely PNA (see recent CAT scan)  COMPARISON: CT abdomen and pelvis 06/07/2023, CT chest 05/25/2023      Impression    IMPRESSION: Bibasilar opacities suspicious for pneumonia. Normal heart size. No pleural effusions.     Silver Pereyra,   [facility]  [facility]   Phone: #[phone]

## 2023-06-08 NOTE — CONSULTS
Consultation - INFECTIOUS DISEASE CONSULTATION  Liu Malave,  1965, MRN [id number]      Sepsis, due to unspecified organism, unspecified whether acute organ dysfunction present (H) [A41.9]    PCP: Lynda Gutierrez, [phone]   Code status:  Full Code               Chief Complaint: Sepsis, due to unspecified organism, unspecified whether acute organ dysfunction present (H)     Assessment:  Liu Malave is a 58 year old old male with   1.  History of CVA with nonverbal hemiplegia.  2.  Chronic encephalopathy with spasticity  3.  History of COPD with chronic cough  4.  Chronic dysphagia status post G-tube in place.    5.  History of MRSA and ESBL producing organisms  6.  Recurrent aspiration pneumonia.  Last admission on 2023 treated with IV Zosyn and discharged on p.o. Augmentin.  CT chest on 2023 with patchy infiltrates consistent with pneumonia.  Another course of Augmentin sent to his pharmacy.  7.  Now admitted with sepsis with acute respiratory failure with hypoxia.  Leukocytosis present on admission.  Respiratory multiplex PCR and sputum cultures ordered.  On IV meropenem and doxycycline.     Recommendations:     Continue meropenem and doxycycline.  Monitor oxygen status.  I concur with sputum cultures and respiratory multiplex panel.    Discussed with the patient and daughter at bedside, nursing staff.    Thank you for letting us be part of the patient care team. We will follow.    Annmarie Martin MD,MD  [facility].   [facility]  Office Telephone [phone].  Fax [phone]  [facility] paging    HPI:    Liu Malave is a 58 year old old male. History is provided by patient's daughter, chart review, speaking nursing staff.  ID is asked to see this patient for acute respiratory failure with hypoxia.  The patient has a history of CVA with nonverbal hemiplegia, chronic encephalopathy with spasticity, COPD, chronic dysphagia status post  G-tube, multiple admission and clinic visits with aspiration pneumonia.  Last admission was in 4/2023 when he was treated with IV Zosyn and discharged on p.o. Augmentin.  On 5/25/2023, the patient was seen in primary care clinic and had a CT scan of the chest with finding suggestive of pneumonia.  Another course of Augmentin was prescribed.  The patient is now admitted to the hospital with fatigue, shortness of breath without fever.  Seen today, daughter reports patient doing better.      ===========================================    Medical History  Past Medical History:   Diagnosis Date     Acute UTI 02/21/2020     Anxiety      Basal ganglia hemorrhage (H) 06/20/2016     Brain compression (H)      Bruxism      Chronic static encephalopathy      Convulsions, unspecified convulsion type (H)      CVA (cerebral vascular accident) (H) 07/2016    Hemorrhagic,      Depression      Dyslipidemia      Elevated troponin      Essential hypertension      Feeding by G-tube (H) 06/04/2020     GERD (gastroesophageal reflux disease)      Hemiplegia of nondominant side, late effect of cerebrovascular disease (H)     Created by Conversion  Replacement Utility updated for latest IMO load     Hemorrhagic stroke (H) 06/05/2016     Hepatitis C carrier (H)      History of ESBL E. coli infection      History of hemorrhagic stroke with residual hemiparesis (H)      Hypertension      Lung nodule 12/05/2016    9 x 9 mm left upper lobe on CXR     MRSA (methicillin resistant staph aureus) culture positive      Periodontal disease      Persistent fever      Pneumonia of both lower lobes due to infectious organism      Respiratory failure with hypoxia (H)      Seizure (H)      Sepsis due to urinary tract infection (H) 06/04/2020     Severe sepsis (H)      Trismus         Surgical History  Past Surgical History:   Procedure Laterality Date     aneurysm clipping  2006     BRAIN SURGERY       EXAM UNDER ANESTHESIA, RESTORATIONS, EXTRACTION(S)  DENTAL COMPLEX, COMBINED N/A 12/19/2016    Procedure: COMBINED EXAM UNDER ANESTHESIA, RESTORATIONS, EXTRACTION(S) DENTAL COMPLEX;  Surgeon: Verónica Martinez DDS;  Location: [facility] OR     EXAM UNDER ANESTHESIA, RESTORATIONS, EXTRACTION(S) DENTAL COMPLEX, COMBINED N/A 3/7/2018    Procedure: COMBINED EXAM UNDER ANESTHESIA, RESTORATIONS, EXTRACTION(S) DENTAL COMPLEX;  Kenalog Injection in Bilateral [place], Combined Dental Exam,Radiographs, Three Dental Restorations, Periodontal Therapy and Fluoride Treatment;  Surgeon: Darryl Isbell DDS;  Location: [facility] OR     HH MIDLINE INSERTION  8/11/2017          INJECT STEROID (LOCATION) N/A 3/7/2018    Procedure: INJECT STEROID (LOCATION);  Kenalog Injection In Bilateral [place], Combined Dental Exam,Radiographs,Three Dental Restorations, Periodontal Therapy and Fluoride Treatment;  Surgeon: Liu Conroy DDS;  Location: [facility] OR     IR GASTRO JEJUNOSTOMY TUBE CHANGE  7/19/2016     IR GASTRO JEJUNOSTOMY TUBE CHANGE  9/23/2016     IR GASTRO JEJUNOSTOMY TUBE CHANGE  11/28/2016     IR GASTRO JEJUNOSTOMY TUBE CHANGE  1/27/2017     IR GASTRO JEJUNOSTOMY TUBE CHANGE  4/19/2017     IR GASTRO JEJUNOSTOMY TUBE CHANGE  6/20/2017     IR GASTRO JEJUNOSTOMY TUBE CHANGE  8/7/2017     IR GASTRO JEJUNOSTOMY TUBE CHANGE  8/11/2017     IR GASTRO JEJUNOSTOMY TUBE CHANGE  10/20/2017     IR GASTRO JEJUNOSTOMY TUBE CHANGE  1/2/2018     IR GASTRO JEJUNOSTOMY TUBE CHANGE  2/16/2018     IR GASTRO JEJUNOSTOMY TUBE CHANGE  5/16/2018     IR GASTRO JEJUNOSTOMY TUBE CHANGE  6/11/2018     IR GASTRO JEJUNOSTOMY TUBE CHANGE  9/12/2018     IR GASTRO JEJUNOSTOMY TUBE CHANGE  12/24/2018     IR GASTRO JEJUNOSTOMY TUBE CHANGE  3/18/2019     IR GASTRO JEJUNOSTOMY TUBE CHANGE  4/23/2019     IR GASTRO JEJUNOSTOMY TUBE CHANGE  5/21/2019     IR GASTRO JEJUNOSTOMY TUBE CHANGE  9/9/2019     IR GASTRO JEJUNOSTOMY TUBE CHANGE  9/26/2019     IR GASTRO JEJUNOSTOMY TUBE CHANGE  10/11/2019     IR GASTRO JEJUNOSTOMY TUBE  CHANGE  11/21/2019     IR GASTRO JEJUNOSTOMY TUBE CHANGE  11/29/2019     IR GASTRO JEJUNOSTOMY TUBE CHANGE  2/27/2020     IR GASTRO JEJUNOSTOMY TUBE CHANGE  4/10/2020     IR GASTRO JEJUNOSTOMY TUBE CHANGE  7/2/2020     IR GASTRO JEJUNOSTOMY TUBE CHANGE  8/28/2020     IR GASTRO JEJUNOSTOMY TUBE CHANGE  9/17/2020     IR GASTRO JEJUNOSTOMY TUBE CHANGE  10/29/2020     IR GASTRO JEJUNOSTOMY TUBE CHANGE  11/6/2020     IR GASTRO JEJUNOSTOMY TUBE CHANGE  12/24/2020     IR GASTRO JEJUNOSTOMY TUBE CHANGE  1/9/2021     IR GASTRO JEJUNOSTOMY TUBE CHANGE  6/9/2021     IR GASTRO JEJUNOSTOMY TUBE CHANGE  10/4/2021     IR GASTRO JEJUNOSTOMY TUBE CHANGE  10/18/2021     IR GASTRO JEJUNOSTOMY TUBE CHANGE  1/14/2022     IR GASTRO JEJUNOSTOMY TUBE CHANGE  1/20/2022     IR GASTRO JEJUNOSTOMY TUBE CHANGE  4/19/2022     IR GASTRO JEJUNOSTOMY TUBE CHANGE  6/7/2022     IR GASTRO JEJUNOSTOMY TUBE CHANGE  8/19/2022     IR GASTRO JEJUNOSTOMY TUBE CHANGE  9/13/2022     IR GASTRO JEJUNOSTOMY TUBE CHANGE  10/27/2022     IR GASTRO JEJUNOSTOMY TUBE CHANGE  11/3/2022     IR GASTRO JEJUNOSTOMY TUBE CHANGE  1/18/2023     IR GASTRO JEJUNOSTOMY TUBE CHANGE  4/7/2023     IR GASTRO JEJUNOSTOMY TUBE CHANGE  5/4/2023     IR GASTRO JEJUNOSTOMY TUBE PLACEMENT  6/14/2016     IR GJ TUBE REPLACEMENT  12/24/2018     IR GJ TUBE REPLACEMENT  3/18/2019     IR GJ TUBE REPLACEMENT  4/23/2019     IR GJ TUBE REPLACEMENT  5/21/2019     IR GJ TUBE REPLACEMENT  9/9/2019     IR GJ TUBE REPLACEMENT  9/26/2019     IR GJ TUBE REPLACEMENT  10/11/2019     IR GJ TUBE REPLACEMENT  11/21/2019     IR GJ TUBE REPLACEMENT  11/29/2019     IR GJ TUBE REPLACEMENT  2/27/2020     IR GJ TUBE REPLACEMENT  4/10/2020     IR GJ TUBE REPLACEMENT  7/2/2020     IR GJ TUBE REPLACEMENT  8/28/2020     IR GJ TUBE REPLACEMENT  9/17/2020     IR GJ TUBE REPLACEMENT  10/29/2020     IR GJ TUBE REPLACEMENT  11/6/2020     IR GJ TUBE REPLACEMENT  12/24/2020     IR GJ TUBE REPLACEMENT  1/9/2021     IR GJ TUBE  "REPLACEMENT  6/9/2021     J-G tube       PICC  6/10/2016          TRACHEOSTOMY      Removed            Social History  Reviewed, and he  reports that he quit smoking about 18 years ago. His smoking use included cigarettes. He has a 30.00 pack-year smoking history. He quit smokeless tobacco use about 16 years ago. He reports that he does not drink alcohol and does not use drugs.        Family History  Family History   Problem Relation Age of Onset     Hypertension Mother      Cancer Father      Other Cancer Father      Kidney Disease Brother       Psychosocial Needs  Social History     Social History Narrative    Lives with wife, 2 daughters and son in law.    W/c bound.     Additional psychosocial needs reviewed per nursing assessment.       Allergies   Allergen Reactions     Diphenhydramine Unknown     Trazodone Other (See Comments)     \"shaking\" per family        (Not in a hospital admission)       Review of Systems:  Unable to obtain secondary to patient being aphasic Physical Exam:  Temp:  [98.2  F (36.8  C)-99.8  F (37.7  C)] 98.2  F (36.8  C)  Pulse:  [] 84  Resp:  [14-23] 20  BP: (108-145)/(65-86) 123/72  SpO2:  [86 %-99 %] 96 %    {Exam;    Gen: Pleasant in no acute distress.  HEENT: NCAT. EOMI. PERRL.  Neck: No bruit, JVD or thyromegaly.  Lungs: Coarse breath sounds  Card: RRR. NSR. No RMG. Peripheral pulses present and symmetric. No edema.  Abd: Soft NT ND. No mass. Normal bowel sounds.  Skin: No rash.  Extr: No edema.  Neuro: Alert but nonverbal.           ============================    Pertinent Labs  personally reviewed.   Recent Labs   Lab 06/08/23  0639 06/07/23  1840   WBC 15.0* 12.0*   HGB 10.8* 12.4*   HCT 32.9* 36.7*    235       Recent Labs   Lab 06/08/23  0639 06/07/23  1840    138   CO2 20* 22   BUN 31* 33*   ALBUMIN 3.3* 3.9   ALKPHOS 54 70   ALT 49* 63*   AST 31 43*       MICROBIOLOGY DATA:  Personally reviewed      Pertinent Radiology  personally reviewed.    Study " Result    Narrative & Impression   EXAM: CT HEAD W/O CONTRAST  LOCATION: [facility]  DATE/TIME: 6/7/2023 8:29 PM CDT     INDICATION: Acute vomiting. History of hemorrhagic stroke.  COMPARISON: Head CT 04/25/2023  TECHNIQUE: Routine CT Head without IV contrast. Multiplanar reformats. Dose reduction techniques were used.     FINDINGS:  INTRACRANIAL CONTENTS: No intracranial hemorrhage, extraaxial collection, or mass effect.  No CT evidence of acute infarct. Chronic infarcts in the basal ganglia bilaterally. Postsurgical change from a right MCA aneurysm clipping. Associated   encephalomalacia in the anterior right temporal lobe. These findings are unchanged. No new abnormality. Mild generalized volume loss.     VISUALIZED ORBITS/SINUSES/MASTOIDS: No intraorbital abnormality. No paranasal sinus mucosal disease. No middle ear or mastoid effusion.     BONES/SOFT TISSUES: No acute abnormality.                                                                      IMPRESSION:  1.  No CT evidence for acute intracranial process.  2.  Chronic infarcts in the basal ganglia bilaterally.  3.  Status post right MCA aneurysm clipping.  4.  No change.     Study Result    Narrative & Impression   EXAM: XR CHEST PORT 1 VIEW  LOCATION: [facility]  DATE/TIME: 6/7/2023 8:29 PM CDT     INDICATION: hypoxia cough sepsis likely PNA (see recent CAT scan)  COMPARISON: CT abdomen and pelvis 06/07/2023, CT chest 05/25/2023                                                                      IMPRESSION: Bibasilar opacities suspicious for pneumonia. Normal heart size. No pleural effusions.     Study Result    Narrative & Impression   EXAM: CT CHEST W/O CONTRAST  LOCATION: [facility]  DATE/TIME: 5/25/2023 8:49 AM CDT     INDICATION: increased cough, concern for recurrent pneumonia  COMPARISON: 04/25/2023  TECHNIQUE: CT chest without IV contrast. Multiplanar reformats were  obtained. Dose reduction techniques were used.  CONTRAST: None.     FINDINGS:   LUNGS AND PLEURA: Limited by respiratory motion. Calcified granulomas. Paraseptal emphysema. She opacities and consolidations are again seen predominantly within the bilateral lower lobes.     MEDIASTINUM/AXILLAE: No lymphadenopathy. No thoracic aortic aneurysm.     CORONARY ARTERY CALCIFICATION: Severe.     UPPER ABDOMEN: No significant finding.     MUSCULOSKELETAL: Unremarkable.                                                                      IMPRESSION:   1.  Patchy bibasilar consolidations likely reflecting multifocal pneumonia. Repeat chest CT after appropriate treatment is recommended to document resolution.

## 2023-06-08 NOTE — PROGRESS NOTES
[facility] MEDICINE  PROGRESS NOTE     Code Status: Full Code       Identification/Summary:   Liu Malave is a 58 year old male who presented with complaints of cough, vomiting, restlessness.  Past medical history is notable for stroke with significant sequelae, siezures, hemiplegia, GJ feeding tube dependent, HTN, MRSA, depresssion, ESBL UTI, aspiration pneumonia.  Initial evaluation revealed slight hypoxia, tachycardia. Elevated WBC of 12.0. UA neg for infection.  CT abdomen with GJ tube in appropriate position, otherwise unremarkable.  CT head negative for acute disease.  Chest x-ray with bibasilar infiltrates.  Procalcitonin normal.  Initial treatment included Zofran, Zosyn, supplemental oxygen.  Fourth admission since December 2022 for aspiration pneumonia.  ID, pulmonary, speech and GI service consulted.    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  Recently completed a course of Augmentin.  We will keep head of bed elevated at 45 degrees or greater.  Strict NPO.  Will resume home tube feeding regimen.  Continue meropenem and doxycycline.  Continue nebulizer treatments.  Appreciate consultations from ID and pulmonary service.  Discussed with family that unfortunately there are probably not many options to further minimize his risk.  Family would like to pursue if possible though.  Speech therapy and GI service consulted for any recommendations regarding aspiration risk.  Order as needed sublingual atropine drops see if this could minimize any secretions.  Recurrent UTIs, on cefaclor prophylaxis  History of MRSA, ESBL E. coli in prior cultures  Noted.  Contact precautions.  Essential hypertension  Continue on on amlodipine,  doxazosin  Hyperlipidemia  Continue on Zocor  Insomnia  Continue on Seroquel at night  Reactive airway disease  Continue on DuoNebs every 4 hours  GERD  Continue on esomeprazole 20 mg twice daily  Spasticity  Continue on baclofen as needed    Anticoagulation   Aspirin 81 via feeding tube daily.  Pneumatic Compression Devices    COVID-19 PCR/influenza A/B/RSV negative from 6/7/2023  Noted.  Fluids: Saline lock  Pain meds: Tylenol and baclofen as needed  Therapy: N/A.  Likely at baseline functional.  Kelly:Not present  Lines: None     GJ tube    Current Diet  Orders Placed This Encounter      NPO for Medical/Clinical Reasons Except for: Ice Chips    Supplements  None    Barriers to Discharge: Intravenous antibiotics, assessment of feeding tube    Disposition: Likely here 1-2 more days    Clinically Significant Risk Factors Present on Admission              # Hypoalbuminemia: Lowest albumin = 3.3 g/dL at 6/8/2023  6:39 AM, will monitor as appropriate   # Drug Induced Platelet Defect: home medication list includes an antiplatelet medication   # Hypertension: Home medication list includes antihypertensive(s)               Interval History/Subjective:  Patient able to come off of supplemental oxygen.  Visited with nonverbal patient with family via iPad at the bedside.  Family asking if there would be any further possibilities or evaluations that could be done to help minimize his aspiration risk.  Were pleased that the GI and speech therapy will be consulted to evaluate.  Patient appears to be breathing comfortably.  Was somewhat cooperative with exam.  No obvious acute distress.  Multiple family questions answered to verbalized satisfaction.      Last 24H PRN:      acetaminophen (TYLENOL) tablet 500-1,000 mg, 1,000 mg at 06/08/23 0630     baclofen (LIORESAL) tablet 10 mg, 10 mg at 06/08/23 0926     QUEtiapine (SEROquel) tablet 25 mg, 25 mg at 06/08/23 0257    Physical Exam/Objective:  Temp:  [97.2  F (36.2  C)-99.8  F  (37.7  C)] 97.2  F (36.2  C)  Pulse:  [] 90  Resp:  [14-23] 20  BP: (108-145)/(63-86) 112/63  SpO2:  [86 %-99 %] 95 %  Wt Readings from Last 4 Encounters:   06/07/23 59 kg (130 lb)   05/24/23 54 kg (119 lb)   04/26/23 54.1 kg (119 lb 4.3 oz)   02/12/23 56.8 kg (125 lb 3.5 oz)     Body mass index is 23.78 kg/m .    Constitutional: awake, alert, cooperative, no apparent distress, and appears stated age and nonverbal.  Notable flexor status of extremities.  ENT: Limited exam but, Normocephalic, without obvious abnormality, atraumatic, external ears without lesions, oral pharynx with moist mucous membranes, tonsils without erythema or exudates, gums normal and good dentition.  Respiratory: Some coarse breath sounds bilaterally.  No wheezing.  Cardiovascular: Normal apical impulse, regular rate and rhythm, normal S1 and S2, no S3 or S4, and no murmur noted  GI: No scars, normal bowel sounds, soft, non-distended, non-tender, no masses palpated, no hepatosplenomegally and GJ tube is in good position  Skin: normal skin color, texture, turgor, no redness, warmth, or swelling, and no rashes  Musculoskeletal: Significant contractures noted to extremities.  Stable no lower extremity pitting edema present  Neurologic: Cranial nerves II-XII are grossly intact. Sensory:  Sensory intact  Neuropsychiatric: General: normal, calm and normal eye contact Level of consciousness: alert / normal Affect: flat Orientation: Difficult to assess secondary to nonverbal status but is alert and cooperative with deep breathing.  Memory and insight: Unable to assess      Medications:   Personally Reviewed.  Medications       amLODIPine  5 mg Per G Tube Daily     aspirin  81 mg Per Feeding Tube Daily     doxazosin  1 mg Per G Tube Daily     doxycycline  100 mg Intravenous Q12H     ipratropium  2 spray Both Nostrils BID     ipratropium - albuterol 0.5 mg/2.5 mg/3 mL  1 vial Nebulization Q4H     meropenem  500 mg Intravenous Q8H      neomycin-bacitracin-polymyxin   Topical 4x Daily     pantoprazole  40 mg Oral QAM AC     QUEtiapine  25 mg Oral or Feeding Tube TID     saccharomyces boulardii  250 mg Per G Tube Daily     simvastatin  20 mg Per G Tube At Bedtime     sodium chloride (PF)  3 mL Intracatheter Q8H       Data reviewed today: I personally reviewed all new medications, labs, imaging/diagnostics reports over the past 24 hours. Pertinent findings include:    Imaging:   Recent Results (from the past 24 hour(s))   CT Abdomen Pelvis w/o Contrast    Narrative    EXAM: CT ABDOMEN PELVIS W/O CONTRAST  LOCATION: [facility]  DATE/TIME: 6/7/2023 8:28 PM CDT    INDICATION: g tube, vomiting today  COMPARISON: 02/10/2023  TECHNIQUE: CT scan of the abdomen and pelvis was performed without IV contrast. Multiplanar reformats were obtained. Dose reduction techniques were used.  CONTRAST: None.    FINDINGS:   LOWER CHEST: Patchy bilateral airspace opacity at the lung bases.    HEPATOBILIARY: Liver and gallbladder within normal limits.    PANCREAS: Normal.    SPLEEN: Normal.    ADRENAL GLANDS: Normal.    KIDNEYS/BLADDER: Bilateral renal cysts which are benign and needs no further follow-up. No hydronephrosis.    BOWEL: No obstruction or inflammatory change. Gastrojejunostomy tube appears to be in appropriate position.    LYMPH NODES: Normal.    VASCULATURE: Moderate atherosclerotic disease of the abdominal aorta and its branches.    PELVIC ORGANS: Bladder within normal limits.    MUSCULOSKELETAL: Degenerative changes of the spine.      Impression    IMPRESSION:   1.  No acute findings in the abdomen and pelvis.. Gastrojejunostomy tube in appropriate position.     CT Head w/o Contrast    Narrative    EXAM: CT HEAD W/O CONTRAST  LOCATION: [facility]  DATE/TIME: 6/7/2023 8:29 PM CDT    INDICATION: Acute vomiting. History of hemorrhagic stroke.  COMPARISON: Head CT 04/25/2023  TECHNIQUE: Routine CT Head without  IV contrast. Multiplanar reformats. Dose reduction techniques were used.    FINDINGS:  INTRACRANIAL CONTENTS: No intracranial hemorrhage, extraaxial collection, or mass effect.  No CT evidence of acute infarct. Chronic infarcts in the basal ganglia bilaterally. Postsurgical change from a right MCA aneurysm clipping. Associated   encephalomalacia in the anterior right temporal lobe. These findings are unchanged. No new abnormality. Mild generalized volume loss.    VISUALIZED ORBITS/SINUSES/MASTOIDS: No intraorbital abnormality. No paranasal sinus mucosal disease. No middle ear or mastoid effusion.    BONES/SOFT TISSUES: No acute abnormality.      Impression    IMPRESSION:  1.  No CT evidence for acute intracranial process.  2.  Chronic infarcts in the basal ganglia bilaterally.  3.  Status post right MCA aneurysm clipping.  4.  No change.   XR Chest Port 1 View    Narrative    EXAM: XR CHEST PORT 1 VIEW  LOCATION: [facility]  DATE/TIME: 6/7/2023 8:29 PM CDT    INDICATION: hypoxia cough sepsis likely PNA (see recent CAT scan)  COMPARISON: CT abdomen and pelvis 06/07/2023, CT chest 05/25/2023      Impression    IMPRESSION: Bibasilar opacities suspicious for pneumonia. Normal heart size. No pleural effusions.       Labs:  XR Chest Port 1 View   Final Result   IMPRESSION: Bibasilar opacities suspicious for pneumonia. Normal heart size. No pleural effusions.      CT Head w/o Contrast   Final Result   IMPRESSION:   1.  No CT evidence for acute intracranial process.   2.  Chronic infarcts in the basal ganglia bilaterally.   3.  Status post right MCA aneurysm clipping.   4.  No change.      CT Abdomen Pelvis w/o Contrast   Final Result   IMPRESSION:    1.  No acute findings in the abdomen and pelvis.. Gastrojejunostomy tube in appropriate position.           Recent Results (from the past 24 hour(s))   Extra Blood Culture Bottle    Collection Time: 06/07/23  6:40 PM   Result Value Ref Range    Hold  Specimen JIC    Extra Blue Top Tube    Collection Time: 06/07/23  6:40 PM   Result Value Ref Range    Hold Specimen JIC    Extra Red Top Tube    Collection Time: 06/07/23  6:40 PM   Result Value Ref Range    Hold Specimen JIC    Extra Green Top (Lithium Heparin) Tube    Collection Time: 06/07/23  6:40 PM   Result Value Ref Range    Hold Specimen JIC    Extra Purple Top Tube    Collection Time: 06/07/23  6:40 PM   Result Value Ref Range    Hold Specimen JIC    Extra Heparinized Syringe    Collection Time: 06/07/23  6:40 PM   Result Value Ref Range    Hold Specimen     Comprehensive metabolic panel    Collection Time: 06/07/23  6:40 PM   Result Value Ref Range    Sodium 138 136 - 145 mmol/L    Potassium 4.5 3.5 - 5.0 mmol/L    Chloride 105 98 - 107 mmol/L    Carbon Dioxide (CO2) 22 22 - 31 mmol/L    Anion Gap 11 5 - 18 mmol/L    Urea Nitrogen 33 (H) 8 - 22 mg/dL    Creatinine 1.06 0.70 - 1.30 mg/dL    Calcium 9.1 8.5 - 10.5 mg/dL    Glucose 105 70 - 125 mg/dL    Alkaline Phosphatase 70 45 - 120 U/L    AST 43 (H) 0 - 40 U/L    ALT 63 (H) 0 - 45 U/L    Protein Total 8.9 (H) 6.0 - 8.0 g/dL    Albumin 3.9 3.5 - 5.0 g/dL    Bilirubin Total 0.4 0.0 - 1.0 mg/dL    GFR Estimate 80 >60 mL/min/1.73m2   Magnesium    Collection Time: 06/07/23  6:40 PM   Result Value Ref Range    Magnesium 2.3 1.8 - 2.6 mg/dL   B-Type Natriuretic Peptide ([facility])    Collection Time: 06/07/23  6:40 PM   Result Value Ref Range    BNP 26 0 - 52 pg/mL   Procalcitonin    Collection Time: 06/07/23  6:40 PM   Result Value Ref Range    Procalcitonin 0.08 0.00 - 0.49 ng/mL   Blood Culture Arm, Right    Collection Time: 06/07/23  6:40 PM    Specimen: Arm, Right; Blood   Result Value Ref Range    Culture No growth after 12 hours    CBC with platelets and differential    Collection Time: 06/07/23  6:40 PM   Result Value Ref Range    WBC Count 12.0 (H) 4.0 - 11.0 10e3/uL    RBC Count 3.85 (L) 4.40 - 5.90 10e6/uL    Hemoglobin 12.4 (L) 13.3 - 17.7 g/dL     Hematocrit 36.7 (L) 40.0 - 53.0 %    MCV 95 78 - 100 fL    MCH 32.2 26.5 - 33.0 pg    MCHC 33.8 31.5 - 36.5 g/dL    RDW 12.1 10.0 - 15.0 %    Platelet Count 235 150 - 450 10e3/uL    % Neutrophils 82 %    % Lymphocytes 12 %    % Monocytes 5 %    % Eosinophils 1 %    % Basophils 0 %    % Immature Granulocytes 0 %    NRBCs per 100 WBC 0 <1 /100    Absolute Neutrophils 9.7 (H) 1.6 - 8.3 10e3/uL    Absolute Lymphocytes 1.4 0.8 - 5.3 10e3/uL    Absolute Monocytes 0.6 0.0 - 1.3 10e3/uL    Absolute Eosinophils 0.2 0.0 - 0.7 10e3/uL    Absolute Basophils 0.1 0.0 - 0.2 10e3/uL    Absolute Immature Granulocytes 0.1 <=0.4 10e3/uL    Absolute NRBCs 0.0 10e3/uL   Lactic acid whole blood    Collection Time: 06/07/23  7:05 PM   Result Value Ref Range    Lactic Acid 1.3 0.7 - 2.0 mmol/L   Symptomatic Influenza A/B, RSV, & SARS-CoV2 PCR (COVID-19) Nasopharyngeal    Collection Time: 06/07/23  7:15 PM    Specimen: Nasopharyngeal; Swab   Result Value Ref Range    Influenza A PCR Negative Negative    Influenza B PCR Negative Negative    RSV PCR Negative Negative    SARS CoV2 PCR Negative Negative   Blood Culture Peripheral Blood    Collection Time: 06/07/23  7:40 PM    Specimen: Peripheral Blood   Result Value Ref Range    Culture No growth after 12 hours    UA with Microscopic reflex to Culture    Collection Time: 06/07/23  9:06 PM    Specimen: Urine, Midstream   Result Value Ref Range    Color Urine Light Yellow Colorless, Straw, Light Yellow, Yellow    Appearance Urine Clear Clear    Glucose Urine Negative Negative mg/dL    Bilirubin Urine Negative Negative    Ketones Urine Negative Negative mg/dL    Specific Gravity Urine 1.018 1.001 - 1.030    Blood Urine 0.1 mg/dL (A) Negative    pH Urine 5.5 5.0 - 7.0    Protein Albumin Urine 100 (A) Negative mg/dL    Urobilinogen Urine <2.0 <2.0 mg/dL    Nitrite Urine Negative Negative    Leukocyte Esterase Urine Negative Negative    Mucus Urine Present (A) None Seen /LPF    RBC Urine 6 (H) <=2  /HPF    WBC Urine <1 <=5 /HPF   Troponin I    Collection Time: 06/08/23 12:35 AM   Result Value Ref Range    Troponin I 0.02 0.00 - 0.29 ng/mL   Comprehensive metabolic panel    Collection Time: 06/08/23  6:39 AM   Result Value Ref Range    Sodium 137 136 - 145 mmol/L    Potassium 4.6 3.5 - 5.0 mmol/L    Chloride 108 (H) 98 - 107 mmol/L    Carbon Dioxide (CO2) 20 (L) 22 - 31 mmol/L    Anion Gap 9 5 - 18 mmol/L    Urea Nitrogen 31 (H) 8 - 22 mg/dL    Creatinine 1.35 (H) 0.70 - 1.30 mg/dL    Calcium 8.6 8.5 - 10.5 mg/dL    Glucose 114 70 - 125 mg/dL    Alkaline Phosphatase 54 45 - 120 U/L    AST 31 0 - 40 U/L    ALT 49 (H) 0 - 45 U/L    Protein Total 7.5 6.0 - 8.0 g/dL    Albumin 3.3 (L) 3.5 - 5.0 g/dL    Bilirubin Total 0.4 0.0 - 1.0 mg/dL    GFR Estimate 60 (L) >60 mL/min/1.73m2   Troponin I    Collection Time: 06/08/23  6:39 AM   Result Value Ref Range    Troponin I 0.02 0.00 - 0.29 ng/mL   CBC with platelets and differential    Collection Time: 06/08/23  6:39 AM   Result Value Ref Range    WBC Count 15.0 (H) 4.0 - 11.0 10e3/uL    RBC Count 3.41 (L) 4.40 - 5.90 10e6/uL    Hemoglobin 10.8 (L) 13.3 - 17.7 g/dL    Hematocrit 32.9 (L) 40.0 - 53.0 %    MCV 97 78 - 100 fL    MCH 31.7 26.5 - 33.0 pg    MCHC 32.8 31.5 - 36.5 g/dL    RDW 12.3 10.0 - 15.0 %    Platelet Count 185 150 - 450 10e3/uL    % Neutrophils 74 %    % Lymphocytes 18 %    % Monocytes 8 %    % Eosinophils 0 %    % Basophils 0 %    % Immature Granulocytes 0 %    NRBCs per 100 WBC 0 <1 /100    Absolute Neutrophils 11.0 (H) 1.6 - 8.3 10e3/uL    Absolute Lymphocytes 2.7 0.8 - 5.3 10e3/uL    Absolute Monocytes 1.2 0.0 - 1.3 10e3/uL    Absolute Eosinophils 0.0 0.0 - 0.7 10e3/uL    Absolute Basophils 0.1 0.0 - 0.2 10e3/uL    Absolute Immature Granulocytes 0.1 <=0.4 10e3/uL    Absolute NRBCs 0.0 10e3/uL   Troponin I    Collection Time: 06/08/23 12:16 PM   Result Value Ref Range    Troponin I <0.01 0.00 - 0.29 ng/mL       Pending Labs:  Unresulted Labs Ordered in  the Past 30 Days of this Admission     Date and Time Order Name Status Description    6/7/2023  6:57 PM Blood Culture Arm, Right Preliminary     6/7/2023  6:57 PM Blood Culture Peripheral Blood Preliminary             Dayday Gonsales MD  [facility]  [facility]  Phone: #[phone]

## 2023-06-08 NOTE — CONSULTS
"[facility] Pulmonary/Critical Care Consult Team Note    Liu Malave,  1965, MRN [id number]  Admitting Dx: Sepsis, due to unspecified organism, unspecified whether acute organ dysfunction present (H) [A41.9]  Date / Time of Admission:  2023  6:43 PM    Intake/Output last 3 shifts:  I/O last 3 completed shifts:  In: -   Out: 300 [Urine:300]    History per daughter in room: increased coughing and work of breathing  On room air    Assessment/Plan: Liu GIL Malave is a 58 year old male with PMHx of hemorrhagic stroke with hemiplegia, nonverbal with a history of recurrent aspirations, anxiety, chronic encephalopathy, depression, dyslipidemia, HTN and a history of seizure disorder admitted for fever and a cough.     Pneumonia  - sputum sample if able  - Antibiotics for 7-10 days, on meropenem and doxycycline  - home duonebs     Will sign off, please let our service know if any change in clinical condition or questions.    Medical Care Time excluding procedures and family discussions greater than: 45 Minutes    Risk Factors Present on Admission:  Clinically Significant Risk Factors Present on Admission              # Hypoalbuminemia: Lowest albumin = 3.3 g/dL at 2023  6:39 AM, will monitor as appropriate   # Drug Induced Platelet Defect: home medication list includes an antiplatelet medication   # Hypertension: Home medication list includes antihypertensive(s)            # Anemia: based on hgb <11           Marlin Rojas DO  Pulmonary and Critical Care Attending  pgr [phone]    Allergies   Allergen Reactions     Diphenhydramine Unknown     Trazodone Other (See Comments)     \"shaking\" per family       Meds: See MAR    Physical Exam:  /72 (BP Location: Left arm)   Pulse 84   Temp 98.2  F (36.8  C) (Axillary)   Resp 20   Ht 1.575 m (5' 2\")   Wt 59 kg (130 lb)   SpO2 96%   BMI 23.78 kg/m    Intake/Output this shift:  I/O this shift:  In: 220 [NG/GT:120; IV Piggyback:100]  Out: -   GEN: sitting " up in bed, NAD  HEENT: MMM  CVS: regular rhythm, no murmurs  RESP: bilateral rhonchi, no wheezing  ABD: Soft, No abdominal pain with palpation, no guarding, no rigidity, G tube in place  EXT: Warm, well perfused, no edema  NEURO: moving all extremities, nonfocal  PSYCH: pleasant    Pertinent Labs: Latest lab results in EHR personally reviewed.   CMP  Recent Labs   Lab 06/08/23  0639 06/07/23  1840    138   POTASSIUM 4.6 4.5   CHLORIDE 108* 105   CO2 20* 22   ANIONGAP 9 11    105   BUN 31* 33*   CR 1.35* 1.06   GFRESTIMATED 60* 80   SAMIR 8.6 9.1   MAG  --  2.3   PROTTOTAL 7.5 8.9*   ALBUMIN 3.3* 3.9   BILITOTAL 0.4 0.4   ALKPHOS 54 70   AST 31 43*   ALT 49* 63*     CBC  Recent Labs   Lab 06/08/23  0639 06/07/23  1840   WBC 15.0* 12.0*   RBC 3.41* 3.85*   HGB 10.8* 12.4*   HCT 32.9* 36.7*   MCV 97 95   MCH 31.7 32.2   MCHC 32.8 33.8   RDW 12.3 12.1    235     INRNo lab results found in last 7 days.  Arterial Blood GasNo lab results found in last 7 days.    Cultures: not yet available.    Imaging: personally reviewed.   Results for orders placed or performed during the hospital encounter of 06/07/23   XR Chest Port 1 View    Narrative    EXAM: XR CHEST PORT 1 VIEW  LOCATION: [facility]  DATE/TIME: 6/7/2023 8:29 PM CDT    INDICATION: hypoxia cough sepsis likely PNA (see recent CAT scan)  COMPARISON: CT abdomen and pelvis 06/07/2023, CT chest 05/25/2023      Impression    IMPRESSION: Bibasilar opacities suspicious for pneumonia. Normal heart size. No pleural effusions.   CT Abdomen Pelvis w/o Contrast    Narrative    EXAM: CT ABDOMEN PELVIS W/O CONTRAST  LOCATION: [facility]  DATE/TIME: 6/7/2023 8:28 PM CDT    INDICATION: g tube, vomiting today  COMPARISON: 02/10/2023  TECHNIQUE: CT scan of the abdomen and pelvis was performed without IV contrast. Multiplanar reformats were obtained. Dose reduction techniques were used.  CONTRAST: None.    FINDINGS:    LOWER CHEST: Patchy bilateral airspace opacity at the lung bases.    HEPATOBILIARY: Liver and gallbladder within normal limits.    PANCREAS: Normal.    SPLEEN: Normal.    ADRENAL GLANDS: Normal.    KIDNEYS/BLADDER: Bilateral renal cysts which are benign and needs no further follow-up. No hydronephrosis.    BOWEL: No obstruction or inflammatory change. Gastrojejunostomy tube appears to be in appropriate position.    LYMPH NODES: Normal.    VASCULATURE: Moderate atherosclerotic disease of the abdominal aorta and its branches.    PELVIC ORGANS: Bladder within normal limits.    MUSCULOSKELETAL: Degenerative changes of the spine.      Impression    IMPRESSION:   1.  No acute findings in the abdomen and pelvis.. Gastrojejunostomy tube in appropriate position.     CT Head w/o Contrast    Narrative    EXAM: CT HEAD W/O CONTRAST  LOCATION: [facility]  DATE/TIME: 6/7/2023 8:29 PM CDT    INDICATION: Acute vomiting. History of hemorrhagic stroke.  COMPARISON: Head CT 04/25/2023  TECHNIQUE: Routine CT Head without IV contrast. Multiplanar reformats. Dose reduction techniques were used.    FINDINGS:  INTRACRANIAL CONTENTS: No intracranial hemorrhage, extraaxial collection, or mass effect.  No CT evidence of acute infarct. Chronic infarcts in the basal ganglia bilaterally. Postsurgical change from a right MCA aneurysm clipping. Associated   encephalomalacia in the anterior right temporal lobe. These findings are unchanged. No new abnormality. Mild generalized volume loss.    VISUALIZED ORBITS/SINUSES/MASTOIDS: No intraorbital abnormality. No paranasal sinus mucosal disease. No middle ear or mastoid effusion.    BONES/SOFT TISSUES: No acute abnormality.      Impression    IMPRESSION:  1.  No CT evidence for acute intracranial process.  2.  Chronic infarcts in the basal ganglia bilaterally.  3.  Status post right MCA aneurysm clipping.  4.  No change.       Patient Active Problem List   Diagnosis      Dental decay     Sepsis, due to unspecified organism, unspecified whether acute organ dysfunction present (H)     Basal ganglia hemorrhage (H)     Seizure disorder (H)     Depression     Dyslipidemia     Generalized anxiety disorder     Hemiplegia and hemiparesis following cerebral infarction affecting unspecified side (H)     GERD without esophagitis     Hepatitis C virus carrier state (H)     Malnutrition, unspecified type (H)     Pneumonia of both lungs due to infectious organism, unspecified part of lung     Pneumonia     Aspiration pneumonitis (H)     Sepsis without acute organ dysfunction (H)     Urinary retention with incomplete bladder emptying     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)     On tube feeding diet     Respiratory acidosis     Hospital-acquired pneumonia         Surgical History  He  has a past surgical history that includes aneurysm clipping (2006); J-G tube; Tracheostomy; Exam under anesthesia, restorations, extraction(s) dental complex, combined (N/A, 12/19/2016); Inject steroid (location) (N/A, 3/7/2018); Exam under anesthesia, restorations, extraction(s) dental complex, combined (N/A, 3/7/2018); IR Gastro Jejunostomy Tube Placement (6/14/2016); IR Gastro Jejunostomy Tube Change (7/19/2016); IR Gastro Jejunostomy Tube Change (9/23/2016); IR Gastro Jejunostomy Tube Change (11/28/2016); IR Gastro Jejunostomy Tube Change (1/27/2017); IR Gastro Jejunostomy Tube Change (4/19/2017); IR Gastro Jejunostomy Tube Change (6/20/2017); IR Gastro Jejunostomy Tube Change (8/7/2017); IR Gastro Jejunostomy Tube Change (8/11/2017); IR Gastro Jejunostomy Tube Change (10/20/2017); IR Gastro Jejunostomy Tube Change (1/2/2018); IR Gastro Jejunostomy Tube Change (2/16/2018); IR Gastro Jejunostomy Tube Change (5/16/2018); IR Gastro Jejunostomy Tube Change (6/11/2018); IR Gastro Jejunostomy Tube Change (9/12/2018); IR Gastro Jejunostomy Tube  Change (12/24/2018); IR Gastro Jejunostomy Tube Change (3/18/2019); IR Gastro Jejunostomy Tube Change (4/23/2019); IR Gastro Jejunostomy Tube Change (5/21/2019); IR Gastro Jejunostomy Tube Change (9/9/2019); IR Gastro Jejunostomy Tube Change (9/26/2019); IR Gastro Jejunostomy Tube Change (10/11/2019); IR Gastro Jejunostomy Tube Change (11/21/2019); IR Gastro Jejunostomy Tube Change (11/29/2019); IR Gastro Jejunostomy Tube Change (2/27/2020); IR Gastro Jejunostomy Tube Change (4/10/2020); IR Gastro Jejunostomy Tube Change (7/2/2020); IR Gastro Jejunostomy Tube Change (8/28/2020); IR Gastro Jejunostomy Tube Change (9/17/2020); IR Gastro Jejunostomy Tube Change (10/29/2020); IR Gastro Jejunostomy Tube Change (11/6/2020); IR Gastro Jejunostomy Tube Change (12/24/2020); IR Gastro Jejunostomy Tube Change (1/9/2021); IR Gastro Jejunostomy Tube Change (6/9/2021); brain surgery; Picc (6/10/2016); Hh Midline Insertion (8/11/2017); Ir Gj Tube Replacement (12/24/2018); Ir Gj Tube Replacement (3/18/2019); Ir Gj Tube Replacement (4/23/2019); Ir Gj Tube Replacement (5/21/2019); Ir Gj Tube Replacement (9/9/2019); Ir Gj Tube Replacement (9/26/2019); Ir Gj Tube Replacement (10/11/2019); Ir Gj Tube Replacement (11/21/2019); Ir Gj Tube Replacement (11/29/2019); Ir Gj Tube Replacement (2/27/2020); Ir Gj Tube Replacement (4/10/2020); Ir Gj Tube Replacement (7/2/2020); Ir Gj Tube Replacement (8/28/2020); Ir Gj Tube Replacement (9/17/2020); Ir Gj Tube Replacement (10/29/2020); Ir Gj Tube Replacement (11/6/2020); Ir Gj Tube Replacement (12/24/2020); Ir Gj Tube Replacement (1/9/2021); Ir Gj Tube Replacement (6/9/2021); IR Gastro Jejunostomy Tube Change (10/4/2021); IR Gastro Jejunostomy Tube Change (10/18/2021); IR Gastro Jejunostomy Tube Change (1/14/2022); IR Gastro Jejunostomy Tube Change (1/20/2022); IR Gastro Jejunostomy Tube Change (4/19/2022); IR Gastro Jejunostomy Tube Change (6/7/2022); IR Gastro Jejunostomy Tube Change (8/19/2022);  "IR Gastro Jejunostomy Tube Change (9/13/2022); IR Gastro Jejunostomy Tube Change (10/27/2022); IR Gastro Jejunostomy Tube Change (11/3/2022); IR Gastro Jejunostomy Tube Change (1/18/2023); IR Gastro Jejunostomy Tube Change (4/7/2023); and IR Gastro Jejunostomy Tube Change (5/4/2023).    Family History  Reviewed, and family history includes Cancer in his father; Hypertension in his mother; Kidney Disease in his brother; Other Cancer in his father.    Social History  Reviewed, and he  reports that he quit smoking about 18 years ago. His smoking use included cigarettes. He has a 30.00 pack-year smoking history. He quit smokeless tobacco use about 16 years ago. He reports that he does not drink alcohol and does not use drugs.    Allergies  Allergies   Allergen Reactions     Diphenhydramine Unknown     Trazodone Other (See Comments)     \"shaking\" per family              Marlin Rojas,   Pulmonary and Critical Care Attending  pgr [phone]    Securely message with the Vocera Web Console (learn more here)    "

## 2023-06-08 NOTE — ED NOTES
Introduced self to patient. White boardupdated. Hourly rounding explained to patient. Side rails up. Call light given to patient.  Pain assessed.

## 2023-06-08 NOTE — PHARMACY-ADMISSION MEDICATION HISTORY
Pharmacist Admission Medication History    Admission medication history is complete. The information provided in this note is only as accurate as the sources available at the time of the update.    Medication reconciliation/reorder completed by provider prior to medication history? No    Information Source(s): Family member and CareEverywhere/SureScripts via in-person    Pertinent Information: Family member who cares for patient provided list below and last admin times.    Changes made to PTA medication list:    Added: Atrovent nasal spray    Deleted: Augmentin (completed course), lidocaine 4% patch    Changed: omeprazole -> esomeprazole, albuterol and ipratropium nebs -> DuoNeb (due to previous shortage)    Allergies reviewed with patient and updates made in EHR: yes    Medication History Completed By: Swati Buchanan PharmD 6/7/2023 8:42 PM    Prior to Admission medications    Medication Sig Last Dose Taking? Auth Provider Long Term End Date   acetaminophen (TYLENOL) 500 MG tablet 500-1,000 mg by Gastric Tube route every 6 hours as needed for mild pain 6/7/2023 at 1000 Yes Reported, Patient     amLODIPine (NORVASC) 5 MG tablet Take 1 tablet (5 mg total) by mouth daily. 6/7/2023 Yes Lynda Gutierrez MD Yes    aspirin (ASA) 81 MG chewable tablet 1 tablet (81 mg) by Per Feeding Tube route daily Past Week Yes Lynda Gutierrez MD     baclofen (LIORESAL) 10 MG tablet 10 mg by Per G Tube route 4 times daily as needed for muscle spasms Usually takes with Seroquel 6/7/2023 at 2100 Yes Palmira Montemayor MD     bisacodyl (DULCOLAX) 10 MG suppository INSERT 1 SUPPOSTIORY INTO THE RECTUM DAILY AS NEEDED FOR CONSTIPATION. 6/7/2023 Yes Palmira Montemayor MD     cefaclor (CECLOR) 250 MG capsule Give 1 capsule via g tube daily for UTI prevention to be started in 5 day 6/7/2023 Yes Palmira Montemayor MD     chlorhexidine (PERIDEX) 0.12 % solution Swish and spit 15 mLs in mouth daily  6/7/2023 Yes Reported, Patient No     Dextromethorphan-guaiFENesin  MG/5ML syrup TAKE 10 MLS BY G TUBE ROUTE EVERY 4 HOURS AS NEEDED FOR COUGH. 6/7/2023 at 1500 Yes Lynda Gutierrez MD     doxazosin (CARDURA) 1 MG tablet 1 tablet (1 mg) by Per G Tube route daily 6/7/2023 Yes Palmira Montemayor MD Yes    esomeprazole (NEXIUM) 20 MG DR capsule Take 20 mg by mouth 2 times daily Take 30-60 minutes before eating. 6/7/2023 at am x1 Yes Unknown, Entered By History     ferrous sulfate 220 (44 Fe) MG/5ML ELIX TAKE 5 MLS BY ENTERAL ROUTE DAILY 6/7/2023 Yes Shahid Enriquez MD     ipratropium (ATROVENT) 0.06 % nasal spray Spray 2 sprays into both nostrils 2 times daily 6/7/2023 at am x1 Yes Unknown, Entered By History     ipratropium - albuterol 0.5 mg/2.5 mg/3 mL (DUONEB) 0.5-2.5 (3) MG/3ML neb solution Take 1 vial by nebulization every 4 hours 6/7/2023 at 0800 Yes Unknown, Entered By History No    lidocaine (LIDODERM) 5 % patch Place 1 patch onto the skin every 24 hours To prevent lidocaine toxicity, patient should be patch free for 12 hrs daily.  Patient taking differently: Place 1 patch onto the skin daily as needed To prevent lidocaine toxicity, patient should be patch free for 12 hrs daily. Unknown at prn Yes Lynda Gutierrez MD     meclizine (ANTIVERT) 25 MG tablet Take 1 tablet via G-tube every 6 hours as needed for vertigo 6/7/2023 Yes Lynda Gutierrez MD     neomycin-bacitracin-polymyxin (NEOSPORIN) 5-400-5000 ointment Apply topically 4 times daily 6/7/2023 at x2 Yes Lynda Gutierrez MD     nystatin (MYCOSTATIN) 887449 UNIT/GM external cream Apply topically 2 times daily  Patient taking differently: Apply topically 2 times daily as needed (redness) 6/7/2023 Yes Shahid Enriquez MD     polyethylene glycol (MIRALAX) 17 GM/Dose powder Give 1 capful via G-tube daily as needed for constipation Unknown at prn Yes Lynda Gutierrez MD     Polyethylene Glycol 400 (VISINE DRY EYE RELIEF) 1 % SOLN Apply 1 drop to eye every 6 hours as needed (dry eyes) 6/7/2023  Yes Lynda Gutierrez MD     QUEtiapine (SEROQUEL) 25 MG tablet Take 1 tablet (25 mg) by mouth See Admin Instructions Take 1 tablet at 7PM, Midnight and 3AM for total of 75 mg nightly. Ok to repeat after 4 hours and during the day PRN 6/6/2023 Yes Lynda Gutierrez MD Yes    saccharomyces boulardii (FLORASTOR) 250 MG capsule Take 1 capsule (250 mg) by mouth daily Past Week Yes Palmira Montemayor MD     simvastatin (ZOCOR) 20 MG tablet 1 tablet (20 mg) by Per G Tube route At Bedtime 6/6/2023 Yes Lynda Gutierrez MD Yes    Ostomy Supplies (STOMAHESIVE) PSTE Apply topically to skin around ostomy site twice daily and as needed   Lynda Gutierrez MD

## 2023-06-08 NOTE — PLAN OF CARE
Pt is nonverbal. Daughter at bedside to aid with translation. Administered prn seroquel per daughter's request as she stated the pt usually takes it at night to sleep. Administered prn tylenol for 99.8, recheck 99, and per daughter's request in AM. Saline locked inbetween IV abx. Voiding adequately with male purewick in place. Education on medication administration, alarms, and use of call-light to reduce risk for falls and injury. VSS on 2L O2.

## 2023-06-08 NOTE — ED NOTES
Two person bed change and brief change, patient was incontinent. Pure wick placed. No new needs at this time.

## 2023-06-08 NOTE — PROGRESS NOTES
Pt is currently on RA.  Sating 94-96%, HR , RR 18-20, BBS- Coarse/Diminished.  Congested cough.  Duoneb Q4.  RT will continue to monitor.

## 2023-06-09 ENCOUNTER — APPOINTMENT (OUTPATIENT)
Dept: CT IMAGING | Facility: CLINIC | Age: 58
End: 2023-06-09
Attending: INTERNAL MEDICINE
Payer: COMMERCIAL

## 2023-06-09 ENCOUNTER — APPOINTMENT (OUTPATIENT)
Dept: RADIOLOGY | Facility: CLINIC | Age: 58
End: 2023-06-09
Attending: INTERNAL MEDICINE
Payer: COMMERCIAL

## 2023-06-09 ENCOUNTER — HOME INFUSION (PRE-WILLOW HOME INFUSION) (OUTPATIENT)
Dept: PHARMACY | Facility: CLINIC | Age: 58
End: 2023-06-09
Payer: COMMERCIAL

## 2023-06-09 LAB
ANION GAP SERPL CALCULATED.3IONS-SCNC: 10 MMOL/L (ref 5–18)
BUN SERPL-MCNC: 19 MG/DL (ref 8–22)
CALCIUM SERPL-MCNC: 8.8 MG/DL (ref 8.5–10.5)
CHLORIDE BLD-SCNC: 109 MMOL/L (ref 98–107)
CO2 SERPL-SCNC: 21 MMOL/L (ref 22–31)
CREAT SERPL-MCNC: 1.1 MG/DL (ref 0.7–1.3)
ERYTHROCYTE [DISTWIDTH] IN BLOOD BY AUTOMATED COUNT: 12.5 % (ref 10–15)
GFR SERPL CREATININE-BSD FRML MDRD: 77 ML/MIN/1.73M2
GLUCOSE BLD-MCNC: 74 MG/DL (ref 70–125)
GLUCOSE BLDC GLUCOMTR-MCNC: 81 MG/DL (ref 70–99)
GLUCOSE BLDC GLUCOMTR-MCNC: 94 MG/DL (ref 70–99)
HCT VFR BLD AUTO: 32.9 % (ref 40–53)
HGB BLD-MCNC: 10.7 G/DL (ref 13.3–17.7)
LACTATE SERPL-SCNC: 1.2 MMOL/L (ref 0.7–2)
MAGNESIUM SERPL-MCNC: 2 MG/DL (ref 1.8–2.6)
MCH RBC QN AUTO: 31.6 PG (ref 26.5–33)
MCHC RBC AUTO-ENTMCNC: 32.5 G/DL (ref 31.5–36.5)
MCV RBC AUTO: 97 FL (ref 78–100)
PHOSPHATE SERPL-MCNC: 2.5 MG/DL (ref 2.5–4.5)
PLATELET # BLD AUTO: 177 10E3/UL (ref 150–450)
POTASSIUM BLD-SCNC: 4.3 MMOL/L (ref 3.5–5)
RBC # BLD AUTO: 3.39 10E6/UL (ref 4.4–5.9)
SODIUM SERPL-SCNC: 140 MMOL/L (ref 136–145)
TROPONIN I SERPL-MCNC: 0.01 NG/ML (ref 0–0.29)
WBC # BLD AUTO: 9.2 10E3/UL (ref 4–11)

## 2023-06-09 PROCEDURE — 120N000001 HC R&B MED SURG/OB

## 2023-06-09 PROCEDURE — 93005 ELECTROCARDIOGRAM TRACING: CPT | Performed by: INTERNAL MEDICINE

## 2023-06-09 PROCEDURE — 84484 ASSAY OF TROPONIN QUANT: CPT | Performed by: INTERNAL MEDICINE

## 2023-06-09 PROCEDURE — 250N000013 HC RX MED GY IP 250 OP 250 PS 637: Performed by: STUDENT IN AN ORGANIZED HEALTH CARE EDUCATION/TRAINING PROGRAM

## 2023-06-09 PROCEDURE — 36415 COLL VENOUS BLD VENIPUNCTURE: CPT | Performed by: INTERNAL MEDICINE

## 2023-06-09 PROCEDURE — 93005 ELECTROCARDIOGRAM TRACING: CPT

## 2023-06-09 PROCEDURE — 250N000011 HC RX IP 250 OP 636: Performed by: FAMILY MEDICINE

## 2023-06-09 PROCEDURE — 250N000011 HC RX IP 250 OP 636: Performed by: HOSPITALIST

## 2023-06-09 PROCEDURE — 94640 AIRWAY INHALATION TREATMENT: CPT

## 2023-06-09 PROCEDURE — 250N000011 HC RX IP 250 OP 636: Performed by: INTERNAL MEDICINE

## 2023-06-09 PROCEDURE — 82310 ASSAY OF CALCIUM: CPT | Performed by: INTERNAL MEDICINE

## 2023-06-09 PROCEDURE — 999N000054 HC STATISTIC EKG NON-CHARGEABLE

## 2023-06-09 PROCEDURE — 250N000009 HC RX 250: Performed by: FAMILY MEDICINE

## 2023-06-09 PROCEDURE — 99233 SBSQ HOSP IP/OBS HIGH 50: CPT | Performed by: INTERNAL MEDICINE

## 2023-06-09 PROCEDURE — 250N000013 HC RX MED GY IP 250 OP 250 PS 637: Performed by: HOSPITALIST

## 2023-06-09 PROCEDURE — 83735 ASSAY OF MAGNESIUM: CPT | Performed by: INTERNAL MEDICINE

## 2023-06-09 PROCEDURE — 84100 ASSAY OF PHOSPHORUS: CPT | Performed by: INTERNAL MEDICINE

## 2023-06-09 PROCEDURE — 999N000226 HC STATISTIC SLP IP EVAL DEFER: Performed by: SPEECH-LANGUAGE PATHOLOGIST

## 2023-06-09 PROCEDURE — 258N000003 HC RX IP 258 OP 636: Performed by: INTERNAL MEDICINE

## 2023-06-09 PROCEDURE — 999N000157 HC STATISTIC RCP TIME EA 10 MIN

## 2023-06-09 PROCEDURE — 71275 CT ANGIOGRAPHY CHEST: CPT

## 2023-06-09 PROCEDURE — 83605 ASSAY OF LACTIC ACID: CPT | Performed by: INTERNAL MEDICINE

## 2023-06-09 PROCEDURE — 250N000013 HC RX MED GY IP 250 OP 250 PS 637: Performed by: INTERNAL MEDICINE

## 2023-06-09 PROCEDURE — 99232 SBSQ HOSP IP/OBS MODERATE 35: CPT | Performed by: STUDENT IN AN ORGANIZED HEALTH CARE EDUCATION/TRAINING PROGRAM

## 2023-06-09 PROCEDURE — 85027 COMPLETE CBC AUTOMATED: CPT | Performed by: INTERNAL MEDICINE

## 2023-06-09 PROCEDURE — 94640 AIRWAY INHALATION TREATMENT: CPT | Mod: 76

## 2023-06-09 PROCEDURE — 250N000013 HC RX MED GY IP 250 OP 250 PS 637: Performed by: FAMILY MEDICINE

## 2023-06-09 PROCEDURE — 74018 RADEX ABDOMEN 1 VIEW: CPT

## 2023-06-09 PROCEDURE — 93010 ELECTROCARDIOGRAM REPORT: CPT | Performed by: GENERAL ACUTE CARE HOSPITAL

## 2023-06-09 RX ORDER — BISACODYL 10 MG
10 SUPPOSITORY, RECTAL RECTAL DAILY PRN
Status: DISCONTINUED | OUTPATIENT
Start: 2023-06-09 | End: 2023-06-10 | Stop reason: HOSPADM

## 2023-06-09 RX ORDER — IOPAMIDOL 755 MG/ML
75 INJECTION, SOLUTION INTRAVASCULAR ONCE
Status: COMPLETED | OUTPATIENT
Start: 2023-06-09 | End: 2023-06-09

## 2023-06-09 RX ORDER — ENOXAPARIN SODIUM 100 MG/ML
40 INJECTION SUBCUTANEOUS EVERY 24 HOURS
Status: DISCONTINUED | OUTPATIENT
Start: 2023-06-09 | End: 2023-06-10 | Stop reason: HOSPADM

## 2023-06-09 RX ORDER — DEXTROSE MONOHYDRATE 100 MG/ML
INJECTION, SOLUTION INTRAVENOUS CONTINUOUS PRN
Status: DISCONTINUED | OUTPATIENT
Start: 2023-06-09 | End: 2023-06-10 | Stop reason: HOSPADM

## 2023-06-09 RX ORDER — LEVOFLOXACIN 500 MG/1
500 TABLET, FILM COATED ORAL DAILY
Qty: 10 TABLET | Refills: 0 | Status: SHIPPED | OUTPATIENT
Start: 2023-06-09 | End: 2023-06-19

## 2023-06-09 RX ORDER — PIPERACILLIN SODIUM, TAZOBACTAM SODIUM 3; .375 G/15ML; G/15ML
3.38 INJECTION, POWDER, LYOPHILIZED, FOR SOLUTION INTRAVENOUS EVERY 8 HOURS
Status: DISCONTINUED | OUTPATIENT
Start: 2023-06-09 | End: 2023-06-10

## 2023-06-09 RX ORDER — LEVOFLOXACIN 500 MG/1
500 TABLET, FILM COATED ORAL DAILY
Status: DISCONTINUED | OUTPATIENT
Start: 2023-06-09 | End: 2023-06-10 | Stop reason: HOSPADM

## 2023-06-09 RX ADMIN — PIPERACILLIN AND TAZOBACTAM 3.38 G: 3; .375 INJECTION, POWDER, LYOPHILIZED, FOR SOLUTION INTRAVENOUS at 18:09

## 2023-06-09 RX ADMIN — QUETIAPINE FUMARATE 25 MG: 25 TABLET ORAL at 03:29

## 2023-06-09 RX ADMIN — IPRATROPIUM BROMIDE AND ALBUTEROL SULFATE 3 ML: .5; 3 SOLUTION RESPIRATORY (INHALATION) at 11:39

## 2023-06-09 RX ADMIN — IOPAMIDOL 75 ML: 755 INJECTION, SOLUTION INTRAVENOUS at 17:40

## 2023-06-09 RX ADMIN — MEROPENEM 500 MG: 500 INJECTION, POWDER, FOR SOLUTION INTRAVENOUS at 11:47

## 2023-06-09 RX ADMIN — SODIUM CHLORIDE 250 ML: 9 INJECTION, SOLUTION INTRAVENOUS at 19:01

## 2023-06-09 RX ADMIN — BACITRACIN ZINC, NEOMYCIN, POLYMYXIN B: 400; 3.5; 5 OINTMENT TOPICAL at 18:09

## 2023-06-09 RX ADMIN — BACITRACIN ZINC, NEOMYCIN, POLYMYXIN B: 400; 3.5; 5 OINTMENT TOPICAL at 19:47

## 2023-06-09 RX ADMIN — SIMVASTATIN 20 MG: 20 TABLET, FILM COATED ORAL at 21:38

## 2023-06-09 RX ADMIN — QUETIAPINE FUMARATE 25 MG: 25 TABLET ORAL at 00:07

## 2023-06-09 RX ADMIN — LEVOFLOXACIN 500 MG: 500 TABLET, FILM COATED ORAL at 18:09

## 2023-06-09 RX ADMIN — IPRATROPIUM BROMIDE 2 SPRAY: 42 SPRAY, METERED NASAL at 21:20

## 2023-06-09 RX ADMIN — IPRATROPIUM BROMIDE AND ALBUTEROL SULFATE 3 ML: .5; 3 SOLUTION RESPIRATORY (INHALATION) at 07:24

## 2023-06-09 RX ADMIN — GUAIFENESIN AND DEXTROMETHORPHAN 10 ML: 100; 10 SYRUP ORAL at 18:37

## 2023-06-09 RX ADMIN — QUETIAPINE FUMARATE 25 MG: 25 TABLET ORAL at 18:15

## 2023-06-09 RX ADMIN — DOXAZOSIN 1 MG: 1 TABLET ORAL at 08:25

## 2023-06-09 RX ADMIN — BISACODYL 10 MG: 10 SUPPOSITORY RECTAL at 08:34

## 2023-06-09 RX ADMIN — ENOXAPARIN SODIUM 40 MG: 100 INJECTION SUBCUTANEOUS at 18:09

## 2023-06-09 RX ADMIN — BACLOFEN 10 MG: 10 TABLET ORAL at 22:14

## 2023-06-09 RX ADMIN — AMLODIPINE BESYLATE 5 MG: 5 TABLET ORAL at 08:25

## 2023-06-09 RX ADMIN — BACITRACIN ZINC, NEOMYCIN, POLYMYXIN B: 400; 3.5; 5 OINTMENT TOPICAL at 11:53

## 2023-06-09 RX ADMIN — IPRATROPIUM BROMIDE AND ALBUTEROL SULFATE 3 ML: .5; 3 SOLUTION RESPIRATORY (INHALATION) at 20:31

## 2023-06-09 RX ADMIN — BACITRACIN ZINC, NEOMYCIN, POLYMYXIN B: 400; 3.5; 5 OINTMENT TOPICAL at 08:34

## 2023-06-09 RX ADMIN — PANTOPRAZOLE SODIUM 40 MG: 40 TABLET, DELAYED RELEASE ORAL at 06:24

## 2023-06-09 RX ADMIN — MEROPENEM 500 MG: 500 INJECTION, POWDER, FOR SOLUTION INTRAVENOUS at 03:32

## 2023-06-09 RX ADMIN — DOXYCYCLINE 100 MG: 100 INJECTION, POWDER, LYOPHILIZED, FOR SOLUTION INTRAVENOUS at 10:22

## 2023-06-09 RX ADMIN — ASPIRIN 81 MG CHEWABLE TABLET 81 MG: 81 TABLET CHEWABLE at 08:25

## 2023-06-09 RX ADMIN — Medication 250 MG: at 08:25

## 2023-06-09 RX ADMIN — IPRATROPIUM BROMIDE 2 SPRAY: 42 SPRAY, METERED NASAL at 08:33

## 2023-06-09 RX ADMIN — BACLOFEN 10 MG: 10 TABLET ORAL at 18:15

## 2023-06-09 ASSESSMENT — ACTIVITIES OF DAILY LIVING (ADL)
ADLS_ACUITY_SCORE: 73
ADLS_ACUITY_SCORE: 73
ADLS_ACUITY_SCORE: 75
ADLS_ACUITY_SCORE: 73
ADLS_ACUITY_SCORE: 75
ADLS_ACUITY_SCORE: 75
DEPENDENT_IADLS:: CLEANING;COOKING;LAUNDRY;SHOPPING;MEDICATION MANAGEMENT;MONEY MANAGEMENT;TRANSPORTATION;INCONTINENCE
ADLS_ACUITY_SCORE: 73
ADLS_ACUITY_SCORE: 75

## 2023-06-09 NOTE — CARE PLAN
Speech Language Pathology: Orders received. Chart reviewed and discussed with care team (provider over the phone) and patient's family.? Speech Language Pathology not indicated due to patient has been NPO per family, since previous stroke ~6 years ago and has not had any PO intake.? Defer discharge recommendations to provider team. Patient and family may be appropriate for further education from education team regarding tube feedings and prevention of potential aspiration from reflux of tube feedings.? Recommend GI consult if concerns regarding esophageal/reflux concerns with tube feedings. If family is interested in initiating PO intake, would recommend OP SLP evaluation. Will complete orders.

## 2023-06-09 NOTE — PROGRESS NOTES
Patient received nebulizers as ordered, 1600 treatment held due to tachycardia, RN aware. Patient remains on RA, BS diminished t/o.    Leeann Ewing, RT

## 2023-06-09 NOTE — PROGRESS NOTES
Paged MD regarding HR staying at 115-135, new orders to decrease tube feedings to 25ml/hr, give 250 ml bolus x1. If HR persistently high, give another dose of 250ml bolus. Will call RT to collect sputum sample. CT completed. EKG completed. Labs obtained. Dietician was called x2 regarding direction on tube feedings, notified MD, ordered to start tube feeding back at 25ml/hr. Family present and updated about plan.

## 2023-06-09 NOTE — PLAN OF CARE
Problem: Plan of Care - These are the overarching goals to be used throughout the patient stay.    Goal: Optimal Comfort and Wellbeing  Outcome: Progressing  Intervention: Monitor Pain and Promote Comfort  Recent Flowsheet Documentation  Taken 6/8/2023 1900 by Claudette Delgado RN  Pain Management Interventions:   rest   repositioned   Goal Outcome Evaluation:    Pt is non verbal. Unable to assess orientation. Family by bedside and assisting with communication and answering questions via Ipad. VSS. Lung sounds coarse with crackles. RT following and performing neb treatments. Will continue to monitor and intervene as needed. Bed bound per baseline. Has R sided weakness and contractions to extremities.  GJ tube in place. Holding off on feedings d/t aspiration pneumonia. Speech consulted and will follow up for further plan of care. NPO. Purewick don. Q2hr repo. Pt requesting PRN suppository, daughter states pt receives one every other day at 7am to have a bowel movement. No PRN suppository in MAR but is on Home medication list. Posted a sticky note in chart to notify MD and will notify AM nurse.

## 2023-06-09 NOTE — PROGRESS NOTES
[facility] ID Inpatient follow up       Patient:  Liu Malave  Date of birth 1965, Medical record number [id number]  Date of Visit:  06/09/2023  Attending Physician: Jenna Beach MD         Assessment and Recommendations:   Assessment:  Liu Malave is a 58 year old male with   1.  History of CVA with nonverbal hemiplegia.  2.  Chronic encephalopathy with spasticity  3.  History of COPD with chronic cough  4.  Chronic dysphagia status post G-tube in place.    5.  History of MRSA and ESBL producing organisms  6.  Recurrent aspiration pneumonia.  Last admission on 4/27/2023 treated with IV Zosyn and discharged on p.o. Augmentin.  CT chest on 5/24/2023 with patchy infiltrates consistent with pneumonia.  Another course of Augmentin sent to his pharmacy.  7.  Now admitted with sepsis with acute respiratory failure with hypoxia.  Leukocytosis present on admission.  Respiratory multiplex PCR and sputum cultures ordered.  On IV meropenem and doxycycline.  Leukocytosis resolved.  Feeling back to normal.  Wants to go home.     Recommendations:   Discontinue meropenem and doxycycline.  Discharged on p.o. Levaquin 500 mg daily and p.o. Augmentin 875/125 mg twice daily for 10 days.  Prescription sent to his pharmacy.  High risk of readmission.    Discussed with the patient, daughter, nursing staff.    ID will sign off.    Annmarie Martin MD.  [facility].   [facility]  Office Telephone [phone].  Fax [phone]  [facility] paging            Interval History:     HPI:  The interval history was reviewed.   Doing better.  Feels back to normal.  Does not want to spend another night in the hospital.  Daughter on iPad served as .    Pertinent cultures include:  No results found for: CULT    Recent Inflammatory Biomarkers:   Recent Labs   Lab Test 06/08/23  0639 06/07/23  1840 05/24/23  0945 04/27/23  1033 04/26/23  0618 04/25/23  0640  "23  0545 23  0028 10/29/22  0733 10/28/22  2023 22  2256 22  1519 22  0803 04/15/21  0936 04/15/21  0936 20  1140 20  0036 20  0036 20  0036 19  1605 19  1605 18  0556 18  0550 18  0551 18  0808   CRP  --   --   --   --   --   --   --   --   --   --   --  0.4 2.7*  --  0.5 1.5*  --  1.0*  --   --  3.9*  --  1.0*   < >  --    PCAL  --  0.08  --   --   --   --   --  1.26*  --  2.01*  --   --   --   --   --   --   --   --  0.22  --   --   --  0.14  --  0.92*   WBC 15.0* 12.0* 8.2 8.9 13.1* 16.6*   < >  --    < > 12.7*   < > 8.7 8.4   < >  --  15.4*   < >  --   --    < >  --    < >  --    < >  --     < > = values in this interval not displayed.            Review of Systems:   CONSTITUTIONAL:    Temp Max: Temp (24hrs), Av.1  F (36.7  C), Min:97.2  F (36.2  C), Max:98.9  F (37.2  C)  Able to obtain           Current Medications (antimicrobials listed in bold):       amLODIPine  5 mg Per G Tube Daily     aspirin  81 mg Per Feeding Tube Daily     doxazosin  1 mg Per G Tube Daily     doxycycline  100 mg Intravenous Q12H     ipratropium  2 spray Both Nostrils BID     ipratropium - albuterol 0.5 mg/2.5 mg/3 mL  1 vial Nebulization 4x daily     meropenem  500 mg Intravenous Q8H     neomycin-bacitracin-polymyxin   Topical 4x Daily     pantoprazole  40 mg Oral QAM AC     QUEtiapine  25 mg Oral or Feeding Tube TID     saccharomyces boulardii  250 mg Per G Tube Daily     simvastatin  20 mg Per G Tube At Bedtime     sodium chloride (PF)  3 mL Intracatheter Q8H              Allergies:     Allergies   Allergen Reactions     Diphenhydramine Unknown     Trazodone Other (See Comments)     \"shaking\" per family            Physical Exam:   Vitals were reviewed  Patient Vitals for the past 24 hrs:   BP Temp Temp src Pulse Resp SpO2   23 0825 (!) 176/83 98.9  F (37.2  C) Axillary 99 18 95 %   23 0724 -- -- -- 95 16 95 %   23 0013 " 135/77 98.2  F (36.8  C) Axillary 94 18 95 %   06/08/23 1900 125/70 98.5  F (36.9  C) Axillary 93 18 96 %   06/08/23 1645 119/66 98.3  F (36.8  C) Axillary 107 20 93 %   06/08/23 1525 -- -- -- 90 20 95 %   06/08/23 1500 -- 97.2  F (36.2  C) Oral -- -- --   06/08/23 1225 -- -- -- 96 -- --   06/08/23 1136 112/63 97.6  F (36.4  C) Oral 113 18 94 %       Physical Examination:  Gen: Pleasant in no acute distress.  HEENT: NCAT. EOMI. PERRL.  Neck: No bruit, JVD or thyromegaly.  Lungs: Clear to ascultation bilat with no crackles or wheezes.  Card: RRR. NSR. No RMG. Peripheral pulses present and symmetric. No edema.  Abd: Soft NT ND. No mass. Normal bowel sounds.  Skin: No rash.  Extr: No edema.  Neuro: Alert            Laboratory Data:   ID Labs:  Microbiology labs:  Reviewed.    Recent Labs   Lab Test 05/30/22  1519 01/29/22  0803 04/15/21  0936 08/13/20  1140 07/25/20  0036 06/11/19  1605   CRP 0.4 2.7* 0.5 1.5* 1.0* 3.9*     Recent Labs   Lab Test 06/08/23  0639 06/07/23  1840 05/24/23  0945 04/27/23  1033 04/26/23  0618 04/25/23  0640   WBC 15.0* 12.0* 8.2 8.9 13.1* 16.6*     Recent Labs   Lab Test 06/08/23  0639 06/07/23  1840 05/24/23  0945 04/26/23  0618   CR 1.35* 1.06 1.24* 1.31*   GFRESTIMATED 60* 80 67 63       Hematology Studies  Recent Labs   Lab Test 06/08/23  0639 06/07/23  1840 05/24/23  0945 04/27/23  1033 04/26/23  0618 04/25/23  0640   WBC 15.0* 12.0* 8.2 8.9 13.1* 16.6*   HCT 32.9* 36.7* 36.2* 31.2* 33.3* 36.5*    235 185 182 178 218       Metabolic  Recent Labs   Lab Test 06/08/23  0639 06/07/23  1840 05/24/23  0945    138 145   BUN 31* 33* 40.1*   CO2 20* 22 23   CR 1.35* 1.06 1.24*   GFRESTIMATED 60* 80 67       Hepatic Studies  Recent Labs   Lab Test 06/08/23  0639 06/07/23  1840 05/24/23  0945   BILITOTAL 0.4 0.4 0.2   ALKPHOS 54 70 65   ALBUMIN 3.3* 3.9 4.1   AST 31 43* 37   ALT 49* 63* 45       Immunologlobulins  Recent Labs   Lab Test 05/30/22  1519   SED 74*            Imaging  Data:   Reviewed

## 2023-06-09 NOTE — PROGRESS NOTES
RCAT done, pt assessment below;       06/08/23 2100   RCAT Assessment   Reason for Assessment Other (see comments)   Pulmonary Status 0   Surgical Status 0   Chest X-ray 3   Respiratory Pattern 0   Mental Status 0   Breath Sounds 2   Cough Effectiveness 2   Level of Activity 2   O2 Required for SpO2>=92% 0   Acuity Level (points) 9   Acuity Level  4   Re-eval Interval Guideline Every 3 days   Re-evaluation Date 06/11/23     Will order neb QID and per pt home routine stated by pt's family to another RT.    Agnes Zuluaga, RT

## 2023-06-09 NOTE — CONSULTS
"CLINICAL NUTRITION SERVICES - ASSESSMENT NOTE     Nutrition Prescription    RECOMMENDATIONS FOR MDs/PROVIDERS TO ORDER:    Malnutrition Status:    Pt does not meet ASPEN criteria for malnutrition.     Recommendations already ordered by Registered Dietitian (RD):  Enteral Nutrition - Initiate Osmolite 1.5 via G-tube at goal rate of 45 mL/hr provides 1620 kcals, 67 gm protein, and 822 mL free water.   FWF: 110 mL q 4hr   Check Phosphorus Lab Value  Weekly weight    Future/Additional Recommendations:  Monitor Weight, TF tolerance/acceptance, BM.      REASON FOR ASSESSMENT  Liu Malave is a/an 58 year old male assessed by the dietitian for Provider Order - Registered Dietitian to Assess and Order TF per Medical Nutrition Therapy Protocol    HPI  PMHx of prior hemorrhagic stroke, g-tube dependent, recurrent aspiration pneumonia, hypertension, hyperlipidemia, bed-bound, non-verbal who presents to the ED via wheelchair with cough and vomiting.    NUTRITION HISTORY  Pt is on home TF at baseline- met with family at bedside this AM. Home TF regimen- Peptamen 1.5 + prebiotic.   Family reports no changes in weight and NPO at baseline.     CURRENT NUTRITION ORDERS  Diet: NPO  Intake/Tolerance: G-tube dependent home TF    LABS  Labs reviewed- Phos 2.4(L)    MEDICATIONS  Medications reviewed- Norvosac, Cardura, Doxycycline, Protonix, Seroquel, Florastor, zocor.     ANTHROPOMETRICS  Height: 157.5 cm (5' 2\")  Most Recent Weight: 59 kg (130 lb)    IBW: 53.6 kg  BMI: Normal BMI  Weight History:   Wt Readings from Last 10 Encounters:   06/07/23 59 kg (130 lb)   05/24/23 54 kg (119 lb)   04/26/23 54.1 kg (119 lb 4.3 oz)   02/12/23 56.8 kg (125 lb 3.5 oz)   12/13/22 58.7 kg (129 lb 8 oz)   10/30/22 61.5 kg (135 lb 9.3 oz)   09/19/22 60 kg (132 lb 4.4 oz)   05/30/22 59 kg (130 lb)   04/07/22 59 kg (130 lb)   01/28/22 59 kg (130 lb)   Most recent weight likely stated.     Dosing Weight: 54 kg, most recent actual body weight     ASSESSED " NUTRITION NEEDS  Estimated Energy Needs: 4244-0022 kcals/day (25 - 30 kcals/kg)  Justification: Maintenance  Estimated Protein Needs: 55-65 grams protein/day (1 - 1.2 grams of pro/kg)  Justification: Maintenance  Estimated Fluid Needs: 1350+ mL/day (25 - 30 mL/kg)   Justification: Maintenance    PHYSICAL FINDINGS  See malnutrition section below.  Per Flowsheets:   GI: rounded abdomen, no N/V/abdominal pain noted, G tube intact.   BM: none noted since admit   Skin: no wounds noted   Edema: none noted  I/Os: +UOP, +266 mL yesterday   Oral Health: no issues noted     MALNUTRITION:  % Weight Loss:  None noted  % Intake:  No decreased intake noted  Subcutaneous Fat Loss:  None observed  Muscle Loss:  None observed  Fluid Retention:  None noted    Malnutrition Diagnosis: Patient does not meet two of the above criteria necessary for diagnosing malnutrition    NUTRITION DIAGNOSIS  Inadequate protein-energy intake related to aspiration risk as evidenced by need for enteral nutrition at baseline.     INTERVENTIONS  Implementation   Enteral Nutrition - Initiate Osmolite 1.5 via G-tube at goal rate of 45 mL/hr provides 1620 kcals, 67 gm protein, and 822 mL free water.   FWF: 110 mL q 4hr   Prebiotic daily     Goals  Pt to meet nutritional needs via TF  Pt will tolerate TF  BM normalize     Monitoring/Evaluation  Weight, TF tolerance/acceptance, BM.

## 2023-06-09 NOTE — PLAN OF CARE
Problem: Plan of Care - These are the overarching goals to be used throughout the patient stay.    Goal: Optimal Comfort and Wellbeing  Outcome: Progressing     Problem: Pneumonia  Goal: Resolution of Infection Signs and Symptoms  Outcome: Progressing  Intervention: Prevent Infection Progression  Recent Flowsheet Documentation  Taken 6/9/2023 0843 by Nayla Rizzo, RN  Isolation Precautions: contact precautions maintained   Goal Outcome Evaluation:    Patient alert, nonverbal. Tube feedings started at 45mL/hour to G tube. IV antibiotics given. HTN this am, which decreased with meds. Family in room. ID has not seen patient yet this morning. Had one BM after suppository given this morning. Around 1515, RN was alerted by RT that patient was tachycardic. All other vitals stable, provider paged. Alarms on. Bed locked.

## 2023-06-09 NOTE — CONSULTS
GI CONSULT NOTE      Name: Liu Malave    Medical Record #: [id number]    YOB: 1965    Date: 6/9/2023    CC: We were consulted by Dayday Gonsales MD  see Liu Malave in regards to recurrent aspiration pneumonia in patient who has GJ feedings    HPI: This is a 58 year old male with a history of CVA with resultant hemiplegia, seizures, dysphagia, dependent upon tube feeding, HTN, depression.  He is currently being treated for aspiration pneumonia.  Stroke was approximately 6 years ago.  He had a G-tube for feeding but due to recurrent aspiration pneumonia this was changed to a GJ tube.  He now receives feeding through the jejunal port.  Despite this he continues to have aspiration pneumonia.  He had an episode of emesis before admission but this was clear in color and was not the color of his tube feeding.  His daughter was concerned about the amount of free water (is concerned of this might be playing a role).  Daughter reports that they keep the head of his bed elevated 30 degrees.  He is on a continuous drip at home.      Past medical history  Past Medical History:   Diagnosis Date     Acute UTI 02/21/2020     Anxiety      Basal ganglia hemorrhage (H) 06/20/2016     Brain compression (H)      Bruxism      Chronic static encephalopathy      Convulsions, unspecified convulsion type (H)      CVA (cerebral vascular accident) (H) 07/2016    Hemorrhagic,      Depression      Dyslipidemia      Elevated troponin      Essential hypertension      Feeding by G-tube (H) 06/04/2020     GERD (gastroesophageal reflux disease)      Hemiplegia of nondominant side, late effect of cerebrovascular disease (H)     Created by Conversion  Replacement Utility updated for latest IMO load     Hemorrhagic stroke (H) 06/05/2016     Hepatitis C carrier (H)      History of ESBL E. coli infection      History of hemorrhagic stroke with residual hemiparesis (H)      Hypertension      Lung nodule 12/05/2016    9 x 9 mm left upper  "lobe on CXR     MRSA (methicillin resistant staph aureus) culture positive      Periodontal disease      Persistent fever      Pneumonia of both lower lobes due to infectious organism      Respiratory failure with hypoxia (H)      Seizure (H)      Sepsis due to urinary tract infection (H) 2020     Severe sepsis (H)      Trismus         Family history  Family History   Problem Relation Age of Onset     Hypertension Mother      Cancer Father      Other Cancer Father      Kidney Disease Brother         Social history  Social History     Tobacco Use     Smoking status: Former     Packs/day: 1.00     Years: 30.00     Pack years: 30.00     Types: Cigarettes     Quit date: 2005     Years since quittin.0     Smokeless tobacco: Former     Quit date: 2007     Tobacco comments:     quit 2006   Vaping Use     Vaping status: Never Used   Substance Use Topics     Alcohol use: No     Drug use: No       Medications:   Amlodipine, aspirin 81 mg, baclofen 10 mg 4 times daily as needed, Dulcolax, cefaclor, chlorhexidine solution, guaifenesin as needed, Cardura, Nexium, ferrous sulfate, Atrovent, Lidoderm patch, meclizine, Neosporin, nystatin, MiraLAX, Seroquel, Florastor, simvastatin.    Allergies:    Allergies   Allergen Reactions     Diphenhydramine Unknown     Trazodone Other (See Comments)     \"shaking\" per family          REVIEW OF SYSTEMS (ROS): Review of systems is as per HPI.  Remainder of complete review of systems is negative.      PHYSICAL EXAMINATION:      BP (!) 176/83 (BP Location: Right arm, Patient Position: Semi-Brown's, Cuff Size: Adult Regular)   Pulse 105   Temp 98.9  F (37.2  C) (Axillary)   Resp 16   Ht 1.575 m (5' 2\")   Wt 59 kg (130 lb)   SpO2 95%   BMI 23.78 kg/m    GEN: Well developed, well nourished 58 year old male in no acute distress.  HEENT: sclera anicteric, moist mucous membranes, neck soft and supple.  LYMPH: No cervical lymphadenopathy  PULM: lungs clear to auscultation " bilaterally.  CARDIO: Regular rate and rhythm  GI: Non-distended.  Bowel sounds positive.  Soft.  Non-tender to palpation.  No guarding.  GJ tube with clean gauze and no evidence of leakage.  EXT: warm, no lower extremity edema  NEURO: Alert and oriented.  Speech fluid.    PSYCH: Mental status appropriate, mood and affect normal.      LABS and IMAGING  Recent Labs   Lab 06/08/23  0639 06/07/23  1840   WBC 15.0* 12.0*   RBC 3.41* 3.85*   HGB 10.8* 12.4*   HCT 32.9* 36.7*   MCV 97 95   MCH 31.7 32.2   MCHC 32.8 33.8   RDW 12.3 12.1    235      Recent Labs   Lab 06/08/23  0639 06/07/23  1840    138   CO2 20* 22   BUN 31* 33*     Recent Labs   Lab 06/08/23  0639 06/07/23  1840   ALKPHOS 54 70   AST 31 43*   ALT 49* 63*     Lab Results   Component Value Date    INR 1.04 04/15/2021    INR 1.03 06/27/2020    INR 0.94 06/04/2020       XR Chest Port 1 View    Result Date: 6/7/2023  EXAM: XR CHEST PORT 1 VIEW LOCATION: [facility] DATE/TIME: 6/7/2023 8:29 PM CDT INDICATION: hypoxia cough sepsis likely PNA (see recent CAT scan) COMPARISON: CT abdomen and pelvis 06/07/2023, CT chest 05/25/2023     IMPRESSION: Bibasilar opacities suspicious for pneumonia. Normal heart size. No pleural effusions.    CT Abdomen Pelvis w/o Contrast    Result Date: 6/7/2023  EXAM: CT ABDOMEN PELVIS W/O CONTRAST LOCATION: [facility] DATE/TIME: 6/7/2023 8:28 PM CDT INDICATION: g tube, vomiting today COMPARISON: 02/10/2023 TECHNIQUE: CT scan of the abdomen and pelvis was performed without IV contrast. Multiplanar reformats were obtained. Dose reduction techniques were used. CONTRAST: None. FINDINGS: LOWER CHEST: Patchy bilateral airspace opacity at the lung bases. HEPATOBILIARY: Liver and gallbladder within normal limits. PANCREAS: Normal. SPLEEN: Normal. ADRENAL GLANDS: Normal. KIDNEYS/BLADDER: Bilateral renal cysts which are benign and needs no further follow-up. No hydronephrosis.  BOWEL: No obstruction or inflammatory change. Gastrojejunostomy tube appears to be in appropriate position. LYMPH NODES: Normal. VASCULATURE: Moderate atherosclerotic disease of the abdominal aorta and its branches. PELVIC ORGANS: Bladder within normal limits. MUSCULOSKELETAL: Degenerative changes of the spine.     IMPRESSION: 1.  No acute findings in the abdomen and pelvis.. Gastrojejunostomy tube in appropriate position.     CT Head w/o Contrast    Result Date: 6/7/2023  EXAM: CT HEAD W/O CONTRAST LOCATION: [facility] DATE/TIME: 6/7/2023 8:29 PM CDT INDICATION: Acute vomiting. History of hemorrhagic stroke. COMPARISON: Head CT 04/25/2023 TECHNIQUE: Routine CT Head without IV contrast. Multiplanar reformats. Dose reduction techniques were used. FINDINGS: INTRACRANIAL CONTENTS: No intracranial hemorrhage, extraaxial collection, or mass effect.  No CT evidence of acute infarct. Chronic infarcts in the basal ganglia bilaterally. Postsurgical change from a right MCA aneurysm clipping. Associated encephalomalacia in the anterior right temporal lobe. These findings are unchanged. No new abnormality. Mild generalized volume loss. VISUALIZED ORBITS/SINUSES/MASTOIDS: No intraorbital abnormality. No paranasal sinus mucosal disease. No middle ear or mastoid effusion. BONES/SOFT TISSUES: No acute abnormality.     IMPRESSION: 1.  No CT evidence for acute intracranial process. 2.  Chronic infarcts in the basal ganglia bilaterally. 3.  Status post right MCA aneurysm clipping. 4.  No change.       ASSESSMENT  Aspiration pneumonia  History of CVA with tube feeding dependence  This is a 58-year-old male with a history of CVA with resultant hemiplegia, seizures, aphasia and dysphagia requiring enteral nutrition support for 100% of his nutritional and fluid needs.  He has had recurrent aspiration pneumonia.  At one point, he had been fed through a G-tube, but that has since been converted to a GJ.   Nonetheless, aspiration can still occur with GJ tube.  Unfortunate there is not much else to offer.  Note that he did vomit prior to admission but did not vomit up anything reflective of tube feeding.  Note that he has a couple as needed medications that are written for oral route, would make sure that he is following NPO guidelines.  His daughter some concerns about his fluid status and the amount of total free water he is getting.  Getting RD input on this would be helpful.    Patient Active Problem List   Diagnosis     Dental decay     Sepsis, due to unspecified organism, unspecified whether acute organ dysfunction present (H)     Basal ganglia hemorrhage (H)     Seizure disorder (H)     Depression     Dyslipidemia     Generalized anxiety disorder     Hemiplegia and hemiparesis following cerebral infarction affecting unspecified side (H)     GERD without esophagitis     Hepatitis C virus carrier state (H)     Malnutrition, unspecified type (H)     Pneumonia of both lungs due to infectious organism, unspecified part of lung     Pneumonia     Aspiration pneumonitis (H)     Sepsis without acute organ dysfunction (H)     Urinary retention with incomplete bladder emptying     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)     On tube feeding diet     Respiratory acidosis     Hospital-acquired pneumonia     Recurrent aspiration pneumonia (H)     Acute hypoxemic respiratory failure (H)       PLAN  1. Keep head of bed elevated to help decrease risk of aspiratio.   2. Adhere to NPO guidelines.   3. Suggest RD see pt to assist with family question on total yee free water volume (I.e. TF plus flushes).     A total of 25 minutes was spent on today's care.  This included chart review, examination and evaluation of the patient, discussion with daughter, discussion with Dr. Aaron, formulation of assessment and plan.    We will not continue to follow patient but  can be contacted if future concerns arise.  Thank you for asking to consult on this patient.    Ramsey Cronin PA-C   6/9/2023 11:54 AM  [facility]  [phone]

## 2023-06-09 NOTE — CONSULTS
Care Management Initial Consult    General Information  Assessment completed with: Children, Daughter Idalmis  Type of CM/SW Visit: Initial Assessment    Primary Care Provider verified and updated as needed:     Readmission within the last 30 days: no previous admission in last 30 days      Reason for Consult: discharge planning  Advance Care Planning:            Communication Assessment  Patient's communication style: spoken language (English or Bilingual)    Hearing Difficulty or Deaf: no   Wear Glasses or Blind: no    Cognitive  Cognitive/Neuro/Behavioral: .WDL except, speech, orientation, motor response, mood/behavior  Level of Consciousness: alert  Arousal Level: opens eyes spontaneously, arouses to voice  Orientation: other (see comments) (nonverbal)  Mood/Behavior: calm  Best Language: 0 - No aphasia  Speech: unable to speak    Living Environment:   People in home: child(sam), adult, spouse, parent(s), other (see comments) (wife, daughters x2, son-in-law x2, pt's mother)     Current living Arrangements: house      Able to return to prior arrangements: yes       Family/Social Support:  Care provided by: spouse/significant other, child(sam)  Provides care for: no one, unable/limited ability to care for self     Children, Wife  Rajput       Description of Support System: Supportive, Involved         Current Resources:   Patient receiving home care services: No     Community Resources: County Worker, PCA, Other (see comment) (CADI waiver worker)  Equipment currently used at home: hospital bed, wheelchair, manual  Supplies currently used at home: Incontinence Supplies, Enteral Nutrition & Supplies, Gloves, Wipes, Chux, Oxygen Tubing/Supplies, Nebulizer tubing    Employment/Financial:  Employment Status: disabled        Financial Concerns: No concerns identified           Does the patient's insurance plan have a 3 day qualifying hospital stay waiver?  Yes   Will the waiver be used for post-acute placement? No    Lifestyle  & Psychosocial Needs:  Social Determinants of Health     Tobacco Use: Medium Risk (5/4/2023)    Patient History      Smoking Tobacco Use: Former      Smokeless Tobacco Use: Former      Passive Exposure: Not on file   Alcohol Use: Not on file   Financial Resource Strain: Not on file   Food Insecurity: Not on file   Transportation Needs: Not on file   Physical Activity: Not on file   Stress: Not on file   Social Connections: Not on file   Intimate Partner Violence: Not on file   Depression: Not at risk (5/24/2023)    PHQ-2      PHQ-2 Score: 0   Housing Stability: Not on file       Functional Status:  Prior to admission patient needed assistance:   Dependent ADLs:: Bathing, Dressing, Eating, Grooming, Incontinence, Positioning, Transfers, Toileting, Wheelchair-with assist, Ambulation-walker  Dependent IADLs:: Cleaning, Cooking, Laundry, Shopping, Medication Management, Money Management, Transportation, Incontinence       Mental Health Status:          Chemical Dependency Status:                Values/Beliefs:  Spiritual, Cultural Beliefs, Restoration Practices, Values that affect care: no               Additional Information:  Assessed via pt's daughter Idalmis.  Pt is nonverbal at baseline, bedbound, total cares, tube feeding via [facility].  Pt resides with daughter Idalmis, another daughter, 2 sons-in-law, pt's wife Regulo, and pt's mother.  Family provides all cares.  Pt has PCA (13.5 hours/day) and overnight supervision via family members.  Pt has a CADI worker Adriana [phone].  DME in the home: hospital bed, oral suction, tube feeding, incontinence supplies.    Goal: Discharge to home, family can transport, tube feeding via [facility], possible IV antibx.    [facility] liasion would be [facility] ([phone]).  They will need new orders if pt's TF formula changes.    [facility] ([facility]) - Referral sent to check benefits for IV antibx.  Daughter Idalmis agreeable to I; states she is a  and does not have concerns about  "family's ability to manage IV antibx at home.    2:40 PM  [facility] - Per [facility], pt has 100% coverage for IV antibx, there may be a copay upon dispense.    3:53 PM  ID note 6/9: \"Discharged on p.o. Levaquin 500 mg daily and p.o. Augmentin 875/125 mg twice daily for 10 days\"    CM will continue to follow care progression and aide in discharge planning as needed.       Reggie Serna RN      "

## 2023-06-09 NOTE — PROGRESS NOTES
[facility] Medicine PROGRESS NOTE      Identification/Summary:   Liu GIL Malave is a 58 year old male who presented with complaints of cough, vomiting, restlessness.  Past medical history is notable for stroke with significant sequelae, siezures, hemiplegia, GJ feeding tube dependent, HTN, MRSA, depresssion, ESBL UTI, aspiration pneumonia.  Initial evaluation revealed slight hypoxia, tachycardia. Elevated WBC of 12.0. UA neg for infection.  CT abdomen with GJ tube in appropriate position, otherwise unremarkable.  CT head negative for acute disease.  Chest x-ray with bibasilar infiltrates.  Procalcitonin normal.  Initial treatment included Zofran, Zosyn, supplemental oxygen.  Fourth admission since December 2022 for aspiration pneumonia.  ID, pulmonary, speech and GI service consulted. No new recs at this time from speech, GI service. Developed worsening sinus tachycardia to 120's, remain on room air, tube feeds resumed earlier in the day, had suppository with BM today. On exam has some abdominal distension, checking xray abdomen, bladder scan, gastric residuals. EKG sinus tach, check troponin. Difficulty to assess for pain as he is nonverbal, bu overall appears comfortable. Not in distress. Daughter requesting for discharge, ID changed to PO abx. Likely discharge tomorrow.    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  Recently completed a course of Augmentin.  We will keep head of bed elevated at 45 degrees or greater.  Strict NPO.  Will resume home tube feeding regimen.  Was on meropenem and doxycycline. Changed to augmentin, levaquin today  Continue nebulizer treatments.  Appreciate consultations from ID and pulmonary service.  Discussed with family that unfortunately  "there are probably not many options to further minimize his risk. Family would like to pursue if possible though.  Speech therapy and GI no new recs.  Order as needed sublingual atropine drops see if this could minimize any secretions.    Sinus tachycardia  Since admission he has had tachycardia upto 105/min, currently in 120's  Check lactate, troponin, tele,mag level  abd xray no acute changes, bladder scan  Had a BM after suppository   Check CTA chest r/o PE     Recurrent UTIs, on cefaclor prophylaxis  History of MRSA, ESBL E. coli in prior cultures  Noted.  Contact precautions.    Essential hypertension  Continue on on amlodipine, doxazosin  Hyperlipidemia  Continue on Zocor  Insomnia  Continue on Seroquel at night  Reactive airway disease  Continue on DuoNebs every 4 hours  GERD  Continue on esomeprazole 20 mg twice daily  Spasticity  Continue on baclofen as needed     Anticoagulation   Aspirin 81 via feeding tube daily.  Start lovenox     Diet: NPO for Medical/Clinical Reasons Except for: Ice Chips  Adult Formula Drip Feeding: Continuous Osmolite 1.5; Gastrostomy; Goal Rate: 45; mL/hr; start at goal rate  DVT Prophylaxis:   Start lovenox   Code Status: Full Code    Anticipated possible discharge in 1 days once tolerating tube feeds, improvement in heart rate milestones are met.    Interval History/Subjective:  He is non verbal appeared comfortable, daughter at bedside, requesting for discharge this morning, became tachycardic after resuming tube feeds earlier today, sinus tach 120's on EKG, remains on room air. Not in any distress.     Physical Exam/Objective:  Vitals I/O   Vital signs:  Temp: 98.8  F (37.1  C) Temp src: Axillary BP: 118/76 Pulse: (!) 124   Resp: 16 SpO2: 96 % O2 Device: None (Room air) Oxygen Delivery: 2 LPM Height: 157.5 cm (5' 2\") Weight: 59 kg (130 lb)  Estimated body mass index is 23.78 kg/m  as calculated from the following:    Height as of this encounter: 1.575 m (5' 2\").    Weight as of " this encounter: 59 kg (130 lb). I/O last 3 completed shifts:  In: 255 [I.V.:115; NG/GT:140]  Out: 600 [Urine:600]     Body mass index is 23.78 kg/m .    General Appearance:  Alert,, no distress   Head:  Normocephalic, atraumatic   Eyes:  PERRL    Throat:  mucosa; moist   Neck: No JVD, thyromegaly   Lungs:   rhonchi bilaterally, respirations unlabored   Chest Wall:  No tenderness or deformity   Heart:  Regular rate and rhythm, S1, S2 normal, tachycardic no murmur   Abdomen:   Soft, non tender, mildly distended, bowel sounds present, no guarding or rigidity   Extremities: No edema, no joint swelling   Skin: Skin color, texture, turgor normal, no rashes or lesions   Neurologic: Chronic hemiplegia, non verbal        Medications:   Personally Reviewed.    amLODIPine  5 mg Per G Tube Daily     amoxicillin-clavulanate  1 tablet Oral Q12H [facility] (08/20)     aspirin  81 mg Per Feeding Tube Daily     doxazosin  1 mg Per G Tube Daily     ipratropium  2 spray Both Nostrils BID     ipratropium - albuterol 0.5 mg/2.5 mg/3 mL  1 vial Nebulization 4x daily     levofloxacin  500 mg Oral Daily     neomycin-bacitracin-polymyxin   Topical 4x Daily     pantoprazole  40 mg Oral QAM AC     QUEtiapine  25 mg Oral or Feeding Tube TID     saccharomyces boulardii  250 mg Per G Tube Daily     simvastatin  20 mg Per G Tube At Bedtime     sodium chloride (PF)  3 mL Intracatheter Q8H       Data reviewed today: I personally reviewed all new medications, labs, imaging/diagnostics reports over the past 24 hours. Pertinent findings include    Labs:  Most Recent 3 CBC's:Recent Labs   Lab Test 06/09/23  1151 06/08/23  0639 06/07/23  1840   WBC 9.2 15.0* 12.0*   HGB 10.7* 10.8* 12.4*   MCV 97 97 95    185 235     Most Recent 3 BMP's:Recent Labs   Lab Test 06/09/23  1218 06/09/23  1151 06/09/23  0228 06/08/23  0639 06/07/23  1840   NA  --  140  --  137 138   POTASSIUM  --  4.3  --  4.6 4.5   CHLORIDE  --  109*  --  108* 105   CO2  --  21*  --  20* 22    BUN  --  19  --  31* 33*   CR  --  1.10  --  1.35* 1.06   ANIONGAP  --  10  --  9 11   SAMIR  --  8.8  --  8.6 9.1   GLC 81 74 94 114 105       Imaging:   No results found for this or any previous visit (from the past 24 hour(s)).    > 50 MINUTES SPENT BY ME on the date of service doing chart review, history, exam, documentation & further activities per the note.    Jenna Beach MD  Hospitalist  [facility]

## 2023-06-09 NOTE — PROGRESS NOTES
Pt has 100% coverage for iv abx through their [facility] plan, however there may be a copay upon dispense.      (WW) In reference to admission date 1965.    Please contact Intake with any questions, [phone] or In Basket pool, [facility] ([phone]).

## 2023-06-10 VITALS
DIASTOLIC BLOOD PRESSURE: 59 MMHG | WEIGHT: 130 LBS | OXYGEN SATURATION: 100 % | HEART RATE: 94 BPM | HEIGHT: 62 IN | TEMPERATURE: 99.3 F | RESPIRATION RATE: 16 BRPM | SYSTOLIC BLOOD PRESSURE: 112 MMHG | BODY MASS INDEX: 23.92 KG/M2

## 2023-06-10 LAB
ANION GAP SERPL CALCULATED.3IONS-SCNC: 11 MMOL/L (ref 5–18)
BACTERIA SPT CULT: NORMAL
BUN SERPL-MCNC: 16 MG/DL (ref 8–22)
CALCIUM SERPL-MCNC: 8.5 MG/DL (ref 8.5–10.5)
CHLORIDE BLD-SCNC: 108 MMOL/L (ref 98–107)
CO2 SERPL-SCNC: 21 MMOL/L (ref 22–31)
CREAT SERPL-MCNC: 1.42 MG/DL (ref 0.7–1.3)
ERYTHROCYTE [DISTWIDTH] IN BLOOD BY AUTOMATED COUNT: 12.1 % (ref 10–15)
GFR SERPL CREATININE-BSD FRML MDRD: 57 ML/MIN/1.73M2
GLUCOSE BLD-MCNC: 117 MG/DL (ref 70–125)
GLUCOSE BLDC GLUCOMTR-MCNC: 124 MG/DL (ref 70–99)
GRAM STAIN RESULT: NORMAL
HCT VFR BLD AUTO: 31 % (ref 40–53)
HGB BLD-MCNC: 10.3 G/DL (ref 13.3–17.7)
MCH RBC QN AUTO: 32.2 PG (ref 26.5–33)
MCHC RBC AUTO-ENTMCNC: 33.2 G/DL (ref 31.5–36.5)
MCV RBC AUTO: 97 FL (ref 78–100)
PLATELET # BLD AUTO: 179 10E3/UL (ref 150–450)
POTASSIUM BLD-SCNC: 3.8 MMOL/L (ref 3.5–5)
RBC # BLD AUTO: 3.2 10E6/UL (ref 4.4–5.9)
SODIUM SERPL-SCNC: 140 MMOL/L (ref 136–145)
WBC # BLD AUTO: 7.5 10E3/UL (ref 4–11)

## 2023-06-10 PROCEDURE — 250N000013 HC RX MED GY IP 250 OP 250 PS 637: Performed by: STUDENT IN AN ORGANIZED HEALTH CARE EDUCATION/TRAINING PROGRAM

## 2023-06-10 PROCEDURE — 999N000157 HC STATISTIC RCP TIME EA 10 MIN

## 2023-06-10 PROCEDURE — 250N000009 HC RX 250: Performed by: HOSPITALIST

## 2023-06-10 PROCEDURE — 250N000013 HC RX MED GY IP 250 OP 250 PS 637: Performed by: HOSPITALIST

## 2023-06-10 PROCEDURE — 250N000013 HC RX MED GY IP 250 OP 250 PS 637: Performed by: FAMILY MEDICINE

## 2023-06-10 PROCEDURE — 99239 HOSP IP/OBS DSCHRG MGMT >30: CPT | Performed by: INTERNAL MEDICINE

## 2023-06-10 PROCEDURE — 80048 BASIC METABOLIC PNL TOTAL CA: CPT | Performed by: INTERNAL MEDICINE

## 2023-06-10 PROCEDURE — 94640 AIRWAY INHALATION TREATMENT: CPT

## 2023-06-10 PROCEDURE — 94640 AIRWAY INHALATION TREATMENT: CPT | Mod: 76

## 2023-06-10 PROCEDURE — 250N000009 HC RX 250: Performed by: FAMILY MEDICINE

## 2023-06-10 PROCEDURE — 87205 SMEAR GRAM STAIN: CPT | Performed by: INTERNAL MEDICINE

## 2023-06-10 PROCEDURE — 250N000011 HC RX IP 250 OP 636: Performed by: INTERNAL MEDICINE

## 2023-06-10 PROCEDURE — 85027 COMPLETE CBC AUTOMATED: CPT | Performed by: INTERNAL MEDICINE

## 2023-06-10 PROCEDURE — 999N000178 HC STATISTIC SUCTION SPUTUM

## 2023-06-10 PROCEDURE — 250N000009 HC RX 250: Performed by: INTERNAL MEDICINE

## 2023-06-10 RX ORDER — SODIUM CHLORIDE FOR INHALATION 3 %
3 VIAL, NEBULIZER (ML) INHALATION EVERY 6 HOURS PRN
Qty: 30 ML | Refills: 0 | Status: SHIPPED | OUTPATIENT
Start: 2023-06-10 | End: 2023-09-27

## 2023-06-10 RX ORDER — ONDANSETRON 4 MG/1
4 TABLET, FILM COATED ORAL EVERY 8 HOURS PRN
Qty: 30 TABLET | Refills: 0 | Status: SHIPPED | OUTPATIENT
Start: 2023-06-10 | End: 2023-06-21

## 2023-06-10 RX ORDER — LIDOCAINE 50 MG/G
1 PATCH TOPICAL DAILY PRN
Start: 2023-06-10 | End: 2023-06-21

## 2023-06-10 RX ORDER — DOXAZOSIN 1 MG/1
1 TABLET ORAL AT BEDTIME
Qty: 30 TABLET | Refills: 0 | Status: SHIPPED | OUTPATIENT
Start: 2023-06-10 | End: 2023-06-21

## 2023-06-10 RX ORDER — SODIUM CHLORIDE FOR INHALATION 3 %
3 VIAL, NEBULIZER (ML) INHALATION EVERY 6 HOURS PRN
Status: DISCONTINUED | OUTPATIENT
Start: 2023-06-10 | End: 2023-06-10 | Stop reason: HOSPADM

## 2023-06-10 RX ORDER — NYSTATIN 100000 U/G
CREAM TOPICAL 2 TIMES DAILY PRN
Start: 2023-06-10 | End: 2023-06-21

## 2023-06-10 RX ORDER — SODIUM CHLORIDE FOR INHALATION 3 %
3 VIAL, NEBULIZER (ML) INHALATION ONCE
Status: COMPLETED | OUTPATIENT
Start: 2023-06-10 | End: 2023-06-10

## 2023-06-10 RX ORDER — CEFACLOR 250 MG
CAPSULE ORAL
Qty: 90 CAPSULE | Refills: 1
Start: 2023-06-10 | End: 2023-06-10

## 2023-06-10 RX ADMIN — BACITRACIN ZINC, NEOMYCIN, POLYMYXIN B: 400; 3.5; 5 OINTMENT TOPICAL at 08:26

## 2023-06-10 RX ADMIN — LEVOFLOXACIN 500 MG: 500 TABLET, FILM COATED ORAL at 08:15

## 2023-06-10 RX ADMIN — BACLOFEN 10 MG: 10 TABLET ORAL at 13:01

## 2023-06-10 RX ADMIN — IPRATROPIUM BROMIDE 2 SPRAY: 42 SPRAY, METERED NASAL at 08:26

## 2023-06-10 RX ADMIN — IPRATROPIUM BROMIDE AND ALBUTEROL SULFATE 3 ML: .5; 3 SOLUTION RESPIRATORY (INHALATION) at 11:15

## 2023-06-10 RX ADMIN — QUETIAPINE FUMARATE 25 MG: 25 TABLET ORAL at 04:25

## 2023-06-10 RX ADMIN — QUETIAPINE FUMARATE 25 MG: 25 TABLET ORAL at 00:36

## 2023-06-10 RX ADMIN — ENOXAPARIN SODIUM 40 MG: 100 INJECTION SUBCUTANEOUS at 16:20

## 2023-06-10 RX ADMIN — BACLOFEN 10 MG: 10 TABLET ORAL at 04:25

## 2023-06-10 RX ADMIN — SODIUM BICARBONATE 40 MG: 84 INJECTION, SOLUTION INTRAVENOUS at 06:46

## 2023-06-10 RX ADMIN — PIPERACILLIN AND TAZOBACTAM 3.38 G: 3; .375 INJECTION, POWDER, LYOPHILIZED, FOR SOLUTION INTRAVENOUS at 00:31

## 2023-06-10 RX ADMIN — BACITRACIN ZINC, NEOMYCIN, POLYMYXIN B: 400; 3.5; 5 OINTMENT TOPICAL at 13:04

## 2023-06-10 RX ADMIN — AMLODIPINE BESYLATE 5 MG: 5 TABLET ORAL at 08:14

## 2023-06-10 RX ADMIN — ACETAMINOPHEN 1000 MG: 500 TABLET ORAL at 08:19

## 2023-06-10 RX ADMIN — SODIUM CHLORIDE SOLN NEBU 3% 3 ML: 3 NEBU SOLN at 09:56

## 2023-06-10 RX ADMIN — IPRATROPIUM BROMIDE AND ALBUTEROL SULFATE 3 ML: .5; 3 SOLUTION RESPIRATORY (INHALATION) at 15:51

## 2023-06-10 RX ADMIN — ASPIRIN 81 MG CHEWABLE TABLET 81 MG: 81 TABLET CHEWABLE at 08:15

## 2023-06-10 RX ADMIN — GUAIFENESIN AND DEXTROMETHORPHAN 10 ML: 100; 10 SYRUP ORAL at 13:01

## 2023-06-10 RX ADMIN — BACITRACIN ZINC, NEOMYCIN, POLYMYXIN B: 400; 3.5; 5 OINTMENT TOPICAL at 16:20

## 2023-06-10 RX ADMIN — ACETAMINOPHEN 1000 MG: 500 TABLET ORAL at 14:24

## 2023-06-10 RX ADMIN — DOXAZOSIN 1 MG: 1 TABLET ORAL at 08:14

## 2023-06-10 RX ADMIN — IPRATROPIUM BROMIDE AND ALBUTEROL SULFATE 3 ML: .5; 3 SOLUTION RESPIRATORY (INHALATION) at 07:36

## 2023-06-10 RX ADMIN — Medication 250 MG: at 08:15

## 2023-06-10 ASSESSMENT — ACTIVITIES OF DAILY LIVING (ADL)
ADLS_ACUITY_SCORE: 75

## 2023-06-10 NOTE — PLAN OF CARE
Problem: Plan of Care - These are the overarching goals to be used throughout the patient stay.    Goal: Plan of Care Review  Description: The Plan of Care Review/Shift note should be completed every shift.  The Outcome Evaluation is a brief statement about your assessment that the patient is improving, declining, or no change.  This information will be displayed automatically on your shift note.  Outcome: Progressing   Goal Outcome Evaluation:  VSS and afebrile, other than being tachycardic. Patient is non-verbal, but no noted s/s of pain. Appears comfortable. Had one episode of HR up to high 150s r/t coughing fit and needing to be suctioned. Once suctioned, HR gradually came back down. Tele has been sinus tachycardia sitting on average between 100-110. HOB remaining at 30-45 degrees. On isolation precautions. Family at beside. Family informed staff on evening shift that the reason he was more tachycardic was because he was angry and there was a family dispute. Once that family member involved left, patient's HR began to come down. Feeding running at 25 mL/hour. Sticky note left for provider to address order for TF rate as well as the order for feeding to be running in J vs G port. Gastric residual of 0. Bladder scan of 96. Primofit in place.

## 2023-06-10 NOTE — PROGRESS NOTES
Writer able to collect the sputum culture from oral suction after sodium chloride 3% was given. Sputum sent to lab via the tube systems. Pt tolerated suction well.     Gregorio Chiang, RT

## 2023-06-10 NOTE — DISCHARGE SUMMARY
[facility]    Discharge Summary  Hospitalist    Date of Admission:  6/7/2023  Date of Discharge:  6/10/2023  5:44 PM  Provider:  Lorena Alexander DO    Discharge Diagnoses   1.  Acute respiratory failure with hypoxia  2.  Recurrent aspiration pneumonia with early SEPSIS  3.  Sinus tachycardia  4.  Recurrent UTI's      Other medical issues:  Past Medical History:   Diagnosis Date     Acute UTI 02/21/2020     Anxiety      Basal ganglia hemorrhage (H) 06/20/2016     Brain compression (H)      Bruxism      Chronic static encephalopathy      Convulsions, unspecified convulsion type (H)      CVA (cerebral vascular accident) (H) 07/2016    Hemorrhagic,      Depression      Dyslipidemia      Elevated troponin      Essential hypertension      Feeding by G-tube (H) 06/04/2020     GERD (gastroesophageal reflux disease)      Hemiplegia of nondominant side, late effect of cerebrovascular disease (H)     Created by Conversion  Replacement Utility updated for latest IMO load     Hemorrhagic stroke (H) 06/05/2016     Hepatitis C carrier (H)      History of ESBL E. coli infection      History of hemorrhagic stroke with residual hemiparesis (H)      Hypertension      Lung nodule 12/05/2016    9 x 9 mm left upper lobe on CXR     MRSA (methicillin resistant staph aureus) culture positive      Periodontal disease      Persistent fever      Pneumonia of both lower lobes due to infectious organism      Respiratory failure with hypoxia (H)      Seizure (H)      Sepsis due to urinary tract infection (H) 06/04/2020     Severe sepsis (H)      Trismus        History of Present Illness   Liukurtis Malave is an 58 year old male with a significant PMH of chronic hemiplegia, nonverbal/non-ambulatory status d/t past hemorrhagic CVA who presented from home with cough, vomiting and restlessness.  Please see the admission history and physical for full details.    Hospital Course   Liu Malave was admitted on 6/7/2023.   The following problems were addressed during his hospitalization:    1.  Acute respiratory failure with hypoxia       Aspiration pneumonia with SEPSIS    He presented to the ED with concerns for early sepsis (hypoxia,  Tachypnea, tachycardia, leukocytosis and concern for infectious pneumonia).   Noted to have recurrent aspiration pneumonia (4th similar admission since 12/2022).  He is chronically on tube feeds which were initially held and then slowly restarted via J-tube.  He was sttarted on IV meropenem and doxycycline on admission; ID and pulmonary medicine consulted.  He was transitioned to po levaquin and augmentin at time of discharge.      He was seen by SLP and aspiration prevention techniques reviewed with family.      2.  Sinus tachycardia    He had intermittent tachycardia during his hospital stay but was, overall, improved at time of discharge.    3.  Acute renal insufficiency    He was noted to have slight increase in creat on day of discharge.  It was recommended to the family that his free water be increased slightly for the next few days via his feeding tube (I discussed in detail with daughter on the phone on day of discharge).  He will have a f/up with BMP in 2 days (6/12/23) to review with PCP with attention to his electrolytes and creat.      His creat and GFR will need to be reviewed with PCP after discharge to ensure that his levaquin dose remains a safe dose.  Discussed with pharmacy and reviewed levaquin dose prior to his discharge today.    Significant Results and Procedures   none    Pending Results   Unresulted Labs Ordered in the Past 30 Days of this Admission     No orders found from 5/8/2023 to 6/8/2023.          Code Status   Full Code       Primary Care Physician   Lynda Gutierrez    Physical Exam                      Vitals:    06/07/23 1815   Weight: 59 kg (130 lb)     Vital Signs with Ranges     No intake/output data recorded.     PT SEEN AND EXAMINED ON DAY OF D/C    GEN:  Alert,  awake  HEENT:  Normocephalic/atraumatic, PERRL, no scleral icterus, no nasal discharge, mouth moist,  CV:  Slight tachy but otherwise reg rhythm  LUNGS:  Fairly clear to auscultation ant/lat bilaterally.  No rales/rhonchi/wheezing auscultated bilaterally.  No costal retractions bilaterally.  Symmetric chest rise on inhalation noted.  ABD:  Active bowel sounds, soft, no grimacing to light palpation. Feeding tube site appears clean and dry with no visible erythema  EXT:  No pretibial edema or cyanosis bilaterally.   PSYCH:  Intermittent crying    Discharge Disposition   Discharged to home    Consultations This Hospital Stay   INFECTIOUS DISEASES IP CONSULT  PULMONARY IP CONSULT  SPEECH LANGUAGE PATH ADULT IP CONSULT  GASTROENTEROLOGY IP CONSULT  NUTRITION SERVICES ADULT IP CONSULT  NUTRITION SERVICES ADULT IP CONSULT  PHARMACY IP CONSULT  CARE MANAGEMENT / SOCIAL WORK IP CONSULT    Time Spent on this Encounter   ILorena DO, personally saw the patient today and spent greater than 30 minutes discharging this patient.    Discharge Orders      Reason for your hospital stay    You were admitted for aspiration pneumonia     Activity    Your activity upon discharge: activity as tolerated     Discharge Instructions    Hold iron and cefaclor (home meds) until LEVAQUIN course completed     Follow-up and recommended labs and tests     F/up with PCP with preclinical BMP, cbc 6/12/23, as discussed with daughter     Diet    Follow this diet upon discharge: NPO, continue tube feeds  Resume jejunal tube feeds; free water flushes 200cc q4hr or so (total free water should be 800-1000ml/day) until f/up labs on 6/12/23     Discharge Medications   Discharge Medication List as of 6/10/2023  5:05 PM      START taking these medications    Details   amoxicillin-clavulanate (AUGMENTIN) 875-125 MG tablet Take 1 tablet by mouth every 12 hours for 10 days, Disp-20 tablet, R-0, E-Prescribe      levofloxacin (LEVAQUIN) 500 MG  tablet Take 1 tablet (500 mg) by mouth daily for 10 days, Disp-10 tablet, R-0, E-Prescribe      ondansetron (ZOFRAN) 4 MG tablet Take 1 tablet (4 mg) by mouth every 8 hours as needed for nausea, Disp-30 tablet, R-0, E-Prescribe      sodium chloride (NEBUSAL) 3 % neb solution Take 3 mLs by nebulization every 6 hours as needed for other (thick sputum), Disp-30 mL, R-0, E-Prescribe         CONTINUE these medications which have CHANGED    Details   doxazosin (CARDURA) 1 MG tablet Take 1 tablet (1 mg) by mouth At Bedtime, Disp-30 tablet, R-0, E-Prescribe      lidocaine (LIDODERM) 5 % patch Place 1 patch onto the skin daily as needed To prevent lidocaine toxicity, patient should be patch free for 12 hrs daily.No Print Out      nystatin (MYCOSTATIN) 059170 UNIT/GM external cream Apply topically 2 times daily as needed (redness)No Print Out         CONTINUE these medications which have NOT CHANGED    Details   acetaminophen (TYLENOL) 500 MG tablet 500-1,000 mg by Gastric Tube route every 6 hours as needed for mild pain, Historical      amLODIPine (NORVASC) 5 MG tablet Take 1 tablet (5 mg total) by mouth daily., Disp-30 tablet, R-11, E-Prescribe      aspirin (ASA) 81 MG chewable tablet 1 tablet (81 mg) by Per Feeding Tube route daily, Disp-90 tablet, R-0, E-Prescribe      baclofen (LIORESAL) 10 MG tablet 10 mg by Per G Tube route 4 times daily as needed for muscle spasms Usually takes with Seroquel, Historical      bisacodyl (DULCOLAX) 10 MG suppository INSERT 1 SUPPOSTIORY INTO THE RECTUM DAILY AS NEEDED FOR CONSTIPATION., Disp-30 suppository, R-8, E-Prescribe      chlorhexidine (PERIDEX) 0.12 % solution Swish and spit 15 mLs in mouth daily , Historical      Dextromethorphan-guaiFENesin  MG/5ML syrup TAKE 10 MLS BY G TUBE ROUTE EVERY 4 HOURS AS NEEDED FOR COUGH., Disp-237 mL, R-4, E-Prescribe      esomeprazole (NEXIUM) 20 MG DR capsule Take 20 mg by mouth 2 times daily Take 30-60 minutes before eating., Historical     "  ipratropium (ATROVENT) 0.06 % nasal spray Spray 2 sprays into both nostrils 2 times daily, Historical      ipratropium - albuterol 0.5 mg/2.5 mg/3 mL (DUONEB) 0.5-2.5 (3) MG/3ML neb solution Take 1 vial by nebulization every 4 hours, Historical      meclizine (ANTIVERT) 25 MG tablet Take 1 tablet via G-tube every 6 hours as needed for vertigo, Disp-30 tablet, R-11, E-Prescribe      neomycin-bacitracin-polymyxin (NEOSPORIN) 5-400-5000 ointment Apply topically 4 times dailyDisp-30 g, [id number]      Ostomy Supplies (STOMAHESIVE) PSTE Apply topically to skin around ostomy site twice daily and as needed, Disp-56.7 g, R-3, E-Prescribe      polyethylene glycol (MIRALAX) 17 GM/Dose powder Give 1 capful via G-tube daily as needed for constipation, Disp-850 g, R-3, E-Prescribe      Polyethylene Glycol 400 (VISINE DRY EYE RELIEF) 1 % SOLN Apply 1 drop to eye every 6 hours as needed (dry eyes), Disp-15 mL, R-11, E-Prescribe      QUEtiapine (SEROQUEL) 25 MG tablet Take 1 tablet (25 mg) by mouth See Admin Instructions Take 1 tablet at 7PM, Midnight and 3AM for total of 75 mg nightly. Ok to repeat after 4 hours and during the day PRN, Disp-102 tablet, R-4, E-Prescribe      saccharomyces boulardii (FLORASTOR) 250 MG capsule Take 1 capsule (250 mg) by mouth daily, Disp-90 capsule, R-3, E-PrescribeIf you could dispense liquid form, that would be great, but it should be okay for patient to change capsule to liquid. Thanks!      simvastatin (ZOCOR) 20 MG tablet 1 tablet (20 mg) by Per G Tube route At Bedtime, Disp-90 tablet, R-0, E-PrescribeDose decrease         STOP taking these medications       cefaclor (CECLOR) 250 MG capsule Comments:   Reason for Stopping:         ferrous sulfate 220 (44 Fe) MG/5ML ELIX Comments:   Reason for Stopping:             Allergies   Allergies   Allergen Reactions     Diphenhydramine Unknown     Trazodone Other (See Comments)     \"shaking\" per family     Data   Recent Labs   Lab 06/12/23  4958 " 06/10/23  1255 06/09/23  1151   WBC 7.7 7.5 9.2   HGB 11.0* 10.3* 10.7*   HCT 32.7* 31.0* 32.9*   MCV 96 97 97    179 177     Recent Labs   Lab 06/12/23  1548 06/10/23  1255 06/10/23  0834 06/09/23  1218 06/09/23  1151    140  --   --  140   POTASSIUM 4.5 3.8  --   --  4.3   CHLORIDE 105 108*  --   --  109*   CO2 21* 21*  --   --  21*   ANIONGAP 13 11  --   --  10   GLC 79 117 124*   < > 74   BUN 20.3 16  --   --  19   CR 1.29* 1.42*  --   --  1.10   GFRESTIMATED 63 57*  --   --  77   SAMIR 9.1 8.5  --   --  8.8    < > = values in this interval not displayed.     7-Day Micro Results     Collected Updated Procedure Result Status      06/10/2023 1021 06/10/2023 1504 Respiratory Aerobic Bacterial Culture with Gram Stain [[id number]]   Sputum from Expectorate    Final result Component Value   Culture >10 Squamous epithelial cells/low power field indicates oral contamination. Please recollect.   Gram Stain Result >10 Squamous epithelial cells/low power field    >25 PMNs/low power field    4+ Mixed brandon                   Recent Labs   Lab 06/12/23  1548 06/10/23  1255 06/10/23  0834 06/09/23  1218 06/09/23  1151    140  --   --  140   POTASSIUM 4.5 3.8  --   --  4.3   CHLORIDE 105 108*  --   --  109*   CO2 21* 21*  --   --  21*   ANIONGAP 13 11  --   --  10   GLC 79 117 124*   < > 74   BUN 20.3 16  --   --  19   CR 1.29* 1.42*  --   --  1.10   GFRESTIMATED 63 57*  --   --  77   SAMIR 9.1 8.5  --   --  8.8   MAG  --   --   --   --  2.0   PHOS  --   --   --   --  2.5    < > = values in this interval not displayed.     Recent Labs   Lab 06/09/23  1624   LACT 1.2     Results for orders placed or performed during the hospital encounter of 06/07/23   XR Chest Port 1 View    Narrative    EXAM: XR CHEST PORT 1 VIEW  LOCATION: [facility]  DATE/TIME: 6/7/2023 8:29 PM CDT    INDICATION: hypoxia cough sepsis likely PNA (see recent CAT scan)  COMPARISON: CT abdomen and pelvis 06/07/2023, CT  chest 05/25/2023      Impression    IMPRESSION: Bibasilar opacities suspicious for pneumonia. Normal heart size. No pleural effusions.   CT Abdomen Pelvis w/o Contrast    Narrative    EXAM: CT ABDOMEN PELVIS W/O CONTRAST  LOCATION: [facility]  DATE/TIME: 6/7/2023 8:28 PM CDT    INDICATION: g tube, vomiting today  COMPARISON: 02/10/2023  TECHNIQUE: CT scan of the abdomen and pelvis was performed without IV contrast. Multiplanar reformats were obtained. Dose reduction techniques were used.  CONTRAST: None.    FINDINGS:   LOWER CHEST: Patchy bilateral airspace opacity at the lung bases.    HEPATOBILIARY: Liver and gallbladder within normal limits.    PANCREAS: Normal.    SPLEEN: Normal.    ADRENAL GLANDS: Normal.    KIDNEYS/BLADDER: Bilateral renal cysts which are benign and needs no further follow-up. No hydronephrosis.    BOWEL: No obstruction or inflammatory change. Gastrojejunostomy tube appears to be in appropriate position.    LYMPH NODES: Normal.    VASCULATURE: Moderate atherosclerotic disease of the abdominal aorta and its branches.    PELVIC ORGANS: Bladder within normal limits.    MUSCULOSKELETAL: Degenerative changes of the spine.      Impression    IMPRESSION:   1.  No acute findings in the abdomen and pelvis.. Gastrojejunostomy tube in appropriate position.     CT Head w/o Contrast    Narrative    EXAM: CT HEAD W/O CONTRAST  LOCATION: [facility]  DATE/TIME: 6/7/2023 8:29 PM CDT    INDICATION: Acute vomiting. History of hemorrhagic stroke.  COMPARISON: Head CT 04/25/2023  TECHNIQUE: Routine CT Head without IV contrast. Multiplanar reformats. Dose reduction techniques were used.    FINDINGS:  INTRACRANIAL CONTENTS: No intracranial hemorrhage, extraaxial collection, or mass effect.  No CT evidence of acute infarct. Chronic infarcts in the basal ganglia bilaterally. Postsurgical change from a right MCA aneurysm clipping. Associated   encephalomalacia in the  anterior right temporal lobe. These findings are unchanged. No new abnormality. Mild generalized volume loss.    VISUALIZED ORBITS/SINUSES/MASTOIDS: No intraorbital abnormality. No paranasal sinus mucosal disease. No middle ear or mastoid effusion.    BONES/SOFT TISSUES: No acute abnormality.      Impression    IMPRESSION:  1.  No CT evidence for acute intracranial process.  2.  Chronic infarcts in the basal ganglia bilaterally.  3.  Status post right MCA aneurysm clipping.  4.  No change.   XR Abdomen Port 1 View    Narrative    EXAM: XR ABDOMEN PORT 1 VIEW  LOCATION: [facility]  DATE/TIME: 6/9/2023 4:14 PM CDT    INDICATION: abdominal distension  COMPARISON: None.      Impression    IMPRESSION: Negative abdomen. Bowel gas pattern is normal. Nothing for obstruction or free air. No evidence for renal stones. GJ tube is in the same position.     CT Chest Pulmonary Embolism w Contrast    Narrative    EXAM: CT CHEST PULMONARY EMBOLISM W CONTRAST  LOCATION: [facility]  DATE/TIME: 6/9/2023 5:52 PM CDT    INDICATION: Cough tachycardia, r o pulmonary embolism.  COMPARISON:  05/25/2023.  TECHNIQUE: CT chest pulmonary angiogram during arterial phase injection of IV contrast. Multiplanar reformats and MIP reconstructions were performed. Dose reduction techniques were used.   CONTRAST: Isovue 370 75mL      FINDINGS:  ANGIOGRAM CHEST: Pulmonary arteries are normal caliber and negative for pulmonary emboli. Thoracic aorta is negative for dissection.    LUNGS AND PLEURA: Large amount of debris in the airways. Dependent opacities are again seen. Emphysema with apical scarring.     MEDIASTINUM/AXILLAE: Normal.    CORONARY ARTERY CALCIFICATION: Severe.    UPPER ABDOMEN: Percutaneous GJ tube.     MUSCULOSKELETAL: Normal.        Impression    IMPRESSION:  1.  No PE.  2.  Large amount of debris in the airways. Dependent opacities represent atelectasis or scarring.   3.  Emphysema.  4.   Coronary artery disease.

## 2023-06-10 NOTE — PROVIDER NOTIFICATION
Pt on RA, Sats high 90's. BS coarse pre and post neb. Pt has thick white/yellow mucus. Family asked for medication for loosening and mucus break down, writer told family that sodium chloride is a good one but will let MD know. Pt currently received Duoneb qid. Per family, pt didn't like vest treatment at home.     Gregorio Chiang, RT

## 2023-06-10 NOTE — PLAN OF CARE
Problem: Plan of Care - These are the overarching goals to be used throughout the patient stay.    Goal: Optimal Comfort and Wellbeing  Outcome: Progressing     Problem: Gas Exchange Impaired  Goal: Optimal Gas Exchange  Outcome: Progressing   Goal Outcome Evaluation:    Alert and nonverbal. Jejunal tube feed started at 45mL/hour this morning. Patient tolerating well. Had slight fever throughout day in 99's. Tylenol given through G tube. PRN Baclofen and Guaifenesin given for cough and spasticity. No BM's. Primofit in place. Telemetry running Sinus tachycardia. Had tachycardic event with HR in 170's when lab came in to draw labs. Suction at bedside to help manage secretions. Possible discharge home today, pending provider decision.  Bed locked. Alarms on.

## 2023-06-10 NOTE — PLAN OF CARE
Problem: Plan of Care - These are the overarching goals to be used throughout the patient stay.    Goal: Plan of Care Review  Description: The Plan of Care Review/Shift note should be completed every shift.  The Outcome Evaluation is a brief statement about your assessment that the patient is improving, declining, or no change.  This information will be displayed automatically on your shift note.  Outcome: Progressing     Problem: Pneumonia  Goal: Fluid Balance  Outcome: Progressing  Goal: Resolution of Infection Signs and Symptoms  Outcome: Progressing  Intervention: Prevent Infection Progression  Recent Flowsheet Documentation  Taken 6/9/2023 2000 by Gail Lockwood, RN  Isolation Precautions: contact precautions maintained  Goal: Effective Oxygenation and Ventilation  Outcome: Progressing  Intervention: Promote Airway Secretion Clearance  Recent Flowsheet Documentation  Taken 6/9/2023 2000 by Gail Lockwood, RN  Cough And Deep Breathing: unable to perform  Intervention: Optimize Oxygenation and Ventilation  Recent Flowsheet Documentation  Taken 6/9/2023 2000 by Gail Lockwood, RN  Head of Bed (HOB) Positioning: HOB at 30 degrees   Goal Outcome Evaluation:  Patient is nonverbal, family at bedside, monitoring in  - 120s, occasional >130, non sustaining, was agitated, calm down, improved. TF running at 25ml.

## 2023-06-10 NOTE — PROGRESS NOTES
"/73 (BP Location: Right arm)   Pulse (!) 122   Temp 97.3  F (36.3  C) (Axillary)   Resp 16   Ht 1.575 m (5' 2\")   Wt 59 kg (130 lb)   SpO2 97%   BMI 23.78 kg/m        The PT was provided a neb per MD order. BS were clear and diminished over the lungs, though his UAW had an on/off coarse quality. Regarding the UAW, this had a a snoring quality to it. BS were unchanged post TX. I cut the neb short by about a minute once the PT's HR persisted into the high 120's. RT will follow as directed.  "

## 2023-06-10 NOTE — PROGRESS NOTES
Received call from lab that sputum culture had to be cancelled due to being contaminated with too much saliva. Provider notified.

## 2023-06-12 ENCOUNTER — LAB (OUTPATIENT)
Dept: LAB | Facility: CLINIC | Age: 58
End: 2023-06-12
Payer: COMMERCIAL

## 2023-06-12 ENCOUNTER — PATIENT OUTREACH (OUTPATIENT)
Dept: CARE COORDINATION | Facility: CLINIC | Age: 58
End: 2023-06-12

## 2023-06-12 DIAGNOSIS — E83.51 HYPOCALCEMIA: ICD-10-CM

## 2023-06-12 DIAGNOSIS — D64.9 ANEMIA, UNSPECIFIED TYPE: ICD-10-CM

## 2023-06-12 LAB
ANION GAP SERPL CALCULATED.3IONS-SCNC: 13 MMOL/L (ref 7–15)
ATRIAL RATE - MUSE: 119 BPM
BACTERIA BLD CULT: NO GROWTH
BACTERIA BLD CULT: NO GROWTH
BUN SERPL-MCNC: 20.3 MG/DL (ref 8–23)
CALCIUM SERPL-MCNC: 9.1 MG/DL (ref 8.8–10.2)
CHLORIDE SERPL-SCNC: 105 MMOL/L (ref 98–107)
CREAT SERPL-MCNC: 1.29 MG/DL (ref 0.67–1.17)
DEPRECATED HCO3 PLAS-SCNC: 21 MMOL/L (ref 22–29)
DIASTOLIC BLOOD PRESSURE - MUSE: NORMAL MMHG
ERYTHROCYTE [DISTWIDTH] IN BLOOD BY AUTOMATED COUNT: 11.9 % (ref 10–15)
GFR SERPL CREATININE-BSD FRML MDRD: 63 ML/MIN/1.73M2
GLUCOSE SERPL-MCNC: 79 MG/DL (ref 70–99)
HCT VFR BLD AUTO: 32.7 % (ref 40–53)
HGB BLD-MCNC: 11 G/DL (ref 13.3–17.7)
INTERPRETATION ECG - MUSE: NORMAL
MCH RBC QN AUTO: 32.4 PG (ref 26.5–33)
MCHC RBC AUTO-ENTMCNC: 33.6 G/DL (ref 31.5–36.5)
MCV RBC AUTO: 96 FL (ref 78–100)
P AXIS - MUSE: 70 DEGREES
PLATELET # BLD AUTO: 172 10E3/UL (ref 150–450)
POTASSIUM SERPL-SCNC: 4.5 MMOL/L (ref 3.4–5.3)
PR INTERVAL - MUSE: 142 MS
QRS DURATION - MUSE: 72 MS
QT - MUSE: 320 MS
QTC - MUSE: 450 MS
R AXIS - MUSE: 2 DEGREES
RBC # BLD AUTO: 3.4 10E6/UL (ref 4.4–5.9)
SODIUM SERPL-SCNC: 139 MMOL/L (ref 136–145)
SYSTOLIC BLOOD PRESSURE - MUSE: NORMAL MMHG
T AXIS - MUSE: 75 DEGREES
VENTRICULAR RATE- MUSE: 119 BPM
WBC # BLD AUTO: 7.7 10E3/UL (ref 4–11)

## 2023-06-12 PROCEDURE — 80048 BASIC METABOLIC PNL TOTAL CA: CPT

## 2023-06-12 PROCEDURE — 36415 COLL VENOUS BLD VENIPUNCTURE: CPT

## 2023-06-12 PROCEDURE — 85027 COMPLETE CBC AUTOMATED: CPT

## 2023-06-12 NOTE — PROGRESS NOTES
[facility]    Background: Transitional Care Management program identified per system criteria and reviewed by [facility] team for possible outreach.    Assessment: Upon chart review, [facility] Team member will not proceed with patient outreach related to this episode of Transitional Care Management program due to reason below:    Patient is nonverbal, no consent on file to speak to family.  No ACP information in chart.     Plan: Transitional Care Management episode addressed appropriately per reason noted above.      Terrie East RN  [facility] Resource [place], [facility]    *Connected Care Resource Team does NOT follow patient ongoing. Referrals are identified based on internal discharge reports and the outreach is to ensure patient has an understanding of their discharge instructions.

## 2023-06-21 ENCOUNTER — OFFICE VISIT (OUTPATIENT)
Dept: FAMILY MEDICINE | Facility: CLINIC | Age: 58
End: 2023-06-21
Payer: COMMERCIAL

## 2023-06-21 ENCOUNTER — HOSPITAL ENCOUNTER (OUTPATIENT)
Dept: INTERVENTIONAL RADIOLOGY/VASCULAR | Facility: HOSPITAL | Age: 58
Discharge: HOME | End: 2023-06-21
Attending: NURSE PRACTITIONER | Admitting: NURSE PRACTITIONER
Payer: COMMERCIAL

## 2023-06-21 ENCOUNTER — MYC MEDICAL ADVICE (OUTPATIENT)
Dept: FAMILY MEDICINE | Facility: CLINIC | Age: 58
End: 2023-06-21

## 2023-06-21 VITALS
TEMPERATURE: 98.4 F | WEIGHT: 130 LBS | SYSTOLIC BLOOD PRESSURE: 110 MMHG | RESPIRATION RATE: 16 BRPM | DIASTOLIC BLOOD PRESSURE: 60 MMHG | BODY MASS INDEX: 23.92 KG/M2 | OXYGEN SATURATION: 97 % | HEART RATE: 68 BPM | HEIGHT: 62 IN

## 2023-06-21 DIAGNOSIS — Z87.01 HISTORY OF PNEUMONIA: Primary | ICD-10-CM

## 2023-06-21 DIAGNOSIS — R42 VERTIGO: ICD-10-CM

## 2023-06-21 DIAGNOSIS — J43.9 PULMONARY EMPHYSEMA, UNSPECIFIED EMPHYSEMA TYPE (H): ICD-10-CM

## 2023-06-21 DIAGNOSIS — R13.10 DYSPHAGIA, UNSPECIFIED TYPE: ICD-10-CM

## 2023-06-21 DIAGNOSIS — B18.2 HEPATITIS C VIRUS CARRIER STATE (H): ICD-10-CM

## 2023-06-21 DIAGNOSIS — R33.9 URINARY RETENTION WITH INCOMPLETE BLADDER EMPTYING: ICD-10-CM

## 2023-06-21 DIAGNOSIS — R63.30 FEEDING DIFFICULTIES: ICD-10-CM

## 2023-06-21 DIAGNOSIS — I69.959 HEMIPLEGIA OF NONDOMINANT SIDE, LATE EFFECT OF CEREBROVASCULAR DISEASE (H): ICD-10-CM

## 2023-06-21 DIAGNOSIS — M26.609 TMJ (TEMPOROMANDIBULAR JOINT DISORDER): ICD-10-CM

## 2023-06-21 DIAGNOSIS — Z78.9 ON TUBE FEEDING DIET: ICD-10-CM

## 2023-06-21 DIAGNOSIS — R11.0 NAUSEA: ICD-10-CM

## 2023-06-21 DIAGNOSIS — K94.29 IRRITATION AROUND PERCUTANEOUS ENDOSCOPIC GASTROSTOMY (PEG) TUBE SITE (H): ICD-10-CM

## 2023-06-21 DIAGNOSIS — M62.838 MUSCLE SPASM: ICD-10-CM

## 2023-06-21 DIAGNOSIS — K21.9 GASTROESOPHAGEAL REFLUX DISEASE WITHOUT ESOPHAGITIS: ICD-10-CM

## 2023-06-21 DIAGNOSIS — R63.30 FEEDING DIFFICULTIES: Primary | ICD-10-CM

## 2023-06-21 DIAGNOSIS — I10 BENIGN ESSENTIAL HYPERTENSION: ICD-10-CM

## 2023-06-21 DIAGNOSIS — D64.9 ANEMIA, UNSPECIFIED TYPE: ICD-10-CM

## 2023-06-21 DIAGNOSIS — E46 MALNUTRITION, UNSPECIFIED TYPE (H): ICD-10-CM

## 2023-06-21 DIAGNOSIS — E78.5 DYSLIPIDEMIA: ICD-10-CM

## 2023-06-21 DIAGNOSIS — Z86.73 HISTORY OF STROKE: ICD-10-CM

## 2023-06-21 DIAGNOSIS — R52 PAIN: ICD-10-CM

## 2023-06-21 PROBLEM — G40.909 SEIZURE DISORDER (H): Status: RESOLVED | Noted: 2022-04-06 | Resolved: 2023-06-21

## 2023-06-21 PROBLEM — J96.01 ACUTE HYPOXEMIC RESPIRATORY FAILURE (H): Status: RESOLVED | Noted: 2023-06-08 | Resolved: 2023-06-21

## 2023-06-21 LAB
ANION GAP SERPL CALCULATED.3IONS-SCNC: 9 MMOL/L (ref 7–15)
BUN SERPL-MCNC: 37 MG/DL (ref 8–23)
CALCIUM SERPL-MCNC: 9.1 MG/DL (ref 8.8–10.2)
CHLORIDE SERPL-SCNC: 105 MMOL/L (ref 98–107)
CREAT SERPL-MCNC: 1.52 MG/DL (ref 0.67–1.17)
DEPRECATED HCO3 PLAS-SCNC: 23 MMOL/L (ref 22–29)
ERYTHROCYTE [DISTWIDTH] IN BLOOD BY AUTOMATED COUNT: 12.2 % (ref 10–15)
GFR SERPL CREATININE-BSD FRML MDRD: 52 ML/MIN/1.73M2
GLUCOSE SERPL-MCNC: 107 MG/DL (ref 70–99)
HCT VFR BLD AUTO: 32.6 % (ref 40–53)
HGB BLD-MCNC: 10.9 G/DL (ref 13.3–17.7)
MCH RBC QN AUTO: 32 PG (ref 26.5–33)
MCHC RBC AUTO-ENTMCNC: 33.4 G/DL (ref 31.5–36.5)
MCV RBC AUTO: 96 FL (ref 78–100)
PLATELET # BLD AUTO: 209 10E3/UL (ref 150–450)
POTASSIUM SERPL-SCNC: 4.8 MMOL/L (ref 3.4–5.3)
RBC # BLD AUTO: 3.41 10E6/UL (ref 4.4–5.9)
SODIUM SERPL-SCNC: 137 MMOL/L (ref 136–145)
WBC # BLD AUTO: 10 10E3/UL (ref 4–11)

## 2023-06-21 PROCEDURE — 49452 REPLACE G-J TUBE PERC: CPT

## 2023-06-21 PROCEDURE — C1769 GUIDE WIRE: HCPCS

## 2023-06-21 PROCEDURE — 80048 BASIC METABOLIC PNL TOTAL CA: CPT | Performed by: FAMILY MEDICINE

## 2023-06-21 PROCEDURE — 99214 OFFICE O/P EST MOD 30 MIN: CPT | Performed by: FAMILY MEDICINE

## 2023-06-21 PROCEDURE — 85027 COMPLETE CBC AUTOMATED: CPT | Performed by: FAMILY MEDICINE

## 2023-06-21 PROCEDURE — 36415 COLL VENOUS BLD VENIPUNCTURE: CPT | Performed by: FAMILY MEDICINE

## 2023-06-21 RX ORDER — ACETAMINOPHEN 500 MG
1000 TABLET ORAL EVERY 6 HOURS PRN
Qty: 720 TABLET | Refills: 3 | Status: ON HOLD | OUTPATIENT
Start: 2023-06-21 | End: 2024-07-20

## 2023-06-21 RX ORDER — BACLOFEN 10 MG/1
10 TABLET ORAL 4 TIMES DAILY PRN
Qty: 360 TABLET | Refills: 3 | Status: SHIPPED | OUTPATIENT
Start: 2023-06-21 | End: 2024-01-08

## 2023-06-21 RX ORDER — NEOMYCIN/BACITRACIN/POLYMYXINB 3.5-400-5K
OINTMENT (GRAM) TOPICAL 4 TIMES DAILY
Qty: 30 G | Refills: 3 | Status: ON HOLD | OUTPATIENT
Start: 2023-06-21 | End: 2024-07-20

## 2023-06-21 RX ORDER — SIMVASTATIN 20 MG
20 TABLET ORAL AT BEDTIME
Qty: 90 TABLET | Refills: 0 | Status: SHIPPED | OUTPATIENT
Start: 2023-06-21 | End: 2023-11-09

## 2023-06-21 RX ORDER — ONDANSETRON 4 MG/1
4 TABLET, FILM COATED ORAL EVERY 8 HOURS PRN
Qty: 30 TABLET | Refills: 0 | Status: SHIPPED | OUTPATIENT
Start: 2023-06-21 | End: 2023-07-30

## 2023-06-21 RX ORDER — LIDOCAINE 50 MG/G
1 PATCH TOPICAL DAILY PRN
Qty: 30 PATCH | Refills: 11 | Status: SHIPPED | OUTPATIENT
Start: 2023-06-21 | End: 2024-04-12

## 2023-06-21 RX ORDER — DOXAZOSIN 1 MG/1
1 TABLET ORAL AT BEDTIME
Qty: 90 TABLET | Refills: 3 | Status: SHIPPED | OUTPATIENT
Start: 2023-06-21 | End: 2024-07-18

## 2023-06-21 RX ORDER — NYSTATIN 100000 U/G
CREAM TOPICAL 2 TIMES DAILY PRN
Qty: 30 G | Refills: 3 | Status: SHIPPED | OUTPATIENT
Start: 2023-06-21

## 2023-06-21 RX ORDER — ASPIRIN 81 MG/1
81 TABLET, CHEWABLE ORAL DAILY
Qty: 90 TABLET | Refills: 3 | Status: SHIPPED | OUTPATIENT
Start: 2023-06-21 | End: 2024-04-12

## 2023-06-21 RX ORDER — MECLIZINE HYDROCHLORIDE 25 MG/1
TABLET ORAL
Qty: 30 TABLET | Refills: 11 | Status: SHIPPED | OUTPATIENT
Start: 2023-06-21 | End: 2024-07-22

## 2023-06-21 ASSESSMENT — PAIN SCALES - GENERAL: PAINLEVEL: NO PAIN (0)

## 2023-06-21 NOTE — PROGRESS NOTES
Assessment & Plan     History of pneumonia  Clinically doing much better.  Now back to baseline.  Has completed antibiotic therapy.  We will repeat chest CT  - CT Chest w/o Contrast    Pulmonary emphysema, unspecified emphysema type (H)    - Adult Pulmonary Medicine Referral    Dyslipidemia    - simvastatin (ZOCOR) 20 MG tablet  Dispense: 90 tablet; Refill: 0    Nausea    - ondansetron (ZOFRAN) 4 MG tablet  Dispense: 30 tablet; Refill: 0    On tube feeding diet    - nystatin (MYCOSTATIN) 012368 UNIT/GM external cream  Dispense: 30 g; Refill: 3    Irritation around percutaneous endoscopic gastrostomy (PEG) tube site (H)    - neomycin-bacitracin-polymyxin (NEOSPORIN) 5-400-5000 ointment  Dispense: 30 g; Refill: 3    Vertigo    - meclizine (ANTIVERT) 25 MG tablet  Dispense: 30 tablet; Refill: 11    Muscle spasm    - lidocaine (LIDODERM) 5 % patch  Dispense: 30 patch; Refill: 11  - baclofen (LIORESAL) 10 MG tablet  Dispense: 360 tablet; Refill: 3    Urinary retention with incomplete bladder emptying    - doxazosin (CARDURA) 1 MG tablet  Dispense: 90 tablet; Refill: 3    History of stroke    - aspirin (ASA) 81 MG chewable tablet  Dispense: 90 tablet; Refill: 3    Anemia, unspecified type  He will restart iron supplement.  We will continue to monitor hemoglobin level  - CBC with platelets  - CBC with platelets    Benign essential hypertension  Controlled with current medication  - Basic metabolic panel  (Ca, Cl, CO2, Creat, Gluc, K, Na, BUN)  - Basic metabolic panel  (Ca, Cl, CO2, Creat, Gluc, K, Na, BUN)    Gastroesophageal reflux disease without esophagitis    - esomeprazole (NEXIUM) 20 MG DR capsule  Dispense: 180 capsule; Refill: 3    Pain    - acetaminophen (TYLENOL) 500 MG tablet  Dispense: 720 tablet; Refill: 3    TMJ (temporomandibular joint disorder)  Continue massage. He is on acetaminophen and baclofen 4 times daily.  Recommend trial of ice packs and warm compresses.  Referral to ENT also placed  - Adult ENT   Referral    Dysphagia, unspecified type  - XR Video Swallow with SLP or OT - Order with Speech Therapy Referral    Malnutrition, unspecified type (H)  Continue tube feeds    Hemiplegia of nondominant side, late effect of cerebrovascular disease (H)  Stable    Hepatitis C virus carrier state (H)  Stable           MED REC REQUIRED  Post Medication Reconciliation Status: discharge medications reconciled and changed, per note/orders      Lynda Gutierrez MD  [facility]    [facility] 58 [facility] presenting for the following health issues:  Hospital F/U (ER F/U)        6/21/2023     9:39 AM   Additional Questions   Roomed by Tri Freeman   Accompanied by Daughters     HPI   He is seen today for hospital follow-up visit.  He is accompanied by his daughters.  Daughter is able to interpret during visit and declines telephone .  Patient is nonverbal due to prior history of stroke.    He has history of recurrent aspiration pneumonia with sepsis as well as acute respiratory failure with hypoxia and recurrent pneumonia.  He is fed via G-tube    Presented to the emergency department with complaint of increased cough, vomiting and restlessness.  He was hypoxic, tachypneic, tachycardiac and had elevated white blood cell count.  Imaging consistent with pneumonia.  He was started on IV meropenem and doxycycline.  ID and pulmonary medicine were consulted.  He was transitioned to oral Levaquin and Augmentin at time of discharge.    He has completed his course of oral antibiotic therapy.  Daughter reports that he is doing much better.  Cough has improved.  He is now back to his normal cough.  He is still more fatigued.    He is in need of follow-up with his pulmonologist.  They request a referral.  They would also like a new order for speech therapy and swallow study.  This was placed back in January.    Daughter would like to get a follow-up CT scan today to get a baseline for  "when he is feeling well.    They report a new concern about pain in his TMJ joints.  When he yawns he will experience dislocation of the jaw and pain.  They massage the area to get the joint back into place.  He is chronically on Tylenol 1000 mg 4 times daily and baclofen 10 mg 4 times daily.  They have not used ice packs or warm compresses.    Medications are reviewed.  Medication reconciliation is performed.  No other concerns or questions today.          Review of Systems         Objective    /60   Pulse 68   Temp 98.4  F (36.9  C) (Temporal)   Resp 16   Ht 1.575 m (5' 2\")   Wt 59 kg (130 lb)   SpO2 97%   BMI 23.78 kg/m    Body mass index is 23.78 kg/m .  Physical Exam   GENERAL: alert, no distress and appears comfortable, smiling  RESP: lungs clear to auscultation - no rales, rhonchi or wheezes  CV: regular rate and rhythm, normal S1 S2, no S3 or S4, no murmur, click or rub, no peripheral edema and peripheral pulses strong, no LE edema  ABDOMEN: soft, nontender, no hepatosplenomegaly, no masses and bowel sounds normal, G tube in place                      "

## 2023-06-29 ENCOUNTER — APPOINTMENT (OUTPATIENT)
Dept: INTERPRETER SERVICES | Facility: CLINIC | Age: 58
End: 2023-06-29
Payer: COMMERCIAL

## 2023-07-06 ENCOUNTER — MEDICAL CORRESPONDENCE (OUTPATIENT)
Dept: HEALTH INFORMATION MANAGEMENT | Facility: CLINIC | Age: 58
End: 2023-07-06
Payer: COMMERCIAL

## 2023-07-08 DIAGNOSIS — I10 ESSENTIAL HYPERTENSION: ICD-10-CM

## 2023-07-09 RX ORDER — METOPROLOL TARTRATE 25 MG/1
TABLET, FILM COATED ORAL
Qty: 60 TABLET | Refills: 0 | OUTPATIENT
Start: 2023-07-09

## 2023-07-09 NOTE — TELEPHONE ENCOUNTER
"Routing refill request to provider for review/approval because:  Drug not active on patient's medication list    Last office visit provider:  06/21/2023     Requested Prescriptions   Pending Prescriptions Disp Refills     metoprolol tartrate (LOPRESSOR) 25 MG tablet [Pharmacy Med Name: Metoprolol Tartrate Oral Tablet 25 MG] 60 tablet 0     Sig: TAKE ONE TABLET BY MOUTH TWICE DAILY.       Beta-Blockers Protocol Failed - 7/9/2023  9:03 AM        Failed - Medication is active on med list        Passed - Blood pressure under 140/90 in past 12 months     BP Readings from Last 3 Encounters:   06/21/23 110/60   06/10/23 112/59   05/24/23 127/85                 Passed - Patient is age 6 or older        Passed - Recent (12 mo) or future (30 days) visit within the authorizing provider's specialty     Patient has had an office visit with the authorizing provider or a provider within the authorizing providers department within the previous 12 mos or has a future within next 30 days. See \"Patient Info\" tab in inbasket, or \"Choose Columns\" in Meds & Orders section of the refill encounter.                   Kacey Magdaleno RN 07/09/23 9:03 AM  "

## 2023-07-13 ENCOUNTER — OFFICE VISIT (OUTPATIENT)
Dept: PULMONOLOGY | Facility: CLINIC | Age: 58
End: 2023-07-13
Attending: FAMILY MEDICINE
Payer: COMMERCIAL

## 2023-07-13 VITALS — OXYGEN SATURATION: 94 % | DIASTOLIC BLOOD PRESSURE: 68 MMHG | SYSTOLIC BLOOD PRESSURE: 112 MMHG | HEART RATE: 86 BPM

## 2023-07-13 DIAGNOSIS — J43.9 PULMONARY EMPHYSEMA, UNSPECIFIED EMPHYSEMA TYPE (H): ICD-10-CM

## 2023-07-13 PROCEDURE — 99214 OFFICE O/P EST MOD 30 MIN: CPT | Performed by: INTERNAL MEDICINE

## 2023-07-13 NOTE — PATIENT INSTRUCTIONS
Please contact the ENT clinic to see if they have an availability to see him for his TMJ.  Please try to nebulize once a day and use your hands (cup them as I showed you in clinic) and pat his back to help bring up secretions.  Please contact my nurse Sharron Aguilar RN at [phone] if you have followup questions.  Please take to the nutrition team about discontinuing tube feeding at night to help prevent aspiration.  Unfortunately, there is no way for us to prevent aspiration of oral secretions but keeping him upright for as long as you are able could help.

## 2023-07-13 NOTE — PROGRESS NOTES
Assessment and Plan:[facility] GIL Malave is a 57 year old male with a past medical history significant for hemorrhagic stroke with hemiplegia and nonverbal communication who presents to clinic today for recurrent aspiration pneumonia.  He was recently hospitalized with another episode of aspiration pneumonia despite being strictly n.p.o. and receiving all of his nutritional and other intake through his G-tube.  He has previously been thought to have obstructive sleep apnea, however, in reviewing a discussion between the patient's daughter and Dr. Anguiano they previously declined placement of a tracheostomy (which I agree with).    1. Chronic aspiration: Likely oral secretions and has been initiated on Flovent nasal spray which she is presently using.  I agree with Dr. Anguiano as concern that administering a medication such as scopolamine could over dry his secretions and lead to other issues.    I have suggested that the patient's daughter talk to the nutrition team and not feed him overnight as that is the only time that he is recumbent during his tube feeds.    He has been prescribed hypertonic saline and DuoNebs in the past but has been reticent to use these (the patient's daughter notes that it is very challenging to keep the nebulizer mask on his face).  I suggested that if they were able to do this once a day and not strapped the mask on and then use hand percussion therapy as he is not a good candidate for a fast that this could be 1 way to expectorate secretions.  2. TMJ: Patient's daughter notes that when the patient yawns, seems as if he dislocates his jaw and wonders what how we can address this.  In reviewing Dr. Yu's most recent clinic note, she has placed a referral to ENT.  I suggested that the patient's daughter call up the ENT clinic to follow-up with an appointment.    3. Chronic UTI: Dr. Jose Maria Anguiano's recommendation Macrobid was discontinued and a second generation cephalosporin initiated  (although I am unable to find this in the patient's medication list.  4. Follow-up: 3 Months with Jeana Krishna CNP.       Karina Mosquera MD  Pulmonary and Critical Care  (P) [phone]      CCx: Chronic aspiration    HPI: Mr. Malave is a 57 year old male with a history of stroke who presents for evaluation of recurrent aspiration pneumonia.  He was hospitalized at [facility] on 12/12 with aspiration pneumonia and hypoxia that rapidly resolved.  He has had multiple repeat hospitalizations with the same complaints and I saw him in the hospital when he was admitted in February.  He continues continuous tube feeds at 40 cc an hour during the day and this is decreased to 20 cc an hour overnight.  Patient's daughter also mentions that he has been yawning excessively recently and dislocates his jaw every time he does this.  Of note the patient has been resistant to nebulizer treatment and according to the daughter, over the last couple of days has been coughing significantly.    Pertinent medical history:  He has been in the ER at least 4 times this year with this problem.  His stroke was in 2016 and this left him non verbal with yi aspiration on swallow studies.  He has had a GJ tube since and is fed with continuous tube feeds.  His daughter provides excellent cares and is extremely well informed on his medical regimen and a good thought partner for him.  She uses nebulizers four times a day to help him clear secretions as well as NT suctioning.  They also have a vest they try to use, but it rides over his G tube and they find it hard to manage.  He also does not like his aerobika device and will spit it out. He can hear her and can communicate by thumbs up or down and sometimes pointing at a chart.  He caught covid recently at home, and while he got through this well, his aspiration events seem more and more frequent.  He will have brief fevers and chills and coughing when these occur and sometimes his sats will  dip.    He did have a trach initially after his stroke, but was decannulated before discharge.  His daughter notes he will choke when he sleeps and has a broken breathing pattern.  She agrees he would not tolerate any kind of mask over his face.    PMH:  Past Medical History:   Diagnosis Date     Acute UTI 02/21/2020     Anxiety      Basal ganglia hemorrhage (H) 06/20/2016     Brain compression (H)      Bruxism      Chronic static encephalopathy      Convulsions, unspecified convulsion type (H)      CVA (cerebral vascular accident) (H) 07/2016    Hemorrhagic,      Depression      Dyslipidemia      Elevated troponin      Essential hypertension      Feeding by G-tube (H) 06/04/2020     GERD (gastroesophageal reflux disease)      Hemiplegia of nondominant side, late effect of cerebrovascular disease (H)     Created by Conversion  Replacement Utility updated for latest IMO load     Hemorrhagic stroke (H) 06/05/2016     Hepatitis C carrier (H)      History of ESBL E. coli infection      History of hemorrhagic stroke with residual hemiparesis (H)      Hypertension      Lung nodule 12/05/2016    9 x 9 mm left upper lobe on CXR     MRSA (methicillin resistant staph aureus) culture positive      Periodontal disease      Persistent fever      Pneumonia of both lower lobes due to infectious organism      Respiratory failure with hypoxia (H)      Seizure (H)      Sepsis due to urinary tract infection (H) 06/04/2020     Severe sepsis (H)      Trismus        PSH:  Past Surgical History:   Procedure Laterality Date     aneurysm clipping  2006     BRAIN SURGERY       EXAM UNDER ANESTHESIA, RESTORATIONS, EXTRACTION(S) DENTAL COMPLEX, COMBINED N/A 12/19/2016    Procedure: COMBINED EXAM UNDER ANESTHESIA, RESTORATIONS, EXTRACTION(S) DENTAL COMPLEX;  Surgeon: Verónica Martinez DDS;  Location: [facility] OR     EXAM UNDER ANESTHESIA, RESTORATIONS, EXTRACTION(S) DENTAL COMPLEX, COMBINED N/A 3/7/2018    Procedure: COMBINED EXAM UNDER ANESTHESIA,  RESTORATIONS, EXTRACTION(S) DENTAL COMPLEX;  Kenalog Injection in Bilateral [place], Combined Dental Exam,Radiographs, Three Dental Restorations, Periodontal Therapy and Fluoride Treatment;  Surgeon: Darryl Isbell DDS;  Location: [facility] OR     HH MIDLINE INSERTION  8/11/2017          INJECT STEROID (LOCATION) N/A 3/7/2018    Procedure: INJECT STEROID (LOCATION);  Kenalog Injection In Bilateral [place], Combined Dental Exam,Radiographs,Three Dental Restorations, Periodontal Therapy and Fluoride Treatment;  Surgeon: Liu Conroy DDS;  Location: [facility] OR     IR GASTRO JEJUNOSTOMY TUBE CHANGE  7/19/2016     IR GASTRO JEJUNOSTOMY TUBE CHANGE  9/23/2016     IR GASTRO JEJUNOSTOMY TUBE CHANGE  11/28/2016     IR GASTRO JEJUNOSTOMY TUBE CHANGE  1/27/2017     IR GASTRO JEJUNOSTOMY TUBE CHANGE  4/19/2017     IR GASTRO JEJUNOSTOMY TUBE CHANGE  6/20/2017     IR GASTRO JEJUNOSTOMY TUBE CHANGE  8/7/2017     IR GASTRO JEJUNOSTOMY TUBE CHANGE  8/11/2017     IR GASTRO JEJUNOSTOMY TUBE CHANGE  10/20/2017     IR GASTRO JEJUNOSTOMY TUBE CHANGE  1/2/2018     IR GASTRO JEJUNOSTOMY TUBE CHANGE  2/16/2018     IR GASTRO JEJUNOSTOMY TUBE CHANGE  5/16/2018     IR GASTRO JEJUNOSTOMY TUBE CHANGE  6/11/2018     IR GASTRO JEJUNOSTOMY TUBE CHANGE  9/12/2018     IR GASTRO JEJUNOSTOMY TUBE CHANGE  12/24/2018     IR GASTRO JEJUNOSTOMY TUBE CHANGE  3/18/2019     IR GASTRO JEJUNOSTOMY TUBE CHANGE  4/23/2019     IR GASTRO JEJUNOSTOMY TUBE CHANGE  5/21/2019     IR GASTRO JEJUNOSTOMY TUBE CHANGE  9/9/2019     IR GASTRO JEJUNOSTOMY TUBE CHANGE  9/26/2019     IR GASTRO JEJUNOSTOMY TUBE CHANGE  10/11/2019     IR GASTRO JEJUNOSTOMY TUBE CHANGE  11/21/2019     IR GASTRO JEJUNOSTOMY TUBE CHANGE  11/29/2019     IR GASTRO JEJUNOSTOMY TUBE CHANGE  2/27/2020     IR GASTRO JEJUNOSTOMY TUBE CHANGE  4/10/2020     IR GASTRO JEJUNOSTOMY TUBE CHANGE  7/2/2020     IR GASTRO JEJUNOSTOMY TUBE CHANGE  8/28/2020     IR GASTRO JEJUNOSTOMY TUBE CHANGE  9/17/2020     IR  GASTRO JEJUNOSTOMY TUBE CHANGE  10/29/2020     IR GASTRO JEJUNOSTOMY TUBE CHANGE  11/6/2020     IR GASTRO JEJUNOSTOMY TUBE CHANGE  12/24/2020     IR GASTRO JEJUNOSTOMY TUBE CHANGE  1/9/2021     IR GASTRO JEJUNOSTOMY TUBE CHANGE  6/9/2021     IR GASTRO JEJUNOSTOMY TUBE CHANGE  10/4/2021     IR GASTRO JEJUNOSTOMY TUBE CHANGE  10/18/2021     IR GASTRO JEJUNOSTOMY TUBE CHANGE  1/14/2022     IR GASTRO JEJUNOSTOMY TUBE CHANGE  1/20/2022     IR GASTRO JEJUNOSTOMY TUBE CHANGE  4/19/2022     IR GASTRO JEJUNOSTOMY TUBE CHANGE  6/7/2022     IR GASTRO JEJUNOSTOMY TUBE CHANGE  8/19/2022     IR GASTRO JEJUNOSTOMY TUBE CHANGE  9/13/2022     IR GASTRO JEJUNOSTOMY TUBE CHANGE  10/27/2022     IR GASTRO JEJUNOSTOMY TUBE CHANGE  11/3/2022     IR GASTRO JEJUNOSTOMY TUBE CHANGE  1/18/2023     IR GASTRO JEJUNOSTOMY TUBE CHANGE  4/7/2023     IR GASTRO JEJUNOSTOMY TUBE CHANGE  5/4/2023     IR GASTRO JEJUNOSTOMY TUBE CHANGE  6/21/2023     IR GASTRO JEJUNOSTOMY TUBE PLACEMENT  6/14/2016     IR GJ TUBE REPLACEMENT  12/24/2018     IR GJ TUBE REPLACEMENT  3/18/2019     IR GJ TUBE REPLACEMENT  4/23/2019     IR GJ TUBE REPLACEMENT  5/21/2019     IR GJ TUBE REPLACEMENT  9/9/2019     IR GJ TUBE REPLACEMENT  9/26/2019     IR GJ TUBE REPLACEMENT  10/11/2019     IR GJ TUBE REPLACEMENT  11/21/2019     IR GJ TUBE REPLACEMENT  11/29/2019     IR GJ TUBE REPLACEMENT  2/27/2020     IR GJ TUBE REPLACEMENT  4/10/2020     IR GJ TUBE REPLACEMENT  7/2/2020     IR GJ TUBE REPLACEMENT  8/28/2020     IR GJ TUBE REPLACEMENT  9/17/2020     IR GJ TUBE REPLACEMENT  10/29/2020     IR GJ TUBE REPLACEMENT  11/6/2020     IR GJ TUBE REPLACEMENT  12/24/2020     IR GJ TUBE REPLACEMENT  1/9/2021     IR GJ TUBE REPLACEMENT  6/9/2021     J-G tube       PICC  6/10/2016          TRACHEOSTOMY      Removed       SH:  Social History     Socioeconomic History     Marital status:      Spouse name: Not on file     Number of children: 2     Years of education: Not on file      Highest education level: Not on file   Occupational History     Occupation: disability   Tobacco Use     Smoking status: Former     Packs/day: 1.00     Years: 30.00     Pack years: 30.00     Types: Cigarettes     Quit date: 2005     Years since quittin.1     Smokeless tobacco: Former     Quit date: 2007     Tobacco comments:     quit 2006   Vaping Use     Vaping Use: Never used   Substance and Sexual Activity     Alcohol use: No     Drug use: No     Sexual activity: Not Currently     Partners: Female     Birth control/protection: None   Other Topics Concern     Parent/sibling w/ CABG, MI or angioplasty before 65F 55M? No   Social History Narrative    Lives with wife, 2 daughters and son in law.    W/c bound.     Social Determinants of Health     Financial Resource Strain: Not on file   Food Insecurity: Not on file   Transportation Needs: Not on file   Physical Activity: Not on file   Stress: Not on file   Social Connections: Not on file   Intimate Partner Violence: Not on file   Housing Stability: Not on file       Family history:  Family History   Problem Relation Age of Onset     Hypertension Mother      Cancer Father      Other Cancer Father      Kidney Disease Brother      The family history was reviewed and is not pertinent to the chief complaint or HPI.    ROS:  Review of Systems - History obtained from the patient  General ROS: negative  Psychological ROS: negative  ENT ROS: negative  Allergy and Immunology ROS: negative  Endocrine ROS: negative  Respiratory ROS: positive for - cough, shortness of breath and sputum changes  negative for - stridor or tachypnea  Cardiovascular ROS: no chest pain or palpitations  Gastrointestinal ROS: no abdominal pain, change in bowel habits, or black or bloody stools  Genito-Urinary ROS: no dysuria, trouble voiding, or hematuria  Musculoskeletal ROS: negative  Neurological ROS: no TIA or stroke symptoms  Dermatological ROS: negative      Current Meds:  Current  Outpatient Medications   Medication Sig Dispense Refill     acetaminophen (TYLENOL) 500 MG tablet Take 2 tablets (1,000 mg) by mouth every 6 hours as needed for mild pain 720 tablet 3     amLODIPine (NORVASC) 5 MG tablet Take 1 tablet (5 mg total) by mouth daily. 30 tablet 11     aspirin (ASA) 81 MG chewable tablet 1 tablet (81 mg) by Per Feeding Tube route daily 90 tablet 3     baclofen (LIORESAL) 10 MG tablet 1 tablet (10 mg) by Per G Tube route 4 times daily as needed for muscle spasms Usually takes with Seroquel 360 tablet 3     bisacodyl (DULCOLAX) 10 MG suppository INSERT 1 SUPPOSTIORY INTO THE RECTUM DAILY AS NEEDED FOR CONSTIPATION. 30 suppository 8     chlorhexidine (PERIDEX) 0.12 % solution Swish and spit 15 mLs in mouth daily        Dextromethorphan-guaiFENesin  MG/5ML syrup TAKE 10 MLS BY G TUBE ROUTE EVERY 4 HOURS AS NEEDED FOR COUGH. 237 mL 4     doxazosin (CARDURA) 1 MG tablet Take 1 tablet (1 mg) by mouth At Bedtime 90 tablet 3     esomeprazole (NEXIUM) 20 MG DR capsule Take 20 mg via G tube 2 times daily 180 capsule 3     ipratropium (ATROVENT) 0.06 % nasal spray Spray 2 sprays into both nostrils 2 times daily       ipratropium - albuterol 0.5 mg/2.5 mg/3 mL (DUONEB) 0.5-2.5 (3) MG/3ML neb solution Take 1 vial by nebulization every 4 hours       lidocaine (LIDODERM) 5 % patch Place 1 patch onto the skin daily as needed for moderate pain To prevent lidocaine toxicity, patient should be patch free for 12 hrs daily. 30 patch 11     meclizine (ANTIVERT) 25 MG tablet Take 1 tablet via G-tube every 6 hours as needed for vertigo 30 tablet 11     neomycin-bacitracin-polymyxin (NEOSPORIN) 5-400-5000 ointment Apply topically 4 times daily 30 g 3     nystatin (MYCOSTATIN) 891592 UNIT/GM external cream Apply topically 2 times daily as needed (redness) 30 g 3     ondansetron (ZOFRAN) 4 MG tablet Take 1 tablet (4 mg) by mouth every 8 hours as needed for nausea 30 tablet 0     Ostomy Supplies (STOMAHESIVE)  PSTE Apply topically to skin around ostomy site twice daily and as needed 56.7 g 3     polyethylene glycol (MIRALAX) 17 GM/Dose powder Give 1 capful via G-tube daily as needed for constipation 850 g 3     Polyethylene Glycol 400 (VISINE DRY EYE RELIEF) 1 % SOLN Apply 1 drop to eye every 6 hours as needed (dry eyes) 15 mL 11     QUEtiapine (SEROQUEL) 25 MG tablet Take 1 tablet (25 mg) by mouth See Admin Instructions Take 1 tablet at 7PM, Midnight and 3AM for total of 75 mg nightly. Ok to repeat after 4 hours and during the day  tablet 4     simvastatin (ZOCOR) 20 MG tablet 1 tablet (20 mg) by Per G Tube route At Bedtime 90 tablet 0     sodium chloride (NEBUSAL) 3 % neb solution Take 3 mLs by nebulization every 6 hours as needed for other (thick sputum) 30 mL 0     saccharomyces boulardii (FLORASTOR) 250 MG capsule Take 1 capsule (250 mg) by mouth daily (Patient not taking: Reported on 7/13/2023) 90 capsule 3       Labs:  @clab@    I have personally reviewed all imaging and PFT data available pertinent to this visit.    Imaging studies:  CT Chest Pulmonary Embolism w Contrast    Result Date: 12/11/2022  EXAM: CT CHEST PULMONARY EMBOLISM W CONTRAST LOCATION: [facility] DATE/TIME: 12/11/2022 10:46 PM INDICATION: sob, fever COMPARISON: 10/28/2022 TECHNIQUE: CT chest pulmonary angiogram during arterial phase injection of IV contrast. Multiplanar reformats and MIP reconstructions were performed. Dose reduction techniques were used. CONTRAST: Isovue 370 90ml FINDINGS: ANGIOGRAM CHEST: Pulmonary arteries are normal caliber and negative for pulmonary emboli. Thoracic aorta is negative for dissection. No CT evidence of right heart strain. LUNGS AND PLEURA: Emphysema, linear scarring and benign calcifications involving upper lobes are unchanged. Lower lobes demonstrate a similar amount of infiltrate and atelectasis is previous exam though there is been some shifting of opacities now with more  airspace disease within posterior left lower lobe and anterior inferior right lower lobe with other areas resolving; lower lobe findings suggest new or recurrent regions of pneumonia. MEDIASTINUM/AXILLAE: Normal. CORONARY ARTERY CALCIFICATION: Moderate. UPPER ABDOMEN: Gastrojejunostomy tube appears in good position. MUSCULOSKELETAL: Normal.     IMPRESSION: 1.  No pulmonary emboli. 2.  Mild patchy zones of new airspace infiltrate at lung bases suggests developing pneumonia. 3.  Extensive chronic pulmonary scarring.    Head CT w/o contrast    Result Date: 12/11/2022  EXAM: CT HEAD W/O CONTRAST LOCATION: [facility] DATE/TIME: 12/11/2022 10:45 PM INDICATION: Altered mental status COMPARISON:  CT head 09/18/2022. TECHNIQUE: Routine CT Head without IV contrast. Multiplanar reformats. Dose reduction techniques were used. FINDINGS: INTRACRANIAL CONTENTS: No intracranial hemorrhage, extraaxial collection, or mass effect.  No CT evidence of acute infarct.  Similar appearance of multifocal encephalomalacia since 09/18/2022. Mild to moderate generalized volume loss. No hydrocephalus. VISUALIZED ORBITS/SINUSES/MASTOIDS: No intraorbital abnormality. No paranasal sinus mucosal disease. No middle ear or mastoid effusion. BONES/SOFT TISSUES: No acute abnormality.     IMPRESSION: 1.  No acute intracranial process or significant change since 09/18/2022.      PFTs:  none    Physical Exam:  /68 (BP Location: Right arm, Patient Position: Chair, Cuff Size: Adult Regular)   Pulse 86   SpO2 94%   General - Well nourished  Ears/Mouth - Drooling.  Lungs - Clear anteriorly  CVS - regular rhythm with no murmurs, rubs or gallups  Abdomen - soft, NT, ND, NABS  Ext - no cyanosis, clubbing or edema  Skin - no rash

## 2023-07-17 ENCOUNTER — HOSPITAL ENCOUNTER (INPATIENT)
Facility: CLINIC | Age: 58
LOS: 1 days | Discharge: HOME | End: 2023-07-18
Attending: STUDENT IN AN ORGANIZED HEALTH CARE EDUCATION/TRAINING PROGRAM | Admitting: STUDENT IN AN ORGANIZED HEALTH CARE EDUCATION/TRAINING PROGRAM
Payer: COMMERCIAL

## 2023-07-17 ENCOUNTER — APPOINTMENT (OUTPATIENT)
Dept: CT IMAGING | Facility: CLINIC | Age: 58
End: 2023-07-17
Attending: STUDENT IN AN ORGANIZED HEALTH CARE EDUCATION/TRAINING PROGRAM
Payer: COMMERCIAL

## 2023-07-17 ENCOUNTER — APPOINTMENT (OUTPATIENT)
Dept: RADIOLOGY | Facility: CLINIC | Age: 58
End: 2023-07-17
Attending: STUDENT IN AN ORGANIZED HEALTH CARE EDUCATION/TRAINING PROGRAM
Payer: COMMERCIAL

## 2023-07-17 DIAGNOSIS — U07.1 COVID-19: Primary | ICD-10-CM

## 2023-07-17 DIAGNOSIS — R09.02 HYPOXIA: ICD-10-CM

## 2023-07-17 DIAGNOSIS — J18.9 PNEUMONIA OF BOTH LUNGS DUE TO INFECTIOUS ORGANISM, UNSPECIFIED PART OF LUNG: ICD-10-CM

## 2023-07-17 LAB
ALBUMIN SERPL-MCNC: 3.5 G/DL (ref 3.5–5)
ALBUMIN UR-MCNC: 100 MG/DL
ALP SERPL-CCNC: 75 U/L (ref 45–120)
ALT SERPL W P-5'-P-CCNC: 28 U/L (ref 0–45)
ANION GAP SERPL CALCULATED.3IONS-SCNC: 11 MMOL/L (ref 5–18)
APPEARANCE UR: CLEAR
AST SERPL W P-5'-P-CCNC: 37 U/L (ref 0–40)
ATRIAL RATE - MUSE: 131 BPM
BASOPHILS # BLD AUTO: 0.1 10E3/UL (ref 0–0.2)
BASOPHILS NFR BLD AUTO: 0 %
BILIRUB SERPL-MCNC: 0.2 MG/DL (ref 0–1)
BILIRUB UR QL STRIP: NEGATIVE
BNP SERPL-MCNC: 11 PG/ML (ref 0–52)
BUN SERPL-MCNC: 30 MG/DL (ref 8–22)
CALCIUM SERPL-MCNC: 8.7 MG/DL (ref 8.5–10.5)
CHLORIDE BLD-SCNC: 107 MMOL/L (ref 98–107)
CO2 SERPL-SCNC: 20 MMOL/L (ref 22–31)
COLOR UR AUTO: ABNORMAL
CREAT SERPL-MCNC: 1.38 MG/DL (ref 0.7–1.3)
DIASTOLIC BLOOD PRESSURE - MUSE: 72 MMHG
EOSINOPHIL # BLD AUTO: 0.1 10E3/UL (ref 0–0.7)
EOSINOPHIL NFR BLD AUTO: 1 %
ERYTHROCYTE [DISTWIDTH] IN BLOOD BY AUTOMATED COUNT: 12.4 % (ref 10–15)
FLUAV RNA SPEC QL NAA+PROBE: NEGATIVE
FLUBV RNA RESP QL NAA+PROBE: NEGATIVE
GFR SERPL CREATININE-BSD FRML MDRD: 59 ML/MIN/1.73M2
GLUCOSE BLD-MCNC: 125 MG/DL (ref 70–125)
GLUCOSE UR STRIP-MCNC: NEGATIVE MG/DL
HCT VFR BLD AUTO: 32.8 % (ref 40–53)
HGB BLD-MCNC: 10.7 G/DL (ref 13.3–17.7)
HGB UR QL STRIP: ABNORMAL
HOLD SPECIMEN: NORMAL
HYALINE CASTS: 1 /LPF
IMM GRANULOCYTES # BLD: 0 10E3/UL
IMM GRANULOCYTES NFR BLD: 0 %
INTERPRETATION ECG - MUSE: NORMAL
KETONES UR STRIP-MCNC: NEGATIVE MG/DL
LACTATE SERPL-SCNC: 0.9 MMOL/L (ref 0.7–2)
LEUKOCYTE ESTERASE UR QL STRIP: NEGATIVE
LYMPHOCYTES # BLD AUTO: 1.8 10E3/UL (ref 0.8–5.3)
LYMPHOCYTES NFR BLD AUTO: 14 %
MAGNESIUM SERPL-MCNC: 2.3 MG/DL (ref 1.8–2.6)
MCH RBC QN AUTO: 31.8 PG (ref 26.5–33)
MCHC RBC AUTO-ENTMCNC: 32.6 G/DL (ref 31.5–36.5)
MCV RBC AUTO: 98 FL (ref 78–100)
MONOCYTES # BLD AUTO: 1.1 10E3/UL (ref 0–1.3)
MONOCYTES NFR BLD AUTO: 9 %
MUCOUS THREADS #/AREA URNS LPF: PRESENT /LPF
NEUTROPHILS # BLD AUTO: 9.5 10E3/UL (ref 1.6–8.3)
NEUTROPHILS NFR BLD AUTO: 76 %
NITRATE UR QL: NEGATIVE
NRBC # BLD AUTO: 0 10E3/UL
NRBC BLD AUTO-RTO: 0 /100
P AXIS - MUSE: 61 DEGREES
PH UR STRIP: 5.5 [PH] (ref 5–7)
PLATELET # BLD AUTO: 192 10E3/UL (ref 150–450)
POTASSIUM BLD-SCNC: 4.8 MMOL/L (ref 3.5–5)
PR INTERVAL - MUSE: 148 MS
PROCALCITONIN SERPL-MCNC: 0.15 NG/ML (ref 0–0.49)
PROT SERPL-MCNC: 8.5 G/DL (ref 6–8)
QRS DURATION - MUSE: 76 MS
QT - MUSE: 280 MS
QTC - MUSE: 413 MS
R AXIS - MUSE: -8 DEGREES
RBC # BLD AUTO: 3.36 10E6/UL (ref 4.4–5.9)
RBC URINE: 17 /HPF
RSV RNA SPEC NAA+PROBE: NEGATIVE
SARS-COV-2 RNA RESP QL NAA+PROBE: POSITIVE
SODIUM SERPL-SCNC: 138 MMOL/L (ref 136–145)
SP GR UR STRIP: 1.02 (ref 1–1.03)
SQUAMOUS EPITHELIAL: <1 /HPF
SYSTOLIC BLOOD PRESSURE - MUSE: 121 MMHG
T AXIS - MUSE: 44 DEGREES
TROPONIN I SERPL-MCNC: <0.01 NG/ML (ref 0–0.29)
UROBILINOGEN UR STRIP-MCNC: <2 MG/DL
VENTRICULAR RATE- MUSE: 131 BPM
WBC # BLD AUTO: 12.5 10E3/UL (ref 4–11)
WBC URINE: 1 /HPF

## 2023-07-17 PROCEDURE — 250N000011 HC RX IP 250 OP 636: Mod: JZ | Performed by: STUDENT IN AN ORGANIZED HEALTH CARE EDUCATION/TRAINING PROGRAM

## 2023-07-17 PROCEDURE — 999N000157 HC STATISTIC RCP TIME EA 10 MIN

## 2023-07-17 PROCEDURE — 210N000002 HC R&B HEART CARE

## 2023-07-17 PROCEDURE — 99285 EMERGENCY DEPT VISIT HI MDM: CPT | Mod: 25

## 2023-07-17 PROCEDURE — 250N000013 HC RX MED GY IP 250 OP 250 PS 637: Performed by: STUDENT IN AN ORGANIZED HEALTH CARE EDUCATION/TRAINING PROGRAM

## 2023-07-17 PROCEDURE — 999N000285 HC STATISTIC VASC ACCESS LAB DRAW WITH PIV START

## 2023-07-17 PROCEDURE — 99223 1ST HOSP IP/OBS HIGH 75: CPT | Performed by: STUDENT IN AN ORGANIZED HEALTH CARE EDUCATION/TRAINING PROGRAM

## 2023-07-17 PROCEDURE — 84484 ASSAY OF TROPONIN QUANT: CPT | Performed by: STUDENT IN AN ORGANIZED HEALTH CARE EDUCATION/TRAINING PROGRAM

## 2023-07-17 PROCEDURE — 84145 PROCALCITONIN (PCT): CPT | Performed by: STUDENT IN AN ORGANIZED HEALTH CARE EDUCATION/TRAINING PROGRAM

## 2023-07-17 PROCEDURE — 258N000003 HC RX IP 258 OP 636: Performed by: STUDENT IN AN ORGANIZED HEALTH CARE EDUCATION/TRAINING PROGRAM

## 2023-07-17 PROCEDURE — 80053 COMPREHEN METABOLIC PANEL: CPT | Performed by: STUDENT IN AN ORGANIZED HEALTH CARE EDUCATION/TRAINING PROGRAM

## 2023-07-17 PROCEDURE — 93005 ELECTROCARDIOGRAM TRACING: CPT | Performed by: EMERGENCY MEDICINE

## 2023-07-17 PROCEDURE — 999N000248 HC STATISTIC IV INSERT WITH US BY RN

## 2023-07-17 PROCEDURE — 83735 ASSAY OF MAGNESIUM: CPT | Performed by: STUDENT IN AN ORGANIZED HEALTH CARE EDUCATION/TRAINING PROGRAM

## 2023-07-17 PROCEDURE — 36415 COLL VENOUS BLD VENIPUNCTURE: CPT | Performed by: STUDENT IN AN ORGANIZED HEALTH CARE EDUCATION/TRAINING PROGRAM

## 2023-07-17 PROCEDURE — 87040 BLOOD CULTURE FOR BACTERIA: CPT | Performed by: STUDENT IN AN ORGANIZED HEALTH CARE EDUCATION/TRAINING PROGRAM

## 2023-07-17 PROCEDURE — 36415 COLL VENOUS BLD VENIPUNCTURE: CPT | Performed by: EMERGENCY MEDICINE

## 2023-07-17 PROCEDURE — 250N000009 HC RX 250: Performed by: STUDENT IN AN ORGANIZED HEALTH CARE EDUCATION/TRAINING PROGRAM

## 2023-07-17 PROCEDURE — 85025 COMPLETE CBC W/AUTO DIFF WBC: CPT | Performed by: STUDENT IN AN ORGANIZED HEALTH CARE EDUCATION/TRAINING PROGRAM

## 2023-07-17 PROCEDURE — 96361 HYDRATE IV INFUSION ADD-ON: CPT

## 2023-07-17 PROCEDURE — 83880 ASSAY OF NATRIURETIC PEPTIDE: CPT | Performed by: STUDENT IN AN ORGANIZED HEALTH CARE EDUCATION/TRAINING PROGRAM

## 2023-07-17 PROCEDURE — 83605 ASSAY OF LACTIC ACID: CPT | Performed by: STUDENT IN AN ORGANIZED HEALTH CARE EDUCATION/TRAINING PROGRAM

## 2023-07-17 PROCEDURE — 94640 AIRWAY INHALATION TREATMENT: CPT

## 2023-07-17 PROCEDURE — 81001 URINALYSIS AUTO W/SCOPE: CPT | Performed by: STUDENT IN AN ORGANIZED HEALTH CARE EDUCATION/TRAINING PROGRAM

## 2023-07-17 PROCEDURE — 87637 SARSCOV2&INF A&B&RSV AMP PRB: CPT | Performed by: STUDENT IN AN ORGANIZED HEALTH CARE EDUCATION/TRAINING PROGRAM

## 2023-07-17 PROCEDURE — 71045 X-RAY EXAM CHEST 1 VIEW: CPT

## 2023-07-17 PROCEDURE — 96374 THER/PROPH/DIAG INJ IV PUSH: CPT | Mod: 59

## 2023-07-17 PROCEDURE — 71275 CT ANGIOGRAPHY CHEST: CPT

## 2023-07-17 RX ORDER — SODIUM CHLORIDE 9 MG/ML
INJECTION, SOLUTION INTRAVENOUS CONTINUOUS
Status: DISCONTINUED | OUTPATIENT
Start: 2023-07-17 | End: 2023-07-18 | Stop reason: HOSPADM

## 2023-07-17 RX ORDER — PROCHLORPERAZINE MALEATE 10 MG
10 TABLET ORAL EVERY 6 HOURS PRN
Status: DISCONTINUED | OUTPATIENT
Start: 2023-07-17 | End: 2023-07-18 | Stop reason: HOSPADM

## 2023-07-17 RX ORDER — ASPIRIN 81 MG/1
81 TABLET, CHEWABLE ORAL DAILY
Status: DISCONTINUED | OUTPATIENT
Start: 2023-07-17 | End: 2023-07-18 | Stop reason: HOSPADM

## 2023-07-17 RX ORDER — CHLORHEXIDINE GLUCONATE ORAL RINSE 1.2 MG/ML
15 SOLUTION DENTAL DAILY
Status: DISCONTINUED | OUTPATIENT
Start: 2023-07-18 | End: 2023-07-18 | Stop reason: HOSPADM

## 2023-07-17 RX ORDER — SODIUM CHLORIDE FOR INHALATION 3 %
3 VIAL, NEBULIZER (ML) INHALATION EVERY 6 HOURS PRN
Status: DISCONTINUED | OUTPATIENT
Start: 2023-07-17 | End: 2023-07-18 | Stop reason: HOSPADM

## 2023-07-17 RX ORDER — QUETIAPINE FUMARATE 25 MG/1
25 TABLET, FILM COATED ORAL 3 TIMES DAILY
Status: DISCONTINUED | OUTPATIENT
Start: 2023-07-17 | End: 2023-07-18 | Stop reason: HOSPADM

## 2023-07-17 RX ORDER — DOXAZOSIN 1 MG/1
1 TABLET ORAL AT BEDTIME
Status: DISCONTINUED | OUTPATIENT
Start: 2023-07-17 | End: 2023-07-18 | Stop reason: HOSPADM

## 2023-07-17 RX ORDER — PANTOPRAZOLE SODIUM 40 MG/1
40 TABLET, DELAYED RELEASE ORAL
Status: DISCONTINUED | OUTPATIENT
Start: 2023-07-18 | End: 2023-07-18 | Stop reason: HOSPADM

## 2023-07-17 RX ORDER — IPRATROPIUM BROMIDE AND ALBUTEROL SULFATE 2.5; .5 MG/3ML; MG/3ML
3 SOLUTION RESPIRATORY (INHALATION)
Status: DISCONTINUED | OUTPATIENT
Start: 2023-07-17 | End: 2023-07-18 | Stop reason: HOSPADM

## 2023-07-17 RX ORDER — IOPAMIDOL 755 MG/ML
75 INJECTION, SOLUTION INTRAVASCULAR ONCE
Status: COMPLETED | OUTPATIENT
Start: 2023-07-17 | End: 2023-07-17

## 2023-07-17 RX ORDER — IBUPROFEN 200 MG
CAPSULE ORAL 4 TIMES DAILY
Status: DISCONTINUED | OUTPATIENT
Start: 2023-07-17 | End: 2023-07-18 | Stop reason: HOSPADM

## 2023-07-17 RX ORDER — MECLIZINE HYDROCHLORIDE 25 MG/1
25 TABLET ORAL 3 TIMES DAILY PRN
Status: DISCONTINUED | OUTPATIENT
Start: 2023-07-17 | End: 2023-07-18 | Stop reason: HOSPADM

## 2023-07-17 RX ORDER — DEXAMETHASONE SODIUM PHOSPHATE 10 MG/ML
6 INJECTION, SOLUTION INTRAMUSCULAR; INTRAVENOUS DAILY
Status: DISCONTINUED | OUTPATIENT
Start: 2023-07-18 | End: 2023-07-18 | Stop reason: HOSPADM

## 2023-07-17 RX ORDER — QUETIAPINE FUMARATE 25 MG/1
25 TABLET, FILM COATED ORAL DAILY PRN
Status: DISCONTINUED | OUTPATIENT
Start: 2023-07-17 | End: 2023-07-18 | Stop reason: HOSPADM

## 2023-07-17 RX ORDER — ACETAMINOPHEN 500 MG
1000 TABLET ORAL EVERY 6 HOURS PRN
Status: DISCONTINUED | OUTPATIENT
Start: 2023-07-17 | End: 2023-07-18 | Stop reason: HOSPADM

## 2023-07-17 RX ORDER — BISACODYL 10 MG
10 SUPPOSITORY, RECTAL RECTAL DAILY PRN
Status: DISCONTINUED | OUTPATIENT
Start: 2023-07-17 | End: 2023-07-18 | Stop reason: HOSPADM

## 2023-07-17 RX ORDER — PROCHLORPERAZINE 25 MG
25 SUPPOSITORY, RECTAL RECTAL EVERY 12 HOURS PRN
Status: DISCONTINUED | OUTPATIENT
Start: 2023-07-17 | End: 2023-07-18 | Stop reason: HOSPADM

## 2023-07-17 RX ORDER — ONDANSETRON 2 MG/ML
4 INJECTION INTRAMUSCULAR; INTRAVENOUS EVERY 6 HOURS PRN
Status: DISCONTINUED | OUTPATIENT
Start: 2023-07-17 | End: 2023-07-18 | Stop reason: HOSPADM

## 2023-07-17 RX ORDER — DEXAMETHASONE SODIUM PHOSPHATE 10 MG/ML
6 INJECTION, SOLUTION INTRAMUSCULAR; INTRAVENOUS ONCE
Status: COMPLETED | OUTPATIENT
Start: 2023-07-17 | End: 2023-07-17

## 2023-07-17 RX ORDER — ACETAMINOPHEN 650 MG/1
650 SUPPOSITORY RECTAL EVERY 6 HOURS PRN
Status: DISCONTINUED | OUTPATIENT
Start: 2023-07-17 | End: 2023-07-18 | Stop reason: HOSPADM

## 2023-07-17 RX ORDER — GUAIFENESIN/DEXTROMETHORPHAN 100-10MG/5
SYRUP ORAL 4 TIMES DAILY PRN
Status: DISCONTINUED | OUTPATIENT
Start: 2023-07-17 | End: 2023-07-18 | Stop reason: HOSPADM

## 2023-07-17 RX ORDER — ACETAMINOPHEN 325 MG/1
650 TABLET ORAL EVERY 6 HOURS PRN
Status: DISCONTINUED | OUTPATIENT
Start: 2023-07-17 | End: 2023-07-18 | Stop reason: HOSPADM

## 2023-07-17 RX ORDER — CEFACLOR 250 MG
250 CAPSULE ORAL DAILY
COMMUNITY
End: 2024-06-04

## 2023-07-17 RX ORDER — CEFUROXIME AXETIL 250 MG/1
250 TABLET ORAL DAILY
Status: DISCONTINUED | OUTPATIENT
Start: 2023-07-18 | End: 2023-07-18 | Stop reason: HOSPADM

## 2023-07-17 RX ORDER — OMEPRAZOLE 40 MG/1
40 CAPSULE, DELAYED RELEASE ORAL DAILY
COMMUNITY
End: 2024-03-06

## 2023-07-17 RX ORDER — POLYETHYLENE GLYCOL 3350 17 G/17G
17 POWDER, FOR SOLUTION ORAL DAILY PRN
Status: DISCONTINUED | OUTPATIENT
Start: 2023-07-17 | End: 2023-07-18 | Stop reason: HOSPADM

## 2023-07-17 RX ORDER — BACLOFEN 10 MG/1
10 TABLET ORAL 4 TIMES DAILY PRN
Status: DISCONTINUED | OUTPATIENT
Start: 2023-07-17 | End: 2023-07-18 | Stop reason: HOSPADM

## 2023-07-17 RX ORDER — ONDANSETRON 4 MG/1
4 TABLET, ORALLY DISINTEGRATING ORAL EVERY 6 HOURS PRN
Status: DISCONTINUED | OUTPATIENT
Start: 2023-07-17 | End: 2023-07-18 | Stop reason: HOSPADM

## 2023-07-17 RX ORDER — AMLODIPINE BESYLATE 5 MG/1
5 TABLET ORAL DAILY
Status: DISCONTINUED | OUTPATIENT
Start: 2023-07-18 | End: 2023-07-18 | Stop reason: HOSPADM

## 2023-07-17 RX ORDER — LIDOCAINE 40 MG/G
CREAM TOPICAL
Status: DISCONTINUED | OUTPATIENT
Start: 2023-07-17 | End: 2023-07-18 | Stop reason: HOSPADM

## 2023-07-17 RX ORDER — IPRATROPIUM BROMIDE 42 UG/1
2 SPRAY, METERED NASAL 2 TIMES DAILY
Status: DISCONTINUED | OUTPATIENT
Start: 2023-07-17 | End: 2023-07-18 | Stop reason: HOSPADM

## 2023-07-17 RX ORDER — SIMVASTATIN 20 MG
20 TABLET ORAL AT BEDTIME
Status: DISCONTINUED | OUTPATIENT
Start: 2023-07-17 | End: 2023-07-18 | Stop reason: HOSPADM

## 2023-07-17 RX ADMIN — ASPIRIN 81 MG CHEWABLE TABLET 81 MG: 81 TABLET CHEWABLE at 20:37

## 2023-07-17 RX ADMIN — ACETAMINOPHEN 1000 MG: 500 TABLET ORAL at 20:35

## 2023-07-17 RX ADMIN — IOPAMIDOL 75 ML: 755 INJECTION, SOLUTION INTRAVENOUS at 18:02

## 2023-07-17 RX ADMIN — SIMVASTATIN 20 MG: 20 TABLET, FILM COATED ORAL at 23:02

## 2023-07-17 RX ADMIN — SODIUM CHLORIDE, POTASSIUM CHLORIDE, SODIUM LACTATE AND CALCIUM CHLORIDE 1000 ML: 600; 310; 30; 20 INJECTION, SOLUTION INTRAVENOUS at 16:35

## 2023-07-17 RX ADMIN — DOXAZOSIN 1 MG: 1 TABLET ORAL at 23:27

## 2023-07-17 RX ADMIN — BACITRACIN ZINC, NEOMYCIN, POLYMYXIN B: 400; 3.5; 5 OINTMENT TOPICAL at 20:48

## 2023-07-17 RX ADMIN — IPRATROPIUM BROMIDE 2 SPRAY: 42 SPRAY, METERED NASAL at 21:30

## 2023-07-17 RX ADMIN — DEXAMETHASONE SODIUM PHOSPHATE 6 MG: 10 INJECTION, SOLUTION INTRAMUSCULAR; INTRAVENOUS at 17:44

## 2023-07-17 RX ADMIN — SODIUM CHLORIDE, PRESERVATIVE FREE: 5 INJECTION INTRAVENOUS at 18:51

## 2023-07-17 RX ADMIN — IPRATROPIUM BROMIDE AND ALBUTEROL SULFATE 3 ML: .5; 3 SOLUTION RESPIRATORY (INHALATION) at 20:50

## 2023-07-17 RX ADMIN — QUETIAPINE FUMARATE 25 MG: 25 TABLET ORAL at 20:37

## 2023-07-17 RX ADMIN — QUETIAPINE FUMARATE 25 MG: 25 TABLET ORAL at 23:27

## 2023-07-17 ASSESSMENT — ACTIVITIES OF DAILY LIVING (ADL)
WALKING_OR_CLIMBING_STAIRS: TRANSFERRING DIFFICULTY, DEPENDENT
WEAR_GLASSES_OR_BLIND: NO
DIFFICULTY_EATING/SWALLOWING: YES
ADLS_ACUITY_SCORE: 35
WALKING_OR_CLIMBING_STAIRS_DIFFICULTY: YES
TOILETING_ASSISTANCE: TOILETING DIFFICULTY, DEPENDENT
DRESSING/BATHING_DIFFICULTY: YES
ADLS_ACUITY_SCORE: 45
DRESSING/BATHING: DRESSING DIFFICULTY, DEPENDENT;BATHING DIFFICULTY, DEPENDENT
TOILETING_ISSUES: YES
ADLS_ACUITY_SCORE: 35
ADLS_ACUITY_SCORE: 35
DOING_ERRANDS_INDEPENDENTLY_DIFFICULTY: YES

## 2023-07-17 NOTE — PHARMACY-ADMISSION MEDICATION HISTORY
Pharmacist Admission Medication History    Admission medication history is complete. The information provided in this note is only as accurate as the sources available at the time of the update.    Medication reconciliation/reorder completed by provider prior to medication history? No    Information Source(s): Family member and CareEverywhere/SureScripts via phone    Pertinent Information: -    Changes made to PTA medication list:  Added: cefaclor  Deleted: None  Changed: esomeprazole ->omeprazole       Medication History Completed By: Swati Buchanan PharmD 7/17/2023 6:00 PM    Prior to Admission medications    Medication Sig Last Dose Taking? Auth Provider Long Term End Date   acetaminophen (TYLENOL) 500 MG tablet Take 2 tablets (1,000 mg) by mouth every 6 hours as needed for mild pain 7/17/2023 at 1500 Yes Lynda Gutierrez MD     amLODIPine (NORVASC) 5 MG tablet Take 1 tablet (5 mg total) by mouth daily. 7/17/2023 at am Yes Lynda Gutierrez MD Yes    aspirin (ASA) 81 MG chewable tablet 1 tablet (81 mg) by Per Feeding Tube route daily 7/16/2023 at am Yes Lynda Gutierrez MD     baclofen (LIORESAL) 10 MG tablet 1 tablet (10 mg) by Per G Tube route 4 times daily as needed for muscle spasms Usually takes with Seroquel 7/17/2023 at am x1 Yes Lynda Gutierrez MD     bisacodyl (DULCOLAX) 10 MG suppository INSERT 1 SUPPOSTIORY INTO THE RECTUM DAILY AS NEEDED FOR CONSTIPATION. 7/16/2023 Yes Palmira Montemayor MD     cefaclor (CECLOR) 250 MG capsule Take 250 mg by mouth daily UTI ppx 7/17/2023 Yes Unknown, Entered By History No    chlorhexidine (PERIDEX) 0.12 % solution Swish and spit 15 mLs in mouth daily  7/17/2023 Yes Reported, Patient No    Dextromethorphan-guaiFENesin  MG/5ML syrup TAKE 10 MLS BY G TUBE ROUTE EVERY 4 HOURS AS NEEDED FOR COUGH. 7/17/2023 at am Yes Lynda Gutierrez MD     doxazosin (CARDURA) 1 MG tablet Take 1 tablet (1 mg) by mouth At Bedtime 7/16/2023 Yes Lynda Gutierrez MD Yes    ipratropium  (ATROVENT) 0.06 % nasal spray Spray 2 sprays into both nostrils 2 times daily 7/17/2023 at am x1 Yes Unknown, Entered By History     ipratropium - albuterol 0.5 mg/2.5 mg/3 mL (DUONEB) 0.5-2.5 (3) MG/3ML neb solution Take 1 vial by nebulization every 4 hours 7/17/2023 at 1430 Yes Unknown, Entered By History No    lidocaine (LIDODERM) 5 % patch Place 1 patch onto the skin daily as needed for moderate pain To prevent lidocaine toxicity, patient should be patch free for 12 hrs daily. 7/16/2023 at not wearing Yes Lynda Gutierrez MD     meclizine (ANTIVERT) 25 MG tablet Take 1 tablet via G-tube every 6 hours as needed for vertigo 7/16/2023 at am Yes Lynda Gutierrez MD     neomycin-bacitracin-polymyxin (NEOSPORIN) 5-400-5000 ointment Apply topically 4 times daily  Patient taking differently: Apply topically 4 times daily To J Tube site 7/17/2023 Yes Lynda Gutierrez MD     nystatin (MYCOSTATIN) 470423 UNIT/GM external cream Apply topically 2 times daily as needed (redness) Unknown at prn Yes Lynda Gutierrez MD     omeprazole (PRILOSEC) 40 MG DR capsule Take 40 mg by mouth daily 7/17/2023 at am Yes Unknown, Entered By History     ondansetron (ZOFRAN) 4 MG tablet Take 1 tablet (4 mg) by mouth every 8 hours as needed for nausea Past Week Yes Lynda Gutierrez MD     Ostomy Supplies (STOMAHESIVE) PSTE Apply topically to skin around ostomy site twice daily and as needed 7/17/2023 at am Yes Lynda Gutierrez MD     polyethylene glycol (MIRALAX) 17 GM/Dose powder Give 1 capful via G-tube daily as needed for constipation Unknown at prn Yes Lynda Gutierrez MD     Polyethylene Glycol 400 (VISINE DRY EYE RELIEF) 1 % SOLN Apply 1 drop to eye every 6 hours as needed (dry eyes) Unknown at prn Yes Lynda Gutierrez MD     QUEtiapine (SEROQUEL) 25 MG tablet Take 1 tablet (25 mg) by mouth See Admin Instructions Take 1 tablet at 7PM, Midnight and 3AM for total of 75 mg nightly. Ok to repeat after 4 hours and during the day PRN 7/17/2023  at 0300 Yes Lynda Gutierrez MD Yes    simvastatin (ZOCOR) 20 MG tablet 1 tablet (20 mg) by Per G Tube route At Bedtime 7/16/2023 Yes Lynda Gutierrez MD Yes    sodium chloride (NEBUSAL) 3 % neb solution Take 3 mLs by nebulization every 6 hours as needed for other (thick sputum) 7/17/2023 at 1200 Yes Lorena Alexander DO     saccharomyces boulardii (FLORASTOR) 250 MG capsule Take 1 capsule (250 mg) by mouth daily  Patient not taking: Reported on 7/13/2023   Palmira Montemayor MD

## 2023-07-17 NOTE — ED TRIAGE NOTES
P[[t arrived by EMS pt was hospitalized 7/4/23 unknown why. Pt started having fever at home and family did at home covid test and came back +Covid. Pt is bed bound at baseline and partially paralyzed from past stroke,Pt was 90%RA by EMS and placed on 3: Nasal canula. Febrile and HR in 130s per EMS.       
none

## 2023-07-17 NOTE — H&P
[facility]    History and Physical - Hospitalist Service       Date of Admission:  7/17/2023    Assessment & Plan      Liu GIL Malave is a 58 year old male admitted on 7/17/2023. He has a pmhx of prior stroke with significant sequelae, seizures, hemiplegia, GJ feeding tube dependent, HTN, MRSA, depresssion, ESBL UTI, prior episodes of aspiration pneumonia, who presents with acute respiratory failure with hypoxia (home oximeter) and found with positive covid19 infection. Noted baseline to be immobile nonverbal and lives at home with family members caring for him.     On admission, he is febrile from 102--> 100.5 and on 3L via NC and tachycardic 130s--> 104. Notable cr 1.38, hgb 10.7, plts 192, Blood cx pending, lactate nl. CXR without acute new findings. Given Steroids and checking PE study. Admitted and continued steroids and scheduled duonebs.     Acute respiratory failure with hypoxia  Covid 19 infection, tachycardia and fever  Hx of aspiration pneumonia  History of hemorrhagic CVA with hemiplegia, nonverbal, nonambulatory  GJ tube dependent for feeding, 24-hour feeds  A- as above, will continue steroids and pending CT PE study. Would add antibiotics if found with pneumonia though at this poin suspect covid19 primary etiology.  P:  - admit to inpatient  - continue daily 6mg decadron  - duonebs, scheduled  - cont home medications  - pain and nausea control   - follow up pending CT PE study  - would start empiric antibiotics if meets SIRS or Sepsis criteria  - telemetry   -continue home tube feeding regimen.  -may ask ID to see if decompensates  -gentle 50ccs/hr MIVFs until re-assessment       CYNTHIA  A/p- avoid nephrotoxic agents  -Fluids as above, follow-up a.m. labs  -Noted to have received CT PE contrast, monitor clinically carefully    Sinus tachycardia  Earlier in ER 130s now 100s, at 104; received 1L bolus  - MIVFs as above  - ct PE study pending     Recurrent UTIs, on cefaclor  prophylaxis  History of MRSA, ESBL E. coli in prior cultures  Noted.  Contact precautions. Continue prophylactic antibiotic.     Essential hypertension  Continue on on amlodipine, doxazosin    Hyperlipidemia  Continue on Zocor    Insomnia  Continue on Seroquel at night    Reactive airway disease  Continue on DuoNebs every 4 hours- now QID w/ RT to adjust     GERD  Continue on PPI 40 mg twice daily    Spasticity  Continue on baclofen as needed     Anticoagulation   Aspirin 81 via feeding tube daily.  Renal injury so hold on lovenox    Old right rib fracture  A/p- appears subclinical at this point; this is what family members were especially concerned about; explained pain control and clinical monitoring       Diet: Advance Diet as Tolerated: Clear Liquid Diet  - home tube feeds   DVT Prophylaxis: Pneumatic Compression Devices, Ambulate every shift and continue home aspirin but holding on lovenox given renal injury   Kelly Catheter: Not present  Lines: None     Cardiac Monitoring: ACTIVE order. Indication: Tachyarrhythmias, acute (48 hours)  Code Status: Full Code      Clinically Significant Risk Factors Present on Admission                 # Drug Induced Platelet Defect: home medication list includes an antiplatelet medication   # Hypertension: Home medication list includes antihypertensive(s)               Disposition Plan      Expected Discharge Date: 07/19/2023                  Tyrell Dumont MD  Hospitalist Service  [facility]  Securely message with [employer] (more info)  Text page via [employer] Paging/Directory     ______________________________________________________________________    Chief Complaint   Coughing and fever    History is obtained from the patient's two daughters, one in person and other via telephone call    History of Present Illness   Liukurtis Malave is a 58 year old male who has a pmhx of prior stroke with significant sequelae, seizures, hemiplegia, GJ feeding tube dependent,  HTN, MRSA, depresssion, ESBL UTI, prior episodes of aspiration pneumonia, who presents with acute respiratory failure with hypoxia (home oximeter) and found with positive covid19 infection. Noted baseline to be immobile nonverbal and lives at home with family members caring for him.     At baseline is on room air, has feeding tube and family members are caring for him including his 2 daughters and his wife.  Presents with several days of coughs and fever to 102 on day of admission and home COVID 19 test positive.  Presented to ER requiring supplemental oxygen now at 3 L per nasal cannula.  Notably the family members had used a different family member's oxygen which seemed to make him more comfortable prior to arrival to the ER.  On admission discussed with patient's 2 daughters via in-person discussion as well as telephone call and confirmed full CODE STATUS as well as explained care plans and steroids and further CT PE study which is pending.  Note, the patient's 2 daughters are very worried about the old rib fracture, and denied any prior history of him falling or hitting his rib area nor has he previously complained about rib pain; discussed care plans including follow-up CT study and pain control for fractures.    Patient lives with family members and [place], [place].      Past Medical History    Past Medical History:   Diagnosis Date     Acute UTI 02/21/2020     Anxiety      Basal ganglia hemorrhage (H) 06/20/2016     Brain compression (H)      Bruxism      Chronic static encephalopathy      Convulsions, unspecified convulsion type (H)      CVA (cerebral vascular accident) (H) 07/2016    Hemorrhagic,      Depression      Dyslipidemia      Elevated troponin      Essential hypertension      Feeding by G-tube (H) 06/04/2020     GERD (gastroesophageal reflux disease)      Hemiplegia of nondominant side, late effect of cerebrovascular disease (H)     Created by Conversion  Replacement Utility updated for latest  IMO load     Hemorrhagic stroke (H) 06/05/2016     Hepatitis C carrier (H)      History of ESBL E. coli infection      History of hemorrhagic stroke with residual hemiparesis (H)      Hypertension      Lung nodule 12/05/2016    9 x 9 mm left upper lobe on CXR     MRSA (methicillin resistant staph aureus) culture positive      Periodontal disease      Persistent fever      Pneumonia of both lower lobes due to infectious organism      Respiratory failure with hypoxia (H)      Seizure (H)      Sepsis due to urinary tract infection (H) 06/04/2020     Severe sepsis (H)      Trismus      Past Surgical History   Past Surgical History:   Procedure Laterality Date     aneurysm clipping  2006     BRAIN SURGERY       EXAM UNDER ANESTHESIA, RESTORATIONS, EXTRACTION(S) DENTAL COMPLEX, COMBINED N/A 12/19/2016    Procedure: COMBINED EXAM UNDER ANESTHESIA, RESTORATIONS, EXTRACTION(S) DENTAL COMPLEX;  Surgeon: Verónica Martinez DDS;  Location: [facility] OR     EXAM UNDER ANESTHESIA, RESTORATIONS, EXTRACTION(S) DENTAL COMPLEX, COMBINED N/A 3/7/2018    Procedure: COMBINED EXAM UNDER ANESTHESIA, RESTORATIONS, EXTRACTION(S) DENTAL COMPLEX;  Kenalog Injection in Bilateral [place], Combined Dental Exam,Radiographs, Three Dental Restorations, Periodontal Therapy and Fluoride Treatment;  Surgeon: Darryl Isbell DDS;  Location: [facility] OR     HH MIDLINE INSERTION  8/11/2017          INJECT STEROID (LOCATION) N/A 3/7/2018    Procedure: INJECT STEROID (LOCATION);  Kenalog Injection In Bilateral [place], Combined Dental Exam,Radiographs,Three Dental Restorations, Periodontal Therapy and Fluoride Treatment;  Surgeon: Liu Conroy DDS;  Location: [facility] OR     IR GASTRO JEJUNOSTOMY TUBE CHANGE  7/19/2016     IR GASTRO JEJUNOSTOMY TUBE CHANGE  9/23/2016     IR GASTRO JEJUNOSTOMY TUBE CHANGE  11/28/2016     IR GASTRO JEJUNOSTOMY TUBE CHANGE  1/27/2017     IR GASTRO JEJUNOSTOMY TUBE CHANGE  4/19/2017     IR GASTRO JEJUNOSTOMY TUBE CHANGE  6/20/2017      IR GASTRO JEJUNOSTOMY TUBE CHANGE  8/7/2017     IR GASTRO JEJUNOSTOMY TUBE CHANGE  8/11/2017     IR GASTRO JEJUNOSTOMY TUBE CHANGE  10/20/2017     IR GASTRO JEJUNOSTOMY TUBE CHANGE  1/2/2018     IR GASTRO JEJUNOSTOMY TUBE CHANGE  2/16/2018     IR GASTRO JEJUNOSTOMY TUBE CHANGE  5/16/2018     IR GASTRO JEJUNOSTOMY TUBE CHANGE  6/11/2018     IR GASTRO JEJUNOSTOMY TUBE CHANGE  9/12/2018     IR GASTRO JEJUNOSTOMY TUBE CHANGE  12/24/2018     IR GASTRO JEJUNOSTOMY TUBE CHANGE  3/18/2019     IR GASTRO JEJUNOSTOMY TUBE CHANGE  4/23/2019     IR GASTRO JEJUNOSTOMY TUBE CHANGE  5/21/2019     IR GASTRO JEJUNOSTOMY TUBE CHANGE  9/9/2019     IR GASTRO JEJUNOSTOMY TUBE CHANGE  9/26/2019     IR GASTRO JEJUNOSTOMY TUBE CHANGE  10/11/2019     IR GASTRO JEJUNOSTOMY TUBE CHANGE  11/21/2019     IR GASTRO JEJUNOSTOMY TUBE CHANGE  11/29/2019     IR GASTRO JEJUNOSTOMY TUBE CHANGE  2/27/2020     IR GASTRO JEJUNOSTOMY TUBE CHANGE  4/10/2020     IR GASTRO JEJUNOSTOMY TUBE CHANGE  7/2/2020     IR GASTRO JEJUNOSTOMY TUBE CHANGE  8/28/2020     IR GASTRO JEJUNOSTOMY TUBE CHANGE  9/17/2020     IR GASTRO JEJUNOSTOMY TUBE CHANGE  10/29/2020     IR GASTRO JEJUNOSTOMY TUBE CHANGE  11/6/2020     IR GASTRO JEJUNOSTOMY TUBE CHANGE  12/24/2020     IR GASTRO JEJUNOSTOMY TUBE CHANGE  1/9/2021     IR GASTRO JEJUNOSTOMY TUBE CHANGE  6/9/2021     IR GASTRO JEJUNOSTOMY TUBE CHANGE  10/4/2021     IR GASTRO JEJUNOSTOMY TUBE CHANGE  10/18/2021     IR GASTRO JEJUNOSTOMY TUBE CHANGE  1/14/2022     IR GASTRO JEJUNOSTOMY TUBE CHANGE  1/20/2022     IR GASTRO JEJUNOSTOMY TUBE CHANGE  4/19/2022     IR GASTRO JEJUNOSTOMY TUBE CHANGE  6/7/2022     IR GASTRO JEJUNOSTOMY TUBE CHANGE  8/19/2022     IR GASTRO JEJUNOSTOMY TUBE CHANGE  9/13/2022     IR GASTRO JEJUNOSTOMY TUBE CHANGE  10/27/2022     IR GASTRO JEJUNOSTOMY TUBE CHANGE  11/3/2022     IR GASTRO JEJUNOSTOMY TUBE CHANGE  1/18/2023     IR GASTRO JEJUNOSTOMY TUBE CHANGE  4/7/2023     IR GASTRO JEJUNOSTOMY TUBE CHANGE   5/4/2023     IR GASTRO JEJUNOSTOMY TUBE CHANGE  6/21/2023     IR GASTRO JEJUNOSTOMY TUBE PLACEMENT  6/14/2016     IR GJ TUBE REPLACEMENT  12/24/2018     IR GJ TUBE REPLACEMENT  3/18/2019     IR GJ TUBE REPLACEMENT  4/23/2019     IR GJ TUBE REPLACEMENT  5/21/2019     IR GJ TUBE REPLACEMENT  9/9/2019     IR GJ TUBE REPLACEMENT  9/26/2019     IR GJ TUBE REPLACEMENT  10/11/2019     IR GJ TUBE REPLACEMENT  11/21/2019     IR GJ TUBE REPLACEMENT  11/29/2019     IR GJ TUBE REPLACEMENT  2/27/2020     IR GJ TUBE REPLACEMENT  4/10/2020     IR GJ TUBE REPLACEMENT  7/2/2020     IR GJ TUBE REPLACEMENT  8/28/2020     IR GJ TUBE REPLACEMENT  9/17/2020     IR GJ TUBE REPLACEMENT  10/29/2020     IR GJ TUBE REPLACEMENT  11/6/2020     IR GJ TUBE REPLACEMENT  12/24/2020     IR GJ TUBE REPLACEMENT  1/9/2021     IR GJ TUBE REPLACEMENT  6/9/2021     J-G tube       PICC  6/10/2016          TRACHEOSTOMY      Removed     Prior to Admission Medications   Prior to Admission Medications   Prescriptions Last Dose Informant Patient Reported? Taking?   Dextromethorphan-guaiFENesin  MG/5ML syrup 7/17/2023 at am  No Yes   Sig: TAKE 10 MLS BY G TUBE ROUTE EVERY 4 HOURS AS NEEDED FOR COUGH.   Ostomy Supplies (STOMAHESIVE) PSTE 7/17/2023 at am  No Yes   Sig: Apply topically to skin around ostomy site twice daily and as needed   Polyethylene Glycol 400 (VISINE DRY EYE RELIEF) 1 % SOLN Unknown at prn  No Yes   Sig: Apply 1 drop to eye every 6 hours as needed (dry eyes)   QUEtiapine (SEROQUEL) 25 MG tablet 7/17/2023 at 0300  No Yes   Sig: Take 1 tablet (25 mg) by mouth See Admin Instructions Take 1 tablet at 7PM, Midnight and 3AM for total of 75 mg nightly. Ok to repeat after 4 hours and during the day PRN   acetaminophen (TYLENOL) 500 MG tablet 7/17/2023 at 1500  No Yes   Sig: Take 2 tablets (1,000 mg) by mouth every 6 hours as needed for mild pain   amLODIPine (NORVASC) 5 MG tablet 7/17/2023 at am  No Yes   Sig: Take 1 tablet (5 mg total) by mouth  daily.   aspirin (ASA) 81 MG chewable tablet 2023 at am  No Yes   Si tablet (81 mg) by Per Feeding Tube route daily   baclofen (LIORESAL) 10 MG tablet 2023 at am x1  No Yes   Si tablet (10 mg) by Per G Tube route 4 times daily as needed for muscle spasms Usually takes with Seroquel   bisacodyl (DULCOLAX) 10 MG suppository 2023  No Yes   Sig: INSERT 1 SUPPOSTIORY INTO THE RECTUM DAILY AS NEEDED FOR CONSTIPATION.   cefaclor (CECLOR) 250 MG capsule 2023  Yes Yes   Sig: Take 250 mg by mouth daily UTI ppx   chlorhexidine (PERIDEX) 0.12 % solution 2023  Yes Yes   Sig: Swish and spit 15 mLs in mouth daily    doxazosin (CARDURA) 1 MG tablet 2023  No Yes   Sig: Take 1 tablet (1 mg) by mouth At Bedtime   ipratropium (ATROVENT) 0.06 % nasal spray 2023 at am x1  Yes Yes   Sig: Spray 2 sprays into both nostrils 2 times daily   ipratropium - albuterol 0.5 mg/2.5 mg/3 mL (DUONEB) 0.5-2.5 (3) MG/3ML neb solution 2023 at 1430  Yes Yes   Sig: Take 1 vial by nebulization every 4 hours   lidocaine (LIDODERM) 5 % patch 2023 at not wearing  No Yes   Sig: Place 1 patch onto the skin daily as needed for moderate pain To prevent lidocaine toxicity, patient should be patch free for 12 hrs daily.   meclizine (ANTIVERT) 25 MG tablet 2023 at am  No Yes   Sig: Take 1 tablet via G-tube every 6 hours as needed for vertigo   neomycin-bacitracin-polymyxin (NEOSPORIN) 5-400-5000 ointment 2023  No Yes   Sig: Apply topically 4 times daily   Patient taking differently: Apply topically 4 times daily To J Tube site   nystatin (MYCOSTATIN) 176674 UNIT/GM external cream Unknown at prn  No Yes   Sig: Apply topically 2 times daily as needed (redness)   omeprazole (PRILOSEC) 40 MG DR capsule 2023 at am  Yes Yes   Sig: Take 40 mg by mouth daily   ondansetron (ZOFRAN) 4 MG tablet Past Week  No Yes   Sig: Take 1 tablet (4 mg) by mouth every 8 hours as needed for nausea   polyethylene glycol  "(MIRALAX) 17 GM/Dose powder Unknown at prn  No Yes   Sig: Give 1 capful via G-tube daily as needed for constipation   saccharomyces boulardii (FLORASTOR) 250 MG capsule   No No   Sig: Take 1 capsule (250 mg) by mouth daily   Patient not taking: Reported on 2023   simvastatin (ZOCOR) 20 MG tablet 2023  No Yes   Si tablet (20 mg) by Per G Tube route At Bedtime   sodium chloride (NEBUSAL) 3 % neb solution 2023 at 1200  No Yes   Sig: Take 3 mLs by nebulization every 6 hours as needed for other (thick sputum)      Facility-Administered Medications: None        Review of Systems    The 10 point Review of Systems is negative other than noted in the HPI or here.      Social History   I have reviewed this patient's social history and updated it with pertinent information if needed.  Social History     Tobacco Use     Smoking status: Former     Packs/day: 1.00     Years: 30.00     Pack years: 30.00     Types: Cigarettes     Quit date: 2005     Years since quittin.1     Smokeless tobacco: Former     Quit date: 2007     Tobacco comments:     quit 2006   Vaping Use     Vaping Use: Never used   Substance Use Topics     Alcohol use: No     Drug use: No       Family History   I have reviewed this patient's family history and updated it with pertinent information if needed.  Family History   Problem Relation Age of Onset     Hypertension Mother      Cancer Father      Other Cancer Father      Kidney Disease Brother        Allergies   Allergies   Allergen Reactions     Diphenhydramine Unknown     Trazodone Other (See Comments)     \"shaking\" per family        Physical Exam   Vital Signs: Temp: (!) 100.8  F (38.2  C) Temp src: Axillary BP: 123/82 Pulse: 104   Resp: 19 SpO2: 96 % O2 Device: Nasal cannula Oxygen Delivery: 3 LPM  Weight: 0 lbs 0 oz    GENERAL:  Alert, nonverbal as at baseline   HEAD:  Normocephalic, without obvious abnormality, atraumatic   EYES:  PERRL, conjunctiva/corneas clear, no " scleral icterus, EOM's intact   NOSE: Nares normal, septum midline, mucosa normal, no drainage   THROAT: Lips, mucosa, and tongue normal; teeth and gums normal, mouth moist   NECK: Supple, symmetrical, trachea midline   BACK:   Symmetric, no curvature, ROM normal   LUNGS:   On 3L via NC, appeared to produce sounds comfortably on the 02, no visible costral retractions or increased respiratory effort   CHEST WALL:  No tenderness or conspicuous deformity   HEART:  Tachycardic 100s; no conspicuous jugular venous distention noted    ABDOMEN:   Soft, non-tender, feeding tube intact and dressing c/d/i; no masses, no organomegaly, no rebound or guarding   EXTREMITIES: Extremities contracted as per reported baseline, atraumatic, no cyanosis or edema    SKIN: Dry to touch, no exanthems in the visualized areas   NEURO: Alert, at his reported nonverbal baseline; he will respond to questions appropriately showing comprehension; he squeezes hands with instruction, contracted extremities    PSYCH: Cooperative and behavior is appropriate      Medical Decision Making       76 MINUTES SPENT BY ME on the date of service doing chart review, history, exam, documentation & further activities per the note.      Data   ------------------------- PAST 24 HR DATA REVIEWED -----------------------------------------------    I have personally reviewed the following data over the past 24 hrs:    12.5 (H)  \   10.7 (L)   / 192     138 107 30 (H) /  125   4.8 20 (L) 1.38 (H) \       ALT: 28 AST: 37 AP: 75 TBILI: 0.2   ALB: 3.5 TOT PROTEIN: 8.5 (H) LIPASE: N/A       Trop: N/A BNP: 11       Procal: 0.15 CRP: N/A Lactic Acid: 0.9         Imaging results reviewed over the past 24 hrs:   Recent Results (from the past 24 hour(s))   XR Chest Port 1 View    Narrative    EXAM: XR CHEST PORT 1 VIEW  LOCATION: [facility]  DATE: 7/17/2023    INDICATION: sob, fever  COMPARISON: 07/03/2023      Impression    IMPRESSION: Lung volumes are  lower, otherwise no change. Minimal linear scarring at lung bases. Heart size and pulmonary vessels normal. Old right rib fracture.   CT Chest Pulmonary Embolism w Contrast    Narrative    EXAM: CT CHEST PULMONARY EMBOLISM W CONTRAST  LOCATION: [facility]  DATE: 7/17/2023    INDICATION: SOB, fever  COMPARISON: 06/09/2023  TECHNIQUE: CT chest pulmonary angiogram during arterial phase injection of IV contrast. Multiplanar reformats and MIP reconstructions were performed. Dose reduction techniques were used.   CONTRAST: 75 ml Isovue 370    FINDINGS:  ANGIOGRAM CHEST: Pulmonary arteries are normal caliber and negative for pulmonary emboli. Thoracic aorta is negative for dissection. No CT evidence of right heart strain.    LUNGS AND PLEURA: Stable benign granulomatous change. Mild bronchial wall thickening. Increased bibasilar atelectasis and/or infiltrate since comparison. No effusions.    MEDIASTINUM/AXILLAE: No adenopathy. No aneurysm.    CORONARY ARTERY CALCIFICATION: Moderate.    UPPER ABDOMEN: No acute findings. GJ tube noted, tip below the margin of the study.    MUSCULOSKELETAL: No frankly destructive bony lesions.      Impression    IMPRESSION:  1.  No pulmonary embolism demonstrated.  2.  Slight increase in bibasilar atelectasis and/or infiltrate since comparison.

## 2023-07-17 NOTE — ED PROVIDER NOTES
Emergency Department Encounter         FINAL IMPRESSION:  Hypoxia, COVID        ED COURSE AND MEDICAL DECISION MAKING       ED Course as of 07/17/23 1554   Mon Jul 17, 2023   1532 EKG sinus tachycardia 131, no subacute ischemia, no inversions no depressions QTc 413.   1553 Patient immobile nonverbal 58-year-old male who have seen multiple times, history of stroke, G-tube dependent, lives with family, here after being found positive for COVID 2 days ago.  Increased oxygen requirement at home.  Fever.  Lethargy.  On 3 L on arrival.  History otherwise obtained from family.  No abdominal pain.  No leg swelling.  No skin breakdown.   1553   On arrival he is tachycardic, febrile and hypoxic.  Improved with 3 L nasal cannula.  Plan for septic work-up and admission.   -Plan for admission.  Patient given Decadron after discussion with hospitalist.  He is a full code at this point.    3:37 PM Met with patient for initial interview and exam. Discussed initial plan for care for their stay in the emergency department.  5:19 PM I spoke with Dr. Dumont (hospitalist) about admission. He accepts the patient.                 Medical Decision Making    History:    Supplemental history from: Documented in chart, if applicable, Family Member/Significant Other and EMS    External Record(s) reviewed: Documented in chart, if applicable.    Work Up:    Chart documentation includes differential considered and any EKGs or imaging independently interpreted by provider, where specified.    In additional to work up documented, I considered the following work up: Documented in chart, if applicable.    External consultation:    Discussion of management with another provider: Documented in chart, if applicable    Complicating factors:    Care impacted by chronic illness: Hyperlipidemia    Care affected by social determinants of health: N/A    Disposition considerations: Admit.                    Critical Care     Performed by: Tadeo Berg or     Authorized by: Tadeo Berg  Total critical care time:  minutes  Critical care was necessary to treat or prevent imminent or life-threatening deterioration of the following conditions:   Critical care was time spent personally by me on the following activities: development of treatment plan with patient or surrogate, discussions with consultants, examination of patient, evaluation of patient's response to treatment, obtaining history from patient or surrogate, ordering and performing treatments and interventions, ordering and review of laboratory studies, ordering and review of radiographic studies, re-evaluation of patient's condition and monitoring for potential decompensation.  Critical care time was exclusive of separately billable procedures and treating other patients.'    At the conclusion of the encounter I discussed the results of all the tests and the disposition. The questions were answered. The patient or family acknowledged understanding and was agreeable with the care plan.                  MEDICATIONS GIVEN IN THE EMERGENCY DEPARTMENT:  Medications - No data to display    NEW PRESCRIPTIONS STARTED AT TODAY'S ED VISIT:  New Prescriptions    No medications on file       HPI     Patient information obtained from: patient    Use of Interpretor: N/A     Liu GIL Malave is a 58 year old male with a pertinent history of dyslipidemia, SIRS, hepatitis, sepsis, seizure disorder, pneumonia and CVA who presents to this ED via EMS for evaluation of fever    For the past few days the patient has endorsed the sudden onset of a progressively worsening cough. However, the family member reports that the patients cough significantly worsened yesterday night. Additionally, since last night the patient has endorsed a fever as high as 101.3. The family members report that they have been giving the patient tylenol for his symptoms which have provided him significant relief.     Then today the patient had a positive at home  COVID-19 test. Additionally, the family member reports that the patients oxygen has been running low which prompted them to the ED.     At baseline the patient is not on any oxygen. The family member reports that the patient is a full code status. The family member denies any vomiting, bowel changes, or any other complaints.     REVIEW OF SYSTEMS:  Review of Systems   Constitutional: Negative for malaise. Positive for fever.  HEENT: Negative runny nose, sore throat, ear pain, neck pain  Respiratory: Negative for shortness of breath, congestion. Positive for cough.   Cardiovascular: Negative for chest pain, leg edema  Gastrointestinal: Negative for abdominal distention, abdominal pain, constipation, vomiting, nausea, diarrhea  Genitourinary: Negative for dysuria and hematuria.   Integument: Negative for rash, skin breakdown  Neurological: Negative for paresthesias, weakness, headache.  Musculoskeletal: Negative for joint pain, joint swelling      All other systems reviewed and are negative.          MEDICAL HISTORY     Past Medical History:   Diagnosis Date     Acute UTI 02/21/2020     Anxiety      Basal ganglia hemorrhage (H) 06/20/2016     Brain compression (H)      Bruxism      Chronic static encephalopathy      Convulsions, unspecified convulsion type (H)      CVA (cerebral vascular accident) (H) 07/2016     Depression      Dyslipidemia      Elevated troponin      Essential hypertension      Feeding by G-tube (H) 06/04/2020     GERD (gastroesophageal reflux disease)      Hemiplegia of nondominant side, late effect of cerebrovascular disease (H)      Hemorrhagic stroke (H) 06/05/2016     Hepatitis C carrier (H)      History of ESBL E. coli infection      History of hemorrhagic stroke with residual hemiparesis (H)      Hypertension      Lung nodule 12/05/2016     MRSA (methicillin resistant staph aureus) culture positive      Periodontal disease      Persistent fever      Pneumonia of both lower lobes due to  infectious organism      Respiratory failure with hypoxia (H)      Seizure (H)      Sepsis due to urinary tract infection (H) 06/04/2020     Severe sepsis (H)      Trismus        Past Surgical History:   Procedure Laterality Date     aneurysm clipping  2006     BRAIN SURGERY       EXAM UNDER ANESTHESIA, RESTORATIONS, EXTRACTION(S) DENTAL COMPLEX, COMBINED N/A 12/19/2016    Procedure: COMBINED EXAM UNDER ANESTHESIA, RESTORATIONS, EXTRACTION(S) DENTAL COMPLEX;  Surgeon: Verónica Martinez DDS;  Location: [facility] OR     EXAM UNDER ANESTHESIA, RESTORATIONS, EXTRACTION(S) DENTAL COMPLEX, COMBINED N/A 3/7/2018    Procedure: COMBINED EXAM UNDER ANESTHESIA, RESTORATIONS, EXTRACTION(S) DENTAL COMPLEX;  Kenalog Injection in Bilateral [place], Combined Dental Exam,Radiographs, Three Dental Restorations, Periodontal Therapy and Fluoride Treatment;  Surgeon: Darryl Isbell DDS;  Location: [facility] OR     HH MIDLINE INSERTION  8/11/2017          INJECT STEROID (LOCATION) N/A 3/7/2018    Procedure: INJECT STEROID (LOCATION);  Kenalog Injection In Bilateral [place], Combined Dental Exam,Radiographs,Three Dental Restorations, Periodontal Therapy and Fluoride Treatment;  Surgeon: Liu Conroy DDS;  Location: [facility] OR     IR GASTRO JEJUNOSTOMY TUBE CHANGE  7/19/2016     IR GASTRO JEJUNOSTOMY TUBE CHANGE  9/23/2016     IR GASTRO JEJUNOSTOMY TUBE CHANGE  11/28/2016     IR GASTRO JEJUNOSTOMY TUBE CHANGE  1/27/2017     IR GASTRO JEJUNOSTOMY TUBE CHANGE  4/19/2017     IR GASTRO JEJUNOSTOMY TUBE CHANGE  6/20/2017     IR GASTRO JEJUNOSTOMY TUBE CHANGE  8/7/2017     IR GASTRO JEJUNOSTOMY TUBE CHANGE  8/11/2017     IR GASTRO JEJUNOSTOMY TUBE CHANGE  10/20/2017     IR GASTRO JEJUNOSTOMY TUBE CHANGE  1/2/2018     IR GASTRO JEJUNOSTOMY TUBE CHANGE  2/16/2018     IR GASTRO JEJUNOSTOMY TUBE CHANGE  5/16/2018     IR GASTRO JEJUNOSTOMY TUBE CHANGE  6/11/2018     IR GASTRO JEJUNOSTOMY TUBE CHANGE  9/12/2018     IR GASTRO JEJUNOSTOMY TUBE CHANGE   12/24/2018     IR GASTRO JEJUNOSTOMY TUBE CHANGE  3/18/2019     IR GASTRO JEJUNOSTOMY TUBE CHANGE  4/23/2019     IR GASTRO JEJUNOSTOMY TUBE CHANGE  5/21/2019     IR GASTRO JEJUNOSTOMY TUBE CHANGE  9/9/2019     IR GASTRO JEJUNOSTOMY TUBE CHANGE  9/26/2019     IR GASTRO JEJUNOSTOMY TUBE CHANGE  10/11/2019     IR GASTRO JEJUNOSTOMY TUBE CHANGE  11/21/2019     IR GASTRO JEJUNOSTOMY TUBE CHANGE  11/29/2019     IR GASTRO JEJUNOSTOMY TUBE CHANGE  2/27/2020     IR GASTRO JEJUNOSTOMY TUBE CHANGE  4/10/2020     IR GASTRO JEJUNOSTOMY TUBE CHANGE  7/2/2020     IR GASTRO JEJUNOSTOMY TUBE CHANGE  8/28/2020     IR GASTRO JEJUNOSTOMY TUBE CHANGE  9/17/2020     IR GASTRO JEJUNOSTOMY TUBE CHANGE  10/29/2020     IR GASTRO JEJUNOSTOMY TUBE CHANGE  11/6/2020     IR GASTRO JEJUNOSTOMY TUBE CHANGE  12/24/2020     IR GASTRO JEJUNOSTOMY TUBE CHANGE  1/9/2021     IR GASTRO JEJUNOSTOMY TUBE CHANGE  6/9/2021     IR GASTRO JEJUNOSTOMY TUBE CHANGE  10/4/2021     IR GASTRO JEJUNOSTOMY TUBE CHANGE  10/18/2021     IR GASTRO JEJUNOSTOMY TUBE CHANGE  1/14/2022     IR GASTRO JEJUNOSTOMY TUBE CHANGE  1/20/2022     IR GASTRO JEJUNOSTOMY TUBE CHANGE  4/19/2022     IR GASTRO JEJUNOSTOMY TUBE CHANGE  6/7/2022     IR GASTRO JEJUNOSTOMY TUBE CHANGE  8/19/2022     IR GASTRO JEJUNOSTOMY TUBE CHANGE  9/13/2022     IR GASTRO JEJUNOSTOMY TUBE CHANGE  10/27/2022     IR GASTRO JEJUNOSTOMY TUBE CHANGE  11/3/2022     IR GASTRO JEJUNOSTOMY TUBE CHANGE  1/18/2023     IR GASTRO JEJUNOSTOMY TUBE CHANGE  4/7/2023     IR GASTRO JEJUNOSTOMY TUBE CHANGE  5/4/2023     IR GASTRO JEJUNOSTOMY TUBE CHANGE  6/21/2023     IR GASTRO JEJUNOSTOMY TUBE PLACEMENT  6/14/2016     IR GJ TUBE REPLACEMENT  12/24/2018     IR GJ TUBE REPLACEMENT  3/18/2019     IR GJ TUBE REPLACEMENT  4/23/2019     IR GJ TUBE REPLACEMENT  5/21/2019     IR GJ TUBE REPLACEMENT  9/9/2019     IR GJ TUBE REPLACEMENT  9/26/2019     IR GJ TUBE REPLACEMENT  10/11/2019     IR GJ TUBE REPLACEMENT  11/21/2019     IR GJ  TUBE REPLACEMENT  2019     IR GJ TUBE REPLACEMENT  2020     IR GJ TUBE REPLACEMENT  4/10/2020     IR GJ TUBE REPLACEMENT  2020     IR GJ TUBE REPLACEMENT  2020     IR GJ TUBE REPLACEMENT  2020     IR GJ TUBE REPLACEMENT  10/29/2020     IR GJ TUBE REPLACEMENT  2020     IR GJ TUBE REPLACEMENT  2020     IR GJ TUBE REPLACEMENT  2021     IR GJ TUBE REPLACEMENT  2021     J-G tube       PICC  6/10/2016          TRACHEOSTOMY      Removed       Social History     Tobacco Use     Smoking status: Former     Packs/day: 1.00     Years: 30.00     Pack years: 30.00     Types: Cigarettes     Quit date: 2005     Years since quittin.1     Smokeless tobacco: Former     Quit date: 2007     Tobacco comments:     quit 2006   Vaping Use     Vaping Use: Never used   Substance Use Topics     Alcohol use: No     Drug use: No       acetaminophen (TYLENOL) 500 MG tablet  amLODIPine (NORVASC) 5 MG tablet  aspirin (ASA) 81 MG chewable tablet  baclofen (LIORESAL) 10 MG tablet  bisacodyl (DULCOLAX) 10 MG suppository  chlorhexidine (PERIDEX) 0.12 % solution  Dextromethorphan-guaiFENesin  MG/5ML syrup  doxazosin (CARDURA) 1 MG tablet  esomeprazole (NEXIUM) 20 MG DR capsule  ipratropium (ATROVENT) 0.06 % nasal spray  ipratropium - albuterol 0.5 mg/2.5 mg/3 mL (DUONEB) 0.5-2.5 (3) MG/3ML neb solution  lidocaine (LIDODERM) 5 % patch  meclizine (ANTIVERT) 25 MG tablet  neomycin-bacitracin-polymyxin (NEOSPORIN) 5-400-5000 ointment  nystatin (MYCOSTATIN) 630703 UNIT/GM external cream  ondansetron (ZOFRAN) 4 MG tablet  Ostomy Supplies (STOMAHESIVE) PSTE  polyethylene glycol (MIRALAX) 17 GM/Dose powder  Polyethylene Glycol 400 (VISINE DRY EYE RELIEF) 1 % SOLN  QUEtiapine (SEROQUEL) 25 MG tablet  saccharomyces boulardii (FLORASTOR) 250 MG capsule  simvastatin (ZOCOR) 20 MG tablet  sodium chloride (NEBUSAL) 3 % neb solution            PHYSICAL EXAM     /72   Pulse (!) 130   Temp (!) 101.3   F (38.5  C) (Axillary)   SpO2 94%       PHYSICAL EXAM:     General: Patient appears well, nontoxic, comfortable  HEENT: Moist mucous membranes,  No head trauma.    Cardiovascular: Normal rate, normal rhythm, no extremity edema.  No appreciable murmur.  Respiratory: No signs of respiratory distress, lungs are clear to auscultation bilaterally with no wheezes rhonchi or rales.  Abdominal: Soft, nontender, nondistended, no palpable masses, no guarding, no rebound.  G-tube in place.  Musculoskeletal: Full range of motion of joints, no deformities appreciated.  Neurological: Alert and oriented, grossly neurologically intact.  Psychological: Normal affect and mood.  Integument: No rashes appreciated          RESULTS       Labs Ordered and Resulted from Time of ED Arrival to Time of ED Departure - No data to display    No orders to display                     PROCEDURES:  Procedures:  Procedures       I, Lori Hay am serving as a scribe to document services personally performed by Tadeo Berg DO, based on my observations and the provider's statements to me.  I, Tadeo Berg DO, attest that Lori Hay is acting in a scribe capacity, has observed my performance of the services and has documented them in accordance with my direction.    Tadeo Berg DO  Emergency Medicine  [facility] EMERGENCY ROOM       Tadeo Berg DO  07/17/23 [id number]

## 2023-07-17 NOTE — ED NOTES
Bed: [facility]11  Expected date: 7/17/23  Expected time: 3:15 PM  Means of arrival: Ambulance  Comments:

## 2023-07-18 ENCOUNTER — TELEPHONE (OUTPATIENT)
Dept: FAMILY MEDICINE | Facility: CLINIC | Age: 58
End: 2023-07-18
Payer: COMMERCIAL

## 2023-07-18 VITALS
HEART RATE: 101 BPM | BODY MASS INDEX: 21.81 KG/M2 | OXYGEN SATURATION: 98 % | RESPIRATION RATE: 20 BRPM | DIASTOLIC BLOOD PRESSURE: 65 MMHG | WEIGHT: 119.27 LBS | SYSTOLIC BLOOD PRESSURE: 136 MMHG | TEMPERATURE: 99.1 F

## 2023-07-18 LAB
ANION GAP SERPL CALCULATED.3IONS-SCNC: 8 MMOL/L (ref 5–18)
BUN SERPL-MCNC: 24 MG/DL (ref 8–22)
CALCIUM SERPL-MCNC: 8.3 MG/DL (ref 8.5–10.5)
CHLORIDE BLD-SCNC: 110 MMOL/L (ref 98–107)
CO2 SERPL-SCNC: 21 MMOL/L (ref 22–31)
CREAT SERPL-MCNC: 1.07 MG/DL (ref 0.7–1.3)
ERYTHROCYTE [DISTWIDTH] IN BLOOD BY AUTOMATED COUNT: 12.4 % (ref 10–15)
GFR SERPL CREATININE-BSD FRML MDRD: 79 ML/MIN/1.73M2
GLUCOSE BLD-MCNC: 157 MG/DL (ref 70–125)
HCT VFR BLD AUTO: 30.8 % (ref 40–53)
HGB BLD-MCNC: 10.3 G/DL (ref 13.3–17.7)
LACTATE SERPL-SCNC: 1.2 MMOL/L (ref 0.7–2)
MCH RBC QN AUTO: 32.3 PG (ref 26.5–33)
MCHC RBC AUTO-ENTMCNC: 33.4 G/DL (ref 31.5–36.5)
MCV RBC AUTO: 97 FL (ref 78–100)
PLATELET # BLD AUTO: 171 10E3/UL (ref 150–450)
POTASSIUM BLD-SCNC: 4.7 MMOL/L (ref 3.5–5)
RBC # BLD AUTO: 3.19 10E6/UL (ref 4.4–5.9)
SODIUM SERPL-SCNC: 139 MMOL/L (ref 136–145)
WBC # BLD AUTO: 11.3 10E3/UL (ref 4–11)

## 2023-07-18 PROCEDURE — 250N000013 HC RX MED GY IP 250 OP 250 PS 637: Performed by: STUDENT IN AN ORGANIZED HEALTH CARE EDUCATION/TRAINING PROGRAM

## 2023-07-18 PROCEDURE — 36415 COLL VENOUS BLD VENIPUNCTURE: CPT | Performed by: STUDENT IN AN ORGANIZED HEALTH CARE EDUCATION/TRAINING PROGRAM

## 2023-07-18 PROCEDURE — 94640 AIRWAY INHALATION TREATMENT: CPT

## 2023-07-18 PROCEDURE — 80048 BASIC METABOLIC PNL TOTAL CA: CPT | Performed by: STUDENT IN AN ORGANIZED HEALTH CARE EDUCATION/TRAINING PROGRAM

## 2023-07-18 PROCEDURE — 99239 HOSP IP/OBS DSCHRG MGMT >30: CPT | Performed by: STUDENT IN AN ORGANIZED HEALTH CARE EDUCATION/TRAINING PROGRAM

## 2023-07-18 PROCEDURE — 83605 ASSAY OF LACTIC ACID: CPT | Performed by: STUDENT IN AN ORGANIZED HEALTH CARE EDUCATION/TRAINING PROGRAM

## 2023-07-18 PROCEDURE — 250N000009 HC RX 250: Performed by: STUDENT IN AN ORGANIZED HEALTH CARE EDUCATION/TRAINING PROGRAM

## 2023-07-18 PROCEDURE — 250N000011 HC RX IP 250 OP 636: Mod: JZ | Performed by: STUDENT IN AN ORGANIZED HEALTH CARE EDUCATION/TRAINING PROGRAM

## 2023-07-18 PROCEDURE — 85014 HEMATOCRIT: CPT | Performed by: STUDENT IN AN ORGANIZED HEALTH CARE EDUCATION/TRAINING PROGRAM

## 2023-07-18 RX ORDER — DOXYCYCLINE 100 MG/1
100 CAPSULE ORAL 2 TIMES DAILY
Qty: 10 CAPSULE | Refills: 0 | Status: SHIPPED | OUTPATIENT
Start: 2023-07-18 | End: 2023-07-23

## 2023-07-18 RX ORDER — DEXAMETHASONE 4 MG/1
6 TABLET ORAL 2 TIMES DAILY WITH MEALS
Qty: 12 TABLET | Refills: 0 | Status: SHIPPED | OUTPATIENT
Start: 2023-07-18 | End: 2023-09-27

## 2023-07-18 RX ADMIN — BACITRACIN ZINC, NEOMYCIN, POLYMYXIN B: 400; 3.5; 5 OINTMENT TOPICAL at 10:04

## 2023-07-18 RX ADMIN — IPRATROPIUM BROMIDE 2 SPRAY: 42 SPRAY, METERED NASAL at 10:15

## 2023-07-18 RX ADMIN — IPRATROPIUM BROMIDE AND ALBUTEROL SULFATE 3 ML: .5; 3 SOLUTION RESPIRATORY (INHALATION) at 07:29

## 2023-07-18 RX ADMIN — QUETIAPINE FUMARATE 25 MG: 25 TABLET ORAL at 02:57

## 2023-07-18 RX ADMIN — CHLORHEXIDINE GLUCONATE 15 ML: 1.2 SOLUTION ORAL at 10:04

## 2023-07-18 RX ADMIN — DEXAMETHASONE SODIUM PHOSPHATE 6 MG: 10 INJECTION, SOLUTION INTRAMUSCULAR; INTRAVENOUS at 10:03

## 2023-07-18 RX ADMIN — ASPIRIN 81 MG CHEWABLE TABLET 81 MG: 81 TABLET CHEWABLE at 10:03

## 2023-07-18 RX ADMIN — PANTOPRAZOLE SODIUM 40 MG: 40 TABLET, DELAYED RELEASE ORAL at 10:04

## 2023-07-18 RX ADMIN — CEFUROXIME AXETIL 250 MG: 250 TABLET ORAL at 10:08

## 2023-07-18 RX ADMIN — AMLODIPINE BESYLATE 5 MG: 5 TABLET ORAL at 10:04

## 2023-07-18 ASSESSMENT — ACTIVITIES OF DAILY LIVING (ADL)
ADLS_ACUITY_SCORE: 45
ADLS_ACUITY_SCORE: 51
ADLS_ACUITY_SCORE: 51
ADLS_ACUITY_SCORE: 53
DEPENDENT_IADLS:: CLEANING;COOKING;LAUNDRY;SHOPPING;MEDICATION MANAGEMENT;MONEY MANAGEMENT;TRANSPORTATION;INCONTINENCE
ADLS_ACUITY_SCORE: 51
ADLS_ACUITY_SCORE: 45

## 2023-07-18 NOTE — PLAN OF CARE
Problem: Gas Exchange Impaired  Goal: Optimal Gas Exchange  Outcome: Progressing  Intervention: Optimize Oxygenation and Ventilation  Recent Flowsheet Documentation  Taken 7/18/2023 0054 by Ely Cronin, RN  Head of Bed (HOB) Positioning: HOB at 30 degrees   Goal Outcome Evaluation: Pt rested well. Tolerating turning at family request. VSS. No fever.  Hr 's. 02 1l/nc. Lungs coarse, occasional cough. NPO. Daughter stayed at bedside.

## 2023-07-18 NOTE — PLAN OF CARE
Discharge plan reviewed with daughter and patient. Patient nonverbal. Family verbalized understanding. Questions answered. Copy of AVS given. PIV and Tele removed. Patient discharged home with family.    Problem: Plan of Care - These are the overarching goals to be used throughout the patient stay.    Goal: Readiness for Transition of Care  Outcome: Adequate for Care Transition

## 2023-07-18 NOTE — TELEPHONE ENCOUNTER
Sharon from [facility] calling to state:    old company doing tube feeding will not be able to continue due to insurance purposes     [facility] needs the the notes thats state why the patient needs the feeding tube     Sharon   [phone]  Ext [phone]    Fax: [phone]

## 2023-07-18 NOTE — DISCHARGE SUMMARY
[facility]  Hospitalist Discharge Summary      Date of Admission:  7/17/2023  Date of Discharge:  7/18/2023  Discharging Provider: Tyrell Dumont MD  Discharge Service: Hospitalist Service    Discharge Diagnoses   Acute respiratory failure with hypoxia  Covid 19 infection, tachycardia and fever  Hx of aspiration pneumonia  History of hemorrhagic CVA with hemiplegia, nonverbal, nonambulatory  GJ tube dependent for feeding  Acute kidney injury  Hx of reactive airway disease   CT demonstrating infiltrate vs atelectasis (no PE)    Clinically Significant Risk Factors          Follow-ups Needed After Discharge   Follow-up Appointments     Follow-up and recommended labs and tests       Follow up with primary care provider, Lynda Gutierrez, within 7 days for   hospital follow- up.              Unresulted Labs Ordered in the Past 30 Days of this Admission     Date and Time Order Name Status Description    7/17/2023  3:26 PM Blood Culture Peripheral Blood Preliminary     7/17/2023  3:26 PM Blood Culture Peripheral Blood Preliminary       These results will be followed up by s    Discharge Disposition   Discharged to home  Condition at discharge: Stable    Hospital Course     Liu GIL Malave is a 58 year old male admitted on 7/17/2023. He has a pmhx of prior stroke with significant sequelae, seizures, hemiplegia, GJ feeding tube dependent, HTN, MRSA, depresssion, ESBL UTI, prior episodes of aspiration pneumonia, who presents with acute respiratory failure with hypoxia (home oximeter) and found with positive covid19 infection. Noted baseline to be immobile nonverbal and lives at home with family members caring for him.      On admission, he was febrile from 102--> 100.5 and on 3L via NC and tachycardic 130s--> 104. Notable cr 1.38, hgb 10.7, plts 192, Blood cx pending, lactate normal. CXR without acute new findings. Given Steroids and checking PE study. Admitted and continued steroids and scheduled  winsomeonevishal.     On 7/18/2023, Cr improved to 1.07. He was weaned off of oxygen and tolerated room air. His CT study was negative for PE but did show Slight increase in bibasilar atelectasis and/or infiltrate since comparison. As such, he was discharged to back home in stable condition as he returned back to his baseline and tolerated room air and tachycardia improved from initial 130s to now 101 on discharge and sent home with 4 more days of 6mg decadron daily and also empiric pneumonial coverage doxycycline 100mg twice daily for 5 days total. He was instructed to follow up with PCP within a week and also for a home sleep study (previously was to have but the family did not ). He and daughter were instructed on when to return for further cares and warning signs to monitor for.     Consultations This Hospital Stay   None    Code Status   Full Code    Time Spent on this Encounter   I, Tyrell Dumont MD, personally saw the patient today and spent greater than 30 minutes discharging this patient.       Tyrell Dumont MD  [facility]  [address] [place] [id number]  Phone: [phone]  Fax: [phone]  ______________________________________________________________________    Physical Exam   Vital Signs: Temp: 99.1  F (37.3  C) Temp src: Axillary BP: 136/65 Pulse: 112   Resp: 20 SpO2: 93 % O2 Device: None (Room air) Oxygen Delivery: 1 LPM  Weight: 119 lbs 4.3 oz    GENERAL:  Alert, nonverbal as at baseline   HEAD:  Normocephalic, without obvious abnormality, atraumatic   EYES:  PERRL, conjunctiva/corneas clear, no scleral icterus, EOM's intact   NOSE: Nares normal, septum midline, mucosa normal, no drainage   THROAT: Lips, mucosa, and tongue normal; teeth and gums normal, mouth moist   NECK: Supple, symmetrical, trachea midline   BACK:   Symmetric, no curvature, ROM normal   LUNGS:   On room air and coarse but relativley clear to auscultation and normal respiratory  effort with speaking one word responses   CHEST WALL:  No tenderness or conspicuous deformity   HEART:  HR 100s, currently 101-109;  no conspicuous jugular venous distention noted    ABDOMEN:   Soft, non-tender, feeding tube intact and dressing c/d/i; no masses, no organomegaly, no rebound or guarding   EXTREMITIES: Extremities contracted as per reported baseline, atraumatic, no cyanosis or edema    SKIN: Dry to touch, no exanthems in the visualized areas   NEURO: Alert, at his reported nonverbal baseline; he will respond to questions appropriately showing comprehension; he squeezes hands with instruction, contracted extremities unchanged and reported at baseline     PSYCH: Cooperative and behavior is appropriate       Primary Care Physician   Lynda Gutierrez    Discharge Orders      Reason for your hospital stay    Covid 19 and possible pneumonia     Follow-up and recommended labs and tests     Follow up with primary care provider, Lynda Gutierrez, within 7 days for hospital follow- up.     Activity    Your activity upon discharge: activity as tolerated     Diet    Follow this diet upon discharge: Orders Placed This Encounter      Advance Diet as Tolerated: Clear Liquid Diet       Significant Results and Procedures   Results for orders placed or performed during the hospital encounter of 07/17/23   XR Chest Port 1 View    Narrative    EXAM: XR CHEST PORT 1 VIEW  LOCATION: [facility]  DATE: 7/17/2023    INDICATION: sob, fever  COMPARISON: 07/03/2023      Impression    IMPRESSION: Lung volumes are lower, otherwise no change. Minimal linear scarring at lung bases. Heart size and pulmonary vessels normal. Old right rib fracture.   CT Chest Pulmonary Embolism w Contrast    Narrative    EXAM: CT CHEST PULMONARY EMBOLISM W CONTRAST  LOCATION: [facility]  DATE: 7/17/2023    INDICATION: SOB, fever  COMPARISON: 06/09/2023  TECHNIQUE: CT chest pulmonary angiogram during  arterial phase injection of IV contrast. Multiplanar reformats and MIP reconstructions were performed. Dose reduction techniques were used.   CONTRAST: 75 ml Isovue 370    FINDINGS:  ANGIOGRAM CHEST: Pulmonary arteries are normal caliber and negative for pulmonary emboli. Thoracic aorta is negative for dissection. No CT evidence of right heart strain.    LUNGS AND PLEURA: Stable benign granulomatous change. Mild bronchial wall thickening. Increased bibasilar atelectasis and/or infiltrate since comparison. No effusions.    MEDIASTINUM/AXILLAE: No adenopathy. No aneurysm.    CORONARY ARTERY CALCIFICATION: Moderate.    UPPER ABDOMEN: No acute findings. GJ tube noted, tip below the margin of the study.    MUSCULOSKELETAL: No frankly destructive bony lesions.      Impression    IMPRESSION:  1.  No pulmonary embolism demonstrated.  2.  Slight increase in bibasilar atelectasis and/or infiltrate since comparison.       Discharge Medications   Current Discharge Medication List      START taking these medications    Details   dexamethasone (DECADRON) 4 MG tablet 1.5 tablets (6 mg) by Oral or Feeding Tube route 2 times daily (with meals) for 4 days  Qty: 12 tablet, Refills: 0    Associated Diagnoses: COVID-19      doxycycline hyclate (VIBRAMYCIN) 100 MG capsule Take 1 capsule (100 mg) by mouth 2 times daily for 5 days  Qty: 10 capsule, Refills: 0    Associated Diagnoses: Pneumonia of both lungs due to infectious organism, unspecified part of lung         CONTINUE these medications which have NOT CHANGED    Details   acetaminophen (TYLENOL) 500 MG tablet Take 2 tablets (1,000 mg) by mouth every 6 hours as needed for mild pain  Qty: 720 tablet, Refills: 3    Associated Diagnoses: Pain      amLODIPine (NORVASC) 5 MG tablet Take 1 tablet (5 mg total) by mouth daily.  Qty: 30 tablet, Refills: 11    Associated Diagnoses: Essential hypertension      aspirin (ASA) 81 MG chewable tablet 1 tablet (81 mg) by Per Feeding Tube route  daily  Qty: 90 tablet, Refills: 3    Associated Diagnoses: History of stroke      baclofen (LIORESAL) 10 MG tablet 1 tablet (10 mg) by Per G Tube route 4 times daily as needed for muscle spasms Usually takes with Seroquel  Qty: 360 tablet, Refills: 3    Associated Diagnoses: Muscle spasm      bisacodyl (DULCOLAX) 10 MG suppository INSERT 1 SUPPOSTIORY INTO THE RECTUM DAILY AS NEEDED FOR CONSTIPATION.  Qty: 30 suppository, Refills: 8    Associated Diagnoses: Slow transit constipation      cefaclor (CECLOR) 250 MG capsule Take 250 mg by mouth daily UTI ppx      chlorhexidine (PERIDEX) 0.12 % solution Swish and spit 15 mLs in mouth daily       Dextromethorphan-guaiFENesin  MG/5ML syrup TAKE 10 MLS BY G TUBE ROUTE EVERY 4 HOURS AS NEEDED FOR COUGH.  Qty: 237 mL, Refills: 4    Associated Diagnoses: History of pneumonia      doxazosin (CARDURA) 1 MG tablet Take 1 tablet (1 mg) by mouth At Bedtime  Qty: 90 tablet, Refills: 3    Associated Diagnoses: Urinary retention with incomplete bladder emptying      ipratropium (ATROVENT) 0.06 % nasal spray Spray 2 sprays into both nostrils 2 times daily      ipratropium - albuterol 0.5 mg/2.5 mg/3 mL (DUONEB) 0.5-2.5 (3) MG/3ML neb solution Take 1 vial by nebulization every 4 hours      lidocaine (LIDODERM) 5 % patch Place 1 patch onto the skin daily as needed for moderate pain To prevent lidocaine toxicity, patient should be patch free for 12 hrs daily.  Qty: 30 patch, Refills: 11    Associated Diagnoses: Muscle spasm      meclizine (ANTIVERT) 25 MG tablet Take 1 tablet via G-tube every 6 hours as needed for vertigo  Qty: 30 tablet, Refills: 11    Associated Diagnoses: Vertigo      neomycin-bacitracin-polymyxin (NEOSPORIN) 5-400-5000 ointment Apply topically 4 times daily  Qty: 30 g, Refills: 3    Associated Diagnoses: Irritation around percutaneous endoscopic gastrostomy (PEG) tube site (H)      nystatin (MYCOSTATIN) 723069 UNIT/GM external cream Apply topically 2 times daily  as needed (redness)  Qty: 30 g, Refills: 3    Associated Diagnoses: On tube feeding diet      omeprazole (PRILOSEC) 40 MG DR capsule Take 40 mg by mouth daily      ondansetron (ZOFRAN) 4 MG tablet Take 1 tablet (4 mg) by mouth every 8 hours as needed for nausea  Qty: 30 tablet, Refills: 0    Associated Diagnoses: Nausea      Ostomy Supplies (STOMAHESIVE) PSTE Apply topically to skin around ostomy site twice daily and as needed  Qty: 56.7 g, Refills: 3    Associated Diagnoses: Irritation around percutaneous endoscopic gastrostomy (PEG) tube site (H)      polyethylene glycol (MIRALAX) 17 GM/Dose powder Give 1 capful via G-tube daily as needed for constipation  Qty: 850 g, Refills: 3    Associated Diagnoses: Slow transit constipation      Polyethylene Glycol 400 (VISINE DRY EYE RELIEF) 1 % SOLN Apply 1 drop to eye every 6 hours as needed (dry eyes)  Qty: 15 mL, Refills: 11    Associated Diagnoses: Dry eyes      QUEtiapine (SEROQUEL) 25 MG tablet Take 1 tablet (25 mg) by mouth See Admin Instructions Take 1 tablet at 7PM, Midnight and 3AM for total of 75 mg nightly. Ok to repeat after 4 hours and during the day PRN  Qty: 102 tablet, Refills: 4    Associated Diagnoses: Agitation      simvastatin (ZOCOR) 20 MG tablet 1 tablet (20 mg) by Per G Tube route At Bedtime  Qty: 90 tablet, Refills: 0    Associated Diagnoses: Dyslipidemia      sodium chloride (NEBUSAL) 3 % neb solution Take 3 mLs by nebulization every 6 hours as needed for other (thick sputum)  Qty: 30 mL, Refills: 0    Associated Diagnoses: Chronic mucus hypersecretion, respiratory      saccharomyces boulardii (FLORASTOR) 250 MG capsule Take 1 capsule (250 mg) by mouth daily  Qty: 90 capsule, Refills: 3    Comments: If you could dispense liquid form, that would be great, but it should be okay for patient to change capsule to liquid. Thanks!  Associated Diagnoses: Takes dietary supplements           Allergies   Allergies   Allergen Reactions     Diphenhydramine  "Unknown     Trazodone Other (See Comments)     \"shaking\" per family     "

## 2023-07-18 NOTE — CONSULTS
Care Management Initial Consult    General Information  Assessment completed with: Children,    Type of CM/SW Visit: Initial Assessment    Primary Care Provider verified and updated as needed:     Readmission within the last 30 days:        Reason for Consult: discharge planning  Advance Care Planning:            Communication Assessment  Patient's communication style: spoken language (English or Bilingual)    Hearing Difficulty or Deaf: no   Wear Glasses or Blind: no    Cognitive  Cognitive/Neuro/Behavioral:                        Living Environment:   People in home: child(sam), adult, spouse  Daughter Idalmis, 1 other daughter, 2 sons in law, and pt's wife  Current living Arrangements: house      Able to return to prior arrangements: yes       Family/Social Support:  Care provided by: spouse/significant other  Provides care for: no one, unable/limited ability to care for self  Marital Status:   Wife, Children          Description of Support System:           Current Resources:   Patient receiving home care services:       Community Resources: PCA (CADI CM)  Equipment currently used at home:    Supplies currently used at home:      Employment/Financial:  Employment Status: disabled        Financial Concerns:             Does the patient's insurance plan have a 3 day qualifying hospital stay waiver?  Yes   Will the waiver be used for post-acute placement? No    Lifestyle & Psychosocial Needs:  Social Determinants of Health     Tobacco Use: Medium Risk (7/13/2023)    Patient History      Smoking Tobacco Use: Former      Smokeless Tobacco Use: Former      Passive Exposure: Not on file   Alcohol Use: Not on file   Financial Resource Strain: Not on file   Food Insecurity: Not on file   Transportation Needs: Not on file   Physical Activity: Not on file   Stress: Not on file   Social Connections: Not on file   Intimate Partner Violence: Not on file   Depression: Not at risk (5/24/2023)    PHQ-2      PHQ-2 Score: 0    Housing Stability: Not on file       Functional Status:  Prior to admission patient needed assistance:   Dependent ADLs:: Bathing, Dressing, Eating, Grooming, Incontinence, Positioning, Transfers, Toileting, Wheelchair-with assist, Ambulation-walker  Dependent IADLs:: Cleaning, Cooking, Laundry, Shopping, Medication Management, Money Management, Transportation, Incontinence       Mental Health Status:          Chemical Dependency Status:                Values/Beliefs:  Spiritual, Cultural Beliefs, Sabianism Practices, Values that affect care:                 Additional Information:  Assessed via pt's daughter Idalmis.  Pt is nonverbal.  Resides with daughter Idalmis, a different daughter, 2 sons-in-law, and pt's wife.  Pt is bedbound, total cares.  Pt has hospital bed, incontinence supplies, suction machine; tube feeding via [place].  PCA 13.5 hours/day and overnight supervision.  Pt also has a CADI CM.     Anticipate discharge to home, via family transport.    Reggie Serna RN

## 2023-07-18 NOTE — PROGRESS NOTES
Care Management Discharge Note    Discharge Date: 07/18/2023       Discharge Disposition: Home    Discharge Services: None    Discharge DME:  NA    Discharge Transportation: family or friend will provide    Private pay costs discussed: Not applicable    Education Provided on the Discharge Plan: Yes AVS per bedside nurse     Persons Notified of Discharge Plans: patient, daughter Idalmis via phone call    Patient/Family in Agreement with the Plan: yes    Additional Information:  Pt discharging to home via family transport.  Daughter Idalmis agreeable to this discharge plan.    Idalmis requested home O2.  Pt is currently on home O2 and would not qualify.  [facility] D[facility] cannot dispense even if private pay, if pt does not qualify for home O2.  Idalmis verbalized understanding.    CC referral sent per protocol.     Reggie Serna RN

## 2023-07-18 NOTE — PROGRESS NOTES
IVF infusing. Purewick in place. Temp 100.8. Awaiting orders from pharmacy to be verified. Family at bedside.     Report given to ASH Meredith

## 2023-07-19 DIAGNOSIS — Z09 HOSPITAL DISCHARGE FOLLOW-UP: ICD-10-CM

## 2023-07-19 NOTE — TELEPHONE ENCOUNTER
I printed a  hospital discharge summary that states why patient needs a feeding tube.  It is in my top basket.  Please fax to [facility]

## 2023-07-20 ENCOUNTER — PATIENT OUTREACH (OUTPATIENT)
Dept: CARE COORDINATION | Facility: CLINIC | Age: 58
End: 2023-07-20
Payer: COMMERCIAL

## 2023-07-20 NOTE — PROGRESS NOTES
Clinic Care Coordination Contact  [facility]: Post-Discharge Note  SITUATION                                                      Admission:    Admission Date: 07/17/23   Reason for Admission: acute respiratory failure with hypoxia  Discharge:   Discharge Date: 07/18/23  Discharge Diagnosis: acute respiratory failure with hypoxia, COVID 19 infection    BACKGROUND                                                      Per hospital discharge summary and inpatient provider notes:  58 year old male admitted on 7/17/2023. He has a pmhx of prior stroke with significant sequelae, seizures, hemiplegia, GJ feeding tube dependent, HTN, MRSA, depresssion, ESBL UTI, prior episodes of aspiration pneumonia, who presents with acute respiratory failure with hypoxia (home oximeter) and found with positive covid19 infection. Noted baseline to be immobile nonverbal and lives at home with family members caring for him.      On admission, he was febrile from 102--> 100.5 and on 3L via NC and tachycardic 130s--> 104. Notable cr 1.38, hgb 10.7, plts 192, Blood cx pending, lactate normal. CXR without acute new findings. Given Steroids and checking PE study. Admitted and continued steroids and scheduled duonebs.      On 7/18/2023, Cr improved to 1.07. He was weaned off of oxygen and tolerated room air. His CT study was negative for PE but did show Slight increase in bibasilar atelectasis and/or infiltrate since comparison. As such, he was discharged to back home in stable condition as he returned back to his baseline and tolerated room air and tachycardia improved from initial 130s to now 101 on discharge and sent home with 4 more days of 6mg decadron daily and also empiric pneumonial coverage doxycycline 100mg twice daily for 5 days total. He was instructed to follow up with PCP within a week and also for a home sleep study (previously was to have but the family did not ). He and daughter were instructed on when to return for  "further cares and warning signs to monitor for.     ASSESSMENT           Discharge Assessment  How are you doing now that you are home?: Patient's daughter stated, \"He is much better\".  How are your symptoms? (Red Flag symptoms escalate to triage hotline per guidelines): Improved  Do you feel your condition is stable enough to be safe at home until your provider visit?: Yes  Does the patient have their discharge instructions? : Yes  Does the patient have questions regarding their discharge instructions? : No  Were you started on any new medications or were there changes to any of your previous medications? : Yes  Does the patient have all of their medications?: Yes  Do you have questions regarding any of your medications? : No  Do you have all of your needed medical supplies or equipment (DME)?  (i.e. oxygen tank, CPAP, cane, etc.): Yes  Discharge follow-up appointment scheduled within 14 calendar days? : No  Is patient agreeable to assistance with scheduling? : No         Post-op (Clinicians Only)  Did the patient have surgery or a procedure: No  Fever: No  Chills: No  Eating & Drinking: eating and drinking without complaints/concerns  PO Intake:  (tube feeding)  Bowel Function: normal  Date of last BM: 07/20/23  Urinary Status: voiding without complaint/concerns    Care Management       Care Mgmt General Assessment      CCC RN spoke with patient's daughter Idalmis today to follow up on his recent hospitalization. Idalmis said patient was doing \"much better\". She stated he was taking his medications as directed. Idalmis denied any needs or concerns at this time. She stated she would schedule a follow up appointment with Dr. Gutierrez for patient on her own. She declined to enroll patient in Care Coordination at this time, but was is aware he can be referred back if needs or concerns should arise.                    PLAN                                                      Outpatient Plan:  Home with family support.     No future " appointments.      For any urgent concerns, please contact our 24 hour nurse triage line: [phone] ([phone])         David J Myhre, RN

## 2023-07-22 LAB
BACTERIA BLD CULT: NO GROWTH
BACTERIA BLD CULT: NO GROWTH

## 2023-07-29 DIAGNOSIS — R11.0 NAUSEA: ICD-10-CM

## 2023-07-29 NOTE — TELEPHONE ENCOUNTER
"Last Written Prescription Date:  6/21/23  Last Fill Quantity: 30,  # refills: 0   Last office visit provider:  6/21/23     Routing to PCP to review quantity    Requested Prescriptions   Pending Prescriptions Disp Refills    ondansetron (ZOFRAN) 4 MG tablet [Pharmacy Med Name: Ondansetron HCl Oral Tablet 4 MG] 30 tablet 0     Sig: TAKE 1 TABLET (4 MG) BY MOUTH EVERY 8 HOURS AS NEEDED FOR NAUSEA        Antivertigo/Antiemetic Agents Passed - 7/29/2023 12:17 PM        Passed - Recent (12 mo) or future (30 days) visit within the authorizing provider's specialty     Patient has had an office visit with the authorizing provider or a provider within the authorizing providers department within the previous 12 mos or has a future within next 30 days. See \"Patient Info\" tab in inbasket, or \"Choose Columns\" in Meds & Orders section of the refill encounter.              Passed - Medication is active on med list        Passed - Patient is 18 years of age or older             Tiana Angel RN 07/29/23 5:28 PM  "

## 2023-07-30 RX ORDER — ONDANSETRON 4 MG/1
4 TABLET, FILM COATED ORAL EVERY 8 HOURS PRN
Qty: 30 TABLET | Refills: 0 | Status: SHIPPED | OUTPATIENT
Start: 2023-07-30 | End: 2023-10-10

## 2023-08-14 ENCOUNTER — TELEPHONE (OUTPATIENT)
Dept: NURSING | Facility: CLINIC | Age: 58
End: 2023-08-14
Payer: COMMERCIAL

## 2023-08-14 NOTE — TELEPHONE ENCOUNTER
Clinic Action Needed: Yes, please  fax requested documents to number below.  [facility] Triage Call  Presenting Problem:    Caller: daughter Idalmis, no c2c on file. Requesting enteral supplies.     Conferenced in the call is [facility] Morales.  Phone # [phone]  Fax # [phone]    Request for chart notes from office visits in the past 6 months. [facility] states that they requested this 1 month ago but has not received any documents.  See 7/18 telephone encounter: clinic staff faxed notes to Sharon ALEMAN's fax # [phone]. Carmen requests to have clinic send to fax # above.      Carisa Mobley RN/[facility] [facility] Nurse Advisor

## 2023-08-23 ENCOUNTER — HOSPITAL ENCOUNTER (OUTPATIENT)
Dept: INTERVENTIONAL RADIOLOGY/VASCULAR | Facility: HOSPITAL | Age: 58
Discharge: HOME | End: 2023-08-23
Attending: NURSE PRACTITIONER | Admitting: RADIOLOGY
Payer: COMMERCIAL

## 2023-08-23 DIAGNOSIS — R63.30 FEEDING DIFFICULTIES: ICD-10-CM

## 2023-08-23 DIAGNOSIS — R63.30 FEEDING DIFFICULTIES: Primary | ICD-10-CM

## 2023-08-23 PROCEDURE — 49452 REPLACE G-J TUBE PERC: CPT

## 2023-08-23 PROCEDURE — 255N000002 HC RX 255 OP 636: Performed by: RADIOLOGY

## 2023-08-23 PROCEDURE — C1769 GUIDE WIRE: HCPCS

## 2023-08-23 RX ADMIN — IOHEXOL 20 ML: 350 INJECTION, SOLUTION INTRAVENOUS at 09:35

## 2023-08-23 NOTE — PROCEDURES
Interventional Radiology Post-Procedure Note     Patient name:  Liu Malave  MRN:  [id number]   Date:  8/23/2023     Procedure(s): GJ-tube exchange    Attending:  Christin    Sedation:  None    Pre/Post Procedure Diagnosis: Stroke    Drains/Lines:  18Fr LAURA GJ-tube    Specimen(s):  None    Estimated Blood Loss:  None    Complications:  None     Findings:  Uneventful procedure, please see dictation in PACS or under the Imaging tab in EPIC for detailed procedure note.    Plan:  Routine GJ-tube exchange in 3-6 months.      John Waller MD  Vascular & Interventional Radiology  8/23/2023  9:34 AM

## 2023-08-29 DIAGNOSIS — R05.3 CHRONIC COUGH: Primary | ICD-10-CM

## 2023-08-30 RX ORDER — IPRATROPIUM BROMIDE AND ALBUTEROL SULFATE 2.5; .5 MG/3ML; MG/3ML
SOLUTION RESPIRATORY (INHALATION)
Qty: 90 ML | Refills: 0 | Status: SHIPPED | OUTPATIENT
Start: 2023-08-30 | End: 2023-11-09

## 2023-08-30 NOTE — TELEPHONE ENCOUNTER
"Former patient of Gutierrez & has not established care with another provider.  Please assign refill request to covering provider per clinic standard process.  Routing refill request to provider for review/approval because:  Medication is reported/historical    Last Written Prescription Date:  n/a  Last Fill Quantity: n/a,  # refills: n/a   Last office visit provider:  6/21/23     Requested Prescriptions   Pending Prescriptions Disp Refills    ipratropium - albuterol 0.5 mg/2.5 mg/3 mL (DUONEB) 0.5-2.5 (3) MG/3ML neb solution [Pharmacy Med Name: Ipratropium-Albuterol Inhalation Solution 0.5-2.5 (3) MG/3ML] 90 mL 0     Sig: Take 1 vial (3 mLs) by nebulization every 4 hours       Short-Acting Beta Agonist Inhalers Protocol  Passed - 8/29/2023  8:22 AM        Passed - Patient is age 12 or older        Passed - Recent (12 mo) or future (30 days) visit within the authorizing provider's specialty     Patient has had an office visit with the authorizing provider or a provider within the authorizing providers department within the previous 12 mos or has a future within next 30 days. See \"Patient Info\" tab in inbasket, or \"Choose Columns\" in Meds & Orders section of the refill encounter.              Passed - Medication is active on med list       Asthma Nebs Protocol Passed - 8/29/2023  8:22 AM        Passed - Patient is age 4 years or older        Passed - Recent (12 mo) or future (30 days) visit within the authorizing provider's specialty     Patient has had an office visit with the authorizing provider or a provider within the authorizing providers department within the previous 12 mos or has a future within next 30 days. See \"Patient Info\" tab in inbasket, or \"Choose Columns\" in Meds & Orders section of the refill encounter.              Passed - Medication is active on med list             Estelle Chase RN 08/29/23 8:01 PM  "

## 2023-09-01 NOTE — TELEPHONE ENCOUNTER
Refaxed notes, including last 3 office notes and several discharge summaries from 2023 to [facility] [place]

## 2023-09-11 ENCOUNTER — TELEPHONE (OUTPATIENT)
Dept: NURSING | Facility: CLINIC | Age: 58
End: 2023-09-11
Payer: COMMERCIAL

## 2023-09-11 ENCOUNTER — TELEPHONE (OUTPATIENT)
Dept: FAMILY MEDICINE | Facility: CLINIC | Age: 58
End: 2023-09-11
Payer: COMMERCIAL

## 2023-09-11 NOTE — TELEPHONE ENCOUNTER
Daughter called back and was informed of message below.     Daughter is wanting to see if  could do the phone visit sometime this week as his interview is this week. Pt was scheduled with Dr. Abarca for a phone visit on 9/18 but is wanting to see if they can talk sooner to Dr. Enriquez.

## 2023-09-11 NOTE — TELEPHONE ENCOUNTER
I have no openings. Is there another provider who could talk with daughter over a phone visit this week.     Shahid Brady MD

## 2023-09-11 NOTE — TELEPHONE ENCOUNTER
Daughter calling requesting note be written by PCP partner for medical conditions and travel for family members. PCP is no longer with E. Caller transferred to [place] to leave message .     ANIBAL PEARSON RN  [facility] nurse advisors  9/11/2023  9:21 AM

## 2023-09-11 NOTE — TELEPHONE ENCOUNTER
I Have never seen this patient and know nothing about them. I cannot complete this letter as of now. I could certainly have a phone appointment with a family member to explain the situation as I am also confused why they need a letter.    Shahid Brady MD

## 2023-09-11 NOTE — TELEPHONE ENCOUNTER
Reason for call:  Other     Patient called regarding (reason for call): call back    Additional comments: Patient's daughter is calling stating she needs a letter for relative to come from [facility] to visit. Patient's daughter states she needs it to state his diagnosis and condition. Please advise and call daughter if needed please and thank you.     Phone number to reach patient:  Cell number on file:    Telephone Information:   Mobile [phone]       Best Time:  any    Can we leave a detailed message on this number?  YES

## 2023-09-16 ENCOUNTER — MEDICAL CORRESPONDENCE (OUTPATIENT)
Dept: CARDIOLOGY | Facility: CLINIC | Age: 58
End: 2023-09-16

## 2023-09-18 NOTE — TELEPHONE ENCOUNTER
Patient's daughter Idalmis calling in very frustrated as [facility] is stating that they still have not received any faxes that were requested.    Writer called [facility] onto the call with daughter and spoke to Rossy.    [facility] states that they have sent a SMN to clinic multiple times with no response.     Writer gave Rossy the fax number where all of the notes have been sent.     Transferred to [facility] who stated they have chart notes but needs the SMN, this has been faxed to clinic number. They are going to refax this form.    Patient's daughter was informed that we are working on this. States patient only has 1 day left of his feeding supplies and formula.    BARBRA Sears, RN  [facility]

## 2023-09-18 NOTE — TELEPHONE ENCOUNTER
Fax received from [facility] [place] for provider to sign.  Writer called and notified daughter that provider will sign tomorrow and we will fax it back [facility] tomorrow.      BARBRA Sears, RN  [facility]

## 2023-09-20 NOTE — TELEPHONE ENCOUNTER
Writer called [facility] to ensure they received the faxed medical necessity form.   Writer spoke to Leah at [facility], she stated that the forms have received the forms yesterday 9/19/23. She also stated that they shipped out order yesterday for patient.  Writer called daughter Idalmis and informed her of this information.    BARBRA Sears, RN  [facility]

## 2023-09-27 ENCOUNTER — OFFICE VISIT (OUTPATIENT)
Dept: FAMILY MEDICINE | Facility: CLINIC | Age: 58
End: 2023-09-27
Payer: COMMERCIAL

## 2023-09-27 VITALS
DIASTOLIC BLOOD PRESSURE: 81 MMHG | SYSTOLIC BLOOD PRESSURE: 125 MMHG | RESPIRATION RATE: 14 BRPM | TEMPERATURE: 98.4 F | HEART RATE: 92 BPM

## 2023-09-27 DIAGNOSIS — L24.9 IRRITANT DERMATITIS: ICD-10-CM

## 2023-09-27 DIAGNOSIS — Z00.00 ANNUAL PHYSICAL EXAM: ICD-10-CM

## 2023-09-27 DIAGNOSIS — I61.9 HEMORRHAGIC STROKE (H): ICD-10-CM

## 2023-09-27 DIAGNOSIS — B18.2 HEPATITIS C VIRUS CARRIER STATE (H): ICD-10-CM

## 2023-09-27 DIAGNOSIS — R45.1 AGITATION: ICD-10-CM

## 2023-09-27 DIAGNOSIS — J69.0 RECURRENT ASPIRATION PNEUMONIA (H): ICD-10-CM

## 2023-09-27 DIAGNOSIS — R13.12 OROPHARYNGEAL DYSPHAGIA: ICD-10-CM

## 2023-09-27 DIAGNOSIS — B18.2 CHRONIC HEPATITIS C WITHOUT HEPATIC COMA (H): ICD-10-CM

## 2023-09-27 DIAGNOSIS — E46 MALNUTRITION, UNSPECIFIED TYPE (H): ICD-10-CM

## 2023-09-27 DIAGNOSIS — I69.359 HEMIPLEGIA AND HEMIPARESIS FOLLOWING CEREBRAL INFARCTION AFFECTING UNSPECIFIED SIDE (H): ICD-10-CM

## 2023-09-27 DIAGNOSIS — Z13.9 ENCOUNTER FOR SCREENING INVOLVING SOCIAL DETERMINANTS OF HEALTH (SDOH): Primary | ICD-10-CM

## 2023-09-27 DIAGNOSIS — J43.9 PULMONARY EMPHYSEMA, UNSPECIFIED EMPHYSEMA TYPE (H): ICD-10-CM

## 2023-09-27 DIAGNOSIS — I69.151 SPASTIC HEMIPLEGIA OF RIGHT DOMINANT SIDE AS LATE EFFECT OF NONTRAUMATIC INTRAPARENCHYMAL HEMORRHAGE OF BRAIN (H): ICD-10-CM

## 2023-09-27 DIAGNOSIS — Z12.11 SCREEN FOR COLON CANCER: ICD-10-CM

## 2023-09-27 DIAGNOSIS — J39.8 CHRONIC MUCUS HYPERSECRETION, RESPIRATORY: ICD-10-CM

## 2023-09-27 PROBLEM — U07.1 COVID-19: Status: RESOLVED | Noted: 2023-07-17 | Resolved: 2023-09-27

## 2023-09-27 PROBLEM — R33.9 RETENTION OF URINE: Status: ACTIVE | Noted: 2023-09-27

## 2023-09-27 PROBLEM — E83.51 HYPOCALCEMIA: Status: RESOLVED | Noted: 2020-06-05 | Resolved: 2023-09-27

## 2023-09-27 PROBLEM — A41.9 SEPSIS WITHOUT ACUTE ORGAN DYSFUNCTION (H): Status: RESOLVED | Noted: 2023-02-11 | Resolved: 2023-09-27

## 2023-09-27 PROBLEM — R25.2 SPASTICITY: Status: ACTIVE | Noted: 2022-05-31

## 2023-09-27 PROBLEM — N39.0 ACUTE UTI: Status: ACTIVE | Noted: 2020-02-21

## 2023-09-27 PROBLEM — G93.5 BRAIN COMPRESSION (H): Status: RESOLVED | Noted: 2023-09-27 | Resolved: 2023-09-27

## 2023-09-27 PROBLEM — A41.9 SEPSIS, DUE TO UNSPECIFIED ORGANISM, UNSPECIFIED WHETHER ACUTE ORGAN DYSFUNCTION PRESENT (H): Status: RESOLVED | Noted: 2022-01-28 | Resolved: 2023-09-27

## 2023-09-27 PROBLEM — J18.9 PNEUMONIA OF BOTH LUNGS DUE TO INFECTIOUS ORGANISM, UNSPECIFIED PART OF LUNG: Status: RESOLVED | Noted: 2022-10-28 | Resolved: 2023-09-27

## 2023-09-27 PROBLEM — E88.09 HYPOALBUMINEMIA DUE TO PROTEIN-CALORIE MALNUTRITION (H): Status: RESOLVED | Noted: 2023-09-27 | Resolved: 2023-09-27

## 2023-09-27 PROBLEM — G47.00 INSOMNIA: Status: ACTIVE | Noted: 2022-05-31

## 2023-09-27 PROBLEM — K21.9 GERD WITHOUT ESOPHAGITIS: Status: RESOLVED | Noted: 2022-04-06 | Resolved: 2023-09-27

## 2023-09-27 PROBLEM — J18.9 PNEUMONIA: Status: RESOLVED | Noted: 2022-12-11 | Resolved: 2023-09-27

## 2023-09-27 PROBLEM — Z87.440 PERSONAL HISTORY OF URINARY TRACT INFECTION: Status: RESOLVED | Noted: 2020-08-13 | Resolved: 2023-09-27

## 2023-09-27 PROBLEM — E87.5 HYPERKALEMIA: Status: ACTIVE | Noted: 2023-09-27

## 2023-09-27 PROBLEM — E83.51 HYPOCALCEMIA: Status: ACTIVE | Noted: 2020-06-05

## 2023-09-27 PROBLEM — G93.6 VASOGENIC BRAIN EDEMA (H): Status: ACTIVE | Noted: 2023-09-27

## 2023-09-27 PROBLEM — E87.29 RESPIRATORY ACIDOSIS: Status: RESOLVED | Noted: 2023-04-25 | Resolved: 2023-09-27

## 2023-09-27 PROBLEM — Z87.440 PERSONAL HISTORY OF URINARY TRACT INFECTION: Status: ACTIVE | Noted: 2020-08-13

## 2023-09-27 PROBLEM — R19.7 DIARRHEA: Status: ACTIVE | Noted: 2023-09-27

## 2023-09-27 PROBLEM — R19.7 DIARRHEA: Status: RESOLVED | Noted: 2023-09-27 | Resolved: 2023-09-27

## 2023-09-27 PROBLEM — E87.5 HYPERKALEMIA: Status: RESOLVED | Noted: 2023-09-27 | Resolved: 2023-09-27

## 2023-09-27 PROBLEM — K59.01 SLOW TRANSIT CONSTIPATION: Status: ACTIVE | Noted: 2018-03-19

## 2023-09-27 PROBLEM — F41.1 GENERALIZED ANXIETY DISORDER: Status: RESOLVED | Noted: 2022-04-06 | Resolved: 2023-09-27

## 2023-09-27 PROBLEM — R25.2 SPASTICITY: Status: RESOLVED | Noted: 2022-05-31 | Resolved: 2023-09-27

## 2023-09-27 PROBLEM — R33.9 RETENTION OF URINE: Status: RESOLVED | Noted: 2023-09-27 | Resolved: 2023-09-27

## 2023-09-27 PROBLEM — R52 GENERALIZED PAIN: Chronic | Status: RESOLVED | Noted: 2018-03-19 | Resolved: 2023-09-27

## 2023-09-27 PROBLEM — J18.9 HOSPITAL-ACQUIRED PNEUMONIA: Status: RESOLVED | Noted: 2023-06-07 | Resolved: 2023-09-27
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
## 2023-09-27 PROBLEM — R52 GENERALIZED PAIN: Chronic | Status: ACTIVE | Noted: 2018-03-19

## 2023-09-27 PROBLEM — E88.09 HYPOALBUMINEMIA DUE TO PROTEIN-CALORIE MALNUTRITION (H): Status: ACTIVE | Noted: 2023-09-27

## 2023-09-27 PROBLEM — F32.A DEPRESSION: Status: RESOLVED | Noted: 2022-04-06 | Resolved: 2023-09-27

## 2023-09-27 PROBLEM — G93.5 BRAIN COMPRESSION (H): Status: ACTIVE | Noted: 2023-09-27

## 2023-09-27 PROBLEM — N39.0 ACUTE UTI: Status: RESOLVED | Noted: 2020-02-21 | Resolved: 2023-09-27

## 2023-09-27 PROBLEM — R06.02 SHORTNESS OF BREATH: Status: ACTIVE | Noted: 2023-09-27

## 2023-09-27 PROBLEM — R09.02 HYPOXIA: Status: RESOLVED | Noted: 2023-07-17 | Resolved: 2023-09-27

## 2023-09-27 PROBLEM — K59.01 SLOW TRANSIT CONSTIPATION: Status: RESOLVED | Noted: 2018-03-19 | Resolved: 2023-09-27

## 2023-09-27 PROBLEM — R06.02 SHORTNESS OF BREATH: Status: RESOLVED | Noted: 2023-09-27 | Resolved: 2023-09-27

## 2023-09-27 PROBLEM — E78.5 DYSLIPIDEMIA: Status: RESOLVED | Noted: 2022-04-06 | Resolved: 2023-09-27

## 2023-09-27 PROBLEM — G93.6 VASOGENIC BRAIN EDEMA (H): Status: RESOLVED | Noted: 2023-09-27 | Resolved: 2023-09-27

## 2023-09-27 LAB
ALBUMIN SERPL BCG-MCNC: 4.2 G/DL (ref 3.5–5.2)
ALP SERPL-CCNC: 77 U/L (ref 40–129)
ALT SERPL W P-5'-P-CCNC: 46 U/L (ref 0–70)
ANION GAP SERPL CALCULATED.3IONS-SCNC: 10 MMOL/L (ref 7–15)
AST SERPL W P-5'-P-CCNC: 39 U/L (ref 0–45)
BASOPHILS # BLD AUTO: 0 10E3/UL (ref 0–0.2)
BASOPHILS NFR BLD AUTO: 0 %
BILIRUB SERPL-MCNC: 0.2 MG/DL
BUN SERPL-MCNC: 29.8 MG/DL (ref 8–23)
CALCIUM SERPL-MCNC: 9.1 MG/DL (ref 8.8–10.2)
CHLORIDE SERPL-SCNC: 106 MMOL/L (ref 98–107)
CHOLEST SERPL-MCNC: 220 MG/DL
CREAT SERPL-MCNC: 1.06 MG/DL (ref 0.67–1.17)
DEPRECATED HCO3 PLAS-SCNC: 24 MMOL/L (ref 22–29)
EGFRCR SERPLBLD CKD-EPI 2021: 80 ML/MIN/1.73M2
EOSINOPHIL # BLD AUTO: 0.5 10E3/UL (ref 0–0.7)
EOSINOPHIL NFR BLD AUTO: 5 %
ERYTHROCYTE [DISTWIDTH] IN BLOOD BY AUTOMATED COUNT: 11.8 % (ref 10–15)
GLUCOSE SERPL-MCNC: 95 MG/DL (ref 70–99)
HCT VFR BLD AUTO: 37.8 % (ref 40–53)
HDLC SERPL-MCNC: 33 MG/DL
HGB BLD-MCNC: 12.6 G/DL (ref 13.3–17.7)
IMM GRANULOCYTES # BLD: 0 10E3/UL
IMM GRANULOCYTES NFR BLD: 0 %
LDLC SERPL CALC-MCNC: 135 MG/DL
LYMPHOCYTES # BLD AUTO: 3.1 10E3/UL (ref 0.8–5.3)
LYMPHOCYTES NFR BLD AUTO: 32 %
MCH RBC QN AUTO: 32.1 PG (ref 26.5–33)
MCHC RBC AUTO-ENTMCNC: 33.3 G/DL (ref 31.5–36.5)
MCV RBC AUTO: 96 FL (ref 78–100)
MONOCYTES # BLD AUTO: 1 10E3/UL (ref 0–1.3)
MONOCYTES NFR BLD AUTO: 10 %
NEUTROPHILS # BLD AUTO: 5 10E3/UL (ref 1.6–8.3)
NEUTROPHILS NFR BLD AUTO: 52 %
NONHDLC SERPL-MCNC: 187 MG/DL
PLATELET # BLD AUTO: 199 10E3/UL (ref 150–450)
POTASSIUM SERPL-SCNC: 4.4 MMOL/L (ref 3.4–5.3)
PROT SERPL-MCNC: 8.3 G/DL (ref 6.4–8.3)
RBC # BLD AUTO: 3.93 10E6/UL (ref 4.4–5.9)
SODIUM SERPL-SCNC: 140 MMOL/L (ref 135–145)
TRIGL SERPL-MCNC: 258 MG/DL
WBC # BLD AUTO: 9.6 10E3/UL (ref 4–11)

## 2023-09-27 PROCEDURE — 99214 OFFICE O/P EST MOD 30 MIN: CPT | Performed by: STUDENT IN AN ORGANIZED HEALTH CARE EDUCATION/TRAINING PROGRAM

## 2023-09-27 PROCEDURE — 36415 COLL VENOUS BLD VENIPUNCTURE: CPT | Performed by: STUDENT IN AN ORGANIZED HEALTH CARE EDUCATION/TRAINING PROGRAM

## 2023-09-27 PROCEDURE — 80053 COMPREHEN METABOLIC PANEL: CPT | Performed by: STUDENT IN AN ORGANIZED HEALTH CARE EDUCATION/TRAINING PROGRAM

## 2023-09-27 PROCEDURE — 87902 NFCT AGT GNTYP ALYS HEP C: CPT | Mod: 90 | Performed by: STUDENT IN AN ORGANIZED HEALTH CARE EDUCATION/TRAINING PROGRAM

## 2023-09-27 PROCEDURE — 85025 COMPLETE CBC W/AUTO DIFF WBC: CPT | Performed by: STUDENT IN AN ORGANIZED HEALTH CARE EDUCATION/TRAINING PROGRAM

## 2023-09-27 PROCEDURE — 99000 SPECIMEN HANDLING OFFICE-LAB: CPT | Performed by: STUDENT IN AN ORGANIZED HEALTH CARE EDUCATION/TRAINING PROGRAM

## 2023-09-27 PROCEDURE — 87522 HEPATITIS C REVRS TRNSCRPJ: CPT | Performed by: STUDENT IN AN ORGANIZED HEALTH CARE EDUCATION/TRAINING PROGRAM

## 2023-09-27 PROCEDURE — 86803 HEPATITIS C AB TEST: CPT | Performed by: STUDENT IN AN ORGANIZED HEALTH CARE EDUCATION/TRAINING PROGRAM

## 2023-09-27 PROCEDURE — 80061 LIPID PANEL: CPT | Performed by: STUDENT IN AN ORGANIZED HEALTH CARE EDUCATION/TRAINING PROGRAM

## 2023-09-27 PROCEDURE — 90471 IMMUNIZATION ADMIN: CPT | Performed by: STUDENT IN AN ORGANIZED HEALTH CARE EDUCATION/TRAINING PROGRAM

## 2023-09-27 PROCEDURE — 90682 RIV4 VACC RECOMBINANT DNA IM: CPT | Performed by: STUDENT IN AN ORGANIZED HEALTH CARE EDUCATION/TRAINING PROGRAM

## 2023-09-27 RX ORDER — TRIAMCINOLONE ACETONIDE 1 MG/G
CREAM TOPICAL 2 TIMES DAILY
Qty: 30 G | Refills: 0 | Status: SHIPPED | OUTPATIENT
Start: 2023-09-27 | End: 2023-11-09

## 2023-09-27 RX ORDER — SODIUM CHLORIDE FOR INHALATION 3 %
3 VIAL, NEBULIZER (ML) INHALATION EVERY 6 HOURS PRN
Qty: 30 ML | Refills: 0 | Status: SHIPPED | OUTPATIENT
Start: 2023-09-27 | End: 2023-11-09

## 2023-09-27 RX ORDER — QUETIAPINE FUMARATE 100 MG/1
TABLET, FILM COATED ORAL
Qty: 180 TABLET | Refills: 0 | Status: SHIPPED | OUTPATIENT
Start: 2023-09-27 | End: 2023-10-25

## 2023-09-27 RX ORDER — ALBUTEROL SULFATE 0.83 MG/ML
SOLUTION RESPIRATORY (INHALATION)
COMMUNITY
Start: 2023-08-29 | End: 2024-07-18

## 2023-09-27 RX ORDER — KETOCONAZOLE 20 MG/G
CREAM TOPICAL 2 TIMES DAILY
Qty: 30 G | Refills: 0 | Status: SHIPPED | OUTPATIENT
Start: 2023-09-27 | End: 2023-11-09

## 2023-09-27 RX ORDER — IPRATROPIUM BROMIDE 42 UG/1
2 SPRAY, METERED NASAL 2 TIMES DAILY
Qty: 15 ML | Refills: 11 | Status: SHIPPED | OUTPATIENT
Start: 2023-09-27 | End: 2023-10-25

## 2023-09-27 ASSESSMENT — PAIN SCALES - GENERAL: PAINLEVEL: NO PAIN (0)

## 2023-09-27 NOTE — LETTER
To whom it may concern,     Patient Liu WARE 21 is chronically disabled and in poor health due to Hx of severe strokes. He would benefit from family and friends in [place] being able to visit him as he is unable to travel to them due to his poor health. Please accommodate this.     Shahid Brady MD  [address] [place]  [phone]

## 2023-09-27 NOTE — PROGRESS NOTES
Assessment and Plan   58-year-old male with complex past medical history including history of both ischemic CVA as well as a hemorrhagic stroke with subsequent spastic hemiplegia, nonverbal, GJ tube dependent as well as subsequent recurrent aspiration pneumonias, recurrent UTIs with urinary retention, and hepatitis C carrier who presents for establishment of care as well as a couple concerns from daughter.  Spent majority of visit reviewing patient's complex medical history.  We will continue to do this on future visits.  He is currently not seeing any specialist regarding his complex neurovascular history though I do not know that this is necessary.  He was on anticonvulsants at 1 time but he ran out approximately two years ago and did not have anticonvulsants for a year without seizure activity.  Thus I do not feel it is necessary to restart.  He does have spasticity but uses baclofen as needed for this and is helping.  I did recommend referral to PMNR to consider other adaptions or medications to improve his function and overall life.  Daughter did bring up concern of a rash near patient's GJ tube.  Think this is most likely combination of irritation from various bandages as well as some leaking around the tube.  Daughter does describe that clotrimazole helped rash some some may be some fungal components.  I recommended a combination antifungal and steroid cream and placed orders for this.  We will check some basic labs that patient is due for.  Also I did do not know his status regarding his hepatitis C at this point.  I recommended a hep C RNA to assess this as he has had positive hep C antibodies on record in the past.    4. Irritant dermatitis  - ketoconazole (NIZORAL) 2 % external cream; Apply topically 2 times daily  Dispense: 30 g; Refill: 0  - triamcinolone (KENALOG) 0.1 % external cream; Apply topically 2 times daily  Dispense: 30 g; Refill: 0    5. Hemorrhagic stroke (H)    6. Agitation  - QUEtiapine  (SEROQUEL) 100 MG tablet; 1 tablet crushed in feeding tube at bedtime, may repeat 4 hours later if difficulty sleeping. No more than 200 mg in 24 hour period.  Dispense: 180 tablet; Refill: 0    7. Chronic mucus hypersecretion, respiratory  - sodium chloride (NEBUSAL) 3 % neb solution; Take 3 mLs by nebulization every 6 hours as needed for other (thick sputum)  Dispense: 30 mL; Refill: 0  - ipratropium (ATROVENT) 0.06 % nasal spray; Spray 2 sprays into both nostrils 2 times daily  Dispense: 15 mL; Refill: 11    8. Oropharyngeal dysphagia    9. Annual physical exam  - Comprehensive metabolic panel (BMP + Alb, Alk Phos, ALT, AST, Total. Bili, TP); Future  - CBC with platelets and differential; Future  - Lipid panel reflex to direct LDL Non-fasting; Future  - Comprehensive metabolic panel (BMP + Alb, Alk Phos, ALT, AST, Total. Bili, TP)  - CBC with platelets and differential  - Lipid panel reflex to direct LDL Non-fasting    10. 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 Hemiplegia and hemiparesis following cerebral infarction affecting unspecified side (H)  - Adult Physical Medicine and Rehab  Referral; Future    12. Malnutrition, unspecified type (H)  - Adult Physical Medicine and Rehab  Referral; Future    13. Recurrent aspiration pneumonia (H)  - Adult Physical Medicine and Rehab  Referral; Future    14. Hepatitis C virus carrier state (H)  - Hepatitis C Screen Reflex to HCV RNA Quant and Genotype; Future  - Hepatitis C Screen Reflex to HCV RNA Quant and Genotype    Follow up: 1 month     Options for treatment and follow-up care were reviewed with the patient and/or guardian. Liu Malave and/or guardian engaged in the decision making process and verbalized understanding of the options discussed and agreed with the final plan.    Dr. Key  Caitlyn         HPI:   Liu GIL Malave is a 58 year old  male who presents for:    Chief Complaint   Patient presents with    Establish Care    Imm/Inj    Rash     On stomach for 1 to 2 months              PMHX:     Patient Active Problem List   Diagnosis    Dental decay    Sepsis, due to unspecified organism, unspecified whether acute organ dysfunction present (H)    Basal ganglia hemorrhage (H)    Depression    Dyslipidemia    Generalized anxiety disorder    Hemiplegia and hemiparesis following cerebral infarction affecting unspecified side (H)    GERD without esophagitis    Hepatitis C virus carrier state (H)    Malnutrition, unspecified type (H)    Pneumonia of both lungs due to infectious organism, unspecified part of lung    Pneumonia    Aspiration pneumonitis (H)    Sepsis without acute organ dysfunction (H)    Urinary retention with incomplete bladder emptying    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)    On tube feeding diet    Respiratory acidosis    Hospital-acquired pneumonia    Recurrent aspiration pneumonia (H)    COVID-19    Hypoxia    Acute UTI    Brain compression (H)    Diarrhea    Generalized pain    Hemorrhagic stroke (H)    History of CVA (cerebrovascular accident)    Hyperkalemia    Hypoalbuminemia due to protein-calorie malnutrition (H)    Hypocalcemia    Insomnia    Oropharyngeal dysphagia    Personal history of urinary tract infection    Retention of urine    Restlessness and agitation    Shortness of breath    Slow transit constipation    Spasticity    Vasogenic brain edema (H)       Social History     Tobacco Use    Smoking status: Former     Packs/day: 1.00     Years: 30.00     Pack years: 30.00     Types: Cigarettes     Quit date: 2005     Years since quittin.3     Passive exposure: Past    Smokeless tobacco: Former     Quit date: 2007    Tobacco comments:     quit 2006   Vaping Use    Vaping Use: Never  "used   Substance Use Topics    Alcohol use: No    Drug use: No       Social History     Social History Narrative    Lives with wife, 2 daughters and son in law.    W/c bound.       Allergies   Allergen Reactions    Diphenhydramine Unknown    Trazodone Other (See Comments)     \"shaking\" per family       No results found for this or any previous visit (from the past 24 hour(s)).         Review of Systems:    ROS: 10 point ROS neg other than the symptoms noted above in the HPI.         Physical Exam:     Vitals:    09/27/23 1127   BP: 125/81   Pulse: 92   Resp: 14   Temp: 98.4  F (36.9  C)     There is no height or weight on file to calculate BMI.    General appearance: Alert, wheelchair bound, nonverbal, responsive to stimuli  Head: Normocephalic, atraumatic, without obvious abnormality  Eyes: Pupils equal round, reactive.  Conjunctiva clear.  Nose: Nares normal, no drainage.  Throat: Lips, mucosa, tongue normal mucosa pink and moist  Neck: Supple, symmetric, trachea midline,  Lungs: coarse with crackles, unlabored, coughing intermittently  Heart: Regular rate and rhythm, normal S1 and S2, no murmur, rub or gallop.            "

## 2023-09-27 NOTE — COMMUNITY RESOURCES LIST (ENGLISH)
09/27/2023   [facility]  N/A  For questions about this resource list or additional care needs, please contact your primary care clinic or care manager.  Phone: [phone]   Email: N/A   Address: [address] [place] [address]   Hours: N/A        Hotlines and Helplines       Hotline - Housing crisis  1  [facility] Distance: 14.7 miles      Phone/Virtual   [address] [place] [address]  Language: English  Hours: Mon - Sun Open 24 Hours   Phone: (536) 701-8379 Email: communications@[facility].org Website: https://[facility]-[place].org/oursaviourshousing/     2  [facility] Distance: 16.3 miles      Phone/Virtual   [address] [place] [address]  Language: English  Hours: Mon - Sun Open 24 Hours   Phone: (862) 111-6146 Email: info@[facility].org Website: http://www.[facility].org          Housing       Coordinated Entry access point  3  [facility] Distance: 7.01 miles      In-Person, Phone/Virtual   [address] [place] [address]  Language: English, Croatian  Hours: Mon - Fri 8:30 AM - 4:30 PM  Fees: Free   Phone: (258) 916-1957 Email: info@[facility].org Website: https://www.[facility].org/locations/[facility]-clinic/     4  [facility] - Housing Services Distance: 14.89 miles      In-Person   [address] [place] [address]  Language: English  Hours: Mon - Fri 9:00 AM - 5:00 PM  Fees: Free   Phone: (421) 767-7900 Email: housing@[facility].org Website: http://www.[facility].org/housing     Drop-in center or day shelter  5  [facility] Distance: 5.57 miles      In-Person   [address] [place] [address]  Language: English  Hours: Mon - Fri 9:00 AM - 4:00 PM  Fees: Free   Phone: (628) 588-6763 Email: juliane@[facility].org Website: http://listeninghouse.org     6  [facility] [place] [place]  [place] [facility] Distance: 7.08 miles      In-Person   [address] [place] [address]  Language: English  Hours: Mon - Sun 9:00 AM - 5:30 PM  Fees: Free, Self Pay   Phone: (975) 316-9775 Email: info@[employer] Website: https://www.[employer]/locations/[facility]/     Housing search assistance  7  [facility] - Housing Resources Distance: 3.31 miles      In-Person, Phone/Virtual   [address] [place] [address]  Language: English  Hours: Mon - Fri 8:00 AM - 4:30 PM  Fees: Free   Phone: (485) 310-3654 Email: office@[employer] Website: http://[employer]     8  [facility] Resources and Housing Information Distance: 9.02 miles      In-Person, Phone/Virtual   [address], [place] [address]  Language: English  Hours: Mon - Fri 8:00 AM - 4:30 PM  Fees: Free   Phone: (474) 965-9842 Email: isela@[facility]. Website: https://www.[facility]./469/Community-Services     Shelter for families  9  [facility] Distance: 13.02 miles      In-Person   [address] [place] [address]  Language: English  Hours: Mon - Fri 3:00 PM - 9:00 AM , Sat - Sun Open 24 Hours  Fees: Free   Phone: (229) 757-6164 Ext.1 Website: https://www.[facility].org/2020/07/03/emergency-family-shelter/     10  [facility] Distance: 22.71 miles      In-Person   [address] [place] [address]  Language: English  Hours: Mon - Sun Open 24 Hours  Fees: Free, Self Pay   Phone: (728) 888-8870 Email: Kiel@[facility].org Website: http://www.[facility].org/     Shelter for individuals  11  [facility] [place] [facility] Distance: 7.08 miles      In-Person   [address] [place] [address]  Language: English  Hours: Mon - Sun 5:00 PM -  10:00 AM  Fees: Free, Self Pay   Phone: (342) 982-8877 Email: info@[employer] Website: https://www.[place].[employer]/locations/[facility]-saint-paul/     12  [facility] Distance: 14.7 miles      In-Person   [address] [place] [address]  Language: English  Hours: Mon - Sun Open 24 Hours  Fees: Free   Phone: (472) 826-8493 Email: communications@[employer].org Website: https://[facility].org/oursaviourshousing/          Important Numbers & Websites       Emergency Services   911  [facility]   311  Poison Control   (622) 843-2438  Suicide Prevention Lifeline   (722) 797-9751 (TALK)  Child Abuse Hotline   (520) 836-9805 (4-A-Child)  Sexual Assault Hotline   (161) 770-4309 (HOPE)  National Runaway Safeline   (264) 467-1654 (RUNAWAY)  All-Options Talkline   (667) 288-4812  Substance Abuse Referral   (964) 162-4380 (HELP)

## 2023-09-27 NOTE — COMMUNITY RESOURCES LIST (PATIENT PREFERRED LANGUAGE)
09/27/2023   [facility]  N/A  N?u có th?c m?c v? kaleigh shamir segovia nguyên này ho?c các tamica c?u ch?m sóc b? sunyared vui lòng liên h? v?i phòng khám ch?m sóc chính ho?c ng??i qu?n lý ch?m sóc c?a b?n.  Phone: [phone]   Email: N/A   Address: [address] [place] [address]   Hours: N/A        ???ng dây nóng và ???ng dây tr? giúp       ???ng dây nóng - Kh?ng ho?ng nhà ?  1  Nhà ? c?a ??ng C?u R?i Saulo?ng cách: 13.02 d?m      ?i?n tho?i/?o   [address] [place] [address]  Ngôn ng?: Ti?ng Dona  Gi?: Th? mayela - ch? nh?t M? c?a 24 gi?   Phone: (343) [phone] Email: communications@[facility].org Website: https://[facility]-[place].org/oursaviourshousing/     2  N?n t?ng - [place] Saulo?ng cách: 14.7 d?m      ?i?n tho?i/?o   [address] [place] [address]  Ngôn ng?: Ti?ng Dona  Gi?: Th? mayela - ch? nh?t M? c?a 24 gi?   Phone: [phone] Email: info@[facility].org Website: http://www.[facility].org          Nhà ?       ?i?m truy c?p m?c nh?p ph?i h?p  3  Ch?m sóc c?ng ??ng [place] - Phòng khám ban ngày [address] Saulo?ng cách: 5.57 d?m      M?t ??i m?t, ?i?n tho?i/?o   [address] [place] [address]  Ngôn ng?: ng??i Tây Ban Nha, Ti?ng Dona  Gi?: Th? mayela - Th? sáu 8:30 SA - 4:30 CH  Phí: Mi?n phí   Phone: [phone]) [phone] Email: info@[facility].org Website: https://www.[facility].org/locations/[facility]/     4  D?ch v? xã h?i Church c?a [place] (S) - D?ch v? nhà ? Saulo?ng cách: 14.7 d?m      M?t ??i m?t   [address] [place] [address]  Ngôn ng?: Ti?ng Dona  Gi?: Th? mayela - Th? sáu 9:00 SA - 5:00 CH  Phí: Mi?n phí   Phone: [phone] Email: housing@[facility].org Website: http://www.[facility].org/housing     Paulino tâm t?m trú ho?c n?i trú ?n ban ngày  5  Nhà l?ng nghe Thánh Phaolô Saulo?ng cách: 3.31 d?m      M?t ??i m?t   [address] [place] [address]  Ngôn ng?: Ti?ng Dona  Gi?: Th? mayela - Th? sáu 9:00 SA - 4:00 CH  Phí: Mi?n phí   Phone: [phone] Email:  juniork@[facility].org Website: http://listeninghouse.org     6  T? ch?c t? thi?n Công giáo c?a [facility] [place] [facility] Asulo?ng cách: 7.01 d?m      M?t ??i m?t   [address] [place] [address]  Ngôn ng?: Ti?ng Dona  Gi?: Th? mayela - ch? nh?t 9:00 SA - 5:30 CH  Phí: Mi?n phí, T? tr?   Phone: (324) [phone] Email: info@[employer] Website: https://www.[employer]/locations/higher-[facility]-saint-paul/     H? tr? tìm ki?m nhà ?  7  C? natalie Phát tri?n C?ng ??ng Qu?n [place] - Tài nguyên nhà ? Saulo?ng cách: 14.89 d?m      M?t ??i m?t, ?i?n tho?i/?o   [address] [place] [address]  Ngôn ng?: Ti?ng Dona  Gi?: Th? mayela - Th? sáu 8:00 SA - 4:30 CH  Phí: Mi?n phí   Phone: [phone] Email: office@[employer] Website: http://[employer]     8  Qu?n [place] - D?ch v? C?ng ??ng - [facility] tin v? Nhà ? và Michelle?n l?c cho Ng??i vô dariana c? Saulo?ng cách: 16.3 d?m      M?t ??i m?t, ?i?n tho?i/?o   [address] [place] [address]  Ngôn ng?: Ti?ng Dona  Gi?: Th? mayela - Th? sáu 8:00 SA - 4:30 CH  Phí: Mi?n phí   Phone: [phone] Email: isela@[facility]. Website: https://www.[facility]./469/Community-Services     N?i trú ?n cho dariana ?ình  9  N?i t?m trú c?a dariana ?ình [facility] [place] Saulo?ng cách: 7.08 d?m      M?t ??i m?t   [address] [facility] [address] [place] [address]  Reagan ng?: Ti?ng Dona  Gi?: Th? mayela - Th? sáu 3:00 CH - 9:00 SA , ngày th? b?y - ch? nh?t M? c?a 24 gi?  Phí: Mi?n phí   Phone: [phone] Ex.1 Website: https://www.[facility].org/2020/07/03/emergency-family-shelter/     10  ??i Quân C?u Th? [facility] [place] [facility] Saulo?ng cách: 22.71 d?m      M?t ??i m?t   [address] [place] [address]  Reagan ng?: Ti?ng Dona  Gi?: Th? mayela - ch? nh?t M? c?a 24 gi?  Phí: Mi?n phí, T? tr?   Phone: [phone] Email: Kiel@[facility].[employer].org Website: http://www.[facility]agraceplace.org/      N?i trú ?n cho cá nhân  11  T? ch?c t? thi?n Công giáo c?a [facility] [place] [facility] Saulo?ng cách: 7.08 d?m      M?t ??i m?t   [address] [place] [address]  Ngôn ng?: Ti?ng Dona  Gi?: Th? mayela - ch? nh?t 5:00 CH - 10:00 SA  Phí: Mi?n phí, T? tr?   Phone: [phone] Email: info@[employer] Website: https://www.[employer]MANDIT.[employer]/[facility]/higher-[facility]-saint-paul/     12  Nhà ? c?a ??ng C?u R?i Saulo?ng cách: 9.02 d?m      M?t ??i m?t   [address] [place] [address]  Ngôn ng?: Ti?ng Dona  Gi?: Th? mayela - ch? nh?t M? c?a 24 gi?  Phí: Mi?n phí   Phone: [phone] Email: communications@[employer]-[place].org Website: https://[facility]-[place].org/oursaviourshousing/          Nh?ng con s? và leonarda web natalie tr?ng       Các d?ch v? kh?n c?p   911  D?ch v? thành ph?   311  Ki?m soát ??c   [phone]  ???ng dây phòng ch?ng t? t?   [phone] (TALK)  ???ng dây nóng l?m d?ng tr? em   [phone] (4-A-Child)  ???ng dây nóng t?n công tình d?c   [phone] (HOPE)  ???ng dây ch?y tr?n qu?c dariana   [phone] (RUNAWAY)  T?t c? các tùy ch?n Talkline   [phone]  Gi?i thi?u l?m d?ng d??c ch?t   [phone] (HELP)

## 2023-09-27 NOTE — LETTER
To whom it may concern,     Patient Liu Malave  61 is chronically disabled and in poor health due to Hx of severe strokes. He would benefit from family and friends in [place] being able to visit him as he is unable to travel to them due to his poor health. Please accommodate this.     Shahid Brady MD  [address] [place]  [phone]

## 2023-09-28 LAB — HCV AB SERPL QL IA: REACTIVE

## 2023-09-30 LAB
HCV RNA SERPL NAA+PROBE-ACNC: ABNORMAL IU/ML
HCV RNA SERPL NAA+PROBE-LOG IU: 7.1 {LOG_IU}/ML

## 2023-10-03 NOTE — RESULT ENCOUNTER NOTE
I called and spoke with the patient about their recent clinic visit results. I answered any questions they had. Will refer for GI for hep c screening.    Dr. Shahid Brady

## 2023-10-04 ENCOUNTER — TRANSFERRED RECORDS (OUTPATIENT)
Dept: HEALTH INFORMATION MANAGEMENT | Facility: CLINIC | Age: 58
End: 2023-10-04
Payer: COMMERCIAL

## 2023-10-06 ENCOUNTER — TRANSFERRED RECORDS (OUTPATIENT)
Dept: HEALTH INFORMATION MANAGEMENT | Facility: CLINIC | Age: 58
End: 2023-10-06
Payer: COMMERCIAL

## 2023-10-06 LAB — HCV GENTYP SERPL NAA+PROBE: NORMAL

## 2023-10-09 DIAGNOSIS — H04.123 DRY EYES: ICD-10-CM

## 2023-10-09 DIAGNOSIS — R11.0 NAUSEA: ICD-10-CM

## 2023-10-10 ENCOUNTER — HOSPITAL ENCOUNTER (OUTPATIENT)
Dept: ULTRASOUND IMAGING | Facility: CLINIC | Age: 58
Discharge: HOME | End: 2023-10-10
Attending: INTERNAL MEDICINE | Admitting: INTERNAL MEDICINE
Payer: COMMERCIAL

## 2023-10-10 ENCOUNTER — TRANSFERRED RECORDS (OUTPATIENT)
Dept: HEALTH INFORMATION MANAGEMENT | Facility: CLINIC | Age: 58
End: 2023-10-10
Payer: COMMERCIAL

## 2023-10-10 DIAGNOSIS — B18.2 CHRONIC VIRAL HEPATITIS C (H): ICD-10-CM

## 2023-10-10 PROCEDURE — 76705 ECHO EXAM OF ABDOMEN: CPT

## 2023-10-10 RX ORDER — ONDANSETRON 4 MG/1
4 TABLET, FILM COATED ORAL EVERY 8 HOURS PRN
Qty: 30 TABLET | Refills: 0 | Status: SHIPPED | OUTPATIENT
Start: 2023-10-10 | End: 2023-12-18

## 2023-10-11 ENCOUNTER — TRANSFERRED RECORDS (OUTPATIENT)
Dept: HEALTH INFORMATION MANAGEMENT | Facility: CLINIC | Age: 58
End: 2023-10-11

## 2023-10-12 ENCOUNTER — TRANSFERRED RECORDS (OUTPATIENT)
Dept: HEALTH INFORMATION MANAGEMENT | Facility: CLINIC | Age: 58
End: 2023-10-12
Payer: COMMERCIAL

## 2023-10-17 ENCOUNTER — MEDICAL CORRESPONDENCE (OUTPATIENT)
Dept: HEALTH INFORMATION MANAGEMENT | Facility: CLINIC | Age: 58
End: 2023-10-17
Payer: COMMERCIAL

## 2023-10-25 ENCOUNTER — APPOINTMENT (OUTPATIENT)
Dept: RADIOLOGY | Facility: CLINIC | Age: 58
End: 2023-10-25
Attending: EMERGENCY MEDICINE
Payer: COMMERCIAL

## 2023-10-25 ENCOUNTER — HOSPITAL ENCOUNTER (INPATIENT)
Facility: CLINIC | Age: 58
LOS: 2 days | Discharge: HOME | End: 2023-10-27
Attending: EMERGENCY MEDICINE | Admitting: HOSPITALIST
Payer: COMMERCIAL

## 2023-10-25 ENCOUNTER — APPOINTMENT (OUTPATIENT)
Dept: CT IMAGING | Facility: CLINIC | Age: 58
End: 2023-10-25
Attending: HOSPITALIST
Payer: COMMERCIAL

## 2023-10-25 DIAGNOSIS — J69.0 ASPIRATION PNEUMONIA OF LEFT LOWER LOBE DUE TO GASTRIC SECRETIONS (H): ICD-10-CM

## 2023-10-25 DIAGNOSIS — J18.9 COMMUNITY ACQUIRED PNEUMONIA, UNSPECIFIED LATERALITY: Primary | ICD-10-CM

## 2023-10-25 DIAGNOSIS — A41.9 SEPSIS WITHOUT ACUTE ORGAN DYSFUNCTION, DUE TO UNSPECIFIED ORGANISM (H): ICD-10-CM

## 2023-10-25 LAB
ALBUMIN SERPL BCG-MCNC: 4.1 G/DL (ref 3.5–5.2)
ALBUMIN UR-MCNC: 70 MG/DL
ALP SERPL-CCNC: 83 U/L (ref 40–129)
ALT SERPL W P-5'-P-CCNC: 32 U/L (ref 0–70)
ANION GAP SERPL CALCULATED.3IONS-SCNC: 11 MMOL/L (ref 7–15)
APPEARANCE UR: CLEAR
AST SERPL W P-5'-P-CCNC: 29 U/L (ref 0–45)
BASE EXCESS BLDV CALC-SCNC: 0.4 MMOL/L
BASOPHILS # BLD AUTO: 0.1 10E3/UL (ref 0–0.2)
BASOPHILS NFR BLD AUTO: 0 %
BILIRUB SERPL-MCNC: 0.3 MG/DL
BILIRUB UR QL STRIP: NEGATIVE
BUN SERPL-MCNC: 41.8 MG/DL (ref 8–23)
CALCIUM SERPL-MCNC: 9.1 MG/DL (ref 8.8–10.2)
CHLORIDE SERPL-SCNC: 106 MMOL/L (ref 98–107)
COLOR UR AUTO: ABNORMAL
CREAT SERPL-MCNC: 1.29 MG/DL (ref 0.67–1.17)
DEPRECATED HCO3 PLAS-SCNC: 24 MMOL/L (ref 22–29)
EGFRCR SERPLBLD CKD-EPI 2021: 63 ML/MIN/1.73M2
EOSINOPHIL # BLD AUTO: 0.2 10E3/UL (ref 0–0.7)
EOSINOPHIL NFR BLD AUTO: 1 %
ERYTHROCYTE [DISTWIDTH] IN BLOOD BY AUTOMATED COUNT: 11.8 % (ref 10–15)
FLUAV RNA SPEC QL NAA+PROBE: NEGATIVE
FLUBV RNA RESP QL NAA+PROBE: NEGATIVE
GLUCOSE SERPL-MCNC: 116 MG/DL (ref 70–99)
GLUCOSE UR STRIP-MCNC: NEGATIVE MG/DL
HCO3 BLDV-SCNC: 26 MMOL/L (ref 24–30)
HCT VFR BLD AUTO: 36.5 % (ref 40–53)
HGB BLD-MCNC: 11.9 G/DL (ref 13.3–17.7)
HGB UR QL STRIP: NEGATIVE
HOLD SPECIMEN: NORMAL
HOLD SPECIMEN: NORMAL
IMM GRANULOCYTES # BLD: 0.1 10E3/UL
IMM GRANULOCYTES NFR BLD: 1 %
KETONES UR STRIP-MCNC: NEGATIVE MG/DL
LACTATE SERPL-SCNC: 0.8 MMOL/L (ref 0.7–2)
LACTATE SERPL-SCNC: 1.2 MMOL/L (ref 0.7–2)
LEUKOCYTE ESTERASE UR QL STRIP: NEGATIVE
LYMPHOCYTES # BLD AUTO: 2.4 10E3/UL (ref 0.8–5.3)
LYMPHOCYTES NFR BLD AUTO: 15 %
MCH RBC QN AUTO: 31.4 PG (ref 26.5–33)
MCHC RBC AUTO-ENTMCNC: 32.6 G/DL (ref 31.5–36.5)
MCV RBC AUTO: 96 FL (ref 78–100)
MONOCYTES # BLD AUTO: 1 10E3/UL (ref 0–1.3)
MONOCYTES NFR BLD AUTO: 6 %
NEUTROPHILS # BLD AUTO: 12.2 10E3/UL (ref 1.6–8.3)
NEUTROPHILS NFR BLD AUTO: 77 %
NITRATE UR QL: NEGATIVE
NRBC # BLD AUTO: 0 10E3/UL
NRBC BLD AUTO-RTO: 0 /100
NT-PROBNP SERPL-MCNC: 95 PG/ML (ref 0–900)
OXYHGB MFR BLDV: 49.2 % (ref 70–75)
PCO2 BLDV: 46 MM HG (ref 35–50)
PH BLDV: 7.36 [PH] (ref 7.35–7.45)
PH UR STRIP: 6.5 [PH] (ref 5–7)
PLATELET # BLD AUTO: 209 10E3/UL (ref 150–450)
PO2 BLDV: 28 MM HG (ref 25–47)
POTASSIUM SERPL-SCNC: 4.5 MMOL/L (ref 3.4–5.3)
PROCALCITONIN SERPL IA-MCNC: 0.79 NG/ML
PROT SERPL-MCNC: 8.4 G/DL (ref 6.4–8.3)
RBC # BLD AUTO: 3.79 10E6/UL (ref 4.4–5.9)
RBC URINE: 2 /HPF
RSV RNA SPEC NAA+PROBE: NEGATIVE
SAO2 % BLDV: 50.1 % (ref 70–75)
SARS-COV-2 RNA RESP QL NAA+PROBE: NEGATIVE
SODIUM SERPL-SCNC: 141 MMOL/L (ref 135–145)
SP GR UR STRIP: 1.02 (ref 1–1.03)
TROPONIN T SERPL HS-MCNC: 23 NG/L
UROBILINOGEN UR STRIP-MCNC: <2 MG/DL
WBC # BLD AUTO: 15.9 10E3/UL (ref 4–11)
WBC URINE: <1 /HPF

## 2023-10-25 PROCEDURE — 87637 SARSCOV2&INF A&B&RSV AMP PRB: CPT | Performed by: EMERGENCY MEDICINE

## 2023-10-25 PROCEDURE — 99292 CRITICAL CARE ADDL 30 MIN: CPT

## 2023-10-25 PROCEDURE — 258N000003 HC RX IP 258 OP 636: Performed by: EMERGENCY MEDICINE

## 2023-10-25 PROCEDURE — 81001 URINALYSIS AUTO W/SCOPE: CPT | Performed by: EMERGENCY MEDICINE

## 2023-10-25 PROCEDURE — 87149 DNA/RNA DIRECT PROBE: CPT | Performed by: EMERGENCY MEDICINE

## 2023-10-25 PROCEDURE — 250N000011 HC RX IP 250 OP 636: Mod: JZ | Performed by: HOSPITALIST

## 2023-10-25 PROCEDURE — 80053 COMPREHEN METABOLIC PANEL: CPT | Performed by: EMERGENCY MEDICINE

## 2023-10-25 PROCEDURE — 250N000013 HC RX MED GY IP 250 OP 250 PS 637: Performed by: HOSPITALIST

## 2023-10-25 PROCEDURE — 999N000157 HC STATISTIC RCP TIME EA 10 MIN

## 2023-10-25 PROCEDURE — 84484 ASSAY OF TROPONIN QUANT: CPT | Performed by: EMERGENCY MEDICINE

## 2023-10-25 PROCEDURE — 250N000009 HC RX 250: Performed by: EMERGENCY MEDICINE

## 2023-10-25 PROCEDURE — 120N000001 HC R&B MED SURG/OB

## 2023-10-25 PROCEDURE — 85025 COMPLETE CBC W/AUTO DIFF WBC: CPT | Performed by: EMERGENCY MEDICINE

## 2023-10-25 PROCEDURE — 74177 CT ABD & PELVIS W/CONTRAST: CPT

## 2023-10-25 PROCEDURE — 250N000011 HC RX IP 250 OP 636: Mod: JZ | Performed by: EMERGENCY MEDICINE

## 2023-10-25 PROCEDURE — 36415 COLL VENOUS BLD VENIPUNCTURE: CPT | Performed by: HOSPITALIST

## 2023-10-25 PROCEDURE — 83605 ASSAY OF LACTIC ACID: CPT | Performed by: EMERGENCY MEDICINE

## 2023-10-25 PROCEDURE — 82805 BLOOD GASES W/O2 SATURATION: CPT | Performed by: EMERGENCY MEDICINE

## 2023-10-25 PROCEDURE — 94660 CPAP INITIATION&MGMT: CPT

## 2023-10-25 PROCEDURE — 99291 CRITICAL CARE FIRST HOUR: CPT | Mod: 25

## 2023-10-25 PROCEDURE — 84145 PROCALCITONIN (PCT): CPT | Performed by: EMERGENCY MEDICINE

## 2023-10-25 PROCEDURE — 83605 ASSAY OF LACTIC ACID: CPT | Performed by: HOSPITALIST

## 2023-10-25 PROCEDURE — 83880 ASSAY OF NATRIURETIC PEPTIDE: CPT | Performed by: EMERGENCY MEDICINE

## 2023-10-25 PROCEDURE — 94640 AIRWAY INHALATION TREATMENT: CPT

## 2023-10-25 PROCEDURE — 36415 COLL VENOUS BLD VENIPUNCTURE: CPT | Performed by: EMERGENCY MEDICINE

## 2023-10-25 PROCEDURE — 258N000003 HC RX IP 258 OP 636: Performed by: HOSPITALIST

## 2023-10-25 PROCEDURE — 71045 X-RAY EXAM CHEST 1 VIEW: CPT

## 2023-10-25 PROCEDURE — 87077 CULTURE AEROBIC IDENTIFY: CPT | Performed by: EMERGENCY MEDICINE

## 2023-10-25 PROCEDURE — 99223 1ST HOSP IP/OBS HIGH 75: CPT | Performed by: HOSPITALIST

## 2023-10-25 RX ORDER — CEFTRIAXONE 2 G/1
2 INJECTION, POWDER, FOR SOLUTION INTRAMUSCULAR; INTRAVENOUS ONCE
Status: COMPLETED | OUTPATIENT
Start: 2023-10-25 | End: 2023-10-25

## 2023-10-25 RX ORDER — HEPARIN SODIUM 5000 [USP'U]/.5ML
5000 INJECTION, SOLUTION INTRAVENOUS; SUBCUTANEOUS 2 TIMES DAILY
Status: DISCONTINUED | OUTPATIENT
Start: 2023-10-25 | End: 2023-10-27 | Stop reason: HOSPADM

## 2023-10-25 RX ORDER — IPRATROPIUM BROMIDE 42 UG/1
2 SPRAY, METERED NASAL DAILY PRN
COMMUNITY

## 2023-10-25 RX ORDER — SACCHAROMYCES BOULARDII 250 MG
250 CAPSULE ORAL DAILY PRN
COMMUNITY
End: 2024-04-12

## 2023-10-25 RX ORDER — IOPAMIDOL 755 MG/ML
90 INJECTION, SOLUTION INTRAVASCULAR ONCE
Status: COMPLETED | OUTPATIENT
Start: 2023-10-25 | End: 2023-10-25

## 2023-10-25 RX ORDER — BACLOFEN 10 MG/1
10 TABLET ORAL 4 TIMES DAILY PRN
Status: DISCONTINUED | OUTPATIENT
Start: 2023-10-25 | End: 2023-10-26

## 2023-10-25 RX ORDER — PIPERACILLIN SODIUM, TAZOBACTAM SODIUM 3; .375 G/15ML; G/15ML
3.38 INJECTION, POWDER, LYOPHILIZED, FOR SOLUTION INTRAVENOUS ONCE
Status: COMPLETED | OUTPATIENT
Start: 2023-10-25 | End: 2023-10-25

## 2023-10-25 RX ORDER — PROCHLORPERAZINE MALEATE 10 MG
10 TABLET ORAL EVERY 6 HOURS PRN
Status: DISCONTINUED | OUTPATIENT
Start: 2023-10-25 | End: 2023-10-25

## 2023-10-25 RX ORDER — QUETIAPINE FUMARATE 50 MG/1
50 TABLET, FILM COATED ORAL AT BEDTIME
COMMUNITY
End: 2024-01-08

## 2023-10-25 RX ORDER — QUETIAPINE FUMARATE 25 MG/1
25 TABLET, FILM COATED ORAL 3 TIMES DAILY PRN
COMMUNITY
End: 2023-11-09

## 2023-10-25 RX ORDER — CEFEPIME HYDROCHLORIDE 2 G/1
2 INJECTION, POWDER, FOR SOLUTION INTRAVENOUS EVERY 12 HOURS
Status: DISCONTINUED | OUTPATIENT
Start: 2023-10-25 | End: 2023-10-26

## 2023-10-25 RX ORDER — QUETIAPINE FUMARATE 25 MG/1
25 TABLET, FILM COATED ORAL 3 TIMES DAILY PRN
Status: DISCONTINUED | OUTPATIENT
Start: 2023-10-25 | End: 2023-10-27 | Stop reason: HOSPADM

## 2023-10-25 RX ORDER — ONDANSETRON 2 MG/ML
4 INJECTION INTRAMUSCULAR; INTRAVENOUS EVERY 6 HOURS PRN
Status: DISCONTINUED | OUTPATIENT
Start: 2023-10-25 | End: 2023-10-27 | Stop reason: HOSPADM

## 2023-10-25 RX ORDER — IPRATROPIUM BROMIDE AND ALBUTEROL SULFATE 2.5; .5 MG/3ML; MG/3ML
3 SOLUTION RESPIRATORY (INHALATION) ONCE
Status: COMPLETED | OUTPATIENT
Start: 2023-10-25 | End: 2023-10-25

## 2023-10-25 RX ORDER — GUAIFENESIN/DEXTROMETHORPHAN 100-10MG/5
10 SYRUP ORAL EVERY 4 HOURS PRN
Status: DISCONTINUED | OUTPATIENT
Start: 2023-10-25 | End: 2023-10-27 | Stop reason: HOSPADM

## 2023-10-25 RX ORDER — POLYETHYLENE GLYCOL 3350 17 G/17G
17 POWDER, FOR SOLUTION ORAL DAILY PRN
Status: DISCONTINUED | OUTPATIENT
Start: 2023-10-25 | End: 2023-10-27 | Stop reason: HOSPADM

## 2023-10-25 RX ORDER — ACETAMINOPHEN 325 MG/1
975 TABLET ORAL EVERY 8 HOURS PRN
Status: DISCONTINUED | OUTPATIENT
Start: 2023-10-25 | End: 2023-10-27 | Stop reason: HOSPADM

## 2023-10-25 RX ORDER — ONDANSETRON 4 MG/1
4 TABLET, ORALLY DISINTEGRATING ORAL EVERY 6 HOURS PRN
Status: DISCONTINUED | OUTPATIENT
Start: 2023-10-25 | End: 2023-10-27 | Stop reason: HOSPADM

## 2023-10-25 RX ORDER — CALCIUM CARBONATE 500 MG/1
1000 TABLET, CHEWABLE ORAL 4 TIMES DAILY PRN
Status: DISCONTINUED | OUTPATIENT
Start: 2023-10-25 | End: 2023-10-27

## 2023-10-25 RX ORDER — DOXAZOSIN 1 MG/1
1 TABLET ORAL AT BEDTIME
Status: DISCONTINUED | OUTPATIENT
Start: 2023-10-25 | End: 2023-10-27 | Stop reason: HOSPADM

## 2023-10-25 RX ORDER — QUETIAPINE FUMARATE 50 MG/1
50 TABLET, FILM COATED ORAL AT BEDTIME
Status: DISCONTINUED | OUTPATIENT
Start: 2023-10-25 | End: 2023-10-26

## 2023-10-25 RX ORDER — IPRATROPIUM BROMIDE 42 UG/1
2 SPRAY, METERED NASAL DAILY
Status: DISCONTINUED | OUTPATIENT
Start: 2023-10-25 | End: 2023-10-27 | Stop reason: HOSPADM

## 2023-10-25 RX ORDER — PROCHLORPERAZINE 25 MG
25 SUPPOSITORY, RECTAL RECTAL EVERY 12 HOURS PRN
Status: DISCONTINUED | OUTPATIENT
Start: 2023-10-25 | End: 2023-10-25

## 2023-10-25 RX ORDER — LANOLIN ALCOHOL/MO/W.PET/CERES
3 CREAM (GRAM) TOPICAL
Status: DISCONTINUED | OUTPATIENT
Start: 2023-10-25 | End: 2023-10-27 | Stop reason: HOSPADM

## 2023-10-25 RX ORDER — ASPIRIN 81 MG/1
81 TABLET, CHEWABLE ORAL DAILY
Status: DISCONTINUED | OUTPATIENT
Start: 2023-10-26 | End: 2023-10-27 | Stop reason: HOSPADM

## 2023-10-25 RX ORDER — SIMVASTATIN 20 MG
20 TABLET ORAL AT BEDTIME
Status: DISCONTINUED | OUTPATIENT
Start: 2023-10-25 | End: 2023-10-27 | Stop reason: HOSPADM

## 2023-10-25 RX ORDER — DOCUSATE SODIUM 100 MG/1
100 CAPSULE, LIQUID FILLED ORAL 2 TIMES DAILY PRN
Status: DISCONTINUED | OUTPATIENT
Start: 2023-10-25 | End: 2023-10-27 | Stop reason: HOSPADM

## 2023-10-25 RX ORDER — SODIUM CHLORIDE 9 MG/ML
INJECTION, SOLUTION INTRAVENOUS CONTINUOUS
Status: DISCONTINUED | OUTPATIENT
Start: 2023-10-25 | End: 2023-10-27

## 2023-10-25 RX ORDER — VANCOMYCIN HYDROCHLORIDE 1 G/200ML
1000 INJECTION, SOLUTION INTRAVENOUS
Status: DISCONTINUED | OUTPATIENT
Start: 2023-10-25 | End: 2023-10-27

## 2023-10-25 RX ADMIN — SIMVASTATIN 20 MG: 20 TABLET, FILM COATED ORAL at 21:27

## 2023-10-25 RX ADMIN — IPRATROPIUM BROMIDE 2 SPRAY: 42 SPRAY NASAL at 14:39

## 2023-10-25 RX ADMIN — IOPAMIDOL 90 ML: 755 INJECTION, SOLUTION INTRAVENOUS at 08:23

## 2023-10-25 RX ADMIN — ACETAMINOPHEN 975 MG: 325 TABLET ORAL at 06:43

## 2023-10-25 RX ADMIN — SODIUM CHLORIDE: 9 INJECTION, SOLUTION INTRAVENOUS at 21:29

## 2023-10-25 RX ADMIN — SODIUM CHLORIDE: 9 INJECTION, SOLUTION INTRAVENOUS at 06:33

## 2023-10-25 RX ADMIN — HEPARIN SODIUM 5000 UNITS: 5000 INJECTION, SOLUTION INTRAVENOUS; SUBCUTANEOUS at 20:33

## 2023-10-25 RX ADMIN — PIPERACILLIN AND TAZOBACTAM 3.38 G: 3; .375 INJECTION, POWDER, LYOPHILIZED, FOR SOLUTION INTRAVENOUS at 06:40

## 2023-10-25 RX ADMIN — VANCOMYCIN HYDROCHLORIDE 1250 MG: 5 INJECTION, POWDER, LYOPHILIZED, FOR SOLUTION INTRAVENOUS at 10:06

## 2023-10-25 RX ADMIN — DOXAZOSIN 1 MG: 1 TABLET ORAL at 21:27

## 2023-10-25 RX ADMIN — IPRATROPIUM BROMIDE AND ALBUTEROL SULFATE 3 ML: .5; 3 SOLUTION RESPIRATORY (INHALATION) at 04:42

## 2023-10-25 RX ADMIN — HEPARIN SODIUM 5000 UNITS: 5000 INJECTION, SOLUTION INTRAVENOUS; SUBCUTANEOUS at 08:57

## 2023-10-25 RX ADMIN — CEFEPIME 2 G: 2 INJECTION, POWDER, FOR SOLUTION INTRAVENOUS at 08:57

## 2023-10-25 RX ADMIN — CEFEPIME 2 G: 2 INJECTION, POWDER, FOR SOLUTION INTRAVENOUS at 20:33

## 2023-10-25 RX ADMIN — SODIUM CHLORIDE 1000 ML: 9 INJECTION, SOLUTION INTRAVENOUS at 08:57

## 2023-10-25 RX ADMIN — CEFTRIAXONE SODIUM 2 G: 2 INJECTION, POWDER, FOR SOLUTION INTRAMUSCULAR; INTRAVENOUS at 05:22

## 2023-10-25 RX ADMIN — VANCOMYCIN HYDROCHLORIDE 1000 MG: 1 INJECTION, SOLUTION INTRAVENOUS at 21:26

## 2023-10-25 RX ADMIN — QUETIAPINE FUMARATE 50 MG: 50 TABLET ORAL at 21:26

## 2023-10-25 RX ADMIN — SODIUM CHLORIDE 1000 ML: 9 INJECTION, SOLUTION INTRAVENOUS at 05:03

## 2023-10-25 ASSESSMENT — ACTIVITIES OF DAILY LIVING (ADL)
DEPENDENT_IADLS:: CLEANING;COOKING;LAUNDRY;SHOPPING;MEDICATION MANAGEMENT;MONEY MANAGEMENT;TRANSPORTATION;INCONTINENCE
ADLS_ACUITY_SCORE: 39
ADLS_ACUITY_SCORE: 35
ADLS_ACUITY_SCORE: 39

## 2023-10-25 NOTE — PROGRESS NOTES
/63   Pulse (!) 126   Temp (!) 101.3  F (38.5  C) (Oral)   Resp 24   Wt 60.3 kg (133 lb)   SpO2 92%   BMI 24.33 kg/m      The PT was established on the BIPAP per MD order. While I set the PT up on 12 over 5, the VT were frequently > 700m. Also, the VBG was not c/w a PT that needed much/any support (though his RR is elevated into the high 20s). Thus, I reduced the PS to 5.     Increasingly, the PT was making it known (per family) that he was not comfortable on the BIPAP. Currently, we are giving the PT a trial off BIPAP.    RT will follow as directed.

## 2023-10-25 NOTE — PHARMACY-ADMISSION MEDICATION HISTORY
Pharmacist Admission Medication History    Admission medication history is complete. The information provided in this note is only as accurate as the sources available at the time of the update.    Information Source(s): Patient, Clinic records, and CareEverywhere/SureScripts via in-person    Pertinent Information: Patient's daughter was able to provide up to date medication information and last known administration times.    Changes made to PTA medication list:  Added: None  Deleted: None  Changed: Quetiapine: Patient has been using 50mg at bedtime and 25mg TID prn anxiety throughout the day    Medication Affordability:       Allergies reviewed with patient and updates made in EHR: yes    Medication History Completed By: Jaspal Chaudhry, [facility] 10/25/2023 10:08 AM    PTA Med List   Medication Sig Last Dose    acetaminophen (TYLENOL) 500 MG tablet Take 2 tablets (1,000 mg) by mouth every 6 hours as needed for mild pain PRN    albuterol (PROVENTIL) (2.5 MG/3ML) 0.083% neb solution Take 1 vial (2.5 mg) by nebulization every 6 hours as needed for shortness of breath, wheezing or cough.* PRN    amLODIPine (NORVASC) 5 MG tablet Take 1 tablet (5 mg total) by mouth daily. 10/24/2023 at am    aspirin (ASA) 81 MG chewable tablet 1 tablet (81 mg) by Per Feeding Tube route daily 10/24/2023 at am    baclofen (LIORESAL) 10 MG tablet 1 tablet (10 mg) by Per G Tube route 4 times daily as needed for muscle spasms Usually takes with Seroquel PRN    bisacodyl (DULCOLAX) 10 MG suppository INSERT 1 SUPPOSTIORY INTO THE RECTUM DAILY AS NEEDED FOR CONSTIPATION. PRN    cefaclor (CECLOR) 250 MG capsule Take 250 mg by mouth daily UTI ppx 10/24/2023 at am    Dextromethorphan-guaiFENesin  MG/5ML syrup TAKE 10 MLS BY G TUBE ROUTE EVERY 4 HOURS AS NEEDED FOR COUGH. PRN    doxazosin (CARDURA) 1 MG tablet Take 1 tablet (1 mg) by mouth At Bedtime 10/24/2023 at pm    ipratropium (ATROVENT) 0.06 % nasal spray Spray 2 sprays into both nostrils daily  10/24/2023 at am    ipratropium - albuterol 0.5 mg/2.5 mg/3 mL (DUONEB) 0.5-2.5 (3) MG/3ML neb solution Take 1 vial (3 mLs) by nebulization every 4 hours PRN    ketoconazole (NIZORAL) 2 % external cream Apply topically 2 times daily 10/24/2023 at pm    lidocaine (LIDODERM) 5 % patch Place 1 patch onto the skin daily as needed for moderate pain To prevent lidocaine toxicity, patient should be patch free for 12 hrs daily. PRN    meclizine (ANTIVERT) 25 MG tablet Take 1 tablet via G-tube every 6 hours as needed for vertigo PRN    neomycin-bacitracin-polymyxin (NEOSPORIN) 5-400-5000 ointment Apply topically 4 times daily (Patient taking differently: Apply topically 4 times daily To J Tube site) 10/24/2023 at am    nystatin (MYCOSTATIN) 963105 UNIT/GM external cream Apply topically 2 times daily as needed (redness) PRN    omeprazole (PRILOSEC) 40 MG DR capsule Take 40 mg by mouth daily 10/24/2023 at am    ondansetron (ZOFRAN) 4 MG tablet TAKE 1 TABLET (4 MG) BY MOUTH EVERY 8 HOURS AS NEEDED FOR NAUSEA PRN    polyethylene glycol (MIRALAX) 17 GM/Dose powder Give 1 capful via G-tube daily as needed for constipation PRN    QUEtiapine (SEROQUEL) 25 MG tablet Take 25 mg by mouth 3 times daily as needed (Anxiety/Sleep) PRN    QUEtiapine (SEROQUEL) 50 MG tablet Take 50 mg by mouth at bedtime 10/24/2023 at pm    saccharomyces boulardii (FLORASTOR) 250 MG capsule Take 250 mg by mouth daily 10/24/2023 at am    simvastatin (ZOCOR) 20 MG tablet 1 tablet (20 mg) by Per G Tube route At Bedtime 10/24/2023 at pm    sodium chloride (NEBUSAL) 3 % neb solution Take 3 mLs by nebulization every 6 hours as needed for other (thick sputum) PRN    triamcinolone (KENALOG) 0.1 % external cream Apply topically 2 times daily 10/24/2023 at am    VISINE DRY EYE RELIEF 1 % SOLN Apply 1 drop to eye every 6 hours as needed (dry eyes) PRN       Home

## 2023-10-25 NOTE — ED TRIAGE NOTES
Pt presents to ED with family who report cough starting yesterday morning, ill contacts at home.  Pt having to be suctioned more frequently.  +fever.  Pt lethargic.

## 2023-10-25 NOTE — PLAN OF CARE
Problem: Adult Inpatient Plan of Care  Goal: Absence of Hospital-Acquired Illness or Injury  Intervention: Identify and Manage Fall Risk  Recent Flowsheet Documentation  Taken 10/25/2023 1533 by Viridiana Bello, RN  Safety Promotion/Fall Prevention: mobility aid in reach  Taken 10/25/2023 1155 by Viridiana Bello, RN  Safety Promotion/Fall Prevention: mobility aid in reach  Taken 10/25/2023 0952 by Viridiana Bello, RN  Safety Promotion/Fall Prevention: mobility aid in reach   Goal Outcome Evaluation:  Patient refused turning side to side- repositioned legs and arms with pillows.   Suction set up bedside- nasal spray administered, vitally stable, diminished lungs. Family remains at bedside.   Patient able to make needs known.  Viridiana Bello, RN

## 2023-10-25 NOTE — H&P
[facility]    History and Physical - Hospitalist Service       Date of Admission:  10/25/2023    Assessment & Plan      Liu GIL Malave is a 58 year old male presenting with dyspnea and tachycardia.  He is nonverbal and nonambulatory at baseline.  He has associated fever, leukocytosis, and tachypnea consistent with sepsis and associated hypoxic respiratory failure.  The etiology of his sepsis is not concrete.  While he does have a history of aspiration pneumonia, he does not have clear evidence of an infiltrate on CXR.  He does have a history of UTIs.  The G tube site appears benign.  Further evaluation is needed to determine the etiology of his presentation.  A noninfectious etiology, such as PE, could also account for his fever, tachycardia, and respiratory failure.  Continue broad spectrum abx pending culture data. . Further evaluation with CT as well.      # Acute hypoxic respiratory failure  # Sepsis  - vanc, cefepime  - cx pending  - CT chest/ab/pelvis with PE protocol  - bolus additional 1L fluids to complete 30cc/kg    # Chronic tube feed dependence  - GJ exchanged on 8/23    # Hep C  - outpatient hepatology followup    # Hx aneurysm  # Hx hemorrhagic CVA    # Hx reactive airway disease    # HTN  # HLD            Diet: NPO for Medical/Clinical Reasons Except for: Ice Chips, Meds    Kelly Catheter: Not present  Lines: None     Cardiac Monitoring: ACTIVE order. Indication: sepsis  Code Status: Full Code      Clinically Significant Risk Factors Present on Admission                # Drug Induced Platelet Defect: home medication list includes an antiplatelet medication   # Hypertension: Home medication list includes antihypertensive(s)                 Disposition Plan      Expected Discharge Date: 10/27/2023                  Tre Syed MD  Hospitalist Service  [facility]  Securely message with [employer] (more info)  Text page via [employer] Paging/Directory      ______________________________________________________________________    Chief Complaint   Dyspnea and tachycardia    History is obtained from the patient's daughter (declined )    History of Present Illness   Liu Malave is a 58 year old male who presents with a chief complaint of dyspnea and tachycardia.    He had had sick contacts at home.  The patient has had an increased in cough in the last week.  No fever.  Last night he developed increased work of breath and tachycardia.  His presentation is similar to what he has had in the past due to pneumonia.  The patient has complained of dizziness and light headedness.  He has not had issues with the feeding tube.  No regurgitation or vomiting of tube feeds.  He is able to move his left leg in a limited fashion.  Right leg is weak and he is able to move this only a little.  The family subsequently reports the patient is not normally able to move the right leg.  Both arms are contracted.  He is occasionally able to move the left arm.  He is able to move at the neck.  Sometimes he can nod or shake his head in response to questions.  He has not been confused or behaving differently.  He is nonverbal.    The patient communicates with a board at home.    He has only two children (daughters).  Daughter, Idalmis, prefers to communicate in English.  Patient is Full Code per daughter Idalmis.    Peptamen 1.5 with prebiotic, 24 hours per day.  Rate during day 50 ml/hr, at night 30 ml/hr.  He gets about 800 ml free water per day.        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 coli infection     History of hemorrhagic stroke with residual hemiparesis (H)     Hypertension     Lung nodule 12/05/2016    9 x 9 mm left upper lobe on CXR    MRSA (methicillin resistant staph aureus) culture positive     Periodontal disease     Persistent fever     Pneumonia of both lower lobes due to infectious organism     Respiratory failure with hypoxia (H)     Seizure (H)     Sepsis due to urinary tract infection (H) 06/04/2020    Severe sepsis (H)     Trismus        Past Surgical History   Past Surgical History:   Procedure Laterality Date    aneurysm clipping  2006    BRAIN SURGERY      EXAM UNDER ANESTHESIA, RESTORATIONS, EXTRACTION(S) DENTAL COMPLEX, COMBINED N/A 12/19/2016    Procedure: COMBINED EXAM UNDER ANESTHESIA, RESTORATIONS, EXTRACTION(S) DENTAL COMPLEX;  Surgeon: Verónica Martinez DDS;  Location: [facility] OR    EXAM UNDER ANESTHESIA, RESTORATIONS, EXTRACTION(S) DENTAL COMPLEX, COMBINED N/A 3/7/2018    Procedure: COMBINED EXAM UNDER ANESTHESIA, RESTORATIONS, EXTRACTION(S) DENTAL COMPLEX;  Kenalog Injection in Bilateral [place], Combined Dental Exam,Radiographs, Three Dental Restorations, Periodontal Therapy and Fluoride Treatment;  Surgeon: Darryl Isbell DDS;  Location: [facility] OR    HH MIDLINE INSERTION  8/11/2017         INJECT STEROID (LOCATION) N/A 3/7/2018    Procedure: INJECT STEROID (LOCATION);  Kenalog Injection In Bilateral [place], Combined Dental Exam,Radiographs,Three Dental Restorations, Periodontal Therapy and Fluoride Treatment;  Surgeon: Liu Conroy DDS;  Location: [facility] OR    IR GASTRO JEJUNOSTOMY TUBE CHANGE  7/19/2016    IR GASTRO JEJUNOSTOMY TUBE CHANGE  9/23/2016    IR GASTRO JEJUNOSTOMY  TUBE CHANGE  11/28/2016    IR GASTRO JEJUNOSTOMY TUBE CHANGE  1/27/2017    IR GASTRO JEJUNOSTOMY TUBE CHANGE  4/19/2017    IR GASTRO JEJUNOSTOMY TUBE CHANGE  6/20/2017    IR GASTRO JEJUNOSTOMY TUBE CHANGE  8/7/2017    IR GASTRO JEJUNOSTOMY TUBE CHANGE  8/11/2017    IR GASTRO JEJUNOSTOMY TUBE CHANGE  10/20/2017    IR GASTRO JEJUNOSTOMY TUBE CHANGE  1/2/2018    IR GASTRO JEJUNOSTOMY TUBE CHANGE  2/16/2018    IR GASTRO JEJUNOSTOMY TUBE CHANGE  5/16/2018    IR GASTRO JEJUNOSTOMY TUBE CHANGE  6/11/2018    IR GASTRO JEJUNOSTOMY TUBE CHANGE  9/12/2018    IR GASTRO JEJUNOSTOMY TUBE CHANGE  12/24/2018    IR GASTRO JEJUNOSTOMY TUBE CHANGE  3/18/2019    IR GASTRO JEJUNOSTOMY TUBE CHANGE  4/23/2019    IR GASTRO JEJUNOSTOMY TUBE CHANGE  5/21/2019    IR GASTRO JEJUNOSTOMY TUBE CHANGE  9/9/2019    IR GASTRO JEJUNOSTOMY TUBE CHANGE  9/26/2019    IR GASTRO JEJUNOSTOMY TUBE CHANGE  10/11/2019    IR GASTRO JEJUNOSTOMY TUBE CHANGE  11/21/2019    IR GASTRO JEJUNOSTOMY TUBE CHANGE  11/29/2019    IR GASTRO JEJUNOSTOMY TUBE CHANGE  2/27/2020    IR GASTRO JEJUNOSTOMY TUBE CHANGE  4/10/2020    IR GASTRO JEJUNOSTOMY TUBE CHANGE  7/2/2020    IR GASTRO JEJUNOSTOMY TUBE CHANGE  8/28/2020    IR GASTRO JEJUNOSTOMY TUBE CHANGE  9/17/2020    IR GASTRO JEJUNOSTOMY TUBE CHANGE  10/29/2020    IR GASTRO JEJUNOSTOMY TUBE CHANGE  11/6/2020    IR GASTRO JEJUNOSTOMY TUBE CHANGE  12/24/2020    IR GASTRO JEJUNOSTOMY TUBE CHANGE  1/9/2021    IR GASTRO JEJUNOSTOMY TUBE CHANGE  6/9/2021    IR GASTRO JEJUNOSTOMY TUBE CHANGE  10/4/2021    IR GASTRO JEJUNOSTOMY TUBE CHANGE  10/18/2021    IR GASTRO JEJUNOSTOMY TUBE CHANGE  1/14/2022    IR GASTRO JEJUNOSTOMY TUBE CHANGE  1/20/2022    IR GASTRO JEJUNOSTOMY TUBE CHANGE  4/19/2022    IR GASTRO JEJUNOSTOMY TUBE CHANGE  6/7/2022    IR GASTRO JEJUNOSTOMY TUBE CHANGE  8/19/2022    IR GASTRO JEJUNOSTOMY TUBE CHANGE  9/13/2022    IR GASTRO JEJUNOSTOMY TUBE CHANGE  10/27/2022    IR GASTRO JEJUNOSTOMY TUBE CHANGE  11/3/2022     IR GASTRO JEJUNOSTOMY TUBE CHANGE  2023    IR GASTRO JEJUNOSTOMY TUBE CHANGE  2023    IR GASTRO JEJUNOSTOMY TUBE CHANGE  2023    IR GASTRO JEJUNOSTOMY TUBE CHANGE  2023    IR GASTRO JEJUNOSTOMY TUBE CHANGE  2023    IR GASTRO JEJUNOSTOMY TUBE PLACEMENT  2016    IR GJ TUBE REPLACEMENT  2018    IR GJ TUBE REPLACEMENT  3/18/2019    IR GJ TUBE REPLACEMENT  2019    IR GJ TUBE REPLACEMENT  2019    IR GJ TUBE REPLACEMENT  2019    IR GJ TUBE REPLACEMENT  2019    IR GJ TUBE REPLACEMENT  10/11/2019    IR GJ TUBE REPLACEMENT  2019    IR GJ TUBE REPLACEMENT  2019    IR GJ TUBE REPLACEMENT  2020    IR GJ TUBE REPLACEMENT  4/10/2020    IR GJ TUBE REPLACEMENT  2020    IR GJ TUBE REPLACEMENT  2020    IR GJ TUBE REPLACEMENT  2020    IR GJ TUBE REPLACEMENT  10/29/2020    IR GJ TUBE REPLACEMENT  2020    IR GJ TUBE REPLACEMENT  2020    IR GJ TUBE REPLACEMENT  2021    IR GJ TUBE REPLACEMENT  2021    J-G tube      PICC  6/10/2016         TRACHEOSTOMY      Removed       Prior to Admission Medications   Prior to Admission Medications   Prescriptions Last Dose Informant Patient Reported? Taking?   Dextromethorphan-guaiFENesin  MG/5ML syrup   No No   Sig: TAKE 10 MLS BY G TUBE ROUTE EVERY 4 HOURS AS NEEDED FOR COUGH.   Glycerin-Hypromellose- (VISINE DRY EYE OP)   Yes No   Sig: Apply 1 drop to eye every 6 hours as needed (dry eyes)*   Ostomy Supplies (STOMAHESIVE) PSTE   No No   Sig: Apply topically to skin around ostomy site twice daily and as needed   QUEtiapine (SEROQUEL) 100 MG tablet   No No   Si tablet crushed in feeding tube at bedtime, may repeat 4 hours later if difficulty sleeping. No more than 200 mg in 24 hour period.   VISINE DRY EYE RELIEF 1 % SOLN   No No   Sig: Apply 1 drop to eye every 6 hours as needed (dry eyes)   acetaminophen (TYLENOL) 500 MG tablet   No No   Sig: Take 2 tablets (1,000 mg) by mouth every  6 hours as needed for mild pain   albuterol (PROVENTIL) (2.5 MG/3ML) 0.083% neb solution   Yes No   Sig: Take 1 vial (2.5 mg) by nebulization every 6 hours as needed for shortness of breath, wheezing or cough.*   amLODIPine (NORVASC) 5 MG tablet   No No   Sig: Take 1 tablet (5 mg total) by mouth daily.   aspirin (ASA) 81 MG chewable tablet   No No   Si tablet (81 mg) by Per Feeding Tube route daily   baclofen (LIORESAL) 10 MG tablet   No No   Si tablet (10 mg) by Per G Tube route 4 times daily as needed for muscle spasms Usually takes with Seroquel   bisacodyl (DULCOLAX) 10 MG suppository   No No   Sig: INSERT 1 SUPPOSTIORY INTO THE RECTUM DAILY AS NEEDED FOR CONSTIPATION.   cefaclor (CECLOR) 250 MG capsule   Yes No   Sig: Take 250 mg by mouth daily UTI ppx   chlorhexidine (PERIDEX) 0.12 % solution   Yes No   Sig: Swish and spit 15 mLs in mouth daily    doxazosin (CARDURA) 1 MG tablet   No No   Sig: Take 1 tablet (1 mg) by mouth At Bedtime   ipratropium (ATROVENT) 0.06 % nasal spray   No No   Sig: Spray 2 sprays into both nostrils 2 times daily   ipratropium - albuterol 0.5 mg/2.5 mg/3 mL (DUONEB) 0.5-2.5 (3) MG/3ML neb solution   No No   Sig: Take 1 vial (3 mLs) by nebulization every 4 hours   ketoconazole (NIZORAL) 2 % external cream   No No   Sig: Apply topically 2 times daily   lidocaine (LIDODERM) 5 % patch   No No   Sig: Place 1 patch onto the skin daily as needed for moderate pain To prevent lidocaine toxicity, patient should be patch free for 12 hrs daily.   meclizine (ANTIVERT) 25 MG tablet   No No   Sig: Take 1 tablet via G-tube every 6 hours as needed for vertigo   neomycin-bacitracin-polymyxin (NEOSPORIN) 5-400-5000 ointment   No No   Sig: Apply topically 4 times daily   Patient taking differently: Apply topically 4 times daily To J Tube site   nystatin (MYCOSTATIN) 189619 UNIT/GM external cream   No No   Sig: Apply topically 2 times daily as needed (redness)   omeprazole (PRILOSEC) 40 MG DR  capsule   Yes No   Sig: Take 40 mg by mouth daily   ondansetron (ZOFRAN) 4 MG tablet   No No   Sig: TAKE 1 TABLET (4 MG) BY MOUTH EVERY 8 HOURS AS NEEDED FOR NAUSEA   polyethylene glycol (MIRALAX) 17 GM/Dose powder   No No   Sig: Give 1 capful via G-tube daily as needed for constipation   saccharomyces boulardii (FLORASTOR) 250 MG capsule   No No   Sig: Take 1 capsule (250 mg) by mouth daily   simvastatin (ZOCOR) 20 MG tablet   No No   Si tablet (20 mg) by Per G Tube route At Bedtime   sodium chloride (NEBUSAL) 3 % neb solution   No No   Sig: Take 3 mLs by nebulization every 6 hours as needed for other (thick sputum)   triamcinolone (KENALOG) 0.1 % external cream   No No   Sig: Apply topically 2 times daily      Facility-Administered Medications: None           Physical Exam   Vital Signs: Temp: (!) 101.3  F (38.5  C) Temp src: Oral BP: (!) 152/84 Pulse: (!) 131   Resp: (!) 42 SpO2: 92 % O2 Device: None (Room air)    Weight: 133 lbs 0 oz    Gen: lying in bed in no acute distress, overall ill appearing  Neuro: alert, spontaneously moves left leg and right arm; doesn't move right leg or left arm  CV:  tachycardia, regular rhythm  Pulm:  tachypnea, coughing, no acute resp distress, decreased breath sounds in both bases posteriorly  GI:  abdomen soft, non distended, NTTP, bowel sounds present; G tube insertion site without surrounding erythema  Ext:  trace pitting edema in the left lower leg, nonpitting edema more notable in the right lower leg      Medical Decision Making             Data   Reviewed:    Na 141  K 4.5  BUN 42  Cr 1.3    Lactate 0.8    WBC 16  Hgb 12  Plts 209    CXR  IMPRESSION: The heart is normal in size. Discoid atelectasis is seen in the left lung base. The lungs are otherwise clear.     EKG (my read) - interpretation limited by significant artifact; sinus tachycardia, no clear acute ischemic changes

## 2023-10-25 NOTE — ED PROVIDER NOTES
EMERGENCY DEPARTMENT ENCOUNTER      NAME: Liu Malave  AGE: 58 year old male  YOB: 1965  MRN: [id number]  EVALUATION DATE & TIME: No admission date for patient encounter.    PCP: Shahid Enriquez    ED PROVIDER: Tristian Vazquez M.D.      Chief Complaint   Patient presents with    Shortness of Breath    Fever         FINAL IMPRESSION:  1. Aspiration pneumonia of left lower lobe due to gastric secretions (H)    2. Sepsis without acute organ dysfunction, due to unspecified organism (H)          ED COURSE & MEDICAL DECISION MAKING:    Pertinent Labs & Imaging studies reviewed. (See chart for details)  58 year old male presents to the Emergency Department for evaluation of cough shortness of breath.  Patient is tachycardic and febrile.  I suspect sepsis.  Initially tried on BiPAP but did not tolerate this.  Has history aspiration may be aspiration pneumonia.  COVID influenza are negative.  White count elevated.  Lactic acid is okay.  Procalcitonin is elevated.  Given IV ceftriaxone based on the aspiration protocol.  Discussed with the admitting hospitalist.  They can expand antibiotics of the leg.  Did give 1 DuoNeb.  Did not seem to do a lot.  EKG does not show any ischemia.  Troponin is 23.  BNP is normal.  I do not think this is cardiac in nature.  Did give IV fluids.  No hypotension.  No need for pressors.  Patient be admitted for further care.  Discussed with patient's daughter.  She states understanding.    3:40 AM I met with the patient to gather history and to perform my initial exam. I discussed the plan for care while in the Emergency Department.   5:19 AM I spoke with Dr. Pereyra, hospitalist, who agrees to plan of admission.     At the conclusion of the encounter I discussed the results of all of the tests and the disposition. The questions were answered. The patient or family acknowledged understanding and was agreeable with the care plan.     Medical Decision Making    History:  Supplemental  history from: Family Member/Significant Other  External Record(s) reviewed: Documented in chart, if applicable.    Work Up:  Chart documentation includes differential considered and any EKGs or imaging independently interpreted by provider, where specified.  In additional to work up documented, I considered the following work up: Documented in chart, if applicable.    External consultation:  Discussion of management with another provider: Documented in chart, if applicable    Complicating factors:  Care impacted by chronic illness: Other: CVA, Gtube, chronic hep C  Care affected by social determinants of health: Access to Medical Care    Disposition considerations: Admit.          Critical Care     Performed by: Dr Tristian Vazquez  Authorized by: Dr Tristian Vazquez  Total critical care time: 90 minutes  Critical care was necessary to treat or prevent imminent or life-threatening deterioration of the following conditions: Sepsis  Critical care was time spent personally by me on the following activities: development of treatment plan with patient or surrogate, discussions with consultants, examination of patient, evaluation of patient's response to treatment, obtaining history from patient or surrogate, ordering and performing treatments and interventions, ordering and review of laboratory studies, ordering and review of radiographic studies, re-evaluation of patient's condition and monitoring for potential decompensation.  Critical care time was exclusive of separately billable procedures and treating other patients.      MEDICATIONS GIVEN IN THE EMERGENCY:  Medications   sodium chloride 0.9% BOLUS 1,000 mL (1,000 mLs Intravenous $New Bag 10/25/23 7072)   cefTRIAXone (ROCEPHIN) 2 g vial to attach to  ml bag for ADULTS or NS 50 ml bag for PEDS (2 g Intravenous $New Bag 10/25/23 1128)   ipratropium - albuterol 0.5 mg/2.5 mg/3 mL (DUONEB) neb solution 3 mL (3 mLs Nebulization $Given 10/25/23 6571)       NEW  PRESCRIPTIONS STARTED AT TODAY'S ER VISIT  New Prescriptions    No medications on file          =================================================================    HPI    Patient information was obtained from: Patient's daughter    Use of : N/A         Liu GIL Malave is a 58 year old male with a pertinent history of both ischemic CVA as well as a hemorrhagic stroke with subsequent spastic hemiplegia, nonverbal, GJ tube dependent as well as subsequent recurrent aspiration pneumonias, recurrent UTIs with urinary retention, and hepatitis C carrier who presents to this ED for evaluation of shortness of breath.     Per chart review, patient was seen at [facility] in [place] on 9/27/23 to establish care. He is currently not seeing any specialist regarding his complex neurovascular history. He was on anticonvulsants at 1 time but he ran out approximately two years ago and did not have anticonvulsants for a year without seizure activity. He has spasticity but uses baclofen as needed for this and is helping. Referral to PMNR for medication improvements and adaptations to improve quality of life. Daughter described that clotrimazole helped rash around Gtube some some may be some fungal components. Recommended a combination antifungal and steroid cream and placed orders for this. Basic lab workup unremarkable for patient's baseline. Discharged home with plan to follow up.     Patient's daughter reports a sudden onset of shortness of breath and low oxygen sats this morning at 1:00AM. Notes patient felt fatigued and developed a cough yesterday. Recent sick contacts. Denies using oxygen or CPAP machines at home. No known recent aspiration. Denies vomiting, diarrhea, and urinary symptoms.     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 coli infection     History of hemorrhagic stroke with residual hemiparesis (H)     Hypertension     Lung nodule 12/05/2016    9 x 9 mm left upper lobe on CXR    MRSA (methicillin resistant staph aureus) culture positive     Periodontal disease     Persistent fever     Pneumonia of both lower lobes due to infectious organism     Respiratory failure with hypoxia (H)     Seizure (H)     Sepsis due to urinary tract infection (H) 06/04/2020    Severe sepsis (H)     Trismus        PAST SURGICAL HISTORY:  Past Surgical History:   Procedure Laterality Date    aneurysm clipping  2006    BRAIN SURGERY      EXAM UNDER ANESTHESIA, RESTORATIONS, EXTRACTION(S) DENTAL COMPLEX, COMBINED N/A 12/19/2016    Procedure: COMBINED EXAM UNDER ANESTHESIA, RESTORATIONS, EXTRACTION(S) DENTAL COMPLEX;  Surgeon: Verónica Martinez DDS;  Location: [facility] OR    EXAM UNDER ANESTHESIA, RESTORATIONS, EXTRACTION(S) DENTAL COMPLEX, COMBINED N/A 3/7/2018    Procedure: COMBINED EXAM UNDER ANESTHESIA, RESTORATIONS, EXTRACTION(S) DENTAL COMPLEX;  Kenalog Injection in Bilateral [place], Combined Dental Exam,Radiographs, Three Dental Restorations, Periodontal Therapy and Fluoride Treatment;  Surgeon: Darryl Isbell DDS;  Location: [facility] OR    HH MIDLINE INSERTION  8/11/2017         INJECT STEROID (LOCATION) N/A 3/7/2018    Procedure: INJECT STEROID (LOCATION);  Kenalog Injection In Bilateral [place], Combined Dental Exam,Radiographs,Three Dental  Restorations, Periodontal Therapy and Fluoride Treatment;  Surgeon: Liu Conroy DDS;  Location: [facility] OR    IR GASTRO JEJUNOSTOMY TUBE CHANGE  7/19/2016    IR GASTRO JEJUNOSTOMY TUBE CHANGE  9/23/2016    IR GASTRO JEJUNOSTOMY TUBE CHANGE  11/28/2016    IR GASTRO JEJUNOSTOMY TUBE CHANGE  1/27/2017    IR GASTRO JEJUNOSTOMY TUBE CHANGE  4/19/2017    IR GASTRO JEJUNOSTOMY TUBE CHANGE  6/20/2017    IR GASTRO JEJUNOSTOMY TUBE CHANGE  8/7/2017    IR GASTRO JEJUNOSTOMY TUBE CHANGE  8/11/2017    IR GASTRO JEJUNOSTOMY TUBE CHANGE  10/20/2017    IR GASTRO JEJUNOSTOMY TUBE CHANGE  1/2/2018    IR GASTRO JEJUNOSTOMY TUBE CHANGE  2/16/2018    IR GASTRO JEJUNOSTOMY TUBE CHANGE  5/16/2018    IR GASTRO JEJUNOSTOMY TUBE CHANGE  6/11/2018    IR GASTRO JEJUNOSTOMY TUBE CHANGE  9/12/2018    IR GASTRO JEJUNOSTOMY TUBE CHANGE  12/24/2018    IR GASTRO JEJUNOSTOMY TUBE CHANGE  3/18/2019    IR GASTRO JEJUNOSTOMY TUBE CHANGE  4/23/2019    IR GASTRO JEJUNOSTOMY TUBE CHANGE  5/21/2019    IR GASTRO JEJUNOSTOMY TUBE CHANGE  9/9/2019    IR GASTRO JEJUNOSTOMY TUBE CHANGE  9/26/2019    IR GASTRO JEJUNOSTOMY TUBE CHANGE  10/11/2019    IR GASTRO JEJUNOSTOMY TUBE CHANGE  11/21/2019    IR GASTRO JEJUNOSTOMY TUBE CHANGE  11/29/2019    IR GASTRO JEJUNOSTOMY TUBE CHANGE  2/27/2020    IR GASTRO JEJUNOSTOMY TUBE CHANGE  4/10/2020    IR GASTRO JEJUNOSTOMY TUBE CHANGE  7/2/2020    IR GASTRO JEJUNOSTOMY TUBE CHANGE  8/28/2020    IR GASTRO JEJUNOSTOMY TUBE CHANGE  9/17/2020    IR GASTRO JEJUNOSTOMY TUBE CHANGE  10/29/2020    IR GASTRO JEJUNOSTOMY TUBE CHANGE  11/6/2020    IR GASTRO JEJUNOSTOMY TUBE CHANGE  12/24/2020    IR GASTRO JEJUNOSTOMY TUBE CHANGE  1/9/2021    IR GASTRO JEJUNOSTOMY TUBE CHANGE  6/9/2021    IR GASTRO JEJUNOSTOMY TUBE CHANGE  10/4/2021    IR GASTRO JEJUNOSTOMY TUBE CHANGE  10/18/2021    IR GASTRO JEJUNOSTOMY TUBE CHANGE  1/14/2022    IR GASTRO JEJUNOSTOMY TUBE CHANGE  1/20/2022    IR GASTRO JEJUNOSTOMY TUBE CHANGE  4/19/2022    IR GASTRO  JEJUNOSTOMY TUBE CHANGE  6/7/2022    IR GASTRO JEJUNOSTOMY TUBE CHANGE  8/19/2022    IR GASTRO JEJUNOSTOMY TUBE CHANGE  9/13/2022    IR GASTRO JEJUNOSTOMY TUBE CHANGE  10/27/2022    IR GASTRO JEJUNOSTOMY TUBE CHANGE  11/3/2022    IR GASTRO JEJUNOSTOMY TUBE CHANGE  1/18/2023    IR GASTRO JEJUNOSTOMY TUBE CHANGE  4/7/2023    IR GASTRO JEJUNOSTOMY TUBE CHANGE  5/4/2023    IR GASTRO JEJUNOSTOMY TUBE CHANGE  6/21/2023    IR GASTRO JEJUNOSTOMY TUBE CHANGE  8/23/2023    IR GASTRO JEJUNOSTOMY TUBE PLACEMENT  6/14/2016    IR GJ TUBE REPLACEMENT  12/24/2018    IR GJ TUBE REPLACEMENT  3/18/2019    IR GJ TUBE REPLACEMENT  4/23/2019    IR GJ TUBE REPLACEMENT  5/21/2019    IR GJ TUBE REPLACEMENT  9/9/2019    IR GJ TUBE REPLACEMENT  9/26/2019    IR GJ TUBE REPLACEMENT  10/11/2019    IR GJ TUBE REPLACEMENT  11/21/2019    IR GJ TUBE REPLACEMENT  11/29/2019    IR GJ TUBE REPLACEMENT  2/27/2020    IR GJ TUBE REPLACEMENT  4/10/2020    IR GJ TUBE REPLACEMENT  7/2/2020    IR GJ TUBE REPLACEMENT  8/28/2020    IR GJ TUBE REPLACEMENT  9/17/2020    IR GJ TUBE REPLACEMENT  10/29/2020    IR GJ TUBE REPLACEMENT  11/6/2020    IR GJ TUBE REPLACEMENT  12/24/2020    IR GJ TUBE REPLACEMENT  1/9/2021    IR GJ TUBE REPLACEMENT  6/9/2021    J-G tube      PICC  6/10/2016         TRACHEOSTOMY      Removed           CURRENT MEDICATIONS:    Current Facility-Administered Medications   Medication    cefTRIAXone (ROCEPHIN) 2 g vial to attach to  ml bag for ADULTS or NS 50 ml bag for PEDS    sodium chloride 0.9% BOLUS 1,000 mL     Current Outpatient Medications   Medication    acetaminophen (TYLENOL) 500 MG tablet    albuterol (PROVENTIL) (2.5 MG/3ML) 0.083% neb solution    amLODIPine (NORVASC) 5 MG tablet    aspirin (ASA) 81 MG chewable tablet    baclofen (LIORESAL) 10 MG tablet    bisacodyl (DULCOLAX) 10 MG suppository    cefaclor (CECLOR) 250 MG capsule    chlorhexidine (PERIDEX) 0.12 % solution    Dextromethorphan-guaiFENesin  MG/5ML syrup     "doxazosin (CARDURA) 1 MG tablet    Glycerin-Hypromellose- (VISINE DRY EYE OP)    ipratropium (ATROVENT) 0.06 % nasal spray    ipratropium - albuterol 0.5 mg/2.5 mg/3 mL (DUONEB) 0.5-2.5 (3) MG/3ML neb solution    ketoconazole (NIZORAL) 2 % external cream    lidocaine (LIDODERM) 5 % patch    meclizine (ANTIVERT) 25 MG tablet    neomycin-bacitracin-polymyxin (NEOSPORIN) 5-400-5000 ointment    nystatin (MYCOSTATIN) 159834 UNIT/GM external cream    omeprazole (PRILOSEC) 40 MG DR capsule    ondansetron (ZOFRAN) 4 MG tablet    Ostomy Supplies (STOMAHESIVE) PSTE    polyethylene glycol (MIRALAX) 17 GM/Dose powder    QUEtiapine (SEROQUEL) 100 MG tablet    saccharomyces boulardii (FLORASTOR) 250 MG capsule    simvastatin (ZOCOR) 20 MG tablet    sodium chloride (NEBUSAL) 3 % neb solution    triamcinolone (KENALOG) 0.1 % external cream    VISINE DRY EYE RELIEF 1 % SOLN         ALLERGIES:  Allergies   Allergen Reactions    Diphenhydramine Unknown    Trazodone Other (See Comments)     \"shaking\" per family       FAMILY HISTORY:  Family History   Problem Relation Age of Onset    Hypertension Mother     Cancer Father     Other Cancer Father     Kidney Disease Brother        SOCIAL HISTORY:   Social History     Socioeconomic History    Marital status:     Number of children: 2   Occupational History    Occupation: disability   Tobacco Use    Smoking status: Former     Packs/day: 1.00     Years: 30.00     Additional pack years: 0.00     Total pack years: 30.00     Types: Cigarettes     Quit date: 2005     Years since quittin.4     Passive exposure: Past    Smokeless tobacco: Former     Quit date: 2007    Tobacco comments:     quit 2006   Vaping Use    Vaping Use: Never used   Substance and Sexual Activity    Alcohol use: No    Drug use: No    Sexual activity: Not Currently     Partners: Female     Birth control/protection: None   Other Topics Concern    Parent/sibling w/ CABG, MI or angioplasty before 65F " 55M? No   Social History Narrative    Lives with wife, 2 daughters and son in law.    W/c bound.     Social Determinants of Health     Financial Resource Strain: Low Risk  (9/27/2023)    Financial Resource Strain     Within the past 12 months, have you or your family members you live with been unable to get utilities (heat, electricity) when it was really needed?: No   Food Insecurity: Low Risk  (9/27/2023)    Food Insecurity     Within the past 12 months, did you worry that your food would run out before you got money to buy more?: No     Within the past 12 months, did the food you bought just not last and you didn t have money to get more?: No   Transportation Needs: Low Risk  (9/27/2023)    Transportation Needs     Within the past 12 months, has lack of transportation kept you from medical appointments, getting your medicines, non-medical meetings or appointments, work, or from getting things that you need?: No   Housing Stability: High Risk (9/27/2023)    Housing Stability     Do you have housing? : No     Are you worried about losing your housing?: No       VITALS:  BP (!) 152/84   Pulse (!) 131   Temp (!) 101.3  F (38.5  C) (Oral)   Resp (!) 42   Wt 60.3 kg (133 lb)   SpO2 92%   BMI 24.33 kg/m      PHYSICAL EXAM    Physical Exam  Vitals and nursing note reviewed.   Constitutional:       General: He is in acute distress.      Appearance: He is ill-appearing. He is not diaphoretic.   HENT:      Head: Atraumatic.   Eyes:      General: No scleral icterus.     Pupils: Pupils are equal, round, and reactive to light.   Cardiovascular:      Rate and Rhythm: Normal rate and regular rhythm.      Heart sounds: Normal heart sounds.   Pulmonary:      Effort: Tachypnea and respiratory distress present.      Breath sounds: Rales present.   Abdominal:      Palpations: Abdomen is soft.      Tenderness: There is no abdominal tenderness.   Musculoskeletal:         General: No tenderness.   Lymphadenopathy:      Cervical:  No cervical adenopathy.   Skin:     General: Skin is warm.      Findings: No rash.           LAB:  All pertinent labs reviewed and interpreted.  Labs Ordered and Resulted from Time of ED Arrival to Time of ED Departure   COMPREHENSIVE METABOLIC PANEL - Abnormal       Result Value    Sodium 141      Potassium 4.5      Carbon Dioxide (CO2) 24      Anion Gap 11      Urea Nitrogen 41.8 (*)     Creatinine 1.29 (*)     GFR Estimate 63      Calcium 9.1      Chloride 106      Glucose 116 (*)     Alkaline Phosphatase 83      AST 29      ALT 32      Protein Total 8.4 (*)     Albumin 4.1      Bilirubin Total 0.3     PROCALCITONIN - Abnormal    Procalcitonin 0.79 (*)    BLOOD GAS VENOUS - Abnormal    pH Venous 7.36      pCO2 Venous 46      pO2 Venous 28      Bicarbonate Venous 26      Base Excess/Deficit 0.4      Oxyhemoglobin Venous 49.2 (*)     O2 Sat, Venous 50.1 (*)    TROPONIN T, HIGH SENSITIVITY - Abnormal    Troponin T, High Sensitivity 23 (*)    CBC WITH PLATELETS AND DIFFERENTIAL - Abnormal    WBC Count 15.9 (*)     RBC Count 3.79 (*)     Hemoglobin 11.9 (*)     Hematocrit 36.5 (*)     MCV 96      MCH 31.4      MCHC 32.6      RDW 11.8      Platelet Count 209      % Neutrophils 77      % Lymphocytes 15      % Monocytes 6      % Eosinophils 1      % Basophils 0      % Immature Granulocytes 1      NRBCs per 100 WBC 0      Absolute Neutrophils 12.2 (*)     Absolute Lymphocytes 2.4      Absolute Monocytes 1.0      Absolute Eosinophils 0.2      Absolute Basophils 0.1      Absolute Immature Granulocytes 0.1      Absolute NRBCs 0.0     LACTIC ACID WHOLE BLOOD - Normal    Lactic Acid 0.8     INFLUENZA A/B, RSV, & SARS-COV2 PCR - Normal    Influenza A PCR Negative      Influenza B PCR Negative      RSV PCR Negative      SARS CoV2 PCR Negative     N TERMINAL PRO BNP OUTPATIENT - Normal    N Terminal Pro BNP Outpatient 95     ROUTINE UA WITH MICROSCOPIC REFLEX TO CULTURE   BLOOD CULTURE   BLOOD CULTURE       RADIOLOGY:  Reviewed  all pertinent imaging. Please see official radiology report.  XR Chest Port 1 View   Final Result   IMPRESSION: The heart is normal in size. Discoid atelectasis is seen in the left lung base. The lungs are otherwise clear.          EKG:    Performed at: 344  Impression: Sinus tachycardia with no acute abnormalities.  Unchanged from previous dated July 17, 2023  Sinus tachycardia with a rate of 136.  .  QRS 72.  QTc 421.  I have independently reviewed and interpreted the EKG(s) documented above.    PROCEDURES:         I, Estelle Becerra, am serving as a scribe to document services personally performed by Dr. Tristian Vazquez, based on my observation and the provider's statements to me. I, Tristian Vazquez MD attest that Estelle Becerra is acting in a scribe capacity, has observed my performance of the services and has documented them in accordance with my direction.    Tristian Vazquez M.D.  Emergency Medicine  [facility] EMERGENCY ROOM  [address] [facility] [id number] [phone]  Dept: [phone]       Tristian Vazquez MD  10/25/23 0550

## 2023-10-26 ENCOUNTER — APPOINTMENT (OUTPATIENT)
Dept: CARDIOLOGY | Facility: CLINIC | Age: 58
End: 2023-10-26
Attending: HOSPITALIST
Payer: COMMERCIAL

## 2023-10-26 LAB
ALBUMIN SERPL BCG-MCNC: 3.2 G/DL (ref 3.5–5.2)
ALP SERPL-CCNC: 65 U/L (ref 40–129)
ALT SERPL W P-5'-P-CCNC: 18 U/L (ref 0–70)
ANION GAP SERPL CALCULATED.3IONS-SCNC: 11 MMOL/L (ref 7–15)
AST SERPL W P-5'-P-CCNC: 27 U/L (ref 0–45)
BILIRUB SERPL-MCNC: 0.2 MG/DL
BUN SERPL-MCNC: 23 MG/DL (ref 8–23)
CALCIUM SERPL-MCNC: 7.8 MG/DL (ref 8.8–10.2)
CHLORIDE SERPL-SCNC: 116 MMOL/L (ref 98–107)
CREAT SERPL-MCNC: 0.95 MG/DL (ref 0.67–1.17)
DEPRECATED HCO3 PLAS-SCNC: 16 MMOL/L (ref 22–29)
EGFRCR SERPLBLD CKD-EPI 2021: >90 ML/MIN/1.73M2
ENTEROCOCCUS FAECALIS: NOT DETECTED
ENTEROCOCCUS FAECIUM: NOT DETECTED
ERYTHROCYTE [DISTWIDTH] IN BLOOD BY AUTOMATED COUNT: 11.8 % (ref 10–15)
GLUCOSE BLDC GLUCOMTR-MCNC: 127 MG/DL (ref 70–99)
GLUCOSE BLDC GLUCOMTR-MCNC: 67 MG/DL (ref 70–99)
GLUCOSE BLDC GLUCOMTR-MCNC: 99 MG/DL (ref 70–99)
GLUCOSE SERPL-MCNC: 72 MG/DL (ref 70–99)
HCT VFR BLD AUTO: 34.2 % (ref 40–53)
HGB BLD-MCNC: 10.8 G/DL (ref 13.3–17.7)
LACTATE SERPL-SCNC: 0.6 MMOL/L (ref 0.7–2)
LISTERIA SPECIES (DETECTED/NOT DETECTED): NOT DETECTED
LVEF ECHO: NORMAL
MCH RBC QN AUTO: 31.8 PG (ref 26.5–33)
MCHC RBC AUTO-ENTMCNC: 31.6 G/DL (ref 31.5–36.5)
MCV RBC AUTO: 101 FL (ref 78–100)
MRSA DNA SPEC QL NAA+PROBE: NEGATIVE
PLATELET # BLD AUTO: 158 10E3/UL (ref 150–450)
POTASSIUM SERPL-SCNC: 4.5 MMOL/L (ref 3.4–5.3)
PROT SERPL-MCNC: 6.8 G/DL (ref 6.4–8.3)
RBC # BLD AUTO: 3.4 10E6/UL (ref 4.4–5.9)
SA TARGET DNA: NEGATIVE
SODIUM SERPL-SCNC: 143 MMOL/L (ref 135–145)
STAPHYLOCOCCUS AUREUS: NOT DETECTED
STAPHYLOCOCCUS EPIDERMIDIS: NOT DETECTED
STAPHYLOCOCCUS LUGDUNENSIS: NOT DETECTED
STAPHYLOCOCCUS SPECIES: DETECTED
STREPTOCOCCUS AGALACTIAE: NOT DETECTED
STREPTOCOCCUS ANGINOSUS GROUP: NOT DETECTED
STREPTOCOCCUS PNEUMONIAE: NOT DETECTED
STREPTOCOCCUS PYOGENES: NOT DETECTED
STREPTOCOCCUS SPECIES: NOT DETECTED
WBC # BLD AUTO: 9.1 10E3/UL (ref 4–11)

## 2023-10-26 PROCEDURE — 94640 AIRWAY INHALATION TREATMENT: CPT

## 2023-10-26 PROCEDURE — 250N000009 HC RX 250

## 2023-10-26 PROCEDURE — 87641 MR-STAPH DNA AMP PROBE: CPT | Performed by: HOSPITALIST

## 2023-10-26 PROCEDURE — 255N000002 HC RX 255 OP 636: Performed by: HOSPITALIST

## 2023-10-26 PROCEDURE — 120N000001 HC R&B MED SURG/OB

## 2023-10-26 PROCEDURE — 83605 ASSAY OF LACTIC ACID: CPT | Performed by: HOSPITALIST

## 2023-10-26 PROCEDURE — 85027 COMPLETE CBC AUTOMATED: CPT | Performed by: HOSPITALIST

## 2023-10-26 PROCEDURE — 250N000011 HC RX IP 250 OP 636: Mod: JZ | Performed by: HOSPITALIST

## 2023-10-26 PROCEDURE — 250N000013 HC RX MED GY IP 250 OP 250 PS 637: Performed by: HOSPITALIST

## 2023-10-26 PROCEDURE — 87040 BLOOD CULTURE FOR BACTERIA: CPT | Performed by: HOSPITALIST

## 2023-10-26 PROCEDURE — 99232 SBSQ HOSP IP/OBS MODERATE 35: CPT | Performed by: HOSPITALIST

## 2023-10-26 PROCEDURE — 250N000013 HC RX MED GY IP 250 OP 250 PS 637: Performed by: INTERNAL MEDICINE

## 2023-10-26 PROCEDURE — 82962 GLUCOSE BLOOD TEST: CPT

## 2023-10-26 PROCEDURE — 36415 COLL VENOUS BLD VENIPUNCTURE: CPT | Performed by: HOSPITALIST

## 2023-10-26 PROCEDURE — 999N000157 HC STATISTIC RCP TIME EA 10 MIN

## 2023-10-26 PROCEDURE — 258N000003 HC RX IP 258 OP 636: Performed by: HOSPITALIST

## 2023-10-26 PROCEDURE — 80053 COMPREHEN METABOLIC PANEL: CPT | Performed by: HOSPITALIST

## 2023-10-26 PROCEDURE — 999N000208 ECHOCARDIOGRAM COMPLETE

## 2023-10-26 PROCEDURE — 258N000001 HC RX 258: Performed by: HOSPITALIST

## 2023-10-26 PROCEDURE — 93306 TTE W/DOPPLER COMPLETE: CPT | Mod: 26 | Performed by: INTERNAL MEDICINE

## 2023-10-26 RX ORDER — BISACODYL 10 MG
10 SUPPOSITORY, RECTAL RECTAL EVERY OTHER DAY
Status: DISCONTINUED | OUTPATIENT
Start: 2023-10-26 | End: 2023-10-27 | Stop reason: HOSPADM

## 2023-10-26 RX ORDER — BACLOFEN 10 MG/1
10 TABLET ORAL ONCE
Status: DISCONTINUED | OUTPATIENT
Start: 2023-10-27 | End: 2023-10-26

## 2023-10-26 RX ORDER — IPRATROPIUM BROMIDE AND ALBUTEROL SULFATE 2.5; .5 MG/3ML; MG/3ML
3 SOLUTION RESPIRATORY (INHALATION) DAILY
Status: DISCONTINUED | OUTPATIENT
Start: 2023-10-26 | End: 2023-10-27

## 2023-10-26 RX ORDER — BACLOFEN 10 MG/1
10 TABLET ORAL EVERY EVENING
Status: DISCONTINUED | OUTPATIENT
Start: 2023-10-26 | End: 2023-10-27

## 2023-10-26 RX ORDER — IPRATROPIUM BROMIDE AND ALBUTEROL SULFATE 2.5; .5 MG/3ML; MG/3ML
SOLUTION RESPIRATORY (INHALATION)
Status: COMPLETED
Start: 2023-10-26 | End: 2023-10-26

## 2023-10-26 RX ORDER — DEXTROSE MONOHYDRATE 100 MG/ML
INJECTION, SOLUTION INTRAVENOUS CONTINUOUS PRN
Status: DISCONTINUED | OUTPATIENT
Start: 2023-10-26 | End: 2023-10-27 | Stop reason: HOSPADM

## 2023-10-26 RX ORDER — BACLOFEN 10 MG/1
10 TABLET ORAL ONCE
Status: COMPLETED | OUTPATIENT
Start: 2023-10-27 | End: 2023-10-27

## 2023-10-26 RX ORDER — CEFEPIME HYDROCHLORIDE 2 G/1
2 INJECTION, POWDER, FOR SOLUTION INTRAVENOUS EVERY 8 HOURS
Status: DISCONTINUED | OUTPATIENT
Start: 2023-10-26 | End: 2023-10-27

## 2023-10-26 RX ORDER — QUETIAPINE FUMARATE 25 MG/1
50 TABLET, FILM COATED ORAL 3 TIMES DAILY
Status: DISCONTINUED | OUTPATIENT
Start: 2023-10-26 | End: 2023-10-27 | Stop reason: HOSPADM

## 2023-10-26 RX ORDER — MECLIZINE HYDROCHLORIDE 25 MG/1
25 TABLET ORAL EVERY 6 HOURS PRN
Status: DISCONTINUED | OUTPATIENT
Start: 2023-10-26 | End: 2023-10-27 | Stop reason: HOSPADM

## 2023-10-26 RX ADMIN — DEXTROSE MONOHYDRATE 500 ML: 100 INJECTION, SOLUTION INTRAVENOUS at 09:15

## 2023-10-26 RX ADMIN — BACLOFEN 10 MG: 10 TABLET ORAL at 18:03

## 2023-10-26 RX ADMIN — PERFLUTREN 2 ML: 6.52 INJECTION, SUSPENSION INTRAVENOUS at 13:55

## 2023-10-26 RX ADMIN — VANCOMYCIN HYDROCHLORIDE 1000 MG: 1 INJECTION, SOLUTION INTRAVENOUS at 15:59

## 2023-10-26 RX ADMIN — CEFEPIME 2 G: 2 INJECTION, POWDER, FOR SOLUTION INTRAVENOUS at 08:37

## 2023-10-26 RX ADMIN — CEFEPIME 2 G: 2 INJECTION, POWDER, FOR SOLUTION INTRAVENOUS at 18:20

## 2023-10-26 RX ADMIN — IPRATROPIUM BROMIDE AND ALBUTEROL SULFATE 3 ML: 2.5; .5 SOLUTION RESPIRATORY (INHALATION) at 08:29

## 2023-10-26 RX ADMIN — QUETIAPINE FUMARATE 25 MG: 25 TABLET ORAL at 03:11

## 2023-10-26 RX ADMIN — QUETIAPINE FUMARATE 50 MG: 25 TABLET ORAL at 18:03

## 2023-10-26 RX ADMIN — IPRATROPIUM BROMIDE 2 SPRAY: 42 SPRAY NASAL at 08:33

## 2023-10-26 RX ADMIN — MECLIZINE HYDROCHLORIDE 25 MG: 25 TABLET ORAL at 17:57

## 2023-10-26 RX ADMIN — HEPARIN SODIUM 5000 UNITS: 5000 INJECTION, SOLUTION INTRAVENOUS; SUBCUTANEOUS at 08:37

## 2023-10-26 RX ADMIN — BACLOFEN 10 MG: 10 TABLET ORAL at 04:36

## 2023-10-26 RX ADMIN — IPRATROPIUM BROMIDE AND ALBUTEROL SULFATE 3 ML: .5; 3 SOLUTION RESPIRATORY (INHALATION) at 08:29

## 2023-10-26 RX ADMIN — DOXAZOSIN 1 MG: 1 TABLET ORAL at 21:17

## 2023-10-26 RX ADMIN — SIMVASTATIN 20 MG: 20 TABLET, FILM COATED ORAL at 21:17

## 2023-10-26 RX ADMIN — ACETAMINOPHEN 975 MG: 325 TABLET ORAL at 17:57

## 2023-10-26 RX ADMIN — SODIUM CHLORIDE: 9 INJECTION, SOLUTION INTRAVENOUS at 08:33

## 2023-10-26 RX ADMIN — BISACODYL 10 MG: 10 SUPPOSITORY RECTAL at 10:32

## 2023-10-26 RX ADMIN — ASPIRIN 81 MG CHEWABLE TABLET 81 MG: 81 TABLET CHEWABLE at 08:36

## 2023-10-26 RX ADMIN — SODIUM CHLORIDE: 9 INJECTION, SOLUTION INTRAVENOUS at 22:17

## 2023-10-26 RX ADMIN — HEPARIN SODIUM 5000 UNITS: 5000 INJECTION, SOLUTION INTRAVENOUS; SUBCUTANEOUS at 21:16

## 2023-10-26 RX ADMIN — QUETIAPINE FUMARATE 25 MG: 25 TABLET ORAL at 20:11

## 2023-10-26 ASSESSMENT — ACTIVITIES OF DAILY LIVING (ADL)
ADLS_ACUITY_SCORE: 47
ADLS_ACUITY_SCORE: 51
ADLS_ACUITY_SCORE: 47
ADLS_ACUITY_SCORE: 51
ADLS_ACUITY_SCORE: 65
ADLS_ACUITY_SCORE: 51
ADLS_ACUITY_SCORE: 65
ADLS_ACUITY_SCORE: 65
ADLS_ACUITY_SCORE: 51
ADLS_ACUITY_SCORE: 47
ADLS_ACUITY_SCORE: 65
ADLS_ACUITY_SCORE: 51

## 2023-10-26 NOTE — CONSULTS
Care Management Initial Consult      General Information  Assessment completed with: VM-chart review, Other (No answer by phone from family),    Type of CM/SW Visit: Initial Assessment  Primary Care Provider verified and updated as needed: Yes   Readmission within the last 30 days: no previous admission in last 30 days   Reason for Consult: discharge planning, transportation  Advance Care Planning: Advance Care Planning Reviewed: no concerns identified        Communication Assessment  Patient's communication style: spoken language (non-English) (Non-verbal)        Cognitive  Cognitive/Neuro/Behavioral: WDL                      Living Environment:   People in home: child(sam), adult, spouse  Lives with daughter Idalmis and she is the main   Current living Arrangements: house      Able to return to prior arrangements: yes     Family/Social Support:  Care provided by: child(sam), homecare agency, spouse/significant other (Family/PCA care as needed)  Provides care for: no one, unable/limited ability to care for self  Marital Status:   Wife, Children, PCA          Description of Support System: Supportive, Involved    Support Assessment: Adequate family and caregiver support, Adequate social supports    Current Resources:   Patient receiving home care services: Yes (PCA services)  Community Resources: PCA, County Worker, County Programs (13.5 hrs/day PCA. CADI waiver)  Equipment currently used at home: walker, rolling, wheelchair, manual, hospital bed (Additional DME use likely)  Supplies currently used at home: Incontinence Supplies, Enteral Nutrition & Supplies, Gloves, Wipes, Chux, Oxygen Tubing/Supplies, Nebulizer tubing    Employment/Financial:  Employment Status: disabled     Financial Concerns: none   Referral to Financial Worker: No     Does the patient's insurance plan have a 3 day qualifying hospital stay waiver?  Yes     Which insurance plan 3 day waiver is available? Alternative insurance  waiver    Will the waiver be used for post-acute placement? Undetermined at this time    Lifestyle & Psychosocial Needs:  Social Determinants of Health     Food Insecurity: Low Risk  (9/27/2023)    Food Insecurity     Within the past 12 months, did you worry that your food would run out before you got money to buy more?: No     Within the past 12 months, did the food you bought just not last and you didn t have money to get more?: No   Depression: Not at risk (5/24/2023)    PHQ-2     PHQ-2 Score: 0   Housing Stability: High Risk (9/27/2023)    Housing Stability     Do you have housing? : No     Are you worried about losing your housing?: No   Tobacco Use: Medium Risk (9/27/2023)    Patient History     Smoking Tobacco Use: Former     Smokeless Tobacco Use: Former     Passive Exposure: Past   Financial Resource Strain: Low Risk  (9/27/2023)    Financial Resource Strain     Within the past 12 months, have you or your family members you live with been unable to get utilities (heat, electricity) when it was really needed?: No   Alcohol Use: Not on file   Transportation Needs: Low Risk  (9/27/2023)    Transportation Needs     Within the past 12 months, has lack of transportation kept you from medical appointments, getting your medicines, non-medical meetings or appointments, work, or from getting things that you need?: No   Physical Activity: Not on file   Interpersonal Safety: Not on file   Stress: Not on file   Social Connections: Not on file     Functional Status:  Prior to admission patient needed assistance:   Dependent ADLs:: Bathing, Dressing, Eating, Grooming, Incontinence, Positioning, Transfers, Toileting, Wheelchair-with assist, Ambulation-walker  Dependent IADLs:: Cleaning, Cooking, Laundry, Shopping, Medication Management, Money Management, Transportation, Incontinence  Assessment of Functional Status: At functional baseline    Mental Health Status:  Mental Health Status: No Current Concerns       Chemical  "Dependency Status:  Chemical Dependency Status: No Current Concerns           Values/Beliefs:  Spiritual, Cultural Beliefs, Christianity Practices, Values that affect care: no (None identified)             Additional Information:  Writer reviewed chart to collect assessment information as family did not answer the phone upon attempted call by writer. Pt lives in as house with his wife, dtr Idalmis, three other adults, and possibly grandchildren. Multiple DME.  Pt is non-verbal and total cares. Family/PCA care provides pt with all I/ADL assistance needed. [employer] used for home tube feeding. Pt is also on a CADI waiver. No concerns with eventual discharge identified.    10/25 per BABAR Solis.-\"58 year old male presents to the Emergency Department for evaluation of cough shortness of breath.  Patient is tachycardic and febrile.  I suspect sepsis.  Initially tried on BiPAP but did not tolerate this.  Has history aspiration may be aspiration pneumonia.  COVID influenza are negative.  White count elevated.  Lactic acid is okay.  Procalcitonin is elevated.  Given IV ceftriaxone based on the aspiration protocol.  Discussed with the admitting hospitalist.  They can expand antibiotics of the leg.  Did give 1 DuoNeb.  Did not seem to do a lot.  EKG does not show any ischemia.  Troponin is 23.  BNP is normal.  I do not think this is cardiac in nature.  Did give IV fluids.  No hypotension.  No need for pressors.  Patient be admitted for further care.  Discussed with patient's daughter.  She states understanding.\"    No pending consults. Anticipate improvement and return home to the care of PCAs/family. CM to follow for changes in current anticipated discharge disposition. Family to transport.    Moris Young RN      "

## 2023-10-26 NOTE — PLAN OF CARE
Problem: Sepsis/Septic Shock  Goal: Absence of Bleeding  Outcome: Progressing     Problem: Pneumonia  Goal: Effective Oxygenation and Ventilation  Outcome: Progressing   Goal Outcome Evaluation:    Cares provided [id number].   Unable to assess orientation due to nonverbal status.. VSS on RA. Pt did not appear to be in any pain based on rFLACC scale. Pt is nonverbal, but daughter is in room to explain cares in Irish. Pt has a wet, weak cough, suction at bedside for secretions. NS @100mL/hr. Pt does not get OOB. Male pure wick on and patent. G/J tube used to administer medications. Lactic acid fired on shift, back at 1.2.

## 2023-10-26 NOTE — PROGRESS NOTES
[facility]    Medicine Progress Note - Hospitalist Service    Date of Admission:  10/25/2023    Assessment & Plan   Liu Malave is a 58 year old male admitted with sepsis 2/2 community acquired pneumonia.  He is currently clinically stable.  Continue empiric abx pending cultures.  Resume tube feeds today.    # Acute hypoxic respiratory failure, improved  # Sepsis  - vanc, cefepime  - cx pending     # Chronic tube feed dependence  - GJ exchanged on 8/23     # Hep C  - outpatient hepatology followup     # Hx aneurysm  # Hx hemorrhagic CVA     # Hx reactive airway disease     # HTN  # HLD          Diet: NPO for Medical/Clinical Reasons Except for: No Exceptions    Kelly Catheter: Not present  Lines: None     Cardiac Monitoring: None  Code Status: Full Code      Clinically Significant Risk Factors          # Hypocalcemia: Lowest Ca = 7.8 mg/dL in last 2 days, will monitor and replace as appropriate     # Hypoalbuminemia: Lowest albumin = 3.2 g/dL at 10/26/2023  5:48 AM, will monitor as appropriate                # Financial/Environmental Concerns: none         Disposition Plan     Expected Discharge Date: 10/27/2023      Destination: home with help/services;home with family              Tre Syed MD  Hospitalist Service  [facility]  Securely message with [employer] (more info)  Text page via [employer] Paging/Directory   ______________________________________________________________________    Interval History   Patient is nonverbal.  Family reports he had trouble sleeping.  They communicate he receives quetiapine three times nightly at home.    Physical Exam   Vital Signs: Temp: 98.5  F (36.9  C) Temp src: Axillary BP: 134/72 Pulse: 87   Resp: 22 SpO2: 94 % O2 Device: None (Room air)    Weight: 133 lbs 0 oz    Gen:  lying in bed, appears comfortable overall  Neuro: alert, nonverbal  CV:  nl rate, regular rhythm to palpation  Pulm: no acute resp distress, coarse breath  sounds anteriorly  GI:  abdomen soft, NTTP    Medical Decision Making             Data   Reviewed:    Na 143  K 4.5  BUN 23  Cr 0.95  CO2 16    WBC 9.1  Hgb 11  Plts 158

## 2023-10-26 NOTE — PLAN OF CARE
Non-verbal. Per family, only understands Japanese. Family at bedside providing translation and verbal cues.  VSS on RA. Did not appear to be in pain. Weak, congested cough, productive; suction at bedside.   mL/hr.  Male purewick in place; patent.      Goal Outcome Evaluation:    Problem: Adult Inpatient Plan of Care  Goal: Optimal Comfort and Wellbeing  Outcome: Progressing     Problem: Pneumonia  Goal: Fluid Balance  Outcome: Progressing

## 2023-10-26 NOTE — PROGRESS NOTES
"/72 (BP Location: Left arm)   Pulse 90   Temp 98.8  F (37.1  C) (Axillary)   Resp 18   Ht 1.575 m (5' 2\")   Wt 60.3 kg (133 lb)   SpO2 93%   BMI 24.33 kg/m      I Dc'd the BIPAP order for (1) the PT was only on it for ~ 1 hour several hours ago (and pre-BIPAP VBG was not c/w an objective need for it) and (2) the presence of relative contraindications (can't take off mask by self, aspiration concerns, etc). RT will follow as directed.  "

## 2023-10-27 VITALS
RESPIRATION RATE: 16 BRPM | HEIGHT: 62 IN | TEMPERATURE: 98.8 F | BODY MASS INDEX: 24.48 KG/M2 | HEART RATE: 79 BPM | OXYGEN SATURATION: 95 % | WEIGHT: 133 LBS | SYSTOLIC BLOOD PRESSURE: 147 MMHG | DIASTOLIC BLOOD PRESSURE: 71 MMHG

## 2023-10-27 DIAGNOSIS — Z09 HOSPITAL DISCHARGE FOLLOW-UP: ICD-10-CM

## 2023-10-27 PROBLEM — R65.20 SEPSIS WITH ACUTE HYPOXIC RESPIRATORY FAILURE WITHOUT SEPTIC SHOCK (H): Status: ACTIVE | Noted: 2023-10-25

## 2023-10-27 PROBLEM — R73.9 HYPERGLYCEMIA: Status: ACTIVE | Noted: 2023-10-27

## 2023-10-27 PROBLEM — J96.01 SEPSIS WITH ACUTE HYPOXIC RESPIRATORY FAILURE WITHOUT SEPTIC SHOCK (H): Status: ACTIVE | Noted: 2023-10-25

## 2023-10-27 PROBLEM — J18.9 COMMUNITY ACQUIRED PNEUMONIA: Status: ACTIVE | Noted: 2023-10-25

## 2023-10-27 LAB
ALBUMIN SERPL BCG-MCNC: 3.1 G/DL (ref 3.5–5.2)
ALP SERPL-CCNC: 68 U/L (ref 40–129)
ALT SERPL W P-5'-P-CCNC: 17 U/L (ref 0–70)
ANION GAP SERPL CALCULATED.3IONS-SCNC: 8 MMOL/L (ref 7–15)
AST SERPL W P-5'-P-CCNC: 25 U/L (ref 0–45)
ATRIAL RATE - MUSE: 90 BPM
BILIRUB SERPL-MCNC: 0.2 MG/DL
BUN SERPL-MCNC: 15.2 MG/DL (ref 8–23)
CALCIUM SERPL-MCNC: 7.7 MG/DL (ref 8.8–10.2)
CHLORIDE SERPL-SCNC: 114 MMOL/L (ref 98–107)
CREAT SERPL-MCNC: 0.84 MG/DL (ref 0.67–1.17)
DEPRECATED HCO3 PLAS-SCNC: 17 MMOL/L (ref 22–29)
DIASTOLIC BLOOD PRESSURE - MUSE: NORMAL MMHG
EGFRCR SERPLBLD CKD-EPI 2021: >90 ML/MIN/1.73M2
ERYTHROCYTE [DISTWIDTH] IN BLOOD BY AUTOMATED COUNT: 11.4 % (ref 10–15)
GLUCOSE SERPL-MCNC: 108 MG/DL (ref 70–99)
HCT VFR BLD AUTO: 31 % (ref 40–53)
HGB BLD-MCNC: 10 G/DL (ref 13.3–17.7)
INTERPRETATION ECG - MUSE: NORMAL
LACTATE SERPL-SCNC: 0.5 MMOL/L (ref 0.7–2)
MCH RBC QN AUTO: 31.3 PG (ref 26.5–33)
MCHC RBC AUTO-ENTMCNC: 32.3 G/DL (ref 31.5–36.5)
MCV RBC AUTO: 97 FL (ref 78–100)
P AXIS - MUSE: 56 DEGREES
PLATELET # BLD AUTO: 167 10E3/UL (ref 150–450)
POTASSIUM SERPL-SCNC: 3.8 MMOL/L (ref 3.4–5.3)
PR INTERVAL - MUSE: 152 MS
PROT SERPL-MCNC: 6.5 G/DL (ref 6.4–8.3)
QRS DURATION - MUSE: 80 MS
QT - MUSE: 362 MS
QTC - MUSE: 442 MS
R AXIS - MUSE: 15 DEGREES
RBC # BLD AUTO: 3.19 10E6/UL (ref 4.4–5.9)
SODIUM SERPL-SCNC: 139 MMOL/L (ref 135–145)
SYSTOLIC BLOOD PRESSURE - MUSE: NORMAL MMHG
T AXIS - MUSE: 49 DEGREES
VANCOMYCIN SERPL-MCNC: 19 UG/ML
VENTRICULAR RATE- MUSE: 90 BPM
WBC # BLD AUTO: 6.3 10E3/UL (ref 4–11)

## 2023-10-27 PROCEDURE — 250N000009 HC RX 250: Performed by: HOSPITALIST

## 2023-10-27 PROCEDURE — 250N000013 HC RX MED GY IP 250 OP 250 PS 637: Performed by: HOSPITALIST

## 2023-10-27 PROCEDURE — 93010 ELECTROCARDIOGRAM REPORT: CPT | Performed by: GENERAL ACUTE CARE HOSPITAL

## 2023-10-27 PROCEDURE — 94640 AIRWAY INHALATION TREATMENT: CPT

## 2023-10-27 PROCEDURE — 93005 ELECTROCARDIOGRAM TRACING: CPT

## 2023-10-27 PROCEDURE — 80053 COMPREHEN METABOLIC PANEL: CPT | Performed by: HOSPITALIST

## 2023-10-27 PROCEDURE — 83605 ASSAY OF LACTIC ACID: CPT | Performed by: HOSPITALIST

## 2023-10-27 PROCEDURE — 94640 AIRWAY INHALATION TREATMENT: CPT | Mod: 76

## 2023-10-27 PROCEDURE — 85027 COMPLETE CBC AUTOMATED: CPT | Performed by: HOSPITALIST

## 2023-10-27 PROCEDURE — 250N000011 HC RX IP 250 OP 636: Mod: JZ | Performed by: HOSPITALIST

## 2023-10-27 PROCEDURE — 99238 HOSP IP/OBS DSCHRG MGMT 30/<: CPT | Performed by: HOSPITALIST

## 2023-10-27 PROCEDURE — 999N000157 HC STATISTIC RCP TIME EA 10 MIN

## 2023-10-27 PROCEDURE — 80202 ASSAY OF VANCOMYCIN: CPT | Performed by: HOSPITALIST

## 2023-10-27 PROCEDURE — 36415 COLL VENOUS BLD VENIPUNCTURE: CPT | Performed by: HOSPITALIST

## 2023-10-27 RX ORDER — LEVOFLOXACIN 750 MG/1
750 TABLET, FILM COATED ORAL DAILY
Qty: 4 TABLET | Refills: 0 | Status: SHIPPED | OUTPATIENT
Start: 2023-10-28 | End: 2023-11-01

## 2023-10-27 RX ORDER — IPRATROPIUM BROMIDE AND ALBUTEROL SULFATE 2.5; .5 MG/3ML; MG/3ML
3 SOLUTION RESPIRATORY (INHALATION)
Status: DISCONTINUED | OUTPATIENT
Start: 2023-10-27 | End: 2023-10-27 | Stop reason: HOSPADM

## 2023-10-27 RX ORDER — LEVOFLOXACIN 750 MG/1
750 TABLET, FILM COATED ORAL DAILY
Status: DISCONTINUED | OUTPATIENT
Start: 2023-10-27 | End: 2023-10-27 | Stop reason: HOSPADM

## 2023-10-27 RX ORDER — AMLODIPINE BESYLATE 5 MG/1
5 TABLET ORAL DAILY
Status: DISCONTINUED | OUTPATIENT
Start: 2023-10-27 | End: 2023-10-27 | Stop reason: HOSPADM

## 2023-10-27 RX ORDER — BACLOFEN 10 MG/1
10 TABLET ORAL 4 TIMES DAILY PRN
Status: DISCONTINUED | OUTPATIENT
Start: 2023-10-27 | End: 2023-10-27 | Stop reason: HOSPADM

## 2023-10-27 RX ORDER — KETOCONAZOLE 20 MG/G
CREAM TOPICAL 2 TIMES DAILY
Status: DISCONTINUED | OUTPATIENT
Start: 2023-10-27 | End: 2023-10-27 | Stop reason: HOSPADM

## 2023-10-27 RX ADMIN — Medication 3 MG: at 05:25

## 2023-10-27 RX ADMIN — HEPARIN SODIUM 5000 UNITS: 5000 INJECTION, SOLUTION INTRAVENOUS; SUBCUTANEOUS at 10:11

## 2023-10-27 RX ADMIN — QUETIAPINE FUMARATE 50 MG: 25 TABLET ORAL at 00:09

## 2023-10-27 RX ADMIN — QUETIAPINE FUMARATE 50 MG: 25 TABLET ORAL at 03:14

## 2023-10-27 RX ADMIN — CEFEPIME 2 G: 2 INJECTION, POWDER, FOR SOLUTION INTRAVENOUS at 00:10

## 2023-10-27 RX ADMIN — BACLOFEN 10 MG: 10 TABLET ORAL at 00:09

## 2023-10-27 RX ADMIN — IPRATROPIUM BROMIDE AND ALBUTEROL SULFATE 3 ML: .5; 3 SOLUTION RESPIRATORY (INHALATION) at 08:33

## 2023-10-27 RX ADMIN — ACETAMINOPHEN 975 MG: 325 TABLET ORAL at 03:14

## 2023-10-27 RX ADMIN — Medication 40 MG: at 10:11

## 2023-10-27 RX ADMIN — IPRATROPIUM BROMIDE 2 SPRAY: 42 SPRAY NASAL at 10:11

## 2023-10-27 RX ADMIN — IPRATROPIUM BROMIDE AND ALBUTEROL SULFATE 3 ML: .5; 3 SOLUTION RESPIRATORY (INHALATION) at 12:02

## 2023-10-27 RX ADMIN — AMLODIPINE BESYLATE 5 MG: 5 TABLET ORAL at 10:11

## 2023-10-27 RX ADMIN — KETOCONAZOLE: 20 CREAM TOPICAL at 10:11

## 2023-10-27 RX ADMIN — ASPIRIN 81 MG CHEWABLE TABLET 81 MG: 81 TABLET CHEWABLE at 10:11

## 2023-10-27 RX ADMIN — LEVOFLOXACIN 750 MG: 750 TABLET, FILM COATED ORAL at 10:11

## 2023-10-27 ASSESSMENT — ACTIVITIES OF DAILY LIVING (ADL)
ADLS_ACUITY_SCORE: 65

## 2023-10-27 NOTE — DISCHARGE SUMMARY
[facility]  Hospitalist Discharge Summary      Date of Admission:  10/25/2023  Date of Discharge:  10/27/2023  Discharging Provider: Tre Syed MD  Discharge Service: Hospitalist Service    Discharge Diagnoses   Acute hypoxic respiratory failure  Sepsis 2/2 community acquired pneumonia  Hyperglycemia    Clinically Significant Risk Factors          Follow-ups Needed After Discharge   Follow-up Appointments     Follow-up and recommended labs and tests       Follow up with primary care provider, Shahid Enriquez, within 7 days for   hospital follow- up.  The following labs/tests are recommended:  - followup hyperglycemia  - ensure followup with hepatology or hepatitis C            Unresulted Labs Ordered in the Past 30 Days of this Admission       Date and Time Order Name Status Description    10/26/2023 11:33 AM Blood Culture Hand, Left Preliminary     10/26/2023 11:33 AM Blood Culture Arm, Right Preliminary     10/25/2023  3:53 AM Blood Culture Peripheral Blood Preliminary     10/25/2023  3:53 AM Blood Culture Peripheral Blood Preliminary         These results will be followed up by PCP    Discharge Disposition   Discharged to home  Condition at discharge: Stable    Hospital Course   The patient was admitted with acute hypoxic respiratory failure and sepsis 2/2 community acquired pneumonia.  He was treated with IV antibiotics.  He had resolution of hypoxia and overall clinical improvement by discharge.  He was discharged home to complete a course of antibiotics.    Consultations This Hospital Stay   PHARMACY TO DOSE VANCO  CARE MANAGEMENT / SOCIAL WORK IP CONSULT  PHARMACY IP CONSULT    Code Status   Full Code    Time Spent on this Encounter   I, Tre Syed MD, personally saw the patient today and spent less than or equal to 30 minutes discharging this patient.       Tre Syed MD  [address]  [address]  Phone: [phone]  Fax: [phone]  ______________________________________________________________________    Physical Exam   Vital Signs: Temp: 98.9  F (37.2  C) Temp src: Axillary BP: 132/84 Pulse: 77   Resp: 16 SpO2: 95 % O2 Device: None (Room air)    Weight: 133 lbs 0 oz    See progress note       Primary Care Physician   Shahid Enriquez    Discharge Orders      Reason for your hospital stay    You were admitted to the hospital with pneumonia.  You were treated with IV antibiotics and had improvement by discharge.     Follow-up and recommended labs and tests     Follow up with primary care provider, Shahid Enriquez, within 7 days for hospital follow- up.  The following labs/tests are recommended:  - followup hyperglycemia  - ensure followup with hepatology or hepatitis C     Activity    Your activity upon discharge: previously level of activity as tolerated     Resume Home Care Services     When to contact your care team    Seek medical attention if you have any of the following: fevers, difficulty breathing, or change in mental status.     Diet    Follow this diet upon discharge: tube feed diet       Significant Results and Procedures   Most Recent 3 CBC's:  Recent Labs   Lab Test 10/27/23  0657 10/26/23  0548 10/25/23  0417   WBC 6.3 9.1 15.9*   HGB 10.0* 10.8* 11.9*   MCV 97 101* 96    158 209     Most Recent 3 BMP's:  Recent Labs   Lab Test 10/27/23  0657 10/26/23  1645 10/26/23  1038 10/26/23  0855 10/26/23  0548 10/25/23  0417     --   --   --  143 141   POTASSIUM 3.8  --   --   --  4.5 4.5   CHLORIDE 114*  --   --   --  116* 106   CO2 17*  --   --   --  16* 24   BUN 15.2  --   --   --  23.0 41.8*   CR 0.84  --   --   --  0.95 1.29*   ANIONGAP 8  --   --   --  11 11   SAMIR 7.7*  --   --   --  7.8* 9.1   * 99 127*   < > 72 116*    < > = values in this interval not displayed.   ,   Results for orders placed or performed during the hospital encounter of 10/25/23   XR Chest  Port 1 View    Narrative    EXAM: XR CHEST PORT 1 VIEW  LOCATION: [facility]  DATE: 10/25/2023    INDICATION: a  COMPARISON: None.      Impression    IMPRESSION: The heart is normal in size. Discoid atelectasis is seen in the left lung base. The lungs are otherwise clear.   CT Chest (PE) Abdomen Pelvis w Contrast    Narrative    EXAM: CT CHEST PE ABDOMEN PELVIS W CONTRAST  LOCATION: [facility]  DATE: 10/25/2023    INDICATION: fever, tachycardia, hypoxia, ?pe or infection,undifferentiated sepsis  COMPARISON: Today's 10/25/2023 CXR, RUQ US 10/10/2023, 07/17/2023 CTA chest and 06/07/2023 CT AP  TECHNIQUE: CT chest pulmonary angiogram and routine CT abdomen pelvis with IV contrast. Arterial phase through the chest and venous phase through the abdomen and pelvis. Multiplanar reformats and MIP reconstructions were performed. Dose reduction   techniques were used.   CONTRAST: Isovue 370 90 mL IV    FINDINGS:  ANGIOGRAM CHEST: Pulmonary arteries are normal caliber and negative for pulmonary emboli. Normal caliber thoracic aorta with no evidence of dissection. No CT evidence of right heart strain.     LUNGS AND PLEURA: Multiple foci of consolidation and/or volume loss have developed throughout the lower lobes. Mild centrilobular emphysema. Benign calcified granulomas mild scarring in the upper lungs. No pleural effusion.    MEDIASTINUM/AXILLAE: No lymphadenopathy. Heart size within normal limits. No pericardial effusion.    CORONARY ARTERY CALCIFICATION: Calcified atheromatous plaque throughout the right and LAD coronary arteries.    HEPATOBILIARY: Liver is normal.  No calcified gallstones or bile duct dilatation.     PANCREAS: Normal.    SPLEEN: Spleen size normal.    ADRENAL GLANDS: Normal.    KIDNEY/BLADDER: Several benign renal cysts. No follow up is needed. Kidneys, ureters and bladder are otherwise normal.    BOWEL: Normal appendix. Well-positioned percutaneous  gastrojejunostomy tube tip in proximal jejunum. Bowel is otherwise normal with no obstruction or inflammatory change. No free air. No free fluid.    LYMPH NODES: No lymphadenopathy.    VASCULATURE: Normal caliber abdominal aorta with mild calcified atheromatous plaque.      PELVIC ORGANS: Prostate enlargement. Pelvis otherwise unremarkable.    MUSCULOSKELETAL: Unremarkable.      Impression    IMPRESSION:   1.  No pulmonary emboli. No evidence of acute aortic syndrome.  2.  Multiple foci of consolidation and/or volume loss have developed throughout the lower lobes.   3.  Mild centrilobular emphysema.   4.  Calcified atheromatous plaque throughout the right and LAD coronary arteries.  5.  Well-positioned percutaneous gastrojejunostomy tube tip in proximal jejunum.  6.  Prostate enlargement.   Echocardiogram Complete     Value    LVEF  60-65%    Narrative    [id number]  [id number]  [id number]  [id number]^JAYLAN^[place] [id number]     Name: NAVYA ESPINAL  MRN: [id number]  : 1965  Study Date: 10/26/2023 01:31 PM  Age: 58 yrs  Gender: Male  Patient Location: [facility]  Reason For Study: Endocarditis  Ordering Physician: EAGLE TIAN  Performed By: PHILIP     BSA: 1.6 m2  Height: 62 in  Weight: 133 lb  HR: 94  ______________________________________________________________________________  Procedure  Complete Echo Adult. Definity (NDC #26429-718) given intravenously.  Technically difficult study. No hemodynamically significant valvular  abnormalities on 2D or color flow imaging. There is no comparison study  available.  ______________________________________________________________________________  Interpretation Summary     Left ventricular size, wall motion and function are normal. The ejection  fraction is 60-65%.  Normal right ventricle size and systolic function.  Both atria are of normal size.  No hemodynamically significant valvular abnormalities on 2D or color  flow  imaging.  There is no comparison study available.  ______________________________________________________________________________  Left Ventricle  Left ventricular size, wall motion and function are normal. The ejection  fraction is 60-65%. There is normal left ventricular wall thickness. Left  ventricular diastolic function is normal.     Right Ventricle  Normal right ventricle size and systolic function.     Atria  Normal left atrial size. Right atrial size is normal. There is no color  Doppler evidence of an atrial shunt.     Mitral Valve  Mitral valve leaflets appear normal. There is no evidence of mitral stenosis  or clinically significant mitral regurgitation.     Tricuspid Valve  Tricuspid valve leaflets appear normal. There is no evidence of tricuspid  stenosis or clinically significant tricuspid regurgitation. Right ventricle  systolic pressure estimate normal. The right ventricular systolic pressure is  approximated at 17.9 mmHg plus the right atrial pressure.     Aortic Valve  The aortic valve is trileaflet. Aortic valve leaflets appear normal. There is  no evidence of aortic stenosis or clinically significant aortic regurgitation.     Pulmonic Valve  The pulmonic valve is not well seen, but is grossly normal. This degree of  valvular regurgitation is within normal limits. There is trace pulmonic  valvular regurgitation.     Vessels  The aorta root is normal. Normal size ascending aorta. IVC diameter <2.1 cm  collapsing >50% with sniff suggests a normal RA pressure of 3 mmHg.     Pericardium  There is no pericardial effusion.     Rhythm  Sinus rhythm was noted.  ______________________________________________________________________________  MMode/2D Measurements & Calculations  IVSd: 0.95 cm  LVIDd: 4.3 cm  LVIDs: 3.1 cm  LVPWd: 1.1 cm  FS: 28.2 %  LV mass(C)d: 141.6 grams  LV mass(C)dI: 88.1 grams/m2  Ao root diam: 3.1 cm  asc Aorta Diam: 3.0 cm  LVOT diam: 2.4 cm  LVOT area: 4.4 cm2  Ao root diam  index Ht(cm/m): 2.0  Ao root diam index BSA (cm/m2): 1.9  Asc Ao diam index BSA (cm/m2): 1.9  Asc Ao diam index Ht(cm/m): 1.9  LA Volume (BP): 31.6 ml     LA Volume Index (BP): 19.6 ml/m2  LA Volume Indexed (AL/bp): 21.0 ml/m2  RWT: 0.50  TAPSE: 2.3 cm     Doppler Measurements & Calculations  MV E max hollis: 65.6 cm/sec  MV A max hollis: 76.7 cm/sec  MV E/A: 0.86  MV dec slope: 326.6 cm/sec2  MV dec time: 0.20 sec  Ao V2 max: 130.3 cm/sec  Ao max P.0 mmHg  Ao V2 mean: 88.9 cm/sec  Ao mean PG: 3.7 mmHg  Ao V2 VTI: 21.1 cm  DORI(I,D): 3.1 cm2  DORI(V,D): 2.8 cm2  LV V1 max P.7 mmHg  LV V1 max: 82.7 cm/sec  LV V1 VTI: 14.7 cm  SV(LVOT): 64.6 ml  SI(LVOT): 40.2 ml/m2  PA acc time: 0.08 sec  TR max hollis: 211.7 cm/sec  TR max P.9 mmHg  AV Hollis Ratio (DI): 0.63  DORI Index (cm2/m2): 1.9  E/E': 7.6  E/E' av.2  Lateral E/e': 8.9  Medial E/e': 7.4  Peak E' Hollis: 8.6 cm/sec  RV S Hollis: 11.9 cm/sec     ______________________________________________________________________________  Report approved by: Chester Joshi 10/26/2023 02:54 PM             Discharge Medications   Current Discharge Medication List        START taking these medications    Details   levofloxacin (LEVAQUIN) 750 MG tablet 1 tablet (750 mg) by Per G Tube route daily for 4 days Do not take with vitamin supplements or TUMS.  Qty: 4 tablet, Refills: 0    Associated Diagnoses: Community acquired pneumonia, unspecified laterality           CONTINUE these medications which have NOT CHANGED    Details   acetaminophen (TYLENOL) 500 MG tablet Take 2 tablets (1,000 mg) by mouth every 6 hours as needed for mild pain  Qty: 720 tablet, Refills: 3    Associated Diagnoses: Pain      albuterol (PROVENTIL) (2.5 MG/3ML) 0.083% neb solution Take 1 vial (2.5 mg) by nebulization every 6 hours as needed for shortness of breath, wheezing or cough.*      amLODIPine (NORVASC) 5 MG tablet Take 1 tablet (5 mg total) by mouth daily.  Qty: 30 tablet, Refills: 11    Associated  Diagnoses: Essential hypertension      aspirin (ASA) 81 MG chewable tablet 1 tablet (81 mg) by Per Feeding Tube route daily  Qty: 90 tablet, Refills: 3    Associated Diagnoses: History of stroke      baclofen (LIORESAL) 10 MG tablet 1 tablet (10 mg) by Per G Tube route 4 times daily as needed for muscle spasms Usually takes with Seroquel  Qty: 360 tablet, Refills: 3    Associated Diagnoses: Muscle spasm      bisacodyl (DULCOLAX) 10 MG suppository INSERT 1 SUPPOSTIORY INTO THE RECTUM DAILY AS NEEDED FOR CONSTIPATION.  Qty: 30 suppository, Refills: 8    Associated Diagnoses: Slow transit constipation      cefaclor (CECLOR) 250 MG capsule Take 250 mg by mouth daily UTI ppx      Dextromethorphan-guaiFENesin  MG/5ML syrup TAKE 10 MLS BY G TUBE ROUTE EVERY 4 HOURS AS NEEDED FOR COUGH.  Qty: 237 mL, Refills: 4    Associated Diagnoses: History of pneumonia      doxazosin (CARDURA) 1 MG tablet Take 1 tablet (1 mg) by mouth At Bedtime  Qty: 90 tablet, Refills: 3    Associated Diagnoses: Urinary retention with incomplete bladder emptying      ipratropium (ATROVENT) 0.06 % nasal spray Spray 2 sprays into both nostrils daily      ipratropium - albuterol 0.5 mg/2.5 mg/3 mL (DUONEB) 0.5-2.5 (3) MG/3ML neb solution Take 1 vial (3 mLs) by nebulization every 4 hours  Qty: 90 mL, Refills: 0    Associated Diagnoses: Chronic cough      ketoconazole (NIZORAL) 2 % external cream Apply topically 2 times daily  Qty: 30 g, Refills: 0    Associated Diagnoses: Irritant dermatitis      lidocaine (LIDODERM) 5 % patch Place 1 patch onto the skin daily as needed for moderate pain To prevent lidocaine toxicity, patient should be patch free for 12 hrs daily.  Qty: 30 patch, Refills: 11    Associated Diagnoses: Muscle spasm      meclizine (ANTIVERT) 25 MG tablet Take 1 tablet via G-tube every 6 hours as needed for vertigo  Qty: 30 tablet, Refills: 11    Associated Diagnoses: Vertigo      neomycin-bacitracin-polymyxin (NEOSPORIN) 5-400-5000  ointment Apply topically 4 times daily  Qty: 30 g, Refills: 3    Associated Diagnoses: Irritation around percutaneous endoscopic gastrostomy (PEG) tube site (H)      nystatin (MYCOSTATIN) 882568 UNIT/GM external cream Apply topically 2 times daily as needed (redness)  Qty: 30 g, Refills: 3    Associated Diagnoses: On tube feeding diet      omeprazole (PRILOSEC) 40 MG DR capsule Take 40 mg by mouth daily      ondansetron (ZOFRAN) 4 MG tablet TAKE 1 TABLET (4 MG) BY MOUTH EVERY 8 HOURS AS NEEDED FOR NAUSEA  Qty: 30 tablet, Refills: 0    Associated Diagnoses: Nausea      polyethylene glycol (MIRALAX) 17 GM/Dose powder Give 1 capful via G-tube daily as needed for constipation  Qty: 850 g, Refills: 3    Associated Diagnoses: Slow transit constipation      !! QUEtiapine (SEROQUEL) 25 MG tablet Take 25 mg by mouth 3 times daily as needed (Anxiety/Sleep)      !! QUEtiapine (SEROQUEL) 50 MG tablet Take 50 mg by mouth at bedtime      saccharomyces boulardii (FLORASTOR) 250 MG capsule Take 250 mg by mouth daily      simvastatin (ZOCOR) 20 MG tablet 1 tablet (20 mg) by Per G Tube route At Bedtime  Qty: 90 tablet, Refills: 0    Associated Diagnoses: Dyslipidemia      sodium chloride (NEBUSAL) 3 % neb solution Take 3 mLs by nebulization every 6 hours as needed for other (thick sputum)  Qty: 30 mL, Refills: 0    Associated Diagnoses: Chronic mucus hypersecretion, respiratory      triamcinolone (KENALOG) 0.1 % external cream Apply topically 2 times daily  Qty: 30 g, Refills: 0    Associated Diagnoses: Irritant dermatitis      VISINE DRY EYE RELIEF 1 % SOLN Apply 1 drop to eye every 6 hours as needed (dry eyes)  Qty: 15 mL, Refills: 0    Associated Diagnoses: Dry eyes      Ostomy Supplies (STOMAHESIVE) PSTE Apply topically to skin around ostomy site twice daily and as needed  Qty: 56.7 g, Refills: 3    Associated Diagnoses: Irritation around percutaneous endoscopic gastrostomy (PEG) tube site (H)       !! - Potential duplicate  "medications found. Please discuss with provider.        Allergies   Allergies   Allergen Reactions    Diphenhydramine Unknown    Trazodone Other (See Comments)     \"shaking\" per family     "

## 2023-10-27 NOTE — PROGRESS NOTES
Pt alert. No signs or symptoms of pain. VSS. PIV in right hand and L fore arm removed. AVS printed, discussed, and sent home with patient. Pt transported via family.

## 2023-10-27 NOTE — PROGRESS NOTES
Care Management Discharge Note    Discharge Date: 10/27/2023     Discharge Disposition: Home    Discharge Services: None    Discharge DME: None    Discharge Transportation: family or friend will provide    Private pay costs discussed: Not applicable    Education Provided on the Discharge Plan: Yes    Persons Notified of Discharge Plans: patient per team, daughter Idalmis    Patient/Family in Agreement with the Plan: yes    Additional Information:    Pt discharging to home, family transport, outpatient followup.  Resume TF as prior to hospital admit.  Resume PCA services as prior to admit.  Daughter Idalmis denies any CM needs.    [facility] - [phone] - Jonas Penningtoness at [facility], pt is now with [facility].    [facility] ([phone], Fax [phone]) - Beth confirms pt is active and receiving TF through them.  Current order: Peptemin 1.5 prebiotic - pt receives 4 cartons/day (250ml/carton, 60ml/hour until 4 cartons are infused).  It would take 1-2 days to change in a pt's chart, then dietician must review.  It could take a week to fully change/process a pt's TF orders.    11:26 AM  Writer confirmed with Dr. Syed: pt is to resume TF formula/rate as prior to admit.    Reggie Serna RN

## 2023-10-27 NOTE — PROGRESS NOTES
Patient VSS. Intermittent cough. RA. Tube feeding per order. IVF stopped per MD. Family at bedside. New order for EKG. Plan discharge home with family this morning. Report to receiving RN for continuation of care.

## 2023-10-27 NOTE — PLAN OF CARE
Goal Outcome Evaluation:    Tube feed at 30mL/hour. NS at 100mL/hour. Male external catheter in place. PRN seroquel given for restlessness. Q2 turns. Alarms on. Family in room.

## 2023-10-27 NOTE — PLAN OF CARE
Pt is nonverbal, Danish understanding. Daughter at bedside to help explain cares. Daughter reported pt had a headache, tylenol administered. Daughter also reports pt did not sleep well despite multiple doses of seroquel. Requested melatonin at 0500, explained typically not given after 0300, okay to give per Dr. Pereyra. IVF infusing. Voiding adequately with male purewick in place. Education on medication administration, alarms, and use of call-light to reduce risk for falls and injury. VSS. Tolerating Jevity @30mL/hr.

## 2023-10-27 NOTE — PROGRESS NOTES
[facility]    Medicine Progress Note - Hospitalist Service    Date of Admission:  10/25/2023    Assessment & Plan   Liu GIL Malave  is a 58 year old male admitted with sepsis 2/2 community acquired pneumonia.  He is currently clinically stable.  No fever or leukocytosis.  Medically ready for discharge today.    # Acute hypoxic respiratory failure, improved  # Sepsis 2/2 community acquired pneumonia  - plan for transition to levofloxacin  - EKG to evaluate qtc given use of quetiapine     # Chronic tube feed dependence  - GJ exchanged on 8/23     # Hep C  - outpatient hepatology followup (already has appt for Nov 8)     # Hx aneurysm  # Hx hemorrhagic CVA     # Hx reactive airway disease    # Hyperglycemia  - outpatient followup     # HTN  # HLD    EKG (my read) - sinus rhythm, no acute ischemic changes; qtc 442          Diet: NPO for Medical/Clinical Reasons Except for: No Exceptions  Adult Formula Drip Feeding: Continuous Jevity 1.5; Jejunostomy; Goal Rate: 30; mL/hr      Kelly Catheter: Not present  Lines: None     Cardiac Monitoring: None  Code Status: Full Code      Clinically Significant Risk Factors          # Hypocalcemia: Lowest Ca = 7.8 mg/dL in last 2 days, will monitor and replace as appropriate     # Hypoalbuminemia: Lowest albumin = 3.2 g/dL at 10/26/2023  5:48 AM, will monitor as appropriate                # Financial/Environmental Concerns: none         Disposition Plan     Expected Discharge Date: 10/27/2023      Destination: home with help/services              Tre Syed MD  Hospitalist Service  [facility]  Securely message with [employer] (more info)  Text page via [employer] Paging/Directory   ______________________________________________________________________    Interval History   Daughter declines .  Daughter facilitates communication.  Patient reports he is doing ok and denies dyspnea.  Per daughter, patient sometimes snores at  night    Physical Exam   Vital Signs: Temp: 98.9  F (37.2  C) Temp src: Axillary BP: (!) 156/96 Pulse: 80   Resp: 18 SpO2: 95 % O2 Device: None (Room air)    Weight: 133 lbs 0 oz    Gen: lying in bed in no acute distress  Neuro: alert, responds to questions communicated by daughter  CV:  nl rate, regular rhythm to palpation  Pulm: no acute resp distress, snoring which improves with sitting patient more upright, tachypnea, coarse/junkie breath sounds bilaterally to anterior auscultation  GI:  abdomen soft, NTTP; g tube insertion site with mild bloody drainage      Medical Decision Making             Data     Reviewed:  WBC 6.3  Hgb 10  Plts 167    TTE  Left ventricular size, wall motion and function are normal. The ejection  fraction is 60-65%.  Normal right ventricle size and systolic function.  Both atria are of normal size.  No hemodynamically significant valvular abnormalities on 2D or color flow  imaging.  There is no comparison study available.

## 2023-10-28 LAB
BACTERIA BLD CULT: ABNORMAL
BACTERIA BLD CULT: ABNORMAL

## 2023-10-30 LAB — BACTERIA BLD CULT: NO GROWTH

## 2023-10-31 LAB
BACTERIA BLD CULT: NO GROWTH
BACTERIA BLD CULT: NO GROWTH

## 2023-11-01 ENCOUNTER — PATIENT OUTREACH (OUTPATIENT)
Dept: CARE COORDINATION | Facility: CLINIC | Age: 58
End: 2023-11-01
Payer: COMMERCIAL

## 2023-11-01 NOTE — PROGRESS NOTES
Clinic Care Coordination Contact  [facility]/Voicemail    Clinical Data: Care Coordinator Outreach        Left message on patient's voicemail with call back information and requested return call.    Plan: Care Coordinator will try to reach patient again in 1-2 business days.    David Myhre, RN   CCC RN

## 2023-11-03 ENCOUNTER — PATIENT OUTREACH (OUTPATIENT)
Dept: CARE COORDINATION | Facility: CLINIC | Age: 58
End: 2023-11-03
Payer: COMMERCIAL

## 2023-11-03 NOTE — PROGRESS NOTES
"Clinic Care Coordination Contact  [facility]: Post-Discharge Note  SITUATION                                                      Admission:    Admission Date: 10/25/23   Reason for Admission: SOB  Discharge:   Discharge Date: 10/27/23  Discharge Diagnosis: acute hypoxic respiratory failure, sepsis 2/2 community acquired pnemonia, hyperglycemia    BACKGROUND                                                      Per hospital discharge summary and inpatient provider notes:  The patient was admitted with acute hypoxic respiratory failure and sepsis 2/2 community acquired pneumonia.  He was treated with IV antibiotics.  He had resolution of hypoxia and overall clinical improvement by discharge.  He was discharged home to complete a course of antibiotics.     ASSESSMENT           Discharge Assessment  How are you doing now that you are home?: Patient's daughter Idalmis said: \"He is better, but still has a cough\"  How are your symptoms? (Red Flag symptoms escalate to triage hotline per guidelines): Improved  Do you feel your condition is stable enough to be safe at home until your provider visit?: Yes  Does the patient have their discharge instructions? : Yes  Does the patient have questions regarding their discharge instructions? : No  Were you started on any new medications or were there changes to any of your previous medications? : Yes  Does the patient have all of their medications?: Yes  Do you have questions regarding any of your medications? : No  Do you have all of your needed medical supplies or equipment (DME)?  (i.e. oxygen tank, CPAP, cane, etc.): Yes  Discharge follow-up appointment scheduled within 14 calendar days? : No (Currently scheduled for follow up on 11/29/23)  Is patient agreeable to assistance with scheduling? : Yes         Post-op (Clinicians Only)  Did the patient have surgery or a procedure: No  Fever: No  Chills: No  Eating & Drinking: eating and drinking without complaints/concerns  PO " Intake: regular diet  Bowel Function: normal  Date of last BM: 11/03/23  Urinary Status: voiding without complaint/concerns    Care Management       Care Mgmt General Assessment         CCC RN spoke with patient's daughter Idalmis to follow up on his recent hospitalization. Idalmis said her father has not had any fevers, but continues to have a cough. She stated she has completed the course of antibiotics prescribed to him. Idalmis wondered if he could have a follow up with his PCP sooner than the scheduled 11/29/23. Writer will send his PCP a note to determine if has has any sooner availability. She declined any other needs or concerns at this time. She declined enrolling patient in Care Coordination, but is aware he can be referred back by her PCP if needs or concern should arise.                 PLAN                                                      Outpatient Plan:  Home with family/PCA support.     Future Appointments   Date Time Provider Department Center   11/14/2023  8:00 AM [facility]   11/29/2023  3:20 PM [facility]         For any urgent concerns, please contact our 24 hour nurse triage line: [phone] ([phone])         David J Myhre, RN

## 2023-11-09 DIAGNOSIS — E78.5 DYSLIPIDEMIA: ICD-10-CM

## 2023-11-09 DIAGNOSIS — L24.9 IRRITANT DERMATITIS: ICD-10-CM

## 2023-11-09 DIAGNOSIS — R45.1 RESTLESSNESS AND AGITATION: Primary | ICD-10-CM

## 2023-11-09 DIAGNOSIS — J39.8 CHRONIC MUCUS HYPERSECRETION, RESPIRATORY: ICD-10-CM

## 2023-11-09 DIAGNOSIS — Z87.01 HISTORY OF PNEUMONIA: ICD-10-CM

## 2023-11-09 DIAGNOSIS — R05.3 CHRONIC COUGH: ICD-10-CM

## 2023-11-09 DIAGNOSIS — H04.123 DRY EYES: ICD-10-CM

## 2023-11-09 RX ORDER — SODIUM CHLORIDE SOLN NEBU 3% 3 %
NEBU SOLN INHALATION
Qty: 240 ML | Refills: 0 | Status: SHIPPED | OUTPATIENT
Start: 2023-11-09 | End: 2023-12-19

## 2023-11-09 RX ORDER — TRIAMCINOLONE ACETONIDE 1 MG/G
CREAM TOPICAL 2 TIMES DAILY
Qty: 30 G | Refills: 0 | Status: SHIPPED | OUTPATIENT
Start: 2023-11-09 | End: 2023-12-19

## 2023-11-09 RX ORDER — SIMVASTATIN 20 MG
TABLET ORAL
Qty: 90 TABLET | Refills: 0 | OUTPATIENT
Start: 2023-11-09

## 2023-11-09 RX ORDER — IPRATROPIUM BROMIDE AND ALBUTEROL SULFATE 2.5; .5 MG/3ML; MG/3ML
SOLUTION RESPIRATORY (INHALATION)
Qty: 90 ML | Refills: 0 | Status: SHIPPED | OUTPATIENT
Start: 2023-11-09 | End: 2023-12-18

## 2023-11-09 RX ORDER — KETOCONAZOLE 20 MG/G
CREAM TOPICAL 2 TIMES DAILY
Qty: 30 G | Refills: 0 | Status: SHIPPED | OUTPATIENT
Start: 2023-11-09 | End: 2023-12-19

## 2023-11-09 RX ORDER — QUETIAPINE FUMARATE 25 MG/1
TABLET, FILM COATED ORAL
Qty: 102 TABLET | Refills: 3 | Status: SHIPPED | OUTPATIENT
Start: 2023-11-09 | End: 2024-04-22

## 2023-11-09 RX ORDER — SIMVASTATIN 20 MG
TABLET ORAL
Qty: 90 TABLET | Refills: 0 | Status: SHIPPED | OUTPATIENT
Start: 2023-11-09 | End: 2024-01-29

## 2023-11-09 RX ORDER — GUAIFENESIN AND DEXTROMETHORPHAN HYDROBROMIDE 100; 10 MG/5ML; MG/5ML
SOLUTION ORAL
Qty: 237 ML | Refills: 0 | Status: SHIPPED | OUTPATIENT
Start: 2023-11-09 | End: 2023-12-19

## 2023-11-15 ENCOUNTER — MEDICAL CORRESPONDENCE (OUTPATIENT)
Dept: HEALTH INFORMATION MANAGEMENT | Facility: CLINIC | Age: 58
End: 2023-11-15
Payer: COMMERCIAL

## 2023-11-16 ENCOUNTER — HOSPITAL ENCOUNTER (OUTPATIENT)
Dept: INTERVENTIONAL RADIOLOGY/VASCULAR | Facility: CLINIC | Age: 58
Discharge: HOME | End: 2023-11-16
Payer: COMMERCIAL

## 2023-11-16 VITALS
DIASTOLIC BLOOD PRESSURE: 91 MMHG | SYSTOLIC BLOOD PRESSURE: 156 MMHG | OXYGEN SATURATION: 96 % | RESPIRATION RATE: 18 BRPM | HEART RATE: 88 BPM

## 2023-11-16 DIAGNOSIS — R63.30 FEEDING DIFFICULTIES: Primary | ICD-10-CM

## 2023-11-16 DIAGNOSIS — R63.30 FEEDING DIFFICULTIES: ICD-10-CM

## 2023-11-16 PROCEDURE — 250N000009 HC RX 250: Performed by: RADIOLOGY

## 2023-11-16 PROCEDURE — C1769 GUIDE WIRE: HCPCS

## 2023-11-16 PROCEDURE — 49452 REPLACE G-J TUBE PERC: CPT

## 2023-11-16 RX ORDER — LIDOCAINE HYDROCHLORIDE 20 MG/ML
JELLY TOPICAL ONCE
Status: COMPLETED | OUTPATIENT
Start: 2023-11-16 | End: 2023-11-16

## 2023-11-16 RX ADMIN — LIDOCAINE HYDROCHLORIDE: 20 JELLY TOPICAL at 14:55

## 2023-11-16 NOTE — PROCEDURES
[facility]    Procedure: Imaging Procedure Note    Date/Time: 11/16/2023 3:16 PM    Performed by: Cesar Foy MD  Authorized by: Cesar Foy MD      UNIVERSAL PROTOCOL   Site Marked: Yes  Prior Images Obtained and Reviewed:  Yes  Required items: Required blood products, implants, devices and special equipment available    Patient identity confirmed:  Verbally with patient, arm band and provided demographic data  NA - No sedation, light sedation, or local anesthesia  Confirmation Checklist:  Patient's identity using two indicators, relevant allergies, procedure was appropriate and matched the consent or emergent situation and correct equipment/implants were available  Time out: Immediately prior to the procedure a time out was called    Universal Protocol: the Joint Commission Universal Protocol was followed    Preparation: Patient was prepped and draped in usual sterile fashion    ESBL (mL):  0     ANESTHESIA    Anesthesia:  Local infiltration  Local Anesthetic:  Lidocaine 1% without epinephrine  Anesthetic Total (mL):  10      SEDATION    Patient Sedated: No    See dictated procedure note for full details.    PROCEDURE  Describe Procedure: GJ tube exchange.  The new tube is ready for use.  Patient Tolerance:  Patient tolerated the procedure well with no immediate complications  Length of time physician/provider present for 1:1 monitoring during sedation: 0 (NA)

## 2023-11-16 NOTE — DISCHARGE INSTRUCTIONS
Gastrostomy (G) or Gastrojejunostomy (G/J) Tube Exchange Discharge Instructions:   You had a gastrostomy (stomach) tube or a Gastrojejunostomy Tube (stomach and jejunum) exchanged on ***.   This tube is often used for nutritional support and medication administration or it can be used for stomach venting.     Care instructions:  - If you received sedation for your procedure, do not drive or operate heavy machinery for the rest of the day.  - Avoid soaking in stagnant water (tub baths, Jacuzzis, pools, lake, or ocean).   - You may shower beginning the day after the tube was exchanged.   - Clean under the disc daily with soap and water. Pat dry under disc and apply new split gauze dressing under disc. Cleaning tube site daily will help prevent infection and skin irritation.  - A small amount of clear tan drainage from the tube exit site can be normal.  - Make sure the disc on the tube fits slightly snug against the skin so that the tube does not move in or out of the body easily.  - Flush your tube with 60cc of water twice a day (using a cath tip syringe) to prevent tube from clogging (or follow recommendations from your doctor or dietician if given).    Giving Feedings and Medications:  - Follow-up with your dietician, primary care provider, or oncologist for instructions on tube feedings and medication administration.    - ONLY use specific enteric feedings with your tube (unless otherwise discussed with your dietitian).    - Flush tube at least twice daily with 60ml of water using cath tip syringe unless otherwise instructed by your doctor or dietician.  - Flush the tube before and after administrating medications and bolus feedings with 60cc of water.    Follow Up:  -Recommend routine 3 month exchanges of feeding tube. Please contact your primary care provider or speak with your dietitian to obtain an order to have your G/GJ tube exchanged. Then contact [facility]'s Medical Imaging scheduling department at  [phone] to schedule your G/GJ tube exchange appointment.    Please seek medical evaluation for:  - Fever (greater than 101 F (38.3C).  - Purulent (yellow/green/foul smelling) drainage from tube exit site.   - Significant or worsening abdominal pain.   - Skin that is hot to the touch or significantly reddened at the tube exit site.   - Bleeding at tube exit site.    Call [facility] IR RN Line at [phone] with questions or if you have any of the following symptoms:  - Tube falls out or felt to be out of position.  - Unable to flush tube.  - Significant leakage around tube site (tube feeding, medications or drainage).  - Significant bleeding at the tube exit site.  - Severe pain at tube exit site.

## 2023-11-16 NOTE — PROGRESS NOTES
Patient Name: Liu Malave  Medical Record Number: [id number]  Today's Date: 11/16/2023    Procedure: GJ Exchange  Proceduralist: Dr. Foy    Procedure Start: 1507  Procedure end: 1512  Sedation medications administered: N/a    Other Notes: Pt arrived to IR room 1 from Pre/post bay 1. Consent reviewed. Pt denies any questions or concerns regarding procedure. Pt positioned supine and monitored per protocol. Pt tolerated procedure without any noted complications. VSS on RA. Pt transferred back to Pre/post bay 1.

## 2023-11-19 ENCOUNTER — MEDICAL CORRESPONDENCE (OUTPATIENT)
Dept: HEALTH INFORMATION MANAGEMENT | Facility: CLINIC | Age: 58
End: 2023-11-19
Payer: COMMERCIAL

## 2023-12-18 DIAGNOSIS — Z87.01 HISTORY OF PNEUMONIA: ICD-10-CM

## 2023-12-18 DIAGNOSIS — R05.3 CHRONIC COUGH: ICD-10-CM

## 2023-12-18 DIAGNOSIS — L24.9 IRRITANT DERMATITIS: ICD-10-CM

## 2023-12-18 DIAGNOSIS — K59.01 SLOW TRANSIT CONSTIPATION: ICD-10-CM

## 2023-12-18 DIAGNOSIS — H04.123 DRY EYES: ICD-10-CM

## 2023-12-18 DIAGNOSIS — J39.8 CHRONIC MUCUS HYPERSECRETION, RESPIRATORY: ICD-10-CM

## 2023-12-18 DIAGNOSIS — R11.0 NAUSEA: ICD-10-CM

## 2023-12-18 RX ORDER — ONDANSETRON 4 MG/1
4 TABLET, FILM COATED ORAL EVERY 8 HOURS PRN
Qty: 30 TABLET | Refills: 0 | Status: SHIPPED | OUTPATIENT
Start: 2023-12-18 | End: 2024-01-29

## 2023-12-18 RX ORDER — IPRATROPIUM BROMIDE AND ALBUTEROL SULFATE 2.5; .5 MG/3ML; MG/3ML
SOLUTION RESPIRATORY (INHALATION)
Qty: 90 ML | Refills: 2 | Status: SHIPPED | OUTPATIENT
Start: 2023-12-18 | End: 2024-06-04

## 2023-12-18 RX ORDER — POLYETHYLENE GLYCOL 3350 17 G/17G
POWDER, FOR SOLUTION ORAL
Qty: 850 G | Refills: 0 | Status: SHIPPED | OUTPATIENT
Start: 2023-12-18 | End: 2024-03-06

## 2023-12-19 RX ORDER — GUAIFENESIN AND DEXTROMETHORPHAN HYDROBROMIDE 100; 10 MG/5ML; MG/5ML
SOLUTION ORAL
Qty: 237 ML | Refills: 0 | Status: SHIPPED | OUTPATIENT
Start: 2023-12-19 | End: 2024-01-29

## 2023-12-19 RX ORDER — KETOCONAZOLE 20 MG/G
CREAM TOPICAL
Qty: 30 G | Refills: 0 | Status: SHIPPED | OUTPATIENT
Start: 2023-12-19 | End: 2024-01-29

## 2023-12-19 RX ORDER — SODIUM CHLORIDE SOLN NEBU 3% 3 %
NEBU SOLN INHALATION
Qty: 240 ML | Refills: 0 | Status: SHIPPED | OUTPATIENT
Start: 2023-12-19 | End: 2024-07-15

## 2023-12-19 RX ORDER — TRIAMCINOLONE ACETONIDE 1 MG/G
CREAM TOPICAL
Qty: 30 G | Refills: 0 | Status: SHIPPED | OUTPATIENT
Start: 2023-12-19 | End: 2024-01-29

## 2024-01-08 ENCOUNTER — HOSPITAL ENCOUNTER (EMERGENCY)
Facility: CLINIC | Age: 59
Discharge: HOME | End: 2024-01-09
Attending: FAMILY MEDICINE | Admitting: FAMILY MEDICINE
Payer: COMMERCIAL

## 2024-01-08 ENCOUNTER — APPOINTMENT (OUTPATIENT)
Dept: INTERVENTIONAL RADIOLOGY/VASCULAR | Facility: CLINIC | Age: 59
End: 2024-01-08
Attending: NURSE PRACTITIONER
Payer: COMMERCIAL

## 2024-01-08 ENCOUNTER — APPOINTMENT (OUTPATIENT)
Dept: CT IMAGING | Facility: CLINIC | Age: 59
End: 2024-01-08
Attending: FAMILY MEDICINE
Payer: COMMERCIAL

## 2024-01-08 ENCOUNTER — APPOINTMENT (OUTPATIENT)
Dept: RADIOLOGY | Facility: CLINIC | Age: 59
End: 2024-01-08
Attending: FAMILY MEDICINE
Payer: COMMERCIAL

## 2024-01-08 DIAGNOSIS — J15.9 COMMUNITY ACQUIRED BACTERIAL PNEUMONIA: ICD-10-CM

## 2024-01-08 DIAGNOSIS — Z78.9 ON TUBE FEEDING DIET: ICD-10-CM

## 2024-01-08 DIAGNOSIS — I69.359 HEMIPLEGIA AND HEMIPARESIS FOLLOWING CEREBRAL INFARCTION AFFECTING UNSPECIFIED SIDE (H): ICD-10-CM

## 2024-01-08 DIAGNOSIS — J96.01 ACUTE RESPIRATORY FAILURE WITH HYPOXIA (H): ICD-10-CM

## 2024-01-08 LAB
ALBUMIN SERPL BCG-MCNC: 4.1 G/DL (ref 3.5–5.2)
ALBUMIN UR-MCNC: 50 MG/DL
ALP SERPL-CCNC: 76 U/L (ref 40–150)
ALT SERPL W P-5'-P-CCNC: 36 U/L (ref 0–70)
ANION GAP SERPL CALCULATED.3IONS-SCNC: 10 MMOL/L (ref 7–15)
APPEARANCE UR: CLEAR
AST SERPL W P-5'-P-CCNC: 35 U/L (ref 0–45)
BASE EXCESS BLDV CALC-SCNC: -0.7 MMOL/L
BASOPHILS # BLD AUTO: 0.1 10E3/UL (ref 0–0.2)
BASOPHILS NFR BLD AUTO: 0 %
BILIRUB DIRECT SERPL-MCNC: <0.2 MG/DL (ref 0–0.3)
BILIRUB SERPL-MCNC: 0.3 MG/DL
BILIRUB UR QL STRIP: NEGATIVE
BUN SERPL-MCNC: 32.6 MG/DL (ref 8–23)
CALCIUM SERPL-MCNC: 8.7 MG/DL (ref 8.8–10.2)
CHLORIDE SERPL-SCNC: 106 MMOL/L (ref 98–107)
COLOR UR AUTO: COLORLESS
CREAT SERPL-MCNC: 1 MG/DL (ref 0.67–1.17)
DEPRECATED HCO3 PLAS-SCNC: 22 MMOL/L (ref 22–29)
EGFRCR SERPLBLD CKD-EPI 2021: 86 ML/MIN/1.73M2
EOSINOPHIL # BLD AUTO: 0.2 10E3/UL (ref 0–0.7)
EOSINOPHIL NFR BLD AUTO: 2 %
ERYTHROCYTE [DISTWIDTH] IN BLOOD BY AUTOMATED COUNT: 12.4 % (ref 10–15)
FLUAV RNA SPEC QL NAA+PROBE: NEGATIVE
FLUBV RNA RESP QL NAA+PROBE: NEGATIVE
GLUCOSE SERPL-MCNC: 107 MG/DL (ref 70–99)
GLUCOSE UR STRIP-MCNC: NEGATIVE MG/DL
HCO3 BLDV-SCNC: 24 MMOL/L (ref 24–30)
HCT VFR BLD AUTO: 35.6 % (ref 40–53)
HGB BLD-MCNC: 12 G/DL (ref 13.3–17.7)
HGB UR QL STRIP: NEGATIVE
IMM GRANULOCYTES # BLD: 0.1 10E3/UL
IMM GRANULOCYTES NFR BLD: 0 %
KETONES UR STRIP-MCNC: NEGATIVE MG/DL
LACTATE SERPL-SCNC: 1 MMOL/L (ref 0.7–2)
LEUKOCYTE ESTERASE UR QL STRIP: NEGATIVE
LYMPHOCYTES # BLD AUTO: 1.9 10E3/UL (ref 0.8–5.3)
LYMPHOCYTES NFR BLD AUTO: 12 %
MAGNESIUM SERPL-MCNC: 2.5 MG/DL (ref 1.7–2.3)
MCH RBC QN AUTO: 31.4 PG (ref 26.5–33)
MCHC RBC AUTO-ENTMCNC: 33.7 G/DL (ref 31.5–36.5)
MCV RBC AUTO: 93 FL (ref 78–100)
MONOCYTES # BLD AUTO: 1 10E3/UL (ref 0–1.3)
MONOCYTES NFR BLD AUTO: 6 %
NEUTROPHILS # BLD AUTO: 12.5 10E3/UL (ref 1.6–8.3)
NEUTROPHILS NFR BLD AUTO: 80 %
NITRATE UR QL: NEGATIVE
NRBC # BLD AUTO: 0 10E3/UL
NRBC BLD AUTO-RTO: 0 /100
OXYHGB MFR BLDV: 80.1 % (ref 70–75)
PCO2 BLDV: 39 MM HG (ref 35–50)
PH BLDV: 7.4 [PH] (ref 7.35–7.45)
PH UR STRIP: 8 [PH] (ref 5–7)
PLATELET # BLD AUTO: 247 10E3/UL (ref 150–450)
PO2 BLDV: 43 MM HG (ref 25–47)
POTASSIUM SERPL-SCNC: 4.5 MMOL/L (ref 3.4–5.3)
PROCALCITONIN SERPL IA-MCNC: 0.14 NG/ML
PROT SERPL-MCNC: 8.1 G/DL (ref 6.4–8.3)
RBC # BLD AUTO: 3.82 10E6/UL (ref 4.4–5.9)
RBC URINE: 1 /HPF
RSV RNA SPEC NAA+PROBE: NEGATIVE
SAO2 % BLDV: 81.7 % (ref 70–75)
SARS-COV-2 RNA RESP QL NAA+PROBE: NEGATIVE
SODIUM SERPL-SCNC: 138 MMOL/L (ref 135–145)
SP GR UR STRIP: <1.005 (ref 1–1.03)
UROBILINOGEN UR STRIP-MCNC: <2 MG/DL
WBC # BLD AUTO: 15.7 10E3/UL (ref 4–11)
WBC URINE: 0 /HPF

## 2024-01-08 PROCEDURE — C1769 GUIDE WIRE: HCPCS

## 2024-01-08 PROCEDURE — 49452 REPLACE G-J TUBE PERC: CPT

## 2024-01-08 PROCEDURE — 82248 BILIRUBIN DIRECT: CPT | Performed by: FAMILY MEDICINE

## 2024-01-08 PROCEDURE — 96366 THER/PROPH/DIAG IV INF ADDON: CPT | Mod: 59

## 2024-01-08 PROCEDURE — 85025 COMPLETE CBC W/AUTO DIFF WBC: CPT | Performed by: FAMILY MEDICINE

## 2024-01-08 PROCEDURE — 83735 ASSAY OF MAGNESIUM: CPT | Performed by: FAMILY MEDICINE

## 2024-01-08 PROCEDURE — 87040 BLOOD CULTURE FOR BACTERIA: CPT | Performed by: FAMILY MEDICINE

## 2024-01-08 PROCEDURE — 96365 THER/PROPH/DIAG IV INF INIT: CPT | Mod: 59

## 2024-01-08 PROCEDURE — 82805 BLOOD GASES W/O2 SATURATION: CPT | Performed by: FAMILY MEDICINE

## 2024-01-08 PROCEDURE — 250N000011 HC RX IP 250 OP 636: Performed by: FAMILY MEDICINE

## 2024-01-08 PROCEDURE — 81001 URINALYSIS AUTO W/SCOPE: CPT | Performed by: FAMILY MEDICINE

## 2024-01-08 PROCEDURE — 250N000011 HC RX IP 250 OP 636: Performed by: STUDENT IN AN ORGANIZED HEALTH CARE EDUCATION/TRAINING PROGRAM

## 2024-01-08 PROCEDURE — 84145 PROCALCITONIN (PCT): CPT | Performed by: FAMILY MEDICINE

## 2024-01-08 PROCEDURE — 87637 SARSCOV2&INF A&B&RSV AMP PRB: CPT | Performed by: FAMILY MEDICINE

## 2024-01-08 PROCEDURE — 80053 COMPREHEN METABOLIC PANEL: CPT | Performed by: FAMILY MEDICINE

## 2024-01-08 PROCEDURE — 71275 CT ANGIOGRAPHY CHEST: CPT

## 2024-01-08 PROCEDURE — 99285 EMERGENCY DEPT VISIT HI MDM: CPT | Mod: 25

## 2024-01-08 PROCEDURE — 71045 X-RAY EXAM CHEST 1 VIEW: CPT

## 2024-01-08 PROCEDURE — 258N000003 HC RX IP 258 OP 636: Performed by: FAMILY MEDICINE

## 2024-01-08 PROCEDURE — 83605 ASSAY OF LACTIC ACID: CPT | Performed by: FAMILY MEDICINE

## 2024-01-08 PROCEDURE — 250N000013 HC RX MED GY IP 250 OP 250 PS 637: Performed by: STUDENT IN AN ORGANIZED HEALTH CARE EDUCATION/TRAINING PROGRAM

## 2024-01-08 PROCEDURE — 96361 HYDRATE IV INFUSION ADD-ON: CPT | Mod: 59

## 2024-01-08 PROCEDURE — 250N000013 HC RX MED GY IP 250 OP 250 PS 637: Performed by: FAMILY MEDICINE

## 2024-01-08 PROCEDURE — 36415 COLL VENOUS BLD VENIPUNCTURE: CPT | Performed by: FAMILY MEDICINE

## 2024-01-08 RX ORDER — AZITHROMYCIN 200 MG/5ML
7 POWDER, FOR SUSPENSION ORAL DAILY
Qty: 70 ML | Refills: 0 | Status: SHIPPED | OUTPATIENT
Start: 2024-01-08 | End: 2024-01-18

## 2024-01-08 RX ORDER — PIPERACILLIN SODIUM, TAZOBACTAM SODIUM 3; .375 G/15ML; G/15ML
3.38 INJECTION, POWDER, LYOPHILIZED, FOR SOLUTION INTRAVENOUS ONCE
Status: COMPLETED | OUTPATIENT
Start: 2024-01-08 | End: 2024-01-08

## 2024-01-08 RX ORDER — BACLOFEN 10 MG/1
10 TABLET ORAL 4 TIMES DAILY
COMMUNITY
End: 2024-07-22

## 2024-01-08 RX ORDER — AMOXICILLIN AND CLAVULANATE POTASSIUM 400; 57 MG/5ML; MG/5ML
6 POWDER, FOR SUSPENSION ORAL 2 TIMES DAILY
Qty: 120 ML | Refills: 0 | Status: SHIPPED | OUTPATIENT
Start: 2024-01-08 | End: 2024-01-18

## 2024-01-08 RX ORDER — BISACODYL 10 MG
10 SUPPOSITORY, RECTAL RECTAL DAILY
COMMUNITY
End: 2024-06-03

## 2024-01-08 RX ORDER — IBUPROFEN 600 MG/1
600 TABLET, FILM COATED ORAL ONCE
Status: DISCONTINUED | OUTPATIENT
Start: 2024-01-08 | End: 2024-01-08

## 2024-01-08 RX ORDER — BACLOFEN 10 MG/1
10 TABLET ORAL 3 TIMES DAILY
Status: DISCONTINUED | OUTPATIENT
Start: 2024-01-08 | End: 2024-01-09 | Stop reason: HOSPADM

## 2024-01-08 RX ORDER — IOPAMIDOL 755 MG/ML
90 INJECTION, SOLUTION INTRAVASCULAR ONCE
Status: COMPLETED | OUTPATIENT
Start: 2024-01-08 | End: 2024-01-08

## 2024-01-08 RX ORDER — QUETIAPINE FUMARATE 25 MG/1
25 TABLET, FILM COATED ORAL 2 TIMES DAILY
Status: DISCONTINUED | OUTPATIENT
Start: 2024-01-09 | End: 2024-01-08

## 2024-01-08 RX ORDER — BACLOFEN 10 MG/1
10 TABLET ORAL 3 TIMES DAILY
Status: DISCONTINUED | OUTPATIENT
Start: 2024-01-08 | End: 2024-01-08

## 2024-01-08 RX ORDER — BACLOFEN 10 MG/1
10 TABLET ORAL 3 TIMES DAILY
Status: DISCONTINUED | OUTPATIENT
Start: 2024-01-09 | End: 2024-01-08

## 2024-01-08 RX ORDER — IBUPROFEN 100 MG/5ML
600 SUSPENSION, ORAL (FINAL DOSE FORM) ORAL ONCE
Status: COMPLETED | OUTPATIENT
Start: 2024-01-08 | End: 2024-01-08

## 2024-01-08 RX ORDER — ACETAMINOPHEN 325 MG/10.15ML
720 LIQUID ORAL ONCE
Status: COMPLETED | OUTPATIENT
Start: 2024-01-08 | End: 2024-01-08

## 2024-01-08 RX ORDER — QUETIAPINE FUMARATE 25 MG/1
25 TABLET, FILM COATED ORAL 2 TIMES DAILY
Status: DISCONTINUED | OUTPATIENT
Start: 2024-01-08 | End: 2024-01-08

## 2024-01-08 RX ORDER — QUETIAPINE FUMARATE 25 MG/1
25 TABLET, FILM COATED ORAL 2 TIMES DAILY
Status: DISCONTINUED | OUTPATIENT
Start: 2024-01-08 | End: 2024-01-09 | Stop reason: HOSPADM

## 2024-01-08 RX ADMIN — VANCOMYCIN HYDROCHLORIDE 1250 MG: 5 INJECTION, POWDER, LYOPHILIZED, FOR SOLUTION INTRAVENOUS at 13:08

## 2024-01-08 RX ADMIN — IBUPROFEN 600 MG: 100 SUSPENSION ORAL at 21:22

## 2024-01-08 RX ADMIN — BACLOFEN 10 MG: 10 TABLET ORAL at 20:37

## 2024-01-08 RX ADMIN — QUETIAPINE FUMARATE 25 MG: 25 TABLET ORAL at 20:37

## 2024-01-08 RX ADMIN — IOPAMIDOL 90 ML: 755 INJECTION, SOLUTION INTRAVENOUS at 12:11

## 2024-01-08 RX ADMIN — SODIUM CHLORIDE 1000 ML: 9 INJECTION, SOLUTION INTRAVENOUS at 10:15

## 2024-01-08 RX ADMIN — PIPERACILLIN AND TAZOBACTAM 3.38 G: 3; .375 INJECTION, POWDER, FOR SOLUTION INTRAVENOUS at 10:19

## 2024-01-08 RX ADMIN — PIPERACILLIN AND TAZOBACTAM 3.38 G: 3; .375 INJECTION, POWDER, FOR SOLUTION INTRAVENOUS at 21:29

## 2024-01-08 RX ADMIN — SODIUM CHLORIDE 500 ML: 9 INJECTION, SOLUTION INTRAVENOUS at 17:57

## 2024-01-08 RX ADMIN — ACETAMINOPHEN 720 MG: 325 SOLUTION ORAL at 14:08

## 2024-01-08 ASSESSMENT — ACTIVITIES OF DAILY LIVING (ADL)
ADLS_ACUITY_SCORE: 35
ADLS_ACUITY_SCORE: 43

## 2024-01-08 NOTE — ED TRIAGE NOTES
The patient presents to the ED with fever and shortness of breath that his daughter noticed this morning. The patient is wheelchair bound and has a prior history of pneumonia. The patient is nonverbal. The patient's oxygen levels were low at home. She also noted an elevated pulse rate.

## 2024-01-08 NOTE — PROGRESS NOTES
Patient Name: Liu Malave  Medical Record Number: [id number]  Today's Date: 1/8/2024    Procedure: GJ tube exchange  Proceduralist: Dr. Millard    Procedure Start: 1456  Procedure end: 1501  Sedation medications administered: N/a    Report given to: Noel Ledezma RN  : N/a    Other Notes: Pt arrived to IR room 1 from  ED 5 . Consent reviewed. Pt denies any questions or concerns regarding procedure. Pt positioned supine and monitored per protocol. Pt tolerated procedure without any noted complications. VSS on RA. Pt transferred back to  ED 5 .

## 2024-01-08 NOTE — PHARMACY-VANCOMYCIN DOSING SERVICE
Pharmacy Vancomycin Initial Note  Date of Service 2024  Patient's  1965  58 year old, male    Indication: Sepsis    Current estimated CrCl = Estimated Creatinine Clearance: 78 mL/min (based on SCr of 0.84 mg/dL).    Creatinine for last 3 days  No results found for requested labs within last 3 days.    Recent Vancomycin Level(s) for last 3 days  No results found for requested labs within last 3 days.      Vancomycin IV Administrations (past 72 hours)        No vancomycin orders with administrations in past 72 hours.                    Nephrotoxins and other renal medications (From now, onward)      Start     Dose/Rate Route Frequency Ordered Stop    24 0900  piperacillin-tazobactam (ZOSYN) 3.375 g vial to attach to  mL bag         3.375 g  over 30 Minutes Intravenous ONCE 24 0837      24 0900  vancomycin (VANCOCIN) 1,250 mg in 0.9% NaCl 250 mL intermittent infusion         1,250 mg  over 90 Minutes Intravenous ONCE 24 0837              Contrast Orders - past 72 hours (72h ago, onward)      None                  Plan:  Start vancomycin  1250 mg IV once.   Please consult Pharmacy for further dosing.     Francis Hurtado [facility]

## 2024-01-08 NOTE — PROCEDURES
[facility]    Procedure: Gastrojejunostomy tube exchange    Date/Time: 1/8/2024 3:05 PM    Performed by: Silver Millard MD  Authorized by: Silver Millard MD      UNIVERSAL PROTOCOL   Site Marked: NA  Prior Images Obtained and Reviewed:  Yes  Required items: Required blood products, implants, devices and special equipment available    Patient identity confirmed:  Verbally with patient, arm band, provided demographic data and hospital-assigned identification number  Patient was reevaluated immediately before administering moderate or deep sedation or anesthesia  Confirmation Checklist:  Patient's identity using two indicators, relevant allergies, procedure was appropriate and matched the consent or emergent situation and correct equipment/implants were available  Time out: Immediately prior to the procedure a time out was called    Universal Protocol: the Joint Commission Universal Protocol was followed    Preparation: Patient was prepped and draped in usual sterile fashion       ANESTHESIA    Anesthesia:  Local infiltration  Local Anesthetic:  Lidocaine 1% without epinephrine      SEDATION    Patient Sedated: No    See dictated procedure note for full details.  Findings: Successful gastrojejunostomy tube exchange.    Specimens: none    Complications: None    Condition: Stable      PROCEDURE    Patient Tolerance:  Patient tolerated the procedure well with no immediate complications  Length of time physician/provider present for 1:1 monitoring during sedation: 0    Eric Millard MD  Interventional Radiology

## 2024-01-08 NOTE — ED PROVIDER NOTES
EMERGENCY DEPARTMENT ENCOUNTER      NAME: Liu Malave  AGE: 58 year old male  YOB: 1965  MRN: [id number]  EVALUATION DATE & TIME: No admission date for patient encounter.    PCP: Shahid Enriquez    ED PROVIDER: Julian Emmanuel M.D.    Chief Complaint   Patient presents with    Shortness of Breath    Fever       FINAL IMPRESSION:  1. Community acquired bacterial pneumonia    2. Acute respiratory failure with hypoxia (H)    3. Hemiplegia and hemiparesis following cerebral infarction affecting unspecified side (H)    4. On tube feeding diet        ED COURSE & MEDICAL DECISION MAKING:    Pertinent Labs & Imaging studies independently interpreted by me. (See chart for details)  8:33 AM  Patient seen and examined, reviewed most recent admission October 25-27, 2023 at which time patient was admitted with acute hypoxic respiratory failure related to community-acquired pneumonia with sepsis as well, improved after antibiotics and oxygen support and was stable to discharge.  Patient presents with family for fever and low oxygen saturation today.  On exam, patient is nonverbal which is baseline, attends to voice.  Lungs are clear, no abdominal tenderness on my exam labs ordered along with broad-spectrum antibiotics for likely sepsis.  Patient initially seen in the lobby due to critical capacity in the department, will move to a monitored bed as soon as possible and plan for transfer for admission.  10:41 AM labs ordered and independently interpreted by me with normal lactate and normal procalcitonin, leukocytosis and mild anemia, which is chronic.  Hepatic panel is normal, basic metabolic panel is reassuring.  Venous blood gas is normal.  Chest x-ray independently interpreted by me does not demonstrate acute infiltrate or effusion.  CT scan ordered to evaluate for cause of fever, tachycardia, leukocytosis.  Likely underlying pneumonia not visualized on chest x-ray but cannot exclude intra-abdominal infection  or urinary tract infection.  12:48 PM CT of the chest independently interpreted by with me with no pulmonary embolism but bilateral pulmonary infiltrate consistent with pneumonia, patient has already been given antibiotics.  Abdominal CT shows GJ tube positioned deep into the duodenum, it does appear the balloon is not inflated.  1:30 PM Updated family with diagnosis and plan.  Discussed with [facility], no beds available.  Discussed with [facility].  1:57 PM  Patient accepted to [place] by Dr. Key, hospitalist.  4:13 PM  G/J tube replaced.  Patient is stable for transfer, still tachycardic but saturations remained stable    At the conclusion of the encounter I discussed the results of all of the tests and the disposition. The questions were answered. The patient or family acknowledged understanding and was agreeable with the care plan.     Medical Decision Making  Obtained supplemental history:Supplemental history obtained?: Family Member/Significant Other  Reviewed external records: External records reviewed?: Documented in chart  Care impacted by chronic illness:Cerebrovascular Disease, Diabetes, and Hypertension  Care significantly affected by social determinants of health:Access to Affordable Health Care  Did you consider but not order tests?: Work up considered but not performed and documented in chart, if applicable  Did you interpret images independently?: Independent interpretation of ECG and images noted in documentation, when applicable.  Consultation discussion with other provider:Did you involve another provider (consultant, MH, pharmacy, etc.)?: I discussed the care with another health care provider, see documentation for details.  Transfer for admission due to no beds available      MEDICATIONS GIVEN IN THE EMERGENCY:  Medications   vancomycin (VANCOCIN) 1,250 mg in 0.9% NaCl 250 mL intermittent infusion (1,250 mg Intravenous $New Bag 1/8/24 1945)   sodium chloride 0.9% BOLUS 500 mL  "(has no administration in time range)   acetaminophen (TYLENOL) solution 720 mg (has no administration in time range)   sodium chloride 0.9% BOLUS 1,000 mL (1,000 mLs Intravenous $New Bag 1/8/24 1015)   piperacillin-tazobactam (ZOSYN) 3.375 g vial to attach to  mL bag (0 g Intravenous Stopped 1/8/24 1307)   iopamidol (ISOVUE-370) solution 90 mL (90 mLs Intravenous $Given 1/8/24 1211)       NEW PRESCRIPTIONS STARTED AT TODAY'S ER VISIT  New Prescriptions    No medications on file       =================================================================    HPI    Patient information was obtained from: patient's daughter      Liu Malave is a 58 year old male with a pertinent history of both ischemic CVA as well as a hemorrhagic stroke with subsequent spastic hemiplegia, nonverbal, GJ tube dependent as well as subsequent recurrent aspiration pneumonias, recurrent UTIs with urinary retention, and hepatitis C carrier who presents to this ED by private car for evaluation of shortness of breath and fever.     Per triage, \"  The patient presents to the ED with fever and shortness of breath that his daughter noticed this morning. The patient is wheelchair bound and has a prior history of pneumonia. The patient is nonverbal. The patient's oxygen levels were low at home. She also noted an elevated pulse rate.\"    Patient's daughter reports onset of shortness of breath within the patient this morning. She also reports concern for the patient's feeding tube balloon possibly being broken. Patient only eats through his feeding tube. He is not on oxygen at home at baseline,      REVIEW OF SYSTEMS   Review of Systems   All other systems reviewed and negative    PAST MEDICAL HISTORY:  Past Medical History:   Diagnosis Date    Acute UTI 02/21/2020    Anxiety     Basal ganglia hemorrhage (H) 06/20/2016    Brain compression (H)     Bruxism     Chronic static encephalopathy     Convulsions, unspecified convulsion type (H)     CVA " (cerebral vascular accident) (H) 07/2016    Hemorrhagic,     Depression     Dyslipidemia     Elevated troponin     Essential hypertension     Feeding by G-tube (H) 06/04/2020    GERD (gastroesophageal reflux disease)     Hemiplegia of nondominant side, late effect of cerebrovascular disease (H)     Created by Conversion  Replacement Utility updated for latest IMO load    Hemorrhagic stroke (H) 06/05/2016    Hepatitis C carrier (H)     History of ESBL E. coli infection     History of hemorrhagic stroke with residual hemiparesis (H)     Hypertension     Lung nodule 12/05/2016    9 x 9 mm left upper lobe on CXR    MRSA (methicillin resistant staph aureus) culture positive     Periodontal disease     Persistent fever     Pneumonia of both lower lobes due to infectious organism     Respiratory failure with hypoxia (H)     Seizure (H)     Sepsis due to urinary tract infection (H) 06/04/2020    Severe sepsis (H)     Trismus        PAST SURGICAL HISTORY:  Past Surgical History:   Procedure Laterality Date    aneurysm clipping  2006    BRAIN SURGERY      EXAM UNDER ANESTHESIA, RESTORATIONS, EXTRACTION(S) DENTAL COMPLEX, COMBINED N/A 12/19/2016    Procedure: COMBINED EXAM UNDER ANESTHESIA, RESTORATIONS, EXTRACTION(S) DENTAL COMPLEX;  Surgeon: Verónica Martinez DDS;  Location: [facility] OR    EXAM UNDER ANESTHESIA, RESTORATIONS, EXTRACTION(S) DENTAL COMPLEX, COMBINED N/A 3/7/2018    Procedure: COMBINED EXAM UNDER ANESTHESIA, RESTORATIONS, EXTRACTION(S) DENTAL COMPLEX;  Kenalog Injection in Bilateral [place], Combined Dental Exam,Radiographs, Three Dental Restorations, Periodontal Therapy and Fluoride Treatment;  Surgeon: Darryl Isbell DDS;  Location: [facility] OR    HH MIDLINE INSERTION  8/11/2017         INJECT STEROID (LOCATION) N/A 3/7/2018    Procedure: INJECT STEROID (LOCATION);  Kenalog Injection In Bilateral [place], Combined Dental Exam,Radiographs,Three Dental Restorations, Periodontal Therapy and Fluoride Treatment;   Surgeon: Liu Conroy DDS;  Location: [facility] OR    IR GASTRO JEJUNOSTOMY TUBE CHANGE  7/19/2016    IR GASTRO JEJUNOSTOMY TUBE CHANGE  9/23/2016    IR GASTRO JEJUNOSTOMY TUBE CHANGE  11/28/2016    IR GASTRO JEJUNOSTOMY TUBE CHANGE  1/27/2017    IR GASTRO JEJUNOSTOMY TUBE CHANGE  4/19/2017    IR GASTRO JEJUNOSTOMY TUBE CHANGE  6/20/2017    IR GASTRO JEJUNOSTOMY TUBE CHANGE  8/7/2017    IR GASTRO JEJUNOSTOMY TUBE CHANGE  8/11/2017    IR GASTRO JEJUNOSTOMY TUBE CHANGE  10/20/2017    IR GASTRO JEJUNOSTOMY TUBE CHANGE  1/2/2018    IR GASTRO JEJUNOSTOMY TUBE CHANGE  2/16/2018    IR GASTRO JEJUNOSTOMY TUBE CHANGE  5/16/2018    IR GASTRO JEJUNOSTOMY TUBE CHANGE  6/11/2018    IR GASTRO JEJUNOSTOMY TUBE CHANGE  9/12/2018    IR GASTRO JEJUNOSTOMY TUBE CHANGE  12/24/2018    IR GASTRO JEJUNOSTOMY TUBE CHANGE  3/18/2019    IR GASTRO JEJUNOSTOMY TUBE CHANGE  4/23/2019    IR GASTRO JEJUNOSTOMY TUBE CHANGE  5/21/2019    IR GASTRO JEJUNOSTOMY TUBE CHANGE  9/9/2019    IR GASTRO JEJUNOSTOMY TUBE CHANGE  9/26/2019    IR GASTRO JEJUNOSTOMY TUBE CHANGE  10/11/2019    IR GASTRO JEJUNOSTOMY TUBE CHANGE  11/21/2019    IR GASTRO JEJUNOSTOMY TUBE CHANGE  11/29/2019    IR GASTRO JEJUNOSTOMY TUBE CHANGE  2/27/2020    IR GASTRO JEJUNOSTOMY TUBE CHANGE  4/10/2020    IR GASTRO JEJUNOSTOMY TUBE CHANGE  7/2/2020    IR GASTRO JEJUNOSTOMY TUBE CHANGE  8/28/2020    IR GASTRO JEJUNOSTOMY TUBE CHANGE  9/17/2020    IR GASTRO JEJUNOSTOMY TUBE CHANGE  10/29/2020    IR GASTRO JEJUNOSTOMY TUBE CHANGE  11/6/2020    IR GASTRO JEJUNOSTOMY TUBE CHANGE  12/24/2020    IR GASTRO JEJUNOSTOMY TUBE CHANGE  1/9/2021    IR GASTRO JEJUNOSTOMY TUBE CHANGE  6/9/2021    IR GASTRO JEJUNOSTOMY TUBE CHANGE  10/4/2021    IR GASTRO JEJUNOSTOMY TUBE CHANGE  10/18/2021    IR GASTRO JEJUNOSTOMY TUBE CHANGE  1/14/2022    IR GASTRO JEJUNOSTOMY TUBE CHANGE  1/20/2022    IR GASTRO JEJUNOSTOMY TUBE CHANGE  4/19/2022    IR GASTRO JEJUNOSTOMY TUBE CHANGE  6/7/2022    IR GASTRO JEJUNOSTOMY  TUBE CHANGE  8/19/2022    IR GASTRO JEJUNOSTOMY TUBE CHANGE  9/13/2022    IR GASTRO JEJUNOSTOMY TUBE CHANGE  10/27/2022    IR GASTRO JEJUNOSTOMY TUBE CHANGE  11/3/2022    IR GASTRO JEJUNOSTOMY TUBE CHANGE  1/18/2023    IR GASTRO JEJUNOSTOMY TUBE CHANGE  4/7/2023    IR GASTRO JEJUNOSTOMY TUBE CHANGE  5/4/2023    IR GASTRO JEJUNOSTOMY TUBE CHANGE  6/21/2023    IR GASTRO JEJUNOSTOMY TUBE CHANGE  8/23/2023    IR GASTRO JEJUNOSTOMY TUBE CHANGE  11/16/2023    IR GASTRO JEJUNOSTOMY TUBE PLACEMENT  6/14/2016    IR GJ TUBE REPLACEMENT  12/24/2018    IR GJ TUBE REPLACEMENT  3/18/2019    IR GJ TUBE REPLACEMENT  4/23/2019    IR GJ TUBE REPLACEMENT  5/21/2019    IR GJ TUBE REPLACEMENT  9/9/2019    IR GJ TUBE REPLACEMENT  9/26/2019    IR GJ TUBE REPLACEMENT  10/11/2019    IR GJ TUBE REPLACEMENT  11/21/2019    IR GJ TUBE REPLACEMENT  11/29/2019    IR GJ TUBE REPLACEMENT  2/27/2020    IR GJ TUBE REPLACEMENT  4/10/2020    IR GJ TUBE REPLACEMENT  7/2/2020    IR GJ TUBE REPLACEMENT  8/28/2020    IR GJ TUBE REPLACEMENT  9/17/2020    IR GJ TUBE REPLACEMENT  10/29/2020    IR GJ TUBE REPLACEMENT  11/6/2020    IR GJ TUBE REPLACEMENT  12/24/2020    IR GJ TUBE REPLACEMENT  1/9/2021    IR GJ TUBE REPLACEMENT  6/9/2021    J-G tube      PICC  6/10/2016         TRACHEOSTOMY      Removed       CURRENT MEDICATIONS:    Current Facility-Administered Medications   Medication    acetaminophen (TYLENOL) solution 720 mg    sodium chloride 0.9% BOLUS 500 mL    vancomycin (VANCOCIN) 1,250 mg in 0.9% NaCl 250 mL intermittent infusion     Current Outpatient Medications   Medication    acetaminophen (TYLENOL) 500 MG tablet    albuterol (PROVENTIL) (2.5 MG/3ML) 0.083% neb solution    amLODIPine (NORVASC) 5 MG tablet    aspirin (ASA) 81 MG chewable tablet    baclofen (LIORESAL) 10 MG tablet    bisacodyl (DULCOLAX) 10 MG suppository    cefaclor (CECLOR) 250 MG capsule    Dextromethorphan-guaiFENesin  MG/5ML syrup    doxazosin (CARDURA) 1 MG tablet     "ipratropium (ATROVENT) 0.06 % nasal spray    ipratropium - albuterol 0.5 mg/2.5 mg/3 mL (DUONEB) 0.5-2.5 (3) MG/3ML neb solution    ketoconazole (NIZORAL) 2 % external cream    lidocaine (LIDODERM) 5 % patch    meclizine (ANTIVERT) 25 MG tablet    NEBUSAL 3 % neb solution    neomycin-bacitracin-polymyxin (NEOSPORIN) 5-400-5000 ointment    nystatin (MYCOSTATIN) 760208 UNIT/GM external cream    omeprazole (PRILOSEC) 40 MG DR capsule    ondansetron (ZOFRAN) 4 MG tablet    Ostomy Supplies (STOMAHESIVE) PSTE    polyethylene glycol (MIRALAX) 17 GM/Dose powder    QUEtiapine (SEROQUEL) 25 MG tablet    saccharomyces boulardii (FLORASTOR) 250 MG capsule    simvastatin (ZOCOR) 20 MG tablet    triamcinolone (KENALOG) 0.1 % external cream    VISINE DRY EYE RELIEF 1 % SOLN       ALLERGIES:  Allergies   Allergen Reactions    Diphenhydramine Unknown    Trazodone Other (See Comments)     \"shaking\" per family       FAMILY HISTORY:  Family History   Problem Relation Age of Onset    Hypertension Mother     Cancer Father     Other Cancer Father     Kidney Disease Brother        SOCIAL HISTORY:   Social History     Socioeconomic History    Marital status:     Number of children: 2   Occupational History    Occupation: disability   Tobacco Use    Smoking status: Former     Packs/day: 1.00     Years: 30.00     Additional pack years: 0.00     Total pack years: 30.00     Types: Cigarettes     Quit date: 2005     Years since quittin.6     Passive exposure: Past    Smokeless tobacco: Former     Quit date: 2007    Tobacco comments:     quit 2006   Vaping Use    Vaping Use: Never used   Substance and Sexual Activity    Alcohol use: No    Drug use: No    Sexual activity: Not Currently     Partners: Female     Birth control/protection: None   Other Topics Concern    Parent/sibling w/ CABG, MI or angioplasty before 65F 55M? No   Social History Narrative    Lives with wife, 2 daughters and son in law.    W/c bound.     Social " "Determinants of Health     Financial Resource Strain: Low Risk  (9/27/2023)    Financial Resource Strain     Within the past 12 months, have you or your family members you live with been unable to get utilities (heat, electricity) when it was really needed?: No   Food Insecurity: Low Risk  (9/27/2023)    Food Insecurity     Within the past 12 months, did you worry that your food would run out before you got money to buy more?: No     Within the past 12 months, did the food you bought just not last and you didn t have money to get more?: No   Transportation Needs: Low Risk  (9/27/2023)    Transportation Needs     Within the past 12 months, has lack of transportation kept you from medical appointments, getting your medicines, non-medical meetings or appointments, work, or from getting things that you need?: No   Housing Stability: High Risk (9/27/2023)    Housing Stability     Do you have housing? : No     Are you worried about losing your housing?: No       VITALS:  /64   Pulse 108   Temp 99.2  F (37.3  C) (Temporal)   Resp 20   Ht 1.575 m (5' 2\")   Wt 59 kg (130 lb)   SpO2 97%   BMI 23.78 kg/m      PHYSICAL EXAM:  Physical Exam  Vitals and nursing note reviewed.   Constitutional:       Appearance: Normal appearance.   HENT:      Head: Normocephalic and atraumatic.      Right Ear: External ear normal.      Left Ear: External ear normal.      Nose: Nose normal.      Mouth/Throat:      Mouth: Mucous membranes are moist.   Eyes:      Extraocular Movements: Extraocular movements intact.      Conjunctiva/sclera: Conjunctivae normal.      Pupils: Pupils are equal, round, and reactive to light.   Cardiovascular:      Rate and Rhythm: Regular rhythm. Tachycardia present.   Pulmonary:      Effort: Pulmonary effort is normal.      Breath sounds: Normal breath sounds. No wheezing or rales.   Abdominal:      General: Abdomen is flat. There is no distension.      Palpations: Abdomen is soft.      Tenderness: There is " no abdominal tenderness. There is no guarding.      Comments: Feeding tube in place.\   Musculoskeletal:         General: Normal range of motion.      Cervical back: Normal range of motion and neck supple.      Right lower leg: No edema.      Left lower leg: No edema.      Comments: Upper extremity contractures bilaterally    Lymphadenopathy:      Cervical: No cervical adenopathy.   Skin:     General: Skin is warm and dry.   Neurological:      General: No focal deficit present.      Mental Status: He is alert.      Comments: Attentive to voice   Psychiatric:         Mood and Affect: Mood normal.         Behavior: Behavior normal.         Thought Content: Thought content normal.          LAB:  All pertinent labs reviewed and interpreted.  Results for orders placed or performed during the hospital encounter of 01/08/24   XR Chest 1 View    Impression    IMPRESSION: Low lung volumes. Mild bibasilar pulmonary opacities may be related to atelectasis or mild developing infiltrates. No pleural effusion. Normal heart size.   CT Chest (PE) Abdomen Pelvis w Contrast    Impression    IMPRESSION:   1.  No pulmonary emboli. No evidence of acute aortic syndrome.  2.  Multiple foci of consolidation and/or volume loss throughout both lower lobes, right greater than left, have decreased slightly.   3.  Mild centrilobular emphysema.   4.  Calcified atheromatous plaque throughout the right and LAD coronary arteries.  5.  Well-positioned percutaneous gastrojejunostomy tube tip in proximal jejunum.  6.  Prostate enlargement.     Basic metabolic panel   Result Value Ref Range    Sodium 138 135 - 145 mmol/L    Potassium 4.5 3.4 - 5.3 mmol/L    Chloride 106 98 - 107 mmol/L    Carbon Dioxide (CO2) 22 22 - 29 mmol/L    Anion Gap 10 7 - 15 mmol/L    Urea Nitrogen 32.6 (H) 8.0 - 23.0 mg/dL    Creatinine 1.00 0.67 - 1.17 mg/dL    GFR Estimate 86 >60 mL/min/1.73m2    Calcium 8.7 (L) 8.8 - 10.2 mg/dL    Glucose 107 (H) 70 - 99 mg/dL   Lactic acid  whole blood   Result Value Ref Range    Lactic Acid 1.0 0.7 - 2.0 mmol/L   Result Value Ref Range    Procalcitonin 0.14 <0.50 ng/mL   Result Value Ref Range    Magnesium 2.5 (H) 1.7 - 2.3 mg/dL   Blood gas venous   Result Value Ref Range    pH Venous 7.40 7.35 - 7.45    pCO2 Venous 39 35 - 50 mm Hg    pO2 Venous 43 25 - 47 mm Hg    Bicarbonate Venous 24 24 - 30 mmol/L    Base Excess/Deficit -0.7   mmol/L    Oxyhemoglobin Venous 80.1 (H) 70.0 - 75.0 %    O2 Sat, Venous 81.7 (H) 70.0 - 75.0 %   Symptomatic Influenza A/B, RSV, & SARS-CoV2 PCR (COVID-19) Nasopharyngeal    Specimen: Nasopharyngeal; Swab   Result Value Ref Range    Influenza A PCR Negative Negative    Influenza B PCR Negative Negative    RSV PCR Negative Negative    SARS CoV2 PCR Negative Negative   Hepatic function panel   Result Value Ref Range    Protein Total 8.1 6.4 - 8.3 g/dL    Albumin 4.1 3.5 - 5.2 g/dL    Bilirubin Total 0.3 <=1.2 mg/dL    Alkaline Phosphatase 76 40 - 150 U/L    AST 35 0 - 45 U/L    ALT 36 0 - 70 U/L    Bilirubin Direct <0.20 0.00 - 0.30 mg/dL   CBC with platelets and differential   Result Value Ref Range    WBC Count 15.7 (H) 4.0 - 11.0 10e3/uL    RBC Count 3.82 (L) 4.40 - 5.90 10e6/uL    Hemoglobin 12.0 (L) 13.3 - 17.7 g/dL    Hematocrit 35.6 (L) 40.0 - 53.0 %    MCV 93 78 - 100 fL    MCH 31.4 26.5 - 33.0 pg    MCHC 33.7 31.5 - 36.5 g/dL    RDW 12.4 10.0 - 15.0 %    Platelet Count 247 150 - 450 10e3/uL    % Neutrophils 80 %    % Lymphocytes 12 %    % Monocytes 6 %    % Eosinophils 2 %    % Basophils 0 %    % Immature Granulocytes 0 %    NRBCs per 100 WBC 0 <1 /100    Absolute Neutrophils 12.5 (H) 1.6 - 8.3 10e3/uL    Absolute Lymphocytes 1.9 0.8 - 5.3 10e3/uL    Absolute Monocytes 1.0 0.0 - 1.3 10e3/uL    Absolute Eosinophils 0.2 0.0 - 0.7 10e3/uL    Absolute Basophils 0.1 0.0 - 0.2 10e3/uL    Absolute Immature Granulocytes 0.1 <=0.4 10e3/uL    Absolute NRBCs 0.0 10e3/uL       RADIOLOGY:  Reviewed all pertinent imaging. Please  see official radiology report.  CT Chest (PE) Abdomen Pelvis w Contrast   Final Result   IMPRESSION:    1.  No pulmonary emboli. No evidence of acute aortic syndrome.   2.  Multiple foci of consolidation and/or volume loss throughout both lower lobes, right greater than left, have decreased slightly.    3.  Mild centrilobular emphysema.    4.  Calcified atheromatous plaque throughout the right and LAD coronary arteries.   5.  Well-positioned percutaneous gastrojejunostomy tube tip in proximal jejunum.   6.  Prostate enlargement.         XR Chest 1 View   Final Result   IMPRESSION: Low lung volumes. Mild bibasilar pulmonary opacities may be related to atelectasis or mild developing infiltrates. No pleural effusion. Normal heart size.          I, Aliyah Pitt, am serving as a scribe to document services personally performed by Dr. Emmanuel based on my observation and the provider's statements to me. I, Julian Emmanuel MD attest that Aliyah Pitt is acting in a scribe capacity, has observed my performance of the services and has documented them in accordance with my direction.    Julian Emmanuel M.D.  Emergency Medicine  [facility] EMERGENCY ROOM  [address] [id number] [phone]  Dept: [phone]       Julian Emmanuel MD  01/08/24 9541

## 2024-01-08 NOTE — CONSULTS
Reviewed CT with Dr. Millard. Plan to exchange GJ tube today. Order placed.  LION CHILDS, APRN CNP on 1/8/2024 at 2:34 PM

## 2024-01-08 NOTE — ED NOTES
EMERGENCY DEPARTMENT SIGN OUT NOTE        ED COURSE AND MEDICAL DECISION MAKING  Patient was signed out to me by Dr Julian Emmanuel at 3:52 PM.    In brief, Liu GIL Malave is a 58 year old male who initially presented with shortness of breath and fever.      At time of sign out, disposition was pending transfer to [facility].      -Bedside discussion with family.  They do not want to be transferred to [facility] would rather go home.  Patient is not requiring any supplemental oxygen.  Vitals and labs reviewed.  Does appear to have an infection on his CT.  Mild white count which again goes up with an infection.  Procalcitonin and lactic acid are normal going against bacteremia.  Family understands and agrees that the best course of action would be admission for further monitoring as well as  IV antibiotic treatment.  They understand the risks and states they will bring him back if anything worsens.  Sent home with Augmentin and azithromycin.    FINAL IMPRESSION    1. Community acquired bacterial pneumonia    2. Acute respiratory failure with hypoxia (H)    3. Hemiplegia and hemiparesis following cerebral infarction affecting unspecified side (H)    4. On tube feeding diet        ED MEDS  Medications   sodium chloride 0.9% BOLUS 500 mL (has no administration in time range)   sodium chloride 0.9% BOLUS 1,000 mL (1,000 mLs Intravenous $New Bag 1/8/24 1015)   piperacillin-tazobactam (ZOSYN) 3.375 g vial to attach to  mL bag (0 g Intravenous Stopped 1/8/24 1307)   vancomycin (VANCOCIN) 1,250 mg in 0.9% NaCl 250 mL intermittent infusion (0 mg Intravenous Stopped 1/8/24 1527)   iopamidol (ISOVUE-370) solution 90 mL (90 mLs Intravenous $Given 1/8/24 1211)   acetaminophen (TYLENOL) solution 720 mg (720 mg Per G Tube $Given 1/8/24 1408)   iopamidol (ISOVUE-300) solution 61% 30 mL (30 mLs Oral $Given 1/8/24 1501)       LAB  Labs Ordered and Resulted from Time of ED Arrival to Time of ED Departure   BASIC METABOLIC PANEL -  Abnormal       Result Value    Sodium 138      Potassium 4.5      Chloride 106      Carbon Dioxide (CO2) 22      Anion Gap 10      Urea Nitrogen 32.6 (*)     Creatinine 1.00      GFR Estimate 86      Calcium 8.7 (*)     Glucose 107 (*)    MAGNESIUM - Abnormal    Magnesium 2.5 (*)    BLOOD GAS VENOUS - Abnormal    pH Venous 7.40      pCO2 Venous 39      pO2 Venous 43      Bicarbonate Venous 24      Base Excess/Deficit -0.7      Oxyhemoglobin Venous 80.1 (*)     O2 Sat, Venous 81.7 (*)    ROUTINE UA WITH MICROSCOPIC REFLEX TO CULTURE - Abnormal    Color Urine Colorless      Appearance Urine Clear      Glucose Urine Negative      Bilirubin Urine Negative      Ketones Urine Negative      Specific Gravity Urine <1.005 (*)     Blood Urine Negative      pH Urine 8.0 (*)     Protein Albumin Urine 50 (*)     Urobilinogen Urine <2.0      Nitrite Urine Negative      Leukocyte Esterase Urine Negative      RBC Urine 1      WBC Urine 0     CBC WITH PLATELETS AND DIFFERENTIAL - Abnormal    WBC Count 15.7 (*)     RBC Count 3.82 (*)     Hemoglobin 12.0 (*)     Hematocrit 35.6 (*)     MCV 93      MCH 31.4      MCHC 33.7      RDW 12.4      Platelet Count 247      % Neutrophils 80      % Lymphocytes 12      % Monocytes 6      % Eosinophils 2      % Basophils 0      % Immature Granulocytes 0      NRBCs per 100 WBC 0      Absolute Neutrophils 12.5 (*)     Absolute Lymphocytes 1.9      Absolute Monocytes 1.0      Absolute Eosinophils 0.2      Absolute Basophils 0.1      Absolute Immature Granulocytes 0.1      Absolute NRBCs 0.0     LACTIC ACID WHOLE BLOOD - Normal    Lactic Acid 1.0     PROCALCITONIN - Normal    Procalcitonin 0.14     INFLUENZA A/B, RSV, & SARS-COV2 PCR - Normal    Influenza A PCR Negative      Influenza B PCR Negative      RSV PCR Negative      SARS CoV2 PCR Negative     HEPATIC FUNCTION PANEL - Normal    Protein Total 8.1      Albumin 4.1      Bilirubin Total 0.3      Alkaline Phosphatase 76      AST 35      ALT 36       Bilirubin Direct <0.20     BLOOD CULTURE   BLOOD CULTURE       EKG      RADIOLOGY    CT Chest (PE) Abdomen Pelvis w Contrast   Final Result   IMPRESSION:    1.  No pulmonary emboli. No evidence of acute aortic syndrome.   2.  Multiple foci of consolidation and/or volume loss throughout both lower lobes, right greater than left, have decreased slightly.    3.  Mild centrilobular emphysema.    4.  Calcified atheromatous plaque throughout the right and LAD coronary arteries.   5.  Well-positioned percutaneous gastrojejunostomy tube tip in proximal jejunum.   6.  Prostate enlargement.         XR Chest 1 View   Final Result   IMPRESSION: Low lung volumes. Mild bibasilar pulmonary opacities may be related to atelectasis or mild developing infiltrates. No pleural effusion. Normal heart size.      IR Gastro Jejunostomy Tube Change    (Results Pending)       DISCHARGE MEDS  New Prescriptions    No medications on file     I, Dipti rodriguez, am serving as a scribe to document services personally performed by Tadeo Berg DO, based on my observations and the provider's statements to me.  I, Tadeo Berg DO, attest that Dipti Rodriguez is acting in a scribe capacity, has observed my performance of the services and has documented them in accordance with my direction.     Tadeo Berg DO  [facility] EMERGENCY ROOM  [address] [address] [phone]       Tadeo Berg DO  01/08/24 6273

## 2024-01-08 NOTE — PHARMACY-ADMISSION MEDICATION HISTORY
Pharmacist Admission Medication History    Admission medication history is complete. The information provided in this note is only as accurate as the sources available at the time of the update.    Information Source(s): Family member and CareEverywhere/SureScripts via in-person    Pertinent Information: Family reports patient takes baclofen in the morning and with each dose of Seroquel in the evening. Patient also takes Nebusal with each Duoneb every 4 hours.     Changes made to PTA medication list:  Added: None  Deleted: duplicate Seroquel  Changed: baclofen to scheduled      Allergies reviewed with patient and updates made in EHR: yes    Medication History Completed By: Francis Hurtado [facility] ED 1/8/2024 12:28 PM    PTA Med List   Medication Sig Note Last Dose    acetaminophen (TYLENOL) 500 MG tablet Take 2 tablets (1,000 mg) by mouth every 6 hours as needed for mild pain  1/8/2024 at AM    albuterol (PROVENTIL) (2.5 MG/3ML) 0.083% neb solution Take 1 vial (2.5 mg) by nebulization every 6 hours as needed for shortness of breath, wheezing or cough.*  Unknown at prn    amLODIPine (NORVASC) 5 MG tablet Take 1 tablet (5 mg total) by mouth daily.  1/8/2024 at AM    aspirin (ASA) 81 MG chewable tablet 1 tablet (81 mg) by Per Feeding Tube route daily  1/8/2024 at AM    baclofen (LIORESAL) 10 MG tablet 10 mg by Per Feeding Tube route 4 times daily Takes in the morning and with each dose of Seroquel at 7PM, Midnight, and 3AM. 1/8/2024: Prescribed QID prn muscle spasm but family reports patient taking scheduled.  1/8/2024 at AM x1    bisacodyl (DULCOLAX) 10 MG suppository Place 10 mg rectally every other day  1/7/2024 at AM    cefaclor (CECLOR) 250 MG capsule Take 250 mg by mouth daily UTI ppx  1/8/2024 at AM    Dextromethorphan-guaiFENesin  MG/5ML syrup TAKE 10 MLS BY G TUBE ROUTE EVERY 4 HOURS AS NEEDED FOR COUGH.  Unknown at prn    doxazosin (CARDURA) 1 MG tablet Take 1 tablet (1 mg) by mouth At Bedtime  1/7/2024  at PM    ipratropium (ATROVENT) 0.06 % nasal spray Spray 2 sprays into both nostrils daily  1/8/2024 at AM    ipratropium - albuterol 0.5 mg/2.5 mg/3 mL (DUONEB) 0.5-2.5 (3) MG/3ML neb solution Take 1 vial (3 mLs) by nebulization every 4 hours. 1/8/2024: While awake 1/8/2024 at AM, while awake    ketoconazole (NIZORAL) 2 % external cream Apply topically 2 times daily to affected area(s).  1/8/2024 at AM    lidocaine (LIDODERM) 5 % patch Place 1 patch onto the skin daily as needed for moderate pain To prevent lidocaine toxicity, patient should be patch free for 12 hrs daily.  Unknown at not on    meclizine (ANTIVERT) 25 MG tablet Take 1 tablet via G-tube every 6 hours as needed for vertigo  Unknown at prn    NEBUSAL 3 % neb solution TAKE 4 MLS BY NEBULIZATION EVERY 6 HOURS AS NEEDED FOR THICK SPUTUM. 1/8/2024: Family reports taking each time he does Duoneb 1/8/2024 at AM, with duoneb    neomycin-bacitracin-polymyxin (NEOSPORIN) 5-400-5000 ointment Apply topically 4 times daily (Patient taking differently: Apply topically 4 times daily To J Tube site)  1/8/2024 at AM    nystatin (MYCOSTATIN) 718852 UNIT/GM external cream Apply topically 2 times daily as needed (redness)  Unknown at prn    omeprazole (PRILOSEC) 40 MG DR capsule Take 40 mg by mouth daily  1/8/2024 at AM    ondansetron (ZOFRAN) 4 MG tablet TAKE 1 TABLET (4 MG) BY MOUTH EVERY 8 HOURS AS NEEDED FOR NAUSEA.  Unknown at prn    Ostomy Supplies (STOMAHESIVE) PSTE Apply topically to skin around ostomy site twice daily and as needed      polyethylene glycol (MIRALAX) 17 GM/Dose powder Give 1 capful via G-tube daily as needed for constipation  Unknown at prn    QUEtiapine (SEROQUEL) 25 MG tablet TAKE 1 TABLET BY MOUTH AT 7:00PM, MIDNIGHT AND 3:00AM FOR TOTAL OF 75 MG NIGHTLY. OK TO REPEAT AFTER 4 HOURS AND DURING THE DAY AS NEEDED  1/8/2024 at 0300    saccharomyces boulardii (FLORASTOR) 250 MG capsule Take 250 mg by mouth daily as needed (when on antibiotics)  1/8/2024: Requests to have this started when on antibiotics.  Unknown at prn    simvastatin (ZOCOR) 20 MG tablet TAKE 1 TABLET PER G TUBE ROUTE AT BEDTIME  1/7/2024 at PM    triamcinolone (KENALOG) 0.1 % external cream Apply topically 2 times daily to affected area(s).  1/8/2024 at AM    VISINE DRY EYE RELIEF 1 % SOLN Apply 1 drop to eye every 6 hours as needed for dry eyes.  Unknown at prn

## 2024-01-09 VITALS
RESPIRATION RATE: 20 BRPM | TEMPERATURE: 97.8 F | HEIGHT: 62 IN | WEIGHT: 130 LBS | HEART RATE: 90 BPM | DIASTOLIC BLOOD PRESSURE: 72 MMHG | BODY MASS INDEX: 23.92 KG/M2 | SYSTOLIC BLOOD PRESSURE: 129 MMHG | OXYGEN SATURATION: 95 %

## 2024-01-09 ASSESSMENT — ACTIVITIES OF DAILY LIVING (ADL): ADLS_ACUITY_SCORE: 43

## 2024-01-09 NOTE — ED NOTES
Pt family informed of being accepted at [facility], family does not want to transfer and requesting to talk to provider.

## 2024-01-09 NOTE — DISCHARGE INSTRUCTIONS
Please take antibiotics as prescribed.  Return for any worsening symptoms including fevers, high heart rate or low oxygen.

## 2024-01-09 NOTE — ED NOTES
Discharge instructions discussed with patient daughter and all questions answered. Pt daughter agreeable with discharge plan of care. VSS.

## 2024-01-10 ENCOUNTER — LAB (OUTPATIENT)
Dept: LAB | Facility: CLINIC | Age: 59
End: 2024-01-10
Payer: COMMERCIAL

## 2024-01-10 DIAGNOSIS — J15.9 COMMUNITY ACQUIRED BACTERIAL PNEUMONIA: ICD-10-CM

## 2024-01-10 LAB
ANION GAP SERPL CALCULATED.3IONS-SCNC: 11 MMOL/L (ref 7–15)
BUN SERPL-MCNC: 19.7 MG/DL (ref 8–23)
CALCIUM SERPL-MCNC: 9 MG/DL (ref 8.8–10.2)
CHLORIDE SERPL-SCNC: 108 MMOL/L (ref 98–107)
CREAT SERPL-MCNC: 0.96 MG/DL (ref 0.67–1.17)
DEPRECATED HCO3 PLAS-SCNC: 21 MMOL/L (ref 22–29)
EGFRCR SERPLBLD CKD-EPI 2021: 90 ML/MIN/1.73M2
ERYTHROCYTE [DISTWIDTH] IN BLOOD BY AUTOMATED COUNT: 12.5 % (ref 10–15)
GLUCOSE SERPL-MCNC: 88 MG/DL (ref 70–99)
HCT VFR BLD AUTO: 33.2 % (ref 40–53)
HGB BLD-MCNC: 11 G/DL (ref 13.3–17.7)
MCH RBC QN AUTO: 32.1 PG (ref 26.5–33)
MCHC RBC AUTO-ENTMCNC: 33.1 G/DL (ref 31.5–36.5)
MCV RBC AUTO: 97 FL (ref 78–100)
PLATELET # BLD AUTO: 218 10E3/UL (ref 150–450)
POTASSIUM SERPL-SCNC: 4.2 MMOL/L (ref 3.4–5.3)
RBC # BLD AUTO: 3.43 10E6/UL (ref 4.4–5.9)
SODIUM SERPL-SCNC: 140 MMOL/L (ref 135–145)
WBC # BLD AUTO: 8.4 10E3/UL (ref 4–11)

## 2024-01-10 PROCEDURE — 36415 COLL VENOUS BLD VENIPUNCTURE: CPT

## 2024-01-10 PROCEDURE — 80048 BASIC METABOLIC PNL TOTAL CA: CPT

## 2024-01-10 PROCEDURE — 85027 COMPLETE CBC AUTOMATED: CPT

## 2024-01-13 LAB
BACTERIA BLD CULT: NO GROWTH
BACTERIA BLD CULT: NO GROWTH

## 2024-01-29 DIAGNOSIS — E78.5 DYSLIPIDEMIA: ICD-10-CM

## 2024-01-29 DIAGNOSIS — H04.123 DRY EYES: ICD-10-CM

## 2024-01-29 DIAGNOSIS — L24.9 IRRITANT DERMATITIS: ICD-10-CM

## 2024-01-29 DIAGNOSIS — Z87.01 HISTORY OF PNEUMONIA: ICD-10-CM

## 2024-01-29 DIAGNOSIS — R11.0 NAUSEA: ICD-10-CM

## 2024-01-29 RX ORDER — SIMVASTATIN 20 MG
TABLET ORAL
Qty: 90 TABLET | Refills: 0 | Status: SHIPPED | OUTPATIENT
Start: 2024-01-29 | End: 2024-04-22

## 2024-01-29 RX ORDER — GUAIFENESIN AND DEXTROMETHORPHAN HYDROBROMIDE 100; 10 MG/5ML; MG/5ML
SOLUTION ORAL
Qty: 237 ML | Refills: 0 | Status: SHIPPED | OUTPATIENT
Start: 2024-01-29 | End: 2024-03-06

## 2024-01-29 RX ORDER — ONDANSETRON 4 MG/1
4 TABLET, FILM COATED ORAL EVERY 8 HOURS PRN
Qty: 30 TABLET | Refills: 0 | Status: SHIPPED | OUTPATIENT
Start: 2024-01-29 | End: 2024-03-06

## 2024-01-29 RX ORDER — KETOCONAZOLE 20 MG/G
CREAM TOPICAL
Qty: 30 G | Refills: 0 | Status: SHIPPED | OUTPATIENT
Start: 2024-01-29 | End: 2024-03-06

## 2024-01-29 RX ORDER — TRIAMCINOLONE ACETONIDE 1 MG/G
CREAM TOPICAL
Qty: 30 G | Refills: 0 | Status: SHIPPED | OUTPATIENT
Start: 2024-01-29 | End: 2024-03-06

## 2024-02-24 ENCOUNTER — HEALTH MAINTENANCE LETTER (OUTPATIENT)
Age: 59
End: 2024-02-24

## 2024-03-06 DIAGNOSIS — R13.12 OROPHARYNGEAL DYSPHAGIA: Primary | ICD-10-CM

## 2024-03-06 DIAGNOSIS — R11.0 NAUSEA: ICD-10-CM

## 2024-03-06 DIAGNOSIS — L24.9 IRRITANT DERMATITIS: ICD-10-CM

## 2024-03-06 DIAGNOSIS — I10 ESSENTIAL HYPERTENSION: ICD-10-CM

## 2024-03-06 DIAGNOSIS — Z87.01 HISTORY OF PNEUMONIA: ICD-10-CM

## 2024-03-06 DIAGNOSIS — K59.01 SLOW TRANSIT CONSTIPATION: ICD-10-CM

## 2024-03-06 RX ORDER — OMEPRAZOLE 40 MG/1
40 CAPSULE, DELAYED RELEASE ORAL
Qty: 90 CAPSULE | Refills: 3 | Status: ON HOLD | OUTPATIENT
Start: 2024-03-06 | End: 2024-07-20

## 2024-03-06 RX ORDER — AMLODIPINE BESYLATE 5 MG/1
TABLET ORAL
Qty: 90 TABLET | Refills: 2 | Status: SHIPPED | OUTPATIENT
Start: 2024-03-06

## 2024-03-06 RX ORDER — ONDANSETRON 4 MG/1
4 TABLET, FILM COATED ORAL EVERY 8 HOURS PRN
Qty: 90 TABLET | Refills: 2 | Status: ON HOLD | OUTPATIENT
Start: 2024-03-06 | End: 2024-07-20

## 2024-03-06 RX ORDER — KETOCONAZOLE 20 MG/G
CREAM TOPICAL
Qty: 30 G | Refills: 2 | Status: SHIPPED | OUTPATIENT
Start: 2024-03-06 | End: 2024-07-15

## 2024-03-06 RX ORDER — TRIAMCINOLONE ACETONIDE 1 MG/G
CREAM TOPICAL
Qty: 30 G | Refills: 2 | Status: SHIPPED | OUTPATIENT
Start: 2024-03-06 | End: 2024-07-15

## 2024-03-06 RX ORDER — GUAIFENESIN AND DEXTROMETHORPHAN HYDROBROMIDE 100; 10 MG/5ML; MG/5ML
SOLUTION ORAL
Qty: 237 ML | Refills: 0 | Status: SHIPPED | OUTPATIENT
Start: 2024-03-06 | End: 2024-04-22

## 2024-03-06 RX ORDER — POLYETHYLENE GLYCOL 3350 17 G/17G
POWDER, FOR SOLUTION ORAL
Qty: 850 G | Refills: 0 | Status: SHIPPED | OUTPATIENT
Start: 2024-03-06 | End: 2024-08-13

## 2024-03-21 ENCOUNTER — MEDICAL CORRESPONDENCE (OUTPATIENT)
Dept: HEALTH INFORMATION MANAGEMENT | Facility: CLINIC | Age: 59
End: 2024-03-21
Payer: COMMERCIAL

## 2024-03-22 ENCOUNTER — TELEPHONE (OUTPATIENT)
Dept: FAMILY MEDICINE | Facility: CLINIC | Age: 59
End: 2024-03-22
Payer: COMMERCIAL

## 2024-03-22 NOTE — TELEPHONE ENCOUNTER
Forms/Letter Request    Type of form/letter: OTHER: respiratory device-cough assist device orders       Do we have the form/letter: Yes: she faxed on 2/13/24 and will again today-please call when received    Who is the form from? [facility]    Where did/will the form come from? form was faxed in    When is form/letter needed by: asap    How would you like the form/letter returned: Fax : [phone]    Please call when received as this form is overdue

## 2024-04-09 ENCOUNTER — HOSPITAL ENCOUNTER (OUTPATIENT)
Dept: INTERVENTIONAL RADIOLOGY/VASCULAR | Facility: CLINIC | Age: 59
Discharge: HOME | End: 2024-04-09
Attending: NURSE PRACTITIONER | Admitting: RADIOLOGY
Payer: COMMERCIAL

## 2024-04-09 VITALS
OXYGEN SATURATION: 95 % | SYSTOLIC BLOOD PRESSURE: 139 MMHG | RESPIRATION RATE: 16 BRPM | DIASTOLIC BLOOD PRESSURE: 89 MMHG | HEART RATE: 88 BPM

## 2024-04-09 DIAGNOSIS — R63.30 FEEDING DIFFICULTIES: ICD-10-CM

## 2024-04-09 PROCEDURE — C1769 GUIDE WIRE: HCPCS

## 2024-04-09 PROCEDURE — 250N000009 HC RX 250: Performed by: RADIOLOGY

## 2024-04-09 PROCEDURE — 49452 REPLACE G-J TUBE PERC: CPT

## 2024-04-09 RX ORDER — LIDOCAINE HYDROCHLORIDE 20 MG/ML
JELLY TOPICAL ONCE
Status: COMPLETED | OUTPATIENT
Start: 2024-04-09 | End: 2024-04-09

## 2024-04-09 RX ADMIN — LIDOCAINE HYDROCHLORIDE: 20 JELLY TOPICAL at 14:10

## 2024-04-09 RX ADMIN — SODIUM CHLORIDE 1 BAG: 9 INJECTION, SOLUTION INTRAVENOUS at 15:00

## 2024-04-09 NOTE — IR NOTE
Patient arrived for routine GJ exchange, family present.  No questions.  Patient tolerated procedure without difficulties.    Patient discharged with family in own wheelchair.  Discharge instructions explained to family, patient daughter verbalized understanding, and papers sent with.  No questions.

## 2024-04-09 NOTE — DISCHARGE INSTRUCTIONS
Gastrostomy (G) or Gastrojejunostomy (G/J) Tube Exchange Discharge Instructions:   You had a gastrostomy (G) tube or a Gastrojejunostomy Tube (GJ) exchanged.  This tube is often used for nutritional support and medication administration or it can be used for stomach venting.     Care instructions:  - If you received sedation for your procedure, do not drive or operate heavy machinery for the rest of the day.  - Avoid soaking in stagnant water (tub baths, Jacuzzis, pools, lake, or ocean).   - You may shower beginning the day after the tube was exchanged.   - Clean under the disc daily with soap and water. Pat dry under disc and apply new split gauze dressing under disc. Cleaning tube site daily will help prevent infection and skin irritation.  - A small amount of clear tan drainage from the tube exit site can be normal.  - Make sure the disc on the tube fits slightly snug against the skin so that the tube does not move in or out of the body easily.   - If you experience leaking from around tube exit site, the most common cause is that the disc is not tightened against the skin.   - To tighten the disc, pull gently on the tube so the retention balloon (under the skin) is pulled up against the skin under the tube. Push the disc down until it is tight against the skin without a gap between the skin and the disc.  - Flush your tube with 60cc of water twice a day (using a cath tip syringe) to prevent tube from clogging (or follow recommendations from your doctor or dietician if given).    Giving Feedings and Medications:  - Follow-up with your dietician, primary care provider, or oncologist for instructions on tube feedings and medication administration.    - ONLY use specific enteric feedings with your tube (unless otherwise discussed with your dietitian).    - Flush tube at least twice daily with 60ml of water using cath tip syringe unless otherwise instructed by your doctor or dietician.  - Flush the tube before and  after administrating medications and bolus feedings with 60cc of water.    Follow Up:  - Recommend routine 3 month exchanges of feeding tube. Please contact your primary care provider or speak with your dietitian to obtain an order to have your G/GJ tube exchanged. Then contact [facility] scheduling department at [phone] to schedule your G/GJ tube exchange appointment.    Please seek medical evaluation for:  - Fever (greater than 101 F (38.3C).  - Purulent (yellow/green/foul smelling) drainage from tube exit site.   - Significant or worsening abdominal pain.   - Skin that is hot to the touch or significantly reddened at the tube exit site.   - Bleeding at tube exit site.    Call [facility] IR RN Line at [phone] with questions or if you have any of the following symptoms:  - Tube falls out or felt to be out of position.  - Unable to flush tube.  - Significant leakage around tube site (tube feeding, medications or drainage).  - Significant bleeding at the tube exit site.  - Severe pain at tube exit site.

## 2024-04-12 ENCOUNTER — APPOINTMENT (OUTPATIENT)
Dept: CT IMAGING | Facility: CLINIC | Age: 59
End: 2024-04-12
Attending: STUDENT IN AN ORGANIZED HEALTH CARE EDUCATION/TRAINING PROGRAM
Payer: COMMERCIAL

## 2024-04-12 ENCOUNTER — HOSPITAL ENCOUNTER (EMERGENCY)
Facility: CLINIC | Age: 59
Discharge: HOME | End: 2024-04-12
Attending: STUDENT IN AN ORGANIZED HEALTH CARE EDUCATION/TRAINING PROGRAM | Admitting: STUDENT IN AN ORGANIZED HEALTH CARE EDUCATION/TRAINING PROGRAM
Payer: COMMERCIAL

## 2024-04-12 VITALS
OXYGEN SATURATION: 97 % | RESPIRATION RATE: 18 BRPM | TEMPERATURE: 99.3 F | HEART RATE: 101 BPM | DIASTOLIC BLOOD PRESSURE: 73 MMHG | SYSTOLIC BLOOD PRESSURE: 147 MMHG

## 2024-04-12 DIAGNOSIS — J40 BRONCHITIS: ICD-10-CM

## 2024-04-12 LAB
ALBUMIN SERPL BCG-MCNC: 3.6 G/DL (ref 3.5–5.2)
ALBUMIN UR-MCNC: 50 MG/DL
ALP SERPL-CCNC: ABNORMAL U/L
ALT SERPL W P-5'-P-CCNC: ABNORMAL U/L
ANION GAP SERPL CALCULATED.3IONS-SCNC: 7 MMOL/L (ref 7–15)
ANION GAP SERPL CALCULATED.3IONS-SCNC: 9 MMOL/L (ref 7–15)
APPEARANCE UR: CLEAR
AST SERPL W P-5'-P-CCNC: ABNORMAL U/L
ATRIAL RATE - MUSE: 103 BPM
BASE EXCESS BLDV CALC-SCNC: 0.7 MMOL/L (ref -3–3)
BASOPHILS # BLD AUTO: 0.1 10E3/UL (ref 0–0.2)
BASOPHILS NFR BLD AUTO: 0 %
BILIRUB SERPL-MCNC: 0.3 MG/DL
BILIRUB UR QL STRIP: NEGATIVE
BUN SERPL-MCNC: 33.2 MG/DL (ref 8–23)
BUN SERPL-MCNC: 34.9 MG/DL (ref 8–23)
CALCIUM SERPL-MCNC: 8 MG/DL (ref 8.8–10.2)
CALCIUM SERPL-MCNC: 8.4 MG/DL (ref 8.8–10.2)
CHLORIDE SERPL-SCNC: 107 MMOL/L (ref 98–107)
CHLORIDE SERPL-SCNC: 108 MMOL/L (ref 98–107)
COLOR UR AUTO: COLORLESS
CREAT SERPL-MCNC: 1.12 MG/DL (ref 0.67–1.17)
CREAT SERPL-MCNC: 1.14 MG/DL (ref 0.67–1.17)
DEPRECATED HCO3 PLAS-SCNC: 23 MMOL/L (ref 22–29)
DEPRECATED HCO3 PLAS-SCNC: 24 MMOL/L (ref 22–29)
DIASTOLIC BLOOD PRESSURE - MUSE: 84 MMHG
EGFRCR SERPLBLD CKD-EPI 2021: 74 ML/MIN/1.73M2
EGFRCR SERPLBLD CKD-EPI 2021: 75 ML/MIN/1.73M2
EOSINOPHIL # BLD AUTO: 0.2 10E3/UL (ref 0–0.7)
EOSINOPHIL NFR BLD AUTO: 1 %
ERYTHROCYTE [DISTWIDTH] IN BLOOD BY AUTOMATED COUNT: 11.9 % (ref 10–15)
FLUAV RNA SPEC QL NAA+PROBE: NEGATIVE
FLUBV RNA RESP QL NAA+PROBE: NEGATIVE
GLUCOSE SERPL-MCNC: 104 MG/DL (ref 70–99)
GLUCOSE SERPL-MCNC: 108 MG/DL (ref 70–99)
GLUCOSE UR STRIP-MCNC: NEGATIVE MG/DL
HCO3 BLDV-SCNC: 26 MMOL/L (ref 21–28)
HCT VFR BLD AUTO: 34.4 % (ref 40–53)
HGB BLD-MCNC: 11.7 G/DL (ref 13.3–17.7)
HGB UR QL STRIP: NEGATIVE
IMM GRANULOCYTES # BLD: 0.1 10E3/UL
IMM GRANULOCYTES NFR BLD: 1 %
INTERPRETATION ECG - MUSE: NORMAL
KETONES UR STRIP-MCNC: NEGATIVE MG/DL
LACTATE SERPL-SCNC: 1.1 MMOL/L (ref 0.7–2)
LEUKOCYTE ESTERASE UR QL STRIP: NEGATIVE
LYMPHOCYTES # BLD AUTO: 1.6 10E3/UL (ref 0.8–5.3)
LYMPHOCYTES NFR BLD AUTO: 9 %
MCH RBC QN AUTO: 31.8 PG (ref 26.5–33)
MCHC RBC AUTO-ENTMCNC: 34 G/DL (ref 31.5–36.5)
MCV RBC AUTO: 94 FL (ref 78–100)
MONOCYTES # BLD AUTO: 1.3 10E3/UL (ref 0–1.3)
MONOCYTES NFR BLD AUTO: 8 %
NEUTROPHILS # BLD AUTO: 14 10E3/UL (ref 1.6–8.3)
NEUTROPHILS NFR BLD AUTO: 81 %
NITRATE UR QL: NEGATIVE
NRBC # BLD AUTO: 0 10E3/UL
NRBC BLD AUTO-RTO: 0 /100
O2/TOTAL GAS SETTING VFR VENT: 21 %
OXYHGB MFR BLDV: 71 % (ref 70–75)
P AXIS - MUSE: 67 DEGREES
PCO2 BLDV: 44 MM HG (ref 40–50)
PH BLDV: 7.38 [PH] (ref 7.32–7.43)
PH UR STRIP: 8 [PH] (ref 5–7)
PLATELET # BLD AUTO: 203 10E3/UL (ref 150–450)
PO2 BLDV: 39 MM HG (ref 25–47)
POTASSIUM SERPL-SCNC: 4.8 MMOL/L (ref 3.4–5.3)
POTASSIUM SERPL-SCNC: 7.1 MMOL/L (ref 3.4–5.3)
PR INTERVAL - MUSE: 154 MS
PROT SERPL-MCNC: 8.2 G/DL (ref 6.4–8.3)
QRS DURATION - MUSE: 74 MS
QT - MUSE: 320 MS
QTC - MUSE: 419 MS
R AXIS - MUSE: 2 DEGREES
RBC # BLD AUTO: 3.68 10E6/UL (ref 4.4–5.9)
RBC URINE: <1 /HPF
RSV RNA SPEC NAA+PROBE: NEGATIVE
SAO2 % BLDV: 72.4 % (ref 70–75)
SARS-COV-2 RNA RESP QL NAA+PROBE: NEGATIVE
SODIUM SERPL-SCNC: 139 MMOL/L (ref 135–145)
SODIUM SERPL-SCNC: 139 MMOL/L (ref 135–145)
SP GR UR STRIP: 1.03 (ref 1–1.03)
SYSTOLIC BLOOD PRESSURE - MUSE: 135 MMHG
T AXIS - MUSE: 54 DEGREES
UROBILINOGEN UR STRIP-MCNC: <2 MG/DL
VENTRICULAR RATE- MUSE: 103 BPM
WBC # BLD AUTO: 17.3 10E3/UL (ref 4–11)
WBC URINE: <1 /HPF

## 2024-04-12 PROCEDURE — 70450 CT HEAD/BRAIN W/O DYE: CPT

## 2024-04-12 PROCEDURE — 99285 EMERGENCY DEPT VISIT HI MDM: CPT | Mod: 25

## 2024-04-12 PROCEDURE — 250N000011 HC RX IP 250 OP 636: Performed by: STUDENT IN AN ORGANIZED HEALTH CARE EDUCATION/TRAINING PROGRAM

## 2024-04-12 PROCEDURE — 71275 CT ANGIOGRAPHY CHEST: CPT

## 2024-04-12 PROCEDURE — 80048 BASIC METABOLIC PNL TOTAL CA: CPT | Performed by: STUDENT IN AN ORGANIZED HEALTH CARE EDUCATION/TRAINING PROGRAM

## 2024-04-12 PROCEDURE — 85049 AUTOMATED PLATELET COUNT: CPT | Performed by: STUDENT IN AN ORGANIZED HEALTH CARE EDUCATION/TRAINING PROGRAM

## 2024-04-12 PROCEDURE — 82040 ASSAY OF SERUM ALBUMIN: CPT | Performed by: STUDENT IN AN ORGANIZED HEALTH CARE EDUCATION/TRAINING PROGRAM

## 2024-04-12 PROCEDURE — 96361 HYDRATE IV INFUSION ADD-ON: CPT

## 2024-04-12 PROCEDURE — 87637 SARSCOV2&INF A&B&RSV AMP PRB: CPT | Performed by: STUDENT IN AN ORGANIZED HEALTH CARE EDUCATION/TRAINING PROGRAM

## 2024-04-12 PROCEDURE — 96365 THER/PROPH/DIAG IV INF INIT: CPT | Mod: 59

## 2024-04-12 PROCEDURE — 36415 COLL VENOUS BLD VENIPUNCTURE: CPT | Performed by: STUDENT IN AN ORGANIZED HEALTH CARE EDUCATION/TRAINING PROGRAM

## 2024-04-12 PROCEDURE — 83605 ASSAY OF LACTIC ACID: CPT | Performed by: STUDENT IN AN ORGANIZED HEALTH CARE EDUCATION/TRAINING PROGRAM

## 2024-04-12 PROCEDURE — 81003 URINALYSIS AUTO W/O SCOPE: CPT | Performed by: STUDENT IN AN ORGANIZED HEALTH CARE EDUCATION/TRAINING PROGRAM

## 2024-04-12 PROCEDURE — 258N000003 HC RX IP 258 OP 636: Performed by: STUDENT IN AN ORGANIZED HEALTH CARE EDUCATION/TRAINING PROGRAM

## 2024-04-12 PROCEDURE — 87040 BLOOD CULTURE FOR BACTERIA: CPT | Performed by: STUDENT IN AN ORGANIZED HEALTH CARE EDUCATION/TRAINING PROGRAM

## 2024-04-12 PROCEDURE — 82805 BLOOD GASES W/O2 SATURATION: CPT | Performed by: STUDENT IN AN ORGANIZED HEALTH CARE EDUCATION/TRAINING PROGRAM

## 2024-04-12 PROCEDURE — 93005 ELECTROCARDIOGRAM TRACING: CPT | Performed by: STUDENT IN AN ORGANIZED HEALTH CARE EDUCATION/TRAINING PROGRAM

## 2024-04-12 RX ORDER — PIPERACILLIN SODIUM, TAZOBACTAM SODIUM 3; .375 G/15ML; G/15ML
3.38 INJECTION, POWDER, LYOPHILIZED, FOR SOLUTION INTRAVENOUS ONCE
Status: COMPLETED | OUTPATIENT
Start: 2024-04-12 | End: 2024-04-12

## 2024-04-12 RX ORDER — IOPAMIDOL 755 MG/ML
75 INJECTION, SOLUTION INTRAVASCULAR ONCE
Status: COMPLETED | OUTPATIENT
Start: 2024-04-12 | End: 2024-04-12

## 2024-04-12 RX ORDER — DOXYCYCLINE 100 MG/1
100 CAPSULE ORAL 2 TIMES DAILY
Qty: 28 CAPSULE | Refills: 0 | Status: SHIPPED | OUTPATIENT
Start: 2024-04-12 | End: 2024-04-26

## 2024-04-12 RX ADMIN — IOPAMIDOL 75 ML: 755 INJECTION, SOLUTION INTRAVENOUS at 10:18

## 2024-04-12 RX ADMIN — PIPERACILLIN AND TAZOBACTAM 3.38 G: 3; .375 INJECTION, POWDER, FOR SOLUTION INTRAVENOUS at 12:49

## 2024-04-12 RX ADMIN — SODIUM CHLORIDE, POTASSIUM CHLORIDE, SODIUM LACTATE AND CALCIUM CHLORIDE 1770 ML: 600; 310; 30; 20 INJECTION, SOLUTION INTRAVENOUS at 09:43

## 2024-04-12 ASSESSMENT — ACTIVITIES OF DAILY LIVING (ADL)
ADLS_ACUITY_SCORE: 39

## 2024-04-12 ASSESSMENT — COLUMBIA-SUICIDE SEVERITY RATING SCALE - C-SSRS
2. HAVE YOU ACTUALLY HAD ANY THOUGHTS OF KILLING YOURSELF IN THE PAST MONTH?: NO
1. IN THE PAST MONTH, HAVE YOU WISHED YOU WERE DEAD OR WISHED YOU COULD GO TO SLEEP AND NOT WAKE UP?: NO
6. HAVE YOU EVER DONE ANYTHING, STARTED TO DO ANYTHING, OR PREPARED TO DO ANYTHING TO END YOUR LIFE?: NO

## 2024-04-12 NOTE — ED PROVIDER NOTES
EMERGENCY DEPARTMENT ENCOUNTER       ED Course & Medical Decision Making     8:53 AM I met with the patient to gather history and perform an initial exam.    Final Impression  58 year old male brought in by family for evaluation of nonstop coughing, they believe he is having increased difficulty breathing and believes he had a fever at home.  Patient afebrile here, though is warm to the touch, temperature 99.3.  Mildly tachycardic throughout ED stay with a heart rate of about 100 (appears to run high based on prior encounters).  ED workup shows a white count of 17.3, though normal lactic acid level.  Initial potassium hemolyzed at 7.1, repeat BMP showed it to be 4.8 which seems more correct.  VBG within normal limits.  Urinalysis negative.  COVID/flu/RSV negative.  CT head negative for acute changes.  CT chest PE run showed some bronchial wall thickening with airway debris, likely bronchitis, some opacities in bilateral lower lobes that may be chronic, though difficult to exclude acute infection such as pneumonia given his history of aspiration and recurrent pneumonia.  Given patient's compromised lung status and high risk for recurrent aspiration pneumonia, was empirically treated with antibiotics in the ED, given dose of Zosyn.  CT showing bronchitis, could be early pneumonia given his cough and patient is admittedly high risk for aspiration pneumonia.  Discussed these findings with family, discussed possible admission for what is essentially early pneumonia, though family ultimately elected for discharge home with course of antibiotics.  Will be sent a course of doxycycline to their desired pharmacy electronically, return to ED if symptoms worsen.    Prior to making a final disposition on this patient the results of patient's tests and other diagnostic studies were discussed with the patient. All questions were answered. Patient expressed understanding of the plan and was amenable to it.    Medical Decision  Making    History:  Supplemental history from: Family member (daughter)  External Record(s) reviewed as documented below;  9/27/2023, [facility] note with Dr. Enriquez, note reviewed.    Work Up:  Chart documentation includes differential considered and any EKGs or imaging independently interpreted by provider, where specified.  DDx considered but not limited to: Pneumonia, flu, RSV, COVID, UTI, sepsis, pulmonary embolism  Considered administering antibiotics for: pneumonia  Considered admission / inpatient observation / escalation of cares for: Pneumonia    Complicating factors:  Care impacted by chronic illness: CVA with spastic hemiplegia, nonverbal, GJ tube dependent, recurrent aspiration pneumonia, hep C carrier  Care affected by social determinants of health: N/A    Disposition considerations: Discharge. I prescribed additional prescription strength medication(s) as charted. I considered admission, but discharged the patient after share decision making conversation.      Medications   sodium chloride (PF) 0.9% PF flush 3 mL (has no administration in time range)   sodium chloride (PF) 0.9% PF flush 3 mL (3 mLs Intracatheter Not Given 4/12/24 0948)   lactated ringers BOLUS 1,770 mL (0 mLs Intravenous Stopped 4/12/24 1240)   iopamidol (ISOVUE-370) solution 75 mL (75 mLs Intravenous $Given 4/12/24 1018)   piperacillin-tazobactam (ZOSYN) 3.375 g vial to attach to  mL bag (0 g Intravenous Stopped 4/12/24 1343)       Discharge Medication List as of 4/12/2024  1:46 PM        START taking these medications    Details   doxycycline hyclate (VIBRAMYCIN) 100 MG capsule Take 1 capsule (100 mg) by mouth 2 times daily for 14 days, Disp-28 capsule, R-0, E-Prescribe           Final Impression     1. Bronchitis      Chief Complaint     Chief Complaint   Patient presents with    Cough    Headache     HPI     Liu GIL Malave is a 58 year old male who presents for evaluation of cough.    Per patient's  daughter, patient has has been coughing nonstop this morning with difficulty breathing. Further endorses a fever that started recently. There was the flu going on at home about 2 weeks ago but patient has not has any bad symptoms until today. Further endorses patient had a headache last week.     Daughter further reports that patient has a gastro jejunostomy tube for feeding and it was last changed on Tuesday (4/9/2024). Denies that patient uses a catheter.     I, Viviennebarrett Ruelas am serving as a scribe to document services personally performed by Dr. Dyllan Alejandre MD, based on my observation and the provider's statements to me. I, Dr. Dyllan Alejandre MD attest that Vivienne Ruelas is acting in a scribe capacity, has observed my performance of the services and has documented them in accordance with my direction.    Physical Exam     BP (!) 147/73   Pulse 101   Temp 99.3  F (37.4  C) (Rectal)   Resp 18   SpO2 97%   Constitutional: Awake, in no acute distress.  Head: Normocephalic, atraumatic.  ENT: Mucous membranes slightly dry  Eyes: Conjunctiva normal.  Respiratory: Somewhat coarse lung sounds bilaterally, though saturations 97% on room air  Cardiovascular: Borderline tachycardic with a rate of about 100. Good peripheral perfusion.  GI: Abdomen soft, nondistended.  GJ tube in place.  Musculoskeletal: Some chronic contractures of extremities  Integument: Warm, dry.  Neurologic: Alert, opens eyes, chronic hemiparesis with some contracture    Labs & Imaging     Imaging reviewed and independently interpreted as below;   CT chest images reviewed, opacities in lung bases, slightly worse on the right, as well as some large airway debris.    Results for orders placed or performed during the hospital encounter of 04/12/24   Head CT w/o contrast    Impression    IMPRESSION:  1.  No acute intracranial process. No significant change since 07/03/2023.  2.  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  3.  Stable chronic bilateral basal ganglia infarcts.  4.  Stable moderate chronic small vessel ischemic disease and mild to moderate generalized brain parenchymal volume loss.   CT Chest Pulmonary Embolism w Contrast    Impression    IMPRESSION:  1.  No PE.  2.  Bronchial wall thickening with debris in the airways can be seen with bronchitis. Dependent opacities in the right lower lobe have not changed and could represent fibrosis. Opacities in the anterior/superior segment of the right lower lobe have   resolved from the comparison study and may have been infectious or inflammatory in nature. Emphysema is present.  3.  Coronary artery disease.   Comprehensive metabolic panel   Result Value Ref Range    Sodium 139 135 - 145 mmol/L    Potassium 7.1 (HH) 3.4 - 5.3 mmol/L    Carbon Dioxide (CO2) 23 22 - 29 mmol/L    Anion Gap 9 7 - 15 mmol/L    Urea Nitrogen 34.9 (H) 8.0 - 23.0 mg/dL    Creatinine 1.12 0.67 - 1.17 mg/dL    GFR Estimate 75 >60 mL/min/1.73m2    Calcium 8.4 (L) 8.8 - 10.2 mg/dL    Chloride 107 98 - 107 mmol/L    Glucose 108 (H) 70 - 99 mg/dL    Alkaline Phosphatase      AST      ALT      Protein Total 8.2 6.4 - 8.3 g/dL    Albumin 3.6 3.5 - 5.2 g/dL    Bilirubin Total 0.3 <=1.2 mg/dL   Lactic acid whole blood   Result Value Ref Range    Lactic Acid 1.1 0.7 - 2.0 mmol/L   Blood gas venous   Result Value Ref Range    pH Venous 7.38 7.32 - 7.43    pCO2 Venous 44 40 - 50 mm Hg    pO2 Venous 39 25 - 47 mm Hg    Bicarbonate Venous 26 21 - 28 mmol/L    Base Excess/Deficit Venous 0.7 -3.0 - 3.0 mmol/L    FIO2 21     Oxyhemoglobin Venous 71 70 - 75 %    O2 Sat, Venous 72.4 70.0 - 75.0 %   Symptomatic Influenza A/B, RSV, & SARS-CoV2 PCR (COVID-19) Nasopharyngeal    Specimen: Nasopharyngeal; Swab   Result Value Ref Range    Influenza A PCR Negative Negative    Influenza B PCR Negative Negative     RSV PCR Negative Negative    SARS CoV2 PCR Negative Negative   UA with Microscopic reflex to Culture    Specimen: Urine, Midstream   Result Value Ref Range    Color Urine Colorless Colorless, Straw, Light Yellow, Yellow    Appearance Urine Clear Clear    Glucose Urine Negative Negative mg/dL    Bilirubin Urine Negative Negative    Ketones Urine Negative Negative mg/dL    Specific Gravity Urine 1.031 (H) 1.001 - 1.030    Blood Urine Negative Negative    pH Urine 8.0 (H) 5.0 - 7.0    Protein Albumin Urine 50 (A) Negative mg/dL    Urobilinogen Urine <2.0 <2.0 mg/dL    Nitrite Urine Negative Negative    Leukocyte Esterase Urine Negative Negative    RBC Urine <1 <=2 /HPF    WBC Urine <1 <=5 /HPF   CBC with platelets and differential   Result Value Ref Range    WBC Count 17.3 (H) 4.0 - 11.0 10e3/uL    RBC Count 3.68 (L) 4.40 - 5.90 10e6/uL    Hemoglobin 11.7 (L) 13.3 - 17.7 g/dL    Hematocrit 34.4 (L) 40.0 - 53.0 %    MCV 94 78 - 100 fL    MCH 31.8 26.5 - 33.0 pg    MCHC 34.0 31.5 - 36.5 g/dL    RDW 11.9 10.0 - 15.0 %    Platelet Count 203 150 - 450 10e3/uL    % Neutrophils 81 %    % Lymphocytes 9 %    % Monocytes 8 %    % Eosinophils 1 %    % Basophils 0 %    % Immature Granulocytes 1 %    NRBCs per 100 WBC 0 <1 /100    Absolute Neutrophils 14.0 (H) 1.6 - 8.3 10e3/uL    Absolute Lymphocytes 1.6 0.8 - 5.3 10e3/uL    Absolute Monocytes 1.3 0.0 - 1.3 10e3/uL    Absolute Eosinophils 0.2 0.0 - 0.7 10e3/uL    Absolute Basophils 0.1 0.0 - 0.2 10e3/uL    Absolute Immature Granulocytes 0.1 <=0.4 10e3/uL    Absolute NRBCs 0.0 10e3/uL   Basic metabolic panel   Result Value Ref Range    Sodium 139 135 - 145 mmol/L    Potassium 4.8 3.4 - 5.3 mmol/L    Chloride 108 (H) 98 - 107 mmol/L    Carbon Dioxide (CO2) 24 22 - 29 mmol/L    Anion Gap 7 7 - 15 mmol/L    Urea Nitrogen 33.2 (H) 8.0 - 23.0 mg/dL    Creatinine 1.14 0.67 - 1.17 mg/dL    GFR Estimate 74 >60 mL/min/1.73m2    Calcium 8.0 (L) 8.8 - 10.2 mg/dL    Glucose 104 (H) 70 - 99  mg/dL   ECG 12-LEAD WITH MUSE (LHE)   Result Value Ref Range    Systolic Blood Pressure 135 mmHg    Diastolic Blood Pressure 84 mmHg    Ventricular Rate 103 BPM    Atrial Rate 103 BPM    WI Interval 154 ms    QRS Duration 74 ms     ms    QTc 419 ms    P Axis 67 degrees    R AXIS 2 degrees    T Axis 54 degrees    Interpretation ECG       Sinus tachycardia  Otherwise normal ECG  When compared with ECG of 27-OCT-2023 09:19,  No significant change was found  Confirmed by SEE ED PROVIDER NOTE FOR, ECG INTERPRETATION ([id number]),  KILEY CARROLL ([id number]) on 4/12/2024 8:54:10 AM         EKG     EKG reviewed and independently interpreted as below;  Sinus tachycardia, rate 103.  .  QRS 84.  QTc 419.  No STEMI     Dyllan Alejandre MD  04/12/24 6340

## 2024-04-12 NOTE — ED TRIAGE NOTES
"Pt presents to the ER via EMS. Pt sent in for evaluation for \"potential pneumonia\". Pt has been having productive cough for couple days and hx of pneumonia. Per family \"never shows on X ray and need CT scan\". Recently had G tube changed and family states usually gets infection after change. Pt also complaining of headache.      Triage Assessment (Adult)       Row Name 04/12/24 0829          Triage Assessment    Airway WDL WDL        Respiratory WDL    Respiratory WDL X;cough     Cough Frequency frequent     Cough Type congested;productive        Skin Circulation/Temperature WDL    Skin Circulation/Temperature WDL WDL        Cardiac WDL    Cardiac WDL X;rhythm     Pulse Rate & Regularity tachycardic        Peripheral/Neurovascular WDL    Peripheral Neurovascular WDL WDL        Cognitive/Neuro/Behavioral WDL    Cognitive/Neuro/Behavioral WDL X;speech                     "

## 2024-04-12 NOTE — PHARMACY-ADMISSION MEDICATION HISTORY
Pharmacist Admission Medication History    Admission medication history is complete. The information provided in this note is only as accurate as the sources available at the time of the update.    Information Source(s):  daughter German via phone [phone]  via phone    Pertinent Information:     Idalmis reports that her father is not taking asa, I asked several times / ways.  Getting daily cefaclor for uti prevention  Only using topicals as needed  Getting daily bisacodyl supp and miralax  Daughter very concerned that he get baclofen and serroquel as scheduled    Changes made to PTA medication list:  Added: None  Deleted: None  Changed: None    Allergies reviewed with patient and updates made in EHR: yes    Medication History Completed By: Yo Benitez RPH 4/12/2024 10:16 AM    PTA Med List   Medication Sig Note Last Dose    acetaminophen (TYLENOL) 500 MG tablet Take 2 tablets (1,000 mg) by mouth every 6 hours as needed for mild pain  Unknown    albuterol (PROVENTIL) (2.5 MG/3ML) 0.083% neb solution Take 1 vial (2.5 mg) by nebulization every 6 hours as needed for shortness of breath, wheezing or cough.*  Unknown    amLODIPine (NORVASC) 5 MG tablet Take 1 tablet (5 mg total) by mouth daily.  4/12/2024    baclofen (LIORESAL) 10 MG tablet 10 mg by Per Feeding Tube route 4 times daily Takes in the morning and with each dose of Seroquel at 7PM, Midnight, and 3AM. 4/12/2024: . 4/12/2024 at am dose    bisacodyl (DULCOLAX) 10 MG suppository Place 10 mg rectally daily  4/12/2024    cefaclor (CECLOR) 250 MG capsule Take 250 mg by mouth daily UTI ppx  4/12/2024    Dextromethorphan-guaiFENesin  MG/5ML syrup TAKE 10 MLS BY G TUBE ROUTE EVERY 4 HOURS AS NEEDED FOR COUGH.  Unknown    doxazosin (CARDURA) 1 MG tablet Take 1 tablet (1 mg) by mouth At Bedtime  4/11/2024    ipratropium (ATROVENT) 0.06 % nasal spray Spray 2 sprays into both nostrils daily as needed for rhinitis  Unknown    ipratropium - albuterol 0.5 mg/2.5  mg/3 mL (DUONEB) 0.5-2.5 (3) MG/3ML neb solution Take 1 vial (3 mLs) by nebulization every 4 hours.  4/12/2024    ketoconazole (NIZORAL) 2 % external cream Apply topically 2 times daily to affected area(s). (Patient taking differently: 2 times daily as needed for itching)  Unknown    meclizine (ANTIVERT) 25 MG tablet Take 1 tablet via G-tube every 6 hours as needed for vertigo  Unknown    NEBUSAL 3 % neb solution TAKE 4 MLS BY NEBULIZATION EVERY 6 HOURS AS NEEDED FOR THICK SPUTUM.  4/12/2024    neomycin-bacitracin-polymyxin (NEOSPORIN) 5-400-5000 ointment Apply topically 4 times daily (Patient taking differently: Apply topically 3 times daily as needed (irritation) To J Tube site)  Unknown    nystatin (MYCOSTATIN) 920617 UNIT/GM external cream Apply topically 2 times daily as needed (redness)  Unknown    omeprazole (PRILOSEC) 40 MG DR capsule take 1 capsule by mouth daily before breakfast  4/12/2024    ondansetron (ZOFRAN) 4 MG tablet TAKE 1 TABLET (4 MG) BY MOUTH EVERY 8 HOURS AS NEEDED FOR NAUSEA.  Unknown    polyethylene glycol (MIRALAX) 17 GM/Dose powder Give 1 capful via G-tube daily as needed for constipation. (Patient taking differently: 17 g by Oral or Feeding Tube route daily)  4/12/2024    QUEtiapine (SEROQUEL) 25 MG tablet TAKE 1 TABLET BY MOUTH AT 7:00PM, MIDNIGHT AND 3:00AM FOR TOTAL OF 75 MG NIGHTLY. OK TO REPEAT AFTER 4 HOURS AND DURING THE DAY AS NEEDED  4/11/2024    simvastatin (ZOCOR) 20 MG tablet TAKE 1 TABLET PER G TUBE ROUTE AT BEDTIME.  4/11/2024    VISINE DRY EYE RELIEF 1 % SOLN Apply 1 drop to eye every 6 hours as needed for dry eyes.  Unknown

## 2024-04-12 NOTE — ED NOTES
Bed: [facility]ED-19  Expected date: 4/12/24  Expected time: 8:14 AM  Means of arrival:   Comments:  [facility]

## 2024-04-17 LAB
BACTERIA BLD CULT: NO GROWTH
BACTERIA BLD CULT: NO GROWTH

## 2024-04-20 DIAGNOSIS — E78.5 DYSLIPIDEMIA: ICD-10-CM

## 2024-04-20 DIAGNOSIS — Z87.01 HISTORY OF PNEUMONIA: ICD-10-CM

## 2024-04-20 DIAGNOSIS — R45.1 RESTLESSNESS AND AGITATION: ICD-10-CM

## 2024-04-20 DIAGNOSIS — E61.1 IRON DEFICIENCY: ICD-10-CM

## 2024-04-20 DIAGNOSIS — H04.123 DRY EYES: ICD-10-CM

## 2024-04-22 RX ORDER — SIMVASTATIN 20 MG
TABLET ORAL
Qty: 90 TABLET | Refills: 0 | Status: SHIPPED | OUTPATIENT
Start: 2024-04-22 | End: 2024-09-26

## 2024-04-22 RX ORDER — GUAIFENESIN AND DEXTROMETHORPHAN HYDROBROMIDE 100; 10 MG/5ML; MG/5ML
SOLUTION ORAL
Qty: 237 ML | Refills: 0 | Status: SHIPPED | OUTPATIENT
Start: 2024-04-22 | End: 2024-05-17

## 2024-04-22 RX ORDER — FERROUS SULFATE 220 (44)/5
ELIXIR ORAL
Qty: 150 ML | Refills: 0 | Status: SHIPPED | OUTPATIENT
Start: 2024-04-22

## 2024-04-22 RX ORDER — QUETIAPINE FUMARATE 25 MG/1
TABLET, FILM COATED ORAL
Qty: 102 TABLET | Refills: 0 | Status: SHIPPED | OUTPATIENT
Start: 2024-04-22 | End: 2024-05-17

## 2024-04-26 ENCOUNTER — LAB (OUTPATIENT)
Dept: LAB | Facility: CLINIC | Age: 59
End: 2024-04-26
Payer: COMMERCIAL

## 2024-04-26 ENCOUNTER — TELEPHONE (OUTPATIENT)
Dept: FAMILY MEDICINE | Facility: CLINIC | Age: 59
End: 2024-04-26

## 2024-04-26 DIAGNOSIS — J40 BRONCHITIS: ICD-10-CM

## 2024-04-26 DIAGNOSIS — J39.8 CHRONIC MUCUS HYPERSECRETION, RESPIRATORY: Primary | ICD-10-CM

## 2024-04-26 DIAGNOSIS — J40 BRONCHITIS: Primary | ICD-10-CM

## 2024-04-26 LAB
BASOPHILS # BLD AUTO: 0.1 10E3/UL (ref 0–0.2)
BASOPHILS NFR BLD AUTO: 1 %
EOSINOPHIL # BLD AUTO: 0.4 10E3/UL (ref 0–0.7)
EOSINOPHIL NFR BLD AUTO: 4 %
ERYTHROCYTE [DISTWIDTH] IN BLOOD BY AUTOMATED COUNT: 12 % (ref 10–15)
HCT VFR BLD AUTO: 37.2 % (ref 40–53)
HGB BLD-MCNC: 12 G/DL (ref 13.3–17.7)
IMM GRANULOCYTES # BLD: 0.1 10E3/UL
IMM GRANULOCYTES NFR BLD: 1 %
LYMPHOCYTES # BLD AUTO: 3.1 10E3/UL (ref 0.8–5.3)
LYMPHOCYTES NFR BLD AUTO: 33 %
MCH RBC QN AUTO: 31.3 PG (ref 26.5–33)
MCHC RBC AUTO-ENTMCNC: 32.3 G/DL (ref 31.5–36.5)
MCV RBC AUTO: 97 FL (ref 78–100)
MONOCYTES # BLD AUTO: 0.9 10E3/UL (ref 0–1.3)
MONOCYTES NFR BLD AUTO: 9 %
NEUTROPHILS # BLD AUTO: 5.1 10E3/UL (ref 1.6–8.3)
NEUTROPHILS NFR BLD AUTO: 53 %
NRBC # BLD AUTO: 0 10E3/UL
NRBC BLD AUTO-RTO: 0 /100
PLATELET # BLD AUTO: 232 10E3/UL (ref 150–450)
RBC # BLD AUTO: 3.83 10E6/UL (ref 4.4–5.9)
WBC # BLD AUTO: 9.6 10E3/UL (ref 4–11)

## 2024-04-26 PROCEDURE — 36415 COLL VENOUS BLD VENIPUNCTURE: CPT

## 2024-04-26 PROCEDURE — 80048 BASIC METABOLIC PNL TOTAL CA: CPT

## 2024-04-26 PROCEDURE — 85025 COMPLETE CBC W/AUTO DIFF WBC: CPT

## 2024-04-26 NOTE — TELEPHONE ENCOUNTER
Test Results    Contacts         Type Contact Phone/Fax    04/26/2024 02:15 PM CDT Phone (Incoming) Idalmis Malave (Emergency Contact) [phone] (M)            Who ordered the test:  Dr. Enriquez    Type of test: Lab    Date of test:  4/26/2024    Where was the test performed:  [facility]    What are your questions/concerns?:  Daughter is requesting a call back with patient's results. She has some concerns with timing and states they are usually released by now.    Could we send this information to you in [employer] or would you prefer to receive a phone call?:   Patient would prefer a phone call   Okay to leave a detailed message?: Yes at Other phone number:  [phone]

## 2024-04-26 NOTE — LETTER
2024      Liu Malave  [address]  [place] [address]        Dear Liu,    Below is the DME order for the requested suction machine. It is electronically signed by Dr. Rosa.       [facility]  [address]  [place] [address]  Phone:  [phone]  Fax:  [phone]  Patient:     Liu Malave MRN:  [id number]   [address]  [place] [address] :  1965  SSN: [id number]   Phone: [phone]  Email: [email or web address] Sex:  M      INSURANCE PAYOR PLAN GROUP # SUBSCRIBER ID   Primary:    [facility] Alexandrea1 [id number]      Order Date:  2024        Durable Medical Equipment Order:    Suction Machine Order [[id number]]    Electronically signed by: Steven Rosa MD on 24 154   Ordering user: Steven Rosa MD 24 154   Diagnoses  Chronic mucus hypersecretion, respiratory [J39.8]   Questionnaire    Question Answer   Medical Equipment (DME) Supplier: [employer]   Start Date: 2024   Secretion Type: Oral   Length of Need: Lifetime   The face to face evaluation was performed on: 2024     Height: Data Unavailable  Weight: 0 lbs 0 oz     Authorizing provider infomation:  Steven Rosa MD   (NPI:[id number])         [facility] Medical Equipment Locations:   Business Hours:  Monday - Friday 8am - 4:30pm  Visit [email or web address]     [facility]  [address] [place] [address], [address]  [place], [place] [id number] [phone]  Monday - Friday, 8 a.m. to 4:30 p.m.    [facility]  [address], [address]  [place], [place] [id number] [phone]  Monday - Friday, 8 a.m. to 4:30 p.m.   [address], [address]  [place], [place] [id number] [phone]  Monday - Friday, 8 a.m. to 4:30 p.m.    [facility]  [address] [place] [address]  [address]  [phone]  Monday - Friday, 8 a.m. to 4:30 p.m.   [facility]  [address], [address]  [place] [address]  [phone]  Monday - Friday, 8 a.m. to 4:30 p.m. [facility]  [address], [address]  [place] [address]  [phone]  Monday - Friday, 8 a.m. to 4:30 p.m.  *Closed daily Noon to 1 p.m. for patient deliveries   [facility]  [address], [address]     [place] [address]            [phone]  Monday - Friday, 8 a.m. to 5:00 p.m.               Sincerely,    Viri Alvarado RN

## 2024-04-26 NOTE — TELEPHONE ENCOUNTER
Order/Referral Request    Who is requesting: patient/daughter    Orders being requested: suction machine/DME    Reason service is needed/diagnosis: current one is over 5 years old and breaking. It is very weak and barely suctioning at all. States they use it every hour and it needs to be replaces right away    When are orders needed by: needing ASAP    Has this been discussed with Provider: No    Does patient have a preference on a Group/Provider/Facility? [facility]    Does patient have an appointment scheduled?: No    Where to send orders: Fax:  [phone]    Could we send this information to you in [facility] or would you prefer to receive a phone call?:   Patient would prefer a phone call   Okay to leave a detailed message?: Yes at Cell number on file:  call [facility]  Telephone Information:   Mobile [phone]

## 2024-04-26 NOTE — TELEPHONE ENCOUNTER
Writer called and informed patient's daughter that all lab draws are being sent to outside clinic for testing due to machine being worked on in lab and it may take a bit longer for the labs to come back. Patient's daughter verbalized understanding.    BARBRA Sears, RN  [facility]

## 2024-04-26 NOTE — TELEPHONE ENCOUNTER
DME (Durable Medical Equipment) Orders and Documentation  Orders Placed This Encounter   Procedures     Suction Machine Order        The patient was assessed and it was determined the patient is in need of the following listed DME Supplies/Equipment. Please complete supporting documentation below to demonstrate medical necessity.

## 2024-04-26 NOTE — TELEPHONE ENCOUNTER
Writer huddled with Dr. Rosa who signed DME order for suction machine. Writer faxed order to [facility] as requested. Writer called and informed patient's daughter that order was faxed.       BARBRA Sears, RN  [facility]

## 2024-04-27 LAB
ANION GAP SERPL CALCULATED.3IONS-SCNC: 10 MMOL/L (ref 7–15)
BUN SERPL-MCNC: 35.2 MG/DL (ref 8–23)
CALCIUM SERPL-MCNC: 9.3 MG/DL (ref 8.8–10.2)
CHLORIDE SERPL-SCNC: 107 MMOL/L (ref 98–107)
CREAT SERPL-MCNC: 1.14 MG/DL (ref 0.67–1.17)
DEPRECATED HCO3 PLAS-SCNC: 25 MMOL/L (ref 22–29)
EGFRCR SERPLBLD CKD-EPI 2021: 74 ML/MIN/1.73M2
GLUCOSE SERPL-MCNC: 101 MG/DL (ref 70–99)
POTASSIUM SERPL-SCNC: 5.3 MMOL/L (ref 3.4–5.3)
SODIUM SERPL-SCNC: 142 MMOL/L (ref 135–145)

## 2024-04-29 NOTE — RESULT ENCOUNTER NOTE
Liu Malave  Your results from your recent clinic visit show:  Your BMP was normal with normal electrolytes and kidney function  Your hemoglobin is improving mildly up to 12 now    If you have more questions please call the clinic at [phone] or send me a [employer] message    Dr. Shahid Brady

## 2024-05-01 PROBLEM — R45.1 RESTLESSNESS AND AGITATION: Status: RESOLVED | Noted: 2023-09-27 | Resolved: 2024-05-01

## 2024-05-01 PROBLEM — A41.9 SEPSIS WITH ACUTE HYPOXIC RESPIRATORY FAILURE WITHOUT SEPTIC SHOCK (H): Status: RESOLVED | Noted: 2023-10-25 | Resolved: 2024-05-01

## 2024-05-01 PROBLEM — R73.9 HYPERGLYCEMIA: Status: RESOLVED | Noted: 2023-10-27 | Resolved: 2024-05-01

## 2024-05-01 PROBLEM — J96.01 SEPSIS WITH ACUTE HYPOXIC RESPIRATORY FAILURE WITHOUT SEPTIC SHOCK (H): Status: RESOLVED | Noted: 2023-10-25 | Resolved: 2024-05-01

## 2024-05-01 PROBLEM — R65.20 SEPSIS WITH ACUTE HYPOXIC RESPIRATORY FAILURE WITHOUT SEPTIC SHOCK (H): Status: RESOLVED | Noted: 2023-10-25 | Resolved: 2024-05-01

## 2024-05-01 PROBLEM — J18.9 COMMUNITY ACQUIRED PNEUMONIA: Status: RESOLVED | Noted: 2023-10-25 | Resolved: 2024-05-01

## 2024-05-01 PROBLEM — G47.00 INSOMNIA: Status: RESOLVED | Noted: 2022-05-31 | Resolved: 2024-05-01

## 2024-05-02 ENCOUNTER — VIRTUAL VISIT (OUTPATIENT)
Dept: FAMILY MEDICINE | Facility: CLINIC | Age: 59
End: 2024-05-02
Payer: COMMERCIAL

## 2024-05-02 DIAGNOSIS — D64.9 NORMOCHROMIC NORMOCYTIC ANEMIA: ICD-10-CM

## 2024-05-02 DIAGNOSIS — R13.10 SWALLOWING DYSFUNCTION: Primary | ICD-10-CM

## 2024-05-02 DIAGNOSIS — J69.0 RECURRENT ASPIRATION PNEUMONIA (H): ICD-10-CM

## 2024-05-02 DIAGNOSIS — I69.359 HEMIPLEGIA AND HEMIPARESIS FOLLOWING CEREBRAL INFARCTION AFFECTING UNSPECIFIED SIDE (H): ICD-10-CM

## 2024-05-02 DIAGNOSIS — R13.12 OROPHARYNGEAL DYSPHAGIA: ICD-10-CM

## 2024-05-02 DIAGNOSIS — J43.9 PULMONARY EMPHYSEMA, UNSPECIFIED EMPHYSEMA TYPE (H): ICD-10-CM

## 2024-05-02 PROCEDURE — 99214 OFFICE O/P EST MOD 30 MIN: CPT | Mod: 95 | Performed by: FAMILY MEDICINE

## 2024-05-02 RX ORDER — IPRATROPIUM BROMIDE 42 UG/1
2 SPRAY, METERED NASAL DAILY PRN
Status: CANCELLED | OUTPATIENT
Start: 2024-05-02

## 2024-05-02 NOTE — PROGRESS NOTES
Liu is a 58 year old who is being evaluated via a billable video visit.    How would you like to obtain your AVS? MyChart  If the video visit is dropped, the invitation should be resent by: Text to cell phone: [phone]  Will anyone else be joining your video visit? No        Swallowing dysfunction  Swallowing dysfunction with oropharyngeal dysphagia.  Suction machine order has been placed April 26, 2024 with face-to-face encounter completed today.  This will be replacement for current machine that they have utilized over the past 6+ years and needs replacement.    Oropharyngeal dysphagia  As above.  Tube feeds continue.    Hemiplegia and hemiparesis following cerebral infarction affecting unspecified side (H)  Hemiplegia and hemiparesis following prior CVA described 2016.    Recurrent aspiration pneumonia (H)  History of recurrent aspiration pneumonia.  Follow-up labs April 26, 2024 showing white count improvement from 17.3 down to 9.6.  Still has cough.  No fever.  Anticipate ongoing improvement in underlying fatigue.    Normochromic normocytic anemia  Recent hemoglobin stable at 12.0 with MCV 97.    Pulmonary emphysema, unspecified emphysema type (H)  Utilizes nebulizer and needs replacement machine.  Order placed for order fill through [employer] [facility]  - Nebulizer and Supplies Order for DME - ONLY FOR DME           Subjective   Liu is a 58 year old, presenting for the following health issues:  Recheck Medication and face to face for suction machine        5/2/2024     3:29 PM   Additional Questions   Roomed by      Video Start Time: 5:16 PM    HPI       Liu is a 58 year old who is being evaluated via a billable video visit.    How would you like to obtain your AVS? MyChart  If the video visit is dropped, the invitation should be resent by: Text to cell phone: [phone]  Will anyone else be joining your video visit? No  Originating Location (pt. Location): Home    Distant Location (provider  location):  On-site        Subjective   Liu is a 58 year old, presenting for the following health issues:  Recheck Medication and face to face for suction machine        5/2/2024     3:29 PM   Additional Questions   Roomed by [facility]     Video visit completed.  Face-to-face encounter completed.  History of prior CVA in 2016.  Does have history of swallowing dysfunction with oropharyngeal dysphagia.  Tube feeds utilized.  Has required suction machine since stroke over 6 years ago and needs replacement machine currently.  Patient remains nonverbal.  Recurrent aspiration pneumonia risk.  Has completed recent antibiotic.  White count improved from 17.3 down to 9.6.  Associated normochromic normocytic anemia with hemoglobin 12.0 and MCV 97.  Still has occasional harsh cough.  Fatigue issues.  Slow improvement.  Did have order placed for suction machine April 26, 2024 but needs associated diagnosis including dysfunction of the swallowing muscles.  Patient's daughter is requesting his well replacement nebulizer machine for neb treatments on as-needed basis.  Comprehensive review of systems as above otherwise all negative.        Review of Systems  Constitutional, HEENT, cardiovascular, pulmonary, GI, , musculoskeletal, neuro, skin, endocrine and psych systems are negative, except as otherwise noted.      Vitals:  No vitals were obtained today due to virtual visit.    Physical Exam   General: Alert and no distress //Respiratory: No audible wheeze, cough, or shortness of breath // Psychiatric:  Appropriate affect, tone, and pace of words        Review of Systems  Constitutional, HEENT, cardiovascular, pulmonary, GI, , musculoskeletal, neuro, skin, endocrine and psych systems are negative, except as otherwise noted.      Objective             Vitals:  No vitals were obtained today due to virtual visit.    Physical Exam   GENERAL: alert and no distress  EYES: Eyes grossly normal to inspection.  No discharge or  erythema, or obvious scleral/conjunctival abnormalities.  RESP: No audible wheeze, cough, or visible cyanosis.    SKIN: Visible skin clear. No significant rash, abnormal pigmentation or lesions.  NEURO: Cranial nerves grossly intact.  Mentation and speech appropriate for age.  PSYCH: Appropriate affect, tone, and pace of words          Video-Visit Details    Type of service:  Video Visit   Video End Time:5:17 PM  Originating Location (pt. Location): Home    Distant Location (provider location):  On-site  Platform used for Video Visit: Marcio  Signed Electronically by: Steven Rosa MD

## 2024-05-17 DIAGNOSIS — R45.1 RESTLESSNESS AND AGITATION: ICD-10-CM

## 2024-05-17 DIAGNOSIS — Z87.01 HISTORY OF PNEUMONIA: ICD-10-CM

## 2024-05-17 RX ORDER — GUAIFENESIN AND DEXTROMETHORPHAN HYDROBROMIDE 100; 10 MG/5ML; MG/5ML
SOLUTION ORAL
Qty: 237 ML | Refills: 0 | Status: SHIPPED | OUTPATIENT
Start: 2024-05-17 | End: 2024-07-15

## 2024-05-17 RX ORDER — QUETIAPINE FUMARATE 25 MG/1
TABLET, FILM COATED ORAL
Qty: 102 TABLET | Refills: 0 | Status: SHIPPED | OUTPATIENT
Start: 2024-05-17 | End: 2024-07-15

## 2024-05-29 ENCOUNTER — TELEPHONE (OUTPATIENT)
Dept: FAMILY MEDICINE | Facility: CLINIC | Age: 59
End: 2024-05-29
Payer: COMMERCIAL

## 2024-05-29 NOTE — TELEPHONE ENCOUNTER
PA Initiation    Medication: Lidocaine patches 5%  Insurance Company:  [employer] [facility]   Pharmacy Filling the Rx:  [facility]   Filling Pharmacy Phone:  [phone]  Filling Pharmacy Fax:  [phone]  Start Date:   05/29/2024

## 2024-06-03 DIAGNOSIS — K59.01 SLOW TRANSIT CONSTIPATION: Primary | ICD-10-CM

## 2024-06-03 RX ORDER — BISACODYL 10 MG
10 SUPPOSITORY, RECTAL RECTAL DAILY
Qty: 30 SUPPOSITORY | Refills: 2 | Status: SHIPPED | OUTPATIENT
Start: 2024-06-03 | End: 2024-09-26

## 2024-06-04 DIAGNOSIS — N39.0 RECURRENT UTI: Primary | ICD-10-CM

## 2024-06-04 DIAGNOSIS — R05.3 CHRONIC COUGH: ICD-10-CM

## 2024-06-04 RX ORDER — IPRATROPIUM BROMIDE AND ALBUTEROL SULFATE 2.5; .5 MG/3ML; MG/3ML
SOLUTION RESPIRATORY (INHALATION)
Qty: 90 ML | Refills: 1 | Status: SHIPPED | OUTPATIENT
Start: 2024-06-04 | End: 2024-08-13

## 2024-06-04 RX ORDER — CEFACLOR 250 MG
250 CAPSULE ORAL DAILY
Qty: 30 CAPSULE | Refills: 3 | Status: ON HOLD | OUTPATIENT
Start: 2024-06-04 | End: 2024-07-20

## 2024-06-04 NOTE — TELEPHONE ENCOUNTER
Medication Question or Refill    Contacts         Type Contact Phone/Fax    06/04/2024 08:59 AM CDT Phone (Incoming) [facility][id number] - [place], [place] - [address] [place] [address] (Pharmacy) [phone]            What medication are you calling about (include dose and sig)?: cefaclor (CECLOR) 250 MG capsule     Preferred Pharmacy:  [facility][id number] - [place] - [address] [place] [address]  [address] [place] [address]  Phone: [phone] Fax: [phone]      Controlled Substance Agreement on file:   CSA -- Patient Level:    CSA: None found at the patient level.       Who prescribed the medication?: Unknown    Do you need a refill? Yes    When did you use the medication last? N/A    Patient offered an appointment? No    Do you have any questions or concerns?  No      Could we send this information to you in [facility] or would you prefer to receive a phone call?:   Patient would prefer a phone call   Okay to leave a detailed message?: Yes at Home number on file [phone] (home)

## 2024-06-05 ENCOUNTER — TELEPHONE (OUTPATIENT)
Dept: FAMILY MEDICINE | Facility: CLINIC | Age: 59
End: 2024-06-05
Payer: COMMERCIAL

## 2024-06-05 NOTE — TELEPHONE ENCOUNTER
PA Initiation    Medication: VISINE DRY EYE RELIEF 1 % SOLN   Insurance Company:  [employer]  Pharmacy Filling the Rx:  [facility]  Filling Pharmacy Phone:  [phone]  Filling Pharmacy Fax:  [phone]  Start Date:   06/05/24

## 2024-06-07 NOTE — TELEPHONE ENCOUNTER
Retail Pharmacy Prior Authorization Team   Phone: [phone]    PA Initiation    Medication: LIDOCAINE 5 % EX PTCH  Insurance Company: [employer] - Phone [phone] Fax [phone]  Pharmacy Filling the Rx: [facility][id number] - [place] - [address] [place] [address]  Filling Pharmacy Phone: [phone]  Filling Pharmacy Fax:    Start Date: 6/7/2024      Note: Due to record-high volumes, our turn-around time is taking longer than usual . We are currently 2 weeks behind in the pools.   We are working diligently to submit all requests in a timely manner and in the order they are received. Please only flag TRUE URGENT requests as high priority to the pool at this time.   If you have questions on status of PA's,  please send a note/message in the active PA encounter and send back to the [facility] PA pool [[id number]].    If you have questions about the turn-around time or about our process, please reach out to our supervisor Hoda Garcia.   Thank you!   RPPA (Retail Pharmacy Prior Authorization) team

## 2024-06-10 NOTE — TELEPHONE ENCOUNTER
Retail Pharmacy Prior Authorization Team   Phone: [phone]    Additional question set requested and faxed back:         Note: Due to record-high volumes, our turn-around time is taking longer than usual . We are currently 2 weeks behind in the pools.   We are working diligently to submit all requests in a timely manner and in the order they are received. Please only flag TRUE URGENT requests as high priority to the pool at this time.   If you have questions on status of PA's,  please send a note/message in the active PA encounter and send back to the [facility] PA pool [[id number]].    If you have questions about the turn-around time or about our process, please reach out to our supervisor Hoda Garcia.   Thank you!   RPPA (Retail Pharmacy Prior Authorization) team

## 2024-06-12 NOTE — TELEPHONE ENCOUNTER
Retail Pharmacy Prior Authorization Team   Phone: [phone]    PA DENIED  If the provider would like to appeal we will need a detailed   letter of medical necessity with clinical reasoning to start the process.   Please close the encounter when finished.   Thank you,  Sulma Garza CPhT    PA Team

## 2024-06-12 NOTE — TELEPHONE ENCOUNTER
Retail Pharmacy Prior Authorization Team   Phone: [phone]    PRIOR AUTHORIZATION DENIED    Medication: LIDOCAINE 5 % EX PTCH  Insurance Company: [employer] - Phone [phone] Fax [phone]  Denial Date: 6/11/2024  Denial Reason(s): All preferred drugs must be absolutely contraindicated and must have  FDA approved DX. other denial reasons listed below.         Appeal Information:

## 2024-06-14 NOTE — TELEPHONE ENCOUNTER
PA Initiation    Medication: VISINE DRY EYE RELIEF 1 % OP SOLN  Insurance Company: [employer] - Phone [phone] Fax [phone]  Pharmacy Filling the Rx: [id number] [address] [place] [address]  Filling Pharmacy Phone: [phone]  Filling Pharmacy Fax: [phone]  Start Date: 6/13/2024

## 2024-06-17 ENCOUNTER — MYC MEDICAL ADVICE (OUTPATIENT)
Dept: FAMILY MEDICINE | Facility: CLINIC | Age: 59
End: 2024-06-17
Payer: COMMERCIAL

## 2024-06-17 NOTE — TELEPHONE ENCOUNTER
Tyrell Miner RN called [facility] on 6/17 and left a message to return call to clinic. If patient calls back, please route to [facility].     TC please route to RN for triage.      Tyrell Miner RN  [facility]

## 2024-06-17 NOTE — TELEPHONE ENCOUNTER
PRIOR AUTHORIZATION DENIED    Medication: VISINE DRY EYE RELIEF 1 % OP SOLN    Insurance Company: [email or web address] - Phone [phone] Fax [phone]    Denial Date: 6/14/2024    Denial Reason(s):       Appeal Information:

## 2024-06-19 NOTE — TELEPHONE ENCOUNTER
Called patients daughter back and she stated that the symptoms have subsided and will continue to monitor and will call back if they appear again.     Tyrell Miner RN  [facility]

## 2024-06-20 ENCOUNTER — MEDICAL CORRESPONDENCE (OUTPATIENT)
Dept: HEALTH INFORMATION MANAGEMENT | Facility: CLINIC | Age: 59
End: 2024-06-20
Payer: COMMERCIAL

## 2024-06-20 DIAGNOSIS — M62.838 MUSCLE SPASM: ICD-10-CM

## 2024-06-20 RX ORDER — LIDOCAINE 50 MG/G
1 PATCH TOPICAL DAILY PRN
Qty: 30 PATCH | Refills: 0 | Status: SHIPPED | OUTPATIENT
Start: 2024-06-20 | End: 2024-06-26

## 2024-06-20 NOTE — TELEPHONE ENCOUNTER
Patients daughter called and requested lidocaine patches to be ordered for the patient again.     Orders pended for provider review.     Tyrell Miner RN  [facility]

## 2024-06-22 ENCOUNTER — TELEPHONE (OUTPATIENT)
Dept: FAMILY MEDICINE | Facility: CLINIC | Age: 59
End: 2024-06-22
Payer: COMMERCIAL

## 2024-06-22 DIAGNOSIS — M62.838 MUSCLE SPASM: ICD-10-CM

## 2024-06-22 DIAGNOSIS — M79.2 NEUROPATHIC PAIN: Primary | ICD-10-CM

## 2024-06-25 NOTE — TELEPHONE ENCOUNTER
PRIOR AUTHORIZATION DENIED    Medication: LIDOCAINE 5 % EX PTCH  Insurance Company: [employer] - Phone [phone] Fax [phone]  Denial Date: 6/22/2024  Denial Reason(s):     Appeal Information:     Patient Notified: No

## 2024-06-26 RX ORDER — LIDOCAINE 50 MG/G
1 PATCH TOPICAL DAILY PRN
Qty: 30 PATCH | Refills: 2 | Status: SHIPPED | OUTPATIENT
Start: 2024-06-26

## 2024-07-11 ENCOUNTER — MYC MEDICAL ADVICE (OUTPATIENT)
Dept: INTERVENTIONAL RADIOLOGY/VASCULAR | Facility: CLINIC | Age: 59
End: 2024-07-11
Payer: COMMERCIAL

## 2024-07-15 DIAGNOSIS — R45.1 RESTLESSNESS AND AGITATION: ICD-10-CM

## 2024-07-15 DIAGNOSIS — Z87.01 HISTORY OF PNEUMONIA: ICD-10-CM

## 2024-07-15 DIAGNOSIS — L24.9 IRRITANT DERMATITIS: ICD-10-CM

## 2024-07-15 DIAGNOSIS — J39.8 CHRONIC MUCUS HYPERSECRETION, RESPIRATORY: ICD-10-CM

## 2024-07-15 RX ORDER — TRIAMCINOLONE ACETONIDE 1 MG/G
CREAM TOPICAL
Qty: 30 G | Refills: 1 | Status: SHIPPED | OUTPATIENT
Start: 2024-07-15 | End: 2024-09-26

## 2024-07-15 RX ORDER — GUAIFENESIN AND DEXTROMETHORPHAN HYDROBROMIDE 100; 10 MG/5ML; MG/5ML
SOLUTION ORAL
Qty: 237 ML | Refills: 0 | Status: SHIPPED | OUTPATIENT
Start: 2024-07-15 | End: 2024-08-13

## 2024-07-15 RX ORDER — QUETIAPINE FUMARATE 25 MG/1
TABLET, FILM COATED ORAL
Qty: 102 TABLET | Refills: 5 | Status: SHIPPED | OUTPATIENT
Start: 2024-07-15

## 2024-07-15 RX ORDER — KETOCONAZOLE 20 MG/G
CREAM TOPICAL
Qty: 30 G | Refills: 0 | Status: SHIPPED | OUTPATIENT
Start: 2024-07-15 | End: 2024-08-13

## 2024-07-15 RX ORDER — SODIUM CHLORIDE SOLN NEBU 3% 3 %
NEBU SOLN INHALATION
Qty: 240 ML | Refills: 0 | Status: SHIPPED | OUTPATIENT
Start: 2024-07-15 | End: 2024-08-13

## 2024-07-18 ENCOUNTER — HOSPITAL ENCOUNTER (INPATIENT)
Facility: CLINIC | Age: 59
LOS: 2 days | Discharge: HOME | End: 2024-07-20
Attending: STUDENT IN AN ORGANIZED HEALTH CARE EDUCATION/TRAINING PROGRAM
Payer: COMMERCIAL

## 2024-07-18 ENCOUNTER — APPOINTMENT (OUTPATIENT)
Dept: CT IMAGING | Facility: CLINIC | Age: 59
End: 2024-07-18
Attending: STUDENT IN AN ORGANIZED HEALTH CARE EDUCATION/TRAINING PROGRAM
Payer: COMMERCIAL

## 2024-07-18 DIAGNOSIS — K94.29 IRRITATION AROUND PERCUTANEOUS ENDOSCOPIC GASTROSTOMY (PEG) TUBE SITE (H): ICD-10-CM

## 2024-07-18 DIAGNOSIS — R06.02 SHORTNESS OF BREATH: ICD-10-CM

## 2024-07-18 DIAGNOSIS — R52 PAIN: ICD-10-CM

## 2024-07-18 DIAGNOSIS — J69.0 RECURRENT ASPIRATION PNEUMONIA (H): Primary | ICD-10-CM

## 2024-07-18 DIAGNOSIS — R13.12 OROPHARYNGEAL DYSPHAGIA: ICD-10-CM

## 2024-07-18 DIAGNOSIS — N39.0 RECURRENT UTI: ICD-10-CM

## 2024-07-18 DIAGNOSIS — R11.0 NAUSEA: ICD-10-CM

## 2024-07-18 PROBLEM — Z86.73 HISTORY OF CVA (CEREBROVASCULAR ACCIDENT): Status: ACTIVE | Noted: 2023-09-27

## 2024-07-18 LAB
ALBUMIN SERPL BCG-MCNC: 3.6 G/DL (ref 3.5–5.2)
ALP SERPL-CCNC: 64 U/L (ref 40–150)
ALT SERPL W P-5'-P-CCNC: 21 U/L (ref 0–70)
ANION GAP SERPL CALCULATED.3IONS-SCNC: 8 MMOL/L (ref 7–15)
AST SERPL W P-5'-P-CCNC: 28 U/L (ref 0–45)
ATRIAL RATE - MUSE: 123 BPM
BASOPHILS # BLD AUTO: 0.1 10E3/UL (ref 0–0.2)
BASOPHILS NFR BLD AUTO: 0 %
BILIRUB SERPL-MCNC: 0.3 MG/DL
BUN SERPL-MCNC: 36.7 MG/DL (ref 8–23)
CALCIUM SERPL-MCNC: 8.7 MG/DL (ref 8.8–10.4)
CHLORIDE SERPL-SCNC: 110 MMOL/L (ref 98–107)
CREAT SERPL-MCNC: 1.19 MG/DL (ref 0.67–1.17)
DIASTOLIC BLOOD PRESSURE - MUSE: 82 MMHG
EGFRCR SERPLBLD CKD-EPI 2021: 69 ML/MIN/1.73M2
EOSINOPHIL # BLD AUTO: 0.3 10E3/UL (ref 0–0.7)
EOSINOPHIL NFR BLD AUTO: 2 %
ERYTHROCYTE [DISTWIDTH] IN BLOOD BY AUTOMATED COUNT: 12 % (ref 10–15)
FLUAV RNA SPEC QL NAA+PROBE: NEGATIVE
FLUBV RNA RESP QL NAA+PROBE: NEGATIVE
GLUCOSE SERPL-MCNC: 113 MG/DL (ref 70–99)
HCO3 SERPL-SCNC: 26 MMOL/L (ref 22–29)
HCT VFR BLD AUTO: 33.8 % (ref 40–53)
HGB BLD-MCNC: 11.2 G/DL (ref 13.3–17.7)
HOLD SPECIMEN: NORMAL
HOLD SPECIMEN: NORMAL
IMM GRANULOCYTES # BLD: 0.1 10E3/UL
IMM GRANULOCYTES NFR BLD: 1 %
INTERPRETATION ECG - MUSE: NORMAL
LACTATE SERPL-SCNC: 0.9 MMOL/L (ref 0.7–2)
LYMPHOCYTES # BLD AUTO: 2.3 10E3/UL (ref 0.8–5.3)
LYMPHOCYTES NFR BLD AUTO: 13 %
MCH RBC QN AUTO: 30.8 PG (ref 26.5–33)
MCHC RBC AUTO-ENTMCNC: 33.1 G/DL (ref 31.5–36.5)
MCV RBC AUTO: 93 FL (ref 78–100)
MONOCYTES # BLD AUTO: 0.9 10E3/UL (ref 0–1.3)
MONOCYTES NFR BLD AUTO: 5 %
NEUTROPHILS # BLD AUTO: 14.1 10E3/UL (ref 1.6–8.3)
NEUTROPHILS NFR BLD AUTO: 79 %
NRBC # BLD AUTO: 0 10E3/UL
NRBC BLD AUTO-RTO: 0 /100
NT-PROBNP SERPL-MCNC: 43 PG/ML (ref 0–900)
P AXIS - MUSE: 59 DEGREES
PLATELET # BLD AUTO: 229 10E3/UL (ref 150–450)
POTASSIUM SERPL-SCNC: 4.4 MMOL/L (ref 3.4–5.3)
PR INTERVAL - MUSE: 154 MS
PROT SERPL-MCNC: 8 G/DL (ref 6.4–8.3)
QRS DURATION - MUSE: 66 MS
QT - MUSE: 302 MS
QTC - MUSE: 432 MS
R AXIS - MUSE: 28 DEGREES
RBC # BLD AUTO: 3.64 10E6/UL (ref 4.4–5.9)
RSV RNA SPEC NAA+PROBE: NEGATIVE
SARS-COV-2 RNA RESP QL NAA+PROBE: NEGATIVE
SODIUM SERPL-SCNC: 144 MMOL/L (ref 135–145)
SYSTOLIC BLOOD PRESSURE - MUSE: 133 MMHG
T AXIS - MUSE: 56 DEGREES
TROPONIN T SERPL HS-MCNC: 14 NG/L
VENTRICULAR RATE- MUSE: 123 BPM
WBC # BLD AUTO: 17.8 10E3/UL (ref 4–11)

## 2024-07-18 PROCEDURE — 83880 ASSAY OF NATRIURETIC PEPTIDE: CPT | Performed by: STUDENT IN AN ORGANIZED HEALTH CARE EDUCATION/TRAINING PROGRAM

## 2024-07-18 PROCEDURE — 71275 CT ANGIOGRAPHY CHEST: CPT

## 2024-07-18 PROCEDURE — 96365 THER/PROPH/DIAG IV INF INIT: CPT

## 2024-07-18 PROCEDURE — 80053 COMPREHEN METABOLIC PANEL: CPT | Performed by: STUDENT IN AN ORGANIZED HEALTH CARE EDUCATION/TRAINING PROGRAM

## 2024-07-18 PROCEDURE — 250N000011 HC RX IP 250 OP 636: Performed by: FAMILY MEDICINE

## 2024-07-18 PROCEDURE — 93005 ELECTROCARDIOGRAM TRACING: CPT | Performed by: STUDENT IN AN ORGANIZED HEALTH CARE EDUCATION/TRAINING PROGRAM

## 2024-07-18 PROCEDURE — 87040 BLOOD CULTURE FOR BACTERIA: CPT | Performed by: STUDENT IN AN ORGANIZED HEALTH CARE EDUCATION/TRAINING PROGRAM

## 2024-07-18 PROCEDURE — 250N000011 HC RX IP 250 OP 636: Performed by: STUDENT IN AN ORGANIZED HEALTH CARE EDUCATION/TRAINING PROGRAM

## 2024-07-18 PROCEDURE — 99291 CRITICAL CARE FIRST HOUR: CPT | Mod: 25

## 2024-07-18 PROCEDURE — 83605 ASSAY OF LACTIC ACID: CPT | Performed by: STUDENT IN AN ORGANIZED HEALTH CARE EDUCATION/TRAINING PROGRAM

## 2024-07-18 PROCEDURE — 36415 COLL VENOUS BLD VENIPUNCTURE: CPT | Performed by: STUDENT IN AN ORGANIZED HEALTH CARE EDUCATION/TRAINING PROGRAM

## 2024-07-18 PROCEDURE — 250N000013 HC RX MED GY IP 250 OP 250 PS 637: Performed by: STUDENT IN AN ORGANIZED HEALTH CARE EDUCATION/TRAINING PROGRAM

## 2024-07-18 PROCEDURE — 70450 CT HEAD/BRAIN W/O DYE: CPT

## 2024-07-18 PROCEDURE — 99223 1ST HOSP IP/OBS HIGH 75: CPT | Performed by: FAMILY MEDICINE

## 2024-07-18 PROCEDURE — 258N000003 HC RX IP 258 OP 636: Performed by: STUDENT IN AN ORGANIZED HEALTH CARE EDUCATION/TRAINING PROGRAM

## 2024-07-18 PROCEDURE — 87637 SARSCOV2&INF A&B&RSV AMP PRB: CPT | Performed by: STUDENT IN AN ORGANIZED HEALTH CARE EDUCATION/TRAINING PROGRAM

## 2024-07-18 PROCEDURE — 84484 ASSAY OF TROPONIN QUANT: CPT | Performed by: STUDENT IN AN ORGANIZED HEALTH CARE EDUCATION/TRAINING PROGRAM

## 2024-07-18 PROCEDURE — 85025 COMPLETE CBC W/AUTO DIFF WBC: CPT | Performed by: STUDENT IN AN ORGANIZED HEALTH CARE EDUCATION/TRAINING PROGRAM

## 2024-07-18 PROCEDURE — 93005 ELECTROCARDIOGRAM TRACING: CPT | Performed by: EMERGENCY MEDICINE

## 2024-07-18 PROCEDURE — 250N000013 HC RX MED GY IP 250 OP 250 PS 637: Performed by: FAMILY MEDICINE

## 2024-07-18 PROCEDURE — 258N000003 HC RX IP 258 OP 636: Performed by: FAMILY MEDICINE

## 2024-07-18 PROCEDURE — 120N000001 HC R&B MED SURG/OB

## 2024-07-18 RX ORDER — BACLOFEN 10 MG/1
10 TABLET ORAL 4 TIMES DAILY
Status: DISCONTINUED | OUTPATIENT
Start: 2024-07-19 | End: 2024-07-20 | Stop reason: HOSPADM

## 2024-07-18 RX ORDER — ONDANSETRON 4 MG/1
4 TABLET, ORALLY DISINTEGRATING ORAL EVERY 6 HOURS PRN
Status: DISCONTINUED | OUTPATIENT
Start: 2024-07-18 | End: 2024-07-20 | Stop reason: HOSPADM

## 2024-07-18 RX ORDER — AMLODIPINE BESYLATE 5 MG/1
5 TABLET ORAL DAILY
Status: DISCONTINUED | OUTPATIENT
Start: 2024-07-19 | End: 2024-07-20 | Stop reason: HOSPADM

## 2024-07-18 RX ORDER — LIDOCAINE 40 MG/G
CREAM TOPICAL
Status: DISCONTINUED | OUTPATIENT
Start: 2024-07-18 | End: 2024-07-20 | Stop reason: HOSPADM

## 2024-07-18 RX ORDER — ACETAMINOPHEN 325 MG/1
650 TABLET ORAL ONCE
Status: COMPLETED | OUTPATIENT
Start: 2024-07-18 | End: 2024-07-18

## 2024-07-18 RX ORDER — VANCOMYCIN HYDROCHLORIDE
25 ONCE
Status: COMPLETED | OUTPATIENT
Start: 2024-07-18 | End: 2024-07-18

## 2024-07-18 RX ORDER — GUAIFENESIN/DEXTROMETHORPHAN 100-10MG/5
10 SYRUP ORAL EVERY 4 HOURS PRN
Status: DISCONTINUED | OUTPATIENT
Start: 2024-07-18 | End: 2024-07-20 | Stop reason: HOSPADM

## 2024-07-18 RX ORDER — QUETIAPINE FUMARATE 25 MG/1
25 TABLET, FILM COATED ORAL EVERY 4 HOURS PRN
COMMUNITY

## 2024-07-18 RX ORDER — KETOCONAZOLE 20 MG/G
CREAM TOPICAL 2 TIMES DAILY PRN
Status: DISCONTINUED | OUTPATIENT
Start: 2024-07-18 | End: 2024-07-20 | Stop reason: HOSPADM

## 2024-07-18 RX ORDER — ACETAMINOPHEN 650 MG/20.3ML
650 LIQUID ORAL EVERY 4 HOURS PRN
Status: DISCONTINUED | OUTPATIENT
Start: 2024-07-18 | End: 2024-07-20 | Stop reason: HOSPADM

## 2024-07-18 RX ORDER — TRIAMCINOLONE ACETONIDE 1 MG/G
CREAM TOPICAL 2 TIMES DAILY PRN
Status: DISCONTINUED | OUTPATIENT
Start: 2024-07-18 | End: 2024-07-20 | Stop reason: HOSPADM

## 2024-07-18 RX ORDER — IPRATROPIUM BROMIDE AND ALBUTEROL SULFATE 2.5; .5 MG/3ML; MG/3ML
3 SOLUTION RESPIRATORY (INHALATION)
Status: DISCONTINUED | OUTPATIENT
Start: 2024-07-18 | End: 2024-07-19

## 2024-07-18 RX ORDER — NEOMYCIN/BACITRACIN/POLYMYXINB 3.5-400-5K
OINTMENT (GRAM) TOPICAL 3 TIMES DAILY PRN
Status: DISCONTINUED | OUTPATIENT
Start: 2024-07-18 | End: 2024-07-20 | Stop reason: HOSPADM

## 2024-07-18 RX ORDER — POLYETHYLENE GLYCOL 3350 17 G/17G
17 POWDER, FOR SOLUTION ORAL DAILY
Status: DISCONTINUED | OUTPATIENT
Start: 2024-07-19 | End: 2024-07-20 | Stop reason: HOSPADM

## 2024-07-18 RX ORDER — ONDANSETRON 2 MG/ML
4 INJECTION INTRAMUSCULAR; INTRAVENOUS EVERY 6 HOURS PRN
Status: DISCONTINUED | OUTPATIENT
Start: 2024-07-18 | End: 2024-07-20 | Stop reason: HOSPADM

## 2024-07-18 RX ORDER — SIMVASTATIN 20 MG
20 TABLET ORAL AT BEDTIME
Status: DISCONTINUED | OUTPATIENT
Start: 2024-07-18 | End: 2024-07-20 | Stop reason: HOSPADM

## 2024-07-18 RX ORDER — PIPERACILLIN SODIUM, TAZOBACTAM SODIUM 3; .375 G/15ML; G/15ML
3.38 INJECTION, POWDER, LYOPHILIZED, FOR SOLUTION INTRAVENOUS EVERY 8 HOURS
Status: DISCONTINUED | OUTPATIENT
Start: 2024-07-19 | End: 2024-07-20

## 2024-07-18 RX ORDER — SODIUM CHLORIDE, SODIUM LACTATE, POTASSIUM CHLORIDE, CALCIUM CHLORIDE 600; 310; 30; 20 MG/100ML; MG/100ML; MG/100ML; MG/100ML
INJECTION, SOLUTION INTRAVENOUS CONTINUOUS
Status: DISCONTINUED | OUTPATIENT
Start: 2024-07-18 | End: 2024-07-19

## 2024-07-18 RX ORDER — QUETIAPINE FUMARATE 25 MG/1
25 TABLET, FILM COATED ORAL EVERY 4 HOURS PRN
Status: DISCONTINUED | OUTPATIENT
Start: 2024-07-18 | End: 2024-07-20 | Stop reason: HOSPADM

## 2024-07-18 RX ORDER — IOPAMIDOL 755 MG/ML
90 INJECTION, SOLUTION INTRAVASCULAR ONCE
Status: COMPLETED | OUTPATIENT
Start: 2024-07-18 | End: 2024-07-18

## 2024-07-18 RX ORDER — FERROUS SULFATE 220 (44)/5
220 ELIXIR ORAL DAILY
Status: DISCONTINUED | OUTPATIENT
Start: 2024-07-19 | End: 2024-07-20 | Stop reason: HOSPADM

## 2024-07-18 RX ORDER — PIPERACILLIN SODIUM, TAZOBACTAM SODIUM 3; .375 G/15ML; G/15ML
3.38 INJECTION, POWDER, LYOPHILIZED, FOR SOLUTION INTRAVENOUS ONCE
Status: COMPLETED | OUTPATIENT
Start: 2024-07-18 | End: 2024-07-18

## 2024-07-18 RX ORDER — AZITHROMYCIN 500 MG/5ML
500 INJECTION, POWDER, LYOPHILIZED, FOR SOLUTION INTRAVENOUS EVERY 24 HOURS
Status: COMPLETED | OUTPATIENT
Start: 2024-07-18 | End: 2024-07-19

## 2024-07-18 RX ORDER — ACETAMINOPHEN 650 MG/1
650 SUPPOSITORY RECTAL EVERY 4 HOURS PRN
Status: DISCONTINUED | OUTPATIENT
Start: 2024-07-18 | End: 2024-07-20 | Stop reason: HOSPADM

## 2024-07-18 RX ORDER — QUETIAPINE FUMARATE 25 MG/1
25 TABLET, FILM COATED ORAL 3 TIMES DAILY
Status: DISCONTINUED | OUTPATIENT
Start: 2024-07-19 | End: 2024-07-20 | Stop reason: HOSPADM

## 2024-07-18 RX ORDER — MECLIZINE HYDROCHLORIDE 25 MG/1
25 TABLET ORAL 3 TIMES DAILY PRN
Status: DISCONTINUED | OUTPATIENT
Start: 2024-07-18 | End: 2024-07-20 | Stop reason: HOSPADM

## 2024-07-18 RX ORDER — SODIUM CHLORIDE FOR INHALATION 3 %
4 VIAL, NEBULIZER (ML) INHALATION
Status: DISCONTINUED | OUTPATIENT
Start: 2024-07-18 | End: 2024-07-20 | Stop reason: HOSPADM

## 2024-07-18 RX ORDER — BISACODYL 10 MG
10 SUPPOSITORY, RECTAL RECTAL DAILY
Status: DISCONTINUED | OUTPATIENT
Start: 2024-07-19 | End: 2024-07-20 | Stop reason: HOSPADM

## 2024-07-18 RX ORDER — NYSTATIN 100000 U/G
CREAM TOPICAL 2 TIMES DAILY PRN
Status: DISCONTINUED | OUTPATIENT
Start: 2024-07-18 | End: 2024-07-20 | Stop reason: HOSPADM

## 2024-07-18 RX ADMIN — QUETIAPINE FUMARATE 25 MG: 25 TABLET ORAL at 23:54

## 2024-07-18 RX ADMIN — PIPERACILLIN AND TAZOBACTAM 3.38 G: 3; .375 INJECTION, POWDER, FOR SOLUTION INTRAVENOUS at 19:58

## 2024-07-18 RX ADMIN — ACETAMINOPHEN 650 MG: 325 TABLET ORAL at 22:24

## 2024-07-18 RX ADMIN — BACLOFEN 10 MG: 10 TABLET ORAL at 23:54

## 2024-07-18 RX ADMIN — SODIUM CHLORIDE, POTASSIUM CHLORIDE, SODIUM LACTATE AND CALCIUM CHLORIDE 1620 ML: 600; 310; 30; 20 INJECTION, SOLUTION INTRAVENOUS at 23:21

## 2024-07-18 RX ADMIN — SIMVASTATIN 20 MG: 20 TABLET, FILM COATED ORAL at 23:54

## 2024-07-18 RX ADMIN — VANCOMYCIN HYDROCHLORIDE 1250 MG: 5 INJECTION, POWDER, LYOPHILIZED, FOR SOLUTION INTRAVENOUS at 21:44

## 2024-07-18 RX ADMIN — SODIUM CHLORIDE 500 ML: 9 INJECTION, SOLUTION INTRAVENOUS at 22:13

## 2024-07-18 RX ADMIN — AZITHROMYCIN MONOHYDRATE 500 MG: 500 INJECTION, POWDER, LYOPHILIZED, FOR SOLUTION INTRAVENOUS at 23:26

## 2024-07-18 RX ADMIN — IOPAMIDOL 90 ML: 755 INJECTION, SOLUTION INTRAVENOUS at 20:25

## 2024-07-18 ASSESSMENT — COLUMBIA-SUICIDE SEVERITY RATING SCALE - C-SSRS
1. IN THE PAST MONTH, HAVE YOU WISHED YOU WERE DEAD OR WISHED YOU COULD GO TO SLEEP AND NOT WAKE UP?: NO
6. HAVE YOU EVER DONE ANYTHING, STARTED TO DO ANYTHING, OR PREPARED TO DO ANYTHING TO END YOUR LIFE?: NO

## 2024-07-18 ASSESSMENT — ACTIVITIES OF DAILY LIVING (ADL)
ADLS_ACUITY_SCORE: 39

## 2024-07-18 NOTE — ED TRIAGE NOTES
Triage Assessment (Adult)       Row Name 07/18/24 1828          Triage Assessment    Airway WDL WDL        Respiratory WDL    Respiratory WDL X;rhythm/pattern     Rhythm/Pattern, Respiratory shortness of breath;labored        Skin Circulation/Temperature WDL    Skin Circulation/Temperature WDL WDL        Cardiac WDL    Cardiac WDL X;rhythm     Pulse Rate & Regularity tachycardic        Peripheral/Neurovascular WDL    Peripheral Neurovascular WDL WDL        Cognitive/Neuro/Behavioral WDL    Cognitive/Neuro/Behavioral WDL X     Level of Consciousness alert     Arousal Level opens eyes spontaneously     Speech unable to speak     Mood/Behavior cooperative        Pupils (CN II)    Pupil PERRLA yes     Pupil Size Left 3 mm     Pupil Size Right 3 mm        Kerri Coma Scale    Best Eye Response 4-->(E4) spontaneous     Best Motor Response 6-->(M6) obeys commands     Best Verbal Response 5-->(V5) oriented     [place] Coma Scale Score 15

## 2024-07-18 NOTE — ED TRIAGE NOTES
Pt BIB EMS for fevers, possible aspiration, and low SpO2. Pt with hx of stroke and is bed bound per family. He lives with daughter. He get nebs and suctioning, has a g-tube. Around 0200 noticed pt was more uncomfortable, gave tylenol then. Called EMS for increased suctioning needs and low SpO2 ro 91% on room air. EMS gave neb and had improvement to 97% on room. They heard diminished lung sounds on the Right lower lobe. 22G in left hand.

## 2024-07-18 NOTE — ED PROVIDER NOTES
NAME: Liu Malave  AGE: 59 year old male  YOB: 1965  MRN: [id number]  EVALUATION DATE & TIME: 2024  6:16 PM    PCP: Steven Rosa  ED PROVIDER: Cherelle Arriaza MD.    Chief Complaint   Patient presents with    Fever    Shortness of Breath       FINAL IMPRESSION:  1. Shortness of breath      MEDICAL DECISION MAKIN:30 PM I met with the patient, obtained history, performed an initial exam, and discussed options and plan for diagnostics and treatment here in the ED.   9:00 PM Reassessed and updated patient with results and plans for admission.  9:41 PM Spoke with the hospitalist Dr. Gonsales who accepts the patient for admission.    MDM: 60 y/o M with h/o CVA with chronic hemiplegia, dysphagia with GJ tube, recurrent aspiration pneumonia among others who presents with shortness of breath. Daughter reports today he seemed more short of breath with fever and increased heart rate and he had also communicated that his head has hurt recently.     Sinus tachycardia on arrival with temp of 100.4, sating okay on room air. CT PE scan/CT abd/pelvis showed no PE but increasing bilateral diffuse ground glass opacity and increased retained secretions. CT head without acute findings. No new neurologic deficits to suggest stroke. Fever most likely from lung source, do not suspect meningitis. WBC 17.8. Started on antibiotics. BNP returned normal, will start fluids now and monitor respiratory status while getting fluids. Viral swabs negative. EKG without concerning ST changes and troponin WNL, do not suspect ACS.    Discussed options with 2 daughters present and ultimately was admitted to hospitalist for further management.     Medical Decision Making  Obtained supplemental history:Supplemental history obtained?: Family Member/Significant Other  Reviewed external records: External records reviewed?: Documented in chart  Care impacted by chronic illness:Cancer/Chemotherapy and Hypertension  Care significantly  affected by social determinants of health:Access to Medical Care  Did you consider but not order tests?: Work up considered but not performed and documented in chart, if applicable  Did you interpret images independently?: Independent interpretation of ECG and images noted in documentation, when applicable.  Consultation discussion with other provider:Did you involve another provider (consultant, , pharmacy, etc.)?: I discussed the care with another health care provider, see documentation for details.  Admit.    Critical Care  Performed by: Cherelle Arriaza MD  Authorized by: Cherelle Arriaza MD     Total critical care time: 30 minutes  Critical care time was exclusive of separately billable procedures and treating other patients.  Critical care was necessary to treat or prevent imminent or life-threatening deterioration of the following conditions: sepsis  Critical care was time spent personally by me on the following activities: development of treatment plan with patient or surrogate, discussions with consultants, examination of patient, evaluation of patient's response to treatment, obtaining history from patient or surrogate, ordering and performing treatments and interventions, ordering and review of laboratory studies, ordering and review of radiographic studies and re-evaluation of patient's condition, this excludes any separately billable procedures.    MEDICATIONS GIVEN IN THE EMERGENCY:  Medications   sodium chloride (PF) 0.9% PF flush 3 mL (has no administration in time range)   sodium chloride (PF) 0.9% PF flush 3 mL (3 mLs Intracatheter $Given 7/18/24 1958)   lidocaine 1 % 0.1-1 mL (has no administration in time range)   lidocaine (LMX4) cream (has no administration in time range)   sodium chloride (PF) 0.9% PF flush 3 mL (3 mLs Intracatheter Not Given 7/18/24 2256)   sodium chloride (PF) 0.9% PF flush 3 mL (has no administration in time range)   acetaminophen (TYLENOL) oral liquid 650 mg (has no  administration in time range)     Or   acetaminophen (TYLENOL) Suppository 650 mg (has no administration in time range)   ondansetron (ZOFRAN ODT) ODT tab 4 mg (has no administration in time range)     Or   ondansetron (ZOFRAN) injection 4 mg (has no administration in time range)   lidocaine 1 % 0.1-1 mL (has no administration in time range)   lidocaine (LMX4) cream (has no administration in time range)   sodium chloride (PF) 0.9% PF flush 3 mL (3 mLs Intracatheter Not Given 7/18/24 2256)   sodium chloride (PF) 0.9% PF flush 3 mL (has no administration in time range)   lactated ringers infusion (has no administration in time range)   piperacillin-tazobactam (ZOSYN) 3.375 g vial to attach to  mL bag (has no administration in time range)   azithromycin (ZITHROMAX) 500 mg in  mL intermittent infusion (500 mg Intravenous $New Bag 7/18/24 2326)   azithromycin (ZITHROMAX) 250 mg in  mL intermittent infusion (has no administration in time range)   baclofen (LIORESAL) tablet 10 mg (10 mg Per G Tube $Given 7/18/24 2354)   bisacodyl (DULCOLAX) suppository 10 mg (has no administration in time range)   guaiFENesin-dextromethorphan (ROBITUSSIN DM) 100-10 MG/5ML syrup 10 mL (has no administration in time range)   ipratropium - albuterol 0.5 mg/2.5 mg/3 mL (DUONEB) neb solution 3 mL (has no administration in time range)   sodium chloride (NEBUSAL) 3 % neb solution 4 mL (has no administration in time range)   polyethylene glycol-propylene glycol PF (SYSTANE ULTRA PF) opthalmic solution (has no administration in time range)   triamcinolone (KENALOG) 0.1 % cream (has no administration in time range)   QUEtiapine (SEROquel) tablet 25 mg (has no administration in time range)   QUEtiapine (SEROquel) tablet 25 mg (25 mg Per G Tube $Given 7/18/24 2354)   neomycin-bacitracin-polymyxin (NEOSPORIN) ointment (has no administration in time range)   nystatin (MYCOSTATIN) cream (has no administration in time range)   amLODIPine  "(NORVASC) tablet 5 mg (has no administration in time range)   ferrous sulfate solution 220 mg ( Per G Tube Automatically Held 7/22/24 0800)   ketoconazole (NIZORAL) 2 % cream (has no administration in time range)   meclizine (ANTIVERT) tablet 25 mg (has no administration in time range)   polyethylene glycol (MIRALAX) Packet 17 g (has no administration in time range)   simvastatin (ZOCOR) tablet 20 mg (20 mg Per G Tube $Given 7/18/24 2354)   pantoprazole (PROTONIX) 2 mg/mL suspension 40 mg (has no administration in time range)   piperacillin-tazobactam (ZOSYN) 3.375 g vial to attach to  mL bag (0 g Intravenous Stopped 7/18/24 2100)   acetaminophen (TYLENOL) tablet 650 mg (650 mg Oral $Given 7/18/24 2224)   vancomycin (VANCOCIN) 1,250 mg in 0.9% NaCl 250 mL intermittent infusion (0 mg Intravenous Stopped 7/18/24 2319)   iopamidol (ISOVUE-370) solution 90 mL (90 mLs Intravenous $Given 7/18/24 2025)   sodium chloride 0.9% BOLUS 500 mL (0 mLs Intravenous Stopped 7/18/24 2319)   lactated ringers BOLUS 1,620 mL (1,620 mLs Intravenous $New Bag 7/18/24 2321)       NEW PRESCRIPTIONS STARTED AT TODAY'S ER VISIT:  New Prescriptions    No medications on file        =================================================================  HPI    Patient information was obtained from: patient's daughter  Use of : N/A      Liu GIL Malave is a 59 year old male with a past medical history of CVA, hemorrhagic stroke, recurrent aspiration pneumonia, oropharyngeal dysphagia, hemiplegia, chronic static encephalopathy, severe sepsis, HTN, GJ tube change (4/9/2024), who presents fever and shortness of breath.    HPI limited due to patient non-verbal at baseline.    Per patient's daughter, patient is non-verbal and doesn't take anything PO. Daughter states that they communicate by him pointing at words on a board. She notes at 11 AM this morning, he started to develop a fever and was short of breath (\"rapid and hard breathing\"). His " heart rate (in the 100's) and blood pressure were also elevated which concerned her (baseline heart rate is in the 80-90's). She notes that whenever he has these symptoms, it's often pneumonia. She did give him tylenol this morning.    She also notes for the past week, patient has been complaining of a headache. She denies any head trauma or new neurologic deficits. She otherwise denies associating vomiting, diarrhea, and rash. Of note, he was supposed to have his GJ tube exchanged yesterday but this was rescheduled because patient wasn't feeling well. There were no other concerns/complaints at this time.    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 coli infection     History of hemorrhagic stroke with residual hemiparesis (H)     Hypertension     Lung nodule 12/05/2016    9 x 9 mm left upper lobe on CXR    MRSA (methicillin resistant staph aureus) culture positive     Periodontal disease     Persistent fever     Pneumonia of both lower lobes due to infectious organism     Respiratory failure with hypoxia (H)     Seizure (H)     Sepsis due to urinary tract infection (H) 06/04/2020    Severe sepsis (H)     Trismus        PAST SURGICAL HISTORY:  Past Surgical History:   Procedure Laterality Date    aneurysm clipping  2006    BRAIN SURGERY       EXAM UNDER ANESTHESIA, RESTORATIONS, EXTRACTION(S) DENTAL COMPLEX, COMBINED N/A 12/19/2016    Procedure: COMBINED EXAM UNDER ANESTHESIA, RESTORATIONS, EXTRACTION(S) DENTAL COMPLEX;  Surgeon: Verónica Martinez DDS;  Location: [facility]    EXAM UNDER ANESTHESIA, RESTORATIONS, EXTRACTION(S) DENTAL COMPLEX, COMBINED N/A 3/7/2018    Procedure: COMBINED EXAM UNDER ANESTHESIA, RESTORATIONS, EXTRACTION(S) DENTAL COMPLEX;  Kenalog Injection in Bilateral [place], Combined Dental Exam,Radiographs, Three Dental Restorations, Periodontal Therapy and Fluoride Treatment;  Surgeon: Darryl Isbell DDS;  Location: [facility] OR    HH MIDLINE INSERTION  8/11/2017         INJECT STEROID (LOCATION) N/A 3/7/2018    Procedure: INJECT STEROID (LOCATION);  Kenalog Injection In Bilateral [place], Combined Dental Exam,Radiographs,Three Dental Restorations, Periodontal Therapy and Fluoride Treatment;  Surgeon: Liu Conroy DDS;  Location: [facility] OR    IR GASTRO JEJUNOSTOMY TUBE CHANGE  7/19/2016    IR GASTRO JEJUNOSTOMY TUBE CHANGE  9/23/2016    IR GASTRO JEJUNOSTOMY TUBE CHANGE  11/28/2016    IR GASTRO JEJUNOSTOMY TUBE CHANGE  1/27/2017    IR GASTRO JEJUNOSTOMY TUBE CHANGE  4/19/2017    IR GASTRO JEJUNOSTOMY TUBE CHANGE  6/20/2017    IR GASTRO JEJUNOSTOMY TUBE CHANGE  8/7/2017    IR GASTRO JEJUNOSTOMY TUBE CHANGE  8/11/2017    IR GASTRO JEJUNOSTOMY TUBE CHANGE  10/20/2017    IR GASTRO JEJUNOSTOMY TUBE CHANGE  1/2/2018    IR GASTRO JEJUNOSTOMY TUBE CHANGE  2/16/2018    IR GASTRO JEJUNOSTOMY TUBE CHANGE  5/16/2018    IR GASTRO JEJUNOSTOMY TUBE CHANGE  6/11/2018    IR GASTRO JEJUNOSTOMY TUBE CHANGE  9/12/2018    IR GASTRO JEJUNOSTOMY TUBE CHANGE  12/24/2018    IR GASTRO JEJUNOSTOMY TUBE CHANGE  3/18/2019    IR GASTRO JEJUNOSTOMY TUBE CHANGE  4/23/2019    IR GASTRO JEJUNOSTOMY TUBE CHANGE  5/21/2019    IR GASTRO JEJUNOSTOMY TUBE CHANGE  9/9/2019    IR GASTRO JEJUNOSTOMY TUBE CHANGE  9/26/2019    IR GASTRO JEJUNOSTOMY TUBE CHANGE  10/11/2019    IR  GASTRO JEJUNOSTOMY TUBE CHANGE  11/21/2019    IR GASTRO JEJUNOSTOMY TUBE CHANGE  11/29/2019    IR GASTRO JEJUNOSTOMY TUBE CHANGE  2/27/2020    IR GASTRO JEJUNOSTOMY TUBE CHANGE  4/10/2020    IR GASTRO JEJUNOSTOMY TUBE CHANGE  7/2/2020    IR GASTRO JEJUNOSTOMY TUBE CHANGE  8/28/2020    IR GASTRO JEJUNOSTOMY TUBE CHANGE  9/17/2020    IR GASTRO JEJUNOSTOMY TUBE CHANGE  10/29/2020    IR GASTRO JEJUNOSTOMY TUBE CHANGE  11/6/2020    IR GASTRO JEJUNOSTOMY TUBE CHANGE  12/24/2020    IR GASTRO JEJUNOSTOMY TUBE CHANGE  1/9/2021    IR GASTRO JEJUNOSTOMY TUBE CHANGE  6/9/2021    IR GASTRO JEJUNOSTOMY TUBE CHANGE  10/4/2021    IR GASTRO JEJUNOSTOMY TUBE CHANGE  10/18/2021    IR GASTRO JEJUNOSTOMY TUBE CHANGE  1/14/2022    IR GASTRO JEJUNOSTOMY TUBE CHANGE  1/20/2022    IR GASTRO JEJUNOSTOMY TUBE CHANGE  4/19/2022    IR GASTRO JEJUNOSTOMY TUBE CHANGE  6/7/2022    IR GASTRO JEJUNOSTOMY TUBE CHANGE  8/19/2022    IR GASTRO JEJUNOSTOMY TUBE CHANGE  9/13/2022    IR GASTRO JEJUNOSTOMY TUBE CHANGE  10/27/2022    IR GASTRO JEJUNOSTOMY TUBE CHANGE  11/3/2022    IR GASTRO JEJUNOSTOMY TUBE CHANGE  1/18/2023    IR GASTRO JEJUNOSTOMY TUBE CHANGE  4/7/2023    IR GASTRO JEJUNOSTOMY TUBE CHANGE  5/4/2023    IR GASTRO JEJUNOSTOMY TUBE CHANGE  6/21/2023    IR GASTRO JEJUNOSTOMY TUBE CHANGE  8/23/2023    IR GASTRO JEJUNOSTOMY TUBE CHANGE  11/16/2023    IR GASTRO JEJUNOSTOMY TUBE CHANGE  1/8/2024    IR GASTRO JEJUNOSTOMY TUBE CHANGE  4/9/2024    IR GASTRO JEJUNOSTOMY TUBE PLACEMENT  6/14/2016    IR GJ TUBE REPLACEMENT  12/24/2018    IR GJ TUBE REPLACEMENT  3/18/2019    IR GJ TUBE REPLACEMENT  4/23/2019    IR GJ TUBE REPLACEMENT  5/21/2019    IR GJ TUBE REPLACEMENT  9/9/2019    IR GJ TUBE REPLACEMENT  9/26/2019    IR GJ TUBE REPLACEMENT  10/11/2019    IR GJ TUBE REPLACEMENT  11/21/2019    IR GJ TUBE REPLACEMENT  11/29/2019    IR GJ TUBE REPLACEMENT  2/27/2020    IR GJ TUBE REPLACEMENT  4/10/2020    IR GJ TUBE REPLACEMENT  7/2/2020    IR GJ TUBE  REPLACEMENT  8/28/2020    IR GJ TUBE REPLACEMENT  9/17/2020    IR GJ TUBE REPLACEMENT  10/29/2020    IR GJ TUBE REPLACEMENT  11/6/2020    IR GJ TUBE REPLACEMENT  12/24/2020    IR GJ TUBE REPLACEMENT  1/9/2021    IR GJ TUBE REPLACEMENT  6/9/2021    J-G tube      PICC  6/10/2016         TRACHEOSTOMY      Removed       CURRENT MEDICATIONS:      Current Facility-Administered Medications:     acetaminophen (TYLENOL) oral liquid 650 mg, 650 mg, Per G Tube, Q4H PRN **OR** acetaminophen (TYLENOL) Suppository 650 mg, 650 mg, Rectal, Q4H PRN, Dayday Gonsales MD    amLODIPine (NORVASC) tablet 5 mg, 5 mg, Per G Tube, Daily, Dayday Gonsales MD    azithromycin (ZITHROMAX) 250 mg in  mL intermittent infusion, 250 mg, Intravenous, Q24H, Dayday Gonsales MD    azithromycin (ZITHROMAX) 500 mg in  mL intermittent infusion, 500 mg, Intravenous, Q24H, Dayday Gonsales MD, 500 mg at 07/18/24 2326    baclofen (LIORESAL) tablet 10 mg, 10 mg, Per G Tube, 4x Daily, Dayday Gonsales MD, 10 mg at 07/18/24 2354    bisacodyl (DULCOLAX) suppository 10 mg, 10 mg, Rectal, Daily, Dayday Gonsales MD    [Held by provider] ferrous sulfate solution 220 mg, 220 mg, Per G Tube, Daily, Dayday Gonsales MD    guaiFENesin-dextromethorphan (ROBITUSSIN DM) 100-10 MG/5ML syrup 10 mL, 10 mL, Per G Tube, Q4H PRN, Dayday Gonsales MD    ipratropium - albuterol 0.5 mg/2.5 mg/3 mL (DUONEB) neb solution 3 mL, 3 mL, Nebulization, Q4H, Dayday Gonsales MD    ketoconazole (NIZORAL) 2 % cream, , Topical, BID PRN, Dayday Gonsales MD    lactated ringers infusion, , Intravenous, Continuous, Dayday Gonsales MD    lidocaine (LMX4) cream, , Topical, Q1H PRN, Dayday Gonsales MD    lidocaine (LMX4) cream, , Topical, Q1H PRN, Dayday Gonsales MD    lidocaine 1 % 0.1-1 mL, 0.1-1 mL, Other, Q1H PRN, Dayday Gonsales MD    lidocaine 1 % 0.1-1 mL, 0.1-1 mL, Other, Q1H PRN, Dayday Gonsales MD    meclizine (ANTIVERT) tablet 25 mg, 25 mg, Per G Tube,  TID PRN, Dayday Gonsales MD    neomycin-bacitracin-polymyxin (NEOSPORIN) ointment, , Topical, TID PRN, Dayday Gonsales MD    nystatin (MYCOSTATIN) cream, , Topical, BID PRN, Dayday Gonsales MD    ondansetron (ZOFRAN ODT) ODT tab 4 mg, 4 mg, Sublingual, Q6H PRN **OR** ondansetron (ZOFRAN) injection 4 mg, 4 mg, Intravenous, Q6H PRN, Dayday Gonsales MD    pantoprazole (PROTONIX) 2 mg/mL suspension 40 mg, 40 mg, Oral, BID AC, Dayday Gonsales MD    piperacillin-tazobactam (ZOSYN) 3.375 g vial to attach to  mL bag, 3.375 g, Intravenous, Q8H, Dayday Gonsales MD    polyethylene glycol (MIRALAX) Packet 17 g, 17 g, Oral or Feeding Tube, Daily, Dayday Gonsales MD    polyethylene glycol-propylene glycol PF (SYSTANE ULTRA PF) opthalmic solution, , Both Eyes, Q6H PRN, Dayday Gonsales MD    QUEtiapine (SEROquel) tablet 25 mg, 25 mg, Per G Tube, Q4H PRN, Dayday Gonsales MD    QUEtiapine (SEROquel) tablet 25 mg, 25 mg, Per G Tube, TID, Dayday Gonsales MD, 25 mg at 07/18/24 2354    simvastatin (ZOCOR) tablet 20 mg, 20 mg, Per G Tube, At Bedtime, Dayday Gonsales MD, 20 mg at 07/18/24 2354    sodium chloride (NEBUSAL) 3 % neb solution 4 mL, 4 mL, Nebulization, Q3H PRN, Dayday Gonsales MD    sodium chloride (PF) 0.9% PF flush 3 mL, 3 mL, Intracatheter, q1 min prn, Dayday Gonsales MD    sodium chloride (PF) 0.9% PF flush 3 mL, 3 mL, Intracatheter, Q8H, Dayday Gonsales MD, 3 mL at 07/18/24 1958    sodium chloride (PF) 0.9% PF flush 3 mL, 3 mL, Intracatheter, Q8H, Dayday Gonsales MD    sodium chloride (PF) 0.9% PF flush 3 mL, 3 mL, Intracatheter, q1 min prn, Dayday Gonsales MD    sodium chloride (PF) 0.9% PF flush 3 mL, 3 mL, Intracatheter, Q8H, Dayday Gonsales MD    sodium chloride (PF) 0.9% PF flush 3 mL, 3 mL, Intracatheter, q1 min prn, Dayday Gonsales MD    triamcinolone (KENALOG) 0.1 % cream, , Topical, BID PRN, Dayday Gonsales MD    Current Outpatient Medications:     acetaminophen  (TYLENOL) 500 MG tablet, Take 2 tablets (1,000 mg) by mouth every 6 hours as needed for mild pain (Patient taking differently: Place 1,000 mg into G tube every 6 hours as needed for mild pain), Disp: 720 tablet, Rfl: 3    amLODIPine (NORVASC) 5 MG tablet, Take 1 tablet (5 mg total) by mouth daily. (Patient taking differently: Place 5 mg into G tube daily), Disp: 90 tablet, Rfl: 2    baclofen (LIORESAL) 10 MG tablet, Place 10 mg into G tube 4 times daily Takes in the morning and with each dose of Seroquel at 1900, 0000, and 0300., Disp: , Rfl:     bisacodyl (DULCOLAX) 10 MG suppository, Place 1 suppository (10 mg) rectally daily, Disp: 30 suppository, Rfl: 2    cefaclor (CECLOR) 250 MG capsule, Take 1 capsule (250 mg) by mouth daily UTI ppx (Patient taking differently: Place 250 mg into G tube daily UTI ppx), Disp: 30 capsule, Rfl: 3    Dextromethorphan-guaiFENesin  MG/5ML syrup, TAKE 10 MLS BY G TUBE ROUTE EVERY 4 HOURS AS NEEDED FOR COUGH., Disp: 237 mL, Rfl: 0    ferrous sulfate 220 (44 Fe) MG/5ML SOLN, TAKE 5 MLS BY ENTERAL ROUTE DAILY. (Patient taking differently: Place 220 mg into G tube daily TAKE 5 MLS BY G-TUBE ROUTE DAILY.), Disp: 150 mL, Rfl: 0    ipratropium (ATROVENT) 0.06 % nasal spray, Spray 2 sprays into both nostrils daily as needed for rhinitis, Disp: , Rfl:     ipratropium - albuterol 0.5 mg/2.5 mg/3 mL (DUONEB) 0.5-2.5 (3) MG/3ML neb solution, Take 1 vial (3 mLs) by nebulization every 4 hours., Disp: 90 mL, Rfl: 1    ketoconazole (NIZORAL) 2 % external cream, Apply topically 2 times daily to affected area(s). (Patient taking differently: Apply topically 2 times daily as needed (around GJ site)), Disp: 30 g, Rfl: 0    lidocaine (LIDODERM) 5 % patch, Place 1 patch onto the skin daily as needed for moderate pain To prevent lidocaine toxicity, patient should be patch free for 12 hrs daily., Disp: 30 patch, Rfl: 2    meclizine (ANTIVERT) 25 MG tablet, Take 1 tablet via G-tube every 6 hours as  needed for vertigo, Disp: 30 tablet, Rfl: 11    NEBUSAL 3 % neb solution, TAKE 4 MLS BY NEBULIZATION EVERY 6 HOURS AS NEEDED FOR THICK SPUTUM. (Patient taking differently: Used after Duoneb.), Disp: 240 mL, Rfl: 0    neomycin-bacitracin-polymyxin (NEOSPORIN) 5-400-5000 ointment, Apply topically 4 times daily (Patient taking differently: Apply topically 3 times daily as needed (irritation) To J Tube site), Disp: 30 g, Rfl: 3    nystatin (MYCOSTATIN) 230893 UNIT/GM external cream, Apply topically 2 times daily as needed (redness) (Patient taking differently: Apply topically 2 times daily as needed (redness around GJ tube)), Disp: 30 g, Rfl: 3    omeprazole (PRILOSEC) 40 MG DR capsule, take 1 capsule by mouth daily before breakfast (Patient taking differently: 40 mg daily before breakfast IN G TUBE), Disp: 90 capsule, Rfl: 3    ondansetron (ZOFRAN) 4 MG tablet, TAKE 1 TABLET (4 MG) BY MOUTH EVERY 8 HOURS AS NEEDED FOR NAUSEA. (Patient taking differently: Place 4 mg into G tube every 8 hours as needed for nausea), Disp: 90 tablet, Rfl: 2    polyethylene glycol (MIRALAX) 17 GM/Dose powder, Give 1 capful via G-tube daily as needed for constipation. (Patient taking differently: Take 17 g by mouth or Feeding Tube daily), Disp: 850 g, Rfl: 0    QUEtiapine (SEROQUEL) 25 MG tablet, Place 25 mg into G tube every 4 hours as needed (Used during the day PRN agitation/etc. This is in addition to the tablet at 1900, 0000, 0300.), Disp: , Rfl:     QUEtiapine (SEROQUEL) 25 MG tablet, TAKE 1 TABLET BY MOUTH AT 7:00PM, MIDNIGHT AND 3:00AM FOR TOTAL OF 75 MG NIGHTLY. OK TO REPEAT AFTER 4 HOURS AND DURING THE DAY AS NEEDED. (Patient taking differently: Place 25 mg into G tube 3 times daily At 1900, 0000, 0300), Disp: 102 tablet, Rfl: 5    simvastatin (ZOCOR) 20 MG tablet, TAKE 1 TABLET PER G TUBE ROUTE AT BEDTIME., Disp: 90 tablet, Rfl: 0    triamcinolone (KENALOG) 0.1 % external cream, Apply topically 2 times daily to affected area(s).  "(Patient taking differently: Apply topically 2 times daily as needed (around G tube site)), Disp: 30 g, Rfl: 1    VISINE DRY EYE RELIEF 1 % SOLN, Apply 1 drop to eye every 6 hours as needed for dry eyes., Disp: 15 mL, Rfl: 0    Ostomy Supplies (STOMAHESIVE) PSTE, Apply topically to skin around ostomy site twice daily and as needed, Disp: 56.7 g, Rfl: 3    ALLERGIES:  Allergies   Allergen Reactions    Diphenhydramine Unknown    Trazodone Other (See Comments)     \"shaking\" per family       FAMILY HISTORY:  Family History   Problem Relation Age of Onset    Hypertension Mother     Cancer Father     Other Cancer Father     Kidney Disease Brother        SOCIAL HISTORY:   Social History     Socioeconomic History    Marital status:     Number of children: 2   Occupational History    Occupation: disability   Tobacco Use    Smoking status: Former     Current packs/day: 0.00     Average packs/day: 1 pack/day for 30.0 years (30.0 ttl pk-yrs)     Types: Cigarettes     Start date: 1975     Quit date: 2005     Years since quittin.1     Passive exposure: Past    Smokeless tobacco: Former     Quit date: 2007    Tobacco comments:     quit 2006   Vaping Use    Vaping status: Never Used   Substance and Sexual Activity    Alcohol use: No    Drug use: No    Sexual activity: Not Currently     Partners: Female     Birth control/protection: None   Other Topics Concern    Parent/sibling w/ CABG, MI or angioplasty before 65F 55M? No   Social History Narrative    Lives with wife, 2 daughters and son in law.    W/c bound.     Social Determinants of Health     Financial Resource Strain: Low Risk  (2023)    Financial Resource Strain     Within the past 12 months, have you or your family members you live with been unable to get utilities (heat, electricity) when it was really needed?: No   Food Insecurity: Low Risk  (2023)    Food Insecurity     Within the past 12 months, did you worry that your food would run out " before you got money to buy more?: No     Within the past 12 months, did the food you bought just not last and you didn t have money to get more?: No   Transportation Needs: Low Risk  (9/27/2023)    Transportation Needs     Within the past 12 months, has lack of transportation kept you from medical appointments, getting your medicines, non-medical meetings or appointments, work, or from getting things that you need?: No   Housing Stability: High Risk (9/27/2023)    Housing Stability     Do you have housing? : No     Are you worried about losing your housing?: No       PHYSICAL EXAM:    Vitals: /71   Pulse 109   Temp 100.4  F (38  C) (Oral)   Resp 21   Wt 54 kg (119 lb)   SpO2 95%   BMI 21.77 kg/m     Constitutional: Thin male resting in bed  HENT: Normocephalic, atraumatic. Neck-gross ROM intact.   Eyes: Pupils mid-range, sclera white  Respiratory: Coarse lung sounds bilaterally, no respiratory distress on room air  Cardiovascular: Tachycardic heart rate, regular rhythm  GI: Soft, not distended, non tender, no guarding. GJ tube in place.  Skin: Warm, dry  Neurologic: Alert with eyes open, nonverbal, hemiplegia    LAB:  All pertinent labs reviewed and interpreted.  Labs Ordered and Resulted from Time of ED Arrival to Time of ED Departure   COMPREHENSIVE METABOLIC PANEL - Abnormal       Result Value    Sodium 144      Potassium 4.4      Carbon Dioxide (CO2) 26      Anion Gap 8      Urea Nitrogen 36.7 (*)     Creatinine 1.19 (*)     GFR Estimate 69      Calcium 8.7 (*)     Chloride 110 (*)     Glucose 113 (*)     Alkaline Phosphatase 64      AST 28      ALT 21      Protein Total 8.0      Albumin 3.6      Bilirubin Total 0.3     CBC WITH PLATELETS AND DIFFERENTIAL - Abnormal    WBC Count 17.8 (*)     RBC Count 3.64 (*)     Hemoglobin 11.2 (*)     Hematocrit 33.8 (*)     MCV 93      MCH 30.8      MCHC 33.1      RDW 12.0      Platelet Count 229      % Neutrophils 79      % Lymphocytes 13      % Monocytes 5       % Eosinophils 2      % Basophils 0      % Immature Granulocytes 1      NRBCs per 100 WBC 0      Absolute Neutrophils 14.1 (*)     Absolute Lymphocytes 2.3      Absolute Monocytes 0.9      Absolute Eosinophils 0.3      Absolute Basophils 0.1      Absolute Immature Granulocytes 0.1      Absolute NRBCs 0.0     LACTIC ACID WHOLE BLOOD WITH 1X REPEAT IN 2 HR WHEN >2 - Normal    Lactic Acid, Initial 0.9     TROPONIN T, HIGH SENSITIVITY - Normal    Troponin T, High Sensitivity 14     NT PROBNP INPATIENT - Normal    N terminal Pro BNP Inpatient 43     INFLUENZA A/B, RSV, & SARS-COV2 PCR - Normal    Influenza A PCR Negative      Influenza B PCR Negative      RSV PCR Negative      SARS CoV2 PCR Negative     ROUTINE UA WITH MICROSCOPIC REFLEX TO CULTURE   BASIC METABOLIC PANEL   CBC WITH PLATELETS   BLOOD CULTURE   BLOOD CULTURE       RADIOLOGY:  CT Chest (PE) Abdomen Pelvis w Contrast   Final Result   IMPRESSION:   1.  No evidence of pulmonary embolus given limitations of exam.   2.  Increasing bilateral diffuse ground glass opacity; differential considerations include early edema and pneumonitis.   3.  Associated increased retained secretions centrally.   4.  No acute abnormalities identified in the abdomen or pelvis.      Head CT w/o contrast   Final Result   IMPRESSION:      1.  No CT evidence of acute intracranial abnormality.   2.  Chronic changes as detailed above.          EKG:   Performed at: 07/18/2024, 18:32  Impression: Sinus tachycardia. Normal ECG. No significant change was found when compared with ECG of 04/12/2024.  Rate: 123 bpm  Rhythm: Sinus tachycardia  QRS Interval: 66 ms  QTc Interval: 432 ms    I have independently reviewed and interpreted the EKG(s) documented above.     PROCEDURES:   Procedures     I, Anika Reinoso, am serving as a scribe to document services personally performed by Dr. Cherelle Arriaza based on my observation and the provider's statements to me. I, Cherelle Arriaza MD attest that Anika Reinoso is  acting in a scribe capacity, has observed my performance of the services and has documented them in accordance with my direction.    Cherelle Arriaza M.D.  Emergency Medicine  [facility] EMERGENCY ROOM  [address] [facility] [address] [phone]  Dept: [phone]     Cherelle Arriaza MD  07/19/24 0026

## 2024-07-19 ENCOUNTER — APPOINTMENT (OUTPATIENT)
Dept: INTERVENTIONAL RADIOLOGY/VASCULAR | Facility: CLINIC | Age: 59
End: 2024-07-19
Attending: PHYSICIAN ASSISTANT
Payer: COMMERCIAL

## 2024-07-19 LAB
ALBUMIN UR-MCNC: 30 MG/DL
ANION GAP SERPL CALCULATED.3IONS-SCNC: 8 MMOL/L (ref 7–15)
APPEARANCE UR: CLEAR
BILIRUB UR QL STRIP: NEGATIVE
BUN SERPL-MCNC: 23.6 MG/DL (ref 8–23)
CALCIUM SERPL-MCNC: 7.9 MG/DL (ref 8.8–10.4)
CHLORIDE SERPL-SCNC: 112 MMOL/L (ref 98–107)
COLOR UR AUTO: COLORLESS
CREAT SERPL-MCNC: 1.1 MG/DL (ref 0.67–1.17)
EGFRCR SERPLBLD CKD-EPI 2021: 76 ML/MIN/1.73M2
ERYTHROCYTE [DISTWIDTH] IN BLOOD BY AUTOMATED COUNT: 12 % (ref 10–15)
GLUCOSE SERPL-MCNC: 103 MG/DL (ref 70–99)
GLUCOSE UR STRIP-MCNC: NEGATIVE MG/DL
HCO3 SERPL-SCNC: 23 MMOL/L (ref 22–29)
HCT VFR BLD AUTO: 29.8 % (ref 40–53)
HGB BLD-MCNC: 9.8 G/DL (ref 13.3–17.7)
HGB UR QL STRIP: NEGATIVE
KETONES UR STRIP-MCNC: NEGATIVE MG/DL
LEUKOCYTE ESTERASE UR QL STRIP: NEGATIVE
MCH RBC QN AUTO: 31.4 PG (ref 26.5–33)
MCHC RBC AUTO-ENTMCNC: 32.9 G/DL (ref 31.5–36.5)
MCV RBC AUTO: 96 FL (ref 78–100)
MUCOUS THREADS #/AREA URNS LPF: PRESENT /LPF
NITRATE UR QL: NEGATIVE
PH UR STRIP: 8 [PH] (ref 5–7)
PLATELET # BLD AUTO: 189 10E3/UL (ref 150–450)
POTASSIUM SERPL-SCNC: 4.2 MMOL/L (ref 3.4–5.3)
RBC # BLD AUTO: 3.12 10E6/UL (ref 4.4–5.9)
RBC URINE: 1 /HPF
SODIUM SERPL-SCNC: 143 MMOL/L (ref 135–145)
SP GR UR STRIP: 1.02 (ref 1–1.03)
UROBILINOGEN UR STRIP-MCNC: <2 MG/DL
WBC # BLD AUTO: 11.6 10E3/UL (ref 4–11)
WBC URINE: <1 /HPF

## 2024-07-19 PROCEDURE — 250N000009 HC RX 250: Performed by: RADIOLOGY

## 2024-07-19 PROCEDURE — 0D2DXUZ CHANGE FEEDING DEVICE IN LOWER INTESTINAL TRACT, EXTERNAL APPROACH: ICD-10-PCS | Performed by: RADIOLOGY

## 2024-07-19 PROCEDURE — 49452 REPLACE G-J TUBE PERC: CPT

## 2024-07-19 PROCEDURE — 250N000013 HC RX MED GY IP 250 OP 250 PS 637: Performed by: FAMILY MEDICINE

## 2024-07-19 PROCEDURE — 999N000157 HC STATISTIC RCP TIME EA 10 MIN

## 2024-07-19 PROCEDURE — 99232 SBSQ HOSP IP/OBS MODERATE 35: CPT | Performed by: FAMILY MEDICINE

## 2024-07-19 PROCEDURE — 80048 BASIC METABOLIC PNL TOTAL CA: CPT | Performed by: FAMILY MEDICINE

## 2024-07-19 PROCEDURE — 250N000011 HC RX IP 250 OP 636: Performed by: FAMILY MEDICINE

## 2024-07-19 PROCEDURE — 94640 AIRWAY INHALATION TREATMENT: CPT | Mod: 76

## 2024-07-19 PROCEDURE — 258N000003 HC RX IP 258 OP 636: Performed by: FAMILY MEDICINE

## 2024-07-19 PROCEDURE — 36415 COLL VENOUS BLD VENIPUNCTURE: CPT | Performed by: FAMILY MEDICINE

## 2024-07-19 PROCEDURE — 81001 URINALYSIS AUTO W/SCOPE: CPT | Performed by: FAMILY MEDICINE

## 2024-07-19 PROCEDURE — 120N000001 HC R&B MED SURG/OB

## 2024-07-19 PROCEDURE — 94640 AIRWAY INHALATION TREATMENT: CPT

## 2024-07-19 PROCEDURE — C1769 GUIDE WIRE: HCPCS

## 2024-07-19 PROCEDURE — 250N000009 HC RX 250: Performed by: FAMILY MEDICINE

## 2024-07-19 PROCEDURE — 272N000735 HC KIT, CIRCUIT WITH NEB, VOLERA

## 2024-07-19 PROCEDURE — 85018 HEMOGLOBIN: CPT | Performed by: FAMILY MEDICINE

## 2024-07-19 RX ORDER — DEXTROSE MONOHYDRATE 100 MG/ML
INJECTION, SOLUTION INTRAVENOUS CONTINUOUS PRN
Status: DISCONTINUED | OUTPATIENT
Start: 2024-07-19 | End: 2024-07-20 | Stop reason: HOSPADM

## 2024-07-19 RX ORDER — SODIUM CHLORIDE 9 MG/ML
INJECTION, SOLUTION INTRAVENOUS CONTINUOUS
Status: DISCONTINUED | OUTPATIENT
Start: 2024-07-19 | End: 2024-07-20

## 2024-07-19 RX ORDER — LIDOCAINE HYDROCHLORIDE 20 MG/ML
JELLY TOPICAL ONCE
Status: COMPLETED | OUTPATIENT
Start: 2024-07-19 | End: 2024-07-19

## 2024-07-19 RX ORDER — GUAR GUM
1 PACKET (EA) ORAL 2 TIMES DAILY
Status: DISCONTINUED | OUTPATIENT
Start: 2024-07-19 | End: 2024-07-20 | Stop reason: HOSPADM

## 2024-07-19 RX ORDER — IPRATROPIUM BROMIDE AND ALBUTEROL SULFATE 2.5; .5 MG/3ML; MG/3ML
3 SOLUTION RESPIRATORY (INHALATION)
Status: DISCONTINUED | OUTPATIENT
Start: 2024-07-19 | End: 2024-07-20 | Stop reason: HOSPADM

## 2024-07-19 RX ORDER — DIAPER,BRIEF,INFANT-TODD,DISP
EACH MISCELLANEOUS 3 TIMES DAILY
Status: DISCONTINUED | OUTPATIENT
Start: 2024-07-19 | End: 2024-07-20 | Stop reason: HOSPADM

## 2024-07-19 RX ADMIN — SODIUM CHLORIDE, POTASSIUM CHLORIDE, SODIUM LACTATE AND CALCIUM CHLORIDE: 600; 310; 30; 20 INJECTION, SOLUTION INTRAVENOUS at 00:28

## 2024-07-19 RX ADMIN — BACLOFEN 10 MG: 10 TABLET ORAL at 03:37

## 2024-07-19 RX ADMIN — SODIUM CHLORIDE: 9 INJECTION, SOLUTION INTRAVENOUS at 12:37

## 2024-07-19 RX ADMIN — LIDOCAINE HYDROCHLORIDE: 20 JELLY TOPICAL at 14:16

## 2024-07-19 RX ADMIN — QUETIAPINE FUMARATE 25 MG: 25 TABLET ORAL at 20:35

## 2024-07-19 RX ADMIN — IPRATROPIUM BROMIDE AND ALBUTEROL SULFATE 3 ML: .5; 3 SOLUTION RESPIRATORY (INHALATION) at 19:42

## 2024-07-19 RX ADMIN — SODIUM BICARBONATE 40 MG: 84 INJECTION INTRAVENOUS at 17:41

## 2024-07-19 RX ADMIN — SIMVASTATIN 20 MG: 20 TABLET, FILM COATED ORAL at 22:09

## 2024-07-19 RX ADMIN — PIPERACILLIN AND TAZOBACTAM 3.38 G: 3; .375 INJECTION, POWDER, FOR SOLUTION INTRAVENOUS at 03:37

## 2024-07-19 RX ADMIN — IPRATROPIUM BROMIDE AND ALBUTEROL SULFATE 3 ML: .5; 3 SOLUTION RESPIRATORY (INHALATION) at 00:43

## 2024-07-19 RX ADMIN — IPRATROPIUM BROMIDE AND ALBUTEROL SULFATE 3 ML: .5; 3 SOLUTION RESPIRATORY (INHALATION) at 07:58

## 2024-07-19 RX ADMIN — BACLOFEN 10 MG: 10 TABLET ORAL at 23:51

## 2024-07-19 RX ADMIN — AMLODIPINE BESYLATE 5 MG: 5 TABLET ORAL at 10:19

## 2024-07-19 RX ADMIN — PIPERACILLIN AND TAZOBACTAM 3.38 G: 3; .375 INJECTION, POWDER, FOR SOLUTION INTRAVENOUS at 18:50

## 2024-07-19 RX ADMIN — IPRATROPIUM BROMIDE AND ALBUTEROL SULFATE 3 ML: .5; 3 SOLUTION RESPIRATORY (INHALATION) at 15:44

## 2024-07-19 RX ADMIN — FAMOTIDINE 20 MG: 10 INJECTION, SOLUTION INTRAVENOUS at 17:30

## 2024-07-19 RX ADMIN — AZITHROMYCIN MONOHYDRATE 250 MG: 500 INJECTION, POWDER, LYOPHILIZED, FOR SOLUTION INTRAVENOUS at 23:31

## 2024-07-19 RX ADMIN — HYDROCORTISONE: 1 OINTMENT TOPICAL at 17:37

## 2024-07-19 RX ADMIN — QUETIAPINE FUMARATE 25 MG: 25 TABLET ORAL at 23:51

## 2024-07-19 RX ADMIN — IPRATROPIUM BROMIDE AND ALBUTEROL SULFATE 3 ML: .5; 3 SOLUTION RESPIRATORY (INHALATION) at 12:11

## 2024-07-19 RX ADMIN — SODIUM BICARBONATE 40 MG: 84 INJECTION INTRAVENOUS at 10:22

## 2024-07-19 RX ADMIN — QUETIAPINE FUMARATE 25 MG: 25 TABLET ORAL at 17:37

## 2024-07-19 RX ADMIN — QUETIAPINE FUMARATE 25 MG: 25 TABLET ORAL at 03:37

## 2024-07-19 RX ADMIN — BACLOFEN 10 MG: 10 TABLET ORAL at 18:01

## 2024-07-19 RX ADMIN — HYDROCORTISONE: 1 OINTMENT TOPICAL at 22:09

## 2024-07-19 RX ADMIN — PIPERACILLIN AND TAZOBACTAM 3.38 G: 3; .375 INJECTION, POWDER, FOR SOLUTION INTRAVENOUS at 10:32

## 2024-07-19 RX ADMIN — ACETAMINOPHEN 650 MG: 650 SOLUTION ORAL at 17:42

## 2024-07-19 RX ADMIN — BACLOFEN 10 MG: 10 TABLET ORAL at 12:39

## 2024-07-19 RX ADMIN — BISACODYL 10 MG: 10 SUPPOSITORY RECTAL at 16:07

## 2024-07-19 ASSESSMENT — ACTIVITIES OF DAILY LIVING (ADL)
ADLS_ACUITY_SCORE: 51
ADLS_ACUITY_SCORE: 39
ADLS_ACUITY_SCORE: 51
ADLS_ACUITY_SCORE: 51
ADLS_ACUITY_SCORE: 39
ADLS_ACUITY_SCORE: 39
ADLS_ACUITY_SCORE: 51
ADLS_ACUITY_SCORE: 51
ADLS_ACUITY_SCORE: 39
ADLS_ACUITY_SCORE: 51
DEPENDENT_IADLS:: CLEANING;COOKING;LAUNDRY;SHOPPING;MEAL PREPARATION;MEDICATION MANAGEMENT;TRANSPORTATION
ADLS_ACUITY_SCORE: 39
ADLS_ACUITY_SCORE: 51
ADLS_ACUITY_SCORE: 39
ADLS_ACUITY_SCORE: 51
ADLS_ACUITY_SCORE: 39
ADLS_ACUITY_SCORE: 39

## 2024-07-19 NOTE — PROGRESS NOTES
RT started administering Duoneb to pt. Pt immediately started to cough a lot, HR jumped to 140 and pt desat to mid 80's. RT stopped neb txt. Pt's family member in room said he does nebs in the mornings and coughs but not as much as now. She does not want 4am neb given to pt. Want to wait until 8am. RT will continue to follow.

## 2024-07-19 NOTE — PHARMACY-VANCOMYCIN DOSING SERVICE
Pharmacy Vancomycin Initial Note  Date of Service 2024  Patient's  1965  59 year old, male    Indication: Sepsis    Current estimated CrCl = Estimated Creatinine Clearance: 49.8 mL/min (A) (based on SCr of 1.19 mg/dL (H)).    Creatinine for last 3 days  2024:  7:00 PM Creatinine 1.19 mg/dL    Recent Vancomycin Level(s) for last 3 days  No results found for requested labs within last 3 days.      Vancomycin IV Administrations (past 72 hours)        No vancomycin orders with administrations in past 72 hours.                    Nephrotoxins and other renal medications (From now, onward)      Start     Dose/Rate Route Frequency Ordered Stop    24 1900  piperacillin-tazobactam (ZOSYN) 3.375 g vial to attach to  mL bag         3.375 g  over 30 Minutes Intravenous ONCE 24 1842      24 1900  vancomycin (VANCOCIN) 1,250 mg in 0.9% NaCl 250 mL intermittent infusion         25 mg/kg × 54 kg  166.7 mL/hr over 90 Minutes Intravenous ONCE 24 1857              Contrast Orders - past 72 hours (72h ago, onward)      None               Plan:  Give vancomycin  1250 mg IV x 1 STAT in ED.    Will need a new  consult order if planning to cont. Inpt.  Deacon HwangD

## 2024-07-19 NOTE — PROCEDURES
[facility]    Procedure: Gastrojejunostomy tube exchange    Date/Time: 7/19/2024 2:33 PM    Performed by: Silver Millard MD  Authorized by: Silver Millard MD      UNIVERSAL PROTOCOL   Site Marked: NA  Prior Images Obtained and Reviewed:  Yes  Required items: Required blood products, implants, devices and special equipment available    Patient identity confirmed:  Verbally with patient, arm band, provided demographic data and hospital-assigned identification number  Patient was reevaluated immediately before administering moderate or deep sedation or anesthesia  Confirmation Checklist:  Patient's identity using two indicators, relevant allergies, procedure was appropriate and matched the consent or emergent situation and correct equipment/implants were available  Time out: Immediately prior to the procedure a time out was called    Universal Protocol: the Joint Commission Universal Protocol was followed    Preparation: Patient was prepped and draped in usual sterile fashion       ANESTHESIA    Anesthesia:  Local infiltration  Local Anesthetic:  Lidocaine 1% without epinephrine      SEDATION    Patient Sedated: No    See dictated procedure note for full details.  Findings: Successful gastrojejunostomy tube exchange.    Specimens: none    Complications: None    Condition: Stable      PROCEDURE    Patient Tolerance:  Patient tolerated the procedure well with no immediate complications  Length of time physician/provider present for 1:1 monitoring during sedation: 0    Eric Millard MD  Interventional Radiology

## 2024-07-19 NOTE — UTILIZATION REVIEW
Admission Status; Secondary Review Determination   Under the authority of the Utilization Management Committee, the utilization review process indicated a secondary review on Liu Malave. The review outcome is based on review of the medical records, discussions with staff, and applying clinical experience noted on the date of the review.   (x) Inpatient Status Appropriate - This patient's medical care is consistent with medical management for inpatient care and reasonable inpatient medical practice.     RATIONALE FOR DETERMINATION   Liu Malave is a 59 year old male with PMH signficant for hemorrhagic CVA with right hemiplegia, G-tube dependent, recurrent aspiration pneumonia, MRSA/ESBL, seizure disorder, HTN, hep C carrier, hyperlipidemia and depression.  Presented with fever temperature 100.4, cough shortness of breath tachycardia.  WBC 17.8.  Chest CT negative for PE.  Increased bilateral diffuse groundglass opacities and retained secretions noted.   Sepsis picture with concerns for recurrent aspiration pneumonia with retained secretions.  IV abx continue (azithro/zosyn) continue. - Needs feeding tube changed before can be used.  He is feeding tube dependent..      At the time of admission with the information available to the attending physician more than 2 nights Hospital complex care was anticipated, based on patient risk of adverse outcome if treated as outpatient and complex care required. Inpatient admission is appropriate based on the Medicare guidelines.   The information on this document is developed by the utilization review team in order for the business office to ensure compliance. This only denotes the appropriateness of proper admission status and does not reflect the quality of care rendered.   The definitions of Inpatient Status and Observation Status used in making the determination above are those provided in the CMS Coverage Manual, Chapter 1 and Chapter 6, section 70.4.   Sincerely,    Mary Clay MD  Utilization Review  Physician Advisor  [facility]

## 2024-07-19 NOTE — PROGRESS NOTES
/75 (BP Location: Left arm)   Pulse 94   Temp 99.9  F (37.7  C) (Axillary)   Resp 18   Wt 54 kg (119 lb)   SpO2 93%   BMI 21.77 kg/m      Pt was on RA when last seen. BS were coarse throughout. Pt did not tolerate the volara tx, pt family asked me to stop and switch to normal neb tx. RT will continue to follow.

## 2024-07-19 NOTE — H&P
[facility] MEDICINE ADMISSION HISTORY AND PHYSICAL   Date of Admission: 7/18/2024  Liu Malave, 1965, [id number]    Identification/Summary:   Liu Malave is a 59 year old male with PMH signficant for hemorrhagic CVA with right hemiplegia, G-tube dependent, recurrent aspiration pneumonia, MRSA/ESBL, seizure disorder, HTN, hep C carrier, hyperlipidemia and depression.  Presented with fever temperature 100.4, cough shortness of breath tachycardia.  WBC 17.8.  Chest CT negative for PE.  Increased bilateral diffuse groundglass opacities and retained secretions noted.    Assessment and Plan:    Sepsis (tachycardia, fever, leukocytosis)  Recurrent aspiration pneumonia with retained secretions  Inpatient MedSur admission.  Expect length of stay greater than 2 nights.  Recurrent issue for the patient.  Typically has quick turnaround.  Sepsis protocols utilized for fluids.  Utilize Zosyn and azithromycin per respiratory high risk multidrug-resistant organism sepsis protocol.  Ordered DuoNebs and home NebuSal nebs.  Order RT consultation for possible percussive therapy due to retained secretions.    Leukocytosis  Initial white count 17.8.  Follow daily CBC.    Hemorrhagic CVA  Right hemiparesis  Order home baclofen 4 times daily, meclizine and scheduled overnight Seroquel.  Hold Lidoderm patch as has not been using for greater than 1 month due to insurance issues.  Hold his home prophylactic cefaclor.    GJ tube feeding dependent  -Patient was scheduled to get his GJ tube exchanged but appointment was rescheduled due to his illness.  Family wondering if this could be accomplished during his stay.  -IR consulted for GJ tube exchange.  -Order daily Prilosec.  -Hold off on G-tube feedings until IR tube exchange timing can be coordinated.  -Hold his iron as well.    Chronic kidney disease stage IIIa  Admission creatinine 1.19.  Essentially at baseline.    Recheck BMP in the morning.    Essential  hypertension  Order home Norvasc with hold parameters.    Hyperlipidemia  Order home Zocor.    History of MRSA/ESBL  Hepatitis C carrier  Noted.  Contact precautions.    Anticoagulation   Pneumatic Compression Devices    COVID19 PCR/influenza A/B/RSV negative from 7/18/2024  Noted  Joseph present    Code Status: Full Code    Disposition: Inpatient   Medically Ready for Discharge: Anticipated in 2-4 Days       Clinically Significant Risk Factors Present on Admission                                  # Financial/Environmental Concerns:           Chief Complaint Fever and cough     HISTORY     Liu GIL Malave is a 59 year old male with PMH of hemorrhagic CVA with right hemiplegia, G-tube dependent, recurrent aspiration pneumonia, MRSA/ESBL, seizure disorder, HTN, hep C carrier, hyperlipidemia and depression.  Presented with fever temperature 100.4, cough shortness of breath tachycardia.  WBC 17.8.  Chest CT negative for PE.  Increased bilateral diffuse groundglass opacities and retained secretions noted.  Rate improved after IV fluids.  Daughter is present and supportive.  Patient's last hospitalization was October 2023.  All hospitalizations have been for aspiration pneumonia.  Confirmed full CODE STATUS with daughter.  Patient nonverbal.  Did intermittently cough during my visit.  Historical information reviewed.  No new changes.  Patient was to get his GJ tube exchanged in the last day but had to be rescheduled due to his illness.  Family interested in seeing if this tube exchange could be accomplished while he is hospitalized..  .  Questions answered to verbalize satisfaction.    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  No date: Convulsions, unspecified convulsion type (H)  07/2016: CVA (cerebral vascular accident) (H)      Comment:   Hemorrhagic,   No date: Depression  No date: Dyslipidemia  No date: Elevated troponin  No date: Essential hypertension  06/04/2020: Feeding by G-tube (H)  No date: GERD (gastroesophageal reflux disease)  No date: Hemiplegia of nondominant side, late effect of   cerebrovascular disease (H)      Comment:  Created by Conversion  Replacement Utility updated for                latest IMO load  06/05/2016: Hemorrhagic stroke (H)  No date: Hepatitis C carrier (H)  No date: History of ESBL E. coli infection  No date: History of hemorrhagic stroke with residual hemiparesis (H)  No date: Hypertension  12/05/2016: Lung nodule      Comment:  9 x 9 mm left upper lobe on CXR  No date: MRSA (methicillin resistant staph aureus) culture positive  No date: Periodontal disease  No date: Persistent fever  No date: Pneumonia of both lower lobes due to infectious organism  No date: Respiratory failure with hypoxia (H)  No date: Seizure (H)  06/04/2020: Sepsis due to urinary tract infection (H)  No date: Severe sepsis (H)  No date: Trismus     Patient Active Problem List    Diagnosis Date Noted    Shortness of breath 07/18/2024     Priority: Medium    History of CVA (cerebrovascular accident) 09/27/2023     Priority: Medium    Recurrent aspiration pneumonia (H) 06/08/2023     Priority: Medium    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) 04/25/2023     Priority: Medium    On tube feeding diet 04/25/2023     Priority: Medium    Urinary retention with incomplete bladder emptying 02/12/2023     Priority: Medium    Malnutrition, unspecified type (H24) 09/28/2022     Priority: Medium    Oropharyngeal dysphagia 05/31/2022     Priority: Medium     On tube feedings   6 am to 8 pm 50ml/hr and overnight 30 ml/hr, 1.5 peptamine with probiotic       Chronic hepatitis C without hepatic coma (H) 04/06/2022     Priority: Medium     Chronic status checked in 9/23. Saw [facility] who decided  not to treat in 2015 due to medication cost and no liver dysfunction. Rereffered in 10/23      Hemiplegia and hemiparesis following cerebral infarction affecting unspecified side (H) 06/04/2020     Priority: Medium    Hemorrhagic stroke (H) 06/05/2016     Priority: Medium     Mr. Malave is a 51 year old Montenegrin-speaking male with a past history of  right-sided CVA with residual left-sided weakness, anxiety, hypertension, dyslipidemia, GERD, dysphagia and hepatitis C who presented to [facility] on 6/5/2016 with shortness of breath and altered mental status.  He was Transferred to [facility] ICU for management of acute hemorrhagic stroke with vasogenic edema, midline shift and witnessed seizure event.  He was intubated for airway protection.     Neurosurgery was consulted who managed the patient with ICP reduction:  HOB above 30, keep SBP below 140, therapeutic Hypernatremia and serial CT scans.  The patient had known left-sided weakness due to a right-sided aneurysm with clipping in the past.    Neurology was consulted who placed the patient on Keppra on admission.   Vimpat was added on on 6/6/2016 due to witnessed leg tremors although no seizures were evident by EEG.        Surgical History     Past Surgical History:   Procedure Laterality Date    aneurysm clipping  2006    BRAIN SURGERY      EXAM UNDER ANESTHESIA, RESTORATIONS, EXTRACTION(S) DENTAL COMPLEX, COMBINED N/A 12/19/2016    Procedure: COMBINED EXAM UNDER ANESTHESIA, RESTORATIONS, EXTRACTION(S) DENTAL COMPLEX;  Surgeon: Verónica Martinez DDS;  Location: [facility] OR    EXAM UNDER ANESTHESIA, RESTORATIONS, EXTRACTION(S) DENTAL COMPLEX, COMBINED N/A 3/7/2018    Procedure: COMBINED EXAM UNDER ANESTHESIA, RESTORATIONS, EXTRACTION(S) DENTAL COMPLEX;  Kenalog Injection in Bilateral [place], Combined Dental Exam,Radiographs, Three Dental Restorations, Periodontal Therapy and Fluoride Treatment;  Surgeon: Darryl Isbell DDS;  Location: [facility] OR     HH MIDLINE INSERTION  8/11/2017         INJECT STEROID (LOCATION) N/A 3/7/2018    Procedure: INJECT STEROID (LOCATION);  Kenalog Injection In Bilateral [place], Combined Dental Exam,Radiographs,Three Dental Restorations, Periodontal Therapy and Fluoride Treatment;  Surgeon: Liu Conroy DDS;  Location: [facility] OR    IR GASTRO JEJUNOSTOMY TUBE CHANGE  7/19/2016    IR GASTRO JEJUNOSTOMY TUBE CHANGE  9/23/2016    IR GASTRO JEJUNOSTOMY TUBE CHANGE  11/28/2016    IR GASTRO JEJUNOSTOMY TUBE CHANGE  1/27/2017    IR GASTRO JEJUNOSTOMY TUBE CHANGE  4/19/2017    IR GASTRO JEJUNOSTOMY TUBE CHANGE  6/20/2017    IR GASTRO JEJUNOSTOMY TUBE CHANGE  8/7/2017    IR GASTRO JEJUNOSTOMY TUBE CHANGE  8/11/2017    IR GASTRO JEJUNOSTOMY TUBE CHANGE  10/20/2017    IR GASTRO JEJUNOSTOMY TUBE CHANGE  1/2/2018    IR GASTRO JEJUNOSTOMY TUBE CHANGE  2/16/2018    IR GASTRO JEJUNOSTOMY TUBE CHANGE  5/16/2018    IR GASTRO JEJUNOSTOMY TUBE CHANGE  6/11/2018    IR GASTRO JEJUNOSTOMY TUBE CHANGE  9/12/2018    IR GASTRO JEJUNOSTOMY TUBE CHANGE  12/24/2018    IR GASTRO JEJUNOSTOMY TUBE CHANGE  3/18/2019    IR GASTRO JEJUNOSTOMY TUBE CHANGE  4/23/2019    IR GASTRO JEJUNOSTOMY TUBE CHANGE  5/21/2019    IR GASTRO JEJUNOSTOMY TUBE CHANGE  9/9/2019    IR GASTRO JEJUNOSTOMY TUBE CHANGE  9/26/2019    IR GASTRO JEJUNOSTOMY TUBE CHANGE  10/11/2019    IR GASTRO JEJUNOSTOMY TUBE CHANGE  11/21/2019    IR GASTRO JEJUNOSTOMY TUBE CHANGE  11/29/2019    IR GASTRO JEJUNOSTOMY TUBE CHANGE  2/27/2020    IR GASTRO JEJUNOSTOMY TUBE CHANGE  4/10/2020    IR GASTRO JEJUNOSTOMY TUBE CHANGE  7/2/2020    IR GASTRO JEJUNOSTOMY TUBE CHANGE  8/28/2020    IR GASTRO JEJUNOSTOMY TUBE CHANGE  9/17/2020    IR GASTRO JEJUNOSTOMY TUBE CHANGE  10/29/2020    IR GASTRO JEJUNOSTOMY TUBE CHANGE  11/6/2020    IR GASTRO JEJUNOSTOMY TUBE CHANGE  12/24/2020    IR GASTRO JEJUNOSTOMY TUBE CHANGE  1/9/2021    IR GASTRO JEJUNOSTOMY TUBE CHANGE  6/9/2021    IR GASTRO JEJUNOSTOMY TUBE CHANGE   10/4/2021    IR GASTRO JEJUNOSTOMY TUBE CHANGE  10/18/2021    IR GASTRO JEJUNOSTOMY TUBE CHANGE  1/14/2022    IR GASTRO JEJUNOSTOMY TUBE CHANGE  1/20/2022    IR GASTRO JEJUNOSTOMY TUBE CHANGE  4/19/2022    IR GASTRO JEJUNOSTOMY TUBE CHANGE  6/7/2022    IR GASTRO JEJUNOSTOMY TUBE CHANGE  8/19/2022    IR GASTRO JEJUNOSTOMY TUBE CHANGE  9/13/2022    IR GASTRO JEJUNOSTOMY TUBE CHANGE  10/27/2022    IR GASTRO JEJUNOSTOMY TUBE CHANGE  11/3/2022    IR GASTRO JEJUNOSTOMY TUBE CHANGE  1/18/2023    IR GASTRO JEJUNOSTOMY TUBE CHANGE  4/7/2023    IR GASTRO JEJUNOSTOMY TUBE CHANGE  5/4/2023    IR GASTRO JEJUNOSTOMY TUBE CHANGE  6/21/2023    IR GASTRO JEJUNOSTOMY TUBE CHANGE  8/23/2023    IR GASTRO JEJUNOSTOMY TUBE CHANGE  11/16/2023    IR GASTRO JEJUNOSTOMY TUBE CHANGE  1/8/2024    IR GASTRO JEJUNOSTOMY TUBE CHANGE  4/9/2024    IR GASTRO JEJUNOSTOMY TUBE PLACEMENT  6/14/2016    IR GJ TUBE REPLACEMENT  12/24/2018    IR GJ TUBE REPLACEMENT  3/18/2019    IR GJ TUBE REPLACEMENT  4/23/2019    IR GJ TUBE REPLACEMENT  5/21/2019    IR GJ TUBE REPLACEMENT  9/9/2019    IR GJ TUBE REPLACEMENT  9/26/2019    IR GJ TUBE REPLACEMENT  10/11/2019    IR GJ TUBE REPLACEMENT  11/21/2019    IR GJ TUBE REPLACEMENT  11/29/2019    IR GJ TUBE REPLACEMENT  2/27/2020    IR GJ TUBE REPLACEMENT  4/10/2020    IR GJ TUBE REPLACEMENT  7/2/2020    IR GJ TUBE REPLACEMENT  8/28/2020    IR GJ TUBE REPLACEMENT  9/17/2020    IR GJ TUBE REPLACEMENT  10/29/2020    IR GJ TUBE REPLACEMENT  11/6/2020    IR GJ TUBE REPLACEMENT  12/24/2020    IR GJ TUBE REPLACEMENT  1/9/2021    IR GJ TUBE REPLACEMENT  6/9/2021    J-G tube      PICC  6/10/2016         TRACHEOSTOMY      Removed     Family History      Family History   Problem Relation Age of Onset    Hypertension Mother     Cancer Father     Other Cancer Father     Kidney Disease Brother       Social History      Social History     Tobacco Use    Smoking status: Former     Current packs/day: 0.00     Average packs/day: 1  "pack/day for 30.0 years (30.0 ttl pk-yrs)     Types: Cigarettes     Start date: 1975     Quit date: 2005     Years since quittin.1     Passive exposure: Past    Smokeless tobacco: Former     Quit date: 2007    Tobacco comments:     quit 2006   Vaping Use    Vaping status: Never Used   Substance Use Topics    Alcohol use: No    Drug use: No      Allergies     Allergies   Allergen Reactions    Diphenhydramine Unknown    Trazodone Other (See Comments)     \"shaking\" per family     Prior to Admission Medications      Prior to Admission Medications   Prescriptions Last Dose Informant Patient Reported? Taking?   Dextromethorphan-guaiFENesin  MG/5ML syrup 2024 at PM  No Yes   Sig: TAKE 10 MLS BY G TUBE ROUTE EVERY 4 HOURS AS NEEDED FOR COUGH.   NEBUSAL 3 % neb solution 2024 at 1200  No Yes   Sig: TAKE 4 MLS BY NEBULIZATION EVERY 6 HOURS AS NEEDED FOR THICK SPUTUM.   Patient taking differently: Used after Duoneb.   Ostomy Supplies (STOMAHESIVE) PSTE   No No   Sig: Apply topically to skin around ostomy site twice daily and as needed   QUEtiapine (SEROQUEL) 25 MG tablet 2024 at 0300  No Yes   Sig: TAKE 1 TABLET BY MOUTH AT 7:00PM, MIDNIGHT AND 3:00AM FOR TOTAL OF 75 MG NIGHTLY. OK TO REPEAT AFTER 4 HOURS AND DURING THE DAY AS NEEDED.   Patient taking differently: Place 25 mg into G tube 3 times daily At 1900, 0000, 0300   QUEtiapine (SEROQUEL) 25 MG tablet Past Week at day or two ago  Yes Yes   Sig: Place 25 mg into G tube every 4 hours as needed (Used during the day PRN agitation/etc. This is in addition to the tablet at 1900, 0000, 0300.)   VISINE DRY EYE RELIEF 1 % SOLN Past Week at PRN  No Yes   Sig: Apply 1 drop to eye every 6 hours as needed for dry eyes.   acetaminophen (TYLENOL) 500 MG tablet 2024 at 1000; 1,000 mg (has had 2 g PTA today))  No Yes   Sig: Take 2 tablets (1,000 mg) by mouth every 6 hours as needed for mild pain   Patient taking differently: Place 1,000 mg into G " tube every 6 hours as needed for mild pain   amLODIPine (NORVASC) 5 MG tablet 7/18/2024 at AM  No Yes   Sig: Take 1 tablet (5 mg total) by mouth daily.   Patient taking differently: Place 5 mg into G tube daily   baclofen (LIORESAL) 10 MG tablet 7/18/2024 at 1400; 1 of 4  Yes Yes   Sig: Place 10 mg into G tube 4 times daily Takes in the morning and with each dose of Seroquel at 1900, 0000, and 0300.   bisacodyl (DULCOLAX) 10 MG suppository 7/18/2024 at AM  No Yes   Sig: Place 1 suppository (10 mg) rectally daily   cefaclor (CECLOR) 250 MG capsule 7/18/2024 at AM  No Yes   Sig: Take 1 capsule (250 mg) by mouth daily UTI ppx   Patient taking differently: Place 250 mg into G tube daily UTI ppx   ferrous sulfate 220 (44 Fe) MG/5ML SOLN 7/18/2024 at AM  No Yes   Sig: TAKE 5 MLS BY ENTERAL ROUTE DAILY.   Patient taking differently: Place 220 mg into G tube daily TAKE 5 MLS BY G-TUBE ROUTE DAILY.   ipratropium (ATROVENT) 0.06 % nasal spray More than a month at PRN  Yes Yes   Sig: Spray 2 sprays into both nostrils daily as needed for rhinitis   ipratropium - albuterol 0.5 mg/2.5 mg/3 mL (DUONEB) 0.5-2.5 (3) MG/3ML neb solution 7/18/2024 at 1200  No Yes   Sig: Take 1 vial (3 mLs) by nebulization every 4 hours.   ketoconazole (NIZORAL) 2 % external cream Past Week at PRN  No Yes   Sig: Apply topically 2 times daily to affected area(s).   Patient taking differently: Apply topically 2 times daily as needed (around GJ site)   lidocaine (LIDODERM) 5 % patch More than a month at has not used recently due to insurance issues  No Yes   Sig: Place 1 patch onto the skin daily as needed for moderate pain To prevent lidocaine toxicity, patient should be patch free for 12 hrs daily.   meclizine (ANTIVERT) 25 MG tablet 7/18/2024 at AM  No Yes   Sig: Take 1 tablet via G-tube every 6 hours as needed for vertigo   neomycin-bacitracin-polymyxin (NEOSPORIN) 5-400-5000 ointment Past Week at PRN  No Yes   Sig: Apply topically 4 times daily    Patient taking differently: Apply topically 3 times daily as needed (irritation) To J Tube site   nystatin (MYCOSTATIN) 467974 UNIT/GM external cream Past Week at PRN  No Yes   Sig: Apply topically 2 times daily as needed (redness)   Patient taking differently: Apply topically 2 times daily as needed (redness around GJ tube)   omeprazole (PRILOSEC) 40 MG DR capsule 7/18/2024 at AM  No Yes   Sig: take 1 capsule by mouth daily before breakfast   Patient taking differently: 40 mg daily before breakfast IN G TUBE   ondansetron (ZOFRAN) 4 MG tablet Past Month at PRN, not in the last few days  No Yes   Sig: TAKE 1 TABLET (4 MG) BY MOUTH EVERY 8 HOURS AS NEEDED FOR NAUSEA.   Patient taking differently: Place 4 mg into G tube every 8 hours as needed for nausea   polyethylene glycol (MIRALAX) 17 GM/Dose powder 7/18/2024 at AM  No Yes   Sig: Give 1 capful via G-tube daily as needed for constipation.   Patient taking differently: Take 17 g by mouth or Feeding Tube daily   simvastatin (ZOCOR) 20 MG tablet 7/17/2024 at HS  No Yes   Sig: TAKE 1 TABLET PER G TUBE ROUTE AT BEDTIME.   triamcinolone (KENALOG) 0.1 % external cream Past Week at PRN  No Yes   Sig: Apply topically 2 times daily to affected area(s).   Patient taking differently: Apply topically 2 times daily as needed (around G tube site)      Facility-Administered Medications: None      Review of Systems     A 12 point comprehensive review of systems was negative except as noted above in HPI.    PHYSICAL EXAMINATION     Vitals      Temp:  [100.4  F (38  C)] 100.4  F (38  C)  Pulse:  [120-134] 120  Resp:  [16-30] 16  BP: (132-168)/(78-85) 132/78  SpO2:  [96 %] 96 %    Examination     Constitutional: awake, alert, cooperative, no apparent distress, and appears stated age and nonverbal.  Does follow simple commands.  Restricted to bed.  Notable spasticity right arm and leg.  Spasming to the left arm and leg seen.  Eyes: Lids and lashes normal, pupils equal, round and  reactive to light, extra ocular muscles intact, sclera clear, conjunctiva normal  ENT: Normocephalic, without obvious abnormality, atraumatic, sinuses nontender on palpation, external ears without lesions, oral pharynx with moist mucous membranes, tonsils without erythema or exudates, gums normal and good dentition.  Hematologic / Lymphatic: no cervical lymphadenopathy and no supraclavicular lymphadenopathy  Respiratory: Poor air movement.  Some rhonchi at the left base.  Cardiovascular: Tachycardic regular rhythm  GI: G-tube in site.  Site looks good.  Positive bowel sounds soft nondistended.  Skin: normal skin color, texture, turgor, no redness, warmth, or swelling, and no rashes  Musculoskeletal: Hemiparesis to the right and spasticity to the left noted.  No lower extremity edema.   Neurologic: Cranial nerves II-XII are grossly intact. Sensory:  Sensory intact Deep Tendon Reflexes: Unable to assess due to his hemiplegia  Neuropsychiatric: General: restless and normal eye contact Level of consciousness: alert / normal Affect: anxious Orientation: Unable to assess due to nonverbal status. Memory and insight: Unable to assess      Pertinent Lab     Results for orders placed or performed during the hospital encounter of 07/18/24 (from the past 24 hour(s))   ECG 12-LEAD WITH MUSE (E)   Result Value Ref Range    Systolic Blood Pressure 133 mmHg    Diastolic Blood Pressure 82 mmHg    Ventricular Rate 123 BPM    Atrial Rate 123 BPM    OK Interval 154 ms    QRS Duration 66 ms     ms    QTc 432 ms    P Axis 59 degrees    R AXIS 28 degrees    T Axis 56 degrees    Interpretation ECG       Sinus tachycardia  Otherwise normal ECG  When compared with ECG of 12-APR-2024 08:31,  No significant change was found  Confirmed by SEE ED PROVIDER NOTE FOR, ECG INTERPRETATION (4000),  Michelle Ingram ([id number]) on 7/18/2024 8:39:26 PM     CBC with platelets differential    Narrative    The following orders were created for  panel order CBC with platelets differential.  Procedure                               Abnormality         Status                     ---------                               -----------         ------                     CBC with platelets and d...[[id number]]  Abnormal            Final result                 Please view results for these tests on the individual orders.   Comprehensive metabolic panel   Result Value Ref Range    Sodium 144 135 - 145 mmol/L    Potassium 4.4 3.4 - 5.3 mmol/L    Carbon Dioxide (CO2) 26 22 - 29 mmol/L    Anion Gap 8 7 - 15 mmol/L    Urea Nitrogen 36.7 (H) 8.0 - 23.0 mg/dL    Creatinine 1.19 (H) 0.67 - 1.17 mg/dL    GFR Estimate 69 >60 mL/min/1.73m2    Calcium 8.7 (L) 8.8 - 10.4 mg/dL    Chloride 110 (H) 98 - 107 mmol/L    Glucose 113 (H) 70 - 99 mg/dL    Alkaline Phosphatase 64 40 - 150 U/L    AST 28 0 - 45 U/L    ALT 21 0 - 70 U/L    Protein Total 8.0 6.4 - 8.3 g/dL    Albumin 3.6 3.5 - 5.2 g/dL    Bilirubin Total 0.3 <=1.2 mg/dL   Lactic Acid Whole Blood with 1X Repeat in 2 HR when >2   Result Value Ref Range    Lactic Acid, Initial 0.9 0.7 - 2.0 mmol/L   Troponin T, High Sensitivity (now)   Result Value Ref Range    Troponin T, High Sensitivity 14 <=22 ng/L   Nt probnp inpatient   Result Value Ref Range    N terminal Pro BNP Inpatient 43 0 - 900 pg/mL   CBC with platelets and differential   Result Value Ref Range    WBC Count 17.8 (H) 4.0 - 11.0 10e3/uL    RBC Count 3.64 (L) 4.40 - 5.90 10e6/uL    Hemoglobin 11.2 (L) 13.3 - 17.7 g/dL    Hematocrit 33.8 (L) 40.0 - 53.0 %    MCV 93 78 - 100 fL    MCH 30.8 26.5 - 33.0 pg    MCHC 33.1 31.5 - 36.5 g/dL    RDW 12.0 10.0 - 15.0 %    Platelet Count 229 150 - 450 10e3/uL    % Neutrophils 79 %    % Lymphocytes 13 %    % Monocytes 5 %    % Eosinophils 2 %    % Basophils 0 %    % Immature Granulocytes 1 %    NRBCs per 100 WBC 0 <1 /100    Absolute Neutrophils 14.1 (H) 1.6 - 8.3 10e3/uL    Absolute Lymphocytes 2.3 0.8 - 5.3 10e3/uL    Absolute  Monocytes 0.9 0.0 - 1.3 10e3/uL    Absolute Eosinophils 0.3 0.0 - 0.7 10e3/uL    Absolute Basophils 0.1 0.0 - 0.2 10e3/uL    Absolute Immature Granulocytes 0.1 <=0.4 10e3/uL    Absolute NRBCs 0.0 10e3/uL   [place] Draw    Narrative    The following orders were created for panel order [place] Draw.  Procedure                               Abnormality         Status                     ---------                               -----------         ------                     Extra Blue Top Tube[[id number]]                              Final result               Extra Red Top Tube[[id number]]                               Final result                 Please view results for these tests on the individual orders.   Extra Blue Top Tube   Result Value Ref Range    Hold Specimen JIC    Extra Red Top Tube   Result Value Ref Range    Hold Specimen JIC    Symptomatic Influenza A/B, RSV, & SARS-CoV2 PCR (COVID-19) Nose    Specimen: Nose; Swab   Result Value Ref Range    Influenza A PCR Negative Negative    Influenza B PCR Negative Negative    RSV PCR Negative Negative    SARS CoV2 PCR Negative Negative    Narrative    Testing was performed using the Xpert Xpress CoV2/Flu/RSV Assay on the Cepheid GeneXpert Instrument. This test should be ordered for the detection of SARS-CoV-2, influenza, and RSV viruses in individuals who meet clinical and/or epidemiological criteria. Test performance is unknown in asymptomatic patients. This test is for in vitro diagnostic use under the FDA EUA for laboratories certified under CLIA to perform high or moderate complexity testing. This test has not been FDA cleared or approved. A negative result does not rule out the presence of PCR inhibitors in the specimen or target RNA in concentration below the limit of detection for the assay. If only one viral target is positive but coinfection with multiple targets is suspected, the sample should be re-tested with another FDA cleared, approved, or authorized  test, if coinfection would change clinical management. This test was validated by the [facility]. These laboratories are certified under the Clinical Laboratory Improvement Amendments of 1988 (CLIA-88) as qualified to perform high complexity laboratory testing.   Head CT w/o contrast    Narrative    EXAM: CT HEAD W/O CONTRAST  LOCATION: [facility]  DATE: 7/18/2024    INDICATION: headache  COMPARISON: Head CT: 4/12/2024.  TECHNIQUE: Routine CT Head without IV contrast. Multiplanar reformats. Dose reduction techniques were used.    FINDINGS:  INTRACRANIAL CONTENTS: Metallic streak artifact related to right MCA aneurysm clipping material. No evidence of acute intracranial hemorrhage, extraaxial collection, or mass effect.  No CT evidence of acute transcortical infarct. Similar areas of   encephalomalacia involving the anterior right temporal lobe and right corona radiata/basal ganglia. Similar remote infarcts involving the left basal ganglia. Moderate presumed chronic small vessel ischemic changes. Mild generalized volume loss. No   hydrocephalus. Calcified intracranial atherosclerosis. Normal positioning of the cerebellar tonsils.    VISUALIZED ORBITS/SINUSES/MASTOIDS: No intraorbital abnormality. No paranasal sinus mucosal disease. No middle ear or mastoid effusion.    BONES/SOFT TISSUES: No acute abnormality. Postsurgical changes of right pterional craniotomy with mesh cranioplasty material.      Impression    IMPRESSION:    1.  No CT evidence of acute intracranial abnormality.  2.  Chronic changes as detailed above.   CT Chest (PE) Abdomen Pelvis w Contrast    Narrative    EXAM: CT CHEST PE ABDOMEN PELVIS W CONTRAST  LOCATION: [facility]  DATE: 7/18/2024    INDICATION: tachycardia fever  COMPARISON: None.  TECHNIQUE: CT chest pulmonary angiogram and routine CT abdomen pelvis with IV contrast. Arterial phase through the chest and venous phase  through the abdomen and pelvis. Multiplanar reformats and MIP reconstructions were performed. Dose reduction   techniques were used. Exam significantly limited by arm down positioning and motion artifact.  CONTRAST: ISOVUE 370 90 mL    FINDINGS:  ANGIOGRAM CHEST: No evidence of central/main right or left, or segmental embolus. No definite subsegmental embolus. Thoracic aorta is negative for dissection. No CT evidence of right heart strain.     LUNGS AND PLEURA: Calcified granulomas bilaterally. Centrilobular emphysema. Increasing diffuse groundglass opacity, with a right upper lobe predominance. Stable right lower lobe predominant scarring or atelectasis. Moderate retained secretions in the   central airways, increased.    MEDIASTINUM/AXILLAE: No significant mediastinal or hilar adenopathy.    CORONARY ARTERY CALCIFICATION: Moderate.    HEPATOBILIARY: No biliary dilatation    PANCREAS: Unremarkable    SPLEEN: Unremarkable    ADRENAL GLANDS: Unremarkable    KIDNEYS/BLADDER: Bilateral renal cysts for which no additional follow-up imaging recommended. No hydronephrosis. Bladder is mildly thick-walled but not well-distended.    BOWEL: Normal caliber appendix. No small bowel obstruction. Percutaneous GJ tube terminates with its tip in the jejunum.    LYMPH NODES: No significant retroperitoneal adenopathy    VASCULATURE: Moderate atherosclerotic disease without abdominal aortic aneurysm.    PELVIC ORGANS: Mild prostatic hypertrophy. No free fluid.    MUSCULOSKELETAL: No acute bony abnormalities      Impression    IMPRESSION:  1.  No evidence of pulmonary embolus given limitations of exam.  2.  Increasing bilateral diffuse ground glass opacity; differential considerations include early edema and pneumonitis.  3.  Associated increased retained secretions centrally.  4.  No acute abnormalities identified in the abdomen or pelvis.       Pertinent Radiology     Radiology Results:   Recent Results (from the past 24 hour(s))    Head CT w/o contrast    Narrative    EXAM: CT HEAD W/O CONTRAST  LOCATION: [facility]  DATE: 7/18/2024    INDICATION: headache  COMPARISON: Head CT: 4/12/2024.  TECHNIQUE: Routine CT Head without IV contrast. Multiplanar reformats. Dose reduction techniques were used.    FINDINGS:  INTRACRANIAL CONTENTS: Metallic streak artifact related to right MCA aneurysm clipping material. No evidence of acute intracranial hemorrhage, extraaxial collection, or mass effect.  No CT evidence of acute transcortical infarct. Similar areas of   encephalomalacia involving the anterior right temporal lobe and right corona radiata/basal ganglia. Similar remote infarcts involving the left basal ganglia. Moderate presumed chronic small vessel ischemic changes. Mild generalized volume loss. No   hydrocephalus. Calcified intracranial atherosclerosis. Normal positioning of the cerebellar tonsils.    VISUALIZED ORBITS/SINUSES/MASTOIDS: No intraorbital abnormality. No paranasal sinus mucosal disease. No middle ear or mastoid effusion.    BONES/SOFT TISSUES: No acute abnormality. Postsurgical changes of right pterional craniotomy with mesh cranioplasty material.      Impression    IMPRESSION:    1.  No CT evidence of acute intracranial abnormality.  2.  Chronic changes as detailed above.   CT Chest (PE) Abdomen Pelvis w Contrast    Narrative    EXAM: CT CHEST PE ABDOMEN PELVIS W CONTRAST  LOCATION: [facility]  DATE: 7/18/2024    INDICATION: tachycardia fever  COMPARISON: None.  TECHNIQUE: CT chest pulmonary angiogram and routine CT abdomen pelvis with IV contrast. Arterial phase through the chest and venous phase through the abdomen and pelvis. Multiplanar reformats and MIP reconstructions were performed. Dose reduction   techniques were used. Exam significantly limited by arm down positioning and motion artifact.  CONTRAST: ISOVUE 370 90 mL    FINDINGS:  ANGIOGRAM CHEST: No evidence of  central/main right or left, or segmental embolus. No definite subsegmental embolus. Thoracic aorta is negative for dissection. No CT evidence of right heart strain.     LUNGS AND PLEURA: Calcified granulomas bilaterally. Centrilobular emphysema. Increasing diffuse groundglass opacity, with a right upper lobe predominance. Stable right lower lobe predominant scarring or atelectasis. Moderate retained secretions in the   central airways, increased.    MEDIASTINUM/AXILLAE: No significant mediastinal or hilar adenopathy.    CORONARY ARTERY CALCIFICATION: Moderate.    HEPATOBILIARY: No biliary dilatation    PANCREAS: Unremarkable    SPLEEN: Unremarkable    ADRENAL GLANDS: Unremarkable    KIDNEYS/BLADDER: Bilateral renal cysts for which no additional follow-up imaging recommended. No hydronephrosis. Bladder is mildly thick-walled but not well-distended.    BOWEL: Normal caliber appendix. No small bowel obstruction. Percutaneous GJ tube terminates with its tip in the jejunum.    LYMPH NODES: No significant retroperitoneal adenopathy    VASCULATURE: Moderate atherosclerotic disease without abdominal aortic aneurysm.    PELVIC ORGANS: Mild prostatic hypertrophy. No free fluid.    MUSCULOSKELETAL: No acute bony abnormalities      Impression    IMPRESSION:  1.  No evidence of pulmonary embolus given limitations of exam.  2.  Increasing bilateral diffuse ground glass opacity; differential considerations include early edema and pneumonitis.  3.  Associated increased retained secretions centrally.  4.  No acute abnormalities identified in the abdomen or pelvis.     EKG Results: personally reviewed.   Sinus tachycardia heart rate 123.  Otherwise stable          Dayday Gonsales MD  [facility]  [facility]   Phone: #[phone]

## 2024-07-19 NOTE — PROGRESS NOTES
Attempted volara with pt. The pt didn't tolerate it well. Pt shook his head and moaned. Will attempt again when family is here. Pt did get his scheduled nebulizer.    Estefania Roca, RT

## 2024-07-19 NOTE — MEDICATION SCRIBE - ADMISSION MEDICATION HISTORY
Medication Scribe Admission Medication History    Admission medication history is complete. The information provided in this note is only as accurate as the sources available at the time of the update.    Information Source(s): Family member and CareEverywhere/SureScripts via in-person    Pertinent Information: Spoke with the patient's daughter who manages medications for the patient. Taking baclofen with the quetiapine at 1900, 0000, and 0300. May also use quetiapine Q4H PRN during the day as needed.     Has cefaclor 250 mg every day for UTI prophylaxis.    Please order all medications per G-tube.    Previously had aspirin 81 mg Rx but is not currently taking any antiplatelet/anticoagulant medications.     Changes made to PTA medication list:  Added: None  Deleted:   Albuterol 0.083% neb sol - pt is using Duoneb and NaCl neb only  Doxazosin 1 mg - not taking  Changed:   Changed route of all medications that still stated PO to G-tube admin.  Ketoconazole 2% changed to PRN  Neosporin changed to PRN  Added separate line for quetiapine 25 mg to make it easier to order scheduled vs PRN dose  Triamcinolone 0.1% cream changed to PRN    Allergies reviewed with patient and updates made in EHR: yes    Medication History Completed By: Atilio Wakefield 7/18/2024 8:54 PM    PTA Med List   Medication Sig Last Dose    acetaminophen (TYLENOL) 500 MG tablet Take 2 tablets (1,000 mg) by mouth every 6 hours as needed for mild pain (Patient taking differently: Place 1,000 mg into G tube every 6 hours as needed for mild pain) 7/18/2024 at 1000; 1,000 mg (has had 2 g PTA today))    amLODIPine (NORVASC) 5 MG tablet Take 1 tablet (5 mg total) by mouth daily. (Patient taking differently: Place 5 mg into G tube daily) 7/18/2024 at AM    baclofen (LIORESAL) 10 MG tablet Place 10 mg into G tube 4 times daily Takes in the morning and with each dose of Seroquel at 1900, 0000, and 0300. 7/18/2024 at 1400; 1 of 4    bisacodyl (DULCOLAX) 10 MG  suppository Place 1 suppository (10 mg) rectally daily 7/18/2024 at AM    cefaclor (CECLOR) 250 MG capsule Take 1 capsule (250 mg) by mouth daily UTI ppx (Patient taking differently: Place 250 mg into G tube daily UTI ppx) 7/18/2024 at AM    Dextromethorphan-guaiFENesin  MG/5ML syrup TAKE 10 MLS BY G TUBE ROUTE EVERY 4 HOURS AS NEEDED FOR COUGH. 7/17/2024 at PM    ferrous sulfate 220 (44 Fe) MG/5ML SOLN TAKE 5 MLS BY ENTERAL ROUTE DAILY. (Patient taking differently: Place 220 mg into G tube daily TAKE 5 MLS BY G-TUBE ROUTE DAILY.) 7/18/2024 at AM    ipratropium (ATROVENT) 0.06 % nasal spray Spray 2 sprays into both nostrils daily as needed for rhinitis More than a month at PRN    ipratropium - albuterol 0.5 mg/2.5 mg/3 mL (DUONEB) 0.5-2.5 (3) MG/3ML neb solution Take 1 vial (3 mLs) by nebulization every 4 hours. 7/18/2024 at 1200    ketoconazole (NIZORAL) 2 % external cream Apply topically 2 times daily to affected area(s). (Patient taking differently: Apply topically 2 times daily as needed (around GJ site)) Past Week at PRN    lidocaine (LIDODERM) 5 % patch Place 1 patch onto the skin daily as needed for moderate pain To prevent lidocaine toxicity, patient should be patch free for 12 hrs daily. More than a month at has not used recently due to insurance issues    meclizine (ANTIVERT) 25 MG tablet Take 1 tablet via G-tube every 6 hours as needed for vertigo 7/18/2024 at AM    NEBUSAL 3 % neb solution TAKE 4 MLS BY NEBULIZATION EVERY 6 HOURS AS NEEDED FOR THICK SPUTUM. (Patient taking differently: Used after Duoneb.) 7/18/2024 at 1200    neomycin-bacitracin-polymyxin (NEOSPORIN) 5-400-5000 ointment Apply topically 4 times daily (Patient taking differently: Apply topically 3 times daily as needed (irritation) To J Tube site) Past Week at PRN    nystatin (MYCOSTATIN) 719038 UNIT/GM external cream Apply topically 2 times daily as needed (redness) (Patient taking differently: Apply topically 2 times daily as  needed (redness around GJ tube)) Past Week at PRN    omeprazole (PRILOSEC) 40 MG DR capsule take 1 capsule by mouth daily before breakfast (Patient taking differently: 40 mg daily before breakfast IN G TUBE) 7/18/2024 at AM    ondansetron (ZOFRAN) 4 MG tablet TAKE 1 TABLET (4 MG) BY MOUTH EVERY 8 HOURS AS NEEDED FOR NAUSEA. (Patient taking differently: Place 4 mg into G tube every 8 hours as needed for nausea) Past Month at PRN, not in the last few days    polyethylene glycol (MIRALAX) 17 GM/Dose powder Give 1 capful via G-tube daily as needed for constipation. (Patient taking differently: Take 17 g by mouth or Feeding Tube daily) 7/18/2024 at AM    QUEtiapine (SEROQUEL) 25 MG tablet Place 25 mg into G tube every 4 hours as needed (Used during the day PRN agitation/etc. This is in addition to the tablet at 1900, 0000, 0300.) Past Week at day or two ago    QUEtiapine (SEROQUEL) 25 MG tablet TAKE 1 TABLET BY MOUTH AT 7:00PM, MIDNIGHT AND 3:00AM FOR TOTAL OF 75 MG NIGHTLY. OK TO REPEAT AFTER 4 HOURS AND DURING THE DAY AS NEEDED. (Patient taking differently: Place 25 mg into G tube 3 times daily At 1900, 0000, 0300) 7/18/2024 at 0300    simvastatin (ZOCOR) 20 MG tablet TAKE 1 TABLET PER G TUBE ROUTE AT BEDTIME. 7/17/2024 at HS    triamcinolone (KENALOG) 0.1 % external cream Apply topically 2 times daily to affected area(s). (Patient taking differently: Apply topically 2 times daily as needed (around G tube site)) Past Week at PRN    VISINE DRY EYE RELIEF 1 % SOLN Apply 1 drop to eye every 6 hours as needed for dry eyes. Past Week at PRN

## 2024-07-19 NOTE — ED NOTES
Pt was checked for bowel movement around 0430; he did not have a bowel movement. He appeared uncomfortable, so repositioned. Family says he does not like his blankets off. Turned the heat up because family requested.  Urinating in the purewick appropriately.   There was not any wounds or redness noticed on his bottom when changing him. Pt is nonverbal, but does moan when moving. Blankets and pillows surrounded the patient for comfort.   Dr. Gonsales was aware of pt episode of nebulizer causing sp02 decline to 86%% and HR elevation.

## 2024-07-19 NOTE — CONSULTS
"CLINICAL NUTRITION SERVICES - ASSESSMENT NOTE     Nutrition Prescription    RECOMMENDATIONS FOR MDs/PROVIDERS TO ORDER:  Pt's daughter Idalmis hopes to have TF restarted once his G-tube is exchanged. TF orders are in and TF formula will be sent ASAP. Do not start TF before G-tube is exchanged.    Malnutrition Status:    Patient does not meet two of the established criteria necessary for diagnosing malnutrition    Recommendations already ordered by Registered Dietitian (RD):  Enteral Nutrition - Initiate   Prior to feeds starting:  -Obtain and verify enteral access.  -Obtain baseline K+, Mag and Phos labs and ensure they are WNL.   -Patient is hemodynamically stable.    Once the above are met, recommend the following TF regimen as below:    1) Vital 1.5 via PEG at 45 mL/hr x 24 hrs + 2 pkt Nutrisource Fiber.  At goal rate, formula provides total volume of 1080 mL, 1650 kcal (30 kcal/kg, 100% needs), 73 gm Pro (1.3 gm/kg, 100% needs), 201 gm CHO, 61 gm fat, 6 gm fiber, and 825 mL free water per DW of 54 kg.    2) Free water flushes of 95 mL q 4 hrs. Total fluid (free water + flushes) = 1395 mL per day.    3) HOB should be elevated at least 30-40 degrees when supine.    4) If plan is to prone, hold feeds x 1 hr prior to turning from supine to prone, and from prone to supine.    Future/Additional Recommendations:  Monitor TF start, TF tolerance     REASON FOR ASSESSMENT  Liu Malave is a/an 59 year old male assessed by the dietitian for Provider Order - \"Home tube feedings-hold pending IR GJ tube exchange timing\"    NUTRITION HISTORY  Dx: Sepsis  - Per MD, \"patient was scheduled to get his GJ tube exchanged but appointment was rescheduled due to his illness - will hold off on G-tube feedings until IR tube exchange\" (IR consulted)  PMH: hemorrhagic CVA with right hemiplegia, G-tube dependent, recurrent aspiration pneumonia, MRSA/ESBL, CKD3, seizure disorder, HTN, hep C carrier, hyperlipidemia and depression     Met with pt " "and his daughter Idalmis in room this afternoon. Pt's daughter gave info as pt is non-verbal. She says pt takes 4 cartons of Peptamen 1.5 With Prebio1 per day, and runs the TF pump at 60 mL/hr during the day, and 30 mL/hr while sleeping - they do this to prevent choking/coughing while pt sleeps. Pt's daughter is ok with pump running at ~50 mL/hr continuously while admitted, as pt will be resting with HOB at same angle each day.    Writer inquired about pt's wt hx and suspected wt loss. Pt's daughter says pt's UBW is 130#, she hasn't noticed any wt loss, and there haven't been days when pt missed tube feedings (with the exception of yesterday). Suspect wt change may be 2/2 scale differences.    CURRENT NUTRITION ORDERS  Diet: NPO    LABS  Labs reviewed  BUN: 23.6 (H)  B (H)    MEDICATIONS  Medications reviewed  Zosyn  LR infusion @ 150 mL/hr    ANTHROPOMETRICS  Height: 157.5 cm (5' 2\")  Most Recent Weight: 54 kg (119 lb)    IBW: 54.6 kg  BMI: Normal BMI (21.77)  Weight History:   24 : 54 kg (119 lb)  24 : 59 kg (130 lb) - stated?  10/25/23 : 60.3 kg (133 lb)  23 : 54.1 kg (119 lb 4.3 oz)  23 : 59 kg (130 lb)  23 : 59 kg (130 lb)  23 : 54 kg (119 lb)  23 : 54.1 kg (119 lb 4.3 oz)  23 : 56.8 kg (125 lb 3.5 oz)  22 : 58.7 kg (129 lb 8 oz)  10/30/22 : 61.5 kg (135 lb 9.3 oz)  22 : 60 kg (132 lb 4.4 oz)  9.25% wt loss in 6 months. Unclear if 24 wt is stated or not. Pt's wt appears to fluctuate between 119 and 130s over the past 2 years.    Dosing Weight: 54 kg actual body wt    ASSESSED NUTRITION NEEDS  Estimated Energy Needs: 6221-4032 kcals/day (25 - 30 kcals/kg)  Justification: Maintenance  Estimated Protein Needs: 54-64 grams protein/day (1 - 1.2 grams of pro/kg)  Justification: Maintenance  Estimated Fluid Needs: 1651-7853 mL/day (1 mL/kcal)   Justification: Maintenance    MALNUTRITION  % Intake: Decreased intake does not meet criteria  % Weight Loss: " 10% in 6 months (moderate)  Subcutaneous Fat Loss: None observed  Muscle Loss: None observed  Fluid Accumulation/Edema: None noted  Malnutrition Diagnosis: Patient does not meet two of the established criteria necessary for diagnosing malnutrition    NUTRITION DIAGNOSIS  Inadequate oral intake related to NPO as evidenced by pt G-tube dependent      INTERVENTIONS  Implementation  Enteral Nutrition - Initiate     Goals  Total avg nutritional intake to meet a minimum of 25 kcal/kg and 1 g PRO/kg daily (per dosing wt 54 kg).     Monitoring/Evaluation  Progress toward goals will be monitored and evaluated per protocol.

## 2024-07-19 NOTE — IR NOTE
Patient Name: Liu Malave  Medical Record Number: [id number]  Today's Date: 7/19/2024    Procedure: IR GJ tube exchange  Proceduralist: Dr. Millard    Procedure Start: 1425  Procedure end: 1433  Sedation medications administered: N/A  Sedation time: N/A    Other Notes: Pt arrived to IR room 1 from  ED 20 . Consent reviewed. Pt positioned supine with head elevated and monitored per protocol. Pt tolerated procedure without any noted complications. Pt transferred back to  ED 17 .

## 2024-07-19 NOTE — PROGRESS NOTES
[facility] MEDICINE PROGRESS NOTE      Identification/Summary:   Liu Malave is a 59 year old male with PMH signficant for hemorrhagic CVA with right hemiplegia, G-tube dependent, recurrent aspiration pneumonia, MRSA/ESBL, seizure disorder, HTN, hep C carrier, hyperlipidemia and depression.  Presented on 7/18/2024 with fever temperature 100.4, cough shortness of breath tachycardia.  WBC 17.8.  Chest CT negative for PE.  Increased bilateral diffuse groundglass opacities and retained secretions noted.  Hospital course is notable for patient seeming to definitely be improved with improved white blood count.  Resolution of the tachypnea and heart rate normalizing now into the 90s.  Patient is nonverbal due to his CVA.  Discussed with daughter via video conferencing and she acted as [place]  at her request.  Patient likely home tomorrow.     Assessment and Plan:     Sepsis (tachycardia, fever, leukocytosis), resolved  Recurrent aspiration pneumonia with retained secretions, improved  Inpatient MedSur admission.  Expect length of stay greater than 2 nights.  Recurrent issue for the patient.  Typically has quick turnaround.  Sepsis protocols utilized for fluids.  Utilize Zosyn and azithromycin per respiratory high risk multidrug-resistant organism sepsis protocol.  Ordered DuoNebs and home NebuSal nebs.  Order RT consultation for possible percussive therapy due to retained secretions.  Plan: Decrease IV fluid rate.  Updated daughter via video conferencing while I was seeing the patient and she acted as .  Likely patient could be discharged home tomorrow.    Forearm rash  Flat reddish type rash on both forearms which is approximately 5 x 8 cm in diameter  His daughter new who is at bedside reports that he had something similar to last time he was in the hospital but does not recall any details.  That rash went away after about 1 week.  No antibiotic allergies listed.   Unknown reaction from diphenhydramine.  We would hate to give up on a class of antibiotics with his history.  Plan: Outline the area with a permanent marker on both forearms.  Famotidine 20 mg IV x 1.  Topical hydrocortisone ointment 1% 3 times daily x 3 days.  Reassess later and certainly if it becomes impressive or urticarial, swollen lips, etc. contact provider    Leukocytosis  Initial white count 17.8.  Improved to 11,600 today  Plan: Check white blood count in the morning     Hemorrhagic CVA  Right hemiparesis, nonverbal  Order home baclofen 4 times daily, meclizine and scheduled overnight Seroquel.  Hold Lidoderm patch as has not been using for greater than 1 month due to insurance issues.  Hold his home prophylactic cefaclor.     GJ tube feeding dependent  -Patient was scheduled to get his GJ tube exchanged but appointment was rescheduled due to his illness.  Family wondering if this could be accomplished during his stay.  -IR consulted for GJ tube exchange.  -Order daily Prilosec.  -Hold off on G-tube feedings until IR tube exchange timing can be coordinated.  -Hold his iron as well.  Plan: Resume feedings once feeding tube exchanged     Chronic kidney disease stage IIIa  Admission creatinine 1.19.  Now 1.1 essentially at baseline.    Recheck BMP in the morning.     Essential hypertension  Order home amlodipine with hold parameters.     Hyperlipidemia  Order home simvastatin     History of MRSA/ESBL  Hepatitis C carrier  Noted.  Contact precautions.     Anticoagulation   Pneumatic Compression Devices     COVID19 PCR/influenza A/B/RSV negative from 7/18/2024  Noted  Joseph present     Code Status: Full Code     Disposition: Inpatient   Medically Ready for Discharge: Anticipate tomorrow if things continue to improve             Chief Complaint at admission Fever and cough      History from admission:  History from admission: Liu GIL Malave is a 59 year old male with PMH of hemorrhagic CVA with right  hemiplegia, G-tube dependent, recurrent aspiration pneumonia, MRSA/ESBL, seizure disorder, HTN, hep C carrier, hyperlipidemia and depression.  Presented with fever temperature 100.4, cough shortness of breath tachycardia.  WBC 17.8.  Chest CT negative for PE.  Increased bilateral diffuse groundglass opacities and retained secretions noted.  Rate improved after IV fluids.  Daughter is present and supportive.  Patient's last hospitalization was October 2023.  All hospitalizations have been for aspiration pneumonia.  Confirmed full CODE STATUS with daughter.  Patient nonverbal.  Did intermittently cough during my visit.  Historical information reviewed.  No new changes.  Patient was to get his GJ tube exchanged in the last day but had to be rescheduled due to his illness.  Family interested in seeing if this tube exchange could be accomplished while he is hospitalized.    The patient's care was discussed with the Patient and Patient's Family.    John Simms MD  [facility] Medicine  [facility]  Phone: #[phone]  Securely message with the Vocera Web Console (learn more here)  Text page via Phar[employer] Paging/Directory     Interval History/Subjective:  Patient is nonverbal.  Discussed with his daughter via video conferencing.  She feels he is improving and wondering about discharge plans.  She has no other concerns or issues.    Physical Exam/Objective:  Temp:  [99.2  F (37.3  C)-100.4  F (38  C)] 99.9  F (37.7  C)  Pulse:  [] 94  Resp:  [15-30] 18  BP: (122-168)/(68-85) 137/75  SpO2:  [92 %-100 %] 93 %  Body mass index is 21.77 kg/m .    GENERAL:  Alert, appears comfortable, in no acute distress, appears stated age   HEAD:  Normocephalic, without obvious abnormality, atraumatic   LUNGS:   Clear to auscultation bilaterally, no rales, rhonchi, or wheezing, moderate upper airway sounds   HEART:  Regular rate and rhythm, moderately decreased with no obvious murmur   ABDOMEN:    Soft, non-tender, G-tube in the epigastric area   EXTREMITIES: No ankle edema    SKIN: Dry to touch, no exanthems in the visualized areas   NEURO: Alert, nonverbal, right hemiparesis with flaccid right upper extremity and wrist and hand, plantarflexed right foot   PSYCH: Cooperative, behavior is appropriate      Data reviewed today: I personally reviewed all new medications, labs, imaging/diagnostics reports over the past 24 hours. Pertinent findings include:    Imaging:   Recent Results (from the past 24 hour(s))   Head CT w/o contrast    Narrative    EXAM: CT HEAD W/O CONTRAST  LOCATION: [facility]  DATE: 7/18/2024    INDICATION: headache  COMPARISON: Head CT: 4/12/2024.  TECHNIQUE: Routine CT Head without IV contrast. Multiplanar reformats. Dose reduction techniques were used.    FINDINGS:  INTRACRANIAL CONTENTS: Metallic streak artifact related to right MCA aneurysm clipping material. No evidence of acute intracranial hemorrhage, extraaxial collection, or mass effect.  No CT evidence of acute transcortical infarct. Similar areas of   encephalomalacia involving the anterior right temporal lobe and right corona radiata/basal ganglia. Similar remote infarcts involving the left basal ganglia. Moderate presumed chronic small vessel ischemic changes. Mild generalized volume loss. No   hydrocephalus. Calcified intracranial atherosclerosis. Normal positioning of the cerebellar tonsils.    VISUALIZED ORBITS/SINUSES/MASTOIDS: No intraorbital abnormality. No paranasal sinus mucosal disease. No middle ear or mastoid effusion.    BONES/SOFT TISSUES: No acute abnormality. Postsurgical changes of right pterional craniotomy with mesh cranioplasty material.      Impression    IMPRESSION:    1.  No CT evidence of acute intracranial abnormality.  2.  Chronic changes as detailed above.   CT Chest (PE) Abdomen Pelvis w Contrast    Narrative    EXAM: CT CHEST PE ABDOMEN PELVIS W CONTRAST  LOCATION: [facility]  DATE: 7/18/2024    INDICATION: tachycardia fever  COMPARISON: None.  TECHNIQUE: CT chest pulmonary angiogram and routine CT abdomen pelvis with IV contrast. Arterial phase through the chest and venous phase through the abdomen and pelvis. Multiplanar reformats and MIP reconstructions were performed. Dose reduction   techniques were used. Exam significantly limited by arm down positioning and motion artifact.  CONTRAST: ISOVUE 370 90 mL    FINDINGS:  ANGIOGRAM CHEST: No evidence of central/main right or left, or segmental embolus. No definite subsegmental embolus. Thoracic aorta is negative for dissection. No CT evidence of right heart strain.     LUNGS AND PLEURA: Calcified granulomas bilaterally. Centrilobular emphysema. Increasing diffuse groundglass opacity, with a right upper lobe predominance. Stable right lower lobe predominant scarring or atelectasis. Moderate retained secretions in the   central airways, increased.    MEDIASTINUM/AXILLAE: No significant mediastinal or hilar adenopathy.    CORONARY ARTERY CALCIFICATION: Moderate.    HEPATOBILIARY: No biliary dilatation    PANCREAS: Unremarkable    SPLEEN: Unremarkable    ADRENAL GLANDS: Unremarkable    KIDNEYS/BLADDER: Bilateral renal cysts for which no additional follow-up imaging recommended. No hydronephrosis. Bladder is mildly thick-walled but not well-distended.    BOWEL: Normal caliber appendix. No small bowel obstruction. Percutaneous GJ tube terminates with its tip in the jejunum.    LYMPH NODES: No significant retroperitoneal adenopathy    VASCULATURE: Moderate atherosclerotic disease without abdominal aortic aneurysm.    PELVIC ORGANS: Mild prostatic hypertrophy. No free fluid.    MUSCULOSKELETAL: No acute bony abnormalities      Impression    IMPRESSION:  1.  No evidence of pulmonary embolus given limitations of exam.  2.  Increasing bilateral diffuse ground glass opacity; differential considerations include early edema  and pneumonitis.  3.  Associated increased retained secretions centrally.  4.  No acute abnormalities identified in the abdomen or pelvis.       Labs:  Most Recent 3 CBC's:  Recent Labs   Lab Test 07/19/24  0710 07/18/24 1900 04/26/24  0841   WBC 11.6* 17.8* 9.6   HGB 9.8* 11.2* 12.0*   MCV 96 93 97    229 232     Most Recent 3 BMP's:  Recent Labs   Lab Test 07/19/24  0710 07/18/24  1900 04/26/24  0841    144 142   POTASSIUM 4.2 4.4 5.3   CHLORIDE 112* 110* 107   CO2 23 26 25   BUN 23.6* 36.7* 35.2*   CR 1.10 1.19* 1.14   ANIONGAP 8 8 10   SAMIR 7.9* 8.7* 9.3   * 113* 101*     Most Recent 2 LFT's:  Recent Labs   Lab Test 07/18/24 1900 04/12/24  0927 01/08/24  0950   AST 28  --  35   ALT 21  --  36   ALKPHOS 64  --  76   BILITOTAL 0.3 0.3 0.3       Medications:   Personally Reviewed.  Medications   Current Facility-Administered Medications   Medication Dose Route Frequency Provider Last Rate Last Admin    dextrose 10% infusion   Intravenous Continuous PRN Riaz Casillas RD        sodium chloride 0.9 % infusion   Intravenous Continuous John Simms MD 75 mL/hr at 07/19/24 1237 New Bag at 07/19/24 1237     Current Facility-Administered Medications   Medication Dose Route Frequency Provider Last Rate Last Admin    amLODIPine (NORVASC) tablet 5 mg  5 mg Per G Tube Daily Dayday Gonsales MD   5 mg at 07/19/24 1019    azithromycin (ZITHROMAX) 250 mg in  mL intermittent infusion  250 mg Intravenous Q24H Dayday Gonsales MD        baclofen (LIORESAL) tablet 10 mg  10 mg Per G Tube 4x Daily Dayday Gonsales MD   10 mg at 07/19/24 1239    bisacodyl (DULCOLAX) suppository 10 mg  10 mg Rectal Daily Dayday Gonsales MD   10 mg at 07/19/24 1607    [Held by provider] ferrous sulfate solution 220 mg  220 mg Per G Tube Daily Dayday Gonsales MD        ipratropium - albuterol 0.5 mg/2.5 mg/3 mL (DUONEB) neb solution 3 mL  3 mL Nebulization Q4H Dayday Gonsales MD   3 mL at 07/19/24 1541     Nutrisource Fiber PO packet 1 each  1 packet Per Feeding Tube BID Riaz Casillas, RD        pantoprazole (PROTONIX) 2 mg/mL suspension 40 mg  40 mg Oral BID AC Dayday Gonsales MD   40 mg at 07/19/24 1022    piperacillin-tazobactam (ZOSYN) 3.375 g vial to attach to  mL bag  3.375 g Intravenous Q8H Dayday Gonsales MD   3.375 g at 07/19/24 1032    polyethylene glycol (MIRALAX) Packet 17 g  17 g Oral or Feeding Tube Daily Dayday Gonsales MD        QUEtiapine (SEROquel) tablet 25 mg  25 mg Per G Tube TID Dayday Gonsales MD   25 mg at 07/19/24 0337    simvastatin (ZOCOR) tablet 20 mg  20 mg Per G Tube At Bedtime Dayday Gonsales MD   20 mg at 07/18/24 4584    sodium chloride (PF) 0.9% PF flush 3 mL  3 mL Intracatheter Q8H Dayday Gonsales MD   3 mL at 07/18/24 1958    sodium chloride (PF) 0.9% PF flush 3 mL  3 mL Intracatheter Q8H Dayday Gonsales MD        sodium chloride (PF) 0.9% PF flush 3 mL  3 mL Intracatheter Q8H Dayday Gonsales MD

## 2024-07-19 NOTE — CONSULTS
Care Management Initial Consult    General Information  Assessment completed with: Children, Idalmis  Type of CM/SW Visit: Initial Assessment    Primary Care Provider verified and updated as needed: Yes   Readmission within the last 30 days: no previous admission in last 30 days      Reason for Consult: discharge planning  Advance Care Planning: Advance Care Planning Reviewed: no concerns identified          Communication Assessment  Patient's communication style: spoken language (English or Bilingual)             Cognitive  Cognitive/Neuro/Behavioral: .WDL except  Level of Consciousness: alert  Arousal Level: opens eyes spontaneously     Mood/Behavior: cooperative     Speech: unable to speak    Living Environment:   People in home: child(sam), adult, spouse     Current living Arrangements: house      Able to return to prior arrangements: yes       Family/Social Support:  Care provided by: child(sam)  Provides care for: no one, unable/limited ability to care for self  Marital Status:   Children, Wife  [place]       Description of Support System: Supportive         Current Resources:   Patient receiving home care services:  (Daughter denied, per chart review, pt does have PCA, County resources)     Community Resources:    Equipment currently used at home:  (Daughter stated no, per chart review, pt does have a hospital bed and walker at home)  Supplies currently used at home: None    Employment/Financial:  Employment Status: disabled        Financial Concerns: none           Does the patient's insurance plan have a 3 day qualifying hospital stay waiver?  Yes     Which insurance plan 3 day waiver is available? Alternative insurance waiver    Will the waiver be used for post-acute placement? Undetermined at this time    Lifestyle & Psychosocial Needs:  Social Determinants of Health     Food Insecurity: Low Risk  (9/27/2023)    Food Insecurity     Within the past 12 months, did you worry that your food would run out  before you got money to buy more?: No     Within the past 12 months, did the food you bought just not last and you didn t have money to get more?: No   Depression: Not at risk (5/2/2024)    PHQ-2     PHQ-2 Score: 0   Housing Stability: High Risk (9/27/2023)    Housing Stability     Do you have housing? : No     Are you worried about losing your housing?: No   Tobacco Use: Medium Risk (7/18/2024)    Patient History     Smoking Tobacco Use: Former     Smokeless Tobacco Use: Former     Passive Exposure: Past   Financial Resource Strain: Low Risk  (9/27/2023)    Financial Resource Strain     Within the past 12 months, have you or your family members you live with been unable to get utilities (heat, electricity) when it was really needed?: No   Alcohol Use: Not on file   Transportation Needs: Low Risk  (9/27/2023)    Transportation Needs     Within the past 12 months, has lack of transportation kept you from medical appointments, getting your medicines, non-medical meetings or appointments, work, or from getting things that you need?: No   Physical Activity: Not on file   Interpersonal Safety: Not on file   Stress: Not on file   Social Connections: Not on file   Health Literacy: Not on file       Functional Status:  Prior to admission patient needed assistance:   Dependent ADLs:: Bathing, Dressing, Grooming, Incontinence, Positioning, Transfers  Dependent IADLs:: Cleaning, Cooking, Laundry, Shopping, Meal Preparation, Medication Management, Transportation       Mental Health Status:  Mental Health Status: No Current Concerns       Chemical Dependency Status:  Chemical Dependency Status: No Current Concerns             Values/Beliefs:  Spiritual, Cultural Beliefs, Zoroastrianism Practices, Values that affect care: no               Additional Information:  Liu Malave is a(n) 59 year old year old male who presented with Shortness of breath.     CM met with pt and daughter, Idalmis, at bedside. Introduced self and role.  Daughter,  Idalmis,  assisted with completing assessment. Patient lives in a(n) house with his spouse and adult daughters.. Patient receives assistance with all daily living tasks. Anticipated that patient will discharge to home, home with family.    Daughter denies any further needs at this time.    Contacts:  Extended Emergency Contact Information  Primary Emergency Contact: Idalmis Malave  Address: [address] [place] [address]  Home Phone: [phone]  Mobile Phone: [phone]  Relation: Daughter  Secondary Emergency Contact: Regulo Dumont  Address: [address] [place] [address] Phone: [phone]  Relation: Spouse    No further care management intervention anticipated at this time. Please re-consult if further needs arise. Care management signing off.      Ely Will

## 2024-07-20 VITALS
SYSTOLIC BLOOD PRESSURE: 135 MMHG | DIASTOLIC BLOOD PRESSURE: 87 MMHG | OXYGEN SATURATION: 98 % | TEMPERATURE: 99 F | HEART RATE: 91 BPM | WEIGHT: 119 LBS | RESPIRATION RATE: 16 BRPM | BODY MASS INDEX: 21.77 KG/M2

## 2024-07-20 LAB
ANION GAP SERPL CALCULATED.3IONS-SCNC: 10 MMOL/L (ref 7–15)
BUN SERPL-MCNC: 15.3 MG/DL (ref 8–23)
CALCIUM SERPL-MCNC: 7.8 MG/DL (ref 8.8–10.4)
CHLORIDE SERPL-SCNC: 111 MMOL/L (ref 98–107)
CREAT SERPL-MCNC: 1.11 MG/DL (ref 0.67–1.17)
EGFRCR SERPLBLD CKD-EPI 2021: 76 ML/MIN/1.73M2
GLUCOSE SERPL-MCNC: 105 MG/DL (ref 70–99)
HCO3 SERPL-SCNC: 19 MMOL/L (ref 22–29)
MAGNESIUM SERPL-MCNC: 2.1 MG/DL (ref 1.7–2.3)
POTASSIUM SERPL-SCNC: 4.6 MMOL/L (ref 3.4–5.3)
SODIUM SERPL-SCNC: 140 MMOL/L (ref 135–145)

## 2024-07-20 PROCEDURE — 250N000011 HC RX IP 250 OP 636: Performed by: FAMILY MEDICINE

## 2024-07-20 PROCEDURE — 999N000157 HC STATISTIC RCP TIME EA 10 MIN

## 2024-07-20 PROCEDURE — 99238 HOSP IP/OBS DSCHRG MGMT 30/<: CPT | Performed by: EMERGENCY MEDICINE

## 2024-07-20 PROCEDURE — 36415 COLL VENOUS BLD VENIPUNCTURE: CPT | Performed by: FAMILY MEDICINE

## 2024-07-20 PROCEDURE — 250N000013 HC RX MED GY IP 250 OP 250 PS 637: Performed by: FAMILY MEDICINE

## 2024-07-20 PROCEDURE — 250N000009 HC RX 250: Performed by: FAMILY MEDICINE

## 2024-07-20 PROCEDURE — 80048 BASIC METABOLIC PNL TOTAL CA: CPT | Performed by: FAMILY MEDICINE

## 2024-07-20 PROCEDURE — 83735 ASSAY OF MAGNESIUM: CPT | Performed by: FAMILY MEDICINE

## 2024-07-20 PROCEDURE — 258N000003 HC RX IP 258 OP 636: Performed by: FAMILY MEDICINE

## 2024-07-20 PROCEDURE — 94640 AIRWAY INHALATION TREATMENT: CPT | Mod: 76

## 2024-07-20 PROCEDURE — 94640 AIRWAY INHALATION TREATMENT: CPT

## 2024-07-20 RX ORDER — NEOMYCIN/BACITRACIN/POLYMYXINB 3.5-400-5K
OINTMENT (GRAM) TOPICAL 3 TIMES DAILY PRN
Qty: 30 G | Refills: 0 | Status: SHIPPED | OUTPATIENT
Start: 2024-07-20

## 2024-07-20 RX ORDER — ONDANSETRON 4 MG/1
4 TABLET, FILM COATED ORAL EVERY 8 HOURS PRN
Qty: 30 TABLET | Refills: 0 | Status: SHIPPED | OUTPATIENT
Start: 2024-07-20

## 2024-07-20 RX ORDER — AMOXICILLIN AND CLAVULANATE POTASSIUM 400; 57 MG/5ML; MG/5ML
875 POWDER, FOR SUSPENSION ORAL 2 TIMES DAILY
Qty: 218.8 ML | Refills: 0 | Status: SHIPPED | OUTPATIENT
Start: 2024-07-20 | End: 2024-07-30

## 2024-07-20 RX ORDER — ACETAMINOPHEN 500 MG
1000 TABLET ORAL EVERY 6 HOURS PRN
Qty: 60 TABLET | Refills: 0 | Status: SHIPPED | OUTPATIENT
Start: 2024-07-20

## 2024-07-20 RX ORDER — CEFACLOR 250 MG
250 CAPSULE ORAL DAILY
Qty: 30 CAPSULE | Refills: 0 | Status: SHIPPED | OUTPATIENT
Start: 2024-07-20

## 2024-07-20 RX ORDER — AMOXICILLIN AND CLAVULANATE POTASSIUM 400; 57 MG/5ML; MG/5ML
875 POWDER, FOR SUSPENSION ORAL 2 TIMES DAILY
Status: DISCONTINUED | OUTPATIENT
Start: 2024-07-20 | End: 2024-07-20 | Stop reason: HOSPADM

## 2024-07-20 RX ORDER — OMEPRAZOLE 40 MG/1
40 CAPSULE, DELAYED RELEASE ORAL DAILY
Qty: 30 CAPSULE | Refills: 0 | Status: SHIPPED | OUTPATIENT
Start: 2024-07-20

## 2024-07-20 RX ADMIN — SODIUM BICARBONATE 40 MG: 84 INJECTION INTRAVENOUS at 08:49

## 2024-07-20 RX ADMIN — IPRATROPIUM BROMIDE AND ALBUTEROL SULFATE 3 ML: .5; 3 SOLUTION RESPIRATORY (INHALATION) at 12:01

## 2024-07-20 RX ADMIN — PIPERACILLIN AND TAZOBACTAM 3.38 G: 3; .375 INJECTION, POWDER, FOR SOLUTION INTRAVENOUS at 10:15

## 2024-07-20 RX ADMIN — HYDROCORTISONE: 1 OINTMENT TOPICAL at 08:50

## 2024-07-20 RX ADMIN — AMLODIPINE BESYLATE 5 MG: 5 TABLET ORAL at 08:49

## 2024-07-20 RX ADMIN — Medication 1 EACH: at 10:15

## 2024-07-20 RX ADMIN — IPRATROPIUM BROMIDE AND ALBUTEROL SULFATE 3 ML: .5; 3 SOLUTION RESPIRATORY (INHALATION) at 07:51

## 2024-07-20 RX ADMIN — BACLOFEN 10 MG: 10 TABLET ORAL at 12:21

## 2024-07-20 RX ADMIN — SODIUM CHLORIDE: 9 INJECTION, SOLUTION INTRAVENOUS at 01:27

## 2024-07-20 RX ADMIN — BACLOFEN 10 MG: 10 TABLET ORAL at 02:48

## 2024-07-20 RX ADMIN — QUETIAPINE FUMARATE 25 MG: 25 TABLET ORAL at 02:48

## 2024-07-20 RX ADMIN — PIPERACILLIN AND TAZOBACTAM 3.38 G: 3; .375 INJECTION, POWDER, FOR SOLUTION INTRAVENOUS at 01:27

## 2024-07-20 ASSESSMENT — ACTIVITIES OF DAILY LIVING (ADL)
ADLS_ACUITY_SCORE: 51

## 2024-07-20 NOTE — PLAN OF CARE
Problem: Infection  Goal: Absence of Infection Signs and Symptoms  Outcome: Progressing   Goal Outcome Evaluation:       Afebrile, receiving iv antibiotics. Incontinent of bowels and bladder. Repositioning every 2 hours. Non-verbal, appears pain free. Tolerating tube feeding via J-tube at 45 ml/ hour. Forearm rash appears within outlined borders.

## 2024-07-20 NOTE — PROGRESS NOTES
Care Management Discharge Note    Discharge Date: 07/20/2024       Discharge Disposition: Home    Discharge Services: None    Discharge DME: None    Discharge Transportation: family or friend will provide    Private pay costs discussed: Not applicable      Education Provided on the Discharge Plan: Yes  Persons Notified of Discharge Plans: pt  Patient/Family in Agreement with the Plan: yes    Handoff Referral Completed: Yes    Additional Information:  1:33 PM  Pt medically ready to discharge home with family.  Anticipate family transport.    SHANELLE Del RealSW

## 2024-07-20 NOTE — DISCHARGE SUMMARY
[facility] MEDICINE  DISCHARGE SUMMARY     Primary Care Physician: Steven Rosa  Admission Date: 7/18/2024   Discharge Provider: Mena Ortiz MD Discharge Date: 7/20/2024   Diet:   Active Diet and Nourishment Order   Procedures    Adult Formula Drip Feeding: Continuous Vital 1.5; Gastrostomy; Goal Rate: 45; mL/hr; Once G-tube is exchanged, OK to initiate TF at goal rate.    NPO for Medical/Clinical Reasons Except for: NPO but receiving Tube Feeding       Code Status: Full Code   Activity: DCACTIVITY: Activity as tolerated        Condition at Discharge: Stable     REASON FOR PRESENTATION(See Admission Note for Details)     cough    PRINCIPAL & ACTIVE DISCHARGE DIAGNOSES      Aspiration pneumonia, recurrent  Sepsis without organ dysfunction  History of CVA  GJ tube, chronic with replacement  Hypertension  Dyslipidemia  Coronary artery disease, nonocclusive  CYNTHIA  Anemia due to chronic disease      PENDING LABS     Unresulted Labs Ordered in the Past 30 Days of this Admission       Date and Time Order Name Status Description    7/18/2024  6:33 PM Blood Culture Line, venous Preliminary     7/18/2024  6:33 PM Blood Culture Hand, Right Preliminary               PROCEDURES ( this hospitalization only)      IR exchange of GJ tube      RECOMMENDATIONS TO OUTPATIENT PROVIDER FOR F/U VISIT     follow up labs,     DISPOSITION     Discharge home    SUMMARY OF HOSPITAL COURSE:      59-year-old male into [facility] on 7/18/2024 after presenting with fever of 100.4 of cough shortness of breath.    Patient lives independently at home with family.  He is total care due to an old CVA.  He is essentially bedbound.  Patient has tube feeds.  He is cared for by family who gives all of history.    Diagnostics on arrival showed a white count of 17 temperature of 100.4 tachycardia.  CT chest abdomen pelvis with contrast showed bilateral diffuse groundglass opacity which was increased in  nature.  He has chronic retained secretions centrally which were increased in size.  He did fit sepsis criteria.    On admission the patient was put on empiric antibiotics with Zosyn.  His cough improved.  The IR department exchanges GJ tube since it was time.  His creatinine improved.  The white count resolved.  The tube feeds were restarted.  He tolerated them well. Cultures drawn during hospital stay were negative.   On the day of discharge he was awake and alert looking at a movie.  Family was at bedside.  They wished for discharge on a short hospital stay.  He was medically cleared for discharge.  He will have close follow-up with his primary care physician.  He is given Augmentin per his G-tube.  For completion of antibiotics for his aspiration pneumonia.    He will follow within the week for cbc, bmp.     Discharge Medications with Med changes:     Current Discharge Medication List        CONTINUE these medications which have CHANGED    Details   acetaminophen (TYLENOL) 500 MG tablet Place 2 tablets (1,000 mg) into G tube every 6 hours as needed for mild pain  Qty: 60 tablet, Refills: 0    Associated Diagnoses: Pain      cefaclor (CECLOR) 250 MG capsule Place 1 capsule (250 mg) into G tube daily UTI ppx  Qty: 30 capsule, Refills: 0    Associated Diagnoses: Recurrent UTI      neomycin-bacitracin-polymyxin (NEOSPORIN) 5-400-5000 ointment Apply topically 3 times daily as needed (irritation) To J Tube site  Qty: 30 g, Refills: 0    Associated Diagnoses: Irritation around percutaneous endoscopic gastrostomy (PEG) tube site (H)      omeprazole (PRILOSEC) 40 MG DR capsule Take 1 capsule (40 mg) by mouth daily IN G TUBE  Qty: 30 capsule, Refills: 0    Associated Diagnoses: Oropharyngeal dysphagia      ondansetron (ZOFRAN) 4 MG tablet Place 1 tablet (4 mg) into G tube every 8 hours as needed for nausea  Qty: 30 tablet, Refills: 0    Associated Diagnoses: Nausea           CONTINUE these medications which have NOT  CHANGED    Details   amLODIPine (NORVASC) 5 MG tablet Take 1 tablet (5 mg total) by mouth daily.  Qty: 90 tablet, Refills: 2    Associated Diagnoses: Essential hypertension      baclofen (LIORESAL) 10 MG tablet Place 10 mg into G tube 4 times daily Takes in the morning and with each dose of Seroquel at 1900, 0000, and 0300.      bisacodyl (DULCOLAX) 10 MG suppository Place 1 suppository (10 mg) rectally daily  Qty: 30 suppository, Refills: 2    Associated Diagnoses: Slow transit constipation      Dextromethorphan-guaiFENesin  MG/5ML syrup TAKE 10 MLS BY G TUBE ROUTE EVERY 4 HOURS AS NEEDED FOR COUGH.  Qty: 237 mL, Refills: 0    Associated Diagnoses: History of pneumonia      ferrous sulfate 220 (44 Fe) MG/5ML SOLN TAKE 5 MLS BY ENTERAL ROUTE DAILY.  Qty: 150 mL, Refills: 0    Comments: Order instructions to provider for this medication are to order as mg of Ferrous Sulfate. Each 5 mL contains 220 mg Ferrous Sulfate = 44 mg elemental Iron.  Associated Diagnoses: Iron deficiency      ipratropium (ATROVENT) 0.06 % nasal spray Spray 2 sprays into both nostrils daily as needed for rhinitis      ipratropium - albuterol 0.5 mg/2.5 mg/3 mL (DUONEB) 0.5-2.5 (3) MG/3ML neb solution Take 1 vial (3 mLs) by nebulization every 4 hours.  Qty: 90 mL, Refills: 1    Associated Diagnoses: Chronic cough      ketoconazole (NIZORAL) 2 % external cream Apply topically 2 times daily to affected area(s).  Qty: 30 g, Refills: 0    Associated Diagnoses: Irritant dermatitis      lidocaine (LIDODERM) 5 % patch Place 1 patch onto the skin daily as needed for moderate pain To prevent lidocaine toxicity, patient should be patch free for 12 hrs daily.  Qty: 30 patch, Refills: 2    Associated Diagnoses: Neuropathic pain      meclizine (ANTIVERT) 25 MG tablet Take 1 tablet via G-tube every 6 hours as needed for vertigo  Qty: 30 tablet, Refills: 11    Associated Diagnoses: Vertigo      NEBUSAL 3 % neb solution TAKE 4 MLS BY NEBULIZATION EVERY 6  HOURS AS NEEDED FOR THICK SPUTUM.  Qty: 240 mL, Refills: 0    Associated Diagnoses: Chronic mucus hypersecretion, respiratory      nystatin (MYCOSTATIN) 185733 UNIT/GM external cream Apply topically 2 times daily as needed (redness)  Qty: 30 g, Refills: 3    Associated Diagnoses: On tube feeding diet      polyethylene glycol (MIRALAX) 17 GM/Dose powder Give 1 capful via G-tube daily as needed for constipation.  Qty: 850 g, Refills: 0    Associated Diagnoses: Slow transit constipation      !! QUEtiapine (SEROQUEL) 25 MG tablet Place 25 mg into G tube every 4 hours as needed (Used during the day PRN agitation/etc. This is in addition to the tablet at 1900, 0000, 0300.)      !! QUEtiapine (SEROQUEL) 25 MG tablet TAKE 1 TABLET BY MOUTH AT 7:00PM, MIDNIGHT AND 3:00AM FOR TOTAL OF 75 MG NIGHTLY. OK TO REPEAT AFTER 4 HOURS AND DURING THE DAY AS NEEDED.  Qty: 102 tablet, Refills: 5    Associated Diagnoses: Restlessness and agitation      simvastatin (ZOCOR) 20 MG tablet TAKE 1 TABLET PER G TUBE ROUTE AT BEDTIME.  Qty: 90 tablet, Refills: 0    Associated Diagnoses: Dyslipidemia      triamcinolone (KENALOG) 0.1 % external cream Apply topically 2 times daily to affected area(s).  Qty: 30 g, Refills: 1    Associated Diagnoses: Irritant dermatitis      VISINE DRY EYE RELIEF 1 % SOLN Apply 1 drop to eye every 6 hours as needed for dry eyes.  Qty: 15 mL, Refills: 0    Associated Diagnoses: Dry eyes      Ostomy Supplies (STOMAHESIVE) PSTE Apply topically to skin around ostomy site twice daily and as needed  Qty: 56.7 g, Refills: 3    Associated Diagnoses: Irritation around percutaneous endoscopic gastrostomy (PEG) tube site (H)       !! - Potential duplicate medications found. Please discuss with provider.                          Consults       PHARMACY TO DOSE VANCO  NUTRITION SERVICES ADULT IP CONSULT  CARE MANAGEMENT / SOCIAL WORK IP CONSULT  INTERVENTIONAL RADIOLOGY ADULT/PEDS IP CONSULT  PHARMACY IP CONSULT    Immunizations  given this encounter     Most Recent Immunizations   Administered Date(s) Administered    COVID-19 MONOVALENT 12+ (Pfizer) 04/01/2021    COVID-19 Monovalent 12+ (Pfizer 2022) 03/20/2022    Influenza Vaccine 18-64 (Flublok) 09/27/2023    Influenza Vaccine, 6+MO IM (QUADRIVALENT W/PRESERVATIVES) 11/06/2020    Pneumo Conj 13-V (2010&after) 10/28/2021    Pneumococcal 20 valent Conjugate (Prevnar 20) 05/24/2023    Pneumococcal 23 valent 10/25/2017    TDAP (Adacel,Boostrix) 04/20/2015               SIGNIFICANT IMAGING FINDINGS     Results for orders placed or performed during the hospital encounter of 07/18/24   Head CT w/o contrast    Impression    IMPRESSION:    1.  No CT evidence of acute intracranial abnormality.  2.  Chronic changes as detailed above.   CT Chest (PE) Abdomen Pelvis w Contrast    Impression    IMPRESSION:  1.  No evidence of pulmonary embolus given limitations of exam.  2.  Increasing bilateral diffuse ground glass opacity; differential considerations include early edema and pneumonitis.  3.  Associated increased retained secretions centrally.  4.  No acute abnormalities identified in the abdomen or pelvis.   IR Gastro Jejunostomy Tube Change    Impression    IMPRESSION:    1.  Successful gastrojejunostomy tube exchange.    PLAN:  1. Patient to be recovered and discharged from IR.  2. Both the gastric and jejunal lumens are ready for use immediately.  3. Flush lumens with 10 mL NS following each use.  4. Follow-up with IR in 3 months for routine gastrojejunostomy tube exchange.       SIGNIFICANT LABORATORY FINDINGS     Most Recent 3 BMP's:  Recent Labs   Lab Test 07/20/24  0920 07/19/24  0710 07/18/24  1900    143 144   POTASSIUM 4.6 4.2 4.4   CHLORIDE 111* 112* 110*   CO2 19* 23 26   BUN 15.3 23.6* 36.7*   CR 1.11 1.10 1.19*   ANIONGAP 10 8 8   SAMIR 7.8* 7.9* 8.7*   * 103* 113*           Discharge Orders     No discharge procedures on file.    Examination   Physical Exam   Temp:  [99  F  (37.2  C)-99.9  F (37.7  C)] 99  F (37.2  C)  Pulse:  [] 91  Resp:  [16-18] 16  BP: (135-148)/(75-91) 135/87  SpO2:  [93 %-98 %] 98 %  Wt Readings from Last 1 Encounters:   07/18/24 54 kg (119 lb)       General Appearance: Awake, alert, looking at the computer; smiles no cough  Respiratory: clear bilaterally  Cardiovascular: regular  GI: soft, GJ tube in place; no surrounding rednes  Skin: no rash  Other:        Please see EMR for more detailed significant labs, imaging, consultant notes etc.    IMena MD, personally saw the patient today and spent less than or equal to 30 minutes discharging this patient.    Mena Ortiz MD  [facility]    CC:Steven Rosa

## 2024-07-20 NOTE — PLAN OF CARE
Problem: Infection  Goal: Absence of Infection Signs and Symptoms  Outcome: Progressing   Patient is nonverbal, family at bedside, on IVABX, reposition Q2hrs, Tolerating tube feeding. Incontinent of urine. Appears to have slept well.

## 2024-07-20 NOTE — PROGRESS NOTES
Pt daughter requested not to give both 0000 and 0400 neb to the pt. She stated that she like for pt to get some rest. Pt remain on RA with sat in the mid 90's.     Agnes Zuluaga, RT

## 2024-07-20 NOTE — PLAN OF CARE
"DISCHARGE    Patient appropriate for discharge. PIV was removed. AVS, prescriptions, and follow-up education reviewed. All belongings remain with patient. Patient left the unit at 1500 via wheelchair with staff. Transportation to home with family.    Robyn Ortiz RN    Problem: Adult Inpatient Plan of Care  Goal: Plan of Care Review  Description: The Plan of Care Review/Shift note should be completed every shift.  The Outcome Evaluation is a brief statement about your assessment that the patient is improving, declining, or no change.  This information will be displayed automatically on your shift  note.  Outcome: Met  Goal: Patient-Specific Goal (Individualized)  Description: You can add care plan individualizations to a care plan. Examples of Individualization might be:  \"Parent requests to be called daily at 9am for status\", \"I have a hard time hearing out of my right ear\", or \"Do not touch me to wake me up as it startles  me\".  Outcome: Met  Goal: Absence of Hospital-Acquired Illness or Injury  Outcome: Met  Intervention: Identify and Manage Fall Risk  Recent Flowsheet Documentation  Taken 7/20/2024 0800 by Robyn Ortiz RN  Safety Promotion/Fall Prevention: safety round/check completed  Intervention: Prevent Skin Injury  Recent Flowsheet Documentation  Taken 7/20/2024 0800 by Robyn Ortiz RN  Body Position: supine, head elevated  Intervention: Prevent Infection  Recent Flowsheet Documentation  Taken 7/20/2024 0800 by Robyn Ortiz RN  Infection Prevention:   hand hygiene promoted   personal protective equipment utilized   single patient room provided   rest/sleep promoted  Goal: Optimal Comfort and Wellbeing  Outcome: Met  Intervention: Monitor Pain and Promote Comfort  Recent Flowsheet Documentation  Taken 7/20/2024 0800 by Robyn Ortiz RN  Pain Management Interventions:   medication (see MAR)   repositioned  Goal: Readiness for Transition of Care  Outcome: Met     Problem: Infection  Goal: " Absence of Infection Signs and Symptoms  Outcome: Met  Intervention: Prevent or Manage Infection  Recent Flowsheet Documentation  Taken 7/20/2024 0800 by Robyn Ortiz RN  Isolation Precautions: contact precautions maintained   Goal Outcome Evaluation:

## 2024-07-22 ENCOUNTER — TELEPHONE (OUTPATIENT)
Dept: FAMILY MEDICINE | Facility: CLINIC | Age: 59
End: 2024-07-22
Payer: COMMERCIAL

## 2024-07-22 DIAGNOSIS — Z87.01 HISTORY OF ASPIRATION PNEUMONIA: Primary | ICD-10-CM

## 2024-07-22 DIAGNOSIS — R42 VERTIGO: ICD-10-CM

## 2024-07-22 DIAGNOSIS — M62.838 MUSCLE SPASM: Primary | ICD-10-CM

## 2024-07-22 RX ORDER — BACLOFEN 10 MG/1
10 TABLET ORAL 4 TIMES DAILY
Qty: 120 TABLET | Refills: 1 | Status: SHIPPED | OUTPATIENT
Start: 2024-07-22 | End: 2024-09-26

## 2024-07-22 RX ORDER — MECLIZINE HYDROCHLORIDE 25 MG/1
TABLET ORAL
Qty: 30 TABLET | Refills: 11 | Status: SHIPPED | OUTPATIENT
Start: 2024-07-22

## 2024-07-22 NOTE — TELEPHONE ENCOUNTER
Kelly,  through [facility] is calling to inform that patient has been hospitalized. Hospitalization notes are in epic, from [facility].     Elana Mcgregor RN  [facility]

## 2024-07-22 NOTE — TELEPHONE ENCOUNTER
Pending Prescriptions:                       Disp   Refills    meclizine (ANTIVERT) 25 MG tablet         30 tab*11           Sig: Take 1 tablet via G-tube every 6 hours as needed           for vertigo    baclofen (LIORESAL) 10 MG tablet                              Sig: Place 1 tablet (10 mg) into G tube 4 times daily           Takes in the morning and with each dose of           Seroquel at 1900, 0000, and 0300.

## 2024-07-23 PROBLEM — A41.9 SEPSIS WITHOUT ACUTE ORGAN DYSFUNCTION (H): Status: RESOLVED | Noted: 2023-02-11 | Resolved: 2024-07-23

## 2024-07-23 PROBLEM — I69.359 HEMIPLEGIA AND HEMIPARESIS FOLLOWING CEREBRAL INFARCTION AFFECTING UNSPECIFIED SIDE (H): Status: RESOLVED | Noted: 2020-06-04 | Resolved: 2024-07-23

## 2024-07-23 PROBLEM — R33.9 URINARY RETENTION WITH INCOMPLETE BLADDER EMPTYING: Status: RESOLVED | Noted: 2023-02-12 | Resolved: 2024-07-23

## 2024-07-23 PROBLEM — J69.0 RECURRENT ASPIRATION PNEUMONIA (H): Status: RESOLVED | Noted: 2023-06-08 | Resolved: 2024-07-23

## 2024-07-23 PROBLEM — Z87.01 HISTORY OF ASPIRATION PNEUMONIA: Status: ACTIVE | Noted: 2024-07-23

## 2024-07-23 PROBLEM — R06.02 SHORTNESS OF BREATH: Status: RESOLVED | Noted: 2024-07-18 | Resolved: 2024-07-23

## 2024-07-23 LAB
BACTERIA BLD CULT: NO GROWTH
BACTERIA BLD CULT: NO GROWTH

## 2024-07-23 NOTE — TELEPHONE ENCOUNTER
Patient's daughter states he is currently on the aspirin prescribed by Dr. Lynda Gutierrez. Unsure as to why he is on it. They do not need a refill at this time because they get it over the counter. Routing to PCP to review if patient still needs to be on this.    Viri Alvarado, DAILYN, RN  [facility]

## 2024-07-23 NOTE — TELEPHONE ENCOUNTER
Refill for this patient is not on the medication list.  Asprin oral tablet chewable 81mg   Take 1 tablet per feeding tube route daily

## 2024-07-23 NOTE — TELEPHONE ENCOUNTER
Would recommend patient check with his neurologist regarding whether he should remain on low-dose aspirin with history of hemorrhagic CVA historically.

## 2024-07-23 NOTE — TELEPHONE ENCOUNTER
I do not know if patient is taking this now and I would be concerned he shouldn't be if he is. Has a Hx of hemorrhagic stroke. Need to call family and clarify this    Shahid Brady MD'

## 2024-07-24 ENCOUNTER — PATIENT OUTREACH (OUTPATIENT)
Dept: CARE COORDINATION | Facility: CLINIC | Age: 59
End: 2024-07-24
Payer: COMMERCIAL

## 2024-07-24 NOTE — TELEPHONE ENCOUNTER
See PCP's response on 7/23/24 to medication question regarding the patient's aspirin on 7/23/24.    Writer called the patient's daughter, Idalmis, JASON on file, and relayed the above information to [facility].    Idalmis verbalized understanding, but she stated that the patient does not see a neurologist currently, and writer could not find an established neurologist in the patient's chart.    Patient had a Hemorrhagic stroke (H) on 06/05/2016     Also see pharmacy notes from 4/12/24:  Aspirin 1 tablet (81 mg) by Per Feeding Tube route daily, Disp-90 tablet, R-3,   Ordered on: 06/21/23  Associated Dx: History of stroke  Discontinued on: 04/12/24  Discontinue reason: Med Rec(No AVS / No eCancel)  Authorizing provider: Lynda Gutierrez MD    [facility] would like to ask the PCP if the patient should continue with the aspirin as he does not have a neurologist of which to ask regarding aspirin recommendations.    Writer will route the above information to the PCP again to review and advise next steps.    Pricilla Simms, RN, BSN  [facility]

## 2024-07-24 NOTE — TELEPHONE ENCOUNTER
Called to relay message from Dr. Rosa, Patients daughter verbalized understanding and has no further questions at this time.     Tyrell Miner RN  [facility]

## 2024-07-24 NOTE — PROGRESS NOTES
Clinic Care Coordination Contact  [facility]/Voicemail    Clinical Data: Care Coordinator Outreach    Outreach Documentation Number of Outreach Attempt   7/24/2024  10:07 AM 1       Left message on patient's voicemail with call back information and requested return call.    Plan: Care Coordinator will try to reach patient again in 1-2 business days.    David Myhre, RN  CCC RN

## 2024-07-25 ENCOUNTER — PATIENT OUTREACH (OUTPATIENT)
Dept: CARE COORDINATION | Facility: CLINIC | Age: 59
End: 2024-07-25
Payer: COMMERCIAL

## 2024-07-25 NOTE — LETTER
[facility] CARE COORDINATION  [facility]    July 25, 2024    Liu Malave  [address]  [place] [address]      Dear Liu,    I am a clinic care coordinator who works with Steven Rosa MD with the [facility]. I have been trying to reach you recently to introduce Clinic Care Coordination. Below is a description of clinic care coordination and how I can further assist you.       The clinic care coordination team is made up of a registered nurse, , financial resource worker and community health worker who understand the health care system. The goal of clinic care coordination is to help you manage your health and improve access to the health care system. Our team works alongside your provider to assist you in determining your health and social needs. We can help you obtain health care and community resources, providing you with necessary information and education. We can work with you through any barriers and develop a care plan that helps coordinate and strengthen the communication between you and your care team.  Our services are voluntary and are offered without charge to you personally.    Please feel free to contact me with any questions or concerns regarding care coordination and what we can offer.      We are focused on providing you with the highest-quality healthcare experience possible.    Sincerely,     David Myhre, RN  [facility] RN  [phone]

## 2024-07-25 NOTE — PROGRESS NOTES
Clinic Care Coordination Contact  [facility]/Voicemail    Clinical Data: Care Coordinator Outreach    Outreach Documentation Number of Outreach Attempt   7/24/2024  10:07 AM 1   7/25/2024   2:04 PM 2       Left message on patient's voicemail with call back information and requested return call.    Plan: Care Coordinator will send care coordination introduction letter with care coordinator contact information and explanation of care coordination services via [facility]. Care Coordinator will do no further outreaches at this time.    David Myhre, RN  CCC RN

## 2024-08-06 ENCOUNTER — TELEPHONE (OUTPATIENT)
Dept: FAMILY MEDICINE | Facility: CLINIC | Age: 59
End: 2024-08-06
Payer: COMMERCIAL

## 2024-08-06 NOTE — TELEPHONE ENCOUNTER
Per encounter on 7/22/24 - med discontinued by PCP and family informed of message.    DAILY SearsN, RN  [facility]

## 2024-08-06 NOTE — TELEPHONE ENCOUNTER
Medication Question or Refill    Contacts       Contact Date/Time Type Contact Phone/Fax    08/06/2024 10:53 AM CDT Phone (Incoming) [id number] - [place], [place] - [address] [place] [address] (Pharmacy) [phone]            What medication are you calling about (include dose and sig)?: aspirin (ASA) 81 MG chewable tablet     Preferred Pharmacy:  [facility][id number] - [place], [place] - [address] [place] [address]  [address] [place] [address]  Phone: [phone] Fax: [phone]          Controlled Substance Agreement on file:   CSA -- Patient Level:    CSA: None found at the patient level.       Who prescribed the medication?: Gutierrez    Do you need a refill? Yes    When did you use the medication last? N/A    Patient offered an appointment? No    Do you have any questions or concerns?  Yes: medication was discontinued in 2023, and isn't on current med list      Could we send this information to you in [place] or would you prefer to receive a phone call?:   Patient would prefer a phone call   Okay to leave a detailed message?: Yes at Home number on file [phone] (home)

## 2024-08-06 NOTE — TELEPHONE ENCOUNTER
Patient was taken off of this due to Hx of hemorrhagic stroke. I recommend he remain off of it.     Shahid Brady MD

## 2024-08-13 ENCOUNTER — TELEPHONE (OUTPATIENT)
Dept: FAMILY MEDICINE | Facility: CLINIC | Age: 59
End: 2024-08-13
Payer: COMMERCIAL

## 2024-08-13 DIAGNOSIS — R05.3 CHRONIC COUGH: ICD-10-CM

## 2024-08-13 DIAGNOSIS — K59.01 SLOW TRANSIT CONSTIPATION: ICD-10-CM

## 2024-08-13 DIAGNOSIS — L24.9 IRRITANT DERMATITIS: ICD-10-CM

## 2024-08-13 DIAGNOSIS — J39.8 CHRONIC MUCUS HYPERSECRETION, RESPIRATORY: ICD-10-CM

## 2024-08-13 DIAGNOSIS — H04.123 DRY EYES: ICD-10-CM

## 2024-08-13 DIAGNOSIS — Z87.01 HISTORY OF PNEUMONIA: ICD-10-CM

## 2024-08-13 RX ORDER — IPRATROPIUM BROMIDE AND ALBUTEROL SULFATE 2.5; .5 MG/3ML; MG/3ML
SOLUTION RESPIRATORY (INHALATION)
Qty: 90 ML | Refills: 1 | Status: SHIPPED | OUTPATIENT
Start: 2024-08-13

## 2024-08-13 RX ORDER — SODIUM CHLORIDE FOR INHALATION 3 %
VIAL, NEBULIZER (ML) INHALATION
Qty: 240 ML | Refills: 0 | Status: SHIPPED | OUTPATIENT
Start: 2024-08-13 | End: 2024-09-26

## 2024-08-13 RX ORDER — GUAIFENESIN AND DEXTROMETHORPHAN HYDROBROMIDE 100; 10 MG/5ML; MG/5ML
SOLUTION ORAL
Qty: 237 ML | Refills: 0 | Status: SHIPPED | OUTPATIENT
Start: 2024-08-13 | End: 2024-09-26

## 2024-08-13 RX ORDER — POLYETHYLENE GLYCOL 3350 17 G/17G
POWDER, FOR SOLUTION ORAL
Qty: 850 G | Refills: 3 | Status: SHIPPED | OUTPATIENT
Start: 2024-08-13

## 2024-08-13 RX ORDER — KETOCONAZOLE 20 MG/G
CREAM TOPICAL
Qty: 30 G | Refills: 0 | Status: SHIPPED | OUTPATIENT
Start: 2024-08-13 | End: 2024-09-26

## 2024-08-13 NOTE — TELEPHONE ENCOUNTER
Medication Question or Refill    Contacts       Contact Date/Time Type Contact Phone/Fax    08/13/2024 02:59 PM CDT Fax (Incoming) [facility][id number] - [place], [place] - [address] [place] [address] (Pharmacy) [phone]            What medication are you calling about (include dose and sig)?: polyethylene glycol (MIRALAX) 17 GM/Dose powder     Preferred Pharmacy:  [facility][id number] - [place], [place] - [address] [place] [address]  Phone: [phone] Fax: [phone]      Controlled Substance Agreement on file:   CSA -- Patient Level:    CSA: None found at the patient level.       Who prescribed the medication?: Gutierrez    Do you need a refill? Yes    When did you use the medication last? N/A

## 2024-08-13 NOTE — TELEPHONE ENCOUNTER
PA Initiation    Medication: Polyethylene Glycol 400 (VISINE DRY EYE RELIEF) 1 % SOLN   Insurance Company:  [employer] [facility]  Pharmacy Filling the Rx:  [facility]   Filling Pharmacy Phone:  [phone]  Filling Pharmacy Fax:  [phone]  Start Date:  08/13/2024

## 2024-08-14 NOTE — TELEPHONE ENCOUNTER
PA Initiation    Medication: VISINE DRY EYE RELIEF 1 % OP SOLN  Insurance Company: [employer] - Phone [phone] Fax [phone]  Pharmacy Filling the Rx: [facility][id number] [address] [place] [address]  Filling Pharmacy Phone: [phone]  Filling Pharmacy Fax: [phone]  Start Date: 8/14/2024

## 2024-08-15 NOTE — TELEPHONE ENCOUNTER
PRIOR AUTHORIZATION DENIED    Medication: VISINE DRY EYE RELIEF 1 % OP SOLN  Insurance Company: [employer] - Phone [phone] Fax [phone]  Denial Date: 8/15/2024  Denial Reason(s):     Appeal Information:     Patient Notified: No, care team must notify

## 2024-09-25 DIAGNOSIS — J39.8 CHRONIC MUCUS HYPERSECRETION, RESPIRATORY: ICD-10-CM

## 2024-09-25 DIAGNOSIS — K59.01 SLOW TRANSIT CONSTIPATION: ICD-10-CM

## 2024-09-25 DIAGNOSIS — E78.5 DYSLIPIDEMIA: ICD-10-CM

## 2024-09-25 DIAGNOSIS — L24.9 IRRITANT DERMATITIS: ICD-10-CM

## 2024-09-25 DIAGNOSIS — M62.838 MUSCLE SPASM: ICD-10-CM

## 2024-09-25 DIAGNOSIS — Z87.01 HISTORY OF PNEUMONIA: ICD-10-CM

## 2024-09-26 RX ORDER — BISACODYL 10 MG
10 SUPPOSITORY, RECTAL RECTAL DAILY
Qty: 30 SUPPOSITORY | Refills: 1 | Status: SHIPPED | OUTPATIENT
Start: 2024-09-26

## 2024-09-26 RX ORDER — SODIUM CHLORIDE SOLN NEBU 3% 3 %
NEBU SOLN INHALATION
Qty: 240 ML | Refills: 0 | Status: SHIPPED | OUTPATIENT
Start: 2024-09-26

## 2024-09-26 RX ORDER — DEXTROMETHORPHAN HYDROBROMIDE, GUAIFENESIN 10; 100 MG/5ML; MG/5ML
LIQUID ORAL
Qty: 237 ML | Refills: 0 | Status: SHIPPED | OUTPATIENT
Start: 2024-09-26

## 2024-09-26 RX ORDER — SIMVASTATIN 20 MG
TABLET ORAL
Qty: 90 TABLET | Refills: 1 | Status: SHIPPED | OUTPATIENT
Start: 2024-09-26

## 2024-09-26 RX ORDER — KETOCONAZOLE 20 MG/G
CREAM TOPICAL
Qty: 30 G | Refills: 0 | Status: SHIPPED | OUTPATIENT
Start: 2024-09-26

## 2024-09-26 RX ORDER — TRIAMCINOLONE ACETONIDE 1 MG/G
CREAM TOPICAL
Qty: 30 G | Refills: 0 | Status: SHIPPED | OUTPATIENT
Start: 2024-09-26

## 2024-09-26 RX ORDER — BACLOFEN 10 MG/1
TABLET ORAL
Qty: 120 TABLET | Refills: 0 | Status: SHIPPED | OUTPATIENT
Start: 2024-09-26

## 2024-10-02 PROBLEM — S03.00XA: Status: ACTIVE | Noted: 2024-10-02

## 2024-10-14 DIAGNOSIS — R05.3 CHRONIC COUGH: ICD-10-CM

## 2024-10-14 DIAGNOSIS — Z87.01 HISTORY OF PNEUMONIA: ICD-10-CM

## 2024-10-15 RX ORDER — IPRATROPIUM BROMIDE AND ALBUTEROL SULFATE 2.5; .5 MG/3ML; MG/3ML
SOLUTION RESPIRATORY (INHALATION)
Qty: 90 ML | Refills: 0 | Status: SHIPPED | OUTPATIENT
Start: 2024-10-15

## 2024-10-15 RX ORDER — DEXTROMETHORPHAN HYDROBROMIDE, GUAIFENESIN 10; 100 MG/5ML; MG/5ML
LIQUID ORAL
Qty: 237 ML | Refills: 0 | Status: SHIPPED | OUTPATIENT
Start: 2024-10-15

## 2024-10-21 ENCOUNTER — HOSPITAL ENCOUNTER (OUTPATIENT)
Dept: INTERVENTIONAL RADIOLOGY/VASCULAR | Facility: CLINIC | Age: 59
Discharge: HOME | End: 2024-10-21
Attending: PHYSICIAN ASSISTANT | Admitting: RADIOLOGY
Payer: COMMERCIAL

## 2024-10-21 VITALS
SYSTOLIC BLOOD PRESSURE: 165 MMHG | RESPIRATION RATE: 22 BRPM | DIASTOLIC BLOOD PRESSURE: 90 MMHG | HEART RATE: 65 BPM | OXYGEN SATURATION: 91 %

## 2024-10-21 DIAGNOSIS — R63.30 FEEDING DIFFICULTIES: Primary | ICD-10-CM

## 2024-10-21 DIAGNOSIS — R63.30 FEEDING DIFFICULTIES: ICD-10-CM

## 2024-10-21 PROCEDURE — C1769 GUIDE WIRE: HCPCS

## 2024-10-21 PROCEDURE — 250N000009 HC RX 250: Performed by: RADIOLOGY

## 2024-10-21 PROCEDURE — 49452 REPLACE G-J TUBE PERC: CPT

## 2024-10-21 RX ORDER — LIDOCAINE HYDROCHLORIDE 20 MG/ML
JELLY TOPICAL ONCE
Status: COMPLETED | OUTPATIENT
Start: 2024-10-21 | End: 2024-10-21

## 2024-10-21 RX ADMIN — SODIUM CHLORIDE 250 ML: 9 INJECTION, SOLUTION INTRAVENOUS at 12:44

## 2024-10-21 RX ADMIN — LIDOCAINE HYDROCHLORIDE: 20 JELLY TOPICAL at 12:42

## 2024-10-21 NOTE — IR NOTE
Patient Name: Liu Malave  Medical Record Number: [id number]  Today's Date: 10/21/2024    Procedure: GJ tube exchange  Proceduralist: Dr. Gilbert    Procedure Start: 1247  Procedure end: 1250  Sedation medications administered: N/a    Other Notes: Pt arrived to IR room 1 from Pre/post bay 3. Consent reviewed. Pt denies any questions or concerns regarding procedure. Pt positioned supine and monitored per protocol. Pt tolerated procedure without any noted complications. VSS on RA. Pt transferred back to Pre/post bay 3.

## 2024-11-14 ENCOUNTER — HOSPITAL ENCOUNTER (INPATIENT)
Facility: CLINIC | Age: 59
End: 2024-11-14
Attending: EMERGENCY MEDICINE
Payer: COMMERCIAL

## 2024-11-14 ENCOUNTER — APPOINTMENT (OUTPATIENT)
Dept: CT IMAGING | Facility: CLINIC | Age: 59
End: 2024-11-14
Attending: EMERGENCY MEDICINE
Payer: COMMERCIAL

## 2024-11-14 DIAGNOSIS — K59.01 SLOW TRANSIT CONSTIPATION: ICD-10-CM

## 2024-11-14 DIAGNOSIS — I10 HYPERTENSION, UNSPECIFIED TYPE: ICD-10-CM

## 2024-11-14 DIAGNOSIS — R50.9 FEVER IN ADULT: ICD-10-CM

## 2024-11-14 DIAGNOSIS — L24.B1 IRRITANT CONTACT DERMATITIS ASSOCIATED WITH DIGESTIVE STOMA: ICD-10-CM

## 2024-11-14 DIAGNOSIS — R10.9 ABDOMINAL PAIN OF UNKNOWN CAUSE: ICD-10-CM

## 2024-11-14 DIAGNOSIS — Z87.01 HISTORY OF ASPIRATION PNEUMONIA: ICD-10-CM

## 2024-11-14 DIAGNOSIS — R05.1 ACUTE COUGH: ICD-10-CM

## 2024-11-14 DIAGNOSIS — L24.9 IRRITANT DERMATITIS: ICD-10-CM

## 2024-11-14 DIAGNOSIS — J69.0 ASPIRATION PNEUMONIA, UNSPECIFIED ASPIRATION PNEUMONIA TYPE, UNSPECIFIED LATERALITY, UNSPECIFIED PART OF LUNG (H): Primary | ICD-10-CM

## 2024-11-14 DIAGNOSIS — Z91.89 AT RISK OF UTI: ICD-10-CM

## 2024-11-14 LAB
ALBUMIN SERPL BCG-MCNC: 3.5 G/DL (ref 3.5–5.2)
ALP SERPL-CCNC: 75 U/L (ref 40–150)
ALT SERPL W P-5'-P-CCNC: 22 U/L (ref 0–70)
ANION GAP SERPL CALCULATED.3IONS-SCNC: 13 MMOL/L (ref 7–15)
AST SERPL W P-5'-P-CCNC: 31 U/L (ref 0–45)
ATRIAL RATE - MUSE: 123 BPM
BASE EXCESS BLDV CALC-SCNC: 1.3 MMOL/L (ref -3–3)
BASOPHILS # BLD AUTO: 0.1 10E3/UL (ref 0–0.2)
BASOPHILS NFR BLD AUTO: 1 %
BILIRUB SERPL-MCNC: 0.2 MG/DL
BUN SERPL-MCNC: 33.2 MG/DL (ref 8–23)
CALCIUM SERPL-MCNC: 8.7 MG/DL (ref 8.8–10.4)
CHLORIDE SERPL-SCNC: 105 MMOL/L (ref 98–107)
CREAT SERPL-MCNC: 1.21 MG/DL (ref 0.67–1.17)
DIASTOLIC BLOOD PRESSURE - MUSE: 86 MMHG
EGFRCR SERPLBLD CKD-EPI 2021: 68 ML/MIN/1.73M2
EOSINOPHIL # BLD AUTO: 0.2 10E3/UL (ref 0–0.7)
EOSINOPHIL NFR BLD AUTO: 1 %
ERYTHROCYTE [DISTWIDTH] IN BLOOD BY AUTOMATED COUNT: 12.3 % (ref 10–15)
FLUAV RNA SPEC QL NAA+PROBE: NEGATIVE
FLUBV RNA RESP QL NAA+PROBE: NEGATIVE
GLUCOSE SERPL-MCNC: 111 MG/DL (ref 70–99)
HCO3 BLDV-SCNC: 26 MMOL/L (ref 21–28)
HCO3 SERPL-SCNC: 22 MMOL/L (ref 22–29)
HCT VFR BLD AUTO: 34.4 % (ref 40–53)
HGB BLD-MCNC: 11.2 G/DL (ref 13.3–17.7)
HOLD SPECIMEN: NORMAL
IMM GRANULOCYTES # BLD: 0.1 10E3/UL
IMM GRANULOCYTES NFR BLD: 1 %
INTERPRETATION ECG - MUSE: NORMAL
LACTATE SERPL-SCNC: 1.3 MMOL/L (ref 0.7–2)
LYMPHOCYTES # BLD AUTO: 1.2 10E3/UL (ref 0.8–5.3)
LYMPHOCYTES NFR BLD AUTO: 8 %
MAGNESIUM SERPL-MCNC: 2.5 MG/DL (ref 1.7–2.3)
MCH RBC QN AUTO: 30.4 PG (ref 26.5–33)
MCHC RBC AUTO-ENTMCNC: 32.6 G/DL (ref 31.5–36.5)
MCV RBC AUTO: 93 FL (ref 78–100)
MONOCYTES # BLD AUTO: 1 10E3/UL (ref 0–1.3)
MONOCYTES NFR BLD AUTO: 6 %
NEUTROPHILS # BLD AUTO: 12.8 10E3/UL (ref 1.6–8.3)
NEUTROPHILS NFR BLD AUTO: 84 %
NRBC # BLD AUTO: 0 10E3/UL
NRBC BLD AUTO-RTO: 0 /100
O2/TOTAL GAS SETTING VFR VENT: 21 %
OXYHGB MFR BLDV: 66 % (ref 70–75)
P AXIS - MUSE: 67 DEGREES
PCO2 BLDV: 41 MM HG (ref 40–50)
PH BLDV: 7.41 [PH] (ref 7.32–7.43)
PLATELET # BLD AUTO: 206 10E3/UL (ref 150–450)
PO2 BLDV: 36 MM HG (ref 25–47)
POTASSIUM SERPL-SCNC: 4.6 MMOL/L (ref 3.4–5.3)
PR INTERVAL - MUSE: 154 MS
PROCALCITONIN SERPL IA-MCNC: 0.1 NG/ML
PROT SERPL-MCNC: 8.2 G/DL (ref 6.4–8.3)
QRS DURATION - MUSE: 66 MS
QT - MUSE: 306 MS
QTC - MUSE: 438 MS
R AXIS - MUSE: 44 DEGREES
RBC # BLD AUTO: 3.69 10E6/UL (ref 4.4–5.9)
RSV RNA SPEC NAA+PROBE: NEGATIVE
SAO2 % BLDV: 67.6 % (ref 70–75)
SARS-COV-2 RNA RESP QL NAA+PROBE: NEGATIVE
SODIUM SERPL-SCNC: 140 MMOL/L (ref 135–145)
SYSTOLIC BLOOD PRESSURE - MUSE: 132 MMHG
T AXIS - MUSE: 64 DEGREES
TROPONIN T SERPL HS-MCNC: 9 NG/L
VENTRICULAR RATE- MUSE: 123 BPM
WBC # BLD AUTO: 15.3 10E3/UL (ref 4–11)

## 2024-11-14 PROCEDURE — 250N000011 HC RX IP 250 OP 636: Performed by: EMERGENCY MEDICINE

## 2024-11-14 PROCEDURE — 74177 CT ABD & PELVIS W/CONTRAST: CPT

## 2024-11-14 PROCEDURE — 83605 ASSAY OF LACTIC ACID: CPT | Performed by: EMERGENCY MEDICINE

## 2024-11-14 PROCEDURE — 83735 ASSAY OF MAGNESIUM: CPT | Performed by: EMERGENCY MEDICINE

## 2024-11-14 PROCEDURE — 96375 TX/PRO/DX INJ NEW DRUG ADDON: CPT

## 2024-11-14 PROCEDURE — 82805 BLOOD GASES W/O2 SATURATION: CPT | Performed by: EMERGENCY MEDICINE

## 2024-11-14 PROCEDURE — 84145 PROCALCITONIN (PCT): CPT | Performed by: EMERGENCY MEDICINE

## 2024-11-14 PROCEDURE — 258N000003 HC RX IP 258 OP 636: Performed by: EMERGENCY MEDICINE

## 2024-11-14 PROCEDURE — 84484 ASSAY OF TROPONIN QUANT: CPT | Performed by: EMERGENCY MEDICINE

## 2024-11-14 PROCEDURE — 99285 EMERGENCY DEPT VISIT HI MDM: CPT | Mod: 25

## 2024-11-14 PROCEDURE — 96374 THER/PROPH/DIAG INJ IV PUSH: CPT

## 2024-11-14 PROCEDURE — 87637 SARSCOV2&INF A&B&RSV AMP PRB: CPT | Performed by: EMERGENCY MEDICINE

## 2024-11-14 PROCEDURE — 36415 COLL VENOUS BLD VENIPUNCTURE: CPT | Performed by: EMERGENCY MEDICINE

## 2024-11-14 PROCEDURE — 84460 ALANINE AMINO (ALT) (SGPT): CPT | Performed by: EMERGENCY MEDICINE

## 2024-11-14 PROCEDURE — 87040 BLOOD CULTURE FOR BACTERIA: CPT | Performed by: EMERGENCY MEDICINE

## 2024-11-14 PROCEDURE — 85025 COMPLETE CBC W/AUTO DIFF WBC: CPT | Performed by: EMERGENCY MEDICINE

## 2024-11-14 PROCEDURE — 93005 ELECTROCARDIOGRAM TRACING: CPT | Performed by: EMERGENCY MEDICINE

## 2024-11-14 PROCEDURE — 250N000009 HC RX 250: Performed by: EMERGENCY MEDICINE

## 2024-11-14 PROCEDURE — 96361 HYDRATE IV INFUSION ADD-ON: CPT

## 2024-11-14 PROCEDURE — 82310 ASSAY OF CALCIUM: CPT | Performed by: EMERGENCY MEDICINE

## 2024-11-14 PROCEDURE — 80053 COMPREHEN METABOLIC PANEL: CPT | Performed by: EMERGENCY MEDICINE

## 2024-11-14 PROCEDURE — 71260 CT THORAX DX C+: CPT

## 2024-11-14 PROCEDURE — 250N000013 HC RX MED GY IP 250 OP 250 PS 637: Performed by: EMERGENCY MEDICINE

## 2024-11-14 RX ORDER — ONDANSETRON 2 MG/ML
4 INJECTION INTRAMUSCULAR; INTRAVENOUS EVERY 30 MIN PRN
Status: DISCONTINUED | OUTPATIENT
Start: 2024-11-14 | End: 2024-11-15

## 2024-11-14 RX ORDER — LIDOCAINE HYDROCHLORIDE 20 MG/ML
10 JELLY TOPICAL ONCE
Status: COMPLETED | OUTPATIENT
Start: 2024-11-14 | End: 2024-11-14

## 2024-11-14 RX ORDER — IOPAMIDOL 755 MG/ML
90 INJECTION, SOLUTION INTRAVASCULAR ONCE
Status: COMPLETED | OUTPATIENT
Start: 2024-11-14 | End: 2024-11-14

## 2024-11-14 RX ORDER — HYDROMORPHONE HCL IN WATER/PF 6 MG/30 ML
0.2 PATIENT CONTROLLED ANALGESIA SYRINGE INTRAVENOUS
Status: COMPLETED | OUTPATIENT
Start: 2024-11-14 | End: 2024-11-14

## 2024-11-14 RX ORDER — ACETAMINOPHEN 650 MG/1
650 SUPPOSITORY RECTAL ONCE
Status: COMPLETED | OUTPATIENT
Start: 2024-11-14 | End: 2024-11-14

## 2024-11-14 RX ORDER — LIDOCAINE PAIN RELIEF 40 MG/1000MG
1 PATCH TOPICAL DAILY PRN
COMMUNITY
Start: 2024-09-30

## 2024-11-14 RX ORDER — ACETAMINOPHEN 325 MG/1
975 TABLET ORAL ONCE
Status: DISCONTINUED | OUTPATIENT
Start: 2024-11-14 | End: 2024-11-14

## 2024-11-14 RX ADMIN — LIDOCAINE HYDROCHLORIDE 5 ML: 20 JELLY TOPICAL at 21:28

## 2024-11-14 RX ADMIN — IOPAMIDOL 75 ML: 755 INJECTION, SOLUTION INTRAVENOUS at 23:13

## 2024-11-14 RX ADMIN — SODIUM CHLORIDE 500 ML: 9 INJECTION, SOLUTION INTRAVENOUS at 21:52

## 2024-11-14 RX ADMIN — HYDROMORPHONE HYDROCHLORIDE 0.2 MG: 0.2 INJECTION, SOLUTION INTRAMUSCULAR; INTRAVENOUS; SUBCUTANEOUS at 21:53

## 2024-11-14 RX ADMIN — ACETAMINOPHEN 650 MG: 650 SUPPOSITORY RECTAL at 21:59

## 2024-11-14 RX ADMIN — ONDANSETRON 4 MG: 2 INJECTION, SOLUTION INTRAMUSCULAR; INTRAVENOUS at 21:53

## 2024-11-14 ASSESSMENT — ENCOUNTER SYMPTOMS
SHORTNESS OF BREATH: 1
VOMITING: 1
NAUSEA: 1

## 2024-11-14 ASSESSMENT — ACTIVITIES OF DAILY LIVING (ADL)
ADLS_ACUITY_SCORE: 0

## 2024-11-14 ASSESSMENT — COLUMBIA-SUICIDE SEVERITY RATING SCALE - C-SSRS: IS THE PATIENT NOT ABLE TO COMPLETE C-SSRS: UNRESPONSIVE

## 2024-11-15 ENCOUNTER — APPOINTMENT (OUTPATIENT)
Dept: INTERVENTIONAL RADIOLOGY/VASCULAR | Facility: CLINIC | Age: 59
End: 2024-11-15
Attending: NURSE PRACTITIONER
Payer: COMMERCIAL

## 2024-11-15 PROBLEM — R05.1 ACUTE COUGH: Status: ACTIVE | Noted: 2024-11-15

## 2024-11-15 PROBLEM — R50.9 FEVER IN ADULT: Status: ACTIVE | Noted: 2024-11-15

## 2024-11-15 PROBLEM — R10.9 ABDOMINAL PAIN OF UNKNOWN CAUSE: Status: ACTIVE | Noted: 2024-11-15

## 2024-11-15 LAB
ALBUMIN SERPL BCG-MCNC: 3.2 G/DL (ref 3.5–5.2)
ALBUMIN UR-MCNC: 100 MG/DL
ALP SERPL-CCNC: 67 U/L (ref 40–150)
ALT SERPL W P-5'-P-CCNC: 16 U/L (ref 0–70)
ANION GAP SERPL CALCULATED.3IONS-SCNC: 11 MMOL/L (ref 7–15)
APPEARANCE UR: CLEAR
AST SERPL W P-5'-P-CCNC: 26 U/L (ref 0–45)
BASOPHILS # BLD AUTO: 0.1 10E3/UL (ref 0–0.2)
BASOPHILS NFR BLD AUTO: 1 %
BILIRUB SERPL-MCNC: 0.4 MG/DL
BILIRUB UR QL STRIP: NEGATIVE
BUN SERPL-MCNC: 28.7 MG/DL (ref 8–23)
CALCIUM SERPL-MCNC: 8.1 MG/DL (ref 8.8–10.4)
CHLORIDE SERPL-SCNC: 108 MMOL/L (ref 98–107)
COLOR UR AUTO: COLORLESS
CREAT SERPL-MCNC: 1.34 MG/DL (ref 0.67–1.17)
EGFRCR SERPLBLD CKD-EPI 2021: 60 ML/MIN/1.73M2
EOSINOPHIL # BLD AUTO: 0.2 10E3/UL (ref 0–0.7)
EOSINOPHIL NFR BLD AUTO: 1 %
ERYTHROCYTE [DISTWIDTH] IN BLOOD BY AUTOMATED COUNT: 12.4 % (ref 10–15)
GLUCOSE BLDC GLUCOMTR-MCNC: 99 MG/DL (ref 70–99)
GLUCOSE SERPL-MCNC: 112 MG/DL (ref 70–99)
GLUCOSE UR STRIP-MCNC: NEGATIVE MG/DL
HCO3 SERPL-SCNC: 22 MMOL/L (ref 22–29)
HCT VFR BLD AUTO: 34.8 % (ref 40–53)
HGB BLD-MCNC: 11.4 G/DL (ref 13.3–17.7)
HGB UR QL STRIP: NEGATIVE
IMM GRANULOCYTES # BLD: 0.1 10E3/UL
IMM GRANULOCYTES NFR BLD: 0 %
KETONES UR STRIP-MCNC: NEGATIVE MG/DL
LEUKOCYTE ESTERASE UR QL STRIP: NEGATIVE
LYMPHOCYTES # BLD AUTO: 2.1 10E3/UL (ref 0.8–5.3)
LYMPHOCYTES NFR BLD AUTO: 13 %
MCH RBC QN AUTO: 30.7 PG (ref 26.5–33)
MCHC RBC AUTO-ENTMCNC: 32.8 G/DL (ref 31.5–36.5)
MCV RBC AUTO: 94 FL (ref 78–100)
MONOCYTES # BLD AUTO: 1.1 10E3/UL (ref 0–1.3)
MONOCYTES NFR BLD AUTO: 7 %
NEUTROPHILS # BLD AUTO: 12.1 10E3/UL (ref 1.6–8.3)
NEUTROPHILS NFR BLD AUTO: 78 %
NITRATE UR QL: NEGATIVE
NRBC # BLD AUTO: 0 10E3/UL
NRBC BLD AUTO-RTO: 0 /100
PH UR STRIP: 7 [PH] (ref 5–7)
PLATELET # BLD AUTO: 205 10E3/UL (ref 150–450)
POTASSIUM SERPL-SCNC: 4.5 MMOL/L (ref 3.4–5.3)
PROT SERPL-MCNC: 7.5 G/DL (ref 6.4–8.3)
RBC # BLD AUTO: 3.71 10E6/UL (ref 4.4–5.9)
RBC URINE: <1 /HPF
SODIUM SERPL-SCNC: 141 MMOL/L (ref 135–145)
SP GR UR STRIP: <1.005 (ref 1–1.03)
SQUAMOUS EPITHELIAL: <1 /HPF
TROPONIN T SERPL HS-MCNC: 10 NG/L
UROBILINOGEN UR STRIP-MCNC: <2 MG/DL
WBC # BLD AUTO: 15.6 10E3/UL (ref 4–11)
WBC URINE: <1 /HPF

## 2024-11-15 PROCEDURE — 250N000013 HC RX MED GY IP 250 OP 250 PS 637

## 2024-11-15 PROCEDURE — 999N000157 HC STATISTIC RCP TIME EA 10 MIN

## 2024-11-15 PROCEDURE — C1769 GUIDE WIRE: HCPCS

## 2024-11-15 PROCEDURE — 85048 AUTOMATED LEUKOCYTE COUNT: CPT

## 2024-11-15 PROCEDURE — 250N000013 HC RX MED GY IP 250 OP 250 PS 637: Performed by: FAMILY MEDICINE

## 2024-11-15 PROCEDURE — 36415 COLL VENOUS BLD VENIPUNCTURE: CPT

## 2024-11-15 PROCEDURE — 94640 AIRWAY INHALATION TREATMENT: CPT

## 2024-11-15 PROCEDURE — 85004 AUTOMATED DIFF WBC COUNT: CPT

## 2024-11-15 PROCEDURE — 120N000001 HC R&B MED SURG/OB

## 2024-11-15 PROCEDURE — 250N000009 HC RX 250: Performed by: RADIOLOGY

## 2024-11-15 PROCEDURE — 85014 HEMATOCRIT: CPT

## 2024-11-15 PROCEDURE — 81001 URINALYSIS AUTO W/SCOPE: CPT | Performed by: EMERGENCY MEDICINE

## 2024-11-15 PROCEDURE — G0463 HOSPITAL OUTPT CLINIC VISIT: HCPCS

## 2024-11-15 PROCEDURE — 999N000156 HC STATISTIC RCP CONSULT EA 30 MIN

## 2024-11-15 PROCEDURE — 99222 1ST HOSP IP/OBS MODERATE 55: CPT | Mod: AI | Performed by: FAMILY MEDICINE

## 2024-11-15 PROCEDURE — 250N000009 HC RX 250

## 2024-11-15 PROCEDURE — 94640 AIRWAY INHALATION TREATMENT: CPT | Mod: 76

## 2024-11-15 PROCEDURE — 0D2DXUZ CHANGE FEEDING DEVICE IN LOWER INTESTINAL TRACT, EXTERNAL APPROACH: ICD-10-PCS | Performed by: RADIOLOGY

## 2024-11-15 PROCEDURE — 82962 GLUCOSE BLOOD TEST: CPT

## 2024-11-15 PROCEDURE — 250N000011 HC RX IP 250 OP 636

## 2024-11-15 PROCEDURE — 250N000011 HC RX IP 250 OP 636: Performed by: EMERGENCY MEDICINE

## 2024-11-15 PROCEDURE — 49452 REPLACE G-J TUBE PERC: CPT

## 2024-11-15 PROCEDURE — 250N000009 HC RX 250: Performed by: FAMILY MEDICINE

## 2024-11-15 PROCEDURE — 80053 COMPREHEN METABOLIC PANEL: CPT

## 2024-11-15 RX ORDER — NEOMYCIN/BACITRACIN/POLYMYXINB 3.5-400-5K
OINTMENT (GRAM) TOPICAL 3 TIMES DAILY PRN
Status: DISCONTINUED | OUTPATIENT
Start: 2024-11-15 | End: 2024-11-17 | Stop reason: HOSPADM

## 2024-11-15 RX ORDER — KETOCONAZOLE 20 MG/G
CREAM TOPICAL 2 TIMES DAILY PRN
Status: DISCONTINUED | OUTPATIENT
Start: 2024-11-15 | End: 2024-11-17 | Stop reason: HOSPADM

## 2024-11-15 RX ORDER — PIPERACILLIN SODIUM, TAZOBACTAM SODIUM 3; .375 G/15ML; G/15ML
3.38 INJECTION, POWDER, LYOPHILIZED, FOR SOLUTION INTRAVENOUS EVERY 8 HOURS
Status: DISCONTINUED | OUTPATIENT
Start: 2024-11-15 | End: 2024-11-17 | Stop reason: HOSPADM

## 2024-11-15 RX ORDER — CARBOXYMETHYLCELLULOSE SODIUM 5 MG/ML
1 SOLUTION/ DROPS OPHTHALMIC EVERY 6 HOURS PRN
Status: DISCONTINUED | OUTPATIENT
Start: 2024-11-15 | End: 2024-11-17 | Stop reason: HOSPADM

## 2024-11-15 RX ORDER — BACLOFEN 10 MG/1
10 TABLET ORAL 4 TIMES DAILY
Status: DISCONTINUED | OUTPATIENT
Start: 2024-11-15 | End: 2024-11-17 | Stop reason: HOSPADM

## 2024-11-15 RX ORDER — DEXTROSE MONOHYDRATE 100 MG/ML
INJECTION, SOLUTION INTRAVENOUS CONTINUOUS PRN
Status: DISCONTINUED | OUTPATIENT
Start: 2024-11-15 | End: 2024-11-17 | Stop reason: HOSPADM

## 2024-11-15 RX ORDER — SIMVASTATIN 20 MG
20 TABLET ORAL AT BEDTIME
Status: DISCONTINUED | OUTPATIENT
Start: 2024-11-15 | End: 2024-11-17 | Stop reason: HOSPADM

## 2024-11-15 RX ORDER — FERROUS SULFATE 220 (44)/5
220 ELIXIR ORAL DAILY
Status: DISCONTINUED | OUTPATIENT
Start: 2024-11-15 | End: 2024-11-17 | Stop reason: HOSPADM

## 2024-11-15 RX ORDER — SODIUM CHLORIDE FOR INHALATION 3 %
4 VIAL, NEBULIZER (ML) INHALATION EVERY 6 HOURS PRN
Status: DISCONTINUED | OUTPATIENT
Start: 2024-11-15 | End: 2024-11-17 | Stop reason: HOSPADM

## 2024-11-15 RX ORDER — HEPARIN SODIUM 200 [USP'U]/100ML
1 INJECTION, SOLUTION INTRAVENOUS EVERY 5 MIN PRN
Status: DISCONTINUED | OUTPATIENT
Start: 2024-11-15 | End: 2024-11-17 | Stop reason: HOSPADM

## 2024-11-15 RX ORDER — QUETIAPINE FUMARATE 25 MG/1
25 TABLET, FILM COATED ORAL 3 TIMES DAILY
Status: DISCONTINUED | OUTPATIENT
Start: 2024-11-15 | End: 2024-11-17 | Stop reason: HOSPADM

## 2024-11-15 RX ORDER — LIDOCAINE 40 MG/G
CREAM TOPICAL
Status: DISCONTINUED | OUTPATIENT
Start: 2024-11-15 | End: 2024-11-17 | Stop reason: HOSPADM

## 2024-11-15 RX ORDER — IPRATROPIUM BROMIDE AND ALBUTEROL SULFATE 2.5; .5 MG/3ML; MG/3ML
3 SOLUTION RESPIRATORY (INHALATION)
Status: DISCONTINUED | OUTPATIENT
Start: 2024-11-15 | End: 2024-11-17 | Stop reason: HOSPADM

## 2024-11-15 RX ORDER — CEFUROXIME AXETIL 250 MG/1
250 TABLET ORAL DAILY
Status: DISCONTINUED | OUTPATIENT
Start: 2024-11-15 | End: 2024-11-17 | Stop reason: HOSPADM

## 2024-11-15 RX ORDER — IPRATROPIUM BROMIDE 42 UG/1
2 SPRAY, METERED NASAL DAILY PRN
Status: DISCONTINUED | OUTPATIENT
Start: 2024-11-15 | End: 2024-11-17 | Stop reason: HOSPADM

## 2024-11-15 RX ORDER — LIDOCAINE HYDROCHLORIDE 20 MG/ML
JELLY TOPICAL ONCE
Status: COMPLETED | OUTPATIENT
Start: 2024-11-15 | End: 2024-11-15

## 2024-11-15 RX ORDER — GUAIFENESIN/DEXTROMETHORPHAN 100-10MG/5
10 SYRUP ORAL EVERY 4 HOURS PRN
Status: DISCONTINUED | OUTPATIENT
Start: 2024-11-15 | End: 2024-11-17 | Stop reason: HOSPADM

## 2024-11-15 RX ORDER — TRIAMCINOLONE ACETONIDE 1 MG/G
CREAM TOPICAL 2 TIMES DAILY PRN
Status: DISCONTINUED | OUTPATIENT
Start: 2024-11-15 | End: 2024-11-17 | Stop reason: HOSPADM

## 2024-11-15 RX ORDER — PIPERACILLIN SODIUM, TAZOBACTAM SODIUM 3; .375 G/15ML; G/15ML
3.38 INJECTION, POWDER, LYOPHILIZED, FOR SOLUTION INTRAVENOUS ONCE
Status: COMPLETED | OUTPATIENT
Start: 2024-11-15 | End: 2024-11-15

## 2024-11-15 RX ORDER — QUETIAPINE FUMARATE 25 MG/1
25 TABLET, FILM COATED ORAL EVERY 4 HOURS PRN
Status: DISCONTINUED | OUTPATIENT
Start: 2024-11-15 | End: 2024-11-17 | Stop reason: HOSPADM

## 2024-11-15 RX ORDER — ONDANSETRON 4 MG/1
4 TABLET, FILM COATED ORAL EVERY 8 HOURS PRN
Status: DISCONTINUED | OUTPATIENT
Start: 2024-11-15 | End: 2024-11-17 | Stop reason: HOSPADM

## 2024-11-15 RX ORDER — POLYETHYLENE GLYCOL 3350 17 G/17G
17 POWDER, FOR SOLUTION ORAL DAILY PRN
Status: DISCONTINUED | OUTPATIENT
Start: 2024-11-15 | End: 2024-11-17 | Stop reason: HOSPADM

## 2024-11-15 RX ORDER — NYSTATIN 100000 U/G
CREAM TOPICAL 2 TIMES DAILY PRN
Status: DISCONTINUED | OUTPATIENT
Start: 2024-11-15 | End: 2024-11-17 | Stop reason: HOSPADM

## 2024-11-15 RX ORDER — LIDOCAINE 4 G/G
1 PATCH TOPICAL DAILY PRN
Status: DISCONTINUED | OUTPATIENT
Start: 2024-11-15 | End: 2024-11-17 | Stop reason: HOSPADM

## 2024-11-15 RX ORDER — IPRATROPIUM BROMIDE AND ALBUTEROL SULFATE 2.5; .5 MG/3ML; MG/3ML
3 SOLUTION RESPIRATORY (INHALATION)
Status: DISCONTINUED | OUTPATIENT
Start: 2024-11-15 | End: 2024-11-15

## 2024-11-15 RX ORDER — AMLODIPINE BESYLATE 5 MG/1
5 TABLET ORAL DAILY
Status: DISCONTINUED | OUTPATIENT
Start: 2024-11-15 | End: 2024-11-17 | Stop reason: HOSPADM

## 2024-11-15 RX ORDER — ACETAMINOPHEN 500 MG
1000 TABLET ORAL EVERY 6 HOURS PRN
Status: DISCONTINUED | OUTPATIENT
Start: 2024-11-15 | End: 2024-11-17 | Stop reason: HOSPADM

## 2024-11-15 RX ORDER — DIPHENHYDRAMINE HYDROCHLORIDE 50 MG/ML
25 INJECTION INTRAMUSCULAR; INTRAVENOUS EVERY 6 HOURS PRN
Status: DISCONTINUED | OUTPATIENT
Start: 2024-11-15 | End: 2024-11-17 | Stop reason: HOSPADM

## 2024-11-15 RX ORDER — SIMVASTATIN 20 MG
20 TABLET ORAL AT BEDTIME
Status: DISCONTINUED | OUTPATIENT
Start: 2024-11-15 | End: 2024-11-15

## 2024-11-15 RX ORDER — DIPHENHYDRAMINE HCL 25 MG
25 CAPSULE ORAL EVERY 6 HOURS PRN
Status: DISCONTINUED | OUTPATIENT
Start: 2024-11-15 | End: 2024-11-17 | Stop reason: HOSPADM

## 2024-11-15 RX ORDER — BISACODYL 10 MG
10 SUPPOSITORY, RECTAL RECTAL DAILY PRN
Status: DISCONTINUED | OUTPATIENT
Start: 2024-11-15 | End: 2024-11-17 | Stop reason: HOSPADM

## 2024-11-15 RX ORDER — ENOXAPARIN SODIUM 100 MG/ML
40 INJECTION SUBCUTANEOUS EVERY 24 HOURS
Status: DISCONTINUED | OUTPATIENT
Start: 2024-11-15 | End: 2024-11-17 | Stop reason: HOSPADM

## 2024-11-15 RX ORDER — IPRATROPIUM BROMIDE AND ALBUTEROL SULFATE 2.5; .5 MG/3ML; MG/3ML
3 SOLUTION RESPIRATORY (INHALATION) EVERY 4 HOURS PRN
Status: DISCONTINUED | OUTPATIENT
Start: 2024-11-15 | End: 2024-11-15

## 2024-11-15 RX ADMIN — DIPHENHYDRAMINE HYDROCHLORIDE 25 MG: 25 CAPSULE ORAL at 19:05

## 2024-11-15 RX ADMIN — ACETAMINOPHEN 1000 MG: 500 TABLET ORAL at 19:12

## 2024-11-15 RX ADMIN — TRIAMCINOLONE ACETONIDE: 1 CREAM TOPICAL at 19:24

## 2024-11-15 RX ADMIN — IPRATROPIUM BROMIDE AND ALBUTEROL SULFATE 3 ML: .5; 3 SOLUTION RESPIRATORY (INHALATION) at 16:16

## 2024-11-15 RX ADMIN — LIDOCAINE HYDROCHLORIDE: 20 JELLY TOPICAL at 15:23

## 2024-11-15 RX ADMIN — PIPERACILLIN AND TAZOBACTAM 3.38 G: 3; .375 INJECTION, POWDER, FOR SOLUTION INTRAVENOUS at 06:45

## 2024-11-15 RX ADMIN — QUETIAPINE FUMARATE 25 MG: 25 TABLET ORAL at 22:46

## 2024-11-15 RX ADMIN — PIPERACILLIN AND TAZOBACTAM 3.38 G: 3; .375 INJECTION, POWDER, FOR SOLUTION INTRAVENOUS at 13:47

## 2024-11-15 RX ADMIN — QUETIAPINE FUMARATE 25 MG: 25 TABLET ORAL at 03:21

## 2024-11-15 RX ADMIN — IPRATROPIUM BROMIDE AND ALBUTEROL SULFATE 3 ML: .5; 3 SOLUTION RESPIRATORY (INHALATION) at 12:50

## 2024-11-15 RX ADMIN — PIPERACILLIN AND TAZOBACTAM 3.38 G: 3; .375 INJECTION, POWDER, FOR SOLUTION INTRAVENOUS at 00:34

## 2024-11-15 RX ADMIN — SODIUM BICARBONATE 40 MG: 84 INJECTION, SOLUTION INTRAVENOUS at 09:17

## 2024-11-15 RX ADMIN — Medication 1250 MG: at 01:02

## 2024-11-15 RX ADMIN — ENOXAPARIN SODIUM 40 MG: 40 INJECTION SUBCUTANEOUS at 06:35

## 2024-11-15 RX ADMIN — Medication 220 MG: at 09:17

## 2024-11-15 RX ADMIN — SIMVASTATIN 20 MG: 20 TABLET, FILM COATED ORAL at 19:05

## 2024-11-15 RX ADMIN — AMLODIPINE BESYLATE 5 MG: 5 TABLET ORAL at 19:05

## 2024-11-15 RX ADMIN — QUETIAPINE FUMARATE 25 MG: 25 TABLET ORAL at 19:05

## 2024-11-15 RX ADMIN — BACLOFEN 10 MG: 10 TABLET ORAL at 19:05

## 2024-11-15 RX ADMIN — BACLOFEN 10 MG: 10 TABLET ORAL at 09:17

## 2024-11-15 RX ADMIN — SODIUM BICARBONATE 40 MG: 84 INJECTION, SOLUTION INTRAVENOUS at 19:51

## 2024-11-15 RX ADMIN — BACLOFEN 10 MG: 10 TABLET ORAL at 03:20

## 2024-11-15 ASSESSMENT — ACTIVITIES OF DAILY LIVING (ADL)
ADLS_ACUITY_SCORE: 0
DEPENDENT_IADLS:: CLEANING;COOKING;LAUNDRY;SHOPPING;MEAL PREPARATION;MEDICATION MANAGEMENT;TRANSPORTATION
ADLS_ACUITY_SCORE: 0

## 2024-11-15 ASSESSMENT — ENCOUNTER SYMPTOMS: COUGH: 1

## 2024-11-15 NOTE — PROVIDER NOTIFICATION
11/15/24 1250   RCAT Assessment   Reason for Assessment Other (see comments)   Pulmonary Status 3   Surgical Status 0   Chest X-ray 1   Respiratory Pattern 1   Mental Status 4   Breath Sounds 4   Cough Effectiveness 4   Level of Activity 2   O2 Required for SpO2>=92% 0   Acuity Level (points) 19   Acuity Level  2   Re-eval Interval Guideline   (home regimen)   Re-evaluation Date   (home regimen)   Clinical Indications/Symptoms   Aerosol Therapy Home regimen  (NACL 3% neb PRN Q6.)   Aerosol Therapy Plan   RT Treatment Nebulizer   Anticholinergic/Beta-Andrenergic Agonist Duoneb soln (0.5mg/3mg per 3mL) neb Max 6 doses/24h   Aerosol Treatment Frequency   (ghome regimen QID and Alb q4 prn)   Aerosol Therapy (SVN)   Respiratory Treatment Status (SVN) given   Patient Position HOB elevated   Breath Sounds   Breath Sounds All Fields   All Lung Fields Breath Sounds coarse;diminished;rhonchi     Information given face to face via IPAD in room with family on line.   Freida Rey, RT

## 2024-11-15 NOTE — CONSULTS
[facility]  WOC Nurse Inpatient Assessment     Consulted for: G tube    Summary: family at bedside    Patient History (according to provider note(s):      Liu Malave is a 59 year old male who presents with shortness of breath, nausea, and vomiting.  He had a reported aspiration like events at home.  Had G-tube change approximately 3 weeks ago and family thinks he is having more pain and has been rubbing abdomen, indicate abdominal pain.  Family reports productive cough for about the last week.  Difficult to obtain history as patient relatively nonverbal.  Patient's daughter reported no other concerns other than what was noted above    Assessment:      Skin Injury Location: LUQ    11/15    Last photo: 11/15  Skin injury due to: Irritant contact dermatitis due to friction or contact with other specified body fluids   Skin history and plan of care:   slight erythema to G tube periwound, no open areas at this time  Affected area:      Skin assessment: Intact     Measurements (length x width x depth, in cm) 1  x 2  cm area of redness     Color: pink and red     Temperature  warm     Drainage: none .      Color: none      Odor: none  Pain: moderate and during dressing change,  patient nonverbal  Pain interventions prior to dressing change: slow and gentle cares   Treatment goal: Decrease moisture and Protection  STATUS: initial assessment  Supplies ordered: ordered vashe       Treatment Plan:     G tube irritation   Soak the area with vashe moistened gauze for 5 minutes and pat dry.  Apply No sting film barrier to periwound skin.  Cover wound with gauze and tape  Change dressing daily      Orders: Written    RECOMMEND PRIMARY TEAM ORDER: None, at this time  Education provided: plan of care  Discussed plan of care with: Patient and Family  WOC nurse follow-up plan: weekly  Notify WOC if wound(s) deteriorate.  Nursing to notify the Provider(s) and re-consult the WOC Nurse if new skin  concern.    DATA:     Current support surface: Standard  ED cart  Containment of urine/stool: Incontinence Protocol  BMI: Body mass index is 23.78 kg/m .   Active diet order: None     Output: I/O last 3 completed shifts:  In: -   Out: 450 [Urine:450]     Labs:   Recent Labs   Lab 11/15/24  0555   ALBUMIN 3.2*   HGB 11.4*   WBC 15.6*     Pressure injury risk assessment:   Sensory Perception: 3-->slightly limited  Moisture: 3-->occasionally moist  Activity: 1-->bedfast  Mobility: 1-->completely immobile  Nutrition: 2-->probably inadequate  Friction and Shear: 2-->potential problem  Fidel Score: 12    BARBRA Feldman RN CWOCN  Pager no longer in use, please contact through [employer] group: [id number]

## 2024-11-15 NOTE — MEDICATION SCRIBE - ADMISSION MEDICATION HISTORY
Medication Scribe Admission Medication History    Admission medication history is complete. The information provided in this note is only as accurate as the sources available at the time of the update.    Information Source(s): Family member, Caregiver, and CareEverywhere/SureScripts via in-person    Pertinent Information: Reviewed dispense history and spoke with family members who provided a medication history.     On cefaclor daily for UTI ppx.     All meds per G-tube.    Changes made to PTA medication list:  Added: None  Deleted: None  Changed:   Ketoconazole 2% cream BID --> BID PRN  Lidocaine patch 5% --> 4%  Triamcinolone 0.1% cream BID --> BID PRN  Bisacodyl 10 mg suppository daily --> daily PRN    Allergies reviewed with patient and updates made in EHR: yes    Medication History Completed By: Atilio Wakefield 11/14/2024 9:25 PM    PTA Med List   Medication Sig Last Dose/Taking    acetaminophen (TYLENOL) 500 MG tablet Place 2 tablets (1,000 mg) into G tube every 6 hours as needed for mild pain 11/13/2024 Morning    amLODIPine (NORVASC) 5 MG tablet Take 1 tablet (5 mg total) by mouth daily. (Patient taking differently: Place 5 mg into G tube daily.) 11/14/2024 Morning    baclofen (LIORESAL) 10 MG tablet Place 1 tablet into G tube 4 times daily, in the morning and with each dose of Seroquel. 11/14/2024 at  7:00 PM    bisacodyl (DULCOLAX) 10 MG suppository Place 1 suppository (10 mg) rectally daily (Patient taking differently: Place 10 mg rectally daily as needed for constipation.) 11/14/2024 Morning    cefaclor (CECLOR) 250 MG capsule Place 1 capsule (250 mg) into G tube daily UTI ppx 11/14/2024 Morning    CHEST CONGESTION RELIEF DM  MG/5ML syrup TAKE 10 MLS BY G TUBE ROUTE EVERY 4 HOURS AS NEEDED FOR COUGH. 11/14/2024 Morning    ferrous sulfate 220 (44 Fe) MG/5ML SOLN TAKE 5 MLS BY ENTERAL ROUTE DAILY. (Patient taking differently: Place 220 mg into G tube daily. TAKE 5 MLS BY G-TUBE ROUTE DAILY.) Past  Month    ipratropium (ATROVENT) 0.06 % nasal spray Spray 2 sprays into both nostrils daily as needed for rhinitis Past Month    ipratropium - albuterol 0.5 mg/2.5 mg/3 mL (DUONEB) 0.5-2.5 (3) MG/3ML neb solution Take 1 vial (3 mLs) by nebulization every 4 hours. 11/14/2024 Morning    ketoconazole (NIZORAL) 2 % external cream Apply topically 2 times daily to affected area(s). (Patient taking differently: Apply topically 2 times daily as needed for itching or irritation.) Past Week    LIDOCAINE PAIN RELIEF 4 % Patch Place 1 patch onto the skin daily as needed for moderate pain. Past Month    meclizine (ANTIVERT) 25 MG tablet Take 1 tablet via G-tube every 6 hours as needed for vertigo 11/13/2024 Morning    NEBUSAL 3 % neb solution TAKE 4 MLS BY NEBULIZATION EVERY 6 HOURS AS NEEDED FOR THICK SPUTUM. Past Week    neomycin-bacitracin-polymyxin (NEOSPORIN) 5-400-5000 ointment Apply topically 3 times daily as needed (irritation) To J Tube site Past Week    nystatin (MYCOSTATIN) 497419 UNIT/GM external cream Apply topically 2 times daily as needed (redness) Past Week    omeprazole (PRILOSEC) 40 MG DR capsule Take 1 capsule (40 mg) by mouth daily IN G TUBE 11/14/2024 Morning    ondansetron (ZOFRAN) 4 MG tablet Place 1 tablet (4 mg) into G tube every 8 hours as needed for nausea Past Week    polyethylene glycol (MIRALAX) 17 GM/Dose powder Give 1 capful via G-tube daily as needed for constipation 11/14/2024 Morning    Polyethylene Glycol 400 (VISINE DRY EYE RELIEF) 1 % SOLN Apply 1 drop to eye every 6 hours as needed for dry eyes. Past Week    QUEtiapine (SEROQUEL) 25 MG tablet Place 25 mg into G tube every 4 hours as needed (Used during the day PRN agitation/etc. This is in addition to the tablet at 1900, 0000, 0300.) Past Week    QUEtiapine (SEROQUEL) 25 MG tablet TAKE 1 TABLET BY MOUTH AT 7:00PM, MIDNIGHT AND 3:00AM FOR TOTAL OF 75 MG NIGHTLY. OK TO REPEAT AFTER 4 HOURS AND DURING THE DAY AS NEEDED. (Patient taking  differently: Place 25 mg into G tube 3 times daily. At 1900, 0000, 0300) 11/14/2024 at  7:00 PM    simvastatin (ZOCOR) 20 MG tablet TAKE 1 TABLET PER G TUBE ROUTE AT BEDTIME. 11/14/2024 at  7:00 PM    triamcinolone (KENALOG) 0.1 % external cream Apply topically 2 times daily to affected area(s). (Patient taking differently: Apply topically 2 times daily as needed for irritation.) Past Week

## 2024-11-15 NOTE — PROGRESS NOTES
[facility]    Progress Note - Hospitalist Service       Date of Admission:  11/14/2024    Assessment & Plan   Liu GIL Malave is a 59 year old male admitted on 11/14/2024. He has a history of hemorrhagic stroke, history of recurrent aspiration pneumonia, oropharyngeal dysphagia, chronic static encephalopathy, severe sepsis, and chronic hepatitis C, and is admitted for shortness of breath and nausea/vomiting. There are concerns for aspiration event.     Leukocytosis  Concern for aspiration event pneumonitis vs pneumonia  At this time it is relatively unclear the etiology of patient's leukocytosis and tachycardia, but concerning for potential infectious etiology.  He did have a reported aspiration like event at home so could be likely for aspiration event pneumonitis vs pneumonia.   -Continue cardiac tele  -Nutrition consult - patient requires tube feeding  -Continue Zosyn     Abdominal pain  Possible   CT imaging demonstrates GJ tube within the proximal jejunum.  Per family's report there was a history of dislodgment of a previous GJ tube.  Daughter reports that she is concerned that the GJ tube has deflated and that there is some irritation at the skin.  -IR consulted; recommendations are appreciated.   -Wound consult, appreciate expertise  -Continue PTA nystatin     Chronic conditions   History of hemorrhagic CVA: PTA Tylenol, PTA baclofen, PTA chest congestion relief, PTA DuoNeb, PTA lidocaine patch, PTA NebuSal, PTA Seroquel  History of hyperlipidemia: PTA simvastatin  History of hypertension: PTA amlodipine.   Constipation: PTA MiraLAX  UTI prophylaxis: Hold PTA cefaclor as patient receiving Zosyn           Diet: Adult Formula Drip Feeding: Continuous Vital 1.5; Gastrostomy; Goal Rate: 60; mL/hr; Please run TF at 60 mL/hr 8am-8pm, and 30 mL/hr 8pm-8am  DVT Prophylaxis: Enoxaparin (Lovenox) SQ  Kelly Catheter: Not present  Fluids: Tube feeds   Lines: None     Cardiac Monitoring: ACTIVE  order. Indication: patient unable to communicate if having angina equivalents  Code Status: Full Code      Clinically Significant Risk Factors Present on Admission          # Hyperchloremia: Highest Cl = 108 mmol/L in last 2 days, will monitor as appropriate      # Hypocalcemia: Lowest Ca = 8.1 mg/dL in last 2 days, will monitor and replace as appropriate     # Hypoalbuminemia: Lowest albumin = 3.2 g/dL at 11/15/2024  5:55 AM, will monitor as appropriate                   # Financial/Environmental Concerns:           Social Drivers of Health   Housing Stability: High Risk (9/27/2023)    Housing Stability     Do you have housing? : No     Are you worried about losing your housing?: No   Tobacco Use: Medium Risk (9/30/2024)    [facility]    Patient History     Smoking Tobacco Use: Former     Smokeless Tobacco Use: Former   Interpersonal Safety: Unknown (10/21/2024)    Interpersonal Safety     Do you feel physically and emotionally safe where you currently live?: Patient unable to answer     Within the past 12 months, have you been hit, slapped, kicked or otherwise physically hurt by someone?: Patient unable to answer     Within the past 12 months, have you been humiliated or emotionally abused in other ways by your partner or ex-partner?: Patient unable to answer         Disposition Plan   Medically Ready for Discharge: 24-48 hours      The patient's care was discussed with the Attending Physician, Dr. Darryl Rhodes .    Girma Morejon DO, PGY1  Hospitalist Service  [facility]  Securely message with [employer] (more info)  Text page via [employer] Paging/Directory   ______________________________________________________________________    Interval History   The patient is relatively nonverbal. No overnight events. The patient has a GJ tube for 8 years. Per patient's daughter, the patient have been endorsing pain around his GJ tube site; when asked, the patient  nodded that he has pain around the JG tube site.  He denies of chest pains, headaches, leg pains, or any other symptoms.  Daughter is concerned about G-tube tube and possible aspiration pneumonia.    Physical Exam   Vital Signs: Temp: 99.3  F (37.4  C) Temp src: Axillary BP: (!) 133/90 Pulse: 89   Resp: 22 SpO2: 92 % O2 Device: None (Room air)    Weight: 130 lbs 0 oz    Constitutional: Awake, alert, cooperative, no apparent distress, and appears stated age  Eyes: Lids and lashes normal, conjunctiva normal  ENT: Normocephalic, without obvious abnormality, atraumatic  Respiratory: No increased work of breathing, good air exchange, clear to auscultation bilaterally, no crackles or wheezing  Cardiovascular: Regular rate and rhythm, normal S1 and S2, no S3 or S4, and no murmur noted  GI: No scars, normal bowel sounds, soft, non-distended, non-tender, no masses palpated, no hepatosplenomegaly  Skin: normal skin color, texture, turgor  Musculoskeletal: There is no redness, warmth, or swelling of the joints.  Full range of motion noted.  Motor strength is 5 out of 5 all extremities bilaterally.  Tone is normal.  Neurologic: Awake, alert, oriented to name, place and time. Able to gesture to answer questions.   Neuropsychiatric: General: normal, calm, and normal eye contact       Medical Decision Making   Please see A&P for additional details of medical decision making.      Data     I have personally reviewed the following data over the past 24 hrs:    15.6 (H)  \   11.4 (L)   / 205     141 108 (H) 28.7 (H) /  112 (H)   4.5 22 1.34 (H) \     ALT: 16 AST: 26 AP: 67 TBILI: 0.4   ALB: 3.2 (L) TOT PROTEIN: 7.5 LIPASE: N/A     Trop: 10 BNP: N/A     Procal: 0.10 CRP: N/A Lactic Acid: 1.3         Imaging results reviewed over the past 24 hrs:   Recent Results (from the past 24 hours)   CT Chest/Abdomen/Pelvis w Contrast    Narrative    EXAM: CT CHEST/ABDOMEN/PELVIS W CONTRAST  LOCATION: [facility]  DATE:  11/14/2024    INDICATION: abd pain at g tube site and family feels not infusing correctly; cough  COMPARISON: 1/8/2024  TECHNIQUE: CT scan of the chest, abdomen, and pelvis was performed following injection of IV contrast. Multiplanar reformats were obtained. Dose reduction techniques were used.   CONTRAST: Isovue 370 75mL    FINDINGS:   LUNGS AND PLEURA:  Calcified granulomas bilaterally. Centrilobular emphysema. Again noted, mild bronchial wall thickening with debris in the airways. Partially calcified apical scarring has not changed. Linear opacities in the dependent lungs have not   changed.  Stable bilateral lower lobe predominant scarring or atelectasis.     MEDIASTINUM/AXILLAE: No adenopathy. No thoracic aortic aneurysm. No pericardial effusion. Esophagus unremarkable.    CORONARY ARTERY CALCIFICATION: Moderate.    HEPATOBILIARY: Tiny scattered cysts in the liver stable and unchanged. No follow-up needed. No significant mass or bile duct dilatation. No calcified gallstones. Gallbladder unremarkable.    PANCREAS: No significant mass, duct dilatation, or inflammatory change.    SPLEEN: Normal.    ADRENAL GLANDS: Normal.    KIDNEYS/BLADDER: The bilateral kidneys enhance symmetrically without evidence for hydronephrosis or pyelonephritis. Bilateral renal cysts stable. No follow-up needed. No concerning renal masses. No renal calculi. The bilateral ureters and urinary bladder   are unremarkable.    BOWEL: Percutaneous GJ tube with tip in the proximal jejunum. Nonspecific nonobstructive bowel gas pattern. Appendix within normal limits.    LYMPH NODES: No lymphadenopathy.    VASCULATURE: No abdominal aortic aneurysm. Moderate vascular calcifications abdominal aorta and common iliac arteries.    PELVIC ORGANS: Moderate prostatic hypertrophy.    MUSCULOSKELETAL: No concerning osseous abnormalities. No inguinal or periumbilical ventral hernias.      Impression    IMPRESSION:  1.  No evidence for an etiology for the  patient's abdominal pain. Nonspecific nonobstructive bowel gas pattern. No evidence for inguinal or periumbilical ventral hernias.  2.  Centrilobular emphysema with stable scarring in both lungs.  3.  Moderate coronary artery calcification.  4.  Moderate prostatic hypertrophy.  5.  Percutaneous GJ tube with tip in the proximal jejunum.

## 2024-11-15 NOTE — PROGRESS NOTES
PT on RA. Family on IPAD and answering my questions. Pt is unable to move or speak. Pt is a stroke patient. Pt has Duo nebs QID at home and family stated that they do NACL 3% neb after Duo neb 1 x day. Pt is suctioned orally with Yankauer and family stated no other respiratory meds or therapies given. Pt BS are coarse, rhonchi and diminished. Duo neb given x 1. After neb RT suctioned mouth with Yankauer and BS are coarse/rhonchi diminished Sxn x 1 with orally for thick yellow white small secretions.   RT to monitor and follow closely. PT here for likely aspiration pneumonia/fever. PT waiting for a Tele bed.   Freida Rey, RT

## 2024-11-15 NOTE — ED PROVIDER NOTES
NAME: Liu Malave  AGE: 59 year old male  YOB: 1965  MRN: [id number]  EVALUATION DATE & TIME: 2024  8:28 PM    PCP: Steven Rosa    ED PROVIDER: Noel Toney M.D.      Chief Complaint   Patient presents with    Shortness of Breath    Nausea & Vomiting     FINAL IMPRESSION:  1. Acute cough    2. Fever in adult    3. Abdominal pain of unknown cause      MEDICAL DECISION MAKIN:56 PM I met with the patient, obtained history, performed an initial exam, and discussed options and plan for diagnostics and treatment here in the ED.   12:20 AM Spoke with Dr. Avalos, Resident, regarding plan for admission.  12:37 AM patient's family updated again on admission and treatment plan.    Patient was clinically assessed and consented to treatment. After assessment, medical decision making and workup were discussed with the patient. The patient was agreeable to plan for testing, workup, and treatment.  Pertinent Labs & Imaging studies reviewed. (See chart for details)     Medical Decision Making  Obtained supplemental history:Supplemental history obtained?: Documented in chart  Reviewed external records: External records reviewed?: Documented in chart  Care impacted by chronic illness:Documented in Chart  Care significantly affected by social determinants of health:N/A  Did you consider but not order tests?: In addition to work-up documented, I considered the following work up:   Did you interpret images independently?: Independent interpretation of ECG and images noted in documentation, when applicable.  Consultation discussion with other provider:Did you involve another provider (consultant, , pharmacy, etc.)?: I discussed the care with another health care provider, see documentation for details.  Admit.  Not Applicable    Liu Malave is a 59 year old male who presents with shortness of breath and nausea and vomiting.   Differential diagnosis includes but not limited to pneumonia, aspiration  pneumonia, sepsis, seizures, urinary tract infection, G-tube malfunction, G-tube displacement.  Patient is 59-year-old male with history of prior CVA and significant deficit.  Patient has dysphagia and is normally fed through G-tube.  Patient had G-tube replaced 2 weeks ago and  to does appear to be functioning well but the site is excoriated and irritated around where the help has been  rubbing on the skin.  There is no signs of erythema but it is significantly tender there to patient.  He does moan with pain and family helps interpret these responses.  Patient also had an episode of possible vomiting or just heavy coughing after 1 week of coughing.  He was recently diagnosed with pneumonia at urgent care several weeks ago but possibly this is persisting bronchitis.  Patient's daughter and I discussed imaging which she reports typically they missed pneumonias on him with x-ray which they did at urgent care and she is not sure if he may need CT scan.  I discussed possibly getting  this but also checking the abdomen given the reported pain from the G-tube.  Labs were obtained and showed nolactic acidosis but patient did have leukocytosis consistent with likely infectious process.  Hemoglobin was stable, metabolic panel did show some slight dehydration and IV fluids were given.  Additionally EKG showed sinus tachycardia and troponin was negative.  Procalcitonin was normal but patient was febrile with leukocytosis and imaging which proceeded with.  Blood cultures also obtained and after imaging returned with no acute abnormality noted from the G-tube I feel some of the pain may be related to that excoriation at the G-tube site.  There is no infection that I can see there on the skin and no fluid pocket or abscess underneath the skin on the CT scan.  Additionally lungs are clear on CT with no signs of pneumonia but despite the cough for last week my concern would be for aspiration pneumonia with a fever, leukocytosis,  and coughing/vomiting episode tonight.  Patient be started on broad-spectrum antibiotics with Zosyn and vancomycin for coverage of presumed aspiration pneumonia.  Still pending urinalysis which will be obtained before antibiotics were started along with the blood culture will be sent off.  Patient will require admission was discussed with resident service for admission.    0 minutes of critical care time    MEDICATIONS GIVEN IN THE EMERGENCY:  Medications   lidocaine 1 % 0.1-1 mL (has no administration in time range)   lidocaine (LMX4) cream (has no administration in time range)   sodium chloride (PF) 0.9% PF flush 3 mL (has no administration in time range)   sodium chloride (PF) 0.9% PF flush 3 mL (has no administration in time range)   piperacillin-tazobactam (ZOSYN) 3.375 g vial to attach to  mL bag (has no administration in time range)   enoxaparin ANTICOAGULANT (LOVENOX) injection 40 mg (has no administration in time range)   acetaminophen (TYLENOL) tablet 1,000 mg (has no administration in time range)   bisacodyl (DULCOLAX) suppository 10 mg (has no administration in time range)   cefuroxime (CEFTIN) tablet 250 mg ( Per G Tube Automatically Held 11/18/24 0800)   guaiFENesin-dextromethorphan (ROBITUSSIN DM) 100-10 MG/5ML syrup 10 mL (has no administration in time range)   ferrous sulfate solution 220 mg (has no administration in time range)   ipratropium (ATROVENT) 0.06 % spray 2 spray (has no administration in time range)   ipratropium - albuterol 0.5 mg/2.5 mg/3 mL (DUONEB) neb solution 3 mL (has no administration in time range)   ketoconazole (NIZORAL) 2 % cream (has no administration in time range)   Lidocaine (LIDOCARE) 4 % Patch 1 patch (has no administration in time range)   sodium chloride (NEBUSAL) 3 % neb solution 4 mL (has no administration in time range)   neomycin-bacitracin-polymyxin (NEOSPORIN) ointment (has no administration in time range)   nystatin (MYCOSTATIN) cream (has no  administration in time range)   pantoprazole (PROTONIX) 2 mg/mL suspension 40 mg (has no administration in time range)   ondansetron (ZOFRAN) tablet 4 mg (has no administration in time range)   amLODIPine (NORVASC) tablet 5 mg ( Per G Tube Automatically Held 11/18/24 0800)   QUEtiapine (SEROquel) tablet 25 mg (has no administration in time range)   QUEtiapine (SEROquel) tablet 25 mg (has no administration in time range)   simvastatin (ZOCOR) tablet 20 mg (has no administration in time range)   triamcinolone (KENALOG) 0.1 % cream (has no administration in time range)   polyethylene glycol (MIRALAX) powder 17 g (has no administration in time range)   carboxymethylcellulose PF (REFRESH PLUS) 0.5 % ophthalmic solution 1 drop (has no administration in time range)   baclofen (LIORESAL) tablet 10 mg (has no administration in time range)   sodium chloride 0.9% BOLUS 500 mL (0 mLs Intravenous Stopped 11/14/24 2249)   HYDROmorphone (DILAUDID) injection 0.2 mg (0.2 mg Intravenous $Given 11/14/24 2153)   lidocaine (XYLOCAINE) 2 % external gel 10 mL (5 mLs Topical $Given 11/14/24 2128)   acetaminophen (TYLENOL) Suppository 650 mg (650 mg Rectal $Given 11/14/24 2159)   iopamidol (ISOVUE-370) solution 90 mL (75 mLs Intravenous $Given 11/14/24 2313)   piperacillin-tazobactam (ZOSYN) 3.375 g vial to attach to  mL bag (0 g Intravenous Stopped 11/15/24 0102)       NEW PRESCRIPTIONS STARTED AT TODAY'S ER VISIT:  New Prescriptions    No medications on file          =================================================================    HPI    Patient information was obtained from: Patient, daughter, and other family members    Use of : N/A        Liu GIL Malave is a 59 year old male with a past medical history of right-sided CVA with residual left-sided weakness, recurrent aspiration pneumonia, dysphagia with GJ tube, chronic static encephalopathy, severe sepsis, and chronic hepatitis C, who presents with shortness of  breath, nausea and vomiting.    Patient has had a productive cough for about a week and productive. His family states that tonight when they were getting him ready for bed, he vomited up into his throat but not out his mouth. He had a G-tube change approximately three weeks ago. Patient has been rubbing his abdomen which indicates he has some abdominal pain. Upon arrival, the patient is making grunting noise that his family states is his pain sound. When the patient coughs, there is some blood that comes out around his G-tube when it moves in and out as well. He didn't take any over-the-counter medications prior to arrival.  Family does not report any final urine or diarrhea, no other vomiting except for today and cough for only 1 week.  He did recently have diagnosis of pneumonia at urgent care a few weeks ago but had completed full course of 10 days antibiotics after that.    REVIEW OF SYSTEMS   Review of Systems   Respiratory:  Positive for cough and shortness of breath.    Gastrointestinal:  Positive for nausea and vomiting.   All other systems reviewed and are negative.       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 coli infection     History of hemorrhagic stroke with residual hemiparesis (H)     Hypertension     Lung nodule 12/05/2016     9 x 9 mm left upper lobe on CXR    MRSA (methicillin resistant staph aureus) culture positive     Periodontal disease     Persistent fever     Pneumonia of both lower lobes due to infectious organism     Respiratory failure with hypoxia (H)     Seizure (H)     Sepsis due to urinary tract infection (H) 06/04/2020    Severe sepsis (H)     Trismus        PAST SURGICAL HISTORY:  Past Surgical History:   Procedure Laterality Date    aneurysm clipping  2006    BRAIN SURGERY      EXAM UNDER ANESTHESIA, RESTORATIONS, EXTRACTION(S) DENTAL COMPLEX, COMBINED N/A 12/19/2016    Procedure: COMBINED EXAM UNDER ANESTHESIA, RESTORATIONS, EXTRACTION(S) DENTAL COMPLEX;  Surgeon: Verónica Martinez DDS;  Location: [facility]    EXAM UNDER ANESTHESIA, RESTORATIONS, EXTRACTION(S) DENTAL COMPLEX, COMBINED N/A 3/7/2018    Procedure: COMBINED EXAM UNDER ANESTHESIA, RESTORATIONS, EXTRACTION(S) DENTAL COMPLEX;  Kenalog Injection in Bilateral [place], Combined Dental Exam,Radiographs, Three Dental Restorations, Periodontal Therapy and Fluoride Treatment;  Surgeon: Darryl Isbell DDS;  Location: [facility] OR    HH MIDLINE INSERTION  8/11/2017         INJECT STEROID (LOCATION) N/A 3/7/2018    Procedure: INJECT STEROID (LOCATION);  Kenalog Injection In Bilateral [place], Combined Dental Exam,Radiographs,Three Dental Restorations, Periodontal Therapy and Fluoride Treatment;  Surgeon: Liu Conroy DDS;  Location: [facility] OR    IR GASTRO JEJUNOSTOMY TUBE CHANGE  7/19/2016    IR GASTRO JEJUNOSTOMY TUBE CHANGE  9/23/2016    IR GASTRO JEJUNOSTOMY TUBE CHANGE  11/28/2016    IR GASTRO JEJUNOSTOMY TUBE CHANGE  1/27/2017    IR GASTRO JEJUNOSTOMY TUBE CHANGE  4/19/2017    IR GASTRO JEJUNOSTOMY TUBE CHANGE  6/20/2017    IR GASTRO JEJUNOSTOMY TUBE CHANGE  8/7/2017    IR GASTRO JEJUNOSTOMY TUBE CHANGE  8/11/2017    IR GASTRO JEJUNOSTOMY TUBE CHANGE  10/20/2017    IR GASTRO JEJUNOSTOMY TUBE CHANGE  1/2/2018    IR GASTRO JEJUNOSTOMY TUBE CHANGE  2/16/2018    IR  GASTRO JEJUNOSTOMY TUBE CHANGE  5/16/2018    IR GASTRO JEJUNOSTOMY TUBE CHANGE  6/11/2018    IR GASTRO JEJUNOSTOMY TUBE CHANGE  9/12/2018    IR GASTRO JEJUNOSTOMY TUBE CHANGE  12/24/2018    IR GASTRO JEJUNOSTOMY TUBE CHANGE  3/18/2019    IR GASTRO JEJUNOSTOMY TUBE CHANGE  4/23/2019    IR GASTRO JEJUNOSTOMY TUBE CHANGE  5/21/2019    IR GASTRO JEJUNOSTOMY TUBE CHANGE  9/9/2019    IR GASTRO JEJUNOSTOMY TUBE CHANGE  9/26/2019    IR GASTRO JEJUNOSTOMY TUBE CHANGE  10/11/2019    IR GASTRO JEJUNOSTOMY TUBE CHANGE  11/21/2019    IR GASTRO JEJUNOSTOMY TUBE CHANGE  11/29/2019    IR GASTRO JEJUNOSTOMY TUBE CHANGE  2/27/2020    IR GASTRO JEJUNOSTOMY TUBE CHANGE  4/10/2020    IR GASTRO JEJUNOSTOMY TUBE CHANGE  7/2/2020    IR GASTRO JEJUNOSTOMY TUBE CHANGE  8/28/2020    IR GASTRO JEJUNOSTOMY TUBE CHANGE  9/17/2020    IR GASTRO JEJUNOSTOMY TUBE CHANGE  10/29/2020    IR GASTRO JEJUNOSTOMY TUBE CHANGE  11/6/2020    IR GASTRO JEJUNOSTOMY TUBE CHANGE  12/24/2020    IR GASTRO JEJUNOSTOMY TUBE CHANGE  1/9/2021    IR GASTRO JEJUNOSTOMY TUBE CHANGE  6/9/2021    IR GASTRO JEJUNOSTOMY TUBE CHANGE  10/4/2021    IR GASTRO JEJUNOSTOMY TUBE CHANGE  10/18/2021    IR GASTRO JEJUNOSTOMY TUBE CHANGE  1/14/2022    IR GASTRO JEJUNOSTOMY TUBE CHANGE  1/20/2022    IR GASTRO JEJUNOSTOMY TUBE CHANGE  4/19/2022    IR GASTRO JEJUNOSTOMY TUBE CHANGE  6/7/2022    IR GASTRO JEJUNOSTOMY TUBE CHANGE  8/19/2022    IR GASTRO JEJUNOSTOMY TUBE CHANGE  9/13/2022    IR GASTRO JEJUNOSTOMY TUBE CHANGE  10/27/2022    IR GASTRO JEJUNOSTOMY TUBE CHANGE  11/3/2022    IR GASTRO JEJUNOSTOMY TUBE CHANGE  1/18/2023    IR GASTRO JEJUNOSTOMY TUBE CHANGE  4/7/2023    IR GASTRO JEJUNOSTOMY TUBE CHANGE  5/4/2023    IR GASTRO JEJUNOSTOMY TUBE CHANGE  6/21/2023    IR GASTRO JEJUNOSTOMY TUBE CHANGE  8/23/2023    IR GASTRO JEJUNOSTOMY TUBE CHANGE  11/16/2023    IR GASTRO JEJUNOSTOMY TUBE CHANGE  1/8/2024    IR GASTRO JEJUNOSTOMY TUBE CHANGE  4/9/2024    IR GASTRO JEJUNOSTOMY TUBE  CHANGE  7/19/2024    IR GASTRO JEJUNOSTOMY TUBE CHANGE  10/21/2024    IR GASTRO JEJUNOSTOMY TUBE PLACEMENT  6/14/2016    IR GJ TUBE REPLACEMENT  12/24/2018    IR GJ TUBE REPLACEMENT  3/18/2019    IR GJ TUBE REPLACEMENT  4/23/2019    IR GJ TUBE REPLACEMENT  5/21/2019    IR GJ TUBE REPLACEMENT  9/9/2019    IR GJ TUBE REPLACEMENT  9/26/2019    IR GJ TUBE REPLACEMENT  10/11/2019    IR GJ TUBE REPLACEMENT  11/21/2019    IR GJ TUBE REPLACEMENT  11/29/2019    IR GJ TUBE REPLACEMENT  2/27/2020    IR GJ TUBE REPLACEMENT  4/10/2020    IR GJ TUBE REPLACEMENT  7/2/2020    IR GJ TUBE REPLACEMENT  8/28/2020    IR GJ TUBE REPLACEMENT  9/17/2020    IR GJ TUBE REPLACEMENT  10/29/2020    IR GJ TUBE REPLACEMENT  11/6/2020    IR GJ TUBE REPLACEMENT  12/24/2020    IR GJ TUBE REPLACEMENT  1/9/2021    IR GJ TUBE REPLACEMENT  6/9/2021    J-G tube      PICC  6/10/2016         TRACHEOSTOMY      Removed       CURRENT MEDICATIONS:      Current Facility-Administered Medications:     acetaminophen (TYLENOL) tablet 1,000 mg, 1,000 mg, Per G Tube, Q6H PRN, Glenn Avalos, DO    [Held by provider] amLODIPine (NORVASC) tablet 5 mg, 5 mg, Per G Tube, Daily, Glenn Avalos,     baclofen (LIORESAL) tablet 10 mg, 10 mg, Per G Tube, 4x Daily, Glenn Avalos, DO    bisacodyl (DULCOLAX) suppository 10 mg, 10 mg, Rectal, Daily PRN, Glenn Avalos, DO    carboxymethylcellulose PF (REFRESH PLUS) 0.5 % ophthalmic solution 1 drop, 1 drop, Both Eyes, Q6H PRN, Glenn Avalos, DO    [Held by provider] cefuroxime (CEFTIN) tablet 250 mg, 250 mg, Per G Tube, Daily, Glenn Avalos, DO    enoxaparin ANTICOAGULANT (LOVENOX) injection 40 mg, 40 mg, Subcutaneous, Q24H, Glenn Avalos, DO    ferrous sulfate solution 220 mg, 220 mg, Per G Tube, Daily, Glenn Avalos, DO    guaiFENesin-dextromethorphan (ROBITUSSIN DM) 100-10 MG/5ML syrup 10 mL, 10 mL, Per G Tube, Q4H PRN, Glenn Avalos,     ipratropium (ATROVENT) 0.06 % spray 2 spray, 2 spray, Both Nostrils,  Daily PRN, Alexys Avaloss, DO    ipratropium - albuterol 0.5 mg/2.5 mg/3 mL (DUONEB) neb solution 3 mL, 3 mL, Nebulization, Q4H PRN, Alexys Avaloss, DO    ketoconazole (NIZORAL) 2 % cream, , Topical, BID PRN, SteAlexys hernandezs, DO    Lidocaine (LIDOCARE) 4 % Patch 1 patch, 1 patch, Transdermal, Daily PRN, Alexys Avaloss, DO    lidocaine (LMX4) cream, , Topical, Q1H PRN, SteAlexys hernandezs, DO    lidocaine 1 % 0.1-1 mL, 0.1-1 mL, Other, Q1H PRN, Alexys Avaloss, DO    neomycin-bacitracin-polymyxin (NEOSPORIN) ointment, , Topical, TID PRN, Glenn Avalos, DO    nystatin (MYCOSTATIN) cream, , Topical, BID PRN, Glenn Avalos, DO    ondansetron (ZOFRAN) tablet 4 mg, 4 mg, Per G Tube, Q8H PRN, Glenn Avalos, DO    pantoprazole (PROTONIX) 2 mg/mL suspension 40 mg, 40 mg, Oral, BID, Glenn Avalos, DO    piperacillin-tazobactam (ZOSYN) 3.375 g vial to attach to  mL bag, 3.375 g, Intravenous, Q8H, Glenn Avalos, DO    polyethylene glycol (MIRALAX) powder 17 g, 17 g, Per G Tube, Daily PRN, Glenn Avalos, DO    QUEtiapine (SEROquel) tablet 25 mg, 25 mg, Per G Tube, TID, Glenn Avalos, DO    QUEtiapine (SEROquel) tablet 25 mg, 25 mg, Per G Tube, Q4H PRN, Glenn Avalos, DO    simvastatin (ZOCOR) tablet 20 mg, 20 mg, Per G Tube, At Bedtime, Glenn Avalos, DO    sodium chloride (NEBUSAL) 3 % neb solution 4 mL, 4 mL, Nebulization, Q6H PRN, Alexys Avaloss, DO    sodium chloride (PF) 0.9% PF flush 3 mL, 3 mL, Intracatheter, Q8H, Abader, Glenn, DO    sodium chloride (PF) 0.9% PF flush 3 mL, 3 mL, Intracatheter, q1 min prn, Alexys Avaloss, DO    triamcinolone (KENALOG) 0.1 % cream, , Topical, BID PRN, Bailey, Glenn, DO    Current Outpatient Medications:     acetaminophen (TYLENOL) 500 MG tablet, Place 2 tablets (1,000 mg) into G tube every 6 hours as needed for mild pain, Disp: 60 tablet, Rfl: 0    amLODIPine (NORVASC) 5 MG tablet, Take 1 tablet (5 mg total) by mouth daily. (Patient taking differently: Place 5  mg into G tube daily.), Disp: 90 tablet, Rfl: 2    baclofen (LIORESAL) 10 MG tablet, Place 1 tablet into G tube 4 times daily, in the morning and with each dose of Seroquel., Disp: 120 tablet, Rfl: 0    bisacodyl (DULCOLAX) 10 MG suppository, Place 1 suppository (10 mg) rectally daily (Patient taking differently: Place 10 mg rectally daily as needed for constipation.), Disp: 30 suppository, Rfl: 1    cefaclor (CECLOR) 250 MG capsule, Place 1 capsule (250 mg) into G tube daily UTI ppx, Disp: 30 capsule, Rfl: 0    CHEST CONGESTION RELIEF DM  MG/5ML syrup, TAKE 10 MLS BY G TUBE ROUTE EVERY 4 HOURS AS NEEDED FOR COUGH., Disp: 237 mL, Rfl: 0    ferrous sulfate 220 (44 Fe) MG/5ML SOLN, TAKE 5 MLS BY ENTERAL ROUTE DAILY. (Patient taking differently: Place 220 mg into G tube daily. TAKE 5 MLS BY G-TUBE ROUTE DAILY.), Disp: 150 mL, Rfl: 0    ipratropium (ATROVENT) 0.06 % nasal spray, Spray 2 sprays into both nostrils daily as needed for rhinitis, Disp: , Rfl:     ipratropium - albuterol 0.5 mg/2.5 mg/3 mL (DUONEB) 0.5-2.5 (3) MG/3ML neb solution, Take 1 vial (3 mLs) by nebulization every 4 hours., Disp: 90 mL, Rfl: 0    ketoconazole (NIZORAL) 2 % external cream, Apply topically 2 times daily to affected area(s). (Patient taking differently: Apply topically 2 times daily as needed for itching or irritation.), Disp: 30 g, Rfl: 0    LIDOCAINE PAIN RELIEF 4 % Patch, Place 1 patch onto the skin daily as needed for moderate pain., Disp: , Rfl:     meclizine (ANTIVERT) 25 MG tablet, Take 1 tablet via G-tube every 6 hours as needed for vertigo, Disp: 30 tablet, Rfl: 11    NEBUSAL 3 % neb solution, TAKE 4 MLS BY NEBULIZATION EVERY 6 HOURS AS NEEDED FOR THICK SPUTUM., Disp: 240 mL, Rfl: 0    neomycin-bacitracin-polymyxin (NEOSPORIN) 5-400-5000 ointment, Apply topically 3 times daily as needed (irritation) To J Tube site, Disp: 30 g, Rfl: 0    nystatin (MYCOSTATIN) 421174 UNIT/GM external cream, Apply topically 2 times daily as  "needed (redness), Disp: 30 g, Rfl: 3    omeprazole (PRILOSEC) 40 MG DR capsule, Take 1 capsule (40 mg) by mouth daily IN G TUBE, Disp: 30 capsule, Rfl: 0    ondansetron (ZOFRAN) 4 MG tablet, Place 1 tablet (4 mg) into G tube every 8 hours as needed for nausea, Disp: 30 tablet, Rfl: 0    polyethylene glycol (MIRALAX) 17 GM/Dose powder, Give 1 capful via G-tube daily as needed for constipation, Disp: 850 g, Rfl: 3    Polyethylene Glycol 400 (VISINE DRY EYE RELIEF) 1 % SOLN, Apply 1 drop to eye every 6 hours as needed for dry eyes., Disp: 15 mL, Rfl: 11    QUEtiapine (SEROQUEL) 25 MG tablet, Place 25 mg into G tube every 4 hours as needed (Used during the day PRN agitation/etc. This is in addition to the tablet at 1900, 0000, 0300.), Disp: , Rfl:     QUEtiapine (SEROQUEL) 25 MG tablet, TAKE 1 TABLET BY MOUTH AT 7:00PM, MIDNIGHT AND 3:00AM FOR TOTAL OF 75 MG NIGHTLY. OK TO REPEAT AFTER 4 HOURS AND DURING THE DAY AS NEEDED. (Patient taking differently: Place 25 mg into G tube 3 times daily. At 1900, 0000, 0300), Disp: 102 tablet, Rfl: 5    simvastatin (ZOCOR) 20 MG tablet, TAKE 1 TABLET PER G TUBE ROUTE AT BEDTIME., Disp: 90 tablet, Rfl: 1    triamcinolone (KENALOG) 0.1 % external cream, Apply topically 2 times daily to affected area(s). (Patient taking differently: Apply topically 2 times daily as needed for irritation.), Disp: 30 g, Rfl: 0    Ostomy Supplies (STOMAHESIVE) PSTE, Apply topically to skin around ostomy site twice daily and as needed, Disp: 56.7 g, Rfl: 3    ALLERGIES:  Allergies   Allergen Reactions    Diphenhydramine Unknown    Trazodone Other (See Comments)     \"shaking\" per family       FAMILY HISTORY:  Family History   Problem Relation Age of Onset    Hypertension Mother     Cancer Father     Other Cancer Father     Kidney Disease Brother        SOCIAL HISTORY:   Social History     Socioeconomic History    Marital status:     Number of children: 2   Occupational History    Occupation: disability "   Tobacco Use    Smoking status: Former     Current packs/day: 0.00     Average packs/day: 1 pack/day for 30.0 years (30.0 ttl pk-yrs)     Types: Cigarettes     Start date: 1975     Quit date: 2005     Years since quittin.4     Passive exposure: Past    Smokeless tobacco: Former     Quit date: 2007    Tobacco comments:     quit 2006   Vaping Use    Vaping status: Never Used   Substance and Sexual Activity    Alcohol use: No    Drug use: No    Sexual activity: Not Currently     Partners: Female     Birth control/protection: None   Other Topics Concern    Parent/sibling w/ CABG, MI or angioplasty before 65F 55M? No   Social History Narrative    Lives with wife, 2 daughters and son in law.    W/c bound.     Social Drivers of Health     Financial Resource Strain: Low Risk  (2023)    Financial Resource Strain     Within the past 12 months, have you or your family members you live with been unable to get utilities (heat, electricity) when it was really needed?: No   Food Insecurity: Low Risk  (2023)    Food Insecurity     Within the past 12 months, did you worry that your food would run out before you got money to buy more?: No     Within the past 12 months, did the food you bought just not last and you didn t have money to get more?: No   Transportation Needs: Low Risk  (2023)    Transportation Needs     Within the past 12 months, has lack of transportation kept you from medical appointments, getting your medicines, non-medical meetings or appointments, work, or from getting things that you need?: No   Interpersonal Safety: Unknown (10/21/2024)    Interpersonal Safety     Do you feel physically and emotionally safe where you currently live?: Patient unable to answer     Within the past 12 months, have you been hit, slapped, kicked or otherwise physically hurt by someone?: Patient unable to answer     Within the past 12 months, have you been humiliated or emotionally abused in other ways by  "your partner or ex-partner?: Patient unable to answer   Housing Stability: High Risk (9/27/2023)    Housing Stability     Do you have housing? : No     Are you worried about losing your housing?: No       PHYSICAL EXAM:    Vitals: /76   Pulse 90   Temp 100.1  F (37.8  C)   Resp 13   Ht 1.575 m (5' 2\")   Wt 59 kg (130 lb)   SpO2 91%   BMI 23.78 kg/m     Physical Exam  Vitals and nursing note reviewed.   Constitutional:       General: He is not in acute distress.     Appearance: He is well-developed and normal weight. He is ill-appearing. He is not toxic-appearing or diaphoretic.   HENT:      Head: Normocephalic.      Mouth/Throat:      Comments: Dry mucous membrane  Eyes:      Pupils: Pupils are equal, round, and reactive to light.   Neck:      Vascular: No JVD.   Cardiovascular:      Rate and Rhythm: Regular rhythm. Tachycardia present.      Heart sounds: Normal heart sounds.   Pulmonary:      Effort: Tachypnea present. No accessory muscle usage or respiratory distress.      Breath sounds: Transmitted upper airway sounds present. Examination of the right-lower field reveals decreased breath sounds. Examination of the left-lower field reveals decreased breath sounds. Decreased breath sounds present. No wheezing or rales.   Musculoskeletal:      Cervical back: Normal range of motion and neck supple.      Right lower leg: No tenderness. No edema.      Left lower leg: No tenderness. No edema.   Skin:     General: Skin is warm and dry.      Coloration: Skin is not cyanotic.   Neurological:      Mental Status: He is alert.      Comments: Neurological patient at baseline, contractures of right upper extremity as well as extension contractures left upper extremity.  No seizures or tremors, nonverbal and no change in mental status.   Psychiatric:         Behavior: Behavior is not agitated.           LAB:  All pertinent labs reviewed and interpreted.  Labs Ordered and Resulted from Time of ED Arrival to Time of " ED Departure   COMPREHENSIVE METABOLIC PANEL - Abnormal       Result Value    Sodium 140      Potassium 4.6      Carbon Dioxide (CO2) 22      Anion Gap 13      Urea Nitrogen 33.2 (*)     Creatinine 1.21 (*)     GFR Estimate 68      Calcium 8.7 (*)     Chloride 105      Glucose 111 (*)     Alkaline Phosphatase 75      AST 31      ALT 22      Protein Total 8.2      Albumin 3.5      Bilirubin Total 0.2     MAGNESIUM - Abnormal    Magnesium 2.5 (*)    BLOOD GAS VENOUS - Abnormal    pH Venous 7.41      pCO2 Venous 41      pO2 Venous 36      Bicarbonate Venous 26      Base Excess/Deficit Venous 1.3      FIO2 21      Oxyhemoglobin Venous 66 (*)     O2 Sat, Venous 67.6 (*)    ROUTINE UA WITH MICROSCOPIC REFLEX TO CULTURE - Abnormal    Color Urine Colorless      Appearance Urine Clear      Glucose Urine Negative      Bilirubin Urine Negative      Ketones Urine Negative      Specific Gravity Urine <1.005 (*)     Blood Urine Negative      pH Urine 7.0      Protein Albumin Urine 100 (*)     Urobilinogen Urine <2.0      Nitrite Urine Negative      Leukocyte Esterase Urine Negative      RBC Urine <1      WBC Urine <1      Squamous Epithelials Urine <1     CBC WITH PLATELETS AND DIFFERENTIAL - Abnormal    WBC Count 15.3 (*)     RBC Count 3.69 (*)     Hemoglobin 11.2 (*)     Hematocrit 34.4 (*)     MCV 93      MCH 30.4      MCHC 32.6      RDW 12.3      Platelet Count 206      % Neutrophils 84      % Lymphocytes 8      % Monocytes 6      % Eosinophils 1      % Basophils 1      % Immature Granulocytes 1      NRBCs per 100 WBC 0      Absolute Neutrophils 12.8 (*)     Absolute Lymphocytes 1.2      Absolute Monocytes 1.0      Absolute Eosinophils 0.2      Absolute Basophils 0.1      Absolute Immature Granulocytes 0.1      Absolute NRBCs 0.0     INFLUENZA A/B, RSV AND SARS-COV2 PCR - Normal    Influenza A PCR Negative      Influenza B PCR Negative      RSV PCR Negative      SARS CoV2 PCR Negative     LACTIC ACID WHOLE BLOOD WITH 1X  REPEAT IN 2 HR WHEN >2 - Normal    Lactic Acid, Initial 1.3     PROCALCITONIN - Normal    Procalcitonin 0.10     TROPONIN T, HIGH SENSITIVITY - Normal    Troponin T, High Sensitivity 9     TROPONIN T, HIGH SENSITIVITY - Normal    Troponin T, High Sensitivity 10     COMPREHENSIVE METABOLIC PANEL   BLOOD CULTURE       RADIOLOGY:  CT Chest/Abdomen/Pelvis w Contrast   Final Result   IMPRESSION:   1.  No evidence for an etiology for the patient's abdominal pain. Nonspecific nonobstructive bowel gas pattern. No evidence for inguinal or periumbilical ventral hernias.   2.  Centrilobular emphysema with stable scarring in both lungs.   3.  Moderate coronary artery calcification.   4.  Moderate prostatic hypertrophy.   5.  Percutaneous GJ tube with tip in the proximal jejunum.           EKG:   Performed at: 11/14/2024 20:39:57  Impression: Sinus tachycardia. Otherwise normal.  Rate: 123 bpm  Rhythm: Sinus tachycardia  QRS Interval: 66 ms  QTc Interval: 438 ms  Comparison: When compared with ECG of 7/18/2024 18:32, there was no significant changes found.  I have independently reviewed and interpreted the EKG(s) documented above.     PROCEDURES:   Procedures     I, Ewelina Bridges, am serving as a scribe to document services personally performed by Dr. Noel Toney  based on my observation and the provider's statements to me. I, Noel Toney MD attest that Ewelina Bridges is acting in a scribe capacity, has observed my performance of the services and has documented them in accordance with my direction.    Noel Toney M.D.  Emergency Medicine  [facility] Emergency Department       Noel Toney MD  11/15/24 5667

## 2024-11-15 NOTE — CONSULTS
INTERVENTIONAL RADIOLOGY    Aware of consult for malfunctioning percutaneous 18F 45 cm LAURA gastrojejunostomy tube.  Plan for exchange today.      ** Please note the procedural date is tentative and the timing of the procedure is to be determined based on staffing/schedule and triage.    PAUL Jenkins, CNP  Office: [phone]

## 2024-11-15 NOTE — PHARMACY-VANCOMYCIN DOSING SERVICE
Pharmacy Vancomycin Initial Note  Date of Service November 15, 2024  Patient's  1965  59 year old, male    Indication: Sepsis    Current estimated CrCl = Estimated Creatinine Clearance: 53.5 mL/min (A) (based on SCr of 1.21 mg/dL (H)).    Creatinine for last 3 days  2024:  9:23 PM Creatinine 1.21 mg/dL    Recent Vancomycin Level(s) for last 3 days  No results found for requested labs within last 3 days.      Vancomycin IV Administrations (past 72 hours)        No vancomycin orders with administrations in past 72 hours.                    Nephrotoxins and other renal medications (From now, onward)      Start     Dose/Rate Route Frequency Ordered Stop    11/15/24 0030  piperacillin-tazobactam (ZOSYN) 3.375 g vial to attach to  mL bag         3.375 g  over 30 Minutes Intravenous ONCE 11/15/24 0008      11/15/24 0030  vancomycin (VANCOCIN) 1,250 mg in 0.9% NaCl 262.5 mL intermittent infusion         1,250 mg  over 90 Minutes Intravenous ONCE 11/15/24 0012              Contrast Orders - past 72 hours (72h ago, onward)      Start     Dose/Rate Route Frequency Stop    24 2330  iopamidol (ISOVUE-370) solution 90 mL         90 mL Intravenous ONCE 24 2313                Plan:  Start vancomycin  1250mg x 1 dose in ER

## 2024-11-15 NOTE — DISCHARGE INSTRUCTIONS
Gastrostomy (G) or Gastrojejunostomy (G/J) Tube Exchange Discharge Instructions:   You had a gastrostomy (G) tube or a Gastrojejunostomy Tube (GJ) exchanged.  This tube is often used for nutritional support and medication administration or it can be used for stomach venting.     Care instructions:  - If you received sedation for your procedure, do not drive or operate heavy machinery for the rest of the day.  - Avoid soaking in stagnant water (tub baths, Jacuzzis, pools, lake, or ocean).   - You may shower beginning the day after the tube was exchanged.   - Clean under the disc daily with soap and water. Pat dry under disc and apply new split gauze dressing under disc. Cleaning tube site daily will help prevent infection and skin irritation.  - A small amount of clear tan drainage from the tube exit site can be normal.  - Make sure the disc on the tube fits slightly snug against the skin so that the tube does not move in or out of the body easily.   - If you experience leaking from around tube exit site, the most common cause is that the disc is not tightened against the skin.   - To tighten the disc, pull gently on the tube so the retention balloon (under the skin) is pulled up against the skin under the tube. Push the disc down until it is tight against the skin without a gap between the skin and the disc.  - Flush your tube with 60cc of water twice a day (using a cath tip syringe) to prevent tube from clogging (or follow recommendations from your doctor or dietician if given).    Giving Feedings and Medications:  - Follow-up with your dietician, primary care provider, or oncologist for instructions on tube feedings and medication administration.    - ONLY use specific enteric feedings with your tube (unless otherwise discussed with your dietitian).    - Flush tube at least twice daily with 60ml of water using cath tip syringe unless otherwise instructed by your doctor or dietician.  - Flush the tube before and  after administrating medications and bolus feedings with 60cc of water.    Follow Up:  - Recommend routine 3 month exchanges of feeding tube. Please contact your primary care provider or speak with your dietitian to obtain an order to have your G/GJ tube exchanged. Then contact [facility] scheduling department at [phone] to schedule your G/GJ tube exchange appointment.    Please seek medical evaluation for:  - Fever (greater than 101 F (38.3C).  - Purulent (yellow/green/foul smelling) drainage from tube exit site.   - Significant or worsening abdominal pain.   - Skin that is hot to the touch or significantly reddened at the tube exit site.   - Bleeding at tube exit site.    Call [facility] RN Line at [phone] with questions or if you have any of the following symptoms:  - Tube falls out or felt to be out of position.  - Unable to flush tube.  - Significant leakage around tube site (tube feeding, medications or drainage).  - Significant bleeding at the tube exit site.  - Severe pain at tube exit site.

## 2024-11-15 NOTE — CONSULTS
Care Management Initial Consult      General Information  Assessment completed with: Children, Daughter Idalmis by phone  Type of CM/SW Visit: Initial Assessment  Primary Care Provider verified and updated as needed: Yes   Readmission within the last 30 days: no previous admission in last 30 days   Reason for Consult: discharge planning, health care directive, transportation  Advance Care Planning: Advance Care Planning Reviewed: no concerns identified        Communication Assessment  Patient's communication style: spoken language (English or Bilingual) (Pt is non-verbal and daughters speak English well)        Cognitive  Cognitive/Neuro/Behavioral: WDL except    Level of Consciousness: alert    Arousal Level: opens eyes spontaneously    Orientation: other (see comments) (nonverbal, unable to assess)    Mood/Behavior: cooperative    Best Language: 3 - Mute    Speech: unable to speak    Living Environment:   People in home: child(sam), adult, spouse     Current living Arrangements: house      Able to return to prior arrangements: yes  Living Arrangement Comments: Lives with family who cares for the pt as needed    Family/Social Support:  Care provided by: spouse/significant other, child(sam), other (see comments) (PCA services)  Provides care for: no one, unable/limited ability to care for self  Marital Status:   Support system: Wife, Children, PCA  Rajput       Description of Support System: Supportive, Involved    Support Assessment: Adequate family and caregiver support, Adequate social supports    Current Resources:   Patient receiving home care services: Yes (PCA services)  Community Resources: PCA, County Worker, County Programs (13.5 hrs/day of PCA care and CADI Waiver present)  Equipment currently used at home: walker, rolling, hospital bed  Supplies currently used at home: Other (None identified)    Employment/Financial:  Employment Status: disabled     Financial Concerns: none   Referral to Financial  Worker: No     Does the patient's insurance plan have a 3 day qualifying hospital stay waiver?  Yes     Which insurance plan 3 day waiver is available? Alternative insurance waiver    Will the waiver be used for post-acute placement? No      Lifestyle & Psychosocial Needs:  Social Drivers of Health     Food Insecurity: Low Risk  (9/27/2023)    Food Insecurity     Within the past 12 months, did you worry that your food would run out before you got money to buy more?: No     Within the past 12 months, did the food you bought just not last and you didn t have money to get more?: No   Depression: Not at risk (5/2/2024)    PHQ-2     PHQ-2 Score: 0   Housing Stability: High Risk (9/27/2023)    Housing Stability     Do you have housing? : No     Are you worried about losing your housing?: No   Tobacco Use: Medium Risk (9/30/2024)    Received from [facility]    Patient History     Smoking Tobacco Use: Former     Smokeless Tobacco Use: Former     Passive Exposure: Not on file   Financial Resource Strain: Low Risk  (9/27/2023)    Financial Resource Strain     Within the past 12 months, have you or your family members you live with been unable to get utilities (heat, electricity) when it was really needed?: No   Alcohol Use: Not on file   Transportation Needs: Low Risk  (9/27/2023)    Transportation Needs     Within the past 12 months, has lack of transportation kept you from medical appointments, getting your medicines, non-medical meetings or appointments, work, or from getting things that you need?: No   Physical Activity: Not on file   Interpersonal Safety: Unknown (10/21/2024)    Interpersonal Safety     Do you feel physically and emotionally safe where you currently live?: Patient unable to answer     Within the past 12 months, have you been hit, slapped, kicked or otherwise physically hurt by someone?: Patient unable to answer     Within the past 12 months, have you been humiliated or  "emotionally abused in other ways by your partner or ex-partner?: Patient unable to answer   Stress: Not on file   Social Connections: Not on file   Health Literacy: Not on file       Functional Status:  Prior to admission patient needed assistance:   Dependent ADLs:: Bathing, Dressing, Grooming, Incontinence, Positioning, Transfers  Dependent IADLs:: Cleaning, Cooking, Laundry, Shopping, Meal Preparation, Medication Management, Transportation  Assessment of Functional Status: Not at  functional baseline    Mental Health Status:  Mental Health Status: No Current Concerns       Chemical Dependency Status:  Chemical Dependency Status: No Current Concerns           Values/Beliefs:  Spiritual, Cultural Beliefs, Methodist Practices, Values that affect care: no             Discussed  Partnership in Safe Discharge Planning  document with patient/family: No    Additional Information:  Writer spoke with pt's daughter Idalmis by phone to introduce Care Management(CM), obtain an initial assessment, and offer discharge support. Idalmis states pt resides in a house with his wife and adult daughters who assist with I/ADL support as needed as family and also as PCA. Idalmis stated no concerns in regard to pt returning home at time of discharge.     11/14 per THOM Saunders-\"59-year-old male with history of prior CVA and significant deficit.  Patient has dysphagia and is normally fed through G-tube.  Patient had G-tube replaced 2 weeks ago and  to does appear to be functioning well but the site is excoriated and irritated around where the help has been  rubbing on the skin.  There is no signs of erythema but it is significantly tender there to patient.  He does moan with pain and family helps interpret these responses.  Patient also had an episode of possible vomiting or just heavy coughing after 1 week of coughing.  He was recently diagnosed with pneumonia at urgent care several weeks ago but possibly this is persisting bronchitis.  Patient's " "daughter and I discussed imaging which she reports typically they missed pneumonias on him with x-ray which they did at urgent care and she is not sure if he may need CT scan.  I discussed possibly getting  this but also checking the abdomen given the reported pain from the G-tube.  Labs were obtained and showed nolactic acidosis but patient did have leukocytosis consistent with likely infectious process.  Hemoglobin was stable, metabolic panel did show some slight dehydration and IV fluids were given.  Additionally EKG showed sinus tachycardia and troponin was negative.  Procalcitonin was normal but patient was febrile with leukocytosis and imaging which proceeded with.  Blood cultures also obtained and after imaging returned with no acute abnormality noted from the G-tube I feel some of the pain may be related to that excoriation at the G-tube site.  There is no infection that I can see there on the skin and no fluid pocket or abscess underneath the skin on the CT scan.  Additionally lungs are clear on CT with no signs of pneumonia but despite the cough for last week my concern would be for aspiration pneumonia with a fever, leukocytosis, and coughing/vomiting episode tonight.  Patient be started on broad-spectrum antibiotics with Zosyn and vancomycin for coverage of presumed aspiration pneumonia.  Still pending urinalysis which will be obtained before antibiotics were started along with the blood culture will be sent off.  Patient will require admission was discussed with resident service for admission.\"    Next Steps:   No pending consults at this time. Anticipate improvement and return home to the care of family/PCA assistance. CM to follow for changes in current anticipated discharge disposition. Family to transport.    Moris Young RN      "

## 2024-11-15 NOTE — CONSULTS
CLINICAL NUTRITION SERVICES - ASSESSMENT NOTE     Nutrition Prescription    RECOMMENDATIONS FOR MDs/PROVIDERS TO ORDER:  None    Malnutrition Status:    Patient does not meet two of the above criteria necessary for diagnosing malnutrition    Recommendations already ordered by Registered Dietitian (RD):  Prior to feeds starting:  -Obtain and verify enteral access.  -Obtain baseline K+, Mag and Phos labs and ensure they are WNL.   -Patient is hemodynamically stable.     Once the above are met, recommend the following TF regimen as below:     1) Vital 1.5 via g-tube at 60 mL/hr x 12 hrs (8am-8pm) and 30 mL x12 hrs (8pm-8am).  At goal rate, formula provides total volume of 1080 mL, 1620 kcal (27 kcal/kg, 100% needs), 73 gm Pro (1.2 gm/kg, 100% needs), 201 gm CHO, 61 gm fat, 6 gm fiber, and 825 mL free water per DW of 59 kg.    2) Free water flushes of 120 mL q 2 hrs (8am-8pm). Total fluid (free water + flushes) = 1545 mL per day.    Future/Additional Recommendations:  Monitor TF tolerance     REASON FOR ASSESSMENT  Liu GIL Malave is a/an 59 year old male assessed by the dietitian for Provider Order - Registered Dietitian to order TF per Medical Nutrition Therapy Guidelines     NUTRITION HISTORY  Dx: shortness of breath and nausea/vomiting (concern for aspiration event)   PMH: hemorrhagic stroke, history of recurrent aspiration pneumonia, oropharyngeal dysphagia, chronic static encephalopathy, severe sepsis, and chronic hepatitis C     Spoke with pt's dtr on phone this AM to confirm about pt's TF regimen details. Pt is still getting 4 cartons of Peptamen 1.5 with Prebio1 per day, with 120 mL FWF q2 during the day. Dtr ok with pt being in the Vital 1.5 formula while admitted at [facility]. At home, pt's TF runs at 60 mL/hr during the day and 30 mL/hr at night to prevent aspiration. Pt's dtr states pt has tolerated TF lately, but has c/o some abdominal pain- dtr did not elaborate on this. Pt dtr asked about when TF could start, as  "pt did not receive TF overnight, and is likely hungry.     CURRENT NUTRITION ORDERS  Diet: NPO  Nutrition Support: Enteral nutrition via GJ-tube  TF regimen from previous admission on :  Vital 1.5 via PEG at 45 mL/hr x 24 hrs + 2 pkt Nutrisource Fiber.  At goal rate, formula provides total volume of 1080 mL, 1650 kcal (30 kcal/kg, 100% needs), 73 gm Pro (1.3 gm/kg, 100% needs), 201 gm CHO, 61 gm fat, 6 gm fiber, and 825 mL free water per DW of 54 kg    Per H&P: \"CT imaging demonstrates GJ tube within the proximal jejunum.  Per family's report there was a history of dislodgment of a previous GJ tube.  Per family report, current placement appears to be more mobile and requires more effort than previous GJ tubes to be secured down. Per patient's family there is concern of increased pain at the site of the GJ tube and that there is some irritation at the skin.\"    LABS  Labs reviewed  BUN: 28.7 (H)  Cr: 1.34 (H)  Ma.5 (H) on     MEDICATIONS  Medications reviewed    GI:  LBM not on file    ANTHROPOMETRICS  Height: 157.5 cm (5' 2\")  Most Recent Weight: 59 kg (130 lb)    IBW: 52.2 kg  BMI: Normal BMI  Weight History:   24 : 59 kg (130 lb)  24 : 54 kg (119 lb)  24 : 59 kg (130 lb)  10/25/23 : 60.3 kg (133 lb)  23 : 54.1 kg (119 lb 4.3 oz)  23 : 54 kg (119 lb)    Dosing Weight: 59 kg actual body wt    ASSESSED NUTRITION NEEDS  Estimated Energy Needs: 9279-4491 kcals/day (25 - 30 kcals/kg)  Justification: Maintenance  Estimated Protein Needs: 47-59 grams protein/day (0.8 - 1 grams of pro/kg)  Justification: Maintenance  Estimated Fluid Needs: 2277-8862 mL/day (1 mL/kcal)   Justification: Maintenance    MALNUTRITION:  % Weight Loss:  None noted  % Intake:  No decreased intake noted  Subcutaneous Fat Loss:  None observed  Muscle Loss:  None observed  Fluid Retention:  None noted  Malnutrition Diagnosis: Patient does not meet two of the above criteria necessary for diagnosing " malnutrition    NUTRITION DIAGNOSIS  Inadequate oral intake related to dysphagia as evidenced by need for nutrition support to meet estimated needs      INTERVENTIONS  Implementation  Enteral Nutrition - Initiate     Goals  Total avg nutritional intake to meet a minimum of 25 kcal/kg and 0.8 g PRO/kg daily (per dosing wt 59 kg).     Monitoring/Evaluation  Progress toward goals will be monitored and evaluated per protocol.

## 2024-11-15 NOTE — PROVIDER NOTIFICATION
BFM: Rm 370 Liu Malave - Benadryl: Family is requesting. Pt's arm are red...per family, that happens any time pt is receiving abx. Pt was prescribed that at last admit. Please advise.     Acknowledged. Will order.

## 2024-11-15 NOTE — ED TRIAGE NOTES
PT is coming in tonight with a cough that has been going on for about a week and productive. Family said that tonight when they were getting him ready for bed he vomited up into his throat but not out. Pt had a Gtube change about three weeks ago and has been rubbing his ABD and saying that it hurts.     In the room PT is making his grunting noise that family says is his pain sound. They also state that when he coughs blood come out of his Gtube    No OTC medication PTA.     HX: hemorrhagic stroke, CVA. Aspiration pneumonia      Triage Assessment (Adult)       Row Name 11/14/24 2036          Triage Assessment    Airway WDL WDL        Respiratory WDL    Respiratory WDL cough     Cough Frequency frequent     Cough Type productive        Skin Circulation/Temperature WDL    Skin Circulation/Temperature WDL WDL        Cardiac WDL    Cardiac WDL WDL        Peripheral/Neurovascular WDL    Peripheral Neurovascular WDL WDL        Cognitive/Neuro/Behavioral WDL    Cognitive/Neuro/Behavioral WDL WDL

## 2024-11-15 NOTE — H&P
[facility]    History and Physical - Hospitalist Service       Date of Admission:  11/14/2024    Assessment & Plan      Liu GIL Malave is a 59 year old male admitted on 11/14/2024. He has a history of hemorrhagic stroke, history of recurrent aspiration pneumonia, oropharyngeal dysphagia, chronic static encephalopathy, severe sepsis, and chronic hepatitis C and is admitted for shortness of breath and nausea/vomiting (concern for aspiration event)    ED course  -CT chest abdomen pelvis with contrast:  Demonstrated no evidence for etiology of patient's abdominal pain.  Central lobar emphysema noted.  Percutaneous GJ tube with tip in proximal jejunum.  -Blood cell count elevated at 15.3  -Creatinine 1.21, near patient's baseline around 1.1  -Magnesium mildly elevated at 2.5  -Procalcitonin reassuring at 0.1  -Lactic acid reassuring at 1.3  -As needed supplemental O2  -Influenza, RSV, COVID-negative  -Troponin and repeat reassuring at 9 and 10 respectively  -Blood culture ordered - pending  -UA ordered-pending collection    Abdominal pain  Nausea vomiting  Leukocytosis  Tachycardia  Concern for aspiration event pneumonitis vs pneumonia  -At this time it is relatively unclear the etiology of patient's leukocytosis and tachycardia, but concerning for potential infectious etiology.  He did have a reported aspiration like event at home this may be a potential  of some of his symptoms.  No clear abdominal etiology to explain his leukocytosis.  -Admit to inpatient  -Cardiac tele  -nutrition consult - patient requires tube feeding  -Continue Zosyn  -1 dose of vancomycin in the emergency department, will defer additional doses of vancomycin to AM rounding team  -Consider additional fluid bolus if delay in tube feeds    GJ tube  -CT imaging demonstrates GJ tube within the proximal jejunum.  Per family's report there was a history of dislodgment of a previous GJ tube.  Per family report, current  placement appears to be more mobile and requires more effort than previous GJ tubes to be secured down. Per patient's family there is concern of increased pain at the site of the GJ tube and that there is some irritation at the skin.  -Consider possible IR consultation for evaluation of GJ tube  -Per ED provider, there was concern about some skin breakdown around the hub of the G-tube  -Wound consult, appreciate expertise  -Continue PTA nystatin      History of CVA, hemorrhagic  -Continue PTA Tylenol  -Continue PTA baclofen  -Continue PTA chest congestion relief  -Continue PTA DuoNeb  -Sinew PTA lidocaine patch  -Continue PTA NebuSal  -Continue PTA Seroquel    History of hyperlipidemia  -Continue PTA simvastatin    History of hypertension  -Hold PTA amlodipine given blood pressures on the softer side    Constipation  PTA MiraLAX    UTI prophylaxis  -Hold PTA cefaclor as patient receiving Zosyn        Diet: Tube feedings  DVT Prophylaxis: Enoxaparin (Lovenox) SQ  Kelly Catheter: Not present  Fluids: tube feeds  Lines: None     Cardiac Monitoring: ACTIVE order. Indication: patient unable to communicate if having angina equivalents  Code Status: Full Code    Clinically Significant Risk Factors Present on Admission                                 # Financial/Environmental Concerns:           Disposition Plan      Expected Discharge Date: 11/17/2024                The patient's care was discussed with the Attending Physician, SAULO Blanc who agreed with assessment and plan as outlined above. Patient will be seen by Dr. Rhodes in the AM .      Glenn Avalos DO  Hospitalist Service  [facility]  Securely message with [employer] (more info)  Text page via [employer] Paging/Directory   ______________________________________________________________________    Chief Complaint   Abdominal pain/ nausea/vomiting concern for aspiration    History is obtained from the patient and patient's daughter    History  of Present Illness   Liu Malave is a 59 year old male who presents with shortness of breath, nausea, and vomiting.  He had a reported aspiration like events at home.  Had G-tube change approximately 3 weeks ago and family thinks he is having more pain and has been rubbing abdomen, indicate abdominal pain.  Family reports productive cough for about the last week.  Difficult to obtain history as patient relatively nonverbal.  Patient's daughter reported no other concerns other than what was noted above      Past Medical History    Past Medical History:   Diagnosis Date    Acute UTI 02/21/2020    Anxiety     Basal ganglia hemorrhage (H) 06/20/2016    Brain compression (H)     Bruxism     Chronic static encephalopathy     Convulsions, unspecified convulsion type (H)     CVA (cerebral vascular accident) (H) 07/2016    Hemorrhagic,     Depression     Dyslipidemia     Elevated troponin     Essential hypertension     Feeding by G-tube (H) 06/04/2020    GERD (gastroesophageal reflux disease)     Hemiplegia of nondominant side, late effect of cerebrovascular disease (H)     Created by Conversion  Replacement Utility updated for latest IMO load    Hemorrhagic stroke (H) 06/05/2016    Hepatitis C carrier (H)     History of ESBL E. coli infection     History of hemorrhagic stroke with residual hemiparesis (H)     Hypertension     Lung nodule 12/05/2016    9 x 9 mm left upper lobe on CXR    MRSA (methicillin resistant staph aureus) culture positive     Periodontal disease     Persistent fever     Pneumonia of both lower lobes due to infectious organism     Respiratory failure with hypoxia (H)     Seizure (H)     Sepsis due to urinary tract infection (H) 06/04/2020    Severe sepsis (H)     Trismus        Past Surgical History   Past Surgical History:   Procedure Laterality Date    aneurysm clipping  2006    BRAIN SURGERY      EXAM UNDER ANESTHESIA, RESTORATIONS, EXTRACTION(S) DENTAL COMPLEX, COMBINED N/A 12/19/2016    Procedure:  COMBINED EXAM UNDER ANESTHESIA, RESTORATIONS, EXTRACTION(S) DENTAL COMPLEX;  Surgeon: Verónica Martinez DDS;  Location: [facility] OR    EXAM UNDER ANESTHESIA, RESTORATIONS, EXTRACTION(S) DENTAL COMPLEX, COMBINED N/A 3/7/2018    Procedure: COMBINED EXAM UNDER ANESTHESIA, RESTORATIONS, EXTRACTION(S) DENTAL COMPLEX;  Kenalog Injection in Bilateral [place], Combined Dental Exam,Radiographs, Three Dental Restorations, Periodontal Therapy and Fluoride Treatment;  Surgeon: Darryl Isbell DDS;  Location: [facility] OR    HH MIDLINE INSERTION  8/11/2017         INJECT STEROID (LOCATION) N/A 3/7/2018    Procedure: INJECT STEROID (LOCATION);  Kenalog Injection In Bilateral [place], Combined Dental Exam,Radiographs,Three Dental Restorations, Periodontal Therapy and Fluoride Treatment;  Surgeon: Liu Conroy DDS;  Location: [facility] OR    IR GASTRO JEJUNOSTOMY TUBE CHANGE  7/19/2016    IR GASTRO JEJUNOSTOMY TUBE CHANGE  9/23/2016    IR GASTRO JEJUNOSTOMY TUBE CHANGE  11/28/2016    IR GASTRO JEJUNOSTOMY TUBE CHANGE  1/27/2017    IR GASTRO JEJUNOSTOMY TUBE CHANGE  4/19/2017    IR GASTRO JEJUNOSTOMY TUBE CHANGE  6/20/2017    IR GASTRO JEJUNOSTOMY TUBE CHANGE  8/7/2017    IR GASTRO JEJUNOSTOMY TUBE CHANGE  8/11/2017    IR GASTRO JEJUNOSTOMY TUBE CHANGE  10/20/2017    IR GASTRO JEJUNOSTOMY TUBE CHANGE  1/2/2018    IR GASTRO JEJUNOSTOMY TUBE CHANGE  2/16/2018    IR GASTRO JEJUNOSTOMY TUBE CHANGE  5/16/2018    IR GASTRO JEJUNOSTOMY TUBE CHANGE  6/11/2018    IR GASTRO JEJUNOSTOMY TUBE CHANGE  9/12/2018    IR GASTRO JEJUNOSTOMY TUBE CHANGE  12/24/2018    IR GASTRO JEJUNOSTOMY TUBE CHANGE  3/18/2019    IR GASTRO JEJUNOSTOMY TUBE CHANGE  4/23/2019    IR GASTRO JEJUNOSTOMY TUBE CHANGE  5/21/2019    IR GASTRO JEJUNOSTOMY TUBE CHANGE  9/9/2019    IR GASTRO JEJUNOSTOMY TUBE CHANGE  9/26/2019    IR GASTRO JEJUNOSTOMY TUBE CHANGE  10/11/2019    IR GASTRO JEJUNOSTOMY TUBE CHANGE  11/21/2019    IR GASTRO JEJUNOSTOMY TUBE CHANGE  11/29/2019    IR GASTRO  JEJUNOSTOMY TUBE CHANGE  2/27/2020    IR GASTRO JEJUNOSTOMY TUBE CHANGE  4/10/2020    IR GASTRO JEJUNOSTOMY TUBE CHANGE  7/2/2020    IR GASTRO JEJUNOSTOMY TUBE CHANGE  8/28/2020    IR GASTRO JEJUNOSTOMY TUBE CHANGE  9/17/2020    IR GASTRO JEJUNOSTOMY TUBE CHANGE  10/29/2020    IR GASTRO JEJUNOSTOMY TUBE CHANGE  11/6/2020    IR GASTRO JEJUNOSTOMY TUBE CHANGE  12/24/2020    IR GASTRO JEJUNOSTOMY TUBE CHANGE  1/9/2021    IR GASTRO JEJUNOSTOMY TUBE CHANGE  6/9/2021    IR GASTRO JEJUNOSTOMY TUBE CHANGE  10/4/2021    IR GASTRO JEJUNOSTOMY TUBE CHANGE  10/18/2021    IR GASTRO JEJUNOSTOMY TUBE CHANGE  1/14/2022    IR GASTRO JEJUNOSTOMY TUBE CHANGE  1/20/2022    IR GASTRO JEJUNOSTOMY TUBE CHANGE  4/19/2022    IR GASTRO JEJUNOSTOMY TUBE CHANGE  6/7/2022    IR GASTRO JEJUNOSTOMY TUBE CHANGE  8/19/2022    IR GASTRO JEJUNOSTOMY TUBE CHANGE  9/13/2022    IR GASTRO JEJUNOSTOMY TUBE CHANGE  10/27/2022    IR GASTRO JEJUNOSTOMY TUBE CHANGE  11/3/2022    IR GASTRO JEJUNOSTOMY TUBE CHANGE  1/18/2023    IR GASTRO JEJUNOSTOMY TUBE CHANGE  4/7/2023    IR GASTRO JEJUNOSTOMY TUBE CHANGE  5/4/2023    IR GASTRO JEJUNOSTOMY TUBE CHANGE  6/21/2023    IR GASTRO JEJUNOSTOMY TUBE CHANGE  8/23/2023    IR GASTRO JEJUNOSTOMY TUBE CHANGE  11/16/2023    IR GASTRO JEJUNOSTOMY TUBE CHANGE  1/8/2024    IR GASTRO JEJUNOSTOMY TUBE CHANGE  4/9/2024    IR GASTRO JEJUNOSTOMY TUBE CHANGE  7/19/2024    IR GASTRO JEJUNOSTOMY TUBE CHANGE  10/21/2024    IR GASTRO JEJUNOSTOMY TUBE PLACEMENT  6/14/2016    IR GJ TUBE REPLACEMENT  12/24/2018    IR GJ TUBE REPLACEMENT  3/18/2019    IR GJ TUBE REPLACEMENT  4/23/2019    IR GJ TUBE REPLACEMENT  5/21/2019    IR GJ TUBE REPLACEMENT  9/9/2019    IR GJ TUBE REPLACEMENT  9/26/2019    IR GJ TUBE REPLACEMENT  10/11/2019    IR GJ TUBE REPLACEMENT  11/21/2019    IR GJ TUBE REPLACEMENT  11/29/2019    IR GJ TUBE REPLACEMENT  2/27/2020    IR GJ TUBE REPLACEMENT  4/10/2020    IR GJ TUBE REPLACEMENT  7/2/2020    IR GJ TUBE REPLACEMENT   8/28/2020    IR GJ TUBE REPLACEMENT  9/17/2020    IR GJ TUBE REPLACEMENT  10/29/2020    IR GJ TUBE REPLACEMENT  11/6/2020    IR GJ TUBE REPLACEMENT  12/24/2020    IR GJ TUBE REPLACEMENT  1/9/2021    IR GJ TUBE REPLACEMENT  6/9/2021    J-G tube      PICC  6/10/2016         TRACHEOSTOMY      Removed       Prior to Admission Medications   Prior to Admission Medications   Prescriptions Last Dose Informant Patient Reported? Taking?   CHEST CONGESTION RELIEF DM  MG/5ML syrup 11/14/2024 Morning  No Yes   Sig: TAKE 10 MLS BY G TUBE ROUTE EVERY 4 HOURS AS NEEDED FOR COUGH.   LIDOCAINE PAIN RELIEF 4 % Patch Past Month  Yes Yes   Sig: Place 1 patch onto the skin daily as needed for moderate pain.   NEBUSAL 3 % neb solution Past Week  No Yes   Sig: TAKE 4 MLS BY NEBULIZATION EVERY 6 HOURS AS NEEDED FOR THICK SPUTUM.   Ostomy Supplies (STOMAHESIVE) PSTE   No No   Sig: Apply topically to skin around ostomy site twice daily and as needed   Polyethylene Glycol 400 (VISINE DRY EYE RELIEF) 1 % SOLN Past Week  No Yes   Sig: Apply 1 drop to eye every 6 hours as needed for dry eyes.   QUEtiapine (SEROQUEL) 25 MG tablet 11/14/2024 at  7:00 PM  No Yes   Sig: TAKE 1 TABLET BY MOUTH AT 7:00PM, MIDNIGHT AND 3:00AM FOR TOTAL OF 75 MG NIGHTLY. OK TO REPEAT AFTER 4 HOURS AND DURING THE DAY AS NEEDED.   Patient taking differently: Place 25 mg into G tube 3 times daily. At 1900, 0000, 0300   QUEtiapine (SEROQUEL) 25 MG tablet Past Week  Yes Yes   Sig: Place 25 mg into G tube every 4 hours as needed (Used during the day PRN agitation/etc. This is in addition to the tablet at 1900, 0000, 0300.)   acetaminophen (TYLENOL) 500 MG tablet 11/13/2024 Morning  No Yes   Sig: Place 2 tablets (1,000 mg) into G tube every 6 hours as needed for mild pain   amLODIPine (NORVASC) 5 MG tablet 11/14/2024 Morning  No Yes   Sig: Take 1 tablet (5 mg total) by mouth daily.   Patient taking differently: Place 5 mg into G tube daily.   baclofen (LIORESAL) 10 MG  tablet 11/14/2024 at  7:00 PM  No Yes   Sig: Place 1 tablet into G tube 4 times daily, in the morning and with each dose of Seroquel.   bisacodyl (DULCOLAX) 10 MG suppository 11/14/2024 Morning  No Yes   Sig: Place 1 suppository (10 mg) rectally daily   Patient taking differently: Place 10 mg rectally daily as needed for constipation.   cefaclor (CECLOR) 250 MG capsule 11/14/2024 Morning  No Yes   Sig: Place 1 capsule (250 mg) into G tube daily UTI ppx   ferrous sulfate 220 (44 Fe) MG/5ML SOLN Past Month  No Yes   Sig: TAKE 5 MLS BY ENTERAL ROUTE DAILY.   Patient taking differently: Place 220 mg into G tube daily. TAKE 5 MLS BY G-TUBE ROUTE DAILY.   ipratropium (ATROVENT) 0.06 % nasal spray Past Month  Yes Yes   Sig: Spray 2 sprays into both nostrils daily as needed for rhinitis   ipratropium - albuterol 0.5 mg/2.5 mg/3 mL (DUONEB) 0.5-2.5 (3) MG/3ML neb solution 11/14/2024 Morning  No Yes   Sig: Take 1 vial (3 mLs) by nebulization every 4 hours.   ketoconazole (NIZORAL) 2 % external cream Past Week  No Yes   Sig: Apply topically 2 times daily to affected area(s).   Patient taking differently: Apply topically 2 times daily as needed for itching or irritation.   meclizine (ANTIVERT) 25 MG tablet 11/13/2024 Morning  No Yes   Sig: Take 1 tablet via G-tube every 6 hours as needed for vertigo   neomycin-bacitracin-polymyxin (NEOSPORIN) 5-400-5000 ointment Past Week  No Yes   Sig: Apply topically 3 times daily as needed (irritation) To J Tube site   nystatin (MYCOSTATIN) 111291 UNIT/GM external cream Past Week  No Yes   Sig: Apply topically 2 times daily as needed (redness)   omeprazole (PRILOSEC) 40 MG DR capsule 11/14/2024 Morning  No Yes   Sig: Take 1 capsule (40 mg) by mouth daily IN G TUBE   ondansetron (ZOFRAN) 4 MG tablet Past Week  No Yes   Sig: Place 1 tablet (4 mg) into G tube every 8 hours as needed for nausea   polyethylene glycol (MIRALAX) 17 GM/Dose powder 11/14/2024 Morning  No Yes   Sig: Give 1 capful via  "G-tube daily as needed for constipation   simvastatin (ZOCOR) 20 MG tablet 2024 at  7:00 PM  No Yes   Sig: TAKE 1 TABLET PER G TUBE ROUTE AT BEDTIME.   triamcinolone (KENALOG) 0.1 % external cream Past Week  No Yes   Sig: Apply topically 2 times daily to affected area(s).   Patient taking differently: Apply topically 2 times daily as needed for irritation.      Facility-Administered Medications: None        Review of Systems    The 10 point Review of Systems is negative other than noted in the HPI or here. ROS reviewed, see HPI for pertinent positives and negatives.  Glenn Avalos,       Social History   I have reviewed this patient's social history and updated it with pertinent information if needed.  Social History     Tobacco Use    Smoking status: Former     Current packs/day: 0.00     Average packs/day: 1 pack/day for 30.0 years (30.0 ttl pk-yrs)     Types: Cigarettes     Start date: 1975     Quit date: 2005     Years since quittin.4     Passive exposure: Past    Smokeless tobacco: Former     Quit date: 2007    Tobacco comments:     quit 2006   Vaping Use    Vaping status: Never Used   Substance Use Topics    Alcohol use: No    Drug use: No         Family History   I have reviewed this patient's family history and updated it with pertinent information if needed.  Family History   Problem Relation Age of Onset    Hypertension Mother     Cancer Father     Other Cancer Father     Kidney Disease Brother          Allergies   Allergies   Allergen Reactions    Diphenhydramine Unknown    Trazodone Other (See Comments)     \"shaking\" per family        Physical Exam   Vital Signs: Temp: 100.1  F (37.8  C)   BP: 131/76 Pulse: 90   Resp: 13 SpO2: 91 %      Weight: 130 lbs 0 oz    Physical Exam  Constitutional:       General: He is not in acute distress.     Appearance: He is not toxic-appearing or diaphoretic.   HENT:      Right Ear: External ear normal.      Left Ear: External ear normal.      " Nose: Nose normal.   Eyes:      General: No scleral icterus.     Conjunctiva/sclera: Conjunctivae normal.   Cardiovascular:      Rate and Rhythm: Normal rate and regular rhythm.      Heart sounds: No murmur heard.     No friction rub. No gallop.   Pulmonary:      Effort: No respiratory distress.      Breath sounds: No wheezing.   Abdominal:      General: There is no distension.      Comments: GJ tube in place - unable to assess skin below hub of tube    Musculoskeletal:      Comments: Chronic contractures of extremities    Skin:     Findings: No rash.   Neurological:      Mental Status: He is alert.      Comments: Nonverbal, but will make grunting sounds          Medical Decision Making     Please see A&P for additional details of medical decision making.      Data     I have personally reviewed the following data over the past 24 hrs:    15.3 (H)  \   11.2 (L)   / 206     140 105 33.2 (H) /  111 (H)   4.6 22 1.21 (H) \     ALT: 22 AST: 31 AP: 75 TBILI: 0.2   ALB: 3.5 TOT PROTEIN: 8.2 LIPASE: N/A     Trop: 10 BNP: N/A     Procal: 0.10 CRP: N/A Lactic Acid: 1.3         Imaging results reviewed over the past 24 hrs:   Recent Results (from the past 24 hours)   CT Chest/Abdomen/Pelvis w Contrast    Narrative    EXAM: CT CHEST/ABDOMEN/PELVIS W CONTRAST  LOCATION: [facility]  DATE: 11/14/2024    INDICATION: abd pain at g tube site and family feels not infusing correctly; cough  COMPARISON: 1/8/2024  TECHNIQUE: CT scan of the chest, abdomen, and pelvis was performed following injection of IV contrast. Multiplanar reformats were obtained. Dose reduction techniques were used.   CONTRAST: Isovue 370 75mL    FINDINGS:   LUNGS AND PLEURA:  Calcified granulomas bilaterally. Centrilobular emphysema. Again noted, mild bronchial wall thickening with debris in the airways. Partially calcified apical scarring has not changed. Linear opacities in the dependent lungs have not   changed.  Stable bilateral lower  lobe predominant scarring or atelectasis.     MEDIASTINUM/AXILLAE: No adenopathy. No thoracic aortic aneurysm. No pericardial effusion. Esophagus unremarkable.    CORONARY ARTERY CALCIFICATION: Moderate.    HEPATOBILIARY: Tiny scattered cysts in the liver stable and unchanged. No follow-up needed. No significant mass or bile duct dilatation. No calcified gallstones. Gallbladder unremarkable.    PANCREAS: No significant mass, duct dilatation, or inflammatory change.    SPLEEN: Normal.    ADRENAL GLANDS: Normal.    KIDNEYS/BLADDER: The bilateral kidneys enhance symmetrically without evidence for hydronephrosis or pyelonephritis. Bilateral renal cysts stable. No follow-up needed. No concerning renal masses. No renal calculi. The bilateral ureters and urinary bladder   are unremarkable.    BOWEL: Percutaneous GJ tube with tip in the proximal jejunum. Nonspecific nonobstructive bowel gas pattern. Appendix within normal limits.    LYMPH NODES: No lymphadenopathy.    VASCULATURE: No abdominal aortic aneurysm. Moderate vascular calcifications abdominal aorta and common iliac arteries.    PELVIC ORGANS: Moderate prostatic hypertrophy.    MUSCULOSKELETAL: No concerning osseous abnormalities. No inguinal or periumbilical ventral hernias.      Impression    IMPRESSION:  1.  No evidence for an etiology for the patient's abdominal pain. Nonspecific nonobstructive bowel gas pattern. No evidence for inguinal or periumbilical ventral hernias.  2.  Centrilobular emphysema with stable scarring in both lungs.  3.  Moderate coronary artery calcification.  4.  Moderate prostatic hypertrophy.  5.  Percutaneous GJ tube with tip in the proximal jejunum.

## 2024-11-16 VITALS
DIASTOLIC BLOOD PRESSURE: 84 MMHG | TEMPERATURE: 97.3 F | RESPIRATION RATE: 16 BRPM | HEART RATE: 91 BPM | SYSTOLIC BLOOD PRESSURE: 158 MMHG | BODY MASS INDEX: 23.65 KG/M2 | WEIGHT: 128.53 LBS | HEIGHT: 62 IN | OXYGEN SATURATION: 97 %

## 2024-11-16 LAB
ANION GAP SERPL CALCULATED.3IONS-SCNC: 12 MMOL/L (ref 7–15)
BUN SERPL-MCNC: 23.1 MG/DL (ref 8–23)
CALCIUM SERPL-MCNC: 7.8 MG/DL (ref 8.8–10.4)
CHLORIDE SERPL-SCNC: 109 MMOL/L (ref 98–107)
CREAT SERPL-MCNC: 1.53 MG/DL (ref 0.67–1.17)
EGFRCR SERPLBLD CKD-EPI 2021: 51 ML/MIN/1.73M2
ERYTHROCYTE [DISTWIDTH] IN BLOOD BY AUTOMATED COUNT: 12.3 % (ref 10–15)
GLUCOSE SERPL-MCNC: 116 MG/DL (ref 70–99)
HCO3 SERPL-SCNC: 23 MMOL/L (ref 22–29)
HCT VFR BLD AUTO: 35.9 % (ref 40–53)
HGB BLD-MCNC: 12 G/DL (ref 13.3–17.7)
MCH RBC QN AUTO: 30.5 PG (ref 26.5–33)
MCHC RBC AUTO-ENTMCNC: 33.4 G/DL (ref 31.5–36.5)
MCV RBC AUTO: 91 FL (ref 78–100)
PLATELET # BLD AUTO: 175 10E3/UL (ref 150–450)
POTASSIUM SERPL-SCNC: 4.1 MMOL/L (ref 3.4–5.3)
RBC # BLD AUTO: 3.94 10E6/UL (ref 4.4–5.9)
SODIUM SERPL-SCNC: 144 MMOL/L (ref 135–145)
WBC # BLD AUTO: 8.1 10E3/UL (ref 4–11)

## 2024-11-16 PROCEDURE — 250N000013 HC RX MED GY IP 250 OP 250 PS 637

## 2024-11-16 PROCEDURE — 80048 BASIC METABOLIC PNL TOTAL CA: CPT

## 2024-11-16 PROCEDURE — 250N000009 HC RX 250: Performed by: FAMILY MEDICINE

## 2024-11-16 PROCEDURE — 250N000013 HC RX MED GY IP 250 OP 250 PS 637: Performed by: FAMILY MEDICINE

## 2024-11-16 PROCEDURE — 250N000011 HC RX IP 250 OP 636

## 2024-11-16 PROCEDURE — 258N000003 HC RX IP 258 OP 636

## 2024-11-16 PROCEDURE — 36415 COLL VENOUS BLD VENIPUNCTURE: CPT

## 2024-11-16 PROCEDURE — 94640 AIRWAY INHALATION TREATMENT: CPT | Mod: 76

## 2024-11-16 PROCEDURE — 999N000157 HC STATISTIC RCP TIME EA 10 MIN

## 2024-11-16 PROCEDURE — 120N000001 HC R&B MED SURG/OB

## 2024-11-16 PROCEDURE — 85014 HEMATOCRIT: CPT

## 2024-11-16 RX ADMIN — QUETIAPINE FUMARATE 25 MG: 25 TABLET ORAL at 23:34

## 2024-11-16 RX ADMIN — BACLOFEN 10 MG: 10 TABLET ORAL at 00:15

## 2024-11-16 RX ADMIN — SIMVASTATIN 20 MG: 20 TABLET, FILM COATED ORAL at 19:44

## 2024-11-16 RX ADMIN — PIPERACILLIN AND TAZOBACTAM 3.38 G: 3; .375 INJECTION, POWDER, FOR SOLUTION INTRAVENOUS at 00:16

## 2024-11-16 RX ADMIN — IPRATROPIUM BROMIDE AND ALBUTEROL SULFATE 3 ML: .5; 3 SOLUTION RESPIRATORY (INHALATION) at 11:46

## 2024-11-16 RX ADMIN — IPRATROPIUM BROMIDE AND ALBUTEROL SULFATE 3 ML: .5; 3 SOLUTION RESPIRATORY (INHALATION) at 19:23

## 2024-11-16 RX ADMIN — PIPERACILLIN AND TAZOBACTAM 3.38 G: 3; .375 INJECTION, POWDER, FOR SOLUTION INTRAVENOUS at 23:33

## 2024-11-16 RX ADMIN — BACLOFEN 10 MG: 10 TABLET ORAL at 19:44

## 2024-11-16 RX ADMIN — IPRATROPIUM BROMIDE AND ALBUTEROL SULFATE 3 ML: .5; 3 SOLUTION RESPIRATORY (INHALATION) at 16:26

## 2024-11-16 RX ADMIN — Medication 220 MG: at 08:45

## 2024-11-16 RX ADMIN — QUETIAPINE FUMARATE 25 MG: 25 TABLET ORAL at 03:13

## 2024-11-16 RX ADMIN — QUETIAPINE FUMARATE 25 MG: 25 TABLET ORAL at 00:24

## 2024-11-16 RX ADMIN — PIPERACILLIN AND TAZOBACTAM 3.38 G: 3; .375 INJECTION, POWDER, FOR SOLUTION INTRAVENOUS at 08:36

## 2024-11-16 RX ADMIN — AMLODIPINE BESYLATE 5 MG: 5 TABLET ORAL at 08:45

## 2024-11-16 RX ADMIN — ENOXAPARIN SODIUM 40 MG: 40 INJECTION SUBCUTANEOUS at 08:45

## 2024-11-16 RX ADMIN — IPRATROPIUM BROMIDE AND ALBUTEROL SULFATE 3 ML: .5; 3 SOLUTION RESPIRATORY (INHALATION) at 07:29

## 2024-11-16 RX ADMIN — BACLOFEN 10 MG: 10 TABLET ORAL at 23:34

## 2024-11-16 RX ADMIN — BACLOFEN 10 MG: 10 TABLET ORAL at 08:45

## 2024-11-16 RX ADMIN — SODIUM BICARBONATE 40 MG: 84 INJECTION, SOLUTION INTRAVENOUS at 08:58

## 2024-11-16 RX ADMIN — QUETIAPINE FUMARATE 25 MG: 25 TABLET ORAL at 19:44

## 2024-11-16 RX ADMIN — SODIUM BICARBONATE 40 MG: 84 INJECTION, SOLUTION INTRAVENOUS at 19:46

## 2024-11-16 RX ADMIN — BACLOFEN 10 MG: 10 TABLET ORAL at 03:13

## 2024-11-16 RX ADMIN — BISACODYL 10 MG: 10 SUPPOSITORY RECTAL at 06:44

## 2024-11-16 RX ADMIN — SODIUM CHLORIDE 500 ML: 9 INJECTION, SOLUTION INTRAVENOUS at 17:45

## 2024-11-16 RX ADMIN — PIPERACILLIN AND TAZOBACTAM 3.38 G: 3; .375 INJECTION, POWDER, FOR SOLUTION INTRAVENOUS at 16:13

## 2024-11-16 NOTE — PROGRESS NOTES
[facility]    Progress Note - Hospitalist Service       Date of Admission:  11/14/2024    Assessment & Plan   Liu GIL Malave is a 59 year old male admitted on 11/14/2024. He has a history of hemorrhagic stroke, history of recurrent aspiration pneumonia, oropharyngeal dysphagia, chronic static encephalopathy, severe sepsis, and chronic hepatitis C, and is admitted for shortness of breath and nausea/vomiting. There are concerns for aspiration event.     Leukocytosis  Concern for aspiration event pneumonitis vs pneumonia  Emphysema on CT  History of recurrent aspiration pneumonia secondary to oropharyngeal dysphagia  Presented with tachycardia and leukocytosis following reported aspiration-like event at home, leading differential of aspiration event pneumonitis vs pneumonia however given history of recurrence suspect this is most likely aspiration pneumonia.  Family has been using suction to remove secretions but this is baseline for him, likely a nidus for future aspiration events.  -Continue cardiac tele  -Nutrition consult - patient requires tube feeding  -Continue Zosyn    CYNTHIA  Baseline creatinine 1.1, uptrending throughout hospitalization to 1.5 today.  Possible this is multifactorial with dehydration, infection, and Zosyn use compounding effect on renal function.  Provided with a small 500 mL bolus today, family concerned with larger boluses as he is quickly third spaces and develops anasarca.  Plan to start the light maintenance fluids overnight, to discontinue in the morning.  UA not consistent with infection on admission, less likely this is pyelonephritis given improving leukocytosis.  -AM BMP  - mL bolus  -NS 75 mL/h for 10 hours     Abdominal pain  GJ tube in place, s/p replacement on 11/15  CT imaging demonstrates GJ tube within the proximal jejunum.  Family thought that it was loose on admission, replaced by IR team.  Site well-appearing.  -Wound consult, appreciate  expertise  -Continue PTA nystatin     Chronic conditions   History of hemorrhagic CVA: PTA Tylenol, PTA baclofen, PTA chest congestion relief, PTA DuoNeb, PTA lidocaine patch, PTA NebuSal, PTA Seroquel  History of hyperlipidemia: PTA simvastatin  History of hypertension: PTA amlodipine.   Constipation: PTA MiraLAX  UTI prophylaxis: Hold PTA cefaclor as patient receiving Zosyn           Diet: Adult Formula Drip Feeding: Continuous Vital 1.5; Gastrostomy; Goal Rate: 60; mL/hr; Please run TF at 60 mL/hr 8am-8pm, and 30 mL/hr 8pm-8am  DVT Prophylaxis: Enoxaparin (Lovenox) SQ  Kelly Catheter: Not present  Fluids: Tube feeds   Lines: None     Cardiac Monitoring: None  Code Status: Full Code      Clinically Significant Risk Factors          # Hyperchloremia: Highest Cl = 109 mmol/L in last 2 days, will monitor as appropriate      # Hypocalcemia: Lowest Ca = 7.8 mg/dL in last 2 days, will monitor and replace as appropriate     # Hypoalbuminemia: Lowest albumin = 3.2 g/dL at 11/15/2024  5:55 AM, will monitor as appropriate                    # Financial/Environmental Concerns: none         Social Drivers of Health   Housing Stability: High Risk (9/27/2023)    Housing Stability     Do you have housing? : No     Are you worried about losing your housing?: No   Tobacco Use: Medium Risk (9/30/2024)    Received from [employer] [facility]    Patient History     Smoking Tobacco Use: Former     Smokeless Tobacco Use: Former   Interpersonal Safety: Unknown (10/21/2024)    Interpersonal Safety     Do you feel physically and emotionally safe where you currently live?: Patient unable to answer     Within the past 12 months, have you been hit, slapped, kicked or otherwise physically hurt by someone?: Patient unable to answer     Within the past 12 months, have you been humiliated or emotionally abused in other ways by your partner or ex-partner?: Patient unable to answer         Disposition Plan   Medically Ready  for Discharge: 24-48 hours      The patient's care was discussed with the Attending Physician, Dr. Natalio Gauthier .    Bruno Grant DO, PGY3  Hospitalist Service  [facility]  Securely message with [employer] (more info)  Text page via [employer] Paging/Directory   ______________________________________________________________________    Interval History   No acute events overnight.  Discussed case with patient's daughter Idalmis -they would like him to come home but understand he is still undergoing treatment.  Given uptake in creatinine and would like to continue to watch him overnight.  No new fevers or chills.  He still has his cough and secretions that they are using suction to manage, similar to how they would at home.  They feel that they can care for him at home but understand we would like to keep him overnight.    Physical Exam   Vital Signs: Temp: 98.7  F (37.1  C) Temp src: Axillary BP: 118/70 Pulse: 91   Resp: 18 SpO2: 95 % O2 Device: None (Room air)    Weight: 128 lbs 8.45 oz    Constitutional: Awake, alert, cooperative, no apparent distress, and appears stated age  Eyes: Lids and lashes normal, conjunctiva normal  Respiratory: Diffuse expiratory wheezing, no rhonchi or crackles auscultated.  No accessory muscle use.  Still having significant clear secretions similar to previous exam that family suctioning.  Cardiovascular: Regular rate and rhythm, normal S1 and S2, no S3 or S4, and no murmur noted  GI: No scars, normal bowel sounds, soft, non-distended, non-tender, no masses palpated, no hepatosplenomegaly  Skin: normal skin color, texture, turgor  Neurologic: Awake, alert. Able to gesture to answer questions.   Neuropsychiatric: General: normal, calm, and normal eye contact       Medical Decision Making   Please see A&P for additional details of medical decision making.      Data     I have personally reviewed the following data over the past 24 hrs:    8.1  \   12.0 (L)   / 175     144 109  (H) 23.1 (H) /  116 (H)   4.1 23 1.53 (H) \       Imaging results reviewed over the past 24 hrs:   No results found for this or any previous visit (from the past 24 hours).

## 2024-11-16 NOTE — PROVIDER NOTIFICATION
11/16/24 1626   Nebulizer Assessment & Treatment   $RT Use ONLY Delivery Method Nebulizer - Additional   Nebulizer Device Face mask   Pretreatment Heart Rate (beats/min) 89   Pretreatment O2 sats - (TCU only) 96   Pretreat Breath Sounds - Bilat - All Lobes clear;coarse   Patient Position semi-Brown's   Breath Sounds Post-Respiratory Treatment   Posttreatment Heart Rate (beats/min) 91   Post treatment O2 Sats - (TCU only) 98   Posttreatment Assessment (SVN) breath sounds unchanged   Signs of Intolerance (SVN) none   Breath Sounds Posttreatment All Fields All Fields   Breath Sounds Posttreatment All Fields coarse;diminished     Duoneb given x3 this shift. Pt frequently moan/groans when listening to breath sounds so difficult assess. Pt on RA and no apparent change post treatment.

## 2024-11-17 VITALS
DIASTOLIC BLOOD PRESSURE: 82 MMHG | HEART RATE: 100 BPM | WEIGHT: 125 LBS | RESPIRATION RATE: 16 BRPM | BODY MASS INDEX: 23 KG/M2 | SYSTOLIC BLOOD PRESSURE: 162 MMHG | OXYGEN SATURATION: 98 % | HEIGHT: 62 IN | TEMPERATURE: 98.7 F

## 2024-11-17 DIAGNOSIS — R05.3 CHRONIC COUGH: ICD-10-CM

## 2024-11-17 DIAGNOSIS — J39.8 CHRONIC MUCUS HYPERSECRETION, RESPIRATORY: ICD-10-CM

## 2024-11-17 DIAGNOSIS — M62.838 MUSCLE SPASM: ICD-10-CM

## 2024-11-17 DIAGNOSIS — Z87.01 HISTORY OF PNEUMONIA: ICD-10-CM

## 2024-11-17 LAB
ANION GAP SERPL CALCULATED.3IONS-SCNC: 10 MMOL/L (ref 7–15)
BUN SERPL-MCNC: 17.3 MG/DL (ref 8–23)
CALCIUM SERPL-MCNC: 8 MG/DL (ref 8.8–10.4)
CHLORIDE SERPL-SCNC: 110 MMOL/L (ref 98–107)
CREAT SERPL-MCNC: 1.3 MG/DL (ref 0.67–1.17)
EGFRCR SERPLBLD CKD-EPI 2021: 63 ML/MIN/1.73M2
ERYTHROCYTE [DISTWIDTH] IN BLOOD BY AUTOMATED COUNT: 12.3 % (ref 10–15)
GLUCOSE SERPL-MCNC: 129 MG/DL (ref 70–99)
HCO3 SERPL-SCNC: 22 MMOL/L (ref 22–29)
HCT VFR BLD AUTO: 34.4 % (ref 40–53)
HGB BLD-MCNC: 11.2 G/DL (ref 13.3–17.7)
MCH RBC QN AUTO: 30.4 PG (ref 26.5–33)
MCHC RBC AUTO-ENTMCNC: 32.6 G/DL (ref 31.5–36.5)
MCV RBC AUTO: 93 FL (ref 78–100)
PLATELET # BLD AUTO: 208 10E3/UL (ref 150–450)
POTASSIUM SERPL-SCNC: 4.1 MMOL/L (ref 3.4–5.3)
RBC # BLD AUTO: 3.69 10E6/UL (ref 4.4–5.9)
SODIUM SERPL-SCNC: 142 MMOL/L (ref 135–145)
WBC # BLD AUTO: 8.7 10E3/UL (ref 4–11)

## 2024-11-17 PROCEDURE — 85027 COMPLETE CBC AUTOMATED: CPT

## 2024-11-17 PROCEDURE — 82310 ASSAY OF CALCIUM: CPT

## 2024-11-17 PROCEDURE — 250N000011 HC RX IP 250 OP 636

## 2024-11-17 PROCEDURE — 94640 AIRWAY INHALATION TREATMENT: CPT

## 2024-11-17 PROCEDURE — 250N000013 HC RX MED GY IP 250 OP 250 PS 637

## 2024-11-17 PROCEDURE — 250N000009 HC RX 250: Performed by: FAMILY MEDICINE

## 2024-11-17 PROCEDURE — 250N000013 HC RX MED GY IP 250 OP 250 PS 637: Performed by: FAMILY MEDICINE

## 2024-11-17 PROCEDURE — 94640 AIRWAY INHALATION TREATMENT: CPT | Mod: 76

## 2024-11-17 PROCEDURE — 999N000157 HC STATISTIC RCP TIME EA 10 MIN

## 2024-11-17 PROCEDURE — 36415 COLL VENOUS BLD VENIPUNCTURE: CPT

## 2024-11-17 PROCEDURE — 80048 BASIC METABOLIC PNL TOTAL CA: CPT

## 2024-11-17 RX ORDER — BISACODYL 10 MG
10 SUPPOSITORY, RECTAL RECTAL DAILY PRN
Qty: 30 SUPPOSITORY | Refills: 0 | Status: SHIPPED | OUTPATIENT
Start: 2024-11-17

## 2024-11-17 RX ORDER — AZITHROMYCIN 250 MG/1
TABLET, FILM COATED ORAL
Qty: 6 TABLET | Refills: 0 | Status: SHIPPED | OUTPATIENT
Start: 2024-11-17 | End: 2024-11-22

## 2024-11-17 RX ORDER — CEFUROXIME AXETIL 250 MG/1
250 TABLET ORAL DAILY
Qty: 30 TABLET | Refills: 0 | Status: SHIPPED | OUTPATIENT
Start: 2024-11-17

## 2024-11-17 RX ORDER — AMOXICILLIN AND CLAVULANATE POTASSIUM 600; 42.9 MG/5ML; MG/5ML
600 POWDER, FOR SUSPENSION ORAL 2 TIMES DAILY
Qty: 70 ML | Refills: 0 | Status: SHIPPED | OUTPATIENT
Start: 2024-11-17 | End: 2024-11-24

## 2024-11-17 RX ORDER — AMLODIPINE BESYLATE 5 MG/1
5 TABLET ORAL DAILY
Qty: 30 TABLET | Refills: 0 | Status: SHIPPED | OUTPATIENT
Start: 2024-11-18

## 2024-11-17 RX ORDER — TRIAMCINOLONE ACETONIDE 1 MG/G
CREAM TOPICAL DAILY PRN
Qty: 30 G | Refills: 0 | Status: SHIPPED | OUTPATIENT
Start: 2024-11-17

## 2024-11-17 RX ORDER — AMOXICILLIN AND CLAVULANATE POTASSIUM 500; 125 MG/1; MG/1
1 TABLET, FILM COATED ORAL 2 TIMES DAILY
Qty: 14 TABLET | Refills: 0 | Status: SHIPPED | OUTPATIENT
Start: 2024-11-17 | End: 2024-11-17

## 2024-11-17 RX ADMIN — ENOXAPARIN SODIUM 40 MG: 40 INJECTION SUBCUTANEOUS at 08:02

## 2024-11-17 RX ADMIN — IPRATROPIUM BROMIDE AND ALBUTEROL SULFATE 3 ML: .5; 3 SOLUTION RESPIRATORY (INHALATION) at 07:30

## 2024-11-17 RX ADMIN — IPRATROPIUM BROMIDE AND ALBUTEROL SULFATE 3 ML: .5; 3 SOLUTION RESPIRATORY (INHALATION) at 11:35

## 2024-11-17 RX ADMIN — SODIUM BICARBONATE 40 MG: 84 INJECTION, SOLUTION INTRAVENOUS at 10:01

## 2024-11-17 RX ADMIN — AMLODIPINE BESYLATE 5 MG: 5 TABLET ORAL at 08:02

## 2024-11-17 RX ADMIN — QUETIAPINE FUMARATE 25 MG: 25 TABLET ORAL at 03:14

## 2024-11-17 RX ADMIN — Medication 220 MG: at 08:02

## 2024-11-17 RX ADMIN — BACLOFEN 10 MG: 10 TABLET ORAL at 03:14

## 2024-11-17 RX ADMIN — PIPERACILLIN AND TAZOBACTAM 3.38 G: 3; .375 INJECTION, POWDER, FOR SOLUTION INTRAVENOUS at 07:53

## 2024-11-17 RX ADMIN — BACLOFEN 10 MG: 10 TABLET ORAL at 08:02

## 2024-11-17 ASSESSMENT — ACTIVITIES OF DAILY LIVING (ADL)
ADLS_ACUITY_SCORE: 0

## 2024-11-17 NOTE — PLAN OF CARE
Problem: Adult Inpatient Plan of Care  Goal: Absence of Hospital-Acquired Illness or Injury  Outcome: Progressing  Intervention: Identify and Manage Fall Risk  Recent Flowsheet Documentation  Taken 11/16/2024 1400 by Althea Trujillo RN  Safety Promotion/Fall Prevention: safety round/check completed  Taken 11/16/2024 1254 by Althea Trujillo RN  Safety Promotion/Fall Prevention: safety round/check completed  Taken 11/16/2024 1200 by Althea Trujillo RN  Safety Promotion/Fall Prevention: safety round/check completed  Taken 11/16/2024 1105 by Althea Trujillo RN  Safety Promotion/Fall Prevention: safety round/check completed  Intervention: Prevent Skin Injury  Recent Flowsheet Documentation  Taken 11/16/2024 1424 by Althea Trujillo RN  Body Position:   turned   supine, head elevated  Taken 11/16/2024 1254 by Althea Trujillo RN  Body Position:   turned   left  Intervention: Prevent Infection  Recent Flowsheet Documentation  Taken 11/16/2024 1200 by Althea Trujillo RN  Infection Prevention:   rest/sleep promoted   single patient room provided   hand hygiene promoted     Problem: Adult Inpatient Plan of Care  Goal: Optimal Comfort and Wellbeing  Outcome: Progressing   Goal Outcome Evaluation:       Pt bed bound, anastasia speaking, continuous feeding at 60mL. Family at bedside. Q2 turning. Q2 hour flushing gtube.                
  Problem: Adult Inpatient Plan of Care  Goal: Optimal Comfort and Wellbeing  Outcome: Progressing     Problem: Enteral Nutrition  Goal: Feeding Tolerance  Outcome: Progressing     Patient bed bound, non verbal, Q2 turns done. Has continuous feeding at 60ml/hr, decreased to 30ml/hr at 8pm. Flushing done.  Family at bedside.  
  Problem: Enteral Nutrition  Goal: Absence of Aspiration Signs and Symptoms  Outcome: Progressing  Intervention: Minimize Aspiration Risk  Recent Flowsheet Documentation  Taken 11/16/2024 1052 by Mery Ma, RN  Oral Care: lip/mouth moisturizer applied  Head of Bed (HOB) Positioning: HOB at 45 degrees  Taken 11/16/2024 0929 by Mery Ma, RN  Head of Bed (HOB) Positioning: HOB at 45 degrees  Taken 11/16/2024 0911 by Mery Ma, RN  Enteral Feeding Safety: tubing labeled as enteral feeding  Oral Care:   teeth brushed   tongue brushed   suction provided  Head of Bed (HOB) Positioning: HOB at 45 degrees   Goal Outcome Evaluation:   Family updated with plan of care.     Pt tolerating turns as evidence by facial expression, relaxation with movements.     Tolerating IV antibiotic as evidence by clear IV site, no irration at site.                       
  Problem: Fever  Goal: Body Temperature in Desired Range  Outcome: Progressing     Problem: Airway Clearance Ineffective  Goal: Effective Airway Clearance  Outcome: Progressing  Intervention: Promote Airway Secretion Clearance  Recent Flowsheet Documentation  Taken 11/15/2024 1000 by Mamta Castro RN  Activity Management: activity adjusted per tolerance  Taken 11/15/2024 0917 by Mamta Castro, RN  Cough And Deep Breathing: done with encouragement   Goal Outcome Evaluation:    Patient Alert, nonverbal due to hx of stroke. Unable to assess orientation. Family at bedside to translate. Pt grunts at times, able to follow with eyes. Congestive cough present, suction in use. G/j tube in place, woc nurse to assess. Patients daughter did not want writer to begin tube feedings as she believes balloon has deflated, writer explained that CT was done yesterday which showed placement was good. Patient's daughter wants tube replaced. Writer let MD know, awaiting response. Male purwick in place, no signs of pain. Skin assessment complete. Continues on IV ABX. Turn and repo q 2hrs. No open areas on coccyx.   
Goal Outcome Evaluation:     Pt is alert. Non-verbal baseline, but pt can make most needs be known via family and using hand signals to answer questions. Pt understands [place]. VE granted to pt. VSS. Pt reports mild abd pain. PRN pain meds given. Enteral feeding started for pt. Pt is bedbound baseline. Q2 turn implemented. No wounds identified. Discharge plan for 11/16/24 pending clinical improvement as determined by provider/s.    
Pt is alert and nonverbal, understands Irish.  Family member at bedside overnight. VSS on RA. Coughing multiple times overnight, needs assistance with suction to clear secretions. G tube in place, feeding running at 30 ml/hr overnight. Meds given via g-tube. contact precautions for ESBL. Bed bound at baseline, Q 2 turns. Suppository given to pt as family reports pt is due for bowel movement.    Problem: Fever  Goal: Body Temperature in Desired Range  Outcome: Progressing     Problem: Airway Clearance Ineffective  Goal: Effective Airway Clearance  Intervention: Promote Airway Secretion Clearance  Recent Flowsheet Documentation  Taken 11/16/2024 0208 by Mouna Villagomez, RN  Activity Management: activity adjusted per tolerance  Taken 11/16/2024 0031 by Mouna Villagomez RN  Cough And Deep Breathing: done with encouragement  Taken 11/16/2024 0014 by Mouna Villagomez, RN  Activity Management: activity adjusted per tolerance     Problem: Adult Inpatient Plan of Care  Goal: Optimal Comfort and Wellbeing  Outcome: Progressing     Problem: Enteral Nutrition  Goal: Absence of Aspiration Signs and Symptoms  Intervention: Minimize Aspiration Risk  Recent Flowsheet Documentation  Taken 11/16/2024 0208 by Mouna Villagomez, RN  Head of Bed (HOB) Positioning: HOB at 30-45 degrees  Taken 11/16/2024 0014 by Mouna Villagomez RN  Head of Bed (HOB) Positioning: HOB at 30-45 degrees                        
Pt is nonverbal, Khmer speaking with family member at bedside overnight. Appears comfortable during shift. Tolerating Q 2 turns. G tube running feeding at 30 ml/hr. Required oral suction intermittently for secretions when coughing.     Problem: Adult Inpatient Plan of Care  Goal: Optimal Comfort and Wellbeing  Outcome: Progressing     Problem: Airway Clearance Ineffective  Goal: Effective Airway Clearance  Outcome: Progressing  Intervention: Promote Airway Secretion Clearance  Recent Flowsheet Documentation  Taken 11/16/2024 6377 by Mouna Villagomez RN  Cough And Deep Breathing: done with encouragement  Activity Management: activity adjusted per tolerance                         
APER

## 2024-11-17 NOTE — PROGRESS NOTES
Patient remains on room air; sats mid 90s. Duoneb given. BS coarse/rhonchi pre/post treatment. Aerations increased post treatment.     William Bradford, RT

## 2024-11-18 RX ORDER — IPRATROPIUM BROMIDE AND ALBUTEROL SULFATE 2.5; .5 MG/3ML; MG/3ML
SOLUTION RESPIRATORY (INHALATION)
Qty: 90 ML | Refills: 0 | Status: SHIPPED | OUTPATIENT
Start: 2024-11-18

## 2024-11-18 RX ORDER — GUAIFENESIN/DEXTROMETHORPHAN 100-10MG/5
SYRUP ORAL
Qty: 237 ML | Refills: 0 | Status: SHIPPED | OUTPATIENT
Start: 2024-11-18

## 2024-11-18 RX ORDER — SODIUM CHLORIDE SOLN NEBU 3% 3 %
NEBU SOLN INHALATION
Qty: 240 ML | Refills: 0 | Status: SHIPPED | OUTPATIENT
Start: 2024-11-18

## 2024-11-18 RX ORDER — BACLOFEN 10 MG/1
TABLET ORAL
Qty: 120 TABLET | Refills: 0 | Status: SHIPPED | OUTPATIENT
Start: 2024-11-18

## 2024-11-18 NOTE — DISCHARGE SUMMARY
[facility]  Discharge Summary - Medicine & Pediatrics       Date of Admission:  11/14/2024  Date of Discharge:  11/17/2024  1:41 PM  Discharging Provider: Dr. Girma Morejon  Discharge Service: Hospitalist Service    Discharge Diagnoses   Aspiration pneumonia  CYNTHIA  GJ tube displacement    Clinically Significant Risk Factors          Follow-ups Needed After Discharge   Follow-up Appointments       Follow-up and recommended labs and tests       Follow up with primary care provider, Steven Rosa, within 7 days to evaluate medication change and for hospital follow- up.  No follow up labs or test are needed.              Unresulted Labs Ordered in the Past 30 Days of this Admission       Date and Time Order Name Status Description    11/14/2024  9:03 PM Blood Culture Peripheral Blood Preliminary         These results will be followed up by Dr. Morejon.     Discharge Disposition   Discharged to home  Condition at discharge: Stable    Hospital Course   Liu GIL Malave was admitted on 11/14/2024 for aspiration pneumonia, CYNTHIA, and GJ tube displacement. He has a history of hemorrhagic stroke, history of recurrent aspiration pneumonia, oropharyngeal dysphagia, chronic static encephalopathy, severe sepsis, and chronic hepatitis C. The following problems were addressed during his hospitalization:    # Aspiration pneumonia secondary to oropharyngeal dysphagia  Patient presented to the ER with productive cough for 1 week, shortness of breath, nausea, and vomiting.  Patient had a reported aspiration like event at home; he has a history of aspiration pneumonia and pneumonitis; family has been using suction to remove secretions but that is baseline for him.  At the ED, he has notable leukocytosis and tachycardia, but CT chest abdomen pelvis with contrast showed no evidence of etiology for the patient's cough other than a central lobar emphysema.  Zosyn was provided during his hospitalization which improved his  leukocytosis and tachycardia.  The patient is discharged with Augmentin 500 mg twice daily and azithromycin 50 mg daily for 7 days.  Of note, the patient is on Ceftin for prophylactic UTI; family was notified to temporarily stop the Ceftin until he finishes the Augmentin and azithromycin.    # CYNTHIA  Baseline creatinine at 1.1.  Uptrending throughout hospitalization up to 1.53 on 11/16.  Patient was provided a gentle maintenance fluids to help improve his creatinine which downtrended to 1.30 prior to discharge.  Patient is continue to make urine.  Urinalysis on admission was not consistent of UTI or pyelonephritis given improving leukocytosis.  Patient's family was instructed to provide free water through his GJ tube to help with his creatinine levels.    # GJ tube displacement  The patient has a GJ tube placement for tube feedings for the past 8 years after his hemorrhagic strokes.  Patient's family reports that he has been rubbing his abdomen more often which is indicative for more pain.  IR was consulted and the GJ tube has been replaced.  Patient reports of no longer having this pain afterwards.    # Noted elevated blood pressures   T throughout this hospitalization, the patient had fluctuating normal to elevated blood pressures; highest systolic BP at 162/82.  Patient currently takes amlodipine 5 mg daily.  Discussed with family to recheck blood pressures in the outpatient setting.    TO DO IN OUTPATIENT SETTING:   -Follow-up with PCP within 7 days for posthospitalization appointment; check BMP to recheck creatinine levels.  Recheck blood pressures.  -Finish Augmentin 500 mg twice daily and azithromycin 50 mg daily for 7 days.    Consultations This Hospital Stay   PHARMACY TO DOSE VANCO  NUTRITION SERVICES ADULT IP CONSULT  WOUND OSTOMY CONTINENCE NURSE  IP CONSULT  CARE MANAGEMENT / SOCIAL WORK IP CONSULT  PHARMACY IP CONSULT  INTERVENTIONAL RADIOLOGY ADULT/PEDS IP CONSULT    Code Status   Full Code       The  patient was discussed with Attending Physician, Dr. Natalio Gauthier.     Girma Morejon DO, PGY1  [facility]   [address] [address]  Phone: [phone]  Fax: [phone]  ______________________________________________________________________    Physical Exam   Vital Signs: Temp: 98.7  F (37.1  C) Temp src: Axillary BP: (!) 162/82 Pulse: 100   Resp: 16 SpO2: 98 % O2 Device: None (Room air)    Weight: 125 lbs .01 oz    Constitutional: Awake, alert, cooperative, no apparent distress, and appears stated age  Eyes: Lids and lashes normal, conjunctiva normal  Respiratory: Diffuse expiratory wheezing, no rhonchi or crackles auscultated.  No accessory muscle use.  Still having significant clear secretions similar to previous exam that family suctioning.  Cardiovascular: Regular rate and rhythm, normal S1 and S2, no S3 or S4, and no murmur noted  GI: GJ tube in place and left upper quadrant.  No scars, normal bowel sounds, soft, non-distended, non-tender, no masses palpated, no hepatosplenomegaly  Skin: normal skin color, texture, turgor  Neurologic: Awake, alert. Able to gesture to answer questions.   Neuropsychiatric: General: normal, calm, and normal eye contact       Primary Care Physician   Steven Rosa    Discharge Orders      Reason for your hospital stay    Aspiration pneumonia   GJ Tube displacement     Follow-up and recommended labs and tests     Follow up with primary care provider, Steven Rosa, within 7 days to evaluate medication change and for hospital follow- up.  No follow up labs or test are needed.     Activity    Your activity upon discharge: activity as tolerated     Diet    Follow this diet upon discharge: Current Diet:Orders Placed This Encounter      Adult Formula Drip Feeding: Continuous Vital 1.5; Gastrostomy; Goal Rate: 60; mL/hr; Please run TF at 60 mL/hr 8am-8pm,  and 30 mL/hr 8pm-8am    Free water order: Use free water to help with kidney function.  Feeding Tube Flush Frequency: Other;120 mL at 0800, 1000, 1200, 1400, 1600, 1800   Additional Free water: 120 mL       Significant Results and Procedures   Most Recent 3 CBC's:  Recent Labs   Lab Test 11/17/24  1029 11/16/24  1337 11/15/24  0555   WBC 8.7 8.1 15.6*   HGB 11.2* 12.0* 11.4*   MCV 93 91 94    175 205     Most Recent 3 BMP's:  Recent Labs   Lab Test 11/17/24  1029 11/16/24  1337 11/15/24  1352 11/15/24  0555    144  --  141   POTASSIUM 4.1 4.1  --  4.5   CHLORIDE 110* 109*  --  108*   CO2 22 23  --  22   BUN 17.3 23.1*  --  28.7*   CR 1.30* 1.53*  --  1.34*   ANIONGAP 10 12  --  11   SAMIR 8.0* 7.8*  --  8.1*   * 116* 99 112*   ,   Results for orders placed or performed during the hospital encounter of 11/14/24   CT Chest/Abdomen/Pelvis w Contrast    Narrative    EXAM: CT CHEST/ABDOMEN/PELVIS W CONTRAST  LOCATION: [facility]  DATE: 11/14/2024    INDICATION: abd pain at g tube site and family feels not infusing correctly; cough  COMPARISON: 1/8/2024  TECHNIQUE: CT scan of the chest, abdomen, and pelvis was performed following injection of IV contrast. Multiplanar reformats were obtained. Dose reduction techniques were used.   CONTRAST: Isovue 370 75mL    FINDINGS:   LUNGS AND PLEURA:  Calcified granulomas bilaterally. Centrilobular emphysema. Again noted, mild bronchial wall thickening with debris in the airways. Partially calcified apical scarring has not changed. Linear opacities in the dependent lungs have not   changed.  Stable bilateral lower lobe predominant scarring or atelectasis.     MEDIASTINUM/AXILLAE: No adenopathy. No thoracic aortic aneurysm. No pericardial effusion. Esophagus unremarkable.    CORONARY ARTERY CALCIFICATION: Moderate.    HEPATOBILIARY: Tiny scattered cysts in the liver stable and unchanged. No follow-up needed. No significant mass or bile duct  dilatation. No calcified gallstones. Gallbladder unremarkable.    PANCREAS: No significant mass, duct dilatation, or inflammatory change.    SPLEEN: Normal.    ADRENAL GLANDS: Normal.    KIDNEYS/BLADDER: The bilateral kidneys enhance symmetrically without evidence for hydronephrosis or pyelonephritis. Bilateral renal cysts stable. No follow-up needed. No concerning renal masses. No renal calculi. The bilateral ureters and urinary bladder   are unremarkable.    BOWEL: Percutaneous GJ tube with tip in the proximal jejunum. Nonspecific nonobstructive bowel gas pattern. Appendix within normal limits.    LYMPH NODES: No lymphadenopathy.    VASCULATURE: No abdominal aortic aneurysm. Moderate vascular calcifications abdominal aorta and common iliac arteries.    PELVIC ORGANS: Moderate prostatic hypertrophy.    MUSCULOSKELETAL: No concerning osseous abnormalities. No inguinal or periumbilical ventral hernias.      Impression    IMPRESSION:  1.  No evidence for an etiology for the patient's abdominal pain. Nonspecific nonobstructive bowel gas pattern. No evidence for inguinal or periumbilical ventral hernias.  2.  Centrilobular emphysema with stable scarring in both lungs.  3.  Moderate coronary artery calcification.  4.  Moderate prostatic hypertrophy.  5.  Percutaneous GJ tube with tip in the proximal jejunum.     IR Gastro Jejunostomy Tube Change    Narrative    [place] [facility]  LOCATION: [facility]  DATE: 11/15/2024    PROCEDURE: FLUOROSCOPIC GUIDED GASTROJEJUNOSTOMY EXCHANGE    INTERVENTIONAL RADIOLOGIST: John Waller MD    INDICATION: Leaking gastrojejunostomy tube requiring replacement.    MODERATE SEDATION: None.    CONTRAST: 20 mL Omnipaque enteric.  ANTIBIOTICS: None.  ADDITIONAL MEDICATIONS: None.    FLUOROSCOPIC TIME: 0.4 minutes.  RADIATION DOSE: Air Kerma: 3 mGy.    COMPLICATIONS: No immediate complications.    STERILE BARRIER TECHNIQUE: Maximum sterile barrier technique was used.  Cutaneous antisepsis was performed at the operative site with application of 2% chlorhexidine and large sterile drape. Prior to the procedure, the  and assistant performed   hand hygiene and wore hat, mask, sterile gown, and sterile gloves during the entire procedure.    PROCEDURE:    Under fluoroscopic guidance, both the gastric and jejunal lumens of the indwelling gastrojejunostomy were injected with contrast and images were obtained. The tube was then exchanged over a stiff Glidewire for a new 18 Fr 45 cm LAURA gastrojejunostomy   which was positioned under fluoroscopic guidance with its distal tip positioned in the proximal jejunum. The retention balloon was inflated with 10 mL of saline mixed with a tiny amount of contrast. Post placement contrast injection of both the gastric   and jejunal lumens was performed.    FINDINGS:  1. Initial injection demonstrates that the gastric and jejunal lumens are patent and in appropriate position. The retention balloon is completely deflated, likely the source of leaking.  2. After exchange, the new gastrojejunostomy is in appropriate position with the gastric lumen emptying into the stomach and jejunal lumen emptying near the ligament of Treitz.      Impression    IMPRESSION:    Successful gastrojejunostomy tube exchange, ready for immediate use.       Discharge Medications   Discharge Medication List as of 11/17/2024  1:08 PM        START taking these medications    Details   azithromycin (ZITHROMAX) 250 MG tablet Take 2 tablets (500 mg) by mouth daily for 1 day, THEN 1 tablet (250 mg) daily for 4 days., Disp-6 tablet, R-0, E-Prescribe      cefuroxime (CEFTIN) 250 MG tablet Place 1 tablet (250 mg) into G tube daily., Disp-30 tablet, R-0, E-PrescribeDo not take until after finishing Augmentin and Azithromycin      amoxicillin-clavulanate (AUGMENTIN) 500-125 MG tablet Take 1 tablet by mouth 2 times daily., Disp-14 tablet, R-0, E-Prescribe           CONTINUE these  medications which have CHANGED    Details   amLODIPine (NORVASC) 5 MG tablet Place 1 tablet (5 mg) into G tube daily., Disp-30 tablet, R-0, E-Prescribe      bisacodyl (DULCOLAX) 10 MG suppository Place 1 suppository (10 mg) rectally daily as needed for constipation., Disp-30 suppository, R-0, E-Prescribe      triamcinolone (KENALOG) 0.1 % external cream Apply topically daily as needed for irritation. Apply to affected area/rashDisp-30 g, [id number]           CONTINUE these medications which have NOT CHANGED    Details   acetaminophen (TYLENOL) 500 MG tablet Place 2 tablets (1,000 mg) into G tube every 6 hours as needed for mild pain, Disp-60 tablet, R-0, E-Prescribe      baclofen (LIORESAL) 10 MG tablet Place 1 tablet into G tube 4 times daily, in the morning and with each dose of Seroquel., Disp-120 tablet, R-0, E-Prescribe      CHEST CONGESTION RELIEF DM  MG/5ML syrup TAKE 10 MLS BY G TUBE ROUTE EVERY 4 HOURS AS NEEDED FOR COUGH., Disp-237 mL, R-0, E-Prescribe      ferrous sulfate 220 (44 Fe) MG/5ML SOLN TAKE 5 MLS BY ENTERAL ROUTE DAILY., Disp-150 mL, R-0, E-PrescribeOrder instructions to provider for this medication are to order as mg of Ferrous Sulfate. Each 5 mL contains 220 mg Ferrous Sulfate = 44 mg elemental Iron.      ipratropium (ATROVENT) 0.06 % nasal spray Spray 2 sprays into both nostrils daily as needed for rhinitis, Historical      ipratropium - albuterol 0.5 mg/2.5 mg/3 mL (DUONEB) 0.5-2.5 (3) MG/3ML neb solution Take 1 vial (3 mLs) by nebulization every 4 hours., Disp-90 mL, R-0, E-Prescribe      ketoconazole (NIZORAL) 2 % external cream Apply topically 2 times daily to affected area(s).Disp-30 g, [id number]      LIDOCAINE PAIN RELIEF 4 % Patch Place 1 patch onto the skin daily as needed for moderate pain., ARETHA, Historical      meclizine (ANTIVERT) 25 MG tablet Take 1 tablet via G-tube every 6 hours as needed for vertigo, Disp-30 tablet, R-11, E-Prescribe      NEBUSAL 3 % neb solution  "TAKE 4 MLS BY NEBULIZATION EVERY 6 HOURS AS NEEDED FOR THICK SPUTUM., Disp-240 mL, R-0, E-Prescribe      neomycin-bacitracin-polymyxin (NEOSPORIN) 5-400-5000 ointment Apply topically 3 times daily as needed (irritation) To J Tube siteDisp-30 g, W-3C-Eyhrmmjrk      nystatin (MYCOSTATIN) 325786 UNIT/GM external cream Apply topically 2 times daily as needed (redness)Disp-30 g, S-9P-Catysszes      omeprazole (PRILOSEC) 40 MG DR capsule Take 1 capsule (40 mg) by mouth daily IN G TUBE, Disp-30 capsule, R-0, E-Prescribe      ondansetron (ZOFRAN) 4 MG tablet Place 1 tablet (4 mg) into G tube every 8 hours as needed for nausea, Disp-30 tablet, R-0, E-Prescribe      polyethylene glycol (MIRALAX) 17 GM/Dose powder Give 1 capful via G-tube daily as needed for constipation, Disp-850 g, R-3, E-Prescribe      Polyethylene Glycol 400 (VISINE DRY EYE RELIEF) 1 % SOLN Apply 1 drop to eye every 6 hours as needed for dry eyes., Disp-15 mL, R-11, E-Prescribe      !! QUEtiapine (SEROQUEL) 25 MG tablet Place 25 mg into G tube every 4 hours as needed (Used during the day PRN agitation/etc. This is in addition to the tablet at 1900, 0000, 0300.), Historical      !! QUEtiapine (SEROQUEL) 25 MG tablet TAKE 1 TABLET BY MOUTH AT 7:00PM, MIDNIGHT AND 3:00AM FOR TOTAL OF 75 MG NIGHTLY. OK TO REPEAT AFTER 4 HOURS AND DURING THE DAY AS NEEDED., Disp-102 tablet, R-5, E-Prescribe      simvastatin (ZOCOR) 20 MG tablet TAKE 1 TABLET PER G TUBE ROUTE AT BEDTIME., Disp-90 tablet, R-1, E-Prescribe      Ostomy Supplies (STOMAHESIVE) PSTE Apply topically to skin around ostomy site twice daily and as needed, Disp-56.7 g, R-3, E-Prescribe       !! - Potential duplicate medications found. Please discuss with provider.        STOP taking these medications       cefaclor (CECLOR) 250 MG capsule Comments:   Reason for Stopping:             Allergies   Allergies   Allergen Reactions    Diphenhydramine Unknown    Trazodone Other (See Comments)     \"shaking\" per " family

## 2024-11-19 ENCOUNTER — PATIENT OUTREACH (OUTPATIENT)
Dept: CARE COORDINATION | Facility: CLINIC | Age: 59
End: 2024-11-19
Payer: COMMERCIAL

## 2024-11-19 NOTE — PROGRESS NOTES
[facility] Care Resource Center Contact  [facility]/Voicemail     Clinical Data: Post-Discharge Outreach     Outreach attempted x 2.  Left message on patient's voicemail, providing [facility]'s central phone number of [phone] ([phone]) for questions/concerns and/or to schedule an appt with an [facility] provider, if they do not have a PCP.      Plan:  [facility] will do no further outreaches at this time.     BARTOLO Wadsworth  [phone]  [facility]     *Connected Care Resource Team does NOT follow patient ongoing. Referrals are identified based on internal discharge reports and the outreach is to ensure patient has an understanding of their discharge instructions.

## 2024-11-20 LAB — BACTERIA BLD CULT: NO GROWTH

## 2024-11-25 ENCOUNTER — HOSPITAL ENCOUNTER (OUTPATIENT)
Dept: INTERVENTIONAL RADIOLOGY/VASCULAR | Facility: CLINIC | Age: 59
Discharge: HOME | End: 2024-11-25
Admitting: RADIOLOGY
Payer: COMMERCIAL

## 2024-11-25 ENCOUNTER — TELEPHONE (OUTPATIENT)
Dept: INTERVENTIONAL RADIOLOGY/VASCULAR | Facility: CLINIC | Age: 59
End: 2024-11-25
Payer: COMMERCIAL

## 2024-11-25 VITALS
SYSTOLIC BLOOD PRESSURE: 174 MMHG | HEART RATE: 104 BPM | RESPIRATION RATE: 18 BRPM | DIASTOLIC BLOOD PRESSURE: 84 MMHG | OXYGEN SATURATION: 96 % | TEMPERATURE: 99.2 F

## 2024-11-25 DIAGNOSIS — R63.30 FEEDING DIFFICULTIES: ICD-10-CM

## 2024-11-25 PROCEDURE — C1769 GUIDE WIRE: HCPCS

## 2024-11-25 PROCEDURE — 250N000009 HC RX 250: Performed by: RADIOLOGY

## 2024-11-25 PROCEDURE — 49452 REPLACE G-J TUBE PERC: CPT

## 2024-11-25 RX ORDER — LIDOCAINE HYDROCHLORIDE 20 MG/ML
JELLY TOPICAL ONCE
Status: COMPLETED | OUTPATIENT
Start: 2024-11-25 | End: 2024-11-25

## 2024-11-25 RX ADMIN — LIDOCAINE HYDROCHLORIDE: 20 JELLY TOPICAL at 15:09

## 2024-11-25 NOTE — TELEPHONE ENCOUNTER
Call transferred from scheduling line.  Patient's daughter Idalmis on line, reporting continued leaking from GJ tube.  Skin breakdown and bleeding at site.    [facility] reports that GJ tube was exchanged d/t leaking on 11/15 and has continued to leak since.  At time of last exchange, she states that treatment team in IR told her that patient may need upsize of tube, but size not available at that time.  18 F x 45 inserted on 11/15/2024.  Idalmis states leaking  is sever and she is changing dressing at site d/t fluid every hour.    Skin at site is red and bleeding.  Patient indicating to family that he has pain at site.    Functionality of tube intact.  Tube flushes and infuses easily.    I have reviewed wound ostomy note from 11/15/2024 and provided Idalmis with recommendations, names of supplies needed.  I have told her to call primary john and request continue wound ostomy care.  She acknowledges understanding.    Secure chat sent to IR [place] and will await recommendations for large volume leaking from GJ tube.      0855-  Orders received from provider for GJ tube exchange.  Scheduling made aware of orders and I have spoken with Idalmis.    Ashley DEL CID  Interventional Radiology RN   [phone]

## 2024-11-25 NOTE — IR NOTE
Patient Name: Liu Malave  Medical Record Number: [id number]  Today's Date: 11/25/2024    Procedure: Image guided gastrojejunostomy tube exchange  Proceduralist: Dr. Dwyer  Pathology present: na    Procedure Start: 1525  Procedure end: 1536  Sedation medications administered: none     : patient with family at bedside    Other Notes: Pt arrived to IR room 1 from Radiology waiting room. Consent reviewed. Pt denies any questions or concerns regarding procedure. Pt positioned supine and monitored per protocol. Pt tolerated procedure without any noted complications. Pt transferred back to IR pre/post 1.    Patient and family arrive today with c/o of leaking from tube site. MD at bedside to evaluate G/J. Plan to replace today with same size 18 Danish 45 cm. Patient and family given teaching on how to appropriately move G/J disc close to patient to keep balloon taught to skin. Patients family asking about wound care, writer repeated information given by triage nurse to reach out to primary team for WOC nurse recommendations but MD stated for patient to keep site clean and dry at all times.     AVANOS LAURA G/J 18 Spanish 45 cm placed today. Lot # [id number] REF  8250-18.     Discharge instructions and AVS declined by family since they get the same paperwork frequently. Patient in own wheelchair back to [facility].

## 2024-12-01 NOTE — PROGRESS NOTES
Nephrology follow up          Patient: Kim Markham MRN: [id number]  SSN: [id number]    YOB: 1976  Age: 48 y.o.  Sex: female      Subjective:   The patient is seen in ICU  On ventilatory support/awake  Hypernatremia Na 147  Off D5 IV fluid  on water flushes via NG  Resolving GUERA  Off single pressor support  Hypokalemia    No past surgical history on file.   No family history on file.  Social History     Tobacco Use    Smoking status: Every Day     Current packs/day: 1.00     Types: Cigarettes    Smokeless tobacco: Not on file   Substance Use Topics    Alcohol use: Not on file      Current Facility-Administered Medications   Medication Dose Route Frequency Provider Last Rate Last Admin    midodrine (PROAMATINE) tablet 10 mg  10 mg Oral TID WC Mee Elizabeth MD   10 mg at 12/01/24 0816    QUEtiapine (SEROQUEL) tablet 25 mg  25 mg Per NG tube BID Mee Elizabeth MD        tamsulosin (FLOMAX) capsule 0.4 mg  0.4 mg Oral Daily Mee Elizabeth MD   0.4 mg at 12/01/24 0818    [Held by provider] potassium chloride (KLOR-CON) 20 MEQ packet 40 mEq  40 mEq Oral BID Mee Elizabeth MD   40 mEq at 11/30/24 2135    amiodarone (CORDARONE) tablet 200 mg  200 mg Oral Daily Mee Elizabeth MD   200 mg at 12/01/24 0818    propofol infusion  5-50 mcg/kg/min IntraVENous Continuous Mee Elizabeth MD 7.6 mL/hr at 12/01/24 0800 10 mcg/kg/min at 12/01/24 0800    aspirin chewable tablet 81 mg  81 mg Oral Daily Mee Elizabeth MD   81 mg at 12/01/24 0819    LORazepam (ATIVAN) injection 1 mg  1 mg IntraVENous Q4H PRN Mee Elizabeth MD   1 mg at 12/01/24 0442    scopolamine (TRANSDERM-SCOP) transdermal patch 1 patch  1 patch TransDERmal Q72H Mee Elizabeth MD   1 patch at 11/28/24 2102    0.9 % sodium chloride infusion   IntraVENous PRN Mee Elizabeth MD        potassium chloride 20 mEq/50 mL IVPB (Central Line)  20 mEq IntraVENous PRN Mee Elizabeth MD        Or     "[facility] Daily Progress     Patient Name: Liu Malave  Date: 7/30/2019  Visit #: 3  PTA visit #:  2  Referral Diagnosis: [unfilled]  Referring provider: Dyllan Banks MD  Visit Diagnosis:     ICD-10-CM    1. History of stroke with residual deficit I69.30    2. Generalized muscle weakness M62.81    3. 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  Pt arrives to the clinic via a WC, semi reclined. Daughter helps with positioning and transfers.   Pt continues to need  max assist with exercises on R LE and min-mod assist on the L.    Patient is benefitting from skilled physical therapy and is making steady progress toward functional goals.  Patient is appropriate to continue with skilled physical therapy intervention, as indicated by initial plan of care.    Goal Status: On going  Pt. will be independent with home exercise program in : 6 weeks    Pt will: Able to stand which will allow patient to do a standing transfer with min to mod A within 12 weeks  Pt will: Able to assist with rolling in bed within 12 weeks  Pt will: Able to perform exercises with min A to mod A which will help gain strength to help with transfers or sit up straight in wheelchair within 12 weeks  Pt will: Able to sit<>stand with mod A as strength has improved within 12 weeks      Plan / Patient Education:     Continue with initial plan of care.  Progress with home program as tolerated.       Subjective:  Pt's daughter states Liu was tired after last session.   He has been compliant with his HEP. Pt's daughter states Liu has had a \"more positive attitude.\"    Daughter Idalmis attended entire session  Objective:     Pt requires max assist with all transfers.    Treatment Today     TREATMENT MINUTES COMMENTS   Evaluation     Self-care/ Home management     Manual therapy     Neuromuscular Re-education   " "  Therapeutic Activity     Therapeutic Exercises 28 Exercises:  - hip flexion B 2 x 10   -hip AB 2 x10  -hip ADD 2x 10  -knee extension 2 x 10  - manual heel stretch 30\" x 2 B  -manual HS stretch B  -manual hip ER and IR stretches    Gait training     Modality__________________                Total 28    Blank areas are intentional and mean the treatment did not include these items.       Lisa Aguilar,CLT  7/30/2019  "

## 2024-12-09 ENCOUNTER — E-VISIT (OUTPATIENT)
Dept: FAMILY MEDICINE | Facility: CLINIC | Age: 59
End: 2024-12-09
Payer: COMMERCIAL

## 2024-12-09 DIAGNOSIS — R19.5 POSITIVE COLORECTAL CANCER SCREENING USING COLOGUARD TEST: Primary | ICD-10-CM

## 2024-12-10 ENCOUNTER — HOSPITAL ENCOUNTER (OUTPATIENT)
Dept: INTERVENTIONAL RADIOLOGY/VASCULAR | Facility: CLINIC | Age: 59
Discharge: HOME | End: 2024-12-10
Attending: NURSE PRACTITIONER | Admitting: RADIOLOGY
Payer: COMMERCIAL

## 2024-12-10 VITALS
DIASTOLIC BLOOD PRESSURE: 95 MMHG | HEART RATE: 94 BPM | TEMPERATURE: 98.2 F | SYSTOLIC BLOOD PRESSURE: 182 MMHG | RESPIRATION RATE: 18 BRPM | OXYGEN SATURATION: 96 %

## 2024-12-10 DIAGNOSIS — Z93.1 GASTROJEJUNAL (GJ) TUBE IN PLACE (H): ICD-10-CM

## 2024-12-10 DIAGNOSIS — R63.30 FEEDING DIFFICULTIES: Primary | ICD-10-CM

## 2024-12-10 DIAGNOSIS — R63.30 FEEDING DIFFICULTIES: ICD-10-CM

## 2024-12-10 PROCEDURE — 49452 REPLACE G-J TUBE PERC: CPT

## 2024-12-10 PROCEDURE — 250N000009 HC RX 250: Performed by: RADIOLOGY

## 2024-12-10 PROCEDURE — C1769 GUIDE WIRE: HCPCS

## 2024-12-10 RX ORDER — LIDOCAINE HYDROCHLORIDE 20 MG/ML
JELLY TOPICAL ONCE
Status: COMPLETED | OUTPATIENT
Start: 2024-12-10 | End: 2024-12-10

## 2024-12-10 RX ADMIN — LIDOCAINE HYDROCHLORIDE 5 ML: 20 JELLY TOPICAL at 14:56

## 2024-12-10 NOTE — PROGRESS NOTES
Patient Name: Liu Malave  Medical Record Number: [id number]  Today's Date: 12/10/2024    Procedure: Image Guided Gastrojejunostomy Tube Change (ruptured balloon)  Proceduralist: Dr. Julian Dwyer  Pathology present: n/a    Procedure Start: 1501  Procedure end: 1515  Sedation medications administered: n/a, Lidocaine 2% gel only     Report given to: [facility]  : AMADA Crawford    Other Notes: Pt arrived to IR room #1 from IR Pre/Post 1. Consent reviewed. Pt denies any questions or concerns regarding procedure. Pt positioned supine and monitored per protocol. Pt tolerated procedure without any noted complications. Pt transferred back to IR Pre / Post 1.    Dr. Dwyer changed 18 fr x 45 Avanos LAURA Gastrojejunostomy Tube over-a-wire, and confirmed placement of tip in jejunostomy with fluoroscopy.

## 2024-12-10 NOTE — DISCHARGE INSTRUCTIONS
Gastrostomy (G) or Gastrojejunostomy (G/J) Tube Exchange Discharge Instructions:   You had a gastrostomy (G) tube or a Gastrojejunostomy Tube (GJ) exchanged.  This tube is often used for nutritional support and medication administration or it can be used for stomach venting.     Care instructions:  - If you received sedation for your procedure, do not drive or operate heavy machinery for the rest of the day.  - Avoid soaking in stagnant water (tub baths, Jacuzzis, pools, lake, or ocean).   - You may shower beginning the day after the tube was exchanged.   - Clean under the disc daily with soap and water. Pat dry under disc and apply new split gauze dressing under disc. Cleaning tube site daily will help prevent infection and skin irritation.  - A small amount of clear tan drainage from the tube exit site can be normal.  - Make sure the disc on the tube fits slightly snug against the skin so that the tube does not move in or out of the body easily.   - If you experience leaking from around tube exit site, the most common cause is that the disc is not tightened against the skin.   - To tighten the disc, pull gently on the tube so the retention balloon (under the skin) is pulled up against the skin under the tube. Push the disc down until it is tight against the skin without a gap between the skin and the disc.  - Flush your tube with 60cc of water twice a day (using a cath tip syringe) to prevent tube from clogging (or follow recommendations from your doctor or dietician if given).    Giving Feedings and Medications:  - Follow-up with your dietician, primary care provider, or oncologist for instructions on tube feedings and medication administration.    - ONLY use specific enteric feedings with your tube (unless otherwise discussed with your dietitian).    - Flush tube at least twice daily with 60ml of water using cath tip syringe unless otherwise instructed by your doctor or dietician.  - Flush the tube before and  after administrating medications and bolus feedings with 60cc of water.    Follow Up:  - Recommend routine 3 month exchanges of feeding tube. Please contact your primary care provider or speak with your dietitian to obtain an order to have your G/GJ tube exchanged. Then contact [facility]'s Medical Imaging scheduling department at [phone] to schedule your G/GJ tube exchange appointment.    Please seek medical evaluation for:  - Fever (greater than 101 F (38.3C).  - Purulent (yellow/green/foul smelling) drainage from tube exit site.   - Significant or worsening abdominal pain.   - Skin that is hot to the touch or significantly reddened at the tube exit site.   - Bleeding at tube exit site.    Call [facility] RN Line at [phone] with questions or if you have any of the following symptoms:  - Tube falls out or felt to be out of position.  - Unable to flush tube.  - Significant leakage around tube site (tube feeding, medications or drainage).  - Significant bleeding at the tube exit site.  - Severe pain at tube exit site.

## 2024-12-11 ENCOUNTER — VIRTUAL VISIT (OUTPATIENT)
Dept: FAMILY MEDICINE | Facility: CLINIC | Age: 59
End: 2024-12-11
Payer: COMMERCIAL

## 2024-12-11 ENCOUNTER — HOSPITAL ENCOUNTER (OUTPATIENT)
Facility: CLINIC | Age: 59
End: 2024-12-11
Attending: INTERNAL MEDICINE | Admitting: INTERNAL MEDICINE
Payer: COMMERCIAL

## 2024-12-11 DIAGNOSIS — R14.2 FLATULENCE, ERUCTATION AND GAS PAIN: ICD-10-CM

## 2024-12-11 DIAGNOSIS — Z86.73 HISTORY OF CVA (CEREBROVASCULAR ACCIDENT): ICD-10-CM

## 2024-12-11 DIAGNOSIS — R19.5 POSITIVE COLORECTAL CANCER SCREENING USING COLOGUARD TEST: Primary | ICD-10-CM

## 2024-12-11 DIAGNOSIS — R14.3 FLATULENCE, ERUCTATION AND GAS PAIN: ICD-10-CM

## 2024-12-11 DIAGNOSIS — R14.1 FLATULENCE, ERUCTATION AND GAS PAIN: ICD-10-CM

## 2024-12-11 DIAGNOSIS — I69.151 SPASTIC HEMIPLEGIA OF RIGHT DOMINANT SIDE AS LATE EFFECT OF NONTRAUMATIC INTRAPARENCHYMAL HEMORRHAGE OF BRAIN (H): ICD-10-CM

## 2024-12-11 PROCEDURE — 99213 OFFICE O/P EST LOW 20 MIN: CPT | Mod: 95 | Performed by: FAMILY MEDICINE

## 2024-12-11 RX ORDER — LIDOCAINE PAIN RELIEF 40 MG/1000MG
1 PATCH TOPICAL DAILY PRN
Status: CANCELLED | OUTPATIENT
Start: 2024-12-11

## 2024-12-11 NOTE — PROGRESS NOTES
Liu is a 59 year old who is being evaluated via a billable video visit.    How would you like to obtain your AVS? MyChart  If the video visit is dropped, the invitation should be resent by: Text to cell phone: [phone]  Will anyone else be joining your video visit? No      Assessment & Plan     Positive colorectal cancer screening using Cologuard test  Positive Cologuard test.  Due to prior CVA with spastic hemiplegia did suggest alternatives to suggested colonoscopy.  GI referral was placed in order to determine need for future colon cancer screening or diagnostic testing.  - Adult GI  Referral - Consult Only    History of CVA (cerebrovascular accident)  History of CVA.  Spastic hemiplegia of right dominant side.  Feeding tube utilized for enteric nutrition.    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    Flatulence, eructation and gas pain  Does describe increased gassiness and will await GI recommendation regarding management.  - Adult GI  Referral - Consult Only            Subjective   Liu is a 59 year old, presenting for the following health issues:  follow up positive cologuard        12/11/2024     2:30 PM   Additional Questions   Roomed by      Video Start Time: 4:22 PM    History of Present Illness       Reason for visit:  Positive result for cologuard test  Symptom onset:  1-3 days ago  Symptoms include:  Sometime abdominal pain  Symptom intensity:  Mild  Symptom progression:  Staying the same  Had these symptoms before:  No  What makes it worse:  Tylenol  What makes it better:  Rest   He is taking medications regularly.               Review of Systems  Constitutional, HEENT, cardiovascular, pulmonary, GI, , musculoskeletal, neuro, skin, endocrine and psych systems are negative, except as otherwise noted.      Objective           Vitals:  No vitals were obtained today due to  virtual visit.    Physical Exam   GENERAL: alert, no distress, and appears older than stated age  RESP: No audible wheeze, cough, or visible cyanosis.    SKIN: Visible skin clear. No significant rash, abnormal pigmentation or lesions.  NEURO: weakness due to hemiplegia from prior CVA  PSYCH: inattentive        Video-Visit Details  Type of service:  Video Visit   Video End Time:4:36 PM  Originating Location (pt. Location): Home    Distant Location (provider location):  On-site  Platform used for Video Visit: Marcio  Signed Electronically by: Steven Rosa MD

## 2024-12-18 ENCOUNTER — TELEPHONE (OUTPATIENT)
Dept: INTERVENTIONAL RADIOLOGY/VASCULAR | Facility: CLINIC | Age: 59
End: 2024-12-18
Payer: COMMERCIAL

## 2024-12-18 ENCOUNTER — HOSPITAL ENCOUNTER (OUTPATIENT)
Dept: INTERVENTIONAL RADIOLOGY/VASCULAR | Facility: CLINIC | Age: 59
Discharge: HOME | End: 2024-12-18
Attending: NURSE PRACTITIONER | Admitting: RADIOLOGY
Payer: COMMERCIAL

## 2024-12-18 DIAGNOSIS — R63.30 FEEDING DIFFICULTIES: ICD-10-CM

## 2024-12-18 PROCEDURE — 250N000009 HC RX 250: Performed by: RADIOLOGY

## 2024-12-18 PROCEDURE — C1769 GUIDE WIRE: HCPCS

## 2024-12-18 PROCEDURE — 49452 REPLACE G-J TUBE PERC: CPT

## 2024-12-18 RX ORDER — LIDOCAINE HYDROCHLORIDE 20 MG/ML
JELLY TOPICAL ONCE
Status: COMPLETED | OUTPATIENT
Start: 2024-12-18 | End: 2024-12-18

## 2024-12-18 RX ADMIN — LIDOCAINE HYDROCHLORIDE: 20 JELLY TOPICAL at 14:03

## 2024-12-18 NOTE — DISCHARGE INSTRUCTIONS
Gastrostomy (G) or Gastrojejunostomy (G/J) Tube Exchange Discharge Instructions:   You had a gastrostomy (G) tube or a Gastrojejunostomy Tube (GJ) exchanged.  This tube is often used for nutritional support and medication administration or it can be used for stomach venting.     Care instructions:  - If you received sedation for your procedure, do not drive or operate heavy machinery for the rest of the day.  - Avoid soaking in stagnant water (tub baths, Jacuzzis, pools, lake, or ocean).   - You may shower beginning the day after the tube was exchanged.   - Clean under the disc daily with soap and water. Pat dry under disc and apply new split gauze dressing under disc. Cleaning tube site daily will help prevent infection and skin irritation.  - A small amount of clear tan drainage from the tube exit site can be normal.  - Make sure the disc on the tube fits slightly snug against the skin so that the tube does not move in or out of the body easily.   - If you experience leaking from around tube exit site, the most common cause is that the disc is not tightened against the skin.   - To tighten the disc, pull gently on the tube so the retention balloon (under the skin) is pulled up against the skin under the tube. Push the disc down until it is tight against the skin without a gap between the skin and the disc.  - Flush your tube with 60cc of water twice a day (using a cath tip syringe) to prevent tube from clogging (or follow recommendations from your doctor or dietician if given).    Giving Feedings and Medications:  - Follow-up with your dietician, primary care provider, or oncologist for instructions on tube feedings and medication administration.    - ONLY use specific enteric feedings with your tube (unless otherwise discussed with your dietitian).    - Flush tube at least twice daily with 60ml of water using cath tip syringe unless otherwise instructed by your doctor or dietician.  - Flush the tube before and  after administrating medications and bolus feedings with 60cc of water.    Follow Up:  - Recommend routine 3 month exchanges of feeding tube. Please contact your primary care provider or speak with your dietitian to obtain an order to have your G/GJ tube exchanged. Then contact [facility]'s Medical Imaging scheduling department at [phone] to schedule your G/GJ tube exchange appointment.    Please seek medical evaluation for:  - Fever (greater than 101 F (38.3C).  - Purulent (yellow/green/foul smelling) drainage from tube exit site.   - Significant or worsening abdominal pain.   - Skin that is hot to the touch or significantly reddened at the tube exit site.   - Bleeding at tube exit site.    Call [facility] IR RN Line at [phone] with questions or if you have any of the following symptoms:  - Tube falls out or felt to be out of position.  - Unable to flush tube.  - Significant leakage around tube site (tube feeding, medications or drainage).  - Significant bleeding at the tube exit site.  - Severe pain at tube exit site.

## 2024-12-18 NOTE — TELEPHONE ENCOUNTER
Idalmis, patient's daughter calling this am.  Idalmis reports that patient GJ-Tube balloon has burst this morning and now large volume of fluid is leaking around at site.  Idalmis states unable to flush tube without all of flush leaking out around tube.    Liu last seen at [facility] on 12/10/24 for urgent exchange of GJ tube d/t ruptured balloon.    Idalmis reports that all medications are finely ground up before administration and they are flushing tube per direction after medication administration.     This tube is used for nutrition and medication administration 100%.    Patient need communicated to [facility] at .  Will await order and confirmation for urgent scheduling.    Patient does not require sedation.    Tuan paged at 0826.    No response from previous page.    Second page to Tuan at 0950 am.    Ashley DEL CID  Interventional Radiology RN

## 2024-12-18 NOTE — IR NOTE
Patient Name: Liu Malave  Medical Record Number: [id number]  Today's Date: 12/18/2024    Procedure: IR GJ tube exchange  Proceduralist: Dr. Botello    Procedure Start: 1404  Procedure end: 1409  Sedation medications administered: N/A  Sedation time: N/A    : Ty    Other Notes: Pt arrived to IR room 1 from Pre/post bay 3. Consent reviewed. Pt denies any questions or concerns regarding procedure. Pt positioned supine and monitored per protocol. Pt tolerated procedure without any noted complications. Pt transferred back to Pre/post bay 3.    Gastrojejunostomy placed, lot #[id number], exp. 7/24/26.    Discharge criteria met. Discharge instructions given and reviewed with patient and family. No further questions or concerns. Pt brought to hospital entrance via wheelchair and d/c'd home with family.

## 2024-12-21 ENCOUNTER — APPOINTMENT (OUTPATIENT)
Dept: CT IMAGING | Facility: CLINIC | Age: 59
End: 2024-12-21
Attending: EMERGENCY MEDICINE
Payer: COMMERCIAL

## 2024-12-21 ENCOUNTER — HOSPITAL ENCOUNTER (INPATIENT)
Facility: CLINIC | Age: 59
LOS: 4 days | Discharge: HOME HEALTH | End: 2024-12-26
Attending: EMERGENCY MEDICINE
Payer: COMMERCIAL

## 2024-12-21 DIAGNOSIS — I69.151 SPASTIC HEMIPLEGIA OF RIGHT DOMINANT SIDE AS LATE EFFECT OF NONTRAUMATIC INTRAPARENCHYMAL HEMORRHAGE OF BRAIN (H): ICD-10-CM

## 2024-12-21 DIAGNOSIS — K59.09 OTHER CONSTIPATION: ICD-10-CM

## 2024-12-21 DIAGNOSIS — B18.2 CHRONIC HEPATITIS C WITHOUT HEPATIC COMA (H): ICD-10-CM

## 2024-12-21 DIAGNOSIS — Z78.9 ON TUBE FEEDING DIET: ICD-10-CM

## 2024-12-21 DIAGNOSIS — D64.9 CHRONIC ANEMIA: ICD-10-CM

## 2024-12-21 DIAGNOSIS — Z93.1 GASTROJEJUNAL (GJ) TUBE IN PLACE (H): ICD-10-CM

## 2024-12-21 DIAGNOSIS — E46 MALNUTRITION, UNSPECIFIED TYPE (H): Primary | ICD-10-CM

## 2024-12-21 DIAGNOSIS — J69.0 ASPIRATION PNEUMONITIS (H): ICD-10-CM

## 2024-12-21 DIAGNOSIS — R13.12 OROPHARYNGEAL DYSPHAGIA: ICD-10-CM

## 2024-12-21 DIAGNOSIS — R41.82 ALTERED MENTAL STATUS, UNSPECIFIED ALTERED MENTAL STATUS TYPE: ICD-10-CM

## 2024-12-21 DIAGNOSIS — R50.9 FEVER IN ADULT: ICD-10-CM

## 2024-12-21 DIAGNOSIS — J18.0 BRONCHOPNEUMONIA: ICD-10-CM

## 2024-12-21 DIAGNOSIS — I10 ESSENTIAL HYPERTENSION: ICD-10-CM

## 2024-12-21 DIAGNOSIS — N18.9 ACUTE ON CHRONIC RENAL INSUFFICIENCY: ICD-10-CM

## 2024-12-21 DIAGNOSIS — N28.9 ACUTE ON CHRONIC RENAL INSUFFICIENCY: ICD-10-CM

## 2024-12-21 DIAGNOSIS — I61.9 HEMORRHAGIC STROKE (H): ICD-10-CM

## 2024-12-21 LAB
ALBUMIN SERPL BCG-MCNC: 3.3 G/DL (ref 3.5–5.2)
ALP SERPL-CCNC: 73 U/L (ref 40–150)
ALT SERPL W P-5'-P-CCNC: 19 U/L (ref 0–70)
ANION GAP SERPL CALCULATED.3IONS-SCNC: 11 MMOL/L (ref 7–15)
APTT PPP: 32 SECONDS (ref 22–38)
AST SERPL W P-5'-P-CCNC: 34 U/L (ref 0–45)
ATRIAL RATE - MUSE: 91 BPM
BASOPHILS # BLD AUTO: 0.1 10E3/UL (ref 0–0.2)
BASOPHILS NFR BLD AUTO: 1 %
BILIRUB SERPL-MCNC: 0.2 MG/DL
BUN SERPL-MCNC: 38.4 MG/DL (ref 8–23)
CALCIUM SERPL-MCNC: 8.6 MG/DL (ref 8.8–10.4)
CHLORIDE SERPL-SCNC: 107 MMOL/L (ref 98–107)
CREAT SERPL-MCNC: 1.28 MG/DL (ref 0.67–1.17)
DIASTOLIC BLOOD PRESSURE - MUSE: 86 MMHG
EGFRCR SERPLBLD CKD-EPI 2021: 64 ML/MIN/1.73M2
EOSINOPHIL # BLD AUTO: 0.5 10E3/UL (ref 0–0.7)
EOSINOPHIL NFR BLD AUTO: 7 %
ERYTHROCYTE [DISTWIDTH] IN BLOOD BY AUTOMATED COUNT: 12.6 % (ref 10–15)
FLUAV RNA SPEC QL NAA+PROBE: NEGATIVE
FLUBV RNA RESP QL NAA+PROBE: NEGATIVE
GLUCOSE SERPL-MCNC: 106 MG/DL (ref 70–99)
HCO3 SERPL-SCNC: 21 MMOL/L (ref 22–29)
HCT VFR BLD AUTO: 32.5 % (ref 40–53)
HGB BLD-MCNC: 11 G/DL (ref 13.3–17.7)
IMM GRANULOCYTES # BLD: 0.1 10E3/UL
IMM GRANULOCYTES NFR BLD: 1 %
INR PPP: 0.96 (ref 0.85–1.15)
INTERPRETATION ECG - MUSE: NORMAL
LIPASE SERPL-CCNC: 78 U/L (ref 13–60)
LYMPHOCYTES # BLD AUTO: 2.2 10E3/UL (ref 0.8–5.3)
LYMPHOCYTES NFR BLD AUTO: 32 %
MCH RBC QN AUTO: 30.8 PG (ref 26.5–33)
MCHC RBC AUTO-ENTMCNC: 33.8 G/DL (ref 31.5–36.5)
MCV RBC AUTO: 91 FL (ref 78–100)
MONOCYTES # BLD AUTO: 0.8 10E3/UL (ref 0–1.3)
MONOCYTES NFR BLD AUTO: 11 %
NEUTROPHILS # BLD AUTO: 3.4 10E3/UL (ref 1.6–8.3)
NEUTROPHILS NFR BLD AUTO: 49 %
NRBC # BLD AUTO: 0 10E3/UL
NRBC BLD AUTO-RTO: 0 /100
P AXIS - MUSE: 66 DEGREES
PLATELET # BLD AUTO: 225 10E3/UL (ref 150–450)
POTASSIUM SERPL-SCNC: 5 MMOL/L (ref 3.4–5.3)
PR INTERVAL - MUSE: 146 MS
PROT SERPL-MCNC: 7.5 G/DL (ref 6.4–8.3)
QRS DURATION - MUSE: 74 MS
QT - MUSE: 350 MS
QTC - MUSE: 430 MS
R AXIS - MUSE: 54 DEGREES
RBC # BLD AUTO: 3.57 10E6/UL (ref 4.4–5.9)
RSV RNA SPEC NAA+PROBE: NEGATIVE
SARS-COV-2 RNA RESP QL NAA+PROBE: NEGATIVE
SODIUM SERPL-SCNC: 139 MMOL/L (ref 135–145)
SYSTOLIC BLOOD PRESSURE - MUSE: 146 MMHG
T AXIS - MUSE: 68 DEGREES
TROPONIN T SERPL HS-MCNC: 15 NG/L
VENTRICULAR RATE- MUSE: 91 BPM
WBC # BLD AUTO: 6.9 10E3/UL (ref 4–11)

## 2024-12-21 PROCEDURE — 82378 CARCINOEMBRYONIC ANTIGEN: CPT | Performed by: FAMILY MEDICINE

## 2024-12-21 PROCEDURE — 85730 THROMBOPLASTIN TIME PARTIAL: CPT | Performed by: EMERGENCY MEDICINE

## 2024-12-21 PROCEDURE — 84484 ASSAY OF TROPONIN QUANT: CPT | Performed by: EMERGENCY MEDICINE

## 2024-12-21 PROCEDURE — 93005 ELECTROCARDIOGRAM TRACING: CPT | Performed by: EMERGENCY MEDICINE

## 2024-12-21 PROCEDURE — 85014 HEMATOCRIT: CPT | Performed by: EMERGENCY MEDICINE

## 2024-12-21 PROCEDURE — 36415 COLL VENOUS BLD VENIPUNCTURE: CPT | Performed by: EMERGENCY MEDICINE

## 2024-12-21 PROCEDURE — 85025 COMPLETE CBC W/AUTO DIFF WBC: CPT | Performed by: EMERGENCY MEDICINE

## 2024-12-21 PROCEDURE — 83690 ASSAY OF LIPASE: CPT | Performed by: EMERGENCY MEDICINE

## 2024-12-21 PROCEDURE — 70450 CT HEAD/BRAIN W/O DYE: CPT

## 2024-12-21 PROCEDURE — 74177 CT ABD & PELVIS W/CONTRAST: CPT

## 2024-12-21 PROCEDURE — 71275 CT ANGIOGRAPHY CHEST: CPT

## 2024-12-21 PROCEDURE — 87637 SARSCOV2&INF A&B&RSV AMP PRB: CPT | Performed by: EMERGENCY MEDICINE

## 2024-12-21 PROCEDURE — 250N000011 HC RX IP 250 OP 636: Performed by: EMERGENCY MEDICINE

## 2024-12-21 PROCEDURE — 80053 COMPREHEN METABOLIC PANEL: CPT | Performed by: EMERGENCY MEDICINE

## 2024-12-21 PROCEDURE — 99285 EMERGENCY DEPT VISIT HI MDM: CPT | Mod: 25

## 2024-12-21 PROCEDURE — 85610 PROTHROMBIN TIME: CPT | Performed by: EMERGENCY MEDICINE

## 2024-12-21 RX ORDER — IOPAMIDOL 755 MG/ML
90 INJECTION, SOLUTION INTRAVASCULAR ONCE
Status: COMPLETED | OUTPATIENT
Start: 2024-12-21 | End: 2024-12-21

## 2024-12-21 RX ADMIN — IOPAMIDOL 90 ML: 755 INJECTION, SOLUTION INTRAVENOUS at 23:15

## 2024-12-21 ASSESSMENT — ACTIVITIES OF DAILY LIVING (ADL)
ADLS_ACUITY_SCORE: 63
ADLS_ACUITY_SCORE: 63

## 2024-12-21 ASSESSMENT — COLUMBIA-SUICIDE SEVERITY RATING SCALE - C-SSRS: IS THE PATIENT NOT ABLE TO COMPLETE C-SSRS: UNABLE TO VERBALIZE

## 2024-12-22 PROBLEM — R41.82 ALTERED MENTAL STATUS, UNSPECIFIED ALTERED MENTAL STATUS TYPE: Status: ACTIVE | Noted: 2024-12-22

## 2024-12-22 PROBLEM — N28.9 ACUTE ON CHRONIC RENAL INSUFFICIENCY: Status: ACTIVE | Noted: 2024-12-22

## 2024-12-22 PROBLEM — N18.9 ACUTE ON CHRONIC RENAL INSUFFICIENCY: Status: ACTIVE | Noted: 2024-12-22

## 2024-12-22 PROBLEM — J18.0 BRONCHOPNEUMONIA: Status: ACTIVE | Noted: 2024-12-22

## 2024-12-22 LAB
ALBUMIN UR-MCNC: 100 MG/DL
ANION GAP SERPL CALCULATED.3IONS-SCNC: 10 MMOL/L (ref 7–15)
APPEARANCE UR: CLEAR
BILIRUB UR QL STRIP: NEGATIVE
BUN SERPL-MCNC: 33.4 MG/DL (ref 8–23)
CALCIUM SERPL-MCNC: 8.2 MG/DL (ref 8.8–10.4)
CEA SERPL-MCNC: 1.7 NG/ML
CHLORIDE SERPL-SCNC: 110 MMOL/L (ref 98–107)
COLOR UR AUTO: COLORLESS
CREAT SERPL-MCNC: 1.24 MG/DL (ref 0.67–1.17)
EGFRCR SERPLBLD CKD-EPI 2021: 66 ML/MIN/1.73M2
ERYTHROCYTE [DISTWIDTH] IN BLOOD BY AUTOMATED COUNT: 12.6 % (ref 10–15)
ERYTHROCYTE [DISTWIDTH] IN BLOOD BY AUTOMATED COUNT: 12.8 % (ref 10–15)
GLUCOSE SERPL-MCNC: 110 MG/DL (ref 70–99)
GLUCOSE UR STRIP-MCNC: NEGATIVE MG/DL
HCO3 SERPL-SCNC: 22 MMOL/L (ref 22–29)
HCT VFR BLD AUTO: 32.3 % (ref 40–53)
HCT VFR BLD AUTO: 32.6 % (ref 40–53)
HGB BLD-MCNC: 10.8 G/DL (ref 13.3–17.7)
HGB BLD-MCNC: 10.9 G/DL (ref 13.3–17.7)
HGB UR QL STRIP: NEGATIVE
KETONES UR STRIP-MCNC: NEGATIVE MG/DL
LEUKOCYTE ESTERASE UR QL STRIP: NEGATIVE
MCH RBC QN AUTO: 30.3 PG (ref 26.5–33)
MCH RBC QN AUTO: 30.4 PG (ref 26.5–33)
MCHC RBC AUTO-ENTMCNC: 33.4 G/DL (ref 31.5–36.5)
MCHC RBC AUTO-ENTMCNC: 33.4 G/DL (ref 31.5–36.5)
MCV RBC AUTO: 91 FL (ref 78–100)
MCV RBC AUTO: 91 FL (ref 78–100)
NITRATE UR QL: NEGATIVE
PH UR STRIP: 7 [PH] (ref 5–7)
PLATELET # BLD AUTO: 222 10E3/UL (ref 150–450)
PLATELET # BLD AUTO: 222 10E3/UL (ref 150–450)
POTASSIUM SERPL-SCNC: 4.5 MMOL/L (ref 3.4–5.3)
RBC # BLD AUTO: 3.56 10E6/UL (ref 4.4–5.9)
RBC # BLD AUTO: 3.59 10E6/UL (ref 4.4–5.9)
RBC URINE: 1 /HPF
SODIUM SERPL-SCNC: 142 MMOL/L (ref 135–145)
SP GR UR STRIP: <1.005 (ref 1–1.03)
T4 FREE SERPL-MCNC: 1.04 NG/DL (ref 0.9–1.7)
TROPONIN T SERPL HS-MCNC: 15 NG/L
TSH SERPL DL<=0.005 MIU/L-ACNC: 4.97 UIU/ML (ref 0.3–4.2)
UROBILINOGEN UR STRIP-MCNC: <2 MG/DL
WBC # BLD AUTO: 7.9 10E3/UL (ref 4–11)
WBC # BLD AUTO: 8.3 10E3/UL (ref 4–11)
WBC URINE: 1 /HPF

## 2024-12-22 PROCEDURE — 85018 HEMOGLOBIN: CPT | Performed by: FAMILY MEDICINE

## 2024-12-22 PROCEDURE — 85018 HEMOGLOBIN: CPT | Performed by: INTERNAL MEDICINE

## 2024-12-22 PROCEDURE — 999N000156 HC STATISTIC RCP CONSULT EA 30 MIN

## 2024-12-22 PROCEDURE — 84439 ASSAY OF FREE THYROXINE: CPT | Performed by: FAMILY MEDICINE

## 2024-12-22 PROCEDURE — 86301 IMMUNOASSAY TUMOR CA 19-9: CPT | Performed by: FAMILY MEDICINE

## 2024-12-22 PROCEDURE — 250N000009 HC RX 250: Performed by: INTERNAL MEDICINE

## 2024-12-22 PROCEDURE — 82374 ASSAY BLOOD CARBON DIOXIDE: CPT | Performed by: INTERNAL MEDICINE

## 2024-12-22 PROCEDURE — 84443 ASSAY THYROID STIM HORMONE: CPT | Performed by: FAMILY MEDICINE

## 2024-12-22 PROCEDURE — 99207 PR NO BILLABLE SERVICE THIS VISIT: CPT | Performed by: FAMILY MEDICINE

## 2024-12-22 PROCEDURE — 94669 MECHANICAL CHEST WALL OSCILL: CPT

## 2024-12-22 PROCEDURE — 36415 COLL VENOUS BLD VENIPUNCTURE: CPT | Performed by: EMERGENCY MEDICINE

## 2024-12-22 PROCEDURE — 258N000003 HC RX IP 258 OP 636: Performed by: FAMILY MEDICINE

## 2024-12-22 PROCEDURE — 96374 THER/PROPH/DIAG INJ IV PUSH: CPT

## 2024-12-22 PROCEDURE — 36415 COLL VENOUS BLD VENIPUNCTURE: CPT | Performed by: FAMILY MEDICINE

## 2024-12-22 PROCEDURE — 99222 1ST HOSP IP/OBS MODERATE 55: CPT | Mod: AI | Performed by: INTERNAL MEDICINE

## 2024-12-22 PROCEDURE — 250N000011 HC RX IP 250 OP 636: Performed by: INTERNAL MEDICINE

## 2024-12-22 PROCEDURE — 250N000013 HC RX MED GY IP 250 OP 250 PS 637: Performed by: INTERNAL MEDICINE

## 2024-12-22 PROCEDURE — 81003 URINALYSIS AUTO W/O SCOPE: CPT | Performed by: EMERGENCY MEDICINE

## 2024-12-22 PROCEDURE — 94640 AIRWAY INHALATION TREATMENT: CPT | Mod: 76

## 2024-12-22 PROCEDURE — 84484 ASSAY OF TROPONIN QUANT: CPT | Performed by: EMERGENCY MEDICINE

## 2024-12-22 PROCEDURE — 80048 BASIC METABOLIC PNL TOTAL CA: CPT | Performed by: INTERNAL MEDICINE

## 2024-12-22 PROCEDURE — 250N000011 HC RX IP 250 OP 636: Performed by: FAMILY MEDICINE

## 2024-12-22 PROCEDURE — 250N000013 HC RX MED GY IP 250 OP 250 PS 637: Performed by: FAMILY MEDICINE

## 2024-12-22 PROCEDURE — 120N000001 HC R&B MED SURG/OB

## 2024-12-22 PROCEDURE — 84132 ASSAY OF SERUM POTASSIUM: CPT | Performed by: INTERNAL MEDICINE

## 2024-12-22 PROCEDURE — 258N000003 HC RX IP 258 OP 636: Performed by: EMERGENCY MEDICINE

## 2024-12-22 PROCEDURE — 999N000157 HC STATISTIC RCP TIME EA 10 MIN

## 2024-12-22 PROCEDURE — 87149 DNA/RNA DIRECT PROBE: CPT | Performed by: FAMILY MEDICINE

## 2024-12-22 PROCEDURE — 36415 COLL VENOUS BLD VENIPUNCTURE: CPT | Performed by: INTERNAL MEDICINE

## 2024-12-22 PROCEDURE — 94640 AIRWAY INHALATION TREATMENT: CPT

## 2024-12-22 PROCEDURE — 250N000009 HC RX 250: Performed by: FAMILY MEDICINE

## 2024-12-22 PROCEDURE — 250N000011 HC RX IP 250 OP 636: Performed by: EMERGENCY MEDICINE

## 2024-12-22 PROCEDURE — 87040 BLOOD CULTURE FOR BACTERIA: CPT | Performed by: FAMILY MEDICINE

## 2024-12-22 RX ORDER — ONDANSETRON 2 MG/ML
4 INJECTION INTRAMUSCULAR; INTRAVENOUS EVERY 6 HOURS PRN
Status: DISCONTINUED | OUTPATIENT
Start: 2024-12-22 | End: 2024-12-26 | Stop reason: HOSPADM

## 2024-12-22 RX ORDER — IPRATROPIUM BROMIDE AND ALBUTEROL SULFATE 2.5; .5 MG/3ML; MG/3ML
3 SOLUTION RESPIRATORY (INHALATION)
Status: DISCONTINUED | OUTPATIENT
Start: 2024-12-22 | End: 2024-12-26 | Stop reason: HOSPADM

## 2024-12-22 RX ORDER — BISACODYL 10 MG
10 SUPPOSITORY, RECTAL RECTAL DAILY PRN
Status: DISCONTINUED | OUTPATIENT
Start: 2024-12-22 | End: 2024-12-26 | Stop reason: HOSPADM

## 2024-12-22 RX ORDER — SODIUM CHLORIDE FOR INHALATION 3 %
4 VIAL, NEBULIZER (ML) INHALATION
Status: DISCONTINUED | OUTPATIENT
Start: 2024-12-22 | End: 2024-12-26 | Stop reason: HOSPADM

## 2024-12-22 RX ORDER — IPRATROPIUM BROMIDE AND ALBUTEROL SULFATE 2.5; .5 MG/3ML; MG/3ML
3 SOLUTION RESPIRATORY (INHALATION) EVERY 4 HOURS PRN
Status: DISCONTINUED | OUTPATIENT
Start: 2024-12-22 | End: 2024-12-26 | Stop reason: HOSPADM

## 2024-12-22 RX ORDER — IPRATROPIUM BROMIDE 42 UG/1
2 SPRAY, METERED NASAL DAILY PRN
Status: DISCONTINUED | OUTPATIENT
Start: 2024-12-22 | End: 2024-12-26 | Stop reason: HOSPADM

## 2024-12-22 RX ORDER — AMOXICILLIN 250 MG
1 CAPSULE ORAL 2 TIMES DAILY PRN
Status: DISCONTINUED | OUTPATIENT
Start: 2024-12-22 | End: 2024-12-26 | Stop reason: HOSPADM

## 2024-12-22 RX ORDER — MECLIZINE HYDROCHLORIDE 25 MG/1
25 TABLET ORAL 3 TIMES DAILY PRN
Status: DISCONTINUED | OUTPATIENT
Start: 2024-12-22 | End: 2024-12-26 | Stop reason: HOSPADM

## 2024-12-22 RX ORDER — SODIUM CHLORIDE 9 MG/ML
INJECTION, SOLUTION INTRAVENOUS CONTINUOUS
Status: DISCONTINUED | OUTPATIENT
Start: 2024-12-22 | End: 2024-12-24

## 2024-12-22 RX ORDER — ACETAMINOPHEN 650 MG/1
650 SUPPOSITORY RECTAL EVERY 4 HOURS PRN
Status: DISCONTINUED | OUTPATIENT
Start: 2024-12-22 | End: 2024-12-26 | Stop reason: HOSPADM

## 2024-12-22 RX ORDER — BACLOFEN 10 MG/1
10 TABLET ORAL ONCE
Status: COMPLETED | OUTPATIENT
Start: 2024-12-22 | End: 2024-12-22

## 2024-12-22 RX ORDER — AMOXICILLIN 250 MG
1 CAPSULE ORAL 2 TIMES DAILY
Status: DISCONTINUED | OUTPATIENT
Start: 2024-12-22 | End: 2024-12-22 | Stop reason: ALTCHOICE

## 2024-12-22 RX ORDER — QUETIAPINE FUMARATE 25 MG/1
25 TABLET, FILM COATED ORAL 3 TIMES DAILY
Status: DISCONTINUED | OUTPATIENT
Start: 2024-12-22 | End: 2024-12-26 | Stop reason: HOSPADM

## 2024-12-22 RX ORDER — BACLOFEN 10 MG/1
20 TABLET ORAL 4 TIMES DAILY
Status: DISCONTINUED | OUTPATIENT
Start: 2024-12-22 | End: 2024-12-23

## 2024-12-22 RX ORDER — NEOMYCIN/BACITRACIN/POLYMYXINB 3.5-400-5K
OINTMENT (GRAM) TOPICAL 3 TIMES DAILY PRN
Status: DISCONTINUED | OUTPATIENT
Start: 2024-12-22 | End: 2024-12-26 | Stop reason: HOSPADM

## 2024-12-22 RX ORDER — SIMVASTATIN 20 MG
20 TABLET ORAL AT BEDTIME
Status: DISCONTINUED | OUTPATIENT
Start: 2024-12-22 | End: 2024-12-26 | Stop reason: HOSPADM

## 2024-12-22 RX ORDER — ENOXAPARIN SODIUM 100 MG/ML
40 INJECTION SUBCUTANEOUS EVERY 24 HOURS
Status: DISCONTINUED | OUTPATIENT
Start: 2024-12-22 | End: 2024-12-26 | Stop reason: HOSPADM

## 2024-12-22 RX ORDER — HYDRALAZINE HYDROCHLORIDE 10 MG/1
10 TABLET, FILM COATED ORAL EVERY 4 HOURS PRN
Status: DISCONTINUED | OUTPATIENT
Start: 2024-12-22 | End: 2024-12-26 | Stop reason: HOSPADM

## 2024-12-22 RX ORDER — QUETIAPINE FUMARATE 25 MG/1
25 TABLET, FILM COATED ORAL EVERY 4 HOURS PRN
Status: DISCONTINUED | OUTPATIENT
Start: 2024-12-22 | End: 2024-12-26 | Stop reason: HOSPADM

## 2024-12-22 RX ORDER — ONDANSETRON 4 MG/1
4 TABLET, ORALLY DISINTEGRATING ORAL EVERY 6 HOURS PRN
Status: DISCONTINUED | OUTPATIENT
Start: 2024-12-22 | End: 2024-12-26 | Stop reason: HOSPADM

## 2024-12-22 RX ORDER — CEFTRIAXONE 1 G/1
1 INJECTION, POWDER, FOR SOLUTION INTRAMUSCULAR; INTRAVENOUS ONCE
Status: COMPLETED | OUTPATIENT
Start: 2024-12-22 | End: 2024-12-22

## 2024-12-22 RX ORDER — HYDRALAZINE HYDROCHLORIDE 20 MG/ML
10 INJECTION INTRAMUSCULAR; INTRAVENOUS EVERY 4 HOURS PRN
Status: DISCONTINUED | OUTPATIENT
Start: 2024-12-22 | End: 2024-12-26 | Stop reason: HOSPADM

## 2024-12-22 RX ORDER — AMOXICILLIN 250 MG
2 CAPSULE ORAL 2 TIMES DAILY PRN
Status: DISCONTINUED | OUTPATIENT
Start: 2024-12-22 | End: 2024-12-26 | Stop reason: HOSPADM

## 2024-12-22 RX ORDER — ACETAMINOPHEN 325 MG/1
650 TABLET ORAL EVERY 4 HOURS PRN
Status: DISCONTINUED | OUTPATIENT
Start: 2024-12-22 | End: 2024-12-26 | Stop reason: HOSPADM

## 2024-12-22 RX ORDER — LIDOCAINE 40 MG/G
CREAM TOPICAL
Status: DISCONTINUED | OUTPATIENT
Start: 2024-12-22 | End: 2024-12-26 | Stop reason: HOSPADM

## 2024-12-22 RX ORDER — ACETAMINOPHEN 325 MG/10.15ML
650 LIQUID ORAL EVERY 4 HOURS PRN
Status: DISCONTINUED | OUTPATIENT
Start: 2024-12-22 | End: 2024-12-26 | Stop reason: HOSPADM

## 2024-12-22 RX ORDER — ACETYLCYSTEINE 200 MG/ML
2 SOLUTION ORAL; RESPIRATORY (INHALATION)
Status: DISCONTINUED | OUTPATIENT
Start: 2024-12-22 | End: 2024-12-26 | Stop reason: HOSPADM

## 2024-12-22 RX ORDER — QUETIAPINE FUMARATE 25 MG/1
25 TABLET, FILM COATED ORAL ONCE
Status: COMPLETED | OUTPATIENT
Start: 2024-12-22 | End: 2024-12-22

## 2024-12-22 RX ORDER — POLYETHYLENE GLYCOL 3350 17 G/17G
17 POWDER, FOR SOLUTION ORAL DAILY PRN
Status: DISCONTINUED | OUTPATIENT
Start: 2024-12-22 | End: 2024-12-26 | Stop reason: HOSPADM

## 2024-12-22 RX ORDER — QUETIAPINE FUMARATE 25 MG/1
25 TABLET, FILM COATED ORAL 3 TIMES DAILY
Status: DISCONTINUED | OUTPATIENT
Start: 2024-12-22 | End: 2024-12-22

## 2024-12-22 RX ORDER — BACLOFEN 10 MG/1
10 TABLET ORAL 4 TIMES DAILY
Status: DISCONTINUED | OUTPATIENT
Start: 2024-12-22 | End: 2024-12-22

## 2024-12-22 RX ORDER — AZITHROMYCIN 500 MG/5ML
500 INJECTION, POWDER, LYOPHILIZED, FOR SOLUTION INTRAVENOUS EVERY 24 HOURS
Status: DISCONTINUED | OUTPATIENT
Start: 2024-12-22 | End: 2024-12-26 | Stop reason: HOSPADM

## 2024-12-22 RX ORDER — GUAIFENESIN/DEXTROMETHORPHAN 100-10MG/5
10 SYRUP ORAL EVERY 4 HOURS PRN
Status: DISCONTINUED | OUTPATIENT
Start: 2024-12-22 | End: 2024-12-26 | Stop reason: HOSPADM

## 2024-12-22 RX ORDER — AMLODIPINE BESYLATE 5 MG/1
5 TABLET ORAL DAILY
Status: DISCONTINUED | OUTPATIENT
Start: 2024-12-22 | End: 2024-12-26 | Stop reason: HOSPADM

## 2024-12-22 RX ORDER — CEFTRIAXONE 1 G/1
1 INJECTION, POWDER, FOR SOLUTION INTRAMUSCULAR; INTRAVENOUS EVERY 24 HOURS
Status: DISCONTINUED | OUTPATIENT
Start: 2024-12-23 | End: 2024-12-23

## 2024-12-22 RX ADMIN — SODIUM CHLORIDE 500 ML: 9 INJECTION, SOLUTION INTRAVENOUS at 16:29

## 2024-12-22 RX ADMIN — IPRATROPIUM BROMIDE AND ALBUTEROL SULFATE 3 ML: .5; 3 SOLUTION RESPIRATORY (INHALATION) at 04:15

## 2024-12-22 RX ADMIN — DOCUSATE SODIUM 100 MG: 50 LIQUID ORAL at 21:36

## 2024-12-22 RX ADMIN — QUETIAPINE FUMARATE 25 MG: 25 TABLET ORAL at 18:08

## 2024-12-22 RX ADMIN — ENOXAPARIN SODIUM 40 MG: 40 INJECTION SUBCUTANEOUS at 04:51

## 2024-12-22 RX ADMIN — SODIUM BICARBONATE 40 MG: 84 INJECTION, SOLUTION INTRAVENOUS at 11:22

## 2024-12-22 RX ADMIN — CEFTRIAXONE 1 G: 1 INJECTION, POWDER, FOR SOLUTION INTRAMUSCULAR; INTRAVENOUS at 02:08

## 2024-12-22 RX ADMIN — SODIUM CHLORIDE: 9 INJECTION, SOLUTION INTRAVENOUS at 19:21

## 2024-12-22 RX ADMIN — SENNOSIDES 5 ML: 8.8 LIQUID ORAL at 21:36

## 2024-12-22 RX ADMIN — QUETIAPINE FUMARATE 25 MG: 25 TABLET ORAL at 04:53

## 2024-12-22 RX ADMIN — BACLOFEN 20 MG: 10 TABLET ORAL at 23:48

## 2024-12-22 RX ADMIN — IPRATROPIUM BROMIDE AND ALBUTEROL SULFATE 3 ML: .5; 3 SOLUTION RESPIRATORY (INHALATION) at 08:55

## 2024-12-22 RX ADMIN — BACLOFEN 20 MG: 10 TABLET ORAL at 18:08

## 2024-12-22 RX ADMIN — AMLODIPINE BESYLATE 5 MG: 5 TABLET ORAL at 10:31

## 2024-12-22 RX ADMIN — SODIUM CHLORIDE 500 ML: 9 INJECTION, SOLUTION INTRAVENOUS at 02:00

## 2024-12-22 RX ADMIN — QUETIAPINE FUMARATE 25 MG: 25 TABLET ORAL at 23:48

## 2024-12-22 RX ADMIN — BACLOFEN 10 MG: 10 TABLET ORAL at 04:51

## 2024-12-22 RX ADMIN — ACETYLCYSTEINE 2 ML: 200 SOLUTION ORAL; RESPIRATORY (INHALATION) at 12:40

## 2024-12-22 RX ADMIN — ACETYLCYSTEINE 2 ML: 200 SOLUTION ORAL; RESPIRATORY (INHALATION) at 19:26

## 2024-12-22 RX ADMIN — IPRATROPIUM BROMIDE AND ALBUTEROL SULFATE 3 ML: .5; 3 SOLUTION RESPIRATORY (INHALATION) at 12:40

## 2024-12-22 RX ADMIN — IPRATROPIUM BROMIDE AND ALBUTEROL SULFATE 3 ML: .5; 3 SOLUTION RESPIRATORY (INHALATION) at 19:26

## 2024-12-22 RX ADMIN — METOPROLOL TARTRATE 12.5 MG: 25 TABLET, FILM COATED ORAL at 21:36

## 2024-12-22 RX ADMIN — AZITHROMYCIN MONOHYDRATE 500 MG: 500 INJECTION, POWDER, LYOPHILIZED, FOR SOLUTION INTRAVENOUS at 10:31

## 2024-12-22 RX ADMIN — IPRATROPIUM BROMIDE AND ALBUTEROL SULFATE 3 ML: .5; 3 SOLUTION RESPIRATORY (INHALATION) at 16:33

## 2024-12-22 RX ADMIN — ACETYLCYSTEINE 2 ML: 200 SOLUTION ORAL; RESPIRATORY (INHALATION) at 16:33

## 2024-12-22 RX ADMIN — ACETAMINOPHEN 650 MG: 325 SOLUTION ORAL at 16:03

## 2024-12-22 RX ADMIN — BISACODYL 10 MG: 10 SUPPOSITORY RECTAL at 09:14

## 2024-12-22 RX ADMIN — BACLOFEN 20 MG: 10 TABLET ORAL at 10:31

## 2024-12-22 RX ADMIN — ACETYLCYSTEINE 2 ML: 200 SOLUTION ORAL; RESPIRATORY (INHALATION) at 08:55

## 2024-12-22 RX ADMIN — SIMVASTATIN 20 MG: 20 TABLET, FILM COATED ORAL at 21:36

## 2024-12-22 RX ADMIN — METOPROLOL TARTRATE 12.5 MG: 25 TABLET, FILM COATED ORAL at 16:03

## 2024-12-22 ASSESSMENT — ACTIVITIES OF DAILY LIVING (ADL)
ADLS_ACUITY_SCORE: 58
ADLS_ACUITY_SCORE: 52
ADLS_ACUITY_SCORE: 58
ADLS_ACUITY_SCORE: 67
ADLS_ACUITY_SCORE: 67
ADLS_ACUITY_SCORE: 58
ADLS_ACUITY_SCORE: 58
ADLS_ACUITY_SCORE: 60
DEPENDENT_IADLS:: CLEANING;COOKING;LAUNDRY;SHOPPING;MEAL PREPARATION;MEDICATION MANAGEMENT;TRANSPORTATION;MONEY MANAGEMENT;INCONTINENCE
ADLS_ACUITY_SCORE: 58
ADLS_ACUITY_SCORE: 60
ADLS_ACUITY_SCORE: 58
ADLS_ACUITY_SCORE: 60
ADLS_ACUITY_SCORE: 67
ADLS_ACUITY_SCORE: 58
ADLS_ACUITY_SCORE: 60
ADLS_ACUITY_SCORE: 58
ADLS_ACUITY_SCORE: 60
ADLS_ACUITY_SCORE: 60
ADLS_ACUITY_SCORE: 67
ADLS_ACUITY_SCORE: 58
ADLS_ACUITY_SCORE: 58

## 2024-12-22 NOTE — ED NOTES
Pt daughter states Pt takes seroquil and baclofin for sleep aids. States pills cruched and entered via feeding tube.

## 2024-12-22 NOTE — PROVIDER NOTIFICATION
12/22/24 0430   RCAT Assessment   Reason for Assessment Other (see comments)  (possible pneumonia, increase in secretions)   Pulmonary Status 3   Surgical Status 0   Chest X-ray 3  (Bilateral atelectasis, centrilobar emphysema)   Respiratory Pattern 1   Mental Status 2  (nonverbal but alert, does communicate some with his family)   Breath Sounds 3   Cough Effectiveness 1   Level of Activity 4   O2 Required for SpO2>=92% 0   Acuity Level (points) 17   Acuity Level  2   Re-eval Interval Guideline Every 3 days   Re-evaluation Date 12/25/24   Clinical Indications/Symptoms   Aerosol Therapy RCAT protocol   Broncho-pulmonary Hygiene History of mucous producing disease   Aerosol Therapy Plan   RT Treatment Nebulizer   Anticholinergic/Beta-Andrenergic Agonist   (Duoneb 4 times/day per home use)   Mucolytic   (Mucomyst 20% 2 mL 4/day, Nebusal PRN every six hours PRN her home use)   Aerosol Treatment Frequency Acuity Level 3: QID/PRN @noc-Mod wheezing/Hx asthma/secretion removal   Broncho-Pulmonary Hygiene Plan   Broncho-Pulmonary Hygiene Treatment   (oral suction set up at bedside, patient has vest therapy at home and will not allow family to deliver)     Patient has a history of a stroke along with aspiration pneumonia.  Patient does Duonebs three to four times a day and has Nebusal every six hours PRN.  Per patient family he has the vest at home and will not allow family to deliver this therapy.  Plan for Duonebs four times/day and Mucomyst 20% 2 mL four times/day.  Patient has a strong productive cough.  Suction set up at bedside.  Reassess in 72 hours.

## 2024-12-22 NOTE — ED NOTES
Pt family state pt had a positive colon cancer screen and wonder if any correlation to abd pain pt is experiencing.

## 2024-12-22 NOTE — ED PROVIDER NOTES
EMERGENCY DEPARTMENT ENCOUNTER      NAME: Liu Malave  AGE: 59 year old male  YOB: 1965  MRN: [id number]  EVALUATION DATE & TIME: 2024  9:20 PM    PCP: Steven Rosa    ED PROVIDER: Bruno Loaiza M.D.      Chief Complaint   Patient presents with    Abdominal Pain         FINAL IMPRESSION:  1.  Acute altered mental status.  2.  Possible bronchitis.  3.  Acute abdominal pain.  4.  Exacerbation of renal insufficiency.      ED COURSE & MEDICAL DECISION MAKIN. I met with the patient to gather history and to perform my initial exam. We discussed plans for the ED course, including diagnostic testing and treatment. PPE worn: cloth mask.  Family is providing history interpreting for the patient tricia Patterson.  They note new flexor contractures of his arms since an admission in November for aspiration pneumonia.  They also note some swelling in the left hand as well as cough and congestion, and abdominal pain.  1:49 AM.  Head CT showing no acute findings.  However he has multiple old thalamic strokes.  He could easily hide a small stroke that is new in that area and we would never know this.  Patient unable to have an MRI as they do not know the composition of his aneurysm clip on his brain plate from previous surgery done in [place].  No evidence for bleeding.  Chest x-ray without pneumonia but CT showing a bronchial thickening consistent with bronchitis.  Patient has been coughing for greater than 1 to 2 weeks.  So we will treat with antibiotics.  Abdomen without acute findings.  Isolated elevation of lipase at 78.  No pancreatitis on CT.  Renal insufficiency is worsened.  Results communicated to the patient.  Plan admit patient to [facility] telemetry inpatient.  2:11 AM.  Spoke with the hospitalist and [facility]    Pertinent Labs & Imaging studies reviewed. (See chart for details)  59 year old male presents to the Emergency Department for evaluation of  abdominal pain.    At the conclusion of the encounter I discussed the results of all of the tests and the disposition. The questions were answered. The patient or family acknowledged understanding and was agreeable with the care plan.              Medical Decision Making  Obtained supplemental history: Family.  Reviewed external records: Both inpatient and outpatient computer records were reviewed.  Care impacted by chronic illness: Hepatitis C, seizures, previous hemorrhagic stroke and hemiplegia  Did you consider but not order tests?: Work up considered but not performed and documented in chart, if applicable  Did you interpret images independently?: Independent interpretation of ECG and images noted in documentation, when applicable.  Consultation discussion with other provider: If admitted, will discuss with admitting service.  Possible admission after evaluation.    MIPS: Not Applicable        MEDICATIONS GIVEN IN THE EMERGENCY:  Medications - No data to display    NEW PRESCRIPTIONS STARTED AT TODAY'S ER VISIT  New Prescriptions    No medications on file          =================================================================    HPI    Patient information was obtained from: Family.    Use of : Family translating [place].        Liu Malave is a 59 year old male with a pertinent history of previous hemorrhagic stroke and hemiplegia who presents to this ED today for evaluation of various complaints.  Family notes cough and congestion, flexion contractures of his arms since an admission in November for aspiration pneumonia as well as abdominal pain.  History provided by family.    He does not identify any waxing or waning symptoms otherwise, exacerbating or alleviating features, associated symptoms except as mentioned.  He otherwise denies any pain related complaints.    REVIEW OF SYSTEMS   Review of Systems reportedly complaining of abdominal pain, cough and congestion, flexion  contractures.    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 coli infection     History of hemorrhagic stroke with residual hemiparesis (H)     Hypertension     Lung nodule 12/05/2016    9 x 9 mm left upper lobe on CXR    MRSA (methicillin resistant staph aureus) culture positive     Periodontal disease     Persistent fever     Pneumonia of both lower lobes due to infectious organism     Respiratory failure with hypoxia (H)     Seizure (H)     Sepsis due to urinary tract infection (H) 06/04/2020    Severe sepsis (H)     Trismus        PAST SURGICAL HISTORY:  Past Surgical History:   Procedure Laterality Date    aneurysm clipping  2006    BRAIN SURGERY      EXAM UNDER ANESTHESIA, RESTORATIONS, EXTRACTION(S) DENTAL COMPLEX, COMBINED N/A 12/19/2016    Procedure: COMBINED EXAM UNDER ANESTHESIA, RESTORATIONS, EXTRACTION(S) DENTAL COMPLEX;  Surgeon: Verónica Martinez DDS;  Location: [facility] OR    EXAM UNDER ANESTHESIA, RESTORATIONS, EXTRACTION(S) DENTAL COMPLEX, COMBINED N/A 3/7/2018    Procedure: COMBINED EXAM UNDER ANESTHESIA, RESTORATIONS, EXTRACTION(S) DENTAL COMPLEX;  Kenalog Injection in Bilateral [place], Combined Dental Exam,Radiographs, Three Dental Restorations, Periodontal Therapy and Fluoride Treatment;  Surgeon: Darryl Isbell DDS;   Location: [facility] OR    HH MIDLINE INSERTION  8/11/2017         INJECT STEROID (LOCATION) N/A 3/7/2018    Procedure: INJECT STEROID (LOCATION);  Kenalog Injection In Bilateral [place], Combined Dental Exam,Radiographs,Three Dental Restorations, Periodontal Therapy and Fluoride Treatment;  Surgeon: Liu Conroy DDS;  Location: [facility] OR    IR GASTRO JEJUNOSTOMY TUBE CHANGE  7/19/2016    IR GASTRO JEJUNOSTOMY TUBE CHANGE  9/23/2016    IR GASTRO JEJUNOSTOMY TUBE CHANGE  11/28/2016    IR GASTRO JEJUNOSTOMY TUBE CHANGE  1/27/2017    IR GASTRO JEJUNOSTOMY TUBE CHANGE  4/19/2017    IR GASTRO JEJUNOSTOMY TUBE CHANGE  6/20/2017    IR GASTRO JEJUNOSTOMY TUBE CHANGE  8/7/2017    IR GASTRO JEJUNOSTOMY TUBE CHANGE  8/11/2017    IR GASTRO JEJUNOSTOMY TUBE CHANGE  10/20/2017    IR GASTRO JEJUNOSTOMY TUBE CHANGE  1/2/2018    IR GASTRO JEJUNOSTOMY TUBE CHANGE  2/16/2018    IR GASTRO JEJUNOSTOMY TUBE CHANGE  5/16/2018    IR GASTRO JEJUNOSTOMY TUBE CHANGE  6/11/2018    IR GASTRO JEJUNOSTOMY TUBE CHANGE  9/12/2018    IR GASTRO JEJUNOSTOMY TUBE CHANGE  12/24/2018    IR GASTRO JEJUNOSTOMY TUBE CHANGE  3/18/2019    IR GASTRO JEJUNOSTOMY TUBE CHANGE  4/23/2019    IR GASTRO JEJUNOSTOMY TUBE CHANGE  5/21/2019    IR GASTRO JEJUNOSTOMY TUBE CHANGE  9/9/2019    IR GASTRO JEJUNOSTOMY TUBE CHANGE  9/26/2019    IR GASTRO JEJUNOSTOMY TUBE CHANGE  10/11/2019    IR GASTRO JEJUNOSTOMY TUBE CHANGE  11/21/2019    IR GASTRO JEJUNOSTOMY TUBE CHANGE  11/29/2019    IR GASTRO JEJUNOSTOMY TUBE CHANGE  2/27/2020    IR GASTRO JEJUNOSTOMY TUBE CHANGE  4/10/2020    IR GASTRO JEJUNOSTOMY TUBE CHANGE  7/2/2020    IR GASTRO JEJUNOSTOMY TUBE CHANGE  8/28/2020    IR GASTRO JEJUNOSTOMY TUBE CHANGE  9/17/2020    IR GASTRO JEJUNOSTOMY TUBE CHANGE  10/29/2020    IR GASTRO JEJUNOSTOMY TUBE CHANGE  11/6/2020    IR GASTRO JEJUNOSTOMY TUBE CHANGE  12/24/2020    IR GASTRO JEJUNOSTOMY TUBE CHANGE  1/9/2021    IR GASTRO JEJUNOSTOMY TUBE CHANGE  6/9/2021    IR GASTRO JEJUNOSTOMY TUBE  CHANGE  10/4/2021    IR GASTRO JEJUNOSTOMY TUBE CHANGE  10/18/2021    IR GASTRO JEJUNOSTOMY TUBE CHANGE  1/14/2022    IR GASTRO JEJUNOSTOMY TUBE CHANGE  1/20/2022    IR GASTRO JEJUNOSTOMY TUBE CHANGE  4/19/2022    IR GASTRO JEJUNOSTOMY TUBE CHANGE  6/7/2022    IR GASTRO JEJUNOSTOMY TUBE CHANGE  8/19/2022    IR GASTRO JEJUNOSTOMY TUBE CHANGE  9/13/2022    IR GASTRO JEJUNOSTOMY TUBE CHANGE  10/27/2022    IR GASTRO JEJUNOSTOMY TUBE CHANGE  11/3/2022    IR GASTRO JEJUNOSTOMY TUBE CHANGE  1/18/2023    IR GASTRO JEJUNOSTOMY TUBE CHANGE  4/7/2023    IR GASTRO JEJUNOSTOMY TUBE CHANGE  5/4/2023    IR GASTRO JEJUNOSTOMY TUBE CHANGE  6/21/2023    IR GASTRO JEJUNOSTOMY TUBE CHANGE  8/23/2023    IR GASTRO JEJUNOSTOMY TUBE CHANGE  11/16/2023    IR GASTRO JEJUNOSTOMY TUBE CHANGE  1/8/2024    IR GASTRO JEJUNOSTOMY TUBE CHANGE  4/9/2024    IR GASTRO JEJUNOSTOMY TUBE CHANGE  7/19/2024    IR GASTRO JEJUNOSTOMY TUBE CHANGE  10/21/2024    IR GASTRO JEJUNOSTOMY TUBE CHANGE  11/15/2024    IR GASTRO JEJUNOSTOMY TUBE CHANGE  11/25/2024    IR GASTRO JEJUNOSTOMY TUBE CHANGE  12/10/2024    IR GASTRO JEJUNOSTOMY TUBE CHANGE  12/18/2024    IR GASTRO JEJUNOSTOMY TUBE PLACEMENT  6/14/2016    IR GJ TUBE REPLACEMENT  12/24/2018    IR GJ TUBE REPLACEMENT  3/18/2019    IR GJ TUBE REPLACEMENT  4/23/2019    IR GJ TUBE REPLACEMENT  5/21/2019    IR GJ TUBE REPLACEMENT  9/9/2019    IR GJ TUBE REPLACEMENT  9/26/2019    IR GJ TUBE REPLACEMENT  10/11/2019    IR GJ TUBE REPLACEMENT  11/21/2019    IR GJ TUBE REPLACEMENT  11/29/2019    IR GJ TUBE REPLACEMENT  2/27/2020    IR GJ TUBE REPLACEMENT  4/10/2020    IR GJ TUBE REPLACEMENT  7/2/2020    IR GJ TUBE REPLACEMENT  8/28/2020    IR GJ TUBE REPLACEMENT  9/17/2020    IR GJ TUBE REPLACEMENT  10/29/2020    IR GJ TUBE REPLACEMENT  11/6/2020    IR GJ TUBE REPLACEMENT  12/24/2020    IR GJ TUBE REPLACEMENT  1/9/2021    IR GJ TUBE REPLACEMENT  6/9/2021    J-G tube      PICC  6/10/2016         TRACHEOSTOMY      Removed  "          CURRENT MEDICATIONS:    acetaminophen (TYLENOL) 500 MG tablet  amLODIPine (NORVASC) 5 MG tablet  baclofen (LIORESAL) 10 MG tablet  bisacodyl (DULCOLAX) 10 MG suppository  CHEST CONGESTION RELIEF DM  MG/5ML syrup  ferrous sulfate 220 (44 Fe) MG/5ML SOLN  ipratropium (ATROVENT) 0.06 % nasal spray  ipratropium - albuterol 0.5 mg/2.5 mg/3 mL (DUONEB) 0.5-2.5 (3) MG/3ML neb solution  ketoconazole (NIZORAL) 2 % external cream  LIDOCAINE PAIN RELIEF 4 % Patch  meclizine (ANTIVERT) 25 MG tablet  neomycin-bacitracin-polymyxin (NEOSPORIN) 5-400-5000 ointment  nystatin (MYCOSTATIN) 151507 UNIT/GM external cream  omeprazole (PRILOSEC) 40 MG DR capsule  ondansetron (ZOFRAN) 4 MG tablet  Ostomy Supplies (STOMAHESIVE) PSTE  polyethylene glycol (MIRALAX) 17 GM/Dose powder  Polyethylene Glycol 400 (VISINE DRY EYE RELIEF) 1 % SOLN  QUEtiapine (SEROQUEL) 25 MG tablet  QUEtiapine (SEROQUEL) 25 MG tablet  simvastatin (ZOCOR) 20 MG tablet  sodium chloride (NEBUSAL) 3 % neb solution  triamcinolone (KENALOG) 0.1 % external cream        ALLERGIES:  Allergies   Allergen Reactions    Diphenhydramine Unknown    Trazodone Other (See Comments)     \"shaking\" per family       FAMILY HISTORY:  Family History   Problem Relation Age of Onset    Hypertension Mother     Cancer Father     Other Cancer Father     Kidney Disease Brother        SOCIAL HISTORY:   Social History     Socioeconomic History    Marital status:     Number of children: 2   Occupational History    Occupation: disability   Tobacco Use    Smoking status: Former     Current packs/day: 0.00     Average packs/day: 1 pack/day for 30.0 years (30.0 ttl pk-yrs)     Types: Cigarettes     Start date: 1975     Quit date: 2005     Years since quittin.5     Passive exposure: Past    Smokeless tobacco: Former     Quit date: 2007    Tobacco comments:     quit 2006   Vaping Use    Vaping status: Never Used   Substance and Sexual Activity    Alcohol use: No    " Drug use: No    Sexual activity: Not Currently     Partners: Female     Birth control/protection: None   Other Topics Concern    Parent/sibling w/ CABG, MI or angioplasty before 65F 55M? No   Social History Narrative    Lives with wife, 2 daughters and son in law.    W/c bound.     Social Drivers of Health     Financial Resource Strain: Low Risk  (9/27/2023)    Financial Resource Strain     Within the past 12 months, have you or your family members you live with been unable to get utilities (heat, electricity) when it was really needed?: No   Food Insecurity: Low Risk  (9/27/2023)    Food Insecurity     Within the past 12 months, did you worry that your food would run out before you got money to buy more?: No     Within the past 12 months, did the food you bought just not last and you didn t have money to get more?: No   Transportation Needs: Low Risk  (9/27/2023)    Transportation Needs     Within the past 12 months, has lack of transportation kept you from medical appointments, getting your medicines, non-medical meetings or appointments, work, or from getting things that you need?: No   Interpersonal Safety: Unknown (10/21/2024)    Interpersonal Safety     Do you feel physically and emotionally safe where you currently live?: Patient unable to answer     Within the past 12 months, have you been hit, slapped, kicked or otherwise physically hurt by someone?: Patient unable to answer     Within the past 12 months, have you been humiliated or emotionally abused in other ways by your partner or ex-partner?: Patient unable to answer   Housing Stability: High Risk (9/27/2023)    Housing Stability     Do you have housing? : No     Are you worried about losing your housing?: No     Former smoker.  No current drugs, alcohol, or tobacco.    VITALS:  BP (!) 131/98   Pulse 106   Temp 98.6  F (37  C)   Resp 20   Ht 1.524 m (5')   SpO2 95%   BMI 24.41 kg/m      PHYSICAL EXAM    Vital Signs:  BP (!) 131/98   Pulse 106    Temp 98.6  F (37  C)   Resp 20   Ht 1.524 m (5')   SpO2 95%   BMI 24.41 kg/m    General:  On entering the room he is in no apparent distress.  Nonverbal at this time to this examiner.  Neck:  Neck supple with full range of motion and nontender.    Back:  Back and spine are nontender.  No costovertebral angle tenderness.    HEENT:  Oropharynx clear with moist mucous membranes.  HEENT unremarkable.    Pulmonary:  Chest clear to auscultation without rhonchi rales or wheezing.    Cardiovascular:  Cardiac regular rate and rhythm without murmurs rubs or gallops.    Abdomen:  Abdomen soft nontender.  There is no rebound or guarding.    Muskuloskeletal:  He moves all 4 without any difficulty and has normal neurovascular exams.  Extremities without clubbing, cyanosis, or edema.  Legs and calves are nontender.    Neuro:  He is alert and oriented to name.  And moves all extremities symmetrically.  Flexion contractures of both arms.  Does have motion of both hands and feet.  Sensation intact to those extremities.  Cranial nerves II through XII intact equal bilaterally.  Psych:  Normal affect.    Skin:  Unremarkable and warm and dry.       LAB:  All pertinent labs reviewed and interpreted.  Labs Ordered and Resulted from Time of ED Arrival to Time of ED Departure   ROUTINE UA WITH MICROSCOPIC REFLEX TO CULTURE - Abnormal       Result Value    Color Urine Colorless      Appearance Urine Clear      Glucose Urine Negative      Bilirubin Urine Negative      Ketones Urine Negative      Specific Gravity Urine <1.005 (*)     Blood Urine Negative      pH Urine 7.0      Protein Albumin Urine 100 (*)     Urobilinogen Urine <2.0      Nitrite Urine Negative      Leukocyte Esterase Urine Negative      RBC Urine 1      WBC Urine 1     COMPREHENSIVE METABOLIC PANEL - Abnormal    Sodium 139      Potassium 5.0      Carbon Dioxide (CO2) 21 (*)     Anion Gap 11      Urea Nitrogen 38.4 (*)     Creatinine 1.28 (*)     GFR Estimate 64       Calcium 8.6 (*)     Chloride 107      Glucose 106 (*)     Alkaline Phosphatase 73      AST 34      ALT 19      Protein Total 7.5      Albumin 3.3 (*)     Bilirubin Total 0.2     LIPASE - Abnormal    Lipase 78 (*)    CBC WITH PLATELETS AND DIFFERENTIAL - Abnormal    WBC Count 6.9      RBC Count 3.57 (*)     Hemoglobin 11.0 (*)     Hematocrit 32.5 (*)     MCV 91      MCH 30.8      MCHC 33.8      RDW 12.6      Platelet Count 225      % Neutrophils 49      % Lymphocytes 32      % Monocytes 11      % Eosinophils 7      % Basophils 1      % Immature Granulocytes 1      NRBCs per 100 WBC 0      Absolute Neutrophils 3.4      Absolute Lymphocytes 2.2      Absolute Monocytes 0.8      Absolute Eosinophils 0.5      Absolute Basophils 0.1      Absolute Immature Granulocytes 0.1      Absolute NRBCs 0.0     INR - Normal    INR 0.96     PARTIAL THROMBOPLASTIN TIME - Normal    aPTT 32     TROPONIN T, HIGH SENSITIVITY - Normal    Troponin T, High Sensitivity 15     INFLUENZA A/B, RSV AND SARS-COV2 PCR - Normal    Influenza A PCR Negative      Influenza B PCR Negative      RSV PCR Negative      SARS CoV2 PCR Negative     TROPONIN T, HIGH SENSITIVITY - Normal    Troponin T, High Sensitivity 15         RADIOLOGY:  Reviewed all pertinent imaging. Please see official radiology report.  CT Abdomen Pelvis w Contrast   Final Result   IMPRESSION:   1.  No evidence of pulmonary embolus to the segmental level.   2.  Mild Bronchial wall thickening with subsegmental atelectasis seen in the lung bases bilaterally, overall improved aeration of the lower lobes when compared with 11/14/2024   3.  Centrilobular emphysema, unchanged from prior exam   4.  Moderate prostatomegaly, similar prior exam   5.  Normal appendix.   6.  No acute intra-abdominal process identified      CT Chest Pulmonary Embolism w Contrast   Final Result   IMPRESSION:   1.  No evidence of pulmonary embolus to the segmental level.   2.  Mild Bronchial wall thickening with  subsegmental atelectasis seen in the lung bases bilaterally, overall improved aeration of the lower lobes when compared with 11/14/2024   3.  Centrilobular emphysema, unchanged from prior exam   4.  Moderate prostatomegaly, similar prior exam   5.  Normal appendix.   6.  No acute intra-abdominal process identified      Head CT w/o contrast   Final Result   IMPRESSION:   1.  No CT evidence for acute intracranial process.   2.  Stable chronic changes as above.                 EKG:    Normal sinus rhythm at 91, normal EKG.  Same as previous EKG of 11/2024.    I have independently reviewed and interpreted the EKG(s) documented above.      Bruno Loaiza M.D.  Emergency Medicine  [facility] EMERGENCY ROOM  [facility] [facility] [id number] [phone]  Dept: [phone]       Bruno Loaiza MD  12/22/24 0150       rBuno Loaiza MD  12/22/24 0213

## 2024-12-22 NOTE — PLAN OF CARE
Pt alert and nonverbal. Daughter present interpreting. Has a g-tube in place. Unable to make needs, requires oral suctioning, risk for aspiration, repositioned.

## 2024-12-22 NOTE — PHARMACY-ADMISSION MEDICATION HISTORY
Pharmacist Admission Medication History    Admission medication history is complete. The information provided in this note is only as accurate as the sources available at the time of the update.    Medication reconciliation/reorder completed by provider prior to medication history? Yes [place]    Information Source(s): Family member and CareEverywhere/SureScripts via in-person and phone - spoke with daughter over phone in patient room; wearing surgical mask, gown and gloves.    Pertinent Information:  - Family requested to please keep baclofen and quetiapine doses at 1900, 0000, 0300 (and additional dose of baclofen in AM).    Changes made to PTA medication list:  Added: None  Deleted: iron liquid  Changed: None    Allergies reviewed with patient and updates made in EHR: yes    Medication History Completed By: Beth Schmidt, PharmD, BCPS, DPLA 12/22/2024 8:55 AM    Prior to Admission medications    Medication Sig Last Dose Taking? Auth Provider Long Term End Date   acetaminophen (TYLENOL) 500 MG tablet Place 2 tablets (1,000 mg) into G tube every 6 hours as needed for mild pain 12/21/2024 Yes Mena Ortiz MD     amLODIPine (NORVASC) 5 MG tablet Place 1 tablet (5 mg) into G tube daily. 12/21/2024 Yes Morejon, Yenchi, DO Yes    baclofen (LIORESAL) 10 MG tablet Place 1 tablet into G tube 4 times daily, in the morning and with each dose of Seroquel.  Patient taking differently: Take 10 mg by mouth 4 times daily. Takes at 1900, 0000, 0300 and in AM. 12/21/2024 Yes Steven Rosa MD     bisacodyl (DULCOLAX) 10 MG suppository Place 1 suppository (10 mg) rectally daily as needed for constipation. Past Week Yes Camden Morejonhi, DO     CHEST CONGESTION RELIEF DM  MG/5ML syrup TAKE 10 MLS BY G TUBE ROUTE EVERY 4 HOURS AS NEEDED FOR COUGH. 12/21/2024 Yes Laura Ojeda APRN CNP     ipratropium (ATROVENT) 0.06 % nasal spray Spray 2 sprays into both nostrils daily as needed for  rhinitis Past Month Yes Unknown, Entered By History     ipratropium - albuterol 0.5 mg/2.5 mg/3 mL (DUONEB) 0.5-2.5 (3) MG/3ML neb solution Take 1 vial (3 mLs) by nebulization every 4 hours. 12/21/2024 at  8:00 PM Yes Steven Rosa MD Yes    ketoconazole (NIZORAL) 2 % external cream Apply topically 2 times daily to affected area(s). 12/21/2024 Yes Laura Ojeda APRN CNP     LIDOCAINE PAIN RELIEF 4 % Patch Place 1 patch onto the skin daily as needed for moderate pain. 12/20/2024 Yes Reported, Patient     meclizine (ANTIVERT) 25 MG tablet Take 1 tablet via G-tube every 6 hours as needed for vertigo 12/21/2024 Yes Steven Rosa MD     neomycin-bacitracin-polymyxin (NEOSPORIN) 5-400-5000 ointment Apply topically 3 times daily as needed (irritation) To J Tube site Past Week Yes Mena Ortiz MD     nystatin (MYCOSTATIN) 079894 UNIT/GM external cream Apply topically 2 times daily as needed (redness) Past Week Yes Lynda Gutierrez MD     omeprazole (PRILOSEC) 40 MG DR capsule Take 1 capsule (40 mg) by mouth daily IN G TUBE 12/21/2024 Yes Mena Ortiz MD     ondansetron (ZOFRAN) 4 MG tablet Place 1 tablet (4 mg) into G tube every 8 hours as needed for nausea 12/19/2024 Yes Mena Ortiz MD     Ostomy Supplies (STOMAHESIVE) PSTE Apply topically to skin around ostomy site twice daily and as needed Past Week Yes Lynda Gutierrez MD     polyethylene glycol (MIRALAX) 17 GM/Dose powder Give 1 capful via G-tube daily as needed for constipation 12/21/2024 Yes Steven Rosa MD     Polyethylene Glycol 400 (VISINE DRY EYE RELIEF) 1 % SOLN Apply 1 drop to eye every 6 hours as needed for dry eyes. Past Week Yes Steven Rosa MD     QUEtiapine (SEROQUEL) 25 MG tablet Place 25 mg into G tube every 4 hours as needed (Used during the day PRN agitation/etc. This is in addition to the tablet at 1900, 0000, 0300.) Past Week Yes Reported, Patient Yes    QUEtiapine (SEROQUEL) 25 MG tablet TAKE 1 TABLET BY  MOUTH AT 7:00PM, MIDNIGHT AND 3:00AM FOR TOTAL OF 75 MG NIGHTLY. OK TO REPEAT AFTER 4 HOURS AND DURING THE DAY AS NEEDED. 12/21/2024 at  7:00 PM Yes Steven Rosa MD Yes    simvastatin (ZOCOR) 20 MG tablet TAKE 1 TABLET PER G TUBE ROUTE AT BEDTIME. 12/21/2024 Yes Steven Rosa MD Yes    sodium chloride (NEBUSAL) 3 % neb solution TAKE 4 MLS BY NEBULIZATION EVERY 6 HOURS AS NEEDED FOR THICK SPUTUM. 12/21/2024 Yes Laura Ojeda APRN CNP     triamcinolone (KENALOG) 0.1 % external cream Apply topically daily as needed for irritation. Apply to affected area/rash Past Week Yes Girma Morejon, DO

## 2024-12-22 NOTE — PROGRESS NOTES
"[facility] MEDICINE  PROGRESS NOTE     Code Status: Full Code       Assessment and Plan:  Liu Malave is a 59 year old male admitted on 12/21/2024. He has medical history significant for recurrent CVA with basal ganglia hemorrhage in 2016 with extensive residual deficits, aphasia, dysphagia with GJ tube dependence, spastic quadriplegia, hypertension, dyslipidemia, seizure disorder, chronic static encephalopathy, and GERD.  Has a positive Cologuard test.  Not pursuing colonoscopy.  Patient was brought to the ED by family for abdominal pain, fatigue and confusion.  CT scan showed mild bronchial wall thickening.  Family reports that G-tube balloon has been failing/leaking and has had to have IR replace the G-tube 11/15, 11/25, 12/10 and 12/18.  Consult general surgery to look into G-tube issues.  Also consult wound care to assess G-tube site.  Family noting more spasticity.  Increase baclofen.  Continuing Rocephin and azithromycin.  Patient has been refusing percussive vest.  Discussed with family concerns that this will be problematic with his recovery.  Consult palliative care to discuss goals.     Bronchopneumonia  CT chest showing findings of \"Mild Bronchial wall thickening with subsegmental atelectasis seen in the lung bases bilaterally, overall improved aeration of the lower lobes when compared with 11/14/2024.\"  Patient has extensive secretions.  Unable to expectorate mucus.  Has been refusing percussive vest.  Continue Mucomyst nebulizers and DuoNebs 4 times daily.  Continue Robitussin.  Continue Rocephin and add azithromycin due to his risk of complicating pneumonias.  Will try percussion vest again after a bowel movement.    Abdominal pain  Chronic constipation  G-tube leakage recurrent  Positive Cologuard test  -Patient positive outpatient Cologuard test.  Family is decided not to pursue colonoscopy which is understandable.  -Over the last month and a half patient's " G-tube has been persistently leaking and has been replaced by IR on 11/15, 11/25, 12/10 and 12/18.  -Per family request ordered CEA and CA 19-9 testing.  Discussed with them that possible colon cancer treatment would be very difficult for him.  They verbalized their agreement.  -CT scan abdomen unremarkable.  -Order senna docusate per G-tube twice daily scheduled to help with bowel movements.  -Order general surgery consultation as well as wound care to assess G-tube to try and determine why it has been repeatedly failing.     Altered mental status:   Family reports that patient is more confused than baseline.  However, upon asking for clarification, they state that patient appears more tired compared to baseline.  Difficult to assess due to patient's nonverbal status.  Was agitated when RT was attempting to place percussion vest.  CT brain with no acute findings.    Utilize family to ascertain his functional status/improvement.    History of hemorrhagic stroke  Spasticity  Insomnia  Family has been noticing more spasticity and discomfort with his extremities.  Seems to be dealing with little bit of edema.  Continue home scheduled Seroquel.  Discussed with family that historically neurology had not recommended him being on aspirin due to his hemorrhagic stroke.  Continue to stay off aspirin.  Increase his baclofen to 20 mg 4 times daily.  Monitor response.  Family aware this can make him more tired.     Metabolic acidosis:   Mild.  Patient had bicarb of 21 on presentation.  His creatinine is at baseline.  BUN is increased from 17-38.  However, his BUN has been mostly around 23-33.  He does not appear dehydrated.  Received fluid bolus in the ED.  Monitor BMP  Renal dosing of medications     Dysphagia:   GJ tube dependent  Nutrition consult to coordinate resumption of tube feedings.  Continue home Protonix.     Subclinical hypothyroidism  TSH was elevated at 4.97 but T4 free was normal at 1.04.  Discussed  pathophysiology with the family.  Recommend semiannual thyroid testing.    Essential hypertension  Ordered to continue home Norvasc.    Dyslipidemia  Continue home Zocor.    Goals of care  Placed palliative care consult due to patient's challenging status and multiple issues.  Hoping we can have discussions regarding what if any limitations on his care family would wish to pursue.  Previously they had not wish to have him undergo colonoscopy but he is still full code.    Anticoagulation   Enoxaparin (Lovenox) SQ    Fluids: Saline lock  Pain meds: Tylenol as needed  Therapy: N/A.  At baseline functional level.  Kelly:Not present  Lines: None       Current Diet  Orders Placed This Encounter      NPO for Medical/Clinical Reasons Except for: NPO but receiving Tube Feeding    Supplements  None    Lines/Drains/Airways       PIV Line  Duration             Peripheral IV 12/21/24 Anterior;Left Upper forearm <1 day              Drain  Duration             Gastrostomy/Enterostomy Gastrojejunostomy LUQ 18 fr Avanos LAURA GJ tube, lot #[id number] 3 days    External Urinary Catheter <1 day                  Barriers to Discharge: Intravenous antibiotics, GI evaluation of G-tube, wound care and palliative care consultations    Disposition: Potential discharge 1 to 2 days  Medically Ready for Discharge: Anticipated in 2-4 Days       Clinically Significant Risk Factors Present on Admission          # Hyperchloremia: Highest Cl = 110 mmol/L in last 2 days, will monitor as appropriate      # Hypocalcemia: Lowest Ca = 8.2 mg/dL in last 2 days, will monitor and replace as appropriate     # Hypoalbuminemia: Lowest albumin = 3.3 g/dL at 12/21/2024 11:04 PM, will monitor as appropriate          # Anemia: based on hgb <11           # Financial/Environmental Concerns:           Interval History/Subjective:  Met with patient and family with daughter via telephone.  Multiple issues present by the family.  Noted that his G-tube has had to be  replaced multiple times within the last month due to leakage.  They had questions on whether or not he should resume his aspirin that had been stopped last summer.  They have noted more spasticity and some swelling in his extremities.  They were wondering if there were blood tests available for colon cancer given his positive Cologuard testing and their wishes not to pursue colonoscopy.  They did agree not to pursue definitive colon cancer treatment if this was found.  Had questions regarding his mildly abnormal thyroid test.  Patient himself is nonverbal.  Does grimace to palpation over the stomach.  Multiple family questions answered to verbalized satisfaction.      Last 24H PRN:     ipratropium - albuterol 0.5 mg/2.5 mg/3 mL (DUONEB) neb solution 3 mL, 3 mL at 12/22/24 0415    Physical Exam/Objective:  Temp:  [98.6  F (37  C)-98.8  F (37.1  C)] 98.7  F (37.1  C)  Pulse:  [] 97  Resp:  [13-22] 20  BP: (131-181)/() 159/78  SpO2:  [94 %-96 %] 94 %  Wt Readings from Last 4 Encounters:   12/22/24 57.3 kg (126 lb 5.2 oz)   11/17/24 56.7 kg (125 lb)   07/18/24 54 kg (119 lb)   01/08/24 59 kg (130 lb)     Body mass index is 24.67 kg/m .    Constitutional: awake, alert, intermittently cooperative, poor eye contact, did have some mild discomfort with palpation at the abdomen, appears stated age, and thin  ENT: Mucous membranes are quite dry, Normocephalic, without obvious abnormality, atraumatic, external ears without lesions, oral pharynx with moist mucous membranes, tonsils without erythema or exudates, gums normal and good dentition.  Respiratory: Rhonchi heard bilaterally for upper airways.  Cardiovascular: Normal apical impulse, regular rate and rhythm, normal S1 and S2, no S3 or S4, and no murmur noted  GI: Positive bowel sounds distended abdomen.  Does have seem to have some tenderness to palpation.  G-tube site was unremarkable.  Skin: normal skin color, texture, turgor, no redness, warmth, or swelling,  and no rashes  Musculoskeletal: Notable spasticity in all 4 extremities.  Some mild edema to the lower extremities as well as the left hand.   Neurologic: Difficult neurologic assessment due to his severe stroke. Sensory:  Sensory intact  Did withdraw from noxious stimuli at his extremities.  Neuropsychiatric: General: restless, fidgeting, and poor eye contact Level of consciousness: alert / normal Affect: anxious Orientation: Unable to assess.  Memory and insight: impaired: Unable to assess.      Medications:   Personally Reviewed.  Medications   Current Facility-Administered Medications   Medication Dose Route Frequency Provider Last Rate Last Admin     Current Facility-Administered Medications   Medication Dose Route Frequency Provider Last Rate Last Admin    acetylcysteine (MUCOMYST) 20 % nebulizer solution 2 mL  2 mL Nebulization 4x daily Ayaan Dixon MD   2 mL at 12/22/24 0855    amLODIPine (NORVASC) tablet 5 mg  5 mg Per G Tube Daily Dayday Gonsales MD        baclofen (LIORESAL) tablet 10 mg  10 mg Per Feeding Tube 4x Daily Ayaan Dixon MD        enoxaparin ANTICOAGULANT (LOVENOX) injection 40 mg  40 mg Subcutaneous Q24H Dayday Gonsales MD   40 mg at 12/22/24 0451    ipratropium - albuterol 0.5 mg/2.5 mg/3 mL (DUONEB) neb solution 3 mL  3 mL Nebulization 4x daily Ayaan Dixon MD   3 mL at 12/22/24 0855    omeprazole (PriLOSEC) CR capsule 40 mg  40 mg Oral Daily Dayday Gonsales MD        simvastatin (ZOCOR) tablet 20 mg  20 mg Per G Tube At Bedtime Dayday Gonsales MD        sodium chloride (PF) 0.9% PF flush 3 mL  3 mL Intracatheter Q8H Dayday Gonsales MD   3 mL at 12/22/24 0502       Data reviewed today: I personally reviewed all new medications, labs, imaging/diagnostics reports over the past 24 hours. Pertinent findings include:    Imaging:   Recent Results (from the past 24 hours)   Head CT w/o contrast    Narrative    EXAM: CT HEAD W/O CONTRAST  LOCATION: [facility]  DATE: 12/21/2024    INDICATION: Previous CVA, new contractures.  COMPARISON: 07/18/2024  TECHNIQUE: Routine CT Head without IV contrast. Multiplanar reformats. Dose reduction techniques were used.    FINDINGS:  INTRACRANIAL CONTENTS: Redemonstrated right MCA aneurysm clip. No acute hemorrhage or adverse mass effect. No definite acute gray-white differentiation loss. Redemonstrated chronic infarcts throughout the bilateral external capsules, basal ganglia,   corona radiata, centrum semiovale, and right temporal lobe. Moderate presumed chronic small vessel ischemic changes. Mild to moderate generalized volume loss. No hydrocephalus.     VISUALIZED ORBITS/SINUSES/MASTOIDS: No intraorbital abnormality. No paranasal sinus mucosal disease. No middle ear or mastoid effusion.    BONES/SOFT TISSUES: No acute abnormality. Postoperative changes of a right pterional craniotomy with mesh cranioplasty.      Impression    IMPRESSION:  1.  No CT evidence for acute intracranial process.  2.  Stable chronic changes as above.   CT Chest Pulmonary Embolism w Contrast    Narrative    EXAM: CT CHEST PULMONARY EMBOLISM W CONTRAST, CT ABDOMEN PELVIS W CONTRAST  LOCATION: [facility]  DATE: 12/21/2024    INDICATION: cough, cp  COMPARISON: CT chest abdomen pelvis 11/14/2024.  TECHNIQUE: CT chest pulmonary angiogram during arterial phase injection of IV contrast. CT scan of the abdomen and pelvis was performed during the portal venous phase of IV contrast bolus. Multiplanar reformats were obtained. Dose reduction techniques   were used.   CONTRAST: ISOVUE 370 90ML    FINDINGS:  ANGIOGRAM CHEST: Pulmonary arteries are normal caliber and negative for pulmonary emboli. Thoracic aorta is negative for dissection. No CT evidence of right heart strain.    LUNGS AND PLEURA: Calcified granulomas are again seen bilaterally with centrilobular emphysema again noted. There is bronchial wall thickening in the  lower lobes bilaterally, with subsegmental atelectasis seen in the right and to lesser degree left lower   lobe dependently.    MEDIASTINUM/AXILLAE: No lymphadenopathy. Normal esophagus. No significant pericardial effusion.    CORONARY ARTERY CALCIFICATION: Moderate.      HEPATOBILIARY: No significant mass or bile duct dilatation. No calcified gallstones.     PANCREAS: No significant mass, duct dilatation, or inflammatory change.    SPLEEN: Normal size.    ADRENAL GLANDS: No significant nodules.    KIDNEYS/BLADDER: No significant mass, stones, or hydronephrosis. There are simple or benign cysts. No follow up is needed.    BOWEL: A PEG tube is again seen in the stomach with the jejunostomy tube extending into the proximal jejunum. The small bowel is normal in size and caliber. The appendix is normal. Large bowel is decompressed and otherwise unremarkable there is rectal   and sigmoid colon contrast.    LYMPH NODES: No lymphadenopathy.    VASCULATURE: No abdominal aortic aneurysm.    PELVIC ORGANS: Moderate prostatomegaly, similar to prior exam    MUSCULOSKELETAL: Unremarkable.      Impression    IMPRESSION:  1.  No evidence of pulmonary embolus to the segmental level.  2.  Mild Bronchial wall thickening with subsegmental atelectasis seen in the lung bases bilaterally, overall improved aeration of the lower lobes when compared with 11/14/2024  3.  Centrilobular emphysema, unchanged from prior exam  4.  Moderate prostatomegaly, similar prior exam  5.  Normal appendix.  6.  No acute intra-abdominal process identified   CT Abdomen Pelvis w Contrast    Narrative    EXAM: CT CHEST PULMONARY EMBOLISM W CONTRAST, CT ABDOMEN PELVIS W CONTRAST  LOCATION: [facility]  DATE: 12/21/2024    INDICATION: cough, cp  COMPARISON: CT chest abdomen pelvis 11/14/2024.  TECHNIQUE: CT chest pulmonary angiogram during arterial phase injection of IV contrast. CT scan of the abdomen and pelvis was performed during the  portal venous phase of IV contrast bolus. Multiplanar reformats were obtained. Dose reduction techniques   were used.   CONTRAST: ISOVUE 370 90ML    FINDINGS:  ANGIOGRAM CHEST: Pulmonary arteries are normal caliber and negative for pulmonary emboli. Thoracic aorta is negative for dissection. No CT evidence of right heart strain.    LUNGS AND PLEURA: Calcified granulomas are again seen bilaterally with centrilobular emphysema again noted. There is bronchial wall thickening in the lower lobes bilaterally, with subsegmental atelectasis seen in the right and to lesser degree left lower   lobe dependently.    MEDIASTINUM/AXILLAE: No lymphadenopathy. Normal esophagus. No significant pericardial effusion.    CORONARY ARTERY CALCIFICATION: Moderate.      HEPATOBILIARY: No significant mass or bile duct dilatation. No calcified gallstones.     PANCREAS: No significant mass, duct dilatation, or inflammatory change.    SPLEEN: Normal size.    ADRENAL GLANDS: No significant nodules.    KIDNEYS/BLADDER: No significant mass, stones, or hydronephrosis. There are simple or benign cysts. No follow up is needed.    BOWEL: A PEG tube is again seen in the stomach with the jejunostomy tube extending into the proximal jejunum. The small bowel is normal in size and caliber. The appendix is normal. Large bowel is decompressed and otherwise unremarkable there is rectal   and sigmoid colon contrast.    LYMPH NODES: No lymphadenopathy.    VASCULATURE: No abdominal aortic aneurysm.    PELVIC ORGANS: Moderate prostatomegaly, similar to prior exam    MUSCULOSKELETAL: Unremarkable.      Impression    IMPRESSION:  1.  No evidence of pulmonary embolus to the segmental level.  2.  Mild Bronchial wall thickening with subsegmental atelectasis seen in the lung bases bilaterally, overall improved aeration of the lower lobes when compared with 11/14/2024  3.  Centrilobular emphysema, unchanged from prior exam  4.  Moderate prostatomegaly, similar prior  exam  5.  Normal appendix.  6.  No acute intra-abdominal process identified     No results found for this or any previous visit (from the past 4320 hours).    Labs:  CT Abdomen Pelvis w Contrast   Final Result   IMPRESSION:   1.  No evidence of pulmonary embolus to the segmental level.   2.  Mild Bronchial wall thickening with subsegmental atelectasis seen in the lung bases bilaterally, overall improved aeration of the lower lobes when compared with 11/14/2024   3.  Centrilobular emphysema, unchanged from prior exam   4.  Moderate prostatomegaly, similar prior exam   5.  Normal appendix.   6.  No acute intra-abdominal process identified      CT Chest Pulmonary Embolism w Contrast   Final Result   IMPRESSION:   1.  No evidence of pulmonary embolus to the segmental level.   2.  Mild Bronchial wall thickening with subsegmental atelectasis seen in the lung bases bilaterally, overall improved aeration of the lower lobes when compared with 11/14/2024   3.  Centrilobular emphysema, unchanged from prior exam   4.  Moderate prostatomegaly, similar prior exam   5.  Normal appendix.   6.  No acute intra-abdominal process identified      Head CT w/o contrast   Final Result   IMPRESSION:   1.  No CT evidence for acute intracranial process.   2.  Stable chronic changes as above.        Recent Results (from the past 24 hours)   ECG 12-LEAD WITH MUSE ([facility])    Collection Time: 12/21/24  9:43 PM   Result Value Ref Range    Systolic Blood Pressure 146 mmHg    Diastolic Blood Pressure 86 mmHg    Ventricular Rate 91 BPM    Atrial Rate 91 BPM    OH Interval 146 ms    QRS Duration 74 ms     ms    QTc 430 ms    P Axis 66 degrees    R AXIS 54 degrees    T Axis 68 degrees    Interpretation ECG       Sinus rhythm  Normal ECG  When compared with ECG of 14-Nov-2024 20:39,  No significant change was found  Confirmed by SEE ED PROVIDER NOTE FOR, ECG INTERPRETATION ([id number]),  MARCIA ERNST ([id number]) on 12/21/2024 9:47:45 PM      Influenza A/B, RSV and SARS-CoV2 PCR (COVID-19) Nasopharyngeal    Collection Time: 12/21/24 10:31 PM    Specimen: Nasopharyngeal; Swab   Result Value Ref Range    Influenza A PCR Negative Negative    Influenza B PCR Negative Negative    RSV PCR Negative Negative    SARS CoV2 PCR Negative Negative   INR    Collection Time: 12/21/24 11:04 PM   Result Value Ref Range    INR 0.96 0.85 - 1.15   Partial thromboplastin time    Collection Time: 12/21/24 11:04 PM   Result Value Ref Range    aPTT 32 22 - 38 Seconds   Comprehensive metabolic panel    Collection Time: 12/21/24 11:04 PM   Result Value Ref Range    Sodium 139 135 - 145 mmol/L    Potassium 5.0 3.4 - 5.3 mmol/L    Carbon Dioxide (CO2) 21 (L) 22 - 29 mmol/L    Anion Gap 11 7 - 15 mmol/L    Urea Nitrogen 38.4 (H) 8.0 - 23.0 mg/dL    Creatinine 1.28 (H) 0.67 - 1.17 mg/dL    GFR Estimate 64 >60 mL/min/1.73m2    Calcium 8.6 (L) 8.8 - 10.4 mg/dL    Chloride 107 98 - 107 mmol/L    Glucose 106 (H) 70 - 99 mg/dL    Alkaline Phosphatase 73 40 - 150 U/L    AST 34 0 - 45 U/L    ALT 19 0 - 70 U/L    Protein Total 7.5 6.4 - 8.3 g/dL    Albumin 3.3 (L) 3.5 - 5.2 g/dL    Bilirubin Total 0.2 <=1.2 mg/dL   Lipase    Collection Time: 12/21/24 11:04 PM   Result Value Ref Range    Lipase 78 (H) 13 - 60 U/L   Troponin T, High Sensitivity    Collection Time: 12/21/24 11:04 PM   Result Value Ref Range    Troponin T, High Sensitivity 15 <=22 ng/L   CBC with platelets and differential    Collection Time: 12/21/24 11:04 PM   Result Value Ref Range    WBC Count 6.9 4.0 - 11.0 10e3/uL    RBC Count 3.57 (L) 4.40 - 5.90 10e6/uL    Hemoglobin 11.0 (L) 13.3 - 17.7 g/dL    Hematocrit 32.5 (L) 40.0 - 53.0 %    MCV 91 78 - 100 fL    MCH 30.8 26.5 - 33.0 pg    MCHC 33.8 31.5 - 36.5 g/dL    RDW 12.6 10.0 - 15.0 %    Platelet Count 225 150 - 450 10e3/uL    % Neutrophils 49 %    % Lymphocytes 32 %    % Monocytes 11 %    % Eosinophils 7 %    % Basophils 1 %    % Immature Granulocytes 1 %    NRBCs per  100 WBC 0 <1 /100    Absolute Neutrophils 3.4 1.6 - 8.3 10e3/uL    Absolute Lymphocytes 2.2 0.8 - 5.3 10e3/uL    Absolute Monocytes 0.8 0.0 - 1.3 10e3/uL    Absolute Eosinophils 0.5 0.0 - 0.7 10e3/uL    Absolute Basophils 0.1 0.0 - 0.2 10e3/uL    Absolute Immature Granulocytes 0.1 <=0.4 10e3/uL    Absolute NRBCs 0.0 10e3/uL   UA with Microscopic reflex to Culture    Collection Time: 12/22/24 12:56 AM    Specimen: Urine, Catheter   Result Value Ref Range    Color Urine Colorless Colorless, Straw, Light Yellow, Yellow    Appearance Urine Clear Clear    Glucose Urine Negative Negative mg/dL    Bilirubin Urine Negative Negative    Ketones Urine Negative Negative mg/dL    Specific Gravity Urine <1.005 (L) 1.005 - 1.030    Blood Urine Negative Negative    pH Urine 7.0 5.0 - 7.0    Protein Albumin Urine 100 (A) Negative mg/dL    Urobilinogen Urine <2.0 <2.0 mg/dL    Nitrite Urine Negative Negative    Leukocyte Esterase Urine Negative Negative    RBC Urine 1 <=2 /HPF    WBC Urine 1 <=5 /HPF   Troponin T, High Sensitivity    Collection Time: 12/22/24  1:08 AM   Result Value Ref Range    Troponin T, High Sensitivity 15 <=22 ng/L   Basic metabolic panel    Collection Time: 12/22/24  6:28 AM   Result Value Ref Range    Sodium 142 135 - 145 mmol/L    Potassium 4.5 3.4 - 5.3 mmol/L    Chloride 110 (H) 98 - 107 mmol/L    Carbon Dioxide (CO2) 22 22 - 29 mmol/L    Anion Gap 10 7 - 15 mmol/L    Urea Nitrogen 33.4 (H) 8.0 - 23.0 mg/dL    Creatinine 1.24 (H) 0.67 - 1.17 mg/dL    GFR Estimate 66 >60 mL/min/1.73m2    Calcium 8.2 (L) 8.8 - 10.4 mg/dL    Glucose 110 (H) 70 - 99 mg/dL   CBC with platelets    Collection Time: 12/22/24  6:28 AM   Result Value Ref Range    WBC Count 8.3 4.0 - 11.0 10e3/uL    RBC Count 3.59 (L) 4.40 - 5.90 10e6/uL    Hemoglobin 10.9 (L) 13.3 - 17.7 g/dL    Hematocrit 32.6 (L) 40.0 - 53.0 %    MCV 91 78 - 100 fL    MCH 30.4 26.5 - 33.0 pg    MCHC 33.4 31.5 - 36.5 g/dL    RDW 12.6 10.0 - 15.0 %    Platelet Count  222 150 - 450 10e3/uL   TSH with free T4 reflex    Collection Time: 12/22/24  6:28 AM   Result Value Ref Range    TSH 4.97 (H) 0.30 - 4.20 uIU/mL   T4 free    Collection Time: 12/22/24  6:28 AM   Result Value Ref Range    Free T4 1.04 0.90 - 1.70 ng/dL       Pending Labs:  Unresulted Labs Ordered in the Past 30 Days of this Admission       No orders found for last 31 day(s).              Dayday Gonsales MD  [facility]  Phone: #[phone]

## 2024-12-22 NOTE — ED TRIAGE NOTES
Pt has had abdominal pain recently. Family thought it would resolve after feeding tube change a couple of days ago. Pt appears to still be having abdominal discomfort.     Triage Assessment (Adult)       Row Name 12/21/24 6419          Triage Assessment    Airway WDL WDL        Respiratory WDL    Respiratory WDL WDL        Skin Circulation/Temperature WDL    Skin Circulation/Temperature WDL WDL        Cardiac WDL    Cardiac WDL X;rhythm     Pulse Rate & Regularity tachycardic        Peripheral/Neurovascular WDL    Peripheral Neurovascular WDL WDL

## 2024-12-22 NOTE — PLAN OF CARE
Problem: Adult Inpatient Plan of Care  Goal: Optimal Comfort and Wellbeing  Outcome: Progressing   Goal Outcome Evaluation:    Patient is alert, non verbal, no signs indicating he is pain, turned and repositioned, aspiration and contact precautions maintained. Heart rate went up when patient was being changed after a bowel movent , went up to 150 bpm. Heart rate later came down and is sitting between 128-130 bpm. Provider notified, ordered low dose metoprolol for patient. Surgery team consult placed, no tube feeding yet, NPO for medical or clinical reasons, has had two bowel movements today. Discharge pending clinical progress.

## 2024-12-22 NOTE — CONSULTS
Care Management Initial Consult    General Information  Assessment completed with: Children, daughter Idalmis  Type of CM/SW Visit: CM Role Introduction    Primary Care Provider verified and updated as needed: Yes   Readmission within the last 30 days:        Reason for Consult: discharge planning  Advance Care Planning:            Communication Assessment  Patient's communication style: spoken language (English or Bilingual)    Hearing Difficulty or Deaf: no   Wear Glasses or Blind: no    Cognitive  Cognitive/Neuro/Behavioral: .WDL except, speech  Level of Consciousness: alert  Arousal Level: opens eyes spontaneously  Orientation: other (see comments) (JUAN ANTONIO)  Mood/Behavior: cooperative     Speech: unable to speak    Living Environment:   People in home: child(sam), adult, spouse     Current living Arrangements: house      Able to return to prior arrangements:         Family/Social Support:  Care provided by: spouse/significant other, child(sam), other (see comments)  Provides care for: no one, unable/limited ability to care for self     Support system:            Description of Support System:           Current Resources:   Patient receiving home care services: No        Community Resources: PCA, [place] Worker, [facility] Programs (15.5hours/day PCA; 8 hours/day night supervision; [facility] CM)  Equipment currently used at home: wheelchair, manual  Supplies currently used at home: Other (TF via [employer])    Employment/Financial:  Employment Status: disabled        Financial Concerns: none           Does the patient's insurance plan have a 3 day qualifying hospital stay waiver?  Yes     Which insurance plan 3 day waiver is available? Alternative insurance waiver    Will the waiver be used for post-acute placement? Undetermined at this time    Lifestyle & Psychosocial Needs:  Social Drivers of Health     Food Insecurity: Low Risk  (9/27/2023)    Food Insecurity     Within the past 12 months, did you worry that your food would run  out before you got money to buy more?: No     Within the past 12 months, did the food you bought just not last and you didn t have money to get more?: No   Depression: Not at risk (5/2/2024)    PHQ-2     PHQ-2 Score: 0   Housing Stability: High Risk (9/27/2023)    Housing Stability     Do you have housing? : No     Are you worried about losing your housing?: No   Tobacco Use: Medium Risk (12/11/2024)    Patient History     Smoking Tobacco Use: Former     Smokeless Tobacco Use: Former     Passive Exposure: Past   Financial Resource Strain: Low Risk  (9/27/2023)    Financial Resource Strain     Within the past 12 months, have you or your family members you live with been unable to get utilities (heat, electricity) when it was really needed?: No   Alcohol Use: Not on file   Transportation Needs: Low Risk  (9/27/2023)    Transportation Needs     Within the past 12 months, has lack of transportation kept you from medical appointments, getting your medicines, non-medical meetings or appointments, work, or from getting things that you need?: No   Physical Activity: Not on file   Interpersonal Safety: Unknown (12/22/2024)    Interpersonal Safety     Do you feel physically and emotionally safe where you currently live?: Patient unable to answer     Within the past 12 months, have you been hit, slapped, kicked or otherwise physically hurt by someone?: Patient unable to answer     Within the past 12 months, have you been humiliated or emotionally abused in other ways by your partner or ex-partner?: Patient unable to answer   Stress: Not on file   Social Connections: Not on file   Health Literacy: Not on file       Functional Status:  Prior to admission patient needed assistance:   Dependent ADLs:: Bathing, Dressing, Grooming, Incontinence, Positioning, Transfers, Eating, Wheelchair-with assist, Toileting (pivot to wheelchair at baseline)  Dependent IADLs:: Cleaning, Cooking, Laundry, Shopping, Meal Preparation, Medication  Management, Transportation, Money Management, Incontinence       Mental Health Status:          Chemical Dependency Status:                Values/Beliefs:  Spiritual, Cultural Beliefs, Jainism Practices, Values that affect care: no               Discussed  Partnership in Safe Discharge Planning  document with patient/family: No    Additional Information:  Assessed via daughter Idalmis.  Pt lives in a house with wife, 2 adult daughters and their families.  Pt dependent on family and PCA staff for I/ADL's.  Pivot to wheelchair at baseline.  PCA 15.5 hours/day plus 8 hours/day night supervision.  Pt has a [facility] CM.  Tube feedings via [employer].  Discharge goals: return home with family; family can transport.  Idalmis denies any CM needs at this time.    Next Steps:   CM will continue to follow.    Reggie Serna RN

## 2024-12-22 NOTE — PROGRESS NOTES
Spoke with family concerning use of Vest Therapy. Daughter at bedside agreed we could use it. Vest brought to room this AM, patient very agitated, she stated that he need to have a bowel movement. Better if we could try at noon providing he could have a bowel movement. Will try at noon tx again. Vest Therapy remains in patients room.

## 2024-12-22 NOTE — H&P
"[facility]    History and Physical - Hospitalist Service       Date of Admission:  12/21/2024    Assessment & Plan    Active Problems:    Bronchopneumonia    Acute on chronic renal insufficiency    Altered mental status, unspecified altered mental status type      Liu GIL Malave is a 59 year old male admitted on 12/21/2024. He has medical history significant for recurrent CVA with basal ganglia hemorrhage in 2016 with extensive residual deficits, aphasia, dysphagia with GJ tube dependence,, and spastic quadriplegia.  He also has history of hypertension, dyslipidemia, seizure disorder, chronic static encephalopathy, and GERD.  Patient was brought to the ED by family for abdominal pain and confusion.     # Probable sepsis due to bronchopneumonia: Patient with heart rate in the 90s 200s.  Presented with respiratory rate in the 20s.  He is afebrile with normal WBC count.  CT chest showing findings of \"Mild Bronchial wall thickening with subsegmental atelectasis seen in the lung bases bilaterally, overall improved aeration of the lower lobes when compared with 11/14/2024.\"  Patient has extensive secretions.  Unable to expectorate mucus.  Breathing treatments  Continue Robitussin  Chest precautions  Continue Rocephin    # Altered mental status: Family reports that patient is more confused than baseline.  However, upon asking for clarification, they state that patient appears more tired compared to baseline.  Has mildly elevated BUN compared to baseline.  CT brain with no acute findings.  He is aphasic at baseline.  Also has spastic paraplegia.  Patient has right MCA aneurysm clip so he is not a candidate of MRI  Reassessed with patient's family in the morning.  If there is still concern for altered mental status, consider discussing with neurology    # Metabolic acidosis: Mild.  Patient had bicarb of 21 on presentation.  His creatinine is at baseline.  BUN is increased from 17-38.  However, his BUN " has been mostly around 23-33.  He does not appear dehydrated.  Received fluid bolus in the ED.  Monitor BMP  Renal dosing of medications    # Abdominal pain: Difficult to assess.  Patient is nonverbal.  No changes in facial expressions indicate worsening tenderness with palpation.  CT of the abdomen and pelvis with no findings to explain abdominal pain  Continue tube feeding for now  As needed Tylenol for pain    # Dysphagia: GJ tube dependent  Nutrition consult    # Hypertension  # Dyslipidemia  Continue home medications    # Insomnia:  Continue Seroquel and baclofen    Note: Home medications have not been reordered.  Pending pharmacy review.          Diet: NPO for Medical/Clinical Reasons Except for: NPO but receiving Tube Feeding    DVT Prophylaxis: Enoxaparin (Lovenox) SQ  Kelly Catheter: Not present  Lines: None     Cardiac Monitoring: None  Code Status: Full Code      Clinically Significant Risk Factors Present on Admission           # Hypocalcemia: Lowest Ca = 8.6 mg/dL in last 2 days, will monitor and replace as appropriate     # Hypoalbuminemia: Lowest albumin = 3.3 g/dL at 12/21/2024 11:04 PM, will monitor as appropriate                   # Financial/Environmental Concerns:           Disposition Plan     Medically Ready for Discharge: Anticipated in 2-4 Days           Ayaan Dixon MD  Hospitalist Service  [facility]  Securely message with [employer] (more info)  Text page via [facility] Paging/Directory     ______________________________________________________________________    Chief Complaint   Abdomina pain, left arm pain, and confusion.     History is obtained from the patient and patient's daughter    History of Present Illness   Liu Malave is a 59 year old male with medical history significant for recurrent CVA with basal ganglia hemorrhage in 2016 with extensive residual deficits, aphasia, dysphagia with GJ tube dependence,, and spastic quadriplegia.  He also has history of  hypertension, dyslipidemia, seizure disorder, chronic static encephalopathy, and GERD.  Patient was brought to the ED by family for abdominal pain and confusion.  Daughter reports that patient has had abdominal pain for the past few days.  States that the vomiting was due to the GJ tube.  However, tube was replaced a few days ago and he continues to have abdominal discomfort.  She also reports that patient has pain in his left arm.  She reports the patient is confused about a pulm accident for clarification, she states that patient is a little more tired than normal.  States the patient has not had any vomiting but she has noted that patient has more mucus than baseline.  She reports the patient has been warm to touch but has not checked his temperature.  Denies any vomiting, diarrhea, urinary frequency, melena, hematochezia, or hematuria.      Past Medical History    Past Medical History:   Diagnosis Date    Acute UTI 02/21/2020    Anxiety     Basal ganglia hemorrhage (H) 06/20/2016    Brain compression (H)     Bruxism     Chronic static encephalopathy     Convulsions, unspecified convulsion type (H)     CVA (cerebral vascular accident) (H) 07/2016    Hemorrhagic,     Depression     Dyslipidemia     Elevated troponin     Essential hypertension     Feeding by G-tube (H) 06/04/2020    GERD (gastroesophageal reflux disease)     Hemiplegia of nondominant side, late effect of cerebrovascular disease (H)     Created by Conversion  Replacement Utility updated for latest IMO load    Hemorrhagic stroke (H) 06/05/2016    Hepatitis C carrier (H)     History of ESBL E. coli infection     History of hemorrhagic stroke with residual hemiparesis (H)     Hypertension     Lung nodule 12/05/2016    9 x 9 mm left upper lobe on CXR    MRSA (methicillin resistant staph aureus) culture positive     Periodontal disease     Persistent fever     Pneumonia of both lower lobes due to infectious organism     Respiratory failure with hypoxia  (H)     Seizure (H)     Sepsis due to urinary tract infection (H) 06/04/2020    Severe sepsis (H)     Trismus        Past Surgical History   Past Surgical History:   Procedure Laterality Date    aneurysm clipping  2006    BRAIN SURGERY      EXAM UNDER ANESTHESIA, RESTORATIONS, EXTRACTION(S) DENTAL COMPLEX, COMBINED N/A 12/19/2016    Procedure: COMBINED EXAM UNDER ANESTHESIA, RESTORATIONS, EXTRACTION(S) DENTAL COMPLEX;  Surgeon: Verónica Martinez DDS;  Location: [facility] OR    EXAM UNDER ANESTHESIA, RESTORATIONS, EXTRACTION(S) DENTAL COMPLEX, COMBINED N/A 3/7/2018    Procedure: COMBINED EXAM UNDER ANESTHESIA, RESTORATIONS, EXTRACTION(S) DENTAL COMPLEX;  Kenalog Injection in Bilateral [place], Combined Dental Exam,Radiographs, Three Dental Restorations, Periodontal Therapy and Fluoride Treatment;  Surgeon: Darryl Isbell DDS;  Location: [facility] OR    HH MIDLINE INSERTION  8/11/2017         INJECT STEROID (LOCATION) N/A 3/7/2018    Procedure: INJECT STEROID (LOCATION);  Kenalog Injection In Bilateral [place], Combined Dental Exam,Radiographs,Three Dental Restorations, Periodontal Therapy and Fluoride Treatment;  Surgeon: Liu Conroy DDS;  Location: [facility] OR    IR GASTRO JEJUNOSTOMY TUBE CHANGE  7/19/2016    IR GASTRO JEJUNOSTOMY TUBE CHANGE  9/23/2016    IR GASTRO JEJUNOSTOMY TUBE CHANGE  11/28/2016    IR GASTRO JEJUNOSTOMY TUBE CHANGE  1/27/2017    IR GASTRO JEJUNOSTOMY TUBE CHANGE  4/19/2017    IR GASTRO JEJUNOSTOMY TUBE CHANGE  6/20/2017    IR GASTRO JEJUNOSTOMY TUBE CHANGE  8/7/2017    IR GASTRO JEJUNOSTOMY TUBE CHANGE  8/11/2017    IR GASTRO JEJUNOSTOMY TUBE CHANGE  10/20/2017    IR GASTRO JEJUNOSTOMY TUBE CHANGE  1/2/2018    IR GASTRO JEJUNOSTOMY TUBE CHANGE  2/16/2018    IR GASTRO JEJUNOSTOMY TUBE CHANGE  5/16/2018    IR GASTRO JEJUNOSTOMY TUBE CHANGE  6/11/2018    IR GASTRO JEJUNOSTOMY TUBE CHANGE  9/12/2018    IR GASTRO JEJUNOSTOMY TUBE CHANGE  12/24/2018    IR GASTRO JEJUNOSTOMY TUBE CHANGE  3/18/2019    IR  GASTRO JEJUNOSTOMY TUBE CHANGE  4/23/2019    IR GASTRO JEJUNOSTOMY TUBE CHANGE  5/21/2019    IR GASTRO JEJUNOSTOMY TUBE CHANGE  9/9/2019    IR GASTRO JEJUNOSTOMY TUBE CHANGE  9/26/2019    IR GASTRO JEJUNOSTOMY TUBE CHANGE  10/11/2019    IR GASTRO JEJUNOSTOMY TUBE CHANGE  11/21/2019    IR GASTRO JEJUNOSTOMY TUBE CHANGE  11/29/2019    IR GASTRO JEJUNOSTOMY TUBE CHANGE  2/27/2020    IR GASTRO JEJUNOSTOMY TUBE CHANGE  4/10/2020    IR GASTRO JEJUNOSTOMY TUBE CHANGE  7/2/2020    IR GASTRO JEJUNOSTOMY TUBE CHANGE  8/28/2020    IR GASTRO JEJUNOSTOMY TUBE CHANGE  9/17/2020    IR GASTRO JEJUNOSTOMY TUBE CHANGE  10/29/2020    IR GASTRO JEJUNOSTOMY TUBE CHANGE  11/6/2020    IR GASTRO JEJUNOSTOMY TUBE CHANGE  12/24/2020    IR GASTRO JEJUNOSTOMY TUBE CHANGE  1/9/2021    IR GASTRO JEJUNOSTOMY TUBE CHANGE  6/9/2021    IR GASTRO JEJUNOSTOMY TUBE CHANGE  10/4/2021    IR GASTRO JEJUNOSTOMY TUBE CHANGE  10/18/2021    IR GASTRO JEJUNOSTOMY TUBE CHANGE  1/14/2022    IR GASTRO JEJUNOSTOMY TUBE CHANGE  1/20/2022    IR GASTRO JEJUNOSTOMY TUBE CHANGE  4/19/2022    IR GASTRO JEJUNOSTOMY TUBE CHANGE  6/7/2022    IR GASTRO JEJUNOSTOMY TUBE CHANGE  8/19/2022    IR GASTRO JEJUNOSTOMY TUBE CHANGE  9/13/2022    IR GASTRO JEJUNOSTOMY TUBE CHANGE  10/27/2022    IR GASTRO JEJUNOSTOMY TUBE CHANGE  11/3/2022    IR GASTRO JEJUNOSTOMY TUBE CHANGE  1/18/2023    IR GASTRO JEJUNOSTOMY TUBE CHANGE  4/7/2023    IR GASTRO JEJUNOSTOMY TUBE CHANGE  5/4/2023    IR GASTRO JEJUNOSTOMY TUBE CHANGE  6/21/2023    IR GASTRO JEJUNOSTOMY TUBE CHANGE  8/23/2023    IR GASTRO JEJUNOSTOMY TUBE CHANGE  11/16/2023    IR GASTRO JEJUNOSTOMY TUBE CHANGE  1/8/2024    IR GASTRO JEJUNOSTOMY TUBE CHANGE  4/9/2024    IR GASTRO JEJUNOSTOMY TUBE CHANGE  7/19/2024    IR GASTRO JEJUNOSTOMY TUBE CHANGE  10/21/2024    IR GASTRO JEJUNOSTOMY TUBE CHANGE  11/15/2024    IR GASTRO JEJUNOSTOMY TUBE CHANGE  11/25/2024    IR GASTRO JEJUNOSTOMY TUBE CHANGE  12/10/2024    IR GASTRO JEJUNOSTOMY TUBE  CHANGE  12/18/2024    IR GASTRO JEJUNOSTOMY TUBE PLACEMENT  6/14/2016    IR GJ TUBE REPLACEMENT  12/24/2018    IR GJ TUBE REPLACEMENT  3/18/2019    IR GJ TUBE REPLACEMENT  4/23/2019    IR GJ TUBE REPLACEMENT  5/21/2019    IR GJ TUBE REPLACEMENT  9/9/2019    IR GJ TUBE REPLACEMENT  9/26/2019    IR GJ TUBE REPLACEMENT  10/11/2019    IR GJ TUBE REPLACEMENT  11/21/2019    IR GJ TUBE REPLACEMENT  11/29/2019    IR GJ TUBE REPLACEMENT  2/27/2020    IR GJ TUBE REPLACEMENT  4/10/2020    IR GJ TUBE REPLACEMENT  7/2/2020    IR GJ TUBE REPLACEMENT  8/28/2020    IR GJ TUBE REPLACEMENT  9/17/2020    IR GJ TUBE REPLACEMENT  10/29/2020    IR GJ TUBE REPLACEMENT  11/6/2020    IR GJ TUBE REPLACEMENT  12/24/2020    IR GJ TUBE REPLACEMENT  1/9/2021    IR GJ TUBE REPLACEMENT  6/9/2021    J-G tube      PICC  6/10/2016         TRACHEOSTOMY      Removed       Prior to Admission Medications   Prior to Admission Medications   Prescriptions Last Dose Informant Patient Reported? Taking?   CHEST CONGESTION RELIEF DM  MG/5ML syrup   No No   Sig: TAKE 10 MLS BY G TUBE ROUTE EVERY 4 HOURS AS NEEDED FOR COUGH.   LIDOCAINE PAIN RELIEF 4 % Patch   Yes No   Sig: Place 1 patch onto the skin daily as needed for moderate pain.   Ostomy Supplies (STOMAHESIVE) PSTE   No No   Sig: Apply topically to skin around ostomy site twice daily and as needed   Polyethylene Glycol 400 (VISINE DRY EYE RELIEF) 1 % SOLN   No No   Sig: Apply 1 drop to eye every 6 hours as needed for dry eyes.   QUEtiapine (SEROQUEL) 25 MG tablet   No No   Sig: TAKE 1 TABLET BY MOUTH AT 7:00PM, MIDNIGHT AND 3:00AM FOR TOTAL OF 75 MG NIGHTLY. OK TO REPEAT AFTER 4 HOURS AND DURING THE DAY AS NEEDED.   QUEtiapine (SEROQUEL) 25 MG tablet   Yes No   Sig: Place 25 mg into G tube every 4 hours as needed (Used during the day PRN agitation/etc. This is in addition to the tablet at 1900, 0000, 0300.)   acetaminophen (TYLENOL) 500 MG tablet   No No   Sig: Place 2 tablets (1,000 mg) into G tube  every 6 hours as needed for mild pain   amLODIPine (NORVASC) 5 MG tablet   No No   Sig: Place 1 tablet (5 mg) into G tube daily.   baclofen (LIORESAL) 10 MG tablet   No No   Sig: Place 1 tablet into G tube 4 times daily, in the morning and with each dose of Seroquel.   bisacodyl (DULCOLAX) 10 MG suppository   No No   Sig: Place 1 suppository (10 mg) rectally daily as needed for constipation.   ferrous sulfate 220 (44 Fe) MG/5ML SOLN   No No   Sig: TAKE 5 MLS BY ENTERAL ROUTE DAILY.   Patient not taking: Reported on 12/11/2024   ipratropium (ATROVENT) 0.06 % nasal spray   Yes No   Sig: Spray 2 sprays into both nostrils daily as needed for rhinitis   ipratropium - albuterol 0.5 mg/2.5 mg/3 mL (DUONEB) 0.5-2.5 (3) MG/3ML neb solution   No No   Sig: Take 1 vial (3 mLs) by nebulization every 4 hours.   ketoconazole (NIZORAL) 2 % external cream   No No   Sig: Apply topically 2 times daily to affected area(s).   meclizine (ANTIVERT) 25 MG tablet   No No   Sig: Take 1 tablet via G-tube every 6 hours as needed for vertigo   neomycin-bacitracin-polymyxin (NEOSPORIN) 5-400-5000 ointment   No No   Sig: Apply topically 3 times daily as needed (irritation) To J Tube site   nystatin (MYCOSTATIN) 077176 UNIT/GM external cream   No No   Sig: Apply topically 2 times daily as needed (redness)   omeprazole (PRILOSEC) 40 MG DR capsule   No No   Sig: Take 1 capsule (40 mg) by mouth daily IN G TUBE   ondansetron (ZOFRAN) 4 MG tablet   No No   Sig: Place 1 tablet (4 mg) into G tube every 8 hours as needed for nausea   polyethylene glycol (MIRALAX) 17 GM/Dose powder   No No   Sig: Give 1 capful via G-tube daily as needed for constipation   simvastatin (ZOCOR) 20 MG tablet   No No   Sig: TAKE 1 TABLET PER G TUBE ROUTE AT BEDTIME.   sodium chloride (NEBUSAL) 3 % neb solution   No No   Sig: TAKE 4 MLS BY NEBULIZATION EVERY 6 HOURS AS NEEDED FOR THICK SPUTUM.   triamcinolone (KENALOG) 0.1 % external cream   No No   Sig: Apply topically daily as  needed for irritation. Apply to affected area/rash      Facility-Administered Medications: None        Review of Systems    Unable to obtain except as provided by daughter at bedside.      Physical Exam   Vital Signs: Temp: 98.6  F (37  C)   BP: (!) 155/83 Pulse: 104   Resp: 17 SpO2: 96 % O2 Device: None (Room air)    Weight: 0 lbs 0 oz    General: Awake and alert. Does not appears to be in distress.   HEENT: NCAT, Right facial droop. pupils are equal and round, anicteric, oral mucosa is moist, with copious secretions.   Neck: Supple,   Cardiac: RRR.  No murmur. No rub or gallop.  Respiratory: Coarse breath sounds bilaterally.  Sounds very congested.    Abdomen: Soft. ND, + bowel sounds.  GJ tube site is clean, dry, intact with dressing in place.  No fissures present associated tenderness to palpation.  Genitourinary: IUD in place.  Extremity: Significant contractures.  No edema,   Neuro: Awake and alert.  PERRLA.  Contractures of bilateral upper and has lower extremities.  Spastic.  Nonverbal.  Otherwise unable to obtain detailed neuroexam.  Psych: Calm and cooperative.       Medical Decision Making       More than 60 MINUTES SPENT BY ME on the date of service doing chart review, history, exam, documentation & further activities per the note.      Data     I have personally reviewed the following data over the past 24 hrs:    6.9  \   11.0 (L)   / 225     139 107 38.4 (H) /  106 (H)   5.0 21 (L) 1.28 (H) \     ALT: 19 AST: 34 AP: 73 TBILI: 0.2   ALB: 3.3 (L) TOT PROTEIN: 7.5 LIPASE: 78 (H)     Trop: 15 BNP: N/A     INR:  0.96 PTT:  32   D-dimer:  N/A Fibrinogen:  N/A       Imaging results reviewed over the past 24 hrs:   Recent Results (from the past 24 hours)   Head CT w/o contrast    Narrative    EXAM: CT HEAD W/O CONTRAST  LOCATION: [facility]  DATE: 12/21/2024    INDICATION: Previous CVA, new contractures.  COMPARISON: 07/18/2024  TECHNIQUE: Routine CT Head without IV contrast.  Multiplanar reformats. Dose reduction techniques were used.    FINDINGS:  INTRACRANIAL CONTENTS: Redemonstrated right MCA aneurysm clip. No acute hemorrhage or adverse mass effect. No definite acute gray-white differentiation loss. Redemonstrated chronic infarcts throughout the bilateral external capsules, basal ganglia,   corona radiata, centrum semiovale, and right temporal lobe. Moderate presumed chronic small vessel ischemic changes. Mild to moderate generalized volume loss. No hydrocephalus.     VISUALIZED ORBITS/SINUSES/MASTOIDS: No intraorbital abnormality. No paranasal sinus mucosal disease. No middle ear or mastoid effusion.    BONES/SOFT TISSUES: No acute abnormality. Postoperative changes of a right pterional craniotomy with mesh cranioplasty.      Impression    IMPRESSION:  1.  No CT evidence for acute intracranial process.  2.  Stable chronic changes as above.   CT Chest Pulmonary Embolism w Contrast    Narrative    EXAM: CT CHEST PULMONARY EMBOLISM W CONTRAST, CT ABDOMEN PELVIS W CONTRAST  LOCATION: [facility]  DATE: 12/21/2024    INDICATION: cough, cp  COMPARISON: CT chest abdomen pelvis 11/14/2024.  TECHNIQUE: CT chest pulmonary angiogram during arterial phase injection of IV contrast. CT scan of the abdomen and pelvis was performed during the portal venous phase of IV contrast bolus. Multiplanar reformats were obtained. Dose reduction techniques   were used.   CONTRAST: ISOVUE 370 90ML    FINDINGS:  ANGIOGRAM CHEST: Pulmonary arteries are normal caliber and negative for pulmonary emboli. Thoracic aorta is negative for dissection. No CT evidence of right heart strain.    LUNGS AND PLEURA: Calcified granulomas are again seen bilaterally with centrilobular emphysema again noted. There is bronchial wall thickening in the lower lobes bilaterally, with subsegmental atelectasis seen in the right and to lesser degree left lower   lobe dependently.    MEDIASTINUM/AXILLAE: No  lymphadenopathy. Normal esophagus. No significant pericardial effusion.    CORONARY ARTERY CALCIFICATION: Moderate.      HEPATOBILIARY: No significant mass or bile duct dilatation. No calcified gallstones.     PANCREAS: No significant mass, duct dilatation, or inflammatory change.    SPLEEN: Normal size.    ADRENAL GLANDS: No significant nodules.    KIDNEYS/BLADDER: No significant mass, stones, or hydronephrosis. There are simple or benign cysts. No follow up is needed.    BOWEL: A PEG tube is again seen in the stomach with the jejunostomy tube extending into the proximal jejunum. The small bowel is normal in size and caliber. The appendix is normal. Large bowel is decompressed and otherwise unremarkable there is rectal   and sigmoid colon contrast.    LYMPH NODES: No lymphadenopathy.    VASCULATURE: No abdominal aortic aneurysm.    PELVIC ORGANS: Moderate prostatomegaly, similar to prior exam    MUSCULOSKELETAL: Unremarkable.      Impression    IMPRESSION:  1.  No evidence of pulmonary embolus to the segmental level.  2.  Mild Bronchial wall thickening with subsegmental atelectasis seen in the lung bases bilaterally, overall improved aeration of the lower lobes when compared with 11/14/2024  3.  Centrilobular emphysema, unchanged from prior exam  4.  Moderate prostatomegaly, similar prior exam  5.  Normal appendix.  6.  No acute intra-abdominal process identified   CT Abdomen Pelvis w Contrast    Narrative    EXAM: CT CHEST PULMONARY EMBOLISM W CONTRAST, CT ABDOMEN PELVIS W CONTRAST  LOCATION: [facility]  DATE: 12/21/2024    INDICATION: cough, cp  COMPARISON: CT chest abdomen pelvis 11/14/2024.  TECHNIQUE: CT chest pulmonary angiogram during arterial phase injection of IV contrast. CT scan of the abdomen and pelvis was performed during the portal venous phase of IV contrast bolus. Multiplanar reformats were obtained. Dose reduction techniques   were used.   CONTRAST: ISOVUE 370  90ML    FINDINGS:  ANGIOGRAM CHEST: Pulmonary arteries are normal caliber and negative for pulmonary emboli. Thoracic aorta is negative for dissection. No CT evidence of right heart strain.    LUNGS AND PLEURA: Calcified granulomas are again seen bilaterally with centrilobular emphysema again noted. There is bronchial wall thickening in the lower lobes bilaterally, with subsegmental atelectasis seen in the right and to lesser degree left lower   lobe dependently.    MEDIASTINUM/AXILLAE: No lymphadenopathy. Normal esophagus. No significant pericardial effusion.    CORONARY ARTERY CALCIFICATION: Moderate.      HEPATOBILIARY: No significant mass or bile duct dilatation. No calcified gallstones.     PANCREAS: No significant mass, duct dilatation, or inflammatory change.    SPLEEN: Normal size.    ADRENAL GLANDS: No significant nodules.    KIDNEYS/BLADDER: No significant mass, stones, or hydronephrosis. There are simple or benign cysts. No follow up is needed.    BOWEL: A PEG tube is again seen in the stomach with the jejunostomy tube extending into the proximal jejunum. The small bowel is normal in size and caliber. The appendix is normal. Large bowel is decompressed and otherwise unremarkable there is rectal   and sigmoid colon contrast.    LYMPH NODES: No lymphadenopathy.    VASCULATURE: No abdominal aortic aneurysm.    PELVIC ORGANS: Moderate prostatomegaly, similar to prior exam    MUSCULOSKELETAL: Unremarkable.      Impression    IMPRESSION:  1.  No evidence of pulmonary embolus to the segmental level.  2.  Mild Bronchial wall thickening with subsegmental atelectasis seen in the lung bases bilaterally, overall improved aeration of the lower lobes when compared with 11/14/2024  3.  Centrilobular emphysema, unchanged from prior exam  4.  Moderate prostatomegaly, similar prior exam  5.  Normal appendix.  6.  No acute intra-abdominal process identified

## 2024-12-23 ENCOUNTER — APPOINTMENT (OUTPATIENT)
Dept: RADIOLOGY | Facility: CLINIC | Age: 59
End: 2024-12-23
Attending: FAMILY MEDICINE
Payer: COMMERCIAL

## 2024-12-23 PROBLEM — I10 ESSENTIAL HYPERTENSION: Status: ACTIVE | Noted: 2024-12-23

## 2024-12-23 PROBLEM — R78.81 POSITIVE BLOOD CULTURE: Status: ACTIVE | Noted: 2024-12-23

## 2024-12-23 PROBLEM — K59.09 OTHER CONSTIPATION: Status: ACTIVE | Noted: 2024-12-23

## 2024-12-23 LAB
ANION GAP SERPL CALCULATED.3IONS-SCNC: 16 MMOL/L (ref 7–15)
BUN SERPL-MCNC: 25.1 MG/DL (ref 8–23)
C PNEUM DNA SPEC QL NAA+PROBE: NOT DETECTED
CALCIUM SERPL-MCNC: 8.2 MG/DL (ref 8.8–10.4)
CHLORIDE SERPL-SCNC: 114 MMOL/L (ref 98–107)
CREAT SERPL-MCNC: 1.46 MG/DL (ref 0.67–1.17)
EGFRCR SERPLBLD CKD-EPI 2021: 54 ML/MIN/1.73M2
ENTEROCOCCUS FAECALIS: NOT DETECTED
ENTEROCOCCUS FAECIUM: NOT DETECTED
ERYTHROCYTE [DISTWIDTH] IN BLOOD BY AUTOMATED COUNT: 12.9 % (ref 10–15)
FLUAV H1 2009 PAND RNA SPEC QL NAA+PROBE: NOT DETECTED
FLUAV H1 RNA SPEC QL NAA+PROBE: NOT DETECTED
FLUAV H3 RNA SPEC QL NAA+PROBE: NOT DETECTED
FLUAV RNA SPEC QL NAA+PROBE: NOT DETECTED
FLUBV RNA SPEC QL NAA+PROBE: NOT DETECTED
GLUCOSE BLDC GLUCOMTR-MCNC: 125 MG/DL (ref 70–99)
GLUCOSE BLDC GLUCOMTR-MCNC: 170 MG/DL (ref 70–99)
GLUCOSE SERPL-MCNC: 106 MG/DL (ref 70–99)
HADV DNA SPEC QL NAA+PROBE: NOT DETECTED
HCO3 SERPL-SCNC: 17 MMOL/L (ref 22–29)
HCOV PNL SPEC NAA+PROBE: NOT DETECTED
HCT VFR BLD AUTO: 34 % (ref 40–53)
HGB BLD-MCNC: 11.5 G/DL (ref 13.3–17.7)
HMPV RNA SPEC QL NAA+PROBE: NOT DETECTED
HPIV1 RNA SPEC QL NAA+PROBE: NOT DETECTED
HPIV2 RNA SPEC QL NAA+PROBE: NOT DETECTED
HPIV3 RNA SPEC QL NAA+PROBE: NOT DETECTED
HPIV4 RNA SPEC QL NAA+PROBE: NOT DETECTED
L PNEUMO1 AG UR QL IA: NEGATIVE
LACTATE SERPL-SCNC: 1.5 MMOL/L (ref 0.7–2)
LACTATE SERPL-SCNC: 1.6 MMOL/L (ref 0.7–2)
LISTERIA SPECIES (DETECTED/NOT DETECTED): NOT DETECTED
M PNEUMO DNA SPEC QL NAA+PROBE: NOT DETECTED
MCH RBC QN AUTO: 30.5 PG (ref 26.5–33)
MCHC RBC AUTO-ENTMCNC: 33.8 G/DL (ref 31.5–36.5)
MCV RBC AUTO: 90 FL (ref 78–100)
PLATELET # BLD AUTO: 249 10E3/UL (ref 150–450)
POTASSIUM SERPL-SCNC: 3.6 MMOL/L (ref 3.4–5.3)
RBC # BLD AUTO: 3.77 10E6/UL (ref 4.4–5.9)
RSV RNA SPEC QL NAA+PROBE: NOT DETECTED
RSV RNA SPEC QL NAA+PROBE: NOT DETECTED
RV+EV RNA SPEC QL NAA+PROBE: NOT DETECTED
S PNEUM AG SPEC QL: NEGATIVE
SODIUM SERPL-SCNC: 147 MMOL/L (ref 135–145)
SPECIMEN TYPE: NORMAL
STAPHYLOCOCCUS AUREUS: NOT DETECTED
STAPHYLOCOCCUS EPIDERMIDIS: NOT DETECTED
STAPHYLOCOCCUS LUGDUNENSIS: NOT DETECTED
STAPHYLOCOCCUS SPECIES: DETECTED
STREPTOCOCCUS AGALACTIAE: NOT DETECTED
STREPTOCOCCUS ANGINOSUS GROUP: NOT DETECTED
STREPTOCOCCUS PNEUMONIAE: NOT DETECTED
STREPTOCOCCUS PYOGENES: NOT DETECTED
STREPTOCOCCUS SPECIES: NOT DETECTED
WBC # BLD AUTO: 18 10E3/UL (ref 4–11)

## 2024-12-23 PROCEDURE — 258N000003 HC RX IP 258 OP 636: Performed by: SURGERY

## 2024-12-23 PROCEDURE — 272N000735 HC KIT, CIRCUIT WITH NEB, VOLERA

## 2024-12-23 PROCEDURE — 94640 AIRWAY INHALATION TREATMENT: CPT

## 2024-12-23 PROCEDURE — 99222 1ST HOSP IP/OBS MODERATE 55: CPT | Performed by: INTERNAL MEDICINE

## 2024-12-23 PROCEDURE — 82435 ASSAY OF BLOOD CHLORIDE: CPT | Performed by: FAMILY MEDICINE

## 2024-12-23 PROCEDURE — 272N000451 HC KIT SHRLOCK 5FR POWER PICC DOUBLE LUMEN

## 2024-12-23 PROCEDURE — 87899 AGENT NOS ASSAY W/OPTIC: CPT | Performed by: INTERNAL MEDICINE

## 2024-12-23 PROCEDURE — 87486 CHLMYD PNEUM DNA AMP PROBE: CPT | Performed by: INTERNAL MEDICINE

## 2024-12-23 PROCEDURE — 36569 INSJ PICC 5 YR+ W/O IMAGING: CPT

## 2024-12-23 PROCEDURE — 250N000011 HC RX IP 250 OP 636: Performed by: FAMILY MEDICINE

## 2024-12-23 PROCEDURE — 250N000013 HC RX MED GY IP 250 OP 250 PS 637: Performed by: FAMILY MEDICINE

## 2024-12-23 PROCEDURE — 250N000013 HC RX MED GY IP 250 OP 250 PS 637: Performed by: INTERNAL MEDICINE

## 2024-12-23 PROCEDURE — 272N000452 HC KIT SHRLOCK 5FR POWER PICC TRIPLE LUMEN

## 2024-12-23 PROCEDURE — 71045 X-RAY EXAM CHEST 1 VIEW: CPT

## 2024-12-23 PROCEDURE — 200N000001 HC R&B ICU

## 2024-12-23 PROCEDURE — 99222 1ST HOSP IP/OBS MODERATE 55: CPT | Performed by: PHYSICIAN ASSISTANT

## 2024-12-23 PROCEDURE — 36415 COLL VENOUS BLD VENIPUNCTURE: CPT | Performed by: FAMILY MEDICINE

## 2024-12-23 PROCEDURE — 93005 ELECTROCARDIOGRAM TRACING: CPT

## 2024-12-23 PROCEDURE — 94667 MNPJ CHEST WALL 1ST: CPT

## 2024-12-23 PROCEDURE — 258N000003 HC RX IP 258 OP 636: Performed by: FAMILY MEDICINE

## 2024-12-23 PROCEDURE — 999N000055 HC STATISTIC END TITIAL CO2 MONITORING

## 2024-12-23 PROCEDURE — 250N000009 HC RX 250: Performed by: INTERNAL MEDICINE

## 2024-12-23 PROCEDURE — 250N000011 HC RX IP 250 OP 636: Performed by: INTERNAL MEDICINE

## 2024-12-23 PROCEDURE — 87633 RESP VIRUS 12-25 TARGETS: CPT | Performed by: INTERNAL MEDICINE

## 2024-12-23 PROCEDURE — 99207 PR NO CHARGE LOS: CPT | Performed by: SURGERY

## 2024-12-23 PROCEDURE — 85027 COMPLETE CBC AUTOMATED: CPT | Performed by: FAMILY MEDICINE

## 2024-12-23 PROCEDURE — 93010 ELECTROCARDIOGRAM REPORT: CPT | Performed by: INTERNAL MEDICINE

## 2024-12-23 PROCEDURE — 250N000009 HC RX 250: Performed by: FAMILY MEDICINE

## 2024-12-23 PROCEDURE — 99232 SBSQ HOSP IP/OBS MODERATE 35: CPT | Performed by: FAMILY MEDICINE

## 2024-12-23 PROCEDURE — 80048 BASIC METABOLIC PNL TOTAL CA: CPT | Performed by: FAMILY MEDICINE

## 2024-12-23 PROCEDURE — 94640 AIRWAY INHALATION TREATMENT: CPT | Mod: 76

## 2024-12-23 PROCEDURE — 94799 UNLISTED PULMONARY SVC/PX: CPT

## 2024-12-23 PROCEDURE — 83605 ASSAY OF LACTIC ACID: CPT | Performed by: FAMILY MEDICINE

## 2024-12-23 PROCEDURE — 999N000157 HC STATISTIC RCP TIME EA 10 MIN

## 2024-12-23 PROCEDURE — 250N000012 HC RX MED GY IP 250 OP 636 PS 637: Performed by: SURGERY

## 2024-12-23 RX ORDER — DEXTROSE MONOHYDRATE 25 G/50ML
25-50 INJECTION, SOLUTION INTRAVENOUS
Status: DISCONTINUED | OUTPATIENT
Start: 2024-12-23 | End: 2024-12-26 | Stop reason: HOSPADM

## 2024-12-23 RX ORDER — NICOTINE POLACRILEX 4 MG
15-30 LOZENGE BUCCAL
Status: DISCONTINUED | OUTPATIENT
Start: 2024-12-23 | End: 2024-12-26 | Stop reason: HOSPADM

## 2024-12-23 RX ORDER — PIPERACILLIN SODIUM, TAZOBACTAM SODIUM 3; .375 G/15ML; G/15ML
3.38 INJECTION, POWDER, LYOPHILIZED, FOR SOLUTION INTRAVENOUS EVERY 8 HOURS
Status: DISCONTINUED | OUTPATIENT
Start: 2024-12-23 | End: 2024-12-26 | Stop reason: HOSPADM

## 2024-12-23 RX ORDER — BACLOFEN 20 MG/1
20 TABLET ORAL 4 TIMES DAILY
Qty: 120 TABLET | Refills: 0 | Status: SHIPPED | OUTPATIENT
Start: 2024-12-23

## 2024-12-23 RX ORDER — METOPROLOL TARTRATE 25 MG/1
25 TABLET, FILM COATED ORAL 2 TIMES DAILY
Qty: 60 TABLET | Refills: 0 | Status: SHIPPED | OUTPATIENT
Start: 2024-12-23

## 2024-12-23 RX ORDER — AZITHROMYCIN 200 MG/5ML
500 POWDER, FOR SUSPENSION ORAL DAILY
Qty: 62.5 ML | Refills: 0 | Status: SHIPPED | OUTPATIENT
Start: 2024-12-23 | End: 2024-12-26

## 2024-12-23 RX ORDER — LIDOCAINE 40 MG/G
CREAM TOPICAL
Status: DISCONTINUED | OUTPATIENT
Start: 2024-12-23 | End: 2024-12-26 | Stop reason: HOSPADM

## 2024-12-23 RX ORDER — ACETYLCYSTEINE 200 MG/ML
2 SOLUTION ORAL; RESPIRATORY (INHALATION) EVERY 6 HOURS PRN
Qty: 30 ML | Refills: 0 | Status: SHIPPED | OUTPATIENT
Start: 2024-12-23

## 2024-12-23 RX ORDER — BACLOFEN 10 MG/1
10 TABLET ORAL 4 TIMES DAILY
Status: DISCONTINUED | OUTPATIENT
Start: 2024-12-23 | End: 2024-12-24

## 2024-12-23 RX ORDER — CEFDINIR 250 MG/5ML
300 POWDER, FOR SUSPENSION ORAL DAILY
Qty: 42 ML | Refills: 0 | Status: SHIPPED | OUTPATIENT
Start: 2024-12-23 | End: 2024-12-23

## 2024-12-23 RX ORDER — PIPERACILLIN SODIUM, TAZOBACTAM SODIUM 3; .375 G/15ML; G/15ML
3.38 INJECTION, POWDER, LYOPHILIZED, FOR SOLUTION INTRAVENOUS ONCE
Status: COMPLETED | OUTPATIENT
Start: 2024-12-23 | End: 2024-12-23

## 2024-12-23 RX ORDER — DIPHENHYDRAMINE HYDROCHLORIDE, ZINC ACETATE 2; .1 G/100G; G/100G
CREAM TOPICAL 3 TIMES DAILY PRN
Status: DISCONTINUED | OUTPATIENT
Start: 2024-12-23 | End: 2024-12-26 | Stop reason: HOSPADM

## 2024-12-23 RX ORDER — DEXTROSE MONOHYDRATE 100 MG/ML
INJECTION, SOLUTION INTRAVENOUS CONTINUOUS PRN
Status: DISCONTINUED | OUTPATIENT
Start: 2024-12-23 | End: 2024-12-26

## 2024-12-23 RX ORDER — METOPROLOL TARTRATE 25 MG/1
25 TABLET, FILM COATED ORAL 2 TIMES DAILY
Status: DISCONTINUED | OUTPATIENT
Start: 2024-12-23 | End: 2024-12-26 | Stop reason: HOSPADM

## 2024-12-23 RX ORDER — CEFDINIR 250 MG/5ML
300 POWDER, FOR SUSPENSION ORAL 2 TIMES DAILY
Qty: 84 ML | Refills: 0 | Status: SHIPPED | OUTPATIENT
Start: 2024-12-23 | End: 2024-12-26

## 2024-12-23 RX ADMIN — ACETYLCYSTEINE 2 ML: 200 SOLUTION ORAL; RESPIRATORY (INHALATION) at 08:36

## 2024-12-23 RX ADMIN — PIPERACILLIN AND TAZOBACTAM 3.38 G: 3; .375 INJECTION, POWDER, FOR SOLUTION INTRAVENOUS at 23:10

## 2024-12-23 RX ADMIN — QUETIAPINE FUMARATE 25 MG: 25 TABLET ORAL at 03:15

## 2024-12-23 RX ADMIN — DOCUSATE SODIUM 100 MG: 50 LIQUID ORAL at 08:53

## 2024-12-23 RX ADMIN — SODIUM CHLORIDE 500 ML: 9 INJECTION, SOLUTION INTRAVENOUS at 17:17

## 2024-12-23 RX ADMIN — SENNOSIDES 5 ML: 8.8 LIQUID ORAL at 08:51

## 2024-12-23 RX ADMIN — BACLOFEN 20 MG: 10 TABLET ORAL at 03:15

## 2024-12-23 RX ADMIN — AZITHROMYCIN MONOHYDRATE 500 MG: 500 INJECTION, POWDER, LYOPHILIZED, FOR SOLUTION INTRAVENOUS at 11:51

## 2024-12-23 RX ADMIN — BACLOFEN 20 MG: 10 TABLET ORAL at 08:51

## 2024-12-23 RX ADMIN — QUETIAPINE FUMARATE 25 MG: 25 TABLET ORAL at 08:51

## 2024-12-23 RX ADMIN — LIDOCAINE HYDROCHLORIDE 5 ML: 10 INJECTION, SOLUTION EPIDURAL; INFILTRATION; INTRACAUDAL; PERINEURAL at 20:41

## 2024-12-23 RX ADMIN — ACETAMINOPHEN 650 MG: 325 SOLUTION ORAL at 16:36

## 2024-12-23 RX ADMIN — INSULIN ASPART 1 UNITS: 100 INJECTION, SOLUTION INTRAVENOUS; SUBCUTANEOUS at 22:15

## 2024-12-23 RX ADMIN — SODIUM CHLORIDE: 9 INJECTION, SOLUTION INTRAVENOUS at 05:56

## 2024-12-23 RX ADMIN — SIMVASTATIN 20 MG: 20 TABLET, FILM COATED ORAL at 21:27

## 2024-12-23 RX ADMIN — VANCOMYCIN HYDROCHLORIDE 1500 MG: 5 INJECTION, POWDER, LYOPHILIZED, FOR SOLUTION INTRAVENOUS at 17:20

## 2024-12-23 RX ADMIN — IPRATROPIUM BROMIDE AND ALBUTEROL SULFATE 3 ML: .5; 3 SOLUTION RESPIRATORY (INHALATION) at 08:36

## 2024-12-23 RX ADMIN — IPRATROPIUM BROMIDE AND ALBUTEROL SULFATE 3 ML: .5; 3 SOLUTION RESPIRATORY (INHALATION) at 12:12

## 2024-12-23 RX ADMIN — SENNOSIDES 5 ML: 8.8 LIQUID ORAL at 21:27

## 2024-12-23 RX ADMIN — ACETYLCYSTEINE 2 ML: 200 SOLUTION ORAL; RESPIRATORY (INHALATION) at 20:54

## 2024-12-23 RX ADMIN — QUETIAPINE FUMARATE 25 MG: 25 TABLET ORAL at 18:38

## 2024-12-23 RX ADMIN — SODIUM BICARBONATE 40 MG: 84 INJECTION, SOLUTION INTRAVENOUS at 08:55

## 2024-12-23 RX ADMIN — HYDRALAZINE HYDROCHLORIDE 10 MG: 20 INJECTION INTRAMUSCULAR; INTRAVENOUS at 09:30

## 2024-12-23 RX ADMIN — IPRATROPIUM BROMIDE AND ALBUTEROL SULFATE 3 ML: .5; 3 SOLUTION RESPIRATORY (INHALATION) at 20:54

## 2024-12-23 RX ADMIN — AMLODIPINE BESYLATE 5 MG: 5 TABLET ORAL at 08:50

## 2024-12-23 RX ADMIN — METOPROLOL TARTRATE 12.5 MG: 25 TABLET, FILM COATED ORAL at 08:51

## 2024-12-23 RX ADMIN — ACETYLCYSTEINE 2 ML: 200 SOLUTION ORAL; RESPIRATORY (INHALATION) at 12:12

## 2024-12-23 RX ADMIN — PIPERACILLIN AND TAZOBACTAM 3.38 G: 3; .375 INJECTION, POWDER, FOR SOLUTION INTRAVENOUS at 17:32

## 2024-12-23 RX ADMIN — ENOXAPARIN SODIUM 40 MG: 40 INJECTION SUBCUTANEOUS at 03:15

## 2024-12-23 RX ADMIN — DIPHENHYDRAMINE HYDROCHLORIDE, ZINC ACETATE: 2; .1 CREAM TOPICAL at 22:16

## 2024-12-23 RX ADMIN — CEFTRIAXONE 1 G: 1 INJECTION, POWDER, FOR SOLUTION INTRAMUSCULAR; INTRAVENOUS at 03:13

## 2024-12-23 RX ADMIN — DOCUSATE SODIUM 100 MG: 50 LIQUID ORAL at 21:27

## 2024-12-23 RX ADMIN — SODIUM CHLORIDE, POTASSIUM CHLORIDE, SODIUM LACTATE AND CALCIUM CHLORIDE 500 ML: 600; 310; 30; 20 INJECTION, SOLUTION INTRAVENOUS at 22:00

## 2024-12-23 RX ADMIN — BACLOFEN 10 MG: 10 TABLET ORAL at 18:38

## 2024-12-23 ASSESSMENT — ACTIVITIES OF DAILY LIVING (ADL)
ADLS_ACUITY_SCORE: 74
ADLS_ACUITY_SCORE: 74
ADLS_ACUITY_SCORE: 62
ADLS_ACUITY_SCORE: 74
ADLS_ACUITY_SCORE: 70
ADLS_ACUITY_SCORE: 70
ADLS_ACUITY_SCORE: 74
ADLS_ACUITY_SCORE: 74
ADLS_ACUITY_SCORE: 70
ADLS_ACUITY_SCORE: 74
ADLS_ACUITY_SCORE: 70
ADLS_ACUITY_SCORE: 74
ADLS_ACUITY_SCORE: 74
ADLS_ACUITY_SCORE: 70
ADLS_ACUITY_SCORE: 74
ADLS_ACUITY_SCORE: 70

## 2024-12-23 NOTE — SIGNIFICANT EVENT
Significant Event Note    Time of event: 5:03 PM December 23, 2024    Description of event:  Lengthy conversation with family.  They wish to see if they could still bring him home despite concerns for active sepsis high temperature tachycardia and agitation.  Family also requested to decrease his baclofen.      Ultimately family was agreeable to transferring him to the ICU.  Recheck white count now up to 18.  Interestingly lactic acid 1.5.    Plan:  -Transfer to the ICU.  Consult intensivist.  Order set utilized.  -PICC line placement  -Check a stat EKG given tachycardia.  -Appreciate infectious disease consultation.  Current antibiotic regimen Zosyn, vancomycin, azithromycin.  If patient does leave AMA could send out on Augmentin and Bactrim.  -Per family request did decrease his baclofen to 10 mg 4 times daily.    Discussed with: patient's family/emergency contact and bedside nurse    Dayday Gonsales MD

## 2024-12-23 NOTE — PROGRESS NOTES
Patient received scheduled Duoneb and Mucomyst treatments via mask therapy. Tolerated tx well, he does have a strong cough that does produce thick tan secretions. Mouth was suctioned. Vest therapy ordered and in room, patient doesn't want to use. Attempted but becomes very agitated. Breath sounds diminished bilaterally. On RA most of the day this afternoon was placed on 2L NC SAT 94%. Will continue to follow as needed.

## 2024-12-23 NOTE — PLAN OF CARE
Problem: Gas Exchange Impaired  Goal: Optimal Gas Exchange  Outcome: Progressing     Problem: Infection  Goal: Absence of Infection Signs and Symptoms  Outcome: Progressing  Intervention: Prevent or Manage Infection  Recent Flowsheet Documentation  Taken 12/22/2024 1622 by Benny Castañeda RN  Isolation Precautions: contact precautions maintained   Goal Outcome Evaluation:         Diminished lung sounds bilaterally. Maintaining oxygen saturation in the 90's on room air. T max this shift was 101.7 axillary, ice packs and tylenol given, Dr Gonsales was notified, labs and iv bolus ordered. Temperature decreased to 98.3.   Also, Dr Gonsales was updated about patient not being getting tube feeding or water through feeding tube. Dr Gonsales requested writer to reach out to RD for feeding/fluid recommendations. Charge nurse attempted to reach out to RD without success; Dr Gonsales was updated. IV fluids ordered.

## 2024-12-23 NOTE — PHARMACY-VANCOMYCIN DOSING SERVICE
Pharmacy Vancomycin Initial Note  Date of Service 2024  Patient's  1965  59 year old, male    Indication: Bacteremia    Current estimated CrCl = Estimated Creatinine Clearance: 50.7 mL/min (A) (based on SCr of 1.24 mg/dL (H)).    Creatinine for last 3 days  2024: 11:04 PM Creatinine 1.28 mg/dL  2024:  6:28 AM Creatinine 1.24 mg/dL    Recent Vancomycin Level(s) for last 3 days  No results found for requested labs within last 3 days.      Vancomycin IV Administrations (past 72 hours)        No vancomycin orders with administrations in past 72 hours.                    Nephrotoxins and other renal medications (From now, onward)      Start     Dose/Rate Route Frequency Ordered Stop    24 1700  vancomycin (VANCOCIN) 1,250 mg in 0.9% NaCl 262.5 mL intermittent infusion         1,250 mg  over 90 Minutes Intravenous EVERY 24 HOURS 24 1601      24 1630  vancomycin (VANCOCIN) 1,500 mg in 0.9% NaCl 265 mL intermittent infusion         1,500 mg  over 90 Minutes Intravenous ONCE 24 1628              Contrast Orders - past 72 hours (72h ago, onward)      Start     Dose/Rate Route Frequency Stop    24 2330  iopamidol (ISOVUE-370) solution 90 mL         90 mL Intravenous ONCE 24 2315            [employer] Prediction of Planned Initial Vancomycin Regimen  Loading dose: 1500 mg at 17:00 2024.  Regimen: 1250 mg IV every 24 hours.  Start time: 17:00 on 2024  Exposure target: AUC24 (range)400-600 mg/L.hr   AUC24,ss: 533 mg/L.hr  Probability of AUC24 > 400: 80 %  Ctrough,ss: 15.4 mg/L  Probability of Ctrough,ss > 20: 27 %  Probability of nephrotoxicity (Lodise ALMA ): 11 %          Plan:  Start vancomycin 1500mg iv x 1 now then 1250mg iv qq24h  Vancomycin monitoring method: AUC  Vancomycin therapeutic monitoring goal: 400-600 mg*h/L  Pharmacy will check vancomycin levels as appropriate in 1-3 Days.    Serum creatinine levels will be ordered daily for the  first week of therapy and at least twice weekly for subsequent weeks.      Antoine Waggoner, [facility]

## 2024-12-23 NOTE — CONSULTS
Consultation - INFECTIOUS DISEASE CONSULTATION  Liu Malave ,  1965, MRN [id number]      Bronchopneumonia [J18.0]  Acute on chronic renal insufficiency [N28.9, N18.9]  Altered mental status, unspecified altered mental status type [R41.82]    PCP: Steven Rosa, [phone]   Code status:  Full Code               Assessment:  Aspiration, bronchial wall thickening: Admission CT with bronchial wall thickening. Difficulty expectorating mucus at baseline, refusing percussive vest  /4 bottles GPC clusters: verigene pending. This will determine additional workup.  Fever, noted  and . BC as able.  Remote ESBL UTI history  CVA with residual deficits, aphasia, dysphagia with GJ tube dependence, spastic quad.    Active Problems:    Aspiration pneumonitis (H)    Bronchopneumonia    Acute on chronic renal insufficiency    Altered mental status, unspecified altered mental status type    Essential hypertension    Other constipation      Recommendations:   - follow verigene results on GPC  - if clinically relevant, repeat BC in am due to recommendations around blood culture bottle shortage.  - vanc IV  - pip-tazo IV  - azithro for now  - respiratory viral panel, urine legionella  - palliative care consulted  - discussed with family, nursing staff, hospitalist    Viri Mcgill MD  [facility]  Office Telephone [phone].  Fax [phone]  [facility] paging      HPI:    Liu Malave is a 59 year old male. History is provided by chart, family.  Presented on  with abdominal pain and confusion, recent issues with GJ. Was getting ready for discharge then  and , fevers to 102, not at baseline mental status per family. /4 bottle so far from  with GPCs, ID pending. Vanc started.     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) 04/25/2023    On tube feeding diet 04/25/2023    Hemorrhagic stroke (H) 06/05/2016    History of CVA (cerebrovascular accident) 09/27/2023    Oropharyngeal dysphagia 05/31/2022    History of aspiration pneumonia 07/23/2024    Jaw dislocation, initial encounter 10/02/2024    Fever in adult 11/15/2024    Abdominal pain of unknown cause 11/15/2024    Acute cough 11/15/2024     Resolved Ambulatory Problems     Diagnosis Date Noted    Dental decay 12/19/2016    Acute respiratory failure with hypoxia (H)     Respiratory failure with hypoxia (H) 06/20/2016    Essential hypertension with goal blood pressure less than 140/90     Hypotension due to drugs     Palliative care encounter     Aspiration pneumonia, unspecified aspiration pneumonia type, unspecified laterality, unspecified part of lung (H)     Chronic respiratory failure (H)     Tracheostomy present (H)     Attention to tracheostomy (H)     Sepsis (H) 08/03/2017    Severe sepsis (H) 08/04/2017    Gastrojejunostomy tube status (H) 03/03/2020    Sepsis due to urinary tract infection (H) 06/04/2020    Feeding by G-tube (H) 06/04/2020    Sepsis (H) 06/27/2020    SIRS (systemic inflammatory response syndrome) (H) 07/25/2020    Fever, unspecified fever cause 01/28/2022    Sepsis, due to unspecified organism, unspecified whether acute organ dysfunction present (H) 01/28/2022    Basal ganglia hemorrhage (H) 06/20/2016    Seizure disorder (H) 04/06/2022    Depression 04/06/2022    Dyslipidemia 04/06/2022    Generalized anxiety disorder 04/06/2022    Hemiplegia and hemiparesis following cerebral  infarction affecting unspecified side (H) 06/04/2020    GERD without esophagitis 04/06/2022    Fever and chills 10/28/2022    Pneumonia of both lungs due to infectious organism, unspecified part of lung 10/28/2022    Pneumonia 12/11/2022    Sepsis without acute organ dysfunction (H) 02/11/2023    Urinary retention with incomplete bladder emptying 02/12/2023    Pneumonia of both lower lobes due to infectious organism 04/25/2023    Respiratory acidosis 04/25/2023    Hospital-acquired pneumonia 06/07/2023    Recurrent aspiration pneumonia (H) 06/08/2023    Acute hypoxemic respiratory failure (H) 06/08/2023    COVID-19 07/17/2023    Hypoxia 07/17/2023    Acute UTI 02/21/2020    Brain compression (H) 09/27/2023    Diarrhea 09/27/2023    Generalized pain 03/19/2018    Hyperkalemia 09/27/2023    Hypoalbuminemia due to protein-calorie malnutrition (H) 09/27/2023    Hypocalcemia 06/05/2020    Insomnia 05/31/2022    Personal history of urinary tract infection 08/13/2020    Retention of urine 09/27/2023    Restlessness and agitation 09/27/2023    Shortness of breath 09/27/2023    Slow transit constipation 03/19/2018    Spasticity 05/31/2022    Vasogenic brain edema (H) 09/27/2023    Community acquired pneumonia 10/25/2023    Sepsis with acute hypoxic respiratory failure without septic shock (H) 10/25/2023    Hyperglycemia 10/27/2023    Shortness of breath 07/18/2024     Past Medical History:   Diagnosis Date    Anxiety     Bruxism     Chronic static encephalopathy     Convulsions, unspecified convulsion type (H)     CVA (cerebral vascular accident) (H) 07/2016    Elevated troponin     Essential hypertension     GERD (gastroesophageal reflux disease)     Hemiplegia of nondominant side, late effect of cerebrovascular disease (H)     Hepatitis C carrier (H)     History of ESBL E. coli infection     History of hemorrhagic stroke with residual hemiparesis (H)     Hypertension     Lung nodule 12/05/2016    MRSA (methicillin  resistant staph aureus) culture positive     Periodontal disease     Persistent fever     Seizure (H)     Trismus          Surgical History  He  has a past surgical history that includes aneurysm clipping (2006); J-G tube; Tracheostomy; Exam under anesthesia, restorations, extraction(s) dental complex, combined (N/A, 12/19/2016); Inject steroid (location) (N/A, 3/7/2018); Exam under anesthesia, restorations, extraction(s) dental complex, combined (N/A, 3/7/2018); IR Gastro Jejunostomy Tube Placement (6/14/2016); IR Gastro Jejunostomy Tube Change (7/19/2016); IR Gastro Jejunostomy Tube Change (9/23/2016); IR Gastro Jejunostomy Tube Change (11/28/2016); IR Gastro Jejunostomy Tube Change (1/27/2017); IR Gastro Jejunostomy Tube Change (4/19/2017); IR Gastro Jejunostomy Tube Change (6/20/2017); IR Gastro Jejunostomy Tube Change (8/7/2017); IR Gastro Jejunostomy Tube Change (8/11/2017); IR Gastro Jejunostomy Tube Change (10/20/2017); IR Gastro Jejunostomy Tube Change (1/2/2018); IR Gastro Jejunostomy Tube Change (2/16/2018); IR Gastro Jejunostomy Tube Change (5/16/2018); IR Gastro Jejunostomy Tube Change (6/11/2018); IR Gastro Jejunostomy Tube Change (9/12/2018); IR Gastro Jejunostomy Tube Change (12/24/2018); IR Gastro Jejunostomy Tube Change (3/18/2019); IR Gastro Jejunostomy Tube Change (4/23/2019); IR Gastro Jejunostomy Tube Change (5/21/2019); IR Gastro Jejunostomy Tube Change (9/9/2019); IR Gastro Jejunostomy Tube Change (9/26/2019); IR Gastro Jejunostomy Tube Change (10/11/2019); IR Gastro Jejunostomy Tube Change (11/21/2019); IR Gastro Jejunostomy Tube Change (11/29/2019); IR Gastro Jejunostomy Tube Change (2/27/2020); IR Gastro Jejunostomy Tube Change (4/10/2020); IR Gastro Jejunostomy Tube Change (7/2/2020); IR Gastro Jejunostomy Tube Change (8/28/2020); IR Gastro Jejunostomy Tube Change (9/17/2020); IR Gastro Jejunostomy Tube Change (10/29/2020); IR Gastro Jejunostomy Tube Change (11/6/2020); IR Gastro  Jejunostomy Tube Change (12/24/2020); IR Gastro Jejunostomy Tube Change (1/9/2021); IR Gastro Jejunostomy Tube Change (6/9/2021); brain surgery; Picc (6/10/2016); Hh Midline Insertion (8/11/2017); Ir Gj Tube Replacement (12/24/2018); Ir Gj Tube Replacement (3/18/2019); Ir Gj Tube Replacement (4/23/2019); Ir Gj Tube Replacement (5/21/2019); Ir Gj Tube Replacement (9/9/2019); Ir Gj Tube Replacement (9/26/2019); Ir Gj Tube Replacement (10/11/2019); Ir Gj Tube Replacement (11/21/2019); Ir Gj Tube Replacement (11/29/2019); Ir Gj Tube Replacement (2/27/2020); Ir Gj Tube Replacement (4/10/2020); Ir Gj Tube Replacement (7/2/2020); Ir Gj Tube Replacement (8/28/2020); Ir Gj Tube Replacement (9/17/2020); Ir Gj Tube Replacement (10/29/2020); Ir Gj Tube Replacement (11/6/2020); Ir Gj Tube Replacement (12/24/2020); Ir Gj Tube Replacement (1/9/2021); Ir Gj Tube Replacement (6/9/2021); IR Gastro Jejunostomy Tube Change (10/4/2021); IR Gastro Jejunostomy Tube Change (10/18/2021); IR Gastro Jejunostomy Tube Change (1/14/2022); IR Gastro Jejunostomy Tube Change (1/20/2022); IR Gastro Jejunostomy Tube Change (4/19/2022); IR Gastro Jejunostomy Tube Change (6/7/2022); IR Gastro Jejunostomy Tube Change (8/19/2022); IR Gastro Jejunostomy Tube Change (9/13/2022); IR Gastro Jejunostomy Tube Change (10/27/2022); IR Gastro Jejunostomy Tube Change (11/3/2022); IR Gastro Jejunostomy Tube Change (1/18/2023); IR Gastro Jejunostomy Tube Change (4/7/2023); IR Gastro Jejunostomy Tube Change (5/4/2023); IR Gastro Jejunostomy Tube Change (6/21/2023); IR Gastro Jejunostomy Tube Change (8/23/2023); IR Gastro Jejunostomy Tube Change (11/16/2023); IR Gastro Jejunostomy Tube Change (1/8/2024); IR Gastro Jejunostomy Tube Change (4/9/2024); IR Gastro Jejunostomy Tube Change (7/19/2024); IR Gastro Jejunostomy Tube Change (10/21/2024); IR Gastro Jejunostomy Tube Change (11/15/2024); IR Gastro Jejunostomy Tube Change (11/25/2024); IR Gastro Jejunostomy Tube  "Change (12/10/2024); and IR Gastro Jejunostomy Tube Change (12/18/2024).       Social History  Reviewed, and he  reports that he quit smoking about 19 years ago. His smoking use included cigarettes. He started smoking about 49 years ago. He has a 30 pack-year smoking history. He has been exposed to tobacco smoke. He quit smokeless tobacco use about 17 years ago. He reports that he does not drink alcohol and does not use drugs.        Family History  Reviewed and noncontributory to present problem, and family history includes Cancer in his father; Hypertension in his mother; Kidney Disease in his brother; Other Cancer in his father.   Psychosocial Needs  Social History     Social History Narrative    Lives with wife, 2 daughters and son in law.    W/c bound.     Additional psychosocial needs reviewed per nursing assessment.       Allergies   Allergen Reactions    Diphenhydramine Unknown    Trazodone Other (See Comments)     \"shaking\" per family      Medications Prior to Admission   Medication Sig Dispense Refill Last Dose/Taking    acetaminophen (TYLENOL) 500 MG tablet Place 2 tablets (1,000 mg) into G tube every 6 hours as needed for mild pain 60 tablet 0 12/21/2024    amLODIPine (NORVASC) 5 MG tablet Place 1 tablet (5 mg) into G tube daily. 30 tablet 0 12/21/2024    bisacodyl (DULCOLAX) 10 MG suppository Place 1 suppository (10 mg) rectally daily as needed for constipation. 30 suppository 0 Past Week    CHEST CONGESTION RELIEF DM  MG/5ML syrup TAKE 10 MLS BY G TUBE ROUTE EVERY 4 HOURS AS NEEDED FOR COUGH. 237 mL 0 12/21/2024    ipratropium (ATROVENT) 0.06 % nasal spray Spray 2 sprays into both nostrils daily as needed for rhinitis   Past Month    ipratropium - albuterol 0.5 mg/2.5 mg/3 mL (DUONEB) 0.5-2.5 (3) MG/3ML neb solution Take 1 vial (3 mLs) by nebulization every 4 hours. 90 mL 0 12/21/2024 at  8:00 PM    ketoconazole (NIZORAL) 2 % external cream Apply topically 2 times daily to affected area(s). 30 g " 0 12/21/2024    LIDOCAINE PAIN RELIEF 4 % Patch Place 1 patch onto the skin daily as needed for moderate pain.   12/20/2024    meclizine (ANTIVERT) 25 MG tablet Take 1 tablet via G-tube every 6 hours as needed for vertigo 30 tablet 11 12/21/2024    neomycin-bacitracin-polymyxin (NEOSPORIN) 5-400-5000 ointment Apply topically 3 times daily as needed (irritation) To J Tube site 30 g 0 Past Week    nystatin (MYCOSTATIN) 423440 UNIT/GM external cream Apply topically 2 times daily as needed (redness) 30 g 3 Past Week    omeprazole (PRILOSEC) 40 MG DR capsule Take 1 capsule (40 mg) by mouth daily IN G TUBE 30 capsule 0 12/21/2024    ondansetron (ZOFRAN) 4 MG tablet Place 1 tablet (4 mg) into G tube every 8 hours as needed for nausea 30 tablet 0 12/19/2024    Ostomy Supplies (STOMAHESIVE) PSTE Apply topically to skin around ostomy site twice daily and as needed 56.7 g 3 Past Week    polyethylene glycol (MIRALAX) 17 GM/Dose powder Give 1 capful via G-tube daily as needed for constipation 850 g 3 12/21/2024    Polyethylene Glycol 400 (VISINE DRY EYE RELIEF) 1 % SOLN Apply 1 drop to eye every 6 hours as needed for dry eyes. 15 mL 11 Past Week    QUEtiapine (SEROQUEL) 25 MG tablet Place 25 mg into G tube every 4 hours as needed (Used during the day PRN agitation/etc. This is in addition to the tablet at 1900, 0000, 0300.)   Past Week    QUEtiapine (SEROQUEL) 25 MG tablet TAKE 1 TABLET BY MOUTH AT 7:00PM, MIDNIGHT AND 3:00AM FOR TOTAL OF 75 MG NIGHTLY. OK TO REPEAT AFTER 4 HOURS AND DURING THE DAY AS NEEDED. 102 tablet 5 12/21/2024 at  7:00 PM    simvastatin (ZOCOR) 20 MG tablet TAKE 1 TABLET PER G TUBE ROUTE AT BEDTIME. 90 tablet 1 12/21/2024    sodium chloride (NEBUSAL) 3 % neb solution TAKE 4 MLS BY NEBULIZATION EVERY 6 HOURS AS NEEDED FOR THICK SPUTUM. 240 mL 0 12/21/2024    triamcinolone (KENALOG) 0.1 % external cream Apply topically daily as needed for irritation. Apply to affected area/rash 30 g 0 Past Week     [DISCONTINUED] baclofen (LIORESAL) 10 MG tablet Place 1 tablet into G tube 4 times daily, in the morning and with each dose of Seroquel. (Patient taking differently: Take 10 mg by mouth 4 times daily. Takes at 1900, 0000, 0300 and in AM.) 120 tablet 0 12/21/2024        Review of Systems:  A 12 point comprehensive review of systems was negative except as noted. Physical Exam:  Temp:  [98  F (36.7  C)-102.4  F (39.1  C)] 102.4  F (39.1  C)  Pulse:  [102-145] 145  Resp:  [20-24] 24  BP: (130-183)/() 165/92  FiO2 (%):  [21 %] 21 %  SpO2:  [89 %-95 %] 94 %    GEN: NAD, not tracking.   HEAD: atraumatic  ENT: moist membranes, no thrush, anicteric sclera   NECK: supple, no nuchal rigidity  CARDIOVASCULAR: regular rate and rhythm, no murmurs, rubs, or gallops  PULMONARY: course bilaterally, upper airway sounds  ABDOMEN: soft, nontender, nondistended. Normal bowel sounds. PEG  SKIN: no rashes or lesions. No stigma of endocarditis  PSYCH: grossly intact  MUSCULOSKELETAL: no synovitis               Pertinent Labs  personally reviewed.   CBC RESULTS:   Recent Labs   Lab Test 12/22/24  1638   WBC 7.9   RBC 3.56*   HGB 10.8*   HCT 32.3*   MCV 91   MCH 30.3   MCHC 33.4   RDW 12.8           Last Comprehensive Metabolic Panel:  Sodium   Date Value Ref Range Status   12/22/2024 142 135 - 145 mmol/L Final   12/20/2016 142 133 - 144 mmol/L Final     Potassium   Date Value Ref Range Status   12/22/2024 4.5 3.4 - 5.3 mmol/L Final   07/18/2023 4.7 3.5 - 5.0 mmol/L Final   12/20/2016 5.1 3.4 - 5.3 mmol/L Final     Chloride   Date Value Ref Range Status   12/22/2024 110 (H) 98 - 107 mmol/L Final   07/18/2023 110 (H) 98 - 107 mmol/L Final   12/20/2016 108 94 - 109 mmol/L Final     Carbon Dioxide   Date Value Ref Range Status   12/20/2016 29 20 - 32 mmol/L Final     Carbon Dioxide (CO2)   Date Value Ref Range Status   12/22/2024 22 22 - 29 mmol/L Final   07/18/2023 21 (L) 22 - 31 mmol/L Final     Anion Gap   Date Value Ref Range  "Status   12/22/2024 10 7 - 15 mmol/L Final   07/18/2023 8 5 - 18 mmol/L Final   12/20/2016 5 3 - 14 mmol/L Final     Glucose   Date Value Ref Range Status   12/22/2024 110 (H) 70 - 99 mg/dL Final   07/18/2023 157 (H) 70 - 125 mg/dL Final   12/20/2016 133 (H) 70 - 99 mg/dL Final     GLUCOSE BY METER POCT   Date Value Ref Range Status   11/15/2024 99 70 - 99 mg/dL Final     Urea Nitrogen   Date Value Ref Range Status   12/22/2024 33.4 (H) 8.0 - 23.0 mg/dL Final   07/18/2023 24 (H) 8 - 22 mg/dL Final   12/20/2016 32 (H) 7 - 30 mg/dL Final     Creatinine   Date Value Ref Range Status   12/22/2024 1.24 (H) 0.67 - 1.17 mg/dL Final   12/20/2016 1.16 0.66 - 1.25 mg/dL Final     GFR Estimate   Date Value Ref Range Status   12/22/2024 66 >60 mL/min/1.73m2 Final     Comment:     eGFR calculated using 2021 CKD-EPI equation.   04/15/2021 >60 >60 mL/min/1.73m2 Final   12/20/2016 66 >60 mL/min/1.7m2 Final     Comment:     Non  GFR Calc     Calcium   Date Value Ref Range Status   12/22/2024 8.2 (L) 8.8 - 10.4 mg/dL Final     Comment:     Reference intervals for this test were updated on 7/16/2024 to reflect our healthy population more accurately. There may be differences in the flagging of prior results with similar values performed with this method. Those prior results can be interpreted in the context of the updated reference intervals.   12/20/2016 8.3 (L) 8.5 - 10.1 mg/dL Final       CRP   Date Value Ref Range Status   05/30/2022 0.4 0.0 - 0.8 mg/dL Final        The following microbiology studies were personally reviewed:  No results found for: \"CULT\"    Urine Studies    Recent Labs   Lab Test 12/22/24  0056 11/15/24  0031 07/19/24  0600 04/12/24  1111 01/08/24  1526   LEUKEST Negative Negative Negative Negative Negative   WBCU 1 <1 <1 <1 0       Vancomycin Levels    Recent Labs   Lab Test 10/27/23  0657 03/16/18  1317 03/15/18  1331   VANCOMYCIN 19.0 18.7 14.7       MICROBIOLOGY DATA:    All cultures:  7-Day " Micro Results       Collected Updated Procedure Result Status      12/22/2024 1638 12/23/2024 0931 Blood Culture Hand, Left [[id number]]   Blood from Hand, Left    Preliminary result Component Value   Culture No growth after 12 hours  [P]                12/22/2024 1638 12/23/2024 1556 Blood Culture Hand, Right [[id number]]    (Abnormal)   Blood from Hand, Right    Preliminary result Component Value   Culture Positive on the 1st day of incubation  [P]     Gram positive cocci in clusters  [P]     1 of 1 bottles               12/22/2024 1638 12/23/2024 1556 Verigene GP Panel [[id number]]   Blood from Hand, Right    In process Component Value   No component results            12/21/2024 2231 12/21/2024 2320 Influenza A/B, RSV and SARS-CoV2 PCR (COVID-19) Nasopharyngeal [[id number]]    Swab from Nasopharyngeal    Final result Component Value   Influenza A PCR Negative   Influenza B PCR Negative   RSV PCR Negative   SARS CoV2 PCR Negative   NEGATIVE: SARS-CoV-2 (COVID-19) RNA not detected, presumed negative.                     Pertinent Radiology  personally reviewed.     Head CT w/o contrast    Result Date: 12/22/2024  EXAM: CT HEAD W/O CONTRAST LOCATION: [facility] [place] DATE: 12/21/2024 INDICATION: Previous CVA, new contractures. COMPARISON: 07/18/2024 TECHNIQUE: Routine CT Head without IV contrast. Multiplanar reformats. Dose reduction techniques were used. FINDINGS: INTRACRANIAL CONTENTS: Redemonstrated right MCA aneurysm clip. No acute hemorrhage or adverse mass effect. No definite acute gray-white differentiation loss. Redemonstrated chronic infarcts throughout the bilateral external capsules, basal ganglia, corona radiata, centrum semiovale, and right temporal lobe. Moderate presumed chronic small vessel ischemic changes. Mild to moderate generalized volume loss. No hydrocephalus. VISUALIZED ORBITS/SINUSES/MASTOIDS: No intraorbital abnormality. No paranasal sinus mucosal disease. No  middle ear or mastoid effusion. BONES/SOFT TISSUES: No acute abnormality. Postoperative changes of a right pterional craniotomy with mesh cranioplasty.     IMPRESSION: 1.  No CT evidence for acute intracranial process. 2.  Stable chronic changes as above.    CT Chest Pulmonary Embolism w Contrast    Result Date: 12/21/2024  EXAM: CT CHEST PULMONARY EMBOLISM W CONTRAST, CT ABDOMEN PELVIS W CONTRAST LOCATION: [facility] DATE: 12/21/2024 INDICATION: cough, cp COMPARISON: CT chest abdomen pelvis 11/14/2024. TECHNIQUE: CT chest pulmonary angiogram during arterial phase injection of IV contrast. CT scan of the abdomen and pelvis was performed during the portal venous phase of IV contrast bolus. Multiplanar reformats were obtained. Dose reduction techniques were used. CONTRAST: ISOVUE 370 90ML FINDINGS: ANGIOGRAM CHEST: Pulmonary arteries are normal caliber and negative for pulmonary emboli. Thoracic aorta is negative for dissection. No CT evidence of right heart strain. LUNGS AND PLEURA: Calcified granulomas are again seen bilaterally with centrilobular emphysema again noted. There is bronchial wall thickening in the lower lobes bilaterally, with subsegmental atelectasis seen in the right and to lesser degree left lower  lobe dependently. MEDIASTINUM/AXILLAE: No lymphadenopathy. Normal esophagus. No significant pericardial effusion. CORONARY ARTERY CALCIFICATION: Moderate.  HEPATOBILIARY: No significant mass or bile duct dilatation. No calcified gallstones. PANCREAS: No significant mass, duct dilatation, or inflammatory change. SPLEEN: Normal size. ADRENAL GLANDS: No significant nodules. KIDNEYS/BLADDER: No significant mass, stones, or hydronephrosis. There are simple or benign cysts. No follow up is needed. BOWEL: A PEG tube is again seen in the stomach with the jejunostomy tube extending into the proximal jejunum. The small bowel is normal in size and caliber. The appendix is normal. Large  bowel is decompressed and otherwise unremarkable there is rectal and sigmoid colon contrast. LYMPH NODES: No lymphadenopathy. VASCULATURE: No abdominal aortic aneurysm. PELVIC ORGANS: Moderate prostatomegaly, similar to prior exam MUSCULOSKELETAL: Unremarkable.     IMPRESSION: 1.  No evidence of pulmonary embolus to the segmental level. 2.  Mild Bronchial wall thickening with subsegmental atelectasis seen in the lung bases bilaterally, overall improved aeration of the lower lobes when compared with 11/14/2024 3.  Centrilobular emphysema, unchanged from prior exam 4.  Moderate prostatomegaly, similar prior exam 5.  Normal appendix. 6.  No acute intra-abdominal process identified    CT Abdomen Pelvis w Contrast    Result Date: 12/21/2024  EXAM: CT CHEST PULMONARY EMBOLISM W CONTRAST, CT ABDOMEN PELVIS W CONTRAST LOCATION: [facility] DATE: 12/21/2024 INDICATION: cough, cp COMPARISON: CT chest abdomen pelvis 11/14/2024. TECHNIQUE: CT chest pulmonary angiogram during arterial phase injection of IV contrast. CT scan of the abdomen and pelvis was performed during the portal venous phase of IV contrast bolus. Multiplanar reformats were obtained. Dose reduction techniques were used. CONTRAST: ISOVUE 370 90ML FINDINGS: ANGIOGRAM CHEST: Pulmonary arteries are normal caliber and negative for pulmonary emboli. Thoracic aorta is negative for dissection. No CT evidence of right heart strain. LUNGS AND PLEURA: Calcified granulomas are again seen bilaterally with centrilobular emphysema again noted. There is bronchial wall thickening in the lower lobes bilaterally, with subsegmental atelectasis seen in the right and to lesser degree left lower  lobe dependently. MEDIASTINUM/AXILLAE: No lymphadenopathy. Normal esophagus. No significant pericardial effusion. CORONARY ARTERY CALCIFICATION: Moderate.  HEPATOBILIARY: No significant mass or bile duct dilatation. No calcified gallstones. PANCREAS: No significant  mass, duct dilatation, or inflammatory change. SPLEEN: Normal size. ADRENAL GLANDS: No significant nodules. KIDNEYS/BLADDER: No significant mass, stones, or hydronephrosis. There are simple or benign cysts. No follow up is needed. BOWEL: A PEG tube is again seen in the stomach with the jejunostomy tube extending into the proximal jejunum. The small bowel is normal in size and caliber. The appendix is normal. Large bowel is decompressed and otherwise unremarkable there is rectal and sigmoid colon contrast. LYMPH NODES: No lymphadenopathy. VASCULATURE: No abdominal aortic aneurysm. PELVIC ORGANS: Moderate prostatomegaly, similar to prior exam MUSCULOSKELETAL: Unremarkable.     IMPRESSION: 1.  No evidence of pulmonary embolus to the segmental level. 2.  Mild Bronchial wall thickening with subsegmental atelectasis seen in the lung bases bilaterally, overall improved aeration of the lower lobes when compared with 11/14/2024 3.  Centrilobular emphysema, unchanged from prior exam 4.  Moderate prostatomegaly, similar prior exam 5.  Normal appendix. 6.  No acute intra-abdominal process identified    IR Gastro Jejunostomy Tube Change    Result Date: 12/18/2024  [facility] LOCATION: [facility] DATE: 12/18/2024 PROCEDURE: FLUOROSCOPIC GUIDED GASTROJEJUNOSTOMY EXCHANGE INTERVENTIONAL RADIOLOGIST: Ayan INDICATION: Leaking gastrojejunostomy tube requiring replacement. MODERATE SEDATION: None. CONTRAST: 20 mL Omnipaque enteric. ANTIBIOTICS: None. ADDITIONAL MEDICATIONS: None. FLUOROSCOPIC TIME: 0.2 minutes. RADIATION DOSE: Air Kerma: 1 mGy. COMPLICATIONS: No immediate complications. STERILE BARRIER TECHNIQUE: Maximum sterile barrier technique was used. Cutaneous antisepsis was performed at the operative site with application of 2% chlorhexidine and large sterile drape. Prior to the procedure, the  and assistant performed hand hygiene and wore hat, mask, sterile gown, and sterile gloves  during the entire procedure. PROCEDURE:  Under fluoroscopic guidance, both the gastric and jejunal lumens of the indwelling gastrojejunostomy were injected with contrast and images were obtained. The tube was then exchanged over a stiff Glidewire for a new 18 Fr 45 cm LAURA gastrojejunostomy which was positioned under fluoroscopic guidance with its distal tip positioned in the proximal jejunum. The retention balloon was inflated with 10 mL of saline mixed with a tiny amount of contrast. Post placement contrast injection of both the gastric and jejunal lumens was performed. FINDINGS: 1. Initial injection demonstrates that the gastric and jejunal lumens are patent and in appropriate position. The retention balloon is completely deflated, likely the source of leaking. 2. After exchange, the new gastrojejunostomy is in appropriate position with the gastric lumen emptying into the stomach and jejunal lumen emptying near the ligament of Treitz.     IMPRESSION:  Successful gastrojejunostomy tube exchange, ready for immediate use.

## 2024-12-23 NOTE — CONSULTS
General Surgery Consultation  Liu Malave MRN# [id number]   Age/Sex: 59 year old male YOB: 1965     Reason for consult: 1. Altered mental status, unspecified altered mental status type    2. Acute on chronic renal insufficiency    3. Bronchopneumonia            Requesting physician: Dr. Dayday Gonsales                   Assessment and Plan:   Assessment:  GJ tube leakage  Mr. Malave is a 58 yo M with PMH of recurrent CVA with basal ganglia hemorrhage in 2016 with extensive residual deficits, aphasia and dysphagia with GJ tube dependence who was admitted with abdominal pain, fatigue and confusion found to have bronchopneumonia. Patient's GJ tube has required weekly replacement over the last month secondary to leakage and balloon failing/leaking. Patient hemodynamically stable and without leukocytosis. CT abdomen/pelvis on admission noted GJ tube in the stomach with the jejunostomy tube extending into the proximal jejunum. Currently, GJ tube is not leaking and working properly. Discussed with patient's daughter, Idalmis, and recommend continuing home tube feeds and medications via GJ tube. Will place outpatient referral for general surgery follow-up should the tube continue to have ongoing issues requiring frequent exchange to discuss further options. Patient's family in agreement with this plan.     Plan:  - Tube feeds per RD  - Monitor GJ tube and keep bumper approximated to skin, change gauze dressing as needed if soiled to keep skin clean and dry  - Antibiotics per primary team  - Outpatient referral to general surgery placed. General surgery will sign off at this time. Please call if further questions.          Chief Complaint:     Chief Complaint   Patient presents with    Abdominal Pain        History is obtained from the patient and electronic health record, patient's daughter     HPI:   Liu Malave is a 59 year old male with PMH of recurrent CVA with basal ganglia hemorrhage in 2016 with extensive  residual deficits, aphasia and dysphagia with GJ tube dependence, spastic quadriplegia, HTN, HLD, seizure disorder and recent positive Cologuard test who presented to the [facility] Emergency Department with abdominal pain, fatigue and confusion. Patient has a chronic GJ tube which has required weekly exchanges for the last month due to leaking/balloon has been failing. Per the patient's daughter, Idalmis, the GJ tube balloon has been deflating for unknown reasons and the tube has become loose with fear that it would fall out while at home. There is more leakage around the tube as well when this happens and when the tube becomes dislodged they can see that the balloon is deflated. His tube has been replaced by IR on 11/15, 11/25, 12/10 and most recently on 12/18. GJ tube has been replaced with the same size 18 Fr 45 cm LAURA GJ tube each time. Prior to this the GJ tube was otherwise functioning well and was exchanged every three months without issues. Skin around GJ tube is somewhat irritated and erythematous but clean and dry. Patient has otherwise been tolerating medications and tube feeds via the GJ tube without issues and having bowel function. No other past abdominal surgical history.           Past Medical History:     Past Medical History:   Diagnosis Date    Acute UTI 02/21/2020    Anxiety     Basal ganglia hemorrhage (H) 06/20/2016    Brain compression (H)     Bruxism     Chronic static encephalopathy     Convulsions, unspecified convulsion type (H)     CVA (cerebral vascular accident) (H) 07/2016    Hemorrhagic,     Depression     Dyslipidemia     Elevated troponin     Essential hypertension     Feeding by G-tube (H) 06/04/2020    GERD (gastroesophageal reflux disease)     Hemiplegia of nondominant side, late effect of cerebrovascular disease (H)     Created by Conversion  Replacement Utility updated for latest IMO load    Hemorrhagic stroke (H) 06/05/2016    Hepatitis C carrier (H)     History of ESBL E. coli  infection     History of hemorrhagic stroke with residual hemiparesis (H)     Hypertension     Lung nodule 12/05/2016    9 x 9 mm left upper lobe on CXR    MRSA (methicillin resistant staph aureus) culture positive     Periodontal disease     Persistent fever     Pneumonia of both lower lobes due to infectious organism     Respiratory failure with hypoxia (H)     Seizure (H)     Sepsis due to urinary tract infection (H) 06/04/2020    Severe sepsis (H)     Trismus               Past Surgical History:     Past Surgical History:   Procedure Laterality Date    aneurysm clipping  2006    BRAIN SURGERY      EXAM UNDER ANESTHESIA, RESTORATIONS, EXTRACTION(S) DENTAL COMPLEX, COMBINED N/A 12/19/2016    Procedure: COMBINED EXAM UNDER ANESTHESIA, RESTORATIONS, EXTRACTION(S) DENTAL COMPLEX;  Surgeon: Verónica Martinez DDS;  Location: [facility] OR    EXAM UNDER ANESTHESIA, RESTORATIONS, EXTRACTION(S) DENTAL COMPLEX, COMBINED N/A 3/7/2018    Procedure: COMBINED EXAM UNDER ANESTHESIA, RESTORATIONS, EXTRACTION(S) DENTAL COMPLEX;  Kenalog Injection in Bilateral [place], Combined Dental Exam,Radiographs, Three Dental Restorations, Periodontal Therapy and Fluoride Treatment;  Surgeon: Darryl Isbell DDS;  Location: [facility] OR    HH MIDLINE INSERTION  8/11/2017         INJECT STEROID (LOCATION) N/A 3/7/2018    Procedure: INJECT STEROID (LOCATION);  Kenalog Injection In Bilateral [place], Combined Dental Exam,Radiographs,Three Dental Restorations, Periodontal Therapy and Fluoride Treatment;  Surgeon: Liu Conroy DDS;  Location: [facility] OR    IR GASTRO JEJUNOSTOMY TUBE CHANGE  7/19/2016    IR GASTRO JEJUNOSTOMY TUBE CHANGE  9/23/2016    IR GASTRO JEJUNOSTOMY TUBE CHANGE  11/28/2016    IR GASTRO JEJUNOSTOMY TUBE CHANGE  1/27/2017    IR GASTRO JEJUNOSTOMY TUBE CHANGE  4/19/2017    IR GASTRO JEJUNOSTOMY TUBE CHANGE  6/20/2017    IR GASTRO JEJUNOSTOMY TUBE CHANGE  8/7/2017    IR GASTRO JEJUNOSTOMY TUBE CHANGE  8/11/2017    IR GASTRO JEJUNOSTOMY  TUBE CHANGE  10/20/2017    IR GASTRO JEJUNOSTOMY TUBE CHANGE  1/2/2018    IR GASTRO JEJUNOSTOMY TUBE CHANGE  2/16/2018    IR GASTRO JEJUNOSTOMY TUBE CHANGE  5/16/2018    IR GASTRO JEJUNOSTOMY TUBE CHANGE  6/11/2018    IR GASTRO JEJUNOSTOMY TUBE CHANGE  9/12/2018    IR GASTRO JEJUNOSTOMY TUBE CHANGE  12/24/2018    IR GASTRO JEJUNOSTOMY TUBE CHANGE  3/18/2019    IR GASTRO JEJUNOSTOMY TUBE CHANGE  4/23/2019    IR GASTRO JEJUNOSTOMY TUBE CHANGE  5/21/2019    IR GASTRO JEJUNOSTOMY TUBE CHANGE  9/9/2019    IR GASTRO JEJUNOSTOMY TUBE CHANGE  9/26/2019    IR GASTRO JEJUNOSTOMY TUBE CHANGE  10/11/2019    IR GASTRO JEJUNOSTOMY TUBE CHANGE  11/21/2019    IR GASTRO JEJUNOSTOMY TUBE CHANGE  11/29/2019    IR GASTRO JEJUNOSTOMY TUBE CHANGE  2/27/2020    IR GASTRO JEJUNOSTOMY TUBE CHANGE  4/10/2020    IR GASTRO JEJUNOSTOMY TUBE CHANGE  7/2/2020    IR GASTRO JEJUNOSTOMY TUBE CHANGE  8/28/2020    IR GASTRO JEJUNOSTOMY TUBE CHANGE  9/17/2020    IR GASTRO JEJUNOSTOMY TUBE CHANGE  10/29/2020    IR GASTRO JEJUNOSTOMY TUBE CHANGE  11/6/2020    IR GASTRO JEJUNOSTOMY TUBE CHANGE  12/24/2020    IR GASTRO JEJUNOSTOMY TUBE CHANGE  1/9/2021    IR GASTRO JEJUNOSTOMY TUBE CHANGE  6/9/2021    IR GASTRO JEJUNOSTOMY TUBE CHANGE  10/4/2021    IR GASTRO JEJUNOSTOMY TUBE CHANGE  10/18/2021    IR GASTRO JEJUNOSTOMY TUBE CHANGE  1/14/2022    IR GASTRO JEJUNOSTOMY TUBE CHANGE  1/20/2022    IR GASTRO JEJUNOSTOMY TUBE CHANGE  4/19/2022    IR GASTRO JEJUNOSTOMY TUBE CHANGE  6/7/2022    IR GASTRO JEJUNOSTOMY TUBE CHANGE  8/19/2022    IR GASTRO JEJUNOSTOMY TUBE CHANGE  9/13/2022    IR GASTRO JEJUNOSTOMY TUBE CHANGE  10/27/2022    IR GASTRO JEJUNOSTOMY TUBE CHANGE  11/3/2022    IR GASTRO JEJUNOSTOMY TUBE CHANGE  1/18/2023    IR GASTRO JEJUNOSTOMY TUBE CHANGE  4/7/2023    IR GASTRO JEJUNOSTOMY TUBE CHANGE  5/4/2023    IR GASTRO JEJUNOSTOMY TUBE CHANGE  6/21/2023    IR GASTRO JEJUNOSTOMY TUBE CHANGE  8/23/2023    IR GASTRO JEJUNOSTOMY TUBE CHANGE  11/16/2023     "IR GASTRO JEJUNOSTOMY TUBE CHANGE  1/8/2024    IR GASTRO JEJUNOSTOMY TUBE CHANGE  4/9/2024    IR GASTRO JEJUNOSTOMY TUBE CHANGE  7/19/2024    IR GASTRO JEJUNOSTOMY TUBE CHANGE  10/21/2024    IR GASTRO JEJUNOSTOMY TUBE CHANGE  11/15/2024    IR GASTRO JEJUNOSTOMY TUBE CHANGE  11/25/2024    IR GASTRO JEJUNOSTOMY TUBE CHANGE  12/10/2024    IR GASTRO JEJUNOSTOMY TUBE CHANGE  12/18/2024    IR GASTRO JEJUNOSTOMY TUBE PLACEMENT  6/14/2016    IR GJ TUBE REPLACEMENT  12/24/2018    IR GJ TUBE REPLACEMENT  3/18/2019    IR GJ TUBE REPLACEMENT  4/23/2019    IR GJ TUBE REPLACEMENT  5/21/2019    IR GJ TUBE REPLACEMENT  9/9/2019    IR GJ TUBE REPLACEMENT  9/26/2019    IR GJ TUBE REPLACEMENT  10/11/2019    IR GJ TUBE REPLACEMENT  11/21/2019    IR GJ TUBE REPLACEMENT  11/29/2019    IR GJ TUBE REPLACEMENT  2/27/2020    IR GJ TUBE REPLACEMENT  4/10/2020    IR GJ TUBE REPLACEMENT  7/2/2020    IR GJ TUBE REPLACEMENT  8/28/2020    IR GJ TUBE REPLACEMENT  9/17/2020    IR GJ TUBE REPLACEMENT  10/29/2020    IR GJ TUBE REPLACEMENT  11/6/2020    IR GJ TUBE REPLACEMENT  12/24/2020    IR GJ TUBE REPLACEMENT  1/9/2021    IR GJ TUBE REPLACEMENT  6/9/2021    J-G tube      PICC  6/10/2016         TRACHEOSTOMY      Removed             Social History:    reports that he quit smoking about 19 years ago. His smoking use included cigarettes. He started smoking about 49 years ago. He has a 30 pack-year smoking history. He has been exposed to tobacco smoke. He quit smokeless tobacco use about 17 years ago. He reports that he does not drink alcohol and does not use drugs.           Family History:     Family History   Problem Relation Age of Onset    Hypertension Mother     Cancer Father     Other Cancer Father     Kidney Disease Brother               Allergies:     Allergies   Allergen Reactions    Diphenhydramine Unknown    Trazodone Other (See Comments)     \"shaking\" per family              Medications:     Prior to Admission medications    Medication Sig " Start Date End Date Taking? Authorizing Provider   acetaminophen (TYLENOL) 500 MG tablet Place 2 tablets (1,000 mg) into G tube every 6 hours as needed for mild pain 7/20/24  Yes Mena Ortiz MD   amLODIPine (NORVASC) 5 MG tablet Place 1 tablet (5 mg) into G tube daily. 11/18/24  Yes Girma Morejon DO   baclofen (LIORESAL) 10 MG tablet Place 1 tablet into G tube 4 times daily, in the morning and with each dose of Seroquel.  Patient taking differently: Take 10 mg by mouth 4 times daily. Takes at 1900, 0000, 0300 and in AM. 11/18/24  Yes Steven Rosa MD   bisacodyl (DULCOLAX) 10 MG suppository Place 1 suppository (10 mg) rectally daily as needed for constipation. 11/17/24  Yes Girma Morejon DO   CHEST CONGESTION RELIEF DM  MG/5ML syrup TAKE 10 MLS BY G TUBE ROUTE EVERY 4 HOURS AS NEEDED FOR COUGH. 12/20/24  Yes Laura Ojeda APRN CNP   ipratropium (ATROVENT) 0.06 % nasal spray Spray 2 sprays into both nostrils daily as needed for rhinitis   Yes Unknown, Entered By History   ipratropium - albuterol 0.5 mg/2.5 mg/3 mL (DUONEB) 0.5-2.5 (3) MG/3ML neb solution Take 1 vial (3 mLs) by nebulization every 4 hours. 12/20/24  Yes Steven Rosa MD   ketoconazole (NIZORAL) 2 % external cream Apply topically 2 times daily to affected area(s). 12/20/24  Yes Laura Ojeda APRN CNP   LIDOCAINE PAIN RELIEF 4 % Patch Place 1 patch onto the skin daily as needed for moderate pain. 9/30/24  Yes Reported, Patient   meclizine (ANTIVERT) 25 MG tablet Take 1 tablet via G-tube every 6 hours as needed for vertigo 7/22/24  Yes Steven Rosa MD   neomycin-bacitracin-polymyxin (NEOSPORIN) 5-400-5000 ointment Apply topically 3 times daily as needed (irritation) To J Tube site 7/20/24  Yes Mena Ortiz MD   nystatin (MYCOSTATIN) 207531 UNIT/GM external cream Apply topically 2 times daily as needed (redness) 6/21/23  Yes Lynda Gutierrez MD   omeprazole (PRILOSEC) 40 MG DR capsule Take 1 capsule (40 mg)  by mouth daily IN G TUBE 7/20/24  Yes Mena Ortiz MD   ondansetron (ZOFRAN) 4 MG tablet Place 1 tablet (4 mg) into G tube every 8 hours as needed for nausea 7/20/24  Yes Mena Ortiz MD   Ostomy Supplies (STOMAHESIVE) PSTE Apply topically to skin around ostomy site twice daily and as needed 5/24/23  Yes Lynda Gutierrez MD   polyethylene glycol (MIRALAX) 17 GM/Dose powder Give 1 capful via G-tube daily as needed for constipation 8/13/24  Yes Steven Rosa MD   Polyethylene Glycol 400 (VISINE DRY EYE RELIEF) 1 % SOLN Apply 1 drop to eye every 6 hours as needed for dry eyes. 8/13/24  Yes Steven Rosa MD   QUEtiapine (SEROQUEL) 25 MG tablet Place 25 mg into G tube every 4 hours as needed (Used during the day PRN agitation/etc. This is in addition to the tablet at 1900, 0000, 0300.)   Yes Reported, Patient   QUEtiapine (SEROQUEL) 25 MG tablet TAKE 1 TABLET BY MOUTH AT 7:00PM, MIDNIGHT AND 3:00AM FOR TOTAL OF 75 MG NIGHTLY. OK TO REPEAT AFTER 4 HOURS AND DURING THE DAY AS NEEDED. 7/15/24  Yes Steven Rosa MD   simvastatin (ZOCOR) 20 MG tablet TAKE 1 TABLET PER G TUBE ROUTE AT BEDTIME. 9/26/24  Yes Steven Rosa MD   sodium chloride (NEBUSAL) 3 % neb solution TAKE 4 MLS BY NEBULIZATION EVERY 6 HOURS AS NEEDED FOR THICK SPUTUM. 12/20/24  Yes Laura Ojeda APRN CNP   triamcinolone (KENALOG) 0.1 % external cream Apply topically daily as needed for irritation. Apply to affected area/rash 11/17/24  Yes Morejon, Girma, DO              Review of Systems:   The Review of Systems is negative other than noted in the HPI            Physical Exam:   Patient Vitals for the past 24 hrs:   BP Temp Temp src Pulse Resp SpO2   12/23/24 0943 (!) 152/72 -- -- 116 -- --   12/23/24 0930 (!) 183/100 -- -- -- -- --   12/23/24 0924 -- -- -- -- -- 90 %   12/23/24 0847 (!) 144/89 99.3  F (37.4  C) Oral (!) 123 20 (!) 89 %   12/23/24 0836 -- -- -- -- -- 92 %   12/23/24 0500 130/76 99.8  F (37.7  C) Axillary --  -- --   12/23/24 0004 (!) 172/83 98  F (36.7  C) Oral 110 20 93 %   12/22/24 2114 (!) 157/80 98  F (36.7  C) Oral 110 24 95 %   12/22/24 1926 -- -- -- 102 -- 93 %   12/22/24 1656 (!) 146/85 98.3  F (36.8  C) Axillary 110 20 94 %   12/22/24 1633 -- -- -- -- -- 94 %   12/22/24 1524 130/85 (!) 101.7  F (38.7  C) Axillary (!) 122 24 92 %          Intake/Output Summary (Last 24 hours) at 12/23/2024 1032  Last data filed at 12/23/2024 0602  Gross per 24 hour   Intake 1050 ml   Output 1775 ml   Net -725 ml      Constitutional:   Somnolent, does not open eyes or arouse to voice, no apparent distress, and appears stated age       ENT:   Normocephalic, without obvious abnormality, atraumatic, Lips, mucosa, and tongue normal        Lungs:   Normal respiratory effort, no accessory muscle use       Cardiovascular:   Regular rate and rhythm       Abdomen:   Soft, non-tender, non-distended. GJ tube in place clamped and clean/dry/intact. Tube flushed with free water without issues.        Musculoskeletal:   No obvious swelling, bruising or deformity       Skin:   Skin color and texture normal for patient, no rashes or lesions              Data:         All imaging studies reviewed by me.    Results for orders placed or performed during the hospital encounter of 12/21/24 (from the past 24 hours)   Blood Culture Hand, Left    Specimen: Hand, Left; Blood   Result Value Ref Range    Culture No growth after 12 hours    Blood Culture Hand, Right    Specimen: Hand, Right; Blood   Result Value Ref Range    Culture No growth after 12 hours     Narrative    Only an Aerobic Blood Culture Bottle was collected, interpret results with caution.       CBC with platelets   Result Value Ref Range    WBC Count 7.9 4.0 - 11.0 10e3/uL    RBC Count 3.56 (L) 4.40 - 5.90 10e6/uL    Hemoglobin 10.8 (L) 13.3 - 17.7 g/dL    Hematocrit 32.3 (L) 40.0 - 53.0 %    MCV 91 78 - 100 fL    MCH 30.3 26.5 - 33.0 pg    MCHC 33.4 31.5 - 36.5 g/dL    RDW 12.8 10.0 - 15.0  %    Platelet Count 222 150 - 450 10e3/uL           Hoda Phelan PA-C  [address] [address]?  Office: [phone]

## 2024-12-23 NOTE — PLAN OF CARE
Goal Outcome Evaluation: Patient is cleared for discharge by Dr. Gonsales noted order in place. PIV removed per order. Patient's daughters to transport at 1530.-Rachel DASH RN

## 2024-12-23 NOTE — PLAN OF CARE
Goal Outcome Evaluation:  Patient non verbal.   Incontinent of bladder and bowel. Voiding. Primofit in place.  PIV patent. Normal Saline at 100 ml/hr.   Bed bound.     Call light within reach. Bed at lowest position. Bed alarm on.     Visitor exception.      [facility] Admission: 2- Bed Activities  [facility] Daily1- Lying in bed          Problem: Adult Inpatient Plan of Care  Goal: Optimal Comfort and Wellbeing  Outcome: Progressing   Problem: Pain Acute  Goal: Optimal Pain Control and Function  Outcome: Progressing

## 2024-12-23 NOTE — PROGRESS NOTES
Patient received two of three breathing tx's today. At 4pm his HR was high 146, MD ok to hold nebs. RN at bedside with patient for further cares. Breath sounds of been coarse crackles SAT 91% on RA. Suggested to use pneumatic percussor on patient for secretion removal, MD requested to hold at this time. Patient hasn't been allowing us to use vest therapy. Will continue to assess as needed.

## 2024-12-23 NOTE — PROGRESS NOTES
"[facility] MEDICINE  PROGRESS NOTE     Code Status: Full Code       Assessment and Plan:  Liu GIL Malave is a 59 year old male admitted on 12/21/2024. He has medical history significant for recurrent CVA with basal ganglia hemorrhage in 2016 with extensive residual deficits, aphasia, dysphagia with GJ tube dependence, spastic quadriplegia, hypertension, dyslipidemia, seizure disorder, chronic static encephalopathy, ESBL urine, CKD 3A and GERD.  Has a positive Cologuard test.  Not pursuing colonoscopy.  Patient was brought to the ED by family for abdominal pain, fatigue and confusion.  CT scan showed mild bronchial wall thickening.  Initiated on intravenous Rocephin and azithromycin.  Patient has been refusing his percussive vest.  Family aware this will be problematic with his recovery.  Family reports that G-tube balloon has been failing/leaking and IR has replaced the G-tube 11/15, 11/25, 12/10 and 12/18.  Consulted general surgery and no active G-tube issues at this time.  Appreciate wound care G-tube assessment.  Family noting more spasticity.  Increased baclofen.  For hypertension tachycardia metoprolol added.  12/22 Tmax of 101.7.  Blood cultures and UA obtained.  12/23 patient showed some clinical improvement and family wished to discharge to home.  However subsequently blood cultures showed GPC in clusters.  Cancel plans for discharge.  Repeat blood cultures.  Add vancomycin.  Consult infectious disease and palliative care.    Addendum 4:29 PM    New vitals- Temp 102.4,  and /92.    Give NS bolus 500 ml now.  Check lactic, cbc and bmp STAT.    Dayday Gonsales MD  12/23/2024  4:29 PM       Bronchopneumonia  Aspiration pneumonitis  CT chest showing findings of \"Mild Bronchial wall thickening with subsegmental atelectasis seen in the lung bases bilaterally, overall improved aeration of the lower lobes when compared with 11/14/2024.\"  Patient has extensive " secretions.  Unable to expectorate mucus.  Has been refusing percussive vest.  Continue Mucomyst nebulizers and DuoNebs 4 times daily.  Continue Robitussin.  Continue Rocephin and 12/22 added azithromycin due to his risk of complicating pneumonias.    Fever  Positive blood culture  12/22 in the afternoon had a Tmax of 101.7.  Blood cultures obtained.  CBC and urinalysis unremarkable.  12/23 blood culture positive for GPC in clusters.  Cancel plans for discharge.  Repeat blood cultures.  Add vancomycin.  Consult infectious disease given patient's complexity and history.    History of ESBL UTI  Utilize contact precautions per standard protocols.     Abdominal pain  Chronic constipation  G-tube leakage recurrent  Positive Cologuard test  -Patient positive outpatient Cologuard test.  Family is decided not to pursue colonoscopy which is understandable.  -Over the last month and a half patient's G-tube has been persistently leaking and has been replaced by IR on 11/15, 11/25, 12/10 and 12/18.  -Per family request ordered CEA and CA 19-9.  CEA negative.  CA 19-9 pending.  -CT scan abdomen unremarkable.  -Utilize senna docusate per G-tube twice daily scheduled to help with bowel movements.  -Appreciate general surgery consultation.  G-tube appears to be functioning well at this time.  If has subsequent issues should follow-up with surgery as an outpatient.     Altered mental status:   At admission family reported that patient was more confused than baseline.  However, upon asking for clarification, they state that patient appears more tired compared to baseline.  Difficult to assess due to patient's nonverbal status.  Was agitated when RT was attempting to place percussion vest.  CT brain with no acute findings.    Utilize family to ascertain his functional status/improvement.     History of hemorrhagic stroke  Spasticity  Insomnia  Family has been noticing more spasticity and discomfort with his extremities.   Continue  home scheduled Seroquel.  Discussed with family that historically neurology had not recommended him being on aspirin due to his hemorrhagic stroke.  Continue to stay off aspirin.  Increased his baclofen to 20 mg 4 times daily.  Monitor response.  Family aware this can make him more tired.     Metabolic acidosis:   CKD 3A  Mild.  Patient had bicarb of 21 on presentation.  His creatinine is at baseline.  BUN is increased from 17-38.  However, his BUN has been mostly around 23-33.  He does not appear dehydrated.  Received fluid bolus in the ED.  Monitor BMP  Renal dosing of medications     Dysphagia:   GJ tube dependent  Nutrition consult to coordinate resumption of tube feedings.  Continue home Protonix.     Subclinical hypothyroidism  TSH was elevated at 4.97 but T4 free was normal at 1.04.  Discussed pathophysiology with the family.  Recommend semiannual thyroid testing.     Essential hypertension  Tachycardia  Continue home Norvasc.  Ordered metoprolol 25 mg p.o. twice daily with hold parameters.     Dyslipidemia  Continue home Zocor.     Goals of care  Placed palliative care consult due to patient's challenging status and multiple issues.  Hoping we can have discussions regarding what if any limitations on his care family would wish to pursue.  Previously they had not wish to have him undergo colonoscopy but he is still full code.     Anticoagulation   Enoxaparin (Lovenox) SQ     Fluids: Normal saline at 75 mL/h.  Pain meds: Tylenol as needed  Therapy: N/A.  At baseline functional level.   Kelly:Not present  Lines: None       Current Diet  Orders Placed This Encounter      NPO for Medical/Clinical Reasons Except for: NPO but receiving Tube Feeding      Diet    Supplements  Active Nourishment Order   Procedures    Adult Formula Drip Feeding: Continuous Vital 1.5; Jejunostomy; Goal Rate: 60; mL/hr; from 8am-8pm run at 60ml/hr, from 8pm-8am run at 30ml/hr     Lines/Drains/Airways       Drain  Duration              Gastrostomy/Enterostomy Gastrojejunostomy LUQ 18 fr Avanos LAURA GJ tube, lot #[id number] 5 days    External Urinary Catheter 1 day                  Barriers to Discharge: Positive blood culture, intravenous antibiotics, infectious disease consultation    Disposition: Likely here at least 2-3 more days  Medically Ready for Discharge: Anticipated in 2-4 Days       Clinically Significant Risk Factors          # Hyperchloremia: Highest Cl = 110 mmol/L in last 2 days, will monitor as appropriate      # Hypocalcemia: Lowest Ca = 8.2 mg/dL in last 2 days, will monitor and replace as appropriate     # Hypoalbuminemia: Lowest albumin = 3.3 g/dL at 12/21/2024 11:04 PM, will monitor as appropriate     # Hypertension: Noted on problem list                # Financial/Environmental Concerns: none         Interval History/Subjective:  Patient had been stable over night resting comfortably.  Family had contacted us requesting to discharge home as they felt he was doing clinically better.  Plans were made for discharge however prior to departure patient's previous blood culture did come back positive with gram-positive cocci in clusters.  Discharge canceled.  Questions answered to verbalized satisfaction.      Last 24H PRN:     hydrALAZINE (APRESOLINE) tablet 10 mg **OR** hydrALAZINE (APRESOLINE) injection 10 mg, 10 mg at 12/23/24 0930    QUEtiapine (SEROquel) tablet 25 mg, 25 mg at 12/23/24 0851    Physical Exam/Objective:  Temp:  [98  F (36.7  C)-99.8  F (37.7  C)] 99.3  F (37.4  C)  Pulse:  [102-123] 116  Resp:  [20-24] 20  BP: (130-183)/() 152/72  FiO2 (%):  [21 %] 21 %  SpO2:  [89 %-95 %] 91 %  Wt Readings from Last 4 Encounters:   12/22/24 57.3 kg (126 lb 5.2 oz)   11/17/24 56.7 kg (125 lb)   07/18/24 54 kg (119 lb)   01/08/24 59 kg (130 lb)     Body mass index is 24.67 kg/m .    Constitutional: fatigued, somnolent, uncooperative, no apparent distress, appears older than stated age, and thin  ENT: Normocephalic, without  obvious abnormality, atraumatic, external ears without lesions, oral pharynx with moist mucous membranes, tonsils without erythema or exudates, gums normal and good dentition.  Respiratory: Poor air movement throughout.  Rhonchi present.  Cardiovascular: Tachycardic regular rhythm  GI: No scars, normal bowel sounds, soft, non-distended, non-tender, no masses palpated, no hepatosplenomegally  Skin: normal skin color, texture, turgor, no redness, warmth, or swelling, and no rashes  Musculoskeletal: significant spasticity in all 4 extremities.   Neurologic: difficult to assess. Sensory:  Sensory intact  Neuropsychiatric: General: normal, calm, and poor eye contact Level of consciousness: drowsy Affect: difficult to assess     Medications:   Personally Reviewed.  Medications   Current Facility-Administered Medications   Medication Dose Route Frequency Provider Last Rate Last Admin    dextrose 10% infusion   Intravenous Continuous PRN Tyrell Mansfield MD        sodium chloride 0.9 % infusion   Intravenous Continuous Dayday Gonsales MD 75 mL/hr at 12/23/24 0819 Rate Change at 12/23/24 0819     Current Facility-Administered Medications   Medication Dose Route Frequency Provider Last Rate Last Admin    acetylcysteine (MUCOMYST) 20 % nebulizer solution 2 mL  2 mL Nebulization 4x daily Ayaan Dixon MD   2 mL at 12/23/24 1212    amLODIPine (NORVASC) tablet 5 mg  5 mg Per G Tube Daily Dayday Gonsales MD   5 mg at 12/23/24 0850    azithromycin (ZITHROMAX) 500 mg in  mL intermittent infusion  500 mg Intravenous Q24H Dayday Gonsales MD   500 mg at 12/23/24 1151    baclofen (LIORESAL) tablet 20 mg  20 mg Per Feeding Tube 4x Daily Dayday Gonsales MD   20 mg at 12/23/24 0851    cefTRIAXone (ROCEPHIN) 1 g vial to attach to  mL bag for ADULTS or NS 50 mL bag for PEDS  1 g Intravenous Q24H Dayday Gonsales MD   1 g at 12/23/24 0313    sennosides (SENOKOT) syrup 5 mL  5 mL Oral BID Dayday Gonsales MD   5 mL  at 12/23/24 0851    And    docusate (COLACE) 50 MG/5ML liquid 100 mg  100 mg Per Feeding Tube BID Dayday Gonsales MD   100 mg at 12/23/24 0853    enoxaparin ANTICOAGULANT (LOVENOX) injection 40 mg  40 mg Subcutaneous Q24H Dayday Gonsales MD   40 mg at 12/23/24 0315    ipratropium - albuterol 0.5 mg/2.5 mg/3 mL (DUONEB) neb solution 3 mL  3 mL Nebulization 4x daily Ayaan Dixon MD   3 mL at 12/23/24 1212    metoprolol tartrate (LOPRESSOR) half-tab 12.5 mg  12.5 mg Per Feeding Tube BID Dayday Gonsales MD   12.5 mg at 12/23/24 0851    pantoprazole (PROTONIX) 2 mg/mL suspension 40 mg  40 mg Per Feeding Tube Daily Dayday Gonsales MD   40 mg at 12/23/24 0855    QUEtiapine (SEROquel) tablet 25 mg  25 mg Per Feeding Tube TID Dayday Gonsales MD   25 mg at 12/23/24 0315    simvastatin (ZOCOR) tablet 20 mg  20 mg Per G Tube At Bedtime Dayday Gonsales MD   20 mg at 12/22/24 2136    sodium chloride (PF) 0.9% PF flush 3 mL  3 mL Intracatheter Q8H Dayday Gonsales MD   3 mL at 12/22/24 1922    vancomycin (VANCOCIN) 1,250 mg in 0.9% NaCl 262.5 mL intermittent infusion  1,250 mg Intravenous Q12H Dayday Gonsales MD           Data reviewed today: I personally reviewed all new medications, labs, imaging/diagnostics reports over the past 24 hours. Pertinent findings include:    Imaging:   No results found for this or any previous visit (from the past 24 hours).  No results found for this or any previous visit (from the past 4320 hours).    Labs:  CT Abdomen Pelvis w Contrast   Final Result   IMPRESSION:   1.  No evidence of pulmonary embolus to the segmental level.   2.  Mild Bronchial wall thickening with subsegmental atelectasis seen in the lung bases bilaterally, overall improved aeration of the lower lobes when compared with 11/14/2024   3.  Centrilobular emphysema, unchanged from prior exam   4.  Moderate prostatomegaly, similar prior exam   5.  Normal appendix.   6.  No acute intra-abdominal process identified       CT Chest Pulmonary Embolism w Contrast   Final Result   IMPRESSION:   1.  No evidence of pulmonary embolus to the segmental level.   2.  Mild Bronchial wall thickening with subsegmental atelectasis seen in the lung bases bilaterally, overall improved aeration of the lower lobes when compared with 11/14/2024   3.  Centrilobular emphysema, unchanged from prior exam   4.  Moderate prostatomegaly, similar prior exam   5.  Normal appendix.   6.  No acute intra-abdominal process identified      Head CT w/o contrast   Final Result   IMPRESSION:   1.  No CT evidence for acute intracranial process.   2.  Stable chronic changes as above.        Recent Results (from the past 24 hours)   Blood Culture Hand, Left    Collection Time: 12/22/24  4:38 PM    Specimen: Hand, Left; Blood   Result Value Ref Range    Culture No growth after 12 hours    Blood Culture Hand, Right    Collection Time: 12/22/24  4:38 PM    Specimen: Hand, Right; Blood   Result Value Ref Range    Culture Positive on the 1st day of incubation (A)     Culture Gram positive cocci in clusters (AA)    CBC with platelets    Collection Time: 12/22/24  4:38 PM   Result Value Ref Range    WBC Count 7.9 4.0 - 11.0 10e3/uL    RBC Count 3.56 (L) 4.40 - 5.90 10e6/uL    Hemoglobin 10.8 (L) 13.3 - 17.7 g/dL    Hematocrit 32.3 (L) 40.0 - 53.0 %    MCV 91 78 - 100 fL    MCH 30.3 26.5 - 33.0 pg    MCHC 33.4 31.5 - 36.5 g/dL    RDW 12.8 10.0 - 15.0 %    Platelet Count 222 150 - 450 10e3/uL       Pending Labs:  Unresulted Labs Ordered in the Past 30 Days of this Admission       Date and Time Order Name Status Description    12/22/2024  4:07 PM Blood Culture Hand, Right Preliminary     12/22/2024  4:07 PM Blood Culture Hand, Left Preliminary     12/22/2024  9:41 AM Cancer antigen 19-9 In process               Dayday Gonsales MD  [facility] Medicine  [facility]  Phone: #[phone]

## 2024-12-23 NOTE — PROGRESS NOTES
Care Management Discharge Note    Discharge Date: 12/23/2024       Discharge Disposition: Home    Discharge Services: None    Discharge DME: None    Discharge Transportation: family or friend will provide    Education Provided on the Discharge Plan: Yes (AVS per bedside RN)    Persons Notified of Discharge Plans: daughter    Patient/Family in Agreement with the Plan: yes    Handoff Referral Completed: Yes, [facility] PCP: Internal handoff referral completed    Additional Information:  CM reviewed chart. No CM needs identified. Resumption of previous services (home tube feeding, etc). Family to provide transportation home at discharge.     Clinic care coordination referral sent per protocol.        Faith Dumont RN  Care Coordinator

## 2024-12-23 NOTE — PROGRESS NOTES
Patient remained on room air overnight.  Breath sounds diminished and coarse.  Patient has a strong productive cough.  Patient orally suctioned for moderate thick tan secretions.  Patient unable to tolerate vest therapy.  RT to continue with current orders.

## 2024-12-23 NOTE — PROGRESS NOTES
CLINICAL NUTRITION SERVICES - ASSESSMENT NOTE     Nutrition Prescription    RECOMMENDATIONS FOR MDs/PROVIDERS TO ORDER:  Discontinue IVF with TF     Malnutrition Status:    Does not meet 2 criteria    Recommendations already ordered by Registered Dietitian (RD):  1) Vital 1.5 via g-tube at 60 mL/hr x 12 hrs (8am-8pm) and 30 mL x12 hrs (8pm-8am).  At goal rate, formula provides total volume of 1080 mL, 1620 kcal (27 kcal/kg, 100% needs), 73 gm Pro (1.2 gm/kg, 100% needs), 201 gm CHO, 61 gm fat, 6 gm fiber, and 825 mL free water per DW of 59 kg.    2) Free water flushes of 120 mL q 2 hrs (8am-8pm). Total fluid (free water + flushes) = 1545 mL per day.    Future/Additional Recommendations:  Will monitor progress towards goals     REASON FOR ASSESSMENT  Liu GIL Malave is a/an 59 year old male assessed by the dietitian for Provider Order - Registered Dietitian to Assess and Order TF per Medical Nutrition Therapy Protocol    Pertinent Medical Admission/History: CVA, aphasia, dysphagia, GJ tube dependence, spastic quadriplegia, HTN, GERD, sepsis, AMS, abdominal pain    NUTRITION HISTORY  Allergies: NKFA  From 11/15/24 admission: Pt was given the following substitute formula   1) Vital 1.5 via g-tube at 60 mL/hr x 12 hrs (8am-8pm) and 30 mL x12 hrs (8pm-8am).  At goal rate, formula provides total volume of 1080 mL, 1620 kcal (27 kcal/kg, 100% needs), 73 gm Pro (1.2 gm/kg, 100% needs), 201 gm CHO, 61 gm fat, 6 gm fiber, and 825 mL free water    2) Free water flushes of 120 mL q 2 hrs (8am-8pm). Total fluid (free water + flushes) = 1545 mL per day.      Home regimen was 4 cartons Peptamen with perbio1 per day and 120ml FWF q2 dueing the day, pt runs at 60 during the day and 30ml at night     CURRENT NUTRITION ORDERS  Diet: NPO      PHYSICAL FINDINGS  See malnutrition section below.  Per flowsheet:  Edema- no edema noted   GI- fecal incontinence, G/J tube   Skin- no skin breakdown  Pain- pt noted per RN screen to be groaning  "  Oral- none noted     LABS  Labs reviewed, BUN-33.4, Cr-1.24    MEDICATIONS  Medications reviewed, senokot, colace, protonix  NaCl at 75ml/hr    ANTHROPOMETRICS  Height: 152.4 cm (5' 0\"), noted to be 5'2\" on 11/2024  Most Recent Weight: 57.3 kg (126 lb 5.2 oz)    IBW: 52 kg  BMI: Normal BMI  Weight History:   Wt Readings from Last 10 Encounters:   12/22/24 57.3 kg (126 lb 5.2 oz)   11/17/24 56.7 kg (125 lb)   07/18/24 54 kg (119 lb)   01/08/24 59 kg (130 lb)   10/25/23 60.3 kg (133 lb)   07/18/23 54.1 kg (119 lb 4.3 oz)   06/21/23 59 kg (130 lb)   06/07/23 59 kg (130 lb)   05/24/23 54 kg (119 lb)   04/26/23 54.1 kg (119 lb 4.3 oz)     Wt bounces around a bit, seems in usual wt range     ASSESSED NUTRITION NEEDS  Dosing Weight: 57 kg  Estimated Energy Needs: 1219-9399 kcals/day (25 - 30 kcals/kg)  Justification: Maintenance  Estimated Protein Needs: 57-68 grams protein/day (1 - 1.2 grams of pro/kg)  Justification: Maintenance  Estimated Fluid Needs: 4858-2021 mL/day (25 - 30 mL/kg)   Justification: Maintenance      MALNUTRITION:  % Weight Loss:  None noted, wt does bounce around  % Intake:  No decreased intake noted  Subcutaneous Fat Loss:  None observed  Muscle Loss:  None observed  Fluid Retention:  None noted    Malnutrition Diagnosis: Patient does not meet two of the above criteria necessary for diagnosing malnutrition        NUTRITION DIAGNOSIS  Swallowing difficulty related to hx CVA with chronic dysphagia as evidenced by need for enteral nutrition      INTERVENTIONS  Implementation  1) Vital 1.5 via g-tube at 60 mL/hr x 12 hrs (8am-8pm) and 30 mL x12 hrs (8pm-8am).  At goal rate, formula provides total volume of 1080 mL, 1620 kcal (27 kcal/kg, 100% needs), 73 gm Pro (1.2 gm/kg, 100% needs), 201 gm CHO, 61 gm fat, 6 gm fiber, and 825 mL free water per DW of 59 kg.    2) Free water flushes of 120 mL q 2 hrs (8am-8pm). Total fluid (free water + flushes) = 1545 mL per day.    3) Stop IVF with start of enteral " nutrition  Education Needs- none at this time     Goals  Total avg nutritional intake to meet a minimum of 25-30 kcal/kg and 1-1.2 g PRO/kg daily (per dosing wt 57 kg).     Monitoring/Evaluation  Will monitor progress towards goals

## 2024-12-23 NOTE — PLAN OF CARE
Problem: Adult Inpatient Plan of Care  Goal: Absence of Hospital-Acquired Illness or Injury  Intervention: Identify and Manage Fall Risk  Recent Flowsheet Documentation  Taken 12/23/2024 0736 by Rachel Pereyra RN  Safety Promotion/Fall Prevention: safety round/check completed     Problem: Adult Inpatient Plan of Care  Goal: Absence of Hospital-Acquired Illness or Injury  Intervention: Prevent Skin Injury  Recent Flowsheet Documentation  Taken 12/23/2024 0736 by Rachel Pereyra RN  Body Position:   supine, head elevated   supine, legs elevated     Problem: Pain Acute  Goal: Optimal Pain Control and Function  Intervention: Prevent or Manage Pain  Recent Flowsheet Documentation  Taken 12/23/2024 0736 by Rachel Pereyra RN  Medication Review/Management: medications reviewed     Problem: Infection  Goal: Absence of Infection Signs and Symptoms  Intervention: Prevent or Manage Infection  Recent Flowsheet Documentation  Taken 12/23/2024 0736 by Rachel Pereyra RN  Infection Management: aseptic technique maintained     Problem: Gas Exchange Impaired  Goal: Optimal Gas Exchange  Intervention: Optimize Oxygenation and Ventilation  Recent Flowsheet Documentation  Taken 12/23/2024 0736 by Rachel Pereyra RN  Head of Bed (HOB) Positioning: HOB at 30 degrees    Goal Outcome Evaluation: Patient assessed per post CVA with deficits, unable to speak, spastic, sensitive to movement and touch during bedside cares, vital signs noted elevated BP triggered by movement and spasticity, on scheduled baclofen, metoprolol, prn seroquel  given via GJtube, and IV hydralazine given, on NS at 75ml/hr, Nutrition stopped by and told Writer will start tubefeeding. Patient's daughters requested to discharge patient today. Writer informed Dr. Gonsales, would like surgery to see patient about GJtube before discharging today. Will continue to monitor. -Rachel DASH RN

## 2024-12-24 ENCOUNTER — APPOINTMENT (OUTPATIENT)
Dept: RADIOLOGY | Facility: CLINIC | Age: 59
End: 2024-12-24
Attending: INTERNAL MEDICINE
Payer: COMMERCIAL

## 2024-12-24 ENCOUNTER — APPOINTMENT (OUTPATIENT)
Dept: CARDIOLOGY | Facility: CLINIC | Age: 59
End: 2024-12-24
Attending: FAMILY MEDICINE
Payer: COMMERCIAL

## 2024-12-24 ENCOUNTER — APPOINTMENT (OUTPATIENT)
Dept: CT IMAGING | Facility: CLINIC | Age: 59
End: 2024-12-24
Attending: FAMILY MEDICINE
Payer: COMMERCIAL

## 2024-12-24 LAB
ATRIAL RATE - MUSE: 136 BPM
CREAT SERPL-MCNC: 1.54 MG/DL (ref 0.67–1.17)
DIASTOLIC BLOOD PRESSURE - MUSE: NORMAL MMHG
EGFRCR SERPLBLD CKD-EPI 2021: 51 ML/MIN/1.73M2
ERYTHROCYTE [DISTWIDTH] IN BLOOD BY AUTOMATED COUNT: 13.1 % (ref 10–15)
EST. AVERAGE GLUCOSE BLD GHB EST-MCNC: 117 MG/DL
GLUCOSE BLDC GLUCOMTR-MCNC: 101 MG/DL (ref 70–99)
GLUCOSE BLDC GLUCOMTR-MCNC: 121 MG/DL (ref 70–99)
GLUCOSE BLDC GLUCOMTR-MCNC: 124 MG/DL (ref 70–99)
GLUCOSE BLDC GLUCOMTR-MCNC: 134 MG/DL (ref 70–99)
GLUCOSE BLDC GLUCOMTR-MCNC: 139 MG/DL (ref 70–99)
GLUCOSE BLDC GLUCOMTR-MCNC: 145 MG/DL (ref 70–99)
HBA1C MFR BLD: 5.7 %
HCT VFR BLD AUTO: 28.4 % (ref 40–53)
HGB BLD-MCNC: 9.5 G/DL (ref 13.3–17.7)
INTERPRETATION ECG - MUSE: NORMAL
LACTATE SERPL-SCNC: 1.1 MMOL/L (ref 0.7–2)
LVEF ECHO: NORMAL
MCH RBC QN AUTO: 30.5 PG (ref 26.5–33)
MCHC RBC AUTO-ENTMCNC: 33.5 G/DL (ref 31.5–36.5)
MCV RBC AUTO: 91 FL (ref 78–100)
MRSA DNA SPEC QL NAA+PROBE: NEGATIVE
P AXIS - MUSE: 72 DEGREES
PLATELET # BLD AUTO: 184 10E3/UL (ref 150–450)
PR INTERVAL - MUSE: 140 MS
QRS DURATION - MUSE: 68 MS
QT - MUSE: 294 MS
QTC - MUSE: 442 MS
R AXIS - MUSE: 57 DEGREES
RBC # BLD AUTO: 3.11 10E6/UL (ref 4.4–5.9)
SA TARGET DNA: NEGATIVE
SYSTOLIC BLOOD PRESSURE - MUSE: NORMAL MMHG
T AXIS - MUSE: 64 DEGREES
VENTRICULAR RATE- MUSE: 136 BPM
WBC # BLD AUTO: 14.7 10E3/UL (ref 4–11)

## 2024-12-24 PROCEDURE — 120N000013 HC R&B IMCU

## 2024-12-24 PROCEDURE — 87640 STAPH A DNA AMP PROBE: CPT | Performed by: INTERNAL MEDICINE

## 2024-12-24 PROCEDURE — 93306 TTE W/DOPPLER COMPLETE: CPT | Mod: 26 | Performed by: INTERNAL MEDICINE

## 2024-12-24 PROCEDURE — 250N000011 HC RX IP 250 OP 636: Performed by: FAMILY MEDICINE

## 2024-12-24 PROCEDURE — 94640 AIRWAY INHALATION TREATMENT: CPT

## 2024-12-24 PROCEDURE — 83036 HEMOGLOBIN GLYCOSYLATED A1C: CPT | Performed by: SURGERY

## 2024-12-24 PROCEDURE — 258N000003 HC RX IP 258 OP 636: Performed by: FAMILY MEDICINE

## 2024-12-24 PROCEDURE — 94640 AIRWAY INHALATION TREATMENT: CPT | Mod: 76

## 2024-12-24 PROCEDURE — 250N000011 HC RX IP 250 OP 636: Performed by: INTERNAL MEDICINE

## 2024-12-24 PROCEDURE — 250N000009 HC RX 250: Performed by: INTERNAL MEDICINE

## 2024-12-24 PROCEDURE — 999N000157 HC STATISTIC RCP TIME EA 10 MIN

## 2024-12-24 PROCEDURE — 94799 UNLISTED PULMONARY SVC/PX: CPT

## 2024-12-24 PROCEDURE — 94668 MNPJ CHEST WALL SBSQ: CPT

## 2024-12-24 PROCEDURE — 82565 ASSAY OF CREATININE: CPT | Performed by: FAMILY MEDICINE

## 2024-12-24 PROCEDURE — 99254 IP/OBS CNSLTJ NEW/EST MOD 60: CPT | Performed by: CLINICAL NURSE SPECIALIST

## 2024-12-24 PROCEDURE — 99232 SBSQ HOSP IP/OBS MODERATE 35: CPT | Performed by: FAMILY MEDICINE

## 2024-12-24 PROCEDURE — 71045 X-RAY EXAM CHEST 1 VIEW: CPT

## 2024-12-24 PROCEDURE — 250N000013 HC RX MED GY IP 250 OP 250 PS 637: Performed by: FAMILY MEDICINE

## 2024-12-24 PROCEDURE — 87641 MR-STAPH DNA AMP PROBE: CPT | Performed by: INTERNAL MEDICINE

## 2024-12-24 PROCEDURE — 93306 TTE W/DOPPLER COMPLETE: CPT

## 2024-12-24 PROCEDURE — 83605 ASSAY OF LACTIC ACID: CPT | Performed by: FAMILY MEDICINE

## 2024-12-24 PROCEDURE — 70450 CT HEAD/BRAIN W/O DYE: CPT

## 2024-12-24 PROCEDURE — 85014 HEMATOCRIT: CPT | Performed by: FAMILY MEDICINE

## 2024-12-24 PROCEDURE — 99232 SBSQ HOSP IP/OBS MODERATE 35: CPT | Performed by: INTERNAL MEDICINE

## 2024-12-24 RX ORDER — BACLOFEN 10 MG/1
20 TABLET ORAL 4 TIMES DAILY
Status: DISCONTINUED | OUTPATIENT
Start: 2024-12-24 | End: 2024-12-26 | Stop reason: HOSPADM

## 2024-12-24 RX ORDER — VANCOMYCIN HYDROCHLORIDE 1 G/200ML
1000 INJECTION, SOLUTION INTRAVENOUS EVERY 24 HOURS
Status: DISCONTINUED | OUTPATIENT
Start: 2024-12-24 | End: 2024-12-25

## 2024-12-24 RX ORDER — LORAZEPAM 2 MG/ML
1 INJECTION INTRAMUSCULAR EVERY 4 HOURS PRN
Status: DISCONTINUED | OUTPATIENT
Start: 2024-12-24 | End: 2024-12-26 | Stop reason: HOSPADM

## 2024-12-24 RX ADMIN — SODIUM CHLORIDE: 9 INJECTION, SOLUTION INTRAVENOUS at 00:11

## 2024-12-24 RX ADMIN — ACETYLCYSTEINE 2 ML: 200 SOLUTION ORAL; RESPIRATORY (INHALATION) at 20:48

## 2024-12-24 RX ADMIN — METOPROLOL TARTRATE 25 MG: 25 TABLET, FILM COATED ORAL at 05:46

## 2024-12-24 RX ADMIN — ENOXAPARIN SODIUM 40 MG: 40 INJECTION SUBCUTANEOUS at 03:14

## 2024-12-24 RX ADMIN — BACLOFEN 20 MG: 10 TABLET ORAL at 23:47

## 2024-12-24 RX ADMIN — VANCOMYCIN HYDROCHLORIDE 1000 MG: 1 INJECTION, SOLUTION INTRAVENOUS at 16:47

## 2024-12-24 RX ADMIN — SENNOSIDES 5 ML: 8.8 LIQUID ORAL at 09:15

## 2024-12-24 RX ADMIN — LORAZEPAM 1 MG: 2 INJECTION INTRAMUSCULAR; INTRAVENOUS at 23:38

## 2024-12-24 RX ADMIN — METOPROLOL TARTRATE 25 MG: 25 TABLET, FILM COATED ORAL at 21:05

## 2024-12-24 RX ADMIN — QUETIAPINE FUMARATE 25 MG: 25 TABLET ORAL at 12:25

## 2024-12-24 RX ADMIN — ACETYLCYSTEINE 2 ML: 200 SOLUTION ORAL; RESPIRATORY (INHALATION) at 15:09

## 2024-12-24 RX ADMIN — LORAZEPAM 1 MG: 2 INJECTION INTRAMUSCULAR; INTRAVENOUS at 14:41

## 2024-12-24 RX ADMIN — LORAZEPAM 1 MG: 2 INJECTION INTRAMUSCULAR; INTRAVENOUS at 19:15

## 2024-12-24 RX ADMIN — QUETIAPINE FUMARATE 25 MG: 25 TABLET ORAL at 23:47

## 2024-12-24 RX ADMIN — BACLOFEN 10 MG: 10 TABLET ORAL at 03:13

## 2024-12-24 RX ADMIN — IPRATROPIUM BROMIDE AND ALBUTEROL SULFATE 3 ML: .5; 3 SOLUTION RESPIRATORY (INHALATION) at 11:53

## 2024-12-24 RX ADMIN — PIPERACILLIN AND TAZOBACTAM 3.38 G: 3; .375 INJECTION, POWDER, FOR SOLUTION INTRAVENOUS at 22:41

## 2024-12-24 RX ADMIN — PIPERACILLIN AND TAZOBACTAM 3.38 G: 3; .375 INJECTION, POWDER, FOR SOLUTION INTRAVENOUS at 06:47

## 2024-12-24 RX ADMIN — AZITHROMYCIN MONOHYDRATE 500 MG: 500 INJECTION, POWDER, LYOPHILIZED, FOR SOLUTION INTRAVENOUS at 12:25

## 2024-12-24 RX ADMIN — IPRATROPIUM BROMIDE AND ALBUTEROL SULFATE 3 ML: .5; 3 SOLUTION RESPIRATORY (INHALATION) at 20:48

## 2024-12-24 RX ADMIN — PIPERACILLIN AND TAZOBACTAM 3.38 G: 3; .375 INJECTION, POWDER, FOR SOLUTION INTRAVENOUS at 14:43

## 2024-12-24 RX ADMIN — BACLOFEN 20 MG: 10 TABLET ORAL at 18:57

## 2024-12-24 RX ADMIN — BACLOFEN 10 MG: 10 TABLET ORAL at 00:38

## 2024-12-24 RX ADMIN — QUETIAPINE FUMARATE 25 MG: 25 TABLET ORAL at 00:38

## 2024-12-24 RX ADMIN — IPRATROPIUM BROMIDE AND ALBUTEROL SULFATE 3 ML: .5; 3 SOLUTION RESPIRATORY (INHALATION) at 15:09

## 2024-12-24 RX ADMIN — ACETAMINOPHEN 650 MG: 325 SOLUTION ORAL at 11:14

## 2024-12-24 RX ADMIN — QUETIAPINE FUMARATE 25 MG: 25 TABLET ORAL at 18:57

## 2024-12-24 RX ADMIN — ACETYLCYSTEINE 2 ML: 200 SOLUTION ORAL; RESPIRATORY (INHALATION) at 11:53

## 2024-12-24 RX ADMIN — AMLODIPINE BESYLATE 5 MG: 5 TABLET ORAL at 09:15

## 2024-12-24 RX ADMIN — DOCUSATE SODIUM 100 MG: 50 LIQUID ORAL at 09:15

## 2024-12-24 RX ADMIN — IPRATROPIUM BROMIDE AND ALBUTEROL SULFATE 3 ML: .5; 3 SOLUTION RESPIRATORY (INHALATION) at 07:41

## 2024-12-24 RX ADMIN — BACLOFEN 10 MG: 10 TABLET ORAL at 09:16

## 2024-12-24 RX ADMIN — QUETIAPINE FUMARATE 25 MG: 25 TABLET ORAL at 03:13

## 2024-12-24 RX ADMIN — SIMVASTATIN 20 MG: 20 TABLET, FILM COATED ORAL at 21:06

## 2024-12-24 RX ADMIN — ACETYLCYSTEINE 2 ML: 200 SOLUTION ORAL; RESPIRATORY (INHALATION) at 07:41

## 2024-12-24 ASSESSMENT — ACTIVITIES OF DAILY LIVING (ADL)
ADLS_ACUITY_SCORE: 64
ADLS_ACUITY_SCORE: 74
ADLS_ACUITY_SCORE: 64
ADLS_ACUITY_SCORE: 68
ADLS_ACUITY_SCORE: 74
ADLS_ACUITY_SCORE: 68
ADLS_ACUITY_SCORE: 64
ADLS_ACUITY_SCORE: 74
ADLS_ACUITY_SCORE: 74
ADLS_ACUITY_SCORE: 68
ADLS_ACUITY_SCORE: 64
ADLS_ACUITY_SCORE: 74
ADLS_ACUITY_SCORE: 72
ADLS_ACUITY_SCORE: 64
ADLS_ACUITY_SCORE: 64
ADLS_ACUITY_SCORE: 72
ADLS_ACUITY_SCORE: 68
ADLS_ACUITY_SCORE: 64
ADLS_ACUITY_SCORE: 74
ADLS_ACUITY_SCORE: 68
ADLS_ACUITY_SCORE: 74

## 2024-12-24 NOTE — CONSULTS
Palliative Care Consultation Note  [facility]      Patient: Liu Malave  Date of Admission:  12/21/2024    Requesting Clinician / Team: Dr. Gonsales  Reason for consult: Goals of care       Recommendations & Counseling     GOALS OF CARE:   Life-prolonging without limits     ADVANCE CARE PLANNING:  No health care directive on file. Per system policy, Surrogate Decision-makers for Patients With Diminished Decision-making Capacity offers guidance on possible decision-makers. Daughter Idalmis has been identified as a surrogate decision maker.   There is no POLST form on file, defer to patient and/or next of kin for decisions   Code status: Full Code    MEDICAL MANAGEMENT:   We are not actively managing symptoms at this time.    PSYCHOSOCIAL/SPIRITUAL SUPPORT:  Lives with family.  , 2 daughters, 4 grandchildren.  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   Confucianist, not actively practicing     Palliative Care will sign off as goals are established.  If further discussions re: goals of care are needed or assistance needed with sx management, please reconsult. Thank you for the consult and allowing us to aid in the care of Liu Malave.    These recommendations have been discussed with Dr. Gonsales and Bedside RN.    Nargis Smith, CNS  [facility], Palliative Care  Securely message with the [employer] Web Console (learn more here) or  Text page via [facility] Paging/Directory         Assessment       Liu Malave is a 59 year old male admitted on 12/21/2024. He has medical history significant for recurrent CVA with basal ganglia hemorrhage in 2016 with extensive residual deficits, aphasia, dysphagia with GJ tube dependence, spastic quadriplegia, hypertension, dyslipidemia, seizure disorder, chronic static encephalopathy, ESBL urine, CKD 3A and GERD.  Has a positive Cologuard test.  Not pursuing colonoscopy.  Patient was brought to the ED by family for abdominal pain, fatigue and confusion.   CT scan showed mild bronchial wall thickening.  Initiated on intravenous Rocephin and azithromycin.  Patient has been refusing his percussive vest.  Family aware this will be problematic with his recovery.  Family reports that G-tube balloon has been failing/leaking and IR has replaced the G-tube 11/15, 11/25, 12/10 and 12/18.  Consulted general surgery and no active G-tube issues at this time.  Appreciate wound care G-tube assessment.  Family noting more spasticity.  Increased baclofen.  For hypertension with tachycardia, metoprolol added.  12/22 Tmax of 101.7.  Blood cultures and UA obtained.  12/23 patient showed some clinical improvement and family wished to discharge to home.  However subsequently blood cultures showed GPC in clusters and temperature increased to 102.4 with severe tachycardia and hypertension.  Transferred to ICU status.  Infectious disease consulted.  Antibiotics transitioned to Zosyn, vancomycin and azithromycin.     Today, the patient was seen for:  Goals of care    History of Present Illness   Met with daughter Idalmis.   Introduced the role of palliative care as an interdisciplinary team that cares for patients with serious illness to help support symptom management, communication, coping for patients and their families as well as support with medical decision making.    Prognosis, Goals, & Planning:   Functional Status just prior to this current hospitalization:  ECOG4 (Completely disabled and dependent on others for selfcare; bedbound)    Prognosis, Goals, and/or Advance Care Planning:  We discussed general treatment options (full/restorative, selective/conservatives, and comfort only/hospice). We then discussed how these specifically apply to Liu.  Based on this discussion, family has decided to continue with all life prolonging measures at this time.     Code Status was addressed today:   Yes, We discussed potential risks and rationale of attempting cardiac resuscitation, intubation, and  mechanical ventilation.  We also discussed probability of survival as well as quality of life implications.  Based on this discussion, patient or surrogate response/decision: Full code      Patient's decision making preferences: unable to assess        Patient has decision-making capacity today for complex decisions: Unreliable          Coping, Meaning, & Spirituality:   Mood, coping, and/or meaning in the context of serious illness were addressed today: Yes    Social:   Lives with family.  , 2 daughters, 4 grandchildren.  Worked in [employer] business prior to his stroke.     Medications:  Reviewed this patient's medication profile and medications from this hospitalization.     ROS:  Unable to complete ROS due to cognitive status    Physical Exam   Vital Signs with Ranges  Temp:  [98  F (36.7  C)-102.5  F (39.2  C)] 98.4  F (36.9  C)  Pulse:  [] 104  Resp:  [10-38] 25  BP: ()/(56-99) 140/98  SpO2:  [91 %-100 %] 98 %  Wt Readings from Last 10 Encounters:   12/22/24 57.3 kg (126 lb 5.2 oz)   11/17/24 56.7 kg (125 lb)   07/18/24 54 kg (119 lb)   01/08/24 59 kg (130 lb)   10/25/23 60.3 kg (133 lb)   07/18/23 54.1 kg (119 lb 4.3 oz)   06/21/23 59 kg (130 lb)   06/07/23 59 kg (130 lb)   05/24/23 54 kg (119 lb)   04/26/23 54.1 kg (119 lb 4.3 oz)     126 lbs 5.18 oz    PHYSICAL EXAM:  Awake, alert, uncooperative, profound spasticity and kicking especially of the left leg noted, mild distress, appears older than stated age   Nonverbal      Data reviewed:  Results for orders placed or performed during the hospital encounter of 12/21/24 (from the past 24 hours)   Lactic acid whole blood   Result Value Ref Range    Lactic Acid 1.5 0.7 - 2.0 mmol/L   Basic metabolic panel   Result Value Ref Range    Sodium 147 (H) 135 - 145 mmol/L    Potassium 3.6 3.4 - 5.3 mmol/L    Chloride 114 (H) 98 - 107 mmol/L    Carbon Dioxide (CO2) 17 (L) 22 - 29 mmol/L    Anion Gap 16 (H) 7 - 15 mmol/L    Urea Nitrogen 25.1 (H) 8.0 -  23.0 mg/dL    Creatinine 1.46 (H) 0.67 - 1.17 mg/dL    GFR Estimate 54 (L) >60 mL/min/1.73m2    Calcium 8.2 (L) 8.8 - 10.4 mg/dL    Glucose 106 (H) 70 - 99 mg/dL   CBC with platelets   Result Value Ref Range    WBC Count 18.0 (H) 4.0 - 11.0 10e3/uL    RBC Count 3.77 (L) 4.40 - 5.90 10e6/uL    Hemoglobin 11.5 (L) 13.3 - 17.7 g/dL    Hematocrit 34.0 (L) 40.0 - 53.0 %    MCV 90 78 - 100 fL    MCH 30.5 26.5 - 33.0 pg    MCHC 33.8 31.5 - 36.5 g/dL    RDW 12.9 10.0 - 15.0 %    Platelet Count 249 150 - 450 10e3/uL   Respiratory Panel PCR    Specimen: Nasopharyngeal; Swab   Result Value Ref Range    Adenovirus Not Detected Not Detected    Coronavirus Not Detected Not Detected    Human Metapneumovirus Not Detected Not Detected    Human Rhin/Enterovirus Not Detected Not Detected    Influenza A Not Detected Not Detected    Influenza A, H1 Not Detected Not Detected    Influenza A 2009 H1N1 Not Detected Not Detected    Influenza A, H3 Not Detected Not Detected    Influenza B Not Detected Not Detected    Parainfluenza Virus 1 Not Detected Not Detected    Parainfluenza Virus 2 Not Detected Not Detected    Parainfluenza Virus 3 Not Detected Not Detected    Parainfluenza Virus 4 Not Detected Not Detected    Respiratory Syncytial Virus A Not Detected Not Detected    Respiratory Syncytial Virus B Not Detected Not Detected    Chlamydia Pneumoniae Not Detected Not Detected    Mycoplasma Pneumoniae Not Detected Not Detected    Narrative    The [employer] Respiratory Panel is a qualitative nucleic acid, multiplex, in vitro diagnostic test for the simultaneous detection and identification of multiple respiratory viral and bacterial nucleic acids in nasopharyngeal swabs collected in viral transport media from individual exhibiting signs and symptoms of respiratory infection. The assay has received FDA approval for the testing of nasopharyngeal (NP) swabs only. This test is used for clinical purposes and should not be regarded as investigational  or for research. This laboratory is certified under the Clinical Laboratory Improvement Amendments of 1988 (CLIA-88) as qualified to perform high complexity clinical laboratory testing.   ECG 12-LEAD WITH MUSE (LHE)   Result Value Ref Range    Systolic Blood Pressure  mmHg    Diastolic Blood Pressure  mmHg    Ventricular Rate 136 BPM    Atrial Rate 136 BPM    UT Interval 140 ms    QRS Duration 68 ms     ms    QTc 442 ms    P Axis 72 degrees    R AXIS 57 degrees    T Axis 64 degrees    Interpretation ECG       Sinus tachycardia  Otherwise normal ECG  When compared with ECG of 21-Dec-2024 21:43,  Vent. rate has increased by  45 bpm  No significant change was found  Confirmed by ALEXANDER ALBARRAN MD LOC:WW ([id number]) on 12/24/2024 2:24:32 PM     Legionella Urinary Antigen and Streptococcus pneumoniae antigen    Specimen: Urine, Clean Catch   Result Value Ref Range    Legionella pneumophila serogroup 1 urinary antigen Negative Negative    Streptococcus pneumoniae antigen Negative Negative    Legionella pneumophila Urinary/Strep pneumoniae Antigen Specimen Type Urine     Narrative    The result of this test as well as culture, serology, or other antigen detection methods should be used in conjunction with clinical findings to make an accurate diagnosis.   Glucose by meter   Result Value Ref Range    GLUCOSE BY METER POCT 125 (H) 70 - 99 mg/dL   Triple Lumen PICC Placement    Narrative    Sam Lott RN     12/23/2024  9:17 PM  [facility]    Triple Lumen PICC Placement    Date/Time: 12/23/2024 8:34 PM    Performed by: Sam Lott RN  Authorized by: Dayday Gonsales MD  Indications: vascular access      UNIVERSAL PROTOCOL   Site Marked: Yes  Prior Images Obtained and Reviewed:  Yes  Required items: Required blood products, implants, devices and special   equipment available    Patient identity confirmed:  Arm band and verbally with patient  Patient was reevaluated immediately  before administering moderate or deep   sedation or anesthesia  Confirmation Checklist:  Patient's identity using two indicators, relevant   allergies, procedure was appropriate and matched the consent or emergent   situation and correct equipment/implants were available  Time out: Immediately prior to the procedure a time out was called    Universal Protocol: the Joint Commission Universal Protocol was followed    Preparation: Patient was prepped and draped in usual sterile fashion    ESBL (mL):  5     ANESTHESIA    Local Anesthetic:  Lidocaine 2% without epinephrine  Anesthetic Total (mL):  10      SEDATION    Patient Sedated: No        Preparation: skin prepped with 2% chlorhexidine  Skin prep agent: skin prep agent completely dried prior to procedure  Sterile barriers: maximum sterile barriers were used: cap, mask, sterile   gown, sterile gloves, and large sterile sheet  Hand hygiene: hand hygiene performed prior to central venous catheter   insertion  Type of line used: PICC  Catheter type: triple lumen  Catheter size: 5 Fr  Brand: Bard  Lot number: [id number]  Placement method: venipuncture, MST and ultrasound  Number of attempts: 2  Difficulty threading catheter: no  Successful placement: yes  Orientation: right    Location: brachial vein (lateral)  Arm circumference: adults 15 cm  Extremity circumference: 27  Visible catheter length: 0  Total catheter length: 35  Internal Lumen Volume: 35 mL    Dressing and securement: chlorhexidine patch applied, blood cleaned with   CHG, gloves changed prior to final dressing, statlock, site cleansed and   transparent dressing  Post procedure assessment: blood return through all ports and placement   verified by x-ray  PROCEDURE   Patient Tolerance:  Patient tolerated the procedure well with no immediate   complications  Disposal: sharps and needle count correct at the end of procedure, needles   and guidewire disposed in sharps container   Glucose by meter   Result Value  Ref Range    GLUCOSE BY METER POCT 170 (H) 70 - 99 mg/dL   Lactic acid whole blood   Result Value Ref Range    Lactic Acid 1.6 0.7 - 2.0 mmol/L   XR Chest Port 1 View    Narrative    EXAM: XR CHEST PORT 1 VIEW  LOCATION: [facility]  DATE: 12/23/2024    INDICATION: right picc placement, tip location  COMPARISON: Chest CT 12/21/2024      Impression    IMPRESSION: Normal size of cardiomediastinal silhouette. Right upper extremity PICC with tip overlying the superior vena cava. No definite airspace consolidation, pleural effusion or pneumothorax. No acute bony abnormality. Percutaneous gastrojejunostomy   tube partially visualized.   Glucose by meter   Result Value Ref Range    GLUCOSE BY METER POCT 121 (H) 70 - 99 mg/dL   Lactic acid whole blood   Result Value Ref Range    Lactic Acid 1.1 0.7 - 2.0 mmol/L   Hemoglobin A1c   Result Value Ref Range    Estimated Average Glucose 117 (H) <117 mg/dL    Hemoglobin A1C 5.7 (H) <5.7 %   Glucose by meter   Result Value Ref Range    GLUCOSE BY METER POCT 139 (H) 70 - 99 mg/dL   Creatinine   Result Value Ref Range    Creatinine 1.54 (H) 0.67 - 1.17 mg/dL    GFR Estimate 51 (L) >60 mL/min/1.73m2   Glucose by meter   Result Value Ref Range    GLUCOSE BY METER POCT 124 (H) 70 - 99 mg/dL   XR Chest Port 1 View    Narrative    EXAM: XR CHEST PORT 1 VIEW  LOCATION: [facility]  DATE: 12/24/2024    INDICATION: Hypoxia  COMPARISON: 12/23/2024      Impression    IMPRESSION: No change in right upper extremity PICC line. Heart and mediastinal size are normal. There is some streaky opacity involving the left lung base, compatible with either atelectasis or infectious process.                CBC with platelets   Result Value Ref Range    WBC Count 14.7 (H) 4.0 - 11.0 10e3/uL    RBC Count 3.11 (L) 4.40 - 5.90 10e6/uL    Hemoglobin 9.5 (L) 13.3 - 17.7 g/dL    Hematocrit 28.4 (L) 40.0 - 53.0 %    MCV 91 78 - 100 fL    MCH 30.5 26.5 - 33.0 pg     MCHC 33.5 31.5 - 36.5 g/dL    RDW 13.1 10.0 - 15.0 %    Platelet Count 184 150 - 450 10e3/uL   MRSA MSSA PCR, Nasal Swab    Specimen: Nares, Bilateral; Swab   Result Value Ref Range    MRSA Target DNA Negative Negative    SA Target DNA Negative     Narrative    The [employer]  Xpert SA Nasal Complete assay performed in the [employer]  Dx System is a qualitative in vitro diagnostic test designed for rapid detection of Staphylococcus aureus (SA) and methicillin-resistant Staphylococcus aureus (MRSA) from nasal swabs in patients at risk for nasal colonization. The test utilizes automated real-time polymerase chain reaction (PCR) to detect MRSA/SA DNA. The Xpert SA Nasal Complete assay is intended to aid in the prevention and control of MRSA/SA infections in healthcare settings. The assay is not intended to diagnose, guide or monitor treatment for MRSA/SA infections, or provide results of susceptibility to methicillin. A negative result does not preclude MRSA/SA nasal colonization.    Glucose by meter   Result Value Ref Range    GLUCOSE BY METER POCT 145 (H) 70 - 99 mg/dL   Echocardiogram Complete   Result Value Ref Range    LVEF  60-65%     Narrative    [id number]  [id number]  [id number]  [id number]^[place] [address]     Name: NAVYA ESPINAL  MRN: [id number]  : 1965  Study Date: 2024 01:27 PM  Age: 59 yrs  Gender: Male  Patient Location: [facility]  Reason For Study: Tachycardia  Ordering Physician: LIVAN FORD  Performed By: IVY     BSA: 1.5 m2  Height: 60 in  Weight: 126 lb  HR: 120  BP: 124/92 mmHg  ______________________________________________________________________________  Procedure  Echocardiogram with two-dimensional, color and spectral Doppler. Technically  difficult study. Compared to the prior study dated 10/26/2023 no significant  change. No hemodynamically significant valvular abnormalities on 2D or color  flow  imaging.  ______________________________________________________________________________  Interpretation Summary     The left ventricle is normal in size.  The visual ejection fraction is 60-65%.  Regional wall motion is grossly normal but endocardial border definition is  limited  No hemodynamically significant valvular abnormalities on 2D or color flow  imaging.  Compared to the prior study dated 10/26/2023 no significant change  ______________________________________________________________________________  Left Ventricle  The left ventricle is normal in size. There is mild concentric left  ventricular hypertrophy. Left ventricular diastolic function is indeterminate.  The visual ejection fraction is 60-65%. Regional wall motion is grossly normal  but endocardial border definition is limited.     Right Ventricle  The right ventricle is not well visualized.     Atria  The left atrium is not well visualized. Right atrium not well visualized.  Atrial septum not well seen.     Mitral Valve  Mitral valve leaflets appear normal. There is trace mitral regurgitation.  There is no mitral valve stenosis.     Tricuspid Valve  The tricuspid valve is not well visualized. There is trace tricuspid  regurgitation. Right ventricular systolic pressure could not be approximated  due to inadequate tricuspid regurgitation. There is no tricuspid stenosis.     Aortic Valve  The aortic valve is not well visualized. No aortic regurgitation is present.  No aortic stenosis is present.     Pulmonic Valve  The pulmonic valve is not well visualized.     Vessels  The aorta root is normal. The thoracic aorta cannot be assessed. Inferior vena  cava not well visualized for estimation of right atrial pressure.     Pericardium  There is no pericardial effusion.     Rhythm  The rhythm was sinus tachycardia.  ______________________________________________________________________________  MMode/2D Measurements & Calculations     IVSd: 1.2 cm  LVIDd:  4.1 cm  LVIDs: 2.8 cm  LVPWd: 1.2 cm  FS: 32.8 %  LV mass(C)d: 162.8 grams  LV mass(C)dI: 106.5 grams/m2  Ao root diam: 3.4 cm  LVOT diam: 2.2 cm  LVOT area: 3.8 cm2  Ao root diam index Ht(cm/m): 2.2  Ao root diam index BSA (cm/m2): 2.2  EF Biplane: 65.8 %  LA Volume (BP): 28.7 ml     LA Volume Index (BP): 18.8 ml/m2  RWT: 0.56  TAPSE: 1.8 cm     Time Measurements  MM HR: 114.0 BPM     Doppler Measurements & Calculations  MV E max hollis: 100.0 cm/sec  MV A max hollis: 80.9 cm/sec  MV E/A: 1.2  MV dec slope: 858.0 cm/sec2  MV dec time: 0.12 sec  Ao V2 max: 124.0 cm/sec  Ao max P.0 mmHg  Ao V2 mean: 81.0 cm/sec  Ao mean PG: 3.0 mmHg  Ao V2 VTI: 25.6 cm  DORI(I,D): 2.4 cm2  DORI(V,D): 3.0 cm2  LV V1 max PG: 3.7 mmHg  LV V1 max: 96.3 cm/sec  LV V1 VTI: 16.3 cm  SV(LVOT): 62.0 ml  SI(LVOT): 40.5 ml/m2  AV Hollis Ratio (DI): 0.78  DORI Index (cm2/m2): 1.6     ______________________________________________________________________________  Report approved by: Corine Dominguez MD on 2024 02:53 PM         CT Head w/o Contrast    Narrative    EXAM: CT HEAD W/O CONTRAST  LOCATION: [facility]  DATE: 2024    INDICATION: mental status changes with hx prior severe hemorrhagic cva    COMPARISON: 2024 and 2024     TECHNIQUE: Routine CT Head without IV contrast. Multiplanar reformats. Dose reduction techniques were used.    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 There is unchanged encephalomalacia and gliosis within the right frontal/parietal lobes, insula and temporal lobe. No intracranial hemorrhage, extraaxial collection, or   mass effect.  No CT evidence of acute infarct. Chronic appearing gliosis within the subcortical white matter of the left insula.    VISUALIZED ORBITS/SINUSES/MASTOIDS: No intraorbital abnormality. No paranasal sinus mucosal disease. No middle ear or mastoid effusion.    BONES/SOFT TISSUES: No acute abnormality.        Impression    IMPRESSION:    1. No acute intracranial process.  2. Stable appearing chronic changes as described in the report.   Glucose by meter   Result Value Ref Range    GLUCOSE BY METER POCT 134 (H) 70 - 99 mg/dL          65 MINUTES SPENT BY ME on the date of service doing chart review, history, exam, documentation & further activities per the note.

## 2024-12-24 NOTE — PHARMACY-VANCOMYCIN DOSING SERVICE
1250mg q24hr auc > 600    Regimen: 1000 mg IV every 24 hours.  Start time: 17:20 on 12/24/2024  Exposure target: AUC24 (range)400-600 mg/L.hr   AUC24,ss: 510 mg/L.hr  Probability of AUC24 > 400: 77 %  Ctrough,ss: 15.5 mg/L  Probability of Ctrough,ss > 20: 26 %  Probability of nephrotoxicity (Lodise ALMA 2009): 11 %

## 2024-12-24 NOTE — SIGNIFICANT EVENT
Significant Event Note    Time of event: 6:51 PM December 23, 2024    Description of event:  Received phone call from nursing that patient's blood cultures from the 22nd which is yesterday are growing gram-positive cocci    Plan:  Records reviewed infectious diseases aware and patient is on appropriate antibiotics        Sheryl Olson MD

## 2024-12-24 NOTE — PROGRESS NOTES
Care Management Follow Up    Length of Stay (days): 2    Expected Discharge Date: 12/26/2024     Concerns to be Addressed: no discharge needs identified     Patient plan of care discussed at interdisciplinary rounds: Yes    Anticipated Discharge Disposition: Home              Anticipated Discharge Services: None  Anticipated Discharge DME: None    Patient/family educated on Medicare website which has current facility and service quality ratings:    Education Provided on the Discharge Plan: Yes (AVS per bedside RN)  Patient/Family in Agreement with the Plan: yes    Referrals Placed by CM/SW:    Private pay costs discussed: Not applicable    Discussed  Partnership in Safe Discharge Planning  document with patient/family: No     Handoff Completed: Yes, [facility] PCP: Internal handoff referral completed    Additional Information:  Pt is now being followed by ID, waiting for ID final recommendations.  Palliative consult was placed 12/23    Next Steps: Waiting for Palliative consult and ID final recommendations.     Go Davis RN

## 2024-12-24 NOTE — PROGRESS NOTES
BP (!) 140/98   Pulse 104   Temp 98.4  F (36.9  C) (Axillary)   Resp 25   Ht 1.524 m (5')   Wt 57.3 kg (126 lb 5.2 oz)   SpO2 98%   BMI 24.67 kg/m      Pt was on RA when last seen. BS were coarse throughout. Pt received all scheduled nebs from me today. Pt did fair on the volera today. Pt has a weak non productive cough. RT will continue to follow.

## 2024-12-24 NOTE — PROGRESS NOTES
INFECTIOUS DISEASE FOLLOW UP NOTE    Date: 2024   CHIEF COMPLAINT:   Chief Complaint   Patient presents with    Abdominal Pain        ASSESSMENT:    Aspiration, bronchial wall thickening: Admission CT with bronchial wall thickening. Difficulty expectorating mucus at baseline, refusing percussive vest. Respiratory viral panel negative, urine legionella negative.    bottles GPC clusters: verigene with staph species favor contaminant.   Fever, noted  and .   Remote ESBL UTI history  CVA with residual deficits, aphasia, dysphagia with GJ tube dependence, spastic quad.    PLAN:  - continue pip-tazo IV  - continue vanc IV, careful dosing given renal function  - repeat CXR  - low threshold for repeat CT CAP  - palliative care consulted  - discussed with family    Viri Mcgill MD  [facility]   Office Telephone [phone].  Fax [phone]  [employer] paging    ______________________________________________________________________    SUBJECTIVE / INTERVAL HISTORY:  To ICU overnight. Febrile. BC with CoNS.     ROS: All other systems negative except as listed above.    SH/FH/Habits/PMH reviewed and unchanged.    OBJECTIVE:  BP (!) 140/98   Pulse 120   Temp (!) 101  F (38.3  C) (Axillary)   Resp 30   Ht 1.524 m (5')   Wt 57.3 kg (126 lb 5.2 oz)   SpO2 96%   BMI 24.67 kg/m    FiO2 (%): 21 %  Resp: 30    Vital Signs  Temp: (!) 101  F (38.3  C)  Temp src: Axillary  Resp: 30  Pulse: 120  Pulse Rate Source: Monitor  BP: (!) 140/98  BP Location: Left arm    Temp (24hrs), Av  F (37.8  C), Min:98  F (36.7  C), Max:102.5  F (39.2  C)      GEN: No acute distress. nonverbal   RESPIRATORY:  breathing treatment.   CARDIOVASCULAR:  tachy  ABDOMEN:  Soft, normal bowel sounds, non-tender,   EXTREMITIES: No edema.  SKIN/HAIR/NAILS:  No rashes  IV: PICC      Antibiotics:  Pip-tazo  vanc    Pertinent labs:  CRP   Date Value Ref Range Status   2022 0.4 0.0 - 0.8 mg/dL Final      CBC  RESULTS:   Recent Labs   Lab Test 12/23/24  1642   WBC 18.0*   RBC 3.77*   HGB 11.5*   HCT 34.0*   MCV 90   MCH 30.5   MCHC 33.8   RDW 12.9         Last Comprehensive Metabolic Panel:  Sodium   Date Value Ref Range Status   12/23/2024 147 (H) 135 - 145 mmol/L Final   12/20/2016 142 133 - 144 mmol/L Final     Potassium   Date Value Ref Range Status   12/23/2024 3.6 3.4 - 5.3 mmol/L Final   07/18/2023 4.7 3.5 - 5.0 mmol/L Final   12/20/2016 5.1 3.4 - 5.3 mmol/L Final     Chloride   Date Value Ref Range Status   12/23/2024 114 (H) 98 - 107 mmol/L Final   07/18/2023 110 (H) 98 - 107 mmol/L Final   12/20/2016 108 94 - 109 mmol/L Final     Carbon Dioxide   Date Value Ref Range Status   12/20/2016 29 20 - 32 mmol/L Final     Carbon Dioxide (CO2)   Date Value Ref Range Status   12/23/2024 17 (L) 22 - 29 mmol/L Final   07/18/2023 21 (L) 22 - 31 mmol/L Final     Anion Gap   Date Value Ref Range Status   12/23/2024 16 (H) 7 - 15 mmol/L Final   07/18/2023 8 5 - 18 mmol/L Final   12/20/2016 5 3 - 14 mmol/L Final     Glucose   Date Value Ref Range Status   07/18/2023 157 (H) 70 - 125 mg/dL Final   12/20/2016 133 (H) 70 - 99 mg/dL Final     GLUCOSE BY METER POCT   Date Value Ref Range Status   12/24/2024 139 (H) 70 - 99 mg/dL Final     Urea Nitrogen   Date Value Ref Range Status   12/23/2024 25.1 (H) 8.0 - 23.0 mg/dL Final   07/18/2023 24 (H) 8 - 22 mg/dL Final   12/20/2016 32 (H) 7 - 30 mg/dL Final     Creatinine   Date Value Ref Range Status   12/24/2024 1.54 (H) 0.67 - 1.17 mg/dL Final   12/20/2016 1.16 0.66 - 1.25 mg/dL Final     GFR Estimate   Date Value Ref Range Status   12/24/2024 51 (L) >60 mL/min/1.73m2 Final     Comment:     eGFR calculated using 2021 CKD-EPI equation.   04/15/2021 >60 >60 mL/min/1.73m2 Final   12/20/2016 66 >60 mL/min/1.7m2 Final     Comment:     Non  GFR Calc     Calcium   Date Value Ref Range Status   12/23/2024 8.2 (L) 8.8 - 10.4 mg/dL Final     Comment:     Reference  intervals for this test were updated on 7/16/2024 to reflect our healthy population more accurately. There may be differences in the flagging of prior results with similar values performed with this method. Those prior results can be interpreted in the context of the updated reference intervals.   12/20/2016 8.3 (L) 8.5 - 10.1 mg/dL Final        MICROBIOLOGY DATA:  Personally reviewed.  7-Day Micro Results       Collected Updated Procedure Result Status      12/23/2024 1744 12/23/2024 2141 Legionella Urinary Antigen and Streptococcus pneumoniae antigen [[id number]]    Urine, Clean Catch    Final result Component Value   Legionella pneumophila serogroup 1 urinary antigen Negative   A negative result does not exclude the possibility of a Legionella infection, as it can be caused by other serogroups and species of Legionella.   Streptococcus pneumoniae antigen Negative   A negative result does not exclude a Streptococcus pneumoniae infection.   Legionella pneumophila Urinary/Strep pneumoniae Antigen Specimen Type Urine   purewick  purewick  purewick            12/23/2024 1700 12/23/2024 2316 Respiratory Panel PCR [[id number]]    Swab from Nasopharyngeal    Final result Component Value   Adenovirus Not Detected   Coronavirus Not Detected   This test detects Coronavirus 229E, HKU1, NL63 and OC43 but does not distinguish between them. It does not detect MERS ( Respiratory Syndrome), SARS (Severe Acute Respiratory Syndrome) or 2019-nCoV (Novel 2019) Coronavirus.   Human Metapneumovirus Not Detected   Human Rhin/Enterovirus Not Detected   Influenza A Not Detected   Influenza A, H1 Not Detected   Influenza A 2009 H1N1 Not Detected   Influenza A, H3 Not Detected   Influenza B Not Detected   Parainfluenza Virus 1 Not Detected   Parainfluenza Virus 2 Not Detected   Parainfluenza Virus 3 Not Detected   Parainfluenza Virus 4 Not Detected   Respiratory Syncytial Virus A Not Detected   Respiratory Syncytial Virus  B Not Detected   Chlamydia Pneumoniae Not Detected   Mycoplasma Pneumoniae Not Detected            12/22/2024 1638 12/23/2024 2131 Blood Culture Hand, Left [[id number]]   Blood from Hand, Left    Preliminary result Component Value   Culture No growth after 1 day  [P]                12/22/2024 1638 12/23/2024 1556 Blood Culture Hand, Right [[id number]]    (Abnormal)   Blood from Hand, Right    Preliminary result Component Value   Culture Positive on the 1st day of incubation  [P]     Gram positive cocci in clusters  [P]     1 of 1 bottles               12/22/2024 1638 12/23/2024 1835 Verigene GP Panel [[id number]]    (Abnormal)   Blood from Hand, Right    Final result Component Value   Staphylococcus species Detected   Positive for coagulase-negative Staphylococci, other than Staphylococcus epidermidis and Staphylococcus lugdunensis, by [employer] multiplex nucleic acid test. Coagulase-negative Staphylococci are the most common venipuncture or collection associated skin contaminants grown in blood cultures. Final identification and antimicrobial susceptibility testing will be verified by standard methods.   Staphylococcus aureus Not Detected   Staphylococcus epidermidis Not Detected   Staphylococcus lugdunensis Not Detected   Enterococcus faecalis Not Detected   Enterococcus faecium Not Detected   Streptococcus species Not Detected   Streptococcus agalactiae Not Detected   Streptococcus anginosus group Not Detected   Streptococcus pneumoniae Not Detected   Streptococcus pyogenes Not Detected   Listeria species Not Detected            12/21/2024 2231 12/21/2024 2320 Influenza A/B, RSV and SARS-CoV2 PCR (COVID-19) Nasopharyngeal [[id number]]    Swab from Nasopharyngeal    Final result Component Value   Influenza A PCR Negative   Influenza B PCR Negative   RSV PCR Negative   SARS CoV2 PCR Negative   NEGATIVE: SARS-CoV-2 (COVID-19) RNA not detected, presumed negative.                     RADIOLOGY:  Personally  Reviewed.  Recent Results (from the past 24 hours)   XR Chest Port 1 View    Narrative    EXAM: XR CHEST PORT 1 VIEW  LOCATION: [facility]  DATE: 12/23/2024    INDICATION: right picc placement, tip location  COMPARISON: Chest CT 12/21/2024      Impression    IMPRESSION: Normal size of cardiomediastinal silhouette. Right upper extremity PICC with tip overlying the superior vena cava. No definite airspace consolidation, pleural effusion or pneumothorax. No acute bony abnormality. Percutaneous gastrojejunostomy   tube partially visualized.       Active Problems:    Aspiration pneumonitis (H)    Bronchopneumonia    Acute on chronic renal insufficiency    Altered mental status, unspecified altered mental status type    Essential hypertension    Other constipation    Positive blood culture

## 2024-12-24 NOTE — PROGRESS NOTES
Pt was expected to be discharged today, but lab cultures done yesterday,12/22 showed gram positive cocci. Temp, RR, HR elevated on 2 LPM, MD notified. Pt ended up getting transferred to the ICU. Plan of care ongoing.

## 2024-12-24 NOTE — SIGNIFICANT EVENT
"Significant Event Note    Time of event: 5:15 AM December 24, 2024    Description of event:  Paged by RN to evaluate patient for tachycardia. BP is stable, systolics in 150s. HR 90s at start of shift, now in 120s-130s. No change in oxygen requirement, and temp remains < 100.0.     On review of medications, his metoprolol was increased to 25 mg on 12/23; however, last administration of metoprolol was at ~8 AM on 12/23 at 12.5 mg. The evening dose of metoprolol for 25 mg was held because patient's family members refused on patient's behalf because they were reportedly told that it can make the patient \"too sleepy.\"    At bedside he is clinically unchanged per RN, remains on same oxygen and again other vitals unchanged, mildly more hypertensive and not hypotensive. Family members sleeping in room.     Plan:  -Administer Metoprolol 25 mg now  - he is experiencing tachycardia without other changes in vitals because it has a now been almost ~24 hrs since his last dose, and not only is behind but the increased dose ordered by the [facility] [place] hospitalist on 12/23 has not ever been administered yet  -No further imaging or work-up at this time yet      Discussed with: bedside nurse    Julio Dumont MD  Internal Medicine  Hospitalist  [facility]  Phone: #[phone]  Securely message with the Vocera Web Console (learn more here)  Text page via [employer] Paging/Directory       "

## 2024-12-24 NOTE — PROCEDURES
[facility]    Triple Lumen PICC Placement    Date/Time: 12/23/2024 8:34 PM    Performed by: Sam Lott RN  Authorized by: Dayday Gonsales MD  Indications: vascular access      UNIVERSAL PROTOCOL   Site Marked: Yes  Prior Images Obtained and Reviewed:  Yes  Required items: Required blood products, implants, devices and special equipment available    Patient identity confirmed:  Arm band and verbally with patient  Patient was reevaluated immediately before administering moderate or deep sedation or anesthesia  Confirmation Checklist:  Patient's identity using two indicators, relevant allergies, procedure was appropriate and matched the consent or emergent situation and correct equipment/implants were available  Time out: Immediately prior to the procedure a time out was called    Universal Protocol: the Joint Commission Universal Protocol was followed    Preparation: Patient was prepped and draped in usual sterile fashion    ESBL (mL):  5     ANESTHESIA    Local Anesthetic:  Lidocaine 2% without epinephrine  Anesthetic Total (mL):  10      SEDATION    Patient Sedated: No        Preparation: skin prepped with 2% chlorhexidine  Skin prep agent: skin prep agent completely dried prior to procedure  Sterile barriers: maximum sterile barriers were used: cap, mask, sterile gown, sterile gloves, and large sterile sheet  Hand hygiene: hand hygiene performed prior to central venous catheter insertion  Type of line used: PICC  Catheter type: triple lumen  Catheter size: 5 Fr  Brand: Bard  Lot number: [id number]  Placement method: venipuncture, MST and ultrasound  Number of attempts: 2  Difficulty threading catheter: no  Successful placement: yes  Orientation: right    Location: brachial vein (lateral)  Arm circumference: adults 15 cm  Extremity circumference: 27  Visible catheter length: 0  Total catheter length: 35  Internal Lumen Volume: 35 mL    Dressing and securement: chlorhexidine patch  applied, blood cleaned with CHG, gloves changed prior to final dressing, statlock, site cleansed and transparent dressing  Post procedure assessment: blood return through all ports and placement verified by x-ray  PROCEDURE   Patient Tolerance:  Patient tolerated the procedure well with no immediate complications  Disposal: sharps and needle count correct at the end of procedure, needles and guidewire disposed in sharps container

## 2024-12-24 NOTE — PROGRESS NOTES
"[facility] MEDICINE  PROGRESS NOTE     Code Status: Full Code       Assessment and Plan:  Liu GIL Malave is a 59 year old male admitted on 12/21/2024. He has medical history significant for recurrent CVA with basal ganglia hemorrhage in 2016 with extensive residual deficits, aphasia, dysphagia with GJ tube dependence, spastic quadriplegia, hypertension, dyslipidemia, seizure disorder, chronic static encephalopathy, ESBL urine, CKD 3A and GERD.  Has a positive Cologuard test.  Not pursuing colonoscopy.  Patient was brought to the ED by family for abdominal pain, fatigue and confusion.  CT scan showed mild bronchial wall thickening.  Initiated on intravenous Rocephin and azithromycin.  Patient has been refusing his percussive vest.  Family aware this will be problematic with his recovery.  Family reports that G-tube balloon has been failing/leaking and IR has replaced the G-tube 11/15, 11/25, 12/10 and 12/18.  Consulted general surgery and no active G-tube issues at this time.  Appreciate wound care G-tube assessment.  Family noting more spasticity.  Increased baclofen.  For hypertension with tachycardia, metoprolol added.  12/22 Tmax of 101.7.  Blood cultures and UA obtained.  12/23 patient showed some clinical improvement and family wished to discharge to home.  However subsequently blood cultures showed GPC in clusters and temperature increased to 102.4 with severe tachycardia and hypertension.  Transferred to ICU status.  Infectious disease consulted.  Antibiotics transitioned to Zosyn, vancomycin and azithromycin.        Bronchopneumonia  Aspiration pneumonitis  CT chest showing findings of \"Mild Bronchial wall thickening with subsegmental atelectasis seen in the lung bases bilaterally, overall improved aeration of the lower lobes when compared with 11/14/2024.\"  Patient has extensive secretions.  Unable to expectorate mucus.  Has been refusing percussive vest.  Continue Mucomyst " nebulizers and DuoNebs 4 times daily.  Continue Robitussin.  Had been receiving Rocephin and azithromycin due to his risk of complicating pneumonias.  12/24 family requesting overnight oximetry study to see if he would qualify for home oxygen.  Ordered.     Fever  Positive blood culture  12/22 in the afternoon had a Tmax of 101.7.  Blood cultures obtained.  CBC and urinalysis unremarkable.  12/23 blood culture positive for GPC in clusters.  Later shown to be Staph capitis.  Cancelled plans for discharge.  Repeat blood cultures NGTD.  Transferred to ICU.  Appreciate infectious disease consultation.  Antibiotics adjusted to Zosyn, vancomycin and azithromycin.  12/24 with relative clinical stability okay to decrease to IMC status.    Leukocytosis  White count 18--> 14.7.  Follow daily CBC.     History of ESBL UTI  Utilize contact precautions per standard protocols.     Abdominal pain  Chronic constipation  G-tube leakage recurrent  Positive Cologuard test  -Patient positive outpatient Cologuard test.  Family decided not to pursue colonoscopy which is understandable.  -Over the last month and a half patient's G-tube has been persistently leaking and has been replaced by IR on 11/15, 11/25, 12/10 and 12/18.  -Per family request ordered CEA and CA 19-9.  CEA negative.  CA 19-9 pending.  -CT scan abdomen unremarkable.  -Utilize senna docusate per G-tube twice daily scheduled to help with bowel movements.  -Appreciate general surgery consultation.  G-tube appears to be functioning well at this time.  If has subsequent issues should follow-up with surgery as an outpatient.     Altered mental status:   At admission family reported that patient was more confused than baseline.  Difficult to assess due to patient's nonverbal status.  Was agitated when RT was attempting to place percussion vest.  CT brain with no acute findings.    Utilize family to ascertain his functional status/improvement.  12/24 family concerned with  continued confusion and requested repeat CT scan of head.  Ordered without contrast.     History of hemorrhagic stroke  Spasticity  Insomnia  Family has been noticing more spasticity and discomfort with his extremities.   Continue home scheduled Seroquel.  Discussed with family that historically neurology had not recommended him being on aspirin due to his hemorrhagic stroke.  Continue to stay off aspirin.  Baclofen dosage has been adjusted a few times per family request.  12/24 will increase baclofen to 20 mg 3 times daily due to increased spasticity.    Essential hypertension  Tachycardia  Continued home Norvasc.  12/22 started metoprolol 25 mg p.o. twice daily with hold parameters.  12/23 had severe elevation to blood pressure and tachycardia of unclear etiology.  Could be associated with sepsis and worsening infection.  12/24 hypertension tachycardia continue.  Check an echocardiogram.  May also be agitation related and so did discuss with family and they are agreeable to as needed Seroquel as well as increasing his baclofen.     Metabolic acidosis:   CKD 3A  Mild.  Patient had bicarb of 21 on presentation.  His creatinine is at baseline.  BUN is increased from 17-38.  However, his BUN has been mostly around 23-33.  He does not appear dehydrated.  Received fluid bolus in the ED.  With transfer to ICU given intravenous fluids.  12/24 tolerating tube feedings without any difficulty.  Okay to stop IV fluids so as to avoid fluid overload.  Creatinine 1.54.  Follow daily BMP.     Dysphagia:   GJ tube feeding dependent  Nutrition consult to coordinate resumption of tube feedings.  Continue home Protonix.     Subclinical hypothyroidism  TSH was elevated at 4.97 but T4 free was normal at 1.04.  Discussed pathophysiology with the family.  Recommend semiannual thyroid testing.     Dyslipidemia  Continue home Zocor.     Goals of care  Placed palliative care consult due to patient's challenging status and multiple  issues.  Hoping we can have discussions regarding what if any limitations on his care family would wish to pursue.  Previously they had not wished to have him undergo colonoscopy but he is still full code.  Discussed case with Nargis Smith from palliative care.     Anticoagulation   Enoxaparin (Lovenox) SQ     Fluids: Saline lock  Pain meds: Tylenol as needed  Therapy: N/A.  At baseline functional level.    Kelly:Not present  Lines: PRESENT      PICC 12/23/24 Triple Lumen Right Brachial vein lateral-Site Assessment: WDL        Current Diet  Orders Placed This Encounter      NPO for Medical/Clinical Reasons Except for: NPO but receiving Tube Feeding      Diet    Supplements  Active Nourishment Order   Procedures    Adult Formula Drip Feeding: Continuous Vital 1.5; Jejunostomy; Goal Rate: 60; mL/hr; from 8am-8pm run at 60ml/hr, from 8pm-8am run at 30ml/hr     Lines/Drains/Airways       PICC Line  Duration             PICC 12/23/24 Triple Lumen Right Brachial vein lateral <1 day              PIV Line  Duration             Peripheral IV 12/23/24 Distal;Right;Posterior Lower forearm <1 day              Drain  Duration             Gastrostomy/Enterostomy Gastrojejunostomy LUQ 18 fr Avanos LAURA GJ tube, lot #[id number] 5 days    External Urinary Catheter 2 days                  Barriers to Discharge: Intravenous antibiotics, tachycardia and hypertension, increased spasticity, leukocytosis    Disposition: Likely here at least 2 more days  Medically Ready for Discharge: Anticipated in 2-4 Days       Clinically Significant Risk Factors         # Hypernatremia: Highest Na = 147 mmol/L in last 2 days, will monitor as appropriate  # Hyperchloremia: Highest Cl = 114 mmol/L in last 2 days, will monitor as appropriate          # Hypoalbuminemia: Lowest albumin = 3.3 g/dL at 12/21/2024 11:04 PM, will monitor as appropriate     # Hypertension: Noted on problem list                # Financial/Environmental Concerns: none          Interval History/Subjective:  Patient continues to be persistently tachycardic and hypertensive.  Not tracking or following commands.  Having notable spasticity increased more so than previous.  Oxygenating well with O2 sats at 100%.  Daughter is present and very attentive.  Requesting to have CT scan of the head, overnight pulse oximetry study.  Is agreeable to increasing baclofen and receiving Seroquel.  Multiple questions answered to verbalized satisfaction.      Last 24H PRN:     acetaminophen (TYLENOL) oral liquid 650 mg, 650 mg at 12/24/24 1114    diphenhydrAMINE-zinc acetate (BENADRYL) 2-0.1 % cream, Given at 12/23/24 2216    lidocaine 1 % 0.1-5 mL, 5 mL at 12/23/24 2041    sodium chloride (PF) 0.9% PF flush 10-20 mL, 10 mL at 12/24/24 0150    sodium chloride (PF) 0.9% PF flush 3 mL, 3 mL at 12/24/24 0647    Physical Exam/Objective:  Temp:  [98  F (36.7  C)-102.5  F (39.2  C)] 99.5  F (37.5  C)  Pulse:  [] 123  Resp:  [10-38] 28  BP: ()/(56-99) 140/98  SpO2:  [91 %-100 %] 97 %  Wt Readings from Last 4 Encounters:   12/22/24 57.3 kg (126 lb 5.2 oz)   11/17/24 56.7 kg (125 lb)   07/18/24 54 kg (119 lb)   01/08/24 59 kg (130 lb)     Body mass index is 24.67 kg/m .    Constitutional: awake, alert, uncooperative, profound spasticity and kicking especially of the left leg noted, mild distress, appears older than stated age, and thin  ENT: Normocephalic, without obvious abnormality, atraumatic, external ears without lesions, oral pharynx with moist mucous membranes, tonsils without erythema or exudates, gums normal and good dentition.  Respiratory: Poor air movement.  Loud expiratory respiratory noise   Cardiovascular: Tachycardic regular rhythm  GI: Positive bowel sounds soft nondistended.  Difficult to assess tenderness.  G-tube present and functional.  Skin: normal skin color, texture, turgor, no redness, warmth, or swelling, and no rashes  Musculoskeletal: Profound spasticity present in all 4  extremities.  Keeps kicking out with his left leg. no lower extremity pitting edema present  Neurologic: Difficult to assess due to his baseline.  Sensory:  Sensory intact  Neuropsychiatric: General: restless, aggitated, and poor eye contact Level of consciousness: Alert and agitated.  Affect: anxious Orientation: Unable to assess.     Medications:   Personally Reviewed.  Medications   Current Facility-Administered Medications   Medication Dose Route Frequency Provider Last Rate Last Admin    dextrose 10% infusion   Intravenous Continuous PRN Tyrell Mansfield MD        Patient is already receiving anticoagulation with heparin, enoxaparin (LOVENOX), warfarin (COUMADIN)  or other anticoagulant medication   Does not apply Continuous PRN Dayday Gonsales MD        sodium chloride 0.9 % infusion   Intravenous Continuous Dayday Gonsales MD 75 mL/hr at 12/24/24 0600 Rate Verify at 12/24/24 0600     Current Facility-Administered Medications   Medication Dose Route Frequency Provider Last Rate Last Admin    acetylcysteine (MUCOMYST) 20 % nebulizer solution 2 mL  2 mL Nebulization 4x daily Ayaan Dixon MD   2 mL at 12/24/24 1153    amLODIPine (NORVASC) tablet 5 mg  5 mg Per G Tube Daily Dayday Gonsales MD   5 mg at 12/24/24 0915    azithromycin (ZITHROMAX) 500 mg in  mL intermittent infusion  500 mg Intravenous Q24H Dayday Gonsales MD   500 mg at 12/23/24 1151    baclofen (LIORESAL) tablet 10 mg  10 mg Per Feeding Tube 4x Daily Dayday Gonsales MD   10 mg at 12/24/24 0916    sennosides (SENOKOT) syrup 5 mL  5 mL Oral BID Dayday Gonsales MD   5 mL at 12/24/24 0915    And    docusate (COLACE) 50 MG/5ML liquid 100 mg  100 mg Per Feeding Tube BID Dayday Gonsales MD   100 mg at 12/24/24 0915    enoxaparin ANTICOAGULANT (LOVENOX) injection 40 mg  40 mg Subcutaneous Q24H Dayday Gonsales MD   40 mg at 12/24/24 0314    insulin aspart (NovoLOG) injection (RAPID ACTING)  1-7 Units Subcutaneous Q4H Pato  Aissatou Jalloh MD   1 Units at 12/23/24 2215    ipratropium - albuterol 0.5 mg/2.5 mg/3 mL (DUONEB) neb solution 3 mL  3 mL Nebulization 4x daily Ayaan Dixon MD   3 mL at 12/24/24 1153    metoprolol tartrate (LOPRESSOR) tablet 25 mg  25 mg Per Feeding Tube BID Dayday Gonsales MD   25 mg at 12/24/24 0546    pantoprazole (PROTONIX) 2 mg/mL suspension 40 mg  40 mg Per Feeding Tube Daily Dayday Gonsales MD   40 mg at 12/23/24 0855    piperacillin-tazobactam (ZOSYN) 3.375 g vial to attach to  mL bag  3.375 g Intravenous Q8H Viri Mcgill MD   3.375 g at 12/24/24 0647    QUEtiapine (SEROquel) tablet 25 mg  25 mg Per Feeding Tube TID Dayday Gonsales MD   25 mg at 12/24/24 0313    simvastatin (ZOCOR) tablet 20 mg  20 mg Per G Tube At Bedtime Dayday Gonsales MD   20 mg at 12/23/24 2127    sodium chloride (PF) 0.9% PF flush 10-40 mL  10-40 mL Intracatheter Q8H Dayday Gonsales MD   10 mL at 12/24/24 0551    sodium chloride (PF) 0.9% PF flush 3 mL  3 mL Intracatheter Q8H Dayday Gonsales MD   3 mL at 12/24/24 0314    vancomycin (VANCOCIN) 1,000 mg in NaCl 0.9% 200 mL intermittent infusion  1,000 mg Intravenous Q24H Dayday Gonsales MD           Data reviewed today: I personally reviewed all new medications, labs, imaging/diagnostics reports over the past 24 hours. Pertinent findings include:    Imaging:   Recent Results (from the past 24 hours)   XR Chest Port 1 View    Narrative    EXAM: XR CHEST PORT 1 VIEW  LOCATION: [facility]  DATE: 12/23/2024    INDICATION: right picc placement, tip location  COMPARISON: Chest CT 12/21/2024      Impression    IMPRESSION: Normal size of cardiomediastinal silhouette. Right upper extremity PICC with tip overlying the superior vena cava. No definite airspace consolidation, pleural effusion or pneumothorax. No acute bony abnormality. Percutaneous gastrojejunostomy   tube partially visualized.   XR Chest Port 1 View    Narrative    EXAM: XR  CHEST PORT 1 VIEW  LOCATION: [facility]  DATE: 12/24/2024    INDICATION: Hypoxia  COMPARISON: 12/23/2024      Impression    IMPRESSION: No change in right upper extremity PICC line. Heart and mediastinal size are normal. There is some streaky opacity involving the left lung base, compatible with either atelectasis or infectious process.                  No results found for this or any previous visit (from the past 4320 hours).    Labs:  XR Chest Port 1 View   Final Result   IMPRESSION: No change in right upper extremity PICC line. Heart and mediastinal size are normal. There is some streaky opacity involving the left lung base, compatible with either atelectasis or infectious process.                        XR Chest Port 1 View   Final Result   IMPRESSION: Normal size of cardiomediastinal silhouette. Right upper extremity PICC with tip overlying the superior vena cava. No definite airspace consolidation, pleural effusion or pneumothorax. No acute bony abnormality. Percutaneous gastrojejunostomy    tube partially visualized.      CT Abdomen Pelvis w Contrast   Final Result   IMPRESSION:   1.  No evidence of pulmonary embolus to the segmental level.   2.  Mild Bronchial wall thickening with subsegmental atelectasis seen in the lung bases bilaterally, overall improved aeration of the lower lobes when compared with 11/14/2024   3.  Centrilobular emphysema, unchanged from prior exam   4.  Moderate prostatomegaly, similar prior exam   5.  Normal appendix.   6.  No acute intra-abdominal process identified      CT Chest Pulmonary Embolism w Contrast   Final Result   IMPRESSION:   1.  No evidence of pulmonary embolus to the segmental level.   2.  Mild Bronchial wall thickening with subsegmental atelectasis seen in the lung bases bilaterally, overall improved aeration of the lower lobes when compared with 11/14/2024   3.  Centrilobular emphysema, unchanged from prior exam   4.  Moderate  prostatomegaly, similar prior exam   5.  Normal appendix.   6.  No acute intra-abdominal process identified      Head CT w/o contrast   Final Result   IMPRESSION:   1.  No CT evidence for acute intracranial process.   2.  Stable chronic changes as above.      Echocardiogram Complete    (Results Pending)     Recent Results (from the past 24 hours)   Lactic acid whole blood    Collection Time: 12/23/24  4:42 PM   Result Value Ref Range    Lactic Acid 1.5 0.7 - 2.0 mmol/L   Basic metabolic panel    Collection Time: 12/23/24  4:42 PM   Result Value Ref Range    Sodium 147 (H) 135 - 145 mmol/L    Potassium 3.6 3.4 - 5.3 mmol/L    Chloride 114 (H) 98 - 107 mmol/L    Carbon Dioxide (CO2) 17 (L) 22 - 29 mmol/L    Anion Gap 16 (H) 7 - 15 mmol/L    Urea Nitrogen 25.1 (H) 8.0 - 23.0 mg/dL    Creatinine 1.46 (H) 0.67 - 1.17 mg/dL    GFR Estimate 54 (L) >60 mL/min/1.73m2    Calcium 8.2 (L) 8.8 - 10.4 mg/dL    Glucose 106 (H) 70 - 99 mg/dL   CBC with platelets    Collection Time: 12/23/24  4:42 PM   Result Value Ref Range    WBC Count 18.0 (H) 4.0 - 11.0 10e3/uL    RBC Count 3.77 (L) 4.40 - 5.90 10e6/uL    Hemoglobin 11.5 (L) 13.3 - 17.7 g/dL    Hematocrit 34.0 (L) 40.0 - 53.0 %    MCV 90 78 - 100 fL    MCH 30.5 26.5 - 33.0 pg    MCHC 33.8 31.5 - 36.5 g/dL    RDW 12.9 10.0 - 15.0 %    Platelet Count 249 150 - 450 10e3/uL   Respiratory Panel PCR    Collection Time: 12/23/24  5:00 PM    Specimen: Nasopharyngeal; Swab   Result Value Ref Range    Adenovirus Not Detected Not Detected    Coronavirus Not Detected Not Detected    Human Metapneumovirus Not Detected Not Detected    Human Rhin/Enterovirus Not Detected Not Detected    Influenza A Not Detected Not Detected    Influenza A, H1 Not Detected Not Detected    Influenza A 2009 H1N1 Not Detected Not Detected    Influenza A, H3 Not Detected Not Detected    Influenza B Not Detected Not Detected    Parainfluenza Virus 1 Not Detected Not Detected    Parainfluenza Virus 2 Not Detected Not  Detected    Parainfluenza Virus 3 Not Detected Not Detected    Parainfluenza Virus 4 Not Detected Not Detected    Respiratory Syncytial Virus A Not Detected Not Detected    Respiratory Syncytial Virus B Not Detected Not Detected    Chlamydia Pneumoniae Not Detected Not Detected    Mycoplasma Pneumoniae Not Detected Not Detected   ECG 12-LEAD WITH MUSE (LHE)    Collection Time: 12/23/24  5:15 PM   Result Value Ref Range    Systolic Blood Pressure  mmHg    Diastolic Blood Pressure  mmHg    Ventricular Rate 136 BPM    Atrial Rate 136 BPM    LA Interval 140 ms    QRS Duration 68 ms     ms    QTc 442 ms    P Axis 72 degrees    R AXIS 57 degrees    T Axis 64 degrees    Interpretation ECG       Sinus tachycardia  Otherwise normal ECG  When compared with ECG of 21-Dec-2024 21:43,  Vent. rate has increased by  45 bpm  ST now depressed in Inferior leads     Legionella Urinary Antigen and Streptococcus pneumoniae antigen    Collection Time: 12/23/24  5:44 PM    Specimen: Urine, Clean Catch   Result Value Ref Range    Legionella pneumophila serogroup 1 urinary antigen Negative Negative    Streptococcus pneumoniae antigen Negative Negative    Legionella pneumophila Urinary/Strep pneumoniae Antigen Specimen Type Urine    Glucose by meter    Collection Time: 12/23/24  5:59 PM   Result Value Ref Range    GLUCOSE BY METER POCT 125 (H) 70 - 99 mg/dL   Glucose by meter    Collection Time: 12/23/24  8:41 PM   Result Value Ref Range    GLUCOSE BY METER POCT 170 (H) 70 - 99 mg/dL   Lactic acid whole blood    Collection Time: 12/23/24  8:52 PM   Result Value Ref Range    Lactic Acid 1.6 0.7 - 2.0 mmol/L   Glucose by meter    Collection Time: 12/24/24 12:59 AM   Result Value Ref Range    GLUCOSE BY METER POCT 121 (H) 70 - 99 mg/dL   Lactic acid whole blood    Collection Time: 12/24/24  1:48 AM   Result Value Ref Range    Lactic Acid 1.1 0.7 - 2.0 mmol/L   Hemoglobin A1c    Collection Time: 12/24/24  1:48 AM   Result Value Ref Range     Estimated Average Glucose 117 (H) <117 mg/dL    Hemoglobin A1C 5.7 (H) <5.7 %   Glucose by meter    Collection Time: 12/24/24  5:17 AM   Result Value Ref Range    GLUCOSE BY METER POCT 139 (H) 70 - 99 mg/dL   Creatinine    Collection Time: 12/24/24  6:17 AM   Result Value Ref Range    Creatinine 1.54 (H) 0.67 - 1.17 mg/dL    GFR Estimate 51 (L) >60 mL/min/1.73m2   Glucose by meter    Collection Time: 12/24/24  8:06 AM   Result Value Ref Range    GLUCOSE BY METER POCT 124 (H) 70 - 99 mg/dL   CBC with platelets    Collection Time: 12/24/24  9:30 AM   Result Value Ref Range    WBC Count 14.7 (H) 4.0 - 11.0 10e3/uL    RBC Count 3.11 (L) 4.40 - 5.90 10e6/uL    Hemoglobin 9.5 (L) 13.3 - 17.7 g/dL    Hematocrit 28.4 (L) 40.0 - 53.0 %    MCV 91 78 - 100 fL    MCH 30.5 26.5 - 33.0 pg    MCHC 33.5 31.5 - 36.5 g/dL    RDW 13.1 10.0 - 15.0 %    Platelet Count 184 150 - 450 10e3/uL   Glucose by meter    Collection Time: 12/24/24 11:51 AM   Result Value Ref Range    GLUCOSE BY METER POCT 145 (H) 70 - 99 mg/dL       Pending Labs:  Unresulted Labs Ordered in the Past 30 Days of this Admission       Date and Time Order Name Status Description    12/24/2024  8:50 AM MRSA MSSA PCR, Nasal Swab In process     12/22/2024  4:07 PM Blood Culture Hand, Right Preliminary     12/22/2024  4:07 PM Blood Culture Hand, Left Preliminary     12/22/2024  9:41 AM Cancer antigen 19-9 In process               Dayday Gonsales MD  [facility]  Phone: #[phone]

## 2024-12-24 NOTE — PLAN OF CARE
[id number]    Pt nonverbal. Family at bedside throughout shift. VSS on RA. Placed on 3L oxymask while sleeping to maintain O2 saturation > 90%. NSR on tele rate 90s. Intermittently Somnolent. Head CT performed, see results. G tube feedings at goal rate. Glucose 135 and 101. IV abx administered per orders. PRN ativan utilized x1 for agitation. Purewick in place. 1 large loose BM this evening. Stool softeners held. Repositioning encouraged. Positioned as tolerated. Family's questions answered. Supportive of plan of care. No significant events this shift. Purposeful rounding performed . Xiao Ely RN      Problem: Sepsis/Septic Shock  Goal: Blood Glucose Level Within Targeted Range  12/24/2024 1732 by Xiao Ely, RN  Outcome: Progressing     Problem: Sepsis/Septic Shock  Goal: Optimal Nutrition Intake  12/24/2024 1732 by Xiao Ely, RN  Outcome: Progressing     Problem: Dysrhythmia  Goal: Normalized Cardiac Rhythm  12/24/2024 1732 by Xiao Ely, RN  Outcome: Progressing

## 2024-12-24 NOTE — PLAN OF CARE
Problem: Fall Injury Risk  Goal: Absence of Fall and Fall-Related Injury  Outcome: Progressing  Intervention: Identify and Manage Contributors  Recent Flowsheet Documentation  Taken 12/24/2024 0400 by Adelina Arguelles RN  Medication Review/Management: medications reviewed  Taken 12/24/2024 0000 by Adelina Arguelles RN  Medication Review/Management: medications reviewed  Taken 12/23/2024 2000 by Adelina Arguelles RN  Medication Review/Management: medications reviewed  Intervention: Promote Injury-Free Environment  Recent Flowsheet Documentation  Taken 12/24/2024 0400 by Adelina Arguelles RN  Safety Promotion/Fall Prevention:   safety round/check completed   clutter free environment maintained   nonskid shoes/slippers when out of bed   patient and family education   room near nurse's station  Taken 12/24/2024 0000 by Adelina Arguelles RN  Safety Promotion/Fall Prevention:   safety round/check completed   clutter free environment maintained   nonskid shoes/slippers when out of bed   patient and family education   room near nurse's station  Taken 12/23/2024 2000 by Adelina Arguelles RN  Safety Promotion/Fall Prevention:   safety round/check completed   clutter free environment maintained   nonskid shoes/slippers when out of bed   patient and family education   room near nurse's station     Problem: Sepsis/Septic Shock  Goal: Optimal Coping  Outcome: Progressing  Intervention: Optimize Psychosocial Adjustment to Illness  Recent Flowsheet Documentation  Taken 12/24/2024 0400 by Adelina Arguelles RN  Family/Support System Care:   involvement promoted   support provided  Taken 12/24/2024 0000 by Adelina Arguelles RN  Family/Support System Care:   involvement promoted   support provided  Taken 12/23/2024 2000 by Adelina Arguelles RN  Family/Support System Care:   involvement promoted   support provided  Goal: Absence of Bleeding  Outcome: Progressing  Goal: Blood Glucose Level Within Targeted Range  Outcome:  Progressing  Goal: Absence of Infection Signs and Symptoms  Outcome: Progressing  Intervention: Initiate Sepsis Management  Recent Flowsheet Documentation  Taken 12/24/2024 0400 by Adelina Arguelles RN  Infection Prevention:   rest/sleep promoted   hand hygiene promoted  Isolation Precautions: contact precautions maintained  Taken 12/24/2024 0000 by Adelina Arguelles RN  Infection Prevention:   rest/sleep promoted   hand hygiene promoted  Isolation Precautions: contact precautions maintained  Taken 12/23/2024 2000 by Adelina Arguelles RN  Infection Prevention:   rest/sleep promoted   hand hygiene promoted  Isolation Precautions: contact precautions maintained  Intervention: Promote Recovery  Recent Flowsheet Documentation  Taken 12/24/2024 0400 by Adelina Arguelles RN  Activity Management: activity adjusted per tolerance  Taken 12/24/2024 0000 by Adelina Arguelles RN  Activity Management: activity adjusted per tolerance  Taken 12/23/2024 2000 by Adelina Arguelles RN  Activity Management: activity adjusted per tolerance  Goal: Optimal Nutrition Intake  Outcome: Progressing     Problem: Dysrhythmia  Goal: Normalized Cardiac Rhythm  Outcome: Progressing   Goal Outcome Evaluation:    Family at bedside entire shift; very emotional and crying at beginning of shift. Sinus rhythm in 90s-100s. SBP < 100 so contacted tele-ICU and received order for 500 ml bolus of LR. At that time patient's metoprolol dose was due so held due to low BP; however, daughter had already told writer that the family did not want the medication given because a doctor had told them that it would make him sleepy and they did not want him to be sleepy. Throughout shift patient's HR increased to 130s and BP increased to 150s-160s. Tele-ICU requested that hospitalist be consulted. Hospitalist arrived at bedside and determined that patient should be given his metoprolol dose and that tachycardia was likely reflex tachycardia. Dose given and HR decreased  to 120s within 2 hours and BP decreased to 140s. Temperature max was 99.9 axillary. Two family members slept at bedside. Kept patient on 2 lpm NC; however, on room air he was satting mid-to-high 90s; mouth breather while sleeping so sats decreased at times overnight to low 90s. Coarse lung sounds. Tube feeding infusing at 30 ml/hr.

## 2024-12-25 LAB
ANION GAP SERPL CALCULATED.3IONS-SCNC: 8 MMOL/L (ref 7–15)
BACTERIA BLD CULT: ABNORMAL
BACTERIA BLD CULT: ABNORMAL
BUN SERPL-MCNC: 21.3 MG/DL (ref 8–23)
CALCIUM SERPL-MCNC: 7.3 MG/DL (ref 8.8–10.4)
CANCER AG19-9 SERPL IA-ACNC: 8 U/ML
CHLORIDE SERPL-SCNC: 117 MMOL/L (ref 98–107)
CREAT SERPL-MCNC: 1.51 MG/DL (ref 0.67–1.17)
EGFRCR SERPLBLD CKD-EPI 2021: 52 ML/MIN/1.73M2
ERYTHROCYTE [DISTWIDTH] IN BLOOD BY AUTOMATED COUNT: 13.2 % (ref 10–15)
GLUCOSE BLDC GLUCOMTR-MCNC: 120 MG/DL (ref 70–99)
GLUCOSE BLDC GLUCOMTR-MCNC: 124 MG/DL (ref 70–99)
GLUCOSE BLDC GLUCOMTR-MCNC: 129 MG/DL (ref 70–99)
GLUCOSE BLDC GLUCOMTR-MCNC: 135 MG/DL (ref 70–99)
GLUCOSE BLDC GLUCOMTR-MCNC: 150 MG/DL (ref 70–99)
GLUCOSE BLDC GLUCOMTR-MCNC: 92 MG/DL (ref 70–99)
GLUCOSE SERPL-MCNC: 129 MG/DL (ref 70–99)
HCO3 SERPL-SCNC: 21 MMOL/L (ref 22–29)
HCT VFR BLD AUTO: 27 % (ref 40–53)
HGB BLD-MCNC: 8.9 G/DL (ref 13.3–17.7)
MAGNESIUM SERPL-MCNC: 2 MG/DL (ref 1.7–2.3)
MCH RBC QN AUTO: 30.4 PG (ref 26.5–33)
MCHC RBC AUTO-ENTMCNC: 33 G/DL (ref 31.5–36.5)
MCV RBC AUTO: 92 FL (ref 78–100)
PLATELET # BLD AUTO: 169 10E3/UL (ref 150–450)
POTASSIUM SERPL-SCNC: 3.3 MMOL/L (ref 3.4–5.3)
POTASSIUM SERPL-SCNC: 3.6 MMOL/L (ref 3.4–5.3)
RBC # BLD AUTO: 2.93 10E6/UL (ref 4.4–5.9)
SODIUM SERPL-SCNC: 146 MMOL/L (ref 135–145)
WBC # BLD AUTO: 9.9 10E3/UL (ref 4–11)

## 2024-12-25 PROCEDURE — 94640 AIRWAY INHALATION TREATMENT: CPT | Mod: 76

## 2024-12-25 PROCEDURE — 250N000011 HC RX IP 250 OP 636: Performed by: FAMILY MEDICINE

## 2024-12-25 PROCEDURE — 250N000009 HC RX 250: Performed by: INTERNAL MEDICINE

## 2024-12-25 PROCEDURE — 94762 N-INVAS EAR/PLS OXIMTRY CONT: CPT

## 2024-12-25 PROCEDURE — 120N000013 HC R&B IMCU

## 2024-12-25 PROCEDURE — 85014 HEMATOCRIT: CPT | Performed by: FAMILY MEDICINE

## 2024-12-25 PROCEDURE — 94668 MNPJ CHEST WALL SBSQ: CPT

## 2024-12-25 PROCEDURE — 83735 ASSAY OF MAGNESIUM: CPT | Performed by: HOSPITALIST

## 2024-12-25 PROCEDURE — 84132 ASSAY OF SERUM POTASSIUM: CPT | Performed by: HOSPITALIST

## 2024-12-25 PROCEDURE — 250N000013 HC RX MED GY IP 250 OP 250 PS 637: Performed by: HOSPITALIST

## 2024-12-25 PROCEDURE — 250N000009 HC RX 250: Performed by: FAMILY MEDICINE

## 2024-12-25 PROCEDURE — 258N000003 HC RX IP 258 OP 636: Performed by: FAMILY MEDICINE

## 2024-12-25 PROCEDURE — 94799 UNLISTED PULMONARY SVC/PX: CPT

## 2024-12-25 PROCEDURE — 99232 SBSQ HOSP IP/OBS MODERATE 35: CPT | Performed by: INTERNAL MEDICINE

## 2024-12-25 PROCEDURE — 250N000013 HC RX MED GY IP 250 OP 250 PS 637: Performed by: FAMILY MEDICINE

## 2024-12-25 PROCEDURE — 95801 SLP STDY UNATND W/ANAL: CPT

## 2024-12-25 PROCEDURE — 250N000011 HC RX IP 250 OP 636: Performed by: INTERNAL MEDICINE

## 2024-12-25 PROCEDURE — 80048 BASIC METABOLIC PNL TOTAL CA: CPT | Performed by: FAMILY MEDICINE

## 2024-12-25 PROCEDURE — 99232 SBSQ HOSP IP/OBS MODERATE 35: CPT | Performed by: FAMILY MEDICINE

## 2024-12-25 RX ORDER — POTASSIUM CHLORIDE 20MEQ/15ML
40 LIQUID (ML) ORAL ONCE
Status: COMPLETED | OUTPATIENT
Start: 2024-12-25 | End: 2024-12-25

## 2024-12-25 RX ADMIN — ACETYLCYSTEINE 2 ML: 200 SOLUTION ORAL; RESPIRATORY (INHALATION) at 11:23

## 2024-12-25 RX ADMIN — IPRATROPIUM BROMIDE AND ALBUTEROL SULFATE 3 ML: .5; 3 SOLUTION RESPIRATORY (INHALATION) at 19:27

## 2024-12-25 RX ADMIN — SODIUM BICARBONATE 40 MG: 84 INJECTION, SOLUTION INTRAVENOUS at 08:52

## 2024-12-25 RX ADMIN — IPRATROPIUM BROMIDE AND ALBUTEROL SULFATE 3 ML: .5; 3 SOLUTION RESPIRATORY (INHALATION) at 16:15

## 2024-12-25 RX ADMIN — IPRATROPIUM BROMIDE AND ALBUTEROL SULFATE 3 ML: .5; 3 SOLUTION RESPIRATORY (INHALATION) at 08:12

## 2024-12-25 RX ADMIN — SENNOSIDES 5 ML: 8.8 LIQUID ORAL at 08:52

## 2024-12-25 RX ADMIN — BACLOFEN 20 MG: 10 TABLET ORAL at 23:01

## 2024-12-25 RX ADMIN — QUETIAPINE FUMARATE 25 MG: 25 TABLET ORAL at 09:04

## 2024-12-25 RX ADMIN — PIPERACILLIN AND TAZOBACTAM 3.38 G: 3; .375 INJECTION, POWDER, FOR SOLUTION INTRAVENOUS at 22:53

## 2024-12-25 RX ADMIN — LORAZEPAM 1 MG: 2 INJECTION INTRAMUSCULAR; INTRAVENOUS at 03:38

## 2024-12-25 RX ADMIN — BACLOFEN 20 MG: 10 TABLET ORAL at 18:59

## 2024-12-25 RX ADMIN — POTASSIUM CHLORIDE 40 MEQ: 1.5 SOLUTION ORAL at 05:58

## 2024-12-25 RX ADMIN — IPRATROPIUM BROMIDE AND ALBUTEROL SULFATE 3 ML: .5; 3 SOLUTION RESPIRATORY (INHALATION) at 11:24

## 2024-12-25 RX ADMIN — METOPROLOL TARTRATE 25 MG: 25 TABLET, FILM COATED ORAL at 08:52

## 2024-12-25 RX ADMIN — BACLOFEN 20 MG: 10 TABLET ORAL at 03:39

## 2024-12-25 RX ADMIN — ACETAMINOPHEN 650 MG: 325 SOLUTION ORAL at 22:53

## 2024-12-25 RX ADMIN — QUETIAPINE FUMARATE 25 MG: 25 TABLET ORAL at 17:07

## 2024-12-25 RX ADMIN — ACETYLCYSTEINE 2 ML: 200 SOLUTION ORAL; RESPIRATORY (INHALATION) at 19:27

## 2024-12-25 RX ADMIN — QUETIAPINE FUMARATE 25 MG: 25 TABLET ORAL at 23:01

## 2024-12-25 RX ADMIN — SIMVASTATIN 20 MG: 20 TABLET, FILM COATED ORAL at 20:03

## 2024-12-25 RX ADMIN — QUETIAPINE FUMARATE 25 MG: 25 TABLET ORAL at 19:02

## 2024-12-25 RX ADMIN — AMLODIPINE BESYLATE 5 MG: 5 TABLET ORAL at 08:52

## 2024-12-25 RX ADMIN — ACETYLCYSTEINE 2 ML: 200 SOLUTION ORAL; RESPIRATORY (INHALATION) at 16:16

## 2024-12-25 RX ADMIN — DOCUSATE SODIUM 100 MG: 50 LIQUID ORAL at 08:52

## 2024-12-25 RX ADMIN — ACETYLCYSTEINE 2 ML: 200 SOLUTION ORAL; RESPIRATORY (INHALATION) at 08:13

## 2024-12-25 RX ADMIN — AZITHROMYCIN MONOHYDRATE 500 MG: 500 INJECTION, POWDER, LYOPHILIZED, FOR SOLUTION INTRAVENOUS at 12:38

## 2024-12-25 RX ADMIN — ENOXAPARIN SODIUM 40 MG: 40 INJECTION SUBCUTANEOUS at 03:40

## 2024-12-25 RX ADMIN — QUETIAPINE FUMARATE 25 MG: 25 TABLET ORAL at 03:39

## 2024-12-25 RX ADMIN — METOPROLOL TARTRATE 25 MG: 25 TABLET, FILM COATED ORAL at 20:03

## 2024-12-25 RX ADMIN — BACLOFEN 20 MG: 10 TABLET ORAL at 08:52

## 2024-12-25 RX ADMIN — PIPERACILLIN AND TAZOBACTAM 3.38 G: 3; .375 INJECTION, POWDER, FOR SOLUTION INTRAVENOUS at 15:39

## 2024-12-25 RX ADMIN — ACETAMINOPHEN 650 MG: 325 SOLUTION ORAL at 00:14

## 2024-12-25 RX ADMIN — PIPERACILLIN AND TAZOBACTAM 3.38 G: 3; .375 INJECTION, POWDER, FOR SOLUTION INTRAVENOUS at 06:00

## 2024-12-25 ASSESSMENT — ACTIVITIES OF DAILY LIVING (ADL)
ADLS_ACUITY_SCORE: 74
ADLS_ACUITY_SCORE: 73
ADLS_ACUITY_SCORE: 73
ADLS_ACUITY_SCORE: 74
ADLS_ACUITY_SCORE: 74
ADLS_ACUITY_SCORE: 70
ADLS_ACUITY_SCORE: 74
ADLS_ACUITY_SCORE: 74
ADLS_ACUITY_SCORE: 73
ADLS_ACUITY_SCORE: 73
ADLS_ACUITY_SCORE: 74
ADLS_ACUITY_SCORE: 70
ADLS_ACUITY_SCORE: 74

## 2024-12-25 NOTE — PLAN OF CARE
Problem: Fall Injury Risk  Goal: Absence of Fall and Fall-Related Injury  Outcome: Progressing     Problem: Sepsis/Septic Shock  Goal: Optimal Coping  Outcome: Progressing  Goal: Absence of Bleeding  Outcome: Progressing  Goal: Blood Glucose Level Within Targeted Range  Outcome: Progressing  Goal: Absence of Infection Signs and Symptoms  Outcome: Progressing  Intervention: Initiate Sepsis Management  Recent Flowsheet Documentation  Taken 12/25/2024 0813 by Dimitri Kwon RN  Isolation Precautions: contact precautions initiated  Intervention: Promote Recovery  Recent Flowsheet Documentation  Taken 12/25/2024 0813 by Dimitri Kwon RN  Activity Management: activity adjusted per tolerance  Goal: Optimal Nutrition Intake  Outcome: Progressing     Problem: Dysrhythmia  Goal: Normalized Cardiac Rhythm  Outcome: Progressing     Problem: Pain Acute  Goal: Optimal Pain Control and Function  Outcome: Progressing     Problem: Skin Injury Risk Increased  Goal: Skin Health and Integrity  Outcome: Progressing  Intervention: Plan: Nurse Driven Intervention: Moisture Management  Recent Flowsheet Documentation  Taken 12/25/2024 0813 by Dimitri Kwon RN  Moisture Interventions:   No brief in bed   Urinary collection device  Bathing/Skin Care: bath, partial  Intervention: Plan: Nurse Driven Intervention: Friction and Shear  Recent Flowsheet Documentation  Taken 12/25/2024 0813 by Dimitri Kwon RN  Friction/Shear Interventions: Pad bony prominence (elbow pads, heel pads, ear protectors)  Intervention: Optimize Skin Protection  Recent Flowsheet Documentation  Taken 12/25/2024 0813 by Dimitri Kwon RN  Activity Management: activity adjusted per tolerance  Head of Bed (HOB) Positioning: HOB at 30-45 degrees   Goal Outcome Evaluation:        Patient is nonverbal tube feeding dependent for nutrition, and total cares. Still on vancomycin and Zosyn for sepsis. Patient has been afebrile this morning. Patient an episode  making noises. PRN Seroquel administered with good effect. Tube feeding now a goal rate of 60 ml/hr.  Patient was incontinent of bowel x1 and using external male catheter for urine. Skin is intact. Patient turned and repositioned every two hours. On telemetry running TD Kwon RN  12/25/2024  2:54 PM

## 2024-12-25 NOTE — PROGRESS NOTES
INFECTIOUS DISEASE FOLLOW UP NOTE    Date: 2024   CHIEF COMPLAINT:   Chief Complaint   Patient presents with    Abdominal Pain        ASSESSMENT:    Aspiration, bronchial wall thickening: Admission CT with bronchial wall thickening. Difficulty expectorating mucus at baseline, refusing percussive vest. Respiratory viral panel negative, urine legionella negative. Active issue.    bottles GPC clusters: verigene with staph species favor contaminant.   Fever, noted  and .   Remote ESBL UTI history  CVA with residual deficits, aphasia, dysphagia with GJ tube dependence, spastic quad.    PLAN:  - continue pip-tazo IV  - stop vanc  - anticipate PO augmentin for discharge  - low threshold for repeat CT CAP-can hold off for now unless clinically changes.   - palliative care consulted  - discussed with family    Viri Mcgill MD  [facility]   Office Telephone [phone].  Fax [phone]  [employer] paging    ______________________________________________________________________    SUBJECTIVE / INTERVAL HISTORY:  Room air. More stable. Tolerating antibiotics.     ROS: All other systems negative except as listed above.    SH/FH/Habits/PMH reviewed and unchanged.    OBJECTIVE:  BP (!) 153/87 (BP Location: Left arm)   Pulse 90   Temp 98.1  F (36.7  C) (Axillary)   Resp 18   Ht 1.524 m (5')   Wt 60.2 kg (132 lb 11.2 oz)   SpO2 97%   BMI 25.92 kg/m    FiO2 (%): 21 %  Resp: 18    Vital Signs  Temp: (!) 101  F (38.3  C)  Temp src: Axillary  Resp: 30  Pulse: 120  Pulse Rate Source: Monitor  BP: (!) 140/98  BP Location: Left arm    Temp (24hrs), Av  F (37.8  C), Min:98  F (36.7  C), Max:102.5  F (39.2  C)      GEN: No acute distress. nonverbal   RESPIRATORY:  breathing treatment. Room air   CARDIOVASCULAR:  tachy  ABDOMEN:  Soft, normal bowel sounds, non-tender,   EXTREMITIES: No edema.  SKIN/HAIR/NAILS:  No rashes  IV: PICC      Antibiotics:  Pip-tazo  vanc    Pertinent  labs:  CRP   Date Value Ref Range Status   05/30/2022 0.4 0.0 - 0.8 mg/dL Final      CBC RESULTS:   Recent Labs   Lab Test 12/23/24  1642   WBC 18.0*   RBC 3.77*   HGB 11.5*   HCT 34.0*   MCV 90   MCH 30.5   MCHC 33.8   RDW 12.9         Last Comprehensive Metabolic Panel:  Sodium   Date Value Ref Range Status   12/25/2024 146 (H) 135 - 145 mmol/L Final   12/20/2016 142 133 - 144 mmol/L Final     Potassium   Date Value Ref Range Status   12/25/2024 3.6 3.4 - 5.3 mmol/L Final   07/18/2023 4.7 3.5 - 5.0 mmol/L Final   12/20/2016 5.1 3.4 - 5.3 mmol/L Final     Chloride   Date Value Ref Range Status   12/25/2024 117 (H) 98 - 107 mmol/L Final   07/18/2023 110 (H) 98 - 107 mmol/L Final   12/20/2016 108 94 - 109 mmol/L Final     Carbon Dioxide   Date Value Ref Range Status   12/20/2016 29 20 - 32 mmol/L Final     Carbon Dioxide (CO2)   Date Value Ref Range Status   12/25/2024 21 (L) 22 - 29 mmol/L Final   07/18/2023 21 (L) 22 - 31 mmol/L Final     Anion Gap   Date Value Ref Range Status   12/25/2024 8 7 - 15 mmol/L Final   07/18/2023 8 5 - 18 mmol/L Final   12/20/2016 5 3 - 14 mmol/L Final     Glucose   Date Value Ref Range Status   12/25/2024 129 (H) 70 - 99 mg/dL Final   07/18/2023 157 (H) 70 - 125 mg/dL Final   12/20/2016 133 (H) 70 - 99 mg/dL Final     GLUCOSE BY METER POCT   Date Value Ref Range Status   12/25/2024 120 (H) 70 - 99 mg/dL Final     Comment:     /RN Notified     Urea Nitrogen   Date Value Ref Range Status   12/25/2024 21.3 8.0 - 23.0 mg/dL Final   07/18/2023 24 (H) 8 - 22 mg/dL Final   12/20/2016 32 (H) 7 - 30 mg/dL Final     Creatinine   Date Value Ref Range Status   12/25/2024 1.51 (H) 0.67 - 1.17 mg/dL Final   12/20/2016 1.16 0.66 - 1.25 mg/dL Final     GFR Estimate   Date Value Ref Range Status   12/25/2024 52 (L) >60 mL/min/1.73m2 Final     Comment:     eGFR calculated using 2021 CKD-EPI equation.   04/15/2021 >60 >60 mL/min/1.73m2 Final   12/20/2016 66 >60 mL/min/1.7m2 Final     Comment:      Non  GFR Calc     Calcium   Date Value Ref Range Status   12/25/2024 7.3 (L) 8.8 - 10.4 mg/dL Final     Comment:     Reference intervals for this test were updated on 7/16/2024 to reflect our healthy population more accurately. There may be differences in the flagging of prior results with similar values performed with this method. Those prior results can be interpreted in the context of the updated reference intervals.   12/20/2016 8.3 (L) 8.5 - 10.1 mg/dL Final        MICROBIOLOGY DATA:  Personally reviewed.  7-Day Micro Results       Collected Updated Procedure Result Status      12/24/2024 0930 12/24/2024 1307 MRSA MSSA PCR, Nasal Swab [[id number]]    Swab from Nares, Bilateral    Final result Component Value   MRSA Target DNA Negative   SA Target DNA Negative            12/23/2024 1744 12/23/2024 2141 Legionella Urinary Antigen and Streptococcus pneumoniae antigen [[id number]]    Urine, Clean Catch    Final result Component Value   Legionella pneumophila serogroup 1 urinary antigen Negative   A negative result does not exclude the possibility of a Legionella infection, as it can be caused by other serogroups and species of Legionella.   Streptococcus pneumoniae antigen Negative   A negative result does not exclude a Streptococcus pneumoniae infection.   Legionella pneumophila Urinary/Strep pneumoniae Antigen Specimen Type Urine   purewick  purewick  purewick            12/23/2024 1700 12/23/2024 2316 Respiratory Panel PCR [[id number]]    Swab from Nasopharyngeal    Final result Component Value   Adenovirus Not Detected   Coronavirus Not Detected   This test detects Coronavirus 229E, HKU1, NL63 and OC43 but does not distinguish between them. It does not detect MERS ( Respiratory Syndrome), SARS (Severe Acute Respiratory Syndrome) or 2019-nCoV (Novel 2019) Coronavirus.   Human Metapneumovirus Not Detected   Human Rhin/Enterovirus Not Detected   Influenza A Not Detected    Influenza A, H1 Not Detected   Influenza A 2009 H1N1 Not Detected   Influenza A, H3 Not Detected   Influenza B Not Detected   Parainfluenza Virus 1 Not Detected   Parainfluenza Virus 2 Not Detected   Parainfluenza Virus 3 Not Detected   Parainfluenza Virus 4 Not Detected   Respiratory Syncytial Virus A Not Detected   Respiratory Syncytial Virus B Not Detected   Chlamydia Pneumoniae Not Detected   Mycoplasma Pneumoniae Not Detected            12/22/2024 1638 12/24/2024 2131 Blood Culture Hand, Left [[id number]]   Blood from Hand, Left    Preliminary result Component Value   Culture No growth after 2 days  [P]                12/22/2024 1638 12/25/2024 0754 Blood Culture Hand, Right [[id number]]    (Abnormal)   Blood from Hand, Right    Final result Component Value   Culture Positive on the 1st day of incubation    Staphylococcus capitis    1 of 1 bottles  The recovery of this organism from a single blood culture bottle most likely represents contamination. If susceptibility testing is needed, please refer to the antibiogram or contact [facility]. If contamination is suspected, it is important to repeat two sets of blood cultures from two different sites.               12/22/2024 1638 12/23/2024 1835 Verigene GP Panel [[id number]]    (Abnormal)   Blood from Hand, Right    Final result Component Value   Staphylococcus species Detected   Positive for coagulase-negative Staphylococci, other than Staphylococcus epidermidis and Staphylococcus lugdunensis, by Kadenzeigene multiplex nucleic acid test. Coagulase-negative Staphylococci are the most common venipuncture or collection associated skin contaminants grown in blood cultures. Final identification and antimicrobial susceptibility testing will be verified by standard methods.   Staphylococcus aureus Not Detected   Staphylococcus epidermidis Not Detected   Staphylococcus lugdunensis Not Detected   Enterococcus faecalis Not Detected   Enterococcus faecium Not Detected    Streptococcus species Not Detected   Streptococcus agalactiae Not Detected   Streptococcus anginosus group Not Detected   Streptococcus pneumoniae Not Detected   Streptococcus pyogenes Not Detected   Listeria species Not Detected            12/21/2024 2231 12/21/2024 2320 Influenza A/B, RSV and SARS-CoV2 PCR (COVID-19) Nasopharyngeal [[id number]]    Swab from Nasopharyngeal    Final result Component Value   Influenza A PCR Negative   Influenza B PCR Negative   RSV PCR Negative   SARS CoV2 PCR Negative   NEGATIVE: SARS-CoV-2 (COVID-19) RNA not detected, presumed negative.                     RADIOLOGY:  Personally Reviewed.  Recent Results (from the past 24 hours)   XR Chest Port 1 View    Narrative    EXAM: XR CHEST PORT 1 VIEW  LOCATION: [facility]  DATE: 12/23/2024    INDICATION: right picc placement, tip location  COMPARISON: Chest CT 12/21/2024      Impression    IMPRESSION: Normal size of cardiomediastinal silhouette. Right upper extremity PICC with tip overlying the superior vena cava. No definite airspace consolidation, pleural effusion or pneumothorax. No acute bony abnormality. Percutaneous gastrojejunostomy   tube partially visualized.       Active Problems:    Aspiration pneumonitis (H)    Bronchopneumonia    Acute on chronic renal insufficiency    Altered mental status, unspecified altered mental status type    Essential hypertension    Other constipation    Positive blood culture

## 2024-12-25 NOTE — PROVIDER NOTIFICATION
12/25/24 1616   Vital Signs   Resp 18   Pulse 95   Oximeter Heart Rate 95 bpm   Oxygen Therapy   Device (Oxygen Therapy) none (room air)   Oxygen Therapy   SpO2 100 %   O2 Device None (Room air)   Assessment   Cough Frequency infrequent   Cough Type fair   Respiratory Secretions Assessment   Suction Device Oral   Secretions Amount scant   Suction Tolerance Tolerated poorly   Suctioning Adverse Effects None   Breath Sounds   Breath Sounds All Fields   All Lung Fields Breath Sounds Anterior:;coarse   Nebulizer Assessment & Treatment   $RT Use ONLY Delivery Method Nebulizer - Additional   Nebulizer Device In line   OLE Oscillating Lung Expansion Therapy   OLE Application Mask   OLE Cycle Intensity High   CPEP Pressure (cmH20) 8   CPEP Duration (Min) 6   CHFO Pressure (cmH20) 18   CHFO Duration (Min) 6   Patient Position HOB elevated   Posttreatment Assessment (OLE Therapy) breath sounds unchanged   Signs of Intolerance (OLE Therapy) none     Pt given Volera treatments x3 this shift with Duoneb and Mucomyst as ordered. Pt tolerated fairly well. Pt has mostly upper airway rhonchi but is not agreeable to oral suction. He has a fair cough but infrequent. RT will continue to follow

## 2024-12-25 NOTE — PLAN OF CARE
Problem: Infection  Goal: Absence of Infection Signs and Symptoms  Intervention: Prevent or Manage Infection  Recent Flowsheet Documentation  Taken 12/24/2024 2355 by Dipti Martinez, RN  Isolation Precautions: contact precautions maintained     Problem: Skin Injury Risk Increased  Goal: Skin Health and Integrity  Intervention: Plan: Nurse Driven Intervention: Moisture Management  Recent Flowsheet Documentation  Taken 12/24/2024 2355 by Dipti Martinez, RN  Moisture Interventions:   No brief in bed   Incontinence pad   Pt nonverbal at baseline. Pt very agitated at beginning of shift, PRN ativan and tylenol provided. TF at goal rate of 30ccs/hr. Free water flushes provided. Q2 turns for skin integrity. Tele SR-ST depending on pt's agitation level. PICC line intact, IV abx continued. Afebrile. K+ replaced per protocol.

## 2024-12-26 ENCOUNTER — TELEPHONE (OUTPATIENT)
Dept: FAMILY MEDICINE | Facility: CLINIC | Age: 59
End: 2024-12-26
Payer: COMMERCIAL

## 2024-12-26 VITALS
WEIGHT: 132.7 LBS | HEIGHT: 60 IN | DIASTOLIC BLOOD PRESSURE: 107 MMHG | RESPIRATION RATE: 17 BRPM | HEART RATE: 121 BPM | BODY MASS INDEX: 26.05 KG/M2 | SYSTOLIC BLOOD PRESSURE: 154 MMHG | TEMPERATURE: 99.2 F | OXYGEN SATURATION: 98 %

## 2024-12-26 DIAGNOSIS — G89.4 CHRONIC PAIN SYNDROME: Primary | ICD-10-CM

## 2024-12-26 PROBLEM — D64.9 CHRONIC ANEMIA: Status: ACTIVE | Noted: 2024-12-26

## 2024-12-26 LAB
BACTERIA BLD CULT: NORMAL
CREAT SERPL-MCNC: 1.5 MG/DL (ref 0.67–1.17)
EGFRCR SERPLBLD CKD-EPI 2021: 53 ML/MIN/1.73M2
GLUCOSE BLDC GLUCOMTR-MCNC: 124 MG/DL (ref 70–99)
GLUCOSE BLDC GLUCOMTR-MCNC: 141 MG/DL (ref 70–99)
MAGNESIUM SERPL-MCNC: 2 MG/DL (ref 1.7–2.3)
POTASSIUM SERPL-SCNC: 4 MMOL/L (ref 3.4–5.3)

## 2024-12-26 PROCEDURE — 250N000009 HC RX 250: Performed by: INTERNAL MEDICINE

## 2024-12-26 PROCEDURE — 250N000011 HC RX IP 250 OP 636: Performed by: INTERNAL MEDICINE

## 2024-12-26 PROCEDURE — 84132 ASSAY OF SERUM POTASSIUM: CPT | Performed by: FAMILY MEDICINE

## 2024-12-26 PROCEDURE — 999N000157 HC STATISTIC RCP TIME EA 10 MIN

## 2024-12-26 PROCEDURE — 99232 SBSQ HOSP IP/OBS MODERATE 35: CPT | Performed by: INTERNAL MEDICINE

## 2024-12-26 PROCEDURE — 94642 AEROSOL INHALATION TREATMENT: CPT

## 2024-12-26 PROCEDURE — 94799 UNLISTED PULMONARY SVC/PX: CPT

## 2024-12-26 PROCEDURE — 83735 ASSAY OF MAGNESIUM: CPT | Performed by: HOSPITALIST

## 2024-12-26 PROCEDURE — 250N000013 HC RX MED GY IP 250 OP 250 PS 637: Performed by: FAMILY MEDICINE

## 2024-12-26 PROCEDURE — 94640 AIRWAY INHALATION TREATMENT: CPT

## 2024-12-26 PROCEDURE — 250N000009 HC RX 250: Performed by: FAMILY MEDICINE

## 2024-12-26 PROCEDURE — 82565 ASSAY OF CREATININE: CPT | Performed by: FAMILY MEDICINE

## 2024-12-26 PROCEDURE — 94640 AIRWAY INHALATION TREATMENT: CPT | Mod: 76

## 2024-12-26 PROCEDURE — 258N000003 HC RX IP 258 OP 636: Performed by: FAMILY MEDICINE

## 2024-12-26 PROCEDURE — 250N000011 HC RX IP 250 OP 636: Performed by: FAMILY MEDICINE

## 2024-12-26 PROCEDURE — 99239 HOSP IP/OBS DSCHRG MGMT >30: CPT | Performed by: FAMILY MEDICINE

## 2024-12-26 RX ORDER — LIDOCAINE PAIN RELIEF 40 MG/1000MG
1 PATCH TOPICAL DAILY PRN
Qty: 30 PATCH | Refills: 2 | Status: SHIPPED | OUTPATIENT
Start: 2024-12-26

## 2024-12-26 RX ORDER — CATHETER MALE,EXTERNAL 22 TO 25MM
1 EACH MISCELLANEOUS DAILY
Qty: 30 EACH | Refills: 0 | Status: SHIPPED | OUTPATIENT
Start: 2024-12-26

## 2024-12-26 RX ORDER — SALIVA STIMULANT COMB. NO.7
1 GEL (GRAM) MUCOUS MEMBRANE 4 TIMES DAILY PRN
Qty: 42 G | Refills: 0 | Status: SHIPPED | OUTPATIENT
Start: 2024-12-26

## 2024-12-26 RX ORDER — AMOXICILLIN AND CLAVULANATE POTASSIUM 400; 57 MG/5ML; MG/5ML
875 POWDER, FOR SUSPENSION ORAL 2 TIMES DAILY
Qty: 153.16 ML | Refills: 0 | Status: SHIPPED | OUTPATIENT
Start: 2024-12-26 | End: 2025-01-02

## 2024-12-26 RX ADMIN — IPRATROPIUM BROMIDE AND ALBUTEROL SULFATE 3 ML: .5; 3 SOLUTION RESPIRATORY (INHALATION) at 07:59

## 2024-12-26 RX ADMIN — SODIUM BICARBONATE 40 MG: 84 INJECTION, SOLUTION INTRAVENOUS at 08:51

## 2024-12-26 RX ADMIN — QUETIAPINE FUMARATE 25 MG: 25 TABLET ORAL at 02:28

## 2024-12-26 RX ADMIN — SENNOSIDES 5 ML: 8.8 LIQUID ORAL at 08:34

## 2024-12-26 RX ADMIN — AZITHROMYCIN MONOHYDRATE 500 MG: 500 INJECTION, POWDER, LYOPHILIZED, FOR SOLUTION INTRAVENOUS at 10:57

## 2024-12-26 RX ADMIN — METOPROLOL TARTRATE 25 MG: 25 TABLET, FILM COATED ORAL at 08:34

## 2024-12-26 RX ADMIN — ACETAMINOPHEN 650 MG: 325 SOLUTION ORAL at 08:33

## 2024-12-26 RX ADMIN — BACLOFEN 20 MG: 10 TABLET ORAL at 02:28

## 2024-12-26 RX ADMIN — DOCUSATE SODIUM 100 MG: 50 LIQUID ORAL at 08:33

## 2024-12-26 RX ADMIN — QUETIAPINE FUMARATE 25 MG: 25 TABLET ORAL at 00:33

## 2024-12-26 RX ADMIN — ACETYLCYSTEINE 2 ML: 200 SOLUTION ORAL; RESPIRATORY (INHALATION) at 07:59

## 2024-12-26 RX ADMIN — ACETYLCYSTEINE 2 ML: 200 SOLUTION ORAL; RESPIRATORY (INHALATION) at 12:39

## 2024-12-26 RX ADMIN — BACLOFEN 20 MG: 10 TABLET ORAL at 08:34

## 2024-12-26 RX ADMIN — IPRATROPIUM BROMIDE AND ALBUTEROL SULFATE 3 ML: .5; 3 SOLUTION RESPIRATORY (INHALATION) at 12:39

## 2024-12-26 RX ADMIN — PIPERACILLIN AND TAZOBACTAM 3.38 G: 3; .375 INJECTION, POWDER, FOR SOLUTION INTRAVENOUS at 06:25

## 2024-12-26 RX ADMIN — ENOXAPARIN SODIUM 40 MG: 40 INJECTION SUBCUTANEOUS at 02:28

## 2024-12-26 RX ADMIN — QUETIAPINE FUMARATE 25 MG: 25 TABLET ORAL at 08:41

## 2024-12-26 RX ADMIN — AMLODIPINE BESYLATE 5 MG: 5 TABLET ORAL at 08:34

## 2024-12-26 ASSESSMENT — ACTIVITIES OF DAILY LIVING (ADL)
ADLS_ACUITY_SCORE: 74
ADLS_ACUITY_SCORE: 70
ADLS_ACUITY_SCORE: 74
ADLS_ACUITY_SCORE: 70
ADLS_ACUITY_SCORE: 74
ADLS_ACUITY_SCORE: 70
ADLS_ACUITY_SCORE: 74
ADLS_ACUITY_SCORE: 74
ADLS_ACUITY_SCORE: 70

## 2024-12-26 NOTE — PROGRESS NOTES
Care Management Discharge Note    Discharge Date: 12/26/2024       Discharge Disposition:  home with family and resumption of services    Discharge Services: None    Discharge DME: None    Discharge Transportation: family or friend will provide    Education Provided on the Discharge Plan: Yes (AVS per bedside RN)    Persons Notified of Discharge Plans: daughter    Patient/Family in Agreement with the Plan: yes         Additional Information:  CM reviewed chart. CM spoke to [facility] clinical staff regarding the items needed at discharge. [facility] will need the order and the notes indicating why the items are needed. 11:18 AM  CM faxed orders to [facility]. CM to fax Face to face note and progress note indicating why the items are needed once the notes are available.     CM called [facility] to follow up on referral. Clinical intake states they have not received the orders yet. [facility] can't guarantee that patient could get the orders DME today. [facility] is aware that [facility] has discharge orders. 12:43 PM         [facility]  Phone [phone]  Fax [phone]          Faith Dumont RN  Care Coordinator

## 2024-12-26 NOTE — PLAN OF CARE
Problem: Sepsis/Septic Shock  Goal: Optimal Coping  Outcome: Progressing     Problem: Sepsis/Septic Shock  Goal: Blood Glucose Level Within Targeted Range  Outcome: Progressing   Pt nonverbal at baseline. Extremities contracted, Q2 turns to maintain skin integrity. Pt intermittently restless and agitated throughout the shift, managed with PRN tylenol and PRN seroquel. Continuous enteral feedings at 30ccs/hr overnight, tolerating well. Free water flushes provided. Tmax 100.4. Pt ST most of the night. Oral suctioning provided, minimal thick secretions noted. IV abx continued. PICC line intact. Male purewick in use, voiding spontaneously. Family at bedside.

## 2024-12-26 NOTE — PROGRESS NOTES
INFECTIOUS DISEASE FOLLOW UP NOTE    Date: 12/26/2024   CHIEF COMPLAINT:   Chief Complaint   Patient presents with    Abdominal Pain        ASSESSMENT:    Aspiration, bronchial wall thickening: Admission CT with bronchial wall thickening. Difficulty expectorating mucus at baseline, refusing percussive vest. Respiratory viral panel negative, urine legionella negative. Active issue.   1/4 bottles GPC clusters: verigene with staph species favor contaminant.   Fever, noted 12/22 and 12/23. Temps better. Tmax 100.4 overnight. Leukocytosis resolved.   Remote ESBL UTI history  CVA with residual deficits, aphasia, dysphagia with GJ tube dependence, spastic quad.    PLAN:  - plan augmentin 875mg through tube BID for about 5-7 days at discharge    Dyllan Plummer MD   [facility]   Office Telephone [phone].  Fax [phone]  [employer] paging    ______________________________________________________________________    SUBJECTIVE / INTERVAL HISTORY:  Unable to provide history    ROS: unable    SH/FH/Habits/PMH reviewed and unchanged.    OBJECTIVE:  BP (!) 154/107   Pulse (!) 121   Temp 99.2  F (37.3  C) (Axillary)   Resp 17   Ht 1.524 m (5')   Wt 60.2 kg (132 lb 11.2 oz)   SpO2 98%   BMI 25.92 kg/m    FiO2 (%): 21 %  Resp: 17      GEN: No acute distress. Nonverbal. Room air  RESPIRATORY:  rhonchi  CARDIOVASCULAR:  tachy  ABDOMEN:  Soft, normal bowel sounds, non-tender, +FT  EXTREMITIES: contractures  SKIN/HAIR/NAILS:  No rashes  IV: PICC      Antibiotics:  Pip-tazo  Vanc stopped  azithromycin    Pertinent labs:   reviewed    MICROBIOLOGY DATA:  Personally reviewed.  7-Day Micro Results       Collected Updated Procedure Result Status      12/24/2024 0930 12/24/2024 1307 MRSA MSSA PCR, Nasal Swab [[id number]]    Swab from Nares, Bilateral    Final result Component Value   MRSA Target DNA Negative   SA Target DNA Negative            12/23/2024 1744 12/23/2024 2141 Legionella Urinary Antigen  and Streptococcus pneumoniae antigen [[id number]]    Urine, Clean Catch    Final result Component Value   Legionella pneumophila serogroup 1 urinary antigen Negative   A negative result does not exclude the possibility of a Legionella infection, as it can be caused by other serogroups and species of Legionella.   Streptococcus pneumoniae antigen Negative   A negative result does not exclude a Streptococcus pneumoniae infection.   Legionella pneumophila Urinary/Strep pneumoniae Antigen Specimen Type Urine   purewick  purewick  purewick            12/23/2024 1700 12/23/2024 2316 Respiratory Panel PCR [[id number]]    Swab from Nasopharyngeal    Final result Component Value   Adenovirus Not Detected   Coronavirus Not Detected   This test detects Coronavirus 229E, HKU1, NL63 and OC43 but does not distinguish between them. It does not detect MERS ( Respiratory Syndrome), SARS (Severe Acute Respiratory Syndrome) or 2019-nCoV (Novel 2019) Coronavirus.   Human Metapneumovirus Not Detected   Human Rhin/Enterovirus Not Detected   Influenza A Not Detected   Influenza A, H1 Not Detected   Influenza A 2009 H1N1 Not Detected   Influenza A, H3 Not Detected   Influenza B Not Detected   Parainfluenza Virus 1 Not Detected   Parainfluenza Virus 2 Not Detected   Parainfluenza Virus 3 Not Detected   Parainfluenza Virus 4 Not Detected   Respiratory Syncytial Virus A Not Detected   Respiratory Syncytial Virus B Not Detected   Chlamydia Pneumoniae Not Detected   Mycoplasma Pneumoniae Not Detected            12/22/2024 1638 12/25/2024 2131 Blood Culture Hand, Left [[id number]]   Blood from Hand, Left    Preliminary result Component Value   Culture No growth after 3 days  [P]                12/22/2024 1638 12/25/2024 0754 Blood Culture Hand, Right [[id number]]    (Abnormal)   Blood from Hand, Right    Final result Component Value   Culture Positive on the 1st day of incubation    Staphylococcus capitis    1 of 1  bottles  The recovery of this organism from a single blood culture bottle most likely represents contamination. If susceptibility testing is needed, please refer to the antibiogram or contact [facility]. If contamination is suspected, it is important to repeat two sets of blood cultures from two different sites.               12/22/2024 1638 12/23/2024 1835 Verigene GP Panel [[id number]]    (Abnormal)   Blood from Hand, Right    Final result Component Value   Staphylococcus species Detected   Positive for coagulase-negative Staphylococci, other than Staphylococcus epidermidis and Staphylococcus lugdunensis, by [employer] multiplex nucleic acid test. Coagulase-negative Staphylococci are the most common venipuncture or collection associated skin contaminants grown in blood cultures. Final identification and antimicrobial susceptibility testing will be verified by standard methods.   Staphylococcus aureus Not Detected   Staphylococcus epidermidis Not Detected   Staphylococcus lugdunensis Not Detected   Enterococcus faecalis Not Detected   Enterococcus faecium Not Detected   Streptococcus species Not Detected   Streptococcus agalactiae Not Detected   Streptococcus anginosus group Not Detected   Streptococcus pneumoniae Not Detected   Streptococcus pyogenes Not Detected   Listeria species Not Detected            12/21/2024 2231 12/21/2024 2320 Influenza A/B, RSV and SARS-CoV2 PCR (COVID-19) Nasopharyngeal [[id number]]    Swab from Nasopharyngeal    Final result Component Value   Influenza A PCR Negative   Influenza B PCR Negative   RSV PCR Negative   SARS CoV2 PCR Negative   NEGATIVE: SARS-CoV-2 (COVID-19) RNA not detected, presumed negative.                     RADIOLOGY:  Personally Reviewed.  Recent Results (from the past 24 hours)   XR Chest Port 1 View    Narrative    EXAM: XR CHEST PORT 1 VIEW  LOCATION: [facility]  DATE: 12/23/2024    INDICATION: right picc placement, tip  location  COMPARISON: Chest CT 12/21/2024      Impression    IMPRESSION: Normal size of cardiomediastinal silhouette. Right upper extremity PICC with tip overlying the superior vena cava. No definite airspace consolidation, pleural effusion or pneumothorax. No acute bony abnormality. Percutaneous gastrojejunostomy   tube partially visualized.       Active Problems:    Aspiration pneumonitis (H)    Bronchopneumonia    Acute on chronic renal insufficiency    Altered mental status, unspecified altered mental status type    Essential hypertension    Other constipation    Positive blood culture    Chronic anemia

## 2024-12-26 NOTE — DISCHARGE SUMMARY
[facility] MEDICINE  DISCHARGE SUMMARY     Primary Care Physician: Steven Rosa  Admission Date: 12/21/2024   Discharge Provider: Dayday Gonsales MD Discharge Date: 12/26/2024    Diet:   Active Diet and Nourishment Order   Procedures    Adult Formula Drip Feeding: Continuous Vital 1.5; Jejunostomy; Goal Rate: 60; mL/hr; from 8am-8pm run at 60ml/hr, from 8pm-8am run at 30ml/hr    NPO for Medical/Clinical Reasons Except for: NPO but receiving Tube Feeding    Diet     Code Status: Full Code   Activity: bedrest per previous activity level        Condition at Discharge: Stable     REASON FOR PRESENTATION(See Admission Note for Details)   Fatigue and increased confusion    PRINCIPAL & ACTIVE DISCHARGE DIAGNOSES     Active Problems:    Aspiration pneumonitis (H)    Bronchopneumonia    Acute on chronic renal insufficiency    Altered mental status, unspecified altered mental status type    Essential hypertension    Other constipation    Positive blood culture    Chronic anemia  Fever  Leukocytosis  History of ESBL UTI  Abdominal pain  Chronic constipation  G-tube leakage  Abnormal Cologuard test  History of hemorrhagic stroke with 4 limb spasticity  Insomnia  Tachycardia  Metabolic acidosis  Acute on chronic kidney disease stage IIIa  G-tube feeding dependent  Subclinical hypothyroidism  Dyslipidemia    Clinically Significant Risk Factors        # Hypokalemia: Lowest K = 3.3 mmol/L in last 2 days, will replace as needed  # Hypernatremia: Highest Na = 146 mmol/L in last 2 days, will monitor as appropriate  # Hyperchloremia: Highest Cl = 117 mmol/L in last 2 days, will monitor as appropriate          # Hypoalbuminemia: Lowest albumin = 3.3 g/dL at 12/21/2024 11:04 PM, will monitor as appropriate     # Hypertension: Noted on problem list            # Overweight: Estimated body mass index is 25.92 kg/m  as calculated from the following:    Height as of this encounter: 1.524 m (5').     Weight as of this encounter: 60.2 kg (132 lb 11.2 oz).      # Financial/Environmental Concerns: none         PENDING LABS     Unresulted Labs Ordered in the Past 30 Days of this Admission       Date and Time Order Name Status Description    12/22/2024  4:07 PM Blood Culture Hand, Left Preliminary           PROCEDURES ( this hospitalization only)      None    RECOMMENDATIONS TO OUTPATIENT PROVIDER FOR F/U VISIT     Follow-up Appointments       Follow-up and recommended labs and tests       1.  Follow-up with primary care provider ideally in 1 week.  Recheck blood pressure.  2.  Follow-up with [place] neurology as an outpatient regarding extremity spasming.  Give office phone number to family.    3.  Follow-up with [place] general surgery if having further G-tube issues.  Give office phone number to family.  4.  Recommend follow-up with [place] pulmonary group especially to determine other percussive vest options.  Give office phone number to family.  5.  Recommend annual thyroid testing.                  DISPOSITION     Home with home care    SUMMARY OF HOSPITAL COURSE:      Liukurtis Malave is a 59 year old male admitted on 12/21/2024. He has medical history significant for hemorrhagic CVA with basal ganglia hemorrhage in 2016 with extensive residual deficits, aphasia, dysphagia with GJ tube dependence, spastic quadriplegia, hypertension, dyslipidemia, seizure disorder, chronic static encephalopathy, ESBL urine, CKD 3A and GERD.  Has a positive Cologuard test.  Not pursuing colonoscopy.  Patient was brought to the ED by family for abdominal pain, fatigue and confusion.  CT scan showed mild bronchial wall thickening but abdomen was negative.  Admitted.      1.  Pulmonary.  Initiated on intravenous Rocephin and azithromycin.  Patient has been refusing his percussive vest.  Family aware this will be problematic with his recovery.  12/23 patient showed some clinical improvement and family wished to discharge to home.   However that afternoon subsequently blood cultures showed GPC in clusters and temperature increased to 102.4 with severe tachycardia and hypertension.  Transferred to ICU status.  Ultimately blood culture showed Staph capitis likely contaminant.  Subsequent blood and urine cultures NGTD.  Infectious disease consulted.  Antibiotics transitioned to Zosyn, vancomycin and azithromycin.  After receiving lorazepam patient's heart rate blood pressure and agitation did improve.  Fever and leukocytosis have resolved.  Patient had good improvement and clinically was treated just with oral medications that family can use at home.  Overnight pulse oximetry study was done and showed very good saturations.  Did not require supplemental oxygen.  Infectious disease recommended Augmentin at discharge.  Continued respiratory treatments per family.    2.  GI.  Family reports that G-tube balloon has been failing/leaking and IR has replaced the G-tube 11/15, 11/25, 12/10 and 12/18.  Consulted general surgery and no active G-tube issues were identified at this time.  Appreciate wound care G-tube assessment.  G-tube functioning well during his stay.  If family noting more issues can contact general surgery as an outpatient.      3.  Neurologic.  Family noting more spasticity.  Increased baclofen which seem to help.  Patient was having increased agitation and so as needed doses of Seroquel were administered.  Recommend patient follow-up with neurology as an outpatient as it has been quite sometime since he last saw them.  Provided prescriptions for condom catheter as well as a covered Yunker to help deal with the patient's significant secretions given his profound quadriplegic spasticity associated with his hemorrhagic CVA.      4.  Cardiovascular.  Patient had persistent hypertension with tachycardia, so metoprolol added and heart rate and blood pressure did subsequently improved.  Continue at discharge.    5.  Endocrine.  TSH was  elevated but free T4 was normal.  Recommend annual thyroid testing for subclinical hypothyroidism.    6.  Goals of care.  Patient has a history of an abnormal Cologuard test but family did not wish to put him through a colonoscopy.  Per family request CEA and CA 19-9 were done and was negative.  Palliative consultation was obtained and family does still wish for full spectrum of care and full CODE STATUS was reaffirmed.      Discharge Medications with Med changes:     Current Discharge Medication List        START taking these medications    Details   acetylcysteine (MUCOMYST) 20 % neb solution Take 2 mLs by nebulization every 6 hours as needed (congestion).  Qty: 30 mL, Refills: 0    Associated Diagnoses: Aspiration pneumonitis (H)      amoxicillin-clavulanate (AUGMENTIN) 400-57 MG/5ML suspension Take 10.94 mLs (875 mg) by mouth 2 times daily for 7 days.  Qty: 153.16 mL, Refills: 0    Associated Diagnoses: Aspiration pneumonitis (H)      artificial saliva (BIOTENE ORALBALANCE) GEL gel Take 1 g by mouth 4 times daily as needed (dry mouth.  Apply with swab stick).  Qty: 42 g, Refills: 0    Associated Diagnoses: Aspiration pneumonitis (H); Oropharyngeal dysphagia      Catheters (EVERYDAY MALE EXTERNAL CATH/SM) MISC 1 catheter daily.  Qty: 30 each, Refills: 0    Associated Diagnoses: 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); Hemorrhagic stroke (H)      docusate (COLACE) 50 MG/5ML liquid Place 10 mLs (100 mg) into Feeding Tube 2 times daily.  Qty: 473 mL, Refills: 0    Associated Diagnoses: Other constipation      metoprolol tartrate (LOPRESSOR) 25 MG tablet Place 1 tablet (25 mg) into Feeding Tube 2 times daily.  Qty: 60 tablet, Refills: 0    Associated Diagnoses: Essential hypertension      Oral Hygiene Products (Q-CARE COVERD YANKAUER/SUCTION) MISC Take 1 Device by mouth every hour as needed (secretions).  Qty: 10 each, Refills: 0    Associated  Diagnoses: Oropharyngeal dysphagia; Hemorrhagic stroke (H)      sennosides (SENOKOT) 8.8 MG/5ML syrup Place 5 mLs into G tube 2 times daily.  Qty: 236 mL, Refills: 0    Associated Diagnoses: Other constipation           CONTINUE these medications which have CHANGED    Details   baclofen (LIORESAL) 20 MG tablet Place 1 tablet (20 mg) into Feeding Tube 4 times daily.  Qty: 120 tablet, Refills: 0    Associated Diagnoses: 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)           CONTINUE these medications which have NOT CHANGED    Details   acetaminophen (TYLENOL) 500 MG tablet Place 2 tablets (1,000 mg) into G tube every 6 hours as needed for mild pain  Qty: 60 tablet, Refills: 0    Associated Diagnoses: Pain      amLODIPine (NORVASC) 5 MG tablet Place 1 tablet (5 mg) into G tube daily.  Qty: 30 tablet, Refills: 0    Associated Diagnoses: Hypertension, unspecified type      bisacodyl (DULCOLAX) 10 MG suppository Place 1 suppository (10 mg) rectally daily as needed for constipation.  Qty: 30 suppository, Refills: 0    Associated Diagnoses: Slow transit constipation      CHEST CONGESTION RELIEF DM  MG/5ML syrup TAKE 10 MLS BY G TUBE ROUTE EVERY 4 HOURS AS NEEDED FOR COUGH.  Qty: 237 mL, Refills: 0    Associated Diagnoses: History of pneumonia      ipratropium (ATROVENT) 0.06 % nasal spray Spray 2 sprays into both nostrils daily as needed for rhinitis      ipratropium - albuterol 0.5 mg/2.5 mg/3 mL (DUONEB) 0.5-2.5 (3) MG/3ML neb solution Take 1 vial (3 mLs) by nebulization every 4 hours.  Qty: 90 mL, Refills: 0    Associated Diagnoses: Chronic cough      ketoconazole (NIZORAL) 2 % external cream Apply topically 2 times daily to affected area(s).  Qty: 30 g, Refills: 0    Associated Diagnoses: Irritant dermatitis      LIDOCAINE PAIN RELIEF 4 % Patch Place 1 patch onto the skin daily as needed for moderate pain.      meclizine (ANTIVERT) 25 MG tablet  Take 1 tablet via G-tube every 6 hours as needed for vertigo  Qty: 30 tablet, Refills: 11    Associated Diagnoses: Vertigo      neomycin-bacitracin-polymyxin (NEOSPORIN) 5-400-5000 ointment Apply topically 3 times daily as needed (irritation) To J Tube site  Qty: 30 g, Refills: 0    Associated Diagnoses: Irritation around percutaneous endoscopic gastrostomy (PEG) tube site (H)      nystatin (MYCOSTATIN) 030011 UNIT/GM external cream Apply topically 2 times daily as needed (redness)  Qty: 30 g, Refills: 3    Associated Diagnoses: On tube feeding diet      omeprazole (PRILOSEC) 40 MG DR capsule Take 1 capsule (40 mg) by mouth daily IN G TUBE  Qty: 30 capsule, Refills: 0    Associated Diagnoses: Oropharyngeal dysphagia      ondansetron (ZOFRAN) 4 MG tablet Place 1 tablet (4 mg) into G tube every 8 hours as needed for nausea  Qty: 30 tablet, Refills: 0    Associated Diagnoses: Nausea      Ostomy Supplies (STOMAHESIVE) PSTE Apply topically to skin around ostomy site twice daily and as needed  Qty: 56.7 g, Refills: 3    Associated Diagnoses: Irritation around percutaneous endoscopic gastrostomy (PEG) tube site (H)      polyethylene glycol (MIRALAX) 17 GM/Dose powder Give 1 capful via G-tube daily as needed for constipation  Qty: 850 g, Refills: 3    Associated Diagnoses: Slow transit constipation      Polyethylene Glycol 400 (VISINE DRY EYE RELIEF) 1 % SOLN Apply 1 drop to eye every 6 hours as needed for dry eyes.  Qty: 15 mL, Refills: 11    Associated Diagnoses: Dry eyes      !! QUEtiapine (SEROQUEL) 25 MG tablet Place 25 mg into G tube every 4 hours as needed (Used during the day PRN agitation/etc. This is in addition to the tablet at 1900, 0000, 0300.)      !! QUEtiapine (SEROQUEL) 25 MG tablet TAKE 1 TABLET BY MOUTH AT 7:00PM, MIDNIGHT AND 3:00AM FOR TOTAL OF 75 MG NIGHTLY. OK TO REPEAT AFTER 4 HOURS AND DURING THE DAY AS NEEDED.  Qty: 102 tablet, Refills: 5    Associated Diagnoses: Restlessness and agitation       simvastatin (ZOCOR) 20 MG tablet TAKE 1 TABLET PER G TUBE ROUTE AT BEDTIME.  Qty: 90 tablet, Refills: 1    Associated Diagnoses: Dyslipidemia      sodium chloride (NEBUSAL) 3 % neb solution TAKE 4 MLS BY NEBULIZATION EVERY 6 HOURS AS NEEDED FOR THICK SPUTUM.  Qty: 240 mL, Refills: 0    Associated Diagnoses: Chronic mucus hypersecretion, respiratory      triamcinolone (KENALOG) 0.1 % external cream Apply topically daily as needed for irritation. Apply to affected area/rash  Qty: 30 g, Refills: 0    Associated Diagnoses: Irritant dermatitis       !! - Potential duplicate medications found. Please discuss with provider.            Rationale for medication changes:      Mucomyst nebs for congestion.  Augmentin for bronchopneumonia.  Metoprolol for tachycardia.  Constipation prevention.      Consults     NUTRITION SERVICES ADULT IP CONSULT  RESPIRATORY CARE IP CONSULT  CARE MANAGEMENT / SOCIAL WORK IP CONSULT  GASTROENTEROLOGY IP CONSULT  SURGERY GENERAL IP CONSULT  PHARMACY IP CONSULT  PHARMACY TO DOSE VANCO  INFECTIOUS DISEASES IP CONSULT  PALLIATIVE CARE ADULT IP CONSULT  VASCULAR ACCESS ADULT IP CONSULT  INTENSIVIST IP CONSULT      Immunizations given this encounter     Most Recent Immunizations   Administered Date(s) Administered    COVID-19 MONOVALENT 12+ (Pfizer) 04/01/2021    COVID-19 Monovalent 12+ (Pfizer 2022) 03/20/2022    Influenza Vaccine 18-64 (Flublok) 09/27/2023    Influenza Vaccine, 6+MO IM (QUADRIVALENT W/PRESERVATIVES) 11/06/2020    Pneumo Conj 13-V (2010&after) 10/28/2021    Pneumococcal 20 valent Conjugate (Prevnar 20) 05/24/2023    Pneumococcal 23 valent 10/25/2017    TDAP (Adacel,Boostrix) 04/20/2015           Anticoagulation Information      Recent INR results:   Recent Labs   Lab 12/21/24  2304   INR 0.96     Warfarin doses (if applicable) or name of other anticoagulant: N/A      SIGNIFICANT IMAGING FINDINGS     Results for orders placed or performed during the hospital encounter of 12/21/24    Head CT w/o contrast    Impression    IMPRESSION:  1.  No CT evidence for acute intracranial process.  2.  Stable chronic changes as above.   CT Chest Pulmonary Embolism w Contrast    Impression    IMPRESSION:  1.  No evidence of pulmonary embolus to the segmental level.  2.  Mild Bronchial wall thickening with subsegmental atelectasis seen in the lung bases bilaterally, overall improved aeration of the lower lobes when compared with 11/14/2024  3.  Centrilobular emphysema, unchanged from prior exam  4.  Moderate prostatomegaly, similar prior exam  5.  Normal appendix.  6.  No acute intra-abdominal process identified   CT Abdomen Pelvis w Contrast    Impression    IMPRESSION:  1.  No evidence of pulmonary embolus to the segmental level.  2.  Mild Bronchial wall thickening with subsegmental atelectasis seen in the lung bases bilaterally, overall improved aeration of the lower lobes when compared with 11/14/2024  3.  Centrilobular emphysema, unchanged from prior exam  4.  Moderate prostatomegaly, similar prior exam  5.  Normal appendix.  6.  No acute intra-abdominal process identified   XR Chest Port 1 View    Impression    IMPRESSION: Normal size of cardiomediastinal silhouette. Right upper extremity PICC with tip overlying the superior vena cava. No definite airspace consolidation, pleural effusion or pneumothorax. No acute bony abnormality. Percutaneous gastrojejunostomy   tube partially visualized.   XR Chest Port 1 View    Impression    IMPRESSION: No change in right upper extremity PICC line. Heart and mediastinal size are normal. There is some streaky opacity involving the left lung base, compatible with either atelectasis or infectious process.                CT Head w/o Contrast    Impression    IMPRESSION:    1. No acute intracranial process.  2. Stable appearing chronic changes as described in the report.   Echocardiogram Complete   Result Value Ref Range    LVEF  60-65%        Recent Results (from the  past 4320 hours)   Echocardiogram Complete   Result Value    LVEF  60-65%    [facility]    [id number]  [id number]  [id number]  [id number]^[address] [address]     Name: NAVYA ESPINAL  MRN: [id number]  : 1965  Study Date: 2024 01:27 PM  Age: 59 yrs  Gender: Male  Patient Location: [facility]  Reason For Study: Tachycardia  Ordering Physician: LIVAN FORD  Performed By: IVY     BSA: 1.5 m2  Height: 60 in  Weight: 126 lb  HR: 120  BP: 124/92 mmHg  ______________________________________________________________________________  Procedure  Echocardiogram with two-dimensional, color and spectral Doppler. Technically  difficult study. Compared to the prior study dated 10/26/2023 no significant  change. No hemodynamically significant valvular abnormalities on 2D or color  flow imaging.  ______________________________________________________________________________  Interpretation Summary     The left ventricle is normal in size.  The visual ejection fraction is 60-65%.  Regional wall motion is grossly normal but endocardial border definition is  limited  No hemodynamically significant valvular abnormalities on 2D or color flow  imaging.  Compared to the prior study dated 10/26/2023 no significant change  ______________________________________________________________________________  Left Ventricle  The left ventricle is normal in size. There is mild concentric left  ventricular hypertrophy. Left ventricular diastolic function is indeterminate.  The visual ejection fraction is 60-65%. Regional wall motion is grossly normal  but endocardial border definition is limited.     Right Ventricle  The right ventricle is not well visualized.     Atria  The left atrium is not well visualized. Right atrium not well visualized.  Atrial septum not well seen.     Mitral Valve  Mitral valve leaflets appear normal. There is trace mitral regurgitation.  There is no mitral valve  stenosis.     Tricuspid Valve  The tricuspid valve is not well visualized. There is trace tricuspid  regurgitation. Right ventricular systolic pressure could not be approximated  due to inadequate tricuspid regurgitation. There is no tricuspid stenosis.     Aortic Valve  The aortic valve is not well visualized. No aortic regurgitation is present.  No aortic stenosis is present.     Pulmonic Valve  The pulmonic valve is not well visualized.     Vessels  The aorta root is normal. The thoracic aorta cannot be assessed. Inferior vena  cava not well visualized for estimation of right atrial pressure.     Pericardium  There is no pericardial effusion.     Rhythm  The rhythm was sinus tachycardia.  ______________________________________________________________________________  MMode/2D Measurements & Calculations     IVSd: 1.2 cm  LVIDd: 4.1 cm  LVIDs: 2.8 cm  LVPWd: 1.2 cm  FS: 32.8 %  LV mass(C)d: 162.8 grams  LV mass(C)dI: 106.5 grams/m2  Ao root diam: 3.4 cm  LVOT diam: 2.2 cm  LVOT area: 3.8 cm2  Ao root diam index Ht(cm/m): 2.2  Ao root diam index BSA (cm/m2): 2.2  EF Biplane: 65.8 %  LA Volume (BP): 28.7 ml     LA Volume Index (BP): 18.8 ml/m2  RWT: 0.56  TAPSE: 1.8 cm     Time Measurements  MM HR: 114.0 BPM     Doppler Measurements & Calculations  MV E max hollis: 100.0 cm/sec  MV A max hollis: 80.9 cm/sec  MV E/A: 1.2  MV dec slope: 858.0 cm/sec2  MV dec time: 0.12 sec  Ao V2 max: 124.0 cm/sec  Ao max P.0 mmHg  Ao V2 mean: 81.0 cm/sec  Ao mean PG: 3.0 mmHg  Ao V2 VTI: 25.6 cm  DORI(I,D): 2.4 cm2  DORI(V,D): 3.0 cm2  LV V1 max PG: 3.7 mmHg  LV V1 max: 96.3 cm/sec  LV V1 VTI: 16.3 cm  SV(LVOT): 62.0 ml  SI(LVOT): 40.5 ml/m2  AV Hollis Ratio (DI): 0.78  DORI Index (cm2/m2): 1.6     ______________________________________________________________________________  Report approved by: Corine Dominguez MD on 2024 02:53 PM             SIGNIFICANT LABORATORY FINDINGS     Most Recent 3 CBC's:  Recent Labs   Lab Test  12/25/24 0419 12/24/24  0930 12/23/24  1642   WBC 9.9 14.7* 18.0*   HGB 8.9* 9.5* 11.5*   MCV 92 91 90    184 249     Most Recent 3 BMP's:  Recent Labs   Lab Test 12/26/24  1141 12/26/24  0740 12/26/24 0417 12/25/24 2001 12/25/24  1215 12/25/24  1115 12/25/24  0811 12/25/24 0419 12/24/24  0806 12/24/24  0617 12/23/24  1759 12/23/24  1642 12/22/24  0628   NA  --   --   --   --   --   --   --  146*  --   --   --  147* 142   POTASSIUM  --   --  4.0  --   --  3.6  --  3.3*  --   --   --  3.6 4.5   CHLORIDE  --   --   --   --   --   --   --  117*  --   --   --  114* 110*   CO2  --   --   --   --   --   --   --  21*  --   --   --  17* 22   BUN  --   --   --   --   --   --   --  21.3  --   --   --  25.1* 33.4*   CR  --   --  1.50*  --   --   --   --  1.51*  --  1.54*  --  1.46* 1.24*   ANIONGAP  --   --   --   --   --   --   --  8  --   --   --  16* 10   SAMIR  --   --   --   --   --   --   --  7.3*  --   --   --  8.2* 8.2*   * 124*  --  135*   < >  --    < > 129*   < >  --    < > 106* 110*    < > = values in this interval not displayed.     Most Recent 2 LFT's:  Recent Labs   Lab Test 12/21/24  2304 11/15/24  0555   AST 34 26   ALT 19 16   ALKPHOS 73 67   BILITOTAL 0.2 0.4     Most Recent 3 INR's:  Recent Labs   Lab Test 12/21/24  2304 04/15/21  0936 06/27/20  1210   INR 0.96 1.04 1.03     Most Recent TSH and T4:  Recent Labs   Lab Test 12/22/24  0628   TSH 4.97*   T4 1.04     Most Recent Hemoglobin A1c:  Recent Labs   Lab Test 12/24/24  0148   A1C 5.7*     Most Recent 6 glucoses:  Recent Labs   Lab Test 12/26/24  1141 12/26/24  0740 12/25/24  2001 12/25/24  1608 12/25/24  1215 12/25/24  0811   * 124* 135* 124* 150* 120*     Most Recent Urinalysis:  Recent Labs   Lab Test 12/22/24  0056 02/10/23  2220 01/05/23  1655   COLOR Colorless   < > Yellow   APPEARANCE Clear   < > Clear   URINEGLC Negative   < > Negative   URINEBILI Negative   < > Negative   URINEKETONE Negative   < > Negative   SG <1.005*    < > 1.015   UBLD Negative   < > Large*   URINEPH 7.0   < > 5.0   PROTEIN 100*   < > >=300*   UROBILINOGEN  --   --  0.2   NITRITE Negative   < > Negative   LEUKEST Negative   < > Negative   RBCU 1   < > 25-50*   WBCU 1   < > 5-10*    < > = values in this interval not displayed.     Most Recent ESR & CRP:  Recent Labs   Lab Test 05/30/22  1519   SED 74*   CRP 0.4     Troponin T, High Sensitivity   Date Value Ref Range Status   12/22/2024 15 <=22 ng/L Final     Comment:     Either a High Sensitivity Troponin T baseline (0 hours) value = 100 ng/L, or an increase in High Sensitivity Troponin T = 7 ng/L at 2 hours compared to 0 hours (2-0 hours), suggests myocardial injury, and urgent clinical attention is required.    If the 2-0 hours increase is <7 ng/L, a High Sensitivity Troponin T result above gender-specific reference ranges warrants further evaluation.   Recommendations for further evaluation include correlation with clinical decision-making tool (e.g., HEART), a 3rd High Sensitivity Troponin T test 2 hours after the 2nd (a 20% change from baseline would represent concern), admission for observation, close PCC/cardiology follow-up, or urgent outpatient provocative testing.   12/21/2024 15 <=22 ng/L Final     Comment:     Either a High Sensitivity Troponin T baseline (0 hours) value = 100 ng/L, or an increase in High Sensitivity Troponin T = 7 ng/L at 2 hours compared to 0 hours (2-0 hours), suggests myocardial injury, and urgent clinical attention is required.    If the 2-0 hours increase is <7 ng/L, a High Sensitivity Troponin T result above gender-specific reference ranges warrants further evaluation.   Recommendations for further evaluation include correlation with clinical decision-making tool (e.g., HEART), a 3rd High Sensitivity Troponin T test 2 hours after the 2nd (a 20% change from baseline would represent concern), admission for observation, close PCC/cardiology follow-up, or urgent outpatient  provocative testing.   11/14/2024 10 <=22 ng/L Final     Comment:     Either a High Sensitivity Troponin T baseline (0 hours) value = 100 ng/L, or an increase in High Sensitivity Troponin T = 7 ng/L at 2 hours compared to 0 hours (2-0 hours), suggests myocardial injury, and urgent clinical attention is required.    If the 2-0 hours increase is <7 ng/L, a High Sensitivity Troponin T result above gender-specific reference ranges warrants further evaluation.   Recommendations for further evaluation include correlation with clinical decision-making tool (e.g., HEART), a 3rd High Sensitivity Troponin T test 2 hours after the 2nd (a 20% change from baseline would represent concern), admission for observation, close PCC/cardiology follow-up, or urgent outpatient provocative testing.   11/14/2024 9 <=22 ng/L Final     Comment:     Either a High Sensitivity Troponin T baseline (0 hours) value = 100 ng/L, or an increase in High Sensitivity Troponin T = 7 ng/L at 2 hours compared to 0 hours (2-0 hours), suggests myocardial injury, and urgent clinical attention is required.    If the 2-0 hours increase is <7 ng/L, a High Sensitivity Troponin T result above gender-specific reference ranges warrants further evaluation.   Recommendations for further evaluation include correlation with clinical decision-making tool (e.g., HEART), a 3rd High Sensitivity Troponin T test 2 hours after the 2nd (a 20% change from baseline would represent concern), admission for observation, close PCC/cardiology follow-up, or urgent outpatient provocative testing.   07/18/2024 14 <=22 ng/L Final     Comment:     Either a High Sensitivity Troponin T baseline (0 hours) value = 100 ng/L, or an increase in High Sensitivity Troponin T = 7 ng/L at 2 hours compared to 0 hours (2-0 hours), suggests myocardial injury, and urgent clinical attention is required.    If the 2-0 hours increase is <7 ng/L, a High Sensitivity Troponin T result above gender-specific  reference ranges warrants further evaluation.   Recommendations for further evaluation include correlation with clinical decision-making tool (e.g., HEART), a 3rd High Sensitivity Troponin T test 2 hours after the 2nd (a 20% change from baseline would represent concern), admission for observation, close PCC/cardiology follow-up, or urgent outpatient provocative testing.   10/25/2023 23 (H) <=22 ng/L Final     Comment:     Either a High Sensitivity Troponin T baseline (0 hours) value = 100 ng/L, or an increase in High Sensitivity Troponin T = 7 ng/L at 2 hours compared to 0 hours (2-0 hours), suggests myocardial injury, and urgent clinical attention is required.    If the 2-0 hours increase is <7 ng/L, a High Sensitivity Troponin T result above gender-specific reference ranges warrants further evaluation.   Recommendations for further evaluation include correlation with clinical decision-making tool (e.g., HEART), a 3rd High Sensitivity Troponin T test 2 hours after the 2nd (a 20% change from baseline would represent concern), admission for observation, close PCC/cardiology follow-up, or urgent outpatient provocative testing.      7-Day Micro Results       Collected Updated Procedure Result Status      12/24/2024 0930 12/24/2024 1307 MRSA MSSA PCR, Nasal Swab [[id number]]    Swab from Nares, Bilateral    Final result Component Value   MRSA Target DNA Negative   SA Target DNA Negative            12/23/2024 1744 12/23/2024 2141 Legionella Urinary Antigen and Streptococcus pneumoniae antigen [[id number]]    Urine, Clean Catch    Final result Component Value   Legionella pneumophila serogroup 1 urinary antigen Negative   A negative result does not exclude the possibility of a Legionella infection, as it can be caused by other serogroups and species of Legionella.   Streptococcus pneumoniae antigen Negative   A negative result does not exclude a Streptococcus pneumoniae infection.   Legionella pneumophila  Urinary/Strep pneumoniae Antigen Specimen Type Urine   purewick  purewick  purewick            12/23/2024 1700 12/23/2024 2316 Respiratory Panel PCR [[id number]]    Swab from Nasopharyngeal    Final result Component Value   Adenovirus Not Detected   Coronavirus Not Detected   This test detects Coronavirus 229E, HKU1, NL63 and OC43 but does not distinguish between them. It does not detect MERS ( Respiratory Syndrome), SARS (Severe Acute Respiratory Syndrome) or 2019-nCoV (Novel 2019) Coronavirus.   Human Metapneumovirus Not Detected   Human Rhin/Enterovirus Not Detected   Influenza A Not Detected   Influenza A, H1 Not Detected   Influenza A 2009 H1N1 Not Detected   Influenza A, H3 Not Detected   Influenza B Not Detected   Parainfluenza Virus 1 Not Detected   Parainfluenza Virus 2 Not Detected   Parainfluenza Virus 3 Not Detected   Parainfluenza Virus 4 Not Detected   Respiratory Syncytial Virus A Not Detected   Respiratory Syncytial Virus B Not Detected   Chlamydia Pneumoniae Not Detected   Mycoplasma Pneumoniae Not Detected            12/22/2024 1638 12/25/2024 2131 Blood Culture Hand, Left [[id number]]   Blood from Hand, Left    Preliminary result Component Value   Culture No growth after 3 days  [P]                12/22/2024 1638 12/25/2024 0754 Blood Culture Hand, Right [[id number]]    (Abnormal)   Blood from Hand, Right    Final result Component Value   Culture Positive on the 1st day of incubation    Staphylococcus capitis    1 of 1 bottles  The recovery of this organism from a single blood culture bottle most likely represents contamination. If susceptibility testing is needed, please refer to the antibiogram or contact [facility]. If contamination is suspected, it is important to repeat two sets of blood cultures from two different sites.               12/22/2024 1638 12/23/2024 1835 Verigene GP Panel [[id number]]    (Abnormal)   Blood from Hand, Right    Final result Component Value    Staphylococcus species Detected   Positive for coagulase-negative Staphylococci, other than Staphylococcus epidermidis and Staphylococcus lugdunensis, by SpotRightigene multiplex nucleic acid test. Coagulase-negative Staphylococci are the most common venipuncture or collection associated skin contaminants grown in blood cultures. Final identification and antimicrobial susceptibility testing will be verified by standard methods.   Staphylococcus aureus Not Detected   Staphylococcus epidermidis Not Detected   Staphylococcus lugdunensis Not Detected   Enterococcus faecalis Not Detected   Enterococcus faecium Not Detected   Streptococcus species Not Detected   Streptococcus agalactiae Not Detected   Streptococcus anginosus group Not Detected   Streptococcus pneumoniae Not Detected   Streptococcus pyogenes Not Detected   Listeria species Not Detected            12/21/2024 2231 12/21/2024 2320 Influenza A/B, RSV and SARS-CoV2 PCR (COVID-19) Nasopharyngeal [[id number]]    Swab from Nasopharyngeal    Final result Component Value   Influenza A PCR Negative   Influenza B PCR Negative   RSV PCR Negative   SARS CoV2 PCR Negative   NEGATIVE: SARS-CoV-2 (COVID-19) RNA not detected, presumed negative.                      Discharge Orders        Basic metabolic panel  (Ca, Cl, CO2, Creat, Gluc, K, Na, BUN)     Hemoglobin     Primary Care - Care Coordination Referral      Adult Gen Surg  Referral      Med Therapy Management Referral      Home Care Referral      Reason for your hospital stay    Pneumonia and hypertension     Activity    Your activity upon discharge: bedrest and resume previous activity levels.     When to contact your care team    Call your primary doctor if you have any of the following: temperature greater than 101,  increased shortness of breath, increased swelling, or increased pain.  Call general surgery if having issues with G-tube.     Monitor and record    Bowel movements.  Ideally should have a large  bowel movement at least every other day..     Discharge Instructions    Resume previous home care/PCA orders.     Follow-up and recommended labs and tests     1.  Follow-up with primary care provider ideally in 1 week.  Recheck blood pressure.  2.  Follow-up with [facility] as an outpatient regarding extremity spasming.  Give office phone number to family.    3.  Follow-up with [facility] if having further G-tube issues.  Give office phone number to family.  4.  Recommend follow-up with [facility] especially to determine other percussive vest options.  Give office phone number to family.  5.  Recommend annual thyroid testing.     Other Respiratory Supplies Order    DME Documentation:   Describe the reason for need to support medical necessity: CVA and recurrent aspiration pneumonitis.     I, the undersigned, certify that the above prescribed supplies are medically necessary for this patient and is both reasonable and necessary in reference to accepted standards of medical and necessary in reference to accepted standards of medical practice in the treatment of this patient's condition and is not prescribed as a convenience.     Miscellaneous DME Order    DME Documentation:   Describe the reason for need to support medical necessity: CVA history with severe spasticity in all extremities.     I, the undersigned, certify that the above prescribed supplies are medically necessary for this patient and is both reasonable and necessary in reference to accepted standards of medical and necessary in reference to accepted standards of medical practice in the treatment of this patient's condition and is not prescribed as a convenience.     Diet    Follow this diet upon discharge: Adult Formula Drip Feeding: Continuous Vital 1.5; Jejunostomy; Goal Rate: 60; mL/hr; from 8am-8pm run at 60ml/hr, from 8pm-8am run at 30ml/hr.  NPO but receiving Tube Feeding    Free water order:  Free water -  Feeding Tube Flush Frequency: Other;see comment   Free water volume: Other;see comment   Additional Free water: Give 120ml q 2hrs from 8am-8pm (split between ports)       Examination   Physical Exam   Temp:  [97.4  F (36.3  C)-100.4  F (38  C)] 99.2  F (37.3  C)  Pulse:  [] 121  Resp:  [15-23] 17  BP: (114-168)/() 154/107  SpO2:  [94 %-100 %] 96 %  Wt Readings from Last 4 Encounters:   12/25/24 60.2 kg (132 lb 11.2 oz)   11/17/24 56.7 kg (125 lb)   07/18/24 54 kg (119 lb)   01/08/24 59 kg (130 lb)       Constitutional: Somnolent and asleep during my visit.  Breathing loudly but comfortably, somnolent, unarousable, no apparent distress, appears older than stated age, thin  Eyes: Lids and lashes normal, pupils equal, round and reactive to light, extra ocular muscles intact, sclera clear, conjunctiva normal  ENT: Throat quite dry.  Respiratory: Poor air movement but no rales rhonchi nor wheezing.  Quite a bit of upper respiratory noise present.  Cardiovascular: Normal apical impulse, regular rate and rhythm, normal S1 and S2, no S3 or S4, and no murmur noted  GI: No scars, normal bowel sounds, soft, non-distended, non-tender, no masses palpated, no hepatosplenomegally  Skin: normal skin color, texture, turgor, no redness, warmth, or swelling, and no rashes  Musculoskeletal: Notable spastic quadriplegia stable.  Stable 1+ lower extremity edema bilaterally.  Neurologic: Unable to assess due to patient's somnolence      Please see EMR for more detailed significant labs, imaging, consultant notes etc.    I, Dayday Gonsales MD, personally saw the patient today and spent greater than 30 minutes discharging this patient.    Dayday Gonsales MD  [facility]    CC:Steven Rosa

## 2024-12-26 NOTE — TELEPHONE ENCOUNTER
Pharmacy requesting historic med Lidocaine External Patch 4%. Please send to [facility] in [place] if appropriate.

## 2024-12-26 NOTE — PROGRESS NOTES
Documentation of Face to Face and Certification for Home Health Services    I certify that patient: Liu Malave is under my care and that I, or a nurse practitioner or physician's assistant working with me, had a face-to-face encounter that meets the physician face-to-face encounter requirements with this patient on: 12/26/2024.    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    I certify that, based on my findings, the following services are medically necessary home health services: Nursing, external male catheter and covered [place].    My clinical findings support the need for the above services because: Nurse is needed: To provide caregiver training to assist with: Overall management of severe hemorrhagic stroke.. and skin care protection and inability to clear oral secretions    Further, I certify that my clinical findings support that this patient is homebound (i.e. absences from home require considerable and taxing effort and are for medical reasons or [employer] services or infrequently or of short duration when for other reasons) because: Patient is bedbound due to: Severe hemorrhagic stroke and spasticity in all 4 extremities..    Based on the above findings. I certify that this patient is confined to the home and needs intermittent skilled nursing care, physical therapy and/or speech therapy.  The patient is under my care, and I have initiated the establishment of the plan of care.  This patient will be followed by a physician who will periodically review the plan of care.  Physician/Provider to provide follow up care: Steven Rosa    Attending hospital physician (the Medicare certified [place] provider): Dayday Gonsales MD  Physician Signature: See electronic signature  associated with these discharge orders.  Date: 12/26/2024

## 2024-12-27 LAB — BACTERIA BLD CULT: NO GROWTH

## 2025-01-02 ENCOUNTER — VIRTUAL VISIT (OUTPATIENT)
Dept: PHARMACY | Facility: CLINIC | Age: 60
End: 2025-01-02
Attending: FAMILY MEDICINE
Payer: COMMERCIAL

## 2025-01-02 DIAGNOSIS — L30.9 DERMATITIS: ICD-10-CM

## 2025-01-02 DIAGNOSIS — Z86.73 HISTORY OF STROKE: ICD-10-CM

## 2025-01-02 DIAGNOSIS — E78.5 HYPERLIPIDEMIA LDL GOAL <70: ICD-10-CM

## 2025-01-02 DIAGNOSIS — K59.01 SLOW TRANSIT CONSTIPATION: ICD-10-CM

## 2025-01-02 DIAGNOSIS — K94.20 COMPLICATION OF FEEDING TUBE (H): ICD-10-CM

## 2025-01-02 DIAGNOSIS — M62.830 BACK MUSCLE SPASM: ICD-10-CM

## 2025-01-02 DIAGNOSIS — D64.9 CHRONIC ANEMIA: ICD-10-CM

## 2025-01-02 DIAGNOSIS — R45.1 AGITATION: ICD-10-CM

## 2025-01-02 DIAGNOSIS — R05.3 CHRONIC COUGH: ICD-10-CM

## 2025-01-02 DIAGNOSIS — H04.123 DRY EYES: ICD-10-CM

## 2025-01-02 DIAGNOSIS — Z87.440 HISTORY OF UTI: ICD-10-CM

## 2025-01-02 DIAGNOSIS — R42 VERTIGO: ICD-10-CM

## 2025-01-02 DIAGNOSIS — R52 PAIN: ICD-10-CM

## 2025-01-02 DIAGNOSIS — J69.0 RECURRENT ASPIRATION PNEUMONIA (H): Primary | ICD-10-CM

## 2025-01-02 DIAGNOSIS — G47.00 INSOMNIA, UNSPECIFIED TYPE: ICD-10-CM

## 2025-01-02 DIAGNOSIS — R11.0 NAUSEA: ICD-10-CM

## 2025-01-02 DIAGNOSIS — K21.9 GERD WITHOUT ESOPHAGITIS: ICD-10-CM

## 2025-01-02 DIAGNOSIS — I10 HYPERTENSION, UNSPECIFIED TYPE: ICD-10-CM

## 2025-01-02 PROCEDURE — 99605 MTMS BY PHARM NP 15 MIN: CPT | Mod: 93 | Performed by: PHARMACIST

## 2025-01-02 PROCEDURE — 99607 MTMS BY PHARM ADDL 15 MIN: CPT | Mod: 93 | Performed by: PHARMACIST

## 2025-01-02 NOTE — Clinical Note
Pollo Sheehan I met with Liu and his daughter for a med review after hospitalization. See Plan for changes and questions/considerations for your visit on 1/7.

## 2025-01-02 NOTE — PROGRESS NOTES
Medication Therapy Management (MTM) Encounter    ASSESSMENT:                            Medication Adherence/Access: No issues identified.    Recurrent aspiration pneumonitis/Chronic cough  Improving following hospital discharge, completing antibiotic course as directed - will relay question of need for repeat CBC to primary care provider. Biotene moisturizing spray - recommend looking for this over the counter at a pharmacy since it is likely not covered due to over the counter status.      Hypertension   Patient is not meeting blood pressure goal of < 140/90mmHg per last blood pressure reading though this was at hospital discharge. No blood pressure readings to review today, recommend patient's daughter monitor blood pressure/pulse at home with addition of metoprolol during hospitalization - report back to primary care provider at upcoming appointment next week.     Hyperlipidemia /History of CVA  LDL not at goal of <55. Consider switching simvastatin to rosuvastatin to avoid drug interaction with amlodipine - this may be helpful especially if patient needs additional blood pressure control via amlodipine dose increase.    Constipation   Stable.     GERD /Nausea/Vertigo   Stable, using as needed meclizine.      Mental Health   Insomnia and Agitation  As restlessness/agitation continues to be uncontrolled, recommend daughter try increasing dose of quetiapine to 50mg three times daily (7PM, midnight, 3AM). Close follow-up already scheduled with primary care provider on 1/7/25, recommend following up on effectiveness of this dose increase at this time.     Pain/Spasticity     Spasticity is improving with baclofen dose increase, currently at usual maximum dose: 80 mg/day in 4 divided doses - can go up to 120mg/day. Follow-up with neurology recommended per discharge summary (no scheduled yet). Recommend patient follow-up with primary care provider.      Dermatitis/Irritation around P E G tube:   Improving.     Dry eyes    Stable.     UTI prophylaxis   Will alert primary care provider to question of need to restart prophylactic antibiotic.     Anemia  Will alert primary care provider to question of need to restart ferrous sulfate - repeat hgb lab already ordered to monitor following discharge.     PLAN:                            Quetiapine - to target uncontrolled agitation/restlessness at night, try increasing dose from 25mg to 50mg (continue administering at 7PM, midnight, and 3AM as directed) - follow-up with primary care provider on 1/7/25 on effectiveness of this change    Baclofen - discuss appropriateness of increasing dose versus adding an additional medication with primary care provider if patient continues to have uncontrolled spasms on current dosing regimen  Biotene moisturizing spray - look for this over the counter at a pharmacy   Monitor blood pressure and pulse at home with the addition of metoprolol during hospitalization    Questions/considerations for primary care provider at 1/7/25 appointment:   Do we need to add any additional labs (such as CBC) to the labs already ordered by hospitalist?  Consider switching simvastatin to rosuvastatin to avoid drug interaction with amlodipine - this may be helpful especially if patient needs additional blood pressure control via amlodipine dose increase.  Do you recommend restarting ferrous sulfate considering patient's low hgb?    Do you recommend restarting cefaclor for UTI prophylaxis after completion of Augmentin course?     Follow-up: with primary care provider 1/7/25, with [facility] pharmacist thereafter as needed     SUBJECTIVE/OBJECTIVE:                          Liu Malave is a 59 year old male seen for a transitions of care visit. He was discharged from [facility] on 12/26/24 for aspiration pneumonitis. Patient was accompanied by his daughter, Idalmis. [facility]     Reason for visit: medication  review.    Allergies/ADRs: Reviewed in chart  Past Medical History: Reviewed in chart  Tobacco: He reports that he quit smoking about 19 years ago. His smoking use included cigarettes. He started smoking about 49 years ago. He has a 30 pack-year smoking history. He has been exposed to tobacco smoke. He quit smokeless tobacco use about 17 years ago.  Alcohol: none  Social: patient is currently non-verbal, daughter manages meds with assistance from home health services      Medication Adherence/Access:   Patient (daughter) uses pill box(es).   Medication barriers: none.     Recurrent aspiration pneumonitis/Chronic cough  Augmentin suspension: 875 mg twice daily 7 days - on last day of therapy   Mucomyst 20% nebulizer solution 2 mL every 6 hours - scheduled   Chest congestion relief DM  mg / 5 mL: 10 mL every 4 hours as needed for cough - currently giving him once a day   DuoNeb 1 vial 4 hours - currently using three times daily   NebuSal 3% every 6 hours as needed for thick sputum - typically once per day   Biotene gel 1 g 4 times daily as needed for dry mouth - does not have this yet since insurance does not cover it, daughter would like to know if there is an alternative   Side effects? denies    Hypertension   Amlodipine 5 mg daily  Metoprolol tartrate 25 mg twice daily  Patient reports no current medication side effects  Patient self monitors blood pressure.  Home BP monitoring - takes it when needed, has not taken it since being home from the hospital .      BP Readings from Last 3 Encounters:   12/26/24 (!) 154/107   12/10/24 (!) 182/95   11/25/24 (!) 174/84     Pulse Readings from Last 3 Encounters:   12/26/24 (!) 121   12/10/24 94   11/25/24 104        Hyperlipidemia /History of CVA  Simvastatin 20mg daily - daughter would like to know if switching to another statin medication would be helpful to avoid drug interaction with amlodipine   Patient reports no significant myalgias or other side effects.        Constipation   Docusate 100 mg twice daily  Sennosides 8.8 mg / 5 mL twice daily  Bisacodyl 10 mg suppository daily as needed  MiraLAX 1 capful daily as needed  Bowel movements are improving since patient has come back home    Patient feels that current therapy is effective.   Patient reports no current medication side effects.       GERD /Nausea/Vertigo   Omeprazole 40 mg once daily   Ondansetron 4 mg every 8 hours as needed - no recent use  Meclizine 25 mg every 6 hours as needed - has been using ~1x/day   Patient reports no current symptoms. History of a swallowing dysfunction with oropharyngeal dysphagia.    Patient feels that current regimen is effective.       Mental Health   Insomnia and Agitation  Quetiapine 25mg three times daily (7PM, midnight, 3AM) - has also been getting average 1 as needed dose per day; daughter reports this dose is not as effective as prior and patient has continued to be more restless/agitated at night since being discharged home, she would like to know if she should try increasing the dose for additional benefit  Patient reports no current medication side effects.  Patient reports symptoms are worsened.       Pain/Spasticity     Baclofen 20 mg 4 times daily - was on 10mg but increased during the hospitalization, this has helped but daughter feels there is still room for improvement for management of spasticity, would like to know if this is the highest this medication can go     Acetaminophen 1000 mg every 6 hours as needed - daughter aware to avoid NSAIDs  Lidocaine 4% patch apply daily as needed - applied around the knee   Patient reports no medication side effects    Dermatitis/Irritation around P E G tube:   Ketoconazole 2% cream twice daily - effective   Triamcinolone 0.1% cream daily as needed  Neosporin ointment 3 times daily as needed  Nystatin cream 2 times daily as needed  Side effects? Denies     Dry eyes   Visine 1% every 6 hours as needed - effective  Side effects?  Denies     UTI prophylaxis   Patient previously taking cefaclor 250 mg daily, this was discontinued when Augmentin was started. Daughter would like to know if they should restart once patient has finished course of Augmentin.     Anemia  Patient previously taking ferrous sulfate oral solution. Appears to have been discontinued during his time in the hospital though unsure if this was intentional. Daughter would like to know if this should be restarted since she saw that patient had low hgb in the hospital.      Hemoglobin   Date Value Ref Range Status   12/25/2024 8.9 (L) 13.3 - 17.7 g/dL Final   12/20/2016 9.9 (L) 13.3 - 17.7 g/dL Final     Today's Vitals: There were no vitals taken for this visit.  ----------------  Post Discharge Medication Reconciliation Status: discharge medications reconciled and changed, per note/orders.    I spent 60 minutes with this patient today. All changes were made via collaborative practice agreement with Steven Rosa MD.     A summary of these recommendations was sent via [employer].    Jaci Bhat PharmD  Medication Therapy Management (MTM) Pharmacist   [facility]    Telemedicine Visit Details  The patient's medications can be safely assessed via a telemedicine encounter.  Type of service:  Telephone visit  Originating Location (pt. Location): Home    Distant Location (provider location):  On-site  Start Time:  3:15 PM  End Time:  4:15 PM     Medication Therapy Recommendations  Agitation   1 Current Medication: QUEtiapine (SEROQUEL) 25 MG tablet (Discontinued)   Current Medication Sig: Place 25 mg into G tube every 4 hours as needed (Used during the day PRN agitation/etc. This is in addition to the tablet at 1900, 0000, 0300.)   Rationale: Dose too low - Dosage too low - Effectiveness   Recommendation: Increase Dose   Status: Accepted per CPA   Identified Date: 1/2/2025 Completed Date: 1/2/2025         Hyperlipidemia LDL goal <70   1 Current Medication:  simvastatin (ZOCOR) 20 MG tablet   Current Medication Sig: TAKE 1 TABLET PER G TUBE ROUTE AT BEDTIME.   Rationale: Medication interaction - Adverse medication event - Safety   Recommendation: Change Medication - rosuvastatin 20 MG tablet   Status: Contact Provider - Awaiting Response   Identified Date: 1/2/2025

## 2025-01-05 NOTE — PATIENT INSTRUCTIONS
Recommendations from today's MTM visit:                                                    MTM (medication therapy management) is a service provided by a clinical pharmacist designed to help you get the most of out of your medicines.   Today we reviewed what your medicines are for, how to know if they are working, that your medicines are safe and how to make your medicine regimen as easy as possible.      Quetiapine - to target uncontrolled agitation/restlessness at night, try increasing dose from 25mg to 50mg (continue administering at 7PM, midnight, and 3AM as directed) - follow-up with primary care provider on 1/7/25 on effectiveness of this change    Baclofen - discuss appropriateness of increasing dose versus adding an additional medication with primary care provider if patient continues to have uncontrolled spasms on current dosing regimen  Biotene moisturizing spray - look for this over the counter at a pharmacy   Monitor blood pressure and pulse at home with the addition of metoprolol during hospitalization    Questions/considerations for primary care provider at 1/7/25 appointment:   Do we need to add any additional labs (such as CBC) to the labs already ordered by hospitalist?  Consider switching simvastatin to rosuvastatin to avoid drug interaction with amlodipine - this may be helpful especially if patient needs additional blood pressure control via amlodipine dose increase.  Do you recommend restarting ferrous sulfate considering patient's low hgb?    Do you recommend restarting cefaclor for UTI prophylaxis after completion of Augmentin course?     Follow-up: with primary care provider 1/7/25, with MTM pharmacist thereafter as needed     It was great speaking with you today.  I value your experience and would be very thankful for your time in providing feedback in our clinic survey. In the next few days, you may receive an email or text message from [employer] with a link to a survey related to your  " clinical pharmacist.\"     To schedule another MTM appointment, please call the clinic directly or you may call the MTM scheduling line at [phone].    My Clinical Pharmacist's contact information:                                                      Please feel free to contact me with any questions or concerns you have.      Jaci Bhat PharmD  Medication Therapy Management (MTM) Pharmacist   [facility]     "

## 2025-01-07 ENCOUNTER — VIRTUAL VISIT (OUTPATIENT)
Dept: FAMILY MEDICINE | Facility: CLINIC | Age: 60
End: 2025-01-07
Payer: COMMERCIAL

## 2025-01-07 ENCOUNTER — TELEPHONE (OUTPATIENT)
Dept: FAMILY MEDICINE | Facility: CLINIC | Age: 60
End: 2025-01-07

## 2025-01-07 DIAGNOSIS — D64.9 NORMOCHROMIC NORMOCYTIC ANEMIA: ICD-10-CM

## 2025-01-07 DIAGNOSIS — N18.31 CKD STAGE 3A, GFR 45-59 ML/MIN (H): ICD-10-CM

## 2025-01-07 DIAGNOSIS — Z86.73 HISTORY OF CVA (CEREBROVASCULAR ACCIDENT): ICD-10-CM

## 2025-01-07 DIAGNOSIS — Z87.01 HISTORY OF PNEUMONIA: ICD-10-CM

## 2025-01-07 DIAGNOSIS — N39.0 RECURRENT UTI: Primary | ICD-10-CM

## 2025-01-07 DIAGNOSIS — L30.4 INTERTRIGO: ICD-10-CM

## 2025-01-07 DIAGNOSIS — E78.5 DYSLIPIDEMIA: ICD-10-CM

## 2025-01-07 DIAGNOSIS — I69.151 SPASTIC HEMIPLEGIA OF RIGHT DOMINANT SIDE AS LATE EFFECT OF NONTRAUMATIC INTRAPARENCHYMAL HEMORRHAGE OF BRAIN (H): ICD-10-CM

## 2025-01-07 PROCEDURE — 99495 TRANSJ CARE MGMT MOD F2F 14D: CPT | Mod: 95 | Performed by: FAMILY MEDICINE

## 2025-01-07 RX ORDER — CEFACLOR 250 MG
250 CAPSULE ORAL DAILY
Qty: 90 CAPSULE | Refills: 3 | Status: SHIPPED | OUTPATIENT
Start: 2025-01-07

## 2025-01-07 RX ORDER — NYSTATIN AND TRIAMCINOLONE ACETONIDE 100000; 1 [USP'U]/G; MG/G
OINTMENT TOPICAL 2 TIMES DAILY
Qty: 60 G | Refills: 1 | Status: SHIPPED | OUTPATIENT
Start: 2025-01-07 | End: 2025-01-21

## 2025-01-07 RX ORDER — ROSUVASTATIN CALCIUM 10 MG/1
10 TABLET, COATED ORAL DAILY
Qty: 90 TABLET | Refills: 3 | Status: SHIPPED | OUTPATIENT
Start: 2025-01-07

## 2025-01-07 NOTE — PROGRESS NOTES
Hospital Follow-up Visit:    Hospital/Nursing Home/IP Rehab Facility: [facility]  Date of Admission: 12/21/24  Date of Discharge: 12/26/24  Reason(s) for Admission: aspiration pneumonitis  Was the patient in the ICU or did the patient experience delirium during hospitalization?  No  Do you have any other stressors you would like to discuss with your provider? No    Problems taking medications regularly:  None  Medication changes since discharge: None  Problems adhering to non-medication therapy:  None    Summary of hospitalization:  [facility] discharge summary reviewed    SUMMARY OF HOSPITAL COURSE:       Liu GIL Malave is a 59 year old male admitted on 12/21/2024. He has medical history significant for hemorrhagic CVA with basal ganglia hemorrhage in 2016 with extensive residual deficits, aphasia, dysphagia with GJ tube dependence, spastic quadriplegia, hypertension, dyslipidemia, seizure disorder, chronic static encephalopathy, ESBL urine, CKD 3A and GERD.  Has a positive Cologuard test.  Not pursuing colonoscopy.  Patient was brought to the ED by family for abdominal pain, fatigue and confusion.  CT scan showed mild bronchial wall thickening but abdomen was negative.  Admitted.       1.  Pulmonary.  Initiated on intravenous Rocephin and azithromycin.  Patient has been refusing his percussive vest.  Family aware this will be problematic with his recovery.  12/23 patient showed some clinical improvement and family wished to discharge to home.  However that afternoon subsequently blood cultures showed GPC in clusters and temperature increased to 102.4 with severe tachycardia and hypertension.  Transferred to ICU status.  Ultimately blood culture showed Staph capitis likely contaminant.  Subsequent blood and urine cultures NGTD.  Infectious disease consulted.  Antibiotics transitioned to Zosyn, vancomycin and azithromycin.  After receiving lorazepam patient's heart rate  blood pressure and agitation did improve.  Fever and leukocytosis have resolved.  Patient had good improvement and clinically was treated just with oral medications that family can use at home.  Overnight pulse oximetry study was done and showed very good saturations.  Did not require supplemental oxygen.  Infectious disease recommended Augmentin at discharge.  Continued respiratory treatments per family.     2.  GI.  Family reports that G-tube balloon has been failing/leaking and IR has replaced the G-tube 11/15, 11/25, 12/10 and 12/18.  Consulted general surgery and no active G-tube issues were identified at this time.  Appreciate wound care G-tube assessment.  G-tube functioning well during his stay.  If family noting more issues can contact general surgery as an outpatient.       3.  Neurologic.  Family noting more spasticity.  Increased baclofen which seem to help.  Patient was having increased agitation and so as needed doses of Seroquel were administered.  Recommend patient follow-up with neurology as an outpatient as it has been quite sometime since he last saw them.  Provided prescriptions for condom catheter as well as a covered Yunker to help deal with the patient's significant secretions given his profound quadriplegic spasticity associated with his hemorrhagic CVA.       4.  Cardiovascular.  Patient had persistent hypertension with tachycardia, so metoprolol added and heart rate and blood pressure did subsequently improved.  Continue at discharge.     5.  Endocrine.  TSH was elevated but free T4 was normal.  Recommend annual thyroid testing for subclinical hypothyroidism.     6.  Goals of care.  Patient has a history of an abnormal Cologuard test but family did not wish to put him through a colonoscopy.  Per family request CEA and CA 19-9 were done and was negative.  Palliative consultation was obtained and family does still wish for full spectrum of care and full CODE STATUS was  reaffirmed.    Diagnostic Tests/Treatments reviewed.  Follow up needed: ongoing care  Other Healthcare Providers Involved in Patient s Care:         Homecare  Update since discharge: stable.       Recurrent UTI  Clarification to resume cefaclor to 150 mg daily for UTI prophylaxis.  Patient has completed oral antibiotics prescribed during hospitalization for aspiration pneumonia and has completed outpatient Augmentin following discharge.  - cefaclor (CECLOR) 250 MG capsule  Dispense: 90 capsule; Refill: 3    Dyslipidemia  Due to the risk of rhabdomyolysis with simvastatin and amlodipine, will switch simvastatin to rosuvastatin 10 mg daily.  - rosuvastatin (CRESTOR) 10 MG tablet  Dispense: 90 tablet; Refill: 3    History of pneumonia  Symptomatic improvement described.    History of CVA (cerebrovascular accident)  History of known CVA.  Patient with dose increase of baclofen due to muscle spasticity.  Patient was to follow-up with neurology as outpatient.  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  Home care referral was placed as well.  - Home Care Referral    Normochromic normocytic anemia  Normochromic normocytic anemia.  Update CBC as well as iron studies to determine if need for iron supplement at [facility].  - CBC with platelets  - Ferritin  - Iron & Iron Binding Capacity  - Comprehensive metabolic panel    Intertrigo  Intertrigo involving inguinal region and genitalia and will utilize nystatin triamcinolone ointment topically twice daily x 14 days.  - nystatin-triamcinolone (MYCOLOG) 382270-4.1 UNIT/GM-% external ointment  Dispense: 60 g; Refill: 1    CKD stage 3a, GFR 45-59 ml/min (H)  CKD stage IIIa.  Update comprehensive metabolic panel for renal function as well as hepatic function monitoring.  - Comprehensive metabolic panel           Plan of care communicated with patient and family                    "1/31/2022 12/14/2022 2/13/2023 4/28/2023 7/20/2023 11/3/2023 12/27/2024   Post Discharge Outreach   Admission Date 1/28/2022 12/11/2022 2/10/2023 4/25/2023 7/17/2023 10/25/2023    Reason for Admission Altered Mental Status, Fatigue URI, Cough cough, fever shortness of breath acute respiratory failure with hypoxia SOB    Discharge Date 1/29/2022 12/13/2022 2/12/2023 4/27/2023 7/18/2023 10/27/2023    Discharge Diagnosis Altered Mental Status, Fatigue Acute on recurrent aspiration pneumonia, Sepsis, resolved, History of hemorrhagic stroke x2, Chronic encephalopathy, Hemiplegia, spasticity, Nonverbal  Dysphagia, feeding tube dependent, History of COPD without exacerbation, Essential hypertension aspiration pneumonia of both lower lobes pneumonia acute respiratory failure with hypoxia, COVID 19 infection acute hypoxic respiratory failure, sepsis 2/2 community acquired pnemonia, hyperglycemia    How are you doing now that you are home? \" He's doing fine \" \"He is doing okay yeah he feels more comortable and has slept a little more. Still tired.\" Patient's daughter stated: \"He is doing better\". find Patient's daughter stated, \"He is much better\". Patient's daughter Idalmis said: \"He is better, but still has a cough\" He is better, just tired and has a little fever.   How are your symptoms? (Red Flag symptoms escalate to triage hotline per guidelines) Improved Improved Improved Improved Improved Improved Improved   Do you feel your condition is stable enough to be safe at home until your provider visit? Yes Yes Yes Yes Yes Yes    Does the patient have their discharge instructions?  Yes Yes Yes Yes Yes Yes Yes   Does the patient have questions regarding their discharge instructions?  No No No No No No No   Were you started on any new medications or were there changes to any of your previous medications?  Yes Yes Yes  Yes Yes Yes   Does the patient have all of their medications? Yes Yes Yes  Yes Yes Yes   Do you have questions " regarding any of your medications?  No No No  No No Yes (see comment)   Do you have all of your needed medical supplies or equipment (DME)?  (i.e. oxygen tank, CPAP, cane, etc.) Yes Yes Yes No - What equipment or supplies are needed? Yes Yes Yes   Discharge follow-up appointment scheduled within 14 calendar days?  No Yes Yes No No No    Discharge Follow Up Appointment Date  12/21/2022 2/28/2023 1/7/2025   Discharge Follow Up Appointment Scheduled with?  Primary Care Provider Specialty Care Provider    Primary Care Provider            Answers submitted by the patient for this visit:  General Questionnaire (Submitted on 1/7/2025)  Chief Complaint: Chronic problems general questions HPI Form  What is the reason for your visit today? : hosp follow up  How many servings of fruits and vegetables do you eat daily?: 0-1  On average, how many sweetened beverages do you drink each day (Examples: soda, juice, sweet tea, etc.  Do NOT count diet or artificially sweetened beverages)?: 0  How many minutes a day do you exercise enough to make your heart beat faster?: 9 or less  How many days a week do you exercise enough to make your heart beat faster?: 3 or less  How many days per week do you miss taking your medication?: 0  Questionnaire about: Chronic problems general questions HPI Form (Submitted on 1/7/2025)  Chief Complaint: Chronic problems general questions HPI Form

## 2025-01-07 NOTE — TELEPHONE ENCOUNTER
Prior Authorization Retail Medication Request    Medication/Dose: Nystatin-Triamcinolone 91975-9.1  Diagnosis and ICD code (if different than what is on RX):    New/renewal/insurance change PA/secondary ins. PA:  Previously Tried and Failed:    Rationale:      Insurance   Primary: Marlon GOINS  Insurance ID:  [id number]    Secondary (if applicable):  Insurance ID:      Pharmacy Information (if different than what is on RX)  Name:  [facility]  Phone:  [phone]  Fax:[phone]    Clinic Information  Preferred routing pool for dept communication: [facility]

## 2025-01-09 NOTE — TELEPHONE ENCOUNTER
Central Prior Authorization Team   Phone: [phone]    PA Initiation    Medication: nystatin-triamcinolone (MYCOLOG) 184928-0.1 UNIT/GM-% external ointment  Insurance Company: [employer] - Phone [phone] Fax [phone]  Pharmacy Filling the Rx: [id number] [place], [place] - [address]  Filling Pharmacy Phone: [phone]  Filling Pharmacy Fax:    Start Date: 1/9/2025

## 2025-01-10 ENCOUNTER — VIRTUAL VISIT (OUTPATIENT)
Dept: INTERPRETER SERVICES | Facility: CLINIC | Age: 60
End: 2025-01-10
Payer: COMMERCIAL

## 2025-01-13 DIAGNOSIS — J39.8 CHRONIC MUCUS HYPERSECRETION, RESPIRATORY: ICD-10-CM

## 2025-01-13 DIAGNOSIS — R05.3 CHRONIC COUGH: ICD-10-CM

## 2025-01-13 RX ORDER — SODIUM CHLORIDE FOR INHALATION 3 %
VIAL, NEBULIZER (ML) INHALATION
Qty: 240 ML | Refills: 2 | Status: SHIPPED | OUTPATIENT
Start: 2025-01-13

## 2025-01-13 RX ORDER — IPRATROPIUM BROMIDE AND ALBUTEROL SULFATE 2.5; .5 MG/3ML; MG/3ML
SOLUTION RESPIRATORY (INHALATION)
Qty: 90 ML | Refills: 1 | Status: SHIPPED | OUTPATIENT
Start: 2025-01-13

## 2025-01-13 NOTE — TELEPHONE ENCOUNTER
Prior Authorization Approval    Authorization Effective Date: 1/9/2025  Authorization Expiration Date: 1/9/2026  Medication: nystatin-triamcinolone (MYCOLOG) 778186-2.1 UNIT/GM-% external ointment-PA APPROVED   Approved Dose/Quantity:   Reference #:     Insurance Company: [employer] - Phone [phone] Fax [phone]  Expected CoPay:       CoPay Card Available:      Foundation Assistance Needed:    Which Pharmacy is filling the prescription (Not needed for infusion/clinic administered): [id number] - [place], [place] - [address] [place] [address]  Pharmacy Notified:  Yes- **Instructed pharmacy to notify patient when script is ready to /ship.**  Patient Notified:  Yes

## 2025-01-15 DIAGNOSIS — J69.0 ASPIRATION PNEUMONITIS (H): ICD-10-CM

## 2025-01-15 RX ORDER — ACETYLCYSTEINE 200 MG/ML
2 SOLUTION ORAL; RESPIRATORY (INHALATION) EVERY 6 HOURS PRN
Qty: 30 ML | Refills: 5 | Status: SHIPPED | OUTPATIENT
Start: 2025-01-15

## 2025-01-15 NOTE — TELEPHONE ENCOUNTER
Patients Daughter Idalmis (Ct[facility] on file) called and stated that the pharmacy requested a refill of acetylcysteine nebulizer but they have not heard back yet.     Patients daughter was informed that no refill has come through form the pharmacy yet but will be requested, Daughter stated that this was ordered by the hospital at discharge and helped him a lot and is out and would like a refill.     Order pended for provider response.     Tyrell Miner RN  [facility]

## 2025-01-21 ENCOUNTER — TELEPHONE (OUTPATIENT)
Dept: FAMILY MEDICINE | Facility: CLINIC | Age: 60
End: 2025-01-21
Payer: COMMERCIAL

## 2025-01-21 DIAGNOSIS — L30.4 INTERTRIGO: ICD-10-CM

## 2025-01-22 RX ORDER — NYSTATIN AND TRIAMCINOLONE ACETONIDE 100000; 1 [USP'U]/G; MG/G
CREAM TOPICAL 4 TIMES DAILY
Qty: 60 G | Refills: 1 | Status: SHIPPED | OUTPATIENT
Start: 2025-01-22 | End: 2025-01-22

## 2025-01-22 RX ORDER — NYSTATIN AND TRIAMCINOLONE ACETONIDE 100000; 1 [USP'U]/G; MG/G
CREAM TOPICAL 2 TIMES DAILY
Qty: 60 G | Refills: 1 | Status: SHIPPED | OUTPATIENT
Start: 2025-01-22

## 2025-01-26 DIAGNOSIS — I10 ESSENTIAL HYPERTENSION: ICD-10-CM

## 2025-01-27 ENCOUNTER — MYC REFILL (OUTPATIENT)
Dept: FAMILY MEDICINE | Facility: CLINIC | Age: 60
End: 2025-01-27
Payer: COMMERCIAL

## 2025-01-27 ENCOUNTER — MYC MEDICAL ADVICE (OUTPATIENT)
Dept: FAMILY MEDICINE | Facility: CLINIC | Age: 60
End: 2025-01-27
Payer: COMMERCIAL

## 2025-01-27 DIAGNOSIS — I10 ESSENTIAL HYPERTENSION: ICD-10-CM

## 2025-01-27 DIAGNOSIS — I69.151 SPASTIC HEMIPLEGIA OF RIGHT DOMINANT SIDE AS LATE EFFECT OF NONTRAUMATIC INTRAPARENCHYMAL HEMORRHAGE OF BRAIN (H): ICD-10-CM

## 2025-01-27 DIAGNOSIS — R13.12 OROPHARYNGEAL DYSPHAGIA: ICD-10-CM

## 2025-01-27 DIAGNOSIS — I10 HYPERTENSION, UNSPECIFIED TYPE: ICD-10-CM

## 2025-01-27 RX ORDER — METOPROLOL TARTRATE 25 MG/1
25 TABLET, FILM COATED ORAL 2 TIMES DAILY
Qty: 60 TABLET | Refills: 11 | Status: SHIPPED | OUTPATIENT
Start: 2025-01-27

## 2025-01-27 RX ORDER — METOPROLOL TARTRATE 25 MG/1
25 TABLET, FILM COATED ORAL 2 TIMES DAILY
Qty: 60 TABLET | Refills: 11 | OUTPATIENT
Start: 2025-01-27

## 2025-01-27 RX ORDER — BACLOFEN 20 MG/1
20 TABLET ORAL 4 TIMES DAILY
Qty: 120 TABLET | Refills: 5 | Status: SHIPPED | OUTPATIENT
Start: 2025-01-27

## 2025-01-27 RX ORDER — OMEPRAZOLE 40 MG/1
40 CAPSULE, DELAYED RELEASE ORAL DAILY
Qty: 30 CAPSULE | Refills: 11 | Status: SHIPPED | OUTPATIENT
Start: 2025-01-27

## 2025-01-27 RX ORDER — AMLODIPINE BESYLATE 5 MG/1
5 TABLET ORAL DAILY
Qty: 30 TABLET | Refills: 11 | Status: SHIPPED | OUTPATIENT
Start: 2025-01-27

## 2025-01-27 NOTE — TELEPHONE ENCOUNTER
Order pended for provider review.     Tyrell Miner RN  [facility]

## 2025-02-03 ENCOUNTER — MYC MEDICAL ADVICE (OUTPATIENT)
Dept: FAMILY MEDICINE | Facility: CLINIC | Age: 60
End: 2025-02-03
Payer: COMMERCIAL

## 2025-02-03 NOTE — TELEPHONE ENCOUNTER
Patient Quality Outreach    Patient is due for the following:   Hypertension -  BP check    Action(s) Taken:   Patient needs to check their blood pressure and send their blood pressure readings into the clinic for a provider to review.    Type of outreach:    Sent [employer] message.    Questions for provider review:    None           Pricilla Simms RN

## 2025-02-04 DIAGNOSIS — L30.4 INTERTRIGO: ICD-10-CM

## 2025-02-04 RX ORDER — NYSTATIN AND TRIAMCINOLONE ACETONIDE 100000; 1 [USP'U]/G; MG/G
CREAM TOPICAL 2 TIMES DAILY
Qty: 60 G | Refills: 1 | OUTPATIENT
Start: 2025-02-04

## 2025-02-04 NOTE — TELEPHONE ENCOUNTER
Pending Prescriptions:                       Disp   Refills    nystatin-triamcinolone (MYCOLOG II) 48686*60 g   1            Sig: Apply topically 2 times daily.

## 2025-02-09 ENCOUNTER — APPOINTMENT (OUTPATIENT)
Dept: CT IMAGING | Facility: CLINIC | Age: 60
End: 2025-02-09
Payer: COMMERCIAL

## 2025-02-09 ENCOUNTER — HOSPITAL ENCOUNTER (EMERGENCY)
Facility: CLINIC | Age: 60
Discharge: HOME | End: 2025-02-09
Attending: EMERGENCY MEDICINE | Admitting: EMERGENCY MEDICINE
Payer: COMMERCIAL

## 2025-02-09 ENCOUNTER — APPOINTMENT (OUTPATIENT)
Dept: CT IMAGING | Facility: CLINIC | Age: 60
End: 2025-02-09
Attending: EMERGENCY MEDICINE
Payer: COMMERCIAL

## 2025-02-09 VITALS
BODY MASS INDEX: 25.39 KG/M2 | TEMPERATURE: 98.6 F | HEART RATE: 98 BPM | RESPIRATION RATE: 15 BRPM | OXYGEN SATURATION: 95 % | DIASTOLIC BLOOD PRESSURE: 94 MMHG | WEIGHT: 130 LBS | SYSTOLIC BLOOD PRESSURE: 154 MMHG

## 2025-02-09 DIAGNOSIS — R05.1 ACUTE COUGH: ICD-10-CM

## 2025-02-09 LAB
ALBUMIN SERPL BCG-MCNC: 3.6 G/DL (ref 3.5–5.2)
ALBUMIN UR-MCNC: 200 MG/DL
ALP SERPL-CCNC: 83 U/L (ref 40–150)
ALT SERPL W P-5'-P-CCNC: 18 U/L (ref 0–70)
ANION GAP SERPL CALCULATED.3IONS-SCNC: 10 MMOL/L (ref 7–15)
APPEARANCE UR: CLEAR
AST SERPL W P-5'-P-CCNC: 24 U/L (ref 0–45)
ATRIAL RATE - MUSE: 99 BPM
BASOPHILS # BLD AUTO: 0.1 10E3/UL (ref 0–0.2)
BASOPHILS NFR BLD AUTO: 1 %
BILIRUB SERPL-MCNC: 0.2 MG/DL
BILIRUB UR QL STRIP: NEGATIVE
BUN SERPL-MCNC: 44.5 MG/DL (ref 8–23)
CALCIUM SERPL-MCNC: 8.4 MG/DL (ref 8.8–10.4)
CHLORIDE SERPL-SCNC: 109 MMOL/L (ref 98–107)
COLOR UR AUTO: ABNORMAL
CREAT SERPL-MCNC: 1.52 MG/DL (ref 0.67–1.17)
DIASTOLIC BLOOD PRESSURE - MUSE: 91 MMHG
EGFRCR SERPLBLD CKD-EPI 2021: 52 ML/MIN/1.73M2
EOSINOPHIL # BLD AUTO: 0.4 10E3/UL (ref 0–0.7)
EOSINOPHIL NFR BLD AUTO: 3 %
ERYTHROCYTE [DISTWIDTH] IN BLOOD BY AUTOMATED COUNT: 12.7 % (ref 10–15)
FLUAV RNA SPEC QL NAA+PROBE: NEGATIVE
FLUBV RNA RESP QL NAA+PROBE: NEGATIVE
GLUCOSE SERPL-MCNC: 118 MG/DL (ref 70–99)
GLUCOSE UR STRIP-MCNC: NEGATIVE MG/DL
HCO3 SERPL-SCNC: 22 MMOL/L (ref 22–29)
HCT VFR BLD AUTO: 32.6 % (ref 40–53)
HGB BLD-MCNC: 10.8 G/DL (ref 13.3–17.7)
HGB UR QL STRIP: NEGATIVE
IMM GRANULOCYTES # BLD: 0.1 10E3/UL
IMM GRANULOCYTES NFR BLD: 1 %
INTERPRETATION ECG - MUSE: NORMAL
KETONES UR STRIP-MCNC: NEGATIVE MG/DL
LACTATE SERPL-SCNC: 0.5 MMOL/L (ref 0.7–2)
LEUKOCYTE ESTERASE UR QL STRIP: NEGATIVE
LYMPHOCYTES # BLD AUTO: 2.7 10E3/UL (ref 0.8–5.3)
LYMPHOCYTES NFR BLD AUTO: 21 %
MCH RBC QN AUTO: 30.5 PG (ref 26.5–33)
MCHC RBC AUTO-ENTMCNC: 33.1 G/DL (ref 31.5–36.5)
MCV RBC AUTO: 92 FL (ref 78–100)
MONOCYTES # BLD AUTO: 1.1 10E3/UL (ref 0–1.3)
MONOCYTES NFR BLD AUTO: 8 %
NEUTROPHILS # BLD AUTO: 8.9 10E3/UL (ref 1.6–8.3)
NEUTROPHILS NFR BLD AUTO: 67 %
NITRATE UR QL: NEGATIVE
NRBC # BLD AUTO: 0 10E3/UL
NRBC BLD AUTO-RTO: 0 /100
P AXIS - MUSE: 62 DEGREES
PH UR STRIP: 6.5 [PH] (ref 5–7)
PLATELET # BLD AUTO: 267 10E3/UL (ref 150–450)
POTASSIUM SERPL-SCNC: 5 MMOL/L (ref 3.4–5.3)
PR INTERVAL - MUSE: 138 MS
PROCALCITONIN SERPL IA-MCNC: 0.1 NG/ML
PROT SERPL-MCNC: 7.8 G/DL (ref 6.4–8.3)
QRS DURATION - MUSE: 68 MS
QT - MUSE: 328 MS
QTC - MUSE: 420 MS
R AXIS - MUSE: 14 DEGREES
RBC # BLD AUTO: 3.54 10E6/UL (ref 4.4–5.9)
RBC URINE: <1 /HPF
RSV RNA SPEC NAA+PROBE: NEGATIVE
SARS-COV-2 RNA RESP QL NAA+PROBE: NEGATIVE
SODIUM SERPL-SCNC: 141 MMOL/L (ref 135–145)
SP GR UR STRIP: 1.03 (ref 1–1.03)
SYSTOLIC BLOOD PRESSURE - MUSE: 169 MMHG
T AXIS - MUSE: 47 DEGREES
TROPONIN T SERPL HS-MCNC: 18 NG/L
TROPONIN T SERPL HS-MCNC: 21 NG/L
UROBILINOGEN UR STRIP-MCNC: <2 MG/DL
VENTRICULAR RATE- MUSE: 99 BPM
WBC # BLD AUTO: 13.3 10E3/UL (ref 4–11)
WBC URINE: <1 /HPF

## 2025-02-09 PROCEDURE — 84484 ASSAY OF TROPONIN QUANT: CPT | Performed by: EMERGENCY MEDICINE

## 2025-02-09 PROCEDURE — 93005 ELECTROCARDIOGRAM TRACING: CPT

## 2025-02-09 PROCEDURE — 99285 EMERGENCY DEPT VISIT HI MDM: CPT | Mod: 25

## 2025-02-09 PROCEDURE — 71275 CT ANGIOGRAPHY CHEST: CPT

## 2025-02-09 PROCEDURE — 85025 COMPLETE CBC W/AUTO DIFF WBC: CPT | Performed by: EMERGENCY MEDICINE

## 2025-02-09 PROCEDURE — 999N000127 HC STATISTIC PERIPHERAL IV START W US GUIDANCE

## 2025-02-09 PROCEDURE — 250N000011 HC RX IP 250 OP 636: Mod: JZ | Performed by: EMERGENCY MEDICINE

## 2025-02-09 PROCEDURE — 96374 THER/PROPH/DIAG INJ IV PUSH: CPT | Mod: 59

## 2025-02-09 PROCEDURE — 74176 CT ABD & PELVIS W/O CONTRAST: CPT

## 2025-02-09 PROCEDURE — 83605 ASSAY OF LACTIC ACID: CPT | Performed by: EMERGENCY MEDICINE

## 2025-02-09 PROCEDURE — 84145 PROCALCITONIN (PCT): CPT

## 2025-02-09 PROCEDURE — 250N000011 HC RX IP 250 OP 636

## 2025-02-09 PROCEDURE — 87637 SARSCOV2&INF A&B&RSV AMP PRB: CPT | Performed by: EMERGENCY MEDICINE

## 2025-02-09 PROCEDURE — 81001 URINALYSIS AUTO W/SCOPE: CPT | Performed by: EMERGENCY MEDICINE

## 2025-02-09 PROCEDURE — 87040 BLOOD CULTURE FOR BACTERIA: CPT | Performed by: EMERGENCY MEDICINE

## 2025-02-09 PROCEDURE — 82435 ASSAY OF BLOOD CHLORIDE: CPT | Performed by: EMERGENCY MEDICINE

## 2025-02-09 PROCEDURE — 36415 COLL VENOUS BLD VENIPUNCTURE: CPT | Performed by: EMERGENCY MEDICINE

## 2025-02-09 PROCEDURE — 96361 HYDRATE IV INFUSION ADD-ON: CPT

## 2025-02-09 PROCEDURE — 258N000003 HC RX IP 258 OP 636: Performed by: EMERGENCY MEDICINE

## 2025-02-09 RX ORDER — MORPHINE SULFATE 4 MG/ML
4 INJECTION, SOLUTION INTRAMUSCULAR; INTRAVENOUS ONCE
Status: COMPLETED | OUTPATIENT
Start: 2025-02-09 | End: 2025-02-09

## 2025-02-09 RX ORDER — BISACODYL 10 MG
10 SUPPOSITORY, RECTAL RECTAL EVERY OTHER DAY
COMMUNITY

## 2025-02-09 RX ORDER — IOPAMIDOL 755 MG/ML
75 INJECTION, SOLUTION INTRAVASCULAR ONCE
Status: COMPLETED | OUTPATIENT
Start: 2025-02-09 | End: 2025-02-09

## 2025-02-09 RX ADMIN — IOPAMIDOL 75 ML: 755 INJECTION, SOLUTION INTRAVENOUS at 11:54

## 2025-02-09 RX ADMIN — SODIUM CHLORIDE 500 ML: 9 INJECTION, SOLUTION INTRAVENOUS at 12:36

## 2025-02-09 RX ADMIN — MORPHINE SULFATE 4 MG: 4 INJECTION, SOLUTION INTRAMUSCULAR; INTRAVENOUS at 12:27

## 2025-02-09 ASSESSMENT — ACTIVITIES OF DAILY LIVING (ADL)
ADLS_ACUITY_SCORE: 65

## 2025-02-09 NOTE — PHARMACY-ADMISSION MEDICATION HISTORY
Pharmacy Intern Admission Medication History    Admission medication history is complete. The information provided in this note is only as accurate as the sources available at the time of the update.    Information Source(s): Family member and CareEverywhere/SureScripts via in-person daughter in room who helped go through medications. Called her mom during reviewing meds who she was able to confirm some last doses with     Pertinent Information: patients daughter was able to confirm most medications with description of what used for. Daughter seemed less confident on last doses and tried calling sister who she said would know more. All medications on list were picked up within last three months with most being as needed    Changes made to PTA medication list:  Added: None  Deleted: None  Changed: bisacodyl every other day, note baclofen is given with seroquel at the same time as them     Allergies reviewed with patient and updates made in EHR: yes    Medication History Completed By: Viri Maloney 2/9/2025 1:51 PM    Although I was not present for the interview, nor did I personally see the patient, to my knowledge the intern has compiled the PTA medication list to the best of their ability given the information available at the time.    Sofia Flores, [facility]      PTA Med List   Medication Sig Note Last Dose/Taking    acetaminophen (TYLENOL) 500 MG tablet Place 2 tablets (1,000 mg) into G tube every 6 hours as needed for mild pain  2/9/2025 Morning    acetylcysteine (MUCOMYST) 20 % neb solution Take 2 mLs by nebulization every 6 hours as needed (congestion).  2/9/2025 Morning    amLODIPine (NORVASC) 5 MG tablet Place 1 tablet (5 mg) into G tube daily.  2/9/2025 Morning    baclofen (LIORESAL) 20 MG tablet Place 1 tablet (20 mg) into Feeding Tube 4 times daily. (Patient taking differently: Place 20 mg into Feeding Tube 3 times daily.) 2/9/2025: Patient takes TID with quetiapine at 0000, 0300, and 1900; unclear if  taking additional (4th dose) throughout the day.  2/9/2025 at  3:00 AM    bisacodyl (DULCOLAX) 10 MG suppository Place 10 mg rectally every other day.  2/8/2025    cefaclor (CECLOR) 250 MG capsule Place 1 capsule (250 mg) into G tube daily. UTI ppx  2/9/2025 Morning    CHEST CONGESTION RELIEF DM  MG/5ML syrup TAKE 10 MLS BY G TUBE ROUTE EVERY 4 HOURS AS NEEDED FOR COUGH.  Past Week    docusate (COLACE) 50 MG/5ML liquid Place 10 mLs (100 mg) into Feeding Tube 2 times daily.  Past Week    ipratropium - albuterol 0.5 mg/2.5 mg/3 mL (DUONEB) 0.5-2.5 (3) MG/3ML neb solution Take 1 vial (3 mLs) by nebulization every 4 hours.  2/9/2025 Morning    ketoconazole (NIZORAL) 2 % external cream Apply topically 2 times daily to affected area(s).  2/9/2025    LIDOCAINE PAIN RELIEF 4 % Patch Place 1 patch onto the skin daily as needed for moderate pain.  Past Week    meclizine (ANTIVERT) 25 MG tablet Take 1 tablet via G-tube every 6 hours as needed for vertigo  2/9/2025 Morning    metoprolol tartrate (LOPRESSOR) 25 MG tablet Place 1 tablet (25 mg) into Feeding Tube 2 times daily.  2/9/2025 Morning    neomycin-bacitracin-polymyxin (NEOSPORIN) 5-400-5000 ointment Apply topically 3 times daily as needed (irritation) To J Tube site  2/9/2025    nystatin (MYCOSTATIN) 540550 UNIT/GM external cream Apply topically 2 times daily as needed (redness)  Past Week    nystatin-triamcinolone (MYCOLOG II) 069463-4.1 UNIT/GM-% external cream Apply topically 2 times daily.  Past Week    omeprazole (PRILOSEC) 40 MG DR capsule Take 1 capsule (40 mg) by mouth daily. IN G TUBE  2/9/2025 Morning    ondansetron (ZOFRAN) 4 MG tablet Place 1 tablet (4 mg) into G tube every 8 hours as needed for nausea  More than a month    Oral Hygiene Products (Q-CARE COVERD YANKAUER/SUCTION) MISC Take 1 Device by mouth every hour as needed (secretions).  Past Month    Ostomy Supplies (STOMAHESIVE) PSTE Apply topically to skin around ostomy site twice daily and as  needed  Past Month    polyethylene glycol (MIRALAX) 17 GM/Dose powder Give 1 capful via G-tube daily as needed for constipation  Past Week    Polyethylene Glycol 400 (VISINE DRY EYE RELIEF) 1 % SOLN Apply 1 drop to eye every 6 hours as needed for dry eyes.  2/9/2025 Morning    QUEtiapine (SEROQUEL) 25 MG tablet TAKE 1 TABLET BY MOUTH AT 7:00PM, MIDNIGHT AND 3:00AM FOR TOTAL OF 75 MG NIGHTLY. OK TO REPEAT AFTER 4 HOURS AND DURING THE DAY AS NEEDED. 2/9/2025: Prescribed TID at 0000, 0300, and 1900; plus additional dose PRN (prescribed amount up to 4 tabs/day in total). 2/9/2025 at  3:00 AM    rosuvastatin (CRESTOR) 10 MG tablet Place 1 tablet (10 mg) into G tube daily.  2/9/2025 Morning    sennosides (SENOKOT) 8.8 MG/5ML syrup Place 5 mLs into G tube 2 times daily.  2/9/2025    sodium chloride (NEBUSAL) 3 % neb solution TAKE 4 MLS BY NEBULIZATION EVERY 6 HOURS AS NEEDED FOR THICK SPUTUM.  2/9/2025    triamcinolone (KENALOG) 0.1 % external cream Apply topically daily as needed for irritation. Apply to affected area/rash  Past Week

## 2025-02-09 NOTE — DISCHARGE INSTRUCTIONS
Your emergency department evaluation today is reassuring.  We did not see any sign of pneumonia or pulmonary embolism on your CT, and there was no explanation of your acute symptoms on your CT abdomen pelvis.  Your labs look stable besides a mild increase in your white blood cell count which may be linked to a viral illness.  Please return to the ED if you have any severe shortness of breath, severe chest pain or abdominal pain, increasing weakness or lethargy, or any new concerning symptoms.     Call 911 anytime you think you may need emergency care. For example, call if:    You have severe shortness of breath.     You have symptoms of a heart attack. These may include:  Chest pain or pressure, or a strange feeling in the chest.  Sweating.  Shortness of breath.  Nausea or vomiting.  Pain, pressure, or a strange feeling in the back, neck, jaw, or upper belly or in one or both shoulders or arms.  Lightheadedness or sudden weakness.  A fast or irregular heartbeat.  After you call 911, the  may tell you to chew 1 adult-strength or 2 to 4 low-dose aspirin. Wait for an ambulance. Do not try to drive yourself.   Call your doctor now or seek immediate medical care if:    Your shortness of breath gets worse or you start to wheeze. Wheezing is a high-pitched sound when you breathe.     You wake up at night out of breath or have to prop your head up on several pillows to breathe.     You are short of breath after only light activity or while at rest.   Watch closely for changes in your health, and be sure to contact your doctor if:

## 2025-02-09 NOTE — ED PROVIDER NOTES
Emergency Department Encounter   NAME: Liu Malave ; AGE: 59 year old male ; YOB: 1965 ; MRN: [id number] ; PCP: Steven Rosa   ED PROVIDER: Arely Cohen PA-C    Evaluation Date & Time:   2/9/2025  8:54 AM    CHIEF COMPLAINT:  Shortness of Breath and Fatigue      Impression and Plan   MDM: Liu Malave is a 59 year old male  with a pertinent history of CVA, hemorrhagic stroke, oropharyngeal dysphagia status post GJ tube placement, aspiration pneumonia, renal insufficiency, spastic hemiplegia, who presents to the ED for evaluation of cough and shortness of breath. Patient has had cough for a few weeks and has been slightly short of breath. There are other people in his household that are sick with similar symptoms. Today he reported more generalized weakness and fatigue, so his daughter brought him in. She states that he may have chest pain but he denies abdominal pain. He has bilateral deficits from his two previous strokes, and his daughter has noticed no change in these deficits. No movement in BUE at baseline. Daughter denies history of DVTs. He has not taken anything by mouth and is fed by GJ tube. No fevers.    Vitals reviewed and patient is tachycardic but afebrile. Not hypotensive and he is satting well on room air. On exam patient is nonverbal with extensive deficits 2/2 prior strokes. Unable to move BUE. Diffuse ronchi heard in bilateral lungs as well as wheezes on his left side.    Differential diagnosis/emergent conditions considered and evaluated for includes but not limited to pneumonia, sepsis, pulmonary embolism.     Viral panel is negative, and patient's CT shows no sign of pneumonia or pulmonary embolism. Normal procalcitonin further confirms this. Patient does have an increased white count at 13.3, but his lactate is not elevated. Troponin is normal, and EKG looks unremarkable.  This decreases my concern for cardiac cause of his symptoms.  Because patient is unable to  specifically tell us his symptoms and was still experiencing tachycardia, we felt it was reasonable to add on a CT abdomen pelvis to look for any other reason for his symptoms and increased white count.  This was also negative for any acute cause of his symptoms.     I am no longer concerned for a PE or any sepsis based on his lactate level and decrease in heart rate.  Towards the end of the visit he is sitting at low 90s for heart rate.  We discussed with the family that we cannot pinpoint an exact cause for symptoms today, and it may be due to a viral illness.  We have ruled out pneumonia, which was their main concern.  They state that he is close enough to his baseline where they are comfortable caring for him at home.  They know to return for any significant worsening of his shortness of breath, tachycardia, chest pain or if he seems to decline significantly from his baseline.      ED COURSE:  9:21 AM I met and introduced myself to the patient. I gathered initial history and performed my physical exam. We discussed plan for initial workup.       9:30 AM I have staffed the patient with Dr. Naylor, ED MD, who has evaluated the patient and agrees with all aspects of today's care.   I rechecked the patient and discussed results, discharge, follow up, and reasons to return to the ED.     At the conclusion of the encounter I discussed the results of all the tests and the disposition. The questions were answered. The patient or family acknowledged understanding and was agreeable with the care plan.    Medical Decision Making  Obtained supplemental history:Supplemental history obtained?: Documented in chart and Family Member/Significant Other  Reviewed external records: External records reviewed?: No  Care impacted by chronic illness:Cerebrovascular Disease and Chronic Kidney Disease  Did you consider but not order tests?: Work up considered but not performed and documented in chart, if applicable  Did you interpret  images independently?: Independent interpretation of ECG and images noted in documentation, when applicable.  Consultation discussion with other provider:Did you involve another provider (consultant, , pharmacy, etc.)?: No  Discharge. No recommendations on prescription strength medication(s). See documentation for any additional details.    MIPS: CT Pulmonary Angiogram:Patient is moderate to high risk for PE.    FINAL IMPRESSION:    ICD-10-CM    1. Acute cough  R05.1             MEDICATIONS GIVEN IN THE EMERGENCY DEPARTMENT:  Medications   iopamidol (ISOVUE-370) solution 75 mL (75 mLs Intravenous $Given 2/9/25 1154)   sodium chloride 0.9% BOLUS 500 mL (0 mLs Intravenous Stopped 2/9/25 1532)   morphine (PF) injection 4 mg (4 mg Intravenous $Given 2/9/25 1227)         NEW PRESCRIPTIONS STARTED AT TODAY'S ED VISIT:  New Prescriptions    No medications on file         HPI   Use of Intrepreter: Patient is nonverbal and Chinese speaking, but daughter declined a  at this time.  She is translating during this visit.    Liu Malave is a 59 year old male with a pertinent history of CVA, hemorrhagic stroke, oropharyngeal dysphagia status post GJ tube placement, aspiration pneumonia, renal insufficiency, spastic hemiplegia, who presents to the ED for evaluation of cough and shortness of breath. Patient has had cough for a few weeks and has been slightly short of breath. There are other people in his household that are sick with similar symptoms. Today he reported more generalized weakness and fatigue, so his daughter brought him in. She states that he may have chest pain but he denies abdominal pain. He has bilateral deficits from his two previous strokes, and his daughter has noticed no change in these deficits. No movement in BUE at baseline. Daughter denies history of DVTs. He has not taken anything by mouth and is fed by GJ tube. No fevers.      REVIEW OF SYSTEMS:  Pertinent positive and negative symptoms  per HPI.       Medical History     Past Medical History:   Diagnosis Date    Acute UTI 02/21/2020    Anxiety     Basal ganglia hemorrhage (H) 06/20/2016    Brain compression (H)     Bruxism     Chronic static encephalopathy     Convulsions, unspecified convulsion type (H)     CVA (cerebral vascular accident) (H) 07/2016    Depression     Dyslipidemia     Elevated troponin     Essential hypertension     Feeding by G-tube (H) 06/04/2020    GERD (gastroesophageal reflux disease)     Hemiplegia of nondominant side, late effect of cerebrovascular disease (H)     Hemorrhagic stroke (H) 06/05/2016    Hepatitis C carrier (H)     History of ESBL E. coli infection     History of hemorrhagic stroke with residual hemiparesis (H)     Hypertension     Lung nodule 12/05/2016    MRSA (methicillin resistant staph aureus) culture positive     Periodontal disease     Persistent fever     Pneumonia of both lower lobes due to infectious organism     Respiratory failure with hypoxia (H)     Seizure (H)     Sepsis due to urinary tract infection (H) 06/04/2020    Severe sepsis (H)     Trismus        Past Surgical History:   Procedure Laterality Date    aneurysm clipping  2006    BRAIN SURGERY      EXAM UNDER ANESTHESIA, RESTORATIONS, EXTRACTION(S) DENTAL COMPLEX, COMBINED N/A 12/19/2016    Procedure: COMBINED EXAM UNDER ANESTHESIA, RESTORATIONS, EXTRACTION(S) DENTAL COMPLEX;  Surgeon: Verónica Martinez DDS;  Location: [facility] OR    EXAM UNDER ANESTHESIA, RESTORATIONS, EXTRACTION(S) DENTAL COMPLEX, COMBINED N/A 3/7/2018    Procedure: COMBINED EXAM UNDER ANESTHESIA, RESTORATIONS, EXTRACTION(S) DENTAL COMPLEX;  Kenalog Injection in Bilateral [place], Combined Dental Exam,Radiographs, Three Dental Restorations, Periodontal Therapy and Fluoride Treatment;  Surgeon: Darryl Isbell DDS;  Location: [facility] OR    HH MIDLINE INSERTION  8/11/2017         INJECT STEROID (LOCATION) N/A 3/7/2018    Procedure: INJECT STEROID (LOCATION);  Kenalog Injection  In Bilateral [place], Combined Dental Exam,Radiographs,Three Dental Restorations, Periodontal Therapy and Fluoride Treatment;  Surgeon: Liu Conroy DDS;  Location: [facility] OR    IR GASTRO JEJUNOSTOMY TUBE CHANGE  7/19/2016    IR GASTRO JEJUNOSTOMY TUBE CHANGE  9/23/2016    IR GASTRO JEJUNOSTOMY TUBE CHANGE  11/28/2016    IR GASTRO JEJUNOSTOMY TUBE CHANGE  1/27/2017    IR GASTRO JEJUNOSTOMY TUBE CHANGE  4/19/2017    IR GASTRO JEJUNOSTOMY TUBE CHANGE  6/20/2017    IR GASTRO JEJUNOSTOMY TUBE CHANGE  8/7/2017    IR GASTRO JEJUNOSTOMY TUBE CHANGE  8/11/2017    IR GASTRO JEJUNOSTOMY TUBE CHANGE  10/20/2017    IR GASTRO JEJUNOSTOMY TUBE CHANGE  1/2/2018    IR GASTRO JEJUNOSTOMY TUBE CHANGE  2/16/2018    IR GASTRO JEJUNOSTOMY TUBE CHANGE  5/16/2018    IR GASTRO JEJUNOSTOMY TUBE CHANGE  6/11/2018    IR GASTRO JEJUNOSTOMY TUBE CHANGE  9/12/2018    IR GASTRO JEJUNOSTOMY TUBE CHANGE  12/24/2018    IR GASTRO JEJUNOSTOMY TUBE CHANGE  3/18/2019    IR GASTRO JEJUNOSTOMY TUBE CHANGE  4/23/2019    IR GASTRO JEJUNOSTOMY TUBE CHANGE  5/21/2019    IR GASTRO JEJUNOSTOMY TUBE CHANGE  9/9/2019    IR GASTRO JEJUNOSTOMY TUBE CHANGE  9/26/2019    IR GASTRO JEJUNOSTOMY TUBE CHANGE  10/11/2019    IR GASTRO JEJUNOSTOMY TUBE CHANGE  11/21/2019    IR GASTRO JEJUNOSTOMY TUBE CHANGE  11/29/2019    IR GASTRO JEJUNOSTOMY TUBE CHANGE  2/27/2020    IR GASTRO JEJUNOSTOMY TUBE CHANGE  4/10/2020    IR GASTRO JEJUNOSTOMY TUBE CHANGE  7/2/2020    IR GASTRO JEJUNOSTOMY TUBE CHANGE  8/28/2020    IR GASTRO JEJUNOSTOMY TUBE CHANGE  9/17/2020    IR GASTRO JEJUNOSTOMY TUBE CHANGE  10/29/2020    IR GASTRO JEJUNOSTOMY TUBE CHANGE  11/6/2020    IR GASTRO JEJUNOSTOMY TUBE CHANGE  12/24/2020    IR GASTRO JEJUNOSTOMY TUBE CHANGE  1/9/2021    IR GASTRO JEJUNOSTOMY TUBE CHANGE  6/9/2021    IR GASTRO JEJUNOSTOMY TUBE CHANGE  10/4/2021    IR GASTRO JEJUNOSTOMY TUBE CHANGE  10/18/2021    IR GASTRO JEJUNOSTOMY TUBE CHANGE  1/14/2022    IR GASTRO JEJUNOSTOMY TUBE CHANGE   1/20/2022    IR GASTRO JEJUNOSTOMY TUBE CHANGE  4/19/2022    IR GASTRO JEJUNOSTOMY TUBE CHANGE  6/7/2022    IR GASTRO JEJUNOSTOMY TUBE CHANGE  8/19/2022    IR GASTRO JEJUNOSTOMY TUBE CHANGE  9/13/2022    IR GASTRO JEJUNOSTOMY TUBE CHANGE  10/27/2022    IR GASTRO JEJUNOSTOMY TUBE CHANGE  11/3/2022    IR GASTRO JEJUNOSTOMY TUBE CHANGE  1/18/2023    IR GASTRO JEJUNOSTOMY TUBE CHANGE  4/7/2023    IR GASTRO JEJUNOSTOMY TUBE CHANGE  5/4/2023    IR GASTRO JEJUNOSTOMY TUBE CHANGE  6/21/2023    IR GASTRO JEJUNOSTOMY TUBE CHANGE  8/23/2023    IR GASTRO JEJUNOSTOMY TUBE CHANGE  11/16/2023    IR GASTRO JEJUNOSTOMY TUBE CHANGE  1/8/2024    IR GASTRO JEJUNOSTOMY TUBE CHANGE  4/9/2024    IR GASTRO JEJUNOSTOMY TUBE CHANGE  7/19/2024    IR GASTRO JEJUNOSTOMY TUBE CHANGE  10/21/2024    IR GASTRO JEJUNOSTOMY TUBE CHANGE  11/15/2024    IR GASTRO JEJUNOSTOMY TUBE CHANGE  11/25/2024    IR GASTRO JEJUNOSTOMY TUBE CHANGE  12/10/2024    IR GASTRO JEJUNOSTOMY TUBE CHANGE  12/18/2024    IR GASTRO JEJUNOSTOMY TUBE PLACEMENT  6/14/2016    IR GJ TUBE REPLACEMENT  12/24/2018    IR GJ TUBE REPLACEMENT  3/18/2019    IR GJ TUBE REPLACEMENT  4/23/2019    IR GJ TUBE REPLACEMENT  5/21/2019    IR GJ TUBE REPLACEMENT  9/9/2019    IR GJ TUBE REPLACEMENT  9/26/2019    IR GJ TUBE REPLACEMENT  10/11/2019    IR GJ TUBE REPLACEMENT  11/21/2019    IR GJ TUBE REPLACEMENT  11/29/2019    IR GJ TUBE REPLACEMENT  2/27/2020    IR GJ TUBE REPLACEMENT  4/10/2020    IR GJ TUBE REPLACEMENT  7/2/2020    IR GJ TUBE REPLACEMENT  8/28/2020    IR GJ TUBE REPLACEMENT  9/17/2020    IR GJ TUBE REPLACEMENT  10/29/2020    IR GJ TUBE REPLACEMENT  11/6/2020    IR GJ TUBE REPLACEMENT  12/24/2020    IR GJ TUBE REPLACEMENT  1/9/2021    IR GJ TUBE REPLACEMENT  6/9/2021    J-G tube      PICC  6/10/2016         TRACHEOSTOMY      Removed       Family History   Problem Relation Age of Onset    Hypertension Mother     Cancer Father     Other Cancer Father     Kidney Disease Brother         Social History     Tobacco Use    Smoking status: Former     Current packs/day: 0.00     Average packs/day: 1 pack/day for 30.0 years (30.0 ttl pk-yrs)     Types: Cigarettes     Start date: 1975     Quit date: 2005     Years since quittin.6     Passive exposure: Past    Smokeless tobacco: Former     Quit date: 2007    Tobacco comments:     quit 2006   Vaping Use    Vaping status: Never Used   Substance Use Topics    Alcohol use: No    Drug use: No       acetaminophen (TYLENOL) 500 MG tablet  acetylcysteine (MUCOMYST) 20 % neb solution  amLODIPine (NORVASC) 5 MG tablet  baclofen (LIORESAL) 20 MG tablet  bisacodyl (DULCOLAX) 10 MG suppository  cefaclor (CECLOR) 250 MG capsule  CHEST CONGESTION RELIEF DM  MG/5ML syrup  docusate (COLACE) 50 MG/5ML liquid  ipratropium - albuterol 0.5 mg/2.5 mg/3 mL (DUONEB) 0.5-2.5 (3) MG/3ML neb solution  ketoconazole (NIZORAL) 2 % external cream  LIDOCAINE PAIN RELIEF 4 % Patch  meclizine (ANTIVERT) 25 MG tablet  metoprolol tartrate (LOPRESSOR) 25 MG tablet  neomycin-bacitracin-polymyxin (NEOSPORIN) 5-400-5000 ointment  nystatin (MYCOSTATIN) 927710 UNIT/GM external cream  nystatin-triamcinolone (MYCOLOG II) 329501-5.1 UNIT/GM-% external cream  omeprazole (PRILOSEC) 40 MG DR capsule  ondansetron (ZOFRAN) 4 MG tablet  Oral Hygiene Products (Q-CARE COVERD YANKAUER/SUCTION) [facility]  Ostomy Supplies (STOMAHESIVE) PSTE  polyethylene glycol (MIRALAX) 17 GM/Dose powder  Polyethylene Glycol 400 (VISINE DRY EYE RELIEF) 1 % SOLN  QUEtiapine (SEROQUEL) 25 MG tablet  rosuvastatin (CRESTOR) 10 MG tablet  sennosides (SENOKOT) 8.8 MG/5ML syrup  sodium chloride (NEBUSAL) 3 % neb solution  triamcinolone (KENALOG) 0.1 % external cream  Catheters (EVERYDAY MALE EXTERNAL CATH/SM) MISC          Physical Exam     First Vitals:  Patient Vitals for the past 24 hrs:   BP Temp Temp src Pulse Resp SpO2 Weight   25 1513 -- -- -- 98 15 95 % --   25 1510 (!) 154/94 98.6  F (37   C) Oral 104 13 95 % --   02/09/25 1443 -- -- -- 88 12 95 % --   02/09/25 1435 (!) 141/84 -- -- 93 14 95 % --   02/09/25 1428 -- -- -- 95 13 95 % --   02/09/25 1413 -- -- -- 93 11 95 % --   02/09/25 1405 (!) 162/98 -- -- 98 13 94 % --   02/09/25 1358 -- -- -- 111 14 94 % --   02/09/25 1343 -- -- -- 111 13 93 % --   02/09/25 1335 (!) 150/80 -- -- 106 12 92 % --   02/09/25 1328 -- -- -- 104 13 (!) 91 % --   02/09/25 1313 -- -- -- (!) 121 18 93 % --   02/09/25 1305 -- -- -- 109 13 92 % --   02/09/25 1301 (!) 160/81 -- -- 109 12 93 % --   02/09/25 1258 -- -- -- 111 12 92 % --   02/09/25 1246 -- -- -- 117 14 92 % --   02/09/25 1243 -- -- -- (!) 127 24 (!) 91 % --   02/09/25 1235 -- -- -- (!) 122 17 92 % --   02/09/25 1231 (!) 175/83 -- -- 117 15 92 % --   02/09/25 1228 (!) 171/85 -- -- (!) 122 22 94 % --   02/09/25 1216 -- -- -- (!) 127 (!) 36 -- --   02/09/25 1213 -- -- -- 111 16 -- --   02/09/25 1205 -- -- -- 118 29 -- --   02/09/25 1201 (!) 182/97 -- -- 116 18 -- --   02/09/25 1158 (!) 170/87 -- -- 108 16 -- --   02/09/25 1143 -- -- -- 98 15 -- --   02/09/25 1135 -- -- -- 104 21 -- --   02/09/25 1131 -- -- -- 106 16 -- --   02/09/25 1128 -- -- -- 106 17 -- --   02/09/25 1116 -- -- -- 106 20 -- --   02/09/25 1113 -- -- -- 102 14 -- --   02/09/25 1105 -- -- -- 105 16 -- --   02/09/25 1101 -- -- -- 116 -- -- --   02/09/25 1058 -- -- -- 99 28 96 % --   02/09/25 1046 -- -- -- 107 18 95 % --   02/09/25 1043 -- -- -- 102 13 95 % --   02/09/25 1035 -- -- -- 108 23 94 % --   02/09/25 1031 (!) 154/86 -- -- 105 20 96 % --   02/09/25 1016 -- -- -- 107 14 95 % --   02/09/25 1001 (!) 144/84 -- -- 101 16 96 % --   02/09/25 0946 -- -- -- 99 -- 94 % --   02/09/25 0931 (!) 169/91 -- -- 93 -- 97 % --   02/09/25 0916 -- -- -- 102 -- 98 % --   02/09/25 0901 (!) 155/88 -- -- 104 -- 96 % --   02/09/25 0855 (!) 137/96 98.9  F (37.2  C) Oral 111 26 95 % 59 kg (130 lb)         PHYSICAL EXAM:   Physical Exam  Vitals reviewed.   Constitutional:        General: He is not in acute distress.     Appearance: Normal appearance. He is not toxic-appearing.   HENT:      Head: Atraumatic.   Eyes:      General: No scleral icterus.     Conjunctiva/sclera: Conjunctivae normal.   Cardiovascular:      Rate and Rhythm: Normal rate.      Heart sounds: Normal heart sounds.   Pulmonary:      Effort: Pulmonary effort is normal. No respiratory distress.      Breath sounds: Examination of the right-upper field reveals rhonchi. Examination of the left-upper field reveals wheezing and rhonchi. Examination of the right-middle field reveals rhonchi. Examination of the left-middle field reveals wheezing and rhonchi. Examination of the right-lower field reveals rhonchi. Examination of the left-lower field reveals rhonchi. Wheezing and rhonchi present.   Abdominal:      Palpations: Abdomen is soft.      Tenderness: There is no abdominal tenderness.      Comments: G tube in place and clean   Musculoskeletal:         General: No deformity.      Cervical back: Neck supple.   Skin:     General: Skin is warm.   Neurological:      Mental Status: He is alert.      Comments: Neurological deficits at baseline per daughter. No movement in BUE. Nonverbal             Results     LAB:  All pertinent labs reviewed and interpreted  Labs Ordered and Resulted from Time of ED Arrival to Time of ED Departure   COMPREHENSIVE METABOLIC PANEL - Abnormal       Result Value    Sodium 141      Potassium 5.0      Carbon Dioxide (CO2) 22      Anion Gap 10      Urea Nitrogen 44.5 (*)     Creatinine 1.52 (*)     GFR Estimate 52 (*)     Calcium 8.4 (*)     Chloride 109 (*)     Glucose 118 (*)     Alkaline Phosphatase 83      AST 24      ALT 18      Protein Total 7.8      Albumin 3.6      Bilirubin Total 0.2     LACTIC ACID WHOLE BLOOD WITH 1X REPEAT IN 2 HR WHEN >2 - Abnormal    Lactic Acid, Initial 0.5 (*)    ROUTINE UA WITH MICROSCOPIC REFLEX TO CULTURE - Abnormal    Color Urine Light Yellow      Appearance Urine  Clear      Glucose Urine Negative      Bilirubin Urine Negative      Ketones Urine Negative      Specific Gravity Urine 1.033 (*)     Blood Urine Negative      pH Urine 6.5      Protein Albumin Urine 200 (*)     Urobilinogen Urine <2.0      Nitrite Urine Negative      Leukocyte Esterase Urine Negative      RBC Urine <1      WBC Urine <1     CBC WITH PLATELETS AND DIFFERENTIAL - Abnormal    WBC Count 13.3 (*)     RBC Count 3.54 (*)     Hemoglobin 10.8 (*)     Hematocrit 32.6 (*)     MCV 92      MCH 30.5      MCHC 33.1      RDW 12.7      Platelet Count 267      % Neutrophils 67      % Lymphocytes 21      % Monocytes 8      % Eosinophils 3      % Basophils 1      % Immature Granulocytes 1      NRBCs per 100 WBC 0      Absolute Neutrophils 8.9 (*)     Absolute Lymphocytes 2.7      Absolute Monocytes 1.1      Absolute Eosinophils 0.4      Absolute Basophils 0.1      Absolute Immature Granulocytes 0.1      Absolute NRBCs 0.0     TROPONIN T, HIGH SENSITIVITY - Normal    Troponin T, High Sensitivity 21     INFLUENZA A/B, RSV AND SARS-COV2 PCR - Normal    Influenza A PCR Negative      Influenza B PCR Negative      RSV PCR Negative      SARS CoV2 PCR Negative     PROCALCITONIN - Normal    Procalcitonin 0.10     TROPONIN T, HIGH SENSITIVITY - Normal    Troponin T, High Sensitivity 18     BLOOD CULTURE   BLOOD CULTURE       RADIOLOGY:  CT Abdomen Pelvis w/o Contrast   Final Result   IMPRESSION:    1.  No acute process demonstrated.               CT Chest Pulmonary Embolism w Contrast   Final Result   IMPRESSION:   1.  Negative for pulmonary emboli.      2.  Stable mild bronchiolitis in the lower lobes with mild atelectasis.      3.  No acute new findings.            ECG:  Performed at: 09:55    Impression: Sinus rhythm, normal ECG    Rate: 99 bpm  Rhythm: Sinus rhythm  Axis: 62-14-47  HI Interval: 138 ms  QRS Interval: 68 ms  QTc Interval: 420 ms  ST Changes: None  Comparison: When compared to ECG of December 23, 2024, no  significant change was found    EKG results reviewed and interpreted by Dr. Dayday ED MD.     PROCEDURES:  none        Arely Cohen PA-C   Emergency Medicine   [facility] EMERGENCY ROOM        rAely Cohen PA-C  02/09/25 [id number]

## 2025-02-09 NOTE — ED PROVIDER NOTES
Emergency Department Midlevel Supervisory Note    Date/Time:2/9/2025 9:16 AM    I personally saw the patient and performed a substantive portion of the visit including all aspects of the medical decision making.  I am seeing this patient along with Noel Deleon PA-C.  I, Bruno Naylor D.O., have reviewed the documentation, personally taken the patient's history, performed an exam and agree with the physical finds, diagnosis and management plan.    Prior records were reviewed.  I personally performed history and physical.  I personally reviewed lab, EKG, and radiology results as indicated.  Care was discussed with mid-level provider.  Diagnosis and disposition were discussed.  Final disposition will be per the [facility] depending diagnostic studies and patient's clinical trajectory.      ED Course:  9:16 AM Noel Deleon PA-C staffed patient with me.     The patient presented to the emergency department today with complaints of cough and shortness of breath.  On exam, he is nonverbal at baseline but satting well on room air.  He has some rhonchi in the bilateral lung fields but is in no respiratory distress.  CT of the chest was performed showing no signs of pneumonia, PE, or other acute process.  He has a minimally elevated white blood cell count at 13 but lactate is normal.  Family states that his current symptoms and appearance are baseline.  We discussed disposition options but family states that they are comfortable taking him home.  They will plan to follow-up closely on an outpatient basis and return for worsening symptoms or other concerns.    DIAGNOSIS:  1. Acute cough        Labs and Imaging:  Results for orders placed or performed during the hospital encounter of 02/09/25   CT Chest Pulmonary Embolism w Contrast    Impression    IMPRESSION:  1.  Negative for pulmonary emboli.    2.  Stable mild bronchiolitis in the lower lobes with mild atelectasis.    3.  No acute new findings.   CT Abdomen Pelvis  w/o Contrast    Impression    IMPRESSION:   1.  No acute process demonstrated.         Comprehensive metabolic panel   Result Value Ref Range    Sodium 141 135 - 145 mmol/L    Potassium 5.0 3.4 - 5.3 mmol/L    Carbon Dioxide (CO2) 22 22 - 29 mmol/L    Anion Gap 10 7 - 15 mmol/L    Urea Nitrogen 44.5 (H) 8.0 - 23.0 mg/dL    Creatinine 1.52 (H) 0.67 - 1.17 mg/dL    GFR Estimate 52 (L) >60 mL/min/1.73m2    Calcium 8.4 (L) 8.8 - 10.4 mg/dL    Chloride 109 (H) 98 - 107 mmol/L    Glucose 118 (H) 70 - 99 mg/dL    Alkaline Phosphatase 83 40 - 150 U/L    AST 24 0 - 45 U/L    ALT 18 0 - 70 U/L    Protein Total 7.8 6.4 - 8.3 g/dL    Albumin 3.6 3.5 - 5.2 g/dL    Bilirubin Total 0.2 <=1.2 mg/dL   Lactic acid whole blood with 1x repeat in 2 hr when >2   Result Value Ref Range    Lactic Acid, Initial 0.5 (L) 0.7 - 2.0 mmol/L   UA with Microscopic reflex to Culture    Specimen: Urine, Clean Catch   Result Value Ref Range    Color Urine Light Yellow Colorless, Straw, Light Yellow, Yellow    Appearance Urine Clear Clear    Glucose Urine Negative Negative mg/dL    Bilirubin Urine Negative Negative    Ketones Urine Negative Negative mg/dL    Specific Gravity Urine 1.033 (H) 1.001 - 1.030    Blood Urine Negative Negative    pH Urine 6.5 5.0 - 7.0    Protein Albumin Urine 200 (A) Negative mg/dL    Urobilinogen Urine <2.0 <2.0 mg/dL    Nitrite Urine Negative Negative    Leukocyte Esterase Urine Negative Negative    RBC Urine <1 <=2 /HPF    WBC Urine <1 <=5 /HPF   Result Value Ref Range    Troponin T, High Sensitivity 21 <=22 ng/L   Influenza A/B, RSV and SARS-CoV2 PCR (COVID-19) Nose    Specimen: Nose; Swab   Result Value Ref Range    Influenza A PCR Negative Negative    Influenza B PCR Negative Negative    RSV PCR Negative Negative    SARS CoV2 PCR Negative Negative   Result Value Ref Range    Procalcitonin 0.10 <0.50 ng/mL   CBC with platelets and differential   Result Value Ref Range    WBC Count 13.3 (H) 4.0 - 11.0 10e3/uL    RBC  Count 3.54 (L) 4.40 - 5.90 10e6/uL    Hemoglobin 10.8 (L) 13.3 - 17.7 g/dL    Hematocrit 32.6 (L) 40.0 - 53.0 %    MCV 92 78 - 100 fL    MCH 30.5 26.5 - 33.0 pg    MCHC 33.1 31.5 - 36.5 g/dL    RDW 12.7 10.0 - 15.0 %    Platelet Count 267 150 - 450 10e3/uL    % Neutrophils 67 %    % Lymphocytes 21 %    % Monocytes 8 %    % Eosinophils 3 %    % Basophils 1 %    % Immature Granulocytes 1 %    NRBCs per 100 WBC 0 <1 /100    Absolute Neutrophils 8.9 (H) 1.6 - 8.3 10e3/uL    Absolute Lymphocytes 2.7 0.8 - 5.3 10e3/uL    Absolute Monocytes 1.1 0.0 - 1.3 10e3/uL    Absolute Eosinophils 0.4 0.0 - 0.7 10e3/uL    Absolute Basophils 0.1 0.0 - 0.2 10e3/uL    Absolute Immature Granulocytes 0.1 <=0.4 10e3/uL    Absolute NRBCs 0.0 10e3/uL   Result Value Ref Range    Troponin T, High Sensitivity 18 <=22 ng/L   ECG 12-LEAD WITH MUSE ([facility])   Result Value Ref Range    Systolic Blood Pressure 169 mmHg    Diastolic Blood Pressure 91 mmHg    Ventricular Rate 99 BPM    Atrial Rate 99 BPM    LA Interval 138 ms    QRS Duration 68 ms     ms    QTc 420 ms    P Axis 62 degrees    R AXIS 14 degrees    T Axis 47 degrees    Interpretation ECG       Sinus rhythm  Normal ECG  When compared with ECG of 23-Dec-2024 17:15,  No significant change was found  Confirmed by SEE ED PROVIDER NOTE FOR, ECG INTERPRETATION ([id number]),  Mayra Lowe ([id number]) on 2/9/2025 10:26:50 AM     Blood Culture Line, venous    Specimen: Line, venous; Blood   Result Value Ref Range    Culture No growth after 4 days    Blood Culture Line, venous    Specimen: Line, venous; Blood   Result Value Ref Range    Culture No growth after 4 days      EKG:  Normal sinus rhythm at 99 bpm.  Normal axis.  No signs of acute ischemia.  QRS 60 ms, QTc 420 ms.  I independently reviewed and interpreted this EKG.      Bruno Naylor D.O.  Emergency Medicine  [facility] EMERGENCY ROOM  [address] [place]  [id number]  [phone]  Dept: [phone]     Bruno Naylor,   02/13/25 2052

## 2025-02-09 NOTE — ED TRIAGE NOTES
Pt non-verbal and with R sided deficits since stroke arrives from home with family for cough for past few weeks with SOB at night time and now with increased fatigue and heart rate over 100 for past day.      Triage Assessment (Adult)       Row Name 02/09/25 0857          Triage Assessment    Airway WDL WDL        Respiratory WDL    Respiratory WDL X;rhythm/pattern;cough     Rhythm/Pattern, Respiratory shortness of breath;tachypneic     Cough Frequency infrequent     Cough Type productive        Skin Circulation/Temperature WDL    Skin Circulation/Temperature WDL WDL        Cardiac WDL    Cardiac WDL WDL        Peripheral/Neurovascular WDL    Peripheral Neurovascular WDL WDL        Cognitive/Neuro/Behavioral WDL    Cognitive/Neuro/Behavioral WDL WDL

## 2025-02-10 ENCOUNTER — APPOINTMENT (OUTPATIENT)
Dept: CT IMAGING | Facility: CLINIC | Age: 60
End: 2025-02-10
Attending: FAMILY MEDICINE
Payer: COMMERCIAL

## 2025-02-10 ENCOUNTER — APPOINTMENT (OUTPATIENT)
Dept: RADIOLOGY | Facility: CLINIC | Age: 60
End: 2025-02-10
Attending: FAMILY MEDICINE
Payer: COMMERCIAL

## 2025-02-10 ENCOUNTER — HOSPITAL ENCOUNTER (INPATIENT)
Facility: CLINIC | Age: 60
LOS: 3 days | Discharge: HOME | End: 2025-02-13
Attending: FAMILY MEDICINE | Admitting: FAMILY MEDICINE
Payer: COMMERCIAL

## 2025-02-10 ENCOUNTER — APPOINTMENT (OUTPATIENT)
Dept: RADIOLOGY | Facility: CLINIC | Age: 60
End: 2025-02-10
Attending: HOSPITALIST
Payer: COMMERCIAL

## 2025-02-10 DIAGNOSIS — A41.9 ACUTE SEPSIS (H): ICD-10-CM

## 2025-02-10 DIAGNOSIS — R13.12 OROPHARYNGEAL DYSPHAGIA: ICD-10-CM

## 2025-02-10 DIAGNOSIS — Z87.01 HISTORY OF ASPIRATION PNEUMONIA: ICD-10-CM

## 2025-02-10 DIAGNOSIS — B18.2 CHRONIC HEPATITIS C WITHOUT HEPATIC COMA (H): ICD-10-CM

## 2025-02-10 DIAGNOSIS — I69.151 SPASTIC HEMIPLEGIA OF RIGHT DOMINANT SIDE AS LATE EFFECT OF NONTRAUMATIC INTRAPARENCHYMAL HEMORRHAGE OF BRAIN (H): ICD-10-CM

## 2025-02-10 DIAGNOSIS — J69.0 ASPIRATION PNEUMONITIS (H): Primary | ICD-10-CM

## 2025-02-10 DIAGNOSIS — A08.31 GASTROENTERITIS DUE TO SAPOVIRUS: ICD-10-CM

## 2025-02-10 DIAGNOSIS — J18.0 BRONCHOPNEUMONIA: ICD-10-CM

## 2025-02-10 DIAGNOSIS — Z86.73 HISTORY OF CVA (CEREBROVASCULAR ACCIDENT): ICD-10-CM

## 2025-02-10 DIAGNOSIS — I61.9 HEMORRHAGIC STROKE (H): ICD-10-CM

## 2025-02-10 DIAGNOSIS — R50.9 FEVER, UNSPECIFIED FEVER CAUSE: ICD-10-CM

## 2025-02-10 LAB
ALBUMIN SERPL BCG-MCNC: 3.5 G/DL (ref 3.5–5.2)
ALBUMIN UR-MCNC: 200 MG/DL
ALP SERPL-CCNC: 75 U/L (ref 40–150)
ALT SERPL W P-5'-P-CCNC: 19 U/L (ref 0–70)
ANION GAP SERPL CALCULATED.3IONS-SCNC: 10 MMOL/L (ref 7–15)
ANION GAP SERPL CALCULATED.3IONS-SCNC: 9 MMOL/L (ref 7–15)
APPEARANCE UR: CLEAR
AST SERPL W P-5'-P-CCNC: 24 U/L (ref 0–45)
ATRIAL RATE - MUSE: 126 BPM
BASE EXCESS BLDV CALC-SCNC: -0.3 MMOL/L (ref -3–3)
BASOPHILS # BLD AUTO: 0 10E3/UL (ref 0–0.2)
BASOPHILS NFR BLD AUTO: 0 %
BILIRUB DIRECT SERPL-MCNC: <0.2 MG/DL (ref 0–0.3)
BILIRUB SERPL-MCNC: 0.2 MG/DL
BILIRUB UR QL STRIP: NEGATIVE
BUN SERPL-MCNC: 39.5 MG/DL (ref 8–23)
BUN SERPL-MCNC: 45 MG/DL (ref 8–23)
C PNEUM DNA SPEC QL NAA+PROBE: NOT DETECTED
CALCIUM SERPL-MCNC: 7.8 MG/DL (ref 8.8–10.4)
CALCIUM SERPL-MCNC: 8.4 MG/DL (ref 8.8–10.4)
CHLORIDE SERPL-SCNC: 114 MMOL/L (ref 98–107)
CHLORIDE SERPL-SCNC: 117 MMOL/L (ref 98–107)
COLOR UR AUTO: ABNORMAL
CREAT SERPL-MCNC: 1.62 MG/DL (ref 0.67–1.17)
CREAT SERPL-MCNC: 1.76 MG/DL (ref 0.67–1.17)
DIASTOLIC BLOOD PRESSURE - MUSE: 87 MMHG
EGFRCR SERPLBLD CKD-EPI 2021: 43 ML/MIN/1.73M2
EGFRCR SERPLBLD CKD-EPI 2021: 48 ML/MIN/1.73M2
EOSINOPHIL # BLD AUTO: 0.1 10E3/UL (ref 0–0.7)
EOSINOPHIL NFR BLD AUTO: 1 %
ERYTHROCYTE [DISTWIDTH] IN BLOOD BY AUTOMATED COUNT: 13 % (ref 10–15)
FLUAV H1 2009 PAND RNA SPEC QL NAA+PROBE: NOT DETECTED
FLUAV H1 RNA SPEC QL NAA+PROBE: NOT DETECTED
FLUAV H3 RNA SPEC QL NAA+PROBE: NOT DETECTED
FLUAV RNA SPEC QL NAA+PROBE: NEGATIVE
FLUAV RNA SPEC QL NAA+PROBE: NOT DETECTED
FLUBV RNA RESP QL NAA+PROBE: NEGATIVE
FLUBV RNA SPEC QL NAA+PROBE: NOT DETECTED
GLUCOSE SERPL-MCNC: 135 MG/DL (ref 70–99)
GLUCOSE SERPL-MCNC: 142 MG/DL (ref 70–99)
GLUCOSE UR STRIP-MCNC: NEGATIVE MG/DL
HADV DNA SPEC QL NAA+PROBE: NOT DETECTED
HCO3 BLDV-SCNC: 24 MMOL/L (ref 21–28)
HCO3 SERPL-SCNC: 20 MMOL/L (ref 22–29)
HCO3 SERPL-SCNC: 20 MMOL/L (ref 22–29)
HCOV PNL SPEC NAA+PROBE: NOT DETECTED
HCT VFR BLD AUTO: 31.5 % (ref 40–53)
HGB BLD-MCNC: 10.2 G/DL (ref 13.3–17.7)
HGB UR QL STRIP: NEGATIVE
HMPV RNA SPEC QL NAA+PROBE: NOT DETECTED
HOLD SPECIMEN: NORMAL
HPIV1 RNA SPEC QL NAA+PROBE: NOT DETECTED
HPIV2 RNA SPEC QL NAA+PROBE: NOT DETECTED
HPIV3 RNA SPEC QL NAA+PROBE: NOT DETECTED
HPIV4 RNA SPEC QL NAA+PROBE: NOT DETECTED
IMM GRANULOCYTES # BLD: 0 10E3/UL
IMM GRANULOCYTES NFR BLD: 0 %
INTERPRETATION ECG - MUSE: NORMAL
KETONES UR STRIP-MCNC: NEGATIVE MG/DL
LACTATE SERPL-SCNC: 0.7 MMOL/L (ref 0.7–2)
LACTATE SERPL-SCNC: 2.1 MMOL/L (ref 0.7–2)
LACTATE SERPL-SCNC: 2.4 MMOL/L (ref 0.7–2)
LEUKOCYTE ESTERASE UR QL STRIP: NEGATIVE
LYMPHOCYTES # BLD AUTO: 1.2 10E3/UL (ref 0.8–5.3)
LYMPHOCYTES NFR BLD AUTO: 12 %
M PNEUMO DNA SPEC QL NAA+PROBE: NOT DETECTED
MAGNESIUM SERPL-MCNC: 2.7 MG/DL (ref 1.7–2.3)
MCH RBC QN AUTO: 30 PG (ref 26.5–33)
MCHC RBC AUTO-ENTMCNC: 32.4 G/DL (ref 31.5–36.5)
MCV RBC AUTO: 93 FL (ref 78–100)
MONOCYTES # BLD AUTO: 0.9 10E3/UL (ref 0–1.3)
MONOCYTES NFR BLD AUTO: 9 %
NEUTROPHILS # BLD AUTO: 7.9 10E3/UL (ref 1.6–8.3)
NEUTROPHILS NFR BLD AUTO: 78 %
NITRATE UR QL: NEGATIVE
NRBC # BLD AUTO: 0 10E3/UL
NRBC BLD AUTO-RTO: 0 /100
NT-PROBNP SERPL-MCNC: 365 PG/ML (ref 0–900)
O2/TOTAL GAS SETTING VFR VENT: 35 %
OXYHGB MFR BLDV: 92 % (ref 70–75)
P AXIS - MUSE: 68 DEGREES
PCO2 BLDV: 39 MM HG (ref 40–50)
PH BLDV: 7.41 [PH] (ref 7.32–7.43)
PH UR STRIP: 5.5 [PH] (ref 5–7)
PHOSPHATE SERPL-MCNC: 3 MG/DL (ref 2.5–4.5)
PLATELET # BLD AUTO: 251 10E3/UL (ref 150–450)
PO2 BLDV: 61 MM HG (ref 25–47)
POTASSIUM SERPL-SCNC: 4.5 MMOL/L (ref 3.4–5.3)
POTASSIUM SERPL-SCNC: 4.9 MMOL/L (ref 3.4–5.3)
PR INTERVAL - MUSE: 136 MS
PROCALCITONIN SERPL IA-MCNC: 0.16 NG/ML
PROCALCITONIN SERPL IA-MCNC: 0.17 NG/ML
PROT SERPL-MCNC: 7.5 G/DL (ref 6.4–8.3)
QRS DURATION - MUSE: 72 MS
QT - MUSE: 308 MS
QTC - MUSE: 446 MS
R AXIS - MUSE: 38 DEGREES
RBC # BLD AUTO: 3.4 10E6/UL (ref 4.4–5.9)
RBC URINE: 1 /HPF
RSV RNA SPEC NAA+PROBE: NEGATIVE
RSV RNA SPEC QL NAA+PROBE: NOT DETECTED
RSV RNA SPEC QL NAA+PROBE: NOT DETECTED
RV+EV RNA SPEC QL NAA+PROBE: NOT DETECTED
SAO2 % BLDV: 93.4 % (ref 70–75)
SARS-COV-2 RNA RESP QL NAA+PROBE: NEGATIVE
SODIUM SERPL-SCNC: 144 MMOL/L (ref 135–145)
SODIUM SERPL-SCNC: 146 MMOL/L (ref 135–145)
SP GR UR STRIP: 1.02 (ref 1–1.03)
SQUAMOUS EPITHELIAL: 2 /HPF
SYSTOLIC BLOOD PRESSURE - MUSE: 142 MMHG
T AXIS - MUSE: 64 DEGREES
UROBILINOGEN UR STRIP-MCNC: <2 MG/DL
VENTRICULAR RATE- MUSE: 126 BPM
WBC # BLD AUTO: 10.1 10E3/UL (ref 4–11)
WBC URINE: 1 /HPF

## 2025-02-10 PROCEDURE — 36415 COLL VENOUS BLD VENIPUNCTURE: CPT | Performed by: FAMILY MEDICINE

## 2025-02-10 PROCEDURE — 82248 BILIRUBIN DIRECT: CPT | Performed by: FAMILY MEDICINE

## 2025-02-10 PROCEDURE — 94640 AIRWAY INHALATION TREATMENT: CPT

## 2025-02-10 PROCEDURE — 36415 COLL VENOUS BLD VENIPUNCTURE: CPT | Performed by: EMERGENCY MEDICINE

## 2025-02-10 PROCEDURE — 83605 ASSAY OF LACTIC ACID: CPT | Performed by: STUDENT IN AN ORGANIZED HEALTH CARE EDUCATION/TRAINING PROGRAM

## 2025-02-10 PROCEDURE — 83735 ASSAY OF MAGNESIUM: CPT | Performed by: FAMILY MEDICINE

## 2025-02-10 PROCEDURE — 120N000001 HC R&B MED SURG/OB

## 2025-02-10 PROCEDURE — 99223 1ST HOSP IP/OBS HIGH 75: CPT | Performed by: FAMILY MEDICINE

## 2025-02-10 PROCEDURE — 250N000009 HC RX 250: Performed by: FAMILY MEDICINE

## 2025-02-10 PROCEDURE — 258N000003 HC RX IP 258 OP 636: Performed by: FAMILY MEDICINE

## 2025-02-10 PROCEDURE — 258N000003 HC RX IP 258 OP 636: Performed by: EMERGENCY MEDICINE

## 2025-02-10 PROCEDURE — 36415 COLL VENOUS BLD VENIPUNCTURE: CPT | Performed by: STUDENT IN AN ORGANIZED HEALTH CARE EDUCATION/TRAINING PROGRAM

## 2025-02-10 PROCEDURE — 999N000157 HC STATISTIC RCP TIME EA 10 MIN

## 2025-02-10 PROCEDURE — 94640 AIRWAY INHALATION TREATMENT: CPT | Mod: 76

## 2025-02-10 PROCEDURE — 82310 ASSAY OF CALCIUM: CPT | Performed by: FAMILY MEDICINE

## 2025-02-10 PROCEDURE — 250N000013 HC RX MED GY IP 250 OP 250 PS 637: Performed by: FAMILY MEDICINE

## 2025-02-10 PROCEDURE — 87522 HEPATITIS C REVRS TRNSCRPJ: CPT | Performed by: FAMILY MEDICINE

## 2025-02-10 PROCEDURE — 71045 X-RAY EXAM CHEST 1 VIEW: CPT | Mod: 77

## 2025-02-10 PROCEDURE — 82805 BLOOD GASES W/O2 SATURATION: CPT | Performed by: EMERGENCY MEDICINE

## 2025-02-10 PROCEDURE — 83880 ASSAY OF NATRIURETIC PEPTIDE: CPT | Performed by: FAMILY MEDICINE

## 2025-02-10 PROCEDURE — 84145 PROCALCITONIN (PCT): CPT | Performed by: FAMILY MEDICINE

## 2025-02-10 PROCEDURE — 93005 ELECTROCARDIOGRAM TRACING: CPT | Performed by: EMERGENCY MEDICINE

## 2025-02-10 PROCEDURE — 96374 THER/PROPH/DIAG INJ IV PUSH: CPT

## 2025-02-10 PROCEDURE — 82947 ASSAY GLUCOSE BLOOD QUANT: CPT | Performed by: FAMILY MEDICINE

## 2025-02-10 PROCEDURE — 81001 URINALYSIS AUTO W/SCOPE: CPT | Performed by: EMERGENCY MEDICINE

## 2025-02-10 PROCEDURE — 71045 X-RAY EXAM CHEST 1 VIEW: CPT

## 2025-02-10 PROCEDURE — 87040 BLOOD CULTURE FOR BACTERIA: CPT | Performed by: EMERGENCY MEDICINE

## 2025-02-10 PROCEDURE — 83605 ASSAY OF LACTIC ACID: CPT | Performed by: FAMILY MEDICINE

## 2025-02-10 PROCEDURE — 250N000011 HC RX IP 250 OP 636: Performed by: EMERGENCY MEDICINE

## 2025-02-10 PROCEDURE — 250N000013 HC RX MED GY IP 250 OP 250 PS 637: Performed by: EMERGENCY MEDICINE

## 2025-02-10 PROCEDURE — 84100 ASSAY OF PHOSPHORUS: CPT | Performed by: FAMILY MEDICINE

## 2025-02-10 PROCEDURE — 96375 TX/PRO/DX INJ NEW DRUG ADDON: CPT

## 2025-02-10 PROCEDURE — 87633 RESP VIRUS 12-25 TARGETS: CPT | Performed by: FAMILY MEDICINE

## 2025-02-10 PROCEDURE — 258N000003 HC RX IP 258 OP 636: Performed by: HOSPITALIST

## 2025-02-10 PROCEDURE — 250N000011 HC RX IP 250 OP 636: Performed by: FAMILY MEDICINE

## 2025-02-10 PROCEDURE — 85025 COMPLETE CBC W/AUTO DIFF WBC: CPT | Performed by: FAMILY MEDICINE

## 2025-02-10 PROCEDURE — 87637 SARSCOV2&INF A&B&RSV AMP PRB: CPT | Performed by: EMERGENCY MEDICINE

## 2025-02-10 PROCEDURE — 250N000011 HC RX IP 250 OP 636: Performed by: HOSPITALIST

## 2025-02-10 PROCEDURE — 99285 EMERGENCY DEPT VISIT HI MDM: CPT | Mod: 25

## 2025-02-10 PROCEDURE — 71250 CT THORAX DX C-: CPT

## 2025-02-10 PROCEDURE — 96361 HYDRATE IV INFUSION ADD-ON: CPT

## 2025-02-10 RX ORDER — ACETAMINOPHEN 500 MG
1000 TABLET ORAL EVERY 6 HOURS PRN
Status: DISCONTINUED | OUTPATIENT
Start: 2025-02-10 | End: 2025-02-10

## 2025-02-10 RX ORDER — PIPERACILLIN SODIUM, TAZOBACTAM SODIUM 3; .375 G/15ML; G/15ML
3.38 INJECTION, POWDER, LYOPHILIZED, FOR SOLUTION INTRAVENOUS EVERY 8 HOURS
Status: DISCONTINUED | OUTPATIENT
Start: 2025-02-10 | End: 2025-02-13 | Stop reason: HOSPADM

## 2025-02-10 RX ORDER — ROSUVASTATIN CALCIUM 5 MG/1
10 TABLET, COATED ORAL DAILY
Status: DISCONTINUED | OUTPATIENT
Start: 2025-02-10 | End: 2025-02-13 | Stop reason: HOSPADM

## 2025-02-10 RX ORDER — VANCOMYCIN HYDROCHLORIDE 500 MG/10ML
500 INJECTION, POWDER, LYOPHILIZED, FOR SOLUTION INTRAVENOUS EVERY 12 HOURS
Status: DISCONTINUED | OUTPATIENT
Start: 2025-02-11 | End: 2025-02-13 | Stop reason: HOSPADM

## 2025-02-10 RX ORDER — POLYETHYLENE GLYCOL 3350 17 G/17G
17 POWDER, FOR SOLUTION ORAL DAILY PRN
Status: DISCONTINUED | OUTPATIENT
Start: 2025-02-10 | End: 2025-02-13 | Stop reason: HOSPADM

## 2025-02-10 RX ORDER — DEXTROSE MONOHYDRATE AND SODIUM CHLORIDE 5; .45 G/100ML; G/100ML
INJECTION, SOLUTION INTRAVENOUS CONTINUOUS
Status: DISCONTINUED | OUTPATIENT
Start: 2025-02-10 | End: 2025-02-13 | Stop reason: HOSPADM

## 2025-02-10 RX ORDER — SODIUM CHLORIDE FOR INHALATION 3 %
3 VIAL, NEBULIZER (ML) INHALATION
Status: DISCONTINUED | OUTPATIENT
Start: 2025-02-10 | End: 2025-02-13 | Stop reason: HOSPADM

## 2025-02-10 RX ORDER — ACETYLCYSTEINE 200 MG/ML
2 SOLUTION ORAL; RESPIRATORY (INHALATION) EVERY 6 HOURS PRN
Status: DISCONTINUED | OUTPATIENT
Start: 2025-02-10 | End: 2025-02-10

## 2025-02-10 RX ORDER — HYDROMORPHONE HCL IN WATER/PF 6 MG/30 ML
0.2 PATIENT CONTROLLED ANALGESIA SYRINGE INTRAVENOUS
Status: DISCONTINUED | OUTPATIENT
Start: 2025-02-10 | End: 2025-02-13 | Stop reason: HOSPADM

## 2025-02-10 RX ORDER — LIDOCAINE 40 MG/G
CREAM TOPICAL
Status: DISCONTINUED | OUTPATIENT
Start: 2025-02-10 | End: 2025-02-13 | Stop reason: HOSPADM

## 2025-02-10 RX ORDER — BACLOFEN 10 MG/1
20 TABLET ORAL 3 TIMES DAILY
Status: DISCONTINUED | OUTPATIENT
Start: 2025-02-10 | End: 2025-02-13 | Stop reason: HOSPADM

## 2025-02-10 RX ORDER — LORAZEPAM 2 MG/ML
0.5 INJECTION INTRAMUSCULAR ONCE
Status: DISCONTINUED | OUTPATIENT
Start: 2025-02-10 | End: 2025-02-10

## 2025-02-10 RX ORDER — PIPERACILLIN SODIUM, TAZOBACTAM SODIUM 3; .375 G/15ML; G/15ML
3.38 INJECTION, POWDER, LYOPHILIZED, FOR SOLUTION INTRAVENOUS ONCE
Status: COMPLETED | OUTPATIENT
Start: 2025-02-10 | End: 2025-02-10

## 2025-02-10 RX ORDER — SODIUM CHLORIDE 9 MG/ML
INJECTION, SOLUTION INTRAVENOUS ONCE
Status: COMPLETED | OUTPATIENT
Start: 2025-02-10 | End: 2025-02-10

## 2025-02-10 RX ORDER — ACETAMINOPHEN 650 MG/1
650 SUPPOSITORY RECTAL ONCE
Status: DISCONTINUED | OUTPATIENT
Start: 2025-02-10 | End: 2025-02-10

## 2025-02-10 RX ORDER — BISACODYL 10 MG
10 SUPPOSITORY, RECTAL RECTAL DAILY PRN
Status: DISCONTINUED | OUTPATIENT
Start: 2025-02-10 | End: 2025-02-10

## 2025-02-10 RX ORDER — ACETYLCYSTEINE 200 MG/ML
2 SOLUTION ORAL; RESPIRATORY (INHALATION) 2 TIMES DAILY
Status: DISCONTINUED | OUTPATIENT
Start: 2025-02-10 | End: 2025-02-12

## 2025-02-10 RX ORDER — NYSTATIN 100000 U/G
CREAM TOPICAL 2 TIMES DAILY PRN
Status: DISCONTINUED | OUTPATIENT
Start: 2025-02-10 | End: 2025-02-13 | Stop reason: HOSPADM

## 2025-02-10 RX ORDER — ACETAMINOPHEN 325 MG/10.15ML
1000 LIQUID ORAL EVERY 6 HOURS PRN
Status: DISCONTINUED | OUTPATIENT
Start: 2025-02-10 | End: 2025-02-13 | Stop reason: HOSPADM

## 2025-02-10 RX ORDER — QUETIAPINE FUMARATE 25 MG/1
25 TABLET, FILM COATED ORAL 3 TIMES DAILY
Status: DISCONTINUED | OUTPATIENT
Start: 2025-02-10 | End: 2025-02-13 | Stop reason: HOSPADM

## 2025-02-10 RX ORDER — NALOXONE HYDROCHLORIDE 0.4 MG/ML
0.4 INJECTION, SOLUTION INTRAMUSCULAR; INTRAVENOUS; SUBCUTANEOUS
Status: DISCONTINUED | OUTPATIENT
Start: 2025-02-10 | End: 2025-02-13 | Stop reason: HOSPADM

## 2025-02-10 RX ORDER — DEXTROSE MONOHYDRATE 100 MG/ML
INJECTION, SOLUTION INTRAVENOUS CONTINUOUS PRN
Status: DISCONTINUED | OUTPATIENT
Start: 2025-02-10 | End: 2025-02-13 | Stop reason: HOSPADM

## 2025-02-10 RX ORDER — ONDANSETRON 4 MG/1
4 TABLET, FILM COATED ORAL EVERY 8 HOURS PRN
Status: DISCONTINUED | OUTPATIENT
Start: 2025-02-10 | End: 2025-02-13 | Stop reason: HOSPADM

## 2025-02-10 RX ORDER — QUETIAPINE FUMARATE 25 MG/1
25 TABLET, FILM COATED ORAL DAILY PRN
Status: DISCONTINUED | OUTPATIENT
Start: 2025-02-10 | End: 2025-02-13 | Stop reason: HOSPADM

## 2025-02-10 RX ORDER — BISACODYL 10 MG
10 SUPPOSITORY, RECTAL RECTAL EVERY OTHER DAY
Status: DISCONTINUED | OUTPATIENT
Start: 2025-02-12 | End: 2025-02-11

## 2025-02-10 RX ORDER — HYDROMORPHONE HCL IN WATER/PF 6 MG/30 ML
0.4 PATIENT CONTROLLED ANALGESIA SYRINGE INTRAVENOUS
Status: DISCONTINUED | OUTPATIENT
Start: 2025-02-10 | End: 2025-02-13 | Stop reason: HOSPADM

## 2025-02-10 RX ORDER — KETOCONAZOLE 20 MG/G
CREAM TOPICAL 2 TIMES DAILY
Status: DISCONTINUED | OUTPATIENT
Start: 2025-02-10 | End: 2025-02-13 | Stop reason: HOSPADM

## 2025-02-10 RX ORDER — ACETAMINOPHEN 325 MG/1
975 TABLET ORAL ONCE
Status: COMPLETED | OUTPATIENT
Start: 2025-02-10 | End: 2025-02-10

## 2025-02-10 RX ORDER — ONDANSETRON 4 MG/1
4 TABLET, ORALLY DISINTEGRATING ORAL EVERY 6 HOURS PRN
Status: DISCONTINUED | OUTPATIENT
Start: 2025-02-10 | End: 2025-02-13 | Stop reason: HOSPADM

## 2025-02-10 RX ORDER — NALOXONE HYDROCHLORIDE 0.4 MG/ML
0.2 INJECTION, SOLUTION INTRAMUSCULAR; INTRAVENOUS; SUBCUTANEOUS
Status: DISCONTINUED | OUTPATIENT
Start: 2025-02-10 | End: 2025-02-13 | Stop reason: HOSPADM

## 2025-02-10 RX ORDER — AZITHROMYCIN 500 MG/5ML
500 INJECTION, POWDER, LYOPHILIZED, FOR SOLUTION INTRAVENOUS EVERY 24 HOURS
Status: DISCONTINUED | OUTPATIENT
Start: 2025-02-10 | End: 2025-02-10

## 2025-02-10 RX ORDER — GUAIFENESIN/DEXTROMETHORPHAN 100-10MG/5
5 SYRUP ORAL EVERY 4 HOURS PRN
Status: DISCONTINUED | OUTPATIENT
Start: 2025-02-10 | End: 2025-02-13 | Stop reason: HOSPADM

## 2025-02-10 RX ORDER — AMLODIPINE BESYLATE 5 MG/1
5 TABLET ORAL DAILY
Status: DISCONTINUED | OUTPATIENT
Start: 2025-02-10 | End: 2025-02-13 | Stop reason: HOSPADM

## 2025-02-10 RX ORDER — METOPROLOL TARTRATE 25 MG/1
25 TABLET, FILM COATED ORAL 2 TIMES DAILY
Status: DISCONTINUED | OUTPATIENT
Start: 2025-02-10 | End: 2025-02-13 | Stop reason: HOSPADM

## 2025-02-10 RX ORDER — ONDANSETRON 2 MG/ML
4 INJECTION INTRAMUSCULAR; INTRAVENOUS EVERY 6 HOURS PRN
Status: DISCONTINUED | OUTPATIENT
Start: 2025-02-10 | End: 2025-02-13 | Stop reason: HOSPADM

## 2025-02-10 RX ORDER — BISACODYL 10 MG
10 SUPPOSITORY, RECTAL RECTAL DAILY PRN
Status: DISCONTINUED | OUTPATIENT
Start: 2025-02-10 | End: 2025-02-13 | Stop reason: HOSPADM

## 2025-02-10 RX ORDER — NEOMYCIN/BACITRACIN/POLYMYXINB 3.5-400-5K
OINTMENT (GRAM) TOPICAL 3 TIMES DAILY PRN
Status: DISCONTINUED | OUTPATIENT
Start: 2025-02-10 | End: 2025-02-13 | Stop reason: HOSPADM

## 2025-02-10 RX ORDER — CALCIUM CARBONATE 500 MG/1
1000 TABLET, CHEWABLE ORAL 4 TIMES DAILY PRN
Status: DISCONTINUED | OUTPATIENT
Start: 2025-02-10 | End: 2025-02-13 | Stop reason: HOSPADM

## 2025-02-10 RX ORDER — AZITHROMYCIN 500 MG/5ML
500 INJECTION, POWDER, LYOPHILIZED, FOR SOLUTION INTRAVENOUS ONCE
Status: COMPLETED | OUTPATIENT
Start: 2025-02-10 | End: 2025-02-10

## 2025-02-10 RX ORDER — MORPHINE SULFATE 4 MG/ML
4 INJECTION, SOLUTION INTRAMUSCULAR; INTRAVENOUS ONCE
Status: COMPLETED | OUTPATIENT
Start: 2025-02-10 | End: 2025-02-10

## 2025-02-10 RX ORDER — IPRATROPIUM BROMIDE AND ALBUTEROL SULFATE 2.5; .5 MG/3ML; MG/3ML
3 SOLUTION RESPIRATORY (INHALATION)
Status: DISCONTINUED | OUTPATIENT
Start: 2025-02-10 | End: 2025-02-13 | Stop reason: HOSPADM

## 2025-02-10 RX ADMIN — METOPROLOL TARTRATE 25 MG: 25 TABLET, FILM COATED ORAL at 19:00

## 2025-02-10 RX ADMIN — AMLODIPINE BESYLATE 5 MG: 5 TABLET ORAL at 13:17

## 2025-02-10 RX ADMIN — DOCUSATE SODIUM 100 MG: 50 LIQUID ORAL at 19:01

## 2025-02-10 RX ADMIN — PIPERACILLIN AND TAZOBACTAM 3.38 G: 3; .375 INJECTION, POWDER, FOR SOLUTION INTRAVENOUS at 09:44

## 2025-02-10 RX ADMIN — FAMOTIDINE 20 MG: 10 INJECTION, SOLUTION INTRAVENOUS at 08:12

## 2025-02-10 RX ADMIN — QUETIAPINE FUMARATE 25 MG: 25 TABLET ORAL at 19:00

## 2025-02-10 RX ADMIN — SODIUM CHLORIDE 1000 ML: 9 INJECTION, SOLUTION INTRAVENOUS at 21:13

## 2025-02-10 RX ADMIN — VANCOMYCIN HYDROCHLORIDE 1250 MG: 5 INJECTION, POWDER, LYOPHILIZED, FOR SOLUTION INTRAVENOUS at 22:35

## 2025-02-10 RX ADMIN — SODIUM BICARBONATE 40 MG: 84 INJECTION, SOLUTION INTRAVENOUS at 22:06

## 2025-02-10 RX ADMIN — METOPROLOL TARTRATE 25 MG: 25 TABLET, FILM COATED ORAL at 13:17

## 2025-02-10 RX ADMIN — BISACODYL 10 MG: 10 SUPPOSITORY RECTAL at 09:59

## 2025-02-10 RX ADMIN — ROSUVASTATIN 10 MG: 5 TABLET, FILM COATED ORAL at 13:28

## 2025-02-10 RX ADMIN — MORPHINE SULFATE 4 MG: 4 INJECTION, SOLUTION INTRAMUSCULAR; INTRAVENOUS at 08:13

## 2025-02-10 RX ADMIN — SODIUM CHLORIDE: 9 INJECTION, SOLUTION INTRAVENOUS at 08:12

## 2025-02-10 RX ADMIN — KETOCONAZOLE: 20 CREAM TOPICAL at 19:01

## 2025-02-10 RX ADMIN — BACLOFEN 20 MG: 10 TABLET ORAL at 13:17

## 2025-02-10 RX ADMIN — DEXTROSE AND SODIUM CHLORIDE: 5; 450 INJECTION, SOLUTION INTRAVENOUS at 13:17

## 2025-02-10 RX ADMIN — PIPERACILLIN AND TAZOBACTAM 3.38 G: 3; .375 INJECTION, POWDER, FOR SOLUTION INTRAVENOUS at 16:23

## 2025-02-10 RX ADMIN — ACETYLCYSTEINE 2 ML: 200 SOLUTION ORAL; RESPIRATORY (INHALATION) at 19:50

## 2025-02-10 RX ADMIN — ACETAMINOPHEN 1000 MG: 325 SOLUTION ORAL at 19:25

## 2025-02-10 RX ADMIN — IPRATROPIUM BROMIDE AND ALBUTEROL SULFATE 3 ML: .5; 3 SOLUTION RESPIRATORY (INHALATION) at 13:50

## 2025-02-10 RX ADMIN — IPRATROPIUM BROMIDE AND ALBUTEROL SULFATE 3 ML: .5; 3 SOLUTION RESPIRATORY (INHALATION) at 19:50

## 2025-02-10 RX ADMIN — AZITHROMYCIN MONOHYDRATE 500 MG: 500 INJECTION, POWDER, LYOPHILIZED, FOR SOLUTION INTRAVENOUS at 11:02

## 2025-02-10 RX ADMIN — BACLOFEN 20 MG: 10 TABLET ORAL at 19:00

## 2025-02-10 RX ADMIN — ACETAMINOPHEN 975 MG: 325 TABLET ORAL at 09:47

## 2025-02-10 RX ADMIN — DEXTROSE AND SODIUM CHLORIDE: 5; 450 INJECTION, SOLUTION INTRAVENOUS at 22:42

## 2025-02-10 RX ADMIN — SODIUM CHLORIDE 1000 ML: 9 INJECTION, SOLUTION INTRAVENOUS at 05:48

## 2025-02-10 RX ADMIN — IPRATROPIUM BROMIDE AND ALBUTEROL SULFATE 3 ML: .5; 3 SOLUTION RESPIRATORY (INHALATION) at 16:40

## 2025-02-10 ASSESSMENT — ACTIVITIES OF DAILY LIVING (ADL)
ADLS_ACUITY_SCORE: 69

## 2025-02-10 ASSESSMENT — COLUMBIA-SUICIDE SEVERITY RATING SCALE - C-SSRS: IS THE PATIENT NOT ABLE TO COMPLETE C-SSRS: UNABLE TO VERBALIZE

## 2025-02-10 ASSESSMENT — ENCOUNTER SYMPTOMS
SHORTNESS OF BREATH: 1
COLOR CHANGE: 1

## 2025-02-10 NOTE — ED NOTES
Bed: [facility]-05  Expected date: 2/10/25  Expected time: 5:05 AM  Means of arrival: Ambulance  Comments:  [facility]  49 M SOB

## 2025-02-10 NOTE — PHARMACY-ADMISSION MEDICATION HISTORY
Pharmacist Admission Medication History    Admission medication history is complete. The information provided in this note is only as accurate as the sources available at the time of the update.    Information Source(s): Family member and CareEverywhere/SureScripts via phone    Pertinent Information: no lidocaine patch on;   Fourth baclofen dose is more PRN rather than scheduled    Changes made to PTA medication list:  Added: None  Deleted: None  Changed: senna syrup to prn    Allergies reviewed with patient and updates made in EHR: yes    Medication History Completed By: Dipti Cronin [facility] 2/10/2025 8:56 AM    PTA Med List   Medication Sig Last Dose/Taking    acetaminophen (TYLENOL) 500 MG tablet Place 2 tablets (1,000 mg) into G tube every 6 hours as needed for mild pain Past Week    acetylcysteine (MUCOMYST) 20 % neb solution Take 2 mLs by nebulization every 6 hours as needed (congestion). 2/9/2025    amLODIPine (NORVASC) 5 MG tablet Place 1 tablet (5 mg) into G tube daily. 2/9/2025    baclofen (LIORESAL) 20 MG tablet Place 1 tablet (20 mg) into Feeding Tube 4 times daily. (Patient taking differently: Place 20 mg into Feeding Tube 3 times daily.) 2/10/2025 at  3:00 AM    bisacodyl (DULCOLAX) 10 MG suppository Place 10 mg rectally every other day. 2/8/2025    cefaclor (CECLOR) 250 MG capsule Place 1 capsule (250 mg) into G tube daily. UTI ppx 2/9/2025    CHEST CONGESTION RELIEF DM  MG/5ML syrup TAKE 10 MLS BY G TUBE ROUTE EVERY 4 HOURS AS NEEDED FOR COUGH. Unknown    docusate (COLACE) 50 MG/5ML liquid Place 10 mLs (100 mg) into Feeding Tube 2 times daily. 2/9/2025    ipratropium - albuterol 0.5 mg/2.5 mg/3 mL (DUONEB) 0.5-2.5 (3) MG/3ML neb solution Take 1 vial (3 mLs) by nebulization every 4 hours. 2/9/2025    ketoconazole (NIZORAL) 2 % external cream Apply topically 2 times daily to affected area(s). 2/9/2025    LIDOCAINE PAIN RELIEF 4 % Patch Place 1 patch onto the skin daily as needed for moderate pain.  Unknown    meclizine (ANTIVERT) 25 MG tablet Take 1 tablet via G-tube every 6 hours as needed for vertigo Unknown    metoprolol tartrate (LOPRESSOR) 25 MG tablet Place 1 tablet (25 mg) into Feeding Tube 2 times daily. 2/9/2025 Bedtime    neomycin-bacitracin-polymyxin (NEOSPORIN) 5-400-5000 ointment Apply topically 3 times daily as needed (irritation) To J Tube site Unknown    nystatin (MYCOSTATIN) 769465 UNIT/GM external cream Apply topically 2 times daily as needed (redness) Unknown    nystatin-triamcinolone (MYCOLOG II) 521716-9.1 UNIT/GM-% external cream Apply topically 2 times daily. 2/9/2025    omeprazole (PRILOSEC) 40 MG DR capsule Take 1 capsule (40 mg) by mouth daily. IN G TUBE 2/9/2025    ondansetron (ZOFRAN) 4 MG tablet Place 1 tablet (4 mg) into G tube every 8 hours as needed for nausea Unknown    polyethylene glycol (MIRALAX) 17 GM/Dose powder Give 1 capful via G-tube daily as needed for constipation Unknown    Polyethylene Glycol 400 (VISINE DRY EYE RELIEF) 1 % SOLN Apply 1 drop to eye every 6 hours as needed for dry eyes. Unknown    QUEtiapine (SEROQUEL) 25 MG tablet TAKE 1 TABLET BY MOUTH AT 7:00PM, MIDNIGHT AND 3:00AM FOR TOTAL OF 75 MG NIGHTLY. OK TO REPEAT AFTER 4 HOURS AND DURING THE DAY AS NEEDED. 2/10/2025 at  3:00 AM    rosuvastatin (CRESTOR) 10 MG tablet Place 1 tablet (10 mg) into G tube daily. 2/9/2025    sennosides (SENOKOT) 8.8 MG/5ML syrup Take 5 mLs by mouth 2 times daily as needed for constipation. Unknown    sodium chloride (NEBUSAL) 3 % neb solution TAKE 4 MLS BY NEBULIZATION EVERY 6 HOURS AS NEEDED FOR THICK SPUTUM. Unknown    triamcinolone (KENALOG) 0.1 % external cream Apply topically daily as needed for irritation. Apply to affected area/rash Unknown

## 2025-02-10 NOTE — ED TRIAGE NOTES
Pt arrives to ed via ems with c/o of increased hr and work of breathing was here yesterday for same reason. Pt has new redness in arms after yesterdays ct. Was given duoneb via ems     Triage Assessment (Adult)       Row Name 02/10/25 0518          Triage Assessment    Airway WDL WDL        Respiratory WDL    Respiratory WDL X;rhythm/pattern     Rhythm/Pattern, Respiratory shortness of breath        Cognitive/Neuro/Behavioral WDL    Cognitive/Neuro/Behavioral WDL X  nonverbal

## 2025-02-10 NOTE — PROGRESS NOTES
CLINICAL NUTRITION SERVICES - ASSESSMENT NOTE    RECOMMENDATIONS FOR MDs/PROVIDERS TO ORDER:  Please adjust IVF now that getting FWF and TF    Malnutrition Status:    Does not meet 2 criteria    Registered Dietitian Interventions:  1) Vital 1.5 via g-tube at 60 mL/hr x 12 hrs (8am-8pm) and 30 mL x12 hrs (8pm-8am).  At goal rate, formula provides total volume of 1080 mL, 1620 kcal (27 kcal/kg, 100% needs), 73 gm Pro (1.2 gm/kg, 100% needs), 201 gm CHO, 61 gm fat, 6 gm fiber, and 825 mL free water per DW of 59 kg.    2) Free water flushes of 120 mL q 2 hrs (8am-8pm). Total fluid (free water + flushes) = 1545 mL per day.    Future/Additional Recommendations:  Will monitor progress towards goals     REASON FOR ASSESSMENT  Provider order - Registered Dietitian to order TF per Medical Nutrition Therapy Guidelines    Pertinent Medical Admission/History: sepsis, aphasia, dysphagia, GJ tube dependence, spastic quadriplegia, HTN, GERD, CKD    SUBJECTIVE INFORMATION  Assessed patient in room.    NUTRITION HISTORY  Home nutrition support plan: Home regimen was 4 cartons Peptamen with perbio1 per day and 120ml FWF q2 during the day, pt runs at 60 during the day and 30ml at night     Previously admitted and substituted with 1) Vital 1.5 via g-tube at 60 mL/hr x 12 hrs (8am-8pm) and 30 mL x12 hrs (8pm-8am).  At goal rate, formula provides total volume of 1080 mL, 1620 kcal (27 kcal/kg, 100% needs), 73 gm Pro (1.2 gm/kg, 100% needs), 201 gm CHO, 61 gm fat, 6 gm fiber, and 825 mL free water per DW of 59 kg.    2) Free water flushes of 120 mL q 2 hrs (8am-8pm). Total fluid (free water + flushes) = 1545 mL per day.    CURRENT NUTRITION ORDERS  Diet: NPO      LABS  Nutrition-relevant labs:  Na-146, Bun-39.5, Cr-1.62, Gluc-135    MEDICATIONS  Nutrition-relevant medications:  colace, dulcolax, protonix  IVF of D5% NaCL at 125ml/hr    ANTHROPOMETRICS  Height: 5'  Most Recent Weight: 60.5 kg (133 lb 4.8 oz)  IBW: 52 kg  % IBW: 116%  BMI  (kg/m ): Overweight BMI 25-29.9  Weight History:   Wt Readings from Last 10 Encounters:   02/10/25 60.5 kg (133 lb 4.8 oz)   02/09/25 59 kg (130 lb)   12/25/24 60.2 kg (132 lb 11.2 oz)   11/17/24 56.7 kg (125 lb)   07/18/24 54 kg (119 lb)   01/08/24 59 kg (130 lb)   10/25/23 60.3 kg (133 lb)   07/18/23 54.1 kg (119 lb 4.3 oz)   06/21/23 59 kg (130 lb)   06/07/23 59 kg (130 lb)       Dosing Weight: 60 kg, based on actual wt    ASSESSED NUTRITION NEEDS  Estimated Energy Needs: 4332-0371 kcals/day (25 - 30 kcals/kg)  Justification: Maintenance  Estimated Protein Needs: 60-72 grams protein/day (1 - 1.2 grams of pro/kg)  Justification: Maintenance  Estimated Fluid Needs: 7949-5526 mL/day (1 mL/kcal)  Justification: Maintenance    SYSTEM FINDINGS    Edema: +1 bilateral arm  Skin/wounds: no skin breakdown  GI symptoms: WDL    MALNUTRITION  % Intake: No decreased intake noted  % Weight Loss: None noted  Subcutaneous Fat Loss: None observed  Muscle Loss: None observed  Fluid Accumulation/Edema: Mild, 1+  Malnutrition Diagnosis: Patient does not meet two of the established criteria necessary for diagnosing malnutrition  Malnutrition Present on Admission: No    NUTRITION DIAGNOSIS  Swallowing difficulty related to dysphagia as evidenced by chronic TF     INTERVENTIONS  1) Vital 1.5 via g-tube at 60 mL/hr x 12 hrs (8am-8pm) and 30 mL x12 hrs (8pm-8am).  At goal rate, formula provides total volume of 1080 mL, 1620 kcal (27 kcal/kg, 100% needs), 73 gm Pro (1.2 gm/kg, 100% needs), 201 gm CHO, 61 gm fat, 6 gm fiber, and 825 mL free water per DW of 59 kg.    2) Free water flushes of 120 mL q 2 hrs (8am-8pm). Total fluid (free water + flushes) = 1545 mL per day.    Goals  Total avg nutritional intake to meet a minimum of 25-30 kcal/kg and 1-1.2 g PRO/kg daily (per dosing wt 60 kg).     Monitoring/Evaluation  Progress toward goals will be monitored and evaluated per policy.

## 2025-02-10 NOTE — H&P
[facility] MEDICINE ADMISSION HISTORY AND PHYSICAL   Date of Admission: 2/10/2025  Liu Malave, 1965, [id number]    Assessment and Plan:   Liu Malave is a 59 year old male with PMH signficant for CVA with basal ganglia hemorrhage in 2016 with extensive residual deficits, aphasia, dysphagia with GJ tube dependence, spastic quadriplegia, hypertension, dyslipidemia, seizure disorder, chronic hep C, ESBL urine, CKD 3A and GERD. Has a positive Cologuard test. Not pursuing colonoscopy.  Recurrent hospitalizations for pulmonary disease most recent late December 2024.  Full code.  2/9 seen ED for cough.  Benign workup.  Given IV fluids.  Discharged home.  2/10 returned with fever of 99 now up to 100.1.  Tachycardic and hypertensive.  Less responsive than usual.  Creatinine elevated 1.76.  Procalcitonin low.  White count 10.1.  Chest CT shows stable mild bronchiolitis.  Admit.      Sepsis (fever, tachycardia, hypoxia)  History of aspiration pneumonitis.  Bronchiectasis  Inpatient MedSur telemetry admission.  Expect length of stay greater than 2 nights.  Unclear etiology to patient's symptoms.  Will focus on a respiratory standpoint given this is his highest area of risk.  Utilize sepsis protocols.  Check blood cultures and urinalysis.  Continue intravenous Zosyn and azithromycin per protocols.  Checking COVID/influenza/respiratory viral panel.  Try to obtain a sputum sample.  Order home scheduled and as needed DuoNebs, Mucomyst nebs and NebuSal as needed.  CODE STATUS full code verified with family.    Acute on chronic kidney disease stage IIIa  Admission creatinine 1.76.  Baseline is closer to 1.5.  Hydrate per sepsis protocols.  Give D5 half-normal saline at 125 mm/h.  Follow BMP.    Hepatitis C chronic carrier  September 2023 RNA was 13,900,000 units.  CT liver imaging unremarkable.  Uncertain if this could have something to do with his presentation.  Check LFTs and hepatitis C RNA  quant.    History of hemorrhagic CVA 2016  Spastic quadriplegia  Aphasia  Seizure disorder  Asked family to bring in his communication device.  Family notes he when he has been sick he has been sticking his tongue out more but denies any apparent active seizures.  Order home baclofen and Seroquel.  Not on any chronic seizure med at this time.    GERD  Feeding tube dependent  Okay to continue his regular home tube feedings at this time.  Order RD consult.  Order daily omeprazole.  Hold on his home preventative cefaclor.    Essential hypertension  Order home Norvasc 5 mg daily and metoprolol 25 mg twice daily.    Hyperlipidemia  Order home Crestor.    History of positive Cologuard  Family is declining colonoscopy or further workup.  No abdominal abnormalities noted on CT imaging.    History of ESBL UTI  Noted.  Contact precautions.    Anticoagulation   Pneumatic Compression Devices    COVID19 PCR/influenza A/B/RSV negative  FoleyNot present    Code Status: Full Code    Disposition: Inpatient   Medically Ready for Discharge: Anticipated in 2-4 Days     Lines/Drains/Airways       PIV Line  Duration             Peripheral IV 02/10/25 Right Hand <1 day              Drain  Duration             Gastrostomy/Enterostomy Gastrojejunostomy LUQ 18 fr Avanos LAURA GJ tube, lot #[id number] 53 days                    Clinically Significant Risk Factors Present on Admission         # Hypernatremia: Highest Na = 146 mmol/L in last 2 days, will monitor as appropriate  # Hyperchloremia: Highest Cl = 117 mmol/L in last 2 days, will monitor as appropriate      # Hypocalcemia: Lowest Ca = 7.8 mg/dL in last 2 days, will monitor and replace as appropriate           # Hypertension: Noted on problem list      # Anemia: based on hgb <11           # Financial/Environmental Concerns:           Chief Complaint Fever and cough     HISTORY     Liu GIL Malave is a 59 year old male with PMH of CVA with basal ganglia hemorrhage in 2016 with extensive  residual deficits, aphasia, dysphagia with GJ tube dependence, spastic quadriplegia, hypertension, dyslipidemia, seizure disorder, chronic hep C, ESBL urine, CKD 3A and GERD. Has a positive Cologuard test. Not pursuing colonoscopy.  Recurrent hospitalizations for pulmonary disease most recent late December 2024.  Full code.  2/9 seen ED for cough.  Benign workup.  Given IV fluids.  Discharged home.  2/10 returned with fever of 99 now up to 100.1.  Tachycardic and hypertensive.  Less responsive than usual.  Creatinine elevated 1.76.  Procalcitonin low.  White count 10.1.  Chest CT shows stable mild bronchiolitis.  Admit.  Family notes over the last week he has been less responsive.  Having more spasticity.  Having some episodes where he is sticking out his tongue.  They do not feel like he is having active seizures though.  Not requiring extra oxygen though he has been borderline while here.  Notes that family members have been dealing with norovirus..  Denies any changes in his urination.  No diarrhea or constipation.  Does seem to have some increased abdominal pain going to the family.  Did verify that they still wish for him to be full code.  They have not yet followed up with neurology since the last hospitalization..  Questions answered to verbalize satisfaction.    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  No date: Convulsions, unspecified convulsion type (H)  07/2016: CVA (cerebral vascular accident) (H)      Comment:  Hemorrhagic,   No date: Depression  No date: Dyslipidemia  No date: Elevated troponin  No date: Essential hypertension  06/04/2020: Feeding by G-tube (H)  No date: GERD (gastroesophageal reflux disease)  No date: Hemiplegia of nondominant side, late effect of   cerebrovascular disease (H)      Comment:  Created by  Conversion  Replacement Utility updated for                latest IMO load  06/05/2016: Hemorrhagic stroke (H)  No date: Hepatitis C carrier (H)  No date: History of ESBL E. coli infection  No date: History of hemorrhagic stroke with residual hemiparesis (H)  No date: Hypertension  12/05/2016: Lung nodule      Comment:  9 x 9 mm left upper lobe on CXR  No date: MRSA (methicillin resistant staph aureus) culture positive  No date: Periodontal disease  No date: Persistent fever  No date: Pneumonia of both lower lobes due to infectious organism  No date: Respiratory failure with hypoxia (H)  No date: Seizure (H)  06/04/2020: Sepsis due to urinary tract infection (H)  No date: Severe sepsis (H)  No date: Trismus     Patient Active Problem List    Diagnosis Date Noted    Fever, unspecified fever cause 02/10/2025     Priority: Medium    Acute sepsis (H) 02/10/2025     Priority: Medium    Chronic anemia 12/26/2024     Priority: Medium    Essential hypertension 12/23/2024     Priority: Medium    Other constipation 12/23/2024     Priority: Medium    Positive blood culture 12/23/2024     Priority: Medium    Bronchopneumonia 12/22/2024     Priority: Medium    Acute on chronic renal insufficiency 12/22/2024     Priority: Medium    Altered mental status, unspecified altered mental status type 12/22/2024     Priority: Medium    Fever in adult 11/15/2024     Priority: Medium    Abdominal pain of unknown cause 11/15/2024     Priority: Medium    Acute cough 11/15/2024     Priority: Medium    Jaw dislocation, initial encounter 10/02/2024     Priority: Medium    History of aspiration pneumonia 07/23/2024     Priority: Medium    History of CVA (cerebrovascular accident) 09/27/2023     Priority: Medium    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) 04/25/2023     Priority: Medium    On tube feeding diet 04/25/2023     Priority: Medium    Aspiration pneumonitis (H)  02/10/2023     Priority: Medium    Malnutrition, unspecified type 09/28/2022     Priority: Medium    Oropharyngeal dysphagia 05/31/2022     Priority: Medium     On tube feedings   6 am to 8 pm 50ml/hr and overnight 30 ml/hr, 1.5 peptamine with probiotic       Chronic hepatitis C without hepatic coma (H) 04/06/2022     Priority: Medium     Chronic status checked in 9/23. Saw [facility] who decided not to treat in 2015 due to medication cost and no liver dysfunction. Rereffered in 10/23      Hemorrhagic stroke (H) 06/05/2016     Priority: Medium     Mr. Malave is a 51 year old Nauruan-speaking male with a past history of  right-sided CVA with residual left-sided weakness, anxiety, hypertension, dyslipidemia, GERD, dysphagia and hepatitis C who presented to [facility] on 6/5/2016 with shortness of breath and altered mental status.  He was Transferred to [facility] ICU for management of acute hemorrhagic stroke with vasogenic edema, midline shift and witnessed seizure event.  He was intubated for airway protection.     Neurosurgery was consulted who managed the patient with ICP reduction:  HOB above 30, keep SBP below 140, therapeutic Hypernatremia and serial CT scans.  The patient had known left-sided weakness due to a right-sided aneurysm with clipping in the past.    Neurology was consulted who placed the patient on Keppra on admission.   Vimpat was added on on 6/6/2016 due to witnessed leg tremors although no seizures were evident by EEG.        Surgical History     Past Surgical History:   Procedure Laterality Date    aneurysm clipping  2006    BRAIN SURGERY      EXAM UNDER ANESTHESIA, RESTORATIONS, EXTRACTION(S) DENTAL COMPLEX, COMBINED N/A 12/19/2016    Procedure: COMBINED EXAM UNDER ANESTHESIA, RESTORATIONS, EXTRACTION(S) DENTAL COMPLEX;  Surgeon: Verónica Martinez DDS;  Location: [facility] OR    EXAM UNDER ANESTHESIA, RESTORATIONS, EXTRACTION(S) DENTAL COMPLEX, COMBINED N/A 3/7/2018    Procedure:  COMBINED EXAM UNDER ANESTHESIA, RESTORATIONS, EXTRACTION(S) DENTAL COMPLEX;  Kenalog Injection in Bilateral [place], Combined Dental Exam,Radiographs, Three Dental Restorations, Periodontal Therapy and Fluoride Treatment;  Surgeon: Darryl Isbell DDS;  Location: [facility] OR    HH MIDLINE INSERTION  8/11/2017         INJECT STEROID (LOCATION) N/A 3/7/2018    Procedure: INJECT STEROID (LOCATION);  Kenalog Injection In Bilateral [place], Combined Dental Exam,Radiographs,Three Dental Restorations, Periodontal Therapy and Fluoride Treatment;  Surgeon: Liu Conroy DDS;  Location: [facility] OR    IR GASTRO JEJUNOSTOMY TUBE CHANGE  7/19/2016    IR GASTRO JEJUNOSTOMY TUBE CHANGE  9/23/2016    IR GASTRO JEJUNOSTOMY TUBE CHANGE  11/28/2016    IR GASTRO JEJUNOSTOMY TUBE CHANGE  1/27/2017    IR GASTRO JEJUNOSTOMY TUBE CHANGE  4/19/2017    IR GASTRO JEJUNOSTOMY TUBE CHANGE  6/20/2017    IR GASTRO JEJUNOSTOMY TUBE CHANGE  8/7/2017    IR GASTRO JEJUNOSTOMY TUBE CHANGE  8/11/2017    IR GASTRO JEJUNOSTOMY TUBE CHANGE  10/20/2017    IR GASTRO JEJUNOSTOMY TUBE CHANGE  1/2/2018    IR GASTRO JEJUNOSTOMY TUBE CHANGE  2/16/2018    IR GASTRO JEJUNOSTOMY TUBE CHANGE  5/16/2018    IR GASTRO JEJUNOSTOMY TUBE CHANGE  6/11/2018    IR GASTRO JEJUNOSTOMY TUBE CHANGE  9/12/2018    IR GASTRO JEJUNOSTOMY TUBE CHANGE  12/24/2018    IR GASTRO JEJUNOSTOMY TUBE CHANGE  3/18/2019    IR GASTRO JEJUNOSTOMY TUBE CHANGE  4/23/2019    IR GASTRO JEJUNOSTOMY TUBE CHANGE  5/21/2019    IR GASTRO JEJUNOSTOMY TUBE CHANGE  9/9/2019    IR GASTRO JEJUNOSTOMY TUBE CHANGE  9/26/2019    IR GASTRO JEJUNOSTOMY TUBE CHANGE  10/11/2019    IR GASTRO JEJUNOSTOMY TUBE CHANGE  11/21/2019    IR GASTRO JEJUNOSTOMY TUBE CHANGE  11/29/2019    IR GASTRO JEJUNOSTOMY TUBE CHANGE  2/27/2020    IR GASTRO JEJUNOSTOMY TUBE CHANGE  4/10/2020    IR GASTRO JEJUNOSTOMY TUBE CHANGE  7/2/2020    IR GASTRO JEJUNOSTOMY TUBE CHANGE  8/28/2020    IR GASTRO JEJUNOSTOMY TUBE CHANGE  9/17/2020    IR  GASTRO JEJUNOSTOMY TUBE CHANGE  10/29/2020    IR GASTRO JEJUNOSTOMY TUBE CHANGE  11/6/2020    IR GASTRO JEJUNOSTOMY TUBE CHANGE  12/24/2020    IR GASTRO JEJUNOSTOMY TUBE CHANGE  1/9/2021    IR GASTRO JEJUNOSTOMY TUBE CHANGE  6/9/2021    IR GASTRO JEJUNOSTOMY TUBE CHANGE  10/4/2021    IR GASTRO JEJUNOSTOMY TUBE CHANGE  10/18/2021    IR GASTRO JEJUNOSTOMY TUBE CHANGE  1/14/2022    IR GASTRO JEJUNOSTOMY TUBE CHANGE  1/20/2022    IR GASTRO JEJUNOSTOMY TUBE CHANGE  4/19/2022    IR GASTRO JEJUNOSTOMY TUBE CHANGE  6/7/2022    IR GASTRO JEJUNOSTOMY TUBE CHANGE  8/19/2022    IR GASTRO JEJUNOSTOMY TUBE CHANGE  9/13/2022    IR GASTRO JEJUNOSTOMY TUBE CHANGE  10/27/2022    IR GASTRO JEJUNOSTOMY TUBE CHANGE  11/3/2022    IR GASTRO JEJUNOSTOMY TUBE CHANGE  1/18/2023    IR GASTRO JEJUNOSTOMY TUBE CHANGE  4/7/2023    IR GASTRO JEJUNOSTOMY TUBE CHANGE  5/4/2023    IR GASTRO JEJUNOSTOMY TUBE CHANGE  6/21/2023    IR GASTRO JEJUNOSTOMY TUBE CHANGE  8/23/2023    IR GASTRO JEJUNOSTOMY TUBE CHANGE  11/16/2023    IR GASTRO JEJUNOSTOMY TUBE CHANGE  1/8/2024    IR GASTRO JEJUNOSTOMY TUBE CHANGE  4/9/2024    IR GASTRO JEJUNOSTOMY TUBE CHANGE  7/19/2024    IR GASTRO JEJUNOSTOMY TUBE CHANGE  10/21/2024    IR GASTRO JEJUNOSTOMY TUBE CHANGE  11/15/2024    IR GASTRO JEJUNOSTOMY TUBE CHANGE  11/25/2024    IR GASTRO JEJUNOSTOMY TUBE CHANGE  12/10/2024    IR GASTRO JEJUNOSTOMY TUBE CHANGE  12/18/2024    IR GASTRO JEJUNOSTOMY TUBE PLACEMENT  6/14/2016    IR GJ TUBE REPLACEMENT  12/24/2018    IR GJ TUBE REPLACEMENT  3/18/2019    IR GJ TUBE REPLACEMENT  4/23/2019    IR GJ TUBE REPLACEMENT  5/21/2019    IR GJ TUBE REPLACEMENT  9/9/2019    IR GJ TUBE REPLACEMENT  9/26/2019    IR GJ TUBE REPLACEMENT  10/11/2019    IR GJ TUBE REPLACEMENT  11/21/2019    IR GJ TUBE REPLACEMENT  11/29/2019    IR GJ TUBE REPLACEMENT  2/27/2020    IR GJ TUBE REPLACEMENT  4/10/2020    IR GJ TUBE REPLACEMENT  7/2/2020    IR GJ TUBE REPLACEMENT  8/28/2020    IR GJ TUBE REPLACEMENT   "2020    IR GJ TUBE REPLACEMENT  10/29/2020    IR GJ TUBE REPLACEMENT  2020    IR GJ TUBE REPLACEMENT  2020    IR GJ TUBE REPLACEMENT  2021    IR GJ TUBE REPLACEMENT  2021    J-G tube      PICC  6/10/2016         TRACHEOSTOMY      Removed     Family History      Family History   Problem Relation Age of Onset    Hypertension Mother     Cancer Father     Other Cancer Father     Kidney Disease Brother       Social History      Social History     Tobacco Use    Smoking status: Former     Current packs/day: 0.00     Average packs/day: 1 pack/day for 30.0 years (30.0 ttl pk-yrs)     Types: Cigarettes     Start date: 1975     Quit date: 2005     Years since quittin.6     Passive exposure: Past    Smokeless tobacco: Former     Quit date: 2007    Tobacco comments:     quit 2006   Vaping Use    Vaping status: Never Used   Substance Use Topics    Alcohol use: No    Drug use: No      Allergies     Allergies   Allergen Reactions    Diphenhydramine Unknown    Trazodone Other (See Comments)     \"shaking\" per family    Contrast Dye Rash     Rash and agitation/restless     Prior to Admission Medications      Prior to Admission Medications   Prescriptions Last Dose Informant Patient Reported? Taking?   CHEST CONGESTION RELIEF DM  MG/5ML syrup Unknown  No Yes   Sig: TAKE 10 MLS BY G TUBE ROUTE EVERY 4 HOURS AS NEEDED FOR COUGH.   Catheters (EVERYDAY MALE EXTERNAL CATH/SM) MISC   No No   Si catheter daily.   LIDOCAINE PAIN RELIEF 4 % Patch Unknown  No Yes   Sig: Place 1 patch onto the skin daily as needed for moderate pain.   Oral Hygiene Products (Q-CARE COVERD YANKAUER/SUCTION) MISC   No No   Sig: Take 1 Device by mouth every hour as needed (secretions).   Ostomy Supplies (STOMAHESIVE) PSTE   No No   Sig: Apply topically to skin around ostomy site twice daily and as needed   Polyethylene Glycol 400 (VISINE DRY EYE RELIEF) 1 % SOLN Unknown  No Yes   Sig: Apply 1 drop to eye every 6 " hours as needed for dry eyes.   QUEtiapine (SEROQUEL) 25 MG tablet 2/10/2025 at  3:00 AM  No Yes   Sig: TAKE 1 TABLET BY MOUTH AT 7:00PM, MIDNIGHT AND 3:00AM FOR TOTAL OF 75 MG NIGHTLY. OK TO REPEAT AFTER 4 HOURS AND DURING THE DAY AS NEEDED.   acetaminophen (TYLENOL) 500 MG tablet Past Week  No Yes   Sig: Place 2 tablets (1,000 mg) into G tube every 6 hours as needed for mild pain   acetylcysteine (MUCOMYST) 20 % neb solution 2/9/2025  No Yes   Sig: Take 2 mLs by nebulization every 6 hours as needed (congestion).   amLODIPine (NORVASC) 5 MG tablet 2/9/2025  No Yes   Sig: Place 1 tablet (5 mg) into G tube daily.   baclofen (LIORESAL) 20 MG tablet 2/10/2025 at  3:00 AM  No Yes   Sig: Place 1 tablet (20 mg) into Feeding Tube 4 times daily.   Patient taking differently: Place 20 mg into Feeding Tube 3 times daily.   bisacodyl (DULCOLAX) 10 MG suppository 2/8/2025  Yes Yes   Sig: Place 10 mg rectally every other day.   cefaclor (CECLOR) 250 MG capsule 2/9/2025  No Yes   Sig: Place 1 capsule (250 mg) into G tube daily. UTI ppx   docusate (COLACE) 50 MG/5ML liquid 2/9/2025  No Yes   Sig: Place 10 mLs (100 mg) into Feeding Tube 2 times daily.   ipratropium - albuterol 0.5 mg/2.5 mg/3 mL (DUONEB) 0.5-2.5 (3) MG/3ML neb solution 2/9/2025  No Yes   Sig: Take 1 vial (3 mLs) by nebulization every 4 hours.   ketoconazole (NIZORAL) 2 % external cream 2/9/2025  No Yes   Sig: Apply topically 2 times daily to affected area(s).   meclizine (ANTIVERT) 25 MG tablet Unknown  No Yes   Sig: Take 1 tablet via G-tube every 6 hours as needed for vertigo   metoprolol tartrate (LOPRESSOR) 25 MG tablet 2/9/2025 Bedtime  No Yes   Sig: Place 1 tablet (25 mg) into Feeding Tube 2 times daily.   neomycin-bacitracin-polymyxin (NEOSPORIN) 5-400-5000 ointment Unknown  No Yes   Sig: Apply topically 3 times daily as needed (irritation) To J Tube site   nystatin (MYCOSTATIN) 264899 UNIT/GM external cream Unknown  No Yes   Sig: Apply topically 2 times  daily as needed (redness)   nystatin-triamcinolone (MYCOLOG II) 377211-0.1 UNIT/GM-% external cream 2/9/2025  No Yes   Sig: Apply topically 2 times daily.   omeprazole (PRILOSEC) 40 MG DR capsule 2/9/2025  No Yes   Sig: Take 1 capsule (40 mg) by mouth daily. IN G TUBE   ondansetron (ZOFRAN) 4 MG tablet Unknown  No Yes   Sig: Place 1 tablet (4 mg) into G tube every 8 hours as needed for nausea   polyethylene glycol (MIRALAX) 17 GM/Dose powder Unknown  No Yes   Sig: Give 1 capful via G-tube daily as needed for constipation   rosuvastatin (CRESTOR) 10 MG tablet 2/9/2025  No Yes   Sig: Place 1 tablet (10 mg) into G tube daily.   sennosides (SENOKOT) 8.8 MG/5ML syrup Unknown  Yes Yes   Sig: Take 5 mLs by mouth 2 times daily as needed for constipation.   sodium chloride (NEBUSAL) 3 % neb solution Unknown  No Yes   Sig: TAKE 4 MLS BY NEBULIZATION EVERY 6 HOURS AS NEEDED FOR THICK SPUTUM.   triamcinolone (KENALOG) 0.1 % external cream Unknown  No Yes   Sig: Apply topically daily as needed for irritation. Apply to affected area/rash      Facility-Administered Medications: None      Review of Systems     A 12 point comprehensive review of systems was negative except as noted above in HPI.    PHYSICAL EXAMINATION     Vitals      Temp:  [98.6  F (37  C)-100.1  F (37.8  C)] 100.1  F (37.8  C)  Pulse:  [] 138  Resp:  [11-31] 17  BP: (128-162)/(75-98) 157/98  SpO2:  [90 %-96 %] 90 %    Examination     Constitutional: fatigued, patient's alertness fluctuates.  Did fall asleep during a portion of my visit.  Did not make good eye contact.  Was not following commands.  Having some spasticity spasming noted on the left upper and lower extremity., uncooperative, mild distress, appears older than stated age, and thin  Eyes: Lids and lashes normal, pupils equal, round and reactive to light, extra ocular muscles intact, sclera clear, conjunctiva normal and difficult to assess due to his neurologic status but no obvious abnormalities  noted.  ENT: Does have notable deformity to the right temporal area consistent with his previous craniotomy.  Throat is quite and dry.  Secretions present.  Hematologic / Lymphatic: no cervical lymphadenopathy and no supraclavicular lymphadenopathy  Respiratory: Poor air movement.  Some rhonchi at the bases bilaterally.  No wheezing.  Cardiovascular: Tachycardic regular rhythm  GI: Positive bowel sounds soft nontender nondistended.  Did not appear to grimace while pushing on his abdomen.  Skin: normal skin color, texture, turgor, no redness, warmth, or swelling, and no rashes  Musculoskeletal: Patient has notable spasticity with all 4 extremities.  He has extension at his right lower extremity flexion at both of his arms and flexion with some kicking to his left leg. no lower extremity pitting edema present  Neurologic: Difficult to assess due to his neurologic condition. Sensory:  Sensory intact  Difficult to assess.  Deep Tendon Reflexes: Unable to perform due to his spasticity.  Neuropsychiatric: General: fidgeting and poor eye contact Level of consciousness: lethargic Affect: Unable to assess. Orientation: Unable to assess. Memory and insight:       Pertinent Lab     Results for orders placed or performed during the hospital encounter of 02/10/25 (from the past 24 hours)   CBC with platelets differential    Narrative    The following orders were created for panel order CBC with platelets differential.  Procedure                               Abnormality         Status                     ---------                               -----------         ------                     CBC with platelets and d...[[id number]]  Abnormal            Final result                 Please view results for these tests on the individual orders.   Procalcitonin   Result Value Ref Range    Procalcitonin 0.17 <0.50 ng/mL   Lactic acid whole blood with 1x repeat in 2 hr when >2   Result Value Ref Range    Lactic Acid, Initial 0.7 0.7 - 2.0  mmol/L   Basic metabolic panel   Result Value Ref Range    Sodium 144 135 - 145 mmol/L    Potassium 4.9 3.4 - 5.3 mmol/L    Chloride 114 (H) 98 - 107 mmol/L    Carbon Dioxide (CO2) 20 (L) 22 - 29 mmol/L    Anion Gap 10 7 - 15 mmol/L    Urea Nitrogen 45.0 (H) 8.0 - 23.0 mg/dL    Creatinine 1.76 (H) 0.67 - 1.17 mg/dL    GFR Estimate 43 (L) >60 mL/min/1.73m2    Calcium 8.4 (L) 8.8 - 10.4 mg/dL    Glucose 142 (H) 70 - 99 mg/dL   Nt probnp inpatient (BNP)   Result Value Ref Range    N terminal Pro BNP Inpatient 365 0 - 900 pg/mL   [place] Draw    Narrative    The following orders were created for panel order [place] Draw.  Procedure                               Abnormality         Status                     ---------                               -----------         ------                     Extra Blue Top Tube[[id number]]                              Final result               Extra Red Top Tube[[id number]]                               Final result               Extra Green Top (Lithium...[[id number]]                      Final result               Extra Purple Top Tube[[id number]]                                                         Please view results for these tests on the individual orders.   Blood gas venous   Result Value Ref Range    pH Venous 7.41 7.32 - 7.43    pCO2 Venous 39 (L) 40 - 50 mm Hg    pO2 Venous 61 (H) 25 - 47 mm Hg    Bicarbonate Venous 24 21 - 28 mmol/L    Base Excess/Deficit Venous -0.3 -3.0 - 3.0 mmol/L    FIO2 35     Oxyhemoglobin Venous 92 (H) 70 - 75 %    O2 Sat, Venous 93.4 (H) 70.0 - 75.0 %    Narrative    In healthy individuals, oxyhemoglobin (O2Hb) and oxygen saturation (SO2) are approximately equal. In the presence of dyshemoglobins, oxyhemoglobin can be considerably lower than oxygen saturation.   CBC with platelets and differential   Result Value Ref Range    WBC Count 10.1 4.0 - 11.0 10e3/uL    RBC Count 3.40 (L) 4.40 - 5.90 10e6/uL    Hemoglobin 10.2 (L) 13.3 - 17.7 g/dL     Hematocrit 31.5 (L) 40.0 - 53.0 %    MCV 93 78 - 100 fL    MCH 30.0 26.5 - 33.0 pg    MCHC 32.4 31.5 - 36.5 g/dL    RDW 13.0 10.0 - 15.0 %    Platelet Count 251 150 - 450 10e3/uL    % Neutrophils 78 %    % Lymphocytes 12 %    % Monocytes 9 %    % Eosinophils 1 %    % Basophils 0 %    % Immature Granulocytes 0 %    NRBCs per 100 WBC 0 <1 /100    Absolute Neutrophils 7.9 1.6 - 8.3 10e3/uL    Absolute Lymphocytes 1.2 0.8 - 5.3 10e3/uL    Absolute Monocytes 0.9 0.0 - 1.3 10e3/uL    Absolute Eosinophils 0.1 0.0 - 0.7 10e3/uL    Absolute Basophils 0.0 0.0 - 0.2 10e3/uL    Absolute Immature Granulocytes 0.0 <=0.4 10e3/uL    Absolute NRBCs 0.0 10e3/uL   Extra Blue Top Tube   Result Value Ref Range    Hold Specimen JIC    Extra Red Top Tube   Result Value Ref Range    Hold Specimen JIC    Extra Green Top (Lithium Heparin) Tube   Result Value Ref Range    Hold Specimen JIC    Hepatic function panel   Result Value Ref Range    Protein Total 7.5 6.4 - 8.3 g/dL    Albumin 3.5 3.5 - 5.2 g/dL    Bilirubin Total 0.2 <=1.2 mg/dL    Alkaline Phosphatase 75 40 - 150 U/L    AST 24 0 - 45 U/L    ALT 19 0 - 70 U/L    Bilirubin Direct <0.20 0.00 - 0.30 mg/dL   ECG 12-LEAD WITH MUSE (LHE)   Result Value Ref Range    Systolic Blood Pressure 142 mmHg    Diastolic Blood Pressure 87 mmHg    Ventricular Rate 126 BPM    Atrial Rate 126 BPM    AK Interval 136 ms    QRS Duration 72 ms     ms    QTc 446 ms    P Axis 68 degrees    R AXIS 38 degrees    T Axis 64 degrees    Interpretation ECG       Sinus tachycardia  Otherwise normal ECG  When compared with ECG of 09-Feb-2025 09:55,  No significant change was found  Confirmed by SEE ED PROVIDER NOTE FOR, ECG INTERPRETATION ([id number]),  REMBERTO STEVENSON ([id number]) on 2/10/2025 7:51:01 AM     XR Chest Port 1 View    Narrative    EXAM: XR CHEST PORT 1 VIEW  LOCATION: [facility]  DATE: 2/10/2025    INDICATION: Dyspnea.  COMPARISON: 12/24/2024      Impression    IMPRESSION:  Normal heart size and pulmonary vascularity. No acute infiltrates or consolidation. Minimal linear scarring or atelectasis left lower lung. Aortic calcification. Old right rib fracture. Otherwise unremarkable. No acute findings.   Chest CT w/o contrast    Narrative    EXAM: CT CHEST W/O CONTRAST  LOCATION: [facility]  DATE: 2/10/2025    INDICATION: fever, cough, tachycardia  COMPARISON: 2/9/2025  TECHNIQUE: CT chest without IV contrast. Multiplanar reformats were obtained. Dose reduction techniques were used.  CONTRAST: None.    FINDINGS:   LUNGS AND PLEURA: Mild emphysema. Stable mild diffuse bronchial wall thickening. Stable bibasilar atelectasis. No pleural effusion. Calcified granuloma.    MEDIASTINUM/AXILLAE: No lymphadenopathy. Normal caliber aorta.    CORONARY ARTERY CALCIFICATION: Moderate.    UPPER ABDOMEN: Percutaneous gastrojejunostomy.    MUSCULOSKELETAL: Normal.      Impression    IMPRESSION:   1.  No significant interval change.       Influenza A/B, RSV and SARS-CoV2 PCR (COVID-19) Nasopharyngeal    Specimen: Nasopharyngeal; Swab   Result Value Ref Range    Influenza A PCR Negative Negative    Influenza B PCR Negative Negative    RSV PCR Negative Negative    SARS CoV2 PCR Negative Negative    Narrative    Testing was performed using the Xpert Xpress CoV2/Flu/RSV Assay on the [employer] GeneXpert Instrument. This test should be ordered for the detection of SARS-CoV2, influenza, and RSV viruses in individuals with signs and symptoms of respiratory tract infection. This test is for in vitro diagnostic use under the [place] FDA for laboratories certified under CLIA to perform high or moderate complexity testing. This test has been [place] FDA cleared. A negative result does not rule out the presence of PCR inhibitors in the specimen or target RNA in concentration below the limit of detection for the assay. If only one viral target is positive but coinfection with multiple targets is suspected,  the sample should be re-tested with another FDA cleared, approved, or authorized test, if coninfection would change clinical management. This test was validated by the [facility] [employer]. These laboratories are certified under the Clinical Laboratory Improvement Amendments of 1988 (CLIA-88) as qualified to perfom high complexity laboratory testing.   UA with Microscopic reflex to Culture    Specimen: Urine, Catheter   Result Value Ref Range    Color Urine Light Yellow Colorless, Straw, Light Yellow, Yellow    Appearance Urine Clear Clear    Glucose Urine Negative Negative mg/dL    Bilirubin Urine Negative Negative    Ketones Urine Negative Negative mg/dL    Specific Gravity Urine 1.020 1.001 - 1.030    Blood Urine Negative Negative    pH Urine 5.5 5.0 - 7.0    Protein Albumin Urine 200 (A) Negative mg/dL    Urobilinogen Urine <2.0 <2.0 mg/dL    Nitrite Urine Negative Negative    Leukocyte Esterase Urine Negative Negative    RBC Urine 1 <=2 /HPF    WBC Urine 1 <=5 /HPF    Squamous Epithelials Urine 2 (H) <=1 /HPF    Narrative    Urine Culture not indicated   Magnesium   Result Value Ref Range    Magnesium 2.7 (H) 1.7 - 2.3 mg/dL   Phosphorus   Result Value Ref Range    Phosphorus 3.0 2.5 - 4.5 mg/dL   Procalcitonin   Result Value Ref Range    Procalcitonin 0.16 <0.50 ng/mL   Basic metabolic panel   Result Value Ref Range    Sodium 146 (H) 135 - 145 mmol/L    Potassium 4.5 3.4 - 5.3 mmol/L    Chloride 117 (H) 98 - 107 mmol/L    Carbon Dioxide (CO2) 20 (L) 22 - 29 mmol/L    Anion Gap 9 7 - 15 mmol/L    Urea Nitrogen 39.5 (H) 8.0 - 23.0 mg/dL    Creatinine 1.62 (H) 0.67 - 1.17 mg/dL    GFR Estimate 48 (L) >60 mL/min/1.73m2    Calcium 7.8 (L) 8.8 - 10.4 mg/dL    Glucose 135 (H) 70 - 99 mg/dL   Extra Tube    Narrative    The following orders were created for panel order Extra Tube.  Procedure                               Abnormality         Status                     ---------                                -----------         ------                     Extra Purple Top Tube[[id number]]                            Final result                 Please view results for these tests on the individual orders.   Extra Purple Top Tube   Result Value Ref Range    Hold Specimen JIC      *Note: Due to a large number of results and/or encounters for the requested time period, some results have not been displayed. A complete set of results can be found in Results Review.       Pertinent Radiology     Radiology Results:   Recent Results (from the past 24 hours)   CT Abdomen Pelvis w/o Contrast    Narrative    EXAM: CT ABDOMEN AND PELVIS WITHOUT CONTRAST  LOCATION: [facility]  DATE: 02/09/2025    INDICATION: Abdominal pain.  COMPARISON: None.  TECHNIQUE: CT scan of the abdomen and pelvis was performed without IV contrast. Multiplanar reformats were obtained. Dose reduction techniques were used.  CONTRAST: None.    FINDINGS:   LOWER CHEST: Minimal dependent probable atelectasis. No effusions.    HEPATOBILIARY: No significant mass or bile duct dilatation. No calcified gallstones.     PANCREAS: No significant mass, duct dilatation, or inflammatory change.    SPLEEN: Normal size.    ADRENAL GLANDS: No significant nodules.    KIDNEYS/BLADDER: Contrast present in the urinary collecting system precludes evaluation of small stones. Benign cyst requiring no follow-up. No hydronephrosis.    BOWEL: No obstruction or inflammatory change. G-tube in good position.    LYMPH NODES: No adenopathy demonstrated in the absence of contrast.    VASCULATURE: There are moderate atherosclerotic changes of the visualized aorta and its branches. There is no evidence of aortic aneurysm.    PELVIC ORGANS: No pelvic masses.    MUSCULOSKELETAL: No frankly destructive bony lesions.      Impression    IMPRESSION:   1.  No acute process demonstrated.         XR Chest Port 1 View    Narrative    EXAM: XR CHEST PORT 1 VIEW  LOCATION: [facility]  [facility]  DATE: 2/10/2025    INDICATION: Dyspnea.  COMPARISON: 12/24/2024      Impression    IMPRESSION: Normal heart size and pulmonary vascularity. No acute infiltrates or consolidation. Minimal linear scarring or atelectasis left lower lung. Aortic calcification. Old right rib fracture. Otherwise unremarkable. No acute findings.   Chest CT w/o contrast    Narrative    EXAM: CT CHEST W/O CONTRAST  LOCATION: [facility]  DATE: 2/10/2025    INDICATION: fever, cough, tachycardia  COMPARISON: 2/9/2025  TECHNIQUE: CT chest without IV contrast. Multiplanar reformats were obtained. Dose reduction techniques were used.  CONTRAST: None.    FINDINGS:   LUNGS AND PLEURA: Mild emphysema. Stable mild diffuse bronchial wall thickening. Stable bibasilar atelectasis. No pleural effusion. Calcified granuloma.    MEDIASTINUM/AXILLAE: No lymphadenopathy. Normal caliber aorta.    CORONARY ARTERY CALCIFICATION: Moderate.    UPPER ABDOMEN: Percutaneous gastrojejunostomy.    MUSCULOSKELETAL: Normal.      Impression    IMPRESSION:   1.  No significant interval change.         EKG Results: personally reviewed.   Sinus tachycardia heart rate 126.  Otherwise normal.          Dayday Gonsales MD  Hospitalist  [facility]  [facility]   Phone: #[phone]

## 2025-02-10 NOTE — ED PROVIDER NOTES
EMERGENCY DEPARTMENT ENCOUNTER      NAME: Liu Malave  AGE: 59 year old male  YOB: 1965  MRN: [id number]  EVALUATION DATE & TIME: 2/10/2025  5:12 AM    PCP: Steven Rosa    ED PROVIDER: Julian Emmanuel M.D.    Chief Complaint   Patient presents with    Shortness of Breath       FINAL IMPRESSION:  No diagnosis found.    ED COURSE & MEDICAL DECISION MAKING:    Pertinent Labs & Imaging studies independently interpreted by me. (See chart for details)  Reviewed emergency department visit yesterday when patient was seen for a cough, at that time labs demonstrated creatinine 1.52 which is stable for the patient, normal lactate, normal hepatic panel, stable troponin, leukocytosis, negative respiratory panel, CT PE study with bronchiolitis and CT of the abdomen without abnormalities.  Patient was not discharged with any medications.    ED Course as of 02/11/25 1009   Mon Feb 10, 2025   0527 Patient seen and examined, presents today with fever and increased work of breathing per EMS report.  Patient is nonverbal at baseline and no further history available.  On exam here, patient is tachycardic, no tachypnea but noisy upper airway breathing with dry mucous membranes and thick secretions in the mouth, oxygen saturation 90 to 92% on room air.  Temperature 99.4 although patient feels warmer than that.  Blood pressure normal.  Concern for possible pneumonia, sepsis, pulmonary embolism unlikely given negative CT PE study yesterday.  IV fluids, Tylenol ordered and plan for admission.   0546 Labs ordered and independently interpreted by me with normal lactate, normal venous blood gas, hemoglobin 10.2 which is stable for the patient.  Chest x-ray independently interpreted by me with atelectasis but no other acute findings   0557 EKG:    Independently reviewed and interpreted by me  Performed at: 5:33 AM  Impression: Sinus tachycardia, otherwise normal  Rate: 126  Rhythm: Sinus  Axis: Normal  VT Interval:  136  QRS Interval: 72  QTc Interval: 446  ST Changes: No acute ischemic changes  Comparison: Prior of yesterday, no Zuri   0630 Patient rechecked, family is now in the room.  Apparently patient had some increased cough and temperature 99 degrees by forehead overnight and so family called EMS.  On exam here, after fluids, patient remains tachycardic.  CT ordered.  Family also noticed a red rash on the left arm, and he thinks this may have started after patient received IV contrast.  Consider Benadryl but patient has allergy with unlisted reaction to this.  Patient does have some bruising on the right arm as well but no other evidence for rash or allergic reaction, this can be observed for now.   0636 Labs independently interpreted by me with negative procalcitonin, slightly elevated creatinine from yesterday and patient has been given IV fluids.   0700 Signout to oncoming provider pending CT             At the conclusion of the encounter I discussed the results of all of the tests and the disposition. The questions were answered. The patient or family acknowledged understanding and was agreeable with the care plan.     Medical Decision Making  Obtained supplemental history:Supplemental history obtained?: EMS  Reviewed external records: External records reviewed?: Documented in chart  Care impacted by chronic illness:Documented in Chart  Did you consider but not order tests?: In addition to work-up documented, I considered the following work up:   Did you interpret images independently?: Independent interpretation of ECG and images noted in documentation, when applicable.  Consultation discussion with other provider:Did you involve another provider (consultant, , pharmacy, etc.)?: I discussed the care with another health care provider, see documentation for details.  Admission considered. Patient was signed out to the oncoming physician, disposition pending.    MIPS: CT Pulmonary Angiogram:Patient is moderate to  high risk for PE. and CT PA was ordered for reason(s) other than PE.        MEDICATIONS GIVEN IN THE EMERGENCY:  Medications - No data to display    NEW PRESCRIPTIONS STARTED AT TODAY'S ER VISIT  New Prescriptions    No medications on file       =================================================================    HPI    Patient information was obtained from: EMS      Liu GIL Malave is a 59 year old male with a pertinent history of aspiration pneumonitis, CVA, spastic hemiplegia, and hepatitis C who presents to this ED via EMS for evaluation of shortness of breath.    Per EMS, patient was noted to have increased work of breathing since his last presentation to our ED on 2/9. Noted new bilateral arm redness since his CT. He is non verbal at baseline.    REVIEW OF SYSTEMS   Review of Systems   Respiratory:  Positive for shortness of breath.    Skin:  Positive for color change.      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 coli infection     History of hemorrhagic stroke with residual hemiparesis (H)     Hypertension     Lung nodule 12/05/2016    9 x 9 mm left upper lobe on CXR    MRSA (methicillin resistant staph aureus) culture positive     Periodontal disease     Persistent fever     Pneumonia of both  lower lobes due to infectious organism     Respiratory failure with hypoxia (H)     Seizure (H)     Sepsis due to urinary tract infection (H) 06/04/2020    Severe sepsis (H)     Trismus        PAST SURGICAL HISTORY:  Past Surgical History:   Procedure Laterality Date    aneurysm clipping  2006    BRAIN SURGERY      EXAM UNDER ANESTHESIA, RESTORATIONS, EXTRACTION(S) DENTAL COMPLEX, COMBINED N/A 12/19/2016    Procedure: COMBINED EXAM UNDER ANESTHESIA, RESTORATIONS, EXTRACTION(S) DENTAL COMPLEX;  Surgeon: Verónica Martinez DDS;  Location: [facility]    EXAM UNDER ANESTHESIA, RESTORATIONS, EXTRACTION(S) DENTAL COMPLEX, COMBINED N/A 3/7/2018    Procedure: COMBINED EXAM UNDER ANESTHESIA, RESTORATIONS, EXTRACTION(S) DENTAL COMPLEX;  Kenalog Injection in Bilateral [place], Combined Dental Exam,Radiographs, Three Dental Restorations, Periodontal Therapy and Fluoride Treatment;  Surgeon: Darryl Isbell DDS;  Location: [facility] OR    HH MIDLINE INSERTION  8/11/2017         INJECT STEROID (LOCATION) N/A 3/7/2018    Procedure: INJECT STEROID (LOCATION);  Kenalog Injection In Bilateral [place], Combined Dental Exam,Radiographs,Three Dental Restorations, Periodontal Therapy and Fluoride Treatment;  Surgeon: Liu Conroy DDS;  Location: [facility] OR    IR GASTRO JEJUNOSTOMY TUBE CHANGE  7/19/2016    IR GASTRO JEJUNOSTOMY TUBE CHANGE  9/23/2016    IR GASTRO JEJUNOSTOMY TUBE CHANGE  11/28/2016    IR GASTRO JEJUNOSTOMY TUBE CHANGE  1/27/2017    IR GASTRO JEJUNOSTOMY TUBE CHANGE  4/19/2017    IR GASTRO JEJUNOSTOMY TUBE CHANGE  6/20/2017    IR GASTRO JEJUNOSTOMY TUBE CHANGE  8/7/2017    IR GASTRO JEJUNOSTOMY TUBE CHANGE  8/11/2017    IR GASTRO JEJUNOSTOMY TUBE CHANGE  10/20/2017    IR GASTRO JEJUNOSTOMY TUBE CHANGE  1/2/2018    IR GASTRO JEJUNOSTOMY TUBE CHANGE  2/16/2018    IR GASTRO JEJUNOSTOMY TUBE CHANGE  5/16/2018    IR GASTRO JEJUNOSTOMY TUBE CHANGE  6/11/2018    IR GASTRO JEJUNOSTOMY TUBE CHANGE  9/12/2018    IR GASTRO JEJUNOSTOMY TUBE  CHANGE  12/24/2018    IR GASTRO JEJUNOSTOMY TUBE CHANGE  3/18/2019    IR GASTRO JEJUNOSTOMY TUBE CHANGE  4/23/2019    IR GASTRO JEJUNOSTOMY TUBE CHANGE  5/21/2019    IR GASTRO JEJUNOSTOMY TUBE CHANGE  9/9/2019    IR GASTRO JEJUNOSTOMY TUBE CHANGE  9/26/2019    IR GASTRO JEJUNOSTOMY TUBE CHANGE  10/11/2019    IR GASTRO JEJUNOSTOMY TUBE CHANGE  11/21/2019    IR GASTRO JEJUNOSTOMY TUBE CHANGE  11/29/2019    IR GASTRO JEJUNOSTOMY TUBE CHANGE  2/27/2020    IR GASTRO JEJUNOSTOMY TUBE CHANGE  4/10/2020    IR GASTRO JEJUNOSTOMY TUBE CHANGE  7/2/2020    IR GASTRO JEJUNOSTOMY TUBE CHANGE  8/28/2020    IR GASTRO JEJUNOSTOMY TUBE CHANGE  9/17/2020    IR GASTRO JEJUNOSTOMY TUBE CHANGE  10/29/2020    IR GASTRO JEJUNOSTOMY TUBE CHANGE  11/6/2020    IR GASTRO JEJUNOSTOMY TUBE CHANGE  12/24/2020    IR GASTRO JEJUNOSTOMY TUBE CHANGE  1/9/2021    IR GASTRO JEJUNOSTOMY TUBE CHANGE  6/9/2021    IR GASTRO JEJUNOSTOMY TUBE CHANGE  10/4/2021    IR GASTRO JEJUNOSTOMY TUBE CHANGE  10/18/2021    IR GASTRO JEJUNOSTOMY TUBE CHANGE  1/14/2022    IR GASTRO JEJUNOSTOMY TUBE CHANGE  1/20/2022    IR GASTRO JEJUNOSTOMY TUBE CHANGE  4/19/2022    IR GASTRO JEJUNOSTOMY TUBE CHANGE  6/7/2022    IR GASTRO JEJUNOSTOMY TUBE CHANGE  8/19/2022    IR GASTRO JEJUNOSTOMY TUBE CHANGE  9/13/2022    IR GASTRO JEJUNOSTOMY TUBE CHANGE  10/27/2022    IR GASTRO JEJUNOSTOMY TUBE CHANGE  11/3/2022    IR GASTRO JEJUNOSTOMY TUBE CHANGE  1/18/2023    IR GASTRO JEJUNOSTOMY TUBE CHANGE  4/7/2023    IR GASTRO JEJUNOSTOMY TUBE CHANGE  5/4/2023    IR GASTRO JEJUNOSTOMY TUBE CHANGE  6/21/2023    IR GASTRO JEJUNOSTOMY TUBE CHANGE  8/23/2023    IR GASTRO JEJUNOSTOMY TUBE CHANGE  11/16/2023    IR GASTRO JEJUNOSTOMY TUBE CHANGE  1/8/2024    IR GASTRO JEJUNOSTOMY TUBE CHANGE  4/9/2024    IR GASTRO JEJUNOSTOMY TUBE CHANGE  7/19/2024    IR GASTRO JEJUNOSTOMY TUBE CHANGE  10/21/2024    IR GASTRO JEJUNOSTOMY TUBE CHANGE  11/15/2024    IR GASTRO JEJUNOSTOMY TUBE CHANGE  11/25/2024    IR  GASTRO JEJUNOSTOMY TUBE CHANGE  12/10/2024    IR GASTRO JEJUNOSTOMY TUBE CHANGE  12/18/2024    IR GASTRO JEJUNOSTOMY TUBE PLACEMENT  6/14/2016    IR GJ TUBE REPLACEMENT  12/24/2018    IR GJ TUBE REPLACEMENT  3/18/2019    IR GJ TUBE REPLACEMENT  4/23/2019    IR GJ TUBE REPLACEMENT  5/21/2019    IR GJ TUBE REPLACEMENT  9/9/2019    IR GJ TUBE REPLACEMENT  9/26/2019    IR GJ TUBE REPLACEMENT  10/11/2019    IR GJ TUBE REPLACEMENT  11/21/2019    IR GJ TUBE REPLACEMENT  11/29/2019    IR GJ TUBE REPLACEMENT  2/27/2020    IR GJ TUBE REPLACEMENT  4/10/2020    IR GJ TUBE REPLACEMENT  7/2/2020    IR GJ TUBE REPLACEMENT  8/28/2020    IR GJ TUBE REPLACEMENT  9/17/2020    IR GJ TUBE REPLACEMENT  10/29/2020    IR GJ TUBE REPLACEMENT  11/6/2020    IR GJ TUBE REPLACEMENT  12/24/2020    IR GJ TUBE REPLACEMENT  1/9/2021    IR GJ TUBE REPLACEMENT  6/9/2021    J-G tube      PICC  6/10/2016         TRACHEOSTOMY      Removed       CURRENT MEDICATIONS:    No current facility-administered medications for this encounter.     Current Outpatient Medications   Medication Sig Dispense Refill    acetaminophen (TYLENOL) 500 MG tablet Place 2 tablets (1,000 mg) into G tube every 6 hours as needed for mild pain 60 tablet 0    acetylcysteine (MUCOMYST) 20 % neb solution Take 2 mLs by nebulization every 6 hours as needed (congestion). 30 mL 5    amLODIPine (NORVASC) 5 MG tablet Place 1 tablet (5 mg) into G tube daily. 30 tablet 11    baclofen (LIORESAL) 20 MG tablet Place 1 tablet (20 mg) into Feeding Tube 4 times daily. (Patient taking differently: Place 20 mg into Feeding Tube 3 times daily.) 120 tablet 5    bisacodyl (DULCOLAX) 10 MG suppository Place 10 mg rectally every other day.      Catheters (EVERYDAY MALE EXTERNAL CATH/SM) MISC 1 catheter daily. 30 each 0    cefaclor (CECLOR) 250 MG capsule Place 1 capsule (250 mg) into G tube daily. UTI ppx 90 capsule 3    CHEST CONGESTION RELIEF DM  MG/5ML syrup TAKE 10 MLS BY G TUBE ROUTE EVERY 4  HOURS AS NEEDED FOR COUGH. 237 mL 0    docusate (COLACE) 50 MG/5ML liquid Place 10 mLs (100 mg) into Feeding Tube 2 times daily. 473 mL 0    ipratropium - albuterol 0.5 mg/2.5 mg/3 mL (DUONEB) 0.5-2.5 (3) MG/3ML neb solution Take 1 vial (3 mLs) by nebulization every 4 hours. 90 mL 1    ketoconazole (NIZORAL) 2 % external cream Apply topically 2 times daily to affected area(s). 30 g 0    LIDOCAINE PAIN RELIEF 4 % Patch Place 1 patch onto the skin daily as needed for moderate pain. 30 patch 2    meclizine (ANTIVERT) 25 MG tablet Take 1 tablet via G-tube every 6 hours as needed for vertigo 30 tablet 11    metoprolol tartrate (LOPRESSOR) 25 MG tablet Place 1 tablet (25 mg) into Feeding Tube 2 times daily. 60 tablet 11    neomycin-bacitracin-polymyxin (NEOSPORIN) 5-400-5000 ointment Apply topically 3 times daily as needed (irritation) To J Tube site 30 g 0    nystatin (MYCOSTATIN) 091414 UNIT/GM external cream Apply topically 2 times daily as needed (redness) 30 g 3    nystatin-triamcinolone (MYCOLOG II) 261362-3.1 UNIT/GM-% external cream Apply topically 2 times daily. 60 g 1    omeprazole (PRILOSEC) 40 MG DR capsule Take 1 capsule (40 mg) by mouth daily. IN G TUBE 30 capsule 11    ondansetron (ZOFRAN) 4 MG tablet Place 1 tablet (4 mg) into G tube every 8 hours as needed for nausea 30 tablet 0    Oral Hygiene Products (Q-CARE COVERD YANKAUER/SUCTION) MISC Take 1 Device by mouth every hour as needed (secretions). 10 each 0    Ostomy Supplies (STOMAHESIVE) PSTE Apply topically to skin around ostomy site twice daily and as needed 56.7 g 3    polyethylene glycol (MIRALAX) 17 GM/Dose powder Give 1 capful via G-tube daily as needed for constipation 850 g 3    Polyethylene Glycol 400 (VISINE DRY EYE RELIEF) 1 % SOLN Apply 1 drop to eye every 6 hours as needed for dry eyes. 15 mL 11    QUEtiapine (SEROQUEL) 25 MG tablet TAKE 1 TABLET BY MOUTH AT 7:00PM, MIDNIGHT AND 3:00AM FOR TOTAL OF 75 MG NIGHTLY. OK TO REPEAT AFTER 4 HOURS  "AND DURING THE DAY AS NEEDED. 102 tablet 5    rosuvastatin (CRESTOR) 10 MG tablet Place 1 tablet (10 mg) into G tube daily. 90 tablet 3    sennosides (SENOKOT) 8.8 MG/5ML syrup Place 5 mLs into G tube 2 times daily. 236 mL 0    sodium chloride (NEBUSAL) 3 % neb solution TAKE 4 MLS BY NEBULIZATION EVERY 6 HOURS AS NEEDED FOR THICK SPUTUM. 240 mL 2    triamcinolone (KENALOG) 0.1 % external cream Apply topically daily as needed for irritation. Apply to affected area/rash 30 g 0       ALLERGIES:  Allergies   Allergen Reactions    Diphenhydramine Unknown    Trazodone Other (See Comments)     \"shaking\" per family       FAMILY HISTORY:  Family History   Problem Relation Age of Onset    Hypertension Mother     Cancer Father     Other Cancer Father     Kidney Disease Brother        SOCIAL HISTORY:   Social History     Socioeconomic History    Marital status:     Number of children: 2   Occupational History    Occupation: disability   Tobacco Use    Smoking status: Former     Current packs/day: 0.00     Average packs/day: 1 pack/day for 30.0 years (30.0 ttl pk-yrs)     Types: Cigarettes     Start date: 1975     Quit date: 2005     Years since quittin.6     Passive exposure: Past    Smokeless tobacco: Former     Quit date: 2007    Tobacco comments:     quit 2006   Vaping Use    Vaping status: Never Used   Substance and Sexual Activity    Alcohol use: No    Drug use: No    Sexual activity: Not Currently     Partners: Female     Birth control/protection: None   Other Topics Concern    Parent/sibling w/ CABG, MI or angioplasty before 65F 55M? No   Social History Narrative    Lives with wife, 2 daughters and son in law.    W/c bound.     Social Drivers of Health     Financial Resource Strain: Low Risk  (2024)    Financial Resource Strain     Within the past 12 months, have you or your family members you live with been unable to get utilities (heat, electricity) when it was really needed?: No   Food " Insecurity: Low Risk  (12/26/2024)    Food Insecurity     Within the past 12 months, did you worry that your food would run out before you got money to buy more?: No     Within the past 12 months, did the food you bought just not last and you didn t have money to get more?: No   Transportation Needs: Low Risk  (12/26/2024)    Transportation Needs     Within the past 12 months, has lack of transportation kept you from medical appointments, getting your medicines, non-medical meetings or appointments, work, or from getting things that you need?: No   Interpersonal Safety: Unknown (12/22/2024)    Interpersonal Safety     Do you feel physically and emotionally safe where you currently live?: Patient unable to answer     Within the past 12 months, have you been hit, slapped, kicked or otherwise physically hurt by someone?: Patient unable to answer     Within the past 12 months, have you been humiliated or emotionally abused in other ways by your partner or ex-partner?: Patient unable to answer   Housing Stability: Low Risk  (12/26/2024)    Housing Stability     Do you have housing? : Yes     Are you worried about losing your housing?: No       VITALS:  /84   Pulse (!) 135   Temp 99.4  F (37.4  C) (Oral)   Resp 25   Wt 60.5 kg (133 lb 4.8 oz)   SpO2 92%   BMI 26.03 kg/m      PHYSICAL EXAM:  Physical Exam  Vitals and nursing note reviewed.   Constitutional:       Appearance: Normal appearance.   HENT:      Head: Normocephalic and atraumatic.      Right Ear: External ear normal.      Left Ear: External ear normal.      Nose: Nose normal.      Mouth/Throat:      Mouth: Mucous membranes are moist.   Eyes:      Extraocular Movements: Extraocular movements intact.      Conjunctiva/sclera: Conjunctivae normal.      Pupils: Pupils are equal, round, and reactive to light.   Cardiovascular:      Rate and Rhythm: Regular rhythm. Tachycardia present.   Pulmonary:      Effort: Pulmonary effort is normal.      Breath  sounds: Transmitted upper airway sounds present. No wheezing or rales.   Abdominal:      General: Abdomen is flat. There is no distension.      Palpations: Abdomen is soft.      Tenderness: There is no abdominal tenderness. There is no guarding.   Musculoskeletal:         General: Normal range of motion.      Cervical back: Normal range of motion and neck supple.      Right lower leg: No edema.      Left lower leg: No edema.   Lymphadenopathy:      Cervical: No cervical adenopathy.   Skin:     General: Skin is warm and dry.   Neurological:      Mental Status: Mental status is at baseline.      Comments: No gross focal neurologic deficits. Moves eyes to voice.   Psychiatric:         Mood and Affect: Mood normal.         Behavior: Behavior normal.         Thought Content: Thought content normal.          LAB:  All pertinent labs reviewed and interpreted.       RADIOLOGY:  Reviewed all pertinent imaging. Please see official radiology report.  No orders to display       I, Xiao Mcclure, am serving as a scribe to document services personally performed by Dr. Emmanuel based on my observation and the provider's statements to me. I, Julian Emmanuel MD attest that Xiao Mcclure  is acting in a scribe capacity, has observed my performance of the services and has documented them in accordance with my direction.    Julian Emmanuel M.D.  Emergency Medicine  [facility] EMERGENCY ROOM  [address] [facility] [id number] [phone]  Dept: [phone]       Julian Emmanuel MD  02/11/25 2004

## 2025-02-10 NOTE — ED NOTES
EMERGENCY DEPARTMENT SIGN OUT NOTE        ED COURSE AND MEDICAL DECISION MAKING  Patient was signed out to me by Dr Julian Emmanuel at 0700.  8:33 AM I spoke with the accepting hospitalist, Dr. Gonsales.  Dr. Gonsales is familiar with the patient as he has cared for him with previous admissions.  Patient is prone to sepsis associated with respiratory infections.  Would recommend further evaluation with repeat UA, blood cultures, respiratory panel and initiation of broad-spectrum spectrum antibiotics.  Patient has previously been able to communicate with the board.  I asked the patient's family member about this.  They report that he used to be able to communicate with this board, but now he is unable to since he has gotten progressively more sick.  Agree with admission to medical telemetry.    In brief, Liu GIL Malave is a 59 year old male who initially presented today with shortness of breath and fever. Patient has a history of CVA and is non-verbal. Patient was seen yesterday with tachycardia and hypoxia. Vitals were found to be normal, and patient was discharged home at that time.    At time of sign out, disposition was pending CT Chest. Will likely admit patient.   7:59 AM Ordered placed for transfer within system  8:01 AM CT chest with no interval change.  Patient remains in a sinus tachycardia.  Patient's family members notes that the patient appears agitated.  When he is sleeping his heart rate is around 100, but when awake, his heart rate is in the 130s.  She noticed that he has had a lot of movement of his left lower extremity. She is requesting something for agitation.   8:09 AM Charge nurse made me aware that [facility] reports no beds in the system. There is a telemetry bed that opened here at [facility]. The patient will be admitted at [facility] as there is an open bed.   8:17 AM yesterday the patient received morphine.  I do wonder if the patient is in some kind of pain.  Perhaps his tachycardia improved with  pain medication.  I therefore reassessed the patient and inquired about pain.  The patient's family member reports that he sometimes does appear in pain though he currently does not appear to be in pain.  I did a repeat abdominal exam, the patient did not appear to wince or appear agitated with abdominal exam and did not appear to have any pain.  CT scan of the abdomen pelvis had been done yesterday with no acute findings.  Will plan to give the patient morphine to see if this improves his tachycardia.  8:39 AM Updated patient on the conversation I had with the hospitalist.    FINAL IMPRESSION    1. Fever, unspecified fever cause    2. Acute sepsis (H)        ED MEDS  Medications   bisacodyl (DULCOLAX) suppository 10 mg (has no administration in time range)   acetaminophen (TYLENOL) tablet 975 mg (has no administration in time range)   piperacillin-tazobactam (ZOSYN) 3.375 g vial to attach to  mL bag (has no administration in time range)   azithromycin (ZITHROMAX) 500 mg in  mL intermittent infusion (has no administration in time range)   sodium chloride 0.9% BOLUS 1,000 mL (1,000 mLs Intravenous $New Bag 2/10/25 5573)   famotidine (PEPCID) injection 20 mg (20 mg Intravenous $Given 2/10/25 0812)   sodium chloride 0.9 % infusion ( Intravenous $New Bag 2/10/25 0812)   morphine (PF) injection 4 mg (4 mg Intravenous $Given 2/10/25 0813)       LAB  Labs Ordered and Resulted from Time of ED Arrival to Time of ED Departure   BASIC METABOLIC PANEL - Abnormal       Result Value    Sodium 144      Potassium 4.9      Chloride 114 (*)     Carbon Dioxide (CO2) 20 (*)     Anion Gap 10      Urea Nitrogen 45.0 (*)     Creatinine 1.76 (*)     GFR Estimate 43 (*)     Calcium 8.4 (*)     Glucose 142 (*)    BLOOD GAS VENOUS - Abnormal    pH Venous 7.41      pCO2 Venous 39 (*)     pO2 Venous 61 (*)     Bicarbonate Venous 24      Base Excess/Deficit Venous -0.3      FIO2 35      Oxyhemoglobin Venous 92 (*)     O2 Sat, Venous  93.4 (*)    CBC WITH PLATELETS AND DIFFERENTIAL - Abnormal    WBC Count 10.1      RBC Count 3.40 (*)     Hemoglobin 10.2 (*)     Hematocrit 31.5 (*)     MCV 93      MCH 30.0      MCHC 32.4      RDW 13.0      Platelet Count 251      % Neutrophils 78      % Lymphocytes 12      % Monocytes 9      % Eosinophils 1      % Basophils 0      % Immature Granulocytes 0      NRBCs per 100 WBC 0      Absolute Neutrophils 7.9      Absolute Lymphocytes 1.2      Absolute Monocytes 0.9      Absolute Eosinophils 0.1      Absolute Basophils 0.0      Absolute Immature Granulocytes 0.0      Absolute NRBCs 0.0     PROCALCITONIN - Normal    Procalcitonin 0.17     LACTIC ACID WHOLE BLOOD WITH 1X REPEAT IN 2 HR WHEN >2 - Normal    Lactic Acid, Initial 0.7     NT PROBNP INPATIENT - Normal    N terminal Pro BNP Inpatient 365     INFLUENZA A/B, RSV AND SARS-COV2 PCR   ROUTINE UA WITH MICROSCOPIC REFLEX TO CULTURE   RESPIRATORY PANEL PCR   BLOOD CULTURE   BLOOD CULTURE     RADIOLOGY    Chest CT w/o contrast   Final Result   IMPRESSION:    1.  No significant interval change.            XR Chest Port 1 View   Final Result   IMPRESSION: Normal heart size and pulmonary vascularity. No acute infiltrates or consolidation. Minimal linear scarring or atelectasis left lower lung. Aortic calcification. Old right rib fracture. Otherwise unremarkable. No acute findings.          DISCHARGE MEDS  New Prescriptions    No medications on file     I, Chilo Cunningham, am serving as a scribe to document services personally performed by Renetta Dowd MD, based on my observations and the provider's statements to me.  I, Renetta Dowd MD, attest that Chilo Cunningham is acting in a scribe capacity, has observed my performance of the services and has documented them in accordance with my direction.       Renetta Dowd MD  [facility] EMERGENCY ROOM  [address] [place] [address] [phone]          Renetta Dowd MD  02/10/25 0827

## 2025-02-11 ENCOUNTER — APPOINTMENT (OUTPATIENT)
Dept: RADIOLOGY | Facility: CLINIC | Age: 60
End: 2025-02-11
Attending: FAMILY MEDICINE
Payer: COMMERCIAL

## 2025-02-11 PROBLEM — Z86.73 HISTORY OF CVA (CEREBROVASCULAR ACCIDENT): Status: ACTIVE | Noted: 2023-09-27

## 2025-02-11 PROBLEM — I69.151: Status: ACTIVE | Noted: 2023-04-25

## 2025-02-11 PROBLEM — B18.2 CHRONIC HEPATITIS C WITHOUT HEPATIC COMA (H): Status: ACTIVE | Noted: 2022-04-06

## 2025-02-11 PROBLEM — Z78.9 ON TUBE FEEDING DIET: Status: ACTIVE | Noted: 2023-04-25

## 2025-02-11 LAB
ANION GAP SERPL CALCULATED.3IONS-SCNC: 9 MMOL/L (ref 7–15)
BUN SERPL-MCNC: 32.6 MG/DL (ref 8–23)
CALCIUM SERPL-MCNC: 6.8 MG/DL (ref 8.8–10.4)
CHLORIDE SERPL-SCNC: 117 MMOL/L (ref 98–107)
CREAT SERPL-MCNC: 1.81 MG/DL (ref 0.67–1.17)
EGFRCR SERPLBLD CKD-EPI 2021: 42 ML/MIN/1.73M2
ERYTHROCYTE [DISTWIDTH] IN BLOOD BY AUTOMATED COUNT: 13.1 % (ref 10–15)
GLUCOSE SERPL-MCNC: 155 MG/DL (ref 70–99)
HCO3 SERPL-SCNC: 18 MMOL/L (ref 22–29)
HCT VFR BLD AUTO: 25.9 % (ref 40–53)
HCV RNA SERPL NAA+PROBE-ACNC: ABNORMAL IU/ML
HCV RNA SERPL NAA+PROBE-LOG IU: 7.3 {LOG_IU}/ML
HGB BLD-MCNC: 8.4 G/DL (ref 13.3–17.7)
LACTATE SERPL-SCNC: 0.8 MMOL/L (ref 0.7–2)
LACTATE SERPL-SCNC: 1.5 MMOL/L (ref 0.7–2)
MCH RBC QN AUTO: 30.4 PG (ref 26.5–33)
MCHC RBC AUTO-ENTMCNC: 32.4 G/DL (ref 31.5–36.5)
MCV RBC AUTO: 94 FL (ref 78–100)
PLATELET # BLD AUTO: 184 10E3/UL (ref 150–450)
POTASSIUM SERPL-SCNC: 3.8 MMOL/L (ref 3.4–5.3)
RBC # BLD AUTO: 2.76 10E6/UL (ref 4.4–5.9)
SODIUM SERPL-SCNC: 144 MMOL/L (ref 135–145)
WBC # BLD AUTO: 14.1 10E3/UL (ref 4–11)

## 2025-02-11 PROCEDURE — 120N000001 HC R&B MED SURG/OB

## 2025-02-11 PROCEDURE — 99232 SBSQ HOSP IP/OBS MODERATE 35: CPT | Performed by: FAMILY MEDICINE

## 2025-02-11 PROCEDURE — 258N000003 HC RX IP 258 OP 636: Performed by: FAMILY MEDICINE

## 2025-02-11 PROCEDURE — 82565 ASSAY OF CREATININE: CPT | Performed by: FAMILY MEDICINE

## 2025-02-11 PROCEDURE — 94640 AIRWAY INHALATION TREATMENT: CPT

## 2025-02-11 PROCEDURE — 87507 IADNA-DNA/RNA PROBE TQ 12-25: CPT | Performed by: HOSPITALIST

## 2025-02-11 PROCEDURE — 999N000157 HC STATISTIC RCP TIME EA 10 MIN

## 2025-02-11 PROCEDURE — 85049 AUTOMATED PLATELET COUNT: CPT | Performed by: FAMILY MEDICINE

## 2025-02-11 PROCEDURE — 250N000013 HC RX MED GY IP 250 OP 250 PS 637: Performed by: FAMILY MEDICINE

## 2025-02-11 PROCEDURE — 74018 RADEX ABDOMEN 1 VIEW: CPT

## 2025-02-11 PROCEDURE — 94640 AIRWAY INHALATION TREATMENT: CPT | Mod: 76

## 2025-02-11 PROCEDURE — 36415 COLL VENOUS BLD VENIPUNCTURE: CPT | Performed by: FAMILY MEDICINE

## 2025-02-11 PROCEDURE — 250N000011 HC RX IP 250 OP 636: Performed by: FAMILY MEDICINE

## 2025-02-11 PROCEDURE — 83605 ASSAY OF LACTIC ACID: CPT | Performed by: FAMILY MEDICINE

## 2025-02-11 PROCEDURE — 83605 ASSAY OF LACTIC ACID: CPT | Performed by: INTERNAL MEDICINE

## 2025-02-11 PROCEDURE — 250N000011 HC RX IP 250 OP 636: Performed by: HOSPITALIST

## 2025-02-11 PROCEDURE — 250N000009 HC RX 250: Performed by: FAMILY MEDICINE

## 2025-02-11 RX ORDER — BISACODYL 10 MG
10 SUPPOSITORY, RECTAL RECTAL EVERY OTHER DAY
Status: DISCONTINUED | OUTPATIENT
Start: 2025-02-11 | End: 2025-02-13 | Stop reason: HOSPADM

## 2025-02-11 RX ADMIN — DEXTROSE AND SODIUM CHLORIDE: 5; 450 INJECTION, SOLUTION INTRAVENOUS at 16:03

## 2025-02-11 RX ADMIN — METOPROLOL TARTRATE 25 MG: 25 TABLET, FILM COATED ORAL at 20:24

## 2025-02-11 RX ADMIN — VANCOMYCIN HYDROCHLORIDE 500 MG: 500 INJECTION, POWDER, LYOPHILIZED, FOR SOLUTION INTRAVENOUS at 10:53

## 2025-02-11 RX ADMIN — ACETYLCYSTEINE 2 ML: 200 SOLUTION ORAL; RESPIRATORY (INHALATION) at 07:38

## 2025-02-11 RX ADMIN — DEXTROSE AND SODIUM CHLORIDE: 5; 450 INJECTION, SOLUTION INTRAVENOUS at 06:45

## 2025-02-11 RX ADMIN — KETOCONAZOLE: 20 CREAM TOPICAL at 08:51

## 2025-02-11 RX ADMIN — HYDROMORPHONE HYDROCHLORIDE 0.2 MG: 0.2 INJECTION, SOLUTION INTRAMUSCULAR; INTRAVENOUS; SUBCUTANEOUS at 22:37

## 2025-02-11 RX ADMIN — IPRATROPIUM BROMIDE AND ALBUTEROL SULFATE 3 ML: .5; 3 SOLUTION RESPIRATORY (INHALATION) at 15:15

## 2025-02-11 RX ADMIN — IPRATROPIUM BROMIDE AND ALBUTEROL SULFATE 3 ML: .5; 3 SOLUTION RESPIRATORY (INHALATION) at 12:21

## 2025-02-11 RX ADMIN — ROSUVASTATIN 10 MG: 5 TABLET, FILM COATED ORAL at 08:53

## 2025-02-11 RX ADMIN — METOPROLOL TARTRATE 25 MG: 25 TABLET, FILM COATED ORAL at 08:53

## 2025-02-11 RX ADMIN — SODIUM BICARBONATE 40 MG: 84 INJECTION, SOLUTION INTRAVENOUS at 20:24

## 2025-02-11 RX ADMIN — DOCUSATE SODIUM 100 MG: 50 LIQUID ORAL at 08:53

## 2025-02-11 RX ADMIN — BISACODYL 10 MG: 10 SUPPOSITORY RECTAL at 10:57

## 2025-02-11 RX ADMIN — AZITHROMYCIN MONOHYDRATE 250 MG: 500 INJECTION, POWDER, LYOPHILIZED, FOR SOLUTION INTRAVENOUS at 12:00

## 2025-02-11 RX ADMIN — PIPERACILLIN AND TAZOBACTAM 3.38 G: 3; .375 INJECTION, POWDER, FOR SOLUTION INTRAVENOUS at 00:31

## 2025-02-11 RX ADMIN — ACETYLCYSTEINE 2 ML: 200 SOLUTION ORAL; RESPIRATORY (INHALATION) at 20:33

## 2025-02-11 RX ADMIN — VANCOMYCIN HYDROCHLORIDE 500 MG: 500 INJECTION, POWDER, LYOPHILIZED, FOR SOLUTION INTRAVENOUS at 22:37

## 2025-02-11 RX ADMIN — ACETAMINOPHEN 1000 MG: 325 SOLUTION ORAL at 02:25

## 2025-02-11 RX ADMIN — BACLOFEN 20 MG: 10 TABLET ORAL at 02:24

## 2025-02-11 RX ADMIN — KETOCONAZOLE: 20 CREAM TOPICAL at 20:25

## 2025-02-11 RX ADMIN — BACLOFEN 20 MG: 10 TABLET ORAL at 20:24

## 2025-02-11 RX ADMIN — PIPERACILLIN AND TAZOBACTAM 3.38 G: 3; .375 INJECTION, POWDER, FOR SOLUTION INTRAVENOUS at 08:48

## 2025-02-11 RX ADMIN — QUETIAPINE FUMARATE 25 MG: 25 TABLET ORAL at 00:31

## 2025-02-11 RX ADMIN — ACETAMINOPHEN 1000 MG: 325 SOLUTION ORAL at 20:29

## 2025-02-11 RX ADMIN — SODIUM BICARBONATE 40 MG: 84 INJECTION, SOLUTION INTRAVENOUS at 08:53

## 2025-02-11 RX ADMIN — IPRATROPIUM BROMIDE AND ALBUTEROL SULFATE 3 ML: .5; 3 SOLUTION RESPIRATORY (INHALATION) at 07:38

## 2025-02-11 RX ADMIN — BACLOFEN 20 MG: 10 TABLET ORAL at 00:31

## 2025-02-11 RX ADMIN — QUETIAPINE FUMARATE 25 MG: 25 TABLET ORAL at 20:24

## 2025-02-11 RX ADMIN — DOCUSATE SODIUM 100 MG: 50 LIQUID ORAL at 20:24

## 2025-02-11 RX ADMIN — PIPERACILLIN AND TAZOBACTAM 3.38 G: 3; .375 INJECTION, POWDER, FOR SOLUTION INTRAVENOUS at 16:06

## 2025-02-11 RX ADMIN — IPRATROPIUM BROMIDE AND ALBUTEROL SULFATE 3 ML: .5; 3 SOLUTION RESPIRATORY (INHALATION) at 20:33

## 2025-02-11 RX ADMIN — AMLODIPINE BESYLATE 5 MG: 5 TABLET ORAL at 08:53

## 2025-02-11 RX ADMIN — QUETIAPINE FUMARATE 25 MG: 25 TABLET ORAL at 02:24

## 2025-02-11 ASSESSMENT — ACTIVITIES OF DAILY LIVING (ADL)
ADLS_ACUITY_SCORE: 73
ADLS_ACUITY_SCORE: 69
ADLS_ACUITY_SCORE: 73
ADLS_ACUITY_SCORE: 69
ADLS_ACUITY_SCORE: 69
DEPENDENT_IADLS:: CLEANING;COOKING;LAUNDRY;SHOPPING;MEAL PREPARATION;MEDICATION MANAGEMENT;TRANSPORTATION;MONEY MANAGEMENT;INCONTINENCE
ADLS_ACUITY_SCORE: 69
ADLS_ACUITY_SCORE: 69
ADLS_ACUITY_SCORE: 73
ADLS_ACUITY_SCORE: 69
ADLS_ACUITY_SCORE: 73
ADLS_ACUITY_SCORE: 69
ADLS_ACUITY_SCORE: 73
ADLS_ACUITY_SCORE: 73

## 2025-02-11 NOTE — SIGNIFICANT EVENT
Significant Event Note    Time of event: 7:26 PM February 10, 2025    Description of event:  Notified of increased temperature. Relative to this morning when admitted, appears to be septic by remote chart review.    Sepsis protocols reinitiated.      Jose Alberto Burch MD

## 2025-02-11 NOTE — PROGRESS NOTES
Cross cover note    Patient admitted with undifferentiated sepsis.  Called to evaluate for fever, tachycardia, and hypoxia.  Patient satting low 90s on room air, tachycardia improved spontaneously to 90s on exam.  Patient tachypneic with improvement in respiratory effort and rate when sat up in bed for examination.  Wheezing/rhonchi noted to auscultation.  No specific reaction to abdominal palpation noted.    Unclear etiology of sepsis.  UA was unremarkable.  No clear pulmonary source.  Patient unable to provide a history which makes identification of etiology challenging.  Imaging of chest/ab/pelvis yesterday was unrevealing, as was CT chest earlier today.  Low yield to repeat CT imaging at this time.  Check CXR.  Family reported that there has been norovirus infection in daquan family.  This could certainly explain his presentation.  For now, broaden abx to include vancomycin pending further culture data.  Spontaneous improvement in tachycardia is also reassuring.

## 2025-02-11 NOTE — PROGRESS NOTES
Pt was given scheduled duoneb, BS coarse/rhonchi, on 1L NC. Pt's family member stated pt does not use home 02, pt uses duoneb and mucomyst BID at home. Will continue current orders, RT following.

## 2025-02-11 NOTE — PLAN OF CARE
Patient A nonverbal, afebrile/febrile throughout the night PRN (acetaminophen administered), NPO    G-tube feeding infusing 30mL/hr from 0027-8596.   Medication given through J-tube.     IV access:PIV infusing D5/0.45NS @125mL/hr    Pt denies any n/v, SOB or CP    Yanker at bedside.    Stool & sputum needs to be collected.    Primo fit in use.     Pain: denies; PRNs used: no     Nonambulatory, family refusing Q2 turns.       Care ongoing, call light within reach, bed in low position alarms on for safety. Discharge plan: TBD- home      HLM Admission: 1- Lying in Bed  HLM Daily1- Lying in bed                 Problem: Adult Inpatient Plan of Care  Goal: Plan of Care Review  Description: The Plan of Care Review/Shift note should be completed every shift.  The Outcome Evaluation is a brief statement about your assessment that the patient is improving, declining, or no change.  This information will be displayed automatically on your shift  note.  Outcome: Progressing

## 2025-02-11 NOTE — PROGRESS NOTES
[facility] MEDICINE  PROGRESS NOTE     Code Status: Full Code       Assessment and Plan:  Liu GIL Malave is a 59 year old male with PMH signficant for CVA with basal ganglia hemorrhage in 2016 with extensive residual deficits, aphasia, dysphagia with GJ tube dependence, spastic quadriplegia, hypertension, dyslipidemia, seizure disorder, chronic hep C, ESBL urine, CKD 3A and GERD. Has a positive Cologuard test. Not pursuing colonoscopy.  Recurrent hospitalizations for pulmonary disease most recent late December 2024.  Full code verified with family.  Multiple family members had norovirus.  No diarrhea.  2/9 seen ED for cough.  Benign workup.  Given IV fluids.  Discharged home.  2/10 returned with fever, tachycardia and hypertensive.  Less responsive than usual.  Creatinine elevated 1.76.  Procalcitonin low.  White count 10.1.  Chest CT shows stable mild bronchiolitis.  Admit on IV Zosyn and azithromycin.  Overnight fevers like to 103.  Vancomycin added.  White count up to 14.  Creatinine increased to 1.8 despite IV fluids.  CXR shows new right middle lobe opacity possible aspiration related.  Having some abdominal distention.  Abdominal x-ray unremarkable.  Plan to send next stool for enteric studies.        Sepsis (fever, tachycardia, hypoxia)  Recurrent aspiration pneumonitis.  Bronchiectasis  Lactic acidosis due to severe sepsis.  Unclear etiology to patient's symptoms.  Will focus on a respiratory standpoint given this is his highest area of risk.  Utilizing sepsis protocols.  Blood cultures NGTD x4.  COVID/influenza/respiratory viral panel negative.  Normal urinalysis except for proteinuria.  Try to obtain respiratory sputum sample.  Portable chest x-ray now shows right middle lobe opacity likely related to aspiration.  Continue intravenous Zosyn and azithromycin per protocols.  Continue scheduled and as needed DuoNebs, Mucomyst nebs and NebuSal as needed.     Acute on chronic  kidney disease stage IIIa  Hyponatremia  Admission sodium 146 and creatinine 1.76.  Baseline is closer to 1.5.  Hydrate per sepsis protocols.  Given D5 half-normal saline at 125 mm/h.  Recheck sodium 144 and creatinine 1.81.  Follow BMP.     Hepatitis C chronic carrier  September 2023 RNA was 13,900,000 units.  CT liver imaging unremarkable.  LFTs within normal limits.  Recheck Hep C RNA now up to 21,700,000 units.  Unclear clinical significance.  Consult [facility] to see if hep C treatment indicated/outpatient follow-up.     History of hemorrhagic CVA 2016  Spastic quadriplegia  Aphasia  Seizure disorder  Family notes he when he has been sick he has been sticking his tongue out more but denies any apparent active seizures.  Continue home baclofen and Seroquel.  Not on any chronic seizure med at this time.  Family did bring in his communication board that he will point out to liss.  Will try to utilize this when his mentation has improved.     GERD  Feeding tube dependent GJ tube  Continued regular home tube feedings.  Appreciate RD consult.  Continue daily omeprazole.  Hold on his home preventative cefaclor.  2/11 abdomen having some distention but portable abdominal x-ray unremarkable.  GJ tube is in good position.  Continue tube feedings but will decrease frequency of free water flushes.     Essential hypertension  Continue home Norvasc 5 mg daily and metoprolol 25 mg twice daily.     Hyperlipidemia  Continue home Crestor.     History of positive Cologuard  Family is declining colonoscopy or further workup.  No abdominal abnormalities noted on CT imaging.     History of ESBL UTI  Noted.  Contact precautions.     Anticoagulation   Pneumatic Compression Devices     COVID19 PCR/influenza A/B/RSV negative    Fluids: D5 half-normal saline at 150 mL/h  Pain meds: Tylenol as needed  Therapy: N/A at baseline activity level.  Kelly:Not present  Lines: None       Current Diet  Orders Placed This Encounter      NPO for  Medical/Clinical Reasons Except for: No Exceptions, NPO but receiving Tube Feeding    Supplements  Active Nourishment Order   Procedures    Adult Formula Drip Feeding: Continuous Vital 1.5; Gastrostomy; Goal Rate: 60; mL/hr; from 8am-8pm run at 60ml/hr and from 8pm-8am @30ml/hr     Lines/Drains/Airways       PIV Line  Duration             Peripheral IV 02/10/25 Right Hand 1 day    Peripheral IV 02/10/25 Right;Posterior Lower forearm <1 day              Drain  Duration             Gastrostomy/Enterostomy Gastrojejunostomy LUQ 18 fr Avanos LAURA GJ tube, lot #[id number] 54 days                  Barriers to Discharge: Fever, leukocytosis, intravenous antibiotics and fluids    Disposition: Likely here at least 2-3 more days  Medically Ready for Discharge: Anticipated in 2-4 Days       Clinically Significant Risk Factors         # Hypernatremia: Highest Na = 146 mmol/L in last 2 days, will monitor as appropriate  # Hyperchloremia: Highest Cl = 117 mmol/L in last 2 days, will monitor as appropriate      # Hypocalcemia: Lowest Ca = 6.8 mg/dL in last 2 days, will monitor and replace as appropriate        # Acute Kidney Injury, unspecified: based on a >150% or 0.3 mg/dL increase in last creatinine compared to past 90 day average, will monitor renal function  # Hypertension: Noted on problem list                # Financial/Environmental Concerns:           Interval History/Subjective:  Patient last evening had a fever spike up to 103.  Still is minimally responsive.  Some abdominal distention noted but does not appear to be in pain.  Did bring in his communication board.  States when he is doing well he can use the stick to point letters and spell items around.  Family present and very supportive.  Multiple questions answered to verbalized satisfaction.      Last 24H PRN:     acetaminophen (TYLENOL) oral liquid 1,000 mg, 1,000 mg at 02/11/25 0225    sodium chloride (PF) 0.9% PF flush 3 mL, 3 mL at 02/11/25 0032    Physical  Exam/Objective:  Temp:  [97.3  F (36.3  C)-103  F (39.4  C)] 99  F (37.2  C)  Pulse:  [] 84  Resp:  [18-28] 18  BP: (122-177)/(59-92) 160/74  SpO2:  [92 %-96 %] 94 %  Wt Readings from Last 4 Encounters:   02/11/25 60.6 kg (133 lb 8 oz)   02/09/25 59 kg (130 lb)   12/25/24 60.2 kg (132 lb 11.2 oz)   11/17/24 56.7 kg (125 lb)     Body mass index is 26.07 kg/m .    Constitutional: fatigued, intermittently opening eyes but falling back asleep, uncooperative, no apparent distress, appears older than stated age, and thin  ENT: Dry mucous membranes.  Respiratory: Rhonchi especially on the right side present.  Cardiovascular: Normal apical impulse, regular rate and rhythm, normal S1 and S2, no S3 or S4, and no murmur noted  GI: Diminished but positive bowel sounds soft mild distention.  Does not appear tender.  GJ tube appears to be functioning well.  Skin: normal skin color, texture, turgor, no redness, warmth, or swelling, and no rashes  Musculoskeletal: Significant spastic quadriplegia stable from previous. no lower extremity pitting edema present  Neurologic: Difficult to assess due to his somnolence and significant neurologic deficit.    Medications:   Personally Reviewed.  Medications   Current Facility-Administered Medications   Medication Dose Route Frequency Provider Last Rate Last Admin    dextrose 10% infusion   Intravenous Continuous PRN Stefanie Cherry MD        dextrose 5% and 0.45% NaCl infusion   Intravenous Continuous Dayday Gonsales  mL/hr at 02/11/25 0831 Rate Change at 02/11/25 0831     Current Facility-Administered Medications   Medication Dose Route Frequency Provider Last Rate Last Admin    acetylcysteine (MUCOMYST) 20 % nebulizer solution 2 mL  2 mL Nebulization BID Dayday Gonsales MD   2 mL at 02/11/25 0738    amLODIPine (NORVASC) tablet 5 mg  5 mg Per G Tube Daily Dayday Gonsales MD   5 mg at 02/11/25 0853    azithromycin (ZITHROMAX) 250 mg in  mL intermittent infusion   250 mg Intravenous Q24H Dayday Gonsales MD   250 mg at 02/11/25 1200    baclofen (LIORESAL) tablet 20 mg  20 mg Per Feeding Tube TID Dayday Gonsales MD   20 mg at 02/11/25 0224    bisacodyl (DULCOLAX) suppository 10 mg  10 mg Rectal Every Other Day Dayday Gonsales MD   10 mg at 02/11/25 1057    docusate (COLACE) 50 MG/5ML liquid 100 mg  100 mg Per Feeding Tube BID Dayday Gonsales MD   100 mg at 02/11/25 0853    ipratropium - albuterol 0.5 mg/2.5 mg/3 mL (DUONEB) neb solution 3 mL  3 mL Nebulization 4x daily Dayday Gonsales MD   3 mL at 02/11/25 1221    ketoconazole (NIZORAL) 2 % cream   Topical BID Dayday Gonsales MD   Given at 02/11/25 0851    metoprolol tartrate (LOPRESSOR) tablet 25 mg  25 mg Per Feeding Tube BID Dayday Gonsales MD   25 mg at 02/11/25 0853    pantoprazole (PROTONIX) 2 mg/mL suspension 40 mg  40 mg Per Feeding Tube BID Dayday Gonsales MD   40 mg at 02/11/25 0853    piperacillin-tazobactam (ZOSYN) 3.375 g vial to attach to  mL bag  3.375 g Intravenous Q8H Dayday oGnsales MD   3.375 g at 02/11/25 0848    QUEtiapine (SEROquel) tablet 25 mg  25 mg Oral TID Dayday Gonsales MD   25 mg at 02/11/25 0224    rosuvastatin (CRESTOR) tablet 10 mg  10 mg Per G Tube Daily Dayday Gonsales MD   10 mg at 02/11/25 0853    sodium chloride (PF) 0.9% PF flush 3 mL  3 mL Intracatheter Q8H Dayday Gonsales MD   3 mL at 02/10/25 2210    sodium chloride (PF) 0.9% PF flush 3 mL  3 mL Intracatheter Q8H Dayday Gonsales MD   3 mL at 02/10/25 2210    sodium chloride (PF) 0.9% PF flush 3 mL  3 mL Intracatheter Q8H Jose Alberto Burch MD   3 mL at 02/11/25 1055    vancomycin (VANCOCIN) 500 mg vial to attach to  mL bag  500 mg Intravenous Q12H Tre Syed MD   500 mg at 02/11/25 1053       Data reviewed today: I personally reviewed all new medications, labs, imaging/diagnostics reports over the past 24 hours. Pertinent findings include:    Imaging:   Recent Results (from the past 24  hours)   XR Chest Port 1 View    Narrative    EXAM: XR CHEST PORT 1 VIEW  LOCATION: [facility]  DATE: 2/10/2025    INDICATION: tachycardia, tachypnea  COMPARISON: Chest x-ray and chest CT February 10, 2025      Impression    IMPRESSION: New, 4 to 5 cm focus of asymmetric opacity in the right midlung zone may represent atelectasis or aspiration pneumonitis given the short interval change. Atelectasis in the lower lobes appears unchanged. No pneumothorax or pleural effusion.   Vascular volume is normal.     Recent Results (from the past 4320 hours)   Echocardiogram Complete   Result Value    LVEF  60-65%    Narrative    [id number]  [id number]  [id number]  [id number]^LILIANA^[place] [address]     Name: NAVYA ESPINAL  MRN: [id number]  : 1965  Study Date: 2024 01:27 PM  Age: 59 yrs  Gender: Male  Patient Location: [facility]  Reason For Study: Tachycardia  Ordering Physician: LIVAN FORD  Performed By: IVY     BSA: 1.5 m2  Height: 60 in  Weight: 126 lb  HR: 120  BP: 124/92 mmHg  ______________________________________________________________________________  Procedure  Echocardiogram with two-dimensional, color and spectral Doppler. Technically  difficult study. Compared to the prior study dated 10/26/2023 no significant  change. No hemodynamically significant valvular abnormalities on 2D or color  flow imaging.  ______________________________________________________________________________  Interpretation Summary     The left ventricle is normal in size.  The visual ejection fraction is 60-65%.  Regional wall motion is grossly normal but endocardial border definition is  limited  No hemodynamically significant valvular abnormalities on 2D or color flow  imaging.  Compared to the prior study dated 10/26/2023 no significant change  ______________________________________________________________________________  Left Ventricle  The left  ventricle is normal in size. There is mild concentric left  ventricular hypertrophy. Left ventricular diastolic function is indeterminate.  The visual ejection fraction is 60-65%. Regional wall motion is grossly normal  but endocardial border definition is limited.     Right Ventricle  The right ventricle is not well visualized.     Atria  The left atrium is not well visualized. Right atrium not well visualized.  Atrial septum not well seen.     Mitral Valve  Mitral valve leaflets appear normal. There is trace mitral regurgitation.  There is no mitral valve stenosis.     Tricuspid Valve  The tricuspid valve is not well visualized. There is trace tricuspid  regurgitation. Right ventricular systolic pressure could not be approximated  due to inadequate tricuspid regurgitation. There is no tricuspid stenosis.     Aortic Valve  The aortic valve is not well visualized. No aortic regurgitation is present.  No aortic stenosis is present.     Pulmonic Valve  The pulmonic valve is not well visualized.     Vessels  The aorta root is normal. The thoracic aorta cannot be assessed. Inferior vena  cava not well visualized for estimation of right atrial pressure.     Pericardium  There is no pericardial effusion.     Rhythm  The rhythm was sinus tachycardia.  ______________________________________________________________________________  MMode/2D Measurements & Calculations     IVSd: 1.2 cm  LVIDd: 4.1 cm  LVIDs: 2.8 cm  LVPWd: 1.2 cm  FS: 32.8 %  LV mass(C)d: 162.8 grams  LV mass(C)dI: 106.5 grams/m2  Ao root diam: 3.4 cm  LVOT diam: 2.2 cm  LVOT area: 3.8 cm2  Ao root diam index Ht(cm/m): 2.2  Ao root diam index BSA (cm/m2): 2.2  EF Biplane: 65.8 %  LA Volume (BP): 28.7 ml     LA Volume Index (BP): 18.8 ml/m2  RWT: 0.56  TAPSE: 1.8 cm     Time Measurements  MM HR: 114.0 BPM     Doppler Measurements & Calculations  MV E max jaswant: 100.0 cm/sec  MV A max jaswant: 80.9 cm/sec  MV E/A: 1.2  MV dec slope: 858.0 cm/sec2  MV dec time: 0.12  sec  Ao V2 max: 124.0 cm/sec  Ao max P.0 mmHg  Ao V2 mean: 81.0 cm/sec  Ao mean PG: 3.0 mmHg  Ao V2 VTI: 25.6 cm  DORI(I,D): 2.4 cm2  DORI(V,D): 3.0 cm2  LV V1 max PG: 3.7 mmHg  LV V1 max: 96.3 cm/sec  LV V1 VTI: 16.3 cm  SV(LVOT): 62.0 ml  SI(LVOT): 40.5 ml/m2  AV Hollis Ratio (DI): 0.78  DORI Index (cm2/m2): 1.6     ______________________________________________________________________________  Report approved by: Corine Dominguez MD on 2024 02:53 PM             Labs:  XR Chest Port 1 View   Final Result   IMPRESSION: New, 4 to 5 cm focus of asymmetric opacity in the right midlung zone may represent atelectasis or aspiration pneumonitis given the short interval change. Atelectasis in the lower lobes appears unchanged. No pneumothorax or pleural effusion.    Vascular volume is normal.      Chest CT w/o contrast   Final Result   IMPRESSION:    1.  No significant interval change.            XR Chest Port 1 View   Final Result   IMPRESSION: Normal heart size and pulmonary vascularity. No acute infiltrates or consolidation. Minimal linear scarring or atelectasis left lower lung. Aortic calcification. Old right rib fracture. Otherwise unremarkable. No acute findings.        Recent Results (from the past 24 hours)   Lactic Acid Whole Blood with 1X Repeat in 2 HR when >2    Collection Time: 02/10/25  8:42 PM   Result Value Ref Range    Lactic Acid, Initial 2.1 (H) 0.7 - 2.0 mmol/L   Lactic acid whole blood    Collection Time: 02/10/25 10:56 PM   Result Value Ref Range    Lactic Acid 2.4 (H) 0.7 - 2.0 mmol/L   Basic metabolic panel    Collection Time: 25  5:17 AM   Result Value Ref Range    Sodium 144 135 - 145 mmol/L    Potassium 3.8 3.4 - 5.3 mmol/L    Chloride 117 (H) 98 - 107 mmol/L    Carbon Dioxide (CO2) 18 (L) 22 - 29 mmol/L    Anion Gap 9 7 - 15 mmol/L    Urea Nitrogen 32.6 (H) 8.0 - 23.0 mg/dL    Creatinine 1.81 (H) 0.67 - 1.17 mg/dL    GFR Estimate 42 (L) >60 mL/min/1.73m2    Calcium 6.8 (L) 8.8 - 10.4  mg/dL    Glucose 155 (H) 70 - 99 mg/dL   CBC with platelets    Collection Time: 02/11/25  5:17 AM   Result Value Ref Range    WBC Count 14.1 (H) 4.0 - 11.0 10e3/uL    RBC Count 2.76 (L) 4.40 - 5.90 10e6/uL    Hemoglobin 8.4 (L) 13.3 - 17.7 g/dL    Hematocrit 25.9 (L) 40.0 - 53.0 %    MCV 94 78 - 100 fL    MCH 30.4 26.5 - 33.0 pg    MCHC 32.4 31.5 - 36.5 g/dL    RDW 13.1 10.0 - 15.0 %    Platelet Count 184 150 - 450 10e3/uL   Lactic acid whole blood    Collection Time: 02/11/25  5:17 AM   Result Value Ref Range    Lactic Acid 1.5 0.7 - 2.0 mmol/L       Pending Labs:  Unresulted Labs Ordered in the Past 30 Days of this Admission       Date and Time Order Name Status Description    2/10/2025  8:34 AM Blood Culture Peripheral Blood Preliminary     2/10/2025  8:34 AM Blood Culture Peripheral Blood Preliminary     2/9/2025  9:03 AM Blood Culture Line, venous Preliminary     2/9/2025  9:03 AM Blood Culture Line, venous Preliminary               Dayday Gonsales MD  [facility]  Phone: #[phone]

## 2025-02-11 NOTE — PHARMACY-VANCOMYCIN DOSING SERVICE
"Pharmacy Vancomycin Initial Note  Date of Service February 10, 2025  Patient's  1965  59 year old, male    Indication: Sepsis    Current estimated CrCl = Estimated Creatinine Clearance: 36.7 mL/min (A) (based on SCr of 1.62 mg/dL (H)).    Creatinine for last 3 days  2025:  9:55 AM Creatinine 1.52 mg/dL  2/10/2025:  5:22 AM Creatinine 1.76 mg/dL; 10:43 AM Creatinine 1.62 mg/dL    Recent Vancomycin Level(s) for last 3 days  No results found for requested labs within last 3 days.      Vancomycin IV Administrations (past 72 hours)        No vancomycin orders with administrations in past 72 hours.                    Nephrotoxins and other renal medications (From now, onward)      Start     Dose/Rate Route Frequency Ordered Stop    02/10/25 1530  piperacillin-tazobactam (ZOSYN) 3.375 g vial to attach to  mL bag        Note to Pharmacy: For [facility], [facility] and [facility]: For Zosyn-naive patients, use the \"Zosyn initial dose + extended infusion\" order panel.    3.375 g  over 240 Minutes Intravenous EVERY 8 HOURS 02/10/25 1246              Contrast Orders - past 72 hours (72h ago, onward)      None            [employer] Prediction of Planned Initial Vancomycin Regimen  Loading dose: 1,250 mg IV x 1   Regimen: 500 mg IV every 12 hours.  Start time: 21:45 on 02/10/2025  Exposure target: AUC24 (range)400-600 mg/L.hr   AUC24,ss: 484 mg/L.hr  Probability of AUC24 > 400: 72 %  Ctrough,ss: 17 mg/L  Probability of Ctrough,ss > 20: 32 %  Probability of nephrotoxicity (Lodise ALMA ): 13 %          Plan:  Start vancomycin 1,250 mg IV x 1 then 500 mg IV q12h.   Vancomycin monitoring method: AUC  Vancomycin therapeutic monitoring goal: 400-600 mg*h/L  Pharmacy will check vancomycin levels as appropriate in 1-3 Days.    Serum creatinine levels will be ordered daily for the first week of therapy and at least twice weekly for subsequent weeks.      Thank you for the consult.   Tri Epstein, PharmD, BCPS     "

## 2025-02-11 NOTE — CONSULTS
Care Management Initial Consult    General Information  Assessment completed with: Family,    Type of CM/SW Visit: Initial Assessment    Primary Care Provider verified and updated as needed: Yes   Readmission within the last 30 days: no previous admission in last 30 days      Reason for Consult: discharge planning  Advance Care Planning: Advance Care Planning Reviewed: no concerns identified          Communication Assessment  Patient's communication style: spoken language (English or Bilingual) (family speaks English)             Cognitive  Cognitive/Neuro/Behavioral: .WDL except, arousability, level of consciousness, motor response, orientation, speech  Level of Consciousness: other (see comments) (JUAN ANTONIO)  Arousal Level: arouses to voice, arouses to touch/gentle shaking  Orientation: other (see comments) (JUAN ANTONIO, nonverbal)     Best Language: 3 - Mute  Speech: unable to speak    Living Environment:   People in home: child(sam), adult, spouse, parent(s), grandchild(sam)     Current living Arrangements: house      Able to return to prior arrangements: yes       Family/Social Support:  Care provided by: spouse/significant other, child(sam), other (see comments) (PCA)  Provides care for: no one, unable/limited ability to care for self  Marital Status:   Support system: Wife, Children, Parent(s), Other (specify) (grandchildren)          Description of Support System: Supportive, Involved         Current Resources:   Patient receiving home care services: No        Community Resources: PCA, County Worker, County Programs (15.5hours/day PCA; 8 hours/day night supervision; [facility] CM)  Equipment currently used at home: wheelchair, manual, hospital bed, lift device, other (see comments) (TF pump)  Supplies currently used at home: Other (TF via [employer])    Employment/Financial:  Employment Status: disabled        Financial Concerns: none   Referral to Financial Worker: No       Does the patient's insurance plan have a 3 day  qualifying hospital stay waiver?  No    Lifestyle & Psychosocial Needs:  Social Drivers of Health     Food Insecurity: Low Risk  (12/26/2024)    Food Insecurity     Within the past 12 months, did you worry that your food would run out before you got money to buy more?: No     Within the past 12 months, did the food you bought just not last and you didn t have money to get more?: No   Depression: Not at risk (1/7/2025)    PHQ-2     PHQ-2 Score: 1   Housing Stability: Low Risk  (12/26/2024)    Housing Stability     Do you have housing? : Yes     Are you worried about losing your housing?: No   Tobacco Use: Medium Risk (1/7/2025)    Patient History     Smoking Tobacco Use: Former     Smokeless Tobacco Use: Former     Passive Exposure: Past   Financial Resource Strain: Low Risk  (12/26/2024)    Financial Resource Strain     Within the past 12 months, have you or your family members you live with been unable to get utilities (heat, electricity) when it was really needed?: No   Alcohol Use: Not on file   Transportation Needs: Low Risk  (12/26/2024)    Transportation Needs     Within the past 12 months, has lack of transportation kept you from medical appointments, getting your medicines, non-medical meetings or appointments, work, or from getting things that you need?: No   Physical Activity: Not on file   Interpersonal Safety: Unknown (12/22/2024)    Interpersonal Safety     Do you feel physically and emotionally safe where you currently live?: Patient unable to answer     Within the past 12 months, have you been hit, slapped, kicked or otherwise physically hurt by someone?: Patient unable to answer     Within the past 12 months, have you been humiliated or emotionally abused in other ways by your partner or ex-partner?: Patient unable to answer   Stress: Not on file   Social Connections: Not on file   Health Literacy: Not on file       Functional Status:  Prior to admission patient needed assistance:   Dependent ADLs::  Bathing, Dressing, Grooming, Incontinence, Positioning, Transfers, Eating, Wheelchair-with assist, Toileting  Dependent IADLs:: Cleaning, Cooking, Laundry, Shopping, Meal Preparation, Medication Management, Transportation, Money Management, Incontinence       Mental Health Status:  Mental Health Status: Other (see comment) (JUANA NTONIO)       Chemical Dependency Status:  Chemical Dependency Status: Other (see comment) (JUAN ANTONIO)             Values/Beliefs:  Spiritual, Cultural Beliefs, Quaker Practices, Values that affect care: no               Discussed  Partnership in Safe Discharge Planning  document with patient/family: Yes    Additional Information:  Liu Malave is a(n) 59 year old year old male who presented with Fever, unspecified fever cause [R50.9]  Acute sepsis (H) [A41.9].     CM spoke with family by phone. Introduced self and role.  family  assisted with completing assessment. Patient lives in a(n) house with his wife, mom, daughter and granddaughter. Patient receives assistance with bathing, cooking, cleaning, dressing, eating, grooming, laundry, medication management, money management, shopping, toileting, transfers, and transportation. Anticipated that patient will discharge to home with family, home with help/services.    Pt has a PCA, PCA is family. Family to transport upon discharge.     Family denies any further needs at this time.    Contacts:  Extended Emergency Contact Information  Primary Emergency Contact: MILAGRO MALAVE  Address: [address] [place] [address] [facility]  Home Phone: [phone]  Mobile Phone: [phone]  Relation: Daughter  Secondary Emergency Contact: KAYLEE SALAZAR  Address: [address] [place] [address] [facility]  Home Phone: [phone]  Relation: Spouse      Next Steps: Medical readiness, anticipate returning back to home with family and PCA.    Go Davis RN

## 2025-02-11 NOTE — PLAN OF CARE
Goal Outcome Evaluation:      Plan of Care Reviewed With: family          Outcome Evaluation: Pt to d/c back to home with family and PCA services

## 2025-02-11 NOTE — PROGRESS NOTES
Patient remained on 1 lpm via nasal cannula overnight.  Breath sounds reveal coarse rhonchi.  Patient orally suctioned for a small to moderate amount of thick yellow.  RT to continue to follow.

## 2025-02-11 NOTE — PLAN OF CARE
Problem: Gas Exchange Impaired  Goal: Optimal Gas Exchange  Outcome: Progressing     Problem: Infection  Goal: Absence of Infection Signs and Symptoms  Outcome: Progressing   Goal Outcome Evaluation:       T max this evening was 103 axiliary, tachycardic and tachypneic, reported to Dr Kenney. Dr Kenney asked to have Dr Syed come to assess patient. Patient received iv bolus and vanco was added to plan of care. Temperature improved after tylenol and ice packs. Tube feeding running at 60 ml/hr.

## 2025-02-12 ENCOUNTER — APPOINTMENT (OUTPATIENT)
Dept: INTERPRETER SERVICES | Facility: CLINIC | Age: 60
End: 2025-02-12
Payer: COMMERCIAL

## 2025-02-12 PROBLEM — A08.31 GASTROENTERITIS DUE TO SAPOVIRUS: Status: ACTIVE | Noted: 2025-02-12

## 2025-02-12 LAB
ADV 40+41 DNA STL QL NAA+NON-PROBE: NEGATIVE
ANION GAP SERPL CALCULATED.3IONS-SCNC: 11 MMOL/L (ref 7–15)
ASTRO TYP 1-8 RNA STL QL NAA+NON-PROBE: NEGATIVE
BUN SERPL-MCNC: 18.7 MG/DL (ref 8–23)
C CAYETANENSIS DNA STL QL NAA+NON-PROBE: NEGATIVE
CALCIUM SERPL-MCNC: 7.1 MG/DL (ref 8.8–10.4)
CAMPYLOBACTER DNA SPEC NAA+PROBE: NEGATIVE
CHLORIDE SERPL-SCNC: 112 MMOL/L (ref 98–107)
CREAT SERPL-MCNC: 1.57 MG/DL (ref 0.67–1.17)
CRYPTOSP DNA STL QL NAA+NON-PROBE: NEGATIVE
E COLI O157 DNA STL QL NAA+NON-PROBE: ABNORMAL
E HISTOLYT DNA STL QL NAA+NON-PROBE: NEGATIVE
EAEC ASTA GENE ISLT QL NAA+PROBE: NEGATIVE
EC STX1+STX2 GENES STL QL NAA+NON-PROBE: NEGATIVE
EGFRCR SERPLBLD CKD-EPI 2021: 50 ML/MIN/1.73M2
EPEC EAE GENE STL QL NAA+NON-PROBE: NEGATIVE
ERYTHROCYTE [DISTWIDTH] IN BLOOD BY AUTOMATED COUNT: 13.2 % (ref 10–15)
ETEC LTA+ST1A+ST1B TOX ST NAA+NON-PROBE: NEGATIVE
G LAMBLIA DNA STL QL NAA+NON-PROBE: NEGATIVE
GLUCOSE SERPL-MCNC: 148 MG/DL (ref 70–99)
HCO3 SERPL-SCNC: 19 MMOL/L (ref 22–29)
HCT VFR BLD AUTO: 27.8 % (ref 40–53)
HGB BLD-MCNC: 9.3 G/DL (ref 13.3–17.7)
MCH RBC QN AUTO: 30.6 PG (ref 26.5–33)
MCHC RBC AUTO-ENTMCNC: 33.5 G/DL (ref 31.5–36.5)
MCV RBC AUTO: 91 FL (ref 78–100)
NOROVIRUS GI+II RNA STL QL NAA+NON-PROBE: NEGATIVE
P SHIGELLOIDES DNA STL QL NAA+NON-PROBE: NEGATIVE
PLATELET # BLD AUTO: 195 10E3/UL (ref 150–450)
POTASSIUM SERPL-SCNC: 3.3 MMOL/L (ref 3.4–5.3)
RBC # BLD AUTO: 3.04 10E6/UL (ref 4.4–5.9)
RVA RNA STL QL NAA+NON-PROBE: NEGATIVE
SALMONELLA SP RPOD STL QL NAA+PROBE: NEGATIVE
SAPO I+II+IV+V RNA STL QL NAA+NON-PROBE: POSITIVE
SHIGELLA SP+EIEC IPAH ST NAA+NON-PROBE: NEGATIVE
SODIUM SERPL-SCNC: 142 MMOL/L (ref 135–145)
V CHOLERAE DNA SPEC QL NAA+PROBE: NEGATIVE
VIBRIO DNA SPEC NAA+PROBE: NEGATIVE
WBC # BLD AUTO: 11.7 10E3/UL (ref 4–11)
Y ENTEROCOL DNA STL QL NAA+PROBE: NEGATIVE

## 2025-02-12 PROCEDURE — 99232 SBSQ HOSP IP/OBS MODERATE 35: CPT | Performed by: FAMILY MEDICINE

## 2025-02-12 PROCEDURE — 94640 AIRWAY INHALATION TREATMENT: CPT

## 2025-02-12 PROCEDURE — 250N000009 HC RX 250: Performed by: FAMILY MEDICINE

## 2025-02-12 PROCEDURE — 94640 AIRWAY INHALATION TREATMENT: CPT | Mod: 76

## 2025-02-12 PROCEDURE — 999N000157 HC STATISTIC RCP TIME EA 10 MIN

## 2025-02-12 PROCEDURE — 250N000013 HC RX MED GY IP 250 OP 250 PS 637: Performed by: FAMILY MEDICINE

## 2025-02-12 PROCEDURE — 82435 ASSAY OF BLOOD CHLORIDE: CPT | Performed by: FAMILY MEDICINE

## 2025-02-12 PROCEDURE — 94668 MNPJ CHEST WALL SBSQ: CPT

## 2025-02-12 PROCEDURE — 36415 COLL VENOUS BLD VENIPUNCTURE: CPT | Performed by: FAMILY MEDICINE

## 2025-02-12 PROCEDURE — 250N000011 HC RX IP 250 OP 636: Performed by: HOSPITALIST

## 2025-02-12 PROCEDURE — 250N000009 HC RX 250: Performed by: INTERNAL MEDICINE

## 2025-02-12 PROCEDURE — 258N000003 HC RX IP 258 OP 636: Performed by: FAMILY MEDICINE

## 2025-02-12 PROCEDURE — 94667 MNPJ CHEST WALL 1ST: CPT

## 2025-02-12 PROCEDURE — 250N000011 HC RX IP 250 OP 636: Performed by: FAMILY MEDICINE

## 2025-02-12 PROCEDURE — 999N000156 HC STATISTIC RCP CONSULT EA 30 MIN

## 2025-02-12 PROCEDURE — 120N000001 HC R&B MED SURG/OB

## 2025-02-12 PROCEDURE — 85027 COMPLETE CBC AUTOMATED: CPT | Performed by: FAMILY MEDICINE

## 2025-02-12 RX ORDER — ACETYLCYSTEINE 200 MG/ML
2 SOLUTION ORAL; RESPIRATORY (INHALATION) 4 TIMES DAILY
Status: DISCONTINUED | OUTPATIENT
Start: 2025-02-12 | End: 2025-02-13 | Stop reason: HOSPADM

## 2025-02-12 RX ORDER — ALBUTEROL SULFATE 0.83 MG/ML
2.5 SOLUTION RESPIRATORY (INHALATION) EVERY 4 HOURS PRN
Status: DISCONTINUED | OUTPATIENT
Start: 2025-02-12 | End: 2025-02-13 | Stop reason: HOSPADM

## 2025-02-12 RX ADMIN — METOPROLOL TARTRATE 25 MG: 25 TABLET, FILM COATED ORAL at 09:04

## 2025-02-12 RX ADMIN — DOCUSATE SODIUM 100 MG: 50 LIQUID ORAL at 09:03

## 2025-02-12 RX ADMIN — PIPERACILLIN AND TAZOBACTAM 3.38 G: 3; .375 INJECTION, POWDER, FOR SOLUTION INTRAVENOUS at 00:04

## 2025-02-12 RX ADMIN — IPRATROPIUM BROMIDE AND ALBUTEROL SULFATE 3 ML: .5; 3 SOLUTION RESPIRATORY (INHALATION) at 16:08

## 2025-02-12 RX ADMIN — METOPROLOL TARTRATE 25 MG: 25 TABLET, FILM COATED ORAL at 19:14

## 2025-02-12 RX ADMIN — KETOCONAZOLE: 20 CREAM TOPICAL at 10:58

## 2025-02-12 RX ADMIN — AMLODIPINE BESYLATE 5 MG: 5 TABLET ORAL at 09:04

## 2025-02-12 RX ADMIN — VANCOMYCIN HYDROCHLORIDE 500 MG: 500 INJECTION, POWDER, LYOPHILIZED, FOR SOLUTION INTRAVENOUS at 10:58

## 2025-02-12 RX ADMIN — IPRATROPIUM BROMIDE AND ALBUTEROL SULFATE 3 ML: .5; 3 SOLUTION RESPIRATORY (INHALATION) at 08:10

## 2025-02-12 RX ADMIN — SODIUM BICARBONATE 40 MG: 84 INJECTION, SOLUTION INTRAVENOUS at 22:47

## 2025-02-12 RX ADMIN — BACLOFEN 20 MG: 10 TABLET ORAL at 00:19

## 2025-02-12 RX ADMIN — BACLOFEN 20 MG: 10 TABLET ORAL at 03:17

## 2025-02-12 RX ADMIN — ACETYLCYSTEINE 2 ML: 200 SOLUTION ORAL; RESPIRATORY (INHALATION) at 08:10

## 2025-02-12 RX ADMIN — SODIUM BICARBONATE 40 MG: 84 INJECTION, SOLUTION INTRAVENOUS at 09:33

## 2025-02-12 RX ADMIN — VANCOMYCIN HYDROCHLORIDE 500 MG: 500 INJECTION, POWDER, LYOPHILIZED, FOR SOLUTION INTRAVENOUS at 22:47

## 2025-02-12 RX ADMIN — AZITHROMYCIN MONOHYDRATE 250 MG: 500 INJECTION, POWDER, LYOPHILIZED, FOR SOLUTION INTRAVENOUS at 12:16

## 2025-02-12 RX ADMIN — ACETYLCYSTEINE 2 ML: 200 SOLUTION ORAL; RESPIRATORY (INHALATION) at 16:08

## 2025-02-12 RX ADMIN — ACETYLCYSTEINE 2 ML: 200 SOLUTION ORAL; RESPIRATORY (INHALATION) at 20:07

## 2025-02-12 RX ADMIN — PIPERACILLIN AND TAZOBACTAM 3.38 G: 3; .375 INJECTION, POWDER, FOR SOLUTION INTRAVENOUS at 17:28

## 2025-02-12 RX ADMIN — QUETIAPINE FUMARATE 25 MG: 25 TABLET ORAL at 19:11

## 2025-02-12 RX ADMIN — DEXTROSE AND SODIUM CHLORIDE: 5; 450 INJECTION, SOLUTION INTRAVENOUS at 15:07

## 2025-02-12 RX ADMIN — BACLOFEN 20 MG: 10 TABLET ORAL at 19:10

## 2025-02-12 RX ADMIN — ROSUVASTATIN 10 MG: 5 TABLET, FILM COATED ORAL at 09:03

## 2025-02-12 RX ADMIN — ACETAMINOPHEN 1000 MG: 325 SOLUTION ORAL at 19:36

## 2025-02-12 RX ADMIN — QUETIAPINE FUMARATE 25 MG: 25 TABLET ORAL at 00:19

## 2025-02-12 RX ADMIN — ACETYLCYSTEINE 2 ML: 200 SOLUTION ORAL; RESPIRATORY (INHALATION) at 11:36

## 2025-02-12 RX ADMIN — HYDROMORPHONE HYDROCHLORIDE 0.2 MG: 0.2 INJECTION, SOLUTION INTRAMUSCULAR; INTRAVENOUS; SUBCUTANEOUS at 17:37

## 2025-02-12 RX ADMIN — PIPERACILLIN AND TAZOBACTAM 3.38 G: 3; .375 INJECTION, POWDER, FOR SOLUTION INTRAVENOUS at 09:03

## 2025-02-12 RX ADMIN — IPRATROPIUM BROMIDE AND ALBUTEROL SULFATE 3 ML: .5; 3 SOLUTION RESPIRATORY (INHALATION) at 20:07

## 2025-02-12 RX ADMIN — DOCUSATE SODIUM 100 MG: 50 LIQUID ORAL at 19:31

## 2025-02-12 RX ADMIN — IPRATROPIUM BROMIDE AND ALBUTEROL SULFATE 3 ML: .5; 3 SOLUTION RESPIRATORY (INHALATION) at 11:36

## 2025-02-12 RX ADMIN — DEXTROSE AND SODIUM CHLORIDE: 5; 450 INJECTION, SOLUTION INTRAVENOUS at 05:48

## 2025-02-12 RX ADMIN — KETOCONAZOLE: 20 CREAM TOPICAL at 19:11

## 2025-02-12 RX ADMIN — QUETIAPINE FUMARATE 25 MG: 25 TABLET ORAL at 03:17

## 2025-02-12 ASSESSMENT — ACTIVITIES OF DAILY LIVING (ADL)
ADLS_ACUITY_SCORE: 89
ADLS_ACUITY_SCORE: 79
ADLS_ACUITY_SCORE: 89
ADLS_ACUITY_SCORE: 79
ADLS_ACUITY_SCORE: 89
ADLS_ACUITY_SCORE: 79
ADLS_ACUITY_SCORE: 79
ADLS_ACUITY_SCORE: 89
ADLS_ACUITY_SCORE: 89
ADLS_ACUITY_SCORE: 73
ADLS_ACUITY_SCORE: 89
ADLS_ACUITY_SCORE: 79
ADLS_ACUITY_SCORE: 73
ADLS_ACUITY_SCORE: 73
ADLS_ACUITY_SCORE: 79
ADLS_ACUITY_SCORE: 79
ADLS_ACUITY_SCORE: 89

## 2025-02-12 NOTE — SIGNIFICANT EVENT
I was notified by nursing staff that patient is having difficulty clearing his secretions and they are unable to suction well with the younker.     Mucomyst dosing increased to four times daily.   NT suctioning ordered.    Detail Level: Detailed Depth Of Biopsy: dermis Was A Bandage Applied: Yes Size Of Lesion In Cm: 0 Biopsy Type: H and E Biopsy Method: 15 blade- incisional biopsy technique Anesthesia Type: 1% lidocaine with epinephrine Anesthesia Volume In Cc (Will Not Render If 0): 0.5 Hemostasis: Drysol and Electrocautery Wound Care: Polysporin ointment Dressing: pressure dressing Destruction After The Procedure: No Type Of Destruction Used: Curettage Curettage Text: The wound bed was treated with curettage after the biopsy was performed. Cryotherapy Text: The wound bed was treated with cryotherapy after the biopsy was performed. Electrodesiccation Text: The wound bed was treated with electrodesiccation after the biopsy was performed. Electrodesiccation And Curettage Text: The wound bed was treated with electrodesiccation and curettage after the biopsy was performed. Silver Nitrate Text: The wound bed was treated with silver nitrate after the biopsy was performed. Lab: 6 Lab Facility: 3 Consent: Written consent was obtained and risks were reviewed including but not limited to scarring, infection, bleeding, scabbing, incomplete removal, nerve damage and allergy to anesthesia. Post-Care Instructions: I reviewed with the patient in detail post-care instructions. Patient is to keep the biopsy site dry overnight, and then apply bacitracin twice daily until healed. Patient may apply hydrogen peroxide soaks to remove any crusting. Notification Instructions: Patient will be notified of biopsy results. However, patient instructed to call the office if not contacted within 2 weeks. Billing Type: Third-Party Bill Information: Selecting Yes will display possible errors in your note based on the variables you have selected. This validation is only offered as a suggestion for you. PLEASE NOTE THAT THE VALIDATION TEXT WILL BE REMOVED WHEN YOU FINALIZE YOUR NOTE. IF YOU WANT TO FAX A PRELIMINARY NOTE YOU WILL NEED TO TOGGLE THIS TO 'NO' IF YOU DO NOT WANT IT IN YOUR FAXED NOTE.

## 2025-02-12 NOTE — CONSULTS
"GI CONSULT NOTE    Name: Liu Malave  Medical Record #: [id number]  YOB: 1965  Date of Admission: 2/10/2025  Date/Time: 2/12/2025/8:24 AM     CHIEF COMPLAINT: Hep C     HISTORY OF PRESENT ILLNESS: We were asked to see Liu Malave by Dr. Gonsales for \"Hep C carrier with increasing viral load 13.9 M now up to 21.7 M. Treatment indication?.\"    Liu Malave is a 59 year old male with history of chronic hep C, stroke, seizures, dysphagia, tube feeding dependent, HTN, ESBL urine, CKD, depression, anxiety, GERD who presented for fever on 2/10/25.  GI consulted for increasing viral load in the context of chronic Hep C.     Spoke to patient's daughter on the phone who gave history and states originally presentation was for cough that progressed to fever and abdominal pain.  She states not ever noting any jaundice.  Does state that typically abdominal distention is not normal for him at baseline.  Patient nonverbal and unable to give any further history.    Chart review notes continued fevers overnight despite antibiotics.  Enteric stool study positive for Sapovirus.     Patient was last seen in our clinic on 10/4/2023 (but has been followed with [facility] hepatology group since 2014), after that visit completed a FibroScan that showed minimal damage/scarring of the liver with F2 0 to F1 scores.  Testing at that visit showed genotype 1a with 19.6 million copies.  Hepatology visit ordered to discuss long-term management however patient lost to follow-up.    Has a positive Cologuard test- Not pursuing colonoscopy verified this with his daughter on the phone.    REVIEW OF SYSTEMS (ROS): Complete review of systems negative other than listed in HPI.    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 coli infection     History of hemorrhagic stroke with residual hemiparesis (H)     Hypertension     Lung nodule 2016    9 x 9 mm left upper lobe on CXR    MRSA (methicillin resistant staph aureus) culture positive     Periodontal disease     Persistent fever     Pneumonia of both lower lobes due to infectious organism     Respiratory failure with hypoxia (H)     Seizure (H)     Sepsis due to urinary tract infection (H) 2020    Severe sepsis (H)     Trismus       FAMILY HISTORY:  Family History   Problem Relation Age of Onset    Hypertension Mother     Cancer Father     Other Cancer Father     Kidney Disease Brother      SOCIAL HISTORY:  Social History     Socioeconomic History    Marital status:      Spouse name: Not on file    Number of children: 2    Years of education: Not on file    Highest education level: Not on file   Occupational History    Occupation: disability   Tobacco Use    Smoking status: Former     Current packs/day: 0.00     Average packs/day: 1 pack/day for 30.0 years (30.0 ttl pk-yrs)     Types: Cigarettes     Start date: 1975     Quit date: 2005     Years since quittin.7     Passive exposure: Past    Smokeless tobacco: Former     Quit date: 2007    Tobacco comments:     quit 2006   Vaping Use    Vaping status: Never Used   Substance and Sexual Activity    Alcohol use: No    Drug use: No    Sexual activity: Not Currently     Partners: Female     Birth control/protection: None   Other Topics Concern     Parent/sibling w/ CABG, MI or angioplasty before 65F 55M? No   Social History Narrative    Lives with wife, 2 daughters and son in law.    W/c bound.     Social Drivers of Health     Financial Resource Strain: Low Risk  (12/26/2024)    Financial Resource Strain     Within the past 12 months, have you or your family members you live with been unable to get utilities (heat, electricity) when it was really needed?: No   Food Insecurity: Low Risk  (12/26/2024)    Food Insecurity     Within the past 12 months, did you worry that your food would run out before you got money to buy more?: No     Within the past 12 months, did the food you bought just not last and you didn t have money to get more?: No   Transportation Needs: Low Risk  (12/26/2024)    Transportation Needs     Within the past 12 months, has lack of transportation kept you from medical appointments, getting your medicines, non-medical meetings or appointments, work, or from getting things that you need?: No   Physical Activity: Not on file   Stress: Not on file   Social Connections: Not on file   Interpersonal Safety: Unknown (12/22/2024)    Interpersonal Safety     Do you feel physically and emotionally safe where you currently live?: Patient unable to answer     Within the past 12 months, have you been hit, slapped, kicked or otherwise physically hurt by someone?: Patient unable to answer     Within the past 12 months, have you been humiliated or emotionally abused in other ways by your partner or ex-partner?: Patient unable to answer   Housing Stability: Low Risk  (12/26/2024)    Housing Stability     Do you have housing? : Yes     Are you worried about losing your housing?: No     MEDICATIONS PRIOR TO ADMISSION:   Medications Prior to Admission   Medication Sig Dispense Refill Last Dose/Taking    acetaminophen (TYLENOL) 500 MG tablet Place 2 tablets (1,000 mg) into G tube every 6 hours as needed for mild pain 60 tablet 0 Past Week    acetylcysteine  (MUCOMYST) 20 % neb solution Take 2 mLs by nebulization every 6 hours as needed (congestion). 30 mL 5 2/9/2025    amLODIPine (NORVASC) 5 MG tablet Place 1 tablet (5 mg) into G tube daily. 30 tablet 11 2/9/2025    baclofen (LIORESAL) 20 MG tablet Place 1 tablet (20 mg) into Feeding Tube 4 times daily. (Patient taking differently: Place 20 mg into Feeding Tube 3 times daily.) 120 tablet 5 2/10/2025 at  3:00 AM    bisacodyl (DULCOLAX) 10 MG suppository Place 10 mg rectally every other day.   2/8/2025    cefaclor (CECLOR) 250 MG capsule Place 1 capsule (250 mg) into G tube daily. UTI ppx 90 capsule 3 2/9/2025    CHEST CONGESTION RELIEF DM  MG/5ML syrup TAKE 10 MLS BY G TUBE ROUTE EVERY 4 HOURS AS NEEDED FOR COUGH. 237 mL 0 Unknown    docusate (COLACE) 50 MG/5ML liquid Place 10 mLs (100 mg) into Feeding Tube 2 times daily. 473 mL 0 2/9/2025    ipratropium - albuterol 0.5 mg/2.5 mg/3 mL (DUONEB) 0.5-2.5 (3) MG/3ML neb solution Take 1 vial (3 mLs) by nebulization every 4 hours. 90 mL 1 2/9/2025    ketoconazole (NIZORAL) 2 % external cream Apply topically 2 times daily to affected area(s). 30 g 0 2/9/2025    LIDOCAINE PAIN RELIEF 4 % Patch Place 1 patch onto the skin daily as needed for moderate pain. 30 patch 2 Unknown    meclizine (ANTIVERT) 25 MG tablet Take 1 tablet via G-tube every 6 hours as needed for vertigo 30 tablet 11 Unknown    metoprolol tartrate (LOPRESSOR) 25 MG tablet Place 1 tablet (25 mg) into Feeding Tube 2 times daily. 60 tablet 11 2/9/2025 Bedtime    neomycin-bacitracin-polymyxin (NEOSPORIN) 5-400-5000 ointment Apply topically 3 times daily as needed (irritation) To J Tube site 30 g 0 Unknown    nystatin (MYCOSTATIN) 168162 UNIT/GM external cream Apply topically 2 times daily as needed (redness) 30 g 3 Unknown    nystatin-triamcinolone (MYCOLOG II) 633076-0.1 UNIT/GM-% external cream Apply topically 2 times daily. 60 g 1 2/9/2025    omeprazole (PRILOSEC) 40 MG DR capsule Take 1 capsule (40 mg) by  mouth daily. IN G TUBE 30 capsule 11 2/9/2025    ondansetron (ZOFRAN) 4 MG tablet Place 1 tablet (4 mg) into G tube every 8 hours as needed for nausea 30 tablet 0 Unknown    polyethylene glycol (MIRALAX) 17 GM/Dose powder Give 1 capful via G-tube daily as needed for constipation 850 g 3 Unknown    Polyethylene Glycol 400 (VISINE DRY EYE RELIEF) 1 % SOLN Apply 1 drop to eye every 6 hours as needed for dry eyes. 15 mL 11 Unknown    QUEtiapine (SEROQUEL) 25 MG tablet TAKE 1 TABLET BY MOUTH AT 7:00PM, MIDNIGHT AND 3:00AM FOR TOTAL OF 75 MG NIGHTLY. OK TO REPEAT AFTER 4 HOURS AND DURING THE DAY AS NEEDED. 102 tablet 5 2/10/2025 at  3:00 AM    rosuvastatin (CRESTOR) 10 MG tablet Place 1 tablet (10 mg) into G tube daily. 90 tablet 3 2/9/2025    sennosides (SENOKOT) 8.8 MG/5ML syrup Take 5 mLs by mouth 2 times daily as needed for constipation.   Unknown    sodium chloride (NEBUSAL) 3 % neb solution TAKE 4 MLS BY NEBULIZATION EVERY 6 HOURS AS NEEDED FOR THICK SPUTUM. 240 mL 2 Unknown    triamcinolone (KENALOG) 0.1 % external cream Apply topically daily as needed for irritation. Apply to affected area/rash 30 g 0 Unknown    Catheters (EVERYDAY MALE EXTERNAL CATH/SM) MISC 1 catheter daily. 30 each 0     Oral Hygiene Products (Q-CARE COVERD YANKAUER/SUCTION) MISC Take 1 Device by mouth every hour as needed (secretions). 10 each 0     Ostomy Supplies (STOMAHESIVE) PSTE Apply topically to skin around ostomy site twice daily and as needed 56.7 g 3         ALLERGIES: Diphenhydramine, Trazodone, and Contrast dye    PHYSICAL EXAM:    BP (!) 187/81   Pulse 87   Temp 99.1  F (37.3  C) (Temporal)   Resp 25   Wt 61.8 kg (136 lb 3.9 oz)   SpO2 97%   BMI 26.61 kg/m      GENERAL: NAD  NECK: Supple without adenopathy  EYES: No scleral icterus  LUNGS: Clear to auscultation bilaterally  HEART: Regular rate and rhythm, S1 and S2 present  ABDOMEN: Distended. Positive bowel sounds. Soft, no guarding/rebound/mass, no obvious  organomegaly  MUSKULOSKELETAL:  Warm   SKIN: No jaundice  NEUROLOGIC: Alert   PSYCHIATRIC: Normal affect    LAB DATA:  CMP Results:   Recent Labs   Lab Test 02/11/25  0517 02/10/25  1043 02/10/25  0522 02/09/25  0955 12/22/24  0628 12/21/24  2304    146* 144 141   < > 139   POTASSIUM 3.8 4.5 4.9 5.0   < > 5.0   CHLORIDE 117* 117* 114* 109*   < > 107   CO2 18* 20* 20* 22   < > 21*   ANIONGAP 9 9 10 10   < > 11   * 135* 142* 118*   < > 106*   BUN 32.6* 39.5* 45.0* 44.5*   < > 38.4*   CR 1.81* 1.62* 1.76* 1.52*   < > 1.28*   BILITOTAL  --   --  0.2 0.2  --  0.2   ALKPHOS  --   --  75 83  --  73   ALT  --   --  19 18  --  19   AST  --   --  24 24  --  34    < > = values in this interval not displayed.      CBC  Recent Labs   Lab 02/12/25  0757 02/11/25  0517 02/10/25  0522 02/09/25  0955   WBC 11.7* 14.1* 10.1 13.3*   RBC 3.04* 2.76* 3.40* 3.54*   HGB 9.3* 8.4* 10.2* 10.8*   HCT 27.8* 25.9* 31.5* 32.6*   MCV 91 94 93 92   MCH 30.6 30.4 30.0 30.5   MCHC 33.5 32.4 32.4 33.1   RDW 13.2 13.1 13.0 12.7    184 251 267     Lipase   Date Value Ref Range Status   12/21/2024 78 (H) 13 - 60 U/L Final   10/28/2022 79 (H) 0 - 52 U/L Final   05/30/2022 70 (H) 0 - 52 U/L Final   04/15/2021 73 (H) 0 - 52 U/L Final   07/25/2020 65 (H) 0 - 52 U/L Final     IMAGING:  EXAM: XR ABDOMEN PORT 1 VIEW  LOCATION: [facility]  DATE: 2/11/2025     INDICATION: distension  COMPARISON: CT AP 2/9/2025                                                                   IMPRESSION: Right arm is seen contracted and overlying the right upper quadrant again.  GJ tube is in good position. Bowel gas pattern is unremarkable. No abnormally dilated loops of bowel. Redundant colon with a mild stool burden.    EXAM: CT ABDOMEN AND PELVIS WITHOUT CONTRAST  LOCATION: [facility]  DATE: 02/09/2025     INDICATION: Abdominal pain.  COMPARISON: None.  TECHNIQUE: CT scan of the abdomen and pelvis was  performed without IV contrast. Multiplanar reformats were obtained. Dose reduction techniques were used.  CONTRAST: None.     FINDINGS:   LOWER CHEST: Minimal dependent probable atelectasis. No effusions.     HEPATOBILIARY: No significant mass or bile duct dilatation. No calcified gallstones.      PANCREAS: No significant mass, duct dilatation, or inflammatory change.     SPLEEN: Normal size.     ADRENAL GLANDS: No significant nodules.     KIDNEYS/BLADDER: Contrast present in the urinary collecting system precludes evaluation of small stones. Benign cyst requiring no follow-up. No hydronephrosis.     BOWEL: No obstruction or inflammatory change. G-tube in good position.     LYMPH NODES: No adenopathy demonstrated in the absence of contrast.     VASCULATURE: There are moderate atherosclerotic changes of the visualized aorta and its branches. There is no evidence of aortic aneurysm.     PELVIC ORGANS: No pelvic masses.     MUSCULOSKELETAL: No frankly destructive bony lesions.                                                                   IMPRESSION:   1.  No acute process demonstrated.     ASSESSMENT:    Hepatitis C   Sapovirus  59 year old male with history of chronic hep C, stroke, seizures, dysphagia, tube feeding dependent, HTN, ESBL urine, CKD, depression, anxiety, GERD who presented for fever on 2/10/25.  GI consulted for increasing viral load in the context of chronic Hep C.   -LFTs normal.  Without acute process demonstrated or findings of noted cirrhosis.  -No role of urgent hep C treatment but would advise proceeding to this outpatient.  -Spent time speaking with patient's daughter about process moving forward and she is agreeable to a clinic visit with her hepatologist to discuss hep C treatment.  Still declined wanting at this time a colonoscopy or upper endoscopy for his anemia.  -Patient arrived for abdominal pain with no acute findings, enteric stool studies yesterday evening came back positive for  Sapovirus which could certainly be causing his abdominal distention and discomfort.  Conservative measures is typical treatment.     PLAN:    1.  Outpatient hepatology visit ordered, [facility] will call and updated daughter on this  2.  Supportive cares per primary  3.  GI will sign off, please call with questions or changes    Discussed with Dr. Saeed who will also visit with the patient.     TIME SPENT: 60 min including chart review, patient interview and care coordination.     GI ATTENDING BRIEF ADDENDUM:  The patient was seen and examined.  Discussed with PAUL Kruse.  Agree with findings and plan as above with the following addendum.  18 minutes were spent on providing patient care, including patient evaluation, reviewing documentation/test results, and , in addition to the time spent by the NP/PA.    Dyllan Moss, DO  Gastroenterology  [facility]                                                   Sofia Hanley CNP  Thank you for the opportunity to participate in the care of this patient.   Please feel free to call me with any questions or concerns.  Phone number [phone].

## 2025-02-12 NOTE — PROGRESS NOTES
RESPIRATORY CARE NOTE     Patient Name: Liu Malave  Today's Date: 2/12/2025       Pt continues to receive Duo nebs QID with Mucomyst 20$ 2 ml QID and now doing hand held percussor to each side of chest QID (4 min each side today).Suction x1 for moderate thick yellow with Yankauer BS are coarse and rhonchi to coarse after suction/treatment. Pt is on RA now weaned off of oxygen, SpO2 is 95%. PT has had a productive cough with Yankauer suction. Pt also perceives improvement. PT is non verbal and family is on IPAD in room. PT tolerates well. RT will continue to monitor and assess.    Freida Rey, RT

## 2025-02-12 NOTE — PLAN OF CARE
Problem: Adult Inpatient Plan of Care  Goal: Readiness for Transition of Care  Outcome: Not Progressing     Problem: Infection  Goal: Absence of Infection Signs and Symptoms  Outcome: Not Progressing     Problem: Gas Exchange Impaired  Goal: Optimal Gas Exchange  Outcome: Not Progressing    Nonverbal. PAINAD score 0 at rest. Quadriplegic. On 1L via NC. Tele NSR to [facility] leonarda, yanker suction at bedside. GJ tube with feedings to J tube at 60mL/hr. FWF q4h of 120mL. Primofit on. D5 1/2NS running at 150mL/hr. Had 2 loose stools after suppository. Stool sample sent. NPO. Repositioned q2h. Not oob. Alarms on for safety. Family involved in care.

## 2025-02-12 NOTE — PROGRESS NOTES
CLINICAL NUTRITION SERVICES - REASSESSMENT NOTE     Nutrition Prescription    RECOMMENDATIONS FOR MDs/PROVIDERS TO ORDER:  Consider K+ replacement per low K+ lab.     Malnutrition Status:    Does not meet 2 criteria     Recommendations already ordered by Registered Dietitian (RD):  Enteral Nutrition - continue current regimen    Future/Additional Recommendations:  Monitor TF adequacy, stooling, electrolytes     EVALUATION OF THE PROGRESS TOWARD GOALS   Diet: NPO  Nutrition Support: Enteral nutrition via PEG:  Vital 1.5 via g-tube at 60 mL/hr x 12 hrs (8am-8pm) and 30 mL x12 hrs (8pm-8am).  At goal rate, formula provides total volume of 1080 mL, 1620 kcal (27 kcal/kg, 100% needs), 73 gm Pro (1.2 gm/kg, 100% needs), 201 gm CHO, 61 gm fat, 6 gm fiber, and 825 mL free water per DW of 59 kg.  FWF: 120 mL q 2 hrs (8am-8pm). Total fluid (free water + flushes) = 1545 mL per day.     NEW FINDINGS   Pt had BM on  following stool softeners/suppository.     Labs:  Reviewed.  K+: 3.3 (L)  Cr: 1.57 (H)  B (H)    Meds:  Reviewed.  Dulcolax suppository (given )  Colace  Protonix  D5 and 0.45% NaCl infusion @150 mL/hr    GI:  LBM on , incontinent in bowel/bladder  UOP: 3550 mL on     Previous Goals   Total avg nutritional intake to meet a minimum of 25-30 kcal/kg and 1-1.2 g PRO/kg daily (per dosing wt 60 kg).   Evaluation: Met    CURRENT NUTRITION DIAGNOSIS  Swallowing difficulty related to dysphagia as evidenced by chronic TF   Evaluation: ongoing    INTERVENTIONS  Implementation  Enteral Nutrition - continue current regimen    Goals  Total avg nutritional intake to meet a minimum of 25-30 kcal/kg and 1-1.2 g PRO/kg daily (per dosing wt 60 kg).   Electrolytes WNL   Normalize bowel regimen    Monitoring/Evaluation  Progress toward goals will be monitored and evaluated per protocol.

## 2025-02-12 NOTE — PROGRESS NOTES
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 Has a positive Cologuard test. Not pursuing colonoscopy.  Recurrent hospitalizations for pulmonary disease most recent late December 2024.  Full code verified with family.  Multiple family members had norovirus.  No diarrhea.  2/9 seen ED for cough.  Benign workup.  Given IV fluids.  Discharged home.  2/10 returned with fever, tachycardia and hypertensive.  Less responsive than usual.  Creatinine elevated 1.76.  Procalcitonin low.  White count 10.1.  Chest CT shows stable mild bronchiolitis.  Admit on IV Zosyn and azithromycin.  Overnight fevers like to 103.  Vancomycin added.  White count up to 14.  Creatinine increased to 1.8 despite IV fluids.  CXR shows new right middle lobe opacity possible aspiration related.  Having some abdominal distention.  Abdominal x-ray unremarkable.  Tolerating tube feedings.  Stool positive for Sapovirus.  Clinically slowly improving.  Updated family via iPad.        Sepsis (fever, tachycardia, hypoxia)  Recurrent aspiration pneumonitis.  Bronchiectasis  Lactic acidosis due to severe sepsis.  Unclear etiology-likely respiratory given aspiration history.  Utilized sepsis protocols.  Blood cultures NGTD x4.  COVID/influenza/respiratory viral panel negative.  Normal urinalysis except for proteinuria.  Unsuccessful obtaining respiratory sputum sample.  Repeat portable chest x-ray now shows right middle lobe opacity likely related to aspiration.  Continue intravenous Zosyn and azithromycin per protocols.  Continue scheduled and as needed DuoNebs, Mucomyst nebs and NebuSal as  needed.    Sapovirus enteritis  Stool testing did come back positive for Sapovirus.  Multiple family members did have norovirus.  Not having significant diarrhea so could be at the trailing end of this.  Continue IV hydration.     Acute on chronic kidney disease stage IIIa  Hyponatremia  Admission sodium 146 and creatinine 1.76.  Baseline is closer to 1.5.  Hydrate per sepsis protocols.  Given D5 half-normal saline at 125 mL/h.  Recheck sodium 144 and creatinine 1.81-->1.57.  With tolerating tube feeds decreased fluids to 75 mL/h.  Follow BMP.     Hepatitis C chronic carrier  September 2023 RNA was 13,900,000 units.  CT liver imaging unremarkable.  LFTs within normal limits.  Recheck Hep C RNA up to 21,700,000 units.  Unclear clinical significance.  Appreciate [facility] consultation.  No active needs.  Plan for outpatient follow-up in liver clinic.     History of hemorrhagic CVA 2016  Spastic quadriplegia  Aphasia  Seizure disorder  Family notes he when he has been sick he has been sticking his tongue out more but denies any apparent active seizures.  Continue home baclofen and Seroquel.  Not on any chronic seizure meds at this time.  Family did bring in his communication board that he will point out to liss.  Will try to utilize this when his mentation has improved.     GERD  Feeding tube dependent GJ tube  Continued regular home tube feedings.  Appreciate RD consult.  Continue daily omeprazole.  Hold on his home preventative cefaclor.  2/11 abdomen having some distention but portable abdominal x-ray unremarkable.  GJ tube is in good position.  Continue tube feedings but will decrease frequency of free water flushes.     Essential hypertension  Continue home Norvasc 5 mg daily and metoprolol 25 mg twice daily.     Hyperlipidemia  Continue home Crestor.     History of positive Cologuard  Family is declining colonoscopy or further workup.  No abdominal abnormalities noted on CT imaging.     History of ESBL  UTI  Noted.  Contact precautions.     Anticoagulation   Pneumatic Compression Devices     COVID19 PCR/influenza A/B/RSV negative     Fluids: D5 half-normal saline at 75 mL/h  Pain meds: Tylenol as needed  Therapy: N/A at baseline activity level.   Kelly:Not present  Lines: None       Current Diet  Orders Placed This Encounter      NPO for Medical/Clinical Reasons Except for: No Exceptions, NPO but receiving Tube Feeding    Supplements  Active Nourishment Order   Procedures    Adult Formula Drip Feeding: Continuous Vital 1.5; Gastrostomy; Goal Rate: 60; mL/hr; from 8am-8pm run at 60ml/hr and from 8pm-8am @30ml/hr     Lines/Drains/Airways       PIV Line  Duration             Peripheral IV 02/10/25 Right Hand 2 days    Peripheral IV 02/10/25 Posterior;Left Lower forearm 1 day    Peripheral IV 02/12/25 Anterior;Right Upper forearm <1 day              Drain  Duration             Gastrostomy/Enterostomy Gastrojejunostomy LUQ 18 fr Avanos LAURA GJ tube, lot #[id number] 56 days    External Urinary Catheter 1 day                  Barriers to Discharge: Intravenous antibiotics, IV fluids, CYNTHIA, confusion worse than baseline    Disposition: Potential discharge 2 days  Medically Ready for Discharge: Anticipated in 2-4 Days       Clinically Significant Risk Factors        # Hypokalemia: Lowest K = 3.3 mmol/L in last 2 days, will replace as needed   # Hyperchloremia: Highest Cl = 117 mmol/L in last 2 days, will monitor as appropriate              # Hypertension: Noted on problem list                # Financial/Environmental Concerns: none         Interval History/Subjective:  Patient a little more awake and alert today.  Did open his eyes though was not following commands.  Breath sounds still coarse.  Did update family via iPad that they have set up in the room.  Please that he is doing better.  Questions answered to verbalized satisfaction.      Last 24H PRN:     acetaminophen (TYLENOL) oral liquid 1,000 mg, 1,000 mg at 02/11/25  2029    HYDROmorphone (DILAUDID) injection 0.2 mg, 0.2 mg at 02/11/25 2237    sodium chloride (PF) 0.9% PF flush 3 mL, 3 mL at 02/11/25 2237    Physical Exam/Objective:  Temp:  [98  F (36.7  C)-100.8  F (38.2  C)] 100.2  F (37.9  C)  Pulse:  [] 106  Resp:  [18-25] 20  BP: (117-187)/(57-81) 128/79  SpO2:  [93 %-98 %] 95 %  Wt Readings from Last 4 Encounters:   02/12/25 61.8 kg (136 lb 3.9 oz)   02/09/25 59 kg (130 lb)   12/25/24 60.2 kg (132 lb 11.2 oz)   11/17/24 56.7 kg (125 lb)     Body mass index is 26.61 kg/m .    Constitutional: fatigued, somnolent, did open his eyes when I was in the room and looked at my face and tract.  Did not follow commands., uncooperative, no apparent distress, appears older than stated age, and thin  ENT: Limited exam.  Cranial deformity right side stable.  Dry mouth.  Respiratory: Significant upper respiratory noise rhonchi worse on the right than on the left.  Cardiovascular: Normal apical impulse, regular rate and rhythm, normal S1 and S2, no S3 or S4, and no murmur noted  GI: Positive bowel sounds soft mild distention but improved from prior examination.  Does not appear to be tender.  Skin: normal skin color, texture, turgor, no redness, warmth, or swelling, and no rashes  Musculoskeletal: Significant contractures with his spastic quadriplegia stable.   Neurologic: Difficult to assess but was more awake and alert opening his eyes at room entrance.   Neuropsychiatric: General: normal, calm, and poor eye contact Level of consciousness: drowsy Affect: flat Orientation: Unable to assess     Medications:   Personally Reviewed.  Medications   Current Facility-Administered Medications   Medication Dose Route Frequency Provider Last Rate Last Admin    dextrose 10% infusion   Intravenous Continuous PRN Stefanie Cherry MD        dextrose 5% and 0.45% NaCl infusion   Intravenous Continuous Dayday Gonsales  mL/hr at 02/12/25 1507 New Bag at 02/12/25 1507     Current  Facility-Administered Medications   Medication Dose Route Frequency Provider Last Rate Last Admin    acetylcysteine (MUCOMYST) 20 % nebulizer solution 2 mL  2 mL Nebulization 4x Daily Ayaan Dixon MD   2 mL at 02/12/25 1136    amLODIPine (NORVASC) tablet 5 mg  5 mg Per G Tube Daily Dayday Gonsales MD   5 mg at 02/12/25 0904    azithromycin (ZITHROMAX) 250 mg in  mL intermittent infusion  250 mg Intravenous Q24H Dayday Gonsales MD   250 mg at 02/12/25 1216    baclofen (LIORESAL) tablet 20 mg  20 mg Per Feeding Tube TID Dayday Gonsales MD   20 mg at 02/12/25 0317    bisacodyl (DULCOLAX) suppository 10 mg  10 mg Rectal Every Other Day Dayday Gonsales MD   10 mg at 02/11/25 1057    docusate (COLACE) 50 MG/5ML liquid 100 mg  100 mg Per Feeding Tube BID Dayday Gonsales MD   100 mg at 02/12/25 0903    ipratropium - albuterol 0.5 mg/2.5 mg/3 mL (DUONEB) neb solution 3 mL  3 mL Nebulization 4x daily Dayday Gonsales MD   3 mL at 02/12/25 1136    ketoconazole (NIZORAL) 2 % cream   Topical BID Dayday Gonsales MD   Given at 02/12/25 1058    metoprolol tartrate (LOPRESSOR) tablet 25 mg  25 mg Per Feeding Tube BID Dayday Gonsales MD   25 mg at 02/12/25 0904    pantoprazole (PROTONIX) 2 mg/mL suspension 40 mg  40 mg Per Feeding Tube BID Dayday Gonsales MD   40 mg at 02/12/25 0933    piperacillin-tazobactam (ZOSYN) 3.375 g vial to attach to  mL bag  3.375 g Intravenous Q8H Dayday Gonsales MD   3.375 g at 02/12/25 0903    QUEtiapine (SEROquel) tablet 25 mg  25 mg Oral TID Dayday Gonsales MD   25 mg at 02/12/25 0317    rosuvastatin (CRESTOR) tablet 10 mg  10 mg Per G Tube Daily Dayday Gonsales MD   10 mg at 02/12/25 0903    sodium chloride (PF) 0.9% PF flush 3 mL  3 mL Intracatheter Q8H Dayday Gonsales MD   3 mL at 02/12/25 1216    sodium chloride (PF) 0.9% PF flush 3 mL  3 mL Intracatheter Q8H Dayday Gonsales MD   3 mL at 02/12/25 1216    vancomycin (VANCOCIN) 500 mg vial to attach to NS  100 mL bag  500 mg Intravenous Q12H Tre Syed MD   500 mg at 25 1058       Data reviewed today: I personally reviewed all new medications, labs, imaging/diagnostics reports over the past 24 hours. Pertinent findings include:    Imaging:   No results found for this or any previous visit (from the past 24 hours).  Recent Results (from the past 4320 hours)   Echocardiogram Complete   Result Value    LVEF  60-65%    Narrative    [id number]  [id number]  [id number]  [id number]^LILIANA^[place] [address]     Name: NAVYA ESPINAL  MRN: [id number]  : 1965  Study Date: 2024 01:27 PM  Age: 59 yrs  Gender: Male  Patient Location: [facility]  Reason For Study: Tachycardia  Ordering Physician: LIVAN FORD  Performed By: IVY     BSA: 1.5 m2  Height: 60 in  Weight: 126 lb  HR: 120  BP: 124/92 mmHg  ______________________________________________________________________________  Procedure  Echocardiogram with two-dimensional, color and spectral Doppler. Technically  difficult study. Compared to the prior study dated 10/26/2023 no significant  change. No hemodynamically significant valvular abnormalities on 2D or color  flow imaging.  ______________________________________________________________________________  Interpretation Summary     The left ventricle is normal in size.  The visual ejection fraction is 60-65%.  Regional wall motion is grossly normal but endocardial border definition is  limited  No hemodynamically significant valvular abnormalities on 2D or color flow  imaging.  Compared to the prior study dated 10/26/2023 no significant change  ______________________________________________________________________________  Left Ventricle  The left ventricle is normal in size. There is mild concentric left  ventricular hypertrophy. Left ventricular diastolic function is indeterminate.  The visual ejection fraction is 60-65%. Regional wall motion is grossly  normal  but endocardial border definition is limited.     Right Ventricle  The right ventricle is not well visualized.     Atria  The left atrium is not well visualized. Right atrium not well visualized.  Atrial septum not well seen.     Mitral Valve  Mitral valve leaflets appear normal. There is trace mitral regurgitation.  There is no mitral valve stenosis.     Tricuspid Valve  The tricuspid valve is not well visualized. There is trace tricuspid  regurgitation. Right ventricular systolic pressure could not be approximated  due to inadequate tricuspid regurgitation. There is no tricuspid stenosis.     Aortic Valve  The aortic valve is not well visualized. No aortic regurgitation is present.  No aortic stenosis is present.     Pulmonic Valve  The pulmonic valve is not well visualized.     Vessels  The aorta root is normal. The thoracic aorta cannot be assessed. Inferior vena  cava not well visualized for estimation of right atrial pressure.     Pericardium  There is no pericardial effusion.     Rhythm  The rhythm was sinus tachycardia.  ______________________________________________________________________________  MMode/2D Measurements & Calculations     IVSd: 1.2 cm  LVIDd: 4.1 cm  LVIDs: 2.8 cm  LVPWd: 1.2 cm  FS: 32.8 %  LV mass(C)d: 162.8 grams  LV mass(C)dI: 106.5 grams/m2  Ao root diam: 3.4 cm  LVOT diam: 2.2 cm  LVOT area: 3.8 cm2  Ao root diam index Ht(cm/m): 2.2  Ao root diam index BSA (cm/m2): 2.2  EF Biplane: 65.8 %  LA Volume (BP): 28.7 ml     LA Volume Index (BP): 18.8 ml/m2  RWT: 0.56  TAPSE: 1.8 cm     Time Measurements  MM HR: 114.0 BPM     Doppler Measurements & Calculations  MV E max jaswant: 100.0 cm/sec  MV A max jaswant: 80.9 cm/sec  MV E/A: 1.2  MV dec slope: 858.0 cm/sec2  MV dec time: 0.12 sec  Ao V2 max: 124.0 cm/sec  Ao max P.0 mmHg  Ao V2 mean: 81.0 cm/sec  Ao mean PG: 3.0 mmHg  Ao V2 VTI: 25.6 cm  DORI(I,D): 2.4 cm2  DORI(V,D): 3.0 cm2  LV V1 max PG: 3.7 mmHg  LV V1 max: 96.3 cm/sec  LV V1  VTI: 16.3 cm  SV(LVOT): 62.0 ml  SI(LVOT): 40.5 ml/m2  AV Hollis Ratio (DI): 0.78  DORI Index (cm2/m2): 1.6     ______________________________________________________________________________  Report approved by: Corine Dominguez MD on 12/24/2024 02:53 PM             Labs:  XR Abdomen Port 1 View   Final Result   IMPRESSION: Right arm is seen contracted and overlying the right upper quadrant again.      GJ tube is in good position. Bowel gas pattern is unremarkable. No abnormally dilated loops of bowel. Redundant colon with a mild stool burden.      XR Chest Port 1 View   Final Result   IMPRESSION: New, 4 to 5 cm focus of asymmetric opacity in the right midlung zone may represent atelectasis or aspiration pneumonitis given the short interval change. Atelectasis in the lower lobes appears unchanged. No pneumothorax or pleural effusion.    Vascular volume is normal.      Chest CT w/o contrast   Final Result   IMPRESSION:    1.  No significant interval change.            XR Chest Port 1 View   Final Result   IMPRESSION: Normal heart size and pulmonary vascularity. No acute infiltrates or consolidation. Minimal linear scarring or atelectasis left lower lung. Aortic calcification. Old right rib fracture. Otherwise unremarkable. No acute findings.        Recent Results (from the past 24 hours)   Enteric Bacteria and Virus Panel PCR    Collection Time: 02/11/25  5:09 PM    Specimen: Per Rectum; Stool   Result Value Ref Range    Campylobacter species Negative Negative    Salmonella species Negative Negative    Vibrio species Negative Negative    Vibrio cholerae Negative Negative    Yersinia enterocolitica Negative Negative    Enteropathogenic E. coli (EPEC) Negative Negative, NA    Shiga-like toxin-producing E. coli (STEC) Negative Negative    Shigella/Enteroinvasive E. coli (EIEC) Negative Negative    Cryptosporidium species Negative Negative    Giardia lamblia Negative Negative    Norovirus Gl/Gll Negative Negative    Rotavirus  A Negative Negative    Plesiomonas shigelloides Negative Negative    Enteroaggregative E. coli (EAEC) Negative Negative    Enterotoxigenic E. coli (ETEC) Negative Negative    E. coli O157 NA Negative, NA    Cyclospora cayetanensis Negative Negative    Entamoeba histolytica Negative Negative    Adenovirus F40/41 Negative Negative    Astrovirus Negative Negative    Sapovirus Positive (A) Negative   Lactic Acid Whole Blood w/ 1x repeat in 2 hrs when >2    Collection Time: 02/11/25 11:40 PM   Result Value Ref Range    Lactic Acid, Initial 0.8 0.7 - 2.0 mmol/L   Basic metabolic panel    Collection Time: 02/12/25  7:57 AM   Result Value Ref Range    Sodium 142 135 - 145 mmol/L    Potassium 3.3 (L) 3.4 - 5.3 mmol/L    Chloride 112 (H) 98 - 107 mmol/L    Carbon Dioxide (CO2) 19 (L) 22 - 29 mmol/L    Anion Gap 11 7 - 15 mmol/L    Urea Nitrogen 18.7 8.0 - 23.0 mg/dL    Creatinine 1.57 (H) 0.67 - 1.17 mg/dL    GFR Estimate 50 (L) >60 mL/min/1.73m2    Calcium 7.1 (L) 8.8 - 10.4 mg/dL    Glucose 148 (H) 70 - 99 mg/dL   CBC with platelets    Collection Time: 02/12/25  7:57 AM   Result Value Ref Range    WBC Count 11.7 (H) 4.0 - 11.0 10e3/uL    RBC Count 3.04 (L) 4.40 - 5.90 10e6/uL    Hemoglobin 9.3 (L) 13.3 - 17.7 g/dL    Hematocrit 27.8 (L) 40.0 - 53.0 %    MCV 91 78 - 100 fL    MCH 30.6 26.5 - 33.0 pg    MCHC 33.5 31.5 - 36.5 g/dL    RDW 13.2 10.0 - 15.0 %    Platelet Count 195 150 - 450 10e3/uL       Pending Labs:  Unresulted Labs Ordered in the Past 30 Days of this Admission       Date and Time Order Name Status Description    2/10/2025  8:34 AM Blood Culture Peripheral Blood Preliminary     2/10/2025  8:34 AM Blood Culture Peripheral Blood Preliminary     2/9/2025  9:03 AM Blood Culture Line, venous Preliminary     2/9/2025  9:03 AM Blood Culture Line, venous Preliminary               Dayday Gonsales MD  [facility]  Phone: #[phone]

## 2025-02-12 NOTE — PLAN OF CARE
Problem: Adult Inpatient Plan of Care  Goal: Optimal Comfort and Wellbeing  Outcome: Progressing   Problem: Gas Exchange Impaired  Goal: Optimal Gas Exchange  Outcome: Not Progressing     Goal Outcome Evaluation:       Patient lethargic. Non verbal.   On 1 L O2 NC.  PAINAD 1.    Incontinent of bowel and bladder. Primofit in place.   Lung sounds coarse.    PIV x3 patent. Flushed.   Bed bound.      Tele: sinus tachy    Had a fever overnight. Tylenol administered.     Blood pressure 125/77, pulse 94, temperature 98  F (36.7  C), temperature source Temporal, resp. rate 20, weight 61.8 kg (136 lb 3.9 oz), SpO2 96%.       Call light within reach. Bed at lowest position. Bed alarm on.         HLM Admission: 1- Lying in Bed  HLM Daily1- Lying in bed

## 2025-02-12 NOTE — PLAN OF CARE
Patient A nonverbal, afebrile/febrile throughout the night PRN (acetaminophen administered), NPO     G-tube feeding infusing 30mL/hr from 0689-4558.   Medication given through J-tube.     IV access:PIV infusing D5/0.45NS @150mL/hr     Pt denies any n/v, SOB or CP     Yanker at bedside.     Stool & sputum needs to be collected.     Primo fit in use.     Pain: denies; PRNs used: no     Nonambulatory, Q2 turn     Care ongoing, call light within reach, bed in low position alarms on for safety. Discharge plan: TBD- home        HLM Admission: 1- Lying in Bed  HLM Daily1- Lying in bed                Problem: Adult Inpatient Plan of Care  Goal: Plan of Care Review  Description: The Plan of Care Review/Shift note should be completed every shift.  The Outcome Evaluation is a brief statement about your assessment that the patient is improving, declining, or no change.  This information will be displayed automatically on your shift  note.  Outcome: Progressing

## 2025-02-13 ENCOUNTER — APPOINTMENT (OUTPATIENT)
Dept: INTERPRETER SERVICES | Facility: CLINIC | Age: 60
End: 2025-02-13
Payer: COMMERCIAL

## 2025-02-13 VITALS
RESPIRATION RATE: 18 BRPM | DIASTOLIC BLOOD PRESSURE: 89 MMHG | WEIGHT: 136.3 LBS | OXYGEN SATURATION: 97 % | SYSTOLIC BLOOD PRESSURE: 173 MMHG | BODY MASS INDEX: 26.62 KG/M2 | HEART RATE: 107 BPM | TEMPERATURE: 99 F

## 2025-02-13 LAB
ANION GAP SERPL CALCULATED.3IONS-SCNC: 11 MMOL/L (ref 7–15)
BACTERIA BLD CULT: NORMAL
BUN SERPL-MCNC: 12.9 MG/DL (ref 8–23)
CALCIUM SERPL-MCNC: 7.3 MG/DL (ref 8.8–10.4)
CHLORIDE SERPL-SCNC: 109 MMOL/L (ref 98–107)
CREAT SERPL-MCNC: 1.4 MG/DL (ref 0.67–1.17)
EGFRCR SERPLBLD CKD-EPI 2021: 57 ML/MIN/1.73M2
GLUCOSE SERPL-MCNC: 127 MG/DL (ref 70–99)
HCO3 SERPL-SCNC: 20 MMOL/L (ref 22–29)
POTASSIUM SERPL-SCNC: 3.4 MMOL/L (ref 3.4–5.3)
SODIUM SERPL-SCNC: 140 MMOL/L (ref 135–145)
VANCOMYCIN SERPL-MCNC: 20 UG/ML

## 2025-02-13 PROCEDURE — 99239 HOSP IP/OBS DSCHRG MGMT >30: CPT | Performed by: FAMILY MEDICINE

## 2025-02-13 PROCEDURE — 250N000011 HC RX IP 250 OP 636: Performed by: HOSPITALIST

## 2025-02-13 PROCEDURE — 999N000157 HC STATISTIC RCP TIME EA 10 MIN

## 2025-02-13 PROCEDURE — 250N000011 HC RX IP 250 OP 636: Performed by: FAMILY MEDICINE

## 2025-02-13 PROCEDURE — 82565 ASSAY OF CREATININE: CPT | Performed by: FAMILY MEDICINE

## 2025-02-13 PROCEDURE — 36415 COLL VENOUS BLD VENIPUNCTURE: CPT | Performed by: FAMILY MEDICINE

## 2025-02-13 PROCEDURE — 94640 AIRWAY INHALATION TREATMENT: CPT

## 2025-02-13 PROCEDURE — 94668 MNPJ CHEST WALL SBSQ: CPT

## 2025-02-13 PROCEDURE — 80202 ASSAY OF VANCOMYCIN: CPT | Performed by: FAMILY MEDICINE

## 2025-02-13 PROCEDURE — 250N000013 HC RX MED GY IP 250 OP 250 PS 637: Performed by: FAMILY MEDICINE

## 2025-02-13 PROCEDURE — 250N000009 HC RX 250: Performed by: FAMILY MEDICINE

## 2025-02-13 PROCEDURE — 94667 MNPJ CHEST WALL 1ST: CPT

## 2025-02-13 PROCEDURE — 250N000009 HC RX 250: Performed by: INTERNAL MEDICINE

## 2025-02-13 PROCEDURE — 94640 AIRWAY INHALATION TREATMENT: CPT | Mod: 76

## 2025-02-13 RX ORDER — AMOXICILLIN AND CLAVULANATE POTASSIUM 400; 57 MG/5ML; MG/5ML
500 POWDER, FOR SUSPENSION ORAL 2 TIMES DAILY
Qty: 50 ML | Refills: 0 | Status: SHIPPED | OUTPATIENT
Start: 2025-02-13 | End: 2025-02-17

## 2025-02-13 RX ORDER — BACLOFEN 20 MG/1
20 TABLET ORAL 3 TIMES DAILY
Status: SHIPPED
Start: 2025-02-13

## 2025-02-13 RX ORDER — AZITHROMYCIN 200 MG/5ML
250 POWDER, FOR SUSPENSION ORAL DAILY
Qty: 25 ML | Refills: 0 | Status: SHIPPED | OUTPATIENT
Start: 2025-02-13 | End: 2025-02-17

## 2025-02-13 RX ORDER — OMEPRAZOLE 40 MG/1
40 CAPSULE, DELAYED RELEASE ORAL 2 TIMES DAILY
Qty: 60 CAPSULE | Refills: 0 | Status: SHIPPED | OUTPATIENT
Start: 2025-02-13

## 2025-02-13 RX ADMIN — PIPERACILLIN AND TAZOBACTAM 3.38 G: 3; .375 INJECTION, POWDER, FOR SOLUTION INTRAVENOUS at 09:13

## 2025-02-13 RX ADMIN — ACETYLCYSTEINE 2 ML: 200 SOLUTION ORAL; RESPIRATORY (INHALATION) at 12:16

## 2025-02-13 RX ADMIN — PIPERACILLIN AND TAZOBACTAM 3.38 G: 3; .375 INJECTION, POWDER, FOR SOLUTION INTRAVENOUS at 00:50

## 2025-02-13 RX ADMIN — ALBUTEROL SULFATE 2.5 MG: 2.5 SOLUTION RESPIRATORY (INHALATION) at 04:48

## 2025-02-13 RX ADMIN — QUETIAPINE FUMARATE 25 MG: 25 TABLET ORAL at 04:37

## 2025-02-13 RX ADMIN — BACLOFEN 20 MG: 10 TABLET ORAL at 00:45

## 2025-02-13 RX ADMIN — VANCOMYCIN HYDROCHLORIDE 500 MG: 500 INJECTION, POWDER, LYOPHILIZED, FOR SOLUTION INTRAVENOUS at 10:44

## 2025-02-13 RX ADMIN — BISACODYL 10 MG: 10 SUPPOSITORY RECTAL at 10:19

## 2025-02-13 RX ADMIN — AMLODIPINE BESYLATE 5 MG: 5 TABLET ORAL at 09:11

## 2025-02-13 RX ADMIN — SODIUM BICARBONATE 40 MG: 84 INJECTION, SOLUTION INTRAVENOUS at 10:19

## 2025-02-13 RX ADMIN — BACLOFEN 20 MG: 10 TABLET ORAL at 04:37

## 2025-02-13 RX ADMIN — QUETIAPINE FUMARATE 25 MG: 25 TABLET ORAL at 00:46

## 2025-02-13 RX ADMIN — KETOCONAZOLE: 20 CREAM TOPICAL at 09:12

## 2025-02-13 RX ADMIN — DOCUSATE SODIUM 100 MG: 50 LIQUID ORAL at 09:12

## 2025-02-13 RX ADMIN — ACETYLCYSTEINE 2 ML: 200 SOLUTION ORAL; RESPIRATORY (INHALATION) at 07:48

## 2025-02-13 RX ADMIN — IPRATROPIUM BROMIDE AND ALBUTEROL SULFATE 3 ML: .5; 3 SOLUTION RESPIRATORY (INHALATION) at 12:16

## 2025-02-13 RX ADMIN — IPRATROPIUM BROMIDE AND ALBUTEROL SULFATE 3 ML: .5; 3 SOLUTION RESPIRATORY (INHALATION) at 07:48

## 2025-02-13 RX ADMIN — METOPROLOL TARTRATE 25 MG: 25 TABLET, FILM COATED ORAL at 09:11

## 2025-02-13 RX ADMIN — ROSUVASTATIN 10 MG: 5 TABLET, FILM COATED ORAL at 09:12

## 2025-02-13 ASSESSMENT — ACTIVITIES OF DAILY LIVING (ADL)
ADLS_ACUITY_SCORE: 88
ADLS_ACUITY_SCORE: 100
ADLS_ACUITY_SCORE: 100
ADLS_ACUITY_SCORE: 89
ADLS_ACUITY_SCORE: 88
ADLS_ACUITY_SCORE: 88
ADLS_ACUITY_SCORE: 100
ADLS_ACUITY_SCORE: 88
ADLS_ACUITY_SCORE: 89
ADLS_ACUITY_SCORE: 88

## 2025-02-13 NOTE — PLAN OF CARE
Goal Outcome Evaluation:  Pt able to go home with family and continue care at home.

## 2025-02-13 NOTE — PLAN OF CARE
Problem: Infection  Goal: Absence of Infection Signs and Symptoms  Outcome: Progressing     Problem: Gas Exchange Impaired  Goal: Optimal Gas Exchange  Outcome: Progressing     Goal Outcome Evaluation:     Patient lethargic. Non verbal.   PAINAD 1.    Incontinent of bowel and bladder. Primofit in place.   Lung sounds coarse, and wheezing. Called RT, who performed nebulizer.    PIV x3 patent.   Bed bound.       Had a fever overnight. Tylenol administered.       Call light within reach. Bed at lowest position. Bed alarm on.           HLM Admission: 1- Lying in Bed  HLM Daily1- Lying in bed

## 2025-02-13 NOTE — PLAN OF CARE
Pt was alert, family at bedside. Patient is bed bound. Vitals signs are stable on room air. Pt is tolerating tube feeding. IV antibiotics infusing per order. Pt is voiding appropriately with external catheter.     Alley Chan RN  February 12, 2025, 11:54 PM     Problem: Infection  Goal: Absence of Infection Signs and Symptoms  Outcome: Progressing  Intervention: Prevent or Manage Infection  Recent Flowsheet Documentation  Taken 2/12/2025 1829 by Alley Chan RN  Isolation Precautions:   contact precautions maintained   enteric precautions maintained     Problem: Gas Exchange Impaired  Goal: Optimal Gas Exchange  Outcome: Progressing  Intervention: Optimize Oxygenation and Ventilation  Recent Flowsheet Documentation  Taken 2/12/2025 2300 by Alley Chan RN  Head of Bed (HOB) Positioning: HOB at 45 degrees  Taken 2/12/2025 1912 by Alley Chan RN  Head of Bed (HOB) Positioning: HOB at 45 degrees  Taken 2/12/2025 1737 by Alley Chan RN  Head of Bed (HOB) Positioning: HOB at 45 degrees   Goal Outcome Evaluation:

## 2025-02-13 NOTE — PROGRESS NOTES
Patient seen overnight for scheduled Duoneb and mucomsyt.  Patient did receive a PRN Albuterol nebulizer at 0448.  Hand held percussor given for five minutes on each side of patient chest.  Breath sounds coarse with expiratory wheezing.  Patient orally suctioned for a small to moderate amount of thick yellow secretions.  Patient has a strong spontaneous cough.  Patient remained on room air overnight.  RT to continue with current orders.

## 2025-02-13 NOTE — PROGRESS NOTES
Patient received his schedule QID Duo-nebs with mechanical percussion. VSS with Sat's of 97% in RA. BS: course t/o with crackles in the bases with an infrequent cough that requires oral suctioning. Discussed with family at bedside about the importance of using the vest therapy at home to mobilize his secretions.Katharine Doran, RRT, RRT-NPS.     02/13/25 1216 02/13/25 1237   Oxygen Therapy   SpO2 97 %  --    O2 Device None (Room air)  --    Assessment   Respiratory WDL X;breath sounds;secretions  --    Rhythm/Pattern, Respiratory snoring  --    Expansion/Accessory Muscles/Retractions abdominal muscle use  --    Cough Frequency infrequent  --    Cough Type congested  --    Breath Sounds   Breath Sounds All Fields  --    All Lung Fields Breath Sounds Anterior:;crackles, coarse;wheezes, expiratory  --    Nebulizer Assessment & Treatment   $RT Use ONLY Delivery Method Nebulizer - Additional  --    Daily Review of Necessity (SVN) completed  --    Nebulizer Device Face mask  --    Pretreatment Heart Rate (beats/min) 100  --    Pretreatment Resp Rate (breaths/min) 24  --    Pretreatment O2 sats - (TCU only) 97  --    Pretreat Breath Sounds - Bilat - All Lobes crackles, coarse;wheezes, expiratory  --    Pretreat Breath Sounds - CATIA crackles, coarse;wheezes, expiratory  --    Pretreat Breath Sounds - LLL crackles, coarse;wheezes, expiratory  --    Pretreat Breath Sounds - RUL crackles, coarse;wheezes, expiratory  --    Pretreat Breath Sounds - RML crackles, coarse;wheezes, expiratory  --    Pretreat Breath Sounds - RLL crackles, coarse;wheezes, expiratory  --    Patient Position semi-Brown's  --    Respiratory Treatment Status (SVN) given  --    Tech time (minutes) (TCU only) 2  --    Breath Sounds Post-Respiratory Treatment   Posttreatment Heart Rate (beats/min)  --  101   Posttreatment Resp Rate (breaths/min)  --  24   Post treatment O2 Sats - (TCU only)  --  98   Posttreatment Assessment (SVN)  --  increased  aeration   Signs of Intolerance (SVN)  --  none   Breath Sounds Posttreatment All Fields  --  All Fields   Breath Sounds Posttreatment All Fields  --  crackles, coarse   Chest Physiotherapy (CPT)   $Chest Physiotherapy Subsequent  --    Type (CPT) percussion/postural drainage  --    Method (CPT) mechanical percussor  --    Patient Position semi-Brown's  --    Lung Fields (CPT) anterior  --    Duration per Field (CPT) 5 mins  --    Duration Left Side (CPT) 5 mins  --    Duration Right Side (CPT) 5 mins  --    CPT Total Time (Min) 10  --    Posttreatment Assessment (CPT) increased aeration  --    Signs of Intolerance (CPT) none  --

## 2025-02-13 NOTE — PROGRESS NOTES
Care Management Discharge Note    Discharge Date: 02/13/2025       Discharge Disposition: Home    Discharge Services: PCA    Discharge DME: None    Discharge Transportation: family or friend will provide    Private pay costs discussed: Not applicable    Does the patient's insurance plan have a 3 day qualifying hospital stay waiver?  No    PAS Confirmation Code:    Patient/family educated on Medicare website which has current facility and service quality ratings: no    Education Provided on the Discharge Plan: Yes AVS per bedside nurse   Persons Notified of Discharge Plans:  patient per team, daughter Idalmis  Patient/Family in Agreement with the Plan: yes    Handoff Referral Completed: No, handoff not indicated or clinically appropriate    Additional Information:    Pt discharging to home; daughter Idalmis confirms she will transport.    [facility] Referral [facility] - Homecare referral sent per [facility]'s request for homecare SN.  [facility] is accepting of any homecare agency.  [facility] made aware that  will likely not be able to secure homecare prior to pt's discharge.  Idalmis agrees to discharging from hospital without homecare secured.  Idalmis agrees to follow up with PCP to establish homecare.  1:18 PM - Update from bandar Alvarado at [facility]:  Pt accepted by [facility] for RN services [phone] / Fax [phone].  Discharge orders faxed to [facility] agency.    Kelly  [phone] -  received a call from Kelly at [facility] stating pt/family declined care coordination therefore pt does not have an assigned care coordinator.  Kelly will defer pt/daughter Idalmis to PCP to assist with establishing homecare after discharge.    [employer] - Facesheet, DME order for handheld percussor, and hospital notes faxed to [place] per Idalmis's request.  [facility] states pt already a the suction machine at home.  [facility] understands DME may not be covered under insurance with this rx and notes alone, and pt/family must follow up with PCP in  case additional documents, notes are needed for billing/ coverage.    No further care management intervention anticipated at this time.  Please re-consult if further needs arise.  Care management signing off.      Reggie Serna RN

## 2025-02-13 NOTE — DISCHARGE INSTRUCTIONS
[facility] (Phone [phone]) has accepted you for homecare services (nursing).  Homecare agency will call patient/designated family/facility within 48 hours to schedule a start of care visit.

## 2025-02-13 NOTE — DISCHARGE SUMMARY
[facility] MEDICINE  DISCHARGE SUMMARY     Primary Care Physician: Steven Rosa  Admission Date: 2/10/2025   Discharge Provider: Dayday Gonsales MD Discharge Date: 2/13/2025    Diet:   Active Diet and Nourishment Order   Procedures    Adult Formula Drip Feeding: Continuous Vital 1.5; Gastrostomy; Goal Rate: 60; mL/hr; from 8am-8pm run at 60ml/hr and from 8pm-8am @30ml/hr    NPO for Medical/Clinical Reasons Except for: No Exceptions, NPO but receiving Tube Feeding    Diet     Code Status: Full Code   Activity: Bedrest at baseline        Condition at Discharge: Stable     REASON FOR PRESENTATION(See Admission Note for Details)   Fever and cough    PRINCIPAL & ACTIVE DISCHARGE DIAGNOSES     Principal Problem:    Acute sepsis (H)  Active Problems:    Chronic hepatitis C without hepatic coma (H)    Aspiration pneumonitis (H)    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)    On tube feeding diet    History of CVA (cerebrovascular accident)    Fever, unspecified fever cause    Gastroenteritis due to sapovirus  Bronchiectasis  Lactic acidosis resolved  Acute on chronic kidney disease stage IIIa  Hyponatremia  Aphasia  History of seizure disorder  Feeding tube dependent  GERD  Essential hypertension  Hyperlipidemia  History of ESBL UTI  History of positive Cologuard    Clinically Significant Risk Factors        # Hypokalemia: Lowest K = 3.3 mmol/L in last 2 days, will replace as needed   # Hyperchloremia: Highest Cl = 112 mmol/L in last 2 days, will monitor as appropriate              # Hypertension: Noted on problem list                # Financial/Environmental Concerns: none         PENDING LABS     Unresulted Labs Ordered in the Past 30 Days of this Admission       Date and Time Order Name Status Description    2/10/2025  8:34 AM Blood Culture Peripheral Blood Preliminary     2/10/2025  8:34 AM Blood Culture  Peripheral Blood Preliminary     2/9/2025  9:03 AM Blood Culture Line, venous Preliminary     2/9/2025  9:03 AM Blood Culture Line, venous Preliminary           PROCEDURES ( this hospitalization only)      None    RECOMMENDATIONS TO OUTPATIENT PROVIDER FOR F/U VISIT     Follow-up Appointments       Follow Up      Follow-up with [facility] regarding chronic hepatitis C treatment.  Give office phone number to family.        Follow-up and recommended labs and tests       1.  Follow-up with primary care provider in 1 to 2 weeks. Recheck basic metabolic panel.  2.  Recommend follow-up with your neurologist regarding spasticity.                  DISPOSITION     Home with home care    SUMMARY OF HOSPITAL COURSE:      Liu Malave is a 59 year old male with PMH signficant for CVA with basal ganglia hemorrhage in 2016 with extensive residual deficits, aphasia, dysphagia with GJ tube dependence, spastic quadriplegia, hypertension, dyslipidemia, seizure disorder, chronic hep C, ESBL urine, CKD 3A and GERD. Has a positive Cologuard test. Not pursuing colonoscopy. Recurrent hospitalizations for pulmonary disease most recent late December 2024. Full code verified with family. Multiple family members recently had norovirus.  Patient has had no diarrhea. 2/9 seen in [facility] ED for cough. Benign workup. Given IV fluids. Discharged home. 2/10 returned with fever, tachycardia and hypertensive. Less responsive than usual. Creatinine elevated 1.76. Procalcitonin low. White count 10.1. Chest CT shows stable mild bronchiolitis.  Admitted.      1.  Aspiration pneumonitis.  Initiated on IV Zosyn and azithromycin.  Continued home DuoNebs, NebuSal and Mucomyst nebulizer treatments.  The first evening his fevers spiked to 103. Vancomycin added. White count up to 14. Creatinine increased to 1.8 despite IV fluids. CXR shows new right middle lobe opacity possible aspiration related.  Over the course of his stay respiratory status did  improve.  Mentation and alertness did improve as well.  Family comfortable discharging him home with home care services.  Continue antibiotics and nebulizer treatments.  DME order provided to family for trocar suction.    2.  GI.  Having some abdominal distention. Abdominal x-ray unremarkable. Tolerating tube feedings without difficulty. Stool positive for Sapovirus.  Just had a few loose stools but no significant diarrhea and was able to stay well-hydrated.    3.  Hepatitis C carrier.  As part of fever workup hepatitis C quantitative was done showing increase of viral load RNA to 21,700,000 units.  This is an increase from last year at 13.9 million.  LFTs and liver imaging unremarkable.  [facility] recommended outpatient follow-up as patient may benefit from hepatitis C definitive treatment.    Otherwise patient did well.  Lactic acidosis and CYNTHIA was corrected by time of discharge.  Creatinine at discharge 1.4.  Recheck with primary.    Discharge Medications with Med changes:     Discharge Medication List as of 2/13/2025 12:04 PM        START taking these medications    Details   amoxicillin-clavulanate (AUGMENTIN) 400-57 MG/5ML suspension Place 6.25 mLs (500 mg) into G tube 2 times daily for 4 days., Disp-50 mL, R-0, E-Prescribe      azithromycin (ZITHROMAX) 200 MG/5ML suspension Place 6.25 mLs (250 mg) into G tube daily for 4 days., Disp-25 mL, R-0, E-Prescribe           CONTINUE these medications which have CHANGED    Details   baclofen (LIORESAL) 20 MG tablet Place 1 tablet (20 mg) into Feeding Tube 3 times daily., No Print Out      omeprazole (PRILOSEC) 40 MG DR capsule Take 1 capsule (40 mg) by mouth 2 times daily. IN G TUBE, Disp-60 capsule, R-0, E-Prescribe           CONTINUE these medications which have NOT CHANGED    Details   acetaminophen (TYLENOL) 500 MG tablet Place 2 tablets (1,000 mg) into G tube every 6 hours as needed for mild pain, Disp-60 tablet, R-0, E-Prescribe      acetylcysteine (MUCOMYST)  20 % neb solution Take 2 mLs by nebulization every 6 hours as needed (congestion)., Disp-30 mL, R-5, E-Prescribe      amLODIPine (NORVASC) 5 MG tablet Place 1 tablet (5 mg) into G tube daily., Disp-30 tablet, R-11, E-Prescribe      bisacodyl (DULCOLAX) 10 MG suppository Place 10 mg rectally every other day., Historical      cefaclor (CECLOR) 250 MG capsule Place 1 capsule (250 mg) into G tube daily. UTI ppx, Disp-90 capsule, R-3, E-Prescribe      CHEST CONGESTION RELIEF DM  MG/5ML syrup TAKE 10 MLS BY G TUBE ROUTE EVERY 4 HOURS AS NEEDED FOR COUGH., Disp-237 mL, R-0, E-Prescribe      docusate (COLACE) 50 MG/5ML liquid Place 10 mLs (100 mg) into Feeding Tube 2 times daily., Disp-473 mL, R-0, E-Prescribe      ipratropium - albuterol 0.5 mg/2.5 mg/3 mL (DUONEB) 0.5-2.5 (3) MG/3ML neb solution Take 1 vial (3 mLs) by nebulization every 4 hours., Disp-90 mL, R-1, E-Prescribe      ketoconazole (NIZORAL) 2 % external cream Apply topically 2 times daily to affected area(s).Disp-30 g, [id number]      LIDOCAINE PAIN RELIEF 4 % Patch Place 1 patch onto the skin daily as needed for moderate pain., Disp-30 patch, R-2, ARETHA, E-Prescribe      meclizine (ANTIVERT) 25 MG tablet Take 1 tablet via G-tube every 6 hours as needed for vertigo, Disp-30 tablet, R-11, E-Prescribe      metoprolol tartrate (LOPRESSOR) 25 MG tablet Place 1 tablet (25 mg) into Feeding Tube 2 times daily., Disp-60 tablet, R-11, E-PrescribePRIMARY      neomycin-bacitracin-polymyxin (NEOSPORIN) 5-400-5000 ointment Apply topically 3 times daily as needed (irritation) To J Tube siteDisp-30 g, [id number]      nystatin (MYCOSTATIN) 437502 UNIT/GM external cream Apply topically 2 times daily as needed (redness)Disp-30 g, [id number]      nystatin-triamcinolone (MYCOLOG II) 100692-6.1 UNIT/GM-% external cream Apply topically 2 times daily.Disp-60 g, [id number] sig for BID application      ondansetron (ZOFRAN) 4 MG tablet Place 1 tablet (4 mg)  into G tube every 8 hours as needed for nausea, Disp-30 tablet, R-0, E-Prescribe      polyethylene glycol (MIRALAX) 17 GM/Dose powder Give 1 capful via G-tube daily as needed for constipation, Disp-850 g, R-3, E-Prescribe      Polyethylene Glycol 400 (VISINE DRY EYE RELIEF) 1 % SOLN Apply 1 drop to eye every 6 hours as needed for dry eyes., Disp-15 mL, R-11, E-Prescribe      QUEtiapine (SEROQUEL) 25 MG tablet TAKE 1 TABLET BY MOUTH AT 7:00PM, MIDNIGHT AND 3:00AM FOR TOTAL OF 75 MG NIGHTLY. OK TO REPEAT AFTER 4 HOURS AND DURING THE DAY AS NEEDED., Disp-102 tablet, R-5, E-Prescribe      rosuvastatin (CRESTOR) 10 MG tablet Place 1 tablet (10 mg) into G tube daily., Disp-90 tablet, R-3, E-Prescribe1/7/25 FYI:  new medication (use in place of simvastatin due to risk of drug interaction with amlodipine)      sennosides (SENOKOT) 8.8 MG/5ML syrup Take 5 mLs by mouth 2 times daily as needed for constipation., Historical      sodium chloride (NEBUSAL) 3 % neb solution TAKE 4 MLS BY NEBULIZATION EVERY 6 HOURS AS NEEDED FOR THICK SPUTUM., Disp-240 mL, R-2, E-Prescribe      triamcinolone (KENALOG) 0.1 % external cream Apply topically daily as needed for irritation. Apply to affected area/rashDisp-30 g, [id number]      Catheters (EVERYDAY MALE EXTERNAL CATH/SM) MISC 1 catheter daily., Disp-30 each, R-0, Local Print      Oral Hygiene Products (Q-CARE COVERD YANKAUER/SUCTION) MISC Take 1 Device by mouth every hour as needed (secretions)., Disp-10 each, R-0, Local Print      Ostomy Supplies (STOMAHESIVE) PSTE Apply topically to skin around ostomy site twice daily and as needed, Disp-56.7 g, R-3, E-Prescribe               Rationale for medication changes:      Augmentin and azithromycin for aspiration pneumonitis.  No other actual changes to his medications.      Consults     NUTRITION SERVICES ADULT IP CONSULT  CARE MANAGEMENT / SOCIAL WORK IP CONSULT  PHARMACY IP CONSULT  PHARMACY TO DOSE VANCO  CARE MANAGEMENT / SOCIAL WORK  "IP CONSULT  GASTROENTEROLOGY IP CONSULT      Immunizations given this encounter     Most Recent Immunizations   Administered Date(s) Administered    COVID-19 MONOVALENT 12+ (Pfizer) 04/01/2021    COVID-19 Monovalent 12+ (Pfizer 2022) 03/20/2022    Influenza Vaccine 18-64 (Flublok) 09/27/2023    Influenza Vaccine, 6+MO IM (QUADRIVALENT W/PRESERVATIVES) 11/06/2020    Pneumo Conj 13-V (2010&after) 10/28/2021    Pneumococcal 20 valent Conjugate (Prevnar 20) 05/24/2023    Pneumococcal 23 valent 10/25/2017    TDAP (Adacel,Boostrix) 04/20/2015           Anticoagulation Information      Recent INR results: No results for input(s): \"INR\" in the last 168 hours.  Warfarin doses (if applicable) or name of other anticoagulant: N/A      SIGNIFICANT IMAGING FINDINGS     Results for orders placed or performed during the hospital encounter of 02/10/25   XR Chest Port 1 View    Impression    IMPRESSION: Normal heart size and pulmonary vascularity. No acute infiltrates or consolidation. Minimal linear scarring or atelectasis left lower lung. Aortic calcification. Old right rib fracture. Otherwise unremarkable. No acute findings.   Chest CT w/o contrast    Impression    IMPRESSION:   1.  No significant interval change.       XR Chest Port 1 View    Impression    IMPRESSION: New, 4 to 5 cm focus of asymmetric opacity in the right midlung zone may represent atelectasis or aspiration pneumonitis given the short interval change. Atelectasis in the lower lobes appears unchanged. No pneumothorax or pleural effusion.   Vascular volume is normal.   XR Abdomen Port 1 View    Impression    IMPRESSION: Right arm is seen contracted and overlying the right upper quadrant again.    GJ tube is in good position. Bowel gas pattern is unremarkable. No abnormally dilated loops of bowel. Redundant colon with a mild stool burden.       Recent Results (from the past 4320 hours)   Echocardiogram Complete   Result Value    LVEF  60-65%    Narrative    " [id number]  [id number]  [id number]  [id number]^LILIANA^[place] [address]     Name: NAVYA ESPINAL  MRN: [id number]  : 1965  Study Date: 2024 01:27 PM  Age: 59 yrs  Gender: Male  Patient Location: [facility]  Reason For Study: Tachycardia  Ordering Physician: LIVAN FORD  Performed By: IVY     BSA: 1.5 m2  Height: 60 in  Weight: 126 lb  HR: 120  BP: 124/92 mmHg  ______________________________________________________________________________  Procedure  Echocardiogram with two-dimensional, color and spectral Doppler. Technically  difficult study. Compared to the prior study dated 10/26/2023 no significant  change. No hemodynamically significant valvular abnormalities on 2D or color  flow imaging.  ______________________________________________________________________________  Interpretation Summary     The left ventricle is normal in size.  The visual ejection fraction is 60-65%.  Regional wall motion is grossly normal but endocardial border definition is  limited  No hemodynamically significant valvular abnormalities on 2D or color flow  imaging.  Compared to the prior study dated 10/26/2023 no significant change  ______________________________________________________________________________  Left Ventricle  The left ventricle is normal in size. There is mild concentric left  ventricular hypertrophy. Left ventricular diastolic function is indeterminate.  The visual ejection fraction is 60-65%. Regional wall motion is grossly normal  but endocardial border definition is limited.     Right Ventricle  The right ventricle is not well visualized.     Atria  The left atrium is not well visualized. Right atrium not well visualized.  Atrial septum not well seen.     Mitral Valve  Mitral valve leaflets appear normal. There is trace mitral regurgitation.  There is no mitral valve stenosis.     Tricuspid Valve  The tricuspid valve is not well visualized. There is trace  tricuspid  regurgitation. Right ventricular systolic pressure could not be approximated  due to inadequate tricuspid regurgitation. There is no tricuspid stenosis.     Aortic Valve  The aortic valve is not well visualized. No aortic regurgitation is present.  No aortic stenosis is present.     Pulmonic Valve  The pulmonic valve is not well visualized.     Vessels  The aorta root is normal. The thoracic aorta cannot be assessed. Inferior vena  cava not well visualized for estimation of right atrial pressure.     Pericardium  There is no pericardial effusion.     Rhythm  The rhythm was sinus tachycardia.  ______________________________________________________________________________  MMode/2D Measurements & Calculations     IVSd: 1.2 cm  LVIDd: 4.1 cm  LVIDs: 2.8 cm  LVPWd: 1.2 cm  FS: 32.8 %  LV mass(C)d: 162.8 grams  LV mass(C)dI: 106.5 grams/m2  Ao root diam: 3.4 cm  LVOT diam: 2.2 cm  LVOT area: 3.8 cm2  Ao root diam index Ht(cm/m): 2.2  Ao root diam index BSA (cm/m2): 2.2  EF Biplane: 65.8 %  LA Volume (BP): 28.7 ml     LA Volume Index (BP): 18.8 ml/m2  RWT: 0.56  TAPSE: 1.8 cm     Time Measurements  MM HR: 114.0 BPM     Doppler Measurements & Calculations  MV E max hollis: 100.0 cm/sec  MV A max hollis: 80.9 cm/sec  MV E/A: 1.2  MV dec slope: 858.0 cm/sec2  MV dec time: 0.12 sec  Ao V2 max: 124.0 cm/sec  Ao max P.0 mmHg  Ao V2 mean: 81.0 cm/sec  Ao mean PG: 3.0 mmHg  Ao V2 VTI: 25.6 cm  DORI(I,D): 2.4 cm2  DORI(V,D): 3.0 cm2  LV V1 max PG: 3.7 mmHg  LV V1 max: 96.3 cm/sec  LV V1 VTI: 16.3 cm  SV(LVOT): 62.0 ml  SI(LVOT): 40.5 ml/m2  AV Hollis Ratio (DI): 0.78  DORI Index (cm2/m2): 1.6     ______________________________________________________________________________  Report approved by: Corine Dominguez MD on 2024 02:53 PM             SIGNIFICANT LABORATORY FINDINGS     Most Recent 3 CBC's:  Recent Labs   Lab Test 25  0757 25  0517 02/10/25  0522   WBC 11.7* 14.1* 10.1   HGB 9.3* 8.4* 10.2*   MCV 91 94  93    184 251     Most Recent 3 BMP's:  Recent Labs   Lab Test 02/13/25  1027 02/12/25  0757 02/11/25  0517    142 144   POTASSIUM 3.4 3.3* 3.8   CHLORIDE 109* 112* 117*   CO2 20* 19* 18*   BUN 12.9 18.7 32.6*   CR 1.40* 1.57* 1.81*   ANIONGAP 11 11 9   SAMIR 7.3* 7.1* 6.8*   * 148* 155*     Most Recent 2 LFT's:  Recent Labs   Lab Test 02/10/25  0522 02/09/25  0955   AST 24 24   ALT 19 18   ALKPHOS 75 83   BILITOTAL 0.2 0.2     Most Recent 3 INR's:  Recent Labs   Lab Test 12/21/24  2304 04/15/21  0936 06/27/20  1210   INR 0.96 1.04 1.03     Most Recent TSH and T4:  Recent Labs   Lab Test 12/22/24  0628   TSH 4.97*   T4 1.04     Most Recent Hemoglobin A1c:  Recent Labs   Lab Test 12/24/24  0148   A1C 5.7*     Most Recent 6 glucoses:  Recent Labs   Lab Test 02/13/25  1027 02/12/25  0757 02/11/25  0517 02/10/25  1043 02/10/25  0522 02/09/25  0955   * 148* 155* 135* 142* 118*     Most Recent Urinalysis:  Recent Labs   Lab Test 02/10/25  1005 02/10/23  2220 01/05/23  1655   COLOR Light Yellow   < > Yellow   APPEARANCE Clear   < > Clear   URINEGLC Negative   < > Negative   URINEBILI Negative   < > Negative   URINEKETONE Negative   < > Negative   SG 1.020   < > 1.015   UBLD Negative   < > Large*   URINEPH 5.5   < > 5.0   PROTEIN 200*   < > >=300*   UROBILINOGEN  --   --  0.2   NITRITE Negative   < > Negative   LEUKEST Negative   < > Negative   RBCU 1   < > 25-50*   WBCU 1   < > 5-10*    < > = values in this interval not displayed.     Most Recent ESR & CRP:  Recent Labs   Lab Test 05/30/22  1519   SED 74*   CRP 0.4     Troponin T, High Sensitivity   Date Value Ref Range Status   02/09/2025 18 <=22 ng/L Final     Comment:     Either a High Sensitivity Troponin T baseline (0 hours) value = 100 ng/L, or an increase in High Sensitivity Troponin T = 7 ng/L at 2 hours compared to 0 hours (2-0 hours), suggests myocardial injury, and urgent clinical attention is required.    If the 2-0 hours increase is <7  ng/L, a High Sensitivity Troponin T result above gender-specific reference ranges warrants further evaluation.   Recommendations for further evaluation include correlation with clinical decision-making tool (e.g., HEART), a 3rd High Sensitivity Troponin T test 2 hours after the 2nd (a 20% change from baseline would represent concern), admission for observation, close PCC/cardiology follow-up, or urgent outpatient provocative testing.   02/09/2025 21 <=22 ng/L Final     Comment:     Either a High Sensitivity Troponin T baseline (0 hours) value = 100 ng/L, or an increase in High Sensitivity Troponin T = 7 ng/L at 2 hours compared to 0 hours (2-0 hours), suggests myocardial injury, and urgent clinical attention is required.    If the 2-0 hours increase is <7 ng/L, a High Sensitivity Troponin T result above gender-specific reference ranges warrants further evaluation.   Recommendations for further evaluation include correlation with clinical decision-making tool (e.g., HEART), a 3rd High Sensitivity Troponin T test 2 hours after the 2nd (a 20% change from baseline would represent concern), admission for observation, close PCC/cardiology follow-up, or urgent outpatient provocative testing.   12/22/2024 15 <=22 ng/L Final     Comment:     Either a High Sensitivity Troponin T baseline (0 hours) value = 100 ng/L, or an increase in High Sensitivity Troponin T = 7 ng/L at 2 hours compared to 0 hours (2-0 hours), suggests myocardial injury, and urgent clinical attention is required.    If the 2-0 hours increase is <7 ng/L, a High Sensitivity Troponin T result above gender-specific reference ranges warrants further evaluation.   Recommendations for further evaluation include correlation with clinical decision-making tool (e.g., HEART), a 3rd High Sensitivity Troponin T test 2 hours after the 2nd (a 20% change from baseline would represent concern), admission for observation, close PCC/cardiology follow-up, or urgent outpatient  provocative testing.   12/21/2024 15 <=22 ng/L Final     Comment:     Either a High Sensitivity Troponin T baseline (0 hours) value = 100 ng/L, or an increase in High Sensitivity Troponin T = 7 ng/L at 2 hours compared to 0 hours (2-0 hours), suggests myocardial injury, and urgent clinical attention is required.    If the 2-0 hours increase is <7 ng/L, a High Sensitivity Troponin T result above gender-specific reference ranges warrants further evaluation.   Recommendations for further evaluation include correlation with clinical decision-making tool (e.g., HEART), a 3rd High Sensitivity Troponin T test 2 hours after the 2nd (a 20% change from baseline would represent concern), admission for observation, close PCC/cardiology follow-up, or urgent outpatient provocative testing.   11/14/2024 10 <=22 ng/L Final     Comment:     Either a High Sensitivity Troponin T baseline (0 hours) value = 100 ng/L, or an increase in High Sensitivity Troponin T = 7 ng/L at 2 hours compared to 0 hours (2-0 hours), suggests myocardial injury, and urgent clinical attention is required.    If the 2-0 hours increase is <7 ng/L, a High Sensitivity Troponin T result above gender-specific reference ranges warrants further evaluation.   Recommendations for further evaluation include correlation with clinical decision-making tool (e.g., HEART), a 3rd High Sensitivity Troponin T test 2 hours after the 2nd (a 20% change from baseline would represent concern), admission for observation, close PCC/cardiology follow-up, or urgent outpatient provocative testing.   11/14/2024 9 <=22 ng/L Final     Comment:     Either a High Sensitivity Troponin T baseline (0 hours) value = 100 ng/L, or an increase in High Sensitivity Troponin T = 7 ng/L at 2 hours compared to 0 hours (2-0 hours), suggests myocardial injury, and urgent clinical attention is required.    If the 2-0 hours increase is <7 ng/L, a High Sensitivity Troponin T result above gender-specific  reference ranges warrants further evaluation.   Recommendations for further evaluation include correlation with clinical decision-making tool (e.g., HEART), a 3rd High Sensitivity Troponin T test 2 hours after the 2nd (a 20% change from baseline would represent concern), admission for observation, close PCC/cardiology follow-up, or urgent outpatient provocative testing.   07/18/2024 14 <=22 ng/L Final     Comment:     Either a High Sensitivity Troponin T baseline (0 hours) value = 100 ng/L, or an increase in High Sensitivity Troponin T = 7 ng/L at 2 hours compared to 0 hours (2-0 hours), suggests myocardial injury, and urgent clinical attention is required.    If the 2-0 hours increase is <7 ng/L, a High Sensitivity Troponin T result above gender-specific reference ranges warrants further evaluation.   Recommendations for further evaluation include correlation with clinical decision-making tool (e.g., HEART), a 3rd High Sensitivity Troponin T test 2 hours after the 2nd (a 20% change from baseline would represent concern), admission for observation, close PCC/cardiology follow-up, or urgent outpatient provocative testing.      7-Day Micro Results       Collected Updated Procedure Result Status      02/11/2025 1709 02/12/2025 0045 Enteric Bacteria and Virus Panel PCR [[id number]]    (Abnormal)   Stool from Per Rectum    Final result Component Value   Campylobacter species Negative   Salmonella species Negative   Vibrio species Negative   Vibrio cholerae Negative   Yersinia enterocolitica Negative   Enteropathogenic E. coli (EPEC) Negative   Shiga-like toxin-producing E. coli (STEC) Negative   Shigella/Enteroinvasive E. coli (EIEC) Negative   Cryptosporidium species Negative   Giardia lamblia Negative   Norovirus Gl/Gll Negative   Rotavirus A Negative   Plesiomonas shigelloides Negative   Enteroaggregative E. coli (EAEC) Negative   Enterotoxigenic E. coli (ETEC) Negative   E. coli O157 NA   Cyclospora cayetanensis  Negative   Entamoeba histolytica Negative   Adenovirus F40/41 Negative   Astrovirus Negative   Sapovirus Positive            02/10/2025 0951 02/10/2025 1035 Influenza A/B, RSV and SARS-CoV2 PCR (COVID-19) Nasopharyngeal [[id number]]    Swab from Nasopharyngeal    Final result Component Value   Influenza A PCR Negative   Influenza B PCR Negative   RSV PCR Negative   SARS CoV2 PCR Negative   NEGATIVE: SARS-CoV-2 (COVID-19) RNA not detected, presumed negative.            02/10/2025 0951 02/10/2025 1603 Respiratory Panel PCR [[id number]]    Swab from Nasopharyngeal    Final result Component Value   Adenovirus Not Detected   Coronavirus Not Detected   This test detects Coronavirus 229E, HKU1, NL63 and OC43 but does not distinguish between them. It does not detect MERS ( Respiratory Syndrome), SARS (Severe Acute Respiratory Syndrome) or 2019-nCoV (Novel 2019) Coronavirus.   Human Metapneumovirus Not Detected   Human Rhin/Enterovirus Not Detected   Influenza A Not Detected   Influenza A, H1 Not Detected   Influenza A 2009 H1N1 Not Detected   Influenza A, H3 Not Detected   Influenza B Not Detected   Parainfluenza Virus 1 Not Detected   Parainfluenza Virus 2 Not Detected   Parainfluenza Virus 3 Not Detected   Parainfluenza Virus 4 Not Detected   Respiratory Syncytial Virus A Not Detected   Respiratory Syncytial Virus B Not Detected   Chlamydia Pneumoniae Not Detected   Mycoplasma Pneumoniae Not Detected            02/10/2025 0934 02/13/2025 1146 Blood Culture Peripheral Blood [[id number]]   Peripheral Blood    Preliminary result Component Value   Culture No growth after 3 days  [P]                02/10/2025 0908 02/13/2025 1146 Blood Culture Peripheral Blood [[id number]]   Peripheral Blood    Preliminary result Component Value   Culture No growth after 3 days  [P]                02/09/2025 0959 02/09/2025 1044 Influenza A/B, RSV and SARS-CoV2 PCR (COVID-19) Nose [[id number]]    Swab from Nose    Final  result Component Value   Influenza A PCR Negative   Influenza B PCR Negative   RSV PCR Negative   SARS CoV2 PCR Negative   NEGATIVE: SARS-CoV-2 (COVID-19) RNA not detected, presumed negative.            02/09/2025 0955 02/13/2025 1231 Blood Culture Line, venous [[id number]]   Blood from Line, venous    Preliminary result Component Value   Culture No growth after 4 days  [P]                02/09/2025 0920 02/13/2025 1231 Blood Culture Line, venous [[id number]]   Blood from Line, venous    Preliminary result Component Value   Culture No growth after 4 days  [P]                          Discharge Orders        Primary Care - Care Coordination Referral      Home Care Referral      Med Therapy Management Referral      Follow Up    Follow-up with [facility] regarding chronic hepatitis C treatment.  Give office phone number to family.     Brief Discharge Instructions    Resume Ceclor after hospital antibiotics are done     Reason for your hospital stay    Sapovirus diarrhea with aspiration pneumonitis     Follow-up and recommended labs and tests     1.  Follow-up with primary care provider in 1 to 2 weeks. Recheck basic metabolic panel.  2.  Recommend follow-up with your neurologist regarding spasticity.     Activity    Your activity upon discharge: bedrest     Tubes and Drains    Current Tubes and Drains:     Drain  Duration           Gastrostomy/Enterostomy Gastrojejunostomy LUQ 18 fr Avanos LAURA GJ tube,   lot #[id number] 56 days    External Urinary Catheter 1 day              When to contact your care team    Call your primary doctor if you have any of the following: temperature greater than 101,  increased shortness of breath, increased drainage, or increased swelling.     Suction Machine Order for DME - ONLY FOR DME     Miscellaneous DME Supply Order (Use only if a more specific DME order does not already exist)     Diet    Follow this diet upon discharge: Current Diet:Orders Placed This Encounter       Adult Formula Drip Feeding: Continuous Vital 1.5; Gastrostomy; Goal Rate: 60; mL/hr; from 8am-8pm run at 60ml/hr and from 8pm-8am @30ml/hr    Free water - Feeding Tube Flush Frequency: Other;q4hrs from 8am-8pm   Free water volume: Other;q4hrs from 8am-8pm   Additional Free water: 120ml       Examination   Physical Exam   Temp:  [98.3  F (36.8  C)-100.2  F (37.9  C)] 99  F (37.2  C)  Pulse:  [] 107  Resp:  [16-25] 18  BP: (133-173)/(73-89) 173/89  SpO2:  [94 %-100 %] 97 %  Wt Readings from Last 4 Encounters:   02/13/25 61.8 kg (136 lb 4.8 oz)   02/09/25 59 kg (130 lb)   12/25/24 60.2 kg (132 lb 11.2 oz)   11/17/24 56.7 kg (125 lb)       Constitutional: awake, did open eyes and follow provider/nurse, alert, uncooperative, distracted, no apparent distress, intermittent strong coughs noted, appears stated age, thin,  Eyes: Lids and lashes normal, pupils equal, round and reactive to light, extra ocular muscles intact, sclera clear, conjunctiva normal  ENT: Quite dry throat.  Very strong cough.  Respiratory: Rhonchorous breathing heard throughout but good air movement improved from prior exam.  Cardiovascular: Normal apical impulse, regular rate and rhythm, normal S1 and S2, no S3 or S4, and no murmur noted  GI: No scars, normal bowel sounds, soft, non-distended, non-tender, no masses palpated, no hepatosplenomegally and G-tube in good position.  Skin: normal skin color, texture, turgor, no redness, warmth, or swelling, and no rashes  Musculoskeletal: Severe spastic quadriplegia noted.  Trace to 1+ lower extremity edema.  No changes from baseline.   Neurologic: Cranial nerves II-XII are grossly intact. Sensory:  Sensory intact  Neuropsychiatric: General: normal, calm, and poor eye contact Level of consciousness: alert / normal Affect: flat Orientation: Unable to evaluate. Memory and insight: Unable to evaluate      Please see EMR for more detailed significant labs, imaging, consultant notes etc.    Dayday SNELL  MD Ilda, personally saw the patient today and spent greater than 30 minutes discharging this patient.    Dayday Gonsales MD  [facility]    CC:Steven Rosa

## 2025-02-14 ENCOUNTER — TELEPHONE (OUTPATIENT)
Dept: FAMILY MEDICINE | Facility: CLINIC | Age: 60
End: 2025-02-14
Payer: COMMERCIAL

## 2025-02-14 LAB
BACTERIA BLD CULT: NO GROWTH
BACTERIA BLD CULT: NO GROWTH

## 2025-02-15 LAB
BACTERIA BLD CULT: NO GROWTH
BACTERIA BLD CULT: NO GROWTH

## 2025-02-16 DIAGNOSIS — D64.9 NORMOCHROMIC NORMOCYTIC ANEMIA: Primary | ICD-10-CM

## 2025-02-16 DIAGNOSIS — N18.31 CKD STAGE 3A, GFR 45-59 ML/MIN (H): ICD-10-CM

## 2025-02-17 ENCOUNTER — TELEPHONE (OUTPATIENT)
Dept: FAMILY MEDICINE | Facility: CLINIC | Age: 60
End: 2025-02-17
Payer: COMMERCIAL

## 2025-02-17 ENCOUNTER — PATIENT OUTREACH (OUTPATIENT)
Dept: CARE COORDINATION | Facility: CLINIC | Age: 60
End: 2025-02-17
Payer: COMMERCIAL

## 2025-02-17 DIAGNOSIS — R52 PAIN: ICD-10-CM

## 2025-02-17 DIAGNOSIS — R45.1 RESTLESSNESS AND AGITATION: ICD-10-CM

## 2025-02-17 DIAGNOSIS — J69.0 ASPIRATION PNEUMONITIS (H): Primary | ICD-10-CM

## 2025-02-17 DIAGNOSIS — J39.8 CHRONIC MUCUS HYPERSECRETION, RESPIRATORY: Primary | ICD-10-CM

## 2025-02-17 DIAGNOSIS — I69.151 SPASTIC HEMIPLEGIA OF RIGHT DOMINANT SIDE AS LATE EFFECT OF NONTRAUMATIC INTRAPARENCHYMAL HEMORRHAGE OF BRAIN (H): ICD-10-CM

## 2025-02-17 NOTE — TELEPHONE ENCOUNTER
Reason for Call:  Appointment Request    Patient requesting this type of appt: Chronic Diease Management/Medication/Follow-Up    Requested provider: Steven Rosa    Reason patient unable to be scheduled: Not within requested timeframe    When does patient want to be seen/preferred time: 3-7 days    Comments: RESCHEDULE     Could we send this information to you in [facility] or would you prefer to receive a phone call?:   Patient would prefer a phone call   Okay to leave a detailed message?: Yes at Cell number on file:    Telephone Information:   Mobile [phone]       Call taken on 2/17/2025 at 9:55 AM by Bianka Farfan

## 2025-02-17 NOTE — TELEPHONE ENCOUNTER
Idalmis, patient's daughter, CTC on file, called into the clinic to ask requests of the PCP, after patient's hospital admission from 2/10/25 through 2/13/25.    Writer also spoke with ASH Bañuelos, with [facility], during the phone call with Idalmis.    Patient was discharged from the [facility], yesterday, 2/13/25. Thursday, after admission for acute sepsis, from 2/10/25 through 2/13/25.    Idalmis would like to know if the patient can complete a hospital follow up visit, with the PCP, via virtual visit, so that the patient does not have to leave the home, as Idalmis stated that it seems, when the patient goes out in the cold, around 1 week later, the patient seems to get ill.    Idalmis is also wondering if the PCP can place orders for the patient for a CBC, BMP, and iron to be drawn at home via home care, so the patient does not have to leave the home.    Patient is supposed to meet with ASH Bañuelos, with [facility] tomorrow, 2/15/25, at 11:00 am, for home care admission for skilled nursing, but Sarmad stated that Counts include 234 beds at the [facility] works with the [facility] for home blood draws, as [facility] does not directly do blood draws at home for patients.    Sarmad also gave the writer and Idalmis the phone number for Soumya, care coordinator, with [facility], to see if [facility] can send out a team member to draw blood from the patient at home for lab work, at [phone].    Writer will route the above information to the PCP, to review and advise next steps, regarding orders for blood work and whether it is appropriate for the patient to have a hospital follow up as a virtual visit.    Pricilla Simms RN, BSN  [facility]    
See response from the PCP, to the patient, on 2/16/25,  regarding a follow up visit, as well as lab work request, on 2/14/25.    Writer called the patient's daughter, Idalmis, CTC on file, and relayed the above message to [facility], who verbalized understanding and agrees with the plan.    Idalmis stated that she was having difficulty setting up home care blood draws for the patient due to constraints with the patient's insurance, so she decided to schedule an in-person visit for the patient for an office visit and lab draw.    Writer assisted [facility] with scheduling an in-clinic visit, for the patient,  with the PCP, on Thursday, 2/20/25, at 11:10 am, for an ED follow up visit and to have lab work drawn at the appointment.    Denies other questions or concerns at this time.    Pricilla Simms RN, BSN  [facility]    
Yes, okay for virtual visit.  I have placed lab order as well for CBC, BMP, etc.  
HASMUKH

## 2025-02-17 NOTE — TELEPHONE ENCOUNTER
DME (Durable Medical Equipment) Orders and Documentation  Orders Placed This Encounter   Procedures    Miscellaneous DME Order        The patient was assessed and it was determined the patient is in need of the following listed DME Supplies/Equipment. Please complete supporting documentation below to demonstrate medical necessity.      DME All Other Item(s) Documentation    List reason for need and supporting documentation for medical necessity below for each DME item.     1. Chronic mucous hypersecretion    Suction Documentation  I attest that I have seen and evaluated Liu GIL Malave. 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 Face to face addressing dysphagia and inability to clear/mobilize secretions.     I, the undersigned, certify that the above prescribed supplies are medically necessary for this patient and is both reasonable and necessary in reference to accepted standards of medical and necessary in reference to accepted standards of medical practice in the treatment of this patient's condition and is not prescribed as a convenience.

## 2025-02-17 NOTE — TELEPHONE ENCOUNTER
"Idalmis, patient's daughter, CTC on file, called back to the clinic, on 2/17/25, to change the patient's appointment time, as well as to ask a questions about medical equipment for the patient.    Writer assisted Idalmis with scheduling an in-clinic visit, with the PCP, on Thursday, 2/27/25, at 11:10 am, for an ED follow up and to have lab work drawn.    Writer cancelled the appointment that the patient had with the PCP, on 2/20/25, per Idalmis's request.    Idalmis also stated that per the AVS from the [facility], from patient's hospital stay, from 2/10/25 through 2/13/25, the patient is supposed to have a hand-held percussor ordered through a home medical supply company, but that the hospital did not order it for the patient and that it needs to be ordered by the PCP-see order from AVS below:    \"Miscellaneous DME Supply Order (Use only if a more  specific DME order does not already exist)  As directed  PATIENT INSTRUCTIONS: Please contact your preferred vendor or insurance provider for assistance in obtaining the  ordered medical equipment item.  Start Date: 2/13/2025  DME Item Needed: Hand-held percussor  Length of Need: Lifetime  DME Item Quantity: 1 device and associated equipment\"    [facility] is requesting that the above medical equipment be ordered through [facility].    Writer will route the above message to the PCP to review and advise next steps.     Pricilla Simms RN, BSN  [facility]    "

## 2025-02-17 NOTE — PROGRESS NOTES
Clinic Care Coordination Contact  Care Coordination Clinician Chart Review    Situation: Patient chart reviewed by care coordinator.    Background: Clinic Care Coordination Referral received from inpatient care team for transition handoff communication following hospital admission.    Assessment: Upon chart review, patient is not a candidate for Primary Care Clinic Care Coordination enrollment due to reason stated below:  Hospital follow call completed by Clinic RN. Patient is scheduled for a follow up with his PCP on 2/27/25    Plan/Recommendations: Clinic Care Coordination Referral/order cancelled. RN/SW CC will perform no further monitoring/outreaches at this time and will remain available as needed. If new needs arise, a new Care Coordination Referral may be placed.    David Myhre, RN   [facility] RN  [phone]

## 2025-02-18 RX ORDER — QUETIAPINE FUMARATE 25 MG/1
TABLET, FILM COATED ORAL
Qty: 102 TABLET | Refills: 3 | Status: SHIPPED | OUTPATIENT
Start: 2025-02-18

## 2025-02-18 RX ORDER — PSEUDOEPHED/ACETAMINOPH/DIPHEN 30MG-500MG
1000 TABLET ORAL EVERY 6 HOURS PRN
Qty: 720 TABLET | Refills: 3 | Status: SHIPPED | OUTPATIENT
Start: 2025-02-18

## 2025-02-27 ENCOUNTER — OFFICE VISIT (OUTPATIENT)
Dept: FAMILY MEDICINE | Facility: CLINIC | Age: 60
End: 2025-02-27
Payer: COMMERCIAL

## 2025-02-27 VITALS
HEART RATE: 92 BPM | DIASTOLIC BLOOD PRESSURE: 90 MMHG | TEMPERATURE: 99.7 F | SYSTOLIC BLOOD PRESSURE: 135 MMHG | OXYGEN SATURATION: 92 %

## 2025-02-27 DIAGNOSIS — B18.2 CHRONIC HEPATITIS C WITHOUT HEPATIC COMA (H): ICD-10-CM

## 2025-02-27 DIAGNOSIS — Z78.9 ON TUBE FEEDING DIET: ICD-10-CM

## 2025-02-27 DIAGNOSIS — D64.9 NORMOCHROMIC NORMOCYTIC ANEMIA: ICD-10-CM

## 2025-02-27 DIAGNOSIS — N18.31 CKD STAGE 3A, GFR 45-59 ML/MIN (H): ICD-10-CM

## 2025-02-27 DIAGNOSIS — E78.5 HYPERLIPIDEMIA LDL GOAL <70: ICD-10-CM

## 2025-02-27 DIAGNOSIS — R45.1 RESTLESSNESS AND AGITATION: ICD-10-CM

## 2025-02-27 DIAGNOSIS — N28.9 ACUTE ON CHRONIC RENAL INSUFFICIENCY: ICD-10-CM

## 2025-02-27 DIAGNOSIS — N18.9 ACUTE ON CHRONIC RENAL INSUFFICIENCY: ICD-10-CM

## 2025-02-27 DIAGNOSIS — I69.151 SPASTIC HEMIPLEGIA OF RIGHT DOMINANT SIDE AS LATE EFFECT OF NONTRAUMATIC INTRAPARENCHYMAL HEMORRHAGE OF BRAIN (H): ICD-10-CM

## 2025-02-27 DIAGNOSIS — K59.00 CONSTIPATION, UNSPECIFIED CONSTIPATION TYPE: ICD-10-CM

## 2025-02-27 DIAGNOSIS — J69.0 ASPIRATION PNEUMONITIS (H): Primary | ICD-10-CM

## 2025-02-27 DIAGNOSIS — Z86.73 HISTORY OF CVA (CEREBROVASCULAR ACCIDENT): ICD-10-CM

## 2025-02-27 DIAGNOSIS — I10 ESSENTIAL HYPERTENSION: ICD-10-CM

## 2025-02-27 LAB
ALBUMIN SERPL BCG-MCNC: 3.8 G/DL (ref 3.5–5.2)
ALP SERPL-CCNC: 119 U/L (ref 40–150)
ALT SERPL W P-5'-P-CCNC: 32 U/L (ref 0–70)
ANION GAP SERPL CALCULATED.3IONS-SCNC: 12 MMOL/L (ref 7–15)
AST SERPL W P-5'-P-CCNC: 37 U/L (ref 0–45)
BASOPHILS # BLD AUTO: 0.1 10E3/UL (ref 0–0.2)
BASOPHILS NFR BLD AUTO: 1 %
BILIRUB SERPL-MCNC: 0.2 MG/DL
BUN SERPL-MCNC: 58.4 MG/DL (ref 8–23)
CALCIUM SERPL-MCNC: 9.6 MG/DL (ref 8.8–10.4)
CHLORIDE SERPL-SCNC: 113 MMOL/L (ref 98–107)
CHOLEST SERPL-MCNC: 241 MG/DL
CREAT SERPL-MCNC: 1.77 MG/DL (ref 0.67–1.17)
EGFRCR SERPLBLD CKD-EPI 2021: 43 ML/MIN/1.73M2
EOSINOPHIL # BLD AUTO: 0.4 10E3/UL (ref 0–0.7)
EOSINOPHIL NFR BLD AUTO: 3 %
ERYTHROCYTE [DISTWIDTH] IN BLOOD BY AUTOMATED COUNT: 12.7 % (ref 10–15)
FASTING STATUS PATIENT QL REPORTED: YES
FASTING STATUS PATIENT QL REPORTED: YES
FERRITIN SERPL-MCNC: 73 NG/ML (ref 31–409)
GLUCOSE SERPL-MCNC: 101 MG/DL (ref 70–99)
HCO3 SERPL-SCNC: 25 MMOL/L (ref 22–29)
HCT VFR BLD AUTO: 32.7 % (ref 40–53)
HDLC SERPL-MCNC: 31 MG/DL
HGB BLD-MCNC: 10.5 G/DL (ref 13.3–17.7)
IMM GRANULOCYTES # BLD: 0.3 10E3/UL
IMM GRANULOCYTES NFR BLD: 2 %
IRON BINDING CAPACITY (ROCHE): 334 UG/DL (ref 240–430)
IRON SATN MFR SERPL: 16 % (ref 15–46)
IRON SERPL-MCNC: 53 UG/DL (ref 61–157)
LDLC SERPL CALC-MCNC: 139 MG/DL
LYMPHOCYTES # BLD AUTO: 3.4 10E3/UL (ref 0.8–5.3)
LYMPHOCYTES NFR BLD AUTO: 27 %
MCH RBC QN AUTO: 29.7 PG (ref 26.5–33)
MCHC RBC AUTO-ENTMCNC: 32.1 G/DL (ref 31.5–36.5)
MCV RBC AUTO: 92 FL (ref 78–100)
MONOCYTES # BLD AUTO: 1.4 10E3/UL (ref 0–1.3)
MONOCYTES NFR BLD AUTO: 11 %
NEUTROPHILS # BLD AUTO: 7 10E3/UL (ref 1.6–8.3)
NEUTROPHILS NFR BLD AUTO: 56 %
NONHDLC SERPL-MCNC: 210 MG/DL
PLATELET # BLD AUTO: 375 10E3/UL (ref 150–450)
POTASSIUM SERPL-SCNC: 5.4 MMOL/L (ref 3.4–5.3)
PROT SERPL-MCNC: 9.1 G/DL (ref 6.4–8.3)
RBC # BLD AUTO: 3.54 10E6/UL (ref 4.4–5.9)
SODIUM SERPL-SCNC: 150 MMOL/L (ref 135–145)
TRIGL SERPL-MCNC: 355 MG/DL
WBC # BLD AUTO: 12.5 10E3/UL (ref 4–11)

## 2025-02-27 RX ORDER — BISACODYL 10 MG
10 SUPPOSITORY, RECTAL RECTAL EVERY OTHER DAY
Qty: 15 SUPPOSITORY | Refills: 11 | Status: SHIPPED | OUTPATIENT
Start: 2025-02-27

## 2025-02-27 RX ORDER — DIAZEPAM 5 MG/1
2.5-5 TABLET ORAL EVERY 6 HOURS PRN
Qty: 30 TABLET | Refills: 0 | Status: SHIPPED | OUTPATIENT
Start: 2025-02-27

## 2025-02-27 RX ORDER — QUETIAPINE FUMARATE 50 MG/1
50 TABLET, FILM COATED ORAL EVERY 6 HOURS
Qty: 120 TABLET | Refills: 5 | Status: SHIPPED | OUTPATIENT
Start: 2025-02-27

## 2025-02-27 ASSESSMENT — PAIN SCALES - GENERAL: PAINLEVEL_OUTOF10: NO PAIN (0)

## 2025-02-27 NOTE — PROGRESS NOTES
Hospital Follow-up Visit:    Hospital/Nursing Home/IP Rehab Facility: [facility]  Date of Admission: 2/10/25  Date of Discharge: 2/13/25  Reason(s) for Admission: acute sepsis  Was the patient in the ICU or did the patient experience delirium during hospitalization?  No  Do you have any other stressors you would like to discuss with your provider? 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 Has a positive Cologuard test. Not pursuing colonoscopy. Recurrent hospitalizations for pulmonary disease most recent late December 2024. Full code verified with family. Multiple family members recently had norovirus.  Patient has had no diarrhea. 2/9 seen in [facility] ED for cough. Benign workup. Given IV fluids. Discharged home. 2/10 returned with fever, tachycardia and hypertensive. Less responsive than usual. Creatinine elevated 1.76. Procalcitonin low. White count 10.1. Chest CT shows stable mild bronchiolitis.  Admitted.       1.  Aspiration pneumonitis.  Initiated on IV Zosyn and azithromycin.  Continued home DuoNebs, NebuSal and Mucomyst nebulizer treatments.  The first evening his fevers spiked to 103. Vancomycin added. White count up to 14. Creatinine increased to 1.8 despite IV fluids. CXR shows new right middle lobe opacity possible aspiration related.  Over the course of his stay respiratory status did improve.  Mentation and alertness did improve as well.  Family comfortable discharging him  home with home care services.  Continue antibiotics and nebulizer treatments.  DME order provided to family for trocar suction.     2.  GI.  Having some abdominal distention. Abdominal x-ray unremarkable. Tolerating tube feedings without difficulty. Stool positive for Sapovirus.  Just had a few loose stools but no significant diarrhea and was able to stay well-hydrated.     3.  Hepatitis C carrier.  As part of fever workup hepatitis C quantitative was done showing increase of viral load RNA to 21,700,000 units.  This is an increase from last year at 13.9 million.  LFTs and liver imaging unremarkable.  [facility] recommended outpatient follow-up as patient may benefit from hepatitis C definitive treatment.     Otherwise patient did well.  Lactic acidosis and CYNTHIA was corrected by time of discharge.  Creatinine at discharge 1.4.  Recheck with primary.      Diagnostic Tests/Treatments reviewed.  Follow up needed: recheck CBC and BMP  Other Healthcare Providers Involved in Patient s Care:         Homecare and PCA service  Update since discharge: stable.         EXAM: XR CHEST PORT 1 VIEW  LOCATION: [facility]  DATE: 2/10/2025     INDICATION: tachycardia, tachypnea  COMPARISON: Chest x-ray and chest CT February 10, 2025                                                                      IMPRESSION: New, 4 to 5 cm focus of asymmetric opacity in the right midlung zone may represent atelectasis or aspiration pneumonitis given the short interval change. Atelectasis in the lower lobes appears unchanged. No pneumothorax or pleural effusion.   Vascular volume is normal.              A/P:    Aspiration pneumonitis (H)  Transitional care management completed today.  Medication reconciliation reviewed.  Recent hospitalization at [facility] of following aspiration pneumonitis with sepsis described.  Still has some low-grade fever described between 99 and 100 degrees perhaps.  Seems a little  restless.  May utilize Tylenol currently.  Chest x-ray obtained.  Update white blood count as well.  Concern for atelectasis as likely cause of low-grade fever with pulmonary toilet with turn cough deep breath technique discussed.  Single view chest x-ray completed with noted improvement in right midlung opacity previously described likely representing atelectasis versus aspiration pneumonitis.  No evidence for pneumothorax or pleural effusion.  Will have radiologist confirmed.  - CBC with Platelets & Differential  - Comprehensive metabolic panel  - XR Chest 2 Views    Hyperlipidemia LDL goal <70  Hyperlipidemia will check nonfasting lipid cascade currently per family request.  - Lipid panel reflex to direct LDL Non-fasting    History of CVA (cerebrovascular accident)  History of CVA with right sided hemiplegia with spasticity noted.  Referred to see neurology.  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  Using baclofen 20 mg 3 times daily.  Diazepam 5 mg use half tablet to 1 tablet every 6 hours as needed for agitation or muscle spasm.  - Adult Neurology  Referral  - diazepam (VALIUM) 5 MG tablet  Dispense: 30 tablet; Refill: 0    Restlessness and agitation  Quetiapine 50 mg 4 times daily utilized.  Diazepam 5 mg use half tablet to 1 tablet every 6 hours as needed for breakthrough concerns for agitation, muscle spasticity etc.  - QUEtiapine (SEROQUEL) 50 MG tablet  Dispense: 120 tablet; Refill: 5  - Adult Neurology  Referral  - diazepam (VALIUM) 5 MG tablet  Dispense: 30 tablet; Refill: 0    Chronic hepatitis C without hepatic coma (H)  Chronic hepatitis C and should follow-up with [facility] as outpatient to consider definitive hepatitis C treatment with elevated viral load of 21,700,000.  - Comprehensive metabolic panel    On tube feeding  diet  Feeding tube treatments ongoing.  Patient has PCA services through family members receiving 24/7 care.  [facility] was unable to provide additional service apparently.    Essential hypertension  Hypertension appears fair control currently.  Will monitor.    Normochromic normocytic anemia  Normochromic normocytic anemia CKD stage III historically and will update CBC.  - CBC with Platelets & Differential    CKD stage 3a, GFR 45-59 ml/min (H)  Ensure stable renal function.  - Comprehensive metabolic panel    Constipation, unspecified constipation type  Dulcolax suppositories every other day with benefits of constipation management.  - bisacodyl (DULCOLAX) 10 MG suppository  Dispense: 15 suppository; Refill: 11         The longitudinal plan of care for the diagnosis(es)/condition(s) as documented were addressed during this visit. Due to the added complexity in care, I will continue to support Liu in the subsequent management and with ongoing continuity of care.           Plan of care communicated with patient and family

## 2025-02-28 ENCOUNTER — TELEPHONE (OUTPATIENT)
Dept: FAMILY MEDICINE | Facility: CLINIC | Age: 60
End: 2025-02-28
Payer: COMMERCIAL

## 2025-02-28 NOTE — TELEPHONE ENCOUNTER
Order/Referral Request    Who is requesting: [facility]    Orders being requested: Renewal for Respiratory Suction    Reason service is needed/diagnosis:     When are orders needed by: when done    Has this been discussed with Provider: Yes    Does patient have a preference on a Group/Provider/Facility? [facility]    Does patient have an appointment scheduled?:     Where to send orders: Fax [phone]

## 2025-03-03 DIAGNOSIS — Z87.01 HISTORY OF PNEUMONIA: ICD-10-CM

## 2025-03-03 DIAGNOSIS — L24.9 IRRITANT DERMATITIS: ICD-10-CM

## 2025-03-03 RX ORDER — KETOCONAZOLE 20 MG/G
CREAM TOPICAL
Qty: 30 G | Refills: 0 | Status: SHIPPED | OUTPATIENT
Start: 2025-03-03

## 2025-03-03 RX ORDER — DEXTROMETHORPHAN HYDROBROMIDE, GUAIFENESIN 10; 100 MG/5ML; MG/5ML
LIQUID ORAL
Qty: 237 ML | Refills: 0 | Status: SHIPPED | OUTPATIENT
Start: 2025-03-03

## 2025-03-04 DIAGNOSIS — Z86.73 HISTORY OF CVA (CEREBROVASCULAR ACCIDENT): ICD-10-CM

## 2025-03-04 DIAGNOSIS — J69.0 ASPIRATION PNEUMONITIS (H): Primary | ICD-10-CM

## 2025-03-04 NOTE — TELEPHONE ENCOUNTER
Order faxed. Writer called and informed JASON Radford on file, who states she is unsure if this is what they need. Will call clinic back if different orders needed.    BARBRA Sears, RN  [facility]

## 2025-03-04 NOTE — TELEPHONE ENCOUNTER
I completed forms and placed in the nurse's inbox to finish and contact patient or fax as appropriate    Shahid Brady MD

## 2025-03-05 NOTE — TELEPHONE ENCOUNTER
General Call    Contacts       Contact Date/Time Type Contact Phone/Fax    02/17/2025 09:54 AM CST Phone (Incoming) MILAGRO ESPINAL (Emergency Contact) [phone] (M)          Reason for Call:  Jono Bartlett, calling relay they received an order for a handheld percussor.  They do not carry this product.  Therefore unable to fill.    However, [employer] dose carry aflow vests and cough assist machines.    Please send a new order if one of the above items would benefit the patient.    Could we send this information to you in [employer] or would you prefer to receive a phone call?:   Patient would prefer a phone call   Okay to leave a detailed message?: Yes at Cell number on file:    Telephone Information:   Mobile [phone]

## 2025-03-06 NOTE — TELEPHONE ENCOUNTER
DME (Durable Medical Equipment) Orders and Documentation  Orders Placed This Encounter   Procedures    Cough Assist Order        The patient was assessed and it was determined the patient is in need of the following listed DME Supplies/Equipment. Please complete supporting documentation below to demonstrate medical necessity.      Suction Documentation  I attest that I have seen and evaluated Liu GIL Malave. 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 Face to face addressing dysphagia and inability to clear/mobilize secretions.     I, the undersigned, certify that the above prescribed supplies are medically necessary for this patient and is both reasonable and necessary in reference to accepted standards of medical and necessary in reference to accepted standards of medical practice in the treatment of this patient's condition and is not prescribed as a convenience.

## 2025-03-09 ENCOUNTER — HEALTH MAINTENANCE LETTER (OUTPATIENT)
Age: 60
End: 2025-03-09

## 2025-03-12 ENCOUNTER — TELEPHONE (OUTPATIENT)
Dept: NEUROLOGY | Facility: CLINIC | Age: 60
End: 2025-03-12
Payer: COMMERCIAL

## 2025-03-12 NOTE — TELEPHONE ENCOUNTER
Patient's daughtert, MILAGRO (C2C on file) confirmed scheduled appointment:  Date: 3/18/25  Time: 10am  Visit type: New Stroke  Provider: Dwayne  Location: Virtual  Testing/imaging: NA  Additional notes: rescheduled 4/15/25 appt to 3/18/25.     Rolanda Wang on 3/12/2025 at 10:10 AM

## 2025-03-18 ENCOUNTER — VIRTUAL VISIT (OUTPATIENT)
Dept: NEUROLOGY | Facility: CLINIC | Age: 60
End: 2025-03-18
Attending: FAMILY MEDICINE
Payer: COMMERCIAL

## 2025-03-18 VITALS — BODY MASS INDEX: 26.56 KG/M2 | WEIGHT: 136 LBS

## 2025-03-18 DIAGNOSIS — Z86.73 HISTORY OF CVA (CEREBROVASCULAR ACCIDENT): Primary | ICD-10-CM

## 2025-03-18 DIAGNOSIS — R45.1 RESTLESSNESS AND AGITATION: ICD-10-CM

## 2025-03-18 DIAGNOSIS — I69.054: ICD-10-CM

## 2025-03-18 DIAGNOSIS — I69.151 SPASTIC HEMIPLEGIA OF RIGHT DOMINANT SIDE AS LATE EFFECT OF NONTRAUMATIC INTRAPARENCHYMAL HEMORRHAGE OF BRAIN (H): ICD-10-CM

## 2025-03-18 PROCEDURE — 1126F AMNT PAIN NOTED NONE PRSNT: CPT | Mod: 95 | Performed by: PSYCHIATRY & NEUROLOGY

## 2025-03-18 PROCEDURE — 98007 SYNCH AUDIO-VIDEO EST HI 40: CPT | Performed by: PSYCHIATRY & NEUROLOGY

## 2025-03-18 ASSESSMENT — PAIN SCALES - GENERAL: PAINLEVEL_OUTOF10: NO PAIN (0)

## 2025-03-18 NOTE — PATIENT INSTRUCTIONS
No follow up for now  I will  talk to Dr. Moe Martinez Placed This Encounter   Procedures    Adult Physical Medicine and Rehab  Referral      Stroke & Endovascular RN Care Coordinators:    Apurva Miramontes, RN, BSN  Shaunna Luna, RN, CNRN, SCRN    If you have any questions please contact the RN Care Coordinators at [phone], option [phone].     Thank you for choosing [facility] for your health care needs.

## 2025-03-18 NOTE — PROGRESS NOTES
"[facility] NEUROLOGY CLINIC [address]  3RD FLOOR  [facility] [id number] [phone]          March 18, 2025    Liu Malave                                                                                                                     [address] [facility] [place] [address]    TOTAL 68 minutes  Video: 10:05 - 10:28  Documentation, care coordination, review of imaging and medical records: 45 minutes    Mr. Malave is a 59 year old gentleman originally from [place] who has a history of two strokes - one in 2007 and one in 2016. History is provided by his daughter who provides a lot of his care and is supportive to her father.     The stroke in 2015 occurred in the context of surgical clipping of a R MCA aneurysm - there was a SAH and the patient had residual L sided weakness though he was independent. A head CT from 10/23/2015 shows the sequela of this SAH and aneurysmal clipping with encephalomalacia involving the R BG, CR, extending into the R frontal lobe and enlargement of the tempora horn and involvment of the tip of the temporal lobe. On 6/5/2016 - the patient was hospitalized with a L IPH. There was no new aneurysm of AVM identified at that time and the IPH was thought to be due to HTN. This event was disabling and the patient has been dependent on tube feeds and needs assistance with all his ADLs since then.  He is also non-verbal and is paraplegic.     Most recently, the patient was admitted with new pulmonary infection and was treated with antibiotics and was discharged from the hospital on 2/13/25. His PCP is Dr. Rosa and saw him on 2/27/25. From his note \" also has hypertension, dyslipidemia, seizure disorder, chronic hep C, ESBL urine, CKD 3A and GERD. Has a positive Cologuard test. Not pursuing colonoscopy. Recurrent hospitalizations for pulmonary disease. \"    The reason for the visit today is that there was possibly increased spasticity and restlessness " over the last few months. The patient has also become less interactive over time. He has had increased difficulty since ruben covid in 2021.    ASSESSMENT / PLAN  Encounter Diagnoses   Name Primary?    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 His family has noted increased spasticity and restlessness and decreased interaction over time. It appears that the baclofen was increased from 10 mg TID to 20 mg TID abruptly 2 months ago. This could contribute to his somnolence. Of note, he is at risk for recurrent pulmonary infections and these could also increase spasticity though once the infection is treated this should improve. I will discuss with Dr. Rosa about this gentleman's infection surveillance. I have placed a rehab consult for addressing the spasticity.    The patient is receiving PT exercises from his family at home.     No follow up is scheduled for now.    Orders Placed This Encounter   Procedures    Adult Physical Medicine and Rehab  Referral        12/24 Head CT  Narrative & Impression   EXAM: CT HEAD W/O CONTRAST  LOCATION: [facility]  DATE: 12/24/2024     INDICATION: mental status changes with hx prior severe hemorrhagic cva     COMPARISON: 12/21/2024 and 7/18/2024      TECHNIQUE: Routine CT Head without IV contrast. Multiplanar reformats. Dose reduction techniques were used.     FINDINGS:     INTRACRANIAL CONTENTS: Postprocedural changes of right MCA open aneurysmal clipping. There is unchanged encephalomalacia and gliosis within the right frontal/parietal lobes, insula and temporal lobe. No  intracranial hemorrhage, extraaxial collection, or   mass effect.  No CT evidence of acute infarct. Chronic appearing gliosis within the subcortical white matter of the left insula.     VISUALIZED ORBITS/SINUSES/MASTOIDS: No intraorbital abnormality. No paranasal sinus mucosal disease. No middle ear or mastoid effusion.     BONES/SOFT TISSUES: No acute abnormality.                                                                       IMPRESSION:     1. No acute intracranial process.               Head CTA 6/5/2016  Narrative & Impression        FINDINGS:      HEAD CTA: As described on the recent noncontrast head CT, there is a large intraparenchymal hemorrhage centered within the left basal ganglia. This hemorrhage is not significantly changed in size in the interval. There is persistent mass effect, with   effacement of the left lateral ventricle at least 7 mm shift of midline structures from left to right. Mass effect distorts the course of left MCA branches without definite branch vessel occlusion or high-grade stenosis. There are postsurgical changes   related to previous right pterional craniotomy and right MCA bifurcation region aneurysm clipping. Mild clip related artifact. Within these limits, no definite recurrent aneurysm. No evidence for additional aneurysms or high flow vascular malformation   elsewhere. Dominant left and smaller right vertebral arteries contribute to a normal caliber basilar artery. Standard [place] anatomy. Dural venous sinuses are suboptimally evaluated on this arterial phase exam.     IMPRESSION:  CONCLUSION:   HEAD CTA:   1.  Redemonstration of a large intraparenchymal hemorrhage centered within the left basal ganglia and associated mass effect with at least 7 mm shift of midline structures from left to right. This is not significantly changed compared to the recent   noncontrast head CT images.  2.  Postsurgical changes related to previous right MCA bifurcation aneurysm  clipping similar to prior exams. No evidence for residual or recurrent aneurysm.  3.  No definite new intracranial aneurysm, high flow vascular malformation, or high-grade intracranial stenosis identified.  4.  Some distortion of left MCA branches due to mass effect.       6/5/2016  IMPRESSION:  CONCLUSION:  1.  Moderate to large acute intraparenchymal hemorrhage left basal ganglia with compression of the left lateral ventricle and an 8 mm left to right midline shift.  2.  Chronic lacunar infarct right lentiform nucleus/periventricular corona radiata.  3.  Right MCA trifurcation aneurysm clipping with localized encephalomalacia of the right temporal tip.    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 Presumed postsurgical encephalomalacia involving the right   anterior temporal lobe. Chronic ischemic changes involving the lentiform nucleus of the right basal ganglia extending superiorly into the corona radiata and subcortical white matter of the right frontal lobe, unchanged from previous exam. No extra-axial      fluid collection. No mass effect. No acute intracranial hemorrhage. Orbits are unremarkable bilaterally. Mild mucosal thickening in the inferior right frontal sinus. Remaining visualized paranasal sinuses clear. Mastoid air cells clear.      IMPRESSION:  CONCLUSION:  1.  No acute intracranial hemorrhage.  2.  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  3.  Again seen are chronic sequela of ischemic change involving the right basal ganglia, right corona radiata and subcortical white matter of the right frontal lobe.          10/23/2015        Past Medical History:   Diagnosis Date    Acute UTI 02/21/2020    Anxiety     Basal ganglia hemorrhage  (H) 06/20/2016    Brain compression (H)     Bruxism     Chronic static encephalopathy     Convulsions, unspecified convulsion type (H)     CVA (cerebral vascular accident) (H) 07/2016    Hemorrhagic,     Depression     Dyslipidemia     Elevated troponin     Essential hypertension     Feeding by G-tube (H) 06/04/2020    GERD (gastroesophageal reflux disease)     Hemiplegia of nondominant side, late effect of cerebrovascular disease (H)     Created by Conversion  Replacement Utility updated for latest IMO load    Hemorrhagic stroke (H) 06/05/2016    Hepatitis C carrier (H)     History of ESBL E. coli infection     History of hemorrhagic stroke with residual hemiparesis (H)     Hypertension     Lung nodule 12/05/2016    9 x 9 mm left upper lobe on CXR    MRSA (methicillin resistant staph aureus) culture positive     Periodontal disease     Persistent fever     Pneumonia of both lower lobes due to infectious organism     Respiratory failure with hypoxia (H)     Seizure (H)     Sepsis due to urinary tract infection (H) 06/04/2020    Severe sepsis (H)     Trismus        EXAM    Patient is non-verbal  Daughter assisting with exam  Was sleeping but wakes up easily. Later falls asleep again  Cranial nerves: opens eyes; bifacial weakness  Motor: No movement L UE; Minimal movement (gravity eliminated)R elbow  Moves L LE to request; No movement R LE with extensor contractures  Has a FT since 2016  Is largely bed bound and dependent on ADLS though family sits him in chair occasionally   Two person transfer - he has helped with transfer when he was more alert in the past      Zuleyka Rice MD

## 2025-03-18 NOTE — PROGRESS NOTES
Virtual Visit Details    Type of service:  Video Visit   Video Start Time:  10:05  Video End Time: 10:28    Originating Location (pt. Location): Home    Distant Location (provider location):  Off-site  Platform used for Video Visit: Marcio

## 2025-03-18 NOTE — NURSING NOTE
Current patient location: [address] [place] [id number]    Is the patient currently in the state of [place]? YES    Visit mode: VIDEO    If the visit is dropped, the patient can be reconnected by:VIDEO VISIT: Text to cell phone:   Telephone Information:   Mobile [phone]       Will anyone else be joining the visit? NO  (If patient encounters technical issues they should call [phone]    Are changes needed to the allergy or medication list? No    Are refills needed on medications prescribed by this physician? NO    Rooming Documentation:  Questionnaire(s) not done per department protocol    Reason for visit: Follow Up    Marisol NOGUEIRA

## 2025-03-18 NOTE — LETTER
3/18/2025       RE: Liu Malave  [address] [place] [address]     Dear Colleague,    Thank you for referring your patient, Liu Malave, to the [facility] NEUROLOGY CLINIC [place] at [facility]. Please see a copy of my visit note below.    Virtual Visit Details    Type of service:  Video Visit   Video Start Time:  10:05  Video End Time: 10:28    Originating Location (pt. Location): Home    Distant Location (provider location):  Off-site  Platform used for Video Visit: [facility] NEUROLOGY CLINIC [place] [id number] [facility]  3RD FLOOR  [facility] [address]  [phone]          March 18, 2025    Liu Malave                                                                                                                     [address] [place] [address]    TOTAL 68 minutes  Video: 10:05 - 10:28  Documentation, care coordination, review of imaging and medical records: 45 minutes    Mr. Malave is a 59 year old gentleman originally from [place] who has a history of two strokes - one in 2007 and one in 2016. History is provided by his daughter who provides a lot of his care and is supportive to her father.     The stroke in 2015 occurred in the context of surgical clipping of a R MCA aneurysm - there was a SAH and the patient had residual L sided weakness though he was independent. A head CT from 10/23/2015 shows the sequela of this SAH and aneurysmal clipping with encephalomalacia involving the R BG, CR, extending into the R frontal lobe and enlargement of the tempora horn and involvment of the tip of the temporal lobe. On 6/5/2016 - the patient was hospitalized with a L IPH. There was no new aneurysm of AVM identified at that time and the IPH was thought to be due to HTN. This event was disabling and the patient has been dependent on tube feeds and needs assistance with all his ADLs since then.  He is also  "non-verbal and is paraplegic.     Most recently, the patient was admitted with new pulmonary infection and was treated with antibiotics and was discharged from the hospital on 2/13/25. His PCP is Dr. Rosa and saw him on 2/27/25. From his note \" also has hypertension, dyslipidemia, seizure disorder, chronic hep C, ESBL urine, CKD 3A and GERD. Has a positive Cologuard test. Not pursuing colonoscopy. Recurrent hospitalizations for pulmonary disease. \"    The reason for the visit today is that there was possibly increased spasticity and restlessness over the last few months. The patient has also become less interactive over time. He has had increased difficulty since ruben covid in 2021.    ASSESSMENT / PLAN  Encounter Diagnoses   Name Primary?     History of CVA (cerebrovascular accident) Yes     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)      Restlessness and agitation      Spastic hemiplegia of left nondominant side as late effect of nontraumatic subarachnoid hemorrhage (H)      This 59 year old gentleman with history of SAH  (aneurysmal, 2007) and [facility] (2016) is bed bound and non-verbal. His family has noted increased spasticity and restlessness and decreased interaction over time. It appears that the baclofen was increased from 10 mg TID to 20 mg TID abruptly 2 months ago. This could contribute to his somnolence. Of note, he is at risk for recurrent pulmonary infections and these could also increase spasticity though once the infection is treated this should improve. I will discuss with Dr. Rosa about this gentleman's infection surveillance. I have placed a rehab consult for addressing the spasticity.    The patient is receiving PT exercises from his family at home.     No follow up is scheduled for now.    Orders Placed This Encounter   Procedures     Adult Physical Medicine and Rehab  Referral        12/24 Head CT  Narrative " & Impression   EXAM: CT HEAD W/O CONTRAST  LOCATION: [facility]  DATE: 12/24/2024     INDICATION: mental status changes with hx prior severe hemorrhagic cva     COMPARISON: 12/21/2024 and 7/18/2024      TECHNIQUE: Routine CT Head without IV contrast. Multiplanar reformats. Dose reduction techniques were used.     FINDINGS:     INTRACRANIAL CONTENTS: Postprocedural changes of right MCA open aneurysmal clipping. There is unchanged encephalomalacia and gliosis within the right frontal/parietal lobes, insula and temporal lobe. No intracranial hemorrhage, extraaxial collection, or   mass effect.  No CT evidence of acute infarct. Chronic appearing gliosis within the subcortical white matter of the left insula.     VISUALIZED ORBITS/SINUSES/MASTOIDS: No intraorbital abnormality. No paranasal sinus mucosal disease. No middle ear or mastoid effusion.     BONES/SOFT TISSUES: No acute abnormality.                                                                       IMPRESSION:     1. No acute intracranial process.               Head CTA 6/5/2016  Narrative & Impression        FINDINGS:      HEAD CTA: As described on the recent noncontrast head CT, there is a large intraparenchymal hemorrhage centered within the left basal ganglia. This hemorrhage is not significantly changed in size in the interval. There is persistent mass effect, with   effacement of the left lateral ventricle at least 7 mm shift of midline structures from left to right. Mass effect distorts the course of left MCA branches without definite branch vessel occlusion or high-grade stenosis. There are postsurgical changes   related to previous right pterional craniotomy and right MCA bifurcation region aneurysm clipping. Mild clip related artifact. Within these limits, no definite recurrent aneurysm. No evidence for additional aneurysms or high flow vascular malformation   elsewhere. Dominant left and smaller right vertebral arteries  contribute to a normal caliber basilar artery. Standard [place] anatomy. Dural venous sinuses are suboptimally evaluated on this arterial phase exam.     IMPRESSION:  CONCLUSION:   HEAD CTA:   1.  Redemonstration of a large intraparenchymal hemorrhage centered within the left basal ganglia and associated mass effect with at least 7 mm shift of midline structures from left to right. This is not significantly changed compared to the recent   noncontrast head CT images.  2.  Postsurgical changes related to previous right MCA bifurcation aneurysm clipping similar to prior exams. No evidence for residual or recurrent aneurysm.  3.  No definite new intracranial aneurysm, high flow vascular malformation, or high-grade intracranial stenosis identified.  4.  Some distortion of left MCA branches due to mass effect.       6/5/2016  IMPRESSION:  CONCLUSION:  1.  Moderate to large acute intraparenchymal hemorrhage left basal ganglia with compression of the left lateral ventricle and an 8 mm left to right midline shift.  2.  Chronic lacunar infarct right lentiform nucleus/periventricular corona radiata.  3.  Right MCA trifurcation aneurysm clipping with localized encephalomalacia of the right temporal tip.    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 Presumed postsurgical encephalomalacia involving the right   anterior temporal lobe. Chronic ischemic changes involving the lentiform nucleus of the right basal ganglia extending superiorly into the corona radiata and subcortical white matter of the right frontal lobe, unchanged from previous exam. No extra-axial      fluid collection. No mass effect. No acute intracranial hemorrhage. Orbits are unremarkable bilaterally. Mild mucosal thickening in the inferior right frontal sinus. Remaining visualized paranasal sinuses  clear. Mastoid air cells clear.      IMPRESSION:  CONCLUSION:  1.  No acute intracranial hemorrhage.  2.  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  3.  Again seen are chronic sequela of ischemic change involving the right basal ganglia, right corona radiata and subcortical white matter of the right frontal lobe.          10/23/2015        Past Medical History:   Diagnosis Date     Acute UTI 02/21/2020     Anxiety      Basal ganglia hemorrhage (H) 06/20/2016     Brain compression (H)      Bruxism      Chronic static encephalopathy      Convulsions, unspecified convulsion type (H)      CVA (cerebral vascular accident) (H) 07/2016    Hemorrhagic,      Depression      Dyslipidemia      Elevated troponin      Essential hypertension      Feeding by G-tube (H) 06/04/2020     GERD (gastroesophageal reflux disease)      Hemiplegia of nondominant side, late effect of cerebrovascular disease (H)     Created by Conversion  Replacement Utility updated for latest IMO load     Hemorrhagic stroke (H) 06/05/2016     Hepatitis C carrier (H)      History of ESBL E. coli infection      History of hemorrhagic stroke with residual hemiparesis (H)      Hypertension      Lung nodule 12/05/2016    9 x 9 mm left upper lobe on CXR     MRSA (methicillin resistant staph aureus) culture positive      Periodontal disease      Persistent fever      Pneumonia of both lower lobes due to infectious organism      Respiratory failure with hypoxia (H)      Seizure (H)      Sepsis due to urinary tract infection (H) 06/04/2020     Severe sepsis (H)      Trismus        EXAM    Patient is non-verbal  Daughter assisting with exam  Was sleeping but wakes up easily. Later falls asleep again  Cranial nerves: opens eyes; bifacial weakness  Motor: No movement L UE; Minimal movement (gravity eliminated)R elbow  Moves L LE to request; No movement R LE  with extensor contractures  Has a FT since 2016  Is largely bed bound and dependent on ADLS though family sits him in chair occasionally   Two person transfer - he has helped with transfer when he was more alert in the past      Zuleyka Rice MD      Again, thank you for allowing me to participate in the care of your patient.      Sincerely,    Zuleyka Rice MD

## 2025-03-21 ENCOUNTER — MYC MEDICAL ADVICE (OUTPATIENT)
Dept: FAMILY MEDICINE | Facility: CLINIC | Age: 60
End: 2025-03-21
Payer: COMMERCIAL

## 2025-03-21 DIAGNOSIS — E53.8 FOLIC ACID DEFICIENCY: ICD-10-CM

## 2025-03-21 DIAGNOSIS — Z51.81 ENCOUNTER FOR THERAPEUTIC DRUG MONITORING: ICD-10-CM

## 2025-03-21 DIAGNOSIS — D64.9 NORMOCHROMIC NORMOCYTIC ANEMIA: ICD-10-CM

## 2025-03-21 DIAGNOSIS — N18.31 CKD STAGE 3A, GFR 45-59 ML/MIN (H): Primary | ICD-10-CM

## 2025-03-24 RX ORDER — FOLIC ACID 1 MG/1
1 TABLET ORAL DAILY
Qty: 90 TABLET | Refills: 3 | Status: SHIPPED | OUTPATIENT
Start: 2025-03-24

## 2025-03-24 NOTE — TELEPHONE ENCOUNTER
Please notify.  Folic acid 1 mg daily (rx sent to pharmacy).  Check folic acid and BMP labs in 4 weeks please.

## 2025-03-27 ENCOUNTER — MYC MEDICAL ADVICE (OUTPATIENT)
Dept: INTERVENTIONAL RADIOLOGY/VASCULAR | Facility: CLINIC | Age: 60
End: 2025-03-27
Payer: COMMERCIAL

## 2025-04-03 ENCOUNTER — HOSPITAL ENCOUNTER (OUTPATIENT)
Dept: INTERVENTIONAL RADIOLOGY/VASCULAR | Facility: CLINIC | Age: 60
Discharge: HOME | End: 2025-04-03
Attending: NURSE PRACTITIONER
Payer: COMMERCIAL

## 2025-04-03 VITALS
DIASTOLIC BLOOD PRESSURE: 84 MMHG | SYSTOLIC BLOOD PRESSURE: 179 MMHG | OXYGEN SATURATION: 97 % | HEART RATE: 108 BPM | RESPIRATION RATE: 22 BRPM

## 2025-04-03 DIAGNOSIS — R63.30 FEEDING DIFFICULTIES: ICD-10-CM

## 2025-04-03 DIAGNOSIS — R63.30 FEEDING DIFFICULTIES: Primary | ICD-10-CM

## 2025-04-03 DIAGNOSIS — Z93.1 GASTROJEJUNAL (GJ) TUBE IN PLACE (H): ICD-10-CM

## 2025-04-03 PROCEDURE — 49452 REPLACE G-J TUBE PERC: CPT

## 2025-04-03 PROCEDURE — C1769 GUIDE WIRE: HCPCS

## 2025-04-03 PROCEDURE — 250N000009 HC RX 250: Performed by: RADIOLOGY

## 2025-04-03 RX ORDER — LIDOCAINE HYDROCHLORIDE 20 MG/ML
JELLY TOPICAL ONCE
Status: COMPLETED | OUTPATIENT
Start: 2025-04-03 | End: 2025-04-03

## 2025-04-03 RX ADMIN — LIDOCAINE HYDROCHLORIDE: 20 JELLY TOPICAL at 14:44

## 2025-04-03 NOTE — DISCHARGE INSTRUCTIONS
Gastrostomy (G) or Gastrojejunostomy (G/J) Tube Exchange Discharge Instructions:   You had a Gastrojejunostomy Tube (GJ) exchanged.  This tube is often used for nutritional support and medication administration or it can be used for stomach venting.     Care instructions:  - If you received sedation for your procedure, do not drive or operate heavy machinery for the rest of the day.  - Avoid soaking in stagnant water (tub baths, Jacuzzis, pools, lake, or ocean).   - You may shower beginning the day after the tube was exchanged.   - Clean under the disc daily with soap and water. Pat dry under disc and apply new split gauze dressing under disc. Cleaning tube site daily will help prevent infection and skin irritation.  - A small amount of clear tan drainage from the tube exit site can be normal.  - Make sure the disc on the tube fits slightly snug against the skin so that the tube does not move in or out of the body easily.   - If you experience leaking from around tube exit site, the most common cause is that the disc is not tightened against the skin.   - To tighten the disc, pull gently on the tube so the retention balloon (under the skin) is pulled up against the skin under the tube. Push the disc down until it is tight against the skin without a gap between the skin and the disc.  - Flush your tube with 60cc of water twice a day (using a cath tip syringe) to prevent tube from clogging (or follow recommendations from your doctor or dietician if given).    Giving Feedings and Medications:  - Follow-up with your dietician, primary care provider, or oncologist for instructions on tube feedings and medication administration.    - ONLY use specific enteric feedings with your tube (unless otherwise discussed with your dietitian).    - Flush tube at least twice daily with 60ml of water using cath tip syringe unless otherwise instructed by your doctor or dietician.  - Flush the tube before and after administrating  medications and bolus feedings with 60cc of water.    Follow Up:  - Recommend routine 3 month exchanges of feeding tube. Please contact your primary care provider or speak with your dietitian to obtain an order to have your G/GJ tube exchanged. Then contact [facility]'s Medical Imaging scheduling department at [phone] to schedule your G/GJ tube exchange appointment.    Please seek medical evaluation for:  - Fever (greater than 101 F (38.3C).  - Purulent (yellow/green/foul smelling) drainage from tube exit site.   - Significant or worsening abdominal pain.   - Skin that is hot to the touch or significantly reddened at the tube exit site.   - Bleeding at tube exit site.    Call [facility] RN Line at [phone] with questions or if you have any of the following symptoms:  - Tube falls out or felt to be out of position.  - Unable to flush tube.  - Significant leakage around tube site (tube feeding, medications or drainage).  - Significant bleeding at the tube exit site.  - Severe pain at tube exit site.

## 2025-04-03 NOTE — IR NOTE
Patient Name: Liu Malave  Medical Record Number: [id number]  Today's Date: 4/3/2025    Procedure: GJ tube exchange  Proceduralist: Dr. Randle    Procedure Start: 1459  Procedure end: 1304  Sedation medications administered: N/a    Other Notes: Pt arrived to IR room 1 from Pre/post bay 1. Consent reviewed. Pt denies any questions or concerns regarding procedure. Pt positioned supine and monitored per protocol. Pt tolerated procedure without any noted complications. VSS on RA. Pt transferred back to Pre/post bay 1.

## 2025-04-21 DIAGNOSIS — M62.838 MUSCLE SPASM: ICD-10-CM

## 2025-04-21 DIAGNOSIS — Z87.01 HISTORY OF PNEUMONIA: ICD-10-CM

## 2025-04-21 DIAGNOSIS — L24.9 IRRITANT DERMATITIS: ICD-10-CM

## 2025-04-21 RX ORDER — KETOCONAZOLE 20 MG/G
CREAM TOPICAL
Qty: 30 G | Refills: 0 | Status: SHIPPED | OUTPATIENT
Start: 2025-04-21

## 2025-04-21 RX ORDER — DEXTROMETHORPHAN HYDROBROMIDE, GUAIFENESIN 10; 100 MG/5ML; MG/5ML
LIQUID ORAL
Qty: 237 ML | Refills: 0 | Status: SHIPPED | OUTPATIENT
Start: 2025-04-21

## 2025-04-21 RX ORDER — BACLOFEN 10 MG/1
TABLET ORAL
Qty: 120 TABLET | Refills: 0 | Status: SHIPPED | OUTPATIENT
Start: 2025-04-21

## 2025-04-24 ENCOUNTER — TELEPHONE (OUTPATIENT)
Dept: FAMILY MEDICINE | Facility: CLINIC | Age: 60
End: 2025-04-24
Payer: COMMERCIAL

## 2025-04-24 NOTE — TELEPHONE ENCOUNTER
Forms/Letter Request    Type of form/letter: DME (wheelchair, hospital bed)    Type of DME requested: Urinary Drain Tube     Do we have the form/letter: Yes: placed in Dr. Rosa in box    Who is the form from? [facility] [email or web address]    Where did/will the form come from? form was faxed in    When is form/letter needed by: when done    How would you like the form/letter returned: Fax : [phone]

## 2025-04-25 ENCOUNTER — MEDICAL CORRESPONDENCE (OUTPATIENT)
Dept: HEALTH INFORMATION MANAGEMENT | Facility: CLINIC | Age: 60
End: 2025-04-25
Payer: COMMERCIAL

## 2025-05-01 ENCOUNTER — LAB (OUTPATIENT)
Dept: LAB | Facility: CLINIC | Age: 60
End: 2025-05-01
Payer: COMMERCIAL

## 2025-05-01 DIAGNOSIS — D64.9 NORMOCHROMIC NORMOCYTIC ANEMIA: ICD-10-CM

## 2025-05-01 DIAGNOSIS — Z51.81 ENCOUNTER FOR THERAPEUTIC DRUG MONITORING: ICD-10-CM

## 2025-05-01 DIAGNOSIS — N18.31 CKD STAGE 3A, GFR 45-59 ML/MIN (H): ICD-10-CM

## 2025-05-01 LAB
ANION GAP SERPL CALCULATED.3IONS-SCNC: 10 MMOL/L (ref 7–15)
BUN SERPL-MCNC: 47 MG/DL (ref 8–23)
CALCIUM SERPL-MCNC: 8.5 MG/DL (ref 8.8–10.4)
CHLORIDE SERPL-SCNC: 109 MMOL/L (ref 98–107)
CREAT SERPL-MCNC: 1.6 MG/DL (ref 0.67–1.17)
EGFRCR SERPLBLD CKD-EPI 2021: 49 ML/MIN/1.73M2
ERYTHROCYTE [DISTWIDTH] IN BLOOD BY AUTOMATED COUNT: 13.7 % (ref 10–15)
FOLATE SERPL-MCNC: >40 NG/ML (ref 4.6–34.8)
GLUCOSE SERPL-MCNC: 106 MG/DL (ref 70–99)
HCO3 SERPL-SCNC: 21 MMOL/L (ref 22–29)
HCT VFR BLD AUTO: 27.4 % (ref 40–53)
HGB BLD-MCNC: 8.8 G/DL (ref 13.3–17.7)
MCH RBC QN AUTO: 29.9 PG (ref 26.5–33)
MCHC RBC AUTO-ENTMCNC: 32.1 G/DL (ref 31.5–36.5)
MCV RBC AUTO: 93 FL (ref 78–100)
PLATELET # BLD AUTO: 237 10E3/UL (ref 150–450)
POTASSIUM SERPL-SCNC: 5.1 MMOL/L (ref 3.4–5.3)
RBC # BLD AUTO: 2.94 10E6/UL (ref 4.4–5.9)
SODIUM SERPL-SCNC: 140 MMOL/L (ref 135–145)
WBC # BLD AUTO: 11.3 10E3/UL (ref 4–11)

## 2025-05-05 DIAGNOSIS — D64.9 CHRONIC ANEMIA: ICD-10-CM

## 2025-05-05 DIAGNOSIS — N18.31 CKD STAGE 3A, GFR 45-59 ML/MIN (H): Primary | ICD-10-CM

## 2025-05-06 ENCOUNTER — PATIENT OUTREACH (OUTPATIENT)
Dept: CARE COORDINATION | Facility: CLINIC | Age: 60
End: 2025-05-06
Payer: COMMERCIAL

## 2025-05-08 ENCOUNTER — PATIENT OUTREACH (OUTPATIENT)
Dept: CARE COORDINATION | Facility: CLINIC | Age: 60
End: 2025-05-08
Payer: COMMERCIAL

## 2025-05-20 ENCOUNTER — TELEPHONE (OUTPATIENT)
Dept: FAMILY MEDICINE | Facility: CLINIC | Age: 60
End: 2025-05-20

## 2025-05-20 NOTE — TELEPHONE ENCOUNTER
Forms/Letter Request    Type of form/letter: DME (wheelchair, hospital bed)    Type of DME requested: suction pump for trach    Do we have the form/letter: Yes: Dr Enriquez's inbox    Who is the form from? [facility] medical supply    Where did/will the form come from? form was faxed in    When is form/letter needed by:     How would you like the form/letter returned: Fax : [phone]

## 2025-05-22 DIAGNOSIS — L24.9 IRRITANT DERMATITIS: ICD-10-CM

## 2025-05-22 DIAGNOSIS — Z87.01 HISTORY OF PNEUMONIA: ICD-10-CM

## 2025-05-22 DIAGNOSIS — G89.4 CHRONIC PAIN SYNDROME: ICD-10-CM

## 2025-05-22 RX ORDER — DEXTROMETHORPHAN HYDROBROMIDE, GUAIFENESIN 10; 100 MG/5ML; MG/5ML
LIQUID ORAL
Qty: 237 ML | Refills: 5 | Status: SHIPPED | OUTPATIENT
Start: 2025-05-22

## 2025-05-22 RX ORDER — KETOCONAZOLE 20 MG/G
CREAM TOPICAL
Qty: 30 G | Refills: 5 | Status: SHIPPED | OUTPATIENT
Start: 2025-05-22

## 2025-05-22 RX ORDER — LIDOCAINE PAIN RELIEF 40 MG/1000MG
1 PATCH TOPICAL DAILY PRN
Qty: 30 PATCH | Refills: 5 | Status: SHIPPED | OUTPATIENT
Start: 2025-05-22

## 2025-05-23 NOTE — TELEPHONE ENCOUNTER
The form has been signed by Dr. Enriquez and completed. A copy has been faxed, scanned and another copy has been placed in Dr. Enriquez's office inbox.

## 2025-05-28 ENCOUNTER — TRANSFERRED RECORDS (OUTPATIENT)
Dept: HEALTH INFORMATION MANAGEMENT | Facility: CLINIC | Age: 60
End: 2025-05-28
Payer: COMMERCIAL

## 2025-05-28 ENCOUNTER — TELEPHONE (OUTPATIENT)
Dept: FAMILY MEDICINE | Facility: CLINIC | Age: 60
End: 2025-05-28
Payer: COMMERCIAL

## 2025-05-28 DIAGNOSIS — N18.30 STAGE 3 CHRONIC KIDNEY DISEASE, UNSPECIFIED WHETHER STAGE 3A OR 3B CKD (H): Primary | ICD-10-CM

## 2025-05-28 DIAGNOSIS — D64.9 ANEMIA, UNSPECIFIED TYPE: ICD-10-CM

## 2025-05-29 DIAGNOSIS — N18.30 CHRONIC KIDNEY DISEASE, STAGE III (MODERATE) (H): Primary | ICD-10-CM

## 2025-05-29 DIAGNOSIS — Z93.0 TRACHEOSTOMY STATUS (H): ICD-10-CM

## 2025-05-29 DIAGNOSIS — D64.9 ANEMIA, UNSPECIFIED: ICD-10-CM

## 2025-05-29 DIAGNOSIS — I10 ESSENTIAL HYPERTENSION, MALIGNANT: ICD-10-CM

## 2025-06-04 ENCOUNTER — RESULTS FOLLOW-UP (OUTPATIENT)
Dept: FAMILY MEDICINE | Facility: CLINIC | Age: 60
End: 2025-06-04

## 2025-06-04 ENCOUNTER — LAB (OUTPATIENT)
Dept: LAB | Facility: CLINIC | Age: 60
End: 2025-06-04
Payer: COMMERCIAL

## 2025-06-04 DIAGNOSIS — D64.9 ANEMIA, UNSPECIFIED: ICD-10-CM

## 2025-06-04 DIAGNOSIS — N28.9 ACUTE ON CHRONIC RENAL INSUFFICIENCY: ICD-10-CM

## 2025-06-04 DIAGNOSIS — N18.30 CHRONIC KIDNEY DISEASE, STAGE III (MODERATE) (H): ICD-10-CM

## 2025-06-04 DIAGNOSIS — N18.9 ACUTE ON CHRONIC RENAL INSUFFICIENCY: ICD-10-CM

## 2025-06-04 DIAGNOSIS — Z93.0 TRACHEOSTOMY STATUS (H): ICD-10-CM

## 2025-06-04 DIAGNOSIS — I10 ESSENTIAL HYPERTENSION, MALIGNANT: ICD-10-CM

## 2025-06-04 LAB
ALBUMIN MFR UR ELPH: 189 MG/DL
ALBUMIN SERPL BCG-MCNC: 3.8 G/DL (ref 3.5–5.2)
ALBUMIN UR-MCNC: >=300 MG/DL
ANION GAP SERPL CALCULATED.3IONS-SCNC: 12 MMOL/L (ref 7–15)
APPEARANCE UR: CLEAR
BACTERIA #/AREA URNS HPF: ABNORMAL /HPF
BILIRUB UR QL STRIP: NEGATIVE
BUN SERPL-MCNC: 47.1 MG/DL (ref 8–23)
CALCIUM SERPL-MCNC: 8.5 MG/DL (ref 8.8–10.4)
CHLORIDE SERPL-SCNC: 110 MMOL/L (ref 98–107)
COLOR UR AUTO: YELLOW
CREAT SERPL-MCNC: 1.83 MG/DL (ref 0.67–1.17)
CREAT UR-MCNC: 64 MG/DL
CREAT UR-MCNC: 64 MG/DL
CYSTATIN C (ROCHE): 2.3 MG/L (ref 0.6–1)
EGFRCR SERPLBLD CKD-EPI 2021: 41 ML/MIN/1.73M2
ERYTHROCYTE [DISTWIDTH] IN BLOOD BY AUTOMATED COUNT: 13.9 % (ref 10–15)
GFR/BSA.PRED SERPLBLD CYS-BASED-ARV: 26 ML/MIN/1.73M2
GLUCOSE SERPL-MCNC: 105 MG/DL (ref 70–99)
GLUCOSE UR STRIP-MCNC: NEGATIVE MG/DL
HCO3 SERPL-SCNC: 20 MMOL/L (ref 22–29)
HCT VFR BLD AUTO: 27.9 % (ref 40–53)
HGB BLD-MCNC: 9 G/DL (ref 13.3–17.7)
HGB UR QL STRIP: NEGATIVE
KETONES UR STRIP-MCNC: NEGATIVE MG/DL
LEUKOCYTE ESTERASE UR QL STRIP: NEGATIVE
MCH RBC QN AUTO: 29.8 PG (ref 26.5–33)
MCHC RBC AUTO-ENTMCNC: 32.3 G/DL (ref 31.5–36.5)
MCV RBC AUTO: 92 FL (ref 78–100)
MICROALBUMIN UR-MCNC: 1127 MG/L
MICROALBUMIN/CREAT UR: 1760.94 MG/G CR (ref 0–17)
MUCOUS THREADS #/AREA URNS LPF: PRESENT /LPF
NITRATE UR QL: NEGATIVE
PH UR STRIP: 5.5 [PH] (ref 5–8)
PHOSPHATE SERPL-MCNC: 4.2 MG/DL (ref 2.5–4.5)
PLATELET # BLD AUTO: 184 10E3/UL (ref 150–450)
POTASSIUM SERPL-SCNC: 5.3 MMOL/L (ref 3.4–5.3)
PROT/CREAT 24H UR: 2.95 MG/MG CR (ref 0–0.2)
RBC # BLD AUTO: 3.02 10E6/UL (ref 4.4–5.9)
RBC #/AREA URNS AUTO: ABNORMAL /HPF
SODIUM SERPL-SCNC: 142 MMOL/L (ref 135–145)
SP GR UR STRIP: 1.02 (ref 1–1.03)
SQUAMOUS #/AREA URNS AUTO: ABNORMAL /LPF
UROBILINOGEN UR STRIP-ACNC: 0.2 E.U./DL
WBC # BLD AUTO: 7.6 10E3/UL (ref 4–11)
WBC #/AREA URNS AUTO: ABNORMAL /HPF

## 2025-06-04 PROCEDURE — 82043 UR ALBUMIN QUANTITATIVE: CPT

## 2025-06-04 PROCEDURE — 84156 ASSAY OF PROTEIN URINE: CPT

## 2025-06-04 PROCEDURE — 82668 ASSAY OF ERYTHROPOIETIN: CPT | Mod: 90

## 2025-06-04 PROCEDURE — 81001 URINALYSIS AUTO W/SCOPE: CPT

## 2025-06-04 PROCEDURE — 82610 CYSTATIN C: CPT

## 2025-06-04 PROCEDURE — 85027 COMPLETE CBC AUTOMATED: CPT

## 2025-06-04 PROCEDURE — 82570 ASSAY OF URINE CREATININE: CPT

## 2025-06-04 PROCEDURE — 99000 SPECIMEN HANDLING OFFICE-LAB: CPT

## 2025-06-04 PROCEDURE — 80069 RENAL FUNCTION PANEL: CPT

## 2025-06-04 PROCEDURE — 36415 COLL VENOUS BLD VENIPUNCTURE: CPT

## 2025-06-05 ENCOUNTER — TELEPHONE (OUTPATIENT)
Dept: FAMILY MEDICINE | Facility: CLINIC | Age: 60
End: 2025-06-05
Payer: COMMERCIAL

## 2025-06-05 LAB — EPO SERPL-ACNC: 36 MU/ML

## 2025-06-05 NOTE — TELEPHONE ENCOUNTER
FYI - Status Update    Who is Calling: family member, Daughter HECTOR    Update: Patient daughter's Pavithrau  called stating the patient's GFR is 26% and that the Baclofen is affecting his kidneys and either he need's  to cut back or get a different medication.    Does caller want a call/response back: Yes     Could we send this information to you in My[employer]op or would you prefer to receive a phone call?:   Patient would prefer a phone call   Okay to leave a detailed message?: Yes at Cell number on file:    Telephone Information:   Mobile [phone]

## 2025-06-06 ENCOUNTER — RESULTS FOLLOW-UP (OUTPATIENT)
Dept: MULTI SPECIALTY CLINIC | Facility: CLINIC | Age: 60
End: 2025-06-06

## 2025-06-06 NOTE — TELEPHONE ENCOUNTER
Notify patient daughter to have patient discontinue baclofen due to decrease in kidney function.  Recheck kidney function in 1 week.

## 2025-06-06 NOTE — TELEPHONE ENCOUNTER
Called to relay message to Idalmis, (CTC on file).     Patients daughter stated there may have been a miscommunication they were told it was the Bactrim abx that was effecting his kidneys and that is what they were asking about not the Baclofen.     They would like to verify with Dr. Rosa if they need to adjust the Bactrim or replace it.     They would also like to verify with Dr. Rosa if they also need to for sure stop the baclofen since he mentioned it could be effecting the Kidneys.     Idalmis stated that the nephrologist tested the Renal Panel and the Cystatin C with GFR as they compare them and gives them a better comparison of the GFR.     Orders pended for provider review.     Tyrell Miner RN  [facility]

## 2025-06-09 NOTE — TELEPHONE ENCOUNTER
Daughter calling for update on this message.   (See 6/9/25 note)    Different antibiotic and alternative muscle relaxer- he is having trouble sleeping at night due to muscle spasms.

## 2025-06-10 NOTE — TELEPHONE ENCOUNTER
Called and spoke with patient's daughter Idalmis (consent to communicate on file). Relayed provider response and recommendations. Patient's daughter endorses understanding. Assisted with scheduling virtual appointment for 6/11/25 through [facility] as no appointments available at [facility].  Patient's daughter denies further questions or concerns at this time.    Genie Prado RN  [facility]

## 2025-06-10 NOTE — TELEPHONE ENCOUNTER
Please help patient's family to schedule a virtual visit for patient with any available provider in order to review while I am out of office.

## 2025-06-11 ENCOUNTER — TELEPHONE (OUTPATIENT)
Dept: FAMILY MEDICINE | Facility: CLINIC | Age: 60
End: 2025-06-11

## 2025-06-11 ENCOUNTER — DOCUMENTATION ONLY (OUTPATIENT)
Dept: FAMILY MEDICINE | Facility: CLINIC | Age: 60
End: 2025-06-11

## 2025-06-11 ENCOUNTER — VIRTUAL VISIT (OUTPATIENT)
Dept: INTERNAL MEDICINE | Facility: CLINIC | Age: 60
End: 2025-06-11
Payer: COMMERCIAL

## 2025-06-11 DIAGNOSIS — M62.838 MUSCLE SPASM: ICD-10-CM

## 2025-06-11 DIAGNOSIS — R25.2 SPASTICITY: ICD-10-CM

## 2025-06-11 DIAGNOSIS — N18.4 CKD (CHRONIC KIDNEY DISEASE) STAGE 4, GFR 15-29 ML/MIN (H): Primary | ICD-10-CM

## 2025-06-11 DIAGNOSIS — Z87.01 HISTORY OF PNEUMONIA: Primary | ICD-10-CM

## 2025-06-11 DIAGNOSIS — Z29.89 NEED FOR PROPHYLAXIS AGAINST URINARY TRACT INFECTION: ICD-10-CM

## 2025-06-11 DIAGNOSIS — N18.4 CHRONIC KIDNEY DISEASE, STAGE 4 (SEVERE) (H): ICD-10-CM

## 2025-06-11 PROCEDURE — 98006 SYNCH AUDIO-VIDEO EST MOD 30: CPT | Performed by: NURSE PRACTITIONER

## 2025-06-11 RX ORDER — CYCLOBENZAPRINE HCL 5 MG
5 TABLET ORAL 3 TIMES DAILY PRN
Status: CANCELLED | OUTPATIENT
Start: 2025-06-11

## 2025-06-11 RX ORDER — DOXYCYCLINE HYCLATE 100 MG
100 TABLET ORAL EVERY OTHER DAY
Qty: 90 TABLET | Refills: 1 | Status: SHIPPED | OUTPATIENT
Start: 2025-06-11

## 2025-06-11 RX ORDER — TRIMETHOPRIM 100 MG/1
100 TABLET ORAL 2 TIMES DAILY
Qty: 90 TABLET | Refills: 1 | Status: SHIPPED | OUTPATIENT
Start: 2025-06-11

## 2025-06-11 RX ORDER — BACLOFEN 10 MG/1
TABLET ORAL
Qty: 120 TABLET | Refills: 0 | Status: SHIPPED | OUTPATIENT
Start: 2025-06-11

## 2025-06-11 RX ORDER — TIZANIDINE 2 MG/1
TABLET ORAL
Qty: 90 TABLET | Refills: 3 | Status: SHIPPED | OUTPATIENT
Start: 2025-06-11

## 2025-06-11 RX ORDER — CEPHALEXIN 250 MG/5ML
25 POWDER, FOR SUSPENSION ORAL 4 TIMES DAILY
Status: CANCELLED | OUTPATIENT
Start: 2025-06-11

## 2025-06-11 NOTE — PROGRESS NOTES
Liu is a 60 year old who is being evaluated via a billable video visit.    How would you like to obtain your AVS? MyChart  If the video visit is dropped, the invitation should be resent by: Text to cell phone: [phone]  Will anyone else be joining your video visit? No      Assessment & Plan     Chronic kidney disease, stage 4 (severe) (H)  Recently established with nephrology.    Nephrology has mentioned switching his prophylactic antibiotic regimen and moving away from Bactrim due to his GFR.    Daughter wants me to check another cystatin C with GFR. I can do this although I asked that family try to maintain close communication with nephrology for kidney monitoring moving forward.     Muscle spasm  We will start a 3-week titration off of baclofen and move him towards tizanidine    - baclofen (LIORESAL) 10 MG tablet; Week 1: Take one tablet (10 mg) twice daily; Week 2: Take one half tablet (5 mg) twice daily; Week 3: Take one half tablet (5 mg) daily; Week 4: Stop    History of pneumonia  Patient's daughter tells me that the Bactrim was not only for UTI prophylaxis but for pneumonia prophylaxis (?).    He does have a G-tube. Risk for aspiration pneumonia.     We will go with trimethoprim for UTI prophylaxis.  Doxycycline every other day for respiratory infection prophylaxis.  This should be more amenable for kidney function    - trimethoprim (TRIMPEX) 100 MG tablet; Take 1 tablet (100 mg) by mouth 2 times daily.  - doxycycline hyclate (VIBRA-TABS) 100 MG tablet; Take 1 tablet (100 mg) by mouth every other day.    Spasticity  We had a conversation about the risks associated to abrupt baclofen discontinuation.    The baclofen is going to be more efficacious for spasticity but is going to be harder on kidneys.  Tizanidine is probably the next best option and should be safer for his kidneys    - tiZANidine (ZANAFLEX) 2 MG tablet; Week 1: Take one tablet (2 mg) every evening; Week 2: Take one tablet (2mg) twice  daily; Week 3: Take one tablet (2mg) three times daily)    Need for prophylaxis against urinary tract infection  As above    - trimethoprim (TRIMPEX) 100 MG tablet; Take 1 tablet (100 mg) by mouth 2 times daily.      The longitudinal plan of care for the diagnosis(es)/condition(s) as documented were addressed during this visit. Due to the added complexity in care, I will continue to support Liu in the subsequent management and with ongoing continuity of care.            Subjective   Liu is a 60 year old, presenting for the following health issues:  Follow Up (Needs alternative medication for bactrim and baclofen due to kidney functions)      6/11/2025    11:49 AM   Additional Questions   Roomed by      Video Start Time: 1315    History of Present Illness       CKD: He uses over the counter pain medication, including tylenol, two times daily.    He eats 0-1 servings of fruits and vegetables daily.He consumes 0 sweetened beverage(s) daily.He exercises with enough effort to increase his heart rate 9 or less minutes per day.  He exercises with enough effort to increase his heart rate 3 or less days per week.   He is taking medications regularly.        Virtual visit to discuss making changes to muscle relaxant and antibiotic.    Patient struggling with kidney function.  Nephrology has recommended moving away from Bactrim prophylaxis.    Review of literature shows that we should probably move away from baclofen as well      Objective           Vitals:  No vitals were obtained today due to virtual visit.    Physical Exam aorta intra not often little bit okay            Video-Visit Details    Type of service:  Video Visit   Video End Time:1340  Originating Location (pt. Location): Home    Distant Location (provider location):  On-site  Platform used for Video Visit: Trang  Signed Electronically by: Juan Blandon, ISABELLE

## 2025-06-11 NOTE — PROGRESS NOTES
"Liu Malave has an upcoming lab appointment:    Future Appointments   Date Time Provider Department Center   6/11/2025  1:30 PM Juan Blandon, CNP [facility]   6/12/2025  8:30 AM [facility]     Patient is scheduled for the following lab(s): \"lab recheck\" ?    There is no order available. Please review and place either future orders or HMPO (Review of Health Maintenance Protocol Orders), as appropriate.    There are no preventive care reminders to display for this patient.  Verónica Luis    "

## 2025-06-11 NOTE — TELEPHONE ENCOUNTER
General Call    Contacts       Contact Date/Time Type Contact Phone/Fax    06/11/2025 02:49 PM CDT Phone (Incoming) MILAGRO ESPINAL (Emergency Contact) [phone] (M)          Reason for Call:  Daughter calling, needing lab orders for Cystatin C with GFR  Normal Renal Function     Patient has a lab appt tomorrow.

## 2025-06-12 NOTE — TELEPHONE ENCOUNTER
I ordered this for them. But as I told family yesterday, I cant comment on results. Kidney management is now under patient's nephrology team.

## 2025-06-12 NOTE — TELEPHONE ENCOUNTER
RN called Idalmis on 6/12 and unable to leave a message to return call to clinic.      TC if patient returns call please relay provider notes and recommendations. Please route to nurse queue if there are any further questions or concerns.     Tyrell Miner RN  [facility]

## 2025-06-17 ENCOUNTER — RESULTS FOLLOW-UP (OUTPATIENT)
Dept: FAMILY MEDICINE | Facility: CLINIC | Age: 60
End: 2025-06-17

## 2025-06-17 ENCOUNTER — LAB (OUTPATIENT)
Dept: LAB | Facility: CLINIC | Age: 60
End: 2025-06-17
Payer: COMMERCIAL

## 2025-06-17 DIAGNOSIS — N18.4 CKD (CHRONIC KIDNEY DISEASE) STAGE 4, GFR 15-29 ML/MIN (H): ICD-10-CM

## 2025-06-17 DIAGNOSIS — D64.9 NORMOCHROMIC NORMOCYTIC ANEMIA: ICD-10-CM

## 2025-06-17 DIAGNOSIS — D64.9 CHRONIC ANEMIA: ICD-10-CM

## 2025-06-17 DIAGNOSIS — N18.31 CKD STAGE 3A, GFR 45-59 ML/MIN (H): ICD-10-CM

## 2025-06-17 LAB
ALBUMIN SERPL BCG-MCNC: 3.9 G/DL (ref 3.5–5.2)
ALP SERPL-CCNC: 75 U/L (ref 40–150)
ALT SERPL W P-5'-P-CCNC: 27 U/L (ref 0–70)
ANION GAP SERPL CALCULATED.3IONS-SCNC: 10 MMOL/L (ref 7–15)
AST SERPL W P-5'-P-CCNC: 30 U/L (ref 0–45)
BILIRUB SERPL-MCNC: 0.2 MG/DL
BUN SERPL-MCNC: 50.7 MG/DL (ref 8–23)
CALCIUM SERPL-MCNC: 8.6 MG/DL (ref 8.8–10.4)
CHLORIDE SERPL-SCNC: 115 MMOL/L (ref 98–107)
CREAT SERPL-MCNC: 1.72 MG/DL (ref 0.67–1.17)
CYSTATIN C (ROCHE): 2.5 MG/L (ref 0.6–1)
EGFRCR SERPLBLD CKD-EPI 2021: 44 ML/MIN/1.73M2
ERYTHROCYTE [DISTWIDTH] IN BLOOD BY AUTOMATED COUNT: 13.7 % (ref 10–15)
GFR/BSA.PRED SERPLBLD CYS-BASED-ARV: 23 ML/MIN/1.73M2
GLUCOSE SERPL-MCNC: 109 MG/DL (ref 70–99)
HCO3 SERPL-SCNC: 22 MMOL/L (ref 22–29)
HCT VFR BLD AUTO: 28 % (ref 40–53)
HGB BLD-MCNC: 8.8 G/DL (ref 13.3–17.7)
MCH RBC QN AUTO: 29.2 PG (ref 26.5–33)
MCHC RBC AUTO-ENTMCNC: 31.4 G/DL (ref 31.5–36.5)
MCV RBC AUTO: 93 FL (ref 78–100)
PLATELET # BLD AUTO: 225 10E3/UL (ref 150–450)
POTASSIUM SERPL-SCNC: 4.9 MMOL/L (ref 3.4–5.3)
PROT SERPL-MCNC: 8.1 G/DL (ref 6.4–8.3)
RBC # BLD AUTO: 3.01 10E6/UL (ref 4.4–5.9)
SODIUM SERPL-SCNC: 147 MMOL/L (ref 135–145)
WBC # BLD AUTO: 9 10E3/UL (ref 4–11)

## 2025-06-17 PROCEDURE — 82610 CYSTATIN C: CPT

## 2025-06-17 PROCEDURE — 36415 COLL VENOUS BLD VENIPUNCTURE: CPT

## 2025-06-17 PROCEDURE — 85027 COMPLETE CBC AUTOMATED: CPT

## 2025-06-17 PROCEDURE — 80053 COMPREHEN METABOLIC PANEL: CPT

## 2025-06-18 ENCOUNTER — RESULTS FOLLOW-UP (OUTPATIENT)
Dept: FAMILY MEDICINE | Facility: CLINIC | Age: 60
End: 2025-06-18

## 2025-06-26 ENCOUNTER — TRANSFERRED RECORDS (OUTPATIENT)
Dept: HEALTH INFORMATION MANAGEMENT | Facility: CLINIC | Age: 60
End: 2025-06-26

## 2025-06-30 ENCOUNTER — TELEPHONE (OUTPATIENT)
Dept: FAMILY MEDICINE | Facility: CLINIC | Age: 60
End: 2025-06-30
Payer: COMMERCIAL

## 2025-06-30 ENCOUNTER — MYC MEDICAL ADVICE (OUTPATIENT)
Dept: INTERVENTIONAL RADIOLOGY/VASCULAR | Facility: CLINIC | Age: 60
End: 2025-06-30
Payer: COMMERCIAL

## 2025-06-30 NOTE — TELEPHONE ENCOUNTER
Outside lab orders receved and given to lab.     From [facility].   Urinalysis, urine PRO/CR Ratios, protein electrophoresis urine.

## 2025-06-30 NOTE — TELEPHONE ENCOUNTER
Writer has spoken with MILAGRO, patient's daughter, regarding planned procedure with IR via telephone.      MILAGRO acknowledges understanding of pre-procedure instructions.         I have provided MILAGRO with IR number  for questions or concerns.        Ashley DEL CID RN, BSN  Interventional Radiology

## 2025-07-01 ENCOUNTER — HOSPITAL ENCOUNTER (OUTPATIENT)
Dept: INTERVENTIONAL RADIOLOGY/VASCULAR | Facility: CLINIC | Age: 60
Discharge: HOME | End: 2025-07-01
Attending: NURSE PRACTITIONER | Admitting: NURSE PRACTITIONER
Payer: COMMERCIAL

## 2025-07-01 DIAGNOSIS — I10 ESSENTIAL HYPERTENSION, MALIGNANT: ICD-10-CM

## 2025-07-01 DIAGNOSIS — N18.30 CHRONIC KIDNEY DISEASE, STAGE III (MODERATE) (H): Primary | ICD-10-CM

## 2025-07-01 DIAGNOSIS — R63.30 FEEDING DIFFICULTIES: ICD-10-CM

## 2025-07-01 PROCEDURE — 255N000002 HC RX 255 OP 636: Performed by: NURSE PRACTITIONER

## 2025-07-01 PROCEDURE — 250N000009 HC RX 250: Performed by: RADIOLOGY

## 2025-07-01 PROCEDURE — 49452 REPLACE G-J TUBE PERC: CPT

## 2025-07-01 PROCEDURE — C1769 GUIDE WIRE: HCPCS

## 2025-07-01 RX ORDER — LIDOCAINE HYDROCHLORIDE 20 MG/ML
JELLY TOPICAL ONCE
Status: COMPLETED | OUTPATIENT
Start: 2025-07-01 | End: 2025-07-01

## 2025-07-01 RX ORDER — IOPAMIDOL 612 MG/ML
30 INJECTION, SOLUTION INTRAVASCULAR ONCE
Status: COMPLETED | OUTPATIENT
Start: 2025-07-01 | End: 2025-07-01

## 2025-07-01 RX ADMIN — LIDOCAINE HYDROCHLORIDE: 20 JELLY TOPICAL at 13:08

## 2025-07-01 RX ADMIN — IOPAMIDOL 25 ML: 612 INJECTION, SOLUTION INTRAVENOUS at 13:39

## 2025-07-01 NOTE — DISCHARGE INSTRUCTIONS
Gastrostomy (G) or Gastrojejunostomy (G/J) Tube Exchange Discharge Instructions:   You had a gastrostomy (G) tube or a Gastrojejunostomy Tube (GJ) exchanged.  This tube is often used for nutritional support and medication administration or it can be used for stomach venting.     Care instructions:  - If you received sedation for your procedure, do not drive or operate heavy machinery for the rest of the day.  - Avoid soaking in stagnant water (tub baths, Jacuzzis, pools, lake, or ocean).   - You may shower beginning the day after the tube was exchanged.   - Clean under the disc daily with soap and water. Pat dry under disc and apply new split gauze dressing under disc. Cleaning tube site daily will help prevent infection and skin irritation.  - A small amount of clear tan drainage from the tube exit site can be normal.  - Make sure the disc on the tube fits slightly snug against the skin so that the tube does not move in or out of the body easily.   - If you experience leaking from around tube exit site, the most common cause is that the disc is not tightened against the skin.   - To tighten the disc, pull gently on the tube so the retention balloon (under the skin) is pulled up against the skin under the tube. Push the disc down until it is tight against the skin without a gap between the skin and the disc.  - Flush your tube with 60cc of water twice a day (using a cath tip syringe) to prevent tube from clogging (or follow recommendations from your doctor or dietician if given).    Giving Feedings and Medications:  - Follow-up with your dietician, primary care provider, or oncologist for instructions on tube feedings and medication administration.    - ONLY use specific enteric feedings with your tube (unless otherwise discussed with your dietitian).    - Flush tube at least twice daily with 60ml of water using cath tip syringe unless otherwise instructed by your doctor or dietician.  - Flush the tube before and  after administrating medications and bolus feedings with 60cc of water.    Follow Up:  - Recommend routine 3 month exchanges of feeding tube. Please contact your primary care provider or speak with your dietitian to obtain an order to have your G/GJ tube exchanged. Then contact [facility]'s Medical Imaging scheduling department at [phone] to schedule your G/GJ tube exchange appointment.    Please seek medical evaluation for:  - Fever (greater than 101 F (38.3C).  - Purulent (yellow/green/foul smelling) drainage from tube exit site.   - Significant or worsening abdominal pain.   - Skin that is hot to the touch or significantly reddened at the tube exit site.   - Bleeding at tube exit site.    Call [facility] IR RN Line at [phone] with questions or if you have any of the following symptoms:  - Tube falls out or felt to be out of position.  - Unable to flush tube.  - Significant leakage around tube site (tube feeding, medications or drainage).  - Significant bleeding at the tube exit site.  - Severe pain at tube exit site.    Calm

## 2025-07-01 NOTE — IR NOTE
Patient Name: Liu Malave  Medical Record Number: [id number]  Today's Date: 7/1/2025    Procedure: GJ exchange   Proceduralist: Dr. Newton    Procedure Start: 1331  Procedure end: 1335      Other Notes: Pt arrived to IR room 1 from Pre/post bay 1. Consent reviewed. Pt denies any questions or concerns regarding procedure. Pt positioned supine and monitored per protocol. Pt tolerated procedure without any noted complications. Pt transferred back to Pre/post bay 1. Discharge teaching provided to family.     AVANOS LAURA gastric-jejunal feeding tube with EnFit connectors, 18 Prydeinig, balloon volume 7-10 ml, length 45 cm. Lot #[id number], ref #[id number].

## 2025-07-09 ENCOUNTER — TELEPHONE (OUTPATIENT)
Dept: FAMILY MEDICINE | Facility: CLINIC | Age: 60
End: 2025-07-09
Payer: COMMERCIAL

## 2025-07-14 ENCOUNTER — TELEPHONE (OUTPATIENT)
Dept: FAMILY MEDICINE | Facility: CLINIC | Age: 60
End: 2025-07-14
Payer: COMMERCIAL

## 2025-07-14 DIAGNOSIS — K59.00 CONSTIPATION, UNSPECIFIED CONSTIPATION TYPE: ICD-10-CM

## 2025-07-14 RX ORDER — BISACODYL 10 MG
10 SUPPOSITORY, RECTAL RECTAL EVERY OTHER DAY
Qty: 15 SUPPOSITORY | Refills: 11 | Status: CANCELLED | OUTPATIENT
Start: 2025-07-14

## 2025-07-14 NOTE — TELEPHONE ENCOUNTER
Refill Request  Medication name: Pending Prescriptions:                       Disp   Refills    bisacodyl (DULCOLAX) 10 MG suppository    15 sup*11           Sig: Place 1 suppository (10 mg) rectally every other           day.    Requested Pharmacy:     [id number]  [place], [place] - [address] [place] [address]      Patient has been using this everyday and is out of medication

## 2025-07-15 LAB
ALBUMIN MFR UR ELPH: 79.7 %
ALPHA1 GLOB MFR UR ELPH: 1 %
ALPHA2 GLOB MFR UR ELPH: 3.4 %
B-GLOBULIN MFR UR ELPH: 6.4 %
GAMMA GLOB MFR UR ELPH: 9.5 %
M PROTEIN MFR UR ELPH: 0 %
PROT PATTERN UR ELPH-IMP: ABNORMAL

## 2025-07-15 NOTE — TELEPHONE ENCOUNTER
Clinic RN: Please investigate patient's chart or contact patient if the information cannot be found because a refill was sent to pharmacy on 2/27/25 for 15 suppositories with 11 refills. Please call to see if they are needing to use this many suppositories and if so please triage constipation if appropriate.    Viri Alvarado RN on 7/15/2025 at 8:41 AM

## 2025-07-15 NOTE — TELEPHONE ENCOUNTER
Writer huddled with the PCP, regarding constipation concerns from 7/15/25, and the PCP stated that it was appropriate for the patient to wait until tomorrow for evaluation in-clinic.    Pricilla Simms RN, BSN  [facility]

## 2025-07-15 NOTE — TELEPHONE ENCOUNTER
S-(situation): Called and spoke with patient's daughter Idalmis (consent to communicate on file) to discuss constipation.  Daughter is not currently with the patient so unable to triage.    B-(background):   Hx: on tube feeding diet, chronic hepatitis C, malnutrition, history of CVA, abdominal pain of unknown cause, other constipation, acute on chronic renal insufficiency    A-(assessment):   Daughter reports patient was previously using the suppository every other day. States patient is now using the suppository every day to have a bowel movement, and sometimes requires 2 suppositories per day.  Daughter wants to increase the prescription to be 1-2 times daily with increase in quantity of suppositories to 30 per month, or if there is a higher dose option for the suppository, as insurance is not allowing the patient to refill the prescription early    Daughter reports patient has had chronic constipation since his stroke.  Patient's daughter feels the difficulty with bowel movements is more related to lack of muscle control  Reports stool is not really hard  has a daily bowel movement with 1-2 suppositories per day (occasionally bowel movement every other day)  patient is also using MiraLAX daily for constipation.    denies complaints of abdominal pain outside of baseline, denies abdomen looking bloated or larger than patient baseline  patient eating and drinking normally  denies nausea and vomiting  otherwise acting normally    R-(recommendations): Recommended an office visit to discuss constipation management. Patient's daughter endorses understanding and appointment scheduled for tomorrow 7/16/25 at 2:20pm.  Patient's daughter requests a change to suppository prescription today for larger quantity or higher dosage, as patient has only one suppository remaining.  Routing to provider to advise if appropriate for appointment tomorrow and to review prescription request.      Genie Prado RN  [facility]

## 2025-07-16 ENCOUNTER — VIRTUAL VISIT (OUTPATIENT)
Dept: FAMILY MEDICINE | Facility: CLINIC | Age: 60
End: 2025-07-16
Payer: COMMERCIAL

## 2025-07-16 DIAGNOSIS — H04.123 DRY EYES: ICD-10-CM

## 2025-07-16 DIAGNOSIS — Z78.9 ON TUBE FEEDING DIET: ICD-10-CM

## 2025-07-16 DIAGNOSIS — K59.00 CONSTIPATION, UNSPECIFIED CONSTIPATION TYPE: Primary | ICD-10-CM

## 2025-07-16 DIAGNOSIS — Z86.73 HISTORY OF CVA (CEREBROVASCULAR ACCIDENT): ICD-10-CM

## 2025-07-16 DIAGNOSIS — I69.151 SPASTIC HEMIPLEGIA OF RIGHT DOMINANT SIDE AS LATE EFFECT OF NONTRAUMATIC INTRAPARENCHYMAL HEMORRHAGE OF BRAIN (H): ICD-10-CM

## 2025-07-16 DIAGNOSIS — J69.0 ASPIRATION PNEUMONITIS (H): ICD-10-CM

## 2025-07-16 PROCEDURE — 98013 SYNCH AUDIO-ONLY EST LOW 20: CPT | Performed by: FAMILY MEDICINE

## 2025-07-16 RX ORDER — BISACODYL 10 MG
10 SUPPOSITORY, RECTAL RECTAL DAILY PRN
Qty: 30 SUPPOSITORY | Refills: 11 | Status: SHIPPED | OUTPATIENT
Start: 2025-07-16

## 2025-07-16 NOTE — PROGRESS NOTES
Liu is a 60 year old who is being evaluated via a billable telephone visit.    What phone number would you like to be contacted at? [phone]  How would you like to obtain your AVS? Dominique  Originating Location (pt. Location): Home    Distant Location (provider location):  On-site  Telephone visit completed due to the patient did not consent to a video visit.    Assessment & Plan     Constipation, unspecified constipation type  Constipation concerns.  Benefits from Dulcolax suppository 10 mg daily.  Getting 15 a month currently and requesting increase to 30/month.  This was provided today.  - bisacodyl (DULCOLAX) 10 MG suppository  Dispense: 30 suppository; Refill: 11    History of CVA (cerebrovascular accident)  History of CVA with spastic hemiplegia of right dominant side.  Difficulty with transfers at [facility].  Homebound status noted.    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    Aspiration pneumonitis (H)  History of aspiration pneumonitis.  Does cough at times.  No fever.  No respiratory distress otherwise and will monitor with aspiration precautions reviewed.    On tube feeding diet  Tube feedings continue.  Will check with gastroenterology regarding iron replacement with history of noted anemia with prior hemoglobin 8.8 with MCV 93.    Dry eyes  As needed Visine prescribed.  - Polyethylene Glycol 400 (VISINE DRY EYE RELIEF) 1 % SOLN  Dispense: 15 mL; Refill: 11          Subjective   Liu is a 60 year old, presenting for the following health issues:  Constipation        7/16/2025     2:17 PM   Additional Questions   Roomed by      History of Present Illness       Reason for visit:  Constipation    He eats 0-1 servings of fruits and vegetables daily.He consumes 0 sweetened beverage(s) daily.He exercises with enough effort to increase his heart rate 9 or less minutes per day.  He exercises with enough effort to increase his  heart rate 3 or less days per week.   He is taking medications regularly.        Telephone visit completed today.  Patient is homebound.  History of CVA with spastic hemiplegia of dominant right side.  Difficulty with transfers.  History of aspiration pneumonitis in the past.  Does receive tube feeds.  Still coughs at times.  No fever.  Would like to increase Dulcolax suppository to daily use.  Currently receiving 15/month.  Considering IV infusion but unable to receive currently and questions regarding IV supplementation per G-tube.        Review of Systems  Constitutional, HEENT, cardiovascular, pulmonary, GI, , musculoskeletal, neuro, skin, endocrine and psych systems are negative, except as otherwise noted.      Objective           Vitals:  No vitals were obtained today due to virtual visit.    Physical Exam   General: Alert and no distress //Respiratory: No audible wheeze, cough, or shortness of breath // Psychiatric:  Appropriate affect, tone, and pace of words        EXAM: XR CHEST 1 VIEW  LOCATION: [facility]  DATE: 2/27/2025     INDICATION:  Aspiration pneumonitis (H)  COMPARISON: 2/10/2025                                                                      IMPRESSION: Mild linear scarring again seen in the left lung base. Previous hazy opacity in the right midlung seen on prior study has resolved. No new focal airspace opacity, pleural effusion or pneumothorax. Heart size and pulmonary vascularity are   normal. Atherosclerotic calcification in the aortic arch.            Phone call duration: 15 minutes  Signed Electronically by: Steven Rosa MD

## 2025-07-17 ENCOUNTER — TELEPHONE (OUTPATIENT)
Dept: FAMILY MEDICINE | Facility: CLINIC | Age: 60
End: 2025-07-17

## 2025-07-17 ENCOUNTER — LAB (OUTPATIENT)
Dept: LAB | Facility: CLINIC | Age: 60
End: 2025-07-17
Payer: COMMERCIAL

## 2025-07-17 DIAGNOSIS — N18.30 CHRONIC KIDNEY DISEASE, STAGE III (MODERATE) (H): ICD-10-CM

## 2025-07-17 DIAGNOSIS — K59.00 CONSTIPATION, UNSPECIFIED CONSTIPATION TYPE: ICD-10-CM

## 2025-07-17 DIAGNOSIS — I10 ESSENTIAL HYPERTENSION, MALIGNANT: ICD-10-CM

## 2025-07-17 DIAGNOSIS — J69.0 ASPIRATION PNEUMONITIS (H): ICD-10-CM

## 2025-07-17 LAB
ALBUMIN MFR UR ELPH: 224 MG/DL
ALBUMIN UR-MCNC: >=300 MG/DL
APPEARANCE UR: CLEAR
BACTERIA #/AREA URNS HPF: ABNORMAL /HPF
BILIRUB UR QL STRIP: NEGATIVE
COLOR UR AUTO: YELLOW
CREAT UR-MCNC: 65.8 MG/DL
ERYTHROCYTE [DISTWIDTH] IN BLOOD BY AUTOMATED COUNT: 13.8 % (ref 10–15)
GLUCOSE UR STRIP-MCNC: NEGATIVE MG/DL
HCT VFR BLD AUTO: 25.3 % (ref 40–53)
HGB BLD-MCNC: 8.1 G/DL (ref 13.3–17.7)
HGB UR QL STRIP: NEGATIVE
HOLD SPECIMEN: NORMAL
HYALINE CASTS #/AREA URNS LPF: ABNORMAL /LPF
KETONES UR STRIP-MCNC: NEGATIVE MG/DL
LEUKOCYTE ESTERASE UR QL STRIP: NEGATIVE
MCH RBC QN AUTO: 29.1 PG (ref 26.5–33)
MCHC RBC AUTO-ENTMCNC: 32 G/DL (ref 31.5–36.5)
MCV RBC AUTO: 91 FL (ref 78–100)
NITRATE UR QL: NEGATIVE
PH UR STRIP: 5 [PH] (ref 5–8)
PLATELET # BLD AUTO: 211 10E3/UL (ref 150–450)
PROT/CREAT 24H UR: 3.4 MG/MG CR (ref 0–0.2)
RBC # BLD AUTO: 2.78 10E6/UL (ref 4.4–5.9)
RBC #/AREA URNS AUTO: ABNORMAL /HPF
SP GR UR STRIP: 1.02 (ref 1–1.03)
SQUAMOUS #/AREA URNS AUTO: ABNORMAL /LPF
UROBILINOGEN UR STRIP-ACNC: 0.2 E.U./DL
VIT D+METAB SERPL-MCNC: 29 NG/ML (ref 20–50)
WBC # BLD AUTO: 6 10E3/UL (ref 4–11)
WBC #/AREA URNS AUTO: ABNORMAL /HPF

## 2025-07-17 PROCEDURE — 36415 COLL VENOUS BLD VENIPUNCTURE: CPT

## 2025-07-17 PROCEDURE — 99000 SPECIMEN HANDLING OFFICE-LAB: CPT

## 2025-07-17 PROCEDURE — 85027 COMPLETE CBC AUTOMATED: CPT

## 2025-07-17 PROCEDURE — 81001 URINALYSIS AUTO W/SCOPE: CPT

## 2025-07-17 PROCEDURE — 84156 ASSAY OF PROTEIN URINE: CPT

## 2025-07-17 PROCEDURE — 82306 VITAMIN D 25 HYDROXY: CPT

## 2025-07-17 PROCEDURE — 82397 CHEMILUMINESCENT ASSAY: CPT | Mod: 90

## 2025-07-22 LAB — PTH RELATED PROT SERPL-SCNC: 0.8 PMOL/L

## 2025-07-25 ENCOUNTER — TELEPHONE (OUTPATIENT)
Dept: FAMILY MEDICINE | Facility: CLINIC | Age: 60
End: 2025-07-25
Payer: COMMERCIAL

## 2025-07-25 NOTE — TELEPHONE ENCOUNTER
Forms/Letter Request    Type of form/letter: Home Health Certification      Do we have the form/letter: Yes: Placed in Dr. Rosa in box    Who is the form from? Home care/ [facility]    Where did/will the form come from? form was faxed in    When is form/letter needed by: when done    How would you like the form/letter returned: Fax : [phone]

## 2025-07-28 DIAGNOSIS — R42 VERTIGO: ICD-10-CM

## 2025-07-28 DIAGNOSIS — R05.3 CHRONIC COUGH: ICD-10-CM

## 2025-07-28 RX ORDER — MECLIZINE HYDROCHLORIDE 25 MG/1
TABLET ORAL
Qty: 30 TABLET | Refills: 1 | Status: SHIPPED | OUTPATIENT
Start: 2025-07-28

## 2025-07-28 RX ORDER — IPRATROPIUM BROMIDE AND ALBUTEROL SULFATE 2.5; .5 MG/3ML; MG/3ML
SOLUTION RESPIRATORY (INHALATION)
Qty: 90 ML | Refills: 2 | Status: SHIPPED | OUTPATIENT
Start: 2025-07-28

## 2025-07-29 ENCOUNTER — MEDICAL CORRESPONDENCE (OUTPATIENT)
Dept: HEALTH INFORMATION MANAGEMENT | Facility: CLINIC | Age: 60
End: 2025-07-29
Payer: COMMERCIAL

## 2025-08-11 ENCOUNTER — TELEPHONE (OUTPATIENT)
Dept: FAMILY MEDICINE | Facility: CLINIC | Age: 60
End: 2025-08-11
Payer: COMMERCIAL

## 2025-08-11 DIAGNOSIS — R45.1 RESTLESSNESS AND AGITATION: ICD-10-CM

## 2025-08-11 RX ORDER — QUETIAPINE FUMARATE 25 MG/1
TABLET, FILM COATED ORAL
Qty: 102 TABLET | Refills: 0 | OUTPATIENT
Start: 2025-08-11

## 2025-08-25 ENCOUNTER — TELEPHONE (OUTPATIENT)
Dept: INTERVENTIONAL RADIOLOGY/VASCULAR | Facility: CLINIC | Age: 60
End: 2025-08-25
Payer: COMMERCIAL

## 2025-08-25 DIAGNOSIS — N18.30 CHRONIC KIDNEY DISEASE, STAGE III (MODERATE) (H): Primary | ICD-10-CM

## 2025-08-26 ENCOUNTER — MYC MEDICAL ADVICE (OUTPATIENT)
Dept: INTERVENTIONAL RADIOLOGY/VASCULAR | Facility: CLINIC | Age: 60
End: 2025-08-26
Payer: COMMERCIAL

## 2025-08-27 ENCOUNTER — HOSPITAL ENCOUNTER (OUTPATIENT)
Dept: INTERVENTIONAL RADIOLOGY/VASCULAR | Facility: CLINIC | Age: 60
Discharge: HOME | End: 2025-08-27
Payer: COMMERCIAL

## 2025-08-27 PROCEDURE — 49452 REPLACE G-J TUBE PERC: CPT

## 2025-08-27 PROCEDURE — C1769 GUIDE WIRE: HCPCS

## 2025-08-27 PROCEDURE — 250N000009 HC RX 250: Performed by: RADIOLOGY

## 2025-08-27 RX ORDER — LIDOCAINE HYDROCHLORIDE 20 MG/ML
JELLY TOPICAL ONCE
Status: COMPLETED | OUTPATIENT
Start: 2025-08-27 | End: 2025-08-27

## 2025-08-27 RX ADMIN — LIDOCAINE HYDROCHLORIDE: 20 JELLY TOPICAL at 15:21

## 2025-08-28 DIAGNOSIS — R45.1 RESTLESSNESS AND AGITATION: ICD-10-CM

## 2025-08-28 DIAGNOSIS — K59.01 SLOW TRANSIT CONSTIPATION: ICD-10-CM

## 2025-08-28 RX ORDER — POLYETHYLENE GLYCOL 3350 17 G/17G
POWDER, FOR SOLUTION ORAL
Qty: 850 G | Refills: 1 | Status: SHIPPED | OUTPATIENT
Start: 2025-08-28

## 2025-08-28 RX ORDER — QUETIAPINE FUMARATE 25 MG/1
TABLET, FILM COATED ORAL
Qty: 102 TABLET | Refills: 5 | Status: SHIPPED | OUTPATIENT
Start: 2025-08-28

## 2025-09-02 ENCOUNTER — VIRTUAL VISIT (OUTPATIENT)
Dept: URGENT CARE | Facility: CLINIC | Age: 60
End: 2025-09-02
Payer: COMMERCIAL

## 2025-09-02 ENCOUNTER — NURSE TRIAGE (OUTPATIENT)
Dept: FAMILY MEDICINE | Facility: CLINIC | Age: 60
End: 2025-09-02

## 2025-09-02 DIAGNOSIS — U07.1 COVID: Primary | ICD-10-CM

## 2025-09-02 PROCEDURE — 98005 SYNCH AUDIO-VIDEO EST LOW 20: CPT

## (undated) DEVICE — RX BACITRACIN OINTMENT 0.9G 1/32OZ 01680 11109

## (undated) DEVICE — ANTIFOG SOLUTION W/FOAM PAD 31142527

## (undated) DEVICE — PREP POVIDONE IODINE SOLUTION 10% 120ML

## (undated) DEVICE — GLOVE PROTEXIS POWDER FREE 8.5 ORTHOPEDIC 2D73ET85

## (undated) DEVICE — PAD ARMBOARD FOAM EGGCRATE COVIDEN 3114367

## (undated) DEVICE — LINEN ORTHO PACK 5446

## (undated) DEVICE — SOL WATER IRRIG 1000ML BOTTLE 2F7114

## (undated) DEVICE — STRAP KNEE/BODY 31143004

## (undated) DEVICE — SUCTION CANISTER MEDIVAC LINER 1500ML W/LID 65651-515

## (undated) DEVICE — COVER PROBE ULTRASOUND 3D W/GEL 5X96" LF 20-P3D596

## (undated) DEVICE — GLOVE PROTEXIS W/NEU-THERA 8.5  2D73TE85

## (undated) DEVICE — LINEN GOWN OVERSIZE 5408

## (undated) DEVICE — PREP POVIDONE IODINE 10% SWABSTICKS TRIPLE PACK AS-PVPSBPT

## (undated) DEVICE — LINEN GOWN X4 5410

## (undated) DEVICE — TOOTHBRUSH ADULT NON STERILE MDS136850

## (undated) DEVICE — Device

## (undated) DEVICE — SOL NACL 0.9% IRRIG 1000ML BOTTLE 2F7124

## (undated) DEVICE — SYR EAR BULB 3OZ 0035830

## (undated) DEVICE — SUCTION TIP YANKAUER W/O VENT K86

## (undated) DEVICE — TUBING SUCTION MEDI-VAC 1/4"X20' N620A

## (undated) RX ORDER — FENTANYL CITRATE 50 UG/ML
INJECTION, SOLUTION INTRAMUSCULAR; INTRAVENOUS
Status: DISPENSED
Start: 2018-03-07

## (undated) RX ORDER — LIDOCAINE HYDROCHLORIDE 20 MG/ML
JELLY TOPICAL
Status: DISPENSED
Start: 2023-11-16

## (undated) RX ORDER — LIDOCAINE HYDROCHLORIDE 10 MG/ML
INJECTION, SOLUTION EPIDURAL; INFILTRATION; INTRACAUDAL; PERINEURAL
Status: DISPENSED
Start: 2023-01-18

## (undated) RX ORDER — ROPIVACAINE HYDROCHLORIDE 5 MG/ML
INJECTION, SOLUTION EPIDURAL; INFILTRATION; PERINEURAL
Status: DISPENSED
Start: 2018-03-07

## (undated) RX ORDER — EPHEDRINE SULFATE 50 MG/ML
INJECTION, SOLUTION INTRAMUSCULAR; INTRAVENOUS; SUBCUTANEOUS
Status: DISPENSED
Start: 2018-03-07

## (undated) RX ORDER — LIDOCAINE HYDROCHLORIDE 20 MG/ML
JELLY TOPICAL
Status: DISPENSED
Start: 2024-07-19

## (undated) RX ORDER — LIDOCAINE HYDROCHLORIDE 20 MG/ML
JELLY TOPICAL
Status: DISPENSED
Start: 2024-12-10

## (undated) RX ORDER — PROPOFOL 10 MG/ML
INJECTION, EMULSION INTRAVENOUS
Status: DISPENSED
Start: 2018-03-07

## (undated) RX ORDER — OXYMETAZOLINE HYDROCHLORIDE 0.05 G/100ML
SPRAY NASAL
Status: DISPENSED
Start: 2018-03-07

## (undated) RX ORDER — LIDOCAINE HYDROCHLORIDE 20 MG/ML
JELLY TOPICAL
Status: DISPENSED
Start: 2024-10-21

## (undated) RX ORDER — LIDOCAINE HYDROCHLORIDE 20 MG/ML
JELLY TOPICAL
Status: DISPENSED
Start: 2024-12-18

## (undated) RX ORDER — KETOROLAC TROMETHAMINE 30 MG/ML
INJECTION, SOLUTION INTRAMUSCULAR; INTRAVENOUS
Status: DISPENSED
Start: 2018-03-07

## (undated) RX ORDER — LIDOCAINE HYDROCHLORIDE 20 MG/ML
JELLY TOPICAL
Status: DISPENSED
Start: 2025-08-27

## (undated) RX ORDER — CHLORHEXIDINE GLUCONATE ORAL RINSE 1.2 MG/ML
SOLUTION DENTAL
Status: DISPENSED
Start: 2018-03-07

## (undated) RX ORDER — DEXAMETHASONE SODIUM PHOSPHATE 4 MG/ML
INJECTION, SOLUTION INTRA-ARTICULAR; INTRALESIONAL; INTRAMUSCULAR; INTRAVENOUS; SOFT TISSUE
Status: DISPENSED
Start: 2018-03-07

## (undated) RX ORDER — LIDOCAINE HYDROCHLORIDE 20 MG/ML
JELLY TOPICAL
Status: DISPENSED
Start: 2024-11-25

## (undated) RX ORDER — ROCURONIUM BROMIDE 50 MG/5 ML
SYRINGE (ML) INTRAVENOUS
Status: DISPENSED
Start: 2018-03-07

## (undated) RX ORDER — TRIAMCINOLONE ACETONIDE 40 MG/ML
INJECTION, SUSPENSION INTRA-ARTICULAR; INTRAMUSCULAR
Status: DISPENSED
Start: 2018-03-07

## (undated) RX ORDER — LIDOCAINE HYDROCHLORIDE 20 MG/ML
JELLY TOPICAL
Status: DISPENSED
Start: 2025-04-03

## (undated) RX ORDER — LIDOCAINE HYDROCHLORIDE 20 MG/ML
JELLY TOPICAL
Status: DISPENSED
Start: 2025-07-01

## (undated) RX ORDER — MIDAZOLAM HYDROCHLORIDE 2 MG/ML
SYRUP ORAL
Status: DISPENSED
Start: 2018-03-07

## (undated) RX ORDER — LIDOCAINE HYDROCHLORIDE 20 MG/ML
JELLY TOPICAL
Status: DISPENSED
Start: 2024-11-15

## (undated) RX ORDER — LIDOCAINE HYDROCHLORIDE AND EPINEPHRINE 10; 10 MG/ML; UG/ML
INJECTION, SOLUTION INFILTRATION; PERINEURAL
Status: DISPENSED
Start: 2018-03-07

## (undated) RX ORDER — CLINDAMYCIN PHOSPHATE 600 MG/50ML
INJECTION, SOLUTION INTRAVENOUS
Status: DISPENSED
Start: 2018-03-07

## (undated) RX ORDER — ONDANSETRON 2 MG/ML
INJECTION INTRAMUSCULAR; INTRAVENOUS
Status: DISPENSED
Start: 2018-03-07

## (undated) RX ORDER — PHENYLEPHRINE HCL IN 0.9% NACL 1 MG/10 ML
SYRINGE (ML) INTRAVENOUS
Status: DISPENSED
Start: 2018-03-07

## (undated) RX ORDER — LIDOCAINE HYDROCHLORIDE 20 MG/ML
INJECTION, SOLUTION EPIDURAL; INFILTRATION; INTRACAUDAL; PERINEURAL
Status: DISPENSED
Start: 2018-03-07

## (undated) RX ORDER — LIDOCAINE HYDROCHLORIDE 20 MG/ML
JELLY TOPICAL
Status: DISPENSED
Start: 2023-08-23

## (undated) RX ORDER — LIDOCAINE HYDROCHLORIDE 20 MG/ML
JELLY TOPICAL
Status: DISPENSED
Start: 2024-04-09

## (undated) RX ORDER — LIDOCAINE HYDROCHLORIDE 10 MG/ML
INJECTION, SOLUTION EPIDURAL; INFILTRATION; INTRACAUDAL; PERINEURAL
Status: DISPENSED
Start: 2022-11-03